# Patient Record
Sex: MALE | Race: WHITE | Employment: OTHER | URBAN - METROPOLITAN AREA
[De-identification: names, ages, dates, MRNs, and addresses within clinical notes are randomized per-mention and may not be internally consistent; named-entity substitution may affect disease eponyms.]

---

## 2017-11-27 ENCOUNTER — HOSPITAL ENCOUNTER (INPATIENT)
Facility: CLINIC | Age: 67
LOS: 2 days | Discharge: HOME | DRG: 871 | End: 2017-11-30
Attending: EMERGENCY MEDICINE | Admitting: INTERNAL MEDICINE
Payer: MEDICARE

## 2017-11-27 ENCOUNTER — APPOINTMENT (OUTPATIENT)
Dept: GENERAL RADIOLOGY | Facility: CLINIC | Age: 67
DRG: 871 | End: 2017-11-27
Attending: EMERGENCY MEDICINE
Payer: MEDICARE

## 2017-11-27 ENCOUNTER — TRANSFERRED RECORDS (OUTPATIENT)
Dept: HEALTH INFORMATION MANAGEMENT | Facility: CLINIC | Age: 67
End: 2017-11-27

## 2017-11-27 DIAGNOSIS — J18.9 COMMUNITY ACQUIRED PNEUMONIA OF LEFT LOWER LOBE OF LUNG: ICD-10-CM

## 2017-11-27 DIAGNOSIS — R79.89 ELEVATED TROPONIN: ICD-10-CM

## 2017-11-27 DIAGNOSIS — M33.20 POLYMYOSITIS (H): Primary | ICD-10-CM

## 2017-11-27 DIAGNOSIS — E11.29 TYPE 2 DIABETES MELLITUS WITH MICROALBUMINURIA, UNSPECIFIED LONG TERM INSULIN USE STATUS: ICD-10-CM

## 2017-11-27 DIAGNOSIS — R80.9 TYPE 2 DIABETES MELLITUS WITH MICROALBUMINURIA, UNSPECIFIED LONG TERM INSULIN USE STATUS: ICD-10-CM

## 2017-11-27 LAB
ALBUMIN SERPL-MCNC: 2.9 G/DL (ref 3.4–5)
ALP SERPL-CCNC: 96 U/L (ref 40–150)
ALT SERPL W P-5'-P-CCNC: 12 U/L (ref 0–70)
ANION GAP SERPL CALCULATED.3IONS-SCNC: 9 MMOL/L (ref 3–14)
AST SERPL W P-5'-P-CCNC: 15 U/L (ref 0–45)
BASOPHILS # BLD AUTO: 0 10E9/L (ref 0–0.2)
BASOPHILS NFR BLD AUTO: 0.2 %
BILIRUB SERPL-MCNC: 0.5 MG/DL (ref 0.2–1.3)
BUN SERPL-MCNC: 18 MG/DL (ref 7–30)
CALCIUM SERPL-MCNC: 9.7 MG/DL (ref 8.5–10.1)
CHLORIDE SERPL-SCNC: 102 MMOL/L (ref 94–109)
CO2 SERPL-SCNC: 24 MMOL/L (ref 20–32)
CREAT SERPL-MCNC: 1.13 MG/DL (ref 0.66–1.25)
DIFFERENTIAL METHOD BLD: ABNORMAL
EOSINOPHIL # BLD AUTO: 0 10E9/L (ref 0–0.7)
EOSINOPHIL NFR BLD AUTO: 0 %
ERYTHROCYTE [DISTWIDTH] IN BLOOD BY AUTOMATED COUNT: 15.9 % (ref 10–15)
FLUAV+FLUBV AG SPEC QL: NEGATIVE
FLUAV+FLUBV AG SPEC QL: NEGATIVE
GFR SERPL CREATININE-BSD FRML MDRD: 48 ML/MIN/1.7M2
GLUCOSE SERPL-MCNC: 130 MG/DL (ref 70–99)
HCT VFR BLD AUTO: 42.6 % (ref 40–53)
HGB BLD-MCNC: 14.1 G/DL (ref 13.3–17.7)
IMM GRANULOCYTES # BLD: 0.2 10E9/L (ref 0–0.4)
IMM GRANULOCYTES NFR BLD: 0.8 %
LACTATE SERPL-SCNC: 1.9 MMOL/L (ref 0.4–2)
LYMPHOCYTES # BLD AUTO: 0.7 10E9/L (ref 0.8–5.3)
LYMPHOCYTES NFR BLD AUTO: 2.6 %
MCH RBC QN AUTO: 30.7 PG (ref 26.5–33)
MCHC RBC AUTO-ENTMCNC: 33.1 G/DL (ref 31.5–36.5)
MCV RBC AUTO: 93 FL (ref 78–100)
MONOCYTES # BLD AUTO: 0.9 10E9/L (ref 0–1.3)
MONOCYTES NFR BLD AUTO: 3.6 %
NEUTROPHILS # BLD AUTO: 24.4 10E9/L (ref 1.6–8.3)
NEUTROPHILS NFR BLD AUTO: 92.8 %
NRBC # BLD AUTO: 0 10*3/UL
NRBC BLD AUTO-RTO: 0 /100
NT-PROBNP SERPL-MCNC: 2061 PG/ML (ref 0–900)
PLATELET # BLD AUTO: 317 10E9/L (ref 150–450)
POTASSIUM SERPL-SCNC: 4.1 MMOL/L (ref 3.4–5.3)
PROT SERPL-MCNC: 7.7 G/DL (ref 6.8–8.8)
RBC # BLD AUTO: 4.59 10E12/L (ref 4.4–5.9)
SODIUM SERPL-SCNC: 135 MMOL/L (ref 133–144)
SPECIMEN SOURCE: NORMAL
TROPONIN I SERPL-MCNC: 0.07 UG/L (ref 0–0.04)
WBC # BLD AUTO: 26.2 10E9/L (ref 4–11)

## 2017-11-27 PROCEDURE — 94640 AIRWAY INHALATION TREATMENT: CPT

## 2017-11-27 PROCEDURE — 87181 SC STD AGAR DILUTION PER AGT: CPT | Performed by: EMERGENCY MEDICINE

## 2017-11-27 PROCEDURE — 96365 THER/PROPH/DIAG IV INF INIT: CPT

## 2017-11-27 PROCEDURE — 87040 BLOOD CULTURE FOR BACTERIA: CPT | Performed by: EMERGENCY MEDICINE

## 2017-11-27 PROCEDURE — 87077 CULTURE AEROBIC IDENTIFY: CPT | Performed by: EMERGENCY MEDICINE

## 2017-11-27 PROCEDURE — 25000128 H RX IP 250 OP 636: Performed by: EMERGENCY MEDICINE

## 2017-11-27 PROCEDURE — 87804 INFLUENZA ASSAY W/OPTIC: CPT | Performed by: EMERGENCY MEDICINE

## 2017-11-27 PROCEDURE — 93005 ELECTROCARDIOGRAM TRACING: CPT

## 2017-11-27 PROCEDURE — 87185 SC STD ENZYME DETCJ PER NZM: CPT | Performed by: EMERGENCY MEDICINE

## 2017-11-27 PROCEDURE — 83880 ASSAY OF NATRIURETIC PEPTIDE: CPT | Performed by: EMERGENCY MEDICINE

## 2017-11-27 PROCEDURE — A9270 NON-COVERED ITEM OR SERVICE: HCPCS | Mod: GY | Performed by: EMERGENCY MEDICINE

## 2017-11-27 PROCEDURE — 71020 XR CHEST 2 VW: CPT

## 2017-11-27 PROCEDURE — 83605 ASSAY OF LACTIC ACID: CPT | Performed by: EMERGENCY MEDICINE

## 2017-11-27 PROCEDURE — 84145 PROCALCITONIN (PCT): CPT | Performed by: EMERGENCY MEDICINE

## 2017-11-27 PROCEDURE — 40000275 ZZH STATISTIC RCP TIME EA 10 MIN

## 2017-11-27 PROCEDURE — 85025 COMPLETE CBC W/AUTO DIFF WBC: CPT | Performed by: EMERGENCY MEDICINE

## 2017-11-27 PROCEDURE — 99285 EMERGENCY DEPT VISIT HI MDM: CPT | Mod: 25

## 2017-11-27 PROCEDURE — 25000132 ZZH RX MED GY IP 250 OP 250 PS 637: Mod: GY | Performed by: EMERGENCY MEDICINE

## 2017-11-27 PROCEDURE — 80053 COMPREHEN METABOLIC PANEL: CPT | Performed by: EMERGENCY MEDICINE

## 2017-11-27 PROCEDURE — 36415 COLL VENOUS BLD VENIPUNCTURE: CPT | Performed by: EMERGENCY MEDICINE

## 2017-11-27 PROCEDURE — 96375 TX/PRO/DX INJ NEW DRUG ADDON: CPT

## 2017-11-27 PROCEDURE — 87800 DETECT AGNT MULT DNA DIREC: CPT | Performed by: EMERGENCY MEDICINE

## 2017-11-27 PROCEDURE — 93005 ELECTROCARDIOGRAM TRACING: CPT | Mod: 76

## 2017-11-27 PROCEDURE — 84484 ASSAY OF TROPONIN QUANT: CPT | Performed by: EMERGENCY MEDICINE

## 2017-11-27 PROCEDURE — 25000125 ZZHC RX 250: Performed by: EMERGENCY MEDICINE

## 2017-11-27 PROCEDURE — 99215 OFFICE O/P EST HI 40 MIN: CPT | Performed by: INTERNAL MEDICINE

## 2017-11-27 RX ORDER — IPRATROPIUM BROMIDE AND ALBUTEROL SULFATE 2.5; .5 MG/3ML; MG/3ML
6 SOLUTION RESPIRATORY (INHALATION) ONCE
Status: COMPLETED | OUTPATIENT
Start: 2017-11-27 | End: 2017-11-27

## 2017-11-27 RX ORDER — ASPIRIN 325 MG
325 TABLET ORAL ONCE
Status: COMPLETED | OUTPATIENT
Start: 2017-11-27 | End: 2017-11-27

## 2017-11-27 RX ORDER — METHYLPREDNISOLONE SODIUM SUCCINATE 125 MG/2ML
125 INJECTION, POWDER, LYOPHILIZED, FOR SOLUTION INTRAMUSCULAR; INTRAVENOUS ONCE
Status: COMPLETED | OUTPATIENT
Start: 2017-11-27 | End: 2017-11-27

## 2017-11-27 RX ADMIN — SODIUM CHLORIDE, PRESERVATIVE FREE 1000 ML: 5 INJECTION INTRAVENOUS at 21:49

## 2017-11-27 RX ADMIN — ASPIRIN 325 MG ORAL TABLET 325 MG: 325 PILL ORAL at 23:11

## 2017-11-27 RX ADMIN — IPRATROPIUM BROMIDE AND ALBUTEROL SULFATE 6 ML: .5; 3 SOLUTION RESPIRATORY (INHALATION) at 22:14

## 2017-11-27 RX ADMIN — METHYLPREDNISOLONE SODIUM SUCCINATE 125 MG: 125 INJECTION, POWDER, FOR SOLUTION INTRAMUSCULAR; INTRAVENOUS at 22:12

## 2017-11-27 RX ADMIN — TAZOBACTAM SODIUM AND PIPERACILLIN SODIUM 4.5 G: 500; 4 INJECTION, SOLUTION INTRAVENOUS at 22:12

## 2017-11-27 RX ADMIN — AZITHROMYCIN MONOHYDRATE 500 MG: 500 INJECTION, POWDER, LYOPHILIZED, FOR SOLUTION INTRAVENOUS at 23:11

## 2017-11-27 ASSESSMENT — ENCOUNTER SYMPTOMS
NAUSEA: 0
WHEEZING: 1
SHORTNESS OF BREATH: 1
COUGH: 1
DIARRHEA: 0
FATIGUE: 1
FEVER: 0
VOMITING: 0

## 2017-11-27 NOTE — IP AVS SNAPSHOT
[place] [address] [facility]    [address]    [facility] [address]    Phone:  [phone]    Fax:  [phone]                                       After Visit Summary   11/27/2017    Beulah Jane    MRN: [id number]           After Visit Summary Signature Page     I have received my discharge instructions, and my questions have been answered. I have discussed any challenges I see with this plan with the nurse or doctor.    ..........................................................................................................................................  Patient/Patient Representative Signature      ..........................................................................................................................................  Patient Representative Print Name and Relationship to Patient    ..................................................               ................................................  Date                                            Time    ..........................................................................................................................................  Reviewed by Signature/Title    ...................................................              ..............................................  Date                                                            Time

## 2017-11-27 NOTE — IP AVS SNAPSHOT
MRN:[id number]                      After Visit Summary   11/27/2017    Beulah Jane    MRN: [id number]           Thank you!     Thank you for choosing [facility] for your care. Our goal is always to provide you with excellent care. Hearing back from our patients is one way we can continue to improve our services. Please take a few minutes to complete the written survey that you may receive in the mail after you visit. If you would like to speak to someone directly about your visit please contact Patient Relations at [phone]. Thank you!          Patient Information     Date Of Birth          1950        Designated Caregiver       Most Recent Value    Caregiver    Will someone help with your care after discharge? yes    Name of designated caregiver Shoaib Alexander    Phone number of caregiver [phone]    Caregiver address [place]      About your hospital stay     You were admitted on:  November 28, 2017 You last received care in the:  [facility]    You were discharged on:  November 30, 2017       Who to Call     For medical emergencies, please call 911.  For non-urgent questions about your medical care, please call your primary care provider or clinic, None          Attending Provider     Provider Specialty    Tomas Lucas MD Emergency Medicine    Renny Jameson MD Emergency Medicine    [facility] Internal Medicine       Primary Care Provider Fax #    Physician No Ref-Primary [phone]      Follow-up Appointments     Follow-up and recommended labs and tests        F/u with Dr. Jayro Bright at [facility] as scheduled on 12/5/17 at 9 am. This is to establish care and get set up for repeat CXR in 4 weeks to ensure resolution of your pneumonia.  You should also be set up for a stress test at that time to check your heart.  You will need to f/u on your diabetes as I have dropped your insulin  "down substantially based on your needs while hospitalized-I suspect your decreased insulin needs are due to weight loss.                  Further instructions from your care team       Follow up appt:  You have been established with a new Primary Care Physician at [facility].    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  EFFECTIVE NOW     Approved and electronically signed by:  Ivana Mayo MD             Admission Information     Date & Time Provider Department Dept. Phone    11/27/2017 Joseluis Diamond MD [place] [address] Medical Surgical [phone]      Your Vitals Were     Blood Pressure Pulse Temperature Respirations Height Weight    119/56 (BP Location: Left arm) 132 97  F (36.1  C) (Oral) 20 1.702 m (5' 7.01\") 76.7 kg (169 lb 3.2 oz)    Pulse Oximetry BMI (Body Mass Index)                93% 26.49 kg/m2          [employer] Interactive Information     [facility] [facility] lets you send messages to your doctor, view your test results, renew your prescriptions, schedule appointments and more. To sign up, go to [email or web address] . Click on \"Log in\" on the left side of the screen, which will take you to the Welcome page. Then click on \"Sign up Now\" on the right side of the page.     You will be asked to enter the access code listed below, as well as some personal information. Please follow the directions to create your username and " password.     Your access code is: [email or web address]  Expires: 2018 11:44 AM     Your access code will  in 90 days. If you need help or a new code, please call your [facility] clinic or [phone].        Care EveryWhere ID     This is your Care EveryWhere ID. This could be used by other organizations to access your [facility] medical records  [id number]        Equal Access to Services     LOIS LLOYD : Hadii herberth ku hadasho Soomaali, waaxda luqadaha, qaybta kaalmada adeegyada, waxcindy justinein sauln shanepetty ross javon . So [facility] [place] [phone].    ATENCIÓN: Si habla español, tiene a paul disposición servicios gratuitos de asistencia lingüística. Parisaame al [phone].    We comply with applicable federal civil rights laws and [place] laws. We do not discriminate on the basis of race, color, national origin, age, disability, sex, sexual orientation, or gender identity.               Review of your medicines      START taking        Dose / Directions    amoxicillin-clavulanate 875-125 MG per tablet   Commonly known as:  AUGMENTIN   Used for:  Community acquired pneumonia of left lower lobe of lung (H)   Notes to Patient:  This is for your pneumonia. You were being given zosyn IV antibiotic while in the hospital which you received this morning        Dose:  1 tablet   Take 1 tablet by mouth 2 times daily for 9 doses   Quantity:  9 tablet   Refills:  0       traMADol 50 MG tablet   Commonly known as:  ULTRAM   Used for:  Polymyositis (H)        Dose:  25 mg   Take 0.5 tablets (25 mg) by mouth every 6 hours as needed for moderate pain   Quantity:  20 tablet   Refills:  0         CONTINUE these medicines which may have CHANGED, or have new prescriptions. If we are uncertain of the size of tablets/capsules you have at home, strength may be listed as something that might have changed.        Dose / Directions    insulin isophane human 100 UNIT/ML injection   Commonly known as:  HumuLIN N PEN   This may have changed:    -  how much to take  - when to take this  - Another medication with the same name was removed. Continue taking this medication, and follow the directions you see here.   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 Make sure you understand how and when to take each.   Used for:  Community acquired pneumonia of left lower lobe of lung (H)        Dose:  40 mg   Start taking on:  12/1/2017   Take 2 tablets (40 mg) by mouth daily for 1 day   Quantity:  2 tablet   Refills:  0       * predniSONE 20 MG tablet   Commonly known as:  DELTASONE   This may have changed:  medication strength   Used for:  Polymyositis (H)        Dose:  20 mg   Start taking on:  12/2/2017   Take 1 tablet (20 mg) by mouth daily   Refills:  0       * Notice:  This list has 2 medication(s) that are the same as other medications prescribed for you. Read the directions carefully, and ask your doctor or other care provider to review them with you.      CONTINUE these medicines which have NOT CHANGED        Dose / Directions    * albuterol (2.5 MG/3ML) 0.083% neb solution   Notes to Patient:  You were given a different nebulizer treatment called a duoneb four times per day while in the hospital        Dose:  1 vial   Take 1 vial by nebulization every 6 hours as needed for shortness of breath / dyspnea or wheezing   Refills:  0       * albuterol 108 (90 BASE) MCG/ACT Inhaler   Commonly known as:  PROAIR HFA/PROVENTIL HFA/VENTOLIN HFA        Dose:  2 puff   Inhale 2 puffs into the lungs every 6 hours as needed for shortness of breath / dyspnea or wheezing   Refills:  0       beclomethasone 80 MCG/ACT Inhaler   Commonly known as:   QVAR   Notes to Patient:  You were receiving the Arnuity Ellipta inhaler while hospitalized in place of Qvar.        Dose:  2 puff   Inhale 2 puffs into the lungs 2 times daily   Refills:  0       FOLIC ACID PO        Dose:  1 mg   Take 1 mg by mouth daily   Refills:  0       GABAPENTIN PO        Dose:  600 mg   Take 600 mg by mouth 3 times daily   Refills:  0       GEMFIBROZIL PO        Dose:  600 mg   Take 600 mg by mouth 2 times daily   Refills:  0       loratadine 10 MG tablet   Commonly known as:  CLARITIN        Dose:  10 mg   Take 10 mg by mouth daily   Refills:  0       METHOTREXATE PO        Dose:  7.5 mg   Take 7.5 mg by mouth once a week   Refills:  0       omega 3 1000 MG Caps        Dose:  2 capsule   Take 2 capsules by mouth 2 times daily   Refills:  0       theophylline 400 MG 24 hr tablet   Commonly known as:  UNIPHYL        Dose:  400 mg   Take 400 mg by mouth daily   Refills:  0       * Notice:  This list has 2 medication(s) that are the same as other medications prescribed for you. Read the directions carefully, and ask your doctor or other care provider to review them with you.      STOP taking     budesonide 0.5 MG/2ML neb solution   Commonly known as:  PULMICORT           FUROSEMIDE PO           PREVACID PO           verapamil 120 MG 24 hr capsule   Commonly known as:  VERELAN                Where to get your medicines      These medications were sent to [facility] [place] [address] [address] [address]     Phone:  [phone]     predniSONE 20 MG tablet         These medications were sent to [facility] [address] [place] [address] [address] AT [place] [address] & [facility]  [address] [facility] [address]     Phone:  [phone]     amoxicillin-clavulanate 875-125 MG per tablet         Some of these will need a paper prescription and others can be bought over the counter. Ask your nurse if you  have questions.     Bring a paper prescription for each of these medications     traMADol 50 MG tablet       You don't need a prescription for these medications     insulin isophane human 100 UNIT/ML injection    predniSONE 20 MG tablet               ANTIBIOTIC INSTRUCTION     You've Been Prescribed an Antibiotic - Now What?  Your healthcare team thinks that you or your loved one might have an infection. Some infections can be treated with antibiotics, which are powerful, life-saving drugs. Like all medications, antibiotics have side effects and should only be used when necessary. There are some important things you should know about your antibiotic treatment.      Your healthcare team may run tests before you start taking an antibiotic.    Your team may take samples (e.g., from your blood, urine or other areas) to run tests to look for bacteria. These test can be important to determine if you need an antibiotic at all and, if you do, which antibiotic will work best.      Within a few days, your healthcare team might change or even stop your antibiotic.    Your team may start you on an antibiotic while they are working to find out what is making you sick.    Your team might change your antibiotic because test results show that a different antibiotic would be better to treat your infection.    In some cases, once your team has more information, they learn that you do not need an antibiotic at all. They may find out that you don't have an infection, or that the antibiotic you're taking won't work against your infection. For example, an infection caused by a virus can't be treated with antibiotics. Staying on an antibiotic when you don't need it is more likely to be harmful than helpful.      You may experience side effects from your antibiotic.    Like all medications, antibiotics have side effects. Some of these can be serious.    Let you healthcare team know if you have any known allergies when you are admitted to  the hospital.    One significant side effect of nearly all antibiotics is the risk of severe and sometimes deadly diarrhea caused by Clostridium difficile (C. Difficile). This occurs when a person takes antibiotics because some good germs are destroyed. Antibiotic use allows C. diificile to take over, putting patients at high risk for this serious infection.    As a patient or caregiver, it is important to understand your or your loved one's antibiotic treatment. It is especially important for caregivers to speak up when patients can't speak for themselves. Here are some important questions to ask your healthcare team.    What infection is this antibiotic treating and how do you know I have that infection?    What side effects might occur from this antibiotic?    How long will I need to take this antibiotic?    Is it safe to take this antibiotic with other medications or supplements (e.g., vitamins) that I am taking?     Are there any special directions I need to know about taking this antibiotic? For example, should I take it with food?    How will I be monitored to know whether my infection is responding to the antibiotic?    What tests may help to make sure the right antibiotic is prescribed for me?      Information provided by:  www.cdc.gov/getsmart  U.S. Department of Health and Human Services  Centers for disease Control and Prevention  National Center for Emerging and Zoonotic Infectious Diseases  Division of Healthcare Quality Promotion         Protect others around you: Learn how to safely use, store and throw away your medicines at www.disposemymeds.org.             Medication List: This is a list of all your medications and when to take them. Check marks below indicate your daily home schedule. Keep this list as a reference.      Medications           Morning Afternoon Evening Bedtime As Needed    * albuterol (2.5 MG/3ML) 0.083% neb solution   Take 1 vial by nebulization every 6 hours as needed for  shortness of breath / dyspnea or wheezing   Next Dose Due:  May take as directed.    Notes to Patient:  You were given a different nebulizer treatment called a duoneb four times per day while in the hospital                                   * albuterol 108 (90 BASE) MCG/ACT Inhaler   Commonly known as:  PROAIR HFA/PROVENTIL HFA/VENTOLIN HFA   Inhale 2 puffs into the lungs every 6 hours as needed for shortness of breath / dyspnea or wheezing   Next Dose Due:  You did not take this medication while in the hospital. May resume as directed                                amoxicillin-clavulanate 875-125 MG per tablet   Commonly known as:  AUGMENTIN   Take 1 tablet by mouth 2 times daily for 9 doses   Next Dose Due:  Begin taking this evening and continue to take until completed   Notes to Patient:  This is for your pneumonia. You were being given zosyn IV antibiotic while in the hospital which you received this morning                                      beclomethasone 80 MCG/ACT Inhaler   Commonly known as:  QVAR   Inhale 2 puffs into the lungs 2 times daily   Next Dose Due:  Today 11/30 in the evening   Notes to Patient:  You were receiving the Arnuity Ellipta inhaler while hospitalized in place of Qvar.                                      FOLIC ACID PO   Take 1 mg by mouth daily   Last time this was given:  1 mg on 11/30/2017  9:04 AM   Next Dose Due:  Tomorrow 12/1 in the morning                                   GABAPENTIN PO   Take 600 mg by mouth 3 times daily   Last time this was given:  600 mg on 11/30/2017  9:04 AM                                         GEMFIBROZIL PO   Take 600 mg by mouth 2 times daily   Last time this was given:  600 mg on 11/30/2017  9:04 AM   Next Dose Due:  Today 11/30 in the evening                                      insulin isophane human 100 UNIT/ML injection   Commonly known as:  HumuLIN N PEN   Inject 6 Units Subcutaneous 2 times daily (before meals)   Next Dose Due:  You did  not take this medication while in the hospital. May resume as directed                                      loratadine 10 MG tablet   Commonly known as:  CLARITIN   Take 10 mg by mouth daily   Next Dose Due:  You did not take this medication while in the hospital. May resume as directed                                METHOTREXATE PO   Take 7.5 mg by mouth once a week   Next Dose Due:  You did not take this medication while in the hospital. May resume as directed                                omega 3 1000 MG Caps   Take 2 capsules by mouth 2 times daily   Next Dose Due:  You did not take this medication while in the hospital. May resume as directed                                * predniSONE 20 MG tablet   Commonly known as:  DELTASONE   Take 2 tablets (40 mg) by mouth daily for 1 day   Start taking on:  12/1/2017   Last time this was given:  40 mg on 11/30/2017  9:04 AM   Next Dose Due:  Tomorrow 12/1 in the morning                                   * predniSONE 20 MG tablet   Commonly known as:  DELTASONE   Take 1 tablet (20 mg) by mouth daily   Start taking on:  12/2/2017   Last time this was given:  40 mg on 11/30/2017  9:04 AM   Next Dose Due:  12/2 in the morning                                   theophylline 400 MG 24 hr tablet   Commonly known as:  UNIPHYL   Take 400 mg by mouth daily   Next Dose Due:  You did not take this medication while in the hospital. May resume as directed                                traMADol 50 MG tablet   Commonly known as:  ULTRAM   Take 0.5 tablets (25 mg) by mouth every 6 hours as needed for moderate pain   Last time this was given:  25 mg on 11/30/2017  9:09 AM                                * Notice:  This list has 4 medication(s) that are the same as other medications prescribed for you. Read the directions carefully, and ask your doctor or other care provider to review them with you.              More Information        Pneumonia (Adult)  Pneumonia is an infection deep  within the lungs. It is in the small air sacs (alveoli). Pneumonia may be caused by a virus or bacteria. Pneumonia caused by bacteria is usually treated with an antibiotic. Severe cases may need to be treated in the hospital. Milder cases can be treated at home. Symptoms usually start to get better during the first 2 days of treatment.    Home care  Follow these guidelines when caring for yourself at home:    Rest at home for the first 2 to 3 days, or until you feel stronger. Don t let yourself get overly tired when you go back to your activities.    Stay away from cigarette smoke - yours or other people s.    You may use acetaminophen or ibuprofen to control fever or pain, unless another medicine was prescribed. If you have chronic liver or kidney disease, talk with your healthcare provider before using these medicines. Also talk with your provider if you ve had a stomach ulcer or gastrointestinal bleeding. Don t give aspirin to anyone younger than 18 years of age who is ill with a fever. It may cause severe liver damage.    Your appetite may be poor, so a light diet is fine.    Drink 6 to 8 glasses of fluids every day to make sure you are getting enough fluids. Beverages can include water, sport drinks, sodas without caffeine, juices, tea, or soup. Fluids will help loosen secretions in the lung. This will make it easier for you to cough up the phlegm (sputum). If you also have heart or kidney disease, check with your healthcare provider before you drink extra fluids.    Take antibiotic medicine prescribed until it is all gone, even if you are feeling better after a few days.  Follow-up care  Follow up with your healthcare provider in the next 2 to 3 days, or as advised. This is to be sure the medicine is helping you get better.  If you are 65 or older, you should get a pneumococcal vaccine and a yearly flu (influenza) shot. You should also get these vaccines if you have chronic lung disease like asthma, emphysema,  or COPD. Recently, a second type of pneumonia vaccine has become available for everyone over 65 years old. This is in addition to the previous vaccine. Ask your provider about this.  When to seek medical advice  Call your healthcare provider right away if any of these occur:    You don t get better within the first 48 hours of treatment    Shortness of breath gets worse    Rapid breathing (more than 25 breaths per minute)    Coughing up blood    Chest pain gets worse with breathing    Fever of 100.4 F (38 C) or higher that doesn t get better with fever medicine    Weakness, dizziness, or fainting that gets worse    Thirst or dry mouth that gets worse    Sinus pain, headache, or a stiff neck    Chest pain not caused by coughing  Date Last Reviewed: 1/1/2017 2000-2017 [employer]. [address] [place] [address], [place] [address]. All rights reserved. This information is not intended as a substitute for professional medical care. Always follow your healthcare professional's instructions.                Preventing Pneumonia  Pneumonia is an infection in one or both of the lungs. Those most at risk include older adults, smokers, and people with chronic lung diseases. For example, those with conditions like chronic obstructive pulmonary disease (COPD), including emphysema or chronic bronchitis, asthma, and those with weak immune systems. There are some things you can do to lessen the chance that you will get pneumonia.    Avoid infection    Wash your hands often:    Use soap and warm water.    If soap and water aren't available, use a hand  with alcohol in it.    Avoid touching your face and mouth with your hands.    Use disposable tissues instead of a handkerchief. Throw used tissues away.    Avoid people who have a cold or the flu.    Try to avoid crowded places.  Get vaccinated  Talk with your healthcare provider about vaccinations and whether you should get a yearly flu shot. There are now 2 different  pneumonia vaccines. Both of these are needed if you have a chronic disease or fit into the higher risk category.      Get a flu shot every year as soon as it's available in your area. The flu shot helps prevent you from getting the flu and complications of the flu, such as pneumonia.    Get pneumococcal pneumonia vaccines. Talk with your healthcare provider about which pneumococcal vaccines are right for you.  Do suggested breathing exercises  Deep breathing and coughing exercises can help clear your lungs. Your healthcare provider may suggest them. If so, you will be shown how to do them. Do them as often as your healthcare provider instructs.  Take care of your body    Drink at least 6 to 8 glasses of water a day.    Eat well-balanced, healthy meals.    Avoid drinking alcohol.    Don t smoke. Avoid places where people are smoking.    Moving around helps keep your lungs clear. Ask your healthcare provider what type of activity is best for you. Walking is often a good choice.    Get enough rest. Sleep at least 8 hours each night. Rest or nap during the day as needed.    Ask your healthcare provider when you should schedule follow-up care to make sure the infection is gone.  Date Last Reviewed: 12/1/2016 2000-2017 [employer]. [address] [place] [address], [place] [address]. All rights reserved. This information is not intended as a substitute for professional medical care. Always follow your healthcare professional's instructions.

## 2017-11-28 PROBLEM — J96.01 ACUTE HYPOXEMIC RESPIRATORY FAILURE (H): Status: ACTIVE | Noted: 2017-11-28

## 2017-11-28 LAB
ANION GAP SERPL CALCULATED.3IONS-SCNC: 9 MMOL/L (ref 3–14)
BASOPHILS # BLD AUTO: 0 10E9/L (ref 0–0.2)
BASOPHILS NFR BLD AUTO: 0.1 %
BUN SERPL-MCNC: 17 MG/DL (ref 7–30)
CALCIUM SERPL-MCNC: 9 MG/DL (ref 8.5–10.1)
CHLORIDE SERPL-SCNC: 105 MMOL/L (ref 94–109)
CO2 SERPL-SCNC: 24 MMOL/L (ref 20–32)
CREAT SERPL-MCNC: 0.91 MG/DL (ref 0.66–1.25)
DIFFERENTIAL METHOD BLD: ABNORMAL
EOSINOPHIL # BLD AUTO: 0 10E9/L (ref 0–0.7)
EOSINOPHIL NFR BLD AUTO: 0 %
ERYTHROCYTE [DISTWIDTH] IN BLOOD BY AUTOMATED COUNT: 15.9 % (ref 10–15)
FLUAV+FLUBV RNA SPEC QL NAA+PROBE: NEGATIVE
FLUAV+FLUBV RNA SPEC QL NAA+PROBE: NEGATIVE
GFR SERPL CREATININE-BSD FRML MDRD: 83 ML/MIN/1.7M2
GLUCOSE BLDC GLUCOMTR-MCNC: 143 MG/DL (ref 70–99)
GLUCOSE BLDC GLUCOMTR-MCNC: 188 MG/DL (ref 70–99)
GLUCOSE BLDC GLUCOMTR-MCNC: 203 MG/DL (ref 70–99)
GLUCOSE BLDC GLUCOMTR-MCNC: 207 MG/DL (ref 70–99)
GLUCOSE BLDC GLUCOMTR-MCNC: 261 MG/DL (ref 70–99)
GLUCOSE SERPL-MCNC: 243 MG/DL (ref 70–99)
GRAM STN SPEC: NORMAL
HBA1C MFR BLD: 6.2 % (ref 4.3–6)
HCT VFR BLD AUTO: 36.1 % (ref 40–53)
HGB BLD-MCNC: 11.5 G/DL (ref 13.3–17.7)
IMM GRANULOCYTES # BLD: 0.1 10E9/L (ref 0–0.4)
IMM GRANULOCYTES NFR BLD: 0.7 %
INTERPRETATION ECG - MUSE: NORMAL
INTERPRETATION ECG - MUSE: NORMAL
LACTATE BLD-SCNC: 2.6 MMOL/L (ref 0.7–2)
LACTATE BLD-SCNC: 2.7 MMOL/L (ref 0.7–2)
LDH SERPL L TO P-CCNC: 255 U/L (ref 85–227)
LYMPHOCYTES # BLD AUTO: 0.5 10E9/L (ref 0.8–5.3)
LYMPHOCYTES NFR BLD AUTO: 3.3 %
MAGNESIUM SERPL-MCNC: 2.6 MG/DL (ref 1.6–2.3)
MCH RBC QN AUTO: 30.3 PG (ref 26.5–33)
MCHC RBC AUTO-ENTMCNC: 31.9 G/DL (ref 31.5–36.5)
MCV RBC AUTO: 95 FL (ref 78–100)
MONOCYTES # BLD AUTO: 0.4 10E9/L (ref 0–1.3)
MONOCYTES NFR BLD AUTO: 2.3 %
NEUTROPHILS # BLD AUTO: 14 10E9/L (ref 1.6–8.3)
NEUTROPHILS NFR BLD AUTO: 93.6 %
NRBC # BLD AUTO: 0 10*3/UL
NRBC BLD AUTO-RTO: 0 /100
PLATELET # BLD AUTO: 255 10E9/L (ref 150–450)
POTASSIUM SERPL-SCNC: 4.2 MMOL/L (ref 3.4–5.3)
PROCALCITONIN SERPL-MCNC: 8.1 NG/ML
RBC # BLD AUTO: 3.79 10E12/L (ref 4.4–5.9)
RSV RNA SPEC NAA+PROBE: NEGATIVE
SODIUM SERPL-SCNC: 138 MMOL/L (ref 133–144)
SPECIMEN SOURCE: NORMAL
SPECIMEN SOURCE: NORMAL
TROPONIN I SERPL-MCNC: 0.02 UG/L (ref 0–0.04)
WBC # BLD AUTO: 15 10E9/L (ref 4–11)

## 2017-11-28 PROCEDURE — 84484 ASSAY OF TROPONIN QUANT: CPT | Performed by: INTERNAL MEDICINE

## 2017-11-28 PROCEDURE — 36415 COLL VENOUS BLD VENIPUNCTURE: CPT | Performed by: INTERNAL MEDICINE

## 2017-11-28 PROCEDURE — 83735 ASSAY OF MAGNESIUM: CPT | Performed by: INTERNAL MEDICINE

## 2017-11-28 PROCEDURE — 83036 HEMOGLOBIN GLYCOSYLATED A1C: CPT | Performed by: INTERNAL MEDICINE

## 2017-11-28 PROCEDURE — A9270 NON-COVERED ITEM OR SERVICE: HCPCS | Mod: GY | Performed by: INTERNAL MEDICINE

## 2017-11-28 PROCEDURE — 94640 AIRWAY INHALATION TREATMENT: CPT | Mod: 76

## 2017-11-28 PROCEDURE — 83605 ASSAY OF LACTIC ACID: CPT | Performed by: INTERNAL MEDICINE

## 2017-11-28 PROCEDURE — 87205 SMEAR GRAM STAIN: CPT | Performed by: INTERNAL MEDICINE

## 2017-11-28 PROCEDURE — 83615 LACTATE (LD) (LDH) ENZYME: CPT | Performed by: INTERNAL MEDICINE

## 2017-11-28 PROCEDURE — 85025 COMPLETE CBC W/AUTO DIFF WBC: CPT | Performed by: INTERNAL MEDICINE

## 2017-11-28 PROCEDURE — 00000146 ZZHCL STATISTIC GLUCOSE BY METER IP

## 2017-11-28 PROCEDURE — 40000274 ZZH STATISTIC RCP CONSULT EA 30 MIN

## 2017-11-28 PROCEDURE — 80048 BASIC METABOLIC PNL TOTAL CA: CPT | Performed by: INTERNAL MEDICINE

## 2017-11-28 PROCEDURE — 25000131 ZZH RX MED GY IP 250 OP 636 PS 637: Mod: GY | Performed by: INTERNAL MEDICINE

## 2017-11-28 PROCEDURE — 94640 AIRWAY INHALATION TREATMENT: CPT

## 2017-11-28 PROCEDURE — 25000132 ZZH RX MED GY IP 250 OP 250 PS 637: Mod: GY | Performed by: INTERNAL MEDICINE

## 2017-11-28 PROCEDURE — 87070 CULTURE OTHR SPECIMN AEROBIC: CPT | Performed by: INTERNAL MEDICINE

## 2017-11-28 PROCEDURE — 25000125 ZZHC RX 250: Performed by: INTERNAL MEDICINE

## 2017-11-28 PROCEDURE — 12000007 ZZH R&B INTERMEDIATE

## 2017-11-28 PROCEDURE — 87449 NOS EACH ORGANISM AG IA: CPT | Performed by: INTERNAL MEDICINE

## 2017-11-28 PROCEDURE — 40000275 ZZH STATISTIC RCP TIME EA 10 MIN

## 2017-11-28 PROCEDURE — 87631 RESP VIRUS 3-5 TARGETS: CPT | Performed by: INTERNAL MEDICINE

## 2017-11-28 PROCEDURE — 25000128 H RX IP 250 OP 636: Performed by: INTERNAL MEDICINE

## 2017-11-28 PROCEDURE — 99233 SBSQ HOSP IP/OBS HIGH 50: CPT | Performed by: INTERNAL MEDICINE

## 2017-11-28 RX ORDER — POTASSIUM CHLORIDE 1500 MG/1
20-40 TABLET, EXTENDED RELEASE ORAL
Status: DISCONTINUED | OUTPATIENT
Start: 2017-11-28 | End: 2017-11-30 | Stop reason: HOSPADM

## 2017-11-28 RX ORDER — ACETAMINOPHEN 325 MG/1
650 TABLET ORAL EVERY 4 HOURS PRN
Status: DISCONTINUED | OUTPATIENT
Start: 2017-11-28 | End: 2017-11-30 | Stop reason: HOSPADM

## 2017-11-28 RX ORDER — GABAPENTIN 100 MG/1
200 CAPSULE ORAL 3 TIMES DAILY
Status: DISCONTINUED | OUTPATIENT
Start: 2017-11-28 | End: 2017-11-29

## 2017-11-28 RX ORDER — AMOXICILLIN 250 MG
1 CAPSULE ORAL 2 TIMES DAILY PRN
Status: DISCONTINUED | OUTPATIENT
Start: 2017-11-28 | End: 2017-11-30 | Stop reason: HOSPADM

## 2017-11-28 RX ORDER — PREDNISONE 20 MG/1
40 TABLET ORAL DAILY
Status: DISCONTINUED | OUTPATIENT
Start: 2017-11-28 | End: 2017-11-30 | Stop reason: HOSPADM

## 2017-11-28 RX ORDER — ONDANSETRON 2 MG/ML
4 INJECTION INTRAMUSCULAR; INTRAVENOUS EVERY 6 HOURS PRN
Status: DISCONTINUED | OUTPATIENT
Start: 2017-11-28 | End: 2017-11-30 | Stop reason: HOSPADM

## 2017-11-28 RX ORDER — POTASSIUM CHLORIDE 1.5 G/1.58G
20-40 POWDER, FOR SOLUTION ORAL
Status: DISCONTINUED | OUTPATIENT
Start: 2017-11-28 | End: 2017-11-30 | Stop reason: HOSPADM

## 2017-11-28 RX ORDER — GEMFIBROZIL 600 MG/1
600 TABLET, FILM COATED ORAL 2 TIMES DAILY
Status: ON HOLD | COMMUNITY
End: 2019-01-01

## 2017-11-28 RX ORDER — POTASSIUM CHLORIDE 7.45 MG/ML
10 INJECTION INTRAVENOUS
Status: DISCONTINUED | OUTPATIENT
Start: 2017-11-28 | End: 2017-11-30 | Stop reason: HOSPADM

## 2017-11-28 RX ORDER — HYDROMORPHONE HCL/0.9% NACL/PF 0.2MG/0.2
0.2 SYRINGE (ML) INTRAVENOUS
Status: DISCONTINUED | OUTPATIENT
Start: 2017-11-28 | End: 2017-11-30 | Stop reason: HOSPADM

## 2017-11-28 RX ORDER — BUDESONIDE 0.5 MG/2ML
0.5 INHALANT ORAL 2 TIMES DAILY
Status: ON HOLD | COMMUNITY
End: 2017-11-30

## 2017-11-28 RX ORDER — MAGNESIUM SULFATE HEPTAHYDRATE 40 MG/ML
4 INJECTION, SOLUTION INTRAVENOUS EVERY 4 HOURS PRN
Status: DISCONTINUED | OUTPATIENT
Start: 2017-11-28 | End: 2017-11-30 | Stop reason: HOSPADM

## 2017-11-28 RX ORDER — BISACODYL 10 MG
10 SUPPOSITORY, RECTAL RECTAL DAILY PRN
Status: DISCONTINUED | OUTPATIENT
Start: 2017-11-28 | End: 2017-11-30 | Stop reason: HOSPADM

## 2017-11-28 RX ORDER — GABAPENTIN 600 MG/1
600 TABLET ORAL 3 TIMES DAILY
COMMUNITY
End: 2019-01-01

## 2017-11-28 RX ORDER — IPRATROPIUM BROMIDE AND ALBUTEROL SULFATE 2.5; .5 MG/3ML; MG/3ML
3 SOLUTION RESPIRATORY (INHALATION)
Status: DISCONTINUED | OUTPATIENT
Start: 2017-11-28 | End: 2017-11-28

## 2017-11-28 RX ORDER — FOLIC ACID 1 MG/1
1 TABLET ORAL EVERY MORNING
COMMUNITY
End: 2019-01-01

## 2017-11-28 RX ORDER — AMOXICILLIN 250 MG
2 CAPSULE ORAL 2 TIMES DAILY PRN
Status: DISCONTINUED | OUTPATIENT
Start: 2017-11-28 | End: 2017-11-30 | Stop reason: HOSPADM

## 2017-11-28 RX ORDER — IPRATROPIUM BROMIDE AND ALBUTEROL SULFATE 2.5; .5 MG/3ML; MG/3ML
3 SOLUTION RESPIRATORY (INHALATION)
Status: DISCONTINUED | OUTPATIENT
Start: 2017-11-28 | End: 2017-11-30 | Stop reason: HOSPADM

## 2017-11-28 RX ORDER — POTASSIUM CL/LIDO/0.9 % NACL 10MEQ/0.1L
10 INTRAVENOUS SOLUTION, PIGGYBACK (ML) INTRAVENOUS
Status: DISCONTINUED | OUTPATIENT
Start: 2017-11-28 | End: 2017-11-30 | Stop reason: HOSPADM

## 2017-11-28 RX ORDER — NALOXONE HYDROCHLORIDE 0.4 MG/ML
.1-.4 INJECTION, SOLUTION INTRAMUSCULAR; INTRAVENOUS; SUBCUTANEOUS
Status: DISCONTINUED | OUTPATIENT
Start: 2017-11-28 | End: 2017-11-30 | Stop reason: HOSPADM

## 2017-11-28 RX ORDER — FOLIC ACID 1 MG/1
1 TABLET ORAL DAILY
Status: DISCONTINUED | OUTPATIENT
Start: 2017-11-28 | End: 2017-11-30 | Stop reason: HOSPADM

## 2017-11-28 RX ORDER — ALBUTEROL SULFATE 0.83 MG/ML
1 SOLUTION RESPIRATORY (INHALATION) EVERY 6 HOURS PRN
COMMUNITY

## 2017-11-28 RX ORDER — DEXTROSE MONOHYDRATE 25 G/50ML
25-50 INJECTION, SOLUTION INTRAVENOUS
Status: DISCONTINUED | OUTPATIENT
Start: 2017-11-28 | End: 2017-11-30 | Stop reason: HOSPADM

## 2017-11-28 RX ORDER — OMEGA-3 FATTY ACIDS/FISH OIL 300-1000MG
2 CAPSULE ORAL EVERY MORNING
COMMUNITY

## 2017-11-28 RX ORDER — NICOTINE POLACRILEX 4 MG
15-30 LOZENGE BUCCAL
Status: DISCONTINUED | OUTPATIENT
Start: 2017-11-28 | End: 2017-11-30 | Stop reason: HOSPADM

## 2017-11-28 RX ORDER — OXYCODONE HYDROCHLORIDE 5 MG/1
5-10 TABLET ORAL
Status: DISCONTINUED | OUTPATIENT
Start: 2017-11-28 | End: 2017-11-29

## 2017-11-28 RX ORDER — IPRATROPIUM BROMIDE AND ALBUTEROL SULFATE 2.5; .5 MG/3ML; MG/3ML
3 SOLUTION RESPIRATORY (INHALATION) EVERY 4 HOURS PRN
Status: DISCONTINUED | OUTPATIENT
Start: 2017-11-28 | End: 2017-11-30 | Stop reason: HOSPADM

## 2017-11-28 RX ORDER — THEOPHYLLINE 400 MG/1
200 TABLET, EXTENDED RELEASE ORAL EVERY MORNING
COMMUNITY
End: 2019-01-01

## 2017-11-28 RX ORDER — POTASSIUM CHLORIDE 29.8 MG/ML
20 INJECTION INTRAVENOUS
Status: DISCONTINUED | OUTPATIENT
Start: 2017-11-28 | End: 2017-11-30 | Stop reason: HOSPADM

## 2017-11-28 RX ORDER — ALBUTEROL SULFATE 90 UG/1
2 AEROSOL, METERED RESPIRATORY (INHALATION) PRN
Status: ON HOLD | COMMUNITY
End: 2019-01-01

## 2017-11-28 RX ORDER — ONDANSETRON 4 MG/1
4 TABLET, ORALLY DISINTEGRATING ORAL EVERY 6 HOURS PRN
Status: DISCONTINUED | OUTPATIENT
Start: 2017-11-28 | End: 2017-11-30 | Stop reason: HOSPADM

## 2017-11-28 RX ORDER — LORATADINE 10 MG/1
10 TABLET ORAL DAILY
COMMUNITY
End: 2018-06-29

## 2017-11-28 RX ORDER — VERAPAMIL HYDROCHLORIDE 120 MG/1
120 CAPSULE, EXTENDED RELEASE ORAL AT BEDTIME
Status: ON HOLD | COMMUNITY
End: 2017-11-30

## 2017-11-28 RX ADMIN — SODIUM CHLORIDE 500 ML: 9 INJECTION, SOLUTION INTRAVENOUS at 10:54

## 2017-11-28 RX ADMIN — TAZOBACTAM SODIUM AND PIPERACILLIN SODIUM 3.38 G: 375; 3 INJECTION, SOLUTION INTRAVENOUS at 16:32

## 2017-11-28 RX ADMIN — IPRATROPIUM BROMIDE AND ALBUTEROL SULFATE 3 ML: .5; 3 SOLUTION RESPIRATORY (INHALATION) at 15:41

## 2017-11-28 RX ADMIN — SODIUM CHLORIDE 500 ML: 9 INJECTION, SOLUTION INTRAVENOUS at 02:38

## 2017-11-28 RX ADMIN — TAZOBACTAM SODIUM AND PIPERACILLIN SODIUM 3.38 G: 375; 3 INJECTION, SOLUTION INTRAVENOUS at 10:24

## 2017-11-28 RX ADMIN — INSULIN ASPART 3 UNITS: 100 INJECTION, SOLUTION INTRAVENOUS; SUBCUTANEOUS at 09:21

## 2017-11-28 RX ADMIN — OXYCODONE HYDROCHLORIDE 10 MG: 5 TABLET ORAL at 22:41

## 2017-11-28 RX ADMIN — IPRATROPIUM BROMIDE AND ALBUTEROL SULFATE 3 ML: .5; 3 SOLUTION RESPIRATORY (INHALATION) at 07:45

## 2017-11-28 RX ADMIN — GABAPENTIN 200 MG: 100 CAPSULE ORAL at 16:30

## 2017-11-28 RX ADMIN — IPRATROPIUM BROMIDE AND ALBUTEROL SULFATE 3 ML: .5; 3 SOLUTION RESPIRATORY (INHALATION) at 20:08

## 2017-11-28 RX ADMIN — FOLIC ACID 1 MG: 1 TABLET ORAL at 16:30

## 2017-11-28 RX ADMIN — AZITHROMYCIN MONOHYDRATE 500 MG: 500 INJECTION, POWDER, LYOPHILIZED, FOR SOLUTION INTRAVENOUS at 22:51

## 2017-11-28 RX ADMIN — GABAPENTIN 200 MG: 100 CAPSULE ORAL at 22:24

## 2017-11-28 RX ADMIN — INSULIN ASPART 1 UNITS: 100 INJECTION, SOLUTION INTRAVENOUS; SUBCUTANEOUS at 17:30

## 2017-11-28 RX ADMIN — PREDNISONE 40 MG: 20 TABLET ORAL at 09:20

## 2017-11-28 RX ADMIN — OXYCODONE HYDROCHLORIDE 5 MG: 5 TABLET ORAL at 17:28

## 2017-11-28 RX ADMIN — TAZOBACTAM SODIUM AND PIPERACILLIN SODIUM 3.38 G: 375; 3 INJECTION, SOLUTION INTRAVENOUS at 04:50

## 2017-11-28 RX ADMIN — TAZOBACTAM SODIUM AND PIPERACILLIN SODIUM 3.38 G: 375; 3 INJECTION, SOLUTION INTRAVENOUS at 22:24

## 2017-11-28 RX ADMIN — INSULIN ASPART 2 UNITS: 100 INJECTION, SOLUTION INTRAVENOUS; SUBCUTANEOUS at 13:05

## 2017-11-28 RX ADMIN — INSULIN GLARGINE 16 UNITS: 100 INJECTION, SOLUTION SUBCUTANEOUS at 09:20

## 2017-11-28 RX ADMIN — IPRATROPIUM BROMIDE AND ALBUTEROL SULFATE 3 ML: .5; 3 SOLUTION RESPIRATORY (INHALATION) at 11:54

## 2017-11-28 ASSESSMENT — ACTIVITIES OF DAILY LIVING (ADL)
ADLS_ACUITY_SCORE: 15
ADLS_ACUITY_SCORE: 11
ADLS_ACUITY_SCORE: 15
ADLS_ACUITY_SCORE: 9
ADLS_ACUITY_SCORE: 9

## 2017-11-28 NOTE — ED NOTES
Patient sent from [facility] for SOB for about 3 weeks that has gotten worse in the past week & diagnosed with Pneumonia of both lungs per imaging reports. Patient has history of pulmonary fibrosis on MTX.  Patient oxygen level at 88% with activity the at rest oxygen level 94% room air. ABC's intact.
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
## 2017-11-28 NOTE — PROGRESS NOTES
[facility]    Sepsis Evaluation Progress Note    Date of Service: 11/28/2017    I was called to see Beulah Jane due to abnormal vital signs triggering the Sepsis SIRS screening alert. He is known to have an infection.     Physical Exam    Vital Signs:  Temp: 96.4  F (35.8  C) Temp src: Oral BP: 137/68 Pulse: 132 Heart Rate: 102 Resp: 14 SpO2: 94 % O2 Device: None (Room air) Oxygen Delivery: 1 LPM    Lab:  Lactic Acid   Date Value Ref Range Status   11/28/2017 2.6 (H) 0.7 - 2.0 mmol/L Final       The patient is at baseline mental status.    The rest of their physical exam is significant for improved since admission.    Assessment and Plan    The SIRS and exam findings are likely due to   sepsis.     ID: The patient is currently on the following antibiotics:  Anti-infectives (Future)    Start     Dose/Rate Route Frequency Ordered Stop    11/28/17 2200  azithromycin (ZITHROMAX) 500 mg in D5W 250 mL intermittent infusion      500 mg  over 1 Hours Intravenous EVERY 24 HOURS 11/28/17 0205      11/28/17 0400  piperacillin-tazobactam (ZOSYN) infusion 3.375 g      3.375 g  100 mL/hr over 30 Minutes Intravenous EVERY 6 HOURS 11/28/17 0205          Current antibiotic coverage is appropriate for source of infection.    Fluid: Fluid bolus ordered.    Lab: Repeat lactic acid ordered for 2 hours from now.      Disposition: The patient will remain on the current unit. We will continue to monitor this patient closely.  Ivana Mayo MD

## 2017-11-28 NOTE — DISCHARGE INSTRUCTIONS
Follow up appt:  You have been established with a new Primary Care Physician at [facility].    You will see Dr Chadwick Bright on Tues Dec 5th at 9:00  *bring your hospital dc paperwork with you to your appt  *there will be an  present for your first appt  *if you have any questions or concerns, call phone number below    [facility]  [address] [place], [place]  [phone]

## 2017-11-28 NOTE — ED NOTES
Bed: ED17  Expected date: 11/27/17  Expected time:   Means of arrival:   Comments:  Hold for triage

## 2017-11-28 NOTE — ED PROVIDER NOTES
"  History     Chief Complaint:  Shortness of Breath    HPI   Beulah Jane is a 67-year-old female who presents with shortness of breath and a productive cough. The patient reports that these symptoms have been getting progressively worse for the past two weeks. He states that the phlegm was initially greenish in color and over the past couple of days it has turned to pink and sometimes has some red in it as well. The patient states that he has had subjective fever and T max 99.9. The patient comments that his shortness of breath is worse with exertion and he states that he has had \"noisy breathing.\" He denies orthopnea, PND and peripheral edema. The patient also complains of \"pain in my diaphragm\" that is only present while coughing. He has had no nausea, vomiting, chest pain, diarrhea, or abdominal pain. The patient was seen in urgent care earlier today and was found to have pneumonia. The patient presents to the ED today from urgent care for further treatment of this issue.    The below history may be incomplete as the patient is not in the [place] system and the patient just moved here from [place].     Allergies:  No known drug allergies.      Medications:    Prednisone 10 mg daily (increased to 20 mg currently because sick)  Budesonide   Calan   Losartan   Prevacid   Levothyroxine  Prednisone   Methotrexate   Tramadol   Loratidine   Lopid   Theochron   Gabapentin  Percocet   Verapamil     Past Medical History:    Pneumonia   Pulmonary fibrosis   Polymyositis  Diabetes     Past Surgical History:    History reviewed. No pertinent past surgical history.     Family History:    History reviewed. No pertinent family history.     Social History:  Presents to the ED with son   Patient just moved here from [place]     Review of Systems   Constitutional: Positive for fatigue. Negative for fever.   Respiratory: Positive for cough, shortness of breath and wheezing.    Cardiovascular: Negative for chest " "pain.   Gastrointestinal: Negative for diarrhea, nausea and vomiting.   All other systems reviewed and are negative.    Physical Exam     Patient Vitals for the past 24 hrs:   BP Temp Temp src Pulse Heart Rate Resp SpO2 Height Weight   11/27/17 2330 112/62 - - - - - 94 % - -   11/27/17 2315 115/64 - - - - - 93 % - -   11/27/17 2245 115/65 - - - 126 20 94 % - -   11/27/17 2230 138/73 - - - 123 23 95 % - -   11/27/17 2215 120/71 - - - 111 18 93 % - -   11/27/17 2214 - - - - - - 95 % - -   11/27/17 2200 141/73 - - - 118 22 95 % - -   11/27/17 2145 126/71 - - - - - - - -   11/27/17 2130 122/73 - - - 124 19 92 % - -   11/27/17 2117 126/71 - - - 124 (!) 31 93 % - -   11/27/17 2056 148/72 98.4  F (36.9  C) Temporal 132 - 28 (!) 88 % 1.702 m (5' 7\") 73.5 kg (162 lb)        Physical Exam    General:   Pleasant, age appropriate male resting comfortably.  HEENT:    Oropharynx is moist, without lesions or trismus.  Eyes:    Conjunctiva normal  Neck:    Supple, no meningismus.     CV:     Regular rate and rhythm.      Grade 2/6 systolic murmurs.      No rubs or gallops.       + JVD     2+ radial pulses bilateral.       No lower extremity edema.  PULM:    Inspiratory rales left > right base     Mild expiratory wheezing     No respiratory distress.      No stridor.  ABD:    Soft, non-tender, non-distended.       No pulsatile masses.       No rebound, guarding or rigidity.  MSK:     No gross deformity to all four extremities.   LYMPH:   No cervical lymphadenopathy.  NEURO:   Alert. Good muscle tone, no atrophy.  Skin:    Warm, dry and intact.    Psych:    Mood is good and affect is appropriate.        Emergency Department Course   ECG:  @ 2101  Indication: Shortness of breath   Vent. Rate 129 bpm. SC interval 138 ms. QRS duration 126 ms. QT/QTc 322/471 ms. P-R-T axis 39 114 -3.   Sinus tachycardia. Possible Left atrial enlargement. Right axis deviation. Nonspecific intraventricular block. T wave abnormality, consider inferior " ischemia. Abnormal ECG.   Read @ 2200 by Dr. Jameson.     Repeat ECG:  @ 2247   Vent. Rate 123 bpm. KY interval 142 ms. QRS duration 130 ms. QT/QTc 348/498 ms. P-R-T axis 39 100 -12.   Sinus tachycardia with premature atrial complexes. Possible Left atrial enlargement. Rightward axis. Nonspecific intraventricular block. Abnormal QRS-T angle, consider primary T wave abnormality. Abnormal ECG.  No significant change when compared to previous.   Read @ 2308 by Dr. Jameson.     Imaging:  Chest XR, PA & LAT  Moderately extensive patchy opacities within the mid and  lower lungs bilaterally, most prominent in the left lung. These are  nonspecific, but most likely represent pulmonary edema or an  infectious or inflammatory process. A follow-up chest radiograph could  be performed following treatment to ensure resolution.  Report per radiology: Marciano Pendleton MD (11/28/17 00:24:17)    Radiographic findings were communicated with the patient and Admitting MD who voiced understanding of the findings.    Laboratory:  Blood:  Initial blood draw collected at 2140  CMP: Glc 130, GFR 48, Albumin 2.9, otherwise WNL (Creatinine 1.13)   CBC:  WBC 26.2, HGB 14.1, , otherwise WNL  Lactic acid: 1.9  Pro-BNP: 2061 (H)   Troponin I: 0.071    Blood Culture: pending x 2    Nasal swab:  Influenza A/B Antigen: Negative.      Interventions:  (2149) Normal Saline, 1 liter, IV bolus   (2212) Zosyn, 4.5 g, IV   (2212) Solu-medrol, 125 mg, IV   (2214) DuoNeb, 6 mL, nebulizer    (2311) Aspirin, 325 mg, PO   (2311) Azithromycin, 500 mg, IV infusion     Emergency Department Course:  Nursing notes and vitals reviewed.  (2158) I entered the room with my scribe, obtained the history, and performed an exam of the patient as documented above.    EKG was done, interpretation as above. x2     A peripheral IV was established. Blood was drawn from the patient. This was sent for laboratory testing, findings above.      The patient was sent for  a chest x-ray while in the emergency department, findings above.      Findings and plan explained to the patient who consents to admission.   (0005) I discussed the patient with Dr. Diamond of the hospitalist service, who will admit the patient to a tele bed for further monitoring, evaluation, and treatment.     Impression & Plan    Medical Decision Making:  Beulah Jane is a 67-year-old male who presents with increasing dyspnea over the last two weeks. On examination he has focal pulmonary findings in the lower lobes. Chest x-ray reveals left lower lobe infiltrate and less likely pulmonary edema. Basic laboratory data revealed no elevation of lactate or acute end-organ damage. Patient was given broad spectrum antibiotics with Zosyn and azithromycin. Antibiotics were broadened because of his chronic steroid use and relative immunosuppression on methotrexate every other week. Should be noted that blood cultures were not obtained prior to antibiotics. This was a miscommunication on my part as the initial provider had put in basic laboratory studies which I thought included blood cultures, only to find out later that blood cultures were not obtained before antibiotics.     The patient does also have a history of asthma and pulmonary fibrosis. He was given bronchodilators and steroids in the event that this was complicating his disease process today. Patient feels improved.     There was also concern of pulmonary edema as he does have an elevated BNP with some degree ofpulmonary vascular congestion on CXR. He does not appear volume overloaded clinically. His examination and history is most consistent with acute infectious process. The patient was not given diuretics as I felt this is more of a chronic process. He does have an elevated troponin but no active chest pain and ECG which shows no definitive findings concerning for coronary artery disease. Repeat ECG showed no dynamic changes. Patient given a  full dose of aspirin. I feel that this is likely due to acute illness and possible underlying congestive heart failure that has been undiagnosed, thus anticoagulation not indicated at this time.     Diagnosis:    ICD-10-CM   1. Community acquired pneumonia of left lower lobe of lung (H) J18.1   2. Elevated troponin R74.8     Disposition:  Admitted to tele bed     [facility] EMERGENCY DEPARTMENT    Scribe disclosure:  I, Edwin Chand, am serving as a scribe on 11/27/2017 at 9:58 PM to personally document services performed by Dr. Jameson based on my observations and the provider's statements to me.                Renny Jameson MD  11/28/17 0146
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
## 2017-11-28 NOTE — PROGRESS NOTES
"[facility] RCAT     Date: 11/2828/2017  Admission Dx: PNA  Pulmonary History: Pulmonary Fibrosis  Home Nebulizer/MDI Use:Albuterol neb/MDI Q6 prn  Home Oxygen: None  Acuity Level (RCAT flow sheet): 3  Aerosol Therapy initiated: Duoneb QID/Q4 prn  Pulmonary Hygiene initiated: Cough and deep breathing techniques  Volume Expansion initiated: IS TID  Current Oxygen Requirements: Room Air  Current SpO2: 93%  Re-evaluation date: 12/01/2017  Patient Education: Education was performed with the patient in regards to indications/benefits and possible side effects of bronchodilators. Will continue to do education with patient.       See \"RT Assessments\" flow sheet for patient assessment scoring and Acuity Level Details.     Vital signs:  Temp: 97.7  F (36.5  C) Temp src: Oral BP: 112/43 Pulse: 132 Heart Rate: 105 Resp: 16 SpO2: 93 % O2 Device: None (Room air) Oxygen Delivery: 1 LPM Height: 170.2 cm (5' 7.01\") Weight: 76.2 kg (167 lb 14.4 oz)  Estimated body mass index is 26.29 kg/(m^2) as calculated from the following:    Height as of this encounter: 1.702 m (5' 7.01\").    Weight as of this encounter: 76.2 kg (167 lb 14.4 oz).    PAST MEDICAL HISTORY:   Past Medical History:   Diagnosis Date     Pneumonia      Pulmonary fibrosis, unspecified (H)        PAST SURGICAL HISTORY: No past surgical history on file.    FAMILY HISTORY: No family history on file.    SOCIAL HISTORY:   Social History   Substance Use Topics     Smoking status: Not on file     Smokeless tobacco: Not on file     Alcohol use Not on file     Study Result      CHEST 2 VIEWS  11/27/2017 11:10 PM      HISTORY: Dyspnea. Cough.     COMPARISON: None.     FINDINGS: Hypoinflated lungs. Moderately extensive patchy opacities  are present in the mid and lower lungs bilaterally, most prominent in  the left lower lung. Probable cardiomegaly.         IMPRESSION: Moderately extensive patchy opacities within the mid and  lower lungs bilaterally, most prominent in the left lung. " These are  nonspecific, but most likely represent an infectious or inflammatory  process or pulmonary edema A follow-up chest radiograph could be  performed following treatment to ensure resolution.     ASHLEY WAN MD     Prior to Admission medications    Medication Sig Last Dose Taking? Auth Provider   GABAPENTIN PO Take 600 mg by mouth 3 times daily 11/27/2017 at Unknown time Yes Unknown, Entered By History   GEMFIBROZIL PO Take 600 mg by mouth 2 times daily 11/27/2017 at Unknown time Yes Unknown, Entered By History   METHOTREXATE PO Take 7.5 mg by mouth once a week 11/21/2017 Yes Unknown, Entered By History   theophylline (UNIPHYL) 400 MG 24 hr tablet Take 400 mg by mouth daily 11/27/2017 at Unknown time Yes Unknown, Entered By History   loratadine (CLARITIN) 10 MG tablet Take 10 mg by mouth daily 11/27/2017 at Unknown time Yes Unknown, Entered By History   FUROSEMIDE PO Take 20 mg by mouth daily 11/27/2017 at Unknown time Yes Unknown, Entered By History   insulin isophane human (HUMULIN N PEN) 100 UNIT/ML injection Inject 50 Units Subcutaneous every morning 11/27/2017 at Unknown time Yes Unknown, Entered By History   insulin isophane human (HUMULIN N PEN) 100 UNIT/ML injection Inject 30 Units Subcutaneous every evening as needed for high blood sugar (if blood sugar over 130)   Yes Unknown, Entered By History   PREDNISONE PO Take 20 mg by mouth daily 11/27/2017 at Unknown time Yes Unknown, Entered By History   albuterol (2.5 MG/3ML) 0.083% neb solution Take 1 vial by nebulization every 6 hours as needed for shortness of breath / dyspnea or wheezing 11/27/2017 at Unknown time Yes Unknown, Entered By History   albuterol (PROAIR HFA/PROVENTIL HFA/VENTOLIN HFA) 108 (90 BASE) MCG/ACT Inhaler Inhale 2 puffs into the lungs every 6 hours as needed for shortness of breath / dyspnea or wheezing 11/27/2017 at Unknown time Yes Unknown, Entered By History   omega 3 1000 MG CAPS Take 2 capsules by mouth 2 times daily More  than a month at Unknown time   Unknown, Entered By History   Lansoprazole (PREVACID PO) Take 30 mg by mouth More than a month at Unknown time   Unknown, Entered By History   verapamil (VERELAN) 120 MG 24 hr capsule Take 120 mg by mouth At Bedtime More than a month at Unknown time   Unknown, Entered By History   FOLIC ACID PO Take 1 mg by mouth daily More than a month at Unknown time   Unknown, Entered By History   beclomethasone (QVAR) 80 MCG/ACT Inhaler Inhale 2 puffs into the lungs 2 times daily More than a month at Unknown time   Unknown, Entered By History   budesonide (PULMICORT) 0.5 MG/2ML neb solution Take 0.5 mg by nebulization 2 times daily More than a month at Unknown time   Unknown, Entered By History         Benjamin Frederick RT  11/28/2017
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
## 2017-11-29 LAB
ANION GAP SERPL CALCULATED.3IONS-SCNC: 8 MMOL/L (ref 3–14)
BASOPHILS # BLD AUTO: 0 10E9/L (ref 0–0.2)
BASOPHILS NFR BLD AUTO: 0.1 %
BUN SERPL-MCNC: 23 MG/DL (ref 7–30)
CALCIUM SERPL-MCNC: 8.7 MG/DL (ref 8.5–10.1)
CHLORIDE SERPL-SCNC: 108 MMOL/L (ref 94–109)
CO2 SERPL-SCNC: 25 MMOL/L (ref 20–32)
CREAT SERPL-MCNC: 0.96 MG/DL (ref 0.66–1.25)
DIFFERENTIAL METHOD BLD: ABNORMAL
EOSINOPHIL # BLD AUTO: 0 10E9/L (ref 0–0.7)
EOSINOPHIL NFR BLD AUTO: 0 %
ERYTHROCYTE [DISTWIDTH] IN BLOOD BY AUTOMATED COUNT: 15.9 % (ref 10–15)
GFR SERPL CREATININE-BSD FRML MDRD: 77 ML/MIN/1.7M2
GLUCOSE BLDC GLUCOMTR-MCNC: 108 MG/DL (ref 70–99)
GLUCOSE BLDC GLUCOMTR-MCNC: 155 MG/DL (ref 70–99)
GLUCOSE BLDC GLUCOMTR-MCNC: 157 MG/DL (ref 70–99)
GLUCOSE BLDC GLUCOMTR-MCNC: 169 MG/DL (ref 70–99)
GLUCOSE BLDC GLUCOMTR-MCNC: 180 MG/DL (ref 70–99)
GLUCOSE SERPL-MCNC: 126 MG/DL (ref 70–99)
HCT VFR BLD AUTO: 34.4 % (ref 40–53)
HGB BLD-MCNC: 10.9 G/DL (ref 13.3–17.7)
IMM GRANULOCYTES # BLD: 0.1 10E9/L (ref 0–0.4)
IMM GRANULOCYTES NFR BLD: 0.8 %
LYMPHOCYTES # BLD AUTO: 0.9 10E9/L (ref 0.8–5.3)
LYMPHOCYTES NFR BLD AUTO: 6.5 %
MCH RBC QN AUTO: 30 PG (ref 26.5–33)
MCHC RBC AUTO-ENTMCNC: 31.7 G/DL (ref 31.5–36.5)
MCV RBC AUTO: 95 FL (ref 78–100)
MONOCYTES # BLD AUTO: 0.7 10E9/L (ref 0–1.3)
MONOCYTES NFR BLD AUTO: 4.9 %
NEUTROPHILS # BLD AUTO: 12.6 10E9/L (ref 1.6–8.3)
NEUTROPHILS NFR BLD AUTO: 87.7 %
NRBC # BLD AUTO: 0 10*3/UL
NRBC BLD AUTO-RTO: 0 /100
PLATELET # BLD AUTO: 270 10E9/L (ref 150–450)
POTASSIUM SERPL-SCNC: 3.5 MMOL/L (ref 3.4–5.3)
RBC # BLD AUTO: 3.63 10E12/L (ref 4.4–5.9)
SODIUM SERPL-SCNC: 141 MMOL/L (ref 133–144)
WBC # BLD AUTO: 14.4 10E9/L (ref 4–11)

## 2017-11-29 PROCEDURE — 25000131 ZZH RX MED GY IP 250 OP 636 PS 637: Mod: GY | Performed by: INTERNAL MEDICINE

## 2017-11-29 PROCEDURE — 40000275 ZZH STATISTIC RCP TIME EA 10 MIN

## 2017-11-29 PROCEDURE — 36415 COLL VENOUS BLD VENIPUNCTURE: CPT | Performed by: INTERNAL MEDICINE

## 2017-11-29 PROCEDURE — 85025 COMPLETE CBC W/AUTO DIFF WBC: CPT | Performed by: INTERNAL MEDICINE

## 2017-11-29 PROCEDURE — 25000128 H RX IP 250 OP 636: Performed by: INTERNAL MEDICINE

## 2017-11-29 PROCEDURE — 12000007 ZZH R&B INTERMEDIATE

## 2017-11-29 PROCEDURE — 00000146 ZZHCL STATISTIC GLUCOSE BY METER IP

## 2017-11-29 PROCEDURE — 80048 BASIC METABOLIC PNL TOTAL CA: CPT | Performed by: INTERNAL MEDICINE

## 2017-11-29 PROCEDURE — 25000125 ZZHC RX 250: Performed by: INTERNAL MEDICINE

## 2017-11-29 PROCEDURE — A9270 NON-COVERED ITEM OR SERVICE: HCPCS | Mod: GY | Performed by: INTERNAL MEDICINE

## 2017-11-29 PROCEDURE — 94640 AIRWAY INHALATION TREATMENT: CPT | Mod: 76

## 2017-11-29 PROCEDURE — 94640 AIRWAY INHALATION TREATMENT: CPT

## 2017-11-29 PROCEDURE — 40000809 ZZH STATISTIC NO DOCUMENTATION TO SUPPORT CHARGE

## 2017-11-29 PROCEDURE — 25000132 ZZH RX MED GY IP 250 OP 250 PS 637: Mod: GY | Performed by: INTERNAL MEDICINE

## 2017-11-29 PROCEDURE — 87299 ANTIBODY DETECTION NOS IF: CPT | Performed by: INTERNAL MEDICINE

## 2017-11-29 PROCEDURE — 99233 SBSQ HOSP IP/OBS HIGH 50: CPT | Performed by: INTERNAL MEDICINE

## 2017-11-29 RX ORDER — GEMFIBROZIL 600 MG/1
600 TABLET, FILM COATED ORAL 2 TIMES DAILY
Status: DISCONTINUED | OUTPATIENT
Start: 2017-11-29 | End: 2017-11-30 | Stop reason: HOSPADM

## 2017-11-29 RX ORDER — GABAPENTIN 400 MG/1
400 CAPSULE ORAL ONCE
Status: COMPLETED | OUTPATIENT
Start: 2017-11-29 | End: 2017-11-29

## 2017-11-29 RX ORDER — HEPARIN SODIUM 5000 [USP'U]/.5ML
5000 INJECTION, SOLUTION INTRAVENOUS; SUBCUTANEOUS EVERY 8 HOURS
Status: DISCONTINUED | OUTPATIENT
Start: 2017-11-29 | End: 2017-11-30 | Stop reason: HOSPADM

## 2017-11-29 RX ORDER — GABAPENTIN 600 MG/1
600 TABLET ORAL 3 TIMES DAILY
Status: DISCONTINUED | OUTPATIENT
Start: 2017-11-29 | End: 2017-11-30 | Stop reason: HOSPADM

## 2017-11-29 RX ADMIN — TAZOBACTAM SODIUM AND PIPERACILLIN SODIUM 3.38 G: 375; 3 INJECTION, SOLUTION INTRAVENOUS at 16:35

## 2017-11-29 RX ADMIN — IPRATROPIUM BROMIDE AND ALBUTEROL SULFATE 3 ML: .5; 3 SOLUTION RESPIRATORY (INHALATION) at 19:45

## 2017-11-29 RX ADMIN — TRAMADOL HYDROCHLORIDE 25 MG: 50 TABLET, FILM COATED ORAL at 16:52

## 2017-11-29 RX ADMIN — FOLIC ACID 1 MG: 1 TABLET ORAL at 09:19

## 2017-11-29 RX ADMIN — IPRATROPIUM BROMIDE AND ALBUTEROL SULFATE 3 ML: .5; 3 SOLUTION RESPIRATORY (INHALATION) at 07:58

## 2017-11-29 RX ADMIN — IPRATROPIUM BROMIDE AND ALBUTEROL SULFATE 3 ML: .5; 3 SOLUTION RESPIRATORY (INHALATION) at 15:46

## 2017-11-29 RX ADMIN — GABAPENTIN 600 MG: 600 TABLET, FILM COATED ORAL at 22:33

## 2017-11-29 RX ADMIN — GEMFIBROZIL 600 MG: 600 TABLET ORAL at 22:33

## 2017-11-29 RX ADMIN — IPRATROPIUM BROMIDE AND ALBUTEROL SULFATE 3 ML: .5; 3 SOLUTION RESPIRATORY (INHALATION) at 11:37

## 2017-11-29 RX ADMIN — INSULIN GLARGINE 16 UNITS: 100 INJECTION, SOLUTION SUBCUTANEOUS at 09:19

## 2017-11-29 RX ADMIN — AZITHROMYCIN MONOHYDRATE 500 MG: 500 INJECTION, POWDER, LYOPHILIZED, FOR SOLUTION INTRAVENOUS at 23:17

## 2017-11-29 RX ADMIN — GABAPENTIN 400 MG: 400 CAPSULE ORAL at 12:05

## 2017-11-29 RX ADMIN — GEMFIBROZIL 600 MG: 600 TABLET ORAL at 12:05

## 2017-11-29 RX ADMIN — FLUTICASONE FUROATE 1 PUFF: 100 POWDER RESPIRATORY (INHALATION) at 07:58

## 2017-11-29 RX ADMIN — PREDNISONE 40 MG: 20 TABLET ORAL at 09:19

## 2017-11-29 RX ADMIN — TAZOBACTAM SODIUM AND PIPERACILLIN SODIUM 3.38 G: 375; 3 INJECTION, SOLUTION INTRAVENOUS at 22:35

## 2017-11-29 RX ADMIN — INSULIN ASPART 1 UNITS: 100 INJECTION, SOLUTION INTRAVENOUS; SUBCUTANEOUS at 18:57

## 2017-11-29 RX ADMIN — GABAPENTIN 600 MG: 600 TABLET, FILM COATED ORAL at 16:35

## 2017-11-29 RX ADMIN — TAZOBACTAM SODIUM AND PIPERACILLIN SODIUM 3.38 G: 375; 3 INJECTION, SOLUTION INTRAVENOUS at 04:35

## 2017-11-29 RX ADMIN — INSULIN ASPART 2 UNITS: 100 INJECTION, SOLUTION INTRAVENOUS; SUBCUTANEOUS at 12:05

## 2017-11-29 RX ADMIN — GABAPENTIN 200 MG: 100 CAPSULE ORAL at 09:19

## 2017-11-29 RX ADMIN — TAZOBACTAM SODIUM AND PIPERACILLIN SODIUM 3.38 G: 375; 3 INJECTION, SOLUTION INTRAVENOUS at 09:35

## 2017-11-29 RX ADMIN — HEPARIN SODIUM 5000 UNITS: 5000 INJECTION, SOLUTION INTRAVENOUS; SUBCUTANEOUS at 19:28

## 2017-11-29 RX ADMIN — OXYCODONE HYDROCHLORIDE 5 MG: 5 TABLET ORAL at 09:29

## 2017-11-29 ASSESSMENT — ACTIVITIES OF DAILY LIVING (ADL)
ADLS_ACUITY_SCORE: 11

## 2017-11-29 NOTE — PROGRESS NOTES
Received call from Yoshi: BC+ from 11/27 at 2254: gram neg coccal bacilli. Results given to primary RN Caroline and hospitalist on call.

## 2017-11-30 VITALS
DIASTOLIC BLOOD PRESSURE: 59 MMHG | HEIGHT: 67 IN | OXYGEN SATURATION: 93 % | BODY MASS INDEX: 26.56 KG/M2 | HEART RATE: 132 BPM | RESPIRATION RATE: 18 BRPM | TEMPERATURE: 96.9 F | WEIGHT: 169.2 LBS | SYSTOLIC BLOOD PRESSURE: 133 MMHG

## 2017-11-30 LAB
1,3 BETA GLUCAN SER-MCNC: <31 PG/ML
ALBUMIN SERPL-MCNC: 2.1 G/DL (ref 3.4–5)
ALP SERPL-CCNC: 70 U/L (ref 40–150)
ALT SERPL W P-5'-P-CCNC: 14 U/L (ref 0–70)
ANION GAP SERPL CALCULATED.3IONS-SCNC: 7 MMOL/L (ref 3–14)
ANISOCYTOSIS BLD QL SMEAR: SLIGHT
AST SERPL W P-5'-P-CCNC: 14 U/L (ref 0–45)
B-D GLUCAN INTERPRETATION (1,3): NEGATIVE
BACTERIA SPEC CULT: ABNORMAL
BACTERIA SPEC CULT: ABNORMAL
BACTERIA SPEC CULT: NORMAL
BASOPHILS # BLD AUTO: 0 10E9/L (ref 0–0.2)
BASOPHILS NFR BLD AUTO: 0 %
BILIRUB SERPL-MCNC: 0.3 MG/DL (ref 0.2–1.3)
BUN SERPL-MCNC: 24 MG/DL (ref 7–30)
CALCIUM SERPL-MCNC: 9 MG/DL (ref 8.5–10.1)
CHLORIDE SERPL-SCNC: 106 MMOL/L (ref 94–109)
CO2 SERPL-SCNC: 29 MMOL/L (ref 20–32)
CREAT SERPL-MCNC: 1.11 MG/DL (ref 0.66–1.25)
DIFFERENTIAL METHOD BLD: ABNORMAL
EOSINOPHIL # BLD AUTO: 0 10E9/L (ref 0–0.7)
EOSINOPHIL NFR BLD AUTO: 0 %
ERYTHROCYTE [DISTWIDTH] IN BLOOD BY AUTOMATED COUNT: 16.2 % (ref 10–15)
GFR SERPL CREATININE-BSD FRML MDRD: 66 ML/MIN/1.7M2
GLUCOSE BLDC GLUCOMTR-MCNC: 153 MG/DL (ref 70–99)
GLUCOSE BLDC GLUCOMTR-MCNC: 222 MG/DL (ref 70–99)
GLUCOSE BLDC GLUCOMTR-MCNC: 90 MG/DL (ref 70–99)
GLUCOSE SERPL-MCNC: 108 MG/DL (ref 70–99)
HCT VFR BLD AUTO: 32.7 % (ref 40–53)
HGB BLD-MCNC: 10.4 G/DL (ref 13.3–17.7)
LYMPHOCYTES # BLD AUTO: 0.9 10E9/L (ref 0.8–5.3)
LYMPHOCYTES NFR BLD AUTO: 9 %
Lab: ABNORMAL
MCH RBC QN AUTO: 30.4 PG (ref 26.5–33)
MCHC RBC AUTO-ENTMCNC: 31.8 G/DL (ref 31.5–36.5)
MCV RBC AUTO: 96 FL (ref 78–100)
MONOCYTES # BLD AUTO: 0.9 10E9/L (ref 0–1.3)
MONOCYTES NFR BLD AUTO: 9 %
NEUTROPHILS # BLD AUTO: 8.5 10E9/L (ref 1.6–8.3)
NEUTROPHILS NFR BLD AUTO: 82 %
OVALOCYTES BLD QL SMEAR: SLIGHT
PLATELET # BLD AUTO: 298 10E9/L (ref 150–450)
PLATELET # BLD EST: NORMAL 10*3/UL
POIKILOCYTOSIS BLD QL SMEAR: SLIGHT
POTASSIUM SERPL-SCNC: 3.5 MMOL/L (ref 3.4–5.3)
PROT SERPL-MCNC: 6 G/DL (ref 6.8–8.8)
RBC # BLD AUTO: 3.42 10E12/L (ref 4.4–5.9)
SODIUM SERPL-SCNC: 142 MMOL/L (ref 133–144)
SPECIMEN SOURCE: ABNORMAL
SPECIMEN SOURCE: NORMAL
WBC # BLD AUTO: 10.4 10E9/L (ref 4–11)

## 2017-11-30 PROCEDURE — 25000132 ZZH RX MED GY IP 250 OP 250 PS 637: Mod: GY | Performed by: INTERNAL MEDICINE

## 2017-11-30 PROCEDURE — 00000146 ZZHCL STATISTIC GLUCOSE BY METER IP

## 2017-11-30 PROCEDURE — 25000131 ZZH RX MED GY IP 250 OP 636 PS 637: Mod: GY | Performed by: INTERNAL MEDICINE

## 2017-11-30 PROCEDURE — 40000809 ZZH STATISTIC NO DOCUMENTATION TO SUPPORT CHARGE

## 2017-11-30 PROCEDURE — 99239 HOSP IP/OBS DSCHRG MGMT >30: CPT | Performed by: INTERNAL MEDICINE

## 2017-11-30 PROCEDURE — 25000128 H RX IP 250 OP 636: Performed by: INTERNAL MEDICINE

## 2017-11-30 PROCEDURE — 36415 COLL VENOUS BLD VENIPUNCTURE: CPT | Performed by: INTERNAL MEDICINE

## 2017-11-30 PROCEDURE — 40000275 ZZH STATISTIC RCP TIME EA 10 MIN

## 2017-11-30 PROCEDURE — 94640 AIRWAY INHALATION TREATMENT: CPT | Mod: 76

## 2017-11-30 PROCEDURE — A9270 NON-COVERED ITEM OR SERVICE: HCPCS | Mod: GY | Performed by: INTERNAL MEDICINE

## 2017-11-30 PROCEDURE — 80053 COMPREHEN METABOLIC PANEL: CPT | Performed by: INTERNAL MEDICINE

## 2017-11-30 PROCEDURE — 94640 AIRWAY INHALATION TREATMENT: CPT

## 2017-11-30 PROCEDURE — 25000125 ZZHC RX 250: Performed by: INTERNAL MEDICINE

## 2017-11-30 PROCEDURE — 85025 COMPLETE CBC W/AUTO DIFF WBC: CPT | Performed by: INTERNAL MEDICINE

## 2017-11-30 RX ORDER — TRAMADOL HYDROCHLORIDE 50 MG/1
25 TABLET ORAL EVERY 6 HOURS PRN
Qty: 20 TABLET | Refills: 0 | Status: ON HOLD | OUTPATIENT
Start: 2017-11-30 | End: 2019-01-01

## 2017-11-30 RX ORDER — PREDNISONE 20 MG/1
20 TABLET ORAL DAILY
Status: ON HOLD
Start: 2017-12-02 | End: 2019-01-01

## 2017-11-30 RX ORDER — PREDNISONE 20 MG/1
40 TABLET ORAL DAILY
Qty: 2 TABLET | Refills: 0 | Status: SHIPPED | OUTPATIENT
Start: 2017-12-01 | End: 2017-12-02

## 2017-11-30 RX ADMIN — GEMFIBROZIL 600 MG: 600 TABLET ORAL at 09:04

## 2017-11-30 RX ADMIN — HEPARIN SODIUM 5000 UNITS: 5000 INJECTION, SOLUTION INTRAVENOUS; SUBCUTANEOUS at 09:28

## 2017-11-30 RX ADMIN — TRAMADOL HYDROCHLORIDE 25 MG: 50 TABLET, FILM COATED ORAL at 09:09

## 2017-11-30 RX ADMIN — TRAMADOL HYDROCHLORIDE 25 MG: 50 TABLET, FILM COATED ORAL at 00:37

## 2017-11-30 RX ADMIN — TAZOBACTAM SODIUM AND PIPERACILLIN SODIUM 3.38 G: 375; 3 INJECTION, SOLUTION INTRAVENOUS at 03:52

## 2017-11-30 RX ADMIN — GABAPENTIN 600 MG: 600 TABLET, FILM COATED ORAL at 09:04

## 2017-11-30 RX ADMIN — IPRATROPIUM BROMIDE AND ALBUTEROL SULFATE 3 ML: .5; 3 SOLUTION RESPIRATORY (INHALATION) at 15:51

## 2017-11-30 RX ADMIN — FLUTICASONE FUROATE 1 PUFF: 100 POWDER RESPIRATORY (INHALATION) at 07:58

## 2017-11-30 RX ADMIN — TRAMADOL HYDROCHLORIDE 25 MG: 50 TABLET, FILM COATED ORAL at 16:02

## 2017-11-30 RX ADMIN — IPRATROPIUM BROMIDE AND ALBUTEROL SULFATE 3 ML: .5; 3 SOLUTION RESPIRATORY (INHALATION) at 07:57

## 2017-11-30 RX ADMIN — GABAPENTIN 600 MG: 600 TABLET, FILM COATED ORAL at 16:02

## 2017-11-30 RX ADMIN — TAZOBACTAM SODIUM AND PIPERACILLIN SODIUM 3.38 G: 375; 3 INJECTION, SOLUTION INTRAVENOUS at 16:05

## 2017-11-30 RX ADMIN — INSULIN GLARGINE 16 UNITS: 100 INJECTION, SOLUTION SUBCUTANEOUS at 09:04

## 2017-11-30 RX ADMIN — PREDNISONE 40 MG: 20 TABLET ORAL at 09:04

## 2017-11-30 RX ADMIN — ACETAMINOPHEN 650 MG: 325 TABLET, FILM COATED ORAL at 00:33

## 2017-11-30 RX ADMIN — FOLIC ACID 1 MG: 1 TABLET ORAL at 09:04

## 2017-11-30 RX ADMIN — HEPARIN SODIUM 5000 UNITS: 5000 INJECTION, SOLUTION INTRAVENOUS; SUBCUTANEOUS at 02:03

## 2017-11-30 RX ADMIN — IPRATROPIUM BROMIDE AND ALBUTEROL SULFATE 3 ML: .5; 3 SOLUTION RESPIRATORY (INHALATION) at 11:45

## 2017-11-30 RX ADMIN — INSULIN ASPART 4 UNITS: 100 INJECTION, SOLUTION INTRAVENOUS; SUBCUTANEOUS at 14:10

## 2017-11-30 RX ADMIN — TAZOBACTAM SODIUM AND PIPERACILLIN SODIUM 3.38 G: 375; 3 INJECTION, SOLUTION INTRAVENOUS at 09:28

## 2017-11-30 ASSESSMENT — ACTIVITIES OF DAILY LIVING (ADL)
ADLS_ACUITY_SCORE: 11

## 2017-11-30 NOTE — PROGRESS NOTES
Pt discharged via family transport. AVS reviewed and questions answered. All pt belongings sent with pt at discharge.

## 2017-11-30 NOTE — PHARMACY - DISCHARGE MEDICATION RECONCILIATION AND EDUCATION
Discharge medication review for this patient is complete. Face-to-face medication education was provided by the pharmacist.  See [facility] for allergy information and immunization status.   Pharmacist assisted with medication reconciliation of discharge medications with PTA medications.    Patient was informed to STOP taking the following HOME medications:   -budesonide, furosemide, prevacid, verapamil    Patient was informed to START taking the following NEW medications:   -augmentin, tramadol    Patient was informed to make the following changes to prior to admission medications:  -prednisone, NPH    Patient was educated on the following for each discharge medication:   Rationale for therapy  Duration of treatment  Dosing and or monitoring drug levels  Common side effects  Importance of compliance  Drug/food interactions  Missed doses  Self monitoring parameters    Left written materials and instructions (Clinical notes from [facility]) for new medications: No    OUTCOMES: Patient verbalized understanding    IMPORTANT FOLLOW UP NOTES:   - Reviewed diabetic education focusing on diet control  Pt aware that NPH home dose was considerably higher than discharge order for NPH 6 units bid. Pt knows that further insulin adjustment will be required. Advised pt to ask for a dietician consult as pt says that he eats a lot of carbohydrates at home.  He needs some help with carbohydrate  counting  - Pt will check his BG 2 times a day and rotate the times of testing. He will take his bg log to the next PCP appointment.  - Pt knows how to treat low BG       Discharge Medication List     Review of your medicines      START taking       Dose / Directions    amoxicillin-clavulanate 875-125 MG per tablet   Commonly known as:  AUGMENTIN   Used for:  Community acquired pneumonia of left lower lobe of lung (H)   Notes to Patient:  This is for your pneumonia. You were being given zosyn IV antibiotic while in the hospital which you received this  morning        Dose:  1 tablet   Take 1 tablet by mouth 2 times daily for 9 doses   Quantity:  9 tablet   Refills:  0       traMADol 50 MG tablet   Commonly known as:  ULTRAM   Used for:  Polymyositis (H)        Dose:  25 mg   Take 0.5 tablets (25 mg) by mouth every 6 hours as needed for moderate pain   Quantity:  20 tablet   Refills:  0         CONTINUE these medicines which may have CHANGED, or have new prescriptions. If we are uncertain of the size of tablets/capsules you have at home, strength may be listed as something that might have changed.       Dose / Directions    insulin isophane human 100 UNIT/ML injection   Commonly known as:  HumuLIN N PEN   This may have changed:    - how much to take  - when to take this  - Another medication with the same name was removed. Continue taking this medication, and follow the directions you see here.   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 Make sure you understand how and when to take each.   Used for:  Community acquired pneumonia of left lower lobe of lung (H)        Dose:  40 mg   Start taking on:  12/1/2017   Take 2 tablets (40 mg) by mouth daily for 1 day   Quantity:  2 tablet   Refills:  0       * predniSONE 20 MG tablet   Commonly known as:  DELTASONE   This may have changed:  medication strength   Used for:  Polymyositis (H)        Dose:  20 mg   Start taking on:  12/2/2017   Take 1 tablet (20 mg) by mouth daily   Refills:  0       * Notice:  This list has 2 medication(s) that are the same as other medications prescribed for you. Read the directions carefully, and ask your doctor or other care provider to review them with you.       CONTINUE these medicines which have NOT CHANGED       Dose / Directions    * albuterol (2.5 MG/3ML) 0.083% neb solution   Notes to Patient:  You were given a different nebulizer treatment called a duoneb four times per day while in the hospital        Dose:  1 vial   Take 1 vial by nebulization every 6 hours as needed for shortness of breath / dyspnea or wheezing   Refills:  0       * albuterol 108 (90 BASE) MCG/ACT Inhaler   Commonly known as:  PROAIR HFA/PROVENTIL HFA/VENTOLIN HFA        Dose:  2 puff   Inhale 2 puffs into the lungs every 6 hours as needed for shortness of breath / dyspnea or wheezing   Refills:  0       beclomethasone 80 MCG/ACT Inhaler   Commonly known as:  QVAR   Notes to Patient:  You were receiving the Arnuity Ellipta inhaler while hospitalized in place of Qvar.        Dose:  2 puff   Inhale 2 puffs into the lungs 2 times daily   Refills:  0       FOLIC ACID PO        Dose:  1 mg   Take 1 mg by mouth daily   Refills:  0       GABAPENTIN PO        Dose:  600 mg   Take 600 mg by mouth 3 times daily   Refills:  0       GEMFIBROZIL PO        Dose:  600 mg   Take 600 mg by mouth 2 times daily   Refills:  0       loratadine 10 MG tablet   Commonly known as:  CLARITIN        Dose:  10 mg   Take 10 mg by mouth daily   Refills:  0       METHOTREXATE PO        Dose:  7.5 mg   Take 7.5 mg by mouth once a week   Refills:  0       omega 3 1000 MG Caps        Dose:  2 capsule   Take 2 capsules by mouth 2 times daily   Refills:  0       theophylline 400 MG 24 hr tablet   Commonly known as:  UNIPHYL        Dose:  400 mg   Take 400 mg by mouth daily   Refills:  0       * Notice:  This list has 2 medication(s) that are the same as other medications prescribed for you. Read the directions carefully, and ask your doctor or other care provider to review them with you.      STOP taking          budesonide 0.5 MG/2ML neb solution   Commonly known as:  PULMICORT           FUROSEMIDE PO           PREVACID PO            verapamil 120 MG 24 hr capsule   Commonly known as:  VERELAN                Where to get your medicines      These medications were sent to [facility] [place], [place] - [address] [facility]  [address] [facility] [address]     Phone:  [phone]      predniSONE 20 MG tablet         These medications were sent to [facility] [address] [place] [address] - [address] AT [place] [address] [address]  [address], [facility] [address]     Phone:  [phone]      amoxicillin-clavulanate 875-125 MG per tablet         Some of these will need a paper prescription and others can be bought over the counter. Ask your nurse if you have questions.     Bring a paper prescription for each of these medications      traMADol 50 MG tablet       You don't need a prescription for these medications      insulin isophane human 100 UNIT/ML injection     predniSONE 20 MG tablet

## 2017-11-30 NOTE — DISCHARGE SUMMARY
Discharge Summary  Hospitalist Service    Beulah Jane MRN# [id number]   YOB: 1950 Age: 67 year old     Date of Admission:  11/27/2017  Date of Discharge:  11/30/2017  Admitting Physician:  Joseluis Diamond MD  Discharge Physician: Ivana Mayo MD  Discharging Service: Hospitalist Service     Primary Provider: Dr Jayro Bright - [facility]  Primary Care Physician Phone Number: None         Discharge Diagnoses/Problem Oriented Hospital Course (Providers):    Beulah Jane was admitted on 11/27/2017 by Joseluis Diamond MD and I would refer you to their history and physical.  The following problems were addressed during his hospitalization:    Acute hypoxic respiratory failure  CAP 2/2 GN coccobacilli with haemophilus influenza not type b bacteremia  Sepsis  Trace troponin elevation  Underlying polymyositis on chronic steroids and pain medication s  Chronic pain 2/2 polymyositis  T2dm  htn  Unintentional weight loss consistent with malnutrition         Code Status:      Full Code        Brief Hospital Stay Summary Sent Home With Patient in AVS:       Summary of Stay: Beulah Jane is a 67 year old male with a history of T2dm, htn, polymyositis, pulmonary fibrosis on chronic prednisone/mtx, traveled here from [place] after [place] to stay with son for the forseable future admitted on 11/27/2017 with c/o cough, sob, and found to be hypoxic at 88 % in UC and sent to ER for further evaluation.  CXR shows nonspecific extensive pathcy opacities, significantly elevated procal, and leukocytosis all suggestive of pneumonia.  Influenza rapid agn negative. He has been placed on azith/pip-tazo for severe CAP and increased prednisone dosing for ? Component of pulmonary fibrosis exacerbation and continues to feel as if his breathing is improving.  His oxygen requirements have resolved.      Eval now notable for 2/2 BCx + for GN coccobacilli  "id'd to haemophilus influenza not type B.      Problem List:   1. Acute hypoxic respiratory failure:  2/2 CAP-at risk for opportunistic organisms in setting of chronic prednisone at 20 mg per day.  Now appears most consistent with h flu. -resolved  2. Pna 2/2 h flu has rec'd 3 doses of high dose azith, and 3 days of pip-tazo with significant improvement in symptoms  Given chronic prednisone use I was concerned about opportunistic infections especially PJC-LDH mildly elevated, fungetelle and induced sputum still pending-seems less likely in setting of + blood cultures as described below  3. H. Influenzae bacteremia 2/2 pneumonia  4. Sepsis:  In light of leukocytosis/hypoxia/lactic acidosis/tachycardia 2/2 H flu CAP/bacteremia-resolved   5. Troponin elevation on admission:  Minimally elevated at 0.071, returned with wnl this am.  No complaints of chest pain-would rec OP stress test to ensure we didn't \"unmask\" prior silent CAD  6. Polymyositis with associated Pulmonary fibrosis;  On chronic prednisone 20 mg and mtx, mtx was on hold during hospitalization but can be resumed next week.  He is currently on pred burst of 40 mg per day which will complete 12/1/17 and then he should resume his typical 20 mg dosing of 12/2/17  7. Chronic pain 2/2 polymyositis-he now reports that he had been on gabapentin 600 mg tid in addition to prn oxycodone/apap and tramadol, he has though run out of his tramadol.  We discussed that oxy in long run not a good agent, I have increased his gabapentin back to home dosing ( I initially had the impression he had run out of all of his meds except his prednisone and mtx) and added in prn tramadol, but have recommended against any oxy.  I have given him a limited supply of tramadol at discharge  8. T2dm:  Home regimen was NPH 50 u qam, 30 u qpm-currently on glargine 16 u with ISS with good control.  hgb A1c 6.2 % likely due to recent weight loss-blood sugars remain under good control and normal " this am.  Will discharge with NPH 6 u bid and close f/u-could consider oral agent based on current blood sugars/hgb A1c  9. Htn:  pta regimen verapamil 120 mg qd/furosemide 20 mg-BPs under good control without any agents-likely due to weight loss, d/c both at this time-f/u with primary MD  10. Unintentional weight loss 18 # over past 2.5 months-suspect due to brewing illness, recent multiple stressors, qualifies for malnutrition but appetite here is excellent  DVT Prophylaxis: Heparin SQ while in hospital  Code Status: Full Code         Important Results:      As noted below          Pending Results:        Unresulted Labs Ordered in the Past 30 Days of this Admission     Date and Time Order Name Status Description    11/28/2017 1701 1,3 Beta D glucan fungitell In process     11/28/2017 1553 Pneumocystis jirovecii DFA In process             Discharge Instructions and Follow-Up:      Follow-up Appointments     Follow-up and recommended labs and tests        F/u with Dr. Jayro Bright at [facility] as scheduled on 12/5/17 at   9 am. This is to establish care and get set up for repeat CXR in 4 weeks   to ensure resolution of your pneumonia.  You should also be set up for a   stress test at that time to check your heart.  You will need to f/u on   your diabetes as I have dropped your insulin down substantially based on   your needs while hospitalized-I suspect your decreased insulin needs are   due to weight loss.                      Discharge Disposition:      Discharged to home         Discharge Medications:        Current Discharge Medication List      START taking these medications    Details   traMADol (ULTRAM) 50 MG tablet Take 0.5 tablets (25 mg) by mouth every 6 hours as needed for moderate pain  Qty: 20 tablet, Refills: 0    Associated Diagnoses: Polymyositis (H)      amoxicillin-clavulanate (AUGMENTIN) 875-125 MG per tablet Take 1 tablet by mouth 2 times daily for 9 doses  Qty: 9 tablet, Refills: 0     Associated Diagnoses: Community acquired pneumonia of left lower lobe of lung (H)         CONTINUE these medications which have CHANGED    Details   !! predniSONE (DELTASONE) 20 MG tablet Take 2 tablets (40 mg) by mouth daily for 1 day  Qty: 2 tablet, Refills: 0    Associated Diagnoses: Community acquired pneumonia of left lower lobe of lung (H)      !! predniSONE (DELTASONE) 20 MG tablet Take 1 tablet (20 mg) by mouth daily    Associated Diagnoses: Polymyositis (H)      insulin isophane human (HUMULIN N PEN) 100 UNIT/ML injection Inject 6 Units Subcutaneous 2 times daily (before meals)    Associated Diagnoses: Type 2 diabetes mellitus with microalbuminuria, unspecified long term insulin use status (H)       !! - Potential duplicate medications found. Please discuss with provider.      CONTINUE these medications which have NOT CHANGED    Details   GABAPENTIN PO Take 600 mg by mouth 3 times daily      GEMFIBROZIL PO Take 600 mg by mouth 2 times daily      METHOTREXATE PO Take 7.5 mg by mouth once a week      theophylline (UNIPHYL) 400 MG 24 hr tablet Take 400 mg by mouth daily      loratadine (CLARITIN) 10 MG tablet Take 10 mg by mouth daily      albuterol (2.5 MG/3ML) 0.083% neb solution Take 1 vial by nebulization every 6 hours as needed for shortness of breath / dyspnea or wheezing      albuterol (PROAIR HFA/PROVENTIL HFA/VENTOLIN HFA) 108 (90 BASE) MCG/ACT Inhaler Inhale 2 puffs into the lungs every 6 hours as needed for shortness of breath / dyspnea or wheezing      omega 3 1000 MG CAPS Take 2 capsules by mouth 2 times daily      FOLIC ACID PO Take 1 mg by mouth daily      beclomethasone (QVAR) 80 MCG/ACT Inhaler Inhale 2 puffs into the lungs 2 times daily         STOP taking these medications       Lansoprazole (PREVACID PO) Comments:   Reason for Stopping:         FUROSEMIDE PO Comments:   Reason for Stopping:         verapamil (VERELAN) 120 MG 24 hr capsule Comments:   Reason for Stopping:         budesonide  "(PULMICORT) 0.5 MG/2ML neb solution Comments:   Reason for Stopping:                 Allergies:       No Known Allergies        Consultations This Hospital Stay:      No consultations were requested during this admission         Condition and Physical on Discharge:      Discharge condition: Stable   Vitals: Blood pressure 119/56, pulse 132, temperature 97  F (36.1  C), temperature source Oral, resp. rate 20, height 1.702 m (5' 7.01\"), weight 76.7 kg (169 lb 3.2 oz), SpO2 95 %.  HR 82 , tele NSR     Constitutional: Pleasant nad looks stated age head nc/at sclera clear   Lungs: Much improved, some mild fine crackles julien in ISABELA field no wheeze no rales effort is wnl, sats 93-97 % on RA   Cardiovascular: rrr no mrg no le edema   Abdomen: S/nt/nd   Skin: W/d no c/c   Other: Alert and oriented affect appropriate brumfield         Discharge Time:      Greater than 30 minutes.        Image Results From This Hospital Stay (For Non-EPIC Providers):        Results for orders placed or performed during the hospital encounter of 11/27/17   Chest XR,  PA & LAT    Narrative    CHEST 2 VIEWS  11/27/2017 11:10 PM     HISTORY: Dyspnea. Cough.    COMPARISON: None.    FINDINGS: Hypoinflated lungs. Moderately extensive patchy opacities  are present in the mid and lower lungs bilaterally, most prominent in  the left lower lung. Probable cardiomegaly.      Impression    IMPRESSION: Moderately extensive patchy opacities within the mid and  lower lungs bilaterally, most prominent in the left lung. These are  nonspecific, but most likely represent an infectious or inflammatory  process or pulmonary edema A follow-up chest radiograph could be  performed following treatment to ensure resolution.    ASHLEY WAN MD           Most Recent Lab Results In EPIC (For Non-EPIC Providers):    Most Recent 3 CBC's:  Recent Labs   Lab Test  11/30/17   0618  11/29/17   0553  11/28/17   0755   WBC  10.4  14.4*  15.0*   HGB  10.4*  10.9*  11.5*   MCV  96 "  95  95   PLT  298  270  255      Most Recent 3 BMP's:  Recent Labs   Lab Test  11/30/17   0618  11/29/17   0553  11/28/17   0755   NA  142  141  138   POTASSIUM  3.5  3.5  4.2   CHLORIDE  106  108  105   CO2  29  25  24   BUN  24  23  17   CR  1.11  0.96  0.91   ANIONGAP  7  8  9   DMITRIY  9.0  8.7  9.0   GLC  108*  126*  243*     Most Recent 3 Troponin's:No lab results found.  Most Recent 3 INR's:No lab results found.  Most Recent 2 LFT's:  Recent Labs   Lab Test  11/30/17   0618  11/27/17   2140   AST  14  15   ALT  14  12   ALKPHOS  70  96   BILITOTAL  0.3  0.5     Most Recent Cholesterol Panel:No lab results found.  Most Recent 6 Bacteria Isolates From Any Culture (See EPIC Reports for Culture Details):  Recent Labs   Lab Test  11/28/17   1310  11/27/17   2254  11/27/17   2140   CULT  Light growth  Normal marlee    Cultured on the 1st day of incubation:  Haemophilus influenzae not type B  Susceptibility testing done on previous specimen  *  Critical Value/Significant Value, preliminary result only, called to and read back by  Jackie Hung RN, @6400 11/28/17.DH.    Cultured on the 1st day of incubation:  Haemophilus influenzae not type B  Beta lactamase negative  *  Critical Value/Significant Value, preliminary result only, called to and read back by  Venita Kulkarni RN, @2965 11/28/17.DH.    (Note)  NEGATIVE for the following organisms and resistance markers:  Acinetobacter sp., Citrobacter sp., Enterobacter sp., Proteus sp., E.  coli, K. pneumoniae/oxytoca, P. aeruginosa, CTX-M, KPC, NDM, VIM, IMP  and OXA by SimpleTuitionigene multiplex nucleic acid test. Final identification  and antimicrobial susceptibility testing will be verified by standard  methods.    Specimen tested with Verigene multiplex, gram-negative blood culture  nucleic acid test for the following targets: Acinetobacter sp.,  Citrobacter sp., Enterobacter sp., Proteus sp., E. coli, K.  pneumoniae/oxytoca, P. aeruginosa, and the following resistance  markers:  CTXM, KPC, NDM, VIM, IMP and OXA.    Critical Value/Significant Value called to and read back by  Caroline Kulkarni RN/[facility] at 1832 on 11/28/17. EH.         Most Recent TSH, T4 and HgbA1c:   Recent Labs   Lab Test  11/28/17   0755   A1C  6.2*

## 2017-12-01 LAB
P JIROVECII AG SPEC QL IF: NEGATIVE
SPECIMEN SOURCE: NORMAL

## 2017-12-05 ENCOUNTER — APPOINTMENT (OUTPATIENT)
Dept: CT IMAGING | Facility: CLINIC | Age: 67
DRG: 307 | End: 2017-12-05
Attending: PHYSICIAN ASSISTANT
Payer: MEDICARE

## 2017-12-05 ENCOUNTER — TRANSFERRED RECORDS (OUTPATIENT)
Dept: HEALTH INFORMATION MANAGEMENT | Facility: CLINIC | Age: 67
End: 2017-12-05

## 2017-12-05 ENCOUNTER — HOSPITAL ENCOUNTER (INPATIENT)
Facility: CLINIC | Age: 67
LOS: 2 days | Discharge: HOME | DRG: 307 | End: 2017-12-07
Attending: PHYSICIAN ASSISTANT | Admitting: INTERNAL MEDICINE
Payer: MEDICARE

## 2017-12-05 DIAGNOSIS — A41.9 SEPSIS, DUE TO UNSPECIFIED ORGANISM: ICD-10-CM

## 2017-12-05 DIAGNOSIS — R01.1 NEWLY RECOGNIZED HEART MURMUR: ICD-10-CM

## 2017-12-05 DIAGNOSIS — J18.9 PNEUMONIA DUE TO INFECTIOUS ORGANISM, UNSPECIFIED LATERALITY, UNSPECIFIED PART OF LUNG: ICD-10-CM

## 2017-12-05 LAB
ANION GAP SERPL CALCULATED.3IONS-SCNC: 6 MMOL/L (ref 3–14)
BASOPHILS # BLD AUTO: 0 10E9/L (ref 0–0.2)
BASOPHILS NFR BLD AUTO: 0.3 %
BUN SERPL-MCNC: 17 MG/DL (ref 7–30)
CALCIUM SERPL-MCNC: 9.5 MG/DL (ref 8.5–10.1)
CHLORIDE SERPL-SCNC: 103 MMOL/L (ref 94–109)
CO2 SERPL-SCNC: 28 MMOL/L (ref 20–32)
CREAT SERPL-MCNC: 0.94 MG/DL (ref 0.66–1.25)
DIFFERENTIAL METHOD BLD: ABNORMAL
EOSINOPHIL # BLD AUTO: 0 10E9/L (ref 0–0.7)
EOSINOPHIL NFR BLD AUTO: 0.1 %
ERYTHROCYTE [DISTWIDTH] IN BLOOD BY AUTOMATED COUNT: 16.3 % (ref 10–15)
GFR SERPL CREATININE-BSD FRML MDRD: 79 ML/MIN/1.7M2
GLUCOSE BLDC GLUCOMTR-MCNC: 140 MG/DL (ref 70–99)
GLUCOSE SERPL-MCNC: 106 MG/DL (ref 70–99)
HCT VFR BLD AUTO: 46.8 % (ref 40–53)
HGB BLD-MCNC: 14.8 G/DL (ref 13.3–17.7)
IMM GRANULOCYTES # BLD: 0.7 10E9/L (ref 0–0.4)
IMM GRANULOCYTES NFR BLD: 4.8 %
INTERPRETATION ECG - MUSE: NORMAL
LACTATE BLD-SCNC: 1.5 MMOL/L (ref 0.7–2)
LACTATE BLD-SCNC: 2.9 MMOL/L (ref 0.7–2)
LYMPHOCYTES # BLD AUTO: 0.5 10E9/L (ref 0.8–5.3)
LYMPHOCYTES NFR BLD AUTO: 3.2 %
MCH RBC QN AUTO: 29.4 PG (ref 26.5–33)
MCHC RBC AUTO-ENTMCNC: 31.6 G/DL (ref 31.5–36.5)
MCV RBC AUTO: 93 FL (ref 78–100)
MONOCYTES # BLD AUTO: 0.3 10E9/L (ref 0–1.3)
MONOCYTES NFR BLD AUTO: 2 %
NEUTROPHILS # BLD AUTO: 13.3 10E9/L (ref 1.6–8.3)
NEUTROPHILS NFR BLD AUTO: 89.6 %
NRBC # BLD AUTO: 0 10*3/UL
NRBC BLD AUTO-RTO: 0 /100
NT-PROBNP SERPL-MCNC: 1393 PG/ML (ref 0–900)
PLATELET # BLD AUTO: 408 10E9/L (ref 150–450)
POTASSIUM SERPL-SCNC: 4.2 MMOL/L (ref 3.4–5.3)
RBC # BLD AUTO: 5.04 10E12/L (ref 4.4–5.9)
SODIUM SERPL-SCNC: 137 MMOL/L (ref 133–144)
TROPONIN I SERPL-MCNC: 0.04 UG/L (ref 0–0.04)
WBC # BLD AUTO: 14.9 10E9/L (ref 4–11)

## 2017-12-05 PROCEDURE — 25000125 ZZHC RX 250: Performed by: INTERNAL MEDICINE

## 2017-12-05 PROCEDURE — 83605 ASSAY OF LACTIC ACID: CPT | Performed by: PHYSICIAN ASSISTANT

## 2017-12-05 PROCEDURE — 36415 COLL VENOUS BLD VENIPUNCTURE: CPT | Performed by: PHYSICIAN ASSISTANT

## 2017-12-05 PROCEDURE — 25000132 ZZH RX MED GY IP 250 OP 250 PS 637: Mod: GY | Performed by: INTERNAL MEDICINE

## 2017-12-05 PROCEDURE — 94640 AIRWAY INHALATION TREATMENT: CPT | Mod: 76

## 2017-12-05 PROCEDURE — 83880 ASSAY OF NATRIURETIC PEPTIDE: CPT | Performed by: PHYSICIAN ASSISTANT

## 2017-12-05 PROCEDURE — 84484 ASSAY OF TROPONIN QUANT: CPT | Performed by: PHYSICIAN ASSISTANT

## 2017-12-05 PROCEDURE — 94640 AIRWAY INHALATION TREATMENT: CPT

## 2017-12-05 PROCEDURE — 83605 ASSAY OF LACTIC ACID: CPT | Performed by: INTERNAL MEDICINE

## 2017-12-05 PROCEDURE — 87040 BLOOD CULTURE FOR BACTERIA: CPT | Performed by: INTERNAL MEDICINE

## 2017-12-05 PROCEDURE — 36415 COLL VENOUS BLD VENIPUNCTURE: CPT | Performed by: INTERNAL MEDICINE

## 2017-12-05 PROCEDURE — 93005 ELECTROCARDIOGRAM TRACING: CPT

## 2017-12-05 PROCEDURE — 71260 CT THORAX DX C+: CPT

## 2017-12-05 PROCEDURE — 00000146 ZZHCL STATISTIC GLUCOSE BY METER IP

## 2017-12-05 PROCEDURE — 40000275 ZZH STATISTIC RCP TIME EA 10 MIN

## 2017-12-05 PROCEDURE — 80048 BASIC METABOLIC PNL TOTAL CA: CPT | Performed by: PHYSICIAN ASSISTANT

## 2017-12-05 PROCEDURE — 25000128 H RX IP 250 OP 636: Performed by: INTERNAL MEDICINE

## 2017-12-05 PROCEDURE — 25000125 ZZHC RX 250: Performed by: PHYSICIAN ASSISTANT

## 2017-12-05 PROCEDURE — 25000128 H RX IP 250 OP 636: Performed by: PHYSICIAN ASSISTANT

## 2017-12-05 PROCEDURE — 36415 COLL VENOUS BLD VENIPUNCTURE: CPT

## 2017-12-05 PROCEDURE — 99285 EMERGENCY DEPT VISIT HI MDM: CPT | Mod: 25

## 2017-12-05 PROCEDURE — 87040 BLOOD CULTURE FOR BACTERIA: CPT | Performed by: PHYSICIAN ASSISTANT

## 2017-12-05 PROCEDURE — 99222 1ST HOSP IP/OBS MODERATE 55: CPT | Mod: AI | Performed by: INTERNAL MEDICINE

## 2017-12-05 PROCEDURE — 12000007 ZZH R&B INTERMEDIATE

## 2017-12-05 PROCEDURE — 96365 THER/PROPH/DIAG IV INF INIT: CPT | Mod: 59

## 2017-12-05 PROCEDURE — 85025 COMPLETE CBC W/AUTO DIFF WBC: CPT | Performed by: PHYSICIAN ASSISTANT

## 2017-12-05 PROCEDURE — 40000274 ZZH STATISTIC RCP CONSULT EA 30 MIN

## 2017-12-05 PROCEDURE — A9270 NON-COVERED ITEM OR SERVICE: HCPCS | Mod: GY | Performed by: INTERNAL MEDICINE

## 2017-12-05 RX ORDER — THEOPHYLLINE 400 MG/1
400 TABLET, EXTENDED RELEASE ORAL DAILY
Status: DISCONTINUED | OUTPATIENT
Start: 2017-12-06 | End: 2017-12-07 | Stop reason: HOSPADM

## 2017-12-05 RX ORDER — ALBUTEROL SULFATE 0.83 MG/ML
1 SOLUTION RESPIRATORY (INHALATION) EVERY 6 HOURS PRN
Status: DISCONTINUED | OUTPATIENT
Start: 2017-12-05 | End: 2017-12-05

## 2017-12-05 RX ORDER — LEVOFLOXACIN 5 MG/ML
750 INJECTION, SOLUTION INTRAVENOUS ONCE
Status: COMPLETED | OUTPATIENT
Start: 2017-12-05 | End: 2017-12-05

## 2017-12-05 RX ORDER — NALOXONE HYDROCHLORIDE 0.4 MG/ML
.1-.4 INJECTION, SOLUTION INTRAMUSCULAR; INTRAVENOUS; SUBCUTANEOUS
Status: DISCONTINUED | OUTPATIENT
Start: 2017-12-05 | End: 2017-12-07 | Stop reason: HOSPADM

## 2017-12-05 RX ORDER — FOLIC ACID 1 MG/1
1 TABLET ORAL DAILY
Status: DISCONTINUED | OUTPATIENT
Start: 2017-12-05 | End: 2017-12-07 | Stop reason: HOSPADM

## 2017-12-05 RX ORDER — CETIRIZINE HYDROCHLORIDE 10 MG/1
10 TABLET ORAL DAILY PRN
COMMUNITY
End: 2019-01-01

## 2017-12-05 RX ORDER — POLYETHYLENE GLYCOL 3350 17 G/17G
17 POWDER, FOR SOLUTION ORAL DAILY PRN
Status: DISCONTINUED | OUTPATIENT
Start: 2017-12-05 | End: 2017-12-07 | Stop reason: HOSPADM

## 2017-12-05 RX ORDER — IPRATROPIUM BROMIDE AND ALBUTEROL SULFATE 2.5; .5 MG/3ML; MG/3ML
3 SOLUTION RESPIRATORY (INHALATION)
Status: DISCONTINUED | OUTPATIENT
Start: 2017-12-05 | End: 2017-12-06

## 2017-12-05 RX ORDER — OXYCODONE AND ACETAMINOPHEN 5; 325 MG/1; MG/1
1 TABLET ORAL 3 TIMES DAILY
Status: ON HOLD | COMMUNITY
End: 2019-01-01

## 2017-12-05 RX ORDER — ONDANSETRON 4 MG/1
4 TABLET, ORALLY DISINTEGRATING ORAL EVERY 6 HOURS PRN
Status: DISCONTINUED | OUTPATIENT
Start: 2017-12-05 | End: 2017-12-07 | Stop reason: HOSPADM

## 2017-12-05 RX ORDER — LEVOFLOXACIN 5 MG/ML
500 INJECTION, SOLUTION INTRAVENOUS EVERY 24 HOURS
Status: DISCONTINUED | OUTPATIENT
Start: 2017-12-06 | End: 2017-12-07 | Stop reason: HOSPADM

## 2017-12-05 RX ORDER — ONDANSETRON 2 MG/ML
4 INJECTION INTRAMUSCULAR; INTRAVENOUS EVERY 6 HOURS PRN
Status: DISCONTINUED | OUTPATIENT
Start: 2017-12-05 | End: 2017-12-07 | Stop reason: HOSPADM

## 2017-12-05 RX ORDER — PREDNISONE 20 MG/1
20 TABLET ORAL DAILY
Status: DISCONTINUED | OUTPATIENT
Start: 2017-12-06 | End: 2017-12-07 | Stop reason: HOSPADM

## 2017-12-05 RX ORDER — IPRATROPIUM BROMIDE AND ALBUTEROL SULFATE 2.5; .5 MG/3ML; MG/3ML
3 SOLUTION RESPIRATORY (INHALATION) ONCE
Status: COMPLETED | OUTPATIENT
Start: 2017-12-05 | End: 2017-12-05

## 2017-12-05 RX ORDER — POTASSIUM CHLORIDE 29.8 MG/ML
20 INJECTION INTRAVENOUS
Status: DISCONTINUED | OUTPATIENT
Start: 2017-12-05 | End: 2017-12-07 | Stop reason: HOSPADM

## 2017-12-05 RX ORDER — ACETAMINOPHEN 650 MG/1
650 SUPPOSITORY RECTAL EVERY 4 HOURS PRN
Status: DISCONTINUED | OUTPATIENT
Start: 2017-12-05 | End: 2017-12-07 | Stop reason: HOSPADM

## 2017-12-05 RX ORDER — POTASSIUM CL/LIDO/0.9 % NACL 10MEQ/0.1L
10 INTRAVENOUS SOLUTION, PIGGYBACK (ML) INTRAVENOUS
Status: DISCONTINUED | OUTPATIENT
Start: 2017-12-05 | End: 2017-12-07 | Stop reason: HOSPADM

## 2017-12-05 RX ORDER — POTASSIUM CHLORIDE 1500 MG/1
20-40 TABLET, EXTENDED RELEASE ORAL
Status: DISCONTINUED | OUTPATIENT
Start: 2017-12-05 | End: 2017-12-07 | Stop reason: HOSPADM

## 2017-12-05 RX ORDER — ALBUTEROL SULFATE 0.83 MG/ML
1 SOLUTION RESPIRATORY (INHALATION) EVERY 4 HOURS PRN
Status: DISCONTINUED | OUTPATIENT
Start: 2017-12-05 | End: 2017-12-07 | Stop reason: HOSPADM

## 2017-12-05 RX ORDER — POTASSIUM CHLORIDE 7.45 MG/ML
10 INJECTION INTRAVENOUS
Status: DISCONTINUED | OUTPATIENT
Start: 2017-12-05 | End: 2017-12-07 | Stop reason: HOSPADM

## 2017-12-05 RX ORDER — DEXTROSE MONOHYDRATE 25 G/50ML
25-50 INJECTION, SOLUTION INTRAVENOUS
Status: DISCONTINUED | OUTPATIENT
Start: 2017-12-05 | End: 2017-12-07 | Stop reason: HOSPADM

## 2017-12-05 RX ORDER — OXYCODONE AND ACETAMINOPHEN 5; 325 MG/1; MG/1
1 TABLET ORAL 3 TIMES DAILY
Status: DISCONTINUED | OUTPATIENT
Start: 2017-12-05 | End: 2017-12-07 | Stop reason: HOSPADM

## 2017-12-05 RX ORDER — GEMFIBROZIL 600 MG/1
600 TABLET, FILM COATED ORAL 2 TIMES DAILY
Status: DISCONTINUED | OUTPATIENT
Start: 2017-12-05 | End: 2017-12-07 | Stop reason: HOSPADM

## 2017-12-05 RX ORDER — GABAPENTIN 300 MG/1
600 CAPSULE ORAL 3 TIMES DAILY
Status: DISCONTINUED | OUTPATIENT
Start: 2017-12-05 | End: 2017-12-07 | Stop reason: HOSPADM

## 2017-12-05 RX ORDER — ACETAMINOPHEN 325 MG/1
650 TABLET ORAL EVERY 4 HOURS PRN
Status: DISCONTINUED | OUTPATIENT
Start: 2017-12-05 | End: 2017-12-07 | Stop reason: HOSPADM

## 2017-12-05 RX ORDER — IOPAMIDOL 755 MG/ML
500 INJECTION, SOLUTION INTRAVASCULAR ONCE
Status: COMPLETED | OUTPATIENT
Start: 2017-12-05 | End: 2017-12-05

## 2017-12-05 RX ORDER — NICOTINE POLACRILEX 4 MG
15-30 LOZENGE BUCCAL
Status: DISCONTINUED | OUTPATIENT
Start: 2017-12-05 | End: 2017-12-07 | Stop reason: HOSPADM

## 2017-12-05 RX ORDER — POTASSIUM CHLORIDE 1.5 G/1.58G
20-40 POWDER, FOR SOLUTION ORAL
Status: DISCONTINUED | OUTPATIENT
Start: 2017-12-05 | End: 2017-12-07 | Stop reason: HOSPADM

## 2017-12-05 RX ADMIN — IPRATROPIUM BROMIDE AND ALBUTEROL SULFATE 3 ML: .5; 3 SOLUTION RESPIRATORY (INHALATION) at 15:37

## 2017-12-05 RX ADMIN — LEVOFLOXACIN 750 MG: 5 INJECTION, SOLUTION INTRAVENOUS at 15:20

## 2017-12-05 RX ADMIN — GEMFIBROZIL 600 MG: 600 TABLET ORAL at 20:04

## 2017-12-05 RX ADMIN — SODIUM CHLORIDE, POTASSIUM CHLORIDE, SODIUM LACTATE AND CALCIUM CHLORIDE 1000 ML: 600; 310; 30; 20 INJECTION, SOLUTION INTRAVENOUS at 22:40

## 2017-12-05 RX ADMIN — IOPAMIDOL 64 ML: 755 INJECTION, SOLUTION INTRAVENOUS at 13:16

## 2017-12-05 RX ADMIN — GABAPENTIN 600 MG: 300 CAPSULE ORAL at 20:05

## 2017-12-05 RX ADMIN — Medication 25 MG: at 20:08

## 2017-12-05 RX ADMIN — GUAIFENESIN AND DEXTROMETHORPHAN HYDROBROMIDE 1 TABLET: 600; 30 TABLET, EXTENDED RELEASE ORAL at 20:05

## 2017-12-05 RX ADMIN — SODIUM CHLORIDE 80 ML: 9 INJECTION, SOLUTION INTRAVENOUS at 13:16

## 2017-12-05 RX ADMIN — OXYCODONE HYDROCHLORIDE AND ACETAMINOPHEN 1 TABLET: 5; 325 TABLET ORAL at 20:04

## 2017-12-05 RX ADMIN — IPRATROPIUM BROMIDE AND ALBUTEROL SULFATE 3 ML: .5; 3 SOLUTION RESPIRATORY (INHALATION) at 20:25

## 2017-12-05 RX ADMIN — FOLIC ACID 1 MG: 1 TABLET ORAL at 20:05

## 2017-12-05 ASSESSMENT — ACTIVITIES OF DAILY LIVING (ADL)
BATHING: 0-->INDEPENDENT
SWALLOWING: 0-->SWALLOWS FOODS/LIQUIDS WITHOUT DIFFICULTY
RETIRED_EATING: 0-->INDEPENDENT
ADLS_ACUITY_SCORE: 11
TOILETING: 0-->INDEPENDENT
COGNITION: 0 - NO COGNITION ISSUES REPORTED
AMBULATION: 0-->INDEPENDENT
RETIRED_COMMUNICATION: 0-->UNDERSTANDS/COMMUNICATES WITHOUT DIFFICULTY
DRESS: 0-->INDEPENDENT
TRANSFERRING: 0-->INDEPENDENT
FALL_HISTORY_WITHIN_LAST_SIX_MONTHS: NO

## 2017-12-05 ASSESSMENT — ENCOUNTER SYMPTOMS
SHORTNESS OF BREATH: 1
FEVER: 0
COUGH: 1

## 2017-12-05 ASSESSMENT — PAIN DESCRIPTION - DESCRIPTORS: DESCRIPTORS: NUMBNESS;TINGLING

## 2017-12-05 NOTE — LETTER
Reason for your hospital stay       Admitted in the hospital for murmur heard in the clinic with recent bacteremia.                   Key Recommendations:  Pt is readmitted after 5 days with PNA.  Now admitted with SOB and murmur.      Sobia Ward    AVS/Discharge Summary is the source of truth; this is a helpful guide for improved communication of patient story
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
## 2017-12-05 NOTE — IP AVS SNAPSHOT
MRN:[id number]                      After Visit Summary   12/5/2017    Beulah Jane    MRN: [id number]           Thank you!     Thank you for choosing [facility] for your care. Our goal is always to provide you with excellent care. Hearing back from our patients is one way we can continue to improve our services. Please take a few minutes to complete the written survey that you may receive in the mail after you visit. If you would like to speak to someone directly about your visit please contact Patient Relations at [phone]. Thank you!          Patient Information     Date Of Birth          1950        Designated Caregiver       Most Recent Value    Caregiver    Will someone help with your care after discharge? yes    Name of designated caregiver Janelle Alexander    Phone number of caregiver [phone]    Caregiver address [address] [place] [address]      About your hospital stay     You were admitted on:  December 5, 2017 You last received care in the:  [facility] [facility] [address] [facility]    You were discharged on:  December 7, 2017        Reason for your hospital stay       Admitted in the hospital for murmur heard in the clinic with recent bacteremia.                  Who to Call     For medical emergencies, please call 911.  For non-urgent questions about your medical care, please call your primary care provider or clinic, [phone]          Attending Provider     Provider Specialty    Yvonne Schmitz PA-C Physician Assistant    Dirk Chase MD Internal Medicine       Primary Care Provider Office Phone # Fax #    Chadwick Bright [phone]      After Care Instructions     Activity       Your activity upon discharge: activity as tolerated            Diet       Follow this diet upon discharge: Orders Placed This Encounter      Snacks/Supplements Adult: Other - Please comment; high protein jello; Between Meals      Moderate  "Consistent CHO Diet                  Follow-up Appointments     Follow-up and recommended labs and tests        Follow up with primary care provider, ANAID CRESPO, within 7 days to evaluate medication change, to evaluate treatment change and for hospital follow- up.  Pending final results of recent blood cultures  F/u with cardiology for moderate aortic stenosis                  Further instructions from your care team       Nutrition: Ok to continue high protein snacks/supplements at discharge.    Pending Results     Date and Time Order Name Status Description    12/5/2017 2207 Blood culture Preliminary     12/5/2017 1151 Blood culture Preliminary     12/5/2017 1128 Blood culture Preliminary             Statement of Approval     Ordered          12/07/17 1227  I have reviewed and agree with all the recommendations and orders detailed in this document.  EFFECTIVE NOW     Approved and electronically signed by:  Dirk Chase MD             Admission Information     Date & Time Provider Department Dept. Phone    12/5/2017 Dirk Chase MD [place] [address] [facility] [phone]      Your Vitals Were     Blood Pressure Pulse Temperature Respirations Height Weight    114/55 (BP Location: Left arm) 107 96.2  F (35.7  C) (Oral) 16 1.702 m (5' 7\") 73.7 kg (162 lb 8 oz)    Pulse Oximetry BMI (Body Mass Index)                96% 25.45 kg/m2          [employer] Information     [employer] lets you send messages to your doctor, view your test results, renew your prescriptions, schedule appointments and more. To sign up, go to [email or web address] . Click on \"Log in\" on the left side of the screen, which will take you to the Welcome page. Then click on \"Sign up Now\" on the right side of the page.     You will be asked to enter the access code listed below, as well as some personal information. Please follow the directions to create your username and password.     Your access code is: [email or web address]  Expires: " 2018 11:44 AM     Your access code will  in 90 days. If you need help or a new code, please call your [facility] clinic or [phone].        Care EveryWhere ID     This is your Care EveryWhere ID. This could be used by other organizations to access your [facility] medical records  [id number]        Equal Access to Services     MORROAARTI PREMA : Hadii herberth salguero hadkenricko Sostanali, waaxda luqadaha, qaybta kaalmada diana, gris polksuzanneelizabeth alejandro . So [facility] [phone].    ATENCIÓN: Si habla español, tiene a paul disposición servicios gratuitos de asistencia lingüística. Hugh al [phone].    We comply with applicable federal civil rights laws and [place] laws. We do not discriminate on the basis of race, color, national origin, age, disability, sex, sexual orientation, or gender identity.               Review of your medicines      CONTINUE these medicines which have NOT CHANGED        Dose / Directions    * albuterol (2.5 MG/3ML) 0.083% neb solution        Dose:  1 vial   Take 1 vial by nebulization every 6 hours as needed for shortness of breath / dyspnea or wheezing   Refills:  0       * albuterol 108 (90 BASE) MCG/ACT Inhaler   Commonly known as:  PROAIR HFA/PROVENTIL HFA/VENTOLIN HFA        Dose:  2 puff   Inhale 2 puffs into the lungs every 6 hours as needed for shortness of breath / dyspnea or wheezing   Refills:  0       beclomethasone 80 MCG/ACT Inhaler   Commonly known as:  QVAR        Dose:  2 puff   Inhale 2 puffs into the lungs 2 times daily   Refills:  0       cetirizine 10 MG tablet   Commonly known as:  zyrTEC        Dose:  10 mg   Take 10 mg by mouth daily   Refills:  0       dextromethorphan-guaiFENesin  MG per 12 hr tablet   Commonly known as:  MUCINEX DM        Dose:  1 tablet   Take 1 tablet by mouth every 12 hours   Refills:  0       FOLIC ACID PO        Dose:  1 mg   Take 1 mg by mouth daily   Refills:  0       GABAPENTIN PO        Dose:  600 mg   Take 600 mg by  mouth 3 times daily   Refills:  0       GEMFIBROZIL PO        Dose:  600 mg   Take 600 mg by mouth 2 times daily   Refills:  0       insulin isophane human 100 UNIT/ML injection   Commonly known as:  HumuLIN N PEN   Used for:  Type 2 diabetes mellitus with microalbuminuria, unspecified long term insulin use status (H)        Dose:  6 Units   Inject 6 Units Subcutaneous 2 times daily (before meals)   Refills:  0       loratadine 10 MG tablet   Commonly known as:  CLARITIN        Dose:  10 mg   Take 10 mg by mouth daily   Refills:  0       METHOTREXATE PO        Dose:  7.5 mg   Take 7.5 mg by mouth once a week   Refills:  0       omega 3 1000 MG Caps        Dose:  2 capsule   Take 2 capsules by mouth 2 times daily   Refills:  0       oxyCODONE-acetaminophen 5-325 MG per tablet   Commonly known as:  PERCOCET        Dose:  1 tablet   Take 1 tablet by mouth 3 times daily   Refills:  0       predniSONE 20 MG tablet   Commonly known as:  DELTASONE   Used for:  Polymyositis (H)        Dose:  20 mg   Take 1 tablet (20 mg) by mouth daily   Refills:  0       theophylline 400 MG 24 hr tablet   Commonly known as:  UNIPHYL        Dose:  400 mg   Take 400 mg by mouth daily   Refills:  0       traMADol 50 MG tablet   Commonly known as:  ULTRAM   Used for:  Polymyositis (H)        Dose:  25 mg   Take 0.5 tablets (25 mg) by mouth every 6 hours as needed for moderate pain   Quantity:  20 tablet   Refills:  0       * Notice:  This list has 2 medication(s) that are the same as other medications prescribed for you. Read the directions carefully, and ask your doctor or other care provider to review them with you.      STOP taking     amoxicillin-clavulanate 875-125 MG per tablet   Commonly known as:  AUGMENTIN                    Protect others around you: Learn how to safely use, store and throw away your medicines at www.disposemymeds.org.             Medication List: This is a list of all your medications and when to take them. Check  marks below indicate your daily home schedule. Keep this list as a reference.      Medications           Morning Afternoon Evening Bedtime As Needed    * albuterol (2.5 MG/3ML) 0.083% neb solution   Take 1 vial by nebulization every 6 hours as needed for shortness of breath / dyspnea or wheezing                                * albuterol 108 (90 BASE) MCG/ACT Inhaler   Commonly known as:  PROAIR HFA/PROVENTIL HFA/VENTOLIN HFA   Inhale 2 puffs into the lungs every 6 hours as needed for shortness of breath / dyspnea or wheezing                                beclomethasone 80 MCG/ACT Inhaler   Commonly known as:  QVAR   Inhale 2 puffs into the lungs 2 times daily                                cetirizine 10 MG tablet   Commonly known as:  zyrTEC   Take 10 mg by mouth daily                                dextromethorphan-guaiFENesin  MG per 12 hr tablet   Commonly known as:  MUCINEX DM   Take 1 tablet by mouth every 12 hours   Last time this was given:  1 tablet on 12/7/2017  8:09 AM                                FOLIC ACID PO   Take 1 mg by mouth daily   Last time this was given:  1 mg on 12/7/2017  8:09 AM                                GABAPENTIN PO   Take 600 mg by mouth 3 times daily   Last time this was given:  600 mg on 12/7/2017  8:09 AM                                GEMFIBROZIL PO   Take 600 mg by mouth 2 times daily   Last time this was given:  600 mg on 12/7/2017  8:09 AM                                insulin isophane human 100 UNIT/ML injection   Commonly known as:  HumuLIN N PEN   Inject 6 Units Subcutaneous 2 times daily (before meals)   Last time this was given:  6 Units on 12/6/2017  4:46 PM                                loratadine 10 MG tablet   Commonly known as:  CLARITIN   Take 10 mg by mouth daily                                METHOTREXATE PO   Take 7.5 mg by mouth once a week                                omega 3 1000 MG Caps   Take 2 capsules by mouth 2 times daily                                 oxyCODONE-acetaminophen 5-325 MG per tablet   Commonly known as:  PERCOCET   Take 1 tablet by mouth 3 times daily   Last time this was given:  1 tablet on 12/7/2017  8:15 AM                                predniSONE 20 MG tablet   Commonly known as:  DELTASONE   Take 1 tablet (20 mg) by mouth daily   Last time this was given:  20 mg on 12/7/2017  8:09 AM                                theophylline 400 MG 24 hr tablet   Commonly known as:  UNIPHYL   Take 400 mg by mouth daily   Last time this was given:  400 mg on 12/7/2017  8:09 AM                                traMADol 50 MG tablet   Commonly known as:  ULTRAM   Take 0.5 tablets (25 mg) by mouth every 6 hours as needed for moderate pain   Last time this was given:  25 mg on 12/7/2017  8:15 AM                                * Notice:  This list has 2 medication(s) that are the same as other medications prescribed for you. Read the directions carefully, and ask your doctor or other care provider to review them with you.

## 2017-12-05 NOTE — LETTER
Transition Communication Hand-off for Care Transitions to Next Level of Care Provider    Name: Beulah Jane  MRN #: [id number]  Primary Care Provider: ANAID CRESPO  Primary Care MD Name: Dr Crespo  Primary Clinic: [facility] [address] [facility] [address]  Primary Care Clinic Name: [facility]  Reason for Hospitalization:  Newly recognized heart murmur [R01.1]  Sepsis, due to unspecified organism (H) [A41.9]  Pneumonia due to infectious organism, unspecified laterality, unspecified part of lung [J18.9]  Admit Date/Time: 12/5/2017 11:10 AM  Discharge Date: 12/7/17  Payor Source: Payor: BCBS / Plan: BCBS PLATINUM BLUE / Product Type: PPO /     Readmission Assessment Measure (COLLEEN) Risk Score/category: AVERAGE    Plan of Care Goals/Milestone Events:   Patient Concerns:  Establish care with cardiology             Reason for Communication Hand-off Referral: Multiple providers/specialties  Other readmit after 5 days after having PNA    Discharge Plan:       Concern for non-adherence with plan of care:   NO  Discharge Needs Assessment:  Needs       Most Recent Value    Transportation Available family or friend will provide    # of Referrals Placed by [facility] External Care Coordination          Already enrolled in Tele-monitoring program and name of program:  na  Follow-up specialty is recommended: Yes cardiology.  He said he would like to get connected with  cardiology    Follow-up plan:  No future appointments.    Any outstanding tests or procedures:             Reason for your hospital stay       Admitted in the hospital for murmur heard in the clinic with recent bacteremia.                 Diet       Follow this diet upon discharge: Orders Placed This Encounter       Snacks/Supplements Adult: Other - Please comment; high protein jello; Between Meals       Moderate Consistent CHO Diet                     Follow-up Appointments      Follow-up and recommended labs and tests       Follow up  with primary care provider, ANAID CRESPO, within 7 days to evaluate medication change, to evaluate treatment change and for hospital follow- up.  Pending final results of recent blood cultures   F/u with cardiology for moderate aortic stenosis                     Further instructions from your care team      Nutrition: Ok to continue high protein snacks/supplements at discharge.       Key Recommendations:  Pt is readmitted after 5 days with PNA.  Now admitted with SOB and murmur.  Follow up with cards in recommended.  Follow up with blood culture results.      Sobia Ward    AVS/Discharge Summary is the source of truth; this is a helpful guide for improved communication of patient story

## 2017-12-05 NOTE — PHARMACY-ADMISSION MEDICATION HISTORY
Admission medication history interview status for this patient is complete. See [facility] admission navigator for allergy information, prior to admission medications and immunization status.     Medication history interview source(s):Patient and Family  Medication history resources (including written lists, pill bottles, clinic record): paper med list (also had med bottles with him, discharged from [facility] 11/30/17)  Primary pharmacy:[facility] on [address] in [place].    Changes made to PTA medication list:  Added: Mucinex, Zyrtec, Percocet  Deleted: none  Changed: none    Actions taken by pharmacist (provider contacted, etc):None     Additional medication history information: Finished Augmentin, took last dose today. Methotrexate - takes on Tue, took last dose today in am. Recently discharged from [facility] - on 11/30/17.  Medication reconciliation/reorder completed by provider prior to medication history? No    Do you take OTC medications (eg tylenol, ibuprofen, fish oil, eye/ear drops, etc)? Yes(Y/N)    For patients on insulin therapy: Yes (Y/N)  Lantus/levemir/NPH/Mix 70/30 dose:   Humulin N 6 units bid.  Sliding scale Novolog: No  How many episodes of hypoglycemia do you have per week: none, lowest BG 89.  How many times do you check your blood glucose per day: once     Prior to Admission medications    Medication Sig Last Dose Taking? Auth Provider   dextromethorphan-guaiFENesin (MUCINEX DM)  MG per 12 hr tablet Take 1 tablet by mouth every 12 hours 12/5/2017 at am Yes Unknown, Entered By History   cetirizine (ZYRTEC) 10 MG tablet Take 10 mg by mouth daily 12/5/2017 at Unknown time Yes Unknown, Entered By History   oxyCODONE-acetaminophen (PERCOCET) 5-325 MG per tablet Take 1 tablet by mouth 3 times daily 12/5/2017 at am Yes Unknown, Entered By History   traMADol (ULTRAM) 50 MG tablet Take 0.5 tablets (25 mg) by mouth every 6 hours as needed for moderate pain prn Yes Ivana Mayo MD   predniSONE (DELTASONE) 20 MG  tablet Take 1 tablet (20 mg) by mouth daily 12/5/2017 at Unknown time Yes Ivana Mayo MD   insulin isophane human (HUMULIN N PEN) 100 UNIT/ML injection Inject 6 Units Subcutaneous 2 times daily (before meals) 12/5/2017 at am Yes Ivana Mayo MD   omega 3 1000 MG CAPS Take 2 capsules by mouth 2 times daily 12/5/2017 at am Yes Unknown, Entered By History   GABAPENTIN PO Take 600 mg by mouth 3 times daily 12/5/2017 at 1 dose today Yes Unknown, Entered By History   GEMFIBROZIL PO Take 600 mg by mouth 2 times daily 12/5/2017 at Unknown time Yes Unknown, Entered By History   METHOTREXATE PO Take 7.5 mg by mouth once a week 12/5/2017 at takes on Tue Yes Unknown, Entered By History   theophylline (UNIPHYL) 400 MG 24 hr tablet Take 400 mg by mouth daily 12/5/2017 at Unknown time Yes Unknown, Entered By History   loratadine (CLARITIN) 10 MG tablet Take 10 mg by mouth daily 12/5/2017 at Unknown time Yes Unknown, Entered By History   FOLIC ACID PO Take 1 mg by mouth daily 12/4/2017 at Unknown time Yes Unknown, Entered By History   albuterol (2.5 MG/3ML) 0.083% neb solution Take 1 vial by nebulization every 6 hours as needed for shortness of breath / dyspnea or wheezing prn Yes Unknown, Entered By History   albuterol (PROAIR HFA/PROVENTIL HFA/VENTOLIN HFA) 108 (90 BASE) MCG/ACT Inhaler Inhale 2 puffs into the lungs every 6 hours as needed for shortness of breath / dyspnea or wheezing prn Yes Unknown, Entered By History   beclomethasone (QVAR) 80 MCG/ACT Inhaler Inhale 2 puffs into the lungs 2 times daily 12/5/2017 at Unknown time Yes Unknown, Entered By History   amoxicillin-clavulanate (AUGMENTIN) 875-125 MG per tablet Take 1 tablet by mouth 2 times daily for 9 doses took last dose today  Ivana Mayo MD

## 2017-12-05 NOTE — ED NOTES
"Shortness of breath for 6 weeks with cough. Recent hospitalization (here) for pneumonia. Today went to follow up appointment \"and they were concerned a heart murmer\". Chest pain with deep breath started 2 days ago.   "

## 2017-12-05 NOTE — IP AVS SNAPSHOT
[place] [address] [facility]    [address]    [facility] [address]    Phone:  [phone]    Fax:  [phone]                                       After Visit Summary   12/5/2017    Beulah Jane    MRN: [id number]           After Visit Summary Signature Page     I have received my discharge instructions, and my questions have been answered. I have discussed any challenges I see with this plan with the nurse or doctor.    ..........................................................................................................................................  Patient/Patient Representative Signature      ..........................................................................................................................................  Patient Representative Print Name and Relationship to Patient    ..................................................               ................................................  Date                                            Time    ..........................................................................................................................................  Reviewed by Signature/Title    ...................................................              ..............................................  Date                                                            Time

## 2017-12-06 ENCOUNTER — APPOINTMENT (OUTPATIENT)
Dept: CARDIOLOGY | Facility: CLINIC | Age: 67
DRG: 307 | End: 2017-12-06
Attending: INTERNAL MEDICINE
Payer: MEDICARE

## 2017-12-06 LAB
ANION GAP SERPL CALCULATED.3IONS-SCNC: 8 MMOL/L (ref 3–14)
BASOPHILS # BLD AUTO: 0 10E9/L (ref 0–0.2)
BASOPHILS NFR BLD AUTO: 0 %
BUN SERPL-MCNC: 19 MG/DL (ref 7–30)
CALCIUM SERPL-MCNC: 9 MG/DL (ref 8.5–10.1)
CHLORIDE SERPL-SCNC: 104 MMOL/L (ref 94–109)
CO2 SERPL-SCNC: 25 MMOL/L (ref 20–32)
CREAT SERPL-MCNC: 0.79 MG/DL (ref 0.66–1.25)
DIFFERENTIAL METHOD BLD: ABNORMAL
EOSINOPHIL # BLD AUTO: 0.1 10E9/L (ref 0–0.7)
EOSINOPHIL NFR BLD AUTO: 1 %
ERYTHROCYTE [DISTWIDTH] IN BLOOD BY AUTOMATED COUNT: 16.2 % (ref 10–15)
GFR SERPL CREATININE-BSD FRML MDRD: >90 ML/MIN/1.7M2
GLUCOSE BLDC GLUCOMTR-MCNC: 130 MG/DL (ref 70–99)
GLUCOSE BLDC GLUCOMTR-MCNC: 134 MG/DL (ref 70–99)
GLUCOSE BLDC GLUCOMTR-MCNC: 167 MG/DL (ref 70–99)
GLUCOSE BLDC GLUCOMTR-MCNC: 92 MG/DL (ref 70–99)
GLUCOSE SERPL-MCNC: 116 MG/DL (ref 70–99)
HBA1C MFR BLD: 6.3 % (ref 4.3–6)
HCT VFR BLD AUTO: 43.1 % (ref 40–53)
HGB BLD-MCNC: 13.6 G/DL (ref 13.3–17.7)
LACTATE BLD-SCNC: 1.6 MMOL/L (ref 0.7–2)
LYMPHOCYTES # BLD AUTO: 1 10E9/L (ref 0.8–5.3)
LYMPHOCYTES NFR BLD AUTO: 10 %
MACROCYTES BLD QL SMEAR: PRESENT
MCH RBC QN AUTO: 29.7 PG (ref 26.5–33)
MCHC RBC AUTO-ENTMCNC: 31.6 G/DL (ref 31.5–36.5)
MCV RBC AUTO: 94 FL (ref 78–100)
METAMYELOCYTES # BLD: 0.3 10E9/L
METAMYELOCYTES NFR BLD MANUAL: 3 %
MONOCYTES # BLD AUTO: 0.4 10E9/L (ref 0–1.3)
MONOCYTES NFR BLD AUTO: 4 %
NEUTROPHILS # BLD AUTO: 8.5 10E9/L (ref 1.6–8.3)
NEUTROPHILS NFR BLD AUTO: 82 %
PLATELET # BLD AUTO: 350 10E9/L (ref 150–450)
PLATELET # BLD EST: NORMAL 10*3/UL
POTASSIUM SERPL-SCNC: 4 MMOL/L (ref 3.4–5.3)
RBC # BLD AUTO: 4.58 10E12/L (ref 4.4–5.9)
SODIUM SERPL-SCNC: 137 MMOL/L (ref 133–144)
WBC # BLD AUTO: 10.4 10E9/L (ref 4–11)

## 2017-12-06 PROCEDURE — 80048 BASIC METABOLIC PNL TOTAL CA: CPT | Performed by: INTERNAL MEDICINE

## 2017-12-06 PROCEDURE — 94640 AIRWAY INHALATION TREATMENT: CPT

## 2017-12-06 PROCEDURE — 83036 HEMOGLOBIN GLYCOSYLATED A1C: CPT | Performed by: INTERNAL MEDICINE

## 2017-12-06 PROCEDURE — 00000146 ZZHCL STATISTIC GLUCOSE BY METER IP

## 2017-12-06 PROCEDURE — 40000275 ZZH STATISTIC RCP TIME EA 10 MIN

## 2017-12-06 PROCEDURE — 25000125 ZZHC RX 250: Performed by: INTERNAL MEDICINE

## 2017-12-06 PROCEDURE — 25500064 ZZH RX 255 OP 636: Performed by: INTERNAL MEDICINE

## 2017-12-06 PROCEDURE — 25000128 H RX IP 250 OP 636: Performed by: INTERNAL MEDICINE

## 2017-12-06 PROCEDURE — 93306 TTE W/DOPPLER COMPLETE: CPT | Mod: 26 | Performed by: INTERNAL MEDICINE

## 2017-12-06 PROCEDURE — 25000131 ZZH RX MED GY IP 250 OP 636 PS 637: Mod: GY | Performed by: INTERNAL MEDICINE

## 2017-12-06 PROCEDURE — 36415 COLL VENOUS BLD VENIPUNCTURE: CPT | Performed by: INTERNAL MEDICINE

## 2017-12-06 PROCEDURE — 25000132 ZZH RX MED GY IP 250 OP 250 PS 637: Mod: GY | Performed by: INTERNAL MEDICINE

## 2017-12-06 PROCEDURE — 12000007 ZZH R&B INTERMEDIATE

## 2017-12-06 PROCEDURE — 83605 ASSAY OF LACTIC ACID: CPT | Performed by: INTERNAL MEDICINE

## 2017-12-06 PROCEDURE — 85025 COMPLETE CBC W/AUTO DIFF WBC: CPT | Performed by: INTERNAL MEDICINE

## 2017-12-06 PROCEDURE — A9270 NON-COVERED ITEM OR SERVICE: HCPCS | Mod: GY | Performed by: INTERNAL MEDICINE

## 2017-12-06 PROCEDURE — 99232 SBSQ HOSP IP/OBS MODERATE 35: CPT | Performed by: INTERNAL MEDICINE

## 2017-12-06 PROCEDURE — 40000264 ECHO COMPLETE WITH OPTISON

## 2017-12-06 RX ORDER — IPRATROPIUM BROMIDE AND ALBUTEROL SULFATE 2.5; .5 MG/3ML; MG/3ML
3 SOLUTION RESPIRATORY (INHALATION) 4 TIMES DAILY PRN
Status: DISCONTINUED | OUTPATIENT
Start: 2017-12-06 | End: 2017-12-07 | Stop reason: HOSPADM

## 2017-12-06 RX ADMIN — OXYCODONE HYDROCHLORIDE AND ACETAMINOPHEN 1 TABLET: 5; 325 TABLET ORAL at 22:08

## 2017-12-06 RX ADMIN — INSULIN HUMAN 6 UNITS: 100 INJECTION, SUSPENSION SUBCUTANEOUS at 16:46

## 2017-12-06 RX ADMIN — GABAPENTIN 600 MG: 300 CAPSULE ORAL at 10:54

## 2017-12-06 RX ADMIN — HUMAN ALBUMIN MICROSPHERES AND PERFLUTREN 3 ML: 10; .22 INJECTION, SOLUTION INTRAVENOUS at 08:30

## 2017-12-06 RX ADMIN — IPRATROPIUM BROMIDE AND ALBUTEROL SULFATE 3 ML: .5; 3 SOLUTION RESPIRATORY (INHALATION) at 07:07

## 2017-12-06 RX ADMIN — LEVOFLOXACIN 500 MG: 5 INJECTION, SOLUTION INTRAVENOUS at 15:13

## 2017-12-06 RX ADMIN — GEMFIBROZIL 600 MG: 600 TABLET ORAL at 20:05

## 2017-12-06 RX ADMIN — GEMFIBROZIL 600 MG: 600 TABLET ORAL at 10:55

## 2017-12-06 RX ADMIN — Medication 25 MG: at 11:00

## 2017-12-06 RX ADMIN — FLUTICASONE FUROATE 1 PUFF: 100 POWDER RESPIRATORY (INHALATION) at 07:09

## 2017-12-06 RX ADMIN — FOLIC ACID 1 MG: 1 TABLET ORAL at 10:55

## 2017-12-06 RX ADMIN — Medication 25 MG: at 16:43

## 2017-12-06 RX ADMIN — GUAIFENESIN AND DEXTROMETHORPHAN HYDROBROMIDE 1 TABLET: 600; 30 TABLET, EXTENDED RELEASE ORAL at 10:55

## 2017-12-06 RX ADMIN — PREDNISONE 20 MG: 20 TABLET ORAL at 10:55

## 2017-12-06 RX ADMIN — GABAPENTIN 600 MG: 300 CAPSULE ORAL at 22:08

## 2017-12-06 RX ADMIN — Medication 25 MG: at 22:08

## 2017-12-06 RX ADMIN — OXYCODONE HYDROCHLORIDE AND ACETAMINOPHEN 1 TABLET: 5; 325 TABLET ORAL at 16:41

## 2017-12-06 RX ADMIN — GABAPENTIN 600 MG: 300 CAPSULE ORAL at 16:41

## 2017-12-06 RX ADMIN — THEOPHYLLINE 400 MG: 400 TABLET, EXTENDED RELEASE ORAL at 20:08

## 2017-12-06 RX ADMIN — OXYCODONE HYDROCHLORIDE AND ACETAMINOPHEN 1 TABLET: 5; 325 TABLET ORAL at 10:56

## 2017-12-06 RX ADMIN — GUAIFENESIN AND DEXTROMETHORPHAN HYDROBROMIDE 1 TABLET: 600; 30 TABLET, EXTENDED RELEASE ORAL at 20:05

## 2017-12-06 RX ADMIN — INSULIN HUMAN 6 UNITS: 100 INJECTION, SUSPENSION SUBCUTANEOUS at 11:01

## 2017-12-06 ASSESSMENT — ACTIVITIES OF DAILY LIVING (ADL)
ADLS_ACUITY_SCORE: 11

## 2017-12-06 NOTE — PROGRESS NOTES
[facility]    Sepsis Evaluation Progress Note    Date of Service: 12/05/2017    I was called to see Beulah Jane due to abnormal vital signs triggering the Sepsis SIRS screening alert. He is not known to have an infection.     Physical Exam    Vital Signs:  Temp: 96.4  F (35.8  C) Temp src: Oral BP: 146/63 Pulse: 107 Heart Rate: 120 Resp: 22 SpO2: 97 % O2 Device: None (Room air)      Lab:  Lactic Acid   Date Value Ref Range Status   12/05/2017 2.9 (H) 0.7 - 2.0 mmol/L Final       The patient is at baseline mental status.    The rest of their physical exam is significant for patient admitted earlier today, feels better, no new issues, baseline mentation.    Assessment and Plan    The SIRS and exam findings are likely due to   sepsis.     ID: The patient is currently on the following antibiotics:  Anti-infectives (Future)    Start     Dose/Rate Route Frequency Ordered Stop    12/06/17 1500  levofloxacin (LEVAQUIN) infusion 500 mg      500 mg  100 mL/hr over 60 Minutes Intravenous EVERY 24 HOURS 12/05/17 1705          Current antibiotic coverage is appropriate for source of infection.    Fluid: Fluid bolus ordered.    Lab: Repeat lactic acid ordered for 2 hours from now.      Disposition: The patient will remain on the current unit. We will continue to monitor this patient closely.    Daquan Montes MD
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
## 2017-12-06 NOTE — PLAN OF CARE
Problem: Patient Care Overview  Goal: Individualization & Mutuality  Outcome: No Change  Up in room and halls --- states slight rib pain -- denies chest pain or SOB----appetite good----states cough is occasionally productive ---resting comfortable between checks

## 2017-12-06 NOTE — PLAN OF CARE
Problem: Patient Care Overview  Goal: Plan of Care/Patient Progress Review  Outcome: No Change  VSS except -120's. Alert and Oriented. Up with SBA. Pain 8/10 numbness and tingling in hands and feet baseline for patient. Lactic came back at 2.9, given fluid bolus
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
## 2017-12-06 NOTE — PROGRESS NOTES
"[facility] RCAT     Date: 12-5-17    Admission Dx: PNA/SOB    Pulmonary History: Pulmonary Fibrosis    Home Nebulizer/MDI Use: Albuterol MDI    Home Oxygen: None    Acuity Level (RCAT flow sheet): 3    Aerosol Therapy initiated: Duoneb QID, Albuterol Q4 prn      Pulmonary Hygiene initiated: Deep Breathing and Coughing      Volume Expansion initiated: IS      Current Oxygen Requirements: Room Air    Current SpO2: 96%    Re-evaluation date: 12-8-17    Patient Education: Patient educated on side effects and benefits of bronchodilator      See \"RT Assessments\" flow sheet for patient assessment scoring and Acuity Level Details.             "

## 2017-12-06 NOTE — PROGRESS NOTES
[facility]  Hospitalist Progress Note  Dirk Chase MD, MD 12/06/2017  (Text Page)  Reason for Stay (Diagnosis): cough/ found with murmur         Assessment and Plan:      Beulah Jane is a 67 year old male who was recently hospitalized here for sepsis from CAP with isolated Haemophilus influenzae and finishing up his oral Augmentin, with background hx of chronic prednisone use for polymyositis, on MTX, hx of pulmonary fibrosis, DM type 2, hypertension and recently moved here from [place] who presented in the ED today as he went for follow up and was found with cardiac murmur.     1. Cardiac murmur?- sent here from the clinic due to this.  - perhaps the line of thinking with this is to rule out endocarditis given his recent sepsis with Haemophilus influenzae  - echo today showed known LVH and moderate AS. Suspect he has this for a while as he gets periodic echo in [place]  - requested patient if family can send photos of echo results in [place]  - repeat blood cultures sent from ED- will await at least for a day  - sensitivities earlier showed pansensitivity-  - he has no signs of sepsis at this time, remained afebrile. Lactic acidosis- resolved  - patient stated that he usually have routine twice a year echo in [place] but only for routine check-up  - he is not aware of prior murmur nor cardiac diagnosis. However he is aware of his cardiomegaly.     2. Recent pneumonia- continue with IV levaquin for now. Anticipating this will only be for a short course as he was adequately covered earlier  - CT findings showed persistent infiltrate but this could just be a radiographic delay in the setting of a compromised lung to start with. He has long standing pulmonary fibrosis  - continue with inhalers and nebulization as needed  - not hypoxic     3. Polymyositis- continue with his long standing prednisone home dosing     4. Pulmonary fibrosis- hold MTX for now     5. Insulin  "requiring DM- resume home insulin dosing, SSI and glucose monitoring     6. Peripheral neuropathy on gabapentin     7. Chronic pain of which he takes opioids     DVT Prophylaxis: Low Risk/Ambulatory with no VTE prophylaxis indicated and Pneumatic Compression Devices  Code Status: Full Code  Discharge Dispo: home  Estimated Disch Date / # of Days until Disch: 1 day        Interval History (Subjective):      Continuing care today  Seen and examined  Last night he triggerred sepsis protocol and found with lactic acidosis. He has no ongoing complaints.  No nausea/vomiting, no chest pain, SOB or worsening cough.  Remained afebrile, tachycardia improving                  Physical Exam:      Last Vital Signs:  /48 (BP Location: Left arm)  Pulse 107  Temp 97.1  F (36.2  C) (Oral)  Resp 16  Ht 1.702 m (5' 7\")  Wt 74.6 kg (164 lb 6.4 oz)  SpO2 93%  BMI 25.75 kg/m2    I/O last 3 completed shifts:  In: 1281 [P.O.:300; IV Piggyback:981]  Out: 750 [Urine:750]  Wt Readings from Last 1 Encounters:   12/06/17 74.6 kg (164 lb 6.4 oz)     Vitals:    12/05/17 1102 12/06/17 0555   Weight: 75.8 kg (167 lb) 74.6 kg (164 lb 6.4 oz)       Constitutional: Awake, alert, cooperative, no apparent distress   Respiratory: Clear to auscultation bilaterally, no crackles or wheezing   Cardiovascular: Regular rate and rhythm, normal S1 and S2, and grade 2/6 systolic murmur   Abdomen: Normal bowel sounds, soft, non-distended, non-tender   Skin: No rashes, no cyanosis, dry to touch   Neuro: Alert and oriented x3, no weakness, spontaneous and coherent speech   Extremities: No edema, normal range of motion   Other(s): Euthymic mood, not agitated       All other systems: Negative          Medications:      All current medications were reviewed with changes reflected in problem list.         Data:      All new lab and imaging data was reviewed.   Labs:    Recent Labs  Lab 12/05/17  2250 12/05/17  1200 12/05/17  1149   CULT No growth after 6 " hours No growth after 16 hours No growth after 16 hours       Recent Labs  Lab 12/06/17  0605 12/05/17  1149 11/30/17  0618    137 142   POTASSIUM 4.0 4.2 3.5   CHLORIDE 104 103 106   CO2 25 28 29   ANIONGAP 8 6 7   * 106* 108*   BUN 19 17 24   CR 0.79 0.94 1.11   GFRESTIMATED >90 79 66   GFRESTBLACK >90 >90 80   DMITRIY 9.0 9.5 9.0       Recent Labs  Lab 12/06/17  0605 12/05/17  1149 11/30/17  0618   WBC 10.4 14.9* 10.4   HGB 13.6 14.8 10.4*   HCT 43.1 46.8 32.7*   MCV 94 93 96    408 298       Recent Labs  Lab 12/06/17  1053 12/06/17  0605 12/06/17  0228 12/05/17  2142 12/05/17  1149 11/30/17  1334 11/30/17  0826 11/30/17  0618   GLC  --  116*  --   --  106*  --   --  108*   BGM 92  --  167* 140*  --  222* 90  --      No results for input(s): INR in the last 168 hours.    Recent Labs  Lab 12/05/17  1149   TROPI 0.041      Imaging:   Results for orders placed or performed during the hospital encounter of 12/05/17   CT Chest Pulmonary Embolism w Contrast    Narrative    CT CHEST PULMONARY EMBOLISM WITH CONTRAST  12/5/2017 1:26 PM     HISTORY: Recent pneumonia hospitalization. Possible new murmur,  shortness of breath and chest pain.      TECHNIQUE: Thin section axial images are performed from the thoracic  inlet to the lung bases utilizing 64 mL of Isovue 370 IV contrast  without adverse event. Coronal reformatted images are also generated.  Radiation dose for this scan was reduced using automated exposure  control, adjustment of the mA and/or kV according to patient size, or  iterative reconstruction technique.    FINDINGS:  Calcified granuloma right lung apex is noted on series 6,  image 33. A few additional calcified granulomas are noted within each  lung. Groundglass opacities and airspace opacities are noted in the  lower lobes bilaterally with likely superimposed pulmonary fibrotic  changes and traction bronchiectasis. Pneumonia superimposed on  fibrosis or primary infectious or inflammatory  process all remain in  the differential. No pleural or pericardial fluid. Heart appears  enlarged. The esophagus is unremarkable. Thyroid gland appears normal  in size. No enlarged lymph nodes. Coronary artery calcification is  present. Thoracic aorta is unremarkable with only a few scattered  calcified plaques. Limited images upper abdomen are within normal  limits. Bone window examination demonstrates degenerative spine  changes.      Impression    IMPRESSION:  1. Bibasilar groundglass and airspace opacities concerning for  pneumonia. No enlarged lymph nodes. Fibrotic lung base changes with  superimposed acute alveolitis remains in the differential. No  associated pleural effusions.  2. No evidence of pulmonary embolism. Thoracic aorta is unremarkable.    COURT LOPEZ MD

## 2017-12-06 NOTE — PLAN OF CARE
Problem: Patient Care Overview  Goal: Plan of Care/Patient Progress Review  Outcome: No Change  Pt hospitalized for SOB.  A&O, . Vital signs stable, except tachy, on RA.   Denies any SOB.  Up with SBA, calls approp, no alarms on.   PIV saline locked, IV Levaquin and Prednisone.   Lactic 1.6  ECHO this AM.   On tele monitoring.  Will continue to monitor.

## 2017-12-07 VITALS
HEART RATE: 107 BPM | SYSTOLIC BLOOD PRESSURE: 114 MMHG | DIASTOLIC BLOOD PRESSURE: 55 MMHG | HEIGHT: 67 IN | WEIGHT: 162.5 LBS | TEMPERATURE: 96.2 F | OXYGEN SATURATION: 96 % | RESPIRATION RATE: 16 BRPM | BODY MASS INDEX: 25.51 KG/M2

## 2017-12-07 LAB
BACTERIA SPEC CULT: ABNORMAL
GLUCOSE BLDC GLUCOMTR-MCNC: 101 MG/DL (ref 70–99)
GLUCOSE BLDC GLUCOMTR-MCNC: 123 MG/DL (ref 70–99)
GLUCOSE BLDC GLUCOMTR-MCNC: 178 MG/DL (ref 70–99)
Lab: ABNORMAL
SPECIMEN SOURCE: ABNORMAL

## 2017-12-07 PROCEDURE — 00000146 ZZHCL STATISTIC GLUCOSE BY METER IP

## 2017-12-07 PROCEDURE — 25000131 ZZH RX MED GY IP 250 OP 636 PS 637: Mod: GY | Performed by: INTERNAL MEDICINE

## 2017-12-07 PROCEDURE — 25000125 ZZHC RX 250: Performed by: INTERNAL MEDICINE

## 2017-12-07 PROCEDURE — 25000132 ZZH RX MED GY IP 250 OP 250 PS 637: Mod: GY | Performed by: INTERNAL MEDICINE

## 2017-12-07 PROCEDURE — 94640 AIRWAY INHALATION TREATMENT: CPT

## 2017-12-07 PROCEDURE — 40000275 ZZH STATISTIC RCP TIME EA 10 MIN

## 2017-12-07 PROCEDURE — 99239 HOSP IP/OBS DSCHRG MGMT >30: CPT | Performed by: INTERNAL MEDICINE

## 2017-12-07 PROCEDURE — A9270 NON-COVERED ITEM OR SERVICE: HCPCS | Mod: GY | Performed by: INTERNAL MEDICINE

## 2017-12-07 RX ADMIN — FLUTICASONE FUROATE 1 PUFF: 100 POWDER RESPIRATORY (INHALATION) at 07:36

## 2017-12-07 RX ADMIN — OXYCODONE HYDROCHLORIDE AND ACETAMINOPHEN 1 TABLET: 5; 325 TABLET ORAL at 16:22

## 2017-12-07 RX ADMIN — Medication 2 LOZENGE: at 07:23

## 2017-12-07 RX ADMIN — INSULIN ASPART 2 UNITS: 100 INJECTION, SOLUTION INTRAVENOUS; SUBCUTANEOUS at 12:00

## 2017-12-07 RX ADMIN — GUAIFENESIN AND DEXTROMETHORPHAN HYDROBROMIDE 1 TABLET: 600; 30 TABLET, EXTENDED RELEASE ORAL at 08:09

## 2017-12-07 RX ADMIN — FOLIC ACID 1 MG: 1 TABLET ORAL at 08:09

## 2017-12-07 RX ADMIN — GEMFIBROZIL 600 MG: 600 TABLET ORAL at 08:09

## 2017-12-07 RX ADMIN — GABAPENTIN 600 MG: 300 CAPSULE ORAL at 08:09

## 2017-12-07 RX ADMIN — Medication 25 MG: at 16:22

## 2017-12-07 RX ADMIN — OXYCODONE HYDROCHLORIDE AND ACETAMINOPHEN 1 TABLET: 5; 325 TABLET ORAL at 08:15

## 2017-12-07 RX ADMIN — THEOPHYLLINE 400 MG: 400 TABLET, EXTENDED RELEASE ORAL at 08:09

## 2017-12-07 RX ADMIN — GABAPENTIN 600 MG: 300 CAPSULE ORAL at 16:22

## 2017-12-07 RX ADMIN — Medication 25 MG: at 08:15

## 2017-12-07 RX ADMIN — PREDNISONE 20 MG: 20 TABLET ORAL at 08:09

## 2017-12-07 ASSESSMENT — ACTIVITIES OF DAILY LIVING (ADL)
ADLS_ACUITY_SCORE: 11

## 2017-12-07 NOTE — PLAN OF CARE
Problem: Patient Care Overview  Goal: Plan of Care/Patient Progress Review  Outcome: Improving  Pt remains hospitalized for SOB.   Vital signs stable, denies any pain or SOB this shift.   PIV saline locked, continues IV Levaquin.   Up independently, voiding without difficulty.   On tele monitoring.   Possible d/c home today.    Pt just up to bathroom c/o SOB with exertion. Continues to have infrequent, non productive cough.

## 2017-12-07 NOTE — PLAN OF CARE
Problem: Patient Care Overview  Goal: Plan of Care/Patient Progress Review  Outcome: Improving   Up in room -- denies pain or discomfort ---appetite good -- planning discharge this evening

## 2017-12-07 NOTE — DISCHARGE SUMMARY
[facility]  Discharge Summary  Name: Beulah Jane    MRN: [id number]  YOB: 1950    Age: 67 year old  Date of Discharge:  12/7/2017  Date of Admission: 12/5/2017  Primary Care Provider: Chadwick Bright  Discharge Physician:  Dirk Chase MD  Discharging Service:  Hospitalist      Discharge Diagnosis:  Cardiac murmur secondary to moderate aortic stenosis with no other signs and symptoms of infective endocarditis  Cultures remained negative.  Recent Haemophilus influenzae pneumonia  Pulmonary fibrosis  Peripheral neuropathy       Other Diagnosis:  Past Medical History:   Diagnosis Date     Pneumonia      Pulmonary fibrosis, unspecified (H)           Discharge Disposition:  Discharged to home     Allergies:  No Known Allergies     Discharge Medications:   Current Discharge Medication List      CONTINUE these medications which have NOT CHANGED    Details   dextromethorphan-guaiFENesin (MUCINEX DM)  MG per 12 hr tablet Take 1 tablet by mouth every 12 hours      cetirizine (ZYRTEC) 10 MG tablet Take 10 mg by mouth daily      oxyCODONE-acetaminophen (PERCOCET) 5-325 MG per tablet Take 1 tablet by mouth 3 times daily      traMADol (ULTRAM) 50 MG tablet Take 0.5 tablets (25 mg) by mouth every 6 hours as needed for moderate pain  Qty: 20 tablet, Refills: 0    Associated Diagnoses: Polymyositis (H)      predniSONE (DELTASONE) 20 MG tablet Take 1 tablet (20 mg) by mouth daily    Associated Diagnoses: Polymyositis (H)      insulin isophane human (HUMULIN N PEN) 100 UNIT/ML injection Inject 6 Units Subcutaneous 2 times daily (before meals)    Associated Diagnoses: Type 2 diabetes mellitus with microalbuminuria, unspecified long term insulin use status (H)      omega 3 1000 MG CAPS Take 2 capsules by mouth 2 times daily      GABAPENTIN PO Take 600 mg by mouth 3 times daily      GEMFIBROZIL PO Take 600 mg by mouth 2 times daily      METHOTREXATE PO Take 7.5 mg by mouth once a week     "  theophylline (UNIPHYL) 400 MG 24 hr tablet Take 400 mg by mouth daily      loratadine (CLARITIN) 10 MG tablet Take 10 mg by mouth daily      FOLIC ACID PO Take 1 mg by mouth daily      albuterol (2.5 MG/3ML) 0.083% neb solution Take 1 vial by nebulization every 6 hours as needed for shortness of breath / dyspnea or wheezing      albuterol (PROAIR HFA/PROVENTIL HFA/VENTOLIN HFA) 108 (90 BASE) MCG/ACT Inhaler Inhale 2 puffs into the lungs every 6 hours as needed for shortness of breath / dyspnea or wheezing      beclomethasone (QVAR) 80 MCG/ACT Inhaler Inhale 2 puffs into the lungs 2 times daily         STOP taking these medications       amoxicillin-clavulanate (AUGMENTIN) 875-125 MG per tablet Comments:   Reason for Stopping:                Condition on Discharge:  Discharge condition: Stable   Discharge vitals: Blood pressure 114/55, pulse 107, temperature 96.2  F (35.7  C), temperature source Oral, resp. rate 16, height 1.702 m (5' 7\"), weight 73.7 kg (162 lb 8 oz), SpO2 96 %.   Code status on discharge: Full Code     History of Present Illness:  See detailed admission note for full details.        Significant Physical Exam Findings Day of Discharge:  HEENT; Atraumatic, normocephalic, pinkish conjuctiva, pupils bilateral reactive   Skin: warm and moist, no rashes  Lungs: equal chest expansion, clear to auscultation,, bilateral basal fine crackles  Heart: normal rate, normal rhythm, no rubs or gallops. grade 2/6 murmur  Abdomen: normal bowel sounds, no tenderness, no peritoneal signs, no guarding  Extremities: no deformities, no edema   Neuro; follow commands, alert and oriented x3, spontaneous speech, coherent, moves all extremities spontaneously  Psych; no hallucination, euthymic mood, not agitated      Procedures other than Imaging:  none      echo    Interpretation Summary     Moderate valvular aortic stenosis.  The visual ejection fraction is estimated at 65-70%.  Left ventricular systolic function is " normal.  The ascending aorta is Mildly dilated.  There is moderate concentric left ventricular hypertrophy.  The study was technically difficult. There is no comparison study available.  _____________________________________________________________________________  __        Left Ventricle  The left ventricle is normal in size. There is moderate concentric left  ventricular hypertrophy. E by E prime ratio is between 8 and 15, which is  indeterminate for assessment of left ventricular filling pressures. Grade I or  early diastolic dysfunction. The visual ejection fraction is estimated at 65-  70%. Left ventricular systolic function is normal. Septal motion is consistent  with conduction abnormality.     Right Ventricle  The right ventricle is normal in size and function.     Atria  Normal left atrial size. Right atrial size is normal. Lipomatous hypertrophy  of the interatrial septum is noted. There is no color Doppler evidence of an  atrial shunt.     Mitral Valve  There is mild mitral annular calcification. There is trace mitral  regurgitation.        Tricuspid Valve  Normal IVC (1.5-2.5cm) with >50% respiratory collapse; right atrial pressure  is estimated at 5-10mmHg. There is trace tricuspid regurgitation. Right  ventricular systolic pressure could not be approximated due to inadequate  tricuspid regurgitation.     Aortic Valve  The aortic valve is not well visualized. There is trace aortic regurgitation.  Moderate valvular aortic stenosis. The calculated aortic valve are is 1.3  cm^2. The peak AoV pressure gradient is 49.0 mmHg. The mean AoV pressure  gradient is 29.4 mmHg.     Pulmonic Valve  There is no pulmonic valvular regurgitation.     Vessels  The aortic root is normal size. The ascending aorta is Mildly dilated. The  inferior vena cava is not dilated.     Pericardium  There is no pericardial effusion.        Rhythm  Sinus rhythm was noted.  Imaging:  Results for orders placed or performed during the  hospital encounter of 12/05/17   CT Chest Pulmonary Embolism w Contrast    Narrative    CT CHEST PULMONARY EMBOLISM WITH CONTRAST  12/5/2017 1:26 PM     HISTORY: Recent pneumonia hospitalization. Possible new murmur,  shortness of breath and chest pain.      TECHNIQUE: Thin section axial images are performed from the thoracic  inlet to the lung bases utilizing 64 mL of Isovue 370 IV contrast  without adverse event. Coronal reformatted images are also generated.  Radiation dose for this scan was reduced using automated exposure  control, adjustment of the mA and/or kV according to patient size, or  iterative reconstruction technique.    FINDINGS:  Calcified granuloma right lung apex is noted on series 6,  image 33. A few additional calcified granulomas are noted within each  lung. Groundglass opacities and airspace opacities are noted in the  lower lobes bilaterally with likely superimposed pulmonary fibrotic  changes and traction bronchiectasis. Pneumonia superimposed on  fibrosis or primary infectious or inflammatory process all remain in  the differential. No pleural or pericardial fluid. Heart appears  enlarged. The esophagus is unremarkable. Thyroid gland appears normal  in size. No enlarged lymph nodes. Coronary artery calcification is  present. Thoracic aorta is unremarkable with only a few scattered  calcified plaques. Limited images upper abdomen are within normal  limits. Bone window examination demonstrates degenerative spine  changes.      Impression    IMPRESSION:  1. Bibasilar groundglass and airspace opacities concerning for  pneumonia. No enlarged lymph nodes. Fibrotic lung base changes with  superimposed acute alveolitis remains in the differential. No  associated pleural effusions.  2. No evidence of pulmonary embolism. Thoracic aorta is unremarkable.    COURT LOPEZ MD        Consultations:  No consultations were requested during this admission.     Recent Lab Results:    Recent Labs  Lab  12/06/17  0605 12/05/17  1149   WBC 10.4 14.9*   HGB 13.6 14.8   HCT 43.1 46.8   MCV 94 93    408       Recent Labs  Lab 12/05/17  2250 12/05/17  1200 12/05/17  1149   CULT No growth after 1 day No growth after 2 days No growth after 2 days       Recent Labs  Lab 12/06/17  0605 12/05/17  1149    137   POTASSIUM 4.0 4.2   CHLORIDE 104 103   CO2 25 28   ANIONGAP 8 6   * 106*   BUN 19 17   CR 0.79 0.94   GFRESTIMATED >90 79   GFRESTBLACK >90 >90   DMITRIY 9.0 9.5       Recent Labs  Lab 12/07/17  1159 12/07/17  0804 12/07/17  0151 12/06/17  2207 12/06/17  1645  12/06/17  0605  12/05/17  1149   GLC  --   --   --   --   --   --  116*  --  106*   * 101* 123* 134* 130*  < >  --   < >  --    < > = values in this interval not displayed.    Recent Labs  Lab 12/06/17  0004 12/05/17  2050 12/05/17  1149   LACT 1.6 2.9* 1.5       Recent Labs  Lab 12/05/17  1149   TROPI 0.041     No results for input(s): COLOR, APPEARANCE, URINEGLC, URINEBILI, URINEKETONE, SG, UBLD, URINEPH, PROTEIN, UROBILINOGEN, NITRITE, LEUKEST, RBCU, WBCU in the last 168 hours.       Pending Results:    Unresulted Labs Ordered in the Past 30 Days of this Admission     Date and Time Order Name Status Description    12/5/2017 2207 Blood culture Preliminary     12/5/2017 1151 Blood culture Preliminary     12/5/2017 1128 Blood culture Preliminary            Discharge Instructions and Follow-Up:   Discharge diet:   Active Diet Order      Moderate Consistent CHO Diet      Diet   Discharge activity: Activity as tolerated   Discharge follow-up: 1-2 weeks with PCP   Outpatient therapy: None    Other instructions: None      Hospital Course:  Overnight he has no reported issues. Remained afebrile  No chest pain. No nausea/vomiting, No diarrhea.   No ongoing coughing spells.   He is doing fine and new cultures remained negative up to date.  He has a cardiac murmur with moderate aortic stenosis with normal EF. Review of his old echo from [place]  stated no prior aortic stenosis (5/16)  He finished course of antibiotics. No further need to continue antibiotics upon discharge.  Continue his home dosing of prednisone and MTX for his Polymyositis, pulmonary fibrosis.       Total time spent in face to face contact with the patient and coordinating discharge was:  > 30 Minutes.

## 2017-12-07 NOTE — PLAN OF CARE
"Problem: Patient Care Overview  Goal: Plan of Care/Patient Progress Review  Outcome: Improving  Ambulatory Status:  Pt up ind.  Orientation: a/o  VS:  Tachy   Pain:  C/o mild rib pain. Pt states \"from coughing\" c/o chronic muscle ache/pain -Scheduled percocet and PRN tramadol given   Resp: LS GONZALEZ, crackles. Cough infrequent nonproductive  GI:  denies nausea.  good appetite, on mod carb diet. +BS.  Passing flatus.  Last BM 12/6.  :  Voiding without difficulty   Tx:  Levaquin   Labs:   and ____  Disposition:  Possible d/c tomorrow         "

## 2017-12-08 NOTE — PROGRESS NOTES
Transition Communication Hand-off for Care Transitions to Next Level of Care Provider    Name: Beulah Jane  MRN #: [id number]  Primary Care Provider: ANAID CRESPO  Primary Care MD Name: Dr Crespo  Primary Clinic: [facility] [address] [facility] [address]  Primary Care Clinic Name: [facility]  Reason for Hospitalization:  Newly recognized heart murmur [R01.1]  Sepsis, due to unspecified organism (H) [A41.9]  Pneumonia due to infectious organism, unspecified laterality, unspecified part of lung [J18.9]  Admit Date/Time: 12/5/2017 11:10 AM  Discharge Date: 12/7/17  Payor Source: Payor: BCBS / Plan: BCBS PLATINUM BLUE / Product Type: PPO /     Readmission Assessment Measure (COLLEEN) Risk Score/category: AVERAGE    Plan of Care Goals/Milestone Events:   Patient Concerns:  Establish care with cardiology             Reason for Communication Hand-off Referral: Multiple providers/specialties  Other readmit after 5 days after having PNA    Discharge Plan:       Concern for non-adherence with plan of care:   NO  Discharge Needs Assessment:  Needs       Most Recent Value    Transportation Available family or friend will provide    # of Referrals Placed by [facility] External Care Coordination          Already enrolled in Tele-monitoring program and name of program:  na  Follow-up specialty is recommended: Yes cardiology.  He said he would like to get connected with  cardiology    Follow-up plan:  No future appointments.    Any outstanding tests or procedures:             Reason for your hospital stay       Admitted in the hospital for murmur heard in the clinic with recent bacteremia.                 Diet       Follow this diet upon discharge: Orders Placed This Encounter       Snacks/Supplements Adult: Other - Please comment; high protein jello; Between Meals       Moderate Consistent CHO Diet                     Follow-up Appointments      Follow-up and recommended labs and tests       Follow up  with primary care provider, ANAID CRESPO, within 7 days to evaluate medication change, to evaluate treatment change and for hospital follow- up.  Pending final results of recent blood cultures   F/u with cardiology for moderate aortic stenosis                     Further instructions from your care team      Nutrition: Ok to continue high protein snacks/supplements at discharge.       Key Recommendations:  Pt is readmitted after 5 days with PNA.  Now admitted with SOB and murmur.  Follow up with cards in recommended.  Follow up with blood culture results.      Sobia Ward    AVS/Discharge Summary is the source of truth; this is a helpful guide for improved communication of patient story

## 2017-12-11 LAB
BACTERIA SPEC CULT: NO GROWTH
BACTERIA SPEC CULT: NO GROWTH
Lab: NORMAL
Lab: NORMAL
SPECIMEN SOURCE: NORMAL
SPECIMEN SOURCE: NORMAL

## 2017-12-12 LAB
BACTERIA SPEC CULT: NO GROWTH
Lab: NORMAL
SPECIMEN SOURCE: NORMAL

## 2017-12-15 ENCOUNTER — CARE COORDINATION (OUTPATIENT)
Dept: CARE COORDINATION | Facility: CLINIC | Age: 67
End: 2017-12-15

## 2017-12-15 NOTE — PROGRESS NOTES
Clinic Care Coordination Contact  [place] [facility] Care Coordination Outreach    Patient was enrolled in [facility] upon Discharged from: Hospital    Program Type: Pneumonia    Program Length: 30 days post discharge    Clinical Data:  Outreach Type: Initial post discharge     (Insert screen shot from [facility] if applicable)      Action: Actions: Left Voicemail                        Plan: RN CC will continue to monitor patients responses. Will plan to outreach as needed and with any new variances or concerns.

## 2017-12-18 ENCOUNTER — CARE COORDINATION (OUTPATIENT)
Dept: CARE COORDINATION | Facility: CLINIC | Age: 67
End: 2017-12-18

## 2017-12-18 NOTE — PROGRESS NOTES
Clinic Care Coordination Contact  [place] [facility] Care Coordination Outreach    Patient was enrolled in [facility] upon Discharged from: Hospital    Program Type: Pneumonia    Program Length: 30 days post discharge    Clinical Data:  Outreach Type: Variance follow up call     (Insert screen shot from [facility] if applicable)      Action: Actions: Left Voicemail                           Plan: RN CC will continue to monitor patients responses. Will plan to outreach as needed and with any new variances or concerns.

## 2018-02-23 ENCOUNTER — CARE COORDINATION (OUTPATIENT)
Dept: CARE COORDINATION | Facility: CLINIC | Age: 68
End: 2018-02-23

## 2018-02-23 NOTE — PROGRESS NOTES
Clinic Care Coordination Contact  [facility] Care Coordination Outreach    Patient was enrolled in [facility] upon Discharged from: Hospital    Program Type: Pneumonia    Program Length: 30 days post discharge    Clinical Data:  Outreach Type: Other (Comment)       Action: Actions:  (Pt completed program )

## 2018-05-07 DIAGNOSIS — M54.50 LUMBAGO: Primary | ICD-10-CM

## 2018-05-08 ENCOUNTER — OFFICE VISIT (OUTPATIENT)
Dept: ORTHOPEDICS | Facility: CLINIC | Age: 68
End: 2018-05-08
Payer: COMMERCIAL

## 2018-05-08 ENCOUNTER — PRE VISIT (OUTPATIENT)
Dept: ORTHOPEDICS | Facility: CLINIC | Age: 68
End: 2018-05-08

## 2018-05-08 ENCOUNTER — RADIANT APPOINTMENT (OUTPATIENT)
Dept: GENERAL RADIOLOGY | Facility: CLINIC | Age: 68
End: 2018-05-08
Payer: COMMERCIAL

## 2018-05-08 VITALS — HEART RATE: 110 BPM | DIASTOLIC BLOOD PRESSURE: 68 MMHG | RESPIRATION RATE: 16 BRPM | SYSTOLIC BLOOD PRESSURE: 137 MMHG

## 2018-05-08 DIAGNOSIS — M47.816 ARTHRITIS, LUMBAR SPINE: Primary | ICD-10-CM

## 2018-05-08 DIAGNOSIS — M54.50 LUMBAGO: ICD-10-CM

## 2018-05-08 NOTE — MR AVS SNAPSHOT
After Visit Summary   5/8/2018    Beulah Jane    MRN: [id number]           Patient Information     Date Of Birth          1950        Visit Information        Provider Department      5/8/2018 3:00 PM Maikel Prince MD [facility]        Today's Diagnoses     Arthritis, lumbar spine (H)    -  1       Follow-ups after your visit        Your next 10 appointments already scheduled     May 19, 2018  5:30 PM CDT   MR LUMBAR SPINE W/O CONTRAST with [id number]   [facility] ([facility])    [address] [address] [phone]           Take your medicines as usual, unless your doctor tells you not to. Bring a list of your current medicines to your exam (including vitamins, minerals and over-the-counter drugs). Also bring the results of similar scans you may have had.  Please remove any body piercings and hair extensions before you arrive.  Follow your doctor s orders. If you do not, we may have to postpone your exam.  You may or may not receive IV contrast for this exam pending the discretion of the Radiologist.  You do not need to do anything special to prepare.  The MRI machine uses a strong magnet. Please wear clothes without metal (snaps, zippers). A sweatsuit works well, or we may give you a hospital gown.   **IMPORTANT** THE INSTRUCTIONS BELOW ARE ONLY FOR THOSE PATIENTS WHO HAVE BEEN PRESCRIBED SEDATION OR GENERAL ANESTHESIA DURING THEIR MRI PROCEDURE:  IF YOUR DOCTOR PRESCRIBED ORAL SEDATION (take medicine to help you relax during your exam):   You must get the medicine from your doctor (oral medication) before you arrive. Bring the medicine to the exam. Do not take it at home. You ll be told when to take it upon arriving for your exam.   Arrive one hour early. Bring someone who can take you home after the test. Your medicine will make you sleepy. After the exam, you may not drive, take a bus  or take a taxi by yourself.  IF YOUR DOCTOR PRESCRIBED IV SEDATION:   Arrive one hour early. Bring someone who can take you home after the test. Your medicine will make you sleepy. After the exam, you may not drive, take a bus or take a taxi by yourself.   No eating 6 hours before your exam. You may have clear liquids up until 4 hours before your exam. (Clear liquids include water, clear tea, black coffee and fruit juice without pulp.)  IF YOUR DOCTOR PRESCRIBED ANESTHESIA (be asleep for your exam):   Arrive 1 1/2 hours early. Bring someone who can take you home after the test. You may not drive, take a bus or take a taxi by yourself.   No eating 8 hours before your exam. You may have clear liquids up until 4 hours before your exam. (Clear liquids include water, clear tea, black coffee and fruit juice without pulp.)   You will spend four to five hours in the recovery room.  Please call the Imaging Department at your exam site with any questions.            Jun 11, 2018  2:30 PM CDT   (Arrive by 2:15 PM)   Return Visit with Maikel Prince MD   [facility] ([facility])    [address] [place] [address] [phone]              Future tests that were ordered for you today     Open Future Orders        Priority Expected Expires Ordered    MRI Lumbar spine w/o contrast Routine  5/8/2019 5/8/2018            Who to contact     Please call your clinic at [phone] to:    Ask questions about your health    Make or cancel appointments    Discuss your medicines    Learn about your test results    Speak to your doctor            Additional Information About Your Visit        MyChart Information     [employer] gives you secure access to your electronic health record. If you see a primary care provider, you can also send messages to your care team and make appointments. If you have questions, please call your primary care clinic.  If you do not have a  primary care provider, please call [phone] and they will assist you.      [employer] is an electronic gateway that provides easy, online access to your medical records. With [employer], you can request a clinic appointment, read your test results, renew a prescription or communicate with your care team.     To access your existing account, please contact your [facility] or call [phone] for assistance.        Care EveryWhere ID     This is your Care EveryWhere ID. This could be used by other organizations to access your [place] medical records  [id number]        Your Vitals Were     Pulse Respirations                110 16           Blood Pressure from Last 3 Encounters:   05/08/18 137/68   12/07/17 114/55   11/30/17 133/59    Weight from Last 3 Encounters:   12/07/17 162 lb 8 oz (73.7 kg)   11/30/17 169 lb 3.2 oz (76.7 kg)               Primary Care Provider Office Phone # Fax #    Rah Bright [phone]       [facility] [id number] [place] [id number]        Equal Access to Services     AARTI [facility] : Hadii aad ku hadasho Soomaali, waaxda luqadaha, qaybta kaalmada adeegyada, gris alejandro . So [facility] [phone].    ATENCIÓN: Si habla español, tiene a paul disposición servicios gratuitos de asistencia lingüística. Llame al [phone].    We comply with applicable federal civil rights laws and [place] laws. We do not discriminate on the basis of race, color, national origin, age, disability, sex, sexual orientation, or gender identity.            Thank you!     Thank you for choosing [facility]  for your care. Our goal is always to provide you with excellent care. Hearing back from our patients is one way we can continue to improve our services. Please take a few minutes to complete the written survey that you may receive in the mail after your visit with us. Thank you!             Your Updated  Medication List - Protect others around you: Learn how to safely use, store and throw away your medicines at www.disposemymeds.org.          This list is accurate as of 5/8/18  4:17 PM.  Always use your most recent med list.                   Brand Name Dispense Instructions for use Diagnosis    * albuterol (2.5 MG/3ML) 0.083% neb solution      Take 1 vial by nebulization every 6 hours as needed for shortness of breath / dyspnea or wheezing        * albuterol 108 (90 Base) MCG/ACT Inhaler    PROAIR HFA/PROVENTIL HFA/VENTOLIN HFA     Inhale 2 puffs into the lungs every 6 hours as needed for shortness of breath / dyspnea or wheezing        beclomethasone 80 MCG/ACT Inhaler    QVAR     Inhale 2 puffs into the lungs 2 times daily        cetirizine 10 MG tablet    zyrTEC     Take 10 mg by mouth daily        dextromethorphan-guaiFENesin  MG per 12 hr tablet    MUCINEX DM     Take 1 tablet by mouth every 12 hours        FOLIC ACID PO      Take 1 mg by mouth daily        GABAPENTIN PO      Take 600 mg by mouth 3 times daily        GEMFIBROZIL PO      Take 600 mg by mouth 2 times daily        insulin isophane human 100 UNIT/ML injection    HumuLIN N PEN     Inject 6 Units Subcutaneous 2 times daily (before meals)    Type 2 diabetes mellitus with microalbuminuria, unspecified long term insulin use status (H)       loratadine 10 MG tablet    CLARITIN     Take 10 mg by mouth daily        METHOTREXATE PO      Take 7.5 mg by mouth once a week        omega 3 1000 MG Caps      Take 2 capsules by mouth 2 times daily        oxyCODONE-acetaminophen 5-325 MG per tablet    PERCOCET     Take 1 tablet by mouth 3 times daily        predniSONE 20 MG tablet    DELTASONE     Take 1 tablet (20 mg) by mouth daily    Polymyositis (H)       theophylline 400 MG 24 hr tablet    UNIPHYL     Take 400 mg by mouth daily        traMADol 50 MG tablet    ULTRAM    20 tablet    Take 0.5 tablets (25 mg) by mouth every 6 hours as needed for moderate  pain    Polymyositis (H)       * Notice:  This list has 2 medication(s) that are the same as other medications prescribed for you. Read the directions carefully, and ask your doctor or other care provider to review them with you.

## 2018-05-08 NOTE — PROGRESS NOTES
Subjective:   Beulah Jane is a 68 year old male who presents low back pain. He notes weakness in low back and lower legs. He ambulates with a walker. Low back surgery fusion 1997 [place] and hardware removal. [place] recently moved from [place] in [place] and has not been evaluated in [place]    Beulah has a relatively long story of some bilateral low back pain that he feels in a belt-like fashion and has been present since the mid01990s.  In 1996 he was diagnosed with a herniated nucleus pulposus of the lumbar spine and underwent surgery with fusion.  Later he underwent hardware removal, about 18 months later.  Currently his radiographs demonstrate a marked degenerative change with some fusion activity between L3 and S1.  In addition to this, he states that in 2012 he had a mesh that was implanted and subsequently was infected from his back and then had to be removed.  Now he has noted some difficulty with stiffness and pain in his lower back.  He is having to use a walking stick to help get about.  He is here for further evaluation and would like to consider seeing Dr. Dial if something can be done surgically.       Background:   Date of injury: NA   Duration of symptoms: 6 years  Mechanism of Injury: Chronic; Unknown   Aggravating factors: Standing upright, lying down, walking  Relieving Factors: oxycodone  Prior Evaluation: Prior Physician Evalutation: Last seen in [place] 3 years ago    PAST MEDICAL, SOCIAL, SURGICAL AND FAMILY HISTORY: He  has a past medical history of Hyperlipidemia; Pneumonia; Polymyositis (H); Pulmonary fibrosis, unspecified (H); and Seasonal allergies.  He  has no past surgical history on file.  His family history is not on file.  He reports that he has quit smoking. He has never used smokeless tobacco.    ALLERGIES: He has No Known Allergies.    CURRENT MEDICATIONS: He has a current medication list which includes the following prescription(s):  albuterol, albuterol, beclomethasone, cetirizine, dextromethorphan-guaifenesin, folic acid, gabapentin, gemfibrozil, insulin isophane human, loratadine, methotrexate, omega 3, oxycodone-acetaminophen, prednisone, theophylline, and tramadol.     REVIEW OF SYSTEMS: 12 point review of systems is negative except as noted above.     Exam:   /68  Pulse 110  Resp 16      CONSTITUTIONAL: alert and no distress  HEAD: Normocephalic. No masses, lesions, tenderness or abnormalities  SKIN: no suspicious lesions or rashes  GAIT: shuffling  NEUROLOGIC: Non-focal, Normal muscle tone and strength, reflexes normal, sensation grossly normal.  PSYCHIATRIC: affect normal/bright and mentation appears normal.  LUMBAR SPINE:  He is not able to fully erect himself.  He is nontender over the lumbar spine and has very minimal tenderness to palpation over his bilateral SI joints.  He has very limited flexion and essentially no extension or extension/rotation to the right or left.  Dural tension signs are negative and L4 through S1 are tested and intact.      IMAGING:  Radiographs are noted as above.      Goals of care will be such that he wishes he would be able to walk for prolonged periods of time, he would be able to stand straight or upright and participate in some type of exercise.  If he is in need of surgical opinion he would like to see Dr. Cal Dial.      ASSESSMENT:  Arthritis, status post fusion.      PLAN:  We will proceed with an MRI of the lumbar spine for initial evaluation and to ensure there is not some underlying lumbar spinal stenosis.  Pending this MRI, then we will consider whether to proceed with a CT to further evaluate the degree of fusion.  All questions were answered.  He will follow up with me pending the imaging.      Fifty minutes spent with the patient, and greater than 50% of the time was spent in counseling and coordination of care.

## 2018-05-08 NOTE — LETTER
5/8/2018      RE: Beulah Jane  [address] [place] [facility] [address]        Subjective:   Beulah Jane is a 68 year old male who presents low back pain. He notes weakness in low back and lower legs. He ambulates with a walker. Low back surgery fusion 1997 [place] and hardware removal. He recently moved from [place] in [place] and has not been evaluated in [place]    Beulah has a relatively long story of some bilateral low back pain that he feels in a belt-like fashion and has been present since the mid01990s.  In 1996 he was diagnosed with a herniated nucleus pulposus of the lumbar spine and underwent surgery with fusion.  Later he underwent hardware removal, about 18 months later.  Currently his radiographs demonstrate a marked degenerative change with some fusion activity between L3 and S1.  In addition to this, he states that in 2012 he had a mesh that was implanted and subsequently was infected from his back and then had to be removed.  Now he has noted some difficulty with stiffness and pain in his lower back.  He is having to use a walking stick to help get about.  He is here for further evaluation and would like to consider seeing Dr. Dial if something can be done surgically.       Background:   Date of injury: NA   Duration of symptoms: 6 years  Mechanism of Injury: Chronic; Unknown   Aggravating factors: Standing upright, lying down, walking  Relieving Factors: oxycodone  Prior Evaluation: Prior Physician Evalutation: Last seen in [place] 3 years ago    PAST MEDICAL, SOCIAL, SURGICAL AND FAMILY HISTORY: He  has a past medical history of Hyperlipidemia; Pneumonia; Polymyositis (H); Pulmonary fibrosis, unspecified (H); and Seasonal allergies.  He  has no past surgical history on file.  His family history is not on file.  He reports that he has quit smoking. He has never used smokeless tobacco.    ALLERGIES: He has No Known Allergies.    CURRENT  MEDICATIONS: He has a current medication list which includes the following prescription(s): albuterol, albuterol, beclomethasone, cetirizine, dextromethorphan-guaifenesin, folic acid, gabapentin, gemfibrozil, insulin isophane human, loratadine, methotrexate, omega 3, oxycodone-acetaminophen, prednisone, theophylline, and tramadol.     REVIEW OF SYSTEMS: 12 point review of systems is negative except as noted above.     Exam:   /68  Pulse 110  Resp 16      CONSTITUTIONAL: alert and no distress  HEAD: Normocephalic. No masses, lesions, tenderness or abnormalities  SKIN: no suspicious lesions or rashes  GAIT: shuffling  NEUROLOGIC: Non-focal, Normal muscle tone and strength, reflexes normal, sensation grossly normal.  PSYCHIATRIC: affect normal/bright and mentation appears normal.  LUMBAR SPINE:  He is not able to fully erect himself.  He is nontender over the lumbar spine and has very minimal tenderness to palpation over his bilateral SI joints.  He has very limited flexion and essentially no extension or extension/rotation to the right or left.  Dural tension signs are negative and L4 through S1 are tested and intact.      IMAGING:  Radiographs are noted as above.      Goals of care will be such that he wishes he would be able to walk for prolonged periods of time, he would be able to stand straight or upright and participate in some type of exercise.  If he is in need of surgical opinion he would like to see Dr. Cal Dial.      ASSESSMENT:  Arthritis, status post fusion.      PLAN:  We will proceed with an MRI of the lumbar spine for initial evaluation and to ensure there is not some underlying lumbar spinal stenosis.  Pending this MRI, then we will consider whether to proceed with a CT to further evaluate the degree of fusion.  All questions were answered.  He will follow up with me pending the imaging.      Fifty minutes spent with the patient, and greater than 50% of the time was spent in counseling and  coordination of care.           Maikel Prince MD

## 2018-05-19 ENCOUNTER — RADIANT APPOINTMENT (OUTPATIENT)
Dept: MRI IMAGING | Facility: CLINIC | Age: 68
End: 2018-05-19
Attending: FAMILY MEDICINE
Payer: COMMERCIAL

## 2018-05-19 DIAGNOSIS — M47.816 ARTHRITIS, LUMBAR SPINE: ICD-10-CM

## 2018-05-19 NOTE — DISCHARGE INSTRUCTIONS
MRI Contrast Discharge Instructions    The IV contrast you received today will pass out of your body in your  urine. This will happen in the next 24 hours. You will not feel this process.  Your urine will not change color.    Drink at least 4 extra glasses of water or juice today (unless your doctor  has restricted your fluids). This reduces the stress on your kidneys.  You may take your regular medicines.    If you are on dialysis: It is best to have dialysis today.    If you have a reaction: Most reactions happen right away. If you have  any new symptoms after leaving the hospital (such as hives or swelling),  call your hospital at the correct number below. Or call your family doctor.  If you have breathing distress or wheezing, call 911.    Special instructions: ***    I have read and understand the above information.    Signature:______________________________________ Date:___________    Staff:__________________________________________ Date:___________     Time:__________    [place] [facility] Departments:    ___[place]: [phone]  ___[facility]: [phone]  ___[place]: [phone] ___[facility]: [phone]  ___[facility]: [phone]  ___[facility]: [phone]  ___[phone]  ___[facility]: [phone]  ___[phone]

## 2018-06-18 ENCOUNTER — TELEPHONE (OUTPATIENT)
Dept: ORTHOPEDICS | Facility: CLINIC | Age: 68
End: 2018-06-18

## 2018-06-18 NOTE — TELEPHONE ENCOUNTER
Called and LVM stating that  would like to reschedule patient for a different time due to appointment is 15 minutes and would like the appointment to be 30 minutes. Our callback number was left.

## 2018-06-19 ENCOUNTER — OFFICE VISIT (OUTPATIENT)
Dept: ORTHOPEDICS | Facility: CLINIC | Age: 68
End: 2018-06-19
Payer: COMMERCIAL

## 2018-06-19 VITALS — WEIGHT: 162 LBS | BODY MASS INDEX: 25.43 KG/M2 | RESPIRATION RATE: 16 BRPM | HEIGHT: 67 IN

## 2018-06-19 DIAGNOSIS — M51.379 DEGENERATION OF LUMBAR OR LUMBOSACRAL INTERVERTEBRAL DISC: ICD-10-CM

## 2018-06-19 DIAGNOSIS — M47.816 ARTHRITIS, LUMBAR SPINE: Primary | ICD-10-CM

## 2018-06-19 DIAGNOSIS — M48.062 SPINAL STENOSIS OF LUMBAR REGION WITH NEUROGENIC CLAUDICATION: ICD-10-CM

## 2018-06-19 RX ORDER — SULFAMETHOXAZOLE/TRIMETHOPRIM 800-160 MG
1 TABLET ORAL DAILY
Refills: 11 | COMMUNITY
Start: 2018-05-24 | End: 2019-01-01

## 2018-06-19 NOTE — PROGRESS NOTES
Subjective:   Beulah Jane is a 68 year old male who is here following up on his lumbar MRI. He notes feeling weakness in bilateral legs.    Please refer to my prior and more detailed notes of 05/08/2018 for details.  Beulah comes in today along with his wife and son for review of his lumbosacral MRI.  He has multilevel degenerative disk disease as well as marked facet arthrosis and multilevel lumbar spinal stenosis as well as multilevel neural foraminal stenosis.  He has had prior lower lumbar laminectomies.  He again today reiterates his goals of care.       Goals of care include that he would be able to walk for prolonged periods of time, and he would be able to stand straight or upright and participate in some type of exercise.       I have recommended referral to Dr. Cal Dial per his wishes for evaluation for surgical intervention.  Given his disk sequestration and multilevel disease, I believe that nonsurgical intervention probably has limited relief, although that will have to be weighed against considerations for the type and extent of surgical intervention.  He has attended physical therapy multiple times in the past with limited to no benefit.       I am also placing an order for a bone density test.  If he elects to proceed with any surgical intervention, we should proceed with bone density to ensure that he does not have glucocorticoid-induced osteoporosis.       Greater than 30 minutes were spent with the patient, and all that time was spent in counseling and coordination of care.

## 2018-06-19 NOTE — MR AVS SNAPSHOT
After Visit Summary   6/19/2018    Beulah Jane    MRN: [id number]           Patient Information     Date Of Birth          1950        Visit Information        Provider Department      6/19/2018 6:45 PM Maikel Prince MD [facility]        Today's Diagnoses     Arthritis, lumbar spine (H)    -  1    Spinal stenosis of lumbar region with neurogenic claudication        Degeneration of lumbar or lumbosacral intervertebral disc           Follow-ups after your visit        Additional Services     ORTHOPEDICS ADULT REFERRAL       Your provider has referred you to: [facility]: Orthopaedic Clinic [facility] [phone]   [email or web address]  Dr. Cal Dial for surgical evaluation    Please be aware that coverage of these services is subject to the terms and limitations of your health insurance plan.  Call member services at your health plan with any benefit or coverage questions.      Please bring the following to your appointment:    >>   Any x-rays, CTs or MRIs which have been performed.  Contact the facility where they were done to arrange for  prior to your scheduled appointment.    >>   List of current medications   >>   This referral request   >>   Any documents/labs given to you for this referral                  Your next 10 appointments already scheduled     Jun 19, 2018  6:45 PM CDT   (Arrive by 6:30 PM)   Return Visit with Maikel Prince MD   [facility] ([facility])    [address] [place] [address]  5th [facility] [id number] [phone]            Jun 26, 2018 10:20 AM CDT   (Arrive by 9:50 AM)   NEW SPINE with Carlos Enrique Dial MD   [facility] ([facility])    [address] [place] [address]  4th Floor  [facility] [id number] [phone]              Future tests that were ordered for you today     Open Future Orders         "Priority Expected Expires Ordered    Dexa hip/pelvis/spine* Routine  6/19/2019 6/19/2018            Who to contact     Please call your clinic at [phone] to:    Ask questions about your health    Make or cancel appointments    Discuss your medicines    Learn about your test results    Speak to your doctor            Additional Information About Your Visit        [employer] Information     [employer] gives you secure access to your electronic health record. If you see a primary care provider, you can also send messages to your care team and make appointments. If you have questions, please call your primary care clinic.  If you do not have a primary care provider, please call [phone] and they will assist you.      [employer] is an electronic gateway that provides easy, online access to your medical records. With [employer], you can request a clinic appointment, read your test results, renew a prescription or communicate with your care team.     To access your existing account, please contact your [facility] or call [phone] for assistance.        Care EveryWhere ID     This is your Care EveryWhere ID. This could be used by other organizations to access your [facility] medical records  [id number]        Your Vitals Were     Respirations Height BMI (Body Mass Index)             16 1.702 m (5' 7\") 25.37 kg/m2          Blood Pressure from Last 3 Encounters:   05/08/18 137/68   12/07/17 114/55   11/30/17 133/59    Weight from Last 3 Encounters:   06/19/18 73.5 kg (162 lb)   12/07/17 73.7 kg (162 lb 8 oz)   11/30/17 76.7 kg (169 lb 3.2 oz)              We Performed the Following     ORTHOPEDICS ADULT REFERRAL        Primary Care Provider Office Phone # Fax #    Rah Bright [phone]       [facility] [address] [facility] [address]        Equal Access to Services     LOIS LLOYD : Emelia Ghotra, bryce king, john salamanca " gris ortizpetty arguetaaan ah. So [facility] [phone].    ATENCIÓN: Si milagrosla ruben, tiene a paul disposición servicios gratuitos de asistencia lingüística. Hugh al [phone].    We comply with applicable federal civil rights laws and [place] laws. We do not discriminate on the basis of race, color, national origin, age, disability, sex, sexual orientation, or gender identity.            Thank you!     Thank you for choosing [facility]  for your care. Our goal is always to provide you with excellent care. Hearing back from our patients is one way we can continue to improve our services. Please take a few minutes to complete the written survey that you may receive in the mail after your visit with us. Thank you!             Your Updated Medication List - Protect others around you: Learn how to safely use, store and throw away your medicines at www.disposemymeds.org.          This list is accurate as of 6/19/18  6:41 PM.  Always use your most recent med list.                   Brand Name Dispense Instructions for use Diagnosis    * albuterol (2.5 MG/3ML) 0.083% neb solution      Take 1 vial by nebulization every 6 hours as needed for shortness of breath / dyspnea or wheezing        * albuterol 108 (90 Base) MCG/ACT Inhaler    PROAIR HFA/PROVENTIL HFA/VENTOLIN HFA     Inhale 2 puffs into the lungs every 6 hours as needed for shortness of breath / dyspnea or wheezing        beclomethasone 80 MCG/ACT Inhaler    QVAR     Inhale 2 puffs into the lungs 2 times daily        cetirizine 10 MG tablet    zyrTEC     Take 10 mg by mouth daily        dextromethorphan-guaiFENesin  MG per 12 hr tablet    MUCINEX DM     Take 1 tablet by mouth every 12 hours        FOLIC ACID PO      Take 1 mg by mouth daily        GABAPENTIN PO      Take 600 mg by mouth 3 times daily        GEMFIBROZIL PO      Take 600 mg by mouth 2 times daily        insulin isophane human 100 UNIT/ML injection    HumuLIN  N PEN     Inject 6 Units Subcutaneous 2 times daily (before meals)    Type 2 diabetes mellitus with microalbuminuria, unspecified long term insulin use status (H)       loratadine 10 MG tablet    CLARITIN     Take 10 mg by mouth daily        METHOTREXATE PO      Take 7.5 mg by mouth once a week        omega 3 1000 MG Caps      Take 2 capsules by mouth 2 times daily        oxyCODONE-acetaminophen 5-325 MG per tablet    PERCOCET     Take 1 tablet by mouth 3 times daily        predniSONE 20 MG tablet    DELTASONE     Take 1 tablet (20 mg) by mouth daily    Polymyositis (H)       sulfamethoxazole-trimethoprim 800-160 MG per tablet    BACTRIM DS/SEPTRA DS     TK 1 T PO 3 TIMES A WK        theophylline 400 MG 24 hr tablet    UNIPHYL     Take 400 mg by mouth daily        traMADol 50 MG tablet    ULTRAM    20 tablet    Take 0.5 tablets (25 mg) by mouth every 6 hours as needed for moderate pain    Polymyositis (H)       * Notice:  This list has 2 medication(s) that are the same as other medications prescribed for you. Read the directions carefully, and ask your doctor or other care provider to review them with you.

## 2018-06-19 NOTE — LETTER
6/19/2018      RE: Beulah Jane  [address] [place] [address]        Subjective:   Beulah Jane is a 68 year old male who is here following up on his lumbar MRI. He notes feeling weakness in bilateral legs.    Please refer to my prior and more detailed notes of 05/08/2018 for details.  Beulah comes in today along with his wife and son for review of his lumbosacral MRI.  He has multilevel degenerative disk disease as well as marked facet arthrosis and multilevel lumbar spinal stenosis as well as multilevel neural foraminal stenosis.  He has had prior lower lumbar laminectomies.  He again today reiterates his goals of care.       Goals of care include that he would be able to walk for prolonged periods of time, and he would be able to stand straight or upright and participate in some type of exercise.       I have recommended referral to Dr. Cal Dial per his wishes for evaluation for surgical intervention.  Given his disk sequestration and multilevel disease, I believe that nonsurgical intervention probably has limited relief, although that will have to be weighed against considerations for the type and extent of surgical intervention.  He has attended physical therapy multiple times in the past with limited to no benefit.       I am also placing an order for a bone density test.  If he elects to proceed with any surgical intervention, we should proceed with bone density to ensure that he does not have glucocorticoid-induced osteoporosis.       Greater than 30 minutes were spent with the patient, and all that time was spent in counseling and coordination of care.       Maikel Prince MD

## 2018-06-25 DIAGNOSIS — M54.50 LUMBAGO: Primary | ICD-10-CM

## 2018-06-25 NOTE — TELEPHONE ENCOUNTER
FUTURE VISIT INFORMATION      FUTURE VISIT INFORMATION:    Date: 6/26/18    Time: 1020    Location: ORTHO  REFERRAL INFORMATION:    Referring provider:  DR IGNACIO    Referring providers clinic:  [facility]    Reason for visit/diagnosis  STENOSIS       RECORDS STATUS      ALL RECORDS INTERNAL

## 2018-06-26 ENCOUNTER — RADIANT APPOINTMENT (OUTPATIENT)
Dept: GENERAL RADIOLOGY | Facility: CLINIC | Age: 68
End: 2018-06-26
Attending: ORTHOPAEDIC SURGERY
Payer: COMMERCIAL

## 2018-06-26 ENCOUNTER — PRE VISIT (OUTPATIENT)
Dept: ORTHOPEDICS | Facility: CLINIC | Age: 68
End: 2018-06-26

## 2018-06-26 ENCOUNTER — DOCUMENTATION ONLY (OUTPATIENT)
Dept: INTERVENTIONAL RADIOLOGY/VASCULAR | Facility: CLINIC | Age: 68
End: 2018-06-26

## 2018-06-26 ENCOUNTER — OFFICE VISIT (OUTPATIENT)
Dept: ORTHOPEDICS | Facility: CLINIC | Age: 68
End: 2018-06-26
Payer: COMMERCIAL

## 2018-06-26 VITALS — BODY MASS INDEX: 27.81 KG/M2 | HEIGHT: 64 IN | WEIGHT: 162.9 LBS

## 2018-06-26 DIAGNOSIS — M54.50 LUMBAGO: Primary | ICD-10-CM

## 2018-06-26 DIAGNOSIS — M54.50 LUMBAGO: ICD-10-CM

## 2018-06-26 DIAGNOSIS — M41.9 SCOLIOSIS: ICD-10-CM

## 2018-06-26 DIAGNOSIS — Z01.818 PRE-OPERATIVE EXAMINATION: ICD-10-CM

## 2018-06-26 DIAGNOSIS — M48.062 SPINAL STENOSIS OF LUMBAR REGION WITH NEUROGENIC CLAUDICATION: ICD-10-CM

## 2018-06-26 DIAGNOSIS — Z01.818 PRE-OPERATIVE EXAMINATION: Primary | ICD-10-CM

## 2018-06-26 DIAGNOSIS — T88.8XXD FLUID COLLECTION AT SURGICAL SITE, SUBSEQUENT ENCOUNTER: ICD-10-CM

## 2018-06-26 DIAGNOSIS — M41.9 SCOLIOSIS: Primary | ICD-10-CM

## 2018-06-26 LAB
ALBUMIN SERPL-MCNC: 4.4 G/DL (ref 3.4–5)
BASOPHILS # BLD AUTO: 0.1 10E9/L (ref 0–0.2)
BASOPHILS NFR BLD AUTO: 0.5 %
CRP SERPL-MCNC: 4.7 MG/L (ref 0–8)
DEPRECATED CALCIDIOL+CALCIFEROL SERPL-MC: 34 UG/L (ref 20–75)
DIFFERENTIAL METHOD BLD: ABNORMAL
EOSINOPHIL # BLD AUTO: 0 10E9/L (ref 0–0.7)
EOSINOPHIL NFR BLD AUTO: 0.1 %
ERYTHROCYTE [DISTWIDTH] IN BLOOD BY AUTOMATED COUNT: 15.9 % (ref 10–15)
ERYTHROCYTE [SEDIMENTATION RATE] IN BLOOD BY WESTERGREN METHOD: 10 MM/H (ref 0–20)
HBA1C MFR BLD: 5.6 % (ref 0–5.6)
HCT VFR BLD AUTO: 46.9 % (ref 40–53)
HGB BLD-MCNC: 14.4 G/DL (ref 13.3–17.7)
IMM GRANULOCYTES # BLD: 0.1 10E9/L (ref 0–0.4)
IMM GRANULOCYTES NFR BLD: 0.8 %
LYMPHOCYTES # BLD AUTO: 0.7 10E9/L (ref 0.8–5.3)
LYMPHOCYTES NFR BLD AUTO: 7.3 %
MCH RBC QN AUTO: 29.5 PG (ref 26.5–33)
MCHC RBC AUTO-ENTMCNC: 30.7 G/DL (ref 31.5–36.5)
MCV RBC AUTO: 96 FL (ref 78–100)
MONOCYTES # BLD AUTO: 0.6 10E9/L (ref 0–1.3)
MONOCYTES NFR BLD AUTO: 6 %
NEUTROPHILS # BLD AUTO: 7.9 10E9/L (ref 1.6–8.3)
NEUTROPHILS NFR BLD AUTO: 85.3 %
NRBC # BLD AUTO: 0 10*3/UL
NRBC BLD AUTO-RTO: 0 /100
PLATELET # BLD AUTO: 260 10E9/L (ref 150–450)
RBC # BLD AUTO: 4.88 10E12/L (ref 4.4–5.9)
WBC # BLD AUTO: 9.3 10E9/L (ref 4–11)

## 2018-06-26 RX ORDER — CALCIUM CARBONATE 500(1250)
1 TABLET ORAL 2 TIMES DAILY
Qty: 180 TABLET | Refills: 3 | Status: SHIPPED | OUTPATIENT
Start: 2018-06-26

## 2018-06-26 RX ORDER — CHOLECALCIFEROL (VITAMIN D3) 50 MCG
2000 TABLET ORAL DAILY
Qty: 100 TABLET | Refills: 3 | Status: SHIPPED | OUTPATIENT
Start: 2018-06-26

## 2018-06-26 ASSESSMENT — ENCOUNTER SYMPTOMS
EXERCISE INTOLERANCE: 1
HYPOTENSION: 0
HEMOPTYSIS: 0
NUMBNESS: 1
SKIN CHANGES: 0
SEIZURES: 0
WEIGHT LOSS: 0
HEADACHES: 0
SNORES LOUDLY: 1
WHEEZING: 0
HYPERTENSION: 0
POLYPHAGIA: 0
FATIGUE: 1
DIZZINESS: 1
MYALGIAS: 1
WEAKNESS: 1
NECK PAIN: 0
PARALYSIS: 0
DECREASED APPETITE: 0
POLYDIPSIA: 0
MEMORY LOSS: 0
EYE REDNESS: 0
SPUTUM PRODUCTION: 0
DOUBLE VISION: 0
WEIGHT GAIN: 0
EYE WATERING: 1
BACK PAIN: 1
POSTURAL DYSPNEA: 0
LOSS OF CONSCIOUSNESS: 0
DYSPNEA ON EXERTION: 1
DISTURBANCES IN COORDINATION: 0
COUGH DISTURBING SLEEP: 0
STIFFNESS: 0
SHORTNESS OF BREATH: 1
ALTERED TEMPERATURE REGULATION: 1
TINGLING: 1
FEVER: 0
PALPITATIONS: 0
NAIL CHANGES: 0
LIGHT-HEADEDNESS: 0
MUSCLE WEAKNESS: 1
EYE PAIN: 0
LEG PAIN: 1
POOR WOUND HEALING: 0
EYE IRRITATION: 0
COUGH: 0
INCREASED ENERGY: 1
HALLUCINATIONS: 0
SYNCOPE: 0
SLEEP DISTURBANCES DUE TO BREATHING: 0
NIGHT SWEATS: 0
CHILLS: 0
TREMORS: 0
JOINT SWELLING: 0
SPEECH CHANGE: 0
MUSCLE CRAMPS: 1
ORTHOPNEA: 0
ARTHRALGIAS: 0

## 2018-06-26 ASSESSMENT — PATIENT HEALTH QUESTIONNAIRE - PHQ9
SUM OF ALL RESPONSES TO PHQ QUESTIONS 1-9: 9
10. IF YOU CHECKED OFF ANY PROBLEMS, HOW DIFFICULT HAVE THESE PROBLEMS MADE IT FOR YOU TO DO YOUR WORK, TAKE CARE OF THINGS AT HOME, OR GET ALONG WITH OTHER PEOPLE: NOT DIFFICULT AT ALL
SUM OF ALL RESPONSES TO PHQ QUESTIONS 1-9: 9

## 2018-06-26 NOTE — MR AVS SNAPSHOT
After Visit Summary   6/26/2018    Beulah Jane    MRN: [id number]           Patient Information     Date Of Birth          1950        Visit Information        Provider Department      6/26/2018 10:20 AM Carlos Enrique Dial MD [facility]        Today's Diagnoses     Pre-operative examination    -  1    Spinal stenosis of lumbar region with neurogenic claudication        Fluid collection at surgical site, subsequent encounter           Follow-ups after your visit        Your next 10 appointments already scheduled     Jun 26, 2018 12:45 PM CDT   LAB with  LAB   [facility])    [address] [place] [id number] [phone]           Please do not eat 10-12 hours before your appointment if you are coming in fasting for labs on lipids, cholesterol, or glucose (sugar). This does not apply to pregnant women. Water, hot tea and black coffee (with nothing added) are okay. Do not drink other fluids, diet soda or chew gum.            Jun 29, 2018  8:30 AM CDT   DX HIP/PELVIS/SPINE with UCDX1   [facility] ([facility])    [address] [place] [address] [id number] [phone]           Please do not take any of the following 24 hours prior to the day of your exam: vitamins, calcium tablets, antacids.  If possible, please wear clothes without metal (snaps, zippers). A sweatsuit works well.            Jun 29, 2018  9:30 AM CDT   (Arrive by 9:15 AM)   PAC EVALUATION with  Pac Nikki 9   [facility] ([facility])    [address] [place] [id number] [phone]            Jun 29, 2018 10:30 AM CDT   (Arrive by 10:15 AM)   PAC RN ASSESSMENT with  Pac Rn   [facility] ([facility])    [address] [place] [address]  Floor  [facility] [address]   [phone]            Jun 29, 2018 10:50 AM CDT   (Arrive by 10:35 AM)   PAC Anesthesia Consult with Thad Pac Anesthesiologist   [facility] ([facility])    [address]  4th Floor  [facility] [address]   [phone]            Jul 02, 2018 10:00 AM CDT   XR MYELOGRAPHY LUMBAR SPINE with THADXR3, THAD IMAGING NURSE, THAD IR RAD   [facility] [place] [facility] ([facility])    [address]  1st Floor  [facility] [address]   [phone]           For nerve root injection, please send or bring copies of any MRIs or other scans you have had.  Bring a list of your current medicines to your exam. (Include vitamins, minerals and over-the-counter medicines.) Leave your valuables at home.  Plan to have someone drive you home afterward.  Stop taking the following medicines (but talk to your doctor first):   If you take blood thinners, you may need to stop taking them a few days before treatment. Talk to your doctor before stopping these medicines.Stop taking Coumadin (warfarin) 3 days before treatment. Restart the day after treatment.   If you take Plavix, Ticlid, Pletal or Persantine, please ask your doctor if you should stop these medicines. You may need extra tests on the morning of your scan.   Stop taking medicine for depression or other mental health concerns 24 hours before your exam, if your doctor says it is safe to do so. You may take your other medicines as normal.  Stop all food and drink (including water) 3 hours before your test or treatment. Drink at least four to six glasses of water the night before your exam.  Please tell the doctor:   If you are allergic to X-ray dye (contrast fluid).   If you may be pregnant.  Injections take about 30 to 45 minutes. Most people spend up to 2 hours in the clinic or hospital.  Please call the Imaging Department at your exam site with any  questions            Jul 02, 2018 10:30 AM CDT   XR MYELOGRAM THORACIC SPINE with UCXR3, ABHAY IMAGING NURSE   [facility] [place] [facility] ([facility])    [address]  1st [facility] [address] [phone]           For nerve root injection, please send or bring copies of any MRIs or other scans you have had.  Bring a list of your current medicines to your exam. (Include vitamins, minerals and over-the-counter medicines.) Leave your valuables at home.  Plan to have someone drive you home afterward.  Stop taking the following medicines (but talk to your doctor first):   If you take blood thinners, you may need to stop taking them a few days before treatment. Talk to your doctor before stopping these medicines.Stop taking Coumadin (warfarin) 3 days before treatment. Restart the day after treatment.   If you take Plavix, Ticlid, Pletal or Persantine, please ask your doctor if you should stop these medicines. You may need extra tests on the morning of your scan.   Stop taking medicine for depression or other mental health concerns 24 hours before your exam, if your doctor says it is safe to do so. You may take your other medicines as normal.  Stop all food and drink (including water) 3 hours before your test or treatment. Drink at least four to six glasses of water the night before your exam.  Please tell the doctor:   If you are allergic to X-ray dye (contrast fluid).   If you may be pregnant.  Injections take about 30 to 45 minutes. Most people spend up to 2 hours in the clinic or hospital.  Please call the Imaging Department at your exam site with any questions              Future tests that were ordered for you today     Open Future Orders        Priority Expected Expires Ordered    Myelogram thoracic Routine 6/26/2018 6/26/2019 6/26/2018    Myelogram lumbosacral Routine 6/26/2018 6/26/2019 6/26/2018    CT Lumbar Spine w Contrast (post myelogram) Routine  6/26/2019  "6/26/2018    CT Thoracic Spine Post Myelogram Routine  6/26/2019 6/26/2018    Albumin level Routine  6/27/2019 6/26/2018    Hemoglobin A1c Routine  6/27/2019 6/26/2018    Vitamin D deficiency screening Routine  6/27/2019 6/26/2018    Erythrocyte sedimentation rate auto Routine  6/27/2019 6/26/2018    CRP inflammation Routine  6/27/2019 6/26/2018    CBC with platelets differential Routine  6/27/2019 6/26/2018    DX Hip/Pelvis/Spine Routine  6/26/2019 6/26/2018    IR Fine Needle Aspiration w Imaging Routine 6/26/2018 6/26/2019 6/26/2018    XR Lumbar Spine G/E 4 Views Routine 6/25/2018 7/25/2018 6/25/2018            Who to contact     Please call your clinic at [phone] to:    Ask questions about your health    Make or cancel appointments    Discuss your medicines    Learn about your test results    Speak to your doctor            Additional Information About Your Visit        [employer] Information     [employer] gives you secure access to your electronic health record. If you see a primary care provider, you can also send messages to your care team and make appointments. If you have questions, please call your primary care clinic.  If you do not have a primary care provider, please call [phone] and they will assist you.      [employer] is an electronic gateway that provides easy, online access to your medical records. With [employer], you can request a clinic appointment, read your test results, renew a prescription or communicate with your care team.     To access your existing account, please contact your [facility] or call [phone] for assistance.        Care EveryWhere ID     This is your Care EveryWhere ID. This could be used by other organizations to access your [facility] medical records  [id number]        Your Vitals Were     Height BMI (Body Mass Index)                1.626 m (5' 4\") 27.96 kg/m2           Blood Pressure from Last 3 Encounters:   05/08/18 137/68   12/07/17 " 114/55   11/30/17 133/59    Weight from Last 3 Encounters:   06/26/18 73.9 kg (162 lb 14.4 oz)   06/19/18 73.5 kg (162 lb)   12/07/17 73.7 kg (162 lb 8 oz)                 Today's Medication Changes          These changes are accurate as of 6/26/18 12:28 PM.  If you have any questions, ask your nurse or doctor.               Start taking these medicines.        Dose/Directions    calcium carbonate 500 MG tablet   Commonly known as:  OS-DMITRIY 500 mg [place]. Ca   Used for:  Pre-operative examination, Spinal stenosis of lumbar region with neurogenic claudication   Started by:  Carlos Enrique Dial MD        Dose:  1 tablet   Take 1 tablet (500 mg) by mouth 2 times daily   Quantity:  180 tablet   Refills:  3       vitamin D 2000 units tablet   Used for:  Pre-operative examination, Spinal stenosis of lumbar region with neurogenic claudication   Started by:  Carlos Enrique Dial MD        Dose:  2000 Units   Take 2,000 Units by mouth daily   Quantity:  100 tablet   Refills:  3            Where to get your medicines      These medications were sent to [facility] [address] [place] [address] [address] AT [place] [address] [address]  [address], [facility] [address]     Phone:  [phone]     calcium carbonate 500 MG tablet    vitamin D 2000 units tablet                Primary Care Provider Office Phone # Fax #    Rah Bright [phone]       [address] [address]        Equal Access to Services     LOIS LLOYD AH: Emelia carpentero Sostanali, waaxda luqadaha, qaybta kaalmada adeegyada, gris sanchez. So [facility] [phone].    ATENCIÓN: Si habla español, tiene a paul disposición servicios gratuitos de asistencia lingüística. Hugh al [phone].    We comply with applicable federal civil rights laws and [place] laws. We do not discriminate on the basis of race, color, national origin, age,  disability, sex, sexual orientation, or gender identity.            Thank you!     Thank you for choosing [facility]  for your care. Our goal is always to provide you with excellent care. Hearing back from our patients is one way we can continue to improve our services. Please take a few minutes to complete the written survey that you may receive in the mail after your visit with us. Thank you!             Your Updated Medication List - Protect others around you: Learn how to safely use, store and throw away your medicines at www.disposemymeds.org.          This list is accurate as of 6/26/18 12:28 PM.  Always use your most recent med list.                   Brand Name Dispense Instructions for use Diagnosis    * albuterol (2.5 MG/3ML) 0.083% neb solution      Take 1 vial by nebulization every 6 hours as needed for shortness of breath / dyspnea or wheezing        * albuterol 108 (90 Base) MCG/ACT Inhaler    PROAIR HFA/PROVENTIL HFA/VENTOLIN HFA     Inhale 2 puffs into the lungs every 6 hours as needed for shortness of breath / dyspnea or wheezing        beclomethasone 80 MCG/ACT Inhaler    QVAR     Inhale 2 puffs into the lungs 2 times daily        calcium carbonate 500 MG tablet    OS-DMITRIY 500 mg [place]. Ca    180 tablet    Take 1 tablet (500 mg) by mouth 2 times daily    Pre-operative examination, Spinal stenosis of lumbar region with neurogenic claudication       cetirizine 10 MG tablet    zyrTEC     Take 10 mg by mouth daily        dextromethorphan-guaiFENesin  MG per 12 hr tablet    MUCINEX DM     Take 1 tablet by mouth every 12 hours        FOLIC ACID PO      Take 1 mg by mouth daily        GABAPENTIN PO      Take 600 mg by mouth 3 times daily        GEMFIBROZIL PO      Take 600 mg by mouth 2 times daily        insulin isophane human 100 UNIT/ML injection    HumuLIN N PEN     Inject 6 Units Subcutaneous 2 times daily (before meals)    Type 2 diabetes mellitus with microalbuminuria,  unspecified long term insulin use status (H)       loratadine 10 MG tablet    CLARITIN     Take 10 mg by mouth daily        METHOTREXATE PO      Take 7.5 mg by mouth once a week        omega 3 1000 MG Caps      Take 2 capsules by mouth 2 times daily        oxyCODONE-acetaminophen 5-325 MG per tablet    PERCOCET     Take 1 tablet by mouth 3 times daily        predniSONE 20 MG tablet    DELTASONE     Take 1 tablet (20 mg) by mouth daily    Polymyositis (H)       sulfamethoxazole-trimethoprim 800-160 MG per tablet    BACTRIM DS/SEPTRA DS     TK 1 T PO 3 TIMES A WK        theophylline 400 MG 24 hr tablet    UNIPHYL     Take 400 mg by mouth daily        traMADol 50 MG tablet    ULTRAM    20 tablet    Take 0.5 tablets (25 mg) by mouth every 6 hours as needed for moderate pain    Polymyositis (H)       vitamin D 2000 units tablet     100 tablet    Take 2,000 Units by mouth daily    Pre-operative examination, Spinal stenosis of lumbar region with neurogenic claudication       * Notice:  This list has 2 medication(s) that are the same as other medications prescribed for you. Read the directions carefully, and ask your doctor or other care provider to review them with you.

## 2018-06-26 NOTE — LETTER
6/26/2018       RE: Beulah Jane  [address] [place] [address]     Dear Colleague,    Thank you for referring your patient, Beulah Jane, to the [facility]. Please see a copy of my visit note below.    Spine Surgery Consultation    REFERRING PHYSICIAN: Maikel Prince   PRIMARY CARE PHYSICIAN: Rah Bright           Chief Complaint:   Back Pain (last two to three weeks pt has had alot of low back pain)      History of Present Illness:  Symptom Profile Including: location of symptoms, onset, severity, exacerbating/alleviating factors, previous treatments:        Beulah Jane is a 68 year old male who presents with a complex history of multiple previous lumbar spinal fusions as well as decompressions which were ultimately complicated by infection, apparently a mesh was placed and then the mesh had to be removed and ultimately he was left with a large seroma, lumbar instability, and severe pain and stenosis with bilateral back and leg symptoms.    He has been dealing with these symptoms for several years now.  He cannot stand upright.  He cannot walk more than about 100 feet before he has to sit down due to severe claudicatory leg pain.  He feels that the back pain is debilitating.  He feels like he would rather die than continue to live with the symptoms.    Each of his previous surgeries were done in [place].  He says that the fluid collection was previously aspirated and was thought to be benign but he is not sure it does not have the records with him today.    Also of note, he has a cardiologist who told him that he might need a cardiac valve replaced sometime this winter.           Past Medical History:     Past Medical History:   Diagnosis Date     Hyperlipidemia      Pneumonia      Polymyositis (H)      Pulmonary fibrosis, unspecified (H)      Seasonal allergies             Past Surgical History:   No  "past surgical history on file.         Social History:     Social History   Substance Use Topics     Smoking status: Former Smoker     Smokeless tobacco: Never Used     Alcohol use Not on file            Family History:   No family history on file.         Allergies:   No Known Allergies         Medications:     Current Outpatient Prescriptions   Medication     calcium carbonate (OS-DMITRIY 500 MG [place]. [place]) 500 MG tablet     Cholecalciferol (VITAMIN D) 2000 units tablet     albuterol (2.5 MG/3ML) 0.083% neb solution     albuterol (PROAIR HFA/PROVENTIL HFA/VENTOLIN HFA) 108 (90 BASE) MCG/ACT Inhaler     beclomethasone (QVAR) 80 MCG/ACT Inhaler     cetirizine (ZYRTEC) 10 MG tablet     dextromethorphan-guaiFENesin (MUCINEX DM)  MG per 12 hr tablet     FOLIC ACID PO     GABAPENTIN PO     GEMFIBROZIL PO     insulin isophane human (HUMULIN N PEN) 100 UNIT/ML injection     loratadine (CLARITIN) 10 MG tablet     METHOTREXATE PO     omega 3 1000 MG CAPS     oxyCODONE-acetaminophen (PERCOCET) 5-325 MG per tablet     predniSONE (DELTASONE) 20 MG tablet     sulfamethoxazole-trimethoprim (BACTRIM DS/SEPTRA DS) 800-160 MG per tablet     theophylline (UNIPHYL) 400 MG 24 hr tablet     traMADol (ULTRAM) 50 MG tablet     No current facility-administered medications for this visit.              Review of Systems:     A 10 point ROS was performed and reviewed. Specific responses to these questions are noted at the end of the document.         Physical Exam:   Vitals: Ht 1.626 m (5' 4\")  Wt 73.9 kg (162 lb 14.4 oz)  BMI 27.96 kg/m2  Constitutional: awake, alert, cooperative, no apparent distress, appears stated age.    Eyes: The sclera are white.  Ears, Nose, Throat: The trachea is midline.  Psychiatric: The patient has a normal affect.  Respiratory: breathing non-labored  Cardiovascular: The extremities are warm and perfused.  Skin: no obvious rashes or lesions.  Musculoskeletal, Neurologic, and Spine:          Lumbar " Spine:    Appearance - No gross stepoffs or deformities.  Obvious right sided seroma and fluid collection is balladable.    Motor -     L2-3: Hip flexion 4/5 L and 4/5 R strength          L3/4:  Knee extension L 5/5 and R 5/5 strength         L4/5:  Foot dorsiflexion R 5/5 L 5/5 and       EHL dorsiflexion R 4/5 L 4/5 strength         S1:  Plantarflexion/Peroneal Muscles  L 4/5 and R 4/5 strength    Sensation: intact to light touch L3-S1 distribution BLE, but diminished L5 and S1.      Neurologic:      REFLEXES Left Right                  Patella 1+ 1+   Ankle jerk 0+ 0+   Babinski No upgoing great toe No upgoing great toe   Clonus 0 beats 0 beats     Hip Exam:  No pain with hip log roll and no tenderness over the greater trochanters.    Alignment:  Patient stands with a markedly positive sagittal balance.           Imaging:   We ordered and independently reviewed new radiographs at this clinic visit. The results were discussed with the patient.  Findings include:    May 19, 2018 lumbar MRI: Complex seroma in the posterior soft total occlusion of the spinal canal L3-4.  Tissues.  T11-12 ligamentum flavum hypertrophy and central stenosis around the spinal cord.  Severe central stenosis L1-2, moderate L2-3.  Total occlusion of the spinal canal at L3-4.  Wide central decompression with apparent absence of the lumbar facet joints L3-5.    AP lateral flexion-extension radiographs today show severe destruction of the L3 and L4 vertebral bodies with kyphosis through this area and severe lumbar kyphosis with positive sagittal imbalance.    Standing scoliosis radiographs show market positive sagittal imbalance due to the above-mentioned kyphosis through L3-4.  He is compensating with hyperextension through the upper thoracic spine and severe pelvic retroversion.             Assessment and Plan:   Assessment:  68 year old male with severe flat back and positive sagittal imbalance due to lumbar instability in the setting of  multiple previous operations which were complicated by infection and a large seroma formation.     Plan:  1. He will need an extensive workup.  I am going to obtain a bone density study, have him seen by the anesthesia clinic, order some preoperative labs, and have this fluid collection aspirated to see if it is either spinal fluid or infection.  I will order a DEXA scan.  I would like the PAC to comment on his recent cardiac history.  If he did need surgery I told him there is a 60-70% chance of major morbidity or death.  He understands.  I spent some time counseling him about this.  This would likely be a T10-Pelvis with PSO and multi-level interbody surgery and be a 8 hour with 1-2L blood loss type of surgery.    2. I spent approximately 25 minutes with the patient essentially all of which was in counseling.      Respectfully,  Carlos Enrique Dial MD  Spine Surgery  [facility]    Attending MD (Dr. Carlos Enrique Dial) :  I reviewed and verified the history and physical exam of the patient and discussed the patient's management with the other clinical providers involved in this patient's care including any involved residents or physicians assistants. I reviewed the above note and agree with the documented findings and plan of care, which were communicated to the patient.      Carlos Enrique Dial MD

## 2018-06-26 NOTE — PROGRESS NOTES
Spine Surgery Consultation    REFERRING PHYSICIAN: Maikel Prince   PRIMARY CARE PHYSICIAN: Rah Bright           Chief Complaint:   Back Pain (last two to three weeks pt has had alot of low back pain)      History of Present Illness:  Symptom Profile Including: location of symptoms, onset, severity, exacerbating/alleviating factors, previous treatments:        Beulah Jane is a 68 year old male who presents with a complex history of multiple previous lumbar spinal fusions as well as decompressions which were ultimately complicated by infection, apparently a mesh was placed and then the mesh had to be removed and ultimately he was left with a large seroma, lumbar instability, and severe pain and stenosis with bilateral back and leg symptoms.    He has been dealing with these symptoms for several years now.  He cannot stand upright.  He cannot walk more than about 100 feet before he has to sit down due to severe claudicatory leg pain.  He feels that the back pain is debilitating.  He feels like he would rather die than continue to live with the symptoms.    Each of his previous surgeries were done in [place].  He says that the fluid collection was previously aspirated and was thought to be benign but he is not sure it does not have the records with him today.    Also of note, he has a cardiologist who told him that he might need a cardiac valve replaced sometime this winter.           Past Medical History:     Past Medical History:   Diagnosis Date     Hyperlipidemia      Pneumonia      Polymyositis (H)      Pulmonary fibrosis, unspecified (H)      Seasonal allergies             Past Surgical History:   No past surgical history on file.         Social History:     Social History   Substance Use Topics     Smoking status: Former Smoker     Smokeless tobacco: Never Used     Alcohol use Not on file            Family History:   No family history on file.         Allergies:   No Known Allergies         " Medications:     Current Outpatient Prescriptions   Medication     calcium carbonate (OS-DMITRIY 500 MG [place]. [place]) 500 MG tablet     Cholecalciferol (VITAMIN D) 2000 units tablet     albuterol (2.5 MG/3ML) 0.083% neb solution     albuterol (PROAIR HFA/PROVENTIL HFA/VENTOLIN HFA) 108 (90 BASE) MCG/ACT Inhaler     beclomethasone (QVAR) 80 MCG/ACT Inhaler     cetirizine (ZYRTEC) 10 MG tablet     dextromethorphan-guaiFENesin (MUCINEX DM)  MG per 12 hr tablet     FOLIC ACID PO     GABAPENTIN PO     GEMFIBROZIL PO     insulin isophane human (HUMULIN N PEN) 100 UNIT/ML injection     loratadine (CLARITIN) 10 MG tablet     METHOTREXATE PO     omega 3 1000 MG CAPS     oxyCODONE-acetaminophen (PERCOCET) 5-325 MG per tablet     predniSONE (DELTASONE) 20 MG tablet     sulfamethoxazole-trimethoprim (BACTRIM DS/SEPTRA DS) 800-160 MG per tablet     theophylline (UNIPHYL) 400 MG 24 hr tablet     traMADol (ULTRAM) 50 MG tablet     No current facility-administered medications for this visit.              Review of Systems:     A 10 point ROS was performed and reviewed. Specific responses to these questions are noted at the end of the document.         Physical Exam:   Vitals: Ht 1.626 m (5' 4\")  Wt 73.9 kg (162 lb 14.4 oz)  BMI 27.96 kg/m2  Constitutional: awake, alert, cooperative, no apparent distress, appears stated age.    Eyes: The sclera are white.  Ears, Nose, Throat: The trachea is midline.  Psychiatric: The patient has a normal affect.  Respiratory: breathing non-labored  Cardiovascular: The extremities are warm and perfused.  Skin: no obvious rashes or lesions.  Musculoskeletal, Neurologic, and Spine:          Lumbar Spine:    Appearance - No gross stepoffs or deformities.  Obvious right sided seroma and fluid collection is balladable.    Motor -     L2-3: Hip flexion 4/5 L and 4/5 R strength          L3/4:  Knee extension L 5/5 and R 5/5 strength         L4/5:  Foot dorsiflexion R 5/5 L 5/5 and       EHL dorsiflexion R " 4/5 L 4/5 strength         S1:  Plantarflexion/Peroneal Muscles  L 4/5 and R 4/5 strength    Sensation: intact to light touch L3-S1 distribution BLE, but diminished L5 and S1.      Neurologic:      REFLEXES Left Right                  Patella 1+ 1+   Ankle jerk 0+ 0+   Babinski No upgoing great toe No upgoing great toe   Clonus 0 beats 0 beats     Hip Exam:  No pain with hip log roll and no tenderness over the greater trochanters.    Alignment:  Patient stands with a markedly positive sagittal balance.           Imaging:   We ordered and independently reviewed new radiographs at this clinic visit. The results were discussed with the patient.  Findings include:    May 19, 2018 lumbar MRI: Complex seroma in the posterior soft total occlusion of the spinal canal L3-4.  Tissues.  T11-12 ligamentum flavum hypertrophy and central stenosis around the spinal cord.  Severe central stenosis L1-2, moderate L2-3.  Total occlusion of the spinal canal at L3-4.  Wide central decompression with apparent absence of the lumbar facet joints L3-5.    AP lateral flexion-extension radiographs today show severe destruction of the L3 and L4 vertebral bodies with kyphosis through this area and severe lumbar kyphosis with positive sagittal imbalance.    Standing scoliosis radiographs show market positive sagittal imbalance due to the above-mentioned kyphosis through L3-4.  He is compensating with hyperextension through the upper thoracic spine and severe pelvic retroversion.             Assessment and Plan:   Assessment:  68 year old male with severe flat back and positive sagittal imbalance due to lumbar instability in the setting of multiple previous operations which were complicated by infection and a large seroma formation.     Plan:  1. He will need an extensive workup.  I am going to obtain a bone density study, have him seen by the anesthesia clinic, order some preoperative labs, and have this fluid collection aspirated to see if it  is either spinal fluid or infection.  I will order a DEXA scan.  I would like the PAC to comment on his recent cardiac history.  If he did need surgery I told him there is a 60-70% chance of major morbidity or death.  He understands.  I spent some time counseling him about this.  This would likely be a T10-Pelvis with PSO and multi-level interbody surgery and be a 8 hour with 1-2L blood loss type of surgery.    2. I spent approximately 25 minutes with the patient essentially all of which was in counseling.      Respectfully,  Carlos Enrique Dial MD  Spine Surgery  [facility]    Attending MD (Dr. Carlos Enrique Dial) :  I reviewed and verified the history and physical exam of the patient and discussed the patient's management with the other clinical providers involved in this patient's care including any involved residents or physicians assistants. I reviewed the above note and agree with the documented findings and plan of care, which were communicated to the patient.      Carlos Enrique Dial MD        Answers for HPI/ROS submitted by the patient on 6/26/2018   General Symptoms: Yes  Skin Symptoms: Yes  HENT Symptoms: No  EYE SYMPTOMS: Yes  HEART SYMPTOMS: Yes  LUNG SYMPTOMS: Yes  INTESTINAL SYMPTOMS: No  URINARY SYMPTOMS: No  REPRODUCTIVE SYMPTOMS: No  SKELETAL SYMPTOMS: Yes  BLOOD SYMPTOMS: No  NERVOUS SYSTEM SYMPTOMS: Yes  MENTAL HEALTH SYMPTOMS: No  Fever: No  Loss of appetite: No  Weight loss: No  Weight gain: No  Fatigue: Yes  Night sweats: No  Chills: No  Increased stress: No  Excessive hunger: No  Excessive thirst: No  Feeling hot or cold when others believe the temperature is normal: Yes  Loss of height: Yes  Post-operative complications: No  Surgical site pain: No  Hallucinations: No  Change in or Loss of Energy: Yes  Hyperactivity: No  Confusion: No  Changes in hair: No  Changes in moles/birth marks: No  Itching: No  Rashes: No  Changes in nails: No  Acne: No  Change in facial hair:  No  Warts: Yes  Non-healing sores: No  Scarring: No  Flaking of skin: No  Color changes of hands/feet in cold : No  Sun sensitivity: Yes  Skin thickening: Yes  Eye pain: No  Vision loss: No  Dry eyes: No  Watery eyes: Yes  Eye bulging: No  Double vision: No  Flashing of lights: No  Spots: No  Floaters: No  Redness: No  Crossed eyes: No  Tunnel Vision: No  Yellowing of eyes: No  Eye irritation: No  Cough: No  Sputum or phlegm: No  Coughing up blood: No  Difficulty breating or shortness of breath: Yes  Snoring: Yes  Wheezing: No  Difficulty breathing on exertion: Yes  Nighttime Cough: No  Difficulty breathing when lying flat: No  Chest pain or pressure: No  Fast or irregular heartbeat: No  Pain in legs with walking: Yes  Trouble breathing while lying down: No  Fingers or toes appear blue: No  High blood pressure: No  Low blood pressure: No  Fainting: No  Murmurs: Yes  Pacemaker: No  Varicose veins: No  Edema or swelling: No  Wake up at night with shortness of breath: No  Light-headedness: No  Exercise intolerance: Yes  Back pain: Yes  Muscle aches: Yes  Neck pain: No  Swollen joints: No  Joint pain: No  Bone pain: Yes  Muscle cramps: Yes  Muscle weakness: Yes  Joint stiffness: No  Bone fracture: No  Trouble with coordination: No  Dizziness or trouble with balance: Yes  Fainting or black-out spells: No  Memory loss: No  Headache: No  Seizures: No  Speech problems: No  Tingling: Yes  Tremor: No  Weakness: Yes  Difficulty walking: Yes  Paralysis: No  Numbness: Yes  PHQ-2 Score: 4  If you checked off any problems, how difficult have these problems made it for you to do your work, take care of things at home, or get along with other people?: Not difficult at all  PHQ9 TOTAL SCORE: 9

## 2018-06-26 NOTE — NURSING NOTE
"Reason For Visit:   Chief Complaint   Patient presents with     Back Pain     last two to three weeks pt has had alot of low back pain       Primary MD: Rah Bright  Ref. MD:    ?  No  Occupation None.  Currently working? No.  Work status?  Retired.  Date of surgery: 1997  Type of surgery: Lumbar fusion  Smoker: No  Request smoking cessation information: No    Ht 1.626 m (5' 4\")  Wt 73.9 kg (162 lb 14.4 oz)  BMI 27.96 kg/m2    Pain Assessment  Patient Currently in Pain: Yes  0-10 Pain Scale: 8  Primary Pain Location: Back  Pain Orientation: Lower  Alleviating Factors: Pain medication  Aggravating Factors: Standing    Oswestry (LAKEISHA) Questionnaire    OSWESTRY DISABILITY INDEX 6/26/2018   Count 9   Sum 31   Oswestry Score (%) 68.89                Visual Analog Pain Scale  Back Pain Scale 0-10: 8.5  Right leg pain: 0.5  Left leg pain: 0.5    Promis 10 Assessment    PROMIS 10 6/26/2018   In general, would you say your health is: Poor   In general, would you say your quality of life is: Poor   In general, how would you rate your physical health? Poor   In general, how would you rate your mental health, including your mood and your ability to think? Very good   In general, how would you rate your satisfaction with your social activities and relationships? Poor   In general, please rate how well you carry out your usual social activities and roles Poor   To what extent are you able to carry out your everyday physical activities such as walking, climbing stairs, carrying groceries, or moving a chair? A little   How often have you been bothered by emotional problems such as feeling anxious, depressed or irritable? Rarely   How would you rate your fatigue on average? Severe   How would you rate your pain on average?   0 = No Pain  to  10 = Worst Imaginable Pain 8   In general, would you say your health is: 1   In general, would you say your quality of life is: 1   In general, how would you rate your " physical health? 1   In general, how would you rate your mental health, including your mood and your ability to think? 4   In general, how would you rate your satisfaction with your social activities and relationships? 1   In general, please rate how well you carry out your usual social activities and roles. (This includes activities at home, at work and in your community, and responsibilities as a parent, child, spouse, employee, friend, etc.) 1   To what extent are you able to carry out your everyday physical activities such as walking, climbing stairs, carrying groceries, or moving a chair? 2   In the past 7 days, how often have you been bothered by emotional problems such as feeling anxious, depressed, or irritable? 2   In the past 7 days, how would you rate your fatigue on average? 4   In the past 7 days, how would you rate your pain on average, where 0 means no pain, and 10 means worst imaginable pain? 8   Global Mental Health Score 10   Global Physical Health Score 7   PROMIS TOTAL - SUBSCORES 17                Chloe Huang CMA

## 2018-06-26 NOTE — PROGRESS NOTES
Patient to be placed  On Interventional radiology schedule  for  a us  guided  Fine needle aspiration of large fluid collection,right to midline in the subcutanoeus fat at the laminectomy site.    Discussed with Dr. Gregorio Figueroa IR [facility]  [phone] Call pager  [phone] pager

## 2018-06-27 ASSESSMENT — PATIENT HEALTH QUESTIONNAIRE - PHQ9: SUM OF ALL RESPONSES TO PHQ QUESTIONS 1-9: 9

## 2018-06-29 ENCOUNTER — OFFICE VISIT (OUTPATIENT)
Dept: SURGERY | Facility: CLINIC | Age: 68
End: 2018-06-29
Payer: COMMERCIAL

## 2018-06-29 ENCOUNTER — RADIANT APPOINTMENT (OUTPATIENT)
Dept: BONE DENSITY | Facility: CLINIC | Age: 68
End: 2018-06-29
Attending: ORTHOPAEDIC SURGERY
Payer: COMMERCIAL

## 2018-06-29 ENCOUNTER — APPOINTMENT (OUTPATIENT)
Dept: SURGERY | Facility: CLINIC | Age: 68
End: 2018-06-29
Payer: COMMERCIAL

## 2018-06-29 ENCOUNTER — ANESTHESIA EVENT (OUTPATIENT)
Dept: SURGERY | Facility: CLINIC | Age: 68
End: 2018-06-29

## 2018-06-29 ENCOUNTER — ALLIED HEALTH/NURSE VISIT (OUTPATIENT)
Dept: SURGERY | Facility: CLINIC | Age: 68
End: 2018-06-29
Payer: COMMERCIAL

## 2018-06-29 VITALS
WEIGHT: 159.5 LBS | BODY MASS INDEX: 27.23 KG/M2 | HEIGHT: 64 IN | OXYGEN SATURATION: 99 % | SYSTOLIC BLOOD PRESSURE: 135 MMHG | RESPIRATION RATE: 18 BRPM | DIASTOLIC BLOOD PRESSURE: 67 MMHG | TEMPERATURE: 97.6 F | HEART RATE: 105 BPM

## 2018-06-29 DIAGNOSIS — Z01.818 PRE-OPERATIVE EXAMINATION: ICD-10-CM

## 2018-06-29 DIAGNOSIS — M48.061 SPINAL STENOSIS OF LUMBAR REGION WITHOUT NEUROGENIC CLAUDICATION: Primary | ICD-10-CM

## 2018-06-29 DIAGNOSIS — M48.062 SPINAL STENOSIS OF LUMBAR REGION WITH NEUROGENIC CLAUDICATION: ICD-10-CM

## 2018-06-29 DIAGNOSIS — Z01.818 PREOP EXAMINATION: Primary | ICD-10-CM

## 2018-06-29 NOTE — CONSULTS
Anesthesia Consult Note      Reason for consult: Anesthesiology Consult in Preparation for Complex Spine Fusion Surgery in regards to complex cardiac medical history    Date of Encounter: 6/29/2018  Referring Physician: Dr. Cal Dial  Primary Care Physician:  Rah Bright  Beulah Jane is a 68 year old male who presents for an anesthesiology consult in preparation for a T10-Pelvis with osteotomy and multi-level interbody surgery that would require 8 hours with 1-2L blood loss with Dr. Dial for spinal stenosis of lumbar region.    Mr. Jane has a complex medical history that includes severe aortic stenosis recently identified.  He is followed by cardiology through [facility] last seen 6/18/18 and according to the Care Everywhere record, the need for an aortic valve replacement within the next year was discussed.  The patient also has pulmonary fibrosis and requires 2L/min of oxygen just to walk one block.  He had an appointment with [facility]monology 3/1/18 and further evaluation is ongoing.  He also has polymyositis on methotrexate and high levels of prednisone.  He currently is in the process of weaning off the prednisone with the goal to be done by mid July.  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    Mr. Jane cannot stand upright and has chronic pain.  He has GONZALEZ and fatigue.  He did have a previous lumbar fusion in the late 1990 s and the hardware needed to be removed 2 years later.   Past Medical History  Past Medical History:   Diagnosis Date     Hyperlipidemia      Pneumonia      Polymyositis (H)      Pulmonary fibrosis, unspecified (H)      Seasonal allergies        Past Surgical History  No past surgical history on file.      Prior to Admission Medications  Current Outpatient Prescriptions   Medication Sig Dispense Refill     albuterol (2.5 MG/3ML) 0.083% neb solution Take 1 vial by nebulization every 6 hours as needed for shortness  of breath / dyspnea or wheezing       albuterol (PROAIR HFA/PROVENTIL HFA/VENTOLIN HFA) 108 (90 BASE) MCG/ACT Inhaler Inhale 2 puffs into the lungs every 6 hours as needed for shortness of breath / dyspnea or wheezing       beclomethasone (QVAR) 80 MCG/ACT Inhaler Inhale 2 puffs into the lungs 2 times daily       calcium carbonate (OS-DMITRIY 500 MG [place]. [place]) 500 MG tablet Take 1 tablet (500 mg) by mouth 2 times daily 180 tablet 3     cetirizine (ZYRTEC) 10 MG tablet Take 10 mg by mouth daily as needed        Cholecalciferol (VITAMIN D) 2000 units tablet Take 2,000 Units by mouth daily 100 tablet 3     dextromethorphan-guaiFENesin (MUCINEX DM)  MG per 12 hr tablet Take 1 tablet by mouth every 12 hours as needed        FOLIC ACID PO Take 1 mg by mouth daily       GABAPENTIN PO Take 600 mg by mouth 3 times daily       GEMFIBROZIL PO Take 600 mg by mouth 2 times daily       insulin isophane human (HUMULIN N PEN) 100 UNIT/ML injection Inject 6 Units Subcutaneous 2 times daily (before meals)       METHOTREXATE PO Take 7.5 mg by mouth once a week       omega 3 1000 MG CAPS Take 2 capsules by mouth 2 times daily       oxyCODONE-acetaminophen (PERCOCET) 5-325 MG per tablet Take 1 tablet by mouth 3 times daily       predniSONE (DELTASONE) 20 MG tablet Take 1 tablet (20 mg) by mouth daily (Patient taking differently: Take 5 mg by mouth daily )       sulfamethoxazole-trimethoprim (BACTRIM DS/SEPTRA DS) 800-160 MG per tablet TK 1 T PO 3 TIMES A WK  11     theophylline (UNIPHYL) 400 MG 24 hr tablet Take 400 mg by mouth every morning        traMADol (ULTRAM) 50 MG tablet Take 0.5 tablets (25 mg) by mouth every 6 hours as needed for moderate pain 20 tablet 0       Allergies  No Known Allergies    Social History  Social History     Social History     Marital status:      Spouse name: N/A     Number of children: N/A     Years of education: N/A     Occupational History     Not on file.     Social History Main Topics      "Smoking status: Former Smoker     Smokeless tobacco: Never Used     Alcohol use Not on file     Drug use: Not on file     Sexual activity: Not on file     Other Topics Concern     Not on file     Social History Narrative       Family History  Negative for bleeding and clotting disorders or severe reaction to anesthesia      ROS   The complete review of systems is negative other than noted in the HPI or here.   Constitutional: Denies  fevers/chills.    EENT: Denies difficulty opening mouth or swallowing.  Cardiovascular: +GONZALEZ, Denies pain, tightness or squeezing in chest, upper abdomen, shoulder, or neck.  Denies orthopnea, palpitations or syncope.  Respiratory: Denies significant shortness of breath or cough.    GI: heartburn controlled with medication, nausea/vomiting     : Denies dysuria   Musculoskeletal: Denies joint pain or swelling.    Skin: Denies rashes, infection or wounds.    Hematologic: Denies prolonged bleeding, anemia or blood clot history;  denies cramping pain in legs when walking  Neurologic: Numbness and tingling in fingers and feet; Denies history of stroke, TIA, migraines, seizures, dizziness, denies short term memory loss  Psychiatric: Denies changes in mood or affect.          Temp: 97.6  F (36.4  C) Temp src: Oral BP: 135/67 Pulse: 105   Resp: 18 SpO2: 99 %         159 lbs 8 oz  5' 4\"   Body mass index is 27.38 kg/(m^2).       Physical Exam    Constitutional: Awake, alert, cooperative, no apparent distress, and appears stated age.  Eyes: Pupils equal, round and reactive to light, extra ocular muscles intact, sclera clear, conjunctiva normal.  HENT: Normocephalic, oral pharynx with moist mucus membranes  Respiratory: Clear to auscultation bilaterally, no crackles or wheezing.  Cardiovascular: Regular rate and rhythm and overt murmur noted.  Carotids beverly no bruits. No edema. Palpable pulses to radial  DP and PT arteries.   GI: Normal bowel sounds, soft, non-distended, non-tender, no masses " palpated, no hepatosplenomegaly.    Skin: Warm and dry.    Musculoskeletal:  There is no redness, warmth, or swelling of the joints. Decreased Gross motor strength   Neurologic: Awake, alert, oriented to name, place and time.     Neuropsychiatric: Calm, cooperative. Normal affect.     Labs / testing: (personally reviewed)  Lab Results   Component Value Date    WBC 9.3 06/26/2018    HGB 14.4 06/26/2018    HCT 46.9 06/26/2018     06/26/2018     12/06/2017    POTASSIUM 4.0 12/06/2017    DMITRIY 9.0 12/06/2017     (H) 12/06/2017    CR 0.79 12/06/2017    BUN 19 12/06/2017    CO2 25 12/06/2017    ALT 14 11/30/2017    AST 14 11/30/2017    BILITOTAL 0.3 11/30/2017    ALKPHOS 70 11/30/2017        ASSESSMENT and PLAN  Beulah Jane is a 68 year old male who presents for an anesthesiology consult in preparation for a T10-Pelvis with osteotomy and multi-level interbody surgery that would require 8 hours with 1-2L blood loss with Dr. Dial for spinal stenosis of lumbar region.       PAC referral for risk assessment and optimization for anesthesia with comorbid conditions of:  1.  Cardiac:  Functional status METS =1       -Mod to Severe AS (velocities of 3.7 M/sec, mean gradient of 30 mm Hg, and calculated PETE 1.1 cm2), Moderate aortic insufficiency. LVEF 55%, which is unchanged. ; follows with [facility] (last seen 6/18/18) who discussed need for aortic valve replacement possibly within the next year  -HLD on gemfibrozil    *ECHO 6/7/18: CONCLUSION:  Technically difficult exam.     Normal LV size and systolic function. Left Ventricular Ejection Fraction: 55 %  Mild concentric LVH.  Normal RV size and function.  Aortic valve not seen well enough to assess if bicuspid or tri-leaflet.   Moderate aortic stenosis. Mean gradient 30mmHg. Peak velocity  3.7m/s. PETE 1.1cm2. Moderate aortic regurgitation.   Mild mitral regurgitation.  IVC is normal in size and responsive to inspiration indicating normal RA  pressure.   As compared to previous echo on 01/03/18, no significant change.     *EKG 12/2017:  Sinus rhythm, Possible Left atrial enlargement. Right bundle branch block, T wave abnormality, consider inferior ischemia  2.  Pulm:     -JUAN, wears CPAP  - Pulmonary fibrosis due to polymyositis, seen by pulmonology 3/1/18 and advised to repeat overnight oximetry wearing CPAP; uses 2L/min of O2 with minor exertion activities such as walking one block  *SPIROMETRY [facility] 3/1/2018:   FVC Best 2.22, FVC % 66, FEV1 Best 1.72, FEV1 % 69, FEV1/FVC 77.4   Spirometry consistent with moderate restrictive impairment.  SpO2 was 97% on RA at rest and was 85% after a 150 ft. Walk.  SpO2 was 97%on 2L O2 and was 90% after a 6 minute walk of 150 feet.  * uses QVAR bid, albuterol neb 1x weekly, theophylline qd  3.  Endo:      -DM T2 with neuropathy on insulin, A1C 5.6(6/26/18)  4. Autoimmune:  -Polymyositis, on methotrexate, chronic prednisone 20 mg, currently weaning off     5.  GI:  Risk of PONV score =2 .  If > 2, anti-emetic intervention recommended.  6.  Chronic Pain:  -Oxycodone-apap 5-325 mg:  3 tabs daily, tramadol 25 mg:  3 tabs daily    We recommended to the patient that he follow up with cardiology for further evaluation of AV replacement to include a Heart CATH study.  He does need to complete his evaluation with pulmonology.    In addition, we advise that the methotrexate be stopped 2 weeks prior to the DOS and he complete the prednisone taper prior to surgery; he plans to follow-up with Rheumatology.  The patient was agreeable to this plan.    Anesthesiologist, Dr. Chong considerations:    I have examined the patient and reviewed the medical record.  I have discussed the patient with the OLIVIER and concur with her assessment  The patient is being seen in evaluation prior to scheduling very extensive posterior lumbar fusion.  The patient has multiple known co morbiditeis:    CV:  Known moderate to severe AS (PETE 1.1  cm2, Mean gradient 30-38 mm Hg) with mild to moderate AI.  Followed by cardiology - possible AVR within 1 year          Very poor activity tolerance - well under 1 MET - because of SOB.  No testing for CADz as of this time    Pulmonary:  Interstitial Lung Disease in the face of known polymyositis.  On chronic prednisone.  On O2 with any activity. (SPO2 <85% with 100 feet walking), Has seen pulmonology with planned follow up.    Other:  Polymyositis.  On chronic prednisone (as high as 80 mg/day in past)  And methotrexate.  Followed by rheumatology    Endo:  Chronic steroid use, hyperglycemia (HgbA1c 6.0)    Anesthesia:  Multiple GA without complications including one GA with in last year for I&D back    PE:  Frail appearing male with cushingoid facies.  Moderate dyspnea with speech. MPC 3 with decreased extension.  3 FBTMD.  Lungs - distant BS.  CV  RRR with 4/6            VS:      /67  RR 18  SpO2 99%    Assessment:  High risk surgery in high risk patient.                          According to NSQUIP calculator 10% chance death, 25% chance major complication, 66% chance nursing home discharge (These are actually underestimates as calculator would not allow adjustment for extensive nature of surgery and rheumatologic                           illness)                         Will need cardiology to evaluate for CADz - probable catheterization given lung disease and AS                         Will need pulmonology to optimize all pulmonary medications                         Will need rheumatology to adjust immunosuppressants.  Withholding methotrexate for wound healing may result in severe rebound of polymyositis                         Discussed high risk of complication and nursing home destination with patient.  He is aware of need for significant preparation prior to surgery and agrees to this.                        If decide to proceed will need to coordinate with anesthesiology, ICU personnel, and  perform case on [place].          Note:  I spent a total of 60 minutes face to face with Beulah Jane during today's office visit.  Over 50% of this time was spent counseling the patient and coordinating care regarding his complex cardiac history and risk to proceed with multiple level spinal fusion surgery.  See above for details.    Kristina Saini PA-C      [facility]operative [facility]  [facility]  Office phone: [phone]  Fax: [phone]

## 2018-06-29 NOTE — ANESTHESIA PREPROCEDURE EVALUATION
PAC Discussion and Assessment    ASA Classification:   Case is suitable for:   Anesthetic techniques and relevant risks discussed:   Invasive monitoring and risk discussed:   Types:   Possibility and Risk of blood transfusion discussed:   NPO instructions given:   Additional anesthetic preparation and risks discussed:   Needs early admission to pre-op area:   Other:     PAC Resident/NP Anesthesia Assessment:        Mid-Level Provider/Resident:   Date:   Time:     Attending Anesthesiologist Anesthesia Assessment:  I have examined the patient and reviewed the medical record.  I have discussed the patient with the OLIVIER and concur with her assessment  The patient is being seen in evaluation prior to scheduling very extensive posterior lumbar fusion.  The patient has multiple known co morbiditeis:    CV:  Known moderate to severe AS (PETE 1.1 cm2, Mean gradient 30-38 mm Hg) with mild to moderate AI.  Followed by cardiology - possible AVR within 1 year          Very poor activity tolerance - well under 1 MET - because of SOB.  No testing for CADz as of this time    Pulmonary:  Interstitial Lung Disease in the face of known polymyositis.  On chronic prednisone.  On O2 with any activity. (SPO2 <85% with 100 feet walking), Has seen pulmonology with planned follow up.    Other:  Polymyositis.  On chronic prednisone (as high as 80 mg/day in past)  And methotrexate.  Followed by rheumatology    Endo:  Chronic steroid use, hyperglycemia (HgbA1c 6.0)    Anesthesia:  Multiple GA without complications including one GA with in last year for I&D back    PE:  Frail appearing male with cushingoid facies.  Moderate dyspnea with speech. MPC 3 with decreased extension.  3 FBTMD.  Lungs - distant BS.  CV  RRR with 4/6            VS:      /67  RR 18  SpO2 99%    Assessment:  High risk surgery in high risk patient.                          According to NSQUIP calculator 10% chance death, 25% chance major  complication, 66% chance nursing home discharge (These are actually underestimates as calculator would not allow adjustment for extensive nature of surgery and rheumatologic                           illness)                         Will need cardiology to evaluate for CADz - probable catheterization given lung disease and AS                         Will need pulmonology to optimize all pulmonary medications                         Will need rheumatology to adjust immunosuppressants.  Withholding methotrexate for wound healing may result in severe rebound of polymyositis                         Discussed high risk of complication and nursing home destination with patient.  He is aware of need for significant preparation prior to surgery and agrees to this.                        If decide to proceed will need to coordinate with anesthesiology, ICU personnel, and perform case on [place].        Reviewed and Signed by PAC Anesthesiologist  Anesthesiologist: Nikhil Chong MD  Date: 6/29/2018  Time:   Pass/Fail:   Disposition:     PAC Pharmacist Assessment:        Pharmacist:   Date:   Time:                           .

## 2018-06-29 NOTE — PROGRESS NOTES
Preoperative Assessment Center medication history for June 29, 2018 is complete.    See Epic admission navigator for prior to admission medications.   Operating room staff will still need to confirm medications and last dose information on day of surgery.     Medication history interview sources:  Patient Interview    Changes made to PTA medication list (reason)  Added: None    Deleted:   -- claritin - taking zyrtec instead    Changed: None    Additional medication history information (including reliability of information, actions taken by pharmacist):    -- No recent (within 30 days) chronic daily medications stopped   -- Patient declines being on any other prescription or over-the-counter medications  -- takes methotrexate on Tuesdays.   -- patient reports taking both percocet and tramadol. 15mg of percocet total/day and 75mg of tramadol total/day.  -- patient is to decrease prednisone to 2.5mg tomorrow and stay on that dose for 2 weeks then stop    Prior to Admission medications    Medication Sig Last Dose Taking? Auth Provider   albuterol (2.5 MG/3ML) 0.083% neb solution Take 1 vial by nebulization every 6 hours as needed for shortness of breath / dyspnea or wheezing Taking Yes Unknown, Entered By History   beclomethasone (QVAR) 80 MCG/ACT Inhaler Inhale 2 puffs into the lungs 2 times daily Taking Yes Unknown, Entered By History   calcium carbonate (OS-DMITRIY 500 MG [place]. [place]) 500 MG tablet Take 1 tablet (500 mg) by mouth 2 times daily Taking Yes Carlos Enrique Dial MD   cetirizine (ZYRTEC) 10 MG tablet Take 10 mg by mouth daily as needed  Taking Yes Unknown, Entered By History   Cholecalciferol (VITAMIN D) 2000 units tablet Take 2,000 Units by mouth daily Taking Yes Carlos Enrique Dial MD   FOLIC ACID PO Take 1 mg by mouth daily Taking Yes Unknown, Entered By History   GABAPENTIN PO Take 600 mg by mouth 3 times daily Taking Yes Unknown, Entered By History   GEMFIBROZIL PO Take 600 mg by mouth 2  times daily Taking Yes Unknown, Entered By History   insulin isophane human (HUMULIN N PEN) 100 UNIT/ML injection Inject 6 Units Subcutaneous 2 times daily (before meals) Taking Yes Ivana Mayo MD   METHOTREXATE PO Take 15 mg by mouth once a week Tuesdays Taking Yes Unknown, Entered By History   omega 3 1000 MG CAPS Take 2 capsules by mouth 2 times daily Taking Yes Unknown, Entered By History   oxyCODONE-acetaminophen (PERCOCET) 5-325 MG per tablet Take 1 tablet by mouth 3 times daily Taking Yes Unknown, Entered By History   predniSONE (DELTASONE) 20 MG tablet Take 1 tablet (20 mg) by mouth daily  Patient taking differently: Take 5 mg by mouth daily  Taking Yes Ivana Mayo MD   sulfamethoxazole-trimethoprim (BACTRIM DS/SEPTRA DS) 800-160 MG per tablet TK 1 T PO 3 TIMES A WK Taking Yes Reported, Patient   theophylline (UNIPHYL) 400 MG 24 hr tablet Take 400 mg by mouth every morning  Taking Yes Unknown, Entered By History   traMADol (ULTRAM) 50 MG tablet Take 0.5 tablets (25 mg) by mouth every 6 hours as needed for moderate pain Taking Yes Ivana Mayo MD   albuterol (PROAIR HFA/PROVENTIL HFA/VENTOLIN HFA) 108 (90 BASE) MCG/ACT Inhaler Inhale 2 puffs into the lungs every 6 hours as needed for shortness of breath / dyspnea or wheezing Not Taking  Unknown, Entered By History   dextromethorphan-guaiFENesin (MUCINEX DM)  MG per 12 hr tablet Take 1 tablet by mouth every 12 hours as needed  Not Taking  Unknown, Entered By History         Medication history completed by: Yordy Chase [facility]

## 2018-06-29 NOTE — MR AVS SNAPSHOT
After Visit Summary   6/29/2018    Beulah Jane    MRN: [id number]           Patient Information     Date Of Birth          1950        Visit Information        Provider Department      6/29/2018 9:00 AM Pharmacist, Thad Tanner [facility] [place]        Today's Diagnoses     Preop examination    -  1       Follow-ups after your visit        Your next 10 appointments already scheduled     Jun 29, 2018 10:30 AM CDT   (Arrive by 10:15 AM)   PAC RN ASSESSMENT with Uc Pac Rn   [facility] [place] ([facility])    [address] [place] [id number]   [phone]            Jun 29, 2018 10:50 AM CDT   (Arrive by 10:35 AM)   PAC Anesthesia Consult with Uc Pac Anesthesiologist   [facility] ([facility])    [address] [place] [id number]   [phone]            Jul 02, 2018 10:00 AM CDT   XR MYELOGRAPHY LUMBAR SPINE with THADXR3, THAD IMAGING NURSE, THAD IR RAD   [facility] ([facility])    [address] [place] [id number]   [phone]           For nerve root injection, please send or bring copies of any MRIs or other scans you have had.  Bring a list of your current medicines to your exam. (Include vitamins, minerals and over-the-counter medicines.) Leave your valuables at home.  Plan to have someone drive you home afterward.  Stop taking the following medicines (but talk to your doctor first):   If you take blood thinners, you may need to stop taking them a few days before treatment. Talk to your doctor before stopping these medicines.Stop taking Coumadin (warfarin) 3 days before treatment. Restart the day after treatment.   If you take Plavix, Ticlid, Pletal or Persantine, please ask your doctor if you should stop these medicines. You may need extra tests on the  morning of your scan.   Stop taking medicine for depression or other mental health concerns 24 hours before your exam, if your doctor says it is safe to do so. You may take your other medicines as normal.  Stop all food and drink (including water) 3 hours before your test or treatment. Drink at least four to six glasses of water the night before your exam.  Please tell the doctor:   If you are allergic to X-ray dye (contrast fluid).   If you may be pregnant.  Injections take about 30 to 45 minutes. Most people spend up to 2 hours in the clinic or hospital.  Please call the Imaging Department at your exam site with any questions            Jul 02, 2018 10:30 AM CDT   XR MYELOGRAM THORACIC SPINE with UCXR3,  IMAGING NURSE, ABHAY IR RAD   [facility] ([facility])    [address] [address] [phone]           For nerve root injection, please send or bring copies of any MRIs or other scans you have had.  Bring a list of your current medicines to your exam. (Include vitamins, minerals and over-the-counter medicines.) Leave your valuables at home.  Plan to have someone drive you home afterward.  Stop taking the following medicines (but talk to your doctor first):   If you take blood thinners, you may need to stop taking them a few days before treatment. Talk to your doctor before stopping these medicines.Stop taking Coumadin (warfarin) 3 days before treatment. Restart the day after treatment.   If you take Plavix, Ticlid, Pletal or Persantine, please ask your doctor if you should stop these medicines. You may need extra tests on the morning of your scan.   Stop taking medicine for depression or other mental health concerns 24 hours before your exam, if your doctor says it is safe to do so. You may take your other medicines as normal.  Stop all food and drink (including water) 3 hours before your test or treatment. Drink at least four to six  glasses of water the night before your exam.  Please tell the doctor:   If you are allergic to X-ray dye (contrast fluid).   If you may be pregnant.  Injections take about 30 to 45 minutes. Most people spend up to 2 hours in the clinic or hospital.  Please call the Imaging Department at your exam site with any questions            Jul 02, 2018  3:00 PM CDT   CT THORACIC SPINE W/O CONTRAST with UCCT1   [facility] ([facility])    [address] [place] [address] [phone]           Please bring any scans or X-rays taken at other hospitals, if similar tests were done. Also bring a list of your medicines, including vitamins, minerals and over-the-counter drugs. It is safest to leave personal items at home.  Be sure to tell your doctor:   If you have any allergies.   If there s any chance you are pregnant.   If you are breastfeeding.  You do not need to do anything special to prepare for this exam.  Please wear loose clothing, such as a sweat suit or jogging clothes. Avoid snaps, zippers and other metal. We may ask you to undress and put on a hospital gown.            Jul 02, 2018  3:20 PM CDT   CT LUMBAR SPINE W CONTRAST with UCCT1   [facility] ([facility])    [address] [address] [phone]           Please bring any scans or X-rays taken at other hospitals, if similar tests were done. Also bring a list of your medicines, including vitamins, minerals and over-the-counter drugs. It is safest to leave personal items at home.  Be sure to tell your doctor:   If you have any allergies.   If there s any chance you are pregnant.   If you are breastfeeding.    If you have diabetes as your medication may need to be adjusted for this exam.  You will have contrast for this exam. To prepare:   Do not eat or drink for 2 hours before your exam. If you need to take medicine, you  may take it with small sips of water. (We may ask you to take liquid medicine as well.)   The day before your exam, drink extra fluids at least six 8-ounce glasses (unless your doctor tells you to restrict your fluids).  Patients over 70 or patients with diabetes or kidney problems:   If you haven t had a blood test (creatinine test) within the last 30 days, the Cardiologist/Radiologist may require you to get this test prior to your exam.  Please wear loose clothing, such as a sweat suit or jogging clothes. Avoid snaps, zippers and other metal. We may ask you to undress and put on a hospital gown.  If you have any questions, please call the Imaging Department where you will have your exam.              Who to contact     Please call your clinic at [phone] to:    Ask questions about your health    Make or cancel appointments    Discuss your medicines    Learn about your test results    Speak to your doctor            Additional Information About Your Visit        [facility] Information     [facility] gives you secure access to your electronic health record. If you see a primary care provider, you can also send messages to your care team and make appointments. If you have questions, please call your primary care clinic.  If you do not have a primary care provider, please call [phone] and they will assist you.      [facility] is an electronic gateway that provides easy, online access to your medical records. With [facility], you can request a clinic appointment, read your test results, renew a prescription or communicate with your care team.     To access your existing account, please contact your [facility] or call [phone] for assistance.        Care EveryWhere ID     This is your Care EveryWhere ID. This could be used by other organizations to access your [facility] medical records  [id number]         Blood Pressure from Last 3 Encounters:   06/29/18 135/67   05/08/18 137/68    12/07/17 114/55    Weight from Last 3 Encounters:   06/29/18 72.3 kg (159 lb 8 oz)   06/26/18 73.9 kg (162 lb 14.4 oz)   06/19/18 73.5 kg (162 lb)              Today, you had the following     No orders found for display         Today's Medication Changes          These changes are accurate as of 6/29/18  9:50 AM.  If you have any questions, ask your nurse or doctor.               These medicines have changed or have updated prescriptions.        Dose/Directions    predniSONE 20 MG tablet   Commonly known as:  DELTASONE   This may have changed:  how much to take   Used for:  Polymyositis (H)        Dose:  20 mg   Take 1 tablet (20 mg) by mouth daily   Refills:  0                Primary Care Provider Office Phone # Fax #    Rah Bright [phone]       [facility] [address] [facility] [address]        Equal Access to Services     LOIS LLOYD : Emelia Ghotra, wadanni king, qakevyn kaalmada diana, gris alejandro . So [facility] [phone].    ATENCIÓN: Si habla español, tiene a paul disposición servicios gratuitos de asistencia lingüística. Llame al [phone].    We comply with applicable federal civil rights laws and [place] laws. We do not discriminate on the basis of race, color, national origin, age, disability, sex, sexual orientation, or gender identity.            Thank you!     Thank you for choosing [facility]  for your care. Our goal is always to provide you with excellent care. Hearing back from our patients is one way we can continue to improve our services. Please take a few minutes to complete the written survey that you may receive in the mail after your visit with us. Thank you!             Your Updated Medication List - Protect others around you: Learn how to safely use, store and throw away your medicines at www.disposemymeds.org.          This list is accurate as of 6/29/18  9:50 AM.   Always use your most recent med list.                   Brand Name Dispense Instructions for use Diagnosis    * albuterol (2.5 MG/3ML) 0.083% neb solution      Take 1 vial by nebulization every 6 hours as needed for shortness of breath / dyspnea or wheezing        * albuterol 108 (90 Base) MCG/ACT Inhaler    PROAIR HFA/PROVENTIL HFA/VENTOLIN HFA     Inhale 2 puffs into the lungs every 6 hours as needed for shortness of breath / dyspnea or wheezing        beclomethasone 80 MCG/ACT Inhaler    QVAR     Inhale 2 puffs into the lungs 2 times daily        calcium carbonate 500 MG tablet    OS-DMITRIY 500 mg [place]. Ca    180 tablet    Take 1 tablet (500 mg) by mouth 2 times daily    Pre-operative examination, Spinal stenosis of lumbar region with neurogenic claudication       cetirizine 10 MG tablet    zyrTEC     Take 10 mg by mouth daily as needed        dextromethorphan-guaiFENesin  MG per 12 hr tablet    MUCINEX DM     Take 1 tablet by mouth every 12 hours as needed        FOLIC ACID PO      Take 1 mg by mouth daily        GABAPENTIN PO      Take 600 mg by mouth 3 times daily        GEMFIBROZIL PO      Take 600 mg by mouth 2 times daily        insulin isophane human 100 UNIT/ML injection    HumuLIN N PEN     Inject 6 Units Subcutaneous 2 times daily (before meals)    Type 2 diabetes mellitus with microalbuminuria, unspecified long term insulin use status (H)       METHOTREXATE PO      Take 15 mg by mouth once a week Tuesdays        omega 3 1000 MG Caps      Take 2 capsules by mouth 2 times daily        oxyCODONE-acetaminophen 5-325 MG per tablet    PERCOCET     Take 1 tablet by mouth 3 times daily        predniSONE 20 MG tablet    DELTASONE     Take 1 tablet (20 mg) by mouth daily    Polymyositis (H)       sulfamethoxazole-trimethoprim 800-160 MG per tablet    BACTRIM DS/SEPTRA DS     TK 1 T PO 3 TIMES A WK        theophylline 400 MG 24 hr tablet    UNIPHYL     Take 400 mg by mouth every morning        traMADol 50 MG  tablet    ULTRAM    20 tablet    Take 0.5 tablets (25 mg) by mouth every 6 hours as needed for moderate pain    Polymyositis (H)       vitamin D 2000 units tablet     100 tablet    Take 2,000 Units by mouth daily    Pre-operative examination, Spinal stenosis of lumbar region with neurogenic claudication       * Notice:  This list has 2 medication(s) that are the same as other medications prescribed for you. Read the directions carefully, and ask your doctor or other care provider to review them with you.

## 2018-07-02 ENCOUNTER — RADIANT APPOINTMENT (OUTPATIENT)
Dept: CT IMAGING | Facility: CLINIC | Age: 68
End: 2018-07-02
Attending: ORTHOPAEDIC SURGERY
Payer: COMMERCIAL

## 2018-07-02 ENCOUNTER — RADIANT APPOINTMENT (OUTPATIENT)
Dept: GENERAL RADIOLOGY | Facility: CLINIC | Age: 68
End: 2018-07-02
Attending: ORTHOPAEDIC SURGERY
Payer: COMMERCIAL

## 2018-07-02 VITALS
DIASTOLIC BLOOD PRESSURE: 72 MMHG | TEMPERATURE: 98.4 F | HEART RATE: 74 BPM | RESPIRATION RATE: 18 BRPM | OXYGEN SATURATION: 98 % | SYSTOLIC BLOOD PRESSURE: 136 MMHG

## 2018-07-02 DIAGNOSIS — T88.8XXD FLUID COLLECTION AT SURGICAL SITE, SUBSEQUENT ENCOUNTER: ICD-10-CM

## 2018-07-02 DIAGNOSIS — M48.062 SPINAL STENOSIS OF LUMBAR REGION WITH NEUROGENIC CLAUDICATION: ICD-10-CM

## 2018-07-02 DIAGNOSIS — Z01.818 PRE-OPERATIVE EXAMINATION: ICD-10-CM

## 2018-07-02 RX ORDER — LIDOCAINE HYDROCHLORIDE 10 MG/ML
30 INJECTION, SOLUTION EPIDURAL; INFILTRATION; INTRACAUDAL; PERINEURAL ONCE
Status: COMPLETED | OUTPATIENT
Start: 2018-07-02 | End: 2018-07-02

## 2018-07-02 RX ORDER — IOPAMIDOL 612 MG/ML
15 INJECTION, SOLUTION INTRATHECAL ONCE
Status: COMPLETED | OUTPATIENT
Start: 2018-07-02 | End: 2018-07-02

## 2018-07-02 RX ADMIN — IOPAMIDOL 15 ML: 612 INJECTION, SOLUTION INTRATHECAL at 10:00

## 2018-07-02 RX ADMIN — LIDOCAINE HYDROCHLORIDE 6 ML: 10 INJECTION, SOLUTION EPIDURAL; INFILTRATION; INTRACAUDAL; PERINEURAL at 10:00

## 2018-07-02 NOTE — DISCHARGE INSTRUCTIONS
[facility]                                        Radiology Discharge instructions Post Myelogram/LumbarPuncture/Blood Patch         AFTER YOU GO HOME      Relax and take it easy for 24 hours    You may resume normal activity tomorrow    You may remove the bandage on your back in the evening or next morning    You may resume bathing the next day    Drink at least 4 glasses of extra fluid today if not on a fluid restriction    DO NOT drive or operate machinery at home or at work for at least 24 hours    If you develop a headache you can take over the counter medicines and lay down.  You can try drinking caffeine-coffee, soda.                   CALL YOU PRIMARY PHYSICIAN IF:    If you start to leak a large amount of fluid from the puncture site, lie down flat on your back.     You develop a severe headache    You develop nausea or vomiting     You develop a temperature of 101 degrees or greater

## 2018-07-02 NOTE — MR AVS SNAPSHOT
MRN:[id number]                      After Visit Summary   7/2/2018    Beulah Jane    MRN: [id number]           Visit Information        Provider Department      7/2/2018 10:00 AM  IR RAD;  IMAGING NURSE; UC[facility]3 [facility]           Review of your medicines          These changes are accurate as of 7/2/18  9:38 AM.  If you have any questions, ask your nurse or doctor.               CONTINUE these medicines which may have CHANGED, or have new prescriptions. If we are uncertain of the size of tablets/capsules you have at home, strength may be listed as something that might have changed.        Dose / Directions    predniSONE 20 MG tablet   Commonly known as:  DELTASONE   This may have changed:  how much to take   Used for:  Polymyositis (H)        Dose:  20 mg   Take 1 tablet (20 mg) by mouth daily   Refills:  0         CONTINUE these medicines which have NOT CHANGED        Dose / Directions    * albuterol (2.5 MG/3ML) 0.083% neb solution        Dose:  1 vial   Take 1 vial by nebulization every 6 hours as needed for shortness of breath / dyspnea or wheezing   Refills:  0       * albuterol 108 (90 Base) MCG/ACT Inhaler   Commonly known as:  PROAIR HFA/PROVENTIL HFA/VENTOLIN HFA        Dose:  2 puff   Inhale 2 puffs into the lungs every 6 hours as needed for shortness of breath / dyspnea or wheezing   Refills:  0       beclomethasone 80 MCG/ACT Inhaler   Commonly known as:  QVAR        Dose:  2 puff   Inhale 2 puffs into the lungs 2 times daily   Refills:  0       calcium carbonate 500 MG tablet   Commonly known as:  OS-DMITRIY 500 mg [place]. Ca   Used for:  Pre-operative examination, Spinal stenosis of lumbar region with neurogenic claudication        Dose:  1 tablet   Take 1 tablet (500 mg) by mouth 2 times daily   Quantity:  180 tablet   Refills:  3       cetirizine 10 MG tablet   Commonly known as:  zyrTEC        Dose:  10 mg   Take 10 mg by mouth daily as needed   Refills:   0       dextromethorphan-guaiFENesin  MG per 12 hr tablet   Commonly known as:  MUCINEX DM        Dose:  1 tablet   Take 1 tablet by mouth every 12 hours as needed   Refills:  0       FOLIC ACID PO        Dose:  1 mg   Take 1 mg by mouth daily   Refills:  0       GABAPENTIN PO        Dose:  600 mg   Take 600 mg by mouth 3 times daily   Refills:  0       GEMFIBROZIL PO        Dose:  600 mg   Take 600 mg by mouth 2 times daily   Refills:  0       insulin isophane human 100 UNIT/ML injection   Commonly known as:  HumuLIN N PEN   Used for:  Type 2 diabetes mellitus with microalbuminuria, unspecified long term insulin use status (H)        Dose:  6 Units   Inject 6 Units Subcutaneous 2 times daily (before meals)   Refills:  0       METHOTREXATE PO        Dose:  15 mg   Take 15 mg by mouth once a week Tuesdays   Refills:  0       omega 3 1000 MG Caps        Dose:  2 capsule   Take 2 capsules by mouth 2 times daily   Refills:  0       oxyCODONE-acetaminophen 5-325 MG per tablet   Commonly known as:  PERCOCET        Dose:  1 tablet   Take 1 tablet by mouth 3 times daily   Refills:  0       sulfamethoxazole-trimethoprim 800-160 MG per tablet   Commonly known as:  BACTRIM DS/SEPTRA DS        TK 1 T PO 3 TIMES A WK   Refills:  11       theophylline 400 MG 24 hr tablet   Commonly known as:  UNIPHYL        Dose:  400 mg   Take 400 mg by mouth every morning   Refills:  0       traMADol 50 MG tablet   Commonly known as:  ULTRAM   Used for:  Polymyositis (H)        Dose:  25 mg   Take 0.5 tablets (25 mg) by mouth every 6 hours as needed for moderate pain   Quantity:  20 tablet   Refills:  0       vitamin D 2000 units tablet   Used for:  Pre-operative examination, Spinal stenosis of lumbar region with neurogenic claudication        Dose:  2000 Units   Take 2,000 Units by mouth daily   Quantity:  100 tablet   Refills:  3       * Notice:  This list has 2 medication(s) that are the same as other medications prescribed for you.  Read the directions carefully, and ask your doctor or other care provider to review them with you.             Protect others around you: Learn how to safely use, store and throw away your medicines at www.disposemymeds.org.         Follow-ups after your visit        Your next 10 appointments already scheduled     Jul 02, 2018 10:00 AM CDT   XR MYELOGRAPHY LUMBAR SPINE with MATTHEW3, ABHAY IMAGING NURSE, ABHAY HOFFMANN RAD   [facility] ([facility] [place])    [address] [address] [phone]           For nerve root injection, please send or bring copies of any MRIs or other scans you have had.  Bring a list of your current medicines to your exam. (Include vitamins, minerals and over-the-counter medicines.) Leave your valuables at home.  Plan to have someone drive you home afterward.  Stop taking the following medicines (but talk to your doctor first):   If you take blood thinners, you may need to stop taking them a few days before treatment. Talk to your doctor before stopping these medicines.Stop taking Coumadin (warfarin) 3 days before treatment. Restart the day after treatment.   If you take Plavix, Ticlid, Pletal or Persantine, please ask your doctor if you should stop these medicines. You may need extra tests on the morning of your scan.   Stop taking medicine for depression or other mental health concerns 24 hours before your exam, if your doctor says it is safe to do so. You may take your other medicines as normal.  Stop all food and drink (including water) 3 hours before your test or treatment. Drink at least four to six glasses of water the night before your exam.  Please tell the doctor:   If you are allergic to X-ray dye (contrast fluid).   If you may be pregnant.  Injections take about 30 to 45 minutes. Most people spend up to 2 hours in the clinic or hospital.  Please call the Imaging Department at your exam site with any questions             Jul 02, 2018 10:30 AM CDT   XR MYELOGRAM THORACIC SPINE with UCXR3, UC IMAGING NURSE, UC IR RAD   [facility] Xray ([facility])    [address] [address] [phone]           For nerve root injection, please send or bring copies of any MRIs or other scans you have had.  Bring a list of your current medicines to your exam. (Include vitamins, minerals and over-the-counter medicines.) Leave your valuables at home.  Plan to have someone drive you home afterward.  Stop taking the following medicines (but talk to your doctor first):   If you take blood thinners, you may need to stop taking them a few days before treatment. Talk to your doctor before stopping these medicines.Stop taking Coumadin (warfarin) 3 days before treatment. Restart the day after treatment.   If you take Plavix, Ticlid, Pletal or Persantine, please ask your doctor if you should stop these medicines. You may need extra tests on the morning of your scan.   Stop taking medicine for depression or other mental health concerns 24 hours before your exam, if your doctor says it is safe to do so. You may take your other medicines as normal.  Stop all food and drink (including water) 3 hours before your test or treatment. Drink at least four to six glasses of water the night before your exam.  Please tell the doctor:   If you are allergic to X-ray dye (contrast fluid).   If you may be pregnant.  Injections take about 30 to 45 minutes. Most people spend up to 2 hours in the clinic or hospital.  Please call the Imaging Department at your exam site with any questions            Jul 02, 2018  3:00 PM CDT   CT THORACIC SPINE W/O CONTRAST with UCCT1   [facility] CT ([facility])    [address] [address] [phone]           Please bring any scans or X-rays taken at other hospitals, if similar tests were done. Also  bring a list of your medicines, including vitamins, minerals and over-the-counter drugs. It is safest to leave personal items at home.  Be sure to tell your doctor:   If you have any allergies.   If there s any chance you are pregnant.   If you are breastfeeding.  You do not need to do anything special to prepare for this exam.  Please wear loose clothing, such as a sweat suit or jogging clothes. Avoid snaps, zippers and other metal. We may ask you to undress and put on a hospital gown.            Jul 02, 2018  3:20 PM CDT   CT LUMBAR SPINE W CONTRAST with UCCT1   [facility] CT ([facility])    [address] [facility]  1st Floor  [facility] [address] [phone]           Please bring any scans or X-rays taken at other hospitals, if similar tests were done. Also bring a list of your medicines, including vitamins, minerals and over-the-counter drugs. It is safest to leave personal items at home.  Be sure to tell your doctor:   If you have any allergies.   If there s any chance you are pregnant.   If you are breastfeeding.    If you have diabetes as your medication may need to be adjusted for this exam.  You will have contrast for this exam. To prepare:   Do not eat or drink for 2 hours before your exam. If you need to take medicine, you may take it with small sips of water. (We may ask you to take liquid medicine as well.)   The day before your exam, drink extra fluids at least six 8-ounce glasses (unless your doctor tells you to restrict your fluids).  Patients over 70 or patients with diabetes or kidney problems:   If you haven t had a blood test (creatinine test) within the last 30 days, the Cardiologist/Radiologist may require you to get this test prior to your exam.  Please wear loose clothing, such as a sweat suit or jogging clothes. Avoid snaps, zippers and other metal. We may ask you to undress and put on a hospital gown.  If you have any questions, please call the  Imaging Department where you will have your exam.            Jul 24, 2018 12:00 PM CDT   (Arrive by 11:30 AM)   RETURN SPINE with Carlos Enrique Dial MD   [facility] ([facility] [place])    [address] [place] [address] [phone]               Care Instructions        Further instructions from your care team       [facility]                                        Radiology Discharge instructions Post Myelogram/LumbarPuncture/Blood Patch         AFTER YOU GO HOME      Relax and take it easy for 24 hours    You may resume normal activity tomorrow    You may remove the bandage on your back in the evening or next morning    You may resume bathing the next day    Drink at least 4 glasses of extra fluid today if not on a fluid restriction    DO NOT drive or operate machinery at home or at work for at least 24 hours    If you develop a headache you can take over the counter medicines and lay down.  You can try drinking caffeine-coffee, soda.                   CALL YOU PRIMARY PHYSICIAN IF:    If you start to leak a large amount of fluid from the puncture site, lie down flat on your back.     You develop a severe headache    You develop nausea or vomiting     You develop a temperature of 101 degrees or greater     Additional Information About Your Visit        [employer] Information     [employer] gives you secure access to your electronic health record. If you see a primary care provider, you can also send messages to your care team and make appointments. If you have questions, please call your primary care clinic.  If you do not have a primary care provider, please call [phone] and they will assist you.      [employer] is an electronic gateway that provides easy, online access to your medical records. With [employer], you can request a clinic appointment, read your test results, renew a prescription or communicate with your care team.     To access your  existing account, please contact your [facility] or call [phone] for assistance.        Care EveryWhere ID     This is your Care EveryWhere ID. This could be used by other organizations to access your [facility] medical records  [id number]        Your Vitals Were     Blood Pressure Pulse Temperature Respirations Pulse Oximetry       126/60 80 98.4  F (36.9  C) (Oral) 18 98%        Primary Care Provider Office Phone # Fax #    Rah Bright [phone]      Equal Access to Services     LOIS LLOYD : Hadii aad ku hadasho Soomaali, waaxda luqadaha, qaybta kaalmada adeegyada, waxcindy fletcherin hayterrell alejandro . So [facility] [phone].    ATENCIÓN: Si habla español, tiene a paul disposición servicios gratuitos de asistencia lingüística. Llame al [phone].    We comply with applicable federal civil rights laws and [place] laws. We do not discriminate on the basis of race, color, national origin, age, disability, sex, sexual orientation, or gender identity.            Thank you!     Thank you for choosing [facility] for your care. Our goal is always to provide you with excellent care. Hearing back from our patients is one way we can continue to improve our services. Please take a few minutes to complete the written survey that you may receive in the mail after you visit with us. Thank you!             Medication List: This is a list of all your medications and when to take them. Check marks below indicate your daily home schedule. Keep this list as a reference.          This list is accurate as of 7/2/18  9:38 AM.  Always use your most recent med list.               Medications           Morning Afternoon Evening Bedtime As Needed    * albuterol (2.5 MG/3ML) 0.083% neb solution   Take 1 vial by nebulization every 6 hours as needed for shortness of breath / dyspnea or wheezing                                * albuterol 108 (90 Base) MCG/ACT Inhaler   Commonly known  as:  PROAIR HFA/PROVENTIL HFA/VENTOLIN HFA   Inhale 2 puffs into the lungs every 6 hours as needed for shortness of breath / dyspnea or wheezing                                beclomethasone 80 MCG/ACT Inhaler   Commonly known as:  QVAR   Inhale 2 puffs into the lungs 2 times daily                                calcium carbonate 500 MG tablet   Commonly known as:  OS-DMITRIY 500 mg [place]. Ca   Take 1 tablet (500 mg) by mouth 2 times daily                                cetirizine 10 MG tablet   Commonly known as:  zyrTEC   Take 10 mg by mouth daily as needed                                dextromethorphan-guaiFENesin  MG per 12 hr tablet   Commonly known as:  MUCINEX DM   Take 1 tablet by mouth every 12 hours as needed                                FOLIC ACID PO   Take 1 mg by mouth daily                                GABAPENTIN PO   Take 600 mg by mouth 3 times daily                                GEMFIBROZIL PO   Take 600 mg by mouth 2 times daily                                insulin isophane human 100 UNIT/ML injection   Commonly known as:  HumuLIN N PEN   Inject 6 Units Subcutaneous 2 times daily (before meals)                                METHOTREXATE PO   Take 15 mg by mouth once a week Tuesdays                                omega 3 1000 MG Caps   Take 2 capsules by mouth 2 times daily                                oxyCODONE-acetaminophen 5-325 MG per tablet   Commonly known as:  PERCOCET   Take 1 tablet by mouth 3 times daily                                predniSONE 20 MG tablet   Commonly known as:  DELTASONE   Take 1 tablet (20 mg) by mouth daily                                sulfamethoxazole-trimethoprim 800-160 MG per tablet   Commonly known as:  BACTRIM DS/SEPTRA DS   TK 1 T PO 3 TIMES A WK                                theophylline 400 MG 24 hr tablet   Commonly known as:  UNIPHYL   Take 400 mg by mouth every morning                                traMADol 50 MG tablet   Commonly known as:   ULTRAM   Take 0.5 tablets (25 mg) by mouth every 6 hours as needed for moderate pain                                vitamin D 2000 units tablet   Take 2,000 Units by mouth daily                                * Notice:  This list has 2 medication(s) that are the same as other medications prescribed for you. Read the directions carefully, and ask your doctor or other care provider to review them with you.

## 2018-07-02 NOTE — PROGRESS NOTES
Pt did well with the procedure.  No complications. VSS. AVS given and pt escorted to the waiting room.    Carina, Imaging Nurse

## 2018-07-24 ENCOUNTER — OFFICE VISIT (OUTPATIENT)
Dept: ORTHOPEDICS | Facility: CLINIC | Age: 68
End: 2018-07-24
Payer: COMMERCIAL

## 2018-07-24 VITALS — HEIGHT: 64 IN | BODY MASS INDEX: 27.14 KG/M2 | WEIGHT: 159 LBS

## 2018-07-24 DIAGNOSIS — M48.062 SPINAL STENOSIS OF LUMBAR REGION WITH NEUROGENIC CLAUDICATION: Primary | ICD-10-CM

## 2018-07-24 ASSESSMENT — ENCOUNTER SYMPTOMS
ORTHOPNEA: 0
ALTERED TEMPERATURE REGULATION: 1
CHILLS: 0
POLYPHAGIA: 0
POSTURAL DYSPNEA: 0
WEIGHT GAIN: 0
WHEEZING: 0
SLEEP DISTURBANCES DUE TO BREATHING: 0
SYNCOPE: 0
MUSCLE CRAMPS: 0
HYPERTENSION: 0
EXERCISE INTOLERANCE: 1
HALLUCINATIONS: 0
COUGH DISTURBING SLEEP: 0
MUSCLE WEAKNESS: 1
NAIL CHANGES: 0
INCREASED ENERGY: 0
COUGH: 0
POLYDIPSIA: 0
DYSPNEA ON EXERTION: 1
LIGHT-HEADEDNESS: 0
NECK PAIN: 1
PALPITATIONS: 0
FEVER: 0
SHORTNESS OF BREATH: 1
FATIGUE: 1
MYALGIAS: 1
HEMOPTYSIS: 0
STIFFNESS: 0
BACK PAIN: 1
LEG PAIN: 1
POOR WOUND HEALING: 0
ARTHRALGIAS: 1
SPUTUM PRODUCTION: 0
SNORES LOUDLY: 0
WEIGHT LOSS: 0
DECREASED APPETITE: 0
SKIN CHANGES: 0
NIGHT SWEATS: 0
JOINT SWELLING: 1
HYPOTENSION: 0

## 2018-07-24 NOTE — LETTER
7/24/2018       RE: Beulah Jane  [address]  [place] [address]     Dear Colleague,    Thank you for referring your patient, Beulah Jane, to the [facility] Please see a copy of my visit note below.    Spine Surgery Return Clinic Visit      Chief Complaint:   No chief complaint on file.      Interval HPI:  Symptom Profile Including: location of symptoms, onset, severity, exacerbating/alleviating factors, previous treatments:        Beulah Jane is a 68 year old male who presents with a complex history of multiple previous lumbar spinal fusions as well as decompressions which were ultimately complicated by infection, apparently a mesh was placed and then the mesh had to be removed and ultimately he was left with a large seroma, lumbar instability, and severe pain and stenosis with bilateral back and leg symptoms.     He has been dealing with these symptoms for several years now.  He cannot stand upright.  He cannot walk more than about 100 feet before he has to sit down due to severe claudicatory leg pain.  He feels that the back pain is debilitating.  He feels like he would rather die than continue to live with the symptoms.     Each of his previous surgeries were done in [place].  He says that the fluid collection was previously aspirated and was thought to be benign but he is not sure it does not have the records with him today.     Also of note, he has a cardiologist who told him that he might need a cardiac valve replaced sometime this winter.     After his last visit with me I thought it would be extremely difficult to address this problem.  I sent him to the anesthesia clinic.  It was recommended that he see cardiology to see whether or not he needs the aortic valve replaced before surgery.  He also needs a pulmonology workup which has not yet been completed.  Lastly, he is on prednisone and also takes  methotrexate and he would need to stop the methotrexate before surgery.          Past Medical History:     Past Medical History:   Diagnosis Date     Aortic stenosis      Chronic pain      DM (diabetes mellitus), type 2 (H)     on insulin     Hyperlipidemia      JUAN on CPAP      Pneumonia      Polymyositis (H)      Pulmonary fibrosis, unspecified (H)      Seasonal allergies             Past Surgical History:     Past Surgical History:   Procedure Laterality Date     ARTHROSCOPY KNEE  1970     COLONOSCOPY       DECOMPRESSION, FUSION LUMBAR POSTERIOR TWO LEVELS, COMBINED  1997     HERNIA REPAIR  2006     lumbar spine hardware removal  1999     REMOVAL PROSTHETIC MATERIAL/MESH, ABD WALL NECRO TISS INFEXN  2012     ROTATOR CUFF REPAIR RT/LT  2003            Social History:     Social History   Substance Use Topics     Smoking status: Former Smoker     Packs/day: 0.25     Quit date: 6/29/1970     Smokeless tobacco: Never Used     Alcohol use Not on file      Comment: few times monthly            Family History:   No family history on file.         Allergies:   No Known Allergies         Medications:     Current Outpatient Prescriptions   Medication     albuterol (2.5 MG/3ML) 0.083% neb solution     albuterol (PROAIR HFA/PROVENTIL HFA/VENTOLIN HFA) 108 (90 BASE) MCG/ACT Inhaler     beclomethasone (QVAR) 80 MCG/ACT Inhaler     calcium carbonate (OS-DMITRIY 500 MG [place]. [place]) 500 MG tablet     cetirizine (ZYRTEC) 10 MG tablet     Cholecalciferol (VITAMIN D) 2000 units tablet     dextromethorphan-guaiFENesin (MUCINEX DM)  MG per 12 hr tablet     FOLIC ACID PO     GABAPENTIN PO     GEMFIBROZIL PO     insulin isophane human (HUMULIN N PEN) 100 UNIT/ML injection     METHOTREXATE PO     omega 3 1000 MG CAPS     oxyCODONE-acetaminophen (PERCOCET) 5-325 MG per tablet     predniSONE (DELTASONE) 20 MG tablet     sulfamethoxazole-trimethoprim (BACTRIM DS/SEPTRA DS) 800-160 MG per tablet     theophylline (UNIPHYL) 400 MG 24 hr tablet      traMADol (ULTRAM) 50 MG tablet     No current facility-administered medications for this visit.              Review of Systems:   A focused musculoskeletal and neurologic ROS was performed with pertinent positives and negatives noted in the HPI.  Additional systems were also reviewed and are documented at the bottom of the note.         Physical Exam:   Vitals: There were no vitals taken for this visit.  Musculoskeletal, Neurologic, and Spine:                         Lumbar Spine:                                              Appearance - No gross stepoffs or deformities.  Obvious right sided seroma and fluid collection is balladable.                                              Motor -                                               L2-3: Hip flexion 4/5 L and 4/5 R strength                                               L3/4:  Knee extension L 5/5 and R 5/5 strength                                              L4/5:  Foot dorsiflexion R 5/5 L 5/5 and                                                                     EHL dorsiflexion R 4/5 L 4/5 strength                                              S1:  Plantarflexion/Peroneal Muscles  L 4/5 and R 4/5 strength                                              Sensation: intact to light touch L3-S1 distribution BLE, but diminished L5 and S1.                                                 Neurologic:      REFLEXES Left Right                           Patella 1+ 1+   Ankle jerk 0+ 0+   Babinski No upgoing great toe No upgoing great toe   Clonus 0 beats 0 beats      Hip Exam:  No pain with hip log roll and no tenderness over the greater trochanters.     Alignment:  Patient stands with a markedly positive sagittal balance.           Imaging:   We ordered and independently reviewed new radiographs at this clinic visit. The results were discussed with the patient. Findings include:    I ordered a new CT scan completed on July 2, 2018 cervical thoracic and lumbar CT  myelogram: Severe diffuse spondylosis.  At T11-12 there is a large ventral disc herniation resulting in a complete dye block.  Severe stenosis L1-2.  Moderate at L2-3.  Severe stenosis L3-4 with complete dye block.  The L5-S1 level is healed ventrally and through the facets.  There is 4  of kyphosis between L1 and S1 on these supine images.  The thoracolumbar junction is neutral.  Vacuum disc phenomena at T11-12.    May 19, 2018 lumbar MRI: Complex seroma in the posterior soft total occlusion of the spinal canal L3-4.  Tissues.  T11-12 ligamentum flavum hypertrophy and central stenosis around the spinal cord.  Severe central stenosis L1-2, moderate L2-3.  Total occlusion of the spinal canal at L3-4.  Wide central decompression with apparent absence of the lumbar facet joints L3-5.     AP lateral flexion-extension radiographs 6/2018 severe destruction of the L3 and L4 vertebral bodies with kyphosis through this area and severe lumbar kyphosis with positive sagittal imbalance.     Standing scoliosis radiographs from 6/26/18 show marked positive sagittal imbalance due to the above-mentioned kyphosis through L3-4.  He is compensating with hyperextension through the upper thoracic spine and severe pelvic retroversion. Thoracic kyphosis measures 5 .  There is 25  of kyphosis between L1 and S1.  The measurement between L3 and S1 is 26  of kyphosis and there is a focal kyphosis between L3 and L5 with collapse of the L4 vertebral body.  47  pelvic retroversion.  51  pelvic incidence.  He has a 21 cm positive sagittal balance.     Assessment and Plan:     68 year old male with severe aortic valvular stenosis, pulmonary comorbidities and markedly severe flat back and degenerative changes in the lumbar spine.     Imaging Findings:  Dorsal Seroma from prior infections  Severe flatback and positive sagittal imbalance.  77 degree PI-LL mismatch  Dynamic instability L3-4 with focal kyphosis  25 degree lumbar kyphosis which reduces  to 4 degrees kyphosis on supine imaging.  Stenosis T11-12, L1-2, L2-3, L3-4.    Surgical Plan:   From a medical standpoint he would need to be optimized before any surgery.  Nonetheless if he did proceed with surgery I would plan to do a T10 to the pelvis operation with an interbody decompression and fusion at T11-12 to address the stenosis there and disc herniation there as well as from L1-L5.  Based on his supine imaging I do think he needs about an additional 50  of correction.  It might be difficult to achieve this with multilevel interbody's.  From posterior.  Potentially could consider an anterior column resection and lateral interbody at L3-4.  I do not think his anatomy is amenable to this at L4-5 because the so as is very ventral.  He is circumferentially fused at L5-S1 so we would not be able to get correction there without considering a 3 column osteotomy.  In addition, there is then the consideration of this dorsal fluid collection which appears to be a seroma.  Certainly this would complicate wound healing.  We might need to have plastics involved to have an appropriate closure.  He has previously seen general surgery and they have offered him a debridement with wound VAC type closure.  Potentially this could be done at the time of his spine surgery as well.  If this were to be addressed at the time of his spine surgery I think we would need to have him see plastic surgery here so that we had a good wound closure plan ahead of time.    Medical plan:  We do know his cardiac status before planning any major thoracolumbar operation.  I have asked him to follow-up with his cardiologist and pulmonologist so they can comment on his suitability for surgery and whether or not he needs the aortic valve replacement completed before the spine operation.  If he does have the aortic valve replaced, we would need to know what his anticoagulation status would be and how long he would need to be on anticoagulation  before he could have the spine surgery.      He is going to arrange a visit with his cardiologist.  Furthermore he has some personal issues to attend to in [place].  He is going to be back from this sometime in the fall.  I will see him back in clinic after he has completed those other referrals.  If he is able to have spine surgery from a cardiac and pulmonary standpoint we could start planning for his operation at that time.          Respectfully,  Carlos Enrique Dial MD  Spine Surgery  [facility]

## 2018-07-24 NOTE — NURSING NOTE
"Reason For Visit:   Chief Complaint   Patient presents with     Lower Back - Pain     Right Leg - Pain       Primary MD: Rah Bright  Ref. MD: Maikel John    ?  No  Occupation None.  Currently working? No.  Work status?  Retired.  Date of surgery: 1997  Type of surgery: Lumbar fusion  Smoker: No  Request smoking cessation information: No       Ht 1.626 m (5' 4\")  Wt 72.1 kg (159 lb)  BMI 27.29 kg/m2    Pain Assessment  0-10 Pain Scale: 7  Primary Pain Location: Back  Pain Orientation: Right  Aggravating Factors: Movement    Oswestry (LAKEISHA) Questionnaire    OSWESTRY DISABILITY INDEX 6/26/2018   Count 9   Sum 31   Oswestry Score (%) 68.89            Neck Disability Index (NDI) Questionnaire    No flowsheet data found.           Visual Analog Pain Scale  Back Pain Scale 0-10: 4  Right leg pain: 3.5  Left leg pain: 0    Promis 10 Assessment    PROMIS 10 6/26/2018   In general, would you say your health is: Poor   In general, would you say your quality of life is: Poor   In general, how would you rate your physical health? Poor   In general, how would you rate your mental health, including your mood and your ability to think? Very good   In general, how would you rate your satisfaction with your social activities and relationships? Poor   In general, please rate how well you carry out your usual social activities and roles Poor   To what extent are you able to carry out your everyday physical activities such as walking, climbing stairs, carrying groceries, or moving a chair? A little   How often have you been bothered by emotional problems such as feeling anxious, depressed or irritable? Rarely   How would you rate your fatigue on average? Severe   How would you rate your pain on average?   0 = No Pain  to  10 = Worst Imaginable Pain 8   In general, would you say your health is: 1   In general, would you say your quality of life is: 1   In general, how would you rate your physical health? 1   In general, " how would you rate your mental health, including your mood and your ability to think? 4   In general, how would you rate your satisfaction with your social activities and relationships? 1   In general, please rate how well you carry out your usual social activities and roles. (This includes activities at home, at work and in your community, and responsibilities as a parent, child, spouse, employee, friend, etc.) 1   To what extent are you able to carry out your everyday physical activities such as walking, climbing stairs, carrying groceries, or moving a chair? 2   In the past 7 days, how often have you been bothered by emotional problems such as feeling anxious, depressed, or irritable? 2   In the past 7 days, how would you rate your fatigue on average? 4   In the past 7 days, how would you rate your pain on average, where 0 means no pain, and 10 means worst imaginable pain? 8   Global Mental Health Score 10   Global Physical Health Score 7   PROMIS TOTAL - SUBSCORES 17                Angela Benson, ATC

## 2018-07-24 NOTE — MR AVS SNAPSHOT
"              After Visit Summary   7/24/2018    Beulah Jane    MRN: [id number]           Patient Information     Date Of Birth          1950        Visit Information        Provider Department      7/24/2018 12:00 PM Carlos Enrique Dial [facility]        Today's Diagnoses     Spinal stenosis of lumbar region with neurogenic claudication    -  1       Follow-ups after your visit        Who to contact     Please call your clinic at [phone] to:    Ask questions about your health    Make or cancel appointments    Discuss your medicines    Learn about your test results    Speak to your doctor            Additional Information About Your Visit        MyChart Information     [employer] gives you secure access to your electronic health record. If you see a primary care provider, you can also send messages to your care team and make appointments. If you have questions, please call your primary care clinic.  If you do not have a primary care provider, please call [phone] and they will assist you.      [employer] is an electronic gateway that provides easy, online access to your medical records. With [employer], you can request a clinic appointment, read your test results, renew a prescription or communicate with your care team.     To access your existing account, please contact your [facility] or call [phone] for assistance.        Care EveryWhere ID     This is your Care EveryWhere ID. This could be used by other organizations to access your [facility] medical records  [id number]        Your Vitals Were     Height BMI (Body Mass Index)                1.626 m (5' 4\") 27.29 kg/m2           Blood Pressure from Last 3 Encounters:   07/02/18 136/72   06/29/18 135/67   05/08/18 137/68    Weight from Last 3 Encounters:   07/24/18 72.1 kg (159 lb)   06/29/18 72.3 kg (159 lb 8 oz)   06/26/18 73.9 kg (162 lb 14.4 oz)              Today, you had the " following     No orders found for display         Today's Medication Changes          These changes are accurate as of 7/24/18 12:35 PM.  If you have any questions, ask your nurse or doctor.               These medicines have changed or have updated prescriptions.        Dose/Directions    predniSONE 20 MG tablet   Commonly known as:  DELTASONE   This may have changed:  how much to take   Used for:  Polymyositis (H)        Dose:  20 mg   Take 1 tablet (20 mg) by mouth daily   Refills:  0                Primary Care Provider Office Phone # Fax #    Rah Bright [phone]       [facility] [address] [facility] [address]        Equal Access to Services     [facility] Center[facility] : Hadii herberth Ghotra, waaxda fernando, qaybta kaalmada diana, gris alejandro . [facility] [phone].    ATENCIÓN: Si habla español, tiene a paul disposición servicios gratuitos de asistencia lingüística. LlProtestant [facility] [phone].    We comply with applicable federal civil rights laws and [place] laws. We do not discriminate on the basis of race, color, national origin, age, disability, sex, sexual orientation, or gender identity.            Thank you!     Thank you for choosing [facility]  for your care. Our goal is always to provide you with excellent care. Hearing back from our patients is one way we can continue to improve our services. Please take a few minutes to complete the written survey that you may receive in the mail after your visit with us. Thank you!             Your Updated Medication List - Protect others around you: Learn how to safely use, store and throw away your medicines at www.disposemymeds.org.          This list is accurate as of 7/24/18 12:35 PM.  Always use your most recent med list.                   Brand Name Dispense Instructions for use Diagnosis    * albuterol (2.5 MG/3ML) 0.083% neb solution      Take 1 vial by nebulization every  6 hours as needed for shortness of breath / dyspnea or wheezing        * albuterol 108 (90 Base) MCG/ACT Inhaler    PROAIR HFA/PROVENTIL HFA/VENTOLIN HFA     Inhale 2 puffs into the lungs every 6 hours as needed for shortness of breath / dyspnea or wheezing        beclomethasone 80 MCG/ACT Inhaler    QVAR     Inhale 2 puffs into the lungs 2 times daily        calcium carbonate 500 MG tablet    OS-DMITRIY 500 mg [place]. [place]    180 tablet    Take 1 tablet (500 mg) by mouth 2 times daily    Pre-operative examination, Spinal stenosis of lumbar region with neurogenic claudication       cetirizine 10 MG tablet    zyrTEC     Take 10 mg by mouth daily as needed        dextromethorphan-guaiFENesin  MG per 12 hr tablet    MUCINEX DM     Take 1 tablet by mouth every 12 hours as needed        FOLIC ACID PO      Take 1 mg by mouth daily        GABAPENTIN PO      Take 600 mg by mouth 3 times daily        GEMFIBROZIL PO      Take 600 mg by mouth 2 times daily        insulin isophane human 100 UNIT/ML injection    HumuLIN N PEN     Inject 6 Units Subcutaneous 2 times daily (before meals)    Type 2 diabetes mellitus with microalbuminuria, unspecified long term insulin use status (H)       METHOTREXATE PO      Take 15 mg by mouth once a week Tuesdays        omega 3 1000 MG Caps      Take 2 capsules by mouth 2 times daily        oxyCODONE-acetaminophen 5-325 MG per tablet    PERCOCET     Take 1 tablet by mouth 3 times daily        predniSONE 20 MG tablet    DELTASONE     Take 1 tablet (20 mg) by mouth daily    Polymyositis (H)       sulfamethoxazole-trimethoprim 800-160 MG per tablet    BACTRIM DS/SEPTRA DS     TK 1 T PO 3 TIMES A WK        theophylline 400 MG 24 hr tablet    UNIPHYL     Take 400 mg by mouth every morning        traMADol 50 MG tablet    ULTRAM    20 tablet    Take 0.5 tablets (25 mg) by mouth every 6 hours as needed for moderate pain    Polymyositis (H)       vitamin D 2000 units tablet     100 tablet    Take 2,000  Units by mouth daily    Pre-operative examination, Spinal stenosis of lumbar region with neurogenic claudication       * Notice:  This list has 2 medication(s) that are the same as other medications prescribed for you. Read the directions carefully, and ask your doctor or other care provider to review them with you.

## 2018-07-24 NOTE — PROGRESS NOTES
Spine Surgery Return Clinic Visit      Chief Complaint:   No chief complaint on file.      Interval HPI:  Symptom Profile Including: location of symptoms, onset, severity, exacerbating/alleviating factors, previous treatments:        Beulah Jane is a 68 year old male who presents with a complex history of multiple previous lumbar spinal fusions as well as decompressions which were ultimately complicated by infection, apparently a mesh was placed and then the mesh had to be removed and ultimately he was left with a large seroma, lumbar instability, and severe pain and stenosis with bilateral back and leg symptoms.     He has been dealing with these symptoms for several years now.  He cannot stand upright.  He cannot walk more than about 100 feet before he has to sit down due to severe claudicatory leg pain.  He feels that the back pain is debilitating.  He feels like he would rather die than continue to live with the symptoms.     Each of his previous surgeries were done in [place].  He says that the fluid collection was previously aspirated and was thought to be benign but he is not sure it does not have the records with him today.     Also of note, he has a cardiologist who told him that he might need a cardiac valve replaced sometime this winter.     After his last visit with me I thought it would be extremely difficult to address this problem.  I sent him to the anesthesia clinic.  It was recommended that he see cardiology to see whether or not he needs the aortic valve replaced before surgery.  He also needs a pulmonology workup which has not yet been completed.  Lastly, he is on prednisone and also takes methotrexate and he would need to stop the methotrexate before surgery.          Past Medical History:     Past Medical History:   Diagnosis Date     Aortic stenosis      Chronic pain      DM (diabetes mellitus), type 2 (H)     on insulin     Hyperlipidemia      JUAN on CPAP      Pneumonia       Polymyositis (H)      Pulmonary fibrosis, unspecified (H)      Seasonal allergies             Past Surgical History:     Past Surgical History:   Procedure Laterality Date     ARTHROSCOPY KNEE  1970     COLONOSCOPY       DECOMPRESSION, FUSION LUMBAR POSTERIOR TWO LEVELS, COMBINED  1997     HERNIA REPAIR  2006     lumbar spine hardware removal  1999     REMOVAL PROSTHETIC MATERIAL/MESH, ABD WALL NECRO TISS INFEXN  2012     ROTATOR CUFF REPAIR RT/LT  2003            Social History:     Social History   Substance Use Topics     Smoking status: Former Smoker     Packs/day: 0.25     Quit date: 6/29/1970     Smokeless tobacco: Never Used     Alcohol use Not on file      Comment: few times monthly            Family History:   No family history on file.         Allergies:   No Known Allergies         Medications:     Current Outpatient Prescriptions   Medication     albuterol (2.5 MG/3ML) 0.083% neb solution     albuterol (PROAIR HFA/PROVENTIL HFA/VENTOLIN HFA) 108 (90 BASE) MCG/ACT Inhaler     beclomethasone (QVAR) 80 MCG/ACT Inhaler     calcium carbonate (OS-DMITRIY 500 MG [place] [place]) 500 MG tablet     cetirizine (ZYRTEC) 10 MG tablet     Cholecalciferol (VITAMIN D) 2000 units tablet     dextromethorphan-guaiFENesin (MUCINEX DM)  MG per 12 hr tablet     FOLIC ACID PO     GABAPENTIN PO     GEMFIBROZIL PO     insulin isophane human (HUMULIN N PEN) 100 UNIT/ML injection     METHOTREXATE PO     omega 3 1000 MG CAPS     oxyCODONE-acetaminophen (PERCOCET) 5-325 MG per tablet     predniSONE (DELTASONE) 20 MG tablet     sulfamethoxazole-trimethoprim (BACTRIM DS/SEPTRA DS) 800-160 MG per tablet     theophylline (UNIPHYL) 400 MG 24 hr tablet     traMADol (ULTRAM) 50 MG tablet     No current facility-administered medications for this visit.              Review of Systems:   A focused musculoskeletal and neurologic ROS was performed with pertinent positives and negatives noted in the HPI.  Additional systems were also reviewed and  are documented at the bottom of the note.         Physical Exam:   Vitals: There were no vitals taken for this visit.  Musculoskeletal, Neurologic, and Spine:                         Lumbar Spine:                                              Appearance - No gross stepoffs or deformities.  Obvious right sided seroma and fluid collection is balladable.                                              Motor -                                               L2-3: Hip flexion 4/5 L and 4/5 R strength                                               L3/4:  Knee extension L 5/5 and R 5/5 strength                                              L4/5:  Foot dorsiflexion R 5/5 L 5/5 and                                                                     EHL dorsiflexion R 4/5 L 4/5 strength                                              S1:  Plantarflexion/Peroneal Muscles  L 4/5 and R 4/5 strength                                              Sensation: intact to light touch L3-S1 distribution BLE, but diminished L5 and S1.                                                 Neurologic:      REFLEXES Left Right                           Patella 1+ 1+   Ankle jerk 0+ 0+   Babinski No upgoing great toe No upgoing great toe   Clonus 0 beats 0 beats      Hip Exam:  No pain with hip log roll and no tenderness over the greater trochanters.     Alignment:  Patient stands with a markedly positive sagittal balance.           Imaging:   We ordered and independently reviewed new radiographs at this clinic visit. The results were discussed with the patient. Findings include:    I ordered a new CT scan completed on July 2, 2018 cervical thoracic and lumbar CT myelogram: Severe diffuse spondylosis.  At T11-12 there is a large ventral disc herniation resulting in a complete dye block.  Severe stenosis L1-2.  Moderate at L2-3.  Severe stenosis L3-4 with complete dye block.  The L5-S1 level is healed ventrally and through the facets.  There is 4  of kyphosis  between L1 and S1 on these supine images.  The thoracolumbar junction is neutral.  Vacuum disc phenomena at T11-12.    May 19, 2018 lumbar MRI: Complex seroma in the posterior soft total occlusion of the spinal canal L3-4.  Tissues.  T11-12 ligamentum flavum hypertrophy and central stenosis around the spinal cord.  Severe central stenosis L1-2, moderate L2-3.  Total occlusion of the spinal canal at L3-4.  Wide central decompression with apparent absence of the lumbar facet joints L3-5.     AP lateral flexion-extension radiographs 6/2018 severe destruction of the L3 and L4 vertebral bodies with kyphosis through this area and severe lumbar kyphosis with positive sagittal imbalance.     Standing scoliosis radiographs from 6/26/18 show marked positive sagittal imbalance due to the above-mentioned kyphosis through L3-4.  He is compensating with hyperextension through the upper thoracic spine and severe pelvic retroversion. Thoracic kyphosis measures 5 .  There is 25  of kyphosis between L1 and S1.  The measurement between L3 and S1 is 26  of kyphosis and there is a focal kyphosis between L3 and L5 with collapse of the L4 vertebral body.  47  pelvic retroversion.  51  pelvic incidence.  He has a 21 cm positive sagittal balance.     Assessment and Plan:     68 year old male with severe aortic valvular stenosis, pulmonary comorbidities and markedly severe flat back and degenerative changes in the lumbar spine.     Imaging Findings:  Dorsal Seroma from prior infections  Severe flatback and positive sagittal imbalance.  77 degree PI-LL mismatch  Dynamic instability L3-4 with focal kyphosis  25 degree lumbar kyphosis which reduces to 4 degrees kyphosis on supine imaging.  Stenosis T11-12, L1-2, L2-3, L3-4.    Surgical Plan:   From a medical standpoint he would need to be optimized before any surgery.  Nonetheless if he did proceed with surgery I would plan to do a T10 to the pelvis operation with an interbody decompression and  fusion at T11-12 to address the stenosis there and disc herniation there as well as from L1-L5.  Based on his supine imaging I do think he needs about an additional 50  of correction.  It might be difficult to achieve this with multilevel interbody's.  From posterior.  Potentially could consider an anterior column resection and lateral interbody at L3-4.  I do not think his anatomy is amenable to this at L4-5 because the so as is very ventral.  He is circumferentially fused at L5-S1 so we would not be able to get correction there without considering a 3 column osteotomy.  In addition, there is then the consideration of this dorsal fluid collection which appears to be a seroma.  Certainly this would complicate wound healing.  We might need to have plastics involved to have an appropriate closure.  He has previously seen general surgery and they have offered him a debridement with wound VAC type closure.  Potentially this could be done at the time of his spine surgery as well.  If this were to be addressed at the time of his spine surgery I think we would need to have him see plastic surgery here so that we had a good wound closure plan ahead of time.    Medical plan:  We do know his cardiac status before planning any major thoracolumbar operation.  I have asked him to follow-up with his cardiologist and pulmonologist so they can comment on his suitability for surgery and whether or not he needs the aortic valve replacement completed before the spine operation.  If he does have the aortic valve replaced, we would need to know what his anticoagulation status would be and how long he would need to be on anticoagulation before he could have the spine surgery.      He is going to arrange a visit with his cardiologist.  Furthermore he has some personal issues to attend to in [place].  He is going to be back from this sometime in the fall.  I will see him back in clinic after he has completed those other referrals.  If  he is able to have spine surgery from a cardiac and pulmonary standpoint we could start planning for his operation at that time.          Respectfully,  Carlos Enrique Dial MD  Spine Surgery  [facility]      Answers for HPI/ROS submitted by the patient on 7/24/2018   General Symptoms: Yes  Skin Symptoms: Yes  HENT Symptoms: No  EYE SYMPTOMS: No  HEART SYMPTOMS: Yes  LUNG SYMPTOMS: Yes  INTESTINAL SYMPTOMS: No  URINARY SYMPTOMS: No  REPRODUCTIVE SYMPTOMS: No  SKELETAL SYMPTOMS: Yes  BLOOD SYMPTOMS: No  NERVOUS SYSTEM SYMPTOMS: No  MENTAL HEALTH SYMPTOMS: No  Fever: No  Loss of appetite: No  Weight loss: No  Weight gain: No  Fatigue: Yes  Night sweats: No  Chills: No  Increased stress: No  Excessive hunger: No  Excessive thirst: No  Feeling hot or cold when others believe the temperature is normal: Yes  Loss of height: No  Post-operative complications: No  Surgical site pain: No  Hallucinations: No  Change in or Loss of Energy: No  Hyperactivity: No  Confusion: No  Changes in hair: No  Changes in moles/birth marks: No  Itching: No  Rashes: No  Changes in nails: No  Acne: No  Change in facial hair: No  Warts: No  Non-healing sores: No  Scarring: No  Flaking of skin: Yes  Color changes of hands/feet in cold : Yes  Sun sensitivity: Yes  Skin thickening: No  Cough: No  Sputum or phlegm: No  Coughing up blood: No  Difficulty breating or shortness of breath: Yes  Snoring: No  Wheezing: No  Difficulty breathing on exertion: Yes  Nighttime Cough: No  Difficulty breathing when lying flat: No  Chest pain or pressure: No  Fast or irregular heartbeat: No  Pain in legs with walking: Yes  Trouble breathing while lying down: No  Fingers or toes appear blue: No  High blood pressure: No  Low blood pressure: No  Fainting: No  Murmurs: Yes  Pacemaker: No  Varicose veins: No  Edema or swelling: Yes  Wake up at night with shortness of breath: No  Light-headedness: No  Exercise intolerance: Yes  Back pain: Yes  Muscle  aches: Yes  Neck pain: Yes  Swollen joints: Yes  Joint pain: Yes  Bone pain: No  Muscle cramps: No  Muscle weakness: Yes  Joint stiffness: No  Bone fracture: No

## 2019-01-01 ENCOUNTER — APPOINTMENT (OUTPATIENT)
Dept: OCCUPATIONAL THERAPY | Facility: CLINIC | Age: 69
DRG: 266 | End: 2019-01-01
Attending: NURSE PRACTITIONER
Payer: MEDICARE

## 2019-01-01 ENCOUNTER — OFFICE VISIT (OUTPATIENT)
Dept: CARDIOLOGY | Facility: CLINIC | Age: 69
End: 2019-01-01
Attending: NURSE PRACTITIONER
Payer: MEDICARE

## 2019-01-01 ENCOUNTER — PATIENT OUTREACH (OUTPATIENT)
Dept: CARE COORDINATION | Facility: CLINIC | Age: 69
End: 2019-01-01

## 2019-01-01 ENCOUNTER — NURSING HOME VISIT (OUTPATIENT)
Dept: GERIATRICS | Facility: CLINIC | Age: 69
End: 2019-01-01
Payer: MEDICARE

## 2019-01-01 ENCOUNTER — APPOINTMENT (OUTPATIENT)
Dept: CARDIOLOGY | Facility: CLINIC | Age: 69
DRG: 292 | End: 2019-01-01
Attending: INTERNAL MEDICINE
Payer: MEDICARE

## 2019-01-01 ENCOUNTER — ANCILLARY PROCEDURE (OUTPATIENT)
Dept: GENERAL RADIOLOGY | Facility: CLINIC | Age: 69
End: 2019-01-01
Attending: ORTHOPAEDIC SURGERY
Payer: MEDICARE

## 2019-01-01 ENCOUNTER — HOSPITAL LABORATORY (OUTPATIENT)
Dept: OTHER | Facility: CLINIC | Age: 69
End: 2019-01-01

## 2019-01-01 ENCOUNTER — OFFICE VISIT (OUTPATIENT)
Dept: ORTHOPEDICS | Facility: CLINIC | Age: 69
End: 2019-01-01
Payer: MEDICARE

## 2019-01-01 ENCOUNTER — OFFICE VISIT (OUTPATIENT)
Dept: INTERPRETER SERVICES | Facility: CLINIC | Age: 69
End: 2019-01-01
Payer: MEDICARE

## 2019-01-01 ENCOUNTER — TELEPHONE (OUTPATIENT)
Dept: CARDIOLOGY | Facility: CLINIC | Age: 69
End: 2019-01-01

## 2019-01-01 ENCOUNTER — APPOINTMENT (OUTPATIENT)
Dept: PHYSICAL THERAPY | Facility: CLINIC | Age: 69
DRG: 871 | End: 2019-01-01
Payer: MEDICARE

## 2019-01-01 ENCOUNTER — TELEPHONE (OUTPATIENT)
Dept: FAMILY MEDICINE | Facility: CLINIC | Age: 69
End: 2019-01-01

## 2019-01-01 ENCOUNTER — APPOINTMENT (OUTPATIENT)
Dept: GENERAL RADIOLOGY | Facility: CLINIC | Age: 69
DRG: 453 | End: 2019-01-01
Attending: ORTHOPAEDIC SURGERY
Payer: MEDICARE

## 2019-01-01 ENCOUNTER — APPOINTMENT (OUTPATIENT)
Dept: PHYSICAL THERAPY | Facility: CLINIC | Age: 69
DRG: 453 | End: 2019-01-01
Attending: ORTHOPAEDIC SURGERY
Payer: MEDICARE

## 2019-01-01 ENCOUNTER — ALLIED HEALTH/NURSE VISIT (OUTPATIENT)
Dept: CARDIOLOGY | Facility: CLINIC | Age: 69
End: 2019-01-01
Attending: INTERNAL MEDICINE
Payer: MEDICARE

## 2019-01-01 ENCOUNTER — DOCUMENTATION ONLY (OUTPATIENT)
Dept: CARDIOLOGY | Facility: CLINIC | Age: 69
End: 2019-01-01

## 2019-01-01 ENCOUNTER — HOSPITAL ENCOUNTER (INPATIENT)
Facility: CLINIC | Age: 69
LOS: 5 days | Discharge: NURSING, REHAB OR LONG-TERM CARE | DRG: 871 | End: 2019-10-30
Attending: EMERGENCY MEDICINE | Admitting: INTERNAL MEDICINE
Payer: MEDICARE

## 2019-01-01 ENCOUNTER — ANESTHESIA EVENT (OUTPATIENT)
Dept: SURGERY | Facility: CLINIC | Age: 69
DRG: 453 | End: 2019-01-01
Payer: MEDICARE

## 2019-01-01 ENCOUNTER — APPOINTMENT (OUTPATIENT)
Dept: GENERAL RADIOLOGY | Facility: CLINIC | Age: 69
DRG: 453 | End: 2019-01-01
Attending: PHYSICIAN ASSISTANT
Payer: MEDICARE

## 2019-01-01 ENCOUNTER — APPOINTMENT (OUTPATIENT)
Dept: CT IMAGING | Facility: CLINIC | Age: 69
End: 2019-01-01
Attending: EMERGENCY MEDICINE
Payer: MEDICARE

## 2019-01-01 ENCOUNTER — APPOINTMENT (OUTPATIENT)
Dept: OCCUPATIONAL THERAPY | Facility: CLINIC | Age: 69
DRG: 453 | End: 2019-01-01
Attending: ORTHOPAEDIC SURGERY
Payer: MEDICARE

## 2019-01-01 ENCOUNTER — CARE COORDINATION (OUTPATIENT)
Dept: CARDIOLOGY | Facility: CLINIC | Age: 69
End: 2019-01-01

## 2019-01-01 ENCOUNTER — PRE VISIT (OUTPATIENT)
Dept: SURGERY | Facility: CLINIC | Age: 69
End: 2019-01-01

## 2019-01-01 ENCOUNTER — APPOINTMENT (OUTPATIENT)
Dept: CT IMAGING | Facility: CLINIC | Age: 69
DRG: 280 | End: 2019-01-01
Attending: NURSE PRACTITIONER
Payer: MEDICARE

## 2019-01-01 ENCOUNTER — SURGERY (OUTPATIENT)
Age: 69
End: 2019-01-01
Payer: MEDICARE

## 2019-01-01 ENCOUNTER — APPOINTMENT (OUTPATIENT)
Dept: OCCUPATIONAL THERAPY | Facility: CLINIC | Age: 69
DRG: 280 | End: 2019-01-01
Payer: MEDICARE

## 2019-01-01 ENCOUNTER — APPOINTMENT (OUTPATIENT)
Dept: OCCUPATIONAL THERAPY | Facility: CLINIC | Age: 69
DRG: 871 | End: 2019-01-01
Attending: INTERNAL MEDICINE
Payer: MEDICARE

## 2019-01-01 ENCOUNTER — OFFICE VISIT (OUTPATIENT)
Dept: INTERPRETER SERVICES | Facility: CLINIC | Age: 69
End: 2019-01-01

## 2019-01-01 ENCOUNTER — ANESTHESIA (OUTPATIENT)
Dept: SURGERY | Facility: CLINIC | Age: 69
DRG: 266 | End: 2019-01-01
Payer: MEDICARE

## 2019-01-01 ENCOUNTER — HOSPITAL ENCOUNTER (INPATIENT)
Facility: CLINIC | Age: 69
LOS: 13 days | Discharge: HOME HEALTH | DRG: 280 | End: 2019-10-08
Attending: EMERGENCY MEDICINE | Admitting: HOSPITALIST
Payer: MEDICARE

## 2019-01-01 ENCOUNTER — HOSPITAL ENCOUNTER (OUTPATIENT)
Facility: CLINIC | Age: 69
Discharge: HOME | End: 2019-07-31
Attending: INTERNAL MEDICINE | Admitting: INTERNAL MEDICINE
Payer: MEDICARE

## 2019-01-01 ENCOUNTER — APPOINTMENT (OUTPATIENT)
Dept: CARDIOLOGY | Facility: CLINIC | Age: 69
DRG: 312 | End: 2019-01-01
Attending: INTERNAL MEDICINE
Payer: MEDICARE

## 2019-01-01 ENCOUNTER — PRE VISIT (OUTPATIENT)
Dept: CARDIOLOGY | Facility: CLINIC | Age: 69
End: 2019-01-01

## 2019-01-01 ENCOUNTER — OFFICE VISIT (OUTPATIENT)
Dept: PODIATRY | Facility: CLINIC | Age: 69
End: 2019-01-01
Payer: MEDICARE

## 2019-01-01 ENCOUNTER — OFFICE VISIT (OUTPATIENT)
Dept: SURGERY | Facility: CLINIC | Age: 69
End: 2019-01-01
Payer: MEDICARE

## 2019-01-01 ENCOUNTER — HOSPITAL ENCOUNTER (OUTPATIENT)
Dept: ULTRASOUND IMAGING | Facility: CLINIC | Age: 69
Discharge: HOME | End: 2019-06-27
Attending: PODIATRIST | Admitting: PODIATRIST
Payer: MEDICARE

## 2019-01-01 ENCOUNTER — ANESTHESIA EVENT (OUTPATIENT)
Dept: SURGERY | Facility: CLINIC | Age: 69
DRG: 266 | End: 2019-01-01
Payer: MEDICARE

## 2019-01-01 ENCOUNTER — APPOINTMENT (OUTPATIENT)
Dept: OCCUPATIONAL THERAPY | Facility: CLINIC | Age: 69
DRG: 266 | End: 2019-01-01
Attending: STUDENT IN AN ORGANIZED HEALTH CARE EDUCATION/TRAINING PROGRAM
Payer: MEDICARE

## 2019-01-01 ENCOUNTER — OFFICE VISIT (OUTPATIENT)
Dept: NEUROSURGERY | Facility: CLINIC | Age: 69
End: 2019-01-01
Payer: MEDICARE

## 2019-01-01 ENCOUNTER — APPOINTMENT (OUTPATIENT)
Dept: OCCUPATIONAL THERAPY | Facility: CLINIC | Age: 69
DRG: 871 | End: 2019-01-01
Payer: MEDICARE

## 2019-01-01 ENCOUNTER — HOSPITAL ENCOUNTER (OUTPATIENT)
Dept: LAB | Facility: CLINIC | Age: 69
Discharge: HOME | End: 2019-08-28
Attending: INTERNAL MEDICINE | Admitting: INTERNAL MEDICINE
Payer: MEDICARE

## 2019-01-01 ENCOUNTER — HOME INFUSION (PRE-WILLOW HOME INFUSION) (OUTPATIENT)
Dept: PHARMACY | Facility: CLINIC | Age: 69
End: 2019-01-01

## 2019-01-01 ENCOUNTER — APPOINTMENT (OUTPATIENT)
Dept: GENERAL RADIOLOGY | Facility: CLINIC | Age: 69
DRG: 266 | End: 2019-01-01
Attending: EMERGENCY MEDICINE
Payer: MEDICARE

## 2019-01-01 ENCOUNTER — DOCUMENTATION ONLY (OUTPATIENT)
Dept: CARE COORDINATION | Facility: CLINIC | Age: 69
End: 2019-01-01

## 2019-01-01 ENCOUNTER — APPOINTMENT (OUTPATIENT)
Dept: GENERAL RADIOLOGY | Facility: CLINIC | Age: 69
DRG: 292 | End: 2019-01-01
Attending: EMERGENCY MEDICINE
Payer: MEDICARE

## 2019-01-01 ENCOUNTER — OFFICE VISIT (OUTPATIENT)
Dept: FAMILY MEDICINE | Facility: CLINIC | Age: 69
End: 2019-01-01
Payer: MEDICARE

## 2019-01-01 ENCOUNTER — DOCUMENTATION ONLY (OUTPATIENT)
Dept: ORTHOPEDICS | Facility: CLINIC | Age: 69
End: 2019-01-01

## 2019-01-01 ENCOUNTER — PRE VISIT (OUTPATIENT)
Dept: VASCULAR SURGERY | Facility: CLINIC | Age: 69
End: 2019-01-01

## 2019-01-01 ENCOUNTER — OFFICE VISIT (OUTPATIENT)
Dept: PALLIATIVE MEDICINE | Facility: CLINIC | Age: 69
End: 2019-01-01
Payer: MEDICARE

## 2019-01-01 ENCOUNTER — APPOINTMENT (OUTPATIENT)
Dept: GENERAL RADIOLOGY | Facility: CLINIC | Age: 69
DRG: 280 | End: 2019-01-01
Attending: EMERGENCY MEDICINE
Payer: MEDICARE

## 2019-01-01 ENCOUNTER — HOSPITAL ENCOUNTER (OUTPATIENT)
Dept: CT IMAGING | Facility: CLINIC | Age: 69
Discharge: HOME | End: 2019-07-11
Attending: INTERNAL MEDICINE | Admitting: INTERNAL MEDICINE
Payer: MEDICARE

## 2019-01-01 ENCOUNTER — APPOINTMENT (OUTPATIENT)
Dept: LAB | Facility: CLINIC | Age: 69
End: 2019-01-01
Attending: INTERNAL MEDICINE
Payer: MEDICARE

## 2019-01-01 ENCOUNTER — TELEPHONE (OUTPATIENT)
Dept: INFECTIOUS DISEASES | Facility: CLINIC | Age: 69
End: 2019-01-01

## 2019-01-01 ENCOUNTER — ANCILLARY PROCEDURE (OUTPATIENT)
Dept: CT IMAGING | Facility: CLINIC | Age: 69
End: 2019-01-01
Attending: INTERNAL MEDICINE
Payer: MEDICARE

## 2019-01-01 ENCOUNTER — TELEPHONE (OUTPATIENT)
Dept: ORTHOPEDICS | Facility: CLINIC | Age: 69
End: 2019-01-01

## 2019-01-01 ENCOUNTER — ANESTHESIA EVENT (OUTPATIENT)
Dept: SURGERY | Facility: CLINIC | Age: 69
End: 2019-01-01

## 2019-01-01 ENCOUNTER — HOSPITAL ENCOUNTER (INPATIENT)
Facility: CLINIC | Age: 69
Setting detail: SURGERY ADMIT
End: 2019-01-01
Attending: INTERNAL MEDICINE | Admitting: INTERNAL MEDICINE
Payer: MEDICARE

## 2019-01-01 ENCOUNTER — APPOINTMENT (OUTPATIENT)
Dept: CARDIOLOGY | Facility: CLINIC | Age: 69
DRG: 266 | End: 2019-01-01
Attending: STUDENT IN AN ORGANIZED HEALTH CARE EDUCATION/TRAINING PROGRAM
Payer: MEDICARE

## 2019-01-01 ENCOUNTER — APPOINTMENT (OUTPATIENT)
Dept: GENERAL RADIOLOGY | Facility: CLINIC | Age: 69
DRG: 266 | End: 2019-01-01
Attending: NURSE PRACTITIONER
Payer: MEDICARE

## 2019-01-01 ENCOUNTER — ANESTHESIA (OUTPATIENT)
Dept: SURGERY | Facility: CLINIC | Age: 69
DRG: 453 | End: 2019-01-01
Payer: MEDICARE

## 2019-01-01 ENCOUNTER — ANCILLARY PROCEDURE (OUTPATIENT)
Dept: CT IMAGING | Facility: CLINIC | Age: 69
End: 2019-01-01
Payer: MEDICARE

## 2019-01-01 ENCOUNTER — PATIENT OUTREACH (OUTPATIENT)
Dept: CARDIOLOGY | Facility: CLINIC | Age: 69
End: 2019-01-01

## 2019-01-01 ENCOUNTER — APPOINTMENT (OUTPATIENT)
Dept: OCCUPATIONAL THERAPY | Facility: CLINIC | Age: 69
DRG: 453 | End: 2019-01-01
Attending: PHYSICIAN ASSISTANT
Payer: MEDICARE

## 2019-01-01 ENCOUNTER — APPOINTMENT (OUTPATIENT)
Dept: GENERAL RADIOLOGY | Facility: CLINIC | Age: 69
DRG: 312 | End: 2019-01-01
Attending: EMERGENCY MEDICINE
Payer: MEDICARE

## 2019-01-01 ENCOUNTER — APPOINTMENT (OUTPATIENT)
Dept: CT IMAGING | Facility: CLINIC | Age: 69
DRG: 312 | End: 2019-01-01
Attending: EMERGENCY MEDICINE
Payer: MEDICARE

## 2019-01-01 ENCOUNTER — HOSPITAL ENCOUNTER (INPATIENT)
Facility: CLINIC | Age: 69
Setting detail: SURGERY ADMIT
End: 2019-01-01
Attending: ORTHOPAEDIC SURGERY | Admitting: ORTHOPAEDIC SURGERY
Payer: MEDICARE

## 2019-01-01 ENCOUNTER — OFFICE VISIT (OUTPATIENT)
Dept: PULMONOLOGY | Facility: CLINIC | Age: 69
End: 2019-01-01
Attending: INTERNAL MEDICINE
Payer: MEDICARE

## 2019-01-01 ENCOUNTER — ANCILLARY PROCEDURE (OUTPATIENT)
Dept: ULTRASOUND IMAGING | Facility: CLINIC | Age: 69
End: 2019-01-01
Attending: INTERNAL MEDICINE
Payer: MEDICARE

## 2019-01-01 ENCOUNTER — OFFICE VISIT (OUTPATIENT)
Dept: VASCULAR SURGERY | Facility: CLINIC | Age: 69
End: 2019-01-01
Payer: MEDICARE

## 2019-01-01 ENCOUNTER — TRANSFERRED RECORDS (OUTPATIENT)
Dept: HEALTH INFORMATION MANAGEMENT | Facility: CLINIC | Age: 69
End: 2019-01-01

## 2019-01-01 ENCOUNTER — APPOINTMENT (OUTPATIENT)
Dept: ULTRASOUND IMAGING | Facility: CLINIC | Age: 69
End: 2019-01-01
Attending: EMERGENCY MEDICINE
Payer: MEDICARE

## 2019-01-01 ENCOUNTER — HOSPITAL ENCOUNTER (INPATIENT)
Dept: CARDIOLOGY | Facility: CLINIC | Age: 69
DRG: 266 | End: 2019-09-04
Attending: INTERNAL MEDICINE
Payer: MEDICARE

## 2019-01-01 ENCOUNTER — HOSPITAL ENCOUNTER (EMERGENCY)
Facility: CLINIC | Age: 69
Discharge: OTHER INSTITUTION | End: 2019-06-02
Attending: EMERGENCY MEDICINE | Admitting: EMERGENCY MEDICINE
Payer: MEDICARE

## 2019-01-01 ENCOUNTER — APPOINTMENT (OUTPATIENT)
Dept: CARDIOLOGY | Facility: CLINIC | Age: 69
DRG: 280 | End: 2019-01-01
Attending: NURSE PRACTITIONER
Payer: MEDICARE

## 2019-01-01 ENCOUNTER — APPOINTMENT (OUTPATIENT)
Dept: PHYSICAL THERAPY | Facility: CLINIC | Age: 69
DRG: 312 | End: 2019-01-01
Attending: INTERNAL MEDICINE
Payer: MEDICARE

## 2019-01-01 ENCOUNTER — TELEPHONE (OUTPATIENT)
Dept: OTHER | Facility: CLINIC | Age: 69
End: 2019-01-01

## 2019-01-01 ENCOUNTER — ANCILLARY PROCEDURE (OUTPATIENT)
Dept: GENERAL RADIOLOGY | Facility: CLINIC | Age: 69
End: 2019-01-01
Payer: MEDICARE

## 2019-01-01 ENCOUNTER — OFFICE VISIT (OUTPATIENT)
Dept: ORTHOPEDICS | Facility: CLINIC | Age: 69
End: 2019-01-01
Attending: ORTHOPAEDIC SURGERY
Payer: MEDICARE

## 2019-01-01 ENCOUNTER — DOCUMENTATION ONLY (OUTPATIENT)
Dept: OTHER | Facility: CLINIC | Age: 69
End: 2019-01-01

## 2019-01-01 ENCOUNTER — APPOINTMENT (OUTPATIENT)
Dept: GENERAL RADIOLOGY | Facility: CLINIC | Age: 69
DRG: 453 | End: 2019-01-01
Attending: INTERNAL MEDICINE
Payer: MEDICARE

## 2019-01-01 ENCOUNTER — APPOINTMENT (OUTPATIENT)
Dept: GENERAL RADIOLOGY | Facility: CLINIC | Age: 69
DRG: 266 | End: 2019-01-01
Attending: STUDENT IN AN ORGANIZED HEALTH CARE EDUCATION/TRAINING PROGRAM
Payer: MEDICARE

## 2019-01-01 ENCOUNTER — PRE VISIT (OUTPATIENT)
Dept: ORTHOPEDICS | Facility: CLINIC | Age: 69
End: 2019-01-01

## 2019-01-01 ENCOUNTER — APPOINTMENT (OUTPATIENT)
Dept: GENERAL RADIOLOGY | Facility: CLINIC | Age: 69
DRG: 453 | End: 2019-01-01
Attending: ANESTHESIOLOGY
Payer: MEDICARE

## 2019-01-01 ENCOUNTER — TELEPHONE (OUTPATIENT)
Dept: GERIATRICS | Facility: CLINIC | Age: 69
End: 2019-01-01

## 2019-01-01 ENCOUNTER — TELEPHONE (OUTPATIENT)
Dept: PALLIATIVE MEDICINE | Facility: CLINIC | Age: 69
End: 2019-01-01

## 2019-01-01 ENCOUNTER — APPOINTMENT (OUTPATIENT)
Dept: CARDIOLOGY | Facility: CLINIC | Age: 69
DRG: 871 | End: 2019-01-01
Attending: INTERNAL MEDICINE
Payer: MEDICARE

## 2019-01-01 ENCOUNTER — APPOINTMENT (OUTPATIENT)
Dept: GENERAL RADIOLOGY | Facility: CLINIC | Age: 69
DRG: 871 | End: 2019-01-01
Attending: EMERGENCY MEDICINE
Payer: MEDICARE

## 2019-01-01 ENCOUNTER — HOSPITAL ENCOUNTER (EMERGENCY)
Facility: CLINIC | Age: 69
Discharge: HOME | End: 2019-05-13
Attending: EMERGENCY MEDICINE | Admitting: EMERGENCY MEDICINE
Payer: MEDICARE

## 2019-01-01 ENCOUNTER — APPOINTMENT (OUTPATIENT)
Dept: MEDSURG UNIT | Facility: CLINIC | Age: 69
End: 2019-01-01
Attending: INTERNAL MEDICINE
Payer: MEDICARE

## 2019-01-01 ENCOUNTER — HOSPITAL ENCOUNTER (INPATIENT)
Facility: CLINIC | Age: 69
LOS: 4 days | Discharge: HOME HEALTH | DRG: 292 | End: 2019-07-09
Attending: EMERGENCY MEDICINE | Admitting: INTERNAL MEDICINE
Payer: MEDICARE

## 2019-01-01 ENCOUNTER — NURSE TRIAGE (OUTPATIENT)
Dept: FAMILY MEDICINE | Facility: CLINIC | Age: 69
End: 2019-01-01

## 2019-01-01 ENCOUNTER — APPOINTMENT (OUTPATIENT)
Dept: PHYSICAL THERAPY | Facility: CLINIC | Age: 69
DRG: 453 | End: 2019-01-01
Attending: PHYSICIAN ASSISTANT
Payer: MEDICARE

## 2019-01-01 ENCOUNTER — APPOINTMENT (OUTPATIENT)
Dept: PHYSICAL THERAPY | Facility: CLINIC | Age: 69
DRG: 312 | End: 2019-01-01
Payer: MEDICARE

## 2019-01-01 ENCOUNTER — NURSING HOME VISIT (OUTPATIENT)
Dept: GERIATRIC MEDICINE | Facility: CLINIC | Age: 69
End: 2019-01-01
Payer: MEDICARE

## 2019-01-01 ENCOUNTER — APPOINTMENT (OUTPATIENT)
Dept: CT IMAGING | Facility: CLINIC | Age: 69
DRG: 453 | End: 2019-01-01
Attending: PHYSICIAN ASSISTANT
Payer: MEDICARE

## 2019-01-01 ENCOUNTER — APPOINTMENT (OUTPATIENT)
Dept: OCCUPATIONAL THERAPY | Facility: CLINIC | Age: 69
DRG: 280 | End: 2019-01-01
Attending: HOSPITALIST
Payer: MEDICARE

## 2019-01-01 ENCOUNTER — APPOINTMENT (OUTPATIENT)
Dept: ULTRASOUND IMAGING | Facility: CLINIC | Age: 69
DRG: 453 | End: 2019-01-01
Attending: INTERNAL MEDICINE
Payer: MEDICARE

## 2019-01-01 ENCOUNTER — APPOINTMENT (OUTPATIENT)
Dept: CARDIOLOGY | Facility: CLINIC | Age: 69
DRG: 280 | End: 2019-01-01
Attending: EMERGENCY MEDICINE
Payer: MEDICARE

## 2019-01-01 ENCOUNTER — PATIENT OUTREACH (OUTPATIENT)
Dept: PULMONOLOGY | Facility: CLINIC | Age: 69
End: 2019-01-01

## 2019-01-01 ENCOUNTER — APPOINTMENT (OUTPATIENT)
Dept: ULTRASOUND IMAGING | Facility: CLINIC | Age: 69
DRG: 871 | End: 2019-01-01
Attending: STUDENT IN AN ORGANIZED HEALTH CARE EDUCATION/TRAINING PROGRAM
Payer: MEDICARE

## 2019-01-01 ENCOUNTER — HOSPITAL ENCOUNTER (INPATIENT)
Facility: CLINIC | Age: 69
LOS: 4 days | Discharge: NURSING, REHAB OR LONG-TERM CARE | DRG: 312 | End: 2019-10-18
Attending: EMERGENCY MEDICINE | Admitting: INTERNAL MEDICINE
Payer: MEDICARE

## 2019-01-01 ENCOUNTER — APPOINTMENT (OUTPATIENT)
Dept: CARDIOLOGY | Facility: CLINIC | Age: 69
DRG: 453 | End: 2019-01-01
Attending: PHYSICIAN ASSISTANT
Payer: MEDICARE

## 2019-01-01 ENCOUNTER — HOSPITAL ENCOUNTER (INPATIENT)
Facility: CLINIC | Age: 69
LOS: 9 days | Discharge: HOME | DRG: 266 | End: 2019-09-07
Attending: EMERGENCY MEDICINE | Admitting: INTERNAL MEDICINE
Payer: MEDICARE

## 2019-01-01 ENCOUNTER — HOSPITAL ENCOUNTER (INPATIENT)
Facility: CLINIC | Age: 69
LOS: 16 days | Discharge: NURSING, REHAB OR LONG-TERM CARE | DRG: 453 | End: 2019-05-08
Attending: ORTHOPAEDIC SURGERY | Admitting: ORTHOPAEDIC SURGERY
Payer: MEDICARE

## 2019-01-01 ENCOUNTER — HOSPITAL ENCOUNTER (EMERGENCY)
Facility: CLINIC | Age: 69
Discharge: HOME | End: 2019-05-10
Attending: EMERGENCY MEDICINE | Admitting: EMERGENCY MEDICINE
Payer: MEDICARE

## 2019-01-01 ENCOUNTER — TELEPHONE (OUTPATIENT)
Dept: PODIATRY | Facility: CLINIC | Age: 69
End: 2019-01-01

## 2019-01-01 VITALS
HEART RATE: 69 BPM | WEIGHT: 169.4 LBS | HEIGHT: 62 IN | OXYGEN SATURATION: 91 % | TEMPERATURE: 101.2 F | DIASTOLIC BLOOD PRESSURE: 68 MMHG | RESPIRATION RATE: 22 BRPM | SYSTOLIC BLOOD PRESSURE: 109 MMHG | BODY MASS INDEX: 31.17 KG/M2

## 2019-01-01 VITALS
SYSTOLIC BLOOD PRESSURE: 81 MMHG | BODY MASS INDEX: 23.7 KG/M2 | WEIGHT: 151 LBS | DIASTOLIC BLOOD PRESSURE: 54 MMHG | HEIGHT: 67 IN | HEART RATE: 91 BPM | OXYGEN SATURATION: 95 %

## 2019-01-01 VITALS — HEIGHT: 63 IN | BODY MASS INDEX: 27.64 KG/M2 | RESPIRATION RATE: 18 BRPM | WEIGHT: 156 LBS

## 2019-01-01 VITALS
SYSTOLIC BLOOD PRESSURE: 115 MMHG | OXYGEN SATURATION: 96 % | HEIGHT: 62 IN | RESPIRATION RATE: 20 BRPM | TEMPERATURE: 98.5 F | BODY MASS INDEX: 31.17 KG/M2 | HEART RATE: 93 BPM | WEIGHT: 169.4 LBS | DIASTOLIC BLOOD PRESSURE: 66 MMHG

## 2019-01-01 VITALS
SYSTOLIC BLOOD PRESSURE: 118 MMHG | RESPIRATION RATE: 16 BRPM | BODY MASS INDEX: 26.26 KG/M2 | HEART RATE: 78 BPM | WEIGHT: 167.33 LBS | HEIGHT: 67 IN | DIASTOLIC BLOOD PRESSURE: 71 MMHG | OXYGEN SATURATION: 93 % | TEMPERATURE: 98 F

## 2019-01-01 VITALS
TEMPERATURE: 98.4 F | OXYGEN SATURATION: 92 % | SYSTOLIC BLOOD PRESSURE: 101 MMHG | HEIGHT: 67 IN | HEART RATE: 77 BPM | RESPIRATION RATE: 18 BRPM | DIASTOLIC BLOOD PRESSURE: 64 MMHG | WEIGHT: 152.56 LBS | BODY MASS INDEX: 23.94 KG/M2

## 2019-01-01 VITALS
SYSTOLIC BLOOD PRESSURE: 108 MMHG | BODY MASS INDEX: 28.26 KG/M2 | TEMPERATURE: 99.3 F | HEIGHT: 62 IN | WEIGHT: 153.6 LBS | HEART RATE: 87 BPM | OXYGEN SATURATION: 95 % | DIASTOLIC BLOOD PRESSURE: 75 MMHG | RESPIRATION RATE: 20 BRPM

## 2019-01-01 VITALS
SYSTOLIC BLOOD PRESSURE: 100 MMHG | BODY MASS INDEX: 24.47 KG/M2 | HEART RATE: 87 BPM | HEIGHT: 67 IN | OXYGEN SATURATION: 96 % | RESPIRATION RATE: 16 BRPM | TEMPERATURE: 98.4 F | DIASTOLIC BLOOD PRESSURE: 76 MMHG | WEIGHT: 155.9 LBS

## 2019-01-01 VITALS
SYSTOLIC BLOOD PRESSURE: 108 MMHG | DIASTOLIC BLOOD PRESSURE: 66 MMHG | HEART RATE: 101 BPM | RESPIRATION RATE: 18 BRPM | OXYGEN SATURATION: 98 % | TEMPERATURE: 98.8 F

## 2019-01-01 VITALS
SYSTOLIC BLOOD PRESSURE: 114 MMHG | HEART RATE: 73 BPM | WEIGHT: 156.4 LBS | DIASTOLIC BLOOD PRESSURE: 67 MMHG | HEIGHT: 62 IN | OXYGEN SATURATION: 96 % | TEMPERATURE: 98.2 F | RESPIRATION RATE: 18 BRPM | BODY MASS INDEX: 28.78 KG/M2

## 2019-01-01 VITALS
RESPIRATION RATE: 20 BRPM | WEIGHT: 169.4 LBS | TEMPERATURE: 97.7 F | OXYGEN SATURATION: 95 % | DIASTOLIC BLOOD PRESSURE: 59 MMHG | HEIGHT: 62 IN | BODY MASS INDEX: 31.17 KG/M2 | SYSTOLIC BLOOD PRESSURE: 99 MMHG | HEART RATE: 86 BPM

## 2019-01-01 VITALS
HEIGHT: 65 IN | OXYGEN SATURATION: 93 % | WEIGHT: 148 LBS | BODY MASS INDEX: 24.66 KG/M2 | HEART RATE: 95 BPM | DIASTOLIC BLOOD PRESSURE: 70 MMHG | SYSTOLIC BLOOD PRESSURE: 103 MMHG

## 2019-01-01 VITALS
SYSTOLIC BLOOD PRESSURE: 92 MMHG | RESPIRATION RATE: 20 BRPM | SYSTOLIC BLOOD PRESSURE: 117 MMHG | HEART RATE: 59 BPM | WEIGHT: 157.4 LBS | OXYGEN SATURATION: 95 % | WEIGHT: 169.4 LBS | OXYGEN SATURATION: 91 % | HEIGHT: 65 IN | DIASTOLIC BLOOD PRESSURE: 53 MMHG | TEMPERATURE: 98.1 F | RESPIRATION RATE: 18 BRPM | BODY MASS INDEX: 26.23 KG/M2 | HEART RATE: 92 BPM | DIASTOLIC BLOOD PRESSURE: 57 MMHG | TEMPERATURE: 100.8 F | BODY MASS INDEX: 31.17 KG/M2 | HEIGHT: 62 IN

## 2019-01-01 VITALS
HEIGHT: 65 IN | BODY MASS INDEX: 25.56 KG/M2 | SYSTOLIC BLOOD PRESSURE: 115 MMHG | HEART RATE: 98 BPM | RESPIRATION RATE: 16 BRPM | DIASTOLIC BLOOD PRESSURE: 75 MMHG | OXYGEN SATURATION: 93 % | WEIGHT: 153.4 LBS | TEMPERATURE: 98.5 F

## 2019-01-01 VITALS
HEIGHT: 68 IN | BODY MASS INDEX: 23.34 KG/M2 | DIASTOLIC BLOOD PRESSURE: 61 MMHG | TEMPERATURE: 97.9 F | OXYGEN SATURATION: 92 % | HEART RATE: 110 BPM | SYSTOLIC BLOOD PRESSURE: 87 MMHG | WEIGHT: 154 LBS

## 2019-01-01 VITALS
TEMPERATURE: 98.2 F | SYSTOLIC BLOOD PRESSURE: 105 MMHG | DIASTOLIC BLOOD PRESSURE: 48 MMHG | HEART RATE: 97 BPM | OXYGEN SATURATION: 96 %

## 2019-01-01 VITALS
SYSTOLIC BLOOD PRESSURE: 107 MMHG | RESPIRATION RATE: 18 BRPM | HEIGHT: 62 IN | TEMPERATURE: 97.6 F | DIASTOLIC BLOOD PRESSURE: 57 MMHG | HEART RATE: 92 BPM | WEIGHT: 154.6 LBS | OXYGEN SATURATION: 94 % | BODY MASS INDEX: 28.45 KG/M2

## 2019-01-01 VITALS — DIASTOLIC BLOOD PRESSURE: 69 MMHG | OXYGEN SATURATION: 98 % | SYSTOLIC BLOOD PRESSURE: 109 MMHG | HEART RATE: 98 BPM

## 2019-01-01 VITALS
HEIGHT: 66 IN | BODY MASS INDEX: 26.08 KG/M2 | OXYGEN SATURATION: 94 % | HEART RATE: 101 BPM | SYSTOLIC BLOOD PRESSURE: 94 MMHG | DIASTOLIC BLOOD PRESSURE: 61 MMHG | WEIGHT: 162.26 LBS

## 2019-01-01 VITALS
WEIGHT: 154.2 LBS | SYSTOLIC BLOOD PRESSURE: 106 MMHG | DIASTOLIC BLOOD PRESSURE: 64 MMHG | HEART RATE: 110 BPM | BODY MASS INDEX: 24.78 KG/M2 | HEIGHT: 66 IN | OXYGEN SATURATION: 93 %

## 2019-01-01 VITALS
HEART RATE: 87 BPM | BODY MASS INDEX: 25.92 KG/M2 | HEIGHT: 66 IN | OXYGEN SATURATION: 95 % | SYSTOLIC BLOOD PRESSURE: 111 MMHG | DIASTOLIC BLOOD PRESSURE: 56 MMHG | WEIGHT: 161.3 LBS

## 2019-01-01 VITALS
HEART RATE: 107 BPM | DIASTOLIC BLOOD PRESSURE: 56 MMHG | HEIGHT: 66 IN | WEIGHT: 158.4 LBS | SYSTOLIC BLOOD PRESSURE: 110 MMHG | OXYGEN SATURATION: 95 % | BODY MASS INDEX: 25.46 KG/M2

## 2019-01-01 VITALS
DIASTOLIC BLOOD PRESSURE: 68 MMHG | HEIGHT: 62 IN | RESPIRATION RATE: 18 BRPM | OXYGEN SATURATION: 91 % | WEIGHT: 169.4 LBS | HEART RATE: 112 BPM | TEMPERATURE: 97.9 F | SYSTOLIC BLOOD PRESSURE: 127 MMHG | BODY MASS INDEX: 31.17 KG/M2

## 2019-01-01 VITALS
BODY MASS INDEX: 29.85 KG/M2 | OXYGEN SATURATION: 98 % | DIASTOLIC BLOOD PRESSURE: 60 MMHG | SYSTOLIC BLOOD PRESSURE: 95 MMHG | HEIGHT: 62 IN | WEIGHT: 162.2 LBS | RESPIRATION RATE: 20 BRPM | HEART RATE: 105 BPM | TEMPERATURE: 97.6 F

## 2019-01-01 VITALS
HEART RATE: 112 BPM | BODY MASS INDEX: 31.17 KG/M2 | SYSTOLIC BLOOD PRESSURE: 127 MMHG | DIASTOLIC BLOOD PRESSURE: 68 MMHG | TEMPERATURE: 97.9 F | OXYGEN SATURATION: 91 % | WEIGHT: 169.4 LBS | RESPIRATION RATE: 18 BRPM | HEIGHT: 62 IN

## 2019-01-01 VITALS
BODY MASS INDEX: 23.64 KG/M2 | HEART RATE: 95 BPM | WEIGHT: 156 LBS | SYSTOLIC BLOOD PRESSURE: 91 MMHG | HEIGHT: 68 IN | DIASTOLIC BLOOD PRESSURE: 59 MMHG | OXYGEN SATURATION: 92 %

## 2019-01-01 VITALS
TEMPERATURE: 97.8 F | SYSTOLIC BLOOD PRESSURE: 122 MMHG | HEART RATE: 98 BPM | HEART RATE: 86 BPM | WEIGHT: 160 LBS | HEIGHT: 63 IN | SYSTOLIC BLOOD PRESSURE: 129 MMHG | DIASTOLIC BLOOD PRESSURE: 53 MMHG | DIASTOLIC BLOOD PRESSURE: 64 MMHG | RESPIRATION RATE: 18 BRPM | BODY MASS INDEX: 28.35 KG/M2 | WEIGHT: 178.5 LBS | HEIGHT: 63 IN | OXYGEN SATURATION: 94 % | BODY MASS INDEX: 31.63 KG/M2 | OXYGEN SATURATION: 94 %

## 2019-01-01 VITALS
WEIGHT: 153.6 LBS | RESPIRATION RATE: 18 BRPM | DIASTOLIC BLOOD PRESSURE: 76 MMHG | WEIGHT: 158 LBS | HEART RATE: 89 BPM | HEART RATE: 55 BPM | BODY MASS INDEX: 29.08 KG/M2 | HEIGHT: 62 IN | TEMPERATURE: 97.5 F | SYSTOLIC BLOOD PRESSURE: 100 MMHG | SYSTOLIC BLOOD PRESSURE: 116 MMHG | BODY MASS INDEX: 28.26 KG/M2 | DIASTOLIC BLOOD PRESSURE: 64 MMHG | HEIGHT: 62 IN | OXYGEN SATURATION: 94 %

## 2019-01-01 VITALS
HEART RATE: 83 BPM | BODY MASS INDEX: 23.62 KG/M2 | HEIGHT: 67 IN | TEMPERATURE: 98.5 F | DIASTOLIC BLOOD PRESSURE: 63 MMHG | SYSTOLIC BLOOD PRESSURE: 102 MMHG | WEIGHT: 150.5 LBS | OXYGEN SATURATION: 100 % | RESPIRATION RATE: 18 BRPM

## 2019-01-01 VITALS
BODY MASS INDEX: 29.08 KG/M2 | DIASTOLIC BLOOD PRESSURE: 63 MMHG | WEIGHT: 158 LBS | SYSTOLIC BLOOD PRESSURE: 100 MMHG | OXYGEN SATURATION: 97 % | HEART RATE: 103 BPM | HEIGHT: 62 IN | TEMPERATURE: 98.4 F

## 2019-01-01 VITALS
BODY MASS INDEX: 31.17 KG/M2 | RESPIRATION RATE: 20 BRPM | SYSTOLIC BLOOD PRESSURE: 126 MMHG | HEART RATE: 113 BPM | OXYGEN SATURATION: 92 % | WEIGHT: 169.4 LBS | HEIGHT: 62 IN | DIASTOLIC BLOOD PRESSURE: 78 MMHG | TEMPERATURE: 100 F

## 2019-01-01 VITALS
WEIGHT: 170.8 LBS | TEMPERATURE: 98.4 F | SYSTOLIC BLOOD PRESSURE: 110 MMHG | RESPIRATION RATE: 18 BRPM | HEART RATE: 87 BPM | HEIGHT: 67 IN | BODY MASS INDEX: 26.81 KG/M2 | DIASTOLIC BLOOD PRESSURE: 71 MMHG | OXYGEN SATURATION: 98 %

## 2019-01-01 VITALS
TEMPERATURE: 99.7 F | SYSTOLIC BLOOD PRESSURE: 116 MMHG | RESPIRATION RATE: 18 BRPM | HEIGHT: 62 IN | WEIGHT: 178 LBS | HEART RATE: 95 BPM | BODY MASS INDEX: 32.76 KG/M2 | OXYGEN SATURATION: 92 % | DIASTOLIC BLOOD PRESSURE: 70 MMHG

## 2019-01-01 VITALS
BODY MASS INDEX: 32.2 KG/M2 | WEIGHT: 175 LBS | DIASTOLIC BLOOD PRESSURE: 55 MMHG | SYSTOLIC BLOOD PRESSURE: 109 MMHG | HEART RATE: 95 BPM | TEMPERATURE: 98.4 F | RESPIRATION RATE: 19 BRPM | OXYGEN SATURATION: 98 % | HEIGHT: 62 IN

## 2019-01-01 VITALS
RESPIRATION RATE: 18 BRPM | OXYGEN SATURATION: 96 % | SYSTOLIC BLOOD PRESSURE: 115 MMHG | BODY MASS INDEX: 29.81 KG/M2 | HEIGHT: 62 IN | TEMPERATURE: 98.4 F | HEART RATE: 91 BPM | DIASTOLIC BLOOD PRESSURE: 66 MMHG | WEIGHT: 162 LBS

## 2019-01-01 VITALS
HEART RATE: 93 BPM | TEMPERATURE: 98.5 F | WEIGHT: 169.4 LBS | RESPIRATION RATE: 20 BRPM | OXYGEN SATURATION: 97 % | HEIGHT: 62 IN | SYSTOLIC BLOOD PRESSURE: 115 MMHG | DIASTOLIC BLOOD PRESSURE: 66 MMHG | BODY MASS INDEX: 31.17 KG/M2

## 2019-01-01 VITALS
SYSTOLIC BLOOD PRESSURE: 126 MMHG | DIASTOLIC BLOOD PRESSURE: 69 MMHG | HEIGHT: 66 IN | HEART RATE: 112 BPM | WEIGHT: 161.6 LBS | BODY MASS INDEX: 25.97 KG/M2

## 2019-01-01 VITALS
DIASTOLIC BLOOD PRESSURE: 52 MMHG | HEART RATE: 95 BPM | HEIGHT: 62 IN | BODY MASS INDEX: 31.17 KG/M2 | OXYGEN SATURATION: 95 % | WEIGHT: 169.4 LBS | RESPIRATION RATE: 22 BRPM | TEMPERATURE: 100.2 F | SYSTOLIC BLOOD PRESSURE: 106 MMHG

## 2019-01-01 VITALS
BODY MASS INDEX: 25.63 KG/M2 | DIASTOLIC BLOOD PRESSURE: 80 MMHG | SYSTOLIC BLOOD PRESSURE: 155 MMHG | HEART RATE: 107 BPM | WEIGHT: 159.5 LBS | TEMPERATURE: 97.4 F | HEIGHT: 66 IN | OXYGEN SATURATION: 92 %

## 2019-01-01 VITALS — WEIGHT: 175 LBS | HEIGHT: 62 IN | BODY MASS INDEX: 32.2 KG/M2

## 2019-01-01 VITALS
OXYGEN SATURATION: 98 % | BODY MASS INDEX: 25.52 KG/M2 | DIASTOLIC BLOOD PRESSURE: 52 MMHG | WEIGHT: 158.1 LBS | HEART RATE: 95 BPM | SYSTOLIC BLOOD PRESSURE: 93 MMHG

## 2019-01-01 VITALS
HEART RATE: 122 BPM | OXYGEN SATURATION: 90 % | DIASTOLIC BLOOD PRESSURE: 63 MMHG | TEMPERATURE: 98.7 F | WEIGHT: 161 LBS | SYSTOLIC BLOOD PRESSURE: 121 MMHG | BODY MASS INDEX: 29.45 KG/M2

## 2019-01-01 VITALS
DIASTOLIC BLOOD PRESSURE: 57 MMHG | HEIGHT: 62 IN | RESPIRATION RATE: 18 BRPM | TEMPERATURE: 98.3 F | OXYGEN SATURATION: 98 % | BODY MASS INDEX: 28.52 KG/M2 | WEIGHT: 155 LBS | HEART RATE: 97 BPM | SYSTOLIC BLOOD PRESSURE: 94 MMHG

## 2019-01-01 VITALS
WEIGHT: 162.2 LBS | OXYGEN SATURATION: 94 % | HEART RATE: 101 BPM | SYSTOLIC BLOOD PRESSURE: 94 MMHG | DIASTOLIC BLOOD PRESSURE: 61 MMHG | HEIGHT: 66 IN | BODY MASS INDEX: 26.07 KG/M2

## 2019-01-01 VITALS
TEMPERATURE: 99.4 F | RESPIRATION RATE: 18 BRPM | HEIGHT: 67 IN | BODY MASS INDEX: 26.21 KG/M2 | DIASTOLIC BLOOD PRESSURE: 64 MMHG | WEIGHT: 167 LBS | SYSTOLIC BLOOD PRESSURE: 126 MMHG | OXYGEN SATURATION: 98 %

## 2019-01-01 VITALS
BODY MASS INDEX: 26.16 KG/M2 | HEART RATE: 92 BPM | SYSTOLIC BLOOD PRESSURE: 92 MMHG | WEIGHT: 157 LBS | HEIGHT: 65 IN | DIASTOLIC BLOOD PRESSURE: 53 MMHG | OXYGEN SATURATION: 91 %

## 2019-01-01 VITALS — BODY MASS INDEX: 25.55 KG/M2 | WEIGHT: 159 LBS | HEIGHT: 66 IN

## 2019-01-01 VITALS
BODY MASS INDEX: 29.5 KG/M2 | SYSTOLIC BLOOD PRESSURE: 135 MMHG | DIASTOLIC BLOOD PRESSURE: 67 MMHG | OXYGEN SATURATION: 95 % | HEART RATE: 99 BPM | RESPIRATION RATE: 18 BRPM | HEIGHT: 63 IN | TEMPERATURE: 98.1 F | WEIGHT: 166.5 LBS

## 2019-01-01 VITALS
WEIGHT: 158.4 LBS | HEIGHT: 66 IN | DIASTOLIC BLOOD PRESSURE: 56 MMHG | BODY MASS INDEX: 25.46 KG/M2 | HEART RATE: 107 BPM | OXYGEN SATURATION: 95 % | SYSTOLIC BLOOD PRESSURE: 110 MMHG

## 2019-01-01 VITALS
TEMPERATURE: 97.8 F | HEIGHT: 67 IN | OXYGEN SATURATION: 96 % | HEART RATE: 97 BPM | BODY MASS INDEX: 24.44 KG/M2 | WEIGHT: 155.7 LBS | DIASTOLIC BLOOD PRESSURE: 66 MMHG | SYSTOLIC BLOOD PRESSURE: 126 MMHG

## 2019-01-01 VITALS — HEIGHT: 66 IN | BODY MASS INDEX: 25.88 KG/M2 | WEIGHT: 161 LBS

## 2019-01-01 DIAGNOSIS — M96.843 POSTPROCEDURAL SEROMA OF A MUSCULOSKELETAL STRUCTURE FOLLOWING OTHER PROCEDURE: ICD-10-CM

## 2019-01-01 DIAGNOSIS — I10 ESSENTIAL HYPERTENSION: ICD-10-CM

## 2019-01-01 DIAGNOSIS — Z95.0 S/P PLACEMENT OF CARDIAC PACEMAKER: Primary | ICD-10-CM

## 2019-01-01 DIAGNOSIS — M33.13 DERMATOMYOSITIS (H): ICD-10-CM

## 2019-01-01 DIAGNOSIS — J84.10 PULMONARY FIBROSIS (H): ICD-10-CM

## 2019-01-01 DIAGNOSIS — R53.81 PHYSICAL DECONDITIONING: ICD-10-CM

## 2019-01-01 DIAGNOSIS — S91.302D OPEN WOUND OF LEFT FOOT EXCLUDING ONE OR MORE TOES, SUBSEQUENT ENCOUNTER: ICD-10-CM

## 2019-01-01 DIAGNOSIS — R00.0 TACHYCARDIA: ICD-10-CM

## 2019-01-01 DIAGNOSIS — I35.0 AORTIC VALVE STENOSIS, ETIOLOGY OF CARDIAC VALVE DISEASE UNSPECIFIED: ICD-10-CM

## 2019-01-01 DIAGNOSIS — I26.99 OTHER ACUTE PULMONARY EMBOLISM WITHOUT ACUTE COR PULMONALE (H): ICD-10-CM

## 2019-01-01 DIAGNOSIS — I25.2 STATUS POST NON-ST ELEVATION MYOCARDIAL INFARCTION (NSTEMI): ICD-10-CM

## 2019-01-01 DIAGNOSIS — E03.9 HYPOTHYROIDISM, UNSPECIFIED TYPE: ICD-10-CM

## 2019-01-01 DIAGNOSIS — M81.0 OSTEOPOROSIS, UNSPECIFIED OSTEOPOROSIS TYPE, UNSPECIFIED PATHOLOGICAL FRACTURE PRESENCE: ICD-10-CM

## 2019-01-01 DIAGNOSIS — I35.0 SEVERE AORTIC STENOSIS: ICD-10-CM

## 2019-01-01 DIAGNOSIS — I50.32 CONGESTIVE HEART FAILURE, NYHA CLASS III, CHRONIC, DIASTOLIC (H): Chronic | ICD-10-CM

## 2019-01-01 DIAGNOSIS — Z98.890 S/P SPINAL SURGERY: Primary | ICD-10-CM

## 2019-01-01 DIAGNOSIS — Z95.0 CARDIAC RESYNCHRONIZATION THERAPY PACEMAKER (CRT-P) IN PLACE: ICD-10-CM

## 2019-01-01 DIAGNOSIS — I35.0 CRITICAL AORTIC VALVE STENOSIS: ICD-10-CM

## 2019-01-01 DIAGNOSIS — E11.40 TYPE 2 DIABETES MELLITUS WITH DIABETIC NEUROPATHY, WITH LONG-TERM CURRENT USE OF INSULIN (H): Primary | ICD-10-CM

## 2019-01-01 DIAGNOSIS — R06.09 DYSPNEA ON EXERTION: Primary | ICD-10-CM

## 2019-01-01 DIAGNOSIS — E78.5 DYSLIPIDEMIA: ICD-10-CM

## 2019-01-01 DIAGNOSIS — Z98.890 STATUS POST LUMBAR SPINE SURGERY FOR DECOMPRESSION OF SPINAL CORD: ICD-10-CM

## 2019-01-01 DIAGNOSIS — R09.89 CAROTID BRUIT: ICD-10-CM

## 2019-01-01 DIAGNOSIS — I48.92 ATRIAL FLUTTER, UNSPECIFIED TYPE (H): ICD-10-CM

## 2019-01-01 DIAGNOSIS — Z01.818 PREOP EXAMINATION: ICD-10-CM

## 2019-01-01 DIAGNOSIS — R06.02 SOB (SHORTNESS OF BREATH): ICD-10-CM

## 2019-01-01 DIAGNOSIS — Z79.4 TYPE 2 DIABETES MELLITUS WITH DIABETIC NEUROPATHY, WITH LONG-TERM CURRENT USE OF INSULIN (H): ICD-10-CM

## 2019-01-01 DIAGNOSIS — I50.30 HEART FAILURE WITH PRESERVED EJECTION FRACTION, NYHA CLASS I (H): Primary | ICD-10-CM

## 2019-01-01 DIAGNOSIS — M40.36 FLATBACK SYNDROME OF LUMBAR REGION: ICD-10-CM

## 2019-01-01 DIAGNOSIS — I27.20 PULMONARY HYPERTENSION (H): Primary | ICD-10-CM

## 2019-01-01 DIAGNOSIS — I50.30 HEART FAILURE WITH PRESERVED EJECTION FRACTION, NYHA CLASS I (H): ICD-10-CM

## 2019-01-01 DIAGNOSIS — D62 ANEMIA DUE TO BLOOD LOSS, ACUTE: ICD-10-CM

## 2019-01-01 DIAGNOSIS — Z98.890 S/P SPINAL SURGERY: ICD-10-CM

## 2019-01-01 DIAGNOSIS — M48.062 SPINAL STENOSIS OF LUMBAR REGION WITH NEUROGENIC CLAUDICATION: ICD-10-CM

## 2019-01-01 DIAGNOSIS — I51.89 OTHER ILL-DEFINED HEART DISEASES: ICD-10-CM

## 2019-01-01 DIAGNOSIS — K59.03 DRUG-INDUCED CONSTIPATION: Primary | ICD-10-CM

## 2019-01-01 DIAGNOSIS — R50.9 FEVER, UNSPECIFIED FEVER CAUSE: Primary | ICD-10-CM

## 2019-01-01 DIAGNOSIS — M48.062 SPINAL STENOSIS OF LUMBAR REGION WITH NEUROGENIC CLAUDICATION: Primary | ICD-10-CM

## 2019-01-01 DIAGNOSIS — M54.50 LUMBAR PAIN: ICD-10-CM

## 2019-01-01 DIAGNOSIS — E11.40 TYPE 2 DIABETES MELLITUS WITH DIABETIC NEUROPATHY, WITH LONG-TERM CURRENT USE OF INSULIN (H): ICD-10-CM

## 2019-01-01 DIAGNOSIS — I35.0 SEVERE AORTIC STENOSIS: Primary | ICD-10-CM

## 2019-01-01 DIAGNOSIS — I27.20 PULMONARY HYPERTENSION (H): ICD-10-CM

## 2019-01-01 DIAGNOSIS — Z95.5 S/P DRUG ELUTING CORONARY STENT PLACEMENT: Primary | ICD-10-CM

## 2019-01-01 DIAGNOSIS — M33.20 POLYMYOSITIS (H): ICD-10-CM

## 2019-01-01 DIAGNOSIS — I47.10 SVT (SUPRAVENTRICULAR TACHYCARDIA) (H): ICD-10-CM

## 2019-01-01 DIAGNOSIS — I50.9 CHF (CONGESTIVE HEART FAILURE) (H): Primary | ICD-10-CM

## 2019-01-01 DIAGNOSIS — K59.01 SLOW TRANSIT CONSTIPATION: ICD-10-CM

## 2019-01-01 DIAGNOSIS — R55 VASOVAGAL SYNCOPE: ICD-10-CM

## 2019-01-01 DIAGNOSIS — S00.03XA CONTUSION OF SCALP, INITIAL ENCOUNTER: ICD-10-CM

## 2019-01-01 DIAGNOSIS — D64.9 CHRONIC ANEMIA: ICD-10-CM

## 2019-01-01 DIAGNOSIS — Z13.0 SCREENING FOR ENDOCRINE, NUTRITIONAL, METABOLIC AND IMMUNITY DISORDER: ICD-10-CM

## 2019-01-01 DIAGNOSIS — J96.11 CHRONIC RESPIRATORY FAILURE WITH HYPOXIA (H): ICD-10-CM

## 2019-01-01 DIAGNOSIS — I50.32 CHRONIC DIASTOLIC HEART FAILURE (H): ICD-10-CM

## 2019-01-01 DIAGNOSIS — I25.118 CORONARY ARTERY DISEASE INVOLVING NATIVE CORONARY ARTERY OF NATIVE HEART WITH OTHER FORM OF ANGINA PECTORIS (H): Chronic | ICD-10-CM

## 2019-01-01 DIAGNOSIS — E87.70 HYPERVOLEMIA, UNSPECIFIED HYPERVOLEMIA TYPE: ICD-10-CM

## 2019-01-01 DIAGNOSIS — T79.2XXA SEROMA DUE TO TRAUMA (H): ICD-10-CM

## 2019-01-01 DIAGNOSIS — L97.521 SKIN ULCER OF LEFT FOOT, LIMITED TO BREAKDOWN OF SKIN (H): ICD-10-CM

## 2019-01-01 DIAGNOSIS — E11.40 TYPE 2 DIABETES MELLITUS WITH DIABETIC NEUROPATHY, WITH LONG-TERM CURRENT USE OF INSULIN (H): Chronic | ICD-10-CM

## 2019-01-01 DIAGNOSIS — I95.1 ORTHOSTATIC HYPOTENSION: Primary | ICD-10-CM

## 2019-01-01 DIAGNOSIS — I25.118 CORONARY ARTERY DISEASE INVOLVING NATIVE CORONARY ARTERY OF NATIVE HEART WITH OTHER FORM OF ANGINA PECTORIS (H): ICD-10-CM

## 2019-01-01 DIAGNOSIS — Z13.21 SCREENING FOR ENDOCRINE, NUTRITIONAL, METABOLIC AND IMMUNITY DISORDER: Primary | ICD-10-CM

## 2019-01-01 DIAGNOSIS — M40.15 OTHER SECONDARY KYPHOSIS, THORACOLUMBAR REGION: ICD-10-CM

## 2019-01-01 DIAGNOSIS — R19.7 DIARRHEA OF PRESUMED INFECTIOUS ORIGIN: ICD-10-CM

## 2019-01-01 DIAGNOSIS — Z79.4 TYPE 2 DIABETES MELLITUS WITH DIABETIC NEUROPATHY, WITH LONG-TERM CURRENT USE OF INSULIN (H): Chronic | ICD-10-CM

## 2019-01-01 DIAGNOSIS — Z53.9 ERRONEOUS ENCOUNTER--DISREGARD: Primary | ICD-10-CM

## 2019-01-01 DIAGNOSIS — Z95.2 S/P TAVR (TRANSCATHETER AORTIC VALVE REPLACEMENT): ICD-10-CM

## 2019-01-01 DIAGNOSIS — G89.4 CHRONIC PAIN SYNDROME: ICD-10-CM

## 2019-01-01 DIAGNOSIS — Z47.89 AFTERCARE FOLLOWING SURGERY OF THE MUSCULOSKELETAL SYSTEM: Primary | ICD-10-CM

## 2019-01-01 DIAGNOSIS — Z01.818 PREOP EXAMINATION: Primary | ICD-10-CM

## 2019-01-01 DIAGNOSIS — J18.9 HAP (HOSPITAL-ACQUIRED PNEUMONIA): Primary | ICD-10-CM

## 2019-01-01 DIAGNOSIS — Z01.818 PRE-OPERATIVE EXAMINATION: ICD-10-CM

## 2019-01-01 DIAGNOSIS — I50.9 CONGESTIVE HEART FAILURE, UNSPECIFIED HF CHRONICITY, UNSPECIFIED HEART FAILURE TYPE (H): ICD-10-CM

## 2019-01-01 DIAGNOSIS — I50.33 ACUTE ON CHRONIC HEART FAILURE WITH PRESERVED EJECTION FRACTION (H): ICD-10-CM

## 2019-01-01 DIAGNOSIS — Z98.61 POSTSURGICAL PERCUTANEOUS TRANSLUMINAL CORONARY ANGIOPLASTY STATUS: ICD-10-CM

## 2019-01-01 DIAGNOSIS — M81.0 OSTEOPOROSIS, UNSPECIFIED OSTEOPOROSIS TYPE, UNSPECIFIED PATHOLOGICAL FRACTURE PRESENCE: Primary | ICD-10-CM

## 2019-01-01 DIAGNOSIS — R50.9 ELEVATED TEMPERATURE: Primary | ICD-10-CM

## 2019-01-01 DIAGNOSIS — Z71.89 ADVANCED DIRECTIVES, COUNSELING/DISCUSSION: ICD-10-CM

## 2019-01-01 DIAGNOSIS — A41.9 SEPSIS, DUE TO UNSPECIFIED ORGANISM, UNSPECIFIED WHETHER ACUTE ORGAN DYSFUNCTION PRESENT (H): ICD-10-CM

## 2019-01-01 DIAGNOSIS — J84.10 PULMONARY FIBROSIS (H): Primary | Chronic | ICD-10-CM

## 2019-01-01 DIAGNOSIS — R53.81 MALAISE: ICD-10-CM

## 2019-01-01 DIAGNOSIS — J81.0 ACUTE PULMONARY EDEMA (H): ICD-10-CM

## 2019-01-01 DIAGNOSIS — E03.9 HYPOTHYROIDISM, UNSPECIFIED TYPE: Chronic | ICD-10-CM

## 2019-01-01 DIAGNOSIS — I27.81 COR PULMONALE (H): ICD-10-CM

## 2019-01-01 DIAGNOSIS — M19.90 INFLAMMATORY ARTHRITIS: Primary | ICD-10-CM

## 2019-01-01 DIAGNOSIS — I25.118 CORONARY ARTERY DISEASE INVOLVING NATIVE CORONARY ARTERY OF NATIVE HEART WITH OTHER FORM OF ANGINA PECTORIS (H): Primary | ICD-10-CM

## 2019-01-01 DIAGNOSIS — Z87.891 PERSONAL HISTORY OF TOBACCO USE, PRESENTING HAZARDS TO HEALTH: ICD-10-CM

## 2019-01-01 DIAGNOSIS — R60.0 BILATERAL LOWER EXTREMITY EDEMA: Primary | ICD-10-CM

## 2019-01-01 DIAGNOSIS — Z95.2 S/P TAVR (TRANSCATHETER AORTIC VALVE REPLACEMENT): Primary | ICD-10-CM

## 2019-01-01 DIAGNOSIS — R05.9 COUGH: ICD-10-CM

## 2019-01-01 DIAGNOSIS — R06.02 SHORTNESS OF BREATH: ICD-10-CM

## 2019-01-01 DIAGNOSIS — I95.1 ORTHOSTATIC HYPOTENSION: ICD-10-CM

## 2019-01-01 DIAGNOSIS — J84.9 ILD (INTERSTITIAL LUNG DISEASE) (H): ICD-10-CM

## 2019-01-01 DIAGNOSIS — L97.521 SKIN ULCER OF LEFT FOOT, LIMITED TO BREAKDOWN OF SKIN (H): Primary | ICD-10-CM

## 2019-01-01 DIAGNOSIS — M54.50 LUMBAR PAIN: Primary | ICD-10-CM

## 2019-01-01 DIAGNOSIS — Z95.5 S/P DRUG ELUTING CORONARY STENT PLACEMENT: ICD-10-CM

## 2019-01-01 DIAGNOSIS — M79.89 SWELLING OF RIGHT UPPER EXTREMITY: ICD-10-CM

## 2019-01-01 DIAGNOSIS — Z13.228 SCREENING FOR ENDOCRINE, NUTRITIONAL, METABOLIC AND IMMUNITY DISORDER: Primary | ICD-10-CM

## 2019-01-01 DIAGNOSIS — I50.32 CONGESTIVE HEART FAILURE, NYHA CLASS III, CHRONIC, DIASTOLIC (H): Primary | ICD-10-CM

## 2019-01-01 DIAGNOSIS — I73.9 PAD (PERIPHERAL ARTERY DISEASE) (H): ICD-10-CM

## 2019-01-01 DIAGNOSIS — G47.00 INSOMNIA, UNSPECIFIED TYPE: ICD-10-CM

## 2019-01-01 DIAGNOSIS — E55.9 VITAMIN D DEFICIENCY: ICD-10-CM

## 2019-01-01 DIAGNOSIS — R79.89 ELEVATED TROPONIN: ICD-10-CM

## 2019-01-01 DIAGNOSIS — K59.00 CONSTIPATION, UNSPECIFIED CONSTIPATION TYPE: ICD-10-CM

## 2019-01-01 DIAGNOSIS — E11.42 DIABETIC POLYNEUROPATHY ASSOCIATED WITH TYPE 2 DIABETES MELLITUS (H): ICD-10-CM
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
## 2019-01-01 DIAGNOSIS — R06.00 DYSPNEA, UNSPECIFIED TYPE: ICD-10-CM

## 2019-01-01 DIAGNOSIS — R42 DIZZINESS: ICD-10-CM

## 2019-01-01 DIAGNOSIS — S91.002D WOUND OF LEFT ANKLE, SUBSEQUENT ENCOUNTER: Primary | ICD-10-CM

## 2019-01-01 DIAGNOSIS — M62.08 DIASTASIS OF RECTUS ABDOMINIS: ICD-10-CM

## 2019-01-01 DIAGNOSIS — Z98.1 HISTORY OF LUMBAR FUSION: ICD-10-CM

## 2019-01-01 DIAGNOSIS — J84.10 PULMONARY FIBROSIS (H): Primary | ICD-10-CM

## 2019-01-01 DIAGNOSIS — M19.90 INFLAMMATORY ARTHRITIS: ICD-10-CM

## 2019-01-01 DIAGNOSIS — M19.90 INFLAMMATORY ARTHRITIS: Chronic | ICD-10-CM

## 2019-01-01 DIAGNOSIS — Z13.21 SCREENING FOR ENDOCRINE, NUTRITIONAL, METABOLIC AND IMMUNITY DISORDER: ICD-10-CM

## 2019-01-01 DIAGNOSIS — M85.88 OSTEOPENIA OF LUMBAR SPINE: ICD-10-CM

## 2019-01-01 DIAGNOSIS — J84.112 IPF (IDIOPATHIC PULMONARY FIBROSIS) (H): ICD-10-CM

## 2019-01-01 DIAGNOSIS — I25.83 CORONARY ARTERY DISEASE DUE TO LIPID RICH PLAQUE: ICD-10-CM

## 2019-01-01 DIAGNOSIS — Z95.0 STATUS POST CARDIAC PACEMAKER PROCEDURE: ICD-10-CM

## 2019-01-01 DIAGNOSIS — G47.33 OSA (OBSTRUCTIVE SLEEP APNEA): ICD-10-CM

## 2019-01-01 DIAGNOSIS — Z13.228 SCREENING FOR ENDOCRINE, NUTRITIONAL, METABOLIC AND IMMUNITY DISORDER: ICD-10-CM

## 2019-01-01 DIAGNOSIS — I25.10 CORONARY ARTERY DISEASE DUE TO LIPID RICH PLAQUE: ICD-10-CM

## 2019-01-01 DIAGNOSIS — I50.32 CONGESTIVE HEART FAILURE, NYHA CLASS III, CHRONIC, DIASTOLIC (H): ICD-10-CM

## 2019-01-01 DIAGNOSIS — T88.8XXD FLUID COLLECTION AT SURGICAL SITE, SUBSEQUENT ENCOUNTER: ICD-10-CM

## 2019-01-01 DIAGNOSIS — J84.10 PULMONARY FIBROSIS (H): Chronic | ICD-10-CM

## 2019-01-01 DIAGNOSIS — S91.302A OPEN WOUND OF LEFT FOOT EXCLUDING ONE OR MORE TOES, INITIAL ENCOUNTER: ICD-10-CM

## 2019-01-01 DIAGNOSIS — I73.9 PAD (PERIPHERAL ARTERY DISEASE) (H): Primary | ICD-10-CM

## 2019-01-01 DIAGNOSIS — G89.4 CHRONIC PAIN SYNDROME: Primary | ICD-10-CM

## 2019-01-01 DIAGNOSIS — M48.05 SPINAL STENOSIS OF THORACOLUMBAR REGION: ICD-10-CM

## 2019-01-01 DIAGNOSIS — I25.10 CORONARY ARTERY DISEASE INVOLVING NATIVE CORONARY ARTERY OF NATIVE HEART WITHOUT ANGINA PECTORIS: ICD-10-CM

## 2019-01-01 DIAGNOSIS — J18.9 PNEUMONIA OF RIGHT LUNG DUE TO INFECTIOUS ORGANISM, UNSPECIFIED PART OF LUNG: Primary | ICD-10-CM

## 2019-01-01 DIAGNOSIS — R06.09 DOE (DYSPNEA ON EXERTION): ICD-10-CM

## 2019-01-01 DIAGNOSIS — R07.89 ATYPICAL CHEST PAIN: ICD-10-CM

## 2019-01-01 DIAGNOSIS — Z00.00 ROUTINE HEALTH MAINTENANCE: ICD-10-CM

## 2019-01-01 DIAGNOSIS — Z95.0 S/P PLACEMENT OF CARDIAC PACEMAKER: ICD-10-CM

## 2019-01-01 DIAGNOSIS — K21.9 GASTROESOPHAGEAL REFLUX DISEASE, ESOPHAGITIS PRESENCE NOT SPECIFIED: ICD-10-CM

## 2019-01-01 DIAGNOSIS — M96.843 POSTPROCEDURAL SEROMA OF A MUSCULOSKELETAL STRUCTURE FOLLOWING OTHER PROCEDURE: Primary | ICD-10-CM

## 2019-01-01 DIAGNOSIS — I27.82 OTHER CHRONIC PULMONARY EMBOLISM WITHOUT ACUTE COR PULMONALE (H): ICD-10-CM

## 2019-01-01 DIAGNOSIS — R55 SYNCOPE: Primary | ICD-10-CM

## 2019-01-01 DIAGNOSIS — R93.1 ABNORMAL FINDINGS DIAGNOSTIC IMAGING OF HEART AND CORONARY CIRCULATION: ICD-10-CM

## 2019-01-01 DIAGNOSIS — I51.89 OTHER ILL-DEFINED HEART DISEASES: Primary | ICD-10-CM

## 2019-01-01 DIAGNOSIS — R09.02 HYPOXIA: ICD-10-CM

## 2019-01-01 DIAGNOSIS — L20.89 OTHER ATOPIC DERMATITIS: ICD-10-CM

## 2019-01-01 DIAGNOSIS — J96.01 ACUTE RESPIRATORY FAILURE WITH HYPOXIA (H): Primary | ICD-10-CM

## 2019-01-01 DIAGNOSIS — E11.42 TYPE 2 DIABETES MELLITUS WITH DIABETIC POLYNEUROPATHY, UNSPECIFIED WHETHER LONG TERM INSULIN USE (H): ICD-10-CM

## 2019-01-01 DIAGNOSIS — R50.9 FEVER, UNSPECIFIED FEVER CAUSE: ICD-10-CM

## 2019-01-01 DIAGNOSIS — E03.9 ACQUIRED HYPOTHYROIDISM: ICD-10-CM

## 2019-01-01 DIAGNOSIS — I50.9 ACUTE ON CHRONIC CONGESTIVE HEART FAILURE, UNSPECIFIED HEART FAILURE TYPE (H): ICD-10-CM

## 2019-01-01 DIAGNOSIS — Z01.818 PRE-OP EVALUATION: Primary | ICD-10-CM

## 2019-01-01 DIAGNOSIS — R55 SYNCOPE, UNSPECIFIED SYNCOPE TYPE: ICD-10-CM

## 2019-01-01 DIAGNOSIS — Z79.4 TYPE 2 DIABETES MELLITUS WITH DIABETIC NEUROPATHY, WITH LONG-TERM CURRENT USE OF INSULIN (H): Primary | ICD-10-CM

## 2019-01-01 DIAGNOSIS — I50.32 CONGESTIVE HEART FAILURE, NYHA CLASS III, CHRONIC, DIASTOLIC (H): Primary | Chronic | ICD-10-CM

## 2019-01-01 DIAGNOSIS — Z45.2 PICC (PERIPHERALLY INSERTED CENTRAL CATHETER) FLUSH: ICD-10-CM

## 2019-01-01 DIAGNOSIS — S90.812S: Primary | ICD-10-CM

## 2019-01-01 DIAGNOSIS — A41.9 SEPSIS (H): Primary | ICD-10-CM

## 2019-01-01 DIAGNOSIS — M40.30 FLAT BACK SYNDROME: ICD-10-CM

## 2019-01-01 DIAGNOSIS — J84.9 ILD (INTERSTITIAL LUNG DISEASE) (H): Primary | ICD-10-CM

## 2019-01-01 DIAGNOSIS — I50.31 ACUTE DIASTOLIC CONGESTIVE HEART FAILURE (H): Primary | ICD-10-CM

## 2019-01-01 DIAGNOSIS — Z13.29 SCREENING FOR ENDOCRINE, NUTRITIONAL, METABOLIC AND IMMUNITY DISORDER: ICD-10-CM

## 2019-01-01 DIAGNOSIS — I95.9 HYPOTENSION, UNSPECIFIED HYPOTENSION TYPE: ICD-10-CM

## 2019-01-01 DIAGNOSIS — Z13.29 SCREENING FOR ENDOCRINE, NUTRITIONAL, METABOLIC AND IMMUNITY DISORDER: Primary | ICD-10-CM

## 2019-01-01 DIAGNOSIS — M54.6 ACUTE RIGHT-SIDED THORACIC BACK PAIN: ICD-10-CM

## 2019-01-01 DIAGNOSIS — I73.9 PERIPHERAL ARTERY DISEASE (H): ICD-10-CM

## 2019-01-01 DIAGNOSIS — D50.9 IRON DEFICIENCY ANEMIA, UNSPECIFIED IRON DEFICIENCY ANEMIA TYPE: ICD-10-CM

## 2019-01-01 DIAGNOSIS — R05.9 COUGH: Primary | ICD-10-CM

## 2019-01-01 DIAGNOSIS — R60.0 BILATERAL LEG EDEMA: ICD-10-CM

## 2019-01-01 DIAGNOSIS — T14.8XXA OPEN WOUND: ICD-10-CM

## 2019-01-01 DIAGNOSIS — M33.13 DERMATOMYOSITIS (H): Primary | ICD-10-CM

## 2019-01-01 DIAGNOSIS — Z13.0 SCREENING FOR ENDOCRINE, NUTRITIONAL, METABOLIC AND IMMUNITY DISORDER: Primary | ICD-10-CM

## 2019-01-01 DIAGNOSIS — I21.A1 TYPE 2 MYOCARDIAL INFARCTION (H): ICD-10-CM

## 2019-01-01 LAB
ABO + RH BLD: NORMAL
ACTH PLAS-MCNC: 36 PG/ML
ALBUMIN SERPL-MCNC: 2.3 G/DL (ref 3.4–5)
ALBUMIN SERPL-MCNC: 2.9 G/DL (ref 3.4–5)
ALBUMIN SERPL-MCNC: 3.1 G/DL (ref 3.4–5)
ALBUMIN SERPL-MCNC: 3.4 G/DL (ref 3.4–5)
ALBUMIN SERPL-MCNC: 3.6 G/DL (ref 3.4–5)
ALBUMIN SERPL-MCNC: 3.7 G/DL (ref 3.4–5)
ALBUMIN SERPL-MCNC: 3.9 G/DL (ref 3.4–5)
ALBUMIN UR-MCNC: 10 MG/DL
ALBUMIN UR-MCNC: 10 MG/DL
ALBUMIN UR-MCNC: NEGATIVE MG/DL
ALBUMIN UR-MCNC: NEGATIVE MG/DL
ALP SERPL-CCNC: 135 U/L (ref 40–150)
ALP SERPL-CCNC: 142 U/L (ref 40–150)
ALP SERPL-CCNC: 144 U/L (ref 40–150)
ALP SERPL-CCNC: 168 U/L (ref 40–150)
ALP SERPL-CCNC: 202 U/L (ref 40–150)
ALP SERPL-CCNC: 95 U/L (ref 40–150)
ALT SERPL W P-5'-P-CCNC: 14 U/L (ref 0–70)
ALT SERPL W P-5'-P-CCNC: 17 U/L (ref 0–70)
ALT SERPL W P-5'-P-CCNC: 20 U/L (ref 0–70)
ALT SERPL W P-5'-P-CCNC: 20 U/L (ref 0–70)
ALT SERPL W P-5'-P-CCNC: 35 U/L (ref 0–70)
ALT SERPL W P-5'-P-CCNC: 56 U/L (ref 0–70)
ANGLE RATE OF CLOT GROWTH: 70.8 DEG (ref 59–74)
ANGLE RATE OF CLOT GROWTH: 76.4 DEG (ref 59–74)
ANION GAP SERPL CALCULATED.3IONS-SCNC: 10 MMOL/L (ref 3–14)
ANION GAP SERPL CALCULATED.3IONS-SCNC: 10 MMOL/L (ref 3–14)
ANION GAP SERPL CALCULATED.3IONS-SCNC: 12 MMOL/L (ref 3–14)
ANION GAP SERPL CALCULATED.3IONS-SCNC: 3 MMOL/L (ref 3–14)
ANION GAP SERPL CALCULATED.3IONS-SCNC: 4 MMOL/L (ref 3–14)
ANION GAP SERPL CALCULATED.3IONS-SCNC: 5 MMOL/L (ref 3–14)
ANION GAP SERPL CALCULATED.3IONS-SCNC: 6 MMOL/L (ref 3–14)
ANION GAP SERPL CALCULATED.3IONS-SCNC: 7 MMOL/L (ref 3–14)
ANION GAP SERPL CALCULATED.3IONS-SCNC: 8 MMOL/L (ref 3–14)
ANION GAP SERPL CALCULATED.3IONS-SCNC: 9 MMOL/L (ref 3–14)
ANISOCYTOSIS BLD QL SMEAR: ABNORMAL
APPEARANCE UR: CLEAR
APTT PPP: 37 SEC (ref 22–37)
AST SERPL W P-5'-P-CCNC: 16 U/L (ref 0–45)
AST SERPL W P-5'-P-CCNC: 17 U/L (ref 0–45)
AST SERPL W P-5'-P-CCNC: 26 U/L (ref 0–45)
AST SERPL W P-5'-P-CCNC: 27 U/L (ref 0–45)
AST SERPL W P-5'-P-CCNC: 55 U/L (ref 0–45)
AST SERPL W P-5'-P-CCNC: 90 U/L (ref 0–45)
BACTERIA SPEC CULT: ABNORMAL
BACTERIA SPEC CULT: NO GROWTH
BACTERIA SPEC CULT: NORMAL
BASE DEFICIT BLDA-SCNC: 0.7 MMOL/L
BASE DEFICIT BLDA-SCNC: 2 MMOL/L
BASE EXCESS BLDA CALC-SCNC: 0 MMOL/L
BASE EXCESS BLDA CALC-SCNC: 0 MMOL/L
BASE EXCESS BLDA CALC-SCNC: 2.6 MMOL/L
BASE EXCESS BLDA CALC-SCNC: 2.9 MMOL/L
BASE EXCESS BLDA CALC-SCNC: 3.2 MMOL/L
BASE EXCESS BLDA CALC-SCNC: 3.4 MMOL/L
BASE EXCESS BLDA CALC-SCNC: 4.4 MMOL/L
BASE EXCESS BLDV CALC-SCNC: 2.1 MMOL/L
BASOPHILS # BLD AUTO: 0 10E9/L (ref 0–0.2)
BASOPHILS # BLD AUTO: 0.1 10E9/L (ref 0–0.2)
BASOPHILS NFR BLD AUTO: 0 %
BASOPHILS NFR BLD AUTO: 0.2 %
BASOPHILS NFR BLD AUTO: 0.2 %
BASOPHILS NFR BLD AUTO: 0.3 %
BASOPHILS NFR BLD AUTO: 0.3 %
BASOPHILS NFR BLD AUTO: 0.4 %
BASOPHILS NFR BLD AUTO: 0.5 %
BASOPHILS NFR BLD AUTO: 0.6 %
BASOPHILS NFR BLD AUTO: 0.6 %
BASOPHILS NFR BLD AUTO: 0.8 %
BASOPHILS NFR BLD AUTO: 0.9 %
BASOPHILS NFR BLD AUTO: 1 %
BASOPHILS NFR BLD AUTO: 1.1 %
BILIRUB SERPL-MCNC: 0.3 MG/DL (ref 0.2–1.3)
BILIRUB SERPL-MCNC: 0.3 MG/DL (ref 0.2–1.3)
BILIRUB SERPL-MCNC: 0.4 MG/DL (ref 0.2–1.3)
BILIRUB SERPL-MCNC: 0.4 MG/DL (ref 0.2–1.3)
BILIRUB SERPL-MCNC: 0.8 MG/DL (ref 0.2–1.3)
BILIRUB SERPL-MCNC: 0.8 MG/DL (ref 0.2–1.3)
BILIRUB UR QL STRIP: NEGATIVE
BLD GP AB SCN SERPL QL: NORMAL
BLD PROD TYP BPU: NORMAL
BLD UNIT ID BPU: 0
BLD UNIT ID BPU: 0
BLOOD BANK CMNT PATIENT-IMP: NORMAL
BLOOD PRODUCT CODE: NORMAL
BLOOD PRODUCT CODE: NORMAL
BPU ID: NORMAL
BPU ID: NORMAL
BUN SERPL-MCNC: 10 MG/DL (ref 7–30)
BUN SERPL-MCNC: 10 MG/DL (ref 7–30)
BUN SERPL-MCNC: 11 MG/DL (ref 7–30)
BUN SERPL-MCNC: 11 MG/DL (ref 7–30)
BUN SERPL-MCNC: 12 MG/DL (ref 7–30)
BUN SERPL-MCNC: 13 MG/DL (ref 7–30)
BUN SERPL-MCNC: 14 MG/DL (ref 7–30)
BUN SERPL-MCNC: 15 MG/DL (ref 7–30)
BUN SERPL-MCNC: 16 MG/DL (ref 7–30)
BUN SERPL-MCNC: 17 MG/DL (ref 7–30)
BUN SERPL-MCNC: 18 MG/DL (ref 7–30)
BUN SERPL-MCNC: 18 MG/DL (ref 7–30)
BUN SERPL-MCNC: 19 MG/DL (ref 7–30)
BUN SERPL-MCNC: 20 MG/DL (ref 7–30)
BUN SERPL-MCNC: 21 MG/DL (ref 7–30)
BUN SERPL-MCNC: 22 MG/DL (ref 7–30)
BUN SERPL-MCNC: 23 MG/DL (ref 7–30)
BUN SERPL-MCNC: 24 MG/DL (ref 7–30)
BUN SERPL-MCNC: 25 MG/DL (ref 7–30)
BUN SERPL-MCNC: 26 MG/DL (ref 7–30)
BUN SERPL-MCNC: 26 MG/DL (ref 7–30)
BUN SERPL-MCNC: 28 MG/DL (ref 7–30)
BUN SERPL-MCNC: 29 MG/DL (ref 7–30)
BUN SERPL-MCNC: 30 MG/DL (ref 7–30)
BUN SERPL-MCNC: 32 MG/DL (ref 7–30)
BUN SERPL-MCNC: 32 MG/DL (ref 7–30)
BUN SERPL-MCNC: 34 MG/DL (ref 7–30)
BUN SERPL-MCNC: 34 MG/DL (ref 7–30)
BUN SERPL-MCNC: 35 MG/DL (ref 7–30)
BURR CELLS BLD QL SMEAR: SLIGHT
CA-I BLD-MCNC: 4.6 MG/DL (ref 4.4–5.2)
CA-I BLD-MCNC: 4.7 MG/DL (ref 4.4–5.2)
CA-I BLD-MCNC: 4.8 MG/DL (ref 4.4–5.2)
CA-I BLD-MCNC: 5.1 MG/DL (ref 4.4–5.2)
CA-I BLD-MCNC: 5.5 MG/DL (ref 4.4–5.2)
CALCIUM SERPL-MCNC: 7.9 MG/DL (ref 8.5–10.1)
CALCIUM SERPL-MCNC: 8 MG/DL (ref 8.5–10.1)
CALCIUM SERPL-MCNC: 8.1 MG/DL (ref 8.5–10.1)
CALCIUM SERPL-MCNC: 8.2 MG/DL (ref 8.5–10.1)
CALCIUM SERPL-MCNC: 8.2 MG/DL (ref 8.5–10.1)
CALCIUM SERPL-MCNC: 8.3 MG/DL (ref 8.5–10.1)
CALCIUM SERPL-MCNC: 8.4 MG/DL (ref 8.5–10.1)
CALCIUM SERPL-MCNC: 8.5 MG/DL (ref 8.5–10.1)
CALCIUM SERPL-MCNC: 8.6 MG/DL (ref 8.5–10.1)
CALCIUM SERPL-MCNC: 8.7 MG/DL (ref 8.5–10.1)
CALCIUM SERPL-MCNC: 8.8 MG/DL (ref 8.5–10.1)
CALCIUM SERPL-MCNC: 8.9 MG/DL (ref 8.5–10.1)
CALCIUM SERPL-MCNC: 9 MG/DL (ref 8.5–10.1)
CALCIUM SERPL-MCNC: 9.1 MG/DL (ref 8.5–10.1)
CALCIUM SERPL-MCNC: 9.2 MG/DL (ref 8.5–10.1)
CALCIUM SERPL-MCNC: 9.3 MG/DL (ref 8.5–10.1)
CALCIUM SERPL-MCNC: 9.5 MG/DL (ref 8.5–10.1)
CALCIUM SERPL-MCNC: 9.6 MG/DL (ref 8.5–10.1)
CALCIUM SERPL-MCNC: 9.8 MG/DL (ref 8.5–10.1)
CHLORIDE SERPL-SCNC: 100 MMOL/L (ref 94–109)
CHLORIDE SERPL-SCNC: 101 MMOL/L (ref 94–109)
CHLORIDE SERPL-SCNC: 102 MMOL/L (ref 94–109)
CHLORIDE SERPL-SCNC: 103 MMOL/L (ref 94–109)
CHLORIDE SERPL-SCNC: 104 MMOL/L (ref 94–109)
CHLORIDE SERPL-SCNC: 105 MMOL/L (ref 94–109)
CHLORIDE SERPL-SCNC: 106 MMOL/L (ref 94–109)
CHLORIDE SERPL-SCNC: 89 MMOL/L (ref 94–109)
CHLORIDE SERPL-SCNC: 91 MMOL/L (ref 94–109)
CHLORIDE SERPL-SCNC: 94 MMOL/L (ref 94–109)
CHLORIDE SERPL-SCNC: 95 MMOL/L (ref 94–109)
CHLORIDE SERPL-SCNC: 95 MMOL/L (ref 94–109)
CHLORIDE SERPL-SCNC: 96 MMOL/L (ref 94–109)
CHLORIDE SERPL-SCNC: 97 MMOL/L (ref 94–109)
CHLORIDE SERPL-SCNC: 98 MMOL/L (ref 94–109)
CHLORIDE SERPL-SCNC: 99 MMOL/L (ref 94–109)
CHOLEST SERPL-MCNC: 153 MG/DL
CHOLEST SERPL-MCNC: 96 MG/DL
CI HYPERCOAGULATION INDEX: 4.2 RATIO (ref 0–3)
CI HYPERCOAGULATION INDEX: 4.6 RATIO (ref 0–3)
CLOT LYSIS 30M P MA LENFR BLD TEG: 0 % (ref 0–8)
CLOT LYSIS 30M P MA LENFR BLD TEG: 0 % (ref 0–8)
CLOT STRENGTH BLD TEG: 20.4 KD/SC (ref 5.3–13.2)
CLOT STRENGTH BLD TEG: 25.5 KD/SC (ref 5.3–13.2)
CO2 BLDCOV-SCNC: 24 MMOL/L (ref 21–28)
CO2 BLDCOV-SCNC: 24 MMOL/L (ref 21–28)
CO2 BLDCOV-SCNC: 27 MMOL/L (ref 21–28)
CO2 BLDCOV-SCNC: 28 MMOL/L (ref 21–28)
CO2 BLDCOV-SCNC: 28 MMOL/L (ref 21–28)
CO2 BLDCOV-SCNC: 30 MMOL/L (ref 21–28)
CO2 SERPL-SCNC: 22 MMOL/L (ref 20–32)
CO2 SERPL-SCNC: 23 MMOL/L (ref 20–32)
CO2 SERPL-SCNC: 25 MMOL/L (ref 20–32)
CO2 SERPL-SCNC: 25 MMOL/L (ref 20–32)
CO2 SERPL-SCNC: 26 MMOL/L (ref 20–32)
CO2 SERPL-SCNC: 27 MMOL/L (ref 20–32)
CO2 SERPL-SCNC: 27 MMOL/L (ref 20–32)
CO2 SERPL-SCNC: 28 MMOL/L (ref 20–32)
CO2 SERPL-SCNC: 29 MMOL/L (ref 20–32)
CO2 SERPL-SCNC: 30 MMOL/L (ref 20–32)
CO2 SERPL-SCNC: 31 MMOL/L (ref 20–32)
CO2 SERPL-SCNC: 32 MMOL/L (ref 20–32)
CO2 SERPL-SCNC: 33 MMOL/L (ref 20–32)
CO2 SERPL-SCNC: 34 MMOL/L (ref 20–32)
CO2 SERPL-SCNC: 36 MMOL/L (ref 20–32)
COLOR UR AUTO: ABNORMAL
COLOR UR AUTO: ABNORMAL
COLOR UR AUTO: YELLOW
COLOR UR AUTO: YELLOW
COPATH REPORT: NORMAL
CORTICOSTER 1H P 250 UG ACTH SERPL-SCNC: 23.6 UG/DL
CORTIS SERPL-MCNC: 16.1 UG/DL (ref 4–22)
CORTIS SERPL-MCNC: 8.8 UG/DL (ref 4–22)
CREAT SERPL-MCNC: 0.69 MG/DL (ref 0.66–1.25)
CREAT SERPL-MCNC: 0.71 MG/DL (ref 0.66–1.25)
CREAT SERPL-MCNC: 0.73 MG/DL (ref 0.66–1.25)
CREAT SERPL-MCNC: 0.73 MG/DL (ref 0.66–1.25)
CREAT SERPL-MCNC: 0.74 MG/DL (ref 0.66–1.25)
CREAT SERPL-MCNC: 0.75 MG/DL (ref 0.66–1.25)
CREAT SERPL-MCNC: 0.75 MG/DL (ref 0.66–1.25)
CREAT SERPL-MCNC: 0.76 MG/DL (ref 0.66–1.25)
CREAT SERPL-MCNC: 0.76 MG/DL (ref 0.66–1.25)
CREAT SERPL-MCNC: 0.77 MG/DL (ref 0.66–1.25)
CREAT SERPL-MCNC: 0.78 MG/DL (ref 0.66–1.25)
CREAT SERPL-MCNC: 0.79 MG/DL (ref 0.66–1.25)
CREAT SERPL-MCNC: 0.79 MG/DL (ref 0.66–1.25)
CREAT SERPL-MCNC: 0.8 MG/DL (ref 0.66–1.25)
CREAT SERPL-MCNC: 0.81 MG/DL (ref 0.66–1.25)
CREAT SERPL-MCNC: 0.82 MG/DL (ref 0.66–1.25)
CREAT SERPL-MCNC: 0.82 MG/DL (ref 0.66–1.25)
CREAT SERPL-MCNC: 0.83 MG/DL (ref 0.66–1.25)
CREAT SERPL-MCNC: 0.85 MG/DL (ref 0.66–1.25)
CREAT SERPL-MCNC: 0.86 MG/DL (ref 0.66–1.25)
CREAT SERPL-MCNC: 0.87 MG/DL (ref 0.66–1.25)
CREAT SERPL-MCNC: 0.87 MG/DL (ref 0.66–1.25)
CREAT SERPL-MCNC: 0.88 MG/DL (ref 0.66–1.25)
CREAT SERPL-MCNC: 0.89 MG/DL (ref 0.66–1.25)
CREAT SERPL-MCNC: 0.9 MG/DL (ref 0.66–1.25)
CREAT SERPL-MCNC: 0.91 MG/DL (ref 0.66–1.25)
CREAT SERPL-MCNC: 0.92 MG/DL (ref 0.66–1.25)
CREAT SERPL-MCNC: 0.92 MG/DL (ref 0.66–1.25)
CREAT SERPL-MCNC: 0.94 MG/DL (ref 0.66–1.25)
CREAT SERPL-MCNC: 0.97 MG/DL (ref 0.66–1.25)
CREAT SERPL-MCNC: 0.98 MG/DL (ref 0.66–1.25)
CREAT SERPL-MCNC: 0.99 MG/DL (ref 0.66–1.25)
CREAT SERPL-MCNC: 1 MG/DL (ref 0.66–1.25)
CREAT SERPL-MCNC: 1.02 MG/DL (ref 0.66–1.25)
CREAT SERPL-MCNC: 1.03 MG/DL (ref 0.66–1.25)
CREAT SERPL-MCNC: 1.04 MG/DL (ref 0.66–1.25)
CREAT SERPL-MCNC: 1.04 MG/DL (ref 0.66–1.25)
CREAT SERPL-MCNC: 1.05 MG/DL (ref 0.66–1.25)
CREAT SERPL-MCNC: 1.06 MG/DL (ref 0.66–1.25)
CREAT SERPL-MCNC: 1.07 MG/DL (ref 0.66–1.25)
CREAT SERPL-MCNC: 1.08 MG/DL (ref 0.66–1.25)
CREAT SERPL-MCNC: 1.08 MG/DL (ref 0.66–1.25)
CREAT SERPL-MCNC: 1.09 MG/DL (ref 0.66–1.25)
CREAT SERPL-MCNC: 1.1 MG/DL (ref 0.66–1.25)
CREAT SERPL-MCNC: 1.1 MG/DL (ref 0.66–1.25)
CREAT SERPL-MCNC: 1.11 MG/DL (ref 0.66–1.25)
CREAT SERPL-MCNC: 1.11 MG/DL (ref 0.66–1.25)
CREAT SERPL-MCNC: 1.12 MG/DL (ref 0.66–1.25)
CREAT SERPL-MCNC: 1.13 MG/DL (ref 0.66–1.25)
CREAT SERPL-MCNC: 1.16 MG/DL (ref 0.66–1.25)
CREAT SERPL-MCNC: 1.17 MG/DL (ref 0.66–1.25)
CREAT SERPL-MCNC: 1.18 MG/DL (ref 0.66–1.25)
CREAT SERPL-MCNC: 1.2 MG/DL (ref 0.66–1.25)
CREAT SERPL-MCNC: 1.3 MG/DL (ref 0.66–1.25)
CREAT SERPL-MCNC: 1.32 MG/DL (ref 0.66–1.25)
CRP SERPL-MCNC: 100 MG/L (ref 0–8)
CRP SERPL-MCNC: 114 MG/L (ref 0–8)
CRP SERPL-MCNC: 20 MG/L (ref 0–8)
CRP SERPL-MCNC: 20.7 MG/L (ref 0–8)
CRP SERPL-MCNC: 22.8 MG/L (ref 0–8)
CRP SERPL-MCNC: 36 MG/L (ref 0–8)
CRP SERPL-MCNC: 42.9 MG/L (ref 0–8)
CRP SERPL-MCNC: 45 MG/L (ref 0–8)
CRP SERPL-MCNC: 52 MG/L (ref 0–8)
CRP SERPL-MCNC: 58.4 MG/L (ref 0–8)
CRP SERPL-MCNC: 74 MG/L (ref 0–8)
CRP SERPL-MCNC: NORMAL MG/L (ref 0–8)
D DIMER PPP FEU-MCNC: 7.1 UG/ML FEU (ref 0–0.5)
DACRYOCYTES BLD QL SMEAR: SLIGHT
DEPRECATED CALCIDIOL+CALCIFEROL SERPL-MC: 43 UG/L (ref 20–75)
DIFFERENTIAL METHOD BLD: ABNORMAL
DLCOCOR-%PRED-PRE: 32 %
DLCOCOR-PRE: 7.73 ML/MIN/MMHG
DLCOUNC-%PRED-PRE: 26 %
DLCOUNC-PRE: 6.4 ML/MIN/MMHG
DLCOUNC-PRED: 23.74 ML/MIN/MMHG
EOSINOPHIL # BLD AUTO: 0 10E9/L (ref 0–0.7)
EOSINOPHIL # BLD AUTO: 0.1 10E9/L (ref 0–0.7)
EOSINOPHIL # BLD AUTO: 0.2 10E9/L (ref 0–0.7)
EOSINOPHIL # BLD AUTO: 0.3 10E9/L (ref 0–0.7)
EOSINOPHIL # BLD AUTO: 0.3 10E9/L (ref 0–0.7)
EOSINOPHIL # BLD AUTO: 0.4 10E9/L (ref 0–0.7)
EOSINOPHIL # BLD AUTO: 0.4 10E9/L (ref 0–0.7)
EOSINOPHIL # BLD AUTO: 0.5 10E9/L (ref 0–0.7)
EOSINOPHIL # BLD AUTO: 0.5 10E9/L (ref 0–0.7)
EOSINOPHIL NFR BLD AUTO: 0 %
EOSINOPHIL NFR BLD AUTO: 0.6 %
EOSINOPHIL NFR BLD AUTO: 0.7 %
EOSINOPHIL NFR BLD AUTO: 0.9 %
EOSINOPHIL NFR BLD AUTO: 1.2 %
EOSINOPHIL NFR BLD AUTO: 1.5 %
EOSINOPHIL NFR BLD AUTO: 1.7 %
EOSINOPHIL NFR BLD AUTO: 2.4 %
EOSINOPHIL NFR BLD AUTO: 3 %
EOSINOPHIL NFR BLD AUTO: 3.2 %
EOSINOPHIL NFR BLD AUTO: 4 %
EOSINOPHIL NFR BLD AUTO: 4.9 %
EOSINOPHIL NFR BLD AUTO: 5.3 %
ERV-%PRED-PRE: 35 %
ERV-PRE: 0.38 L
ERV-PRED: 1.07 L
ERYTHROCYTE [DISTWIDTH] IN BLOOD BY AUTOMATED COUNT: 16.5 % (ref 10–15)
ERYTHROCYTE [DISTWIDTH] IN BLOOD BY AUTOMATED COUNT: 17 % (ref 10–15)
ERYTHROCYTE [DISTWIDTH] IN BLOOD BY AUTOMATED COUNT: 17.1 % (ref 10–15)
ERYTHROCYTE [DISTWIDTH] IN BLOOD BY AUTOMATED COUNT: 17.2 % (ref 10–15)
ERYTHROCYTE [DISTWIDTH] IN BLOOD BY AUTOMATED COUNT: 17.3 % (ref 10–15)
ERYTHROCYTE [DISTWIDTH] IN BLOOD BY AUTOMATED COUNT: 17.4 % (ref 10–15)
ERYTHROCYTE [DISTWIDTH] IN BLOOD BY AUTOMATED COUNT: 17.5 % (ref 10–15)
ERYTHROCYTE [DISTWIDTH] IN BLOOD BY AUTOMATED COUNT: 17.7 % (ref 10–15)
ERYTHROCYTE [DISTWIDTH] IN BLOOD BY AUTOMATED COUNT: 18.2 % (ref 10–15)
ERYTHROCYTE [DISTWIDTH] IN BLOOD BY AUTOMATED COUNT: 18.7 % (ref 10–15)
ERYTHROCYTE [DISTWIDTH] IN BLOOD BY AUTOMATED COUNT: 18.8 % (ref 10–15)
ERYTHROCYTE [DISTWIDTH] IN BLOOD BY AUTOMATED COUNT: 19 % (ref 10–15)
ERYTHROCYTE [DISTWIDTH] IN BLOOD BY AUTOMATED COUNT: 19.2 % (ref 10–15)
ERYTHROCYTE [DISTWIDTH] IN BLOOD BY AUTOMATED COUNT: 19.4 % (ref 10–15)
ERYTHROCYTE [DISTWIDTH] IN BLOOD BY AUTOMATED COUNT: 19.4 % (ref 10–15)
ERYTHROCYTE [DISTWIDTH] IN BLOOD BY AUTOMATED COUNT: 19.5 % (ref 10–15)
ERYTHROCYTE [DISTWIDTH] IN BLOOD BY AUTOMATED COUNT: 19.9 % (ref 10–15)
ERYTHROCYTE [DISTWIDTH] IN BLOOD BY AUTOMATED COUNT: 21.2 % (ref 10–15)
ERYTHROCYTE [DISTWIDTH] IN BLOOD BY AUTOMATED COUNT: 22.4 % (ref 10–15)
ERYTHROCYTE [DISTWIDTH] IN BLOOD BY AUTOMATED COUNT: 22.4 % (ref 10–15)
ERYTHROCYTE [DISTWIDTH] IN BLOOD BY AUTOMATED COUNT: 22.7 % (ref 10–15)
ERYTHROCYTE [DISTWIDTH] IN BLOOD BY AUTOMATED COUNT: 22.9 % (ref 10–15)
ERYTHROCYTE [DISTWIDTH] IN BLOOD BY AUTOMATED COUNT: 22.9 % (ref 10–15)
ERYTHROCYTE [DISTWIDTH] IN BLOOD BY AUTOMATED COUNT: 23 % (ref 10–15)
ERYTHROCYTE [DISTWIDTH] IN BLOOD BY AUTOMATED COUNT: 23.1 % (ref 10–15)
ERYTHROCYTE [DISTWIDTH] IN BLOOD BY AUTOMATED COUNT: 23.2 % (ref 10–15)
ERYTHROCYTE [DISTWIDTH] IN BLOOD BY AUTOMATED COUNT: 23.3 % (ref 10–15)
ERYTHROCYTE [DISTWIDTH] IN BLOOD BY AUTOMATED COUNT: 23.3 % (ref 10–15)
ERYTHROCYTE [DISTWIDTH] IN BLOOD BY AUTOMATED COUNT: 23.4 % (ref 10–15)
ERYTHROCYTE [DISTWIDTH] IN BLOOD BY AUTOMATED COUNT: 23.4 % (ref 10–15)
ERYTHROCYTE [DISTWIDTH] IN BLOOD BY AUTOMATED COUNT: 23.6 % (ref 10–15)
ERYTHROCYTE [DISTWIDTH] IN BLOOD BY AUTOMATED COUNT: 23.6 % (ref 10–15)
ERYTHROCYTE [DISTWIDTH] IN BLOOD BY AUTOMATED COUNT: 23.9 % (ref 10–15)
ERYTHROCYTE [DISTWIDTH] IN BLOOD BY AUTOMATED COUNT: 23.9 % (ref 10–15)
ERYTHROCYTE [DISTWIDTH] IN BLOOD BY AUTOMATED COUNT: 24.1 % (ref 10–15)
ERYTHROCYTE [DISTWIDTH] IN BLOOD BY AUTOMATED COUNT: 24.7 % (ref 10–15)
ERYTHROCYTE [DISTWIDTH] IN BLOOD BY AUTOMATED COUNT: 25.1 % (ref 10–15)
ERYTHROCYTE [SEDIMENTATION RATE] IN BLOOD BY WESTERGREN METHOD: 108 MM/H (ref 0–20)
ERYTHROCYTE [SEDIMENTATION RATE] IN BLOOD BY WESTERGREN METHOD: 24 MM/H (ref 0–20)
ERYTHROCYTE [SEDIMENTATION RATE] IN BLOOD BY WESTERGREN METHOD: 49 MM/H (ref 0–20)
ERYTHROCYTE [SEDIMENTATION RATE] IN BLOOD BY WESTERGREN METHOD: 55 MM/H (ref 0–20)
ERYTHROCYTE [SEDIMENTATION RATE] IN BLOOD BY WESTERGREN METHOD: 70 MM/H (ref 0–20)
ERYTHROCYTE [SEDIMENTATION RATE] IN BLOOD BY WESTERGREN METHOD: 99 MM/H (ref 0–20)
ERYTHROCYTE [SEDIMENTATION RATE] IN BLOOD BY WESTERGREN METHOD: NORMAL MM/H (ref 0–20)
EXPTIME-PRE: 6.9 SEC
FEF2575-%PRED-POST: 45 %
FEF2575-%PRED-PRE: 70 %
FEF2575-POST: 1.06 L/SEC
FEF2575-PRE: 1.65 L/SEC
FEF2575-PRED: 2.32 L/SEC
FEFMAX-%PRED-PRE: 57 %
FEFMAX-PRE: 4.45 L/SEC
FEFMAX-PRED: 7.8 L/SEC
FEV1-%PRED-PRE: 39 %
FEV1-PRE: 1.15 L
FEV1FEV6-PRE: 88 %
FEV1FEV6-PRED: 78 %
FEV1FVC-PRE: 88 %
FEV1FVC-PRED: 77 %
FEV1SVC-PRE: 87 %
FEV1SVC-PRED: 69 %
FIBRINOGEN PPP-MCNC: 551 MG/DL (ref 200–420)
FIFMAX-PRE: 1.71 L/SEC
FVC-%PRED-PRE: 33 %
FVC-PRE: 1.31 L
FVC-PRED: 3.86 L
GFR SERPL CREATININE-BSD FRML MDRD: 54 ML/MIN/{1.73_M2}
GFR SERPL CREATININE-BSD FRML MDRD: 55 ML/MIN/{1.73_M2}
GFR SERPL CREATININE-BSD FRML MDRD: 61 ML/MIN/{1.73_M2}
GFR SERPL CREATININE-BSD FRML MDRD: 62 ML/MIN/{1.73_M2}
GFR SERPL CREATININE-BSD FRML MDRD: 63 ML/MIN/{1.73_M2}
GFR SERPL CREATININE-BSD FRML MDRD: 64 ML/MIN/{1.73_M2}
GFR SERPL CREATININE-BSD FRML MDRD: 66 ML/MIN/{1.73_M2}
GFR SERPL CREATININE-BSD FRML MDRD: 66 ML/MIN/{1.73_M2}
GFR SERPL CREATININE-BSD FRML MDRD: 67 ML/MIN/{1.73_M2}
GFR SERPL CREATININE-BSD FRML MDRD: 67 ML/MIN/{1.73_M2}
GFR SERPL CREATININE-BSD FRML MDRD: 68 ML/MIN/{1.73_M2}
GFR SERPL CREATININE-BSD FRML MDRD: 68 ML/MIN/{1.73_M2}
GFR SERPL CREATININE-BSD FRML MDRD: 69 ML/MIN/{1.73_M2}
GFR SERPL CREATININE-BSD FRML MDRD: 70 ML/MIN/{1.73_M2}
GFR SERPL CREATININE-BSD FRML MDRD: 71 ML/MIN/{1.73_M2}
GFR SERPL CREATININE-BSD FRML MDRD: 72 ML/MIN/{1.73_M2}
GFR SERPL CREATININE-BSD FRML MDRD: 73 ML/MIN/{1.73_M2}
GFR SERPL CREATININE-BSD FRML MDRD: 74 ML/MIN/{1.73_M2}
GFR SERPL CREATININE-BSD FRML MDRD: 76 ML/MIN/{1.73_M2}
GFR SERPL CREATININE-BSD FRML MDRD: 77 ML/MIN/{1.73_M2}
GFR SERPL CREATININE-BSD FRML MDRD: 78 ML/MIN/{1.73_M2}
GFR SERPL CREATININE-BSD FRML MDRD: 79 ML/MIN/{1.73_M2}
GFR SERPL CREATININE-BSD FRML MDRD: 82 ML/MIN/{1.73_M2}
GFR SERPL CREATININE-BSD FRML MDRD: 82 ML/MIN/{1.73_M2}
GFR SERPL CREATININE-BSD FRML MDRD: 83 ML/MIN/{1.73_M2}
GFR SERPL CREATININE-BSD FRML MDRD: 84 ML/MIN/{1.73_M2}
GFR SERPL CREATININE-BSD FRML MDRD: 85 ML/MIN/{1.73_M2}
GFR SERPL CREATININE-BSD FRML MDRD: 85 ML/MIN/{1.73_M2}
GFR SERPL CREATININE-BSD FRML MDRD: 86 ML/MIN/{1.73_M2}
GFR SERPL CREATININE-BSD FRML MDRD: 86 ML/MIN/{1.73_M2}
GFR SERPL CREATININE-BSD FRML MDRD: 87 ML/MIN/{1.73_M2}
GFR SERPL CREATININE-BSD FRML MDRD: 88 ML/MIN/{1.73_M2}
GFR SERPL CREATININE-BSD FRML MDRD: 89 ML/MIN/{1.73_M2}
GFR SERPL CREATININE-BSD FRML MDRD: 90 ML/MIN/{1.73_M2}
GFR SERPL CREATININE-BSD FRML MDRD: >90 ML/MIN/{1.73_M2}
GLUCOSE BLD-MCNC: 132 MG/DL (ref 70–99)
GLUCOSE BLD-MCNC: 140 MG/DL (ref 70–99)
GLUCOSE BLD-MCNC: 155 MG/DL (ref 70–99)
GLUCOSE BLD-MCNC: 158 MG/DL (ref 70–99)
GLUCOSE BLD-MCNC: 160 MG/DL (ref 70–99)
GLUCOSE BLD-MCNC: 160 MG/DL (ref 70–99)
GLUCOSE BLD-MCNC: 161 MG/DL (ref 70–99)
GLUCOSE BLD-MCNC: 163 MG/DL (ref 70–99)
GLUCOSE BLDC GLUCOMTR-MCNC: 100 MG/DL (ref 70–99)
GLUCOSE BLDC GLUCOMTR-MCNC: 101 MG/DL (ref 70–99)
GLUCOSE BLDC GLUCOMTR-MCNC: 102 MG/DL (ref 70–99)
GLUCOSE BLDC GLUCOMTR-MCNC: 103 MG/DL (ref 70–99)
GLUCOSE BLDC GLUCOMTR-MCNC: 103 MG/DL (ref 70–99)
GLUCOSE BLDC GLUCOMTR-MCNC: 104 MG/DL (ref 70–99)
GLUCOSE BLDC GLUCOMTR-MCNC: 104 MG/DL (ref 70–99)
GLUCOSE BLDC GLUCOMTR-MCNC: 105 MG/DL (ref 70–99)
GLUCOSE BLDC GLUCOMTR-MCNC: 106 MG/DL (ref 70–99)
GLUCOSE BLDC GLUCOMTR-MCNC: 107 MG/DL (ref 70–99)
GLUCOSE BLDC GLUCOMTR-MCNC: 108 MG/DL (ref 70–99)
GLUCOSE BLDC GLUCOMTR-MCNC: 109 MG/DL (ref 70–99)
GLUCOSE BLDC GLUCOMTR-MCNC: 110 MG/DL (ref 70–99)
GLUCOSE BLDC GLUCOMTR-MCNC: 111 MG/DL (ref 70–99)
GLUCOSE BLDC GLUCOMTR-MCNC: 112 MG/DL (ref 70–99)
GLUCOSE BLDC GLUCOMTR-MCNC: 113 MG/DL (ref 70–99)
GLUCOSE BLDC GLUCOMTR-MCNC: 114 MG/DL (ref 70–99)
GLUCOSE BLDC GLUCOMTR-MCNC: 115 MG/DL (ref 70–99)
GLUCOSE BLDC GLUCOMTR-MCNC: 116 MG/DL (ref 70–99)
GLUCOSE BLDC GLUCOMTR-MCNC: 116 MG/DL (ref 70–99)
GLUCOSE BLDC GLUCOMTR-MCNC: 117 MG/DL (ref 70–99)
GLUCOSE BLDC GLUCOMTR-MCNC: 117 MG/DL (ref 70–99)
GLUCOSE BLDC GLUCOMTR-MCNC: 118 MG/DL (ref 70–99)
GLUCOSE BLDC GLUCOMTR-MCNC: 119 MG/DL (ref 70–99)
GLUCOSE BLDC GLUCOMTR-MCNC: 120 MG/DL (ref 70–99)
GLUCOSE BLDC GLUCOMTR-MCNC: 120 MG/DL (ref 70–99)
GLUCOSE BLDC GLUCOMTR-MCNC: 121 MG/DL (ref 70–99)
GLUCOSE BLDC GLUCOMTR-MCNC: 121 MG/DL (ref 70–99)
GLUCOSE BLDC GLUCOMTR-MCNC: 122 MG/DL (ref 70–99)
GLUCOSE BLDC GLUCOMTR-MCNC: 123 MG/DL (ref 70–99)
GLUCOSE BLDC GLUCOMTR-MCNC: 124 MG/DL (ref 70–99)
GLUCOSE BLDC GLUCOMTR-MCNC: 124 MG/DL (ref 70–99)
GLUCOSE BLDC GLUCOMTR-MCNC: 125 MG/DL (ref 70–99)
GLUCOSE BLDC GLUCOMTR-MCNC: 126 MG/DL (ref 70–99)
GLUCOSE BLDC GLUCOMTR-MCNC: 127 MG/DL (ref 70–99)
GLUCOSE BLDC GLUCOMTR-MCNC: 128 MG/DL (ref 70–99)
GLUCOSE BLDC GLUCOMTR-MCNC: 129 MG/DL (ref 70–99)
GLUCOSE BLDC GLUCOMTR-MCNC: 130 MG/DL (ref 70–99)
GLUCOSE BLDC GLUCOMTR-MCNC: 131 MG/DL (ref 70–99)
GLUCOSE BLDC GLUCOMTR-MCNC: 132 MG/DL (ref 70–99)
GLUCOSE BLDC GLUCOMTR-MCNC: 134 MG/DL (ref 70–99)
GLUCOSE BLDC GLUCOMTR-MCNC: 135 MG/DL (ref 70–99)
GLUCOSE BLDC GLUCOMTR-MCNC: 136 MG/DL (ref 70–99)
GLUCOSE BLDC GLUCOMTR-MCNC: 136 MG/DL (ref 70–99)
GLUCOSE BLDC GLUCOMTR-MCNC: 137 MG/DL (ref 70–99)
GLUCOSE BLDC GLUCOMTR-MCNC: 138 MG/DL (ref 70–99)
GLUCOSE BLDC GLUCOMTR-MCNC: 139 MG/DL (ref 70–99)
GLUCOSE BLDC GLUCOMTR-MCNC: 139 MG/DL (ref 70–99)
GLUCOSE BLDC GLUCOMTR-MCNC: 140 MG/DL (ref 70–99)
GLUCOSE BLDC GLUCOMTR-MCNC: 141 MG/DL (ref 70–99)
GLUCOSE BLDC GLUCOMTR-MCNC: 141 MG/DL (ref 70–99)
GLUCOSE BLDC GLUCOMTR-MCNC: 142 MG/DL (ref 70–99)
GLUCOSE BLDC GLUCOMTR-MCNC: 142 MG/DL (ref 70–99)
GLUCOSE BLDC GLUCOMTR-MCNC: 145 MG/DL (ref 70–99)
GLUCOSE BLDC GLUCOMTR-MCNC: 146 MG/DL (ref 70–99)
GLUCOSE BLDC GLUCOMTR-MCNC: 147 MG/DL (ref 70–99)
GLUCOSE BLDC GLUCOMTR-MCNC: 147 MG/DL (ref 70–99)
GLUCOSE BLDC GLUCOMTR-MCNC: 149 MG/DL (ref 70–99)
GLUCOSE BLDC GLUCOMTR-MCNC: 152 MG/DL (ref 70–99)
GLUCOSE BLDC GLUCOMTR-MCNC: 153 MG/DL (ref 70–99)
GLUCOSE BLDC GLUCOMTR-MCNC: 155 MG/DL (ref 70–99)
GLUCOSE BLDC GLUCOMTR-MCNC: 155 MG/DL (ref 70–99)
GLUCOSE BLDC GLUCOMTR-MCNC: 156 MG/DL (ref 70–99)
GLUCOSE BLDC GLUCOMTR-MCNC: 160 MG/DL (ref 70–99)
GLUCOSE BLDC GLUCOMTR-MCNC: 161 MG/DL (ref 70–99)
GLUCOSE BLDC GLUCOMTR-MCNC: 161 MG/DL (ref 70–99)
GLUCOSE BLDC GLUCOMTR-MCNC: 162 MG/DL (ref 70–99)
GLUCOSE BLDC GLUCOMTR-MCNC: 163 MG/DL (ref 70–99)
GLUCOSE BLDC GLUCOMTR-MCNC: 164 MG/DL (ref 70–99)
GLUCOSE BLDC GLUCOMTR-MCNC: 165 MG/DL (ref 70–99)
GLUCOSE BLDC GLUCOMTR-MCNC: 165 MG/DL (ref 70–99)
GLUCOSE BLDC GLUCOMTR-MCNC: 167 MG/DL (ref 70–99)
GLUCOSE BLDC GLUCOMTR-MCNC: 168 MG/DL (ref 70–99)
GLUCOSE BLDC GLUCOMTR-MCNC: 168 MG/DL (ref 70–99)
GLUCOSE BLDC GLUCOMTR-MCNC: 171 MG/DL (ref 70–99)
GLUCOSE BLDC GLUCOMTR-MCNC: 173 MG/DL (ref 70–99)
GLUCOSE BLDC GLUCOMTR-MCNC: 174 MG/DL (ref 70–99)
GLUCOSE BLDC GLUCOMTR-MCNC: 176 MG/DL (ref 70–99)
GLUCOSE BLDC GLUCOMTR-MCNC: 179 MG/DL (ref 70–99)
GLUCOSE BLDC GLUCOMTR-MCNC: 179 MG/DL (ref 70–99)
GLUCOSE BLDC GLUCOMTR-MCNC: 180 MG/DL (ref 70–99)
GLUCOSE BLDC GLUCOMTR-MCNC: 180 MG/DL (ref 70–99)
GLUCOSE BLDC GLUCOMTR-MCNC: 182 MG/DL (ref 70–99)
GLUCOSE BLDC GLUCOMTR-MCNC: 191 MG/DL (ref 70–99)
GLUCOSE BLDC GLUCOMTR-MCNC: 193 MG/DL (ref 70–99)
GLUCOSE BLDC GLUCOMTR-MCNC: 194 MG/DL (ref 70–99)
GLUCOSE BLDC GLUCOMTR-MCNC: 210 MG/DL (ref 70–99)
GLUCOSE BLDC GLUCOMTR-MCNC: 225 MG/DL (ref 70–99)
GLUCOSE BLDC GLUCOMTR-MCNC: 56 MG/DL (ref 70–99)
GLUCOSE BLDC GLUCOMTR-MCNC: 70 MG/DL (ref 70–99)
GLUCOSE BLDC GLUCOMTR-MCNC: 73 MG/DL (ref 70–99)
GLUCOSE BLDC GLUCOMTR-MCNC: 75 MG/DL (ref 70–99)
GLUCOSE BLDC GLUCOMTR-MCNC: 80 MG/DL (ref 70–99)
GLUCOSE BLDC GLUCOMTR-MCNC: 81 MG/DL (ref 70–99)
GLUCOSE BLDC GLUCOMTR-MCNC: 82 MG/DL (ref 70–99)
GLUCOSE BLDC GLUCOMTR-MCNC: 82 MG/DL (ref 70–99)
GLUCOSE BLDC GLUCOMTR-MCNC: 83 MG/DL (ref 70–99)
GLUCOSE BLDC GLUCOMTR-MCNC: 84 MG/DL (ref 70–99)
GLUCOSE BLDC GLUCOMTR-MCNC: 84 MG/DL (ref 70–99)
GLUCOSE BLDC GLUCOMTR-MCNC: 85 MG/DL (ref 70–99)
GLUCOSE BLDC GLUCOMTR-MCNC: 87 MG/DL (ref 70–99)
GLUCOSE BLDC GLUCOMTR-MCNC: 88 MG/DL (ref 70–99)
GLUCOSE BLDC GLUCOMTR-MCNC: 89 MG/DL (ref 70–99)
GLUCOSE BLDC GLUCOMTR-MCNC: 90 MG/DL (ref 70–99)
GLUCOSE BLDC GLUCOMTR-MCNC: 91 MG/DL (ref 70–99)
GLUCOSE BLDC GLUCOMTR-MCNC: 92 MG/DL (ref 70–99)
GLUCOSE BLDC GLUCOMTR-MCNC: 92 MG/DL (ref 70–99)
GLUCOSE BLDC GLUCOMTR-MCNC: 93 MG/DL (ref 70–99)
GLUCOSE BLDC GLUCOMTR-MCNC: 94 MG/DL (ref 70–99)
GLUCOSE BLDC GLUCOMTR-MCNC: 94 MG/DL (ref 70–99)
GLUCOSE BLDC GLUCOMTR-MCNC: 95 MG/DL (ref 70–99)
GLUCOSE BLDC GLUCOMTR-MCNC: 95 MG/DL (ref 70–99)
GLUCOSE BLDC GLUCOMTR-MCNC: 96 MG/DL (ref 70–99)
GLUCOSE BLDC GLUCOMTR-MCNC: 97 MG/DL (ref 70–99)
GLUCOSE BLDC GLUCOMTR-MCNC: 98 MG/DL (ref 70–99)
GLUCOSE BLDC GLUCOMTR-MCNC: 99 MG/DL (ref 70–99)
GLUCOSE BLDC GLUCOMTR-MCNC: 99 MG/DL (ref 70–99)
GLUCOSE SERPL-MCNC: 100 MG/DL (ref 70–99)
GLUCOSE SERPL-MCNC: 102 MG/DL (ref 70–99)
GLUCOSE SERPL-MCNC: 103 MG/DL (ref 70–99)
GLUCOSE SERPL-MCNC: 106 MG/DL (ref 70–99)
GLUCOSE SERPL-MCNC: 106 MG/DL (ref 70–99)
GLUCOSE SERPL-MCNC: 107 MG/DL (ref 70–99)
GLUCOSE SERPL-MCNC: 107 MG/DL (ref 70–99)
GLUCOSE SERPL-MCNC: 108 MG/DL (ref 70–99)
GLUCOSE SERPL-MCNC: 108 MG/DL (ref 70–99)
GLUCOSE SERPL-MCNC: 109 MG/DL (ref 70–99)
GLUCOSE SERPL-MCNC: 109 MG/DL (ref 70–99)
GLUCOSE SERPL-MCNC: 110 MG/DL (ref 70–99)
GLUCOSE SERPL-MCNC: 111 MG/DL (ref 70–99)
GLUCOSE SERPL-MCNC: 112 MG/DL (ref 70–99)
GLUCOSE SERPL-MCNC: 113 MG/DL (ref 70–99)
GLUCOSE SERPL-MCNC: 114 MG/DL (ref 70–99)
GLUCOSE SERPL-MCNC: 115 MG/DL (ref 70–99)
GLUCOSE SERPL-MCNC: 115 MG/DL (ref 70–99)
GLUCOSE SERPL-MCNC: 116 MG/DL (ref 70–99)
GLUCOSE SERPL-MCNC: 117 MG/DL (ref 70–99)
GLUCOSE SERPL-MCNC: 117 MG/DL (ref 70–99)
GLUCOSE SERPL-MCNC: 118 MG/DL (ref 70–99)
GLUCOSE SERPL-MCNC: 121 MG/DL (ref 70–99)
GLUCOSE SERPL-MCNC: 121 MG/DL (ref 70–99)
GLUCOSE SERPL-MCNC: 122 MG/DL (ref 70–99)
GLUCOSE SERPL-MCNC: 122 MG/DL (ref 70–99)
GLUCOSE SERPL-MCNC: 123 MG/DL (ref 70–99)
GLUCOSE SERPL-MCNC: 124 MG/DL (ref 70–99)
GLUCOSE SERPL-MCNC: 125 MG/DL (ref 70–99)
GLUCOSE SERPL-MCNC: 127 MG/DL (ref 70–99)
GLUCOSE SERPL-MCNC: 128 MG/DL (ref 70–99)
GLUCOSE SERPL-MCNC: 128 MG/DL (ref 70–99)
GLUCOSE SERPL-MCNC: 129 MG/DL (ref 70–99)
GLUCOSE SERPL-MCNC: 130 MG/DL (ref 70–99)
GLUCOSE SERPL-MCNC: 131 MG/DL (ref 70–99)
GLUCOSE SERPL-MCNC: 132 MG/DL (ref 70–99)
GLUCOSE SERPL-MCNC: 132 MG/DL (ref 70–99)
GLUCOSE SERPL-MCNC: 134 MG/DL (ref 70–99)
GLUCOSE SERPL-MCNC: 134 MG/DL (ref 70–99)
GLUCOSE SERPL-MCNC: 135 MG/DL (ref 70–99)
GLUCOSE SERPL-MCNC: 137 MG/DL (ref 70–99)
GLUCOSE SERPL-MCNC: 137 MG/DL (ref 70–99)
GLUCOSE SERPL-MCNC: 139 MG/DL (ref 70–99)
GLUCOSE SERPL-MCNC: 145 MG/DL (ref 70–99)
GLUCOSE SERPL-MCNC: 147 MG/DL (ref 70–99)
GLUCOSE SERPL-MCNC: 149 MG/DL (ref 70–99)
GLUCOSE SERPL-MCNC: 153 MG/DL (ref 70–99)
GLUCOSE SERPL-MCNC: 156 MG/DL (ref 70–99)
GLUCOSE SERPL-MCNC: 158 MG/DL (ref 70–99)
GLUCOSE SERPL-MCNC: 161 MG/DL (ref 70–99)
GLUCOSE SERPL-MCNC: 163 MG/DL (ref 70–99)
GLUCOSE SERPL-MCNC: 168 MG/DL (ref 70–99)
GLUCOSE SERPL-MCNC: 173 MG/DL (ref 70–99)
GLUCOSE SERPL-MCNC: 183 MG/DL (ref 70–99)
GLUCOSE SERPL-MCNC: 190 MG/DL (ref 70–99)
GLUCOSE SERPL-MCNC: 212 MG/DL (ref 70–99)
GLUCOSE SERPL-MCNC: 87 MG/DL (ref 70–99)
GLUCOSE SERPL-MCNC: 92 MG/DL (ref 70–99)
GLUCOSE SERPL-MCNC: 92 MG/DL (ref 70–99)
GLUCOSE SERPL-MCNC: 94 MG/DL (ref 70–99)
GLUCOSE SERPL-MCNC: 94 MG/DL (ref 70–99)
GLUCOSE SERPL-MCNC: 95 MG/DL (ref 70–99)
GLUCOSE UR STRIP-MCNC: NEGATIVE MG/DL
GRAM STN SPEC: ABNORMAL
HBA1C MFR BLD: 6.3 % (ref 0–5.6)
HBA1C MFR BLD: 6.7 % (ref 0–5.6)
HCO3 BLD-SCNC: 23 MMOL/L (ref 21–28)
HCO3 BLD-SCNC: 23 MMOL/L (ref 21–28)
HCO3 BLD-SCNC: 24 MMOL/L (ref 21–28)
HCO3 BLD-SCNC: 24 MMOL/L (ref 21–28)
HCO3 BLD-SCNC: 27 MMOL/L (ref 21–28)
HCO3 BLD-SCNC: 28 MMOL/L (ref 21–28)
HCO3 BLD-SCNC: 28 MMOL/L (ref 21–28)
HCO3 BLD-SCNC: 29 MMOL/L (ref 21–28)
HCO3 BLD-SCNC: 29 MMOL/L (ref 21–28)
HCO3 BLDV-SCNC: 28 MMOL/L (ref 21–28)
HCT VFR BLD AUTO: 24.7 % (ref 40–53)
HCT VFR BLD AUTO: 25.2 % (ref 40–53)
HCT VFR BLD AUTO: 25.6 % (ref 40–53)
HCT VFR BLD AUTO: 26.4 % (ref 40–53)
HCT VFR BLD AUTO: 26.5 % (ref 40–53)
HCT VFR BLD AUTO: 26.8 % (ref 40–53)
HCT VFR BLD AUTO: 26.9 % (ref 40–53)
HCT VFR BLD AUTO: 27.6 % (ref 40–53)
HCT VFR BLD AUTO: 27.8 % (ref 40–53)
HCT VFR BLD AUTO: 28 % (ref 40–53)
HCT VFR BLD AUTO: 28.3 % (ref 40–53)
HCT VFR BLD AUTO: 28.4 % (ref 40–53)
HCT VFR BLD AUTO: 28.9 % (ref 40–53)
HCT VFR BLD AUTO: 28.9 % (ref 40–53)
HCT VFR BLD AUTO: 29.2 % (ref 40–53)
HCT VFR BLD AUTO: 29.4 % (ref 40–53)
HCT VFR BLD AUTO: 29.4 % (ref 40–53)
HCT VFR BLD AUTO: 29.9 % (ref 40–53)
HCT VFR BLD AUTO: 30 % (ref 40–53)
HCT VFR BLD AUTO: 30.1 % (ref 40–53)
HCT VFR BLD AUTO: 30.2 % (ref 40–53)
HCT VFR BLD AUTO: 30.2 % (ref 40–53)
HCT VFR BLD AUTO: 31.2 % (ref 40–53)
HCT VFR BLD AUTO: 31.2 % (ref 40–53)
HCT VFR BLD AUTO: 31.5 % (ref 40–53)
HCT VFR BLD AUTO: 31.5 % (ref 40–53)
HCT VFR BLD AUTO: 31.6 % (ref 40–53)
HCT VFR BLD AUTO: 32.1 % (ref 40–53)
HCT VFR BLD AUTO: 32.5 % (ref 40–53)
HCT VFR BLD AUTO: 33.4 % (ref 40–53)
HCT VFR BLD AUTO: 33.5 % (ref 40–53)
HCT VFR BLD AUTO: 33.8 % (ref 40–53)
HCT VFR BLD AUTO: 33.9 % (ref 40–53)
HCT VFR BLD AUTO: 34.1 % (ref 40–53)
HCT VFR BLD AUTO: 34.6 % (ref 40–53)
HCT VFR BLD AUTO: 34.9 % (ref 40–53)
HCT VFR BLD AUTO: 35.4 % (ref 40–53)
HCT VFR BLD AUTO: 35.5 % (ref 40–53)
HCT VFR BLD AUTO: 35.5 % (ref 40–53)
HCT VFR BLD AUTO: 35.8 % (ref 40–53)
HCT VFR BLD AUTO: 36.6 % (ref 40–53)
HCT VFR BLD AUTO: 37.1 % (ref 40–53)
HCT VFR BLD AUTO: 37.4 % (ref 40–53)
HCT VFR BLD AUTO: 37.6 % (ref 40–53)
HCT VFR BLD AUTO: 37.9 % (ref 40–53)
HCT VFR BLD AUTO: 38.7 % (ref 40–53)
HCT VFR BLD AUTO: 52.9 % (ref 40–53)
HDLC SERPL-MCNC: 19 MG/DL
HDLC SERPL-MCNC: 22 MG/DL
HGB BLD-MCNC: 10 G/DL (ref 13.3–17.7)
HGB BLD-MCNC: 10.2 G/DL (ref 13.3–17.7)
HGB BLD-MCNC: 10.3 G/DL (ref 13.3–17.7)
HGB BLD-MCNC: 10.4 G/DL (ref 13.3–17.7)
HGB BLD-MCNC: 10.4 G/DL (ref 13.3–17.7)
HGB BLD-MCNC: 10.5 G/DL (ref 13.3–17.7)
HGB BLD-MCNC: 10.7 G/DL (ref 13.3–17.7)
HGB BLD-MCNC: 11.3 G/DL (ref 13.3–17.7)
HGB BLD-MCNC: 11.3 G/DL (ref 13.3–17.7)
HGB BLD-MCNC: 11.5 G/DL (ref 13.3–17.7)
HGB BLD-MCNC: 11.6 G/DL (ref 13.3–17.7)
HGB BLD-MCNC: 11.7 G/DL (ref 13.3–17.7)
HGB BLD-MCNC: 11.8 G/DL (ref 13.3–17.7)
HGB BLD-MCNC: 12.6 G/DL (ref 13.3–17.7)
HGB BLD-MCNC: 13 G/DL (ref 13.3–17.7)
HGB BLD-MCNC: 13.1 G/DL (ref 13.3–17.7)
HGB BLD-MCNC: 13.6 G/DL (ref 13.3–17.7)
HGB BLD-MCNC: 15.9 G/DL (ref 13.3–17.7)
HGB BLD-MCNC: 7.3 G/DL (ref 13.3–17.7)
HGB BLD-MCNC: 7.3 G/DL (ref 13.3–17.7)
HGB BLD-MCNC: 7.6 G/DL (ref 13.3–17.7)
HGB BLD-MCNC: 7.7 G/DL (ref 13.3–17.7)
HGB BLD-MCNC: 7.7 G/DL (ref 13.3–17.7)
HGB BLD-MCNC: 7.8 G/DL (ref 13.3–17.7)
HGB BLD-MCNC: 7.8 G/DL (ref 13.3–17.7)
HGB BLD-MCNC: 7.9 G/DL (ref 13.3–17.7)
HGB BLD-MCNC: 8.1 G/DL (ref 13.3–17.7)
HGB BLD-MCNC: 8.2 G/DL (ref 13.3–17.7)
HGB BLD-MCNC: 8.2 G/DL (ref 13.3–17.7)
HGB BLD-MCNC: 8.3 G/DL (ref 13.3–17.7)
HGB BLD-MCNC: 8.4 G/DL (ref 13.3–17.7)
HGB BLD-MCNC: 8.4 G/DL (ref 13.3–17.7)
HGB BLD-MCNC: 8.6 G/DL (ref 13.3–17.7)
HGB BLD-MCNC: 8.7 G/DL (ref 13.3–17.7)
HGB BLD-MCNC: 8.8 G/DL (ref 13.3–17.7)
HGB BLD-MCNC: 8.8 G/DL (ref 13.3–17.7)
HGB BLD-MCNC: 8.9 G/DL (ref 13.3–17.7)
HGB BLD-MCNC: 8.9 G/DL (ref 13.3–17.7)
HGB BLD-MCNC: 9 G/DL (ref 13.3–17.7)
HGB BLD-MCNC: 9.1 G/DL (ref 13.3–17.7)
HGB BLD-MCNC: 9.2 G/DL (ref 13.3–17.7)
HGB BLD-MCNC: 9.2 G/DL (ref 13.3–17.7)
HGB BLD-MCNC: 9.3 G/DL (ref 13.3–17.7)
HGB BLD-MCNC: 9.3 G/DL (ref 13.3–17.7)
HGB BLD-MCNC: 9.4 G/DL (ref 13.3–17.7)
HGB BLD-MCNC: 9.5 G/DL (ref 13.3–17.7)
HGB BLD-MCNC: 9.6 G/DL (ref 13.3–17.7)
HGB BLD-MCNC: 9.7 G/DL (ref 13.3–17.7)
HGB BLD-MCNC: 9.7 G/DL (ref 13.3–17.7)
HGB BLD-MCNC: 9.8 G/DL (ref 13.3–17.7)
HGB BLD-MCNC: 9.9 G/DL (ref 13.3–17.7)
HGB UR QL STRIP: ABNORMAL
HGB UR QL STRIP: NEGATIVE
HYALINE CASTS #/AREA URNS LPF: 1 /LPF (ref 0–2)
IC-%PRED-PRE: 29 %
IC-PRE: 0.95 L
IC-PRED: 3.18 L
IMM GRANULOCYTES # BLD: 0 10E9/L (ref 0–0.4)
IMM GRANULOCYTES # BLD: 0.1 10E9/L (ref 0–0.4)
IMM GRANULOCYTES # BLD: 0.4 10E9/L (ref 0–0.4)
IMM GRANULOCYTES # BLD: 0.4 10E9/L (ref 0–0.4)
IMM GRANULOCYTES NFR BLD: 0.3 %
IMM GRANULOCYTES NFR BLD: 0.3 %
IMM GRANULOCYTES NFR BLD: 0.4 %
IMM GRANULOCYTES NFR BLD: 0.4 %
IMM GRANULOCYTES NFR BLD: 0.5 %
IMM GRANULOCYTES NFR BLD: 0.6 %
IMM GRANULOCYTES NFR BLD: 0.8 %
IMM GRANULOCYTES NFR BLD: 0.9 %
IMM GRANULOCYTES NFR BLD: 1 %
IMM GRANULOCYTES NFR BLD: 1.1 %
IMM GRANULOCYTES NFR BLD: 2.6 %
IMM GRANULOCYTES NFR BLD: 3.9 %
INR PPP: 1.03 (ref 0.86–1.14)
INR PPP: 1.11 (ref 0.86–1.14)
INR PPP: 1.15 (ref 0.86–1.14)
INR PPP: 1.18 (ref 0.86–1.14)
INR PPP: 1.21 (ref 0.86–1.14)
INR PPP: 1.24 (ref 0.86–1.14)
INR PPP: 1.27 (ref 0.86–1.14)
INR PPP: 1.28 (ref 0.86–1.14)
INR PPP: 1.29 (ref 0.86–1.14)
INR PPP: 1.3 (ref 0.86–1.14)
INR PPP: 1.38 (ref 0.86–1.14)
INTERPRETATION ECG - MUSE: NORMAL
IRON SATN MFR SERPL: 14 % (ref 15–46)
IRON SERPL-MCNC: 57 UG/DL (ref 35–180)
K TIME TO SPEC CLOT STRENGTH: 1 MIN (ref 1–3)
K TIME TO SPEC CLOT STRENGTH: 1.3 MIN (ref 1–3)
KCT BLD-ACNC: 134 SEC (ref 75–150)
KCT BLD-ACNC: 159 SEC (ref 75–150)
KCT BLD-ACNC: 187 SEC (ref 75–150)
KCT BLD-ACNC: 235 SEC (ref 75–150)
KCT BLD-ACNC: 280 SEC (ref 75–150)
KCT BLD-ACNC: 320 SEC (ref 75–150)
KETONES UR STRIP-MCNC: NEGATIVE MG/DL
LACTATE BLD-SCNC: 0.7 MMOL/L (ref 0.7–2)
LACTATE BLD-SCNC: 0.7 MMOL/L (ref 0.7–2)
LACTATE BLD-SCNC: 0.7 MMOL/L (ref 0.7–2.1)
LACTATE BLD-SCNC: 1 MMOL/L (ref 0.7–2)
LACTATE BLD-SCNC: 1 MMOL/L (ref 0.7–2)
LACTATE BLD-SCNC: 1.1 MMOL/L (ref 0.7–2)
LACTATE BLD-SCNC: 1.2 MMOL/L (ref 0.7–2)
LACTATE BLD-SCNC: 1.3 MMOL/L (ref 0.7–2)
LACTATE BLD-SCNC: 1.4 MMOL/L (ref 0.7–2)
LACTATE BLD-SCNC: 1.5 MMOL/L (ref 0.7–2)
LACTATE BLD-SCNC: 1.5 MMOL/L (ref 0.7–2)
LACTATE BLD-SCNC: 1.7 MMOL/L (ref 0.7–2)
LACTATE BLD-SCNC: 1.7 MMOL/L (ref 0.7–2)
LACTATE BLD-SCNC: 1.8 MMOL/L (ref 0.7–2)
LACTATE BLD-SCNC: 1.9 MMOL/L (ref 0.7–2)
LACTATE BLD-SCNC: 1.9 MMOL/L (ref 0.7–2)
LACTATE BLD-SCNC: 2 MMOL/L (ref 0.7–2)
LACTATE BLD-SCNC: 2 MMOL/L (ref 0.7–2)
LACTATE BLD-SCNC: 2.1 MMOL/L (ref 0.7–2)
LACTATE BLD-SCNC: 2.3 MMOL/L (ref 0.7–2)
LACTATE BLD-SCNC: 2.4 MMOL/L (ref 0.7–2)
LACTATE BLD-SCNC: 2.6 MMOL/L (ref 0.7–2)
LACTATE BLD-SCNC: 2.7 MMOL/L (ref 0.7–2)
LACTATE BLD-SCNC: 2.8 MMOL/L (ref 0.7–2)
LACTATE BLD-SCNC: 3 MMOL/L (ref 0.7–2)
LACTATE BLD-SCNC: 3.1 MMOL/L (ref 0.7–2)
LACTATE BLD-SCNC: 3.7 MMOL/L (ref 0.7–2)
LACTATE BLD-SCNC: 4.6 MMOL/L (ref 0.7–2)
LACTATE BLD-SCNC: 4.6 MMOL/L (ref 0.7–2.1)
LACTATE BLD-SCNC: 4.8 MMOL/L (ref 0.7–2.1)
LACTATE BLD-SCNC: NORMAL MMOL/L (ref 0.7–2)
LDLC SERPL CALC-MCNC: 103 MG/DL
LDLC SERPL CALC-MCNC: 58 MG/DL
LEUKOCYTE ESTERASE UR QL STRIP: ABNORMAL
LEUKOCYTE ESTERASE UR QL STRIP: NEGATIVE
LMWH PPP CHRO-ACNC: 0.25 IU/ML
LMWH PPP CHRO-ACNC: 0.3 IU/ML
LMWH PPP CHRO-ACNC: 0.32 IU/ML
LMWH PPP CHRO-ACNC: 0.46 IU/ML
LY60 LYSIS AT 60 MINUTES: 0 % (ref 0–15)
LY60 LYSIS AT 60 MINUTES: 0.8 % (ref 0–15)
LYMPHOCYTES # BLD AUTO: 0.7 10E9/L (ref 0.8–5.3)
LYMPHOCYTES # BLD AUTO: 0.8 10E9/L (ref 0.8–5.3)
LYMPHOCYTES # BLD AUTO: 0.9 10E9/L (ref 0.8–5.3)
LYMPHOCYTES # BLD AUTO: 1.3 10E9/L (ref 0.8–5.3)
LYMPHOCYTES # BLD AUTO: 1.4 10E9/L (ref 0.8–5.3)
LYMPHOCYTES # BLD AUTO: 1.4 10E9/L (ref 0.8–5.3)
LYMPHOCYTES NFR BLD AUTO: 10.4 %
LYMPHOCYTES NFR BLD AUTO: 11.7 %
LYMPHOCYTES NFR BLD AUTO: 11.7 %
LYMPHOCYTES NFR BLD AUTO: 13.5 %
LYMPHOCYTES NFR BLD AUTO: 5 %
LYMPHOCYTES NFR BLD AUTO: 5.3 %
LYMPHOCYTES NFR BLD AUTO: 7.6 %
LYMPHOCYTES NFR BLD AUTO: 8 %
LYMPHOCYTES NFR BLD AUTO: 8.5 %
LYMPHOCYTES NFR BLD AUTO: 8.5 %
LYMPHOCYTES NFR BLD AUTO: 8.7 %
LYMPHOCYTES NFR BLD AUTO: 9.2 %
LYMPHOCYTES NFR BLD AUTO: 9.5 %
LYMPHOCYTES NFR BLD AUTO: 9.7 %
LYMPHOCYTES NFR BLD AUTO: 9.8 %
Lab: ABNORMAL
Lab: ABNORMAL
Lab: NORMAL
MA MAXIMUM CLOT STRENGTH: 80.4 MM (ref 55–74)
MA MAXIMUM CLOT STRENGTH: 83.6 MM (ref 55–74)
MAGNESIUM SERPL-MCNC: 1.8 MG/DL (ref 1.6–2.3)
MAGNESIUM SERPL-MCNC: 2 MG/DL (ref 1.6–2.3)
MAGNESIUM SERPL-MCNC: 2.2 MG/DL (ref 1.6–2.3)
MAGNESIUM SERPL-MCNC: 2.3 MG/DL (ref 1.6–2.3)
MAGNESIUM SERPL-MCNC: 2.4 MG/DL (ref 1.6–2.3)
MAGNESIUM SERPL-MCNC: 2.5 MG/DL (ref 1.6–2.3)
MCH RBC QN AUTO: 20.5 PG (ref 26.5–33)
MCH RBC QN AUTO: 20.6 PG (ref 26.5–33)
MCH RBC QN AUTO: 20.7 PG (ref 26.5–33)
MCH RBC QN AUTO: 20.8 PG (ref 26.5–33)
MCH RBC QN AUTO: 20.9 PG (ref 26.5–33)
MCH RBC QN AUTO: 21 PG (ref 26.5–33)
MCH RBC QN AUTO: 21 PG (ref 26.5–33)
MCH RBC QN AUTO: 21.1 PG (ref 26.5–33)
MCH RBC QN AUTO: 21.2 PG (ref 26.5–33)
MCH RBC QN AUTO: 21.3 PG (ref 26.5–33)
MCH RBC QN AUTO: 21.4 PG (ref 26.5–33)
MCH RBC QN AUTO: 21.5 PG (ref 26.5–33)
MCH RBC QN AUTO: 21.5 PG (ref 26.5–33)
MCH RBC QN AUTO: 21.8 PG (ref 26.5–33)
MCH RBC QN AUTO: 22.2 PG (ref 26.5–33)
MCH RBC QN AUTO: 22.4 PG (ref 26.5–33)
MCH RBC QN AUTO: 23.4 PG (ref 26.5–33)
MCH RBC QN AUTO: 24.4 PG (ref 26.5–33)
MCH RBC QN AUTO: 24.9 PG (ref 26.5–33)
MCH RBC QN AUTO: 25.8 PG (ref 26.5–33)
MCH RBC QN AUTO: 26.2 PG (ref 26.5–33)
MCH RBC QN AUTO: 26.6 PG (ref 26.5–33)
MCH RBC QN AUTO: 26.7 PG (ref 26.5–33)
MCH RBC QN AUTO: 26.8 PG (ref 26.5–33)
MCH RBC QN AUTO: 26.9 PG (ref 26.5–33)
MCH RBC QN AUTO: 27.1 PG (ref 26.5–33)
MCH RBC QN AUTO: 27.4 PG (ref 26.5–33)
MCH RBC QN AUTO: 27.4 PG (ref 26.5–33)
MCH RBC QN AUTO: 27.5 PG (ref 26.5–33)
MCH RBC QN AUTO: 27.5 PG (ref 26.5–33)
MCH RBC QN AUTO: 27.6 PG (ref 26.5–33)
MCH RBC QN AUTO: 27.8 PG (ref 26.5–33)
MCH RBC QN AUTO: 28 PG (ref 26.5–33)
MCH RBC QN AUTO: 28.1 PG (ref 26.5–33)
MCH RBC QN AUTO: 28.2 PG (ref 26.5–33)
MCH RBC QN AUTO: 28.4 PG (ref 26.5–33)
MCH RBC QN AUTO: 28.4 PG (ref 26.5–33)
MCH RBC QN AUTO: 28.9 PG (ref 26.5–33)
MCHC RBC AUTO-ENTMCNC: 26.2 G/DL (ref 31.5–36.5)
MCHC RBC AUTO-ENTMCNC: 26.3 G/DL (ref 31.5–36.5)
MCHC RBC AUTO-ENTMCNC: 26.6 G/DL (ref 31.5–36.5)
MCHC RBC AUTO-ENTMCNC: 26.8 G/DL (ref 31.5–36.5)
MCHC RBC AUTO-ENTMCNC: 27 G/DL (ref 31.5–36.5)
MCHC RBC AUTO-ENTMCNC: 27.2 G/DL (ref 31.5–36.5)
MCHC RBC AUTO-ENTMCNC: 27.2 G/DL (ref 31.5–36.5)
MCHC RBC AUTO-ENTMCNC: 27.3 G/DL (ref 31.5–36.5)
MCHC RBC AUTO-ENTMCNC: 27.4 G/DL (ref 31.5–36.5)
MCHC RBC AUTO-ENTMCNC: 27.5 G/DL (ref 31.5–36.5)
MCHC RBC AUTO-ENTMCNC: 27.6 G/DL (ref 31.5–36.5)
MCHC RBC AUTO-ENTMCNC: 27.7 G/DL (ref 31.5–36.5)
MCHC RBC AUTO-ENTMCNC: 27.7 G/DL (ref 31.5–36.5)
MCHC RBC AUTO-ENTMCNC: 27.8 G/DL (ref 31.5–36.5)
MCHC RBC AUTO-ENTMCNC: 27.9 G/DL (ref 31.5–36.5)
MCHC RBC AUTO-ENTMCNC: 28 G/DL (ref 31.5–36.5)
MCHC RBC AUTO-ENTMCNC: 28.3 G/DL (ref 31.5–36.5)
MCHC RBC AUTO-ENTMCNC: 28.4 G/DL (ref 31.5–36.5)
MCHC RBC AUTO-ENTMCNC: 28.4 G/DL (ref 31.5–36.5)
MCHC RBC AUTO-ENTMCNC: 28.5 G/DL (ref 31.5–36.5)
MCHC RBC AUTO-ENTMCNC: 28.5 G/DL (ref 31.5–36.5)
MCHC RBC AUTO-ENTMCNC: 28.6 G/DL (ref 31.5–36.5)
MCHC RBC AUTO-ENTMCNC: 28.7 G/DL (ref 31.5–36.5)
MCHC RBC AUTO-ENTMCNC: 28.7 G/DL (ref 31.5–36.5)
MCHC RBC AUTO-ENTMCNC: 28.9 G/DL (ref 31.5–36.5)
MCHC RBC AUTO-ENTMCNC: 29 G/DL (ref 31.5–36.5)
MCHC RBC AUTO-ENTMCNC: 29 G/DL (ref 31.5–36.5)
MCHC RBC AUTO-ENTMCNC: 29.1 G/DL (ref 31.5–36.5)
MCHC RBC AUTO-ENTMCNC: 29.1 G/DL (ref 31.5–36.5)
MCHC RBC AUTO-ENTMCNC: 29.2 G/DL (ref 31.5–36.5)
MCHC RBC AUTO-ENTMCNC: 29.6 G/DL (ref 31.5–36.5)
MCHC RBC AUTO-ENTMCNC: 29.6 G/DL (ref 31.5–36.5)
MCHC RBC AUTO-ENTMCNC: 29.8 G/DL (ref 31.5–36.5)
MCHC RBC AUTO-ENTMCNC: 29.8 G/DL (ref 31.5–36.5)
MCHC RBC AUTO-ENTMCNC: 30 G/DL (ref 31.5–36.5)
MCHC RBC AUTO-ENTMCNC: 30.1 G/DL (ref 31.5–36.5)
MCHC RBC AUTO-ENTMCNC: 30.5 G/DL (ref 31.5–36.5)
MCHC RBC AUTO-ENTMCNC: 31.4 G/DL (ref 31.5–36.5)
MCV RBC AUTO: 74 FL (ref 78–100)
MCV RBC AUTO: 75 FL (ref 78–100)
MCV RBC AUTO: 76 FL (ref 78–100)
MCV RBC AUTO: 76 FL (ref 78–100)
MCV RBC AUTO: 77 FL (ref 78–100)
MCV RBC AUTO: 78 FL (ref 78–100)
MCV RBC AUTO: 79 FL (ref 78–100)
MCV RBC AUTO: 80 FL (ref 78–100)
MCV RBC AUTO: 81 FL (ref 78–100)
MCV RBC AUTO: 81 FL (ref 78–100)
MCV RBC AUTO: 83 FL (ref 78–100)
MCV RBC AUTO: 86 FL (ref 78–100)
MCV RBC AUTO: 87 FL (ref 78–100)
MCV RBC AUTO: 90 FL (ref 78–100)
MCV RBC AUTO: 92 FL (ref 78–100)
MCV RBC AUTO: 92 FL (ref 78–100)
MCV RBC AUTO: 94 FL (ref 78–100)
MCV RBC AUTO: 95 FL (ref 78–100)
MCV RBC AUTO: 96 FL (ref 78–100)
MCV RBC AUTO: 97 FL (ref 78–100)
MDC_IDC_LEAD_IMPLANT_DT: NORMAL
MDC_IDC_LEAD_IMPLANT_DT: NORMAL
MDC_IDC_LEAD_LOCATION: NORMAL
MDC_IDC_LEAD_LOCATION: NORMAL
MDC_IDC_LEAD_LOCATION_DETAIL_1: NORMAL
MDC_IDC_LEAD_LOCATION_DETAIL_1: NORMAL
MDC_IDC_LEAD_MFG: NORMAL
MDC_IDC_LEAD_MFG: NORMAL
MDC_IDC_LEAD_MODEL: NORMAL
MDC_IDC_LEAD_MODEL: NORMAL
MDC_IDC_LEAD_POLARITY_TYPE: NORMAL
MDC_IDC_LEAD_POLARITY_TYPE: NORMAL
MDC_IDC_LEAD_SERIAL: NORMAL
MDC_IDC_LEAD_SERIAL: NORMAL
MDC_IDC_LEAD_SPECIAL_FUNCTION: NORMAL
MDC_IDC_LEAD_SPECIAL_FUNCTION: NORMAL
MDC_IDC_MSMT_BATTERY_DTM: NORMAL
MDC_IDC_MSMT_BATTERY_REMAINING_LONGEVITY: 132 MO
MDC_IDC_MSMT_BATTERY_STATUS: NORMAL
MDC_IDC_MSMT_LEADCHNL_RA_IMPEDANCE_VALUE: 566 OHM
MDC_IDC_MSMT_LEADCHNL_RA_PACING_THRESHOLD_AMPLITUDE: 0.8 V
MDC_IDC_MSMT_LEADCHNL_RA_PACING_THRESHOLD_PULSEWIDTH: 0.4 MS
MDC_IDC_MSMT_LEADCHNL_RA_SENSING_INTR_AMPL: 6.4 MV
MDC_IDC_MSMT_LEADCHNL_RV_IMPEDANCE_VALUE: 784 OHM
MDC_IDC_MSMT_LEADCHNL_RV_PACING_THRESHOLD_AMPLITUDE: 1.7 V
MDC_IDC_MSMT_LEADCHNL_RV_PACING_THRESHOLD_PULSEWIDTH: 0.4 MS
MDC_IDC_PG_IMPLANT_DTM: NORMAL
MDC_IDC_PG_MFG: NORMAL
MDC_IDC_PG_MODEL: NORMAL
MDC_IDC_PG_SERIAL: NORMAL
MDC_IDC_PG_TYPE: NORMAL
MDC_IDC_SESS_CLINIC_NAME: NORMAL
MDC_IDC_SESS_DTM: NORMAL
MDC_IDC_SESS_TYPE: NORMAL
MDC_IDC_SET_BRADY_AT_MODE_SWITCH_MODE: NORMAL
MDC_IDC_SET_BRADY_AT_MODE_SWITCH_RATE: 170 {BEATS}/MIN
MDC_IDC_SET_BRADY_HYSTRATE: NORMAL
MDC_IDC_SET_BRADY_LOWRATE: 60 {BEATS}/MIN
MDC_IDC_SET_BRADY_MAX_SENSOR_RATE: 120 {BEATS}/MIN
MDC_IDC_SET_BRADY_MAX_TRACKING_RATE: 120 {BEATS}/MIN
MDC_IDC_SET_BRADY_MODE: NORMAL
MDC_IDC_SET_BRADY_PAV_DELAY_HIGH: 150 MS
MDC_IDC_SET_BRADY_PAV_DELAY_LOW: 180 MS
MDC_IDC_SET_BRADY_SAV_DELAY_HIGH: 125 MS
MDC_IDC_SET_BRADY_SAV_DELAY_LOW: 150 MS
MDC_IDC_SET_LEADCHNL_RA_PACING_AMPLITUDE: 3.5 V
MDC_IDC_SET_LEADCHNL_RA_PACING_ANODE_ELECTRODE_1: NORMAL
MDC_IDC_SET_LEADCHNL_RA_PACING_ANODE_LOCATION_1: NORMAL
MDC_IDC_SET_LEADCHNL_RA_PACING_CAPTURE_MODE: NORMAL
MDC_IDC_SET_LEADCHNL_RA_PACING_CATHODE_ELECTRODE_1: NORMAL
MDC_IDC_SET_LEADCHNL_RA_PACING_CATHODE_LOCATION_1: NORMAL
MDC_IDC_SET_LEADCHNL_RA_PACING_POLARITY: NORMAL
MDC_IDC_SET_LEADCHNL_RA_PACING_PULSEWIDTH: 0.4 MS
MDC_IDC_SET_LEADCHNL_RA_SENSING_ADAPTATION_MODE: NORMAL
MDC_IDC_SET_LEADCHNL_RA_SENSING_ANODE_ELECTRODE_1: NORMAL
MDC_IDC_SET_LEADCHNL_RA_SENSING_ANODE_LOCATION_1: NORMAL
MDC_IDC_SET_LEADCHNL_RA_SENSING_CATHODE_ELECTRODE_1: NORMAL
MDC_IDC_SET_LEADCHNL_RA_SENSING_CATHODE_LOCATION_1: NORMAL
MDC_IDC_SET_LEADCHNL_RA_SENSING_POLARITY: NORMAL
MDC_IDC_SET_LEADCHNL_RA_SENSING_SENSITIVITY: 0.25 MV
MDC_IDC_SET_LEADCHNL_RV_PACING_AMPLITUDE: 2.2 V
MDC_IDC_SET_LEADCHNL_RV_PACING_ANODE_ELECTRODE_1: NORMAL
MDC_IDC_SET_LEADCHNL_RV_PACING_ANODE_LOCATION_1: NORMAL
MDC_IDC_SET_LEADCHNL_RV_PACING_CAPTURE_MODE: NORMAL
MDC_IDC_SET_LEADCHNL_RV_PACING_CATHODE_ELECTRODE_1: NORMAL
MDC_IDC_SET_LEADCHNL_RV_PACING_CATHODE_LOCATION_1: NORMAL
MDC_IDC_SET_LEADCHNL_RV_PACING_POLARITY: NORMAL
MDC_IDC_SET_LEADCHNL_RV_PACING_PULSEWIDTH: 0.4 MS
MDC_IDC_SET_LEADCHNL_RV_SENSING_ADAPTATION_MODE: NORMAL
MDC_IDC_SET_LEADCHNL_RV_SENSING_ANODE_ELECTRODE_1: NORMAL
MDC_IDC_SET_LEADCHNL_RV_SENSING_ANODE_LOCATION_1: NORMAL
MDC_IDC_SET_LEADCHNL_RV_SENSING_CATHODE_ELECTRODE_1: NORMAL
MDC_IDC_SET_LEADCHNL_RV_SENSING_CATHODE_LOCATION_1: NORMAL
MDC_IDC_SET_LEADCHNL_RV_SENSING_POLARITY: NORMAL
MDC_IDC_SET_LEADCHNL_RV_SENSING_SENSITIVITY: 1.5 MV
MDC_IDC_SET_ZONE_DETECTION_INTERVAL: 375 MS
MDC_IDC_SET_ZONE_TYPE: NORMAL
MDC_IDC_SET_ZONE_VENDOR_TYPE: NORMAL
MDC_IDC_STAT_BRADY_RA_PERCENT_PACED: 1 %
MDC_IDC_STAT_BRADY_RV_PERCENT_PACED: 100 %
MDC_IDC_STAT_EPISODE_RECENT_COUNT: 0
MDC_IDC_STAT_EPISODE_RECENT_COUNT_DTM_END: NORMAL
MDC_IDC_STAT_EPISODE_RECENT_COUNT_DTM_START: NORMAL
MDC_IDC_STAT_EPISODE_TOTAL_COUNT: 0
MDC_IDC_STAT_EPISODE_TOTAL_COUNT_DTM_END: NORMAL
MDC_IDC_STAT_EPISODE_TYPE: NORMAL
MDC_IDC_STAT_EPISODE_TYPE: NORMAL
MDC_IDC_STAT_EPISODE_VENDOR_TYPE: NORMAL
MDC_IDC_STAT_EPISODE_VENDOR_TYPE: NORMAL
MICROCYTES BLD QL SMEAR: PRESENT
MONOCYTES # BLD AUTO: 0.2 10E9/L (ref 0–1.3)
MONOCYTES # BLD AUTO: 0.3 10E9/L (ref 0–1.3)
MONOCYTES # BLD AUTO: 0.3 10E9/L (ref 0–1.3)
MONOCYTES # BLD AUTO: 0.5 10E9/L (ref 0–1.3)
MONOCYTES # BLD AUTO: 0.9 10E9/L (ref 0–1.3)
MONOCYTES # BLD AUTO: 1 10E9/L (ref 0–1.3)
MONOCYTES # BLD AUTO: 1.1 10E9/L (ref 0–1.3)
MONOCYTES # BLD AUTO: 1.2 10E9/L (ref 0–1.3)
MONOCYTES # BLD AUTO: 1.3 10E9/L (ref 0–1.3)
MONOCYTES # BLD AUTO: 1.3 10E9/L (ref 0–1.3)
MONOCYTES # BLD AUTO: 1.5 10E9/L (ref 0–1.3)
MONOCYTES # BLD AUTO: 1.6 10E9/L (ref 0–1.3)
MONOCYTES # BLD AUTO: 2 10E9/L (ref 0–1.3)
MONOCYTES NFR BLD AUTO: 1.7 %
MONOCYTES NFR BLD AUTO: 10.5 %
MONOCYTES NFR BLD AUTO: 10.9 %
MONOCYTES NFR BLD AUTO: 10.9 %
MONOCYTES NFR BLD AUTO: 11.8 %
MONOCYTES NFR BLD AUTO: 12.3 %
MONOCYTES NFR BLD AUTO: 3.6 %
MONOCYTES NFR BLD AUTO: 3.8 %
MONOCYTES NFR BLD AUTO: 5.2 %
MONOCYTES NFR BLD AUTO: 8.2 %
MONOCYTES NFR BLD AUTO: 8.4 %
MONOCYTES NFR BLD AUTO: 9.1 %
MONOCYTES NFR BLD AUTO: 9.2 %
MONOCYTES NFR BLD AUTO: 9.2 %
MONOCYTES NFR BLD AUTO: 9.8 %
MRSA DNA SPEC QL NAA+PROBE: NEGATIVE
MUCOUS THREADS #/AREA URNS LPF: PRESENT /LPF
NEUTROPHILS # BLD AUTO: 10.1 10E9/L (ref 1.6–8.3)
NEUTROPHILS # BLD AUTO: 10.2 10E9/L (ref 1.6–8.3)
NEUTROPHILS # BLD AUTO: 11.5 10E9/L (ref 1.6–8.3)
NEUTROPHILS # BLD AUTO: 12.3 10E9/L (ref 1.6–8.3)
NEUTROPHILS # BLD AUTO: 13.1 10E9/L (ref 1.6–8.3)
NEUTROPHILS # BLD AUTO: 6.2 10E9/L (ref 1.6–8.3)
NEUTROPHILS # BLD AUTO: 6.3 10E9/L (ref 1.6–8.3)
NEUTROPHILS # BLD AUTO: 6.8 10E9/L (ref 1.6–8.3)
NEUTROPHILS # BLD AUTO: 7.3 10E9/L (ref 1.6–8.3)
NEUTROPHILS # BLD AUTO: 7.3 10E9/L (ref 1.6–8.3)
NEUTROPHILS # BLD AUTO: 7.5 10E9/L (ref 1.6–8.3)
NEUTROPHILS # BLD AUTO: 8.3 10E9/L (ref 1.6–8.3)
NEUTROPHILS # BLD AUTO: 8.4 10E9/L (ref 1.6–8.3)
NEUTROPHILS # BLD AUTO: 8.6 10E9/L (ref 1.6–8.3)
NEUTROPHILS # BLD AUTO: 9.9 10E9/L (ref 1.6–8.3)
NEUTROPHILS NFR BLD AUTO: 67.6 %
NEUTROPHILS NFR BLD AUTO: 74.1 %
NEUTROPHILS NFR BLD AUTO: 74.8 %
NEUTROPHILS NFR BLD AUTO: 75.7 %
NEUTROPHILS NFR BLD AUTO: 75.9 %
NEUTROPHILS NFR BLD AUTO: 76.8 %
NEUTROPHILS NFR BLD AUTO: 77.5 %
NEUTROPHILS NFR BLD AUTO: 78.1 %
NEUTROPHILS NFR BLD AUTO: 79.1 %
NEUTROPHILS NFR BLD AUTO: 79.6 %
NEUTROPHILS NFR BLD AUTO: 83.3 %
NEUTROPHILS NFR BLD AUTO: 83.4 %
NEUTROPHILS NFR BLD AUTO: 83.4 %
NEUTROPHILS NFR BLD AUTO: 86.8 %
NEUTROPHILS NFR BLD AUTO: 88.1 %
NITRATE UR QL: NEGATIVE
NONHDLC SERPL-MCNC: 131 MG/DL
NONHDLC SERPL-MCNC: 77 MG/DL
NRBC # BLD AUTO: 0 10*3/UL
NRBC # BLD AUTO: 0.1 10*3/UL
NRBC BLD AUTO-RTO: 0 /100
NRBC BLD AUTO-RTO: 1 /100
NT-PROBNP SERPL-MCNC: 1850 PG/ML (ref 0–900)
NT-PROBNP SERPL-MCNC: 2344 PG/ML (ref 0–900)
NT-PROBNP SERPL-MCNC: 2613 PG/ML (ref 0–900)
NT-PROBNP SERPL-MCNC: 5144 PG/ML (ref 0–125)
NT-PROBNP SERPL-MCNC: 5465 PG/ML (ref 0–125)
NT-PROBNP SERPL-MCNC: 6094 PG/ML (ref 0–125)
NT-PROBNP SERPL-MCNC: 6492 PG/ML (ref 0–900)
NT-PROBNP SERPL-MCNC: 6607 PG/ML (ref 0–125)
NT-PROBNP SERPL-MCNC: 7655 PG/ML (ref 0–900)
NT-PROBNP SERPL-MCNC: ABNORMAL PG/ML (ref 0–900)
NUM BPU REQUESTED: 2
O2/TOTAL GAS SETTING VFR VENT: 38 %
O2/TOTAL GAS SETTING VFR VENT: 43 %
O2/TOTAL GAS SETTING VFR VENT: 44 %
O2/TOTAL GAS SETTING VFR VENT: 44 %
O2/TOTAL GAS SETTING VFR VENT: 45 %
O2/TOTAL GAS SETTING VFR VENT: 47 %
O2/TOTAL GAS SETTING VFR VENT: 50 %
O2/TOTAL GAS SETTING VFR VENT: NORMAL %
OVALOCYTES BLD QL SMEAR: ABNORMAL
OXYHGB MFR BLD: 63 % (ref 92–100)
OXYHGB MFR BLDV: 38 %
PAIN DRUG SCR UR W RPTD MEDS: NORMAL
PCO2 BLD: 33 MM HG (ref 35–45)
PCO2 BLD: 35 MM HG (ref 35–45)
PCO2 BLD: 39 MM HG (ref 35–45)
PCO2 BLD: 41 MM HG (ref 35–45)
PCO2 BLD: 42 MM HG (ref 35–45)
PCO2 BLD: 43 MM HG (ref 35–45)
PCO2 BLD: 46 MM HG (ref 35–45)
PCO2 BLDV: 42 MM HG (ref 40–50)
PCO2 BLDV: 43 MM HG (ref 40–50)
PCO2 BLDV: 44 MM HG (ref 40–50)
PCO2 BLDV: 45 MM HG (ref 40–50)
PCO2 BLDV: 46 MM HG (ref 40–50)
PCO2 BLDV: 46 MM HG (ref 40–50)
PCO2 BLDV: 48 MM HG (ref 40–50)
PH BLD: 7.38 PH (ref 7.35–7.45)
PH BLD: 7.38 PH (ref 7.35–7.45)
PH BLD: 7.41 PH (ref 7.35–7.45)
PH BLD: 7.43 PH (ref 7.35–7.45)
PH BLD: 7.44 PH (ref 7.35–7.45)
PH BLD: 7.44 PH (ref 7.35–7.45)
PH BLD: 7.46 PH (ref 7.35–7.45)
PH BLDV: 7.32 PH (ref 7.32–7.43)
PH BLDV: 7.37 PH (ref 7.32–7.43)
PH BLDV: 7.37 PH (ref 7.32–7.43)
PH BLDV: 7.39 PH (ref 7.32–7.43)
PH BLDV: 7.4 PH (ref 7.32–7.43)
PH BLDV: 7.42 PH (ref 7.32–7.43)
PH BLDV: 7.43 PH (ref 7.32–7.43)
PH UR STRIP: 5.5 PH (ref 5–7)
PH UR STRIP: 5.5 PH (ref 5–7)
PH UR STRIP: 6.5 PH (ref 5–7)
PH UR STRIP: 7 PH (ref 5–7)
PHOSPHATE SERPL-MCNC: 2.4 MG/DL (ref 2.5–4.5)
PHOSPHATE SERPL-MCNC: 2.6 MG/DL (ref 2.5–4.5)
PHOSPHATE SERPL-MCNC: 3.7 MG/DL (ref 2.5–4.5)
PHOSPHATE SERPL-MCNC: 3.7 MG/DL (ref 2.5–4.5)
PLATELET # BLD AUTO: 206 10E9/L (ref 150–450)
PLATELET # BLD AUTO: 252 10E9/L (ref 150–450)
PLATELET # BLD AUTO: 264 10E9/L (ref 150–450)
PLATELET # BLD AUTO: 272 10E9/L (ref 150–450)
PLATELET # BLD AUTO: 283 10E9/L (ref 150–450)
PLATELET # BLD AUTO: 302 10E9/L (ref 150–450)
PLATELET # BLD AUTO: 305 10E9/L (ref 150–450)
PLATELET # BLD AUTO: 309 10E9/L (ref 150–450)
PLATELET # BLD AUTO: 318 10E9/L (ref 150–450)
PLATELET # BLD AUTO: 323 10E9/L (ref 150–450)
PLATELET # BLD AUTO: 326 10E9/L (ref 150–450)
PLATELET # BLD AUTO: 332 10E9/L (ref 150–450)
PLATELET # BLD AUTO: 333 10E9/L (ref 150–450)
PLATELET # BLD AUTO: 335 10E9/L (ref 150–450)
PLATELET # BLD AUTO: 336 10E9/L (ref 150–450)
PLATELET # BLD AUTO: 337 10E9/L (ref 150–450)
PLATELET # BLD AUTO: 348 10E9/L (ref 150–450)
PLATELET # BLD AUTO: 349 10E9/L (ref 150–450)
PLATELET # BLD AUTO: 361 10E9/L (ref 150–450)
PLATELET # BLD AUTO: 370 10E9/L (ref 150–450)
PLATELET # BLD AUTO: 375 10E9/L (ref 150–450)
PLATELET # BLD AUTO: 376 10E9/L (ref 150–450)
PLATELET # BLD AUTO: 390 10E9/L (ref 150–450)
PLATELET # BLD AUTO: 391 10E9/L (ref 150–450)
PLATELET # BLD AUTO: 395 10E9/L (ref 150–450)
PLATELET # BLD AUTO: 401 10E9/L (ref 150–450)
PLATELET # BLD AUTO: 405 10E9/L (ref 150–450)
PLATELET # BLD AUTO: 408 10E9/L (ref 150–450)
PLATELET # BLD AUTO: 417 10E9/L (ref 150–450)
PLATELET # BLD AUTO: 418 10E9/L (ref 150–450)
PLATELET # BLD AUTO: 420 10E9/L (ref 150–450)
PLATELET # BLD AUTO: 426 10E9/L (ref 150–450)
PLATELET # BLD AUTO: 428 10E9/L (ref 150–450)
PLATELET # BLD AUTO: 433 10E9/L (ref 150–450)
PLATELET # BLD AUTO: 437 10E9/L (ref 150–450)
PLATELET # BLD AUTO: 439 10E9/L (ref 150–450)
PLATELET # BLD AUTO: 448 10E9/L (ref 150–450)
PLATELET # BLD AUTO: 456 10E9/L (ref 150–450)
PLATELET # BLD AUTO: 460 10E9/L (ref 150–450)
PLATELET # BLD AUTO: 469 10E9/L (ref 150–450)
PLATELET # BLD AUTO: 478 10E9/L (ref 150–450)
PLATELET # BLD AUTO: 487 10E9/L (ref 150–450)
PLATELET # BLD AUTO: 488 10E9/L (ref 150–450)
PLATELET # BLD AUTO: 493 10E9/L (ref 150–450)
PLATELET # BLD AUTO: 504 10E9/L (ref 150–450)
PLATELET # BLD AUTO: 507 10E9/L (ref 150–450)
PLATELET # BLD AUTO: 541 10E9/L (ref 150–450)
PLATELET # BLD AUTO: 544 10E9/L (ref 150–450)
PLATELET # BLD AUTO: 546 10E9/L (ref 150–450)
PLATELET # BLD AUTO: 546 10E9/L (ref 150–450)
PLATELET # BLD AUTO: 549 10E9/L (ref 150–450)
PLATELET # BLD AUTO: 578 10E9/L (ref 150–450)
PLATELET # BLD AUTO: 581 10E9/L (ref 150–450)
PLATELET # BLD AUTO: 661 10E9/L (ref 150–450)
PLATELET # BLD AUTO: 699 10E9/L (ref 150–450)
PLATELET # BLD EST: ABNORMAL 10*3/UL
PO2 BLD: 111 MM HG (ref 80–105)
PO2 BLD: 134 MM HG (ref 80–105)
PO2 BLD: 138 MM HG (ref 80–105)
PO2 BLD: 146 MM HG (ref 80–105)
PO2 BLD: 149 MM HG (ref 80–105)
PO2 BLD: 153 MM HG (ref 80–105)
PO2 BLD: 178 MM HG (ref 80–105)
PO2 BLD: 193 MM HG (ref 80–105)
PO2 BLD: 38 MM HG (ref 80–105)
PO2 BLDV: 19 MM HG (ref 25–47)
PO2 BLDV: 19 MM HG (ref 25–47)
PO2 BLDV: 29 MM HG (ref 25–47)
PO2 BLDV: 29 MM HG (ref 25–47)
PO2 BLDV: 33 MM HG (ref 25–47)
PO2 BLDV: 35 MM HG (ref 25–47)
PO2 BLDV: 36 MM HG (ref 25–47)
POIKILOCYTOSIS BLD QL SMEAR: SLIGHT
POTASSIUM BLD-SCNC: 3.6 MMOL/L (ref 3.4–5.3)
POTASSIUM BLD-SCNC: 3.9 MMOL/L (ref 3.4–5.3)
POTASSIUM BLD-SCNC: 4 MMOL/L (ref 3.4–5.3)
POTASSIUM BLD-SCNC: 4 MMOL/L (ref 3.4–5.3)
POTASSIUM BLD-SCNC: 4.1 MMOL/L (ref 3.4–5.3)
POTASSIUM BLD-SCNC: 4.1 MMOL/L (ref 3.4–5.3)
POTASSIUM BLD-SCNC: 4.2 MMOL/L (ref 3.4–5.3)
POTASSIUM BLD-SCNC: 4.2 MMOL/L (ref 3.4–5.3)
POTASSIUM SERPL-SCNC: 3.3 MMOL/L (ref 3.4–5.3)
POTASSIUM SERPL-SCNC: 3.4 MMOL/L (ref 3.4–5.3)
POTASSIUM SERPL-SCNC: 3.6 MMOL/L (ref 3.4–5.3)
POTASSIUM SERPL-SCNC: 3.7 MMOL/L (ref 3.4–5.3)
POTASSIUM SERPL-SCNC: 3.8 MMOL/L (ref 3.4–5.3)
POTASSIUM SERPL-SCNC: 3.9 MMOL/L (ref 3.4–5.3)
POTASSIUM SERPL-SCNC: 4 MMOL/L (ref 3.4–5.3)
POTASSIUM SERPL-SCNC: 4.1 MMOL/L (ref 3.4–5.3)
POTASSIUM SERPL-SCNC: 4.2 MMOL/L (ref 3.4–5.3)
POTASSIUM SERPL-SCNC: 4.3 MMOL/L (ref 3.4–5.3)
POTASSIUM SERPL-SCNC: 4.4 MMOL/L (ref 3.4–5.3)
POTASSIUM SERPL-SCNC: 4.5 MMOL/L (ref 3.4–5.3)
POTASSIUM SERPL-SCNC: 4.6 MMOL/L (ref 3.4–5.3)
POTASSIUM SERPL-SCNC: 4.6 MMOL/L (ref 3.4–5.3)
POTASSIUM SERPL-SCNC: 4.7 MMOL/L (ref 3.4–5.3)
POTASSIUM SERPL-SCNC: 4.7 MMOL/L (ref 3.4–5.3)
POTASSIUM SERPL-SCNC: 4.8 MMOL/L (ref 3.4–5.3)
POTASSIUM SERPL-SCNC: 5.1 MMOL/L (ref 3.4–5.3)
POTASSIUM SERPL-SCNC: 6 MMOL/L (ref 3.4–5.3)
PROCALCITONIN SERPL-MCNC: 0.4 NG/ML
PROCALCITONIN SERPL-MCNC: 1.18 NG/ML
PROT SERPL-MCNC: 5.6 G/DL (ref 6.8–8.8)
PROT SERPL-MCNC: 6.2 G/DL (ref 6.8–8.8)
PROT SERPL-MCNC: 6.8 G/DL (ref 6.8–8.8)
PROT SERPL-MCNC: 7.2 G/DL (ref 6.8–8.8)
PROT SERPL-MCNC: 7.3 G/DL (ref 6.8–8.8)
PROT SERPL-MCNC: 7.4 G/DL (ref 6.8–8.8)
PTH-INTACT SERPL-MCNC: 50 PG/ML (ref 18–80)
R TIME UNTIL CLOT FORMS: 3.5 MIN (ref 4–9)
R TIME UNTIL CLOT FORMS: 5.7 MIN (ref 4–9)
RADIOLOGIST FLAGS: ABNORMAL
RADIOLOGIST FLAGS: NORMAL
RBC # BLD AUTO: 2.63 10E12/L (ref 4.4–5.9)
RBC # BLD AUTO: 2.65 10E12/L (ref 4.4–5.9)
RBC # BLD AUTO: 2.7 10E12/L (ref 4.4–5.9)
RBC # BLD AUTO: 2.78 10E12/L (ref 4.4–5.9)
RBC # BLD AUTO: 2.81 10E12/L (ref 4.4–5.9)
RBC # BLD AUTO: 2.83 10E12/L (ref 4.4–5.9)
RBC # BLD AUTO: 2.91 10E12/L (ref 4.4–5.9)
RBC # BLD AUTO: 2.93 10E12/L (ref 4.4–5.9)
RBC # BLD AUTO: 2.94 10E12/L (ref 4.4–5.9)
RBC # BLD AUTO: 3.06 10E12/L (ref 4.4–5.9)
RBC # BLD AUTO: 3.09 10E12/L (ref 4.4–5.9)
RBC # BLD AUTO: 3.13 10E12/L (ref 4.4–5.9)
RBC # BLD AUTO: 3.16 10E12/L (ref 4.4–5.9)
RBC # BLD AUTO: 3.17 10E12/L (ref 4.4–5.9)
RBC # BLD AUTO: 3.23 10E12/L (ref 4.4–5.9)
RBC # BLD AUTO: 3.29 10E12/L (ref 4.4–5.9)
RBC # BLD AUTO: 3.44 10E12/L (ref 4.4–5.9)
RBC # BLD AUTO: 3.45 10E12/L (ref 4.4–5.9)
RBC # BLD AUTO: 3.45 10E12/L (ref 4.4–5.9)
RBC # BLD AUTO: 3.5 10E12/L (ref 4.4–5.9)
RBC # BLD AUTO: 3.57 10E12/L (ref 4.4–5.9)
RBC # BLD AUTO: 3.64 10E12/L (ref 4.4–5.9)
RBC # BLD AUTO: 3.78 10E12/L (ref 4.4–5.9)
RBC # BLD AUTO: 3.91 10E12/L (ref 4.4–5.9)
RBC # BLD AUTO: 4.02 10E12/L (ref 4.4–5.9)
RBC # BLD AUTO: 4.07 10E12/L (ref 4.4–5.9)
RBC # BLD AUTO: 4.19 10E12/L (ref 4.4–5.9)
RBC # BLD AUTO: 4.19 10E12/L (ref 4.4–5.9)
RBC # BLD AUTO: 4.21 10E12/L (ref 4.4–5.9)
RBC # BLD AUTO: 4.23 10E12/L (ref 4.4–5.9)
RBC # BLD AUTO: 4.24 10E12/L (ref 4.4–5.9)
RBC # BLD AUTO: 4.32 10E12/L (ref 4.4–5.9)
RBC # BLD AUTO: 4.38 10E12/L (ref 4.4–5.9)
RBC # BLD AUTO: 4.46 10E12/L (ref 4.4–5.9)
RBC # BLD AUTO: 4.46 10E12/L (ref 4.4–5.9)
RBC # BLD AUTO: 4.54 10E12/L (ref 4.4–5.9)
RBC # BLD AUTO: 4.58 10E12/L (ref 4.4–5.9)
RBC # BLD AUTO: 4.62 10E12/L (ref 4.4–5.9)
RBC # BLD AUTO: 4.62 10E12/L (ref 4.4–5.9)
RBC # BLD AUTO: 4.68 10E12/L (ref 4.4–5.9)
RBC # BLD AUTO: 4.73 10E12/L (ref 4.4–5.9)
RBC # BLD AUTO: 4.73 10E12/L (ref 4.4–5.9)
RBC # BLD AUTO: 4.76 10E12/L (ref 4.4–5.9)
RBC # BLD AUTO: 4.87 10E12/L (ref 4.4–5.9)
RBC # BLD AUTO: 4.87 10E12/L (ref 4.4–5.9)
RBC # BLD AUTO: 5.07 10E12/L (ref 4.4–5.9)
RBC # BLD AUTO: 5.76 10E12/L (ref 4.4–5.9)
RBC #/AREA URNS AUTO: 0 /HPF (ref 0–2)
RBC #/AREA URNS AUTO: 0 /HPF (ref 0–2)
RBC #/AREA URNS AUTO: 8 /HPF (ref 0–2)
RBC #/AREA URNS AUTO: <1 /HPF (ref 0–2)
SAO2 % BLDV FROM PO2: 24 %
SAO2 % BLDV FROM PO2: 28 %
SAO2 % BLDV FROM PO2: 56 %
SAO2 % BLDV FROM PO2: 62 %
SAO2 % BLDV FROM PO2: 67 %
SAO2 % BLDV FROM PO2: 70 %
SODIUM BLD-SCNC: 134 MMOL/L (ref 133–144)
SODIUM BLD-SCNC: 135 MMOL/L (ref 133–144)
SODIUM BLD-SCNC: 136 MMOL/L (ref 133–144)
SODIUM BLD-SCNC: 138 MMOL/L (ref 133–144)
SODIUM BLD-SCNC: 139 MMOL/L (ref 133–144)
SODIUM BLD-SCNC: 140 MMOL/L (ref 133–144)
SODIUM SERPL-SCNC: 131 MMOL/L (ref 133–144)
SODIUM SERPL-SCNC: 131 MMOL/L (ref 133–144)
SODIUM SERPL-SCNC: 132 MMOL/L (ref 133–144)
SODIUM SERPL-SCNC: 133 MMOL/L (ref 133–144)
SODIUM SERPL-SCNC: 133 MMOL/L (ref 133–144)
SODIUM SERPL-SCNC: 134 MMOL/L (ref 133–144)
SODIUM SERPL-SCNC: 134 MMOL/L (ref 133–144)
SODIUM SERPL-SCNC: 135 MMOL/L (ref 133–144)
SODIUM SERPL-SCNC: 136 MMOL/L (ref 133–144)
SODIUM SERPL-SCNC: 137 MMOL/L (ref 133–144)
SODIUM SERPL-SCNC: 138 MMOL/L (ref 133–144)
SODIUM SERPL-SCNC: 139 MMOL/L (ref 133–144)
SODIUM SERPL-SCNC: 140 MMOL/L (ref 133–144)
SODIUM SERPL-SCNC: 140 MMOL/L (ref 133–144)
SODIUM SERPL-SCNC: 141 MMOL/L (ref 133–144)
SODIUM SERPL-SCNC: 141 MMOL/L (ref 133–144)
SOURCE: ABNORMAL
SP GR UR STRIP: 1.01 (ref 1–1.03)
SP GR UR STRIP: 1.01 (ref 1–1.03)
SP GR UR STRIP: 1.02 (ref 1–1.03)
SP GR UR STRIP: 1.02 (ref 1–1.03)
SPECIMEN EXP DATE BLD: NORMAL
SPECIMEN SOURCE: ABNORMAL
SPECIMEN SOURCE: NORMAL
SQUAMOUS #/AREA URNS AUTO: 1 /HPF (ref 0–1)
SQUAMOUS #/AREA URNS AUTO: 1 /HPF (ref 0–1)
SQUAMOUS #/AREA URNS AUTO: <1 /HPF (ref 0–1)
TIBC SERPL-MCNC: 413 UG/DL (ref 240–430)
TRANS CELLS #/AREA URNS HPF: <1 /HPF (ref 0–1)
TRANSFUSION STATUS PATIENT QL: NORMAL
TRIGL SERPL-MCNC: 139 MG/DL
TRIGL SERPL-MCNC: 97 MG/DL
TROPONIN I SERPL-MCNC: 0.02 UG/L (ref 0–0.04)
TROPONIN I SERPL-MCNC: 0.03 UG/L (ref 0–0.04)
TROPONIN I SERPL-MCNC: 0.06 UG/L (ref 0–0.04)
TROPONIN I SERPL-MCNC: 0.11 UG/L (ref 0–0.04)
TROPONIN I SERPL-MCNC: 0.11 UG/L (ref 0–0.04)
TROPONIN I SERPL-MCNC: 0.12 UG/L (ref 0–0.04)
TROPONIN I SERPL-MCNC: 0.13 UG/L (ref 0–0.04)
TROPONIN I SERPL-MCNC: 0.18 UG/L (ref 0–0.04)
TROPONIN I SERPL-MCNC: 0.2 UG/L (ref 0–0.04)
TROPONIN I SERPL-MCNC: 0.38 UG/L (ref 0–0.04)
TROPONIN I SERPL-MCNC: 0.38 UG/L (ref 0–0.04)
TROPONIN I SERPL-MCNC: 0.41 UG/L (ref 0–0.04)
TROPONIN I SERPL-MCNC: 0.45 UG/L (ref 0–0.04)
TROPONIN I SERPL-MCNC: 0.51 UG/L (ref 0–0.04)
TROPONIN I SERPL-MCNC: <0.015 UG/L (ref 0–0.04)
TROPONIN I SERPL-MCNC: <0.015 UG/L (ref 0–0.04)
TSH SERPL DL<=0.005 MIU/L-ACNC: 0.46 MU/L (ref 0.4–4)
UROBILINOGEN UR STRIP-MCNC: NORMAL MG/DL (ref 0–2)
VA-%PRED-PRE: 38 %
VA-PRE: 2.24 L
VANCOMYCIN SERPL-MCNC: 15.1 MG/L
VANCOMYCIN SERPL-MCNC: 16.4 MG/L
VANCOMYCIN SERPL-MCNC: 17.5 MG/L
VANCOMYCIN SERPL-MCNC: 19.7 MG/L
VANCOMYCIN SERPL-MCNC: 21.1 MG/L
VANCOMYCIN SERPL-MCNC: 21.3 MG/L
VANCOMYCIN SERPL-MCNC: 22.5 MG/L
VANCOMYCIN SERPL-MCNC: 23 MG/L
VANCOMYCIN SERPL-MCNC: 25 MG/L
VANCOMYCIN SERPL-MCNC: 8 MG/L
VC-%PRED-PRE: 31 %
VC-PRE: 1.33 L
VC-PRED: 4.26 L
WBC # BLD AUTO: 10.1 10E9/L (ref 4–11)
WBC # BLD AUTO: 10.1 10E9/L (ref 4–11)
WBC # BLD AUTO: 10.3 10E9/L (ref 4–11)
WBC # BLD AUTO: 10.7 10E9/L (ref 4–11)
WBC # BLD AUTO: 10.7 10E9/L (ref 4–11)
WBC # BLD AUTO: 10.8 10E9/L (ref 4–11)
WBC # BLD AUTO: 10.9 10E9/L (ref 4–11)
WBC # BLD AUTO: 11.1 10E9/L (ref 4–11)
WBC # BLD AUTO: 12.2 10E9/L (ref 4–11)
WBC # BLD AUTO: 12.3 10E9/L (ref 4–11)
WBC # BLD AUTO: 12.4 10E9/L (ref 4–11)
WBC # BLD AUTO: 12.5 10E9/L (ref 4–11)
WBC # BLD AUTO: 12.5 10E9/L (ref 4–11)
WBC # BLD AUTO: 12.9 10E9/L (ref 4–11)
WBC # BLD AUTO: 13 10E9/L (ref 4–11)
WBC # BLD AUTO: 13 10E9/L (ref 4–11)
WBC # BLD AUTO: 13.1 10E9/L (ref 4–11)
WBC # BLD AUTO: 13.2 10E9/L (ref 4–11)
WBC # BLD AUTO: 13.3 10E9/L (ref 4–11)
WBC # BLD AUTO: 13.3 10E9/L (ref 4–11)
WBC # BLD AUTO: 13.5 10E9/L (ref 4–11)
WBC # BLD AUTO: 13.7 10E9/L (ref 4–11)
WBC # BLD AUTO: 13.7 10E9/L (ref 4–11)
WBC # BLD AUTO: 13.9 10E9/L (ref 4–11)
WBC # BLD AUTO: 14 10E9/L (ref 4–11)
WBC # BLD AUTO: 14.2 10E9/L (ref 4–11)
WBC # BLD AUTO: 14.3 10E9/L (ref 4–11)
WBC # BLD AUTO: 14.4 10E9/L (ref 4–11)
WBC # BLD AUTO: 15 10E9/L (ref 4–11)
WBC # BLD AUTO: 15.7 10E9/L (ref 4–11)
WBC # BLD AUTO: 15.8 10E9/L (ref 4–11)
WBC # BLD AUTO: 16 10E9/L (ref 4–11)
WBC # BLD AUTO: 17.5 10E9/L (ref 4–11)
WBC # BLD AUTO: 18.4 10E9/L (ref 4–11)
WBC # BLD AUTO: 7.8 10E9/L (ref 4–11)
WBC # BLD AUTO: 8.5 10E9/L (ref 4–11)
WBC # BLD AUTO: 8.6 10E9/L (ref 4–11)
WBC # BLD AUTO: 8.7 10E9/L (ref 4–11)
WBC # BLD AUTO: 8.8 10E9/L (ref 4–11)
WBC # BLD AUTO: 8.9 10E9/L (ref 4–11)
WBC # BLD AUTO: 9.3 10E9/L (ref 4–11)
WBC # BLD AUTO: 9.4 10E9/L (ref 4–11)
WBC # BLD AUTO: 9.6 10E9/L (ref 4–11)
WBC # BLD AUTO: 9.7 10E9/L (ref 4–11)
WBC # BLD AUTO: 9.7 10E9/L (ref 4–11)
WBC # BLD AUTO: 9.8 10E9/L (ref 4–11)
WBC # BLD AUTO: 9.8 10E9/L (ref 4–11)
WBC #/AREA URNS AUTO: 1 /HPF (ref 0–5)
WBC #/AREA URNS AUTO: 3 /HPF (ref 0–5)
WBC #/AREA URNS AUTO: 4 /HPF (ref 0–5)
WBC #/AREA URNS AUTO: <1 /HPF (ref 0–5)

## 2019-01-01 PROCEDURE — 25000132 ZZH RX MED GY IP 250 OP 250 PS 637: Mod: GY | Performed by: NURSE PRACTITIONER

## 2019-01-01 PROCEDURE — 25800030 ZZH RX IP 258 OP 636: Performed by: INTERNAL MEDICINE

## 2019-01-01 PROCEDURE — A9270 NON-COVERED ITEM OR SERVICE: HCPCS | Performed by: PHYSICIAN ASSISTANT

## 2019-01-01 PROCEDURE — 12000000 ZZH R&B MED SURG/OB

## 2019-01-01 PROCEDURE — 80053 COMPREHEN METABOLIC PANEL: CPT | Performed by: INTERNAL MEDICINE

## 2019-01-01 PROCEDURE — 83605 ASSAY OF LACTIC ACID: CPT

## 2019-01-01 PROCEDURE — 85610 PROTHROMBIN TIME: CPT | Performed by: PHYSICIAN ASSISTANT

## 2019-01-01 PROCEDURE — 99232 SBSQ HOSP IP/OBS MODERATE 35: CPT | Performed by: HOSPITALIST

## 2019-01-01 PROCEDURE — 99285 EMERGENCY DEPT VISIT HI MDM: CPT | Mod: 25 | Performed by: EMERGENCY MEDICINE

## 2019-01-01 PROCEDURE — 40000275 ZZH STATISTIC RCP TIME EA 10 MIN

## 2019-01-01 PROCEDURE — 81001 URINALYSIS AUTO W/SCOPE: CPT | Performed by: EMERGENCY MEDICINE

## 2019-01-01 PROCEDURE — 85027 COMPLETE CBC AUTOMATED: CPT | Performed by: STUDENT IN AN ORGANIZED HEALTH CARE EDUCATION/TRAINING PROGRAM

## 2019-01-01 PROCEDURE — G0463 HOSPITAL OUTPT CLINIC VISIT: HCPCS | Mod: 27

## 2019-01-01 PROCEDURE — 25000132 ZZH RX MED GY IP 250 OP 250 PS 637: Mod: GY | Performed by: HOSPITALIST

## 2019-01-01 PROCEDURE — 25000128 H RX IP 250 OP 636: Performed by: INTERNAL MEDICINE

## 2019-01-01 PROCEDURE — 25000566 ZZH SEVOFLURANE, EA 15 MIN: Performed by: ORTHOPAEDIC SURGERY

## 2019-01-01 PROCEDURE — 80048 BASIC METABOLIC PNL TOTAL CA: CPT | Performed by: STUDENT IN AN ORGANIZED HEALTH CARE EDUCATION/TRAINING PROGRAM

## 2019-01-01 PROCEDURE — 36415 COLL VENOUS BLD VENIPUNCTURE: CPT | Performed by: INTERNAL MEDICINE

## 2019-01-01 PROCEDURE — 25000132 ZZH RX MED GY IP 250 OP 250 PS 637: Mod: GY | Performed by: INTERNAL MEDICINE

## 2019-01-01 PROCEDURE — 00000146 ZZHCL STATISTIC GLUCOSE BY METER IP

## 2019-01-01 PROCEDURE — C1751 CATH, INF, PER/CENT/MIDLINE: HCPCS

## 2019-01-01 PROCEDURE — 80202 ASSAY OF VANCOMYCIN: CPT | Performed by: INTERNAL MEDICINE

## 2019-01-01 PROCEDURE — 99309 SBSQ NF CARE MODERATE MDM 30: CPT | Performed by: NURSE PRACTITIONER

## 2019-01-01 PROCEDURE — 99214 OFFICE O/P EST MOD 30 MIN: CPT | Mod: ZP | Performed by: NURSE PRACTITIONER

## 2019-01-01 PROCEDURE — 25000128 H RX IP 250 OP 636: Performed by: STUDENT IN AN ORGANIZED HEALTH CARE EDUCATION/TRAINING PROGRAM

## 2019-01-01 PROCEDURE — 25000125 ZZHC RX 250: Performed by: NURSE ANESTHETIST, CERTIFIED REGISTERED

## 2019-01-01 PROCEDURE — 97530 THERAPEUTIC ACTIVITIES: CPT | Mod: GP

## 2019-01-01 PROCEDURE — 87040 BLOOD CULTURE FOR BACTERIA: CPT | Performed by: INTERNAL MEDICINE

## 2019-01-01 PROCEDURE — 84100 ASSAY OF PHOSPHORUS: CPT | Performed by: STUDENT IN AN ORGANIZED HEALTH CARE EDUCATION/TRAINING PROGRAM

## 2019-01-01 PROCEDURE — 0QH204Z INSERTION OF INTERNAL FIXATION DEVICE INTO RIGHT PELVIC BONE, OPEN APPROACH: ICD-10-PCS | Performed by: NEUROLOGICAL SURGERY

## 2019-01-01 PROCEDURE — T1013 SIGN LANG/ORAL INTERPRETER: HCPCS | Mod: U3

## 2019-01-01 PROCEDURE — 25000128 H RX IP 250 OP 636: Performed by: PHYSICIAN ASSISTANT

## 2019-01-01 PROCEDURE — 85049 AUTOMATED PLATELET COUNT: CPT | Performed by: NURSE PRACTITIONER

## 2019-01-01 PROCEDURE — 25000125 ZZHC RX 250: Performed by: INTERNAL MEDICINE

## 2019-01-01 PROCEDURE — 96360 HYDRATION IV INFUSION INIT: CPT | Performed by: EMERGENCY MEDICINE

## 2019-01-01 PROCEDURE — 71260 CT THORAX DX C+: CPT

## 2019-01-01 PROCEDURE — 94640 AIRWAY INHALATION TREATMENT: CPT

## 2019-01-01 PROCEDURE — 93005 ELECTROCARDIOGRAM TRACING: CPT | Performed by: EMERGENCY MEDICINE

## 2019-01-01 PROCEDURE — 84484 ASSAY OF TROPONIN QUANT: CPT | Performed by: HOSPITALIST

## 2019-01-01 PROCEDURE — 25000132 ZZH RX MED GY IP 250 OP 250 PS 637

## 2019-01-01 PROCEDURE — 85025 COMPLETE CBC W/AUTO DIFF WBC: CPT | Performed by: EMERGENCY MEDICINE

## 2019-01-01 PROCEDURE — 36415 COLL VENOUS BLD VENIPUNCTURE: CPT | Performed by: SOCIAL WORKER

## 2019-01-01 PROCEDURE — 36415 COLL VENOUS BLD VENIPUNCTURE: CPT | Performed by: STUDENT IN AN ORGANIZED HEALTH CARE EDUCATION/TRAINING PROGRAM

## 2019-01-01 PROCEDURE — 0KXG0ZZ TRANSFER LEFT TRUNK MUSCLE, OPEN APPROACH: ICD-10-PCS | Performed by: NEUROLOGICAL SURGERY

## 2019-01-01 PROCEDURE — 12000001 ZZH R&B MED SURG/OB UMMC

## 2019-01-01 PROCEDURE — A9270 NON-COVERED ITEM OR SERVICE: HCPCS | Performed by: STUDENT IN AN ORGANIZED HEALTH CARE EDUCATION/TRAINING PROGRAM

## 2019-01-01 PROCEDURE — 37000009 ZZH ANESTHESIA TECHNICAL FEE, EACH ADDTL 15 MIN: Performed by: INTERNAL MEDICINE

## 2019-01-01 PROCEDURE — 25000132 ZZH RX MED GY IP 250 OP 250 PS 637: Mod: GY | Performed by: PHYSICIAN ASSISTANT

## 2019-01-01 PROCEDURE — 83605 ASSAY OF LACTIC ACID: CPT | Performed by: STUDENT IN AN ORGANIZED HEALTH CARE EDUCATION/TRAINING PROGRAM

## 2019-01-01 PROCEDURE — 94640 AIRWAY INHALATION TREATMENT: CPT | Mod: 76

## 2019-01-01 PROCEDURE — 36415 COLL VENOUS BLD VENIPUNCTURE: CPT | Performed by: PHYSICIAN ASSISTANT

## 2019-01-01 PROCEDURE — 99285 EMERGENCY DEPT VISIT HI MDM: CPT | Mod: 25

## 2019-01-01 PROCEDURE — 87086 URINE CULTURE/COLONY COUNT: CPT | Performed by: EMERGENCY MEDICINE

## 2019-01-01 PROCEDURE — 36415 COLL VENOUS BLD VENIPUNCTURE: CPT

## 2019-01-01 PROCEDURE — 82565 ASSAY OF CREATININE: CPT | Performed by: INTERNAL MEDICINE

## 2019-01-01 PROCEDURE — 82803 BLOOD GASES ANY COMBINATION: CPT

## 2019-01-01 PROCEDURE — 36592 COLLECT BLOOD FROM PICC: CPT | Performed by: INTERNAL MEDICINE

## 2019-01-01 PROCEDURE — 40000983 ZZH STATISTIC HFNC ADULT NON-CPAP

## 2019-01-01 PROCEDURE — 99207 ZZC CDG-MDM COMPONENT: MEETS LOW - DOWN CODED: CPT | Performed by: NURSE PRACTITIONER

## 2019-01-01 PROCEDURE — 25000132 ZZH RX MED GY IP 250 OP 250 PS 637: Performed by: PHYSICIAN ASSISTANT

## 2019-01-01 PROCEDURE — 97535 SELF CARE MNGMENT TRAINING: CPT | Mod: GO

## 2019-01-01 PROCEDURE — T1013 SIGN LANG/ORAL INTERPRETER: HCPCS | Mod: U3,ZF

## 2019-01-01 PROCEDURE — 25800030 ZZH RX IP 258 OP 636: Performed by: ORTHOPAEDIC SURGERY

## 2019-01-01 PROCEDURE — 80048 BASIC METABOLIC PNL TOTAL CA: CPT | Performed by: INTERNAL MEDICINE

## 2019-01-01 PROCEDURE — 99232 SBSQ HOSP IP/OBS MODERATE 35: CPT | Mod: 24 | Performed by: INTERNAL MEDICINE

## 2019-01-01 PROCEDURE — 97535 SELF CARE MNGMENT TRAINING: CPT | Mod: GO | Performed by: OCCUPATIONAL THERAPIST

## 2019-01-01 PROCEDURE — 80048 BASIC METABOLIC PNL TOTAL CA: CPT | Performed by: EMERGENCY MEDICINE

## 2019-01-01 PROCEDURE — 99238 HOSP IP/OBS DSCHRG MGMT 30/<: CPT | Performed by: PHYSICIAN ASSISTANT

## 2019-01-01 PROCEDURE — 25000128 H RX IP 250 OP 636: Performed by: EMERGENCY MEDICINE

## 2019-01-01 PROCEDURE — 25000132 ZZH RX MED GY IP 250 OP 250 PS 637: Performed by: NURSE PRACTITIONER

## 2019-01-01 PROCEDURE — A9270 NON-COVERED ITEM OR SERVICE: HCPCS | Performed by: ORTHOPAEDIC SURGERY

## 2019-01-01 PROCEDURE — 93005 ELECTROCARDIOGRAM TRACING: CPT

## 2019-01-01 PROCEDURE — 21400000 ZZH R&B CCU UMMC

## 2019-01-01 PROCEDURE — 99233 SBSQ HOSP IP/OBS HIGH 50: CPT | Mod: GC | Performed by: INTERNAL MEDICINE

## 2019-01-01 PROCEDURE — 85027 COMPLETE CBC AUTOMATED: CPT | Performed by: PHYSICIAN ASSISTANT

## 2019-01-01 PROCEDURE — A9270 NON-COVERED ITEM OR SERVICE: HCPCS | Performed by: INTERNAL MEDICINE

## 2019-01-01 PROCEDURE — 25000125 ZZHC RX 250: Performed by: HOSPITALIST

## 2019-01-01 PROCEDURE — 25800030 ZZH RX IP 258 OP 636: Performed by: PHYSICIAN ASSISTANT

## 2019-01-01 PROCEDURE — 83605 ASSAY OF LACTIC ACID: CPT | Performed by: INTERNAL MEDICINE

## 2019-01-01 PROCEDURE — 99232 SBSQ HOSP IP/OBS MODERATE 35: CPT | Mod: 25 | Performed by: INTERNAL MEDICINE

## 2019-01-01 PROCEDURE — 25800030 ZZH RX IP 258 OP 636

## 2019-01-01 PROCEDURE — 99215 OFFICE O/P EST HI 40 MIN: CPT | Mod: GC | Performed by: INTERNAL MEDICINE

## 2019-01-01 PROCEDURE — G0463 HOSPITAL OUTPT CLINIC VISIT: HCPCS | Mod: ZF

## 2019-01-01 PROCEDURE — 99232 SBSQ HOSP IP/OBS MODERATE 35: CPT | Performed by: INTERNAL MEDICINE

## 2019-01-01 PROCEDURE — 85049 AUTOMATED PLATELET COUNT: CPT | Performed by: INTERNAL MEDICINE

## 2019-01-01 PROCEDURE — 36600 WITHDRAWAL OF ARTERIAL BLOOD: CPT

## 2019-01-01 PROCEDURE — 86140 C-REACTIVE PROTEIN: CPT | Performed by: INTERNAL MEDICINE

## 2019-01-01 PROCEDURE — L8699 PROSTHETIC IMPLANT NOS: HCPCS | Performed by: ORTHOPAEDIC SURGERY

## 2019-01-01 PROCEDURE — 85027 COMPLETE CBC AUTOMATED: CPT | Performed by: INTERNAL MEDICINE

## 2019-01-01 PROCEDURE — 83605 ASSAY OF LACTIC ACID: CPT | Performed by: HOSPITALIST

## 2019-01-01 PROCEDURE — G0463 HOSPITAL OUTPT CLINIC VISIT: HCPCS | Mod: 25,ZF

## 2019-01-01 PROCEDURE — 27211024 ZZHC OR SUPPLY OTHER OPNP: Performed by: INTERNAL MEDICINE

## 2019-01-01 PROCEDURE — 84484 ASSAY OF TROPONIN QUANT: CPT | Performed by: INTERNAL MEDICINE

## 2019-01-01 PROCEDURE — 40000558 ZZH STATISTIC CVC DRESSING CHANGE

## 2019-01-01 PROCEDURE — 97116 GAIT TRAINING THERAPY: CPT | Mod: GP | Performed by: PHYSICAL THERAPIST

## 2019-01-01 PROCEDURE — 87040 BLOOD CULTURE FOR BACTERIA: CPT | Performed by: EMERGENCY MEDICINE

## 2019-01-01 PROCEDURE — 99207 ZZC APP CREDIT; MD BILLING SHARED VISIT: CPT | Performed by: PHYSICIAN ASSISTANT

## 2019-01-01 PROCEDURE — 84132 ASSAY OF SERUM POTASSIUM: CPT

## 2019-01-01 PROCEDURE — 25000131 ZZH RX MED GY IP 250 OP 636 PS 637: Performed by: PHYSICIAN ASSISTANT

## 2019-01-01 PROCEDURE — 12000004 ZZH R&B IMCU UMMC

## 2019-01-01 PROCEDURE — 99233 SBSQ HOSP IP/OBS HIGH 50: CPT | Performed by: INTERNAL MEDICINE

## 2019-01-01 PROCEDURE — 83880 ASSAY OF NATRIURETIC PEPTIDE: CPT | Performed by: INTERNAL MEDICINE

## 2019-01-01 PROCEDURE — 87640 STAPH A DNA AMP PROBE: CPT | Performed by: PHYSICIAN ASSISTANT

## 2019-01-01 PROCEDURE — 99310 SBSQ NF CARE HIGH MDM 45: CPT | Performed by: NURSE PRACTITIONER

## 2019-01-01 PROCEDURE — 93922 UPR/L XTREMITY ART 2 LEVELS: CPT

## 2019-01-01 PROCEDURE — 27210794 ZZH OR GENERAL SUPPLY STERILE: Performed by: ORTHOPAEDIC SURGERY

## 2019-01-01 PROCEDURE — 87205 SMEAR GRAM STAIN: CPT | Performed by: INTERNAL MEDICINE

## 2019-01-01 PROCEDURE — 25000132 ZZH RX MED GY IP 250 OP 250 PS 637: Performed by: INTERNAL MEDICINE

## 2019-01-01 PROCEDURE — 40000986 XR CHEST PORT 1 VW

## 2019-01-01 PROCEDURE — 25000128 H RX IP 250 OP 636

## 2019-01-01 PROCEDURE — 99223 1ST HOSP IP/OBS HIGH 75: CPT | Mod: AI | Performed by: HOSPITALIST

## 2019-01-01 PROCEDURE — 0RG60AJ FUSION OF THORACIC VERTEBRAL JOINT WITH INTERBODY FUSION DEVICE, POSTERIOR APPROACH, ANTERIOR COLUMN, OPEN APPROACH: ICD-10-PCS | Performed by: NEUROLOGICAL SURGERY

## 2019-01-01 PROCEDURE — 36415 COLL VENOUS BLD VENIPUNCTURE: CPT | Performed by: HOSPITALIST

## 2019-01-01 PROCEDURE — 21000001 ZZH R&B HEART CARE

## 2019-01-01 PROCEDURE — 83735 ASSAY OF MAGNESIUM: CPT | Performed by: PHYSICIAN ASSISTANT

## 2019-01-01 PROCEDURE — 93010 ELECTROCARDIOGRAM REPORT: CPT | Mod: 76 | Performed by: INTERNAL MEDICINE

## 2019-01-01 PROCEDURE — 80307 DRUG TEST PRSMV CHEM ANLYZR: CPT | Mod: 90 | Performed by: PHYSICAL MEDICINE & REHABILITATION

## 2019-01-01 PROCEDURE — 80048 BASIC METABOLIC PNL TOTAL CA: CPT | Performed by: PHYSICIAN ASSISTANT

## 2019-01-01 PROCEDURE — 72080 X-RAY EXAM THORACOLMB 2/> VW: CPT

## 2019-01-01 PROCEDURE — 93306 TTE W/DOPPLER COMPLETE: CPT | Mod: 26 | Performed by: INTERNAL MEDICINE

## 2019-01-01 PROCEDURE — A9270 NON-COVERED ITEM OR SERVICE: HCPCS | Performed by: EMERGENCY MEDICINE

## 2019-01-01 PROCEDURE — 85025 COMPLETE CBC W/AUTO DIFF WBC: CPT | Performed by: PHYSICIAN ASSISTANT

## 2019-01-01 PROCEDURE — 84295 ASSAY OF SERUM SODIUM: CPT | Performed by: ORTHOPAEDIC SURGERY

## 2019-01-01 PROCEDURE — 99222 1ST HOSP IP/OBS MODERATE 55: CPT | Mod: 25 | Performed by: INTERNAL MEDICINE

## 2019-01-01 PROCEDURE — A9270 NON-COVERED ITEM OR SERVICE: HCPCS | Performed by: NURSE PRACTITIONER

## 2019-01-01 PROCEDURE — 83605 ASSAY OF LACTIC ACID: CPT | Performed by: ANESTHESIOLOGY

## 2019-01-01 PROCEDURE — 97110 THERAPEUTIC EXERCISES: CPT | Mod: GP | Performed by: PHYSICAL THERAPIST

## 2019-01-01 PROCEDURE — 84484 ASSAY OF TROPONIN QUANT: CPT | Performed by: EMERGENCY MEDICINE

## 2019-01-01 PROCEDURE — 97530 THERAPEUTIC ACTIVITIES: CPT | Mod: GP | Performed by: PHYSICAL THERAPIST

## 2019-01-01 PROCEDURE — 71046 X-RAY EXAM CHEST 2 VIEWS: CPT

## 2019-01-01 PROCEDURE — 3E0U0GB INTRODUCTION OF RECOMBINANT BONE MORPHOGENETIC PROTEIN INTO JOINTS, OPEN APPROACH: ICD-10-PCS | Performed by: ORTHOPAEDIC SURGERY

## 2019-01-01 PROCEDURE — 40000014 ZZH STATISTIC ARTERIAL MONITORING DAILY

## 2019-01-01 PROCEDURE — 40000802 ZZH SITE CHECK

## 2019-01-01 PROCEDURE — 25000131 ZZH RX MED GY IP 250 OP 636 PS 637: Mod: GY | Performed by: HOSPITALIST

## 2019-01-01 PROCEDURE — 93010 ELECTROCARDIOGRAM REPORT: CPT | Performed by: INTERNAL MEDICINE

## 2019-01-01 PROCEDURE — 36592 COLLECT BLOOD FROM PICC: CPT | Performed by: PHYSICIAN ASSISTANT

## 2019-01-01 PROCEDURE — 85610 PROTHROMBIN TIME: CPT | Performed by: EMERGENCY MEDICINE

## 2019-01-01 PROCEDURE — 87641 MR-STAPH DNA AMP PROBE: CPT | Performed by: ORTHOPAEDIC SURGERY

## 2019-01-01 PROCEDURE — 80053 COMPREHEN METABOLIC PANEL: CPT | Performed by: PHYSICIAN ASSISTANT

## 2019-01-01 PROCEDURE — 74174 CTA ABD&PLVS W/CONTRAST: CPT

## 2019-01-01 PROCEDURE — 70450 CT HEAD/BRAIN W/O DYE: CPT

## 2019-01-01 PROCEDURE — 99232 SBSQ HOSP IP/OBS MODERATE 35: CPT | Mod: GC | Performed by: PEDIATRICS

## 2019-01-01 PROCEDURE — 99152 MOD SED SAME PHYS/QHP 5/>YRS: CPT | Performed by: INTERNAL MEDICINE

## 2019-01-01 PROCEDURE — A9270 NON-COVERED ITEM OR SERVICE: HCPCS

## 2019-01-01 PROCEDURE — 83735 ASSAY OF MAGNESIUM: CPT | Performed by: STUDENT IN AN ORGANIZED HEALTH CARE EDUCATION/TRAINING PROGRAM

## 2019-01-01 PROCEDURE — 25800030 ZZH RX IP 258 OP 636: Performed by: NURSE ANESTHETIST, CERTIFIED REGISTERED

## 2019-01-01 PROCEDURE — 83880 ASSAY OF NATRIURETIC PEPTIDE: CPT | Performed by: EMERGENCY MEDICINE

## 2019-01-01 PROCEDURE — 37000008 ZZH ANESTHESIA TECHNICAL FEE, 1ST 30 MIN: Performed by: INTERNAL MEDICINE

## 2019-01-01 PROCEDURE — 97161 PT EVAL LOW COMPLEX 20 MIN: CPT | Mod: GP | Performed by: PHYSICAL THERAPIST

## 2019-01-01 PROCEDURE — 83970 ASSAY OF PARATHORMONE: CPT | Performed by: NURSE PRACTITIONER

## 2019-01-01 PROCEDURE — 27211020 ZZHC OR CELL SAVER OPNP: Performed by: ORTHOPAEDIC SURGERY

## 2019-01-01 PROCEDURE — 99221 1ST HOSP IP/OBS SF/LOW 40: CPT | Performed by: SURGERY

## 2019-01-01 PROCEDURE — 97535 SELF CARE MNGMENT TRAINING: CPT | Mod: GO | Performed by: OCCUPATIONAL THERAPY ASSISTANT

## 2019-01-01 PROCEDURE — C1785 PMKR, DUAL, RATE-RESP: HCPCS | Performed by: INTERNAL MEDICINE

## 2019-01-01 PROCEDURE — 94799 UNLISTED PULMONARY SVC/PX: CPT

## 2019-01-01 PROCEDURE — 80048 BASIC METABOLIC PNL TOTAL CA: CPT | Performed by: ORTHOPAEDIC SURGERY

## 2019-01-01 PROCEDURE — 36415 COLL VENOUS BLD VENIPUNCTURE: CPT | Performed by: NURSE PRACTITIONER

## 2019-01-01 PROCEDURE — 83735 ASSAY OF MAGNESIUM: CPT | Performed by: EMERGENCY MEDICINE

## 2019-01-01 PROCEDURE — 71045 X-RAY EXAM CHEST 1 VIEW: CPT

## 2019-01-01 PROCEDURE — 0JH606Z INSERTION OF PACEMAKER, DUAL CHAMBER INTO CHEST SUBCUTANEOUS TISSUE AND FASCIA, OPEN APPROACH: ICD-10-PCS | Performed by: INTERNAL MEDICINE

## 2019-01-01 PROCEDURE — 80048 BASIC METABOLIC PNL TOTAL CA: CPT | Performed by: NURSE PRACTITIONER

## 2019-01-01 PROCEDURE — 97110 THERAPEUTIC EXERCISES: CPT | Mod: GO

## 2019-01-01 PROCEDURE — 84132 ASSAY OF SERUM POTASSIUM: CPT | Performed by: HOSPITALIST

## 2019-01-01 PROCEDURE — 71000015 ZZH RECOVERY PHASE 1 LEVEL 2 EA ADDTL HR: Performed by: ORTHOPAEDIC SURGERY

## 2019-01-01 PROCEDURE — C1713 ANCHOR/SCREW BN/BN,TIS/BN: HCPCS | Performed by: ORTHOPAEDIC SURGERY

## 2019-01-01 PROCEDURE — 99214 OFFICE O/P EST MOD 30 MIN: CPT | Mod: 24 | Performed by: NURSE PRACTITIONER

## 2019-01-01 PROCEDURE — 37000008 ZZH ANESTHESIA TECHNICAL FEE, 1ST 30 MIN: Performed by: ORTHOPAEDIC SURGERY

## 2019-01-01 PROCEDURE — 99309 SBSQ NF CARE MODERATE MDM 30: CPT | Performed by: INTERNAL MEDICINE

## 2019-01-01 PROCEDURE — 40000048 ZZH STATISTIC DAILY SWAN MONITORING

## 2019-01-01 PROCEDURE — 99284 EMERGENCY DEPT VISIT MOD MDM: CPT | Mod: 25

## 2019-01-01 PROCEDURE — 84484 ASSAY OF TROPONIN QUANT: CPT | Performed by: STUDENT IN AN ORGANIZED HEALTH CARE EDUCATION/TRAINING PROGRAM

## 2019-01-01 PROCEDURE — 25000128 H RX IP 250 OP 636: Performed by: NURSE ANESTHETIST, CERTIFIED REGISTERED

## 2019-01-01 PROCEDURE — 99207 ZZC DOWN CODE DUE TO SUBSEQUENT EXAM: CPT | Performed by: PHYSICAL MEDICINE & REHABILITATION

## 2019-01-01 PROCEDURE — 27211417 ZZ H KIT, VPS RHYTHM STYLET

## 2019-01-01 PROCEDURE — 99239 HOSP IP/OBS DSCHRG MGMT >30: CPT | Performed by: PHYSICIAN ASSISTANT

## 2019-01-01 PROCEDURE — 84484 ASSAY OF TROPONIN QUANT: CPT | Performed by: PHYSICIAN ASSISTANT

## 2019-01-01 PROCEDURE — 84132 ASSAY OF SERUM POTASSIUM: CPT | Performed by: ANESTHESIOLOGY

## 2019-01-01 PROCEDURE — 25000132 ZZH RX MED GY IP 250 OP 250 PS 637: Mod: GY | Performed by: STUDENT IN AN ORGANIZED HEALTH CARE EDUCATION/TRAINING PROGRAM

## 2019-01-01 PROCEDURE — 25500064 ZZH RX 255 OP 636: Performed by: INTERNAL MEDICINE

## 2019-01-01 PROCEDURE — 02H63JZ INSERTION OF PACEMAKER LEAD INTO RIGHT ATRIUM, PERCUTANEOUS APPROACH: ICD-10-PCS | Performed by: INTERNAL MEDICINE

## 2019-01-01 PROCEDURE — 81001 URINALYSIS AUTO W/SCOPE: CPT | Performed by: INTERNAL MEDICINE

## 2019-01-01 PROCEDURE — 97116 GAIT TRAINING THERAPY: CPT | Mod: GP

## 2019-01-01 PROCEDURE — 99207 ZZC NON-BILLABLE SERV PER CHARTING: CPT | Performed by: INTERNAL MEDICINE

## 2019-01-01 PROCEDURE — 82805 BLOOD GASES W/O2 SATURATION: CPT | Performed by: INTERNAL MEDICINE

## 2019-01-01 PROCEDURE — 93460 R&L HRT ART/VENTRICLE ANGIO: CPT | Mod: 26 | Performed by: INTERNAL MEDICINE

## 2019-01-01 PROCEDURE — 25000125 ZZHC RX 250: Performed by: ORTHOPAEDIC SURGERY

## 2019-01-01 PROCEDURE — 40000171 ZZH STATISTIC PRE-PROCEDURE ASSESSMENT III: Performed by: INTERNAL MEDICINE

## 2019-01-01 PROCEDURE — 40000141 ZZH STATISTIC PERIPHERAL IV START W/O US GUIDANCE

## 2019-01-01 PROCEDURE — C1874 STENT, COATED/COV W/DEL SYS: HCPCS | Performed by: INTERNAL MEDICINE

## 2019-01-01 PROCEDURE — 25000125 ZZHC RX 250: Performed by: EMERGENCY MEDICINE

## 2019-01-01 PROCEDURE — 99308 SBSQ NF CARE LOW MDM 20: CPT | Performed by: NURSE PRACTITIONER

## 2019-01-01 PROCEDURE — 36593 DECLOT VASCULAR DEVICE: CPT

## 2019-01-01 PROCEDURE — 33208 INSRT HEART PM ATRIAL & VENT: CPT | Mod: KX | Performed by: INTERNAL MEDICINE

## 2019-01-01 PROCEDURE — 80202 ASSAY OF VANCOMYCIN: CPT | Performed by: ORTHOPAEDIC SURGERY

## 2019-01-01 PROCEDURE — 27211024 ZZHC OR SUPPLY OTHER OPNP: Performed by: ORTHOPAEDIC SURGERY

## 2019-01-01 PROCEDURE — 83605 ASSAY OF LACTIC ACID: CPT | Performed by: ORTHOPAEDIC SURGERY

## 2019-01-01 PROCEDURE — 96367 TX/PROPH/DG ADDL SEQ IV INF: CPT

## 2019-01-01 PROCEDURE — 40000556 ZZH STATISTIC PERIPHERAL IV START W US GUIDANCE

## 2019-01-01 PROCEDURE — 97165 OT EVAL LOW COMPLEX 30 MIN: CPT | Mod: GO

## 2019-01-01 PROCEDURE — 86140 C-REACTIVE PROTEIN: CPT | Performed by: PHYSICIAN ASSISTANT

## 2019-01-01 PROCEDURE — 99207 ZZC MOONLIGHTING INDICATOR: CPT | Performed by: INTERNAL MEDICINE

## 2019-01-01 PROCEDURE — 93010 ELECTROCARDIOGRAM REPORT: CPT | Mod: ZP | Performed by: INTERNAL MEDICINE

## 2019-01-01 PROCEDURE — 82533 TOTAL CORTISOL: CPT | Performed by: HOSPITALIST

## 2019-01-01 PROCEDURE — 25000132 ZZH RX MED GY IP 250 OP 250 PS 637: Performed by: STUDENT IN AN ORGANIZED HEALTH CARE EDUCATION/TRAINING PROGRAM

## 2019-01-01 PROCEDURE — 85347 COAGULATION TIME ACTIVATED: CPT | Mod: 91

## 2019-01-01 PROCEDURE — 83605 ASSAY OF LACTIC ACID: CPT | Performed by: SOCIAL WORKER

## 2019-01-01 PROCEDURE — 25000131 ZZH RX MED GY IP 250 OP 636 PS 637: Mod: GY | Performed by: INTERNAL MEDICINE

## 2019-01-01 PROCEDURE — 85520 HEPARIN ASSAY: CPT | Performed by: INTERNAL MEDICINE

## 2019-01-01 PROCEDURE — 25000132 ZZH RX MED GY IP 250 OP 250 PS 637: Performed by: ORTHOPAEDIC SURGERY

## 2019-01-01 PROCEDURE — C1769 GUIDE WIRE: HCPCS | Performed by: INTERNAL MEDICINE

## 2019-01-01 PROCEDURE — 83036 HEMOGLOBIN GLYCOSYLATED A1C: CPT | Performed by: STUDENT IN AN ORGANIZED HEALTH CARE EDUCATION/TRAINING PROGRAM

## 2019-01-01 PROCEDURE — 33208 INSRT HEART PM ATRIAL & VENT: CPT | Performed by: INTERNAL MEDICINE

## 2019-01-01 PROCEDURE — 93005 ELECTROCARDIOGRAM TRACING: CPT | Mod: ZF

## 2019-01-01 PROCEDURE — 20000004 ZZH R&B ICU UMMC

## 2019-01-01 PROCEDURE — 99222 1ST HOSP IP/OBS MODERATE 55: CPT | Mod: 24 | Performed by: INTERNAL MEDICINE

## 2019-01-01 PROCEDURE — C1779 LEAD, PMKR, TRANSVENOUS VDD: HCPCS | Performed by: INTERNAL MEDICINE

## 2019-01-01 PROCEDURE — 84145 PROCALCITONIN (PCT): CPT | Performed by: EMERGENCY MEDICINE

## 2019-01-01 PROCEDURE — 95940 IONM IN OPERATNG ROOM 15 MIN: CPT | Performed by: ORTHOPAEDIC SURGERY

## 2019-01-01 PROCEDURE — 93321 DOPPLER ECHO F-UP/LMTD STD: CPT | Mod: 26 | Performed by: INTERNAL MEDICINE

## 2019-01-01 PROCEDURE — 85730 THROMBOPLASTIN TIME PARTIAL: CPT | Performed by: ORTHOPAEDIC SURGERY

## 2019-01-01 PROCEDURE — 27210794 ZZH OR GENERAL SUPPLY STERILE: Performed by: INTERNAL MEDICINE

## 2019-01-01 PROCEDURE — 27211022 ZZHC OR IOM SUPPLIES OPNP: Performed by: ORTHOPAEDIC SURGERY

## 2019-01-01 PROCEDURE — 25000128 H RX IP 250 OP 636: Performed by: ORTHOPAEDIC SURGERY

## 2019-01-01 PROCEDURE — 99215 OFFICE O/P EST HI 40 MIN: CPT | Mod: ZP | Performed by: NURSE PRACTITIONER

## 2019-01-01 PROCEDURE — 00NY0ZZ RELEASE LUMBAR SPINAL CORD, OPEN APPROACH: ICD-10-PCS | Performed by: NEUROLOGICAL SURGERY

## 2019-01-01 PROCEDURE — 97530 THERAPEUTIC ACTIVITIES: CPT | Mod: GO | Performed by: OCCUPATIONAL THERAPY ASSISTANT

## 2019-01-01 PROCEDURE — 85652 RBC SED RATE AUTOMATED: CPT | Performed by: EMERGENCY MEDICINE

## 2019-01-01 PROCEDURE — 85027 COMPLETE CBC AUTOMATED: CPT | Performed by: HOSPITALIST

## 2019-01-01 PROCEDURE — 37000009 ZZH ANESTHESIA TECHNICAL FEE, EACH ADDTL 15 MIN: Performed by: ORTHOPAEDIC SURGERY

## 2019-01-01 PROCEDURE — 93463 DRUG ADMIN & HEMODYNMIC MEAS: CPT | Performed by: INTERNAL MEDICINE

## 2019-01-01 PROCEDURE — 02PA3MZ REMOVAL OF CARDIAC LEAD FROM HEART, PERCUTANEOUS APPROACH: ICD-10-PCS | Performed by: INTERNAL MEDICINE

## 2019-01-01 PROCEDURE — 84132 ASSAY OF SERUM POTASSIUM: CPT | Performed by: INTERNAL MEDICINE

## 2019-01-01 PROCEDURE — 25000125 ZZHC RX 250: Performed by: PHYSICIAN ASSISTANT

## 2019-01-01 PROCEDURE — 99214 OFFICE O/P EST MOD 30 MIN: CPT | Mod: ZP | Performed by: INTERNAL MEDICINE

## 2019-01-01 PROCEDURE — 80048 BASIC METABOLIC PNL TOTAL CA: CPT | Performed by: HOSPITALIST

## 2019-01-01 PROCEDURE — 93451 RIGHT HEART CATH: CPT | Mod: 26 | Performed by: INTERNAL MEDICINE

## 2019-01-01 PROCEDURE — 99233 SBSQ HOSP IP/OBS HIGH 50: CPT | Mod: 24 | Performed by: INTERNAL MEDICINE

## 2019-01-01 PROCEDURE — 97116 GAIT TRAINING THERAPY: CPT | Mod: GP | Performed by: REHABILITATION PRACTITIONER

## 2019-01-01 PROCEDURE — C1894 INTRO/SHEATH, NON-LASER: HCPCS | Performed by: INTERNAL MEDICINE

## 2019-01-01 PROCEDURE — 99223 1ST HOSP IP/OBS HIGH 75: CPT | Performed by: NURSE PRACTITIONER

## 2019-01-01 PROCEDURE — 40000985 XR LUMBAR SPINE PORT 2-3VW

## 2019-01-01 PROCEDURE — 99207 ZZC NO CHARGE FOLLOW UP PS: CPT

## 2019-01-01 PROCEDURE — 4A023N6 MEASUREMENT OF CARDIAC SAMPLING AND PRESSURE, RIGHT HEART, PERCUTANEOUS APPROACH: ICD-10-PCS | Performed by: INTERNAL MEDICINE

## 2019-01-01 PROCEDURE — 25000128 H RX IP 250 OP 636: Performed by: HOSPITALIST

## 2019-01-01 PROCEDURE — 93010 ELECTROCARDIOGRAM REPORT: CPT | Mod: 59 | Performed by: INTERNAL MEDICINE

## 2019-01-01 PROCEDURE — 97110 THERAPEUTIC EXERCISES: CPT | Mod: GO | Performed by: OCCUPATIONAL THERAPIST

## 2019-01-01 PROCEDURE — 36000070 ZZH SURGERY LEVEL 5 EA 15 ADDTL MIN - UMMC: Performed by: ORTHOPAEDIC SURGERY

## 2019-01-01 PROCEDURE — 36569 INSJ PICC 5 YR+ W/O IMAGING: CPT

## 2019-01-01 PROCEDURE — 83735 ASSAY OF MAGNESIUM: CPT | Performed by: HOSPITALIST

## 2019-01-01 PROCEDURE — P9016 RBC LEUKOCYTES REDUCED: HCPCS | Performed by: PHYSICIAN ASSISTANT

## 2019-01-01 PROCEDURE — 71000017 ZZH RECOVERY PHASE 1 LEVEL 3 EA ADDTL HR: Performed by: ORTHOPAEDIC SURGERY

## 2019-01-01 PROCEDURE — 74174 CTA ABD&PLVS W/CONTRAST: CPT | Mod: 26 | Performed by: INTERNAL MEDICINE

## 2019-01-01 PROCEDURE — 82947 ASSAY GLUCOSE BLOOD QUANT: CPT | Performed by: ANESTHESIOLOGY

## 2019-01-01 PROCEDURE — C1762 CONN TISS, HUMAN(INC FASCIA): HCPCS | Performed by: ORTHOPAEDIC SURGERY

## 2019-01-01 PROCEDURE — 88307 TISSUE EXAM BY PATHOLOGIST: CPT | Performed by: ORTHOPAEDIC SURGERY

## 2019-01-01 PROCEDURE — 25000131 ZZH RX MED GY IP 250 OP 636 PS 637: Performed by: INTERNAL MEDICINE

## 2019-01-01 PROCEDURE — 27210995 ZZH RX 272: Performed by: ORTHOPAEDIC SURGERY

## 2019-01-01 PROCEDURE — 71275 CT ANGIOGRAPHY CHEST: CPT | Mod: 26 | Performed by: INTERNAL MEDICINE

## 2019-01-01 PROCEDURE — 40000065 ZZH STATISTIC EKG NON-CHARGEABLE

## 2019-01-01 PROCEDURE — 85018 HEMOGLOBIN: CPT | Performed by: NURSE PRACTITIONER

## 2019-01-01 PROCEDURE — 93451 RIGHT HEART CATH: CPT | Performed by: INTERNAL MEDICINE

## 2019-01-01 PROCEDURE — 99223 1ST HOSP IP/OBS HIGH 75: CPT | Mod: AI | Performed by: INTERNAL MEDICINE

## 2019-01-01 PROCEDURE — 96360 HYDRATION IV INFUSION INIT: CPT

## 2019-01-01 PROCEDURE — 40000196 ZZH STATISTIC RAPCV CVP MONITORING

## 2019-01-01 PROCEDURE — 25000125 ZZHC RX 250

## 2019-01-01 PROCEDURE — 33361 REPLACE AORTIC VALVE PERQ: CPT | Mod: 62 | Performed by: INTERNAL MEDICINE

## 2019-01-01 PROCEDURE — 36415 COLL VENOUS BLD VENIPUNCTURE: CPT | Performed by: ORTHOPAEDIC SURGERY

## 2019-01-01 PROCEDURE — 83540 ASSAY OF IRON: CPT | Performed by: EMERGENCY MEDICINE

## 2019-01-01 PROCEDURE — 85049 AUTOMATED PLATELET COUNT: CPT | Performed by: STUDENT IN AN ORGANIZED HEALTH CARE EDUCATION/TRAINING PROGRAM

## 2019-01-01 PROCEDURE — 0SG10A0 FUSION OF 2 OR MORE LUMBAR VERTEBRAL JOINTS WITH INTERBODY FUSION DEVICE, ANTERIOR APPROACH, ANTERIOR COLUMN, OPEN APPROACH: ICD-10-PCS | Performed by: ORTHOPAEDIC SURGERY

## 2019-01-01 PROCEDURE — 99223 1ST HOSP IP/OBS HIGH 75: CPT | Mod: GC | Performed by: INTERNAL MEDICINE

## 2019-01-01 PROCEDURE — 93308 TTE F-UP OR LMTD: CPT | Mod: 26 | Performed by: INTERNAL MEDICINE

## 2019-01-01 PROCEDURE — 0RG7071 FUSION OF 2 TO 7 THORACIC VERTEBRAL JOINTS WITH AUTOLOGOUS TISSUE SUBSTITUTE, POSTERIOR APPROACH, POSTERIOR COLUMN, OPEN APPROACH: ICD-10-PCS | Performed by: NEUROLOGICAL SURGERY

## 2019-01-01 PROCEDURE — 86923 COMPATIBILITY TEST ELECTRIC: CPT | Performed by: PHYSICIAN ASSISTANT

## 2019-01-01 PROCEDURE — 99291 CRITICAL CARE FIRST HOUR: CPT | Mod: 25

## 2019-01-01 PROCEDURE — 99239 HOSP IP/OBS DSCHRG MGMT >30: CPT | Performed by: INTERNAL MEDICINE

## 2019-01-01 PROCEDURE — 84295 ASSAY OF SERUM SODIUM: CPT

## 2019-01-01 PROCEDURE — 85610 PROTHROMBIN TIME: CPT | Performed by: INTERNAL MEDICINE

## 2019-01-01 PROCEDURE — 80053 COMPREHEN METABOLIC PANEL: CPT | Performed by: EMERGENCY MEDICINE

## 2019-01-01 PROCEDURE — 0QH304Z INSERTION OF INTERNAL FIXATION DEVICE INTO LEFT PELVIC BONE, OPEN APPROACH: ICD-10-PCS | Performed by: NEUROLOGICAL SURGERY

## 2019-01-01 PROCEDURE — 25800030 ZZH RX IP 258 OP 636: Performed by: NURSE PRACTITIONER

## 2019-01-01 PROCEDURE — 94660 CPAP INITIATION&MGMT: CPT

## 2019-01-01 PROCEDURE — 92928 PRQ TCAT PLMT NTRAC ST 1 LES: CPT | Mod: LD | Performed by: INTERNAL MEDICINE

## 2019-01-01 PROCEDURE — 93503 INSERT/PLACE HEART CATHETER: CPT

## 2019-01-01 PROCEDURE — 97165 OT EVAL LOW COMPLEX 30 MIN: CPT | Mod: GO | Performed by: OCCUPATIONAL THERAPIST

## 2019-01-01 PROCEDURE — 71000016 ZZH RECOVERY PHASE 1 LEVEL 3 FIRST HR: Performed by: INTERNAL MEDICINE

## 2019-01-01 PROCEDURE — 36000072 ZZH SURGERY LEVEL 5 W FLUORO 1ST 30 MIN - UMMC: Performed by: ORTHOPAEDIC SURGERY

## 2019-01-01 PROCEDURE — 84132 ASSAY OF SERUM POTASSIUM: CPT | Performed by: ORTHOPAEDIC SURGERY

## 2019-01-01 PROCEDURE — 02RF38Z REPLACEMENT OF AORTIC VALVE WITH ZOOPLASTIC TISSUE, PERCUTANEOUS APPROACH: ICD-10-PCS | Performed by: INTERNAL MEDICINE

## 2019-01-01 PROCEDURE — X2A5312 CEREBRAL EMBOLIC FILTRATION, DUAL FILTER IN INNOMINATE ARTERY AND LEFT COMMON CAROTID ARTERY, PERCUTANEOUS APPROACH, NEW TECHNOLOGY GROUP 2: ICD-10-PCS | Performed by: INTERNAL MEDICINE

## 2019-01-01 PROCEDURE — 93010 ELECTROCARDIOGRAM REPORT: CPT | Mod: Z6 | Performed by: EMERGENCY MEDICINE

## 2019-01-01 PROCEDURE — 85610 PROTHROMBIN TIME: CPT | Performed by: ORTHOPAEDIC SURGERY

## 2019-01-01 PROCEDURE — 99203 OFFICE O/P NEW LOW 30 MIN: CPT | Performed by: PODIATRIST

## 2019-01-01 PROCEDURE — 36000074 ZZH SURGERY LEVEL 6 1ST 30 MIN - UMMC: Performed by: INTERNAL MEDICINE

## 2019-01-01 PROCEDURE — 82803 BLOOD GASES ANY COMBINATION: CPT | Performed by: ANESTHESIOLOGY

## 2019-01-01 PROCEDURE — 82565 ASSAY OF CREATININE: CPT | Performed by: PHYSICIAN ASSISTANT

## 2019-01-01 PROCEDURE — 99233 SBSQ HOSP IP/OBS HIGH 50: CPT | Performed by: HOSPITALIST

## 2019-01-01 PROCEDURE — 99223 1ST HOSP IP/OBS HIGH 75: CPT | Performed by: PHYSICIAN ASSISTANT

## 2019-01-01 PROCEDURE — 99214 OFFICE O/P EST MOD 30 MIN: CPT | Performed by: PODIATRIST

## 2019-01-01 PROCEDURE — 0SG1071 FUSION OF 2 OR MORE LUMBAR VERTEBRAL JOINTS WITH AUTOLOGOUS TISSUE SUBSTITUTE, POSTERIOR APPROACH, POSTERIOR COLUMN, OPEN APPROACH: ICD-10-PCS | Performed by: NEUROLOGICAL SURGERY

## 2019-01-01 PROCEDURE — 87186 SC STD MICRODIL/AGAR DIL: CPT | Performed by: ORTHOPAEDIC SURGERY

## 2019-01-01 PROCEDURE — 85347 COAGULATION TIME ACTIVATED: CPT

## 2019-01-01 PROCEDURE — 27810169 ZZH OR IMPLANT GENERAL: Performed by: INTERNAL MEDICINE

## 2019-01-01 PROCEDURE — 93288 INTERROG EVL PM/LDLS PM IP: CPT

## 2019-01-01 PROCEDURE — 96365 THER/PROPH/DIAG IV INF INIT: CPT | Mod: 59

## 2019-01-01 PROCEDURE — 71000014 ZZH RECOVERY PHASE 1 LEVEL 2 FIRST HR: Performed by: ORTHOPAEDIC SURGERY

## 2019-01-01 PROCEDURE — G0463 HOSPITAL OUTPT CLINIC VISIT: HCPCS

## 2019-01-01 PROCEDURE — 99233 SBSQ HOSP IP/OBS HIGH 50: CPT | Performed by: PEDIATRICS

## 2019-01-01 PROCEDURE — 87640 STAPH A DNA AMP PROBE: CPT | Performed by: ORTHOPAEDIC SURGERY

## 2019-01-01 PROCEDURE — 87641 MR-STAPH DNA AMP PROBE: CPT | Performed by: PHYSICIAN ASSISTANT

## 2019-01-01 PROCEDURE — 85384 FIBRINOGEN ACTIVITY: CPT | Performed by: ORTHOPAEDIC SURGERY

## 2019-01-01 PROCEDURE — C9600 PERC DRUG-EL COR STENT SING: HCPCS | Mod: LD | Performed by: INTERNAL MEDICINE

## 2019-01-01 PROCEDURE — 93308 TTE F-UP OR LMTD: CPT

## 2019-01-01 PROCEDURE — 84132 ASSAY OF SERUM POTASSIUM: CPT | Performed by: EMERGENCY MEDICINE

## 2019-01-01 PROCEDURE — 93925 LOWER EXTREMITY STUDY: CPT

## 2019-01-01 PROCEDURE — 82803 BLOOD GASES ANY COMBINATION: CPT | Performed by: ORTHOPAEDIC SURGERY

## 2019-01-01 PROCEDURE — 25800030 ZZH RX IP 258 OP 636: Performed by: EMERGENCY MEDICINE

## 2019-01-01 PROCEDURE — 86900 BLOOD TYPING SEROLOGIC ABO: CPT | Performed by: PHYSICIAN ASSISTANT

## 2019-01-01 PROCEDURE — 85018 HEMOGLOBIN: CPT | Performed by: HOSPITALIST

## 2019-01-01 PROCEDURE — 83605 ASSAY OF LACTIC ACID: CPT | Mod: 91 | Performed by: EMERGENCY MEDICINE

## 2019-01-01 PROCEDURE — 25000132 ZZH RX MED GY IP 250 OP 250 PS 637: Performed by: EMERGENCY MEDICINE

## 2019-01-01 PROCEDURE — 25800030 ZZH RX IP 258 OP 636: Performed by: STUDENT IN AN ORGANIZED HEALTH CARE EDUCATION/TRAINING PROGRAM

## 2019-01-01 PROCEDURE — C1760 CLOSURE DEV, VASC: HCPCS | Performed by: INTERNAL MEDICINE

## 2019-01-01 PROCEDURE — 40000277 XR SURGERY CARM FLUORO LESS THAN 5 MIN W STILLS: Mod: TC

## 2019-01-01 PROCEDURE — 33210 INSERT ELECTRD/PM CATH SNGL: CPT | Mod: GC | Performed by: INTERNAL MEDICINE

## 2019-01-01 PROCEDURE — 86850 RBC ANTIBODY SCREEN: CPT | Performed by: PHYSICIAN ASSISTANT

## 2019-01-01 PROCEDURE — 96361 HYDRATE IV INFUSION ADD-ON: CPT

## 2019-01-01 PROCEDURE — 36000078 ZZH SURGERY LEVEL 6 W FLUORO 1ST 30 MIN - UMMC: Performed by: ORTHOPAEDIC SURGERY

## 2019-01-01 PROCEDURE — 82947 ASSAY GLUCOSE BLOOD QUANT: CPT | Performed by: ORTHOPAEDIC SURGERY

## 2019-01-01 PROCEDURE — C1730 CATH, EP, 19 OR FEW ELECT: HCPCS | Performed by: INTERNAL MEDICINE

## 2019-01-01 PROCEDURE — 25000128 H RX IP 250 OP 636: Performed by: NURSE PRACTITIONER

## 2019-01-01 PROCEDURE — 85396 CLOTTING ASSAY WHOLE BLOOD: CPT | Performed by: ORTHOPAEDIC SURGERY

## 2019-01-01 PROCEDURE — 27211415

## 2019-01-01 PROCEDURE — 83605 ASSAY OF LACTIC ACID: CPT | Performed by: PATHOLOGY

## 2019-01-01 PROCEDURE — 99153 MOD SED SAME PHYS/QHP EA: CPT | Performed by: INTERNAL MEDICINE

## 2019-01-01 PROCEDURE — 87070 CULTURE OTHR SPECIMN AEROBIC: CPT | Performed by: ORTHOPAEDIC SURGERY

## 2019-01-01 PROCEDURE — 99207 ZZC CDG-CODE CATEGORY CHANGED: CPT | Performed by: HOSPITALIST

## 2019-01-01 PROCEDURE — 99214 OFFICE O/P EST MOD 30 MIN: CPT | Performed by: INTERNAL MEDICINE

## 2019-01-01 PROCEDURE — 82024 ASSAY OF ACTH: CPT | Performed by: INTERNAL MEDICINE

## 2019-01-01 PROCEDURE — 93306 TTE W/DOPPLER COMPLETE: CPT

## 2019-01-01 PROCEDURE — 82947 ASSAY GLUCOSE BLOOD QUANT: CPT

## 2019-01-01 PROCEDURE — 99207 ZZC APP CREDIT; MD BILLING SHARED VISIT: CPT | Performed by: INTERNAL MEDICINE

## 2019-01-01 PROCEDURE — 36592 COLLECT BLOOD FROM PICC: CPT | Performed by: ORTHOPAEDIC SURGERY

## 2019-01-01 PROCEDURE — 82306 VITAMIN D 25 HYDROXY: CPT | Performed by: ORTHOPAEDIC SURGERY

## 2019-01-01 PROCEDURE — 85049 AUTOMATED PLATELET COUNT: CPT | Performed by: PHYSICIAN ASSISTANT

## 2019-01-01 PROCEDURE — C1887 CATHETER, GUIDING: HCPCS | Performed by: INTERNAL MEDICINE

## 2019-01-01 PROCEDURE — 82962 GLUCOSE BLOOD TEST: CPT

## 2019-01-01 PROCEDURE — 99223 1ST HOSP IP/OBS HIGH 75: CPT | Mod: 25 | Performed by: INTERNAL MEDICINE

## 2019-01-01 PROCEDURE — 83550 IRON BINDING TEST: CPT | Performed by: EMERGENCY MEDICINE

## 2019-01-01 PROCEDURE — 93296 REM INTERROG EVL PM/IDS: CPT

## 2019-01-01 PROCEDURE — 93971 EXTREMITY STUDY: CPT | Mod: RT

## 2019-01-01 PROCEDURE — 0ST20ZZ RESECTION OF LUMBAR VERTEBRAL DISC, OPEN APPROACH: ICD-10-PCS | Performed by: ORTHOPAEDIC SURGERY

## 2019-01-01 PROCEDURE — 85049 AUTOMATED PLATELET COUNT: CPT | Performed by: ORTHOPAEDIC SURGERY

## 2019-01-01 PROCEDURE — 12000047 ZZH R&B IMCU

## 2019-01-01 PROCEDURE — 99213 OFFICE O/P EST LOW 20 MIN: CPT | Performed by: NURSE PRACTITIONER

## 2019-01-01 PROCEDURE — 40000257 ZZH STATISTIC CONSULT NO CHARGE VASC ACCESS

## 2019-01-01 PROCEDURE — B2111ZZ FLUOROSCOPY OF MULTIPLE CORONARY ARTERIES USING LOW OSMOLAR CONTRAST: ICD-10-PCS | Performed by: INTERNAL MEDICINE

## 2019-01-01 PROCEDURE — 99221 1ST HOSP IP/OBS SF/LOW 40: CPT | Mod: GC | Performed by: INTERNAL MEDICINE

## 2019-01-01 PROCEDURE — 0KXF0ZZ TRANSFER RIGHT TRUNK MUSCLE, OPEN APPROACH: ICD-10-PCS | Performed by: NEUROLOGICAL SURGERY

## 2019-01-01 PROCEDURE — 93325 DOPPLER ECHO COLOR FLOW MAPG: CPT | Mod: 26 | Performed by: INTERNAL MEDICINE

## 2019-01-01 PROCEDURE — 36000076 ZZH SURGERY LEVEL 6 EA 15 ADDTL MIN - UMMC: Performed by: INTERNAL MEDICINE

## 2019-01-01 PROCEDURE — 87075 CULTR BACTERIA EXCEPT BLOOD: CPT | Performed by: ORTHOPAEDIC SURGERY

## 2019-01-01 PROCEDURE — 87040 BLOOD CULTURE FOR BACTERIA: CPT | Performed by: HOSPITALIST

## 2019-01-01 PROCEDURE — 83880 ASSAY OF NATRIURETIC PEPTIDE: CPT | Performed by: NURSE PRACTITIONER

## 2019-01-01 PROCEDURE — 86901 BLOOD TYPING SEROLOGIC RH(D): CPT | Performed by: PHYSICIAN ASSISTANT

## 2019-01-01 PROCEDURE — 90662 IIV NO PRSV INCREASED AG IM: CPT

## 2019-01-01 PROCEDURE — 85379 FIBRIN DEGRADATION QUANT: CPT | Performed by: EMERGENCY MEDICINE

## 2019-01-01 PROCEDURE — 85018 HEMOGLOBIN: CPT | Performed by: ORTHOPAEDIC SURGERY

## 2019-01-01 PROCEDURE — 85520 HEPARIN ASSAY: CPT | Performed by: PHYSICIAN ASSISTANT

## 2019-01-01 PROCEDURE — 99232 SBSQ HOSP IP/OBS MODERATE 35: CPT | Performed by: PHYSICIAN ASSISTANT

## 2019-01-01 PROCEDURE — 80061 LIPID PANEL: CPT | Performed by: HOSPITALIST

## 2019-01-01 PROCEDURE — 99203 OFFICE O/P NEW LOW 30 MIN: CPT | Performed by: PHYSICAL MEDICINE & REHABILITATION

## 2019-01-01 PROCEDURE — 99231 SBSQ HOSP IP/OBS SF/LOW 25: CPT | Performed by: NURSE PRACTITIONER

## 2019-01-01 PROCEDURE — 36415 COLL VENOUS BLD VENIPUNCTURE: CPT | Performed by: PATHOLOGY

## 2019-01-01 PROCEDURE — 83605 ASSAY OF LACTIC ACID: CPT | Performed by: PHYSICIAN ASSISTANT

## 2019-01-01 PROCEDURE — 41000018 ZZH PER-PERFUSION 1ST 30 MIN: Performed by: INTERNAL MEDICINE

## 2019-01-01 PROCEDURE — 87077 CULTURE AEROBIC IDENTIFY: CPT | Performed by: ORTHOPAEDIC SURGERY

## 2019-01-01 PROCEDURE — 82330 ASSAY OF CALCIUM: CPT

## 2019-01-01 PROCEDURE — 81001 URINALYSIS AUTO W/SCOPE: CPT | Performed by: HOSPITALIST

## 2019-01-01 PROCEDURE — 99306 1ST NF CARE HIGH MDM 50: CPT | Performed by: INTERNAL MEDICINE

## 2019-01-01 PROCEDURE — 71275 CT ANGIOGRAPHY CHEST: CPT

## 2019-01-01 PROCEDURE — 99207 ZZC CONSULT E&M CHANGED TO INITIAL LEVEL: CPT | Performed by: PHYSICIAN ASSISTANT

## 2019-01-01 PROCEDURE — 97161 PT EVAL LOW COMPLEX 20 MIN: CPT | Mod: GP

## 2019-01-01 PROCEDURE — 40000170 ZZH STATISTIC PRE-PROCEDURE ASSESSMENT II: Performed by: ORTHOPAEDIC SURGERY

## 2019-01-01 PROCEDURE — 0W9L0ZZ DRAINAGE OF LOWER BACK, OPEN APPROACH: ICD-10-PCS | Performed by: NEUROLOGICAL SURGERY

## 2019-01-01 PROCEDURE — 40000172 ZZH STATISTIC PROCEDURE PREP ONLY

## 2019-01-01 PROCEDURE — 99316 NF DSCHRG MGMT 30 MIN+: CPT | Performed by: NURSE PRACTITIONER

## 2019-01-01 PROCEDURE — 99232 SBSQ HOSP IP/OBS MODERATE 35: CPT | Performed by: SURGERY

## 2019-01-01 PROCEDURE — 84295 ASSAY OF SERUM SODIUM: CPT | Performed by: ANESTHESIOLOGY

## 2019-01-01 PROCEDURE — 82533 TOTAL CORTISOL: CPT | Performed by: INTERNAL MEDICINE

## 2019-01-01 PROCEDURE — 02HK3JZ INSERTION OF PACEMAKER LEAD INTO RIGHT VENTRICLE, PERCUTANEOUS APPROACH: ICD-10-PCS | Performed by: INTERNAL MEDICINE

## 2019-01-01 PROCEDURE — 99306 1ST NF CARE HIGH MDM 50: CPT | Mod: AI | Performed by: INTERNAL MEDICINE

## 2019-01-01 PROCEDURE — 80053 COMPREHEN METABOLIC PANEL: CPT | Performed by: STUDENT IN AN ORGANIZED HEALTH CARE EDUCATION/TRAINING PROGRAM

## 2019-01-01 PROCEDURE — 40000264 ECHOCARDIOGRAM COMPLETE

## 2019-01-01 PROCEDURE — C1725 CATH, TRANSLUMIN NON-LASER: HCPCS | Performed by: INTERNAL MEDICINE

## 2019-01-01 PROCEDURE — 36000076 ZZH SURGERY LEVEL 6 EA 15 ADDTL MIN - UMMC: Performed by: ORTHOPAEDIC SURGERY

## 2019-01-01 PROCEDURE — 97530 THERAPEUTIC ACTIVITIES: CPT | Mod: GO

## 2019-01-01 PROCEDURE — 99233 SBSQ HOSP IP/OBS HIGH 50: CPT | Performed by: PHYSICIAN ASSISTANT

## 2019-01-01 PROCEDURE — 8E0WXBF COMPUTER ASSISTED PROCEDURE OF TRUNK REGION, WITH FLUOROSCOPY: ICD-10-PCS | Performed by: NEUROLOGICAL SURGERY

## 2019-01-01 PROCEDURE — 99000 SPECIMEN HANDLING OFFICE-LAB: CPT | Performed by: PHYSICAL MEDICINE & REHABILITATION

## 2019-01-01 PROCEDURE — 80061 LIPID PANEL: CPT | Performed by: INTERNAL MEDICINE

## 2019-01-01 PROCEDURE — 0QS00ZZ REPOSITION LUMBAR VERTEBRA, OPEN APPROACH: ICD-10-PCS | Performed by: NEUROLOGICAL SURGERY

## 2019-01-01 PROCEDURE — 93970 EXTREMITY STUDY: CPT

## 2019-01-01 PROCEDURE — 71000016 ZZH RECOVERY PHASE 1 LEVEL 3 FIRST HR: Performed by: ORTHOPAEDIC SURGERY

## 2019-01-01 PROCEDURE — 93460 R&L HRT ART/VENTRICLE ANGIO: CPT | Mod: XU | Performed by: INTERNAL MEDICINE

## 2019-01-01 PROCEDURE — 97530 THERAPEUTIC ACTIVITIES: CPT | Mod: GP | Performed by: REHABILITATION PRACTITIONER

## 2019-01-01 PROCEDURE — 84443 ASSAY THYROID STIM HORMONE: CPT | Performed by: HOSPITALIST

## 2019-01-01 PROCEDURE — 99233 SBSQ HOSP IP/OBS HIGH 50: CPT | Mod: GC | Performed by: PEDIATRICS

## 2019-01-01 PROCEDURE — 99283 EMERGENCY DEPT VISIT LOW MDM: CPT | Mod: 25

## 2019-01-01 PROCEDURE — 84145 PROCALCITONIN (PCT): CPT | Performed by: HOSPITALIST

## 2019-01-01 PROCEDURE — 82330 ASSAY OF CALCIUM: CPT | Performed by: ANESTHESIOLOGY

## 2019-01-01 PROCEDURE — 83735 ASSAY OF MAGNESIUM: CPT | Performed by: INTERNAL MEDICINE

## 2019-01-01 PROCEDURE — 3E0U0GB INTRODUCTION OF RECOMBINANT BONE MORPHOGENETIC PROTEIN INTO JOINTS, OPEN APPROACH: ICD-10-PCS | Performed by: NEUROLOGICAL SURGERY

## 2019-01-01 PROCEDURE — 86140 C-REACTIVE PROTEIN: CPT | Performed by: EMERGENCY MEDICINE

## 2019-01-01 PROCEDURE — 82330 ASSAY OF CALCIUM: CPT | Performed by: ORTHOPAEDIC SURGERY

## 2019-01-01 PROCEDURE — 96374 THER/PROPH/DIAG INJ IV PUSH: CPT

## 2019-01-01 PROCEDURE — 85018 HEMOGLOBIN: CPT | Performed by: PHYSICIAN ASSISTANT

## 2019-01-01 PROCEDURE — 0SG10AJ FUSION OF 2 OR MORE LUMBAR VERTEBRAL JOINTS WITH INTERBODY FUSION DEVICE, POSTERIOR APPROACH, ANTERIOR COLUMN, OPEN APPROACH: ICD-10-PCS | Performed by: NEUROLOGICAL SURGERY

## 2019-01-01 PROCEDURE — 25000131 ZZH RX MED GY IP 250 OP 636 PS 637: Mod: GY | Performed by: STUDENT IN AN ORGANIZED HEALTH CARE EDUCATION/TRAINING PROGRAM

## 2019-01-01 PROCEDURE — 99207 ZZC OFFICE-HOSPITAL ADMIT: CPT | Performed by: INTERNAL MEDICINE

## 2019-01-01 PROCEDURE — 99207 ZZC NO CHARGE NEW CONSULT PS: CPT

## 2019-01-01 PROCEDURE — C1898 LEAD, PMKR, OTHER THAN TRANS: HCPCS | Performed by: INTERNAL MEDICINE

## 2019-01-01 DEVICE — IMPLANTABLE DEVICE: Type: IMPLANTABLE DEVICE | Site: ARTERIAL | Status: FUNCTIONAL

## 2019-01-01 DEVICE — IMP SCR MEDT 5.5/6.0MM SOLERA 6.5X55MM MA 55840006555: Type: IMPLANTABLE DEVICE | Site: THORACIC | Status: FUNCTIONAL

## 2019-01-01 DEVICE — STENT SYNERGY DRUG ELUTING 3.00X12MM  H7493926012300: Type: IMPLANTABLE DEVICE | Status: FUNCTIONAL

## 2019-01-01 DEVICE — STENT SYNERGY DRUG ELUTING 2.75X24MM  H7493926024270: Type: IMPLANTABLE DEVICE | Status: FUNCTIONAL

## 2019-01-01 DEVICE — IMP LEAD PACING BIPOLAR CAPSUREFIX NOVUS 45CM 5076-45: Type: IMPLANTABLE DEVICE | Status: FUNCTIONAL

## 2019-01-01 DEVICE — GRAFT BONE INFUSE BMP MED 7510400: Type: IMPLANTABLE DEVICE | Site: SPINE LUMBAR | Status: FUNCTIONAL

## 2019-01-01 DEVICE — GRAFT BONE INFUSE BMP LG 7510600: Type: IMPLANTABLE DEVICE | Site: SPINE LUMBAR | Status: FUNCTIONAL

## 2019-01-01 DEVICE — PCMKR CARD ACCOLADE MRI EL DR: Type: IMPLANTABLE DEVICE | Status: FUNCTIONAL

## 2019-01-01 DEVICE — IMP SCR MEDT 5.5/6.0MM SOLERA 8.5X50MM MA 55840008550: Type: IMPLANTABLE DEVICE | Site: THORACIC | Status: FUNCTIONAL

## 2019-01-01 DEVICE — GRAFT BONE CRUSH CANC 15ML 400075: Type: IMPLANTABLE DEVICE | Site: SPINE LUMBAR | Status: FUNCTIONAL

## 2019-01-01 DEVICE — IMP SCR MEDT 5.5/6.0MM SOLERA 7.5X55MM MA 55840007555: Type: IMPLANTABLE DEVICE | Site: THORACIC | Status: FUNCTIONAL

## 2019-01-01 DEVICE — IMP SCR MEDT 5.5/6.0MM SOLERA 7.5X50MM MA 55840007550: Type: IMPLANTABLE DEVICE | Site: THORACIC | Status: FUNCTIONAL

## 2019-01-01 DEVICE — IMP ROD MEDT SOLERA LINED 5.5X500MM CHR 1555200500: Type: IMPLANTABLE DEVICE | Site: THORACIC | Status: FUNCTIONAL

## 2019-01-01 DEVICE — IMPLANTABLE DEVICE: Type: IMPLANTABLE DEVICE | Site: SPINE LUMBAR | Status: FUNCTIONAL

## 2019-01-01 DEVICE — IMP SCR MEDT 5.5/6.0MM SOLERA 7.5X45MM MA 55840007545: Type: IMPLANTABLE DEVICE | Site: THORACIC | Status: FUNCTIONAL

## 2019-01-01 DEVICE — IMP SCR SET MEDT SOLERA BREAK OFF 5.5MM TI 5540030: Type: IMPLANTABLE DEVICE | Site: THORACIC | Status: FUNCTIONAL

## 2019-01-01 DEVICE — VALVE AORTIC EVOLUTPRO TAVR BIOPROSTH 29MM EVOLUTPRO-29-US: Type: IMPLANTABLE DEVICE | Site: HEART | Status: FUNCTIONAL

## 2019-01-01 DEVICE — IMPLANTABLE DEVICE: Type: IMPLANTABLE DEVICE | Site: HEART | Status: FUNCTIONAL

## 2019-01-01 DEVICE — IMP SCR MEDT 5.5/6.0MM SOLERA 8.5X55MM MA 55840008555: Type: IMPLANTABLE DEVICE | Site: THORACIC | Status: FUNCTIONAL

## 2019-01-01 DEVICE — GRAFT BONE CRUSH CANC 30ML 400080: Type: IMPLANTABLE DEVICE | Site: THORACIC | Status: FUNCTIONAL

## 2019-01-01 RX ORDER — ONDANSETRON 2 MG/ML
4 INJECTION INTRAMUSCULAR; INTRAVENOUS EVERY 6 HOURS PRN
Status: DISCONTINUED | OUTPATIENT
Start: 2019-01-01 | End: 2019-01-01 | Stop reason: HOSPADM

## 2019-01-01 RX ORDER — POTASSIUM CHLORIDE 750 MG/1
40 TABLET, EXTENDED RELEASE ORAL
Status: DISCONTINUED | OUTPATIENT
Start: 2019-01-01 | End: 2019-01-01 | Stop reason: HOSPADM

## 2019-01-01 RX ORDER — LIDOCAINE 40 MG/G
CREAM TOPICAL
Status: DISCONTINUED | OUTPATIENT
Start: 2019-01-01 | End: 2019-01-01 | Stop reason: HOSPADM

## 2019-01-01 RX ORDER — NICOTINE POLACRILEX 4 MG
15-30 LOZENGE BUCCAL
Status: DISCONTINUED | OUTPATIENT
Start: 2019-01-01 | End: 2019-01-01

## 2019-01-01 RX ORDER — IOPAMIDOL 755 MG/ML
62 INJECTION, SOLUTION INTRAVASCULAR ONCE
Status: COMPLETED | OUTPATIENT
Start: 2019-01-01 | End: 2019-01-01

## 2019-01-01 RX ORDER — ALBUTEROL SULFATE 0.83 MG/ML
2.5 SOLUTION RESPIRATORY (INHALATION) EVERY 6 HOURS PRN
Status: DISCONTINUED | OUTPATIENT
Start: 2019-01-01 | End: 2019-01-01 | Stop reason: HOSPADM

## 2019-01-01 RX ORDER — POLYETHYLENE GLYCOL 3350 17 G/17G
1 POWDER, FOR SOLUTION ORAL DAILY PRN
Qty: 100 PACKET | Refills: 11 | Status: SHIPPED | OUTPATIENT
Start: 2019-01-01 | End: 2019-01-01

## 2019-01-01 RX ORDER — HYDRALAZINE HYDROCHLORIDE 20 MG/ML
10 INJECTION INTRAMUSCULAR; INTRAVENOUS
Status: DISCONTINUED | OUTPATIENT
Start: 2019-01-01 | End: 2019-01-01 | Stop reason: HOSPADM

## 2019-01-01 RX ORDER — MAGNESIUM HYDROXIDE 1200 MG/15ML
LIQUID ORAL PRN
Status: DISCONTINUED | OUTPATIENT
Start: 2019-01-01 | End: 2019-01-01 | Stop reason: HOSPADM

## 2019-01-01 RX ORDER — CALCIUM CARBONATE 500 MG/1
1000 TABLET, CHEWABLE ORAL 4 TIMES DAILY PRN
Status: DISCONTINUED | OUTPATIENT
Start: 2019-01-01 | End: 2019-01-01 | Stop reason: HOSPADM

## 2019-01-01 RX ORDER — ESMOLOL HYDROCHLORIDE 10 MG/ML
INJECTION INTRAVENOUS PRN
Status: DISCONTINUED | OUTPATIENT
Start: 2019-01-01 | End: 2019-01-01

## 2019-01-01 RX ORDER — GABAPENTIN 300 MG/1
1200 CAPSULE ORAL 3 TIMES DAILY
Status: DISCONTINUED | OUTPATIENT
Start: 2019-01-01 | End: 2019-01-01 | Stop reason: HOSPADM

## 2019-01-01 RX ORDER — ACETAMINOPHEN 650 MG/1
650 SUPPOSITORY RECTAL EVERY 4 HOURS PRN
Status: DISCONTINUED | OUTPATIENT
Start: 2019-01-01 | End: 2019-01-01 | Stop reason: HOSPADM

## 2019-01-01 RX ORDER — LEVOTHYROXINE SODIUM 50 UG/1
50 TABLET ORAL EVERY MORNING
Status: DISCONTINUED | OUTPATIENT
Start: 2019-01-01 | End: 2019-01-01 | Stop reason: HOSPADM

## 2019-01-01 RX ORDER — NALOXONE HYDROCHLORIDE 0.4 MG/ML
.1-.4 INJECTION, SOLUTION INTRAMUSCULAR; INTRAVENOUS; SUBCUTANEOUS
Status: ACTIVE | OUTPATIENT
Start: 2019-01-01 | End: 2019-01-01

## 2019-01-01 RX ORDER — PREDNISONE 10 MG/1
10 TABLET ORAL DAILY
COMMUNITY
End: 2019-01-01

## 2019-01-01 RX ORDER — POLYETHYLENE GLYCOL 3350 17 G/17G
17 POWDER, FOR SOLUTION ORAL DAILY
DISCHARGE
Start: 2019-01-01 | End: 2019-01-01

## 2019-01-01 RX ORDER — CEFAZOLIN SODIUM 1 G/3ML
1 INJECTION, POWDER, FOR SOLUTION INTRAMUSCULAR; INTRAVENOUS EVERY 8 HOURS
Status: DISCONTINUED | OUTPATIENT
Start: 2019-01-01 | End: 2019-01-01 | Stop reason: HOSPADM

## 2019-01-01 RX ORDER — CLOPIDOGREL BISULFATE 75 MG/1
75 TABLET ORAL DAILY
Status: DISCONTINUED | OUTPATIENT
Start: 2019-01-01 | End: 2019-01-01

## 2019-01-01 RX ORDER — NALOXONE HYDROCHLORIDE 0.4 MG/ML
.1-.4 INJECTION, SOLUTION INTRAMUSCULAR; INTRAVENOUS; SUBCUTANEOUS
Status: DISCONTINUED | OUTPATIENT
Start: 2019-01-01 | End: 2019-01-01

## 2019-01-01 RX ORDER — GABAPENTIN 300 MG/1
300 CAPSULE ORAL ONCE
Status: COMPLETED | OUTPATIENT
Start: 2019-01-01 | End: 2019-01-01

## 2019-01-01 RX ORDER — ESMOLOL HYDROCHLORIDE 20 MG/ML
50-300 INJECTION, SOLUTION INTRAVENOUS CONTINUOUS
Status: DISCONTINUED | OUTPATIENT
Start: 2019-01-01 | End: 2019-01-01

## 2019-01-01 RX ORDER — AMOXICILLIN 250 MG
2 CAPSULE ORAL 2 TIMES DAILY PRN
Status: DISCONTINUED | OUTPATIENT
Start: 2019-01-01 | End: 2019-01-01 | Stop reason: HOSPADM

## 2019-01-01 RX ORDER — CEFAZOLIN SODIUM 2 G/50ML
2 SOLUTION INTRAVENOUS
Status: CANCELLED | OUTPATIENT
Start: 2019-01-01

## 2019-01-01 RX ORDER — GABAPENTIN 100 MG/1
100 CAPSULE ORAL
Status: DISCONTINUED | OUTPATIENT
Start: 2019-01-01 | End: 2019-01-01 | Stop reason: HOSPADM

## 2019-01-01 RX ORDER — PROTAMINE SULFATE 10 MG/ML
INJECTION, SOLUTION INTRAVENOUS PRN
Status: DISCONTINUED | OUTPATIENT
Start: 2019-01-01 | End: 2019-01-01

## 2019-01-01 RX ORDER — PANTOPRAZOLE SODIUM 40 MG/1
40 TABLET, DELAYED RELEASE ORAL
Status: DISCONTINUED | OUTPATIENT
Start: 2019-01-01 | End: 2019-01-01 | Stop reason: HOSPADM

## 2019-01-01 RX ORDER — TORSEMIDE 5 MG/1
5 TABLET ORAL DAILY
COMMUNITY

## 2019-01-01 RX ORDER — ACETAMINOPHEN 325 MG/1
975 TABLET ORAL ONCE
Status: DISCONTINUED | OUTPATIENT
Start: 2019-01-01 | End: 2019-01-01 | Stop reason: HOSPADM

## 2019-01-01 RX ORDER — FUROSEMIDE 40 MG
80 TABLET ORAL 2 TIMES DAILY
Qty: 360 TABLET | Refills: 3 | Status: ON HOLD | OUTPATIENT
Start: 2019-01-01 | End: 2019-01-01

## 2019-01-01 RX ORDER — CALCIUM CARBONATE 500(1250)
500 TABLET ORAL 2 TIMES DAILY
Status: DISCONTINUED | OUTPATIENT
Start: 2019-01-01 | End: 2019-01-01 | Stop reason: HOSPADM

## 2019-01-01 RX ORDER — LEVOFLOXACIN 500 MG/1
500 TABLET, FILM COATED ORAL DAILY
Status: COMPLETED | OUTPATIENT
Start: 2019-01-01 | End: 2019-01-01

## 2019-01-01 RX ORDER — AMOXICILLIN 250 MG
2 CAPSULE ORAL 2 TIMES DAILY
Status: DISCONTINUED | OUTPATIENT
Start: 2019-01-01 | End: 2019-01-01

## 2019-01-01 RX ORDER — LIDOCAINE HYDROCHLORIDE 20 MG/ML
INJECTION, SOLUTION INFILTRATION; PERINEURAL PRN
Status: DISCONTINUED | OUTPATIENT
Start: 2019-01-01 | End: 2019-01-01

## 2019-01-01 RX ORDER — PROCHLORPERAZINE 25 MG
12.5 SUPPOSITORY, RECTAL RECTAL EVERY 12 HOURS PRN
Status: DISCONTINUED | OUTPATIENT
Start: 2019-01-01 | End: 2019-01-01 | Stop reason: HOSPADM

## 2019-01-01 RX ORDER — BENZONATATE 100 MG/1
100 CAPSULE ORAL 3 TIMES DAILY PRN
COMMUNITY
End: 2019-01-01

## 2019-01-01 RX ORDER — ATORVASTATIN CALCIUM 40 MG/1
40 TABLET, FILM COATED ORAL DAILY
Status: DISCONTINUED | OUTPATIENT
Start: 2019-01-01 | End: 2019-01-01

## 2019-01-01 RX ORDER — FENTANYL CITRATE 50 UG/ML
INJECTION, SOLUTION INTRAMUSCULAR; INTRAVENOUS PRN
Status: DISCONTINUED | OUTPATIENT
Start: 2019-01-01 | End: 2019-01-01

## 2019-01-01 RX ORDER — LORAZEPAM 0.5 MG/1
0.5 TABLET ORAL EVERY 6 HOURS PRN
Status: DISCONTINUED | OUTPATIENT
Start: 2019-01-01 | End: 2019-01-01 | Stop reason: HOSPADM

## 2019-01-01 RX ORDER — ATORVASTATIN CALCIUM 40 MG/1
40 TABLET, FILM COATED ORAL DAILY
Status: DISCONTINUED | OUTPATIENT
Start: 2019-01-01 | End: 2019-01-01 | Stop reason: HOSPADM

## 2019-01-01 RX ORDER — CLOPIDOGREL BISULFATE 75 MG/1
75 TABLET ORAL DAILY
Status: DISCONTINUED | OUTPATIENT
Start: 2019-01-01 | End: 2019-01-01 | Stop reason: HOSPADM

## 2019-01-01 RX ORDER — LEVOTHYROXINE SODIUM 50 UG/1
50 TABLET ORAL EVERY MORNING
COMMUNITY

## 2019-01-01 RX ORDER — IPRATROPIUM BROMIDE AND ALBUTEROL SULFATE 2.5; .5 MG/3ML; MG/3ML
1 SOLUTION RESPIRATORY (INHALATION) 3 TIMES DAILY PRN
Status: DISCONTINUED | OUTPATIENT
Start: 2019-01-01 | End: 2019-01-01 | Stop reason: HOSPADM

## 2019-01-01 RX ORDER — LANSOPRAZOLE 30 MG/1
30 CAPSULE, DELAYED RELEASE ORAL
Status: DISCONTINUED | OUTPATIENT
Start: 2019-01-01 | End: 2019-01-01 | Stop reason: HOSPADM

## 2019-01-01 RX ORDER — FUROSEMIDE 40 MG
40 TABLET ORAL DAILY
Qty: 90 TABLET | Refills: 0 | COMMUNITY
Start: 2019-01-01 | End: 2019-01-01

## 2019-01-01 RX ORDER — LIDOCAINE 40 MG/G
CREAM TOPICAL
Status: DISCONTINUED | OUTPATIENT
Start: 2019-01-01 | End: 2019-01-01

## 2019-01-01 RX ORDER — DEXTROSE MONOHYDRATE 25 G/50ML
25-50 INJECTION, SOLUTION INTRAVENOUS
Status: DISCONTINUED | OUTPATIENT
Start: 2019-01-01 | End: 2019-01-01 | Stop reason: HOSPADM

## 2019-01-01 RX ORDER — GEMFIBROZIL 600 MG/1
600 TABLET, FILM COATED ORAL 2 TIMES DAILY
Status: DISCONTINUED | OUTPATIENT
Start: 2019-01-01 | End: 2019-01-01 | Stop reason: HOSPADM

## 2019-01-01 RX ORDER — SODIUM CHLORIDE 9 MG/ML
INJECTION, SOLUTION INTRAVENOUS CONTINUOUS
Status: CANCELLED | OUTPATIENT
Start: 2019-01-01

## 2019-01-01 RX ORDER — FLUMAZENIL 0.1 MG/ML
0.2 INJECTION, SOLUTION INTRAVENOUS
Status: DISCONTINUED | OUTPATIENT
Start: 2019-01-01 | End: 2019-01-01 | Stop reason: HOSPADM

## 2019-01-01 RX ORDER — GABAPENTIN 400 MG/1
1200 CAPSULE ORAL 3 TIMES DAILY
COMMUNITY

## 2019-01-01 RX ORDER — LIDOCAINE 40 MG/G
CREAM TOPICAL
Status: CANCELLED | OUTPATIENT
Start: 2019-01-01

## 2019-01-01 RX ORDER — SODIUM CHLORIDE, SODIUM LACTATE, POTASSIUM CHLORIDE, CALCIUM CHLORIDE 600; 310; 30; 20 MG/100ML; MG/100ML; MG/100ML; MG/100ML
INJECTION, SOLUTION INTRAVENOUS CONTINUOUS
Status: DISCONTINUED | OUTPATIENT
Start: 2019-01-01 | End: 2019-01-01 | Stop reason: HOSPADM

## 2019-01-01 RX ORDER — FENTANYL CITRATE 50 UG/ML
INJECTION, SOLUTION INTRAMUSCULAR; INTRAVENOUS
Status: DISCONTINUED | OUTPATIENT
Start: 2019-01-01 | End: 2019-01-01 | Stop reason: HOSPADM

## 2019-01-01 RX ORDER — ALBUTEROL SULFATE 90 UG/1
2 AEROSOL, METERED RESPIRATORY (INHALATION) EVERY 6 HOURS PRN
Status: DISCONTINUED | OUTPATIENT
Start: 2019-01-01 | End: 2019-01-01 | Stop reason: HOSPADM

## 2019-01-01 RX ORDER — ACETAMINOPHEN 325 MG/1
650 TABLET ORAL EVERY 4 HOURS PRN
Status: DISCONTINUED | OUTPATIENT
Start: 2019-01-01 | End: 2019-01-01 | Stop reason: HOSPADM

## 2019-01-01 RX ORDER — HEPARIN SODIUM 1000 [USP'U]/ML
INJECTION, SOLUTION INTRAVENOUS; SUBCUTANEOUS
Status: DISCONTINUED | OUTPATIENT
Start: 2019-01-01 | End: 2019-01-01 | Stop reason: HOSPADM

## 2019-01-01 RX ORDER — OXYCODONE AND ACETAMINOPHEN 7.5; 325 MG/1; MG/1
1 TABLET ORAL 3 TIMES DAILY PRN
Status: DISCONTINUED | OUTPATIENT
Start: 2019-01-01 | End: 2019-01-01

## 2019-01-01 RX ORDER — OXYCODONE AND ACETAMINOPHEN 5; 325 MG/1; MG/1
1 TABLET ORAL 3 TIMES DAILY
Status: DISCONTINUED | OUTPATIENT
Start: 2019-01-01 | End: 2019-01-01 | Stop reason: HOSPADM

## 2019-01-01 RX ORDER — MAGNESIUM SULFATE HEPTAHYDRATE 40 MG/ML
2 INJECTION, SOLUTION INTRAVENOUS DAILY PRN
Status: DISCONTINUED | OUTPATIENT
Start: 2019-01-01 | End: 2019-01-01 | Stop reason: HOSPADM

## 2019-01-01 RX ORDER — HYDROCODONE BITARTRATE AND ACETAMINOPHEN 5; 325 MG/1; MG/1
1-2 TABLET ORAL EVERY 4 HOURS PRN
Status: DISCONTINUED | OUTPATIENT
Start: 2019-01-01 | End: 2019-01-01 | Stop reason: HOSPADM

## 2019-01-01 RX ORDER — FUROSEMIDE 10 MG/ML
40 INJECTION INTRAMUSCULAR; INTRAVENOUS ONCE
Status: COMPLETED | OUTPATIENT
Start: 2019-01-01 | End: 2019-01-01

## 2019-01-01 RX ORDER — LEVOTHYROXINE SODIUM 50 UG/1
50 TABLET ORAL
Status: DISCONTINUED | OUTPATIENT
Start: 2019-01-01 | End: 2019-01-01 | Stop reason: HOSPADM

## 2019-01-01 RX ORDER — LEVOTHYROXINE SODIUM 50 UG/1
50 TABLET ORAL EVERY MORNING
Status: DISCONTINUED | OUTPATIENT
Start: 2019-01-01 | End: 2019-01-01

## 2019-01-01 RX ORDER — PREDNISONE 5 MG/1
5 TABLET ORAL DAILY
COMMUNITY
End: 2019-01-01

## 2019-01-01 RX ORDER — TORSEMIDE 5 MG/1
5 TABLET ORAL DAILY PRN
DISCHARGE
Start: 2019-01-01 | End: 2019-01-01

## 2019-01-01 RX ORDER — FUROSEMIDE 10 MG/ML
40 INJECTION INTRAMUSCULAR; INTRAVENOUS DAILY
Status: DISCONTINUED | OUTPATIENT
Start: 2019-01-01 | End: 2019-01-01

## 2019-01-01 RX ORDER — POTASSIUM CHLORIDE 1.5 G/1.58G
20-40 POWDER, FOR SOLUTION ORAL
Status: DISCONTINUED | OUTPATIENT
Start: 2019-01-01 | End: 2019-01-01 | Stop reason: CLARIF

## 2019-01-01 RX ORDER — MIDODRINE HYDROCHLORIDE 10 MG/1
10 TABLET ORAL
Status: SHIPPED | DISCHARGE
Start: 2019-01-01

## 2019-01-01 RX ORDER — METOCLOPRAMIDE 5 MG/1
5 TABLET ORAL EVERY 6 HOURS PRN
Status: DISCONTINUED | OUTPATIENT
Start: 2019-01-01 | End: 2019-01-01

## 2019-01-01 RX ORDER — ONDANSETRON 4 MG/1
4 TABLET, ORALLY DISINTEGRATING ORAL EVERY 6 HOURS PRN
Status: DISCONTINUED | OUTPATIENT
Start: 2019-01-01 | End: 2019-01-01 | Stop reason: HOSPADM

## 2019-01-01 RX ORDER — METHOCARBAMOL 500 MG/1
500 TABLET, FILM COATED ORAL 4 TIMES DAILY
DISCHARGE
Start: 2019-01-01 | End: 2019-01-01

## 2019-01-01 RX ORDER — FUROSEMIDE 40 MG
40 TABLET ORAL DAILY
Status: DISCONTINUED | OUTPATIENT
Start: 2019-01-01 | End: 2019-01-01 | Stop reason: HOSPADM

## 2019-01-01 RX ORDER — TORSEMIDE 5 MG/1
10 TABLET ORAL EVERY MORNING
COMMUNITY

## 2019-01-01 RX ORDER — WARFARIN SODIUM 2.5 MG/1
2.5 TABLET ORAL
Status: COMPLETED | OUTPATIENT
Start: 2019-01-01 | End: 2019-01-01

## 2019-01-01 RX ORDER — VERAPAMIL HYDROCHLORIDE 120 MG/1
120 TABLET, FILM COATED, EXTENDED RELEASE ORAL AT BEDTIME
Status: ON HOLD | COMMUNITY
End: 2019-01-01

## 2019-01-01 RX ORDER — POTASSIUM CHLORIDE 1500 MG/1
20 TABLET, EXTENDED RELEASE ORAL
Status: DISCONTINUED | OUTPATIENT
Start: 2019-01-01 | End: 2019-01-01 | Stop reason: HOSPADM

## 2019-01-01 RX ORDER — HYDROXYZINE HYDROCHLORIDE 25 MG/1
25 TABLET, FILM COATED ORAL EVERY 6 HOURS PRN
DISCHARGE
Start: 2019-01-01 | End: 2019-01-01

## 2019-01-01 RX ORDER — LIDOCAINE 4 G/G
1-3 PATCH TOPICAL
Status: DISCONTINUED | OUTPATIENT
Start: 2019-01-01 | End: 2019-01-01 | Stop reason: HOSPADM

## 2019-01-01 RX ORDER — PANTOPRAZOLE SODIUM 20 MG/1
40 TABLET, DELAYED RELEASE ORAL
Status: DISCONTINUED | OUTPATIENT
Start: 2019-01-01 | End: 2019-01-01 | Stop reason: HOSPADM

## 2019-01-01 RX ORDER — POTASSIUM CHLORIDE 1500 MG/1
20 TABLET, EXTENDED RELEASE ORAL DAILY
Qty: 30 TABLET | Refills: 0 | DISCHARGE
Start: 2019-01-01

## 2019-01-01 RX ORDER — IOPAMIDOL 755 MG/ML
64 INJECTION, SOLUTION INTRAVASCULAR ONCE
Status: COMPLETED | OUTPATIENT
Start: 2019-01-01 | End: 2019-01-01

## 2019-01-01 RX ORDER — LIDOCAINE 4 G/G
1-3 PATCH TOPICAL EVERY 24 HOURS
DISCHARGE
Start: 2019-01-01 | End: 2019-01-01

## 2019-01-01 RX ORDER — SODIUM CHLORIDE 9 MG/ML
INJECTION, SOLUTION INTRAVENOUS CONTINUOUS
Status: DISCONTINUED | OUTPATIENT
Start: 2019-01-01 | End: 2019-01-01 | Stop reason: HOSPADM

## 2019-01-01 RX ORDER — ASPIRIN 81 MG/1
81 TABLET ORAL DAILY
Status: CANCELLED | OUTPATIENT
Start: 2019-01-01

## 2019-01-01 RX ORDER — LANSOPRAZOLE 30 MG/1
30 CAPSULE, DELAYED RELEASE ORAL
COMMUNITY

## 2019-01-01 RX ORDER — ACETAMINOPHEN 325 MG/1
975 TABLET ORAL EVERY 8 HOURS
Status: DISPENSED | OUTPATIENT
Start: 2019-01-01 | End: 2019-01-01

## 2019-01-01 RX ORDER — ASPIRIN 81 MG/1
81 TABLET ORAL DAILY
Status: DISCONTINUED | OUTPATIENT
Start: 2019-01-01 | End: 2019-01-01 | Stop reason: HOSPADM

## 2019-01-01 RX ORDER — FUROSEMIDE 40 MG
120 TABLET ORAL DAILY
Qty: 90 TABLET | Refills: 0 | Status: SHIPPED | OUTPATIENT
Start: 2019-01-01 | End: 2019-01-01

## 2019-01-01 RX ORDER — ONDANSETRON 4 MG/1
4 TABLET, ORALLY DISINTEGRATING ORAL EVERY 30 MIN PRN
Status: DISCONTINUED | OUTPATIENT
Start: 2019-01-01 | End: 2019-01-01 | Stop reason: HOSPADM

## 2019-01-01 RX ORDER — FUROSEMIDE 10 MG/ML
60 INJECTION INTRAMUSCULAR; INTRAVENOUS DAILY
Status: DISCONTINUED | OUTPATIENT
Start: 2019-01-01 | End: 2019-01-01

## 2019-01-01 RX ORDER — FUROSEMIDE 10 MG/ML
60 INJECTION INTRAMUSCULAR; INTRAVENOUS ONCE
Status: COMPLETED | OUTPATIENT
Start: 2019-01-01 | End: 2019-01-01

## 2019-01-01 RX ORDER — OXYCODONE HYDROCHLORIDE 5 MG/1
5-10 TABLET ORAL EVERY 4 HOURS PRN
Status: DISCONTINUED | OUTPATIENT
Start: 2019-01-01 | End: 2019-01-01

## 2019-01-01 RX ORDER — NICOTINE POLACRILEX 4 MG
15-30 LOZENGE BUCCAL
Status: DISCONTINUED | OUTPATIENT
Start: 2019-01-01 | End: 2019-01-01 | Stop reason: HOSPADM

## 2019-01-01 RX ORDER — DOBUTAMINE HYDROCHLORIDE 200 MG/100ML
2-20 INJECTION INTRAVENOUS CONTINUOUS PRN
Status: DISCONTINUED | OUTPATIENT
Start: 2019-01-01 | End: 2019-01-01 | Stop reason: HOSPADM

## 2019-01-01 RX ORDER — HYDROCORTISONE 2.5 %
CREAM (GRAM) TOPICAL 2 TIMES DAILY
COMMUNITY
Start: 2019-01-01 | End: 2019-01-01

## 2019-01-01 RX ORDER — POLYETHYLENE GLYCOL 3350 17 G/17G
17 POWDER, FOR SOLUTION ORAL DAILY PRN
Status: DISCONTINUED | OUTPATIENT
Start: 2019-01-01 | End: 2019-01-01 | Stop reason: HOSPADM

## 2019-01-01 RX ORDER — IPRATROPIUM BROMIDE AND ALBUTEROL SULFATE 2.5; .5 MG/3ML; MG/3ML
3 SOLUTION RESPIRATORY (INHALATION)
Status: DISCONTINUED | OUTPATIENT
Start: 2019-01-01 | End: 2019-01-01 | Stop reason: HOSPADM

## 2019-01-01 RX ORDER — PREDNISONE 5 MG/1
5 TABLET ORAL 2 TIMES DAILY WITH MEALS
Status: DISCONTINUED | OUTPATIENT
Start: 2019-01-01 | End: 2019-01-01

## 2019-01-01 RX ORDER — ONDANSETRON 2 MG/ML
4 INJECTION INTRAMUSCULAR; INTRAVENOUS EVERY 30 MIN PRN
Status: DISCONTINUED | OUTPATIENT
Start: 2019-01-01 | End: 2019-01-01 | Stop reason: HOSPADM

## 2019-01-01 RX ORDER — OXYCODONE AND ACETAMINOPHEN 5; 325 MG/1; MG/1
1 TABLET ORAL 3 TIMES DAILY
COMMUNITY
End: 2019-01-01

## 2019-01-01 RX ORDER — EPTIFIBATIDE 2 MG/ML
180 INJECTION, SOLUTION INTRAVENOUS EVERY 10 MIN PRN
Status: DISCONTINUED | OUTPATIENT
Start: 2019-01-01 | End: 2019-01-01 | Stop reason: HOSPADM

## 2019-01-01 RX ORDER — SODIUM CHLORIDE, SODIUM LACTATE, POTASSIUM CHLORIDE, CALCIUM CHLORIDE 600; 310; 30; 20 MG/100ML; MG/100ML; MG/100ML; MG/100ML
INJECTION, SOLUTION INTRAVENOUS CONTINUOUS
Status: DISCONTINUED | OUTPATIENT
Start: 2019-01-01 | End: 2019-01-01

## 2019-01-01 RX ORDER — FERROUS GLUCONATE 324(38)MG
324 TABLET ORAL 2 TIMES DAILY WITH MEALS
Status: DISCONTINUED | OUTPATIENT
Start: 2019-01-01 | End: 2019-01-01 | Stop reason: HOSPADM

## 2019-01-01 RX ORDER — PREDNISONE 5 MG/1
10 TABLET ORAL EVERY MORNING
Status: DISCONTINUED | OUTPATIENT
Start: 2019-01-01 | End: 2019-01-01

## 2019-01-01 RX ORDER — OXYCODONE AND ACETAMINOPHEN 7.5; 325 MG/1; MG/1
1 TABLET ORAL EVERY 6 HOURS PRN
Qty: 90 TABLET | Refills: 0 | Status: SHIPPED | OUTPATIENT
Start: 2019-01-01 | End: 2019-01-01

## 2019-01-01 RX ORDER — AMOXICILLIN 250 MG
1 CAPSULE ORAL 2 TIMES DAILY
Status: DISCONTINUED | OUTPATIENT
Start: 2019-01-01 | End: 2019-01-01

## 2019-01-01 RX ORDER — FOLIC ACID 1 MG/1
1 TABLET ORAL EVERY MORNING
Status: DISCONTINUED | OUTPATIENT
Start: 2019-01-01 | End: 2019-01-01 | Stop reason: HOSPADM

## 2019-01-01 RX ORDER — ACETAMINOPHEN 325 MG/1
650 TABLET ORAL EVERY 4 HOURS PRN
Status: DISCONTINUED | OUTPATIENT
Start: 2019-01-01 | End: 2019-01-01

## 2019-01-01 RX ORDER — BISACODYL 10 MG
10 SUPPOSITORY, RECTAL RECTAL ONCE
Status: COMPLETED | OUTPATIENT
Start: 2019-01-01 | End: 2019-01-01

## 2019-01-01 RX ORDER — ACETAMINOPHEN 325 MG/1
650 TABLET ORAL EVERY 4 HOURS PRN
DISCHARGE
Start: 2019-01-01 | End: 2019-01-01

## 2019-01-01 RX ORDER — METOCLOPRAMIDE HYDROCHLORIDE 5 MG/ML
5 INJECTION INTRAMUSCULAR; INTRAVENOUS EVERY 6 HOURS PRN
Status: DISCONTINUED | OUTPATIENT
Start: 2019-01-01 | End: 2019-01-01 | Stop reason: HOSPADM

## 2019-01-01 RX ORDER — SULFAMETHOXAZOLE/TRIMETHOPRIM 800-160 MG
TABLET ORAL
Refills: 0 | COMMUNITY
Start: 2019-01-01 | End: 2019-01-01

## 2019-01-01 RX ORDER — GABAPENTIN 600 MG/1
600 TABLET ORAL 3 TIMES DAILY
Status: DISCONTINUED | OUTPATIENT
Start: 2019-01-01 | End: 2019-01-01 | Stop reason: HOSPADM

## 2019-01-01 RX ORDER — TORSEMIDE 5 MG/1
5 TABLET ORAL 2 TIMES DAILY
Status: DISCONTINUED | OUTPATIENT
Start: 2019-01-01 | End: 2019-01-01 | Stop reason: HOSPADM

## 2019-01-01 RX ORDER — TORSEMIDE 5 MG/1
5 TABLET ORAL 2 TIMES DAILY
DISCHARGE
Start: 2019-01-01 | End: 2019-01-01

## 2019-01-01 RX ORDER — BISACODYL 10 MG
10 SUPPOSITORY, RECTAL RECTAL DAILY PRN
Status: DISCONTINUED | OUTPATIENT
Start: 2019-01-01 | End: 2019-01-01 | Stop reason: HOSPADM

## 2019-01-01 RX ORDER — METHOCARBAMOL 500 MG/1
500 TABLET, FILM COATED ORAL 4 TIMES DAILY
Start: 2019-01-01 | End: 2019-01-01

## 2019-01-01 RX ORDER — DOBUTAMINE HYDROCHLORIDE 200 MG/100ML
5-40 INJECTION INTRAVENOUS CONTINUOUS PRN
Status: DISCONTINUED | OUTPATIENT
Start: 2019-01-01 | End: 2019-01-01 | Stop reason: HOSPADM

## 2019-01-01 RX ORDER — MULTIPLE VITAMINS W/ MINERALS TAB 9MG-400MCG
1 TAB ORAL DAILY
Status: DISCONTINUED | OUTPATIENT
Start: 2019-01-01 | End: 2019-01-01 | Stop reason: HOSPADM

## 2019-01-01 RX ORDER — AMOXICILLIN 250 MG
1 CAPSULE ORAL 2 TIMES DAILY
Status: DISCONTINUED | OUTPATIENT
Start: 2019-01-01 | End: 2019-01-01 | Stop reason: HOSPADM

## 2019-01-01 RX ORDER — ASPIRIN 81 MG/1
324 TABLET, CHEWABLE ORAL ONCE
Status: COMPLETED | OUTPATIENT
Start: 2019-01-01 | End: 2019-01-01

## 2019-01-01 RX ORDER — IPRATROPIUM BROMIDE AND ALBUTEROL SULFATE 2.5; .5 MG/3ML; MG/3ML
3 SOLUTION RESPIRATORY (INHALATION) ONCE
Status: COMPLETED | OUTPATIENT
Start: 2019-01-01 | End: 2019-01-01

## 2019-01-01 RX ORDER — CEFAZOLIN SODIUM 1 G/3ML
INJECTION, POWDER, FOR SOLUTION INTRAMUSCULAR; INTRAVENOUS PRN
Status: DISCONTINUED | OUTPATIENT
Start: 2019-01-01 | End: 2019-01-01

## 2019-01-01 RX ORDER — DIGOXIN 0.25 MG/ML
250 INJECTION INTRAMUSCULAR; INTRAVENOUS EVERY 8 HOURS
Status: COMPLETED | OUTPATIENT
Start: 2019-01-01 | End: 2019-01-01

## 2019-01-01 RX ORDER — POLYETHYLENE GLYCOL 3350 17 G/17G
17 POWDER, FOR SOLUTION ORAL 2 TIMES DAILY
Status: DISCONTINUED | OUTPATIENT
Start: 2019-01-01 | End: 2019-01-01 | Stop reason: HOSPADM

## 2019-01-01 RX ORDER — FUROSEMIDE 10 MG/ML
20 INJECTION INTRAMUSCULAR; INTRAVENOUS ONCE
Status: COMPLETED | OUTPATIENT
Start: 2019-01-01 | End: 2019-01-01

## 2019-01-01 RX ORDER — NALOXONE HYDROCHLORIDE 0.4 MG/ML
.1-.4 INJECTION, SOLUTION INTRAMUSCULAR; INTRAVENOUS; SUBCUTANEOUS
Status: DISCONTINUED | OUTPATIENT
Start: 2019-01-01 | End: 2019-01-01 | Stop reason: HOSPADM

## 2019-01-01 RX ORDER — SODIUM CHLORIDE, SODIUM LACTATE, POTASSIUM CHLORIDE, CALCIUM CHLORIDE 600; 310; 30; 20 MG/100ML; MG/100ML; MG/100ML; MG/100ML
INJECTION, SOLUTION INTRAVENOUS CONTINUOUS PRN
Status: DISCONTINUED | OUTPATIENT
Start: 2019-01-01 | End: 2019-01-01

## 2019-01-01 RX ORDER — POTASSIUM CHLORIDE 1500 MG/1
40 TABLET, EXTENDED RELEASE ORAL DAILY
Status: DISCONTINUED | OUTPATIENT
Start: 2019-01-01 | End: 2019-01-01

## 2019-01-01 RX ORDER — ACETAMINOPHEN 325 MG/1
325-650 TABLET ORAL EVERY 4 HOURS PRN
Status: DISCONTINUED | OUTPATIENT
Start: 2019-01-01 | End: 2019-01-01

## 2019-01-01 RX ORDER — ONDANSETRON 2 MG/ML
INJECTION INTRAMUSCULAR; INTRAVENOUS PRN
Status: DISCONTINUED | OUTPATIENT
Start: 2019-01-01 | End: 2019-01-01

## 2019-01-01 RX ORDER — FERROUS GLUCONATE 324(38)MG
324 TABLET ORAL
Qty: 30 TABLET | Refills: 0 | Status: SHIPPED | OUTPATIENT
Start: 2019-01-01

## 2019-01-01 RX ORDER — METOPROLOL TARTRATE 25 MG/1
12.5 TABLET, FILM COATED ORAL 2 TIMES DAILY
Qty: 180 TABLET | Refills: 3 | Status: ON HOLD | OUTPATIENT
Start: 2019-01-01 | End: 2019-01-01

## 2019-01-01 RX ORDER — ACETAMINOPHEN 325 MG/1
975 TABLET ORAL ONCE
Status: COMPLETED | OUTPATIENT
Start: 2019-01-01 | End: 2019-01-01

## 2019-01-01 RX ORDER — CEFAZOLIN SODIUM 2 G/100ML
2 INJECTION, SOLUTION INTRAVENOUS
Status: COMPLETED | OUTPATIENT
Start: 2019-01-01 | End: 2019-01-01

## 2019-01-01 RX ORDER — LEVALBUTEROL 1.25 MG/.5ML
1.25 SOLUTION, CONCENTRATE RESPIRATORY (INHALATION) EVERY 6 HOURS PRN
Status: DISCONTINUED | OUTPATIENT
Start: 2019-01-01 | End: 2019-01-01 | Stop reason: HOSPADM

## 2019-01-01 RX ORDER — LORAZEPAM 0.5 MG/1
0.5 TABLET ORAL
Status: DISCONTINUED | OUTPATIENT
Start: 2019-01-01 | End: 2019-01-01 | Stop reason: HOSPADM

## 2019-01-01 RX ORDER — PROPOFOL 10 MG/ML
INJECTION, EMULSION INTRAVENOUS CONTINUOUS PRN
Status: DISCONTINUED | OUTPATIENT
Start: 2019-01-01 | End: 2019-01-01

## 2019-01-01 RX ORDER — FUROSEMIDE 40 MG
40 TABLET ORAL DAILY
Status: DISCONTINUED | OUTPATIENT
Start: 2019-01-01 | End: 2019-01-01

## 2019-01-01 RX ORDER — IPRATROPIUM BROMIDE AND ALBUTEROL SULFATE 2.5; .5 MG/3ML; MG/3ML
1 SOLUTION RESPIRATORY (INHALATION) 3 TIMES DAILY
COMMUNITY
Start: 2019-01-01 | End: 2019-01-01

## 2019-01-01 RX ORDER — NITROGLYCERIN 5 MG/ML
VIAL (ML) INTRAVENOUS
Status: DISCONTINUED | OUTPATIENT
Start: 2019-01-01 | End: 2019-01-01 | Stop reason: HOSPADM

## 2019-01-01 RX ORDER — IOPAMIDOL 755 MG/ML
59 INJECTION, SOLUTION INTRAVASCULAR ONCE
Status: COMPLETED | OUTPATIENT
Start: 2019-01-01 | End: 2019-01-01

## 2019-01-01 RX ORDER — PROCHLORPERAZINE MALEATE 5 MG
5 TABLET ORAL EVERY 6 HOURS PRN
Status: DISCONTINUED | OUTPATIENT
Start: 2019-01-01 | End: 2019-01-01 | Stop reason: HOSPADM

## 2019-01-01 RX ORDER — OXYCODONE AND ACETAMINOPHEN 7.5; 325 MG/1; MG/1
1 TABLET ORAL EVERY 6 HOURS PRN
Qty: 90 TABLET | Refills: 0 | Status: ON HOLD | OUTPATIENT
Start: 2019-01-01 | End: 2019-01-01

## 2019-01-01 RX ORDER — OXYCODONE HYDROCHLORIDE 5 MG/1
5-10 TABLET ORAL
Refills: 0 | Status: SHIPPED | DISCHARGE
Start: 2019-01-01 | End: 2019-01-01

## 2019-01-01 RX ORDER — MULTIPLE VITAMINS W/ MINERALS TAB 9MG-400MCG
1 TAB ORAL DAILY
DISCHARGE
Start: 2019-01-01 | End: 2019-01-01

## 2019-01-01 RX ORDER — TORSEMIDE 20 MG/1
20 TABLET ORAL DAILY
Status: DISCONTINUED | OUTPATIENT
Start: 2019-01-01 | End: 2019-01-01

## 2019-01-01 RX ORDER — AMOXICILLIN 250 MG
1 CAPSULE ORAL 2 TIMES DAILY PRN
Status: DISCONTINUED | OUTPATIENT
Start: 2019-01-01 | End: 2019-01-01 | Stop reason: HOSPADM

## 2019-01-01 RX ORDER — DOCUSATE SODIUM 100 MG/1
100 CAPSULE, LIQUID FILLED ORAL 2 TIMES DAILY
Status: DISCONTINUED | OUTPATIENT
Start: 2019-01-01 | End: 2019-01-01 | Stop reason: HOSPADM

## 2019-01-01 RX ORDER — MIDODRINE HYDROCHLORIDE 5 MG/1
5 TABLET ORAL
DISCHARGE
Start: 2019-01-01 | End: 2019-01-01

## 2019-01-01 RX ORDER — POLYETHYLENE GLYCOL 3350 17 G/17G
17 POWDER, FOR SOLUTION ORAL DAILY
Status: DISCONTINUED | OUTPATIENT
Start: 2019-01-01 | End: 2019-01-01

## 2019-01-01 RX ORDER — NITROGLYCERIN 0.4 MG/1
0.4 TABLET SUBLINGUAL EVERY 5 MIN PRN
Status: DISCONTINUED | OUTPATIENT
Start: 2019-01-01 | End: 2019-01-01 | Stop reason: HOSPADM

## 2019-01-01 RX ORDER — OXYCODONE AND ACETAMINOPHEN 7.5; 325 MG/1; MG/1
1 TABLET ORAL 3 TIMES DAILY
Status: DISCONTINUED | OUTPATIENT
Start: 2019-01-01 | End: 2019-01-01

## 2019-01-01 RX ORDER — PIPERACILLIN SODIUM, TAZOBACTAM SODIUM 4; .5 G/20ML; G/20ML
4.5 INJECTION, POWDER, LYOPHILIZED, FOR SOLUTION INTRAVENOUS ONCE
Status: COMPLETED | OUTPATIENT
Start: 2019-01-01 | End: 2019-01-01

## 2019-01-01 RX ORDER — FUROSEMIDE 10 MG/ML
40 INJECTION INTRAMUSCULAR; INTRAVENOUS
Status: DISCONTINUED | OUTPATIENT
Start: 2019-01-01 | End: 2019-01-01 | Stop reason: CLARIF

## 2019-01-01 RX ORDER — DEXAMETHASONE SODIUM PHOSPHATE 10 MG/ML
INJECTION, SOLUTION INTRAMUSCULAR; INTRAVENOUS PRN
Status: DISCONTINUED | OUTPATIENT
Start: 2019-01-01 | End: 2019-01-01

## 2019-01-01 RX ORDER — HYDROMORPHONE HCL/0.9% NACL/PF 0.2MG/0.2
0.2 SYRINGE (ML) INTRAVENOUS
Status: DISCONTINUED | OUTPATIENT
Start: 2019-01-01 | End: 2019-01-01 | Stop reason: HOSPADM

## 2019-01-01 RX ORDER — METHOTREXATE SODIUM 2.5 MG/1
25 TABLET ORAL WEEKLY
Qty: 120 TABLET | Refills: 3 | Status: SHIPPED | OUTPATIENT
Start: 2019-01-01

## 2019-01-01 RX ORDER — DIGOXIN 125 MCG
125 TABLET ORAL DAILY
Status: DISCONTINUED | OUTPATIENT
Start: 2019-01-01 | End: 2019-01-01 | Stop reason: HOSPADM

## 2019-01-01 RX ORDER — TORSEMIDE 10 MG/1
10 TABLET ORAL DAILY
Status: DISCONTINUED | OUTPATIENT
Start: 2019-01-01 | End: 2019-01-01 | Stop reason: HOSPADM

## 2019-01-01 RX ORDER — OXYCODONE AND ACETAMINOPHEN 5; 325 MG/1; MG/1
1 TABLET ORAL 3 TIMES DAILY
Refills: 0
Start: 2019-01-01 | End: 2019-01-01

## 2019-01-01 RX ORDER — DIPHENHYDRAMINE HCL 25 MG
25 CAPSULE ORAL EVERY 6 HOURS PRN
Status: DISCONTINUED | OUTPATIENT
Start: 2019-01-01 | End: 2019-01-01 | Stop reason: HOSPADM

## 2019-01-01 RX ORDER — OXYCODONE HYDROCHLORIDE 10 MG/1
5 TABLET ORAL EVERY 4 HOURS PRN
COMMUNITY
End: 2019-01-01

## 2019-01-01 RX ORDER — POLYETHYLENE GLYCOL 3350 17 G/17G
17 POWDER, FOR SOLUTION ORAL DAILY
Status: DISCONTINUED | OUTPATIENT
Start: 2019-01-01 | End: 2019-01-01 | Stop reason: HOSPADM

## 2019-01-01 RX ORDER — FAMOTIDINE 20 MG/1
20 TABLET, FILM COATED ORAL EVERY 12 HOURS
DISCHARGE
Start: 2019-01-01 | End: 2019-01-01

## 2019-01-01 RX ORDER — CEFAZOLIN SODIUM 1 G/3ML
1 INJECTION, POWDER, FOR SOLUTION INTRAMUSCULAR; INTRAVENOUS EVERY 8 HOURS
Status: DISCONTINUED | OUTPATIENT
Start: 2019-01-01 | End: 2019-01-01

## 2019-01-01 RX ORDER — NITROGLYCERIN 20 MG/100ML
.07-2 INJECTION INTRAVENOUS CONTINUOUS PRN
Status: DISCONTINUED | OUTPATIENT
Start: 2019-01-01 | End: 2019-01-01 | Stop reason: HOSPADM

## 2019-01-01 RX ORDER — HYDROMORPHONE HYDROCHLORIDE 1 MG/ML
.3-.5 INJECTION, SOLUTION INTRAMUSCULAR; INTRAVENOUS; SUBCUTANEOUS
Status: DISCONTINUED | OUTPATIENT
Start: 2019-01-01 | End: 2019-01-01 | Stop reason: HOSPADM

## 2019-01-01 RX ORDER — GABAPENTIN 300 MG/1
600 CAPSULE ORAL 3 TIMES DAILY
Qty: 60 CAPSULE | Refills: 0 | Status: SHIPPED | OUTPATIENT
Start: 2019-01-01 | End: 2019-01-01

## 2019-01-01 RX ORDER — SULFAMETHOXAZOLE/TRIMETHOPRIM 800-160 MG
1 TABLET ORAL
Status: DISCONTINUED | OUTPATIENT
Start: 2019-01-01 | End: 2019-01-01 | Stop reason: HOSPADM

## 2019-01-01 RX ORDER — DIPHENHYDRAMINE HYDROCHLORIDE 50 MG/ML
12.5 INJECTION INTRAMUSCULAR; INTRAVENOUS EVERY 6 HOURS PRN
Status: DISCONTINUED | OUTPATIENT
Start: 2019-01-01 | End: 2019-01-01

## 2019-01-01 RX ORDER — METHOCARBAMOL 500 MG/1
500 TABLET, FILM COATED ORAL
Status: DISCONTINUED | OUTPATIENT
Start: 2019-01-01 | End: 2019-01-01 | Stop reason: HOSPADM

## 2019-01-01 RX ORDER — OXYCODONE AND ACETAMINOPHEN 7.5; 325 MG/1; MG/1
1 TABLET ORAL EVERY 6 HOURS PRN
Status: DISCONTINUED | OUTPATIENT
Start: 2019-01-01 | End: 2019-01-01 | Stop reason: HOSPADM

## 2019-01-01 RX ORDER — OXYCODONE HYDROCHLORIDE 5 MG/1
5-10 TABLET ORAL
Qty: 50 TABLET | Refills: 0 | Status: SHIPPED | OUTPATIENT
Start: 2019-01-01 | End: 2019-01-01

## 2019-01-01 RX ORDER — DIPHENHYDRAMINE HCL 25 MG
50 CAPSULE ORAL EVERY 6 HOURS PRN
Status: DISCONTINUED | OUTPATIENT
Start: 2019-01-01 | End: 2019-01-01 | Stop reason: HOSPADM

## 2019-01-01 RX ORDER — POTASSIUM CHLORIDE 1500 MG/1
40 TABLET, EXTENDED RELEASE ORAL
Status: CANCELLED | OUTPATIENT
Start: 2019-01-01

## 2019-01-01 RX ORDER — LEVOTHYROXINE SODIUM 50 UG/1
50 TABLET ORAL DAILY
Status: DISCONTINUED | OUTPATIENT
Start: 2019-01-01 | End: 2019-01-01 | Stop reason: HOSPADM

## 2019-01-01 RX ORDER — CHLORAL HYDRATE 500 MG
1 CAPSULE ORAL DAILY
Status: DISCONTINUED | OUTPATIENT
Start: 2019-01-01 | End: 2019-01-01 | Stop reason: HOSPADM

## 2019-01-01 RX ORDER — FUROSEMIDE 40 MG
80 TABLET ORAL DAILY
Qty: 90 TABLET | Refills: 0 | Status: SHIPPED | OUTPATIENT
Start: 2019-01-01 | End: 2019-01-01

## 2019-01-01 RX ORDER — ATORVASTATIN CALCIUM 40 MG/1
40 TABLET, FILM COATED ORAL DAILY
Qty: 90 TABLET | Refills: 3 | Status: SHIPPED | OUTPATIENT
Start: 2019-01-01

## 2019-01-01 RX ORDER — ONDANSETRON 4 MG/1
4 TABLET, ORALLY DISINTEGRATING ORAL EVERY 6 HOURS PRN
Status: DISCONTINUED | OUTPATIENT
Start: 2019-01-01 | End: 2019-01-01

## 2019-01-01 RX ORDER — VANCOMYCIN HYDROCHLORIDE 1 G/20ML
1 INJECTION, POWDER, LYOPHILIZED, FOR SOLUTION INTRAVENOUS ONCE
Status: DISCONTINUED | OUTPATIENT
Start: 2019-01-01 | End: 2019-01-01 | Stop reason: HOSPADM

## 2019-01-01 RX ORDER — COSYNTROPIN 0.25 MG/ML
250 INJECTION, POWDER, FOR SOLUTION INTRAMUSCULAR; INTRAVENOUS ONCE
Status: DISCONTINUED | OUTPATIENT
Start: 2019-01-01 | End: 2019-01-01

## 2019-01-01 RX ORDER — DIPHENHYDRAMINE HCL 12.5MG/5ML
12.5 LIQUID (ML) ORAL EVERY 6 HOURS PRN
Status: DISCONTINUED | OUTPATIENT
Start: 2019-01-01 | End: 2019-01-01 | Stop reason: HOSPADM

## 2019-01-01 RX ORDER — FENTANYL CITRATE 50 UG/ML
25-50 INJECTION, SOLUTION INTRAMUSCULAR; INTRAVENOUS
Status: ACTIVE | OUTPATIENT
Start: 2019-01-01 | End: 2019-01-01

## 2019-01-01 RX ORDER — DOCUSATE SODIUM 100 MG/1
100 CAPSULE, LIQUID FILLED ORAL 2 TIMES DAILY
Status: DISCONTINUED | OUTPATIENT
Start: 2019-01-01 | End: 2019-01-01

## 2019-01-01 RX ORDER — CALCIUM CARBONATE 500(1250)
1 TABLET ORAL 2 TIMES DAILY
Status: DISCONTINUED | OUTPATIENT
Start: 2019-01-01 | End: 2019-01-01 | Stop reason: HOSPADM

## 2019-01-01 RX ORDER — IOPAMIDOL 755 MG/ML
61 INJECTION, SOLUTION INTRAVASCULAR ONCE
Status: COMPLETED | OUTPATIENT
Start: 2019-01-01 | End: 2019-01-01

## 2019-01-01 RX ORDER — OXYCODONE HYDROCHLORIDE 5 MG/1
5 TABLET ORAL
Qty: 15 TABLET | Refills: 0 | Status: SHIPPED | OUTPATIENT
Start: 2019-01-01 | End: 2019-01-01

## 2019-01-01 RX ORDER — SODIUM CHLORIDE, SODIUM GLUCONATE, SODIUM ACETATE, POTASSIUM CHLORIDE AND MAGNESIUM CHLORIDE 526; 502; 368; 37; 30 MG/100ML; MG/100ML; MG/100ML; MG/100ML; MG/100ML
INJECTION, SOLUTION INTRAVENOUS CONTINUOUS PRN
Status: DISCONTINUED | OUTPATIENT
Start: 2019-01-01 | End: 2019-01-01

## 2019-01-01 RX ORDER — DOCUSATE SODIUM 100 MG/1
100 CAPSULE, LIQUID FILLED ORAL 2 TIMES DAILY
Qty: 180 CAPSULE | Refills: 11 | Status: SHIPPED | OUTPATIENT
Start: 2019-01-01 | End: 2019-01-01

## 2019-01-01 RX ORDER — TORSEMIDE 5 MG/1
5 TABLET ORAL DAILY
Status: DISCONTINUED | OUTPATIENT
Start: 2019-01-01 | End: 2019-01-01

## 2019-01-01 RX ORDER — FUROSEMIDE 10 MG/ML
40 INJECTION INTRAMUSCULAR; INTRAVENOUS
Status: DISCONTINUED | OUTPATIENT
Start: 2019-01-01 | End: 2019-01-01

## 2019-01-01 RX ORDER — TORSEMIDE 20 MG/1
20 TABLET ORAL DAILY
Qty: 30 TABLET | Refills: 0 | Status: SHIPPED | OUTPATIENT
Start: 2019-01-01 | End: 2019-01-01

## 2019-01-01 RX ORDER — SULFAMETHOXAZOLE/TRIMETHOPRIM 800-160 MG
1 TABLET ORAL DAILY
Qty: 16 TABLET | Refills: 0 | Status: SHIPPED | OUTPATIENT
Start: 2019-01-01 | End: 2019-01-01

## 2019-01-01 RX ORDER — LORAZEPAM 2 MG/ML
0.5 CONCENTRATE ORAL
Qty: 30 ML | Refills: 0 | Status: SHIPPED | OUTPATIENT
Start: 2019-01-01

## 2019-01-01 RX ORDER — OXYCODONE AND ACETAMINOPHEN 5; 325 MG/1; MG/1
1 TABLET ORAL 2 TIMES DAILY PRN
Qty: 20 TABLET | Refills: 0 | Status: SHIPPED | OUTPATIENT
Start: 2019-01-01 | End: 2019-01-01

## 2019-01-01 RX ORDER — ONDANSETRON 2 MG/ML
4 INJECTION INTRAMUSCULAR; INTRAVENOUS EVERY 6 HOURS PRN
Status: DISCONTINUED | OUTPATIENT
Start: 2019-01-01 | End: 2019-01-01

## 2019-01-01 RX ORDER — NOREPINEPHRINE BITARTRATE/D5W 16MG/250ML
.03-.4 PLASTIC BAG, INJECTION (ML) INTRAVENOUS CONTINUOUS
Status: DISCONTINUED | OUTPATIENT
Start: 2019-01-01 | End: 2019-01-01 | Stop reason: HOSPADM

## 2019-01-01 RX ORDER — FOLIC ACID 1 MG/1
1 TABLET ORAL EVERY MORNING
Qty: 90 TABLET | Refills: 3 | Status: SHIPPED | OUTPATIENT
Start: 2019-01-01

## 2019-01-01 RX ORDER — OXYCODONE AND ACETAMINOPHEN 5; 325 MG/1; MG/1
1-2 TABLET ORAL EVERY 4 HOURS PRN
Status: DISCONTINUED | OUTPATIENT
Start: 2019-01-01 | End: 2019-01-01 | Stop reason: HOSPADM

## 2019-01-01 RX ORDER — CEFAZOLIN SODIUM 1 G/3ML
1 INJECTION, POWDER, FOR SOLUTION INTRAMUSCULAR; INTRAVENOUS
Status: DISCONTINUED | OUTPATIENT
Start: 2019-01-01 | End: 2019-01-01 | Stop reason: HOSPADM

## 2019-01-01 RX ORDER — TORSEMIDE 5 MG/1
TABLET ORAL
Status: SHIPPED | DISCHARGE
Start: 2019-01-01 | End: 2019-01-01

## 2019-01-01 RX ORDER — FERROUS GLUCONATE 324(38)MG
324 TABLET ORAL
Status: DISCONTINUED | OUTPATIENT
Start: 2019-01-01 | End: 2019-01-01

## 2019-01-01 RX ORDER — OXYCODONE AND ACETAMINOPHEN 7.5; 325 MG/1; MG/1
1 TABLET ORAL 3 TIMES DAILY
Status: DISCONTINUED | OUTPATIENT
Start: 2019-01-01 | End: 2019-01-01 | Stop reason: HOSPADM

## 2019-01-01 RX ORDER — CHLORAL HYDRATE 500 MG
2000 CAPSULE ORAL 2 TIMES DAILY
Status: DISCONTINUED | OUTPATIENT
Start: 2019-01-01 | End: 2019-01-01 | Stop reason: HOSPADM

## 2019-01-01 RX ORDER — NALOXONE HYDROCHLORIDE 0.4 MG/ML
.2-.4 INJECTION, SOLUTION INTRAMUSCULAR; INTRAVENOUS; SUBCUTANEOUS
Status: ACTIVE | OUTPATIENT
Start: 2019-01-01 | End: 2019-01-01

## 2019-01-01 RX ORDER — IPRATROPIUM BROMIDE AND ALBUTEROL SULFATE 2.5; .5 MG/3ML; MG/3ML
1 SOLUTION RESPIRATORY (INHALATION) 3 TIMES DAILY
Status: DISCONTINUED | OUTPATIENT
Start: 2019-01-01 | End: 2019-01-01

## 2019-01-01 RX ORDER — DIPHENHYDRAMINE HCL 12.5MG/5ML
12.5 LIQUID (ML) ORAL EVERY 6 HOURS PRN
Status: DISCONTINUED | OUTPATIENT
Start: 2019-01-01 | End: 2019-01-01

## 2019-01-01 RX ORDER — PIPERACILLIN SODIUM, TAZOBACTAM SODIUM 4; .5 G/20ML; G/20ML
4.5 INJECTION, POWDER, LYOPHILIZED, FOR SOLUTION INTRAVENOUS EVERY 6 HOURS
Status: DISCONTINUED | OUTPATIENT
Start: 2019-01-01 | End: 2019-01-01

## 2019-01-01 RX ORDER — DEXTROSE MONOHYDRATE, SODIUM CHLORIDE, AND POTASSIUM CHLORIDE 50; 1.49; 4.5 G/1000ML; G/1000ML; G/1000ML
INJECTION, SOLUTION INTRAVENOUS CONTINUOUS
Status: DISCONTINUED | OUTPATIENT
Start: 2019-01-01 | End: 2019-01-01

## 2019-01-01 RX ORDER — ARGATROBAN 1 MG/ML
350 INJECTION, SOLUTION INTRAVENOUS
Status: DISCONTINUED | OUTPATIENT
Start: 2019-01-01 | End: 2019-01-01 | Stop reason: HOSPADM

## 2019-01-01 RX ORDER — HYDROMORPHONE HYDROCHLORIDE 1 MG/ML
.3-.5 INJECTION, SOLUTION INTRAMUSCULAR; INTRAVENOUS; SUBCUTANEOUS EVERY 5 MIN PRN
Status: DISCONTINUED | OUTPATIENT
Start: 2019-01-01 | End: 2019-01-01 | Stop reason: HOSPADM

## 2019-01-01 RX ORDER — TORSEMIDE 10 MG/1
10 TABLET ORAL 2 TIMES DAILY
Status: DISCONTINUED | OUTPATIENT
Start: 2019-01-01 | End: 2019-01-01

## 2019-01-01 RX ORDER — LANOLIN ALCOHOL/MO/W.PET/CERES
3 CREAM (GRAM) TOPICAL
Status: DISCONTINUED | OUTPATIENT
Start: 2019-01-01 | End: 2019-01-01 | Stop reason: HOSPADM

## 2019-01-01 RX ORDER — BUPIVACAINE HYDROCHLORIDE 2.5 MG/ML
INJECTION, SOLUTION EPIDURAL; INFILTRATION; INTRACAUDAL
Status: DISCONTINUED | OUTPATIENT
Start: 2019-01-01 | End: 2019-01-01 | Stop reason: HOSPADM

## 2019-01-01 RX ORDER — GEMFIBROZIL 600 MG/1
600 TABLET, FILM COATED ORAL 2 TIMES DAILY
Status: DISCONTINUED | OUTPATIENT
Start: 2019-01-01 | End: 2019-01-01

## 2019-01-01 RX ORDER — TIROFIBAN HYDROCHLORIDE 50 UG/ML
0.15 INJECTION INTRAVENOUS CONTINUOUS PRN
Status: DISCONTINUED | OUTPATIENT
Start: 2019-01-01 | End: 2019-01-01 | Stop reason: HOSPADM

## 2019-01-01 RX ORDER — TORSEMIDE 20 MG/1
20 TABLET ORAL 2 TIMES DAILY
Status: DISCONTINUED | OUTPATIENT
Start: 2019-01-01 | End: 2019-01-01

## 2019-01-01 RX ORDER — FUROSEMIDE 40 MG
40 TABLET ORAL DAILY
Qty: 30 TABLET | Refills: 0 | Status: SHIPPED | OUTPATIENT
Start: 2019-01-01 | End: 2019-01-01 | Stop reason: DRUGHIGH

## 2019-01-01 RX ORDER — POTASSIUM CL/LIDO/0.9 % NACL 10MEQ/0.1L
10 INTRAVENOUS SOLUTION, PIGGYBACK (ML) INTRAVENOUS
Status: DISCONTINUED | OUTPATIENT
Start: 2019-01-01 | End: 2019-01-01 | Stop reason: CLARIF

## 2019-01-01 RX ORDER — DOCUSATE SODIUM 100 MG/1
100 CAPSULE, LIQUID FILLED ORAL AT BEDTIME
Status: DISCONTINUED | OUTPATIENT
Start: 2019-01-01 | End: 2019-01-01 | Stop reason: HOSPADM

## 2019-01-01 RX ORDER — SILDENAFIL CITRATE 20 MG/1
20 TABLET ORAL 3 TIMES DAILY
COMMUNITY
End: 2019-01-01

## 2019-01-01 RX ORDER — TRAMADOL HYDROCHLORIDE 50 MG/1
50 TABLET ORAL EVERY 6 HOURS PRN
Status: SHIPPED | DISCHARGE
Start: 2019-01-01 | End: 2019-01-01

## 2019-01-01 RX ORDER — LEVOFLOXACIN 5 MG/ML
500 INJECTION, SOLUTION INTRAVENOUS EVERY 24 HOURS
Status: DISCONTINUED | OUTPATIENT
Start: 2019-01-01 | End: 2019-01-01

## 2019-01-01 RX ORDER — GABAPENTIN 600 MG/1
1200 TABLET ORAL 3 TIMES DAILY
Status: DISCONTINUED | OUTPATIENT
Start: 2019-01-01 | End: 2019-01-01 | Stop reason: HOSPADM

## 2019-01-01 RX ORDER — TRAMADOL HYDROCHLORIDE 50 MG/1
25 TABLET ORAL 3 TIMES DAILY
Qty: 40 TABLET | Refills: 0 | Status: SHIPPED | OUTPATIENT
Start: 2019-01-01 | End: 2019-01-01

## 2019-01-01 RX ORDER — FENTANYL CITRATE 50 UG/ML
25-50 INJECTION, SOLUTION INTRAMUSCULAR; INTRAVENOUS
Status: DISCONTINUED | OUTPATIENT
Start: 2019-01-01 | End: 2019-01-01 | Stop reason: HOSPADM

## 2019-01-01 RX ORDER — FUROSEMIDE 10 MG/ML
60 INJECTION INTRAMUSCULAR; INTRAVENOUS
Status: DISCONTINUED | OUTPATIENT
Start: 2019-01-01 | End: 2019-01-01

## 2019-01-01 RX ORDER — VERAPAMIL HYDROCHLORIDE 120 MG/1
120 TABLET, FILM COATED, EXTENDED RELEASE ORAL AT BEDTIME
Status: DISCONTINUED | OUTPATIENT
Start: 2019-01-01 | End: 2019-01-01 | Stop reason: HOSPADM

## 2019-01-01 RX ORDER — CALCIUM CHLORIDE 100 MG/ML
INJECTION INTRAVENOUS; INTRAVENTRICULAR PRN
Status: DISCONTINUED | OUTPATIENT
Start: 2019-01-01 | End: 2019-01-01

## 2019-01-01 RX ORDER — NOREPINEPHRINE BITARTRATE 0.06 MG/ML
.03-.4 INJECTION, SOLUTION INTRAVENOUS CONTINUOUS
Status: DISCONTINUED | OUTPATIENT
Start: 2019-01-01 | End: 2019-01-01 | Stop reason: HOSPADM

## 2019-01-01 RX ORDER — COSYNTROPIN 0.25 MG/ML
250 INJECTION, POWDER, FOR SOLUTION INTRAMUSCULAR; INTRAVENOUS ONCE
Status: COMPLETED | OUTPATIENT
Start: 2019-01-01 | End: 2019-01-01

## 2019-01-01 RX ORDER — ASPIRIN 81 MG/1
81 TABLET ORAL ONCE
Status: COMPLETED | OUTPATIENT
Start: 2019-01-01 | End: 2019-01-01

## 2019-01-01 RX ORDER — METOCLOPRAMIDE 5 MG/1
5 TABLET ORAL EVERY 6 HOURS PRN
Status: DISCONTINUED | OUTPATIENT
Start: 2019-01-01 | End: 2019-01-01 | Stop reason: HOSPADM

## 2019-01-01 RX ORDER — ALBUTEROL SULFATE 90 UG/1
AEROSOL, METERED RESPIRATORY (INHALATION) PRN
Status: DISCONTINUED | OUTPATIENT
Start: 2019-01-01 | End: 2019-01-01

## 2019-01-01 RX ORDER — AMOXICILLIN 250 MG
2 CAPSULE ORAL 2 TIMES DAILY
Status: DISCONTINUED | OUTPATIENT
Start: 2019-01-01 | End: 2019-01-01 | Stop reason: HOSPADM

## 2019-01-01 RX ORDER — POTASSIUM CHLORIDE 1500 MG/1
40 TABLET, EXTENDED RELEASE ORAL DAILY
Status: DISCONTINUED | OUTPATIENT
Start: 2019-01-01 | End: 2019-01-01 | Stop reason: HOSPADM

## 2019-01-01 RX ORDER — CEFAZOLIN SODIUM 1 G/3ML
1 INJECTION, POWDER, FOR SOLUTION INTRAMUSCULAR; INTRAVENOUS SEE ADMIN INSTRUCTIONS
Status: DISCONTINUED | OUTPATIENT
Start: 2019-01-01 | End: 2019-01-01 | Stop reason: HOSPADM

## 2019-01-01 RX ORDER — BISACODYL 10 MG
10 SUPPOSITORY, RECTAL RECTAL DAILY PRN
DISCHARGE
Start: 2019-01-01 | End: 2019-01-01

## 2019-01-01 RX ORDER — MIDODRINE HYDROCHLORIDE 5 MG/1
10 TABLET ORAL
Status: DISCONTINUED | OUTPATIENT
Start: 2019-01-01 | End: 2019-01-01 | Stop reason: HOSPADM

## 2019-01-01 RX ORDER — IPRATROPIUM BROMIDE AND ALBUTEROL SULFATE 2.5; .5 MG/3ML; MG/3ML
3 SOLUTION RESPIRATORY (INHALATION)
Status: DISCONTINUED | OUTPATIENT
Start: 2019-01-01 | End: 2019-01-01

## 2019-01-01 RX ORDER — SODIUM CHLORIDE 450 MG/100ML
INJECTION, SOLUTION INTRAVENOUS CONTINUOUS
Status: CANCELLED | OUTPATIENT
Start: 2019-01-01

## 2019-01-01 RX ORDER — NALOXONE HYDROCHLORIDE 0.4 MG/ML
.2-.4 INJECTION, SOLUTION INTRAMUSCULAR; INTRAVENOUS; SUBCUTANEOUS
Status: DISCONTINUED | OUTPATIENT
Start: 2019-01-01 | End: 2019-01-01

## 2019-01-01 RX ORDER — TRAMADOL HYDROCHLORIDE 50 MG/1
50 TABLET ORAL EVERY 6 HOURS PRN
Qty: 30 TABLET | Refills: 0 | Status: SHIPPED | OUTPATIENT
Start: 2019-01-01 | End: 2019-01-01

## 2019-01-01 RX ORDER — DIGOXIN 125 MCG
125 TABLET ORAL DAILY
DISCHARGE
Start: 2019-01-01

## 2019-01-01 RX ORDER — POTASSIUM CHLORIDE 1500 MG/1
20-40 TABLET, EXTENDED RELEASE ORAL
Status: DISCONTINUED | OUTPATIENT
Start: 2019-01-01 | End: 2019-01-01 | Stop reason: CLARIF

## 2019-01-01 RX ORDER — ATROPINE SULFATE 0.1 MG/ML
0.5 INJECTION INTRAVENOUS EVERY 5 MIN PRN
Status: DISCONTINUED | OUTPATIENT
Start: 2019-01-01 | End: 2019-01-01 | Stop reason: HOSPADM

## 2019-01-01 RX ORDER — ACETAMINOPHEN 325 MG/1
650 TABLET ORAL EVERY 8 HOURS PRN
COMMUNITY
Start: 2019-01-01 | End: 2019-01-01

## 2019-01-01 RX ORDER — HEPARIN SODIUM,PORCINE 10 UNIT/ML
2-5 VIAL (ML) INTRAVENOUS
Status: DISCONTINUED | OUTPATIENT
Start: 2019-01-01 | End: 2019-01-01 | Stop reason: HOSPADM

## 2019-01-01 RX ORDER — IOPAMIDOL 755 MG/ML
INJECTION, SOLUTION INTRAVASCULAR
Status: DISCONTINUED | OUTPATIENT
Start: 2019-01-01 | End: 2019-01-01 | Stop reason: HOSPADM

## 2019-01-01 RX ORDER — GABAPENTIN 300 MG/1
600 CAPSULE ORAL 3 TIMES DAILY
Status: DISCONTINUED | OUTPATIENT
Start: 2019-01-01 | End: 2019-01-01 | Stop reason: HOSPADM

## 2019-01-01 RX ORDER — ATROPINE SULFATE 0.1 MG/ML
0.5 INJECTION INTRAVENOUS EVERY 5 MIN PRN
Status: ACTIVE | OUTPATIENT
Start: 2019-01-01 | End: 2019-01-01

## 2019-01-01 RX ORDER — METOCLOPRAMIDE HYDROCHLORIDE 5 MG/ML
5 INJECTION INTRAMUSCULAR; INTRAVENOUS EVERY 6 HOURS PRN
Status: DISCONTINUED | OUTPATIENT
Start: 2019-01-01 | End: 2019-01-01

## 2019-01-01 RX ORDER — TRAMADOL HYDROCHLORIDE 50 MG/1
50 TABLET ORAL EVERY 6 HOURS PRN
Qty: 129 TABLET | Refills: 0 | Status: SHIPPED | OUTPATIENT
Start: 2019-01-01 | End: 2019-01-01

## 2019-01-01 RX ORDER — OXYCODONE HYDROCHLORIDE 5 MG/1
5-10 TABLET ORAL
Status: DISCONTINUED | OUTPATIENT
Start: 2019-01-01 | End: 2019-01-01 | Stop reason: HOSPADM

## 2019-01-01 RX ORDER — VASOPRESSIN 20 U/ML
INJECTION PARENTERAL PRN
Status: DISCONTINUED | OUTPATIENT
Start: 2019-01-01 | End: 2019-01-01

## 2019-01-01 RX ORDER — POTASSIUM CHLORIDE 1500 MG/1
40 TABLET, EXTENDED RELEASE ORAL DAILY
Qty: 30 TABLET | Refills: 0 | Status: ON HOLD | OUTPATIENT
Start: 2019-01-01 | End: 2019-01-01

## 2019-01-01 RX ORDER — ACETAMINOPHEN 325 MG/1
650 TABLET ORAL EVERY 4 HOURS PRN
DISCHARGE
Start: 2019-01-01

## 2019-01-01 RX ORDER — OXYCODONE HYDROCHLORIDE 5 MG/1
5 TABLET ORAL 3 TIMES DAILY
Qty: 120 TABLET | Refills: 0 | Status: SHIPPED | OUTPATIENT
Start: 2019-01-01

## 2019-01-01 RX ORDER — CHOLECALCIFEROL (VITAMIN D3) 50 MCG
2000 TABLET ORAL DAILY
Status: DISCONTINUED | OUTPATIENT
Start: 2019-01-01 | End: 2019-01-01 | Stop reason: HOSPADM

## 2019-01-01 RX ORDER — TORSEMIDE 20 MG/1
20 TABLET ORAL 2 TIMES DAILY
Status: DISCONTINUED | OUTPATIENT
Start: 2019-01-01 | End: 2019-01-01 | Stop reason: HOSPADM

## 2019-01-01 RX ORDER — ASPIRIN 81 MG/1
81 TABLET ORAL ONCE
Status: CANCELLED | OUTPATIENT
Start: 2019-01-01 | End: 2019-01-01

## 2019-01-01 RX ORDER — FUROSEMIDE 40 MG
120 TABLET ORAL DAILY
Status: DISCONTINUED | OUTPATIENT
Start: 2019-01-01 | End: 2019-01-01

## 2019-01-01 RX ORDER — PREDNISONE 5 MG/1
5 TABLET ORAL DAILY
Status: DISCONTINUED | OUTPATIENT
Start: 2019-01-01 | End: 2019-01-01 | Stop reason: HOSPADM

## 2019-01-01 RX ORDER — ACETAMINOPHEN 325 MG/1
975 TABLET ORAL EVERY 8 HOURS
Status: DISCONTINUED | OUTPATIENT
Start: 2019-01-01 | End: 2019-01-01

## 2019-01-01 RX ORDER — TRAMADOL HYDROCHLORIDE 50 MG/1
50 TABLET ORAL EVERY 6 HOURS PRN
Status: DISCONTINUED | OUTPATIENT
Start: 2019-01-01 | End: 2019-01-01 | Stop reason: HOSPADM

## 2019-01-01 RX ORDER — HYDROCORTISONE 2.5 %
CREAM (GRAM) TOPICAL 2 TIMES DAILY PRN
Status: DISCONTINUED | OUTPATIENT
Start: 2019-01-01 | End: 2019-01-01 | Stop reason: HOSPADM

## 2019-01-01 RX ORDER — MORPHINE SULFATE 100 MG/5ML
2.5 SOLUTION ORAL
Qty: 15 ML | Refills: 0 | Status: SHIPPED | OUTPATIENT
Start: 2019-01-01

## 2019-01-01 RX ORDER — HYDROXYZINE HYDROCHLORIDE 25 MG/1
25 TABLET, FILM COATED ORAL EVERY 6 HOURS PRN
Status: DISCONTINUED | OUTPATIENT
Start: 2019-01-01 | End: 2019-01-01 | Stop reason: HOSPADM

## 2019-01-01 RX ORDER — METOPROLOL TARTRATE 1 MG/ML
1-2 INJECTION, SOLUTION INTRAVENOUS EVERY 5 MIN PRN
Status: DISCONTINUED | OUTPATIENT
Start: 2019-01-01 | End: 2019-01-01 | Stop reason: HOSPADM

## 2019-01-01 RX ORDER — POTASSIUM CHLORIDE 7.45 MG/ML
10 INJECTION INTRAVENOUS
Status: DISCONTINUED | OUTPATIENT
Start: 2019-01-01 | End: 2019-01-01 | Stop reason: CLARIF

## 2019-01-01 RX ORDER — ALBUTEROL SULFATE 0.83 MG/ML
2.5 SOLUTION RESPIRATORY (INHALATION) EVERY 6 HOURS PRN
Status: DISCONTINUED | OUTPATIENT
Start: 2019-01-01 | End: 2019-01-01

## 2019-01-01 RX ORDER — FUROSEMIDE 20 MG
20 TABLET ORAL DAILY
COMMUNITY
End: 2019-01-01

## 2019-01-01 RX ORDER — ACETAMINOPHEN 500 MG
500-1000 TABLET ORAL 3 TIMES DAILY
COMMUNITY
Start: 2019-01-01 | End: 2019-01-01

## 2019-01-01 RX ORDER — OXYCODONE AND ACETAMINOPHEN 7.5; 325 MG/1; MG/1
1 TABLET ORAL 3 TIMES DAILY
Qty: 60 TABLET | Refills: 0 | Status: ON HOLD | OUTPATIENT
Start: 2019-01-01 | End: 2019-01-01

## 2019-01-01 RX ORDER — OXYCODONE HYDROCHLORIDE 5 MG/1
5 TABLET ORAL EVERY 6 HOURS PRN
Status: DISCONTINUED | OUTPATIENT
Start: 2019-01-01 | End: 2019-01-01 | Stop reason: HOSPADM

## 2019-01-01 RX ORDER — TRAMADOL HYDROCHLORIDE 50 MG/1
50 TABLET ORAL EVERY 6 HOURS PRN
Qty: 129 TABLET | Refills: 0
Start: 2019-01-01 | End: 2019-01-01

## 2019-01-01 RX ORDER — PROPOFOL 10 MG/ML
INJECTION, EMULSION INTRAVENOUS PRN
Status: DISCONTINUED | OUTPATIENT
Start: 2019-01-01 | End: 2019-01-01

## 2019-01-01 RX ORDER — MAGNESIUM SULFATE HEPTAHYDRATE 40 MG/ML
4 INJECTION, SOLUTION INTRAVENOUS EVERY 4 HOURS PRN
Status: DISCONTINUED | OUTPATIENT
Start: 2019-01-01 | End: 2019-01-01 | Stop reason: HOSPADM

## 2019-01-01 RX ORDER — PROCHLORPERAZINE MALEATE 5 MG
5 TABLET ORAL EVERY 6 HOURS PRN
Status: DISCONTINUED | OUTPATIENT
Start: 2019-01-01 | End: 2019-01-01

## 2019-01-01 RX ORDER — DIPHENHYDRAMINE HYDROCHLORIDE 50 MG/ML
12.5 INJECTION INTRAMUSCULAR; INTRAVENOUS EVERY 6 HOURS PRN
Status: DISCONTINUED | OUTPATIENT
Start: 2019-01-01 | End: 2019-01-01 | Stop reason: HOSPADM

## 2019-01-01 RX ORDER — ASPIRIN 81 MG/1
81 TABLET ORAL DAILY
Status: DISCONTINUED | OUTPATIENT
Start: 2019-01-01 | End: 2019-01-01

## 2019-01-01 RX ORDER — METOPROLOL TARTRATE 1 MG/ML
INJECTION, SOLUTION INTRAVENOUS PRN
Status: DISCONTINUED | OUTPATIENT
Start: 2019-01-01 | End: 2019-01-01

## 2019-01-01 RX ORDER — HYDROMORPHONE HYDROCHLORIDE 1 MG/ML
0.2 INJECTION, SOLUTION INTRAMUSCULAR; INTRAVENOUS; SUBCUTANEOUS
Status: DISCONTINUED | OUTPATIENT
Start: 2019-01-01 | End: 2019-01-01 | Stop reason: HOSPADM

## 2019-01-01 RX ORDER — LORAZEPAM 2 MG/ML
0.5 INJECTION INTRAMUSCULAR
Status: DISCONTINUED | OUTPATIENT
Start: 2019-01-01 | End: 2019-01-01 | Stop reason: HOSPADM

## 2019-01-01 RX ORDER — ALUMINA, MAGNESIA, AND SIMETHICONE 2400; 2400; 240 MG/30ML; MG/30ML; MG/30ML
30 SUSPENSION ORAL EVERY 4 HOURS PRN
Status: DISCONTINUED | OUTPATIENT
Start: 2019-01-01 | End: 2019-01-01 | Stop reason: HOSPADM

## 2019-01-01 RX ORDER — AMOXICILLIN 250 MG
2 CAPSULE ORAL 2 TIMES DAILY
DISCHARGE
Start: 2019-01-01 | End: 2019-01-01

## 2019-01-01 RX ORDER — ARGATROBAN 1 MG/ML
150 INJECTION, SOLUTION INTRAVENOUS
Status: DISCONTINUED | OUTPATIENT
Start: 2019-01-01 | End: 2019-01-01 | Stop reason: HOSPADM

## 2019-01-01 RX ORDER — DIPHENHYDRAMINE HCL 50 MG
50 CAPSULE ORAL PRN
COMMUNITY

## 2019-01-01 RX ORDER — CLOPIDOGREL BISULFATE 75 MG/1
75 TABLET ORAL DAILY
Qty: 30 TABLET | Refills: 0 | Status: SHIPPED | OUTPATIENT
Start: 2019-01-01

## 2019-01-01 RX ORDER — METOLAZONE 2.5 MG/1
TABLET ORAL
Qty: 5 TABLET | Refills: 0 | Status: SHIPPED | OUTPATIENT
Start: 2019-01-01 | End: 2019-01-01

## 2019-01-01 RX ORDER — DEXTROSE MONOHYDRATE 25 G/50ML
25-50 INJECTION, SOLUTION INTRAVENOUS
Status: DISCONTINUED | OUTPATIENT
Start: 2019-01-01 | End: 2019-01-01

## 2019-01-01 RX ORDER — ASPIRIN 81 MG/1
81 TABLET ORAL ONCE
Status: DISCONTINUED | OUTPATIENT
Start: 2019-01-01 | End: 2019-01-01 | Stop reason: HOSPADM

## 2019-01-01 RX ORDER — FOLIC ACID 1 MG/1
1 TABLET ORAL DAILY
Status: DISCONTINUED | OUTPATIENT
Start: 2019-01-01 | End: 2019-01-01 | Stop reason: HOSPADM

## 2019-01-01 RX ORDER — HEPARIN SODIUM 5000 [USP'U]/.5ML
5000 INJECTION, SOLUTION INTRAVENOUS; SUBCUTANEOUS EVERY 12 HOURS
Status: DISCONTINUED | OUTPATIENT
Start: 2019-01-01 | End: 2019-01-01 | Stop reason: HOSPADM

## 2019-01-01 RX ORDER — HEPARIN SODIUM 10000 [USP'U]/100ML
0-3500 INJECTION, SOLUTION INTRAVENOUS CONTINUOUS
Status: DISCONTINUED | OUTPATIENT
Start: 2019-01-01 | End: 2019-01-01

## 2019-01-01 RX ORDER — NICARDIPINE HYDROCHLORIDE 2.5 MG/ML
INJECTION INTRAVENOUS
Status: DISCONTINUED | OUTPATIENT
Start: 2019-01-01 | End: 2019-01-01 | Stop reason: HOSPADM

## 2019-01-01 RX ORDER — CETIRIZINE HYDROCHLORIDE 10 MG/1
10 TABLET ORAL DAILY PRN
Status: DISCONTINUED | OUTPATIENT
Start: 2019-01-01 | End: 2019-01-01 | Stop reason: HOSPADM

## 2019-01-01 RX ORDER — FAMOTIDINE 20 MG/1
20 TABLET, FILM COATED ORAL DAILY
Start: 2019-01-01 | End: 2019-01-01

## 2019-01-01 RX ORDER — FAMOTIDINE 20 MG/1
20 TABLET, FILM COATED ORAL EVERY 12 HOURS
Status: DISCONTINUED | OUTPATIENT
Start: 2019-01-01 | End: 2019-01-01 | Stop reason: HOSPADM

## 2019-01-01 RX ORDER — SODIUM CHLORIDE 9 MG/ML
1000 INJECTION, SOLUTION INTRAVENOUS CONTINUOUS
Status: DISCONTINUED | OUTPATIENT
Start: 2019-01-01 | End: 2019-01-01 | Stop reason: HOSPADM

## 2019-01-01 RX ORDER — CEFAZOLIN SODIUM 1 G/3ML
1 INJECTION, POWDER, FOR SOLUTION INTRAMUSCULAR; INTRAVENOUS EVERY 8 HOURS
Status: COMPLETED | OUTPATIENT
Start: 2019-01-01 | End: 2019-01-01

## 2019-01-01 RX ORDER — THEOPHYLLINE 400 MG/1
400 TABLET, EXTENDED RELEASE ORAL EVERY MORNING
Status: DISCONTINUED | OUTPATIENT
Start: 2019-01-01 | End: 2019-01-01 | Stop reason: HOSPADM

## 2019-01-01 RX ORDER — FERROUS GLUCONATE 324(38)MG
324 TABLET ORAL
Status: DISCONTINUED | OUTPATIENT
Start: 2019-01-01 | End: 2019-01-01 | Stop reason: HOSPADM

## 2019-01-01 RX ORDER — PREDNISONE 5 MG/1
5 TABLET ORAL DAILY
DISCHARGE
Start: 2019-01-01 | End: 2019-01-01

## 2019-01-01 RX ORDER — METHOTREXATE SODIUM 2.5 MG/1
25 TABLET ORAL WEEKLY
COMMUNITY
End: 2019-01-01

## 2019-01-01 RX ORDER — NOREPINEPHRINE BITARTRATE 0.06 MG/ML
.03-.4 INJECTION, SOLUTION INTRAVENOUS CONTINUOUS PRN
Status: DISCONTINUED | OUTPATIENT
Start: 2019-01-01 | End: 2019-01-01 | Stop reason: HOSPADM

## 2019-01-01 RX ORDER — TORSEMIDE 20 MG/1
20 TABLET ORAL 2 TIMES DAILY
Qty: 60 TABLET | Refills: 0 | Status: ON HOLD | OUTPATIENT
Start: 2019-01-01 | End: 2019-01-01

## 2019-01-01 RX ORDER — IOPAMIDOL 755 MG/ML
120 INJECTION, SOLUTION INTRAVASCULAR ONCE
Status: COMPLETED | OUTPATIENT
Start: 2019-01-01 | End: 2019-01-01

## 2019-01-01 RX ORDER — DOCUSATE SODIUM 100 MG/1
100 CAPSULE, LIQUID FILLED ORAL AT BEDTIME
Qty: 30 CAPSULE | Refills: 0 | Status: SHIPPED | OUTPATIENT
Start: 2019-01-01

## 2019-01-01 RX ORDER — POTASSIUM CHLORIDE 1500 MG/1
20 TABLET, EXTENDED RELEASE ORAL
Status: CANCELLED | OUTPATIENT
Start: 2019-01-01

## 2019-01-01 RX ORDER — FLUMAZENIL 0.1 MG/ML
0.2 INJECTION, SOLUTION INTRAVENOUS
Status: ACTIVE | OUTPATIENT
Start: 2019-01-01 | End: 2019-01-01

## 2019-01-01 RX ORDER — ALBUTEROL SULFATE 5 MG/ML
2.5 SOLUTION RESPIRATORY (INHALATION)
Status: DISCONTINUED | OUTPATIENT
Start: 2019-01-01 | End: 2019-01-01 | Stop reason: HOSPADM

## 2019-01-01 RX ORDER — HEPARIN SODIUM 1000 [USP'U]/ML
INJECTION, SOLUTION INTRAVENOUS; SUBCUTANEOUS PRN
Status: DISCONTINUED | OUTPATIENT
Start: 2019-01-01 | End: 2019-01-01

## 2019-01-01 RX ORDER — MIDODRINE HYDROCHLORIDE 2.5 MG/1
2.5 TABLET ORAL
Status: DISCONTINUED | OUTPATIENT
Start: 2019-01-01 | End: 2019-01-01

## 2019-01-01 RX ORDER — GABAPENTIN 100 MG/1
100 CAPSULE ORAL 3 TIMES DAILY
Status: DISCONTINUED | OUTPATIENT
Start: 2019-01-01 | End: 2019-01-01

## 2019-01-01 RX ORDER — DIGOXIN 125 MCG
125 TABLET ORAL DAILY
Status: DISCONTINUED | OUTPATIENT
Start: 2019-01-01 | End: 2019-01-01

## 2019-01-01 RX ORDER — LABETALOL 20 MG/4 ML (5 MG/ML) INTRAVENOUS SYRINGE
10
Status: DISCONTINUED | OUTPATIENT
Start: 2019-01-01 | End: 2019-01-01 | Stop reason: HOSPADM

## 2019-01-01 RX ORDER — MIDODRINE HYDROCHLORIDE 2.5 MG/1
5 TABLET ORAL
Status: DISCONTINUED | OUTPATIENT
Start: 2019-01-01 | End: 2019-01-01 | Stop reason: HOSPADM

## 2019-01-01 RX ORDER — AMOXICILLIN 250 MG
1 CAPSULE ORAL 2 TIMES DAILY PRN
DISCHARGE
Start: 2019-01-01

## 2019-01-01 RX ORDER — CLOPIDOGREL BISULFATE 75 MG/1
300 TABLET ORAL ONCE
Status: COMPLETED | OUTPATIENT
Start: 2019-01-01 | End: 2019-01-01

## 2019-01-01 RX ORDER — FUROSEMIDE 10 MG/ML
20 INJECTION INTRAMUSCULAR; INTRAVENOUS DAILY
Status: DISCONTINUED | OUTPATIENT
Start: 2019-01-01 | End: 2019-01-01

## 2019-01-01 RX ORDER — FUROSEMIDE 40 MG
40 TABLET ORAL
Status: DISCONTINUED | OUTPATIENT
Start: 2019-01-01 | End: 2019-01-01

## 2019-01-01 RX ORDER — DOPAMINE HYDROCHLORIDE 160 MG/100ML
2-20 INJECTION, SOLUTION INTRAVENOUS CONTINUOUS PRN
Status: DISCONTINUED | OUTPATIENT
Start: 2019-01-01 | End: 2019-01-01 | Stop reason: HOSPADM

## 2019-01-01 RX ORDER — METOLAZONE 2.5 MG/1
2.5 TABLET ORAL PRN
Qty: 5 TABLET | Refills: 0 | Status: SHIPPED | OUTPATIENT
Start: 2019-01-01 | End: 2019-01-01

## 2019-01-01 RX ORDER — CETIRIZINE HYDROCHLORIDE 10 MG/1
10 TABLET ORAL DAILY PRN
Status: ON HOLD | COMMUNITY
End: 2019-01-01

## 2019-01-01 RX ORDER — EPTIFIBATIDE 2 MG/ML
2 INJECTION, SOLUTION INTRAVENOUS CONTINUOUS PRN
Status: DISCONTINUED | OUTPATIENT
Start: 2019-01-01 | End: 2019-01-01 | Stop reason: HOSPADM

## 2019-01-01 RX ORDER — ATROPINE SULFATE 10 MG/ML
1 SOLUTION/ DROPS OPHTHALMIC
Qty: 1 BOTTLE | Refills: 0 | Status: SHIPPED | OUTPATIENT
Start: 2019-01-01

## 2019-01-01 RX ORDER — OXYCODONE HYDROCHLORIDE 5 MG/1
5-10 TABLET ORAL
Qty: 1000 TABLET | Refills: 0 | Status: SHIPPED | OUTPATIENT
Start: 2019-01-01 | End: 2019-01-01

## 2019-01-01 RX ORDER — GABAPENTIN 600 MG/1
1200 TABLET ORAL 3 TIMES DAILY
Qty: 180 TABLET | Refills: 0 | Status: ON HOLD | OUTPATIENT
Start: 2019-01-01 | End: 2019-01-01

## 2019-01-01 RX ORDER — POTASSIUM CHLORIDE 29.8 MG/ML
20 INJECTION INTRAVENOUS
Status: DISCONTINUED | OUTPATIENT
Start: 2019-01-01 | End: 2019-01-01 | Stop reason: CLARIF

## 2019-01-01 RX ORDER — FUROSEMIDE 20 MG
60 TABLET ORAL DAILY
Qty: 270 TABLET | Refills: 3 | Status: SHIPPED | OUTPATIENT
Start: 2019-01-01 | End: 2019-01-01

## 2019-01-01 RX ORDER — IOPAMIDOL 755 MG/ML
63 INJECTION, SOLUTION INTRAVASCULAR ONCE
Status: COMPLETED | OUTPATIENT
Start: 2019-01-01 | End: 2019-01-01

## 2019-01-01 RX ORDER — ASPIRIN 81 MG/1
324 TABLET, CHEWABLE ORAL DAILY
Status: DISCONTINUED | OUTPATIENT
Start: 2019-01-01 | End: 2019-01-01

## 2019-01-01 RX ORDER — LANOLIN ALCOHOL/MO/W.PET/CERES
3 CREAM (GRAM) TOPICAL
DISCHARGE
Start: 2019-01-01 | End: 2019-01-01

## 2019-01-01 RX ORDER — VANCOMYCIN HYDROCHLORIDE 1 G/20ML
INJECTION, POWDER, LYOPHILIZED, FOR SOLUTION INTRAVENOUS PRN
Status: DISCONTINUED | OUTPATIENT
Start: 2019-01-01 | End: 2019-01-01 | Stop reason: HOSPADM

## 2019-01-01 RX ORDER — AMOXICILLIN 250 MG
1 CAPSULE ORAL AT BEDTIME
Status: DISCONTINUED | OUTPATIENT
Start: 2019-01-01 | End: 2019-01-01 | Stop reason: HOSPADM

## 2019-01-01 RX ORDER — CHLORAL HYDRATE 500 MG
2 CAPSULE ORAL EVERY MORNING
Status: DISCONTINUED | OUTPATIENT
Start: 2019-01-01 | End: 2019-01-01 | Stop reason: HOSPADM

## 2019-01-01 RX ORDER — SILDENAFIL CITRATE 20 MG/1
5 TABLET ORAL 3 TIMES DAILY
Qty: 90 TABLET | Refills: 0 | Status: ON HOLD | OUTPATIENT
Start: 2019-01-01 | End: 2019-01-01

## 2019-01-01 RX ORDER — GABAPENTIN 300 MG/1
300 CAPSULE ORAL ONCE
Status: DISCONTINUED | OUTPATIENT
Start: 2019-01-01 | End: 2019-01-01 | Stop reason: HOSPADM

## 2019-01-01 RX ORDER — CEFTRIAXONE SODIUM 1 G/1
1000 INJECTION, POWDER, FOR SOLUTION INTRAMUSCULAR; INTRAVENOUS DAILY
COMMUNITY
Start: 2019-01-01 | End: 2019-01-01

## 2019-01-01 RX ORDER — TRAMADOL HYDROCHLORIDE 50 MG/1
25 TABLET ORAL 3 TIMES DAILY
Qty: 129 TABLET | Refills: 0 | Status: SHIPPED | OUTPATIENT
Start: 2019-01-01 | End: 2019-01-01

## 2019-01-01 RX ORDER — OXYCODONE AND ACETAMINOPHEN 7.5; 325 MG/1; MG/1
1 TABLET ORAL EVERY 6 HOURS PRN
Qty: 20 TABLET | Refills: 0 | Status: SHIPPED | OUTPATIENT
Start: 2019-01-01 | End: 2019-01-01

## 2019-01-01 RX ORDER — PREDNISONE 5 MG/1
5 TABLET ORAL EVERY MORNING
Status: DISCONTINUED | OUTPATIENT
Start: 2019-01-01 | End: 2019-01-01

## 2019-01-01 RX ORDER — FUROSEMIDE 20 MG
20 TABLET ORAL DAILY
Qty: 2 TABLET | Refills: 0 | Status: SHIPPED | OUTPATIENT
Start: 2019-01-01 | End: 2019-01-01

## 2019-01-01 RX ORDER — POTASSIUM CHLORIDE 750 MG/1
20 TABLET, EXTENDED RELEASE ORAL
Status: DISCONTINUED | OUTPATIENT
Start: 2019-01-01 | End: 2019-01-01 | Stop reason: HOSPADM

## 2019-01-01 RX ORDER — METHOCARBAMOL 500 MG/1
500 TABLET, FILM COATED ORAL 4 TIMES DAILY PRN
Status: DISCONTINUED | OUTPATIENT
Start: 2019-01-01 | End: 2019-01-01

## 2019-01-01 RX ADMIN — OXYCODONE HYDROCHLORIDE 10 MG: 5 TABLET ORAL at 13:00

## 2019-01-01 RX ADMIN — SODIUM CHLORIDE 500 ML: 9 INJECTION, SOLUTION INTRAVENOUS at 02:30

## 2019-01-01 RX ADMIN — OXYCODONE HYDROCHLORIDE AND ACETAMINOPHEN 1 TABLET: 7.5; 325 TABLET ORAL at 15:09

## 2019-01-01 RX ADMIN — FERROUS GLUCONATE 324 MG: 324 TABLET ORAL at 10:11

## 2019-01-01 RX ADMIN — ASPIRIN 81 MG: 81 TABLET, COATED ORAL at 09:34

## 2019-01-01 RX ADMIN — TRAMADOL HYDROCHLORIDE 50 MG: 50 TABLET, COATED ORAL at 01:02

## 2019-01-01 RX ADMIN — GABAPENTIN 600 MG: 300 CAPSULE ORAL at 00:38

## 2019-01-01 RX ADMIN — GABAPENTIN 1200 MG: 300 CAPSULE ORAL at 21:22

## 2019-01-01 RX ADMIN — ENOXAPARIN SODIUM 40 MG: 40 INJECTION SUBCUTANEOUS at 11:46

## 2019-01-01 RX ADMIN — IPRATROPIUM BROMIDE AND ALBUTEROL SULFATE 3 ML: .5; 3 SOLUTION RESPIRATORY (INHALATION) at 12:27

## 2019-01-01 RX ADMIN — OXYCODONE HYDROCHLORIDE AND ACETAMINOPHEN 1 TABLET: 7.5; 325 TABLET ORAL at 09:34

## 2019-01-01 RX ADMIN — GABAPENTIN 600 MG: 300 CAPSULE ORAL at 22:19

## 2019-01-01 RX ADMIN — GABAPENTIN 1200 MG: 300 CAPSULE ORAL at 22:30

## 2019-01-01 RX ADMIN — SILDENAFIL 5 MG: 20 TABLET, FILM COATED ORAL at 21:35

## 2019-01-01 RX ADMIN — PHENYLEPHRINE HYDROCHLORIDE 0.5 MCG/KG/MIN: 10 INJECTION, SOLUTION INTRAMUSCULAR; INTRAVENOUS; SUBCUTANEOUS at 10:34

## 2019-01-01 RX ADMIN — OMEGA-3 FATTY ACIDS CAP 1000 MG 2 CAPSULE: 1000 CAP at 09:34

## 2019-01-01 RX ADMIN — MULTIPLE VITAMINS W/ MINERALS TAB 1 TABLET: TAB at 15:38

## 2019-01-01 RX ADMIN — PHENYLEPHRINE HYDROCHLORIDE 200 MCG: 10 INJECTION, SOLUTION INTRAMUSCULAR; INTRAVENOUS; SUBCUTANEOUS at 08:10

## 2019-01-01 RX ADMIN — PHENYLEPHRINE HYDROCHLORIDE 100 MCG: 10 INJECTION, SOLUTION INTRAMUSCULAR; INTRAVENOUS; SUBCUTANEOUS at 11:33

## 2019-01-01 RX ADMIN — OXYCODONE HYDROCHLORIDE 5 MG: 5 TABLET ORAL at 13:51

## 2019-01-01 RX ADMIN — LANSOPRAZOLE 30 MG: 30 CAPSULE, DELAYED RELEASE ORAL at 08:28

## 2019-01-01 RX ADMIN — FOLIC ACID 1 MG: 1 TABLET ORAL at 09:36

## 2019-01-01 RX ADMIN — TRAMADOL HYDROCHLORIDE 50 MG: 50 TABLET, COATED ORAL at 18:58

## 2019-01-01 RX ADMIN — GABAPENTIN 1200 MG: 600 TABLET, FILM COATED ORAL at 08:04

## 2019-01-01 RX ADMIN — GABAPENTIN 600 MG: 300 CAPSULE ORAL at 20:55

## 2019-01-01 RX ADMIN — LEVOTHYROXINE SODIUM 50 MCG: 50 TABLET ORAL at 08:21

## 2019-01-01 RX ADMIN — CHOLECALCIFEROL TAB 50 MCG (2000 UNIT) 2000 UNITS: 50 TAB at 09:13

## 2019-01-01 RX ADMIN — MULTIPLE VITAMINS W/ MINERALS TAB 1 TABLET: TAB at 08:55

## 2019-01-01 RX ADMIN — DOCUSATE SODIUM 100 MG: 100 CAPSULE, LIQUID FILLED ORAL at 21:35

## 2019-01-01 RX ADMIN — VERAPAMIL HYDROCHLORIDE 120 MG: 120 TABLET, FILM COATED, EXTENDED RELEASE ORAL at 22:46

## 2019-01-01 RX ADMIN — LEVOTHYROXINE SODIUM 50 MCG: 50 TABLET ORAL at 09:34

## 2019-01-01 RX ADMIN — PANTOPRAZOLE SODIUM 40 MG: 40 TABLET, DELAYED RELEASE ORAL at 08:57

## 2019-01-01 RX ADMIN — OXYCODONE HYDROCHLORIDE AND ACETAMINOPHEN 1 TABLET: 7.5; 325 TABLET ORAL at 15:10

## 2019-01-01 RX ADMIN — PROPOFOL 30 MCG/KG/MIN: 10 INJECTION, EMULSION INTRAVENOUS at 08:40

## 2019-01-01 RX ADMIN — DIGOXIN 125 MCG: 0.12 TABLET ORAL at 09:18

## 2019-01-01 RX ADMIN — ASPIRIN 81 MG: 81 TABLET, COATED ORAL at 08:21

## 2019-01-01 RX ADMIN — SILDENAFIL 5 MG: 20 TABLET, FILM COATED ORAL at 18:42

## 2019-01-01 RX ADMIN — PHENYLEPHRINE HYDROCHLORIDE 200 MCG: 10 INJECTION, SOLUTION INTRAMUSCULAR; INTRAVENOUS; SUBCUTANEOUS at 11:20

## 2019-01-01 RX ADMIN — PIPERACILLIN AND TAZOBACTAM 4.5 G: 4; .5 INJECTION, POWDER, FOR SOLUTION INTRAVENOUS at 14:03

## 2019-01-01 RX ADMIN — FUROSEMIDE 40 MG: 10 INJECTION, SOLUTION INTRAVENOUS at 08:55

## 2019-01-01 RX ADMIN — METHOCARBAMOL 500 MG: 500 TABLET, FILM COATED ORAL at 13:00

## 2019-01-01 RX ADMIN — HYDROCODONE BITARTRATE AND ACETAMINOPHEN 1 TABLET: 5; 325 TABLET ORAL at 23:00

## 2019-01-01 RX ADMIN — THEOPHYLLINE 400 MG: 400 TABLET, EXTENDED RELEASE ORAL at 07:57

## 2019-01-01 RX ADMIN — PREDNISONE 5 MG: 5 TABLET ORAL at 08:31

## 2019-01-01 RX ADMIN — ACETAMINOPHEN 650 MG: 325 TABLET, FILM COATED ORAL at 15:59

## 2019-01-01 RX ADMIN — OXYCODONE AND ACETAMINOPHEN 1 TABLET: 7.5; 325 TABLET ORAL at 19:59

## 2019-01-01 RX ADMIN — SILDENAFIL 5 MG: 20 TABLET, FILM COATED ORAL at 17:35

## 2019-01-01 RX ADMIN — PIPERACILLIN AND TAZOBACTAM 4.5 G: 4; .5 INJECTION, POWDER, FOR SOLUTION INTRAVENOUS at 13:51

## 2019-01-01 RX ADMIN — ACETAMINOPHEN 975 MG: 325 TABLET, FILM COATED ORAL at 20:11

## 2019-01-01 RX ADMIN — GABAPENTIN 600 MG: 300 CAPSULE ORAL at 00:03

## 2019-01-01 RX ADMIN — IPRATROPIUM BROMIDE AND ALBUTEROL SULFATE 3 ML: .5; 3 SOLUTION RESPIRATORY (INHALATION) at 15:21

## 2019-01-01 RX ADMIN — METHOCARBAMOL 500 MG: 500 TABLET, FILM COATED ORAL at 19:57

## 2019-01-01 RX ADMIN — PHENYLEPHRINE HYDROCHLORIDE 200 MCG: 10 INJECTION, SOLUTION INTRAMUSCULAR; INTRAVENOUS; SUBCUTANEOUS at 08:25

## 2019-01-01 RX ADMIN — ACETAMINOPHEN 975 MG: 325 TABLET, FILM COATED ORAL at 09:52

## 2019-01-01 RX ADMIN — OXYCODONE HYDROCHLORIDE 10 MG: 5 TABLET ORAL at 00:19

## 2019-01-01 RX ADMIN — IPRATROPIUM BROMIDE AND ALBUTEROL SULFATE 3 ML: .5; 3 SOLUTION RESPIRATORY (INHALATION) at 11:20

## 2019-01-01 RX ADMIN — MULTIPLE VITAMINS W/ MINERALS TAB 1 TABLET: TAB at 09:34

## 2019-01-01 RX ADMIN — LEVOTHYROXINE SODIUM 50 MCG: 50 TABLET ORAL at 10:12

## 2019-01-01 RX ADMIN — OXYCODONE HYDROCHLORIDE 10 MG: 5 TABLET ORAL at 16:08

## 2019-01-01 RX ADMIN — LEVOTHYROXINE SODIUM 50 MCG: 50 TABLET ORAL at 08:56

## 2019-01-01 RX ADMIN — TORSEMIDE 20 MG: 20 TABLET ORAL at 08:43

## 2019-01-01 RX ADMIN — MIDODRINE HYDROCHLORIDE 10 MG: 5 TABLET ORAL at 19:12

## 2019-01-01 RX ADMIN — SILDENAFIL 5 MG: 20 TABLET, FILM COATED ORAL at 08:20

## 2019-01-01 RX ADMIN — Medication 12.5 MG: at 07:56

## 2019-01-01 RX ADMIN — Medication 0.5 MG: at 18:12

## 2019-01-01 RX ADMIN — TORSEMIDE 20 MG: 20 TABLET ORAL at 20:12

## 2019-01-01 RX ADMIN — FOLIC ACID 1 MG: 1 TABLET ORAL at 10:11

## 2019-01-01 RX ADMIN — HYDROXYZINE HYDROCHLORIDE 25 MG: 25 TABLET ORAL at 00:41

## 2019-01-01 RX ADMIN — IPRATROPIUM BROMIDE AND ALBUTEROL SULFATE 3 ML: .5; 3 SOLUTION RESPIRATORY (INHALATION) at 08:18

## 2019-01-01 RX ADMIN — FENTANYL CITRATE 25 MCG: 50 INJECTION, SOLUTION INTRAMUSCULAR; INTRAVENOUS at 09:09

## 2019-01-01 RX ADMIN — LEVOTHYROXINE SODIUM 50 MCG: 50 TABLET ORAL at 08:41

## 2019-01-01 RX ADMIN — POLYETHYLENE GLYCOL 3350 17 G: 17 POWDER, FOR SOLUTION ORAL at 09:04

## 2019-01-01 RX ADMIN — TICAGRELOR 90 MG: 90 TABLET ORAL at 21:34

## 2019-01-01 RX ADMIN — GABAPENTIN 600 MG: 600 TABLET, FILM COATED ORAL at 08:31

## 2019-01-01 RX ADMIN — LEVOTHYROXINE SODIUM 50 MCG: 50 TABLET ORAL at 07:31

## 2019-01-01 RX ADMIN — ACETAMINOPHEN 650 MG: 325 TABLET, FILM COATED ORAL at 05:21

## 2019-01-01 RX ADMIN — POTASSIUM CHLORIDE 20 MEQ: 1500 TABLET, EXTENDED RELEASE ORAL at 00:13

## 2019-01-01 RX ADMIN — FENTANYL CITRATE 25 MCG: 50 INJECTION INTRAMUSCULAR; INTRAVENOUS at 19:21

## 2019-01-01 RX ADMIN — WARFARIN SODIUM 2.5 MG: 2.5 TABLET ORAL at 19:08

## 2019-01-01 RX ADMIN — PREDNISONE 5 MG: 5 TABLET ORAL at 18:11

## 2019-01-01 RX ADMIN — CEFAZOLIN 1 G: 1 INJECTION, POWDER, FOR SOLUTION INTRAMUSCULAR; INTRAVENOUS at 20:11

## 2019-01-01 RX ADMIN — GEMFIBROZIL 600 MG: 600 TABLET ORAL at 08:56

## 2019-01-01 RX ADMIN — GABAPENTIN 1200 MG: 600 TABLET, FILM COATED ORAL at 19:31

## 2019-01-01 RX ADMIN — GABAPENTIN 1200 MG: 600 TABLET, FILM COATED ORAL at 20:15

## 2019-01-01 RX ADMIN — VERAPAMIL HYDROCHLORIDE 120 MG: 120 TABLET, FILM COATED, EXTENDED RELEASE ORAL at 23:50

## 2019-01-01 RX ADMIN — ROCURONIUM BROMIDE 50 MG: 10 INJECTION INTRAVENOUS at 08:36

## 2019-01-01 RX ADMIN — ASPIRIN 81 MG: 81 TABLET, DELAYED RELEASE ORAL at 08:57

## 2019-01-01 RX ADMIN — TORSEMIDE 20 MG: 20 TABLET ORAL at 21:17

## 2019-01-01 RX ADMIN — SULFAMETHOXAZOLE AND TRIMETHOPRIM 1 TABLET: 800; 160 TABLET ORAL at 09:52

## 2019-01-01 RX ADMIN — OMEGA-3 FATTY ACIDS CAP 1000 MG 2 CAPSULE: 1000 CAP at 08:21

## 2019-01-01 RX ADMIN — OXYCODONE HYDROCHLORIDE 10 MG: 5 TABLET ORAL at 20:35

## 2019-01-01 RX ADMIN — SILDENAFIL 5 MG: 20 TABLET, FILM COATED ORAL at 22:19

## 2019-01-01 RX ADMIN — IOPAMIDOL 59 ML: 755 INJECTION, SOLUTION INTRAVENOUS at 13:08

## 2019-01-01 RX ADMIN — OXYCODONE AND ACETAMINOPHEN 1 TABLET: 7.5; 325 TABLET ORAL at 09:40

## 2019-01-01 RX ADMIN — POTASSIUM CHLORIDE 20 MEQ: 1500 TABLET, EXTENDED RELEASE ORAL at 07:05

## 2019-01-01 RX ADMIN — MIDODRINE HYDROCHLORIDE 10 MG: 5 TABLET ORAL at 13:09

## 2019-01-01 RX ADMIN — FERROUS GLUCONATE 324 MG: 324 TABLET ORAL at 08:40

## 2019-01-01 RX ADMIN — PHENYLEPHRINE HYDROCHLORIDE 200 MCG: 10 INJECTION, SOLUTION INTRAMUSCULAR; INTRAVENOUS; SUBCUTANEOUS at 10:27

## 2019-01-01 RX ADMIN — PIPERACILLIN AND TAZOBACTAM 4.5 G: 4; .5 INJECTION, POWDER, FOR SOLUTION INTRAVENOUS at 02:56

## 2019-01-01 RX ADMIN — POTASSIUM CHLORIDE, DEXTROSE MONOHYDRATE AND SODIUM CHLORIDE: 150; 5; 450 INJECTION, SOLUTION INTRAVENOUS at 07:38

## 2019-01-01 RX ADMIN — ASPIRIN 81 MG 324 MG: 81 TABLET ORAL at 22:26

## 2019-01-01 RX ADMIN — GABAPENTIN 600 MG: 300 CAPSULE ORAL at 16:58

## 2019-01-01 RX ADMIN — IPRATROPIUM BROMIDE AND ALBUTEROL SULFATE 3 ML: .5; 3 SOLUTION RESPIRATORY (INHALATION) at 19:57

## 2019-01-01 RX ADMIN — CALCIUM 500 MG: 500 TABLET ORAL at 22:03

## 2019-01-01 RX ADMIN — ATORVASTATIN CALCIUM 40 MG: 40 TABLET, FILM COATED ORAL at 08:57

## 2019-01-01 RX ADMIN — SILDENAFIL 5 MG: 20 TABLET, FILM COATED ORAL at 00:31

## 2019-01-01 RX ADMIN — CALCIUM 500 MG: 500 TABLET ORAL at 20:15

## 2019-01-01 RX ADMIN — TRAMADOL HYDROCHLORIDE 50 MG: 50 TABLET, COATED ORAL at 04:53

## 2019-01-01 RX ADMIN — CALCIUM 500 MG: 500 TABLET ORAL at 08:56

## 2019-01-01 RX ADMIN — LEVOFLOXACIN 500 MG: 5 INJECTION, SOLUTION INTRAVENOUS at 22:16

## 2019-01-01 RX ADMIN — OXYCODONE HYDROCHLORIDE AND ACETAMINOPHEN 1 TABLET: 7.5; 325 TABLET ORAL at 09:02

## 2019-01-01 RX ADMIN — FOLIC ACID 1 MG: 1 TABLET ORAL at 09:35

## 2019-01-01 RX ADMIN — GABAPENTIN 600 MG: 600 TABLET, FILM COATED ORAL at 08:25

## 2019-01-01 RX ADMIN — CEFAZOLIN SODIUM 2 G: 2 INJECTION, SOLUTION INTRAVENOUS at 08:22

## 2019-01-01 RX ADMIN — LEVOFLOXACIN 500 MG: 500 TABLET, FILM COATED ORAL at 14:18

## 2019-01-01 RX ADMIN — ACETAMINOPHEN 975 MG: 325 TABLET, FILM COATED ORAL at 18:12

## 2019-01-01 RX ADMIN — CALCIUM 500 MG: 500 TABLET ORAL at 10:01

## 2019-01-01 RX ADMIN — GABAPENTIN 1200 MG: 600 TABLET, FILM COATED ORAL at 21:33

## 2019-01-01 RX ADMIN — FOLIC ACID 1 MG: 1 TABLET ORAL at 07:56

## 2019-01-01 RX ADMIN — IPRATROPIUM BROMIDE AND ALBUTEROL SULFATE 3 ML: .5; 3 SOLUTION RESPIRATORY (INHALATION) at 20:25

## 2019-01-01 RX ADMIN — OXYCODONE HYDROCHLORIDE AND ACETAMINOPHEN 1 TABLET: 5; 325 TABLET ORAL at 09:30

## 2019-01-01 RX ADMIN — OXYCODONE AND ACETAMINOPHEN 1 TABLET: 7.5; 325 TABLET ORAL at 13:15

## 2019-01-01 RX ADMIN — ROCURONIUM BROMIDE 30 MG: 10 INJECTION INTRAVENOUS at 11:34

## 2019-01-01 RX ADMIN — OXYCODONE HYDROCHLORIDE 10 MG: 5 TABLET ORAL at 20:55

## 2019-01-01 RX ADMIN — TORSEMIDE 10 MG: 10 TABLET ORAL at 08:41

## 2019-01-01 RX ADMIN — TRAMADOL HYDROCHLORIDE 50 MG: 50 TABLET, COATED ORAL at 12:53

## 2019-01-01 RX ADMIN — SENNOSIDES AND DOCUSATE SODIUM 1 TABLET: 8.6; 5 TABLET ORAL at 22:38

## 2019-01-01 RX ADMIN — MELATONIN 2000 UNITS: at 08:57

## 2019-01-01 RX ADMIN — FERROUS GLUCONATE 324 MG: 324 TABLET ORAL at 08:54

## 2019-01-01 RX ADMIN — MULTIPLE VITAMINS W/ MINERALS TAB 1 TABLET: TAB at 09:29

## 2019-01-01 RX ADMIN — SENNOSIDES AND DOCUSATE SODIUM 2 TABLET: 8.6; 5 TABLET ORAL at 22:06

## 2019-01-01 RX ADMIN — ACETAMINOPHEN 650 MG: 325 TABLET, FILM COATED ORAL at 09:51

## 2019-01-01 RX ADMIN — OXYCODONE HYDROCHLORIDE AND ACETAMINOPHEN 1 TABLET: 7.5; 325 TABLET ORAL at 22:41

## 2019-01-01 RX ADMIN — GABAPENTIN 600 MG: 600 TABLET, FILM COATED ORAL at 13:58

## 2019-01-01 RX ADMIN — ASPIRIN 81 MG: 81 TABLET ORAL at 07:04

## 2019-01-01 RX ADMIN — INSULIN ASPART 2 UNITS: 100 INJECTION, SOLUTION INTRAVENOUS; SUBCUTANEOUS at 12:18

## 2019-01-01 RX ADMIN — LEVOTHYROXINE SODIUM 50 MCG: 50 TABLET ORAL at 08:06

## 2019-01-01 RX ADMIN — OXYCODONE AND ACETAMINOPHEN 1 TABLET: 7.5; 325 TABLET ORAL at 08:58

## 2019-01-01 RX ADMIN — MELATONIN 2000 UNITS: at 08:28

## 2019-01-01 RX ADMIN — OXYCODONE HYDROCHLORIDE 10 MG: 5 TABLET ORAL at 20:08

## 2019-01-01 RX ADMIN — LIDOCAINE 3 PATCH: 560 PATCH PERCUTANEOUS; TOPICAL; TRANSDERMAL at 19:48

## 2019-01-01 RX ADMIN — GABAPENTIN 600 MG: 600 TABLET, FILM COATED ORAL at 14:48

## 2019-01-01 RX ADMIN — MIDODRINE HYDROCHLORIDE 10 MG: 5 TABLET ORAL at 11:45

## 2019-01-01 RX ADMIN — VERAPAMIL HYDROCHLORIDE 120 MG: 120 TABLET, FILM COATED, EXTENDED RELEASE ORAL at 22:11

## 2019-01-01 RX ADMIN — CALCIUM 500 MG: 500 TABLET ORAL at 08:47

## 2019-01-01 RX ADMIN — OXYCODONE HYDROCHLORIDE 10 MG: 5 TABLET ORAL at 13:33

## 2019-01-01 RX ADMIN — GEMFIBROZIL 600 MG: 600 TABLET ORAL at 09:31

## 2019-01-01 RX ADMIN — OXYCODONE HYDROCHLORIDE AND ACETAMINOPHEN 1 TABLET: 5; 325 TABLET ORAL at 14:20

## 2019-01-01 RX ADMIN — METHOCARBAMOL 500 MG: 500 TABLET, FILM COATED ORAL at 13:04

## 2019-01-01 RX ADMIN — PROPOFOL 50 MG: 10 INJECTION, EMULSION INTRAVENOUS at 07:44

## 2019-01-01 RX ADMIN — MIDODRINE HYDROCHLORIDE 10 MG: 5 TABLET ORAL at 17:00

## 2019-01-01 RX ADMIN — OXYCODONE HYDROCHLORIDE 10 MG: 5 TABLET ORAL at 20:25

## 2019-01-01 RX ADMIN — METHOCARBAMOL 500 MG: 500 TABLET, FILM COATED ORAL at 08:49

## 2019-01-01 RX ADMIN — LANSOPRAZOLE 30 MG: 30 CAPSULE, DELAYED RELEASE ORAL at 08:06

## 2019-01-01 RX ADMIN — DOCUSATE SODIUM 100 MG: 100 CAPSULE, LIQUID FILLED ORAL at 21:03

## 2019-01-01 RX ADMIN — SODIUM CHLORIDE: 9 INJECTION, SOLUTION INTRAVENOUS at 09:06

## 2019-01-01 RX ADMIN — FOLIC ACID 1 MG: 1 TABLET ORAL at 09:47

## 2019-01-01 RX ADMIN — FUROSEMIDE 40 MG: 10 INJECTION, SOLUTION INTRAVENOUS at 22:04

## 2019-01-01 RX ADMIN — GABAPENTIN 600 MG: 300 CAPSULE ORAL at 15:01

## 2019-01-01 RX ADMIN — VANCOMYCIN HYDROCHLORIDE 1500 MG: 10 INJECTION, POWDER, LYOPHILIZED, FOR SOLUTION INTRAVENOUS at 20:16

## 2019-01-01 RX ADMIN — CLOPIDOGREL BISULFATE 75 MG: 75 TABLET ORAL at 09:52

## 2019-01-01 RX ADMIN — OXYCODONE HYDROCHLORIDE 10 MG: 5 TABLET ORAL at 18:13

## 2019-01-01 RX ADMIN — OXYCODONE HYDROCHLORIDE 10 MG: 5 TABLET ORAL at 20:48

## 2019-01-01 RX ADMIN — PHENYLEPHRINE HYDROCHLORIDE 100 MCG: 10 INJECTION INTRAVENOUS at 07:51

## 2019-01-01 RX ADMIN — OXYCODONE HYDROCHLORIDE 10 MG: 5 TABLET ORAL at 00:11

## 2019-01-01 RX ADMIN — POLYETHYLENE GLYCOL 3350 17 G: 17 POWDER, FOR SOLUTION ORAL at 20:11

## 2019-01-01 RX ADMIN — ACETAMINOPHEN 650 MG: 325 TABLET, FILM COATED ORAL at 23:43

## 2019-01-01 RX ADMIN — CALCIUM 500 MG: 500 TABLET ORAL at 08:21

## 2019-01-01 RX ADMIN — LANSOPRAZOLE 30 MG: 30 CAPSULE, DELAYED RELEASE ORAL at 07:10

## 2019-01-01 RX ADMIN — PREDNISONE 5 MG: 5 TABLET ORAL at 09:06

## 2019-01-01 RX ADMIN — OXYCODONE HYDROCHLORIDE 10 MG: 5 TABLET ORAL at 17:11

## 2019-01-01 RX ADMIN — LEVOTHYROXINE SODIUM 50 MCG: 50 TABLET ORAL at 09:51

## 2019-01-01 RX ADMIN — VASOPRESSIN 1 UNITS: 20 INJECTION INTRAVENOUS at 12:30

## 2019-01-01 RX ADMIN — Medication 1 G: at 08:04

## 2019-01-01 RX ADMIN — GEMFIBROZIL 600 MG: 600 TABLET ORAL at 09:59

## 2019-01-01 RX ADMIN — ESMOLOL HYDROCHLORIDE 10 MG: 10 INJECTION, SOLUTION INTRAVENOUS at 09:28

## 2019-01-01 RX ADMIN — GABAPENTIN 600 MG: 300 CAPSULE ORAL at 21:35

## 2019-01-01 RX ADMIN — SENNOSIDES AND DOCUSATE SODIUM 2 TABLET: 8.6; 5 TABLET ORAL at 09:01

## 2019-01-01 RX ADMIN — OXYCODONE HYDROCHLORIDE AND ACETAMINOPHEN 1 TABLET: 7.5; 325 TABLET ORAL at 08:46

## 2019-01-01 RX ADMIN — FOLIC ACID 1 MG: 1 TABLET ORAL at 09:51

## 2019-01-01 RX ADMIN — FENTANYL CITRATE 50 MCG: 50 INJECTION, SOLUTION INTRAMUSCULAR; INTRAVENOUS at 08:04

## 2019-01-01 RX ADMIN — GABAPENTIN 600 MG: 600 TABLET, FILM COATED ORAL at 13:33

## 2019-01-01 RX ADMIN — VANCOMYCIN HYDROCHLORIDE 1750 MG: 10 INJECTION, POWDER, LYOPHILIZED, FOR SOLUTION INTRAVENOUS at 11:08

## 2019-01-01 RX ADMIN — MULTIPLE VITAMINS W/ MINERALS TAB 1 TABLET: TAB at 08:27

## 2019-01-01 RX ADMIN — PANTOPRAZOLE SODIUM 40 MG: 40 TABLET, DELAYED RELEASE ORAL at 09:05

## 2019-01-01 RX ADMIN — GABAPENTIN 600 MG: 300 CAPSULE ORAL at 22:54

## 2019-01-01 RX ADMIN — GABAPENTIN 1200 MG: 600 TABLET, FILM COATED ORAL at 09:41

## 2019-01-01 RX ADMIN — LANSOPRAZOLE 30 MG: 30 CAPSULE, DELAYED RELEASE ORAL at 13:51

## 2019-01-01 RX ADMIN — ATORVASTATIN CALCIUM 40 MG: 40 TABLET, FILM COATED ORAL at 09:52

## 2019-01-01 RX ADMIN — MELATONIN 2000 UNITS: at 08:47

## 2019-01-01 RX ADMIN — LEVOTHYROXINE SODIUM 50 MCG: 50 TABLET ORAL at 11:56

## 2019-01-01 RX ADMIN — POLYETHYLENE GLYCOL 3350 17 G: 17 POWDER, FOR SOLUTION ORAL at 21:00

## 2019-01-01 RX ADMIN — ASPIRIN 81 MG 324 MG: 81 TABLET ORAL at 22:28

## 2019-01-01 RX ADMIN — OMEGA-3 FATTY ACIDS CAP 1000 MG 2 CAPSULE: 1000 CAP at 08:43

## 2019-01-01 RX ADMIN — METHOCARBAMOL 500 MG: 500 TABLET, FILM COATED ORAL at 18:57

## 2019-01-01 RX ADMIN — TICAGRELOR 90 MG: 90 TABLET ORAL at 20:04

## 2019-01-01 RX ADMIN — TORSEMIDE 20 MG: 20 TABLET ORAL at 08:21

## 2019-01-01 RX ADMIN — HEPARIN SODIUM 5000 UNITS: 5000 INJECTION, SOLUTION INTRAVENOUS; SUBCUTANEOUS at 20:04

## 2019-01-01 RX ADMIN — DEXAMETHASONE SODIUM PHOSPHATE 5 MG: 10 INJECTION, SOLUTION INTRAMUSCULAR; INTRAVENOUS at 13:25

## 2019-01-01 RX ADMIN — LEVOTHYROXINE SODIUM 50 MCG: 50 TABLET ORAL at 09:18

## 2019-01-01 RX ADMIN — METHOCARBAMOL 500 MG: 500 TABLET, FILM COATED ORAL at 09:05

## 2019-01-01 RX ADMIN — OXYCODONE HYDROCHLORIDE 10 MG: 5 TABLET ORAL at 20:10

## 2019-01-01 RX ADMIN — OXYCODONE HYDROCHLORIDE 10 MG: 5 TABLET ORAL at 02:17

## 2019-01-01 RX ADMIN — CALCIUM 500 MG: 500 TABLET ORAL at 09:02

## 2019-01-01 RX ADMIN — POLYETHYLENE GLYCOL 3350 17 G: 17 POWDER, FOR SOLUTION ORAL at 08:49

## 2019-01-01 RX ADMIN — OXYCODONE HYDROCHLORIDE 10 MG: 5 TABLET ORAL at 00:07

## 2019-01-01 RX ADMIN — MULTIPLE VITAMINS W/ MINERALS TAB 1 TABLET: TAB at 08:56

## 2019-01-01 RX ADMIN — PHENYLEPHRINE HYDROCHLORIDE 150 MCG: 10 INJECTION, SOLUTION INTRAMUSCULAR; INTRAVENOUS; SUBCUTANEOUS at 11:10

## 2019-01-01 RX ADMIN — CALCIUM 500 MG: 500 TABLET ORAL at 08:43

## 2019-01-01 RX ADMIN — GABAPENTIN 600 MG: 300 CAPSULE ORAL at 16:22

## 2019-01-01 RX ADMIN — THEOPHYLLINE 400 MG: 400 TABLET, EXTENDED RELEASE ORAL at 08:50

## 2019-01-01 RX ADMIN — FAMOTIDINE 20 MG: 20 TABLET ORAL at 06:25

## 2019-01-01 RX ADMIN — OXYCODONE HYDROCHLORIDE 10 MG: 5 TABLET ORAL at 16:33

## 2019-01-01 RX ADMIN — ASPIRIN 81 MG: 81 TABLET, COATED ORAL at 08:55

## 2019-01-01 RX ADMIN — POLYETHYLENE GLYCOL 3350 17 G: 17 POWDER, FOR SOLUTION ORAL at 10:51

## 2019-01-01 RX ADMIN — OXYCODONE HYDROCHLORIDE 10 MG: 5 TABLET ORAL at 22:26

## 2019-01-01 RX ADMIN — CALCIUM 500 MG: 500 TABLET ORAL at 08:58

## 2019-01-01 RX ADMIN — TICAGRELOR 90 MG: 90 TABLET ORAL at 19:57

## 2019-01-01 RX ADMIN — TORSEMIDE 5 MG: 5 TABLET ORAL at 21:27

## 2019-01-01 RX ADMIN — IPRATROPIUM BROMIDE AND ALBUTEROL SULFATE 3 ML: .5; 3 SOLUTION RESPIRATORY (INHALATION) at 19:16

## 2019-01-01 RX ADMIN — POTASSIUM CHLORIDE 20 MEQ: 1500 TABLET, EXTENDED RELEASE ORAL at 17:39

## 2019-01-01 RX ADMIN — MIDAZOLAM 1 MG: 1 INJECTION INTRAMUSCULAR; INTRAVENOUS at 17:10

## 2019-01-01 RX ADMIN — CARBIDOPA AND LEVODOPA 5 MG: 50; 200 TABLET, EXTENDED RELEASE ORAL at 09:51

## 2019-01-01 RX ADMIN — ESMOLOL HYDROCHLORIDE 20 MG: 10 INJECTION, SOLUTION INTRAVENOUS at 14:11

## 2019-01-01 RX ADMIN — VERAPAMIL HYDROCHLORIDE 120 MG: 120 TABLET, FILM COATED, EXTENDED RELEASE ORAL at 21:00

## 2019-01-01 RX ADMIN — LIDOCAINE 3 PATCH: 560 PATCH PERCUTANEOUS; TOPICAL; TRANSDERMAL at 21:15

## 2019-01-01 RX ADMIN — THEOPHYLLINE 400 MG: 400 TABLET, EXTENDED RELEASE ORAL at 09:51

## 2019-01-01 RX ADMIN — FERROUS GLUCONATE 324 MG: 324 TABLET ORAL at 09:18

## 2019-01-01 RX ADMIN — POTASSIUM CHLORIDE 40 MEQ: 1500 TABLET, EXTENDED RELEASE ORAL at 08:26

## 2019-01-01 RX ADMIN — ACETAMINOPHEN 650 MG: 325 TABLET, FILM COATED ORAL at 16:33

## 2019-01-01 RX ADMIN — ASPIRIN 81 MG: 81 TABLET, COATED ORAL at 09:36

## 2019-01-01 RX ADMIN — ONDANSETRON 4 MG: 2 INJECTION INTRAMUSCULAR; INTRAVENOUS at 12:06

## 2019-01-01 RX ADMIN — SENNOSIDES AND DOCUSATE SODIUM 2 TABLET: 8.6; 5 TABLET ORAL at 08:49

## 2019-01-01 RX ADMIN — CLOPIDOGREL BISULFATE 75 MG: 75 TABLET ORAL at 09:29

## 2019-01-01 RX ADMIN — OXYCODONE HYDROCHLORIDE 10 MG: 5 TABLET ORAL at 11:15

## 2019-01-01 RX ADMIN — OXYCODONE HYDROCHLORIDE AND ACETAMINOPHEN 1 TABLET: 7.5; 325 TABLET ORAL at 08:53

## 2019-01-01 RX ADMIN — ROCURONIUM BROMIDE 20 MG: 10 INJECTION INTRAVENOUS at 09:10

## 2019-01-01 RX ADMIN — OXYCODONE HYDROCHLORIDE AND ACETAMINOPHEN 1 TABLET: 7.5; 325 TABLET ORAL at 15:18

## 2019-01-01 RX ADMIN — OXYCODONE HYDROCHLORIDE 10 MG: 5 TABLET ORAL at 13:03

## 2019-01-01 RX ADMIN — Medication 1 G: at 11:50

## 2019-01-01 RX ADMIN — ACETAMINOPHEN 650 MG: 325 TABLET, FILM COATED ORAL at 04:13

## 2019-01-01 RX ADMIN — GEMFIBROZIL 600 MG: 600 TABLET ORAL at 10:05

## 2019-01-01 RX ADMIN — SENNOSIDES AND DOCUSATE SODIUM 2 TABLET: 8.6; 5 TABLET ORAL at 19:44

## 2019-01-01 RX ADMIN — FLUTICASONE FUROATE 1 PUFF: 100 POWDER RESPIRATORY (INHALATION) at 08:21

## 2019-01-01 RX ADMIN — GEMFIBROZIL 600 MG: 600 TABLET ORAL at 13:18

## 2019-01-01 RX ADMIN — GABAPENTIN 1200 MG: 300 CAPSULE ORAL at 10:13

## 2019-01-01 RX ADMIN — METOPROLOL TARTRATE 1 MG: 1 INJECTION, SOLUTION INTRAVENOUS at 12:08

## 2019-01-01 RX ADMIN — PROPOFOL 50 MG: 10 INJECTION, EMULSION INTRAVENOUS at 08:00

## 2019-01-01 RX ADMIN — SENNOSIDES AND DOCUSATE SODIUM 1 TABLET: 8.6; 5 TABLET ORAL at 20:22

## 2019-01-01 RX ADMIN — HEPARIN SODIUM 5000 UNITS: 5000 INJECTION, SOLUTION INTRAVENOUS; SUBCUTANEOUS at 08:14

## 2019-01-01 RX ADMIN — FOLIC ACID 1 MG: 1 TABLET ORAL at 09:01

## 2019-01-01 RX ADMIN — ATORVASTATIN CALCIUM 40 MG: 40 TABLET, FILM COATED ORAL at 09:35

## 2019-01-01 RX ADMIN — TRAMADOL HYDROCHLORIDE 50 MG: 50 TABLET, COATED ORAL at 21:00

## 2019-01-01 RX ADMIN — SENNOSIDES AND DOCUSATE SODIUM 2 TABLET: 8.6; 5 TABLET ORAL at 20:48

## 2019-01-01 RX ADMIN — DOCUSATE SODIUM 100 MG: 100 CAPSULE, LIQUID FILLED ORAL at 21:27

## 2019-01-01 RX ADMIN — POLYETHYLENE GLYCOL 3350 17 G: 17 POWDER, FOR SOLUTION ORAL at 08:26

## 2019-01-01 RX ADMIN — GABAPENTIN 600 MG: 300 CAPSULE ORAL at 21:20

## 2019-01-01 RX ADMIN — SENNOSIDES AND DOCUSATE SODIUM 2 TABLET: 8.6; 5 TABLET ORAL at 08:59

## 2019-01-01 RX ADMIN — LEVOTHYROXINE SODIUM 50 MCG: 50 TABLET ORAL at 06:12

## 2019-01-01 RX ADMIN — IPRATROPIUM BROMIDE AND ALBUTEROL SULFATE 3 ML: .5; 3 SOLUTION RESPIRATORY (INHALATION) at 23:54

## 2019-01-01 RX ADMIN — OXYCODONE AND ACETAMINOPHEN 1 TABLET: 7.5; 325 TABLET ORAL at 14:48

## 2019-01-01 RX ADMIN — ACETAMINOPHEN 650 MG: 325 TABLET, FILM COATED ORAL at 23:22

## 2019-01-01 RX ADMIN — GEMFIBROZIL 600 MG: 600 TABLET ORAL at 20:15

## 2019-01-01 RX ADMIN — DOCUSATE SODIUM 100 MG: 100 CAPSULE, LIQUID FILLED ORAL at 08:27

## 2019-01-01 RX ADMIN — PHENYLEPHRINE HYDROCHLORIDE 200 MCG: 10 INJECTION, SOLUTION INTRAMUSCULAR; INTRAVENOUS; SUBCUTANEOUS at 08:45

## 2019-01-01 RX ADMIN — CLOPIDOGREL BISULFATE 75 MG: 75 TABLET ORAL at 10:11

## 2019-01-01 RX ADMIN — GABAPENTIN 600 MG: 300 CAPSULE ORAL at 21:10

## 2019-01-01 RX ADMIN — LANSOPRAZOLE 30 MG: 30 CAPSULE, DELAYED RELEASE ORAL at 08:57

## 2019-01-01 RX ADMIN — OXYCODONE HYDROCHLORIDE 10 MG: 5 TABLET ORAL at 22:16

## 2019-01-01 RX ADMIN — FOLIC ACID 1 MG: 1 TABLET ORAL at 08:26

## 2019-01-01 RX ADMIN — OXYCODONE AND ACETAMINOPHEN 1 TABLET: 7.5; 325 TABLET ORAL at 20:18

## 2019-01-01 RX ADMIN — LEVOTHYROXINE SODIUM 50 MCG: 50 TABLET ORAL at 07:53

## 2019-01-01 RX ADMIN — ASPIRIN 81 MG: 81 TABLET, COATED ORAL at 08:59

## 2019-01-01 RX ADMIN — INSULIN ASPART 1 UNITS: 100 INJECTION, SOLUTION INTRAVENOUS; SUBCUTANEOUS at 08:26

## 2019-01-01 RX ADMIN — OXYCODONE AND ACETAMINOPHEN 1 TABLET: 7.5; 325 TABLET ORAL at 07:58

## 2019-01-01 RX ADMIN — PHENYLEPHRINE HYDROCHLORIDE 100 MCG: 10 INJECTION, SOLUTION INTRAMUSCULAR; INTRAVENOUS; SUBCUTANEOUS at 09:49

## 2019-01-01 RX ADMIN — MIDAZOLAM 2 MG: 1 INJECTION INTRAMUSCULAR; INTRAVENOUS at 07:24

## 2019-01-01 RX ADMIN — LEVOTHYROXINE SODIUM 50 MCG: 50 TABLET ORAL at 06:28

## 2019-01-01 RX ADMIN — FENTANYL CITRATE 25 MCG: 50 INJECTION, SOLUTION INTRAMUSCULAR; INTRAVENOUS at 08:15

## 2019-01-01 RX ADMIN — CLOPIDOGREL BISULFATE 75 MG: 75 TABLET, FILM COATED ORAL at 11:56

## 2019-01-01 RX ADMIN — ACETAMINOPHEN 975 MG: 325 TABLET, FILM COATED ORAL at 06:37

## 2019-01-01 RX ADMIN — METHOCARBAMOL 500 MG: 500 TABLET, FILM COATED ORAL at 09:51

## 2019-01-01 RX ADMIN — FERROUS GLUCONATE 324 MG: 324 TABLET ORAL at 14:56

## 2019-01-01 RX ADMIN — HUMAN INSULIN 2 UNITS/HR: 100 INJECTION, SOLUTION SUBCUTANEOUS at 10:10

## 2019-01-01 RX ADMIN — LEVOTHYROXINE SODIUM 50 MCG: 50 TABLET ORAL at 08:23

## 2019-01-01 RX ADMIN — OXYCODONE HYDROCHLORIDE AND ACETAMINOPHEN 1 TABLET: 7.5; 325 TABLET ORAL at 09:38

## 2019-01-01 RX ADMIN — MULTIPLE VITAMINS W/ MINERALS TAB 1 TABLET: TAB at 09:59

## 2019-01-01 RX ADMIN — LEVOTHYROXINE SODIUM 50 MCG: 50 TABLET ORAL at 08:57

## 2019-01-01 RX ADMIN — TRAMADOL HYDROCHLORIDE 50 MG: 50 TABLET, COATED ORAL at 08:31

## 2019-01-01 RX ADMIN — VASOPRESSIN 1 UNITS: 20 INJECTION INTRAVENOUS at 08:42

## 2019-01-01 RX ADMIN — INSULIN ASPART 2 UNITS: 100 INJECTION, SOLUTION INTRAVENOUS; SUBCUTANEOUS at 13:58

## 2019-01-01 RX ADMIN — IPRATROPIUM BROMIDE AND ALBUTEROL SULFATE 3 ML: .5; 3 SOLUTION RESPIRATORY (INHALATION) at 07:25

## 2019-01-01 RX ADMIN — OXYCODONE AND ACETAMINOPHEN 1 TABLET: 7.5; 325 TABLET ORAL at 21:33

## 2019-01-01 RX ADMIN — PANTOPRAZOLE SODIUM 40 MG: 40 TABLET, DELAYED RELEASE ORAL at 08:50

## 2019-01-01 RX ADMIN — OXYCODONE AND ACETAMINOPHEN 1 TABLET: 7.5; 325 TABLET ORAL at 21:28

## 2019-01-01 RX ADMIN — FOLIC ACID 1 MG: 1 TABLET ORAL at 09:18

## 2019-01-01 RX ADMIN — OXYCODONE HYDROCHLORIDE 10 MG: 5 TABLET ORAL at 05:12

## 2019-01-01 RX ADMIN — TRAMADOL HYDROCHLORIDE 25 MG: 50 TABLET ORAL at 08:06

## 2019-01-01 RX ADMIN — MULTIPLE VITAMINS W/ MINERALS TAB 1 TABLET: TAB at 08:31

## 2019-01-01 RX ADMIN — GABAPENTIN 600 MG: 600 TABLET, FILM COATED ORAL at 20:08

## 2019-01-01 RX ADMIN — HEPARIN SODIUM 5000 UNITS: 5000 INJECTION, SOLUTION INTRAVENOUS; SUBCUTANEOUS at 21:33

## 2019-01-01 RX ADMIN — FLUTICASONE FUROATE 1 PUFF: 100 POWDER RESPIRATORY (INHALATION) at 20:45

## 2019-01-01 RX ADMIN — VASOPRESSIN 1 UNITS: 20 INJECTION INTRAVENOUS at 10:28

## 2019-01-01 RX ADMIN — FENTANYL CITRATE 50 MCG: 50 INJECTION, SOLUTION INTRAMUSCULAR; INTRAVENOUS at 08:35

## 2019-01-01 RX ADMIN — IPRATROPIUM BROMIDE AND ALBUTEROL SULFATE 3 ML: .5; 3 SOLUTION RESPIRATORY (INHALATION) at 08:20

## 2019-01-01 RX ADMIN — FUROSEMIDE 40 MG: 10 INJECTION, SOLUTION INTRAVENOUS at 08:10

## 2019-01-01 RX ADMIN — LEVOTHYROXINE SODIUM 50 MCG: 50 TABLET ORAL at 06:57

## 2019-01-01 RX ADMIN — IPRATROPIUM BROMIDE AND ALBUTEROL SULFATE 3 ML: .5; 3 SOLUTION RESPIRATORY (INHALATION) at 15:58

## 2019-01-01 RX ADMIN — MULTIPLE VITAMINS W/ MINERALS TAB 1 TABLET: TAB at 09:04

## 2019-01-01 RX ADMIN — PANTOPRAZOLE SODIUM 40 MG: 20 TABLET, DELAYED RELEASE ORAL at 06:00

## 2019-01-01 RX ADMIN — TORSEMIDE 10 MG: 10 TABLET ORAL at 10:12

## 2019-01-01 RX ADMIN — ENOXAPARIN SODIUM 40 MG: 40 INJECTION SUBCUTANEOUS at 21:51

## 2019-01-01 RX ADMIN — CLOPIDOGREL BISULFATE 75 MG: 75 TABLET ORAL at 09:02

## 2019-01-01 RX ADMIN — GABAPENTIN 600 MG: 600 TABLET, FILM COATED ORAL at 21:09

## 2019-01-01 RX ADMIN — PREDNISONE 5 MG: 5 TABLET ORAL at 18:56

## 2019-01-01 RX ADMIN — MULTIPLE VITAMINS W/ MINERALS TAB 1 TABLET: TAB at 09:35

## 2019-01-01 RX ADMIN — OXYCODONE HYDROCHLORIDE 10 MG: 5 TABLET ORAL at 14:48

## 2019-01-01 RX ADMIN — GABAPENTIN 600 MG: 300 CAPSULE ORAL at 08:27

## 2019-01-01 RX ADMIN — FAMOTIDINE 20 MG: 20 TABLET ORAL at 06:38

## 2019-01-01 RX ADMIN — ATORVASTATIN CALCIUM 40 MG: 40 TABLET, FILM COATED ORAL at 08:29

## 2019-01-01 RX ADMIN — ACETAMINOPHEN 650 MG: 325 TABLET, FILM COATED ORAL at 13:33

## 2019-01-01 RX ADMIN — ENOXAPARIN SODIUM 40 MG: 40 INJECTION SUBCUTANEOUS at 12:53

## 2019-01-01 RX ADMIN — SENNOSIDES AND DOCUSATE SODIUM 2 TABLET: 8.6; 5 TABLET ORAL at 20:25

## 2019-01-01 RX ADMIN — GABAPENTIN 1200 MG: 600 TABLET, FILM COATED ORAL at 13:11

## 2019-01-01 RX ADMIN — GABAPENTIN 600 MG: 600 TABLET, FILM COATED ORAL at 09:05

## 2019-01-01 RX ADMIN — SILDENAFIL 5 MG: 20 TABLET, FILM COATED ORAL at 21:05

## 2019-01-01 RX ADMIN — GEMFIBROZIL 600 MG: 600 TABLET ORAL at 08:57

## 2019-01-01 RX ADMIN — OMEGA-3 FATTY ACIDS CAP 1000 MG 2000 MG: 1000 CAP at 21:46

## 2019-01-01 RX ADMIN — PREDNISONE 5 MG: 5 TABLET ORAL at 08:50

## 2019-01-01 RX ADMIN — OXYCODONE HYDROCHLORIDE AND ACETAMINOPHEN 1 TABLET: 7.5; 325 TABLET ORAL at 17:02

## 2019-01-01 RX ADMIN — FOLIC ACID 1 MG: 1 TABLET ORAL at 08:43

## 2019-01-01 RX ADMIN — HYDROCODONE BITARTRATE AND ACETAMINOPHEN 1 TABLET: 5; 325 TABLET ORAL at 15:47

## 2019-01-01 RX ADMIN — PANTOPRAZOLE SODIUM 40 MG: 40 TABLET, DELAYED RELEASE ORAL at 09:34

## 2019-01-01 RX ADMIN — GABAPENTIN 1200 MG: 600 TABLET, FILM COATED ORAL at 08:47

## 2019-01-01 RX ADMIN — SILDENAFIL 5 MG: 20 TABLET, FILM COATED ORAL at 17:19

## 2019-01-01 RX ADMIN — VANCOMYCIN HYDROCHLORIDE 1500 MG: 10 INJECTION, POWDER, LYOPHILIZED, FOR SOLUTION INTRAVENOUS at 20:52

## 2019-01-01 RX ADMIN — TRAMADOL HYDROCHLORIDE 50 MG: 50 TABLET, COATED ORAL at 19:56

## 2019-01-01 RX ADMIN — OXYCODONE HYDROCHLORIDE 10 MG: 5 TABLET ORAL at 05:21

## 2019-01-01 RX ADMIN — TICAGRELOR 90 MG: 90 TABLET ORAL at 10:06

## 2019-01-01 RX ADMIN — HYDROXYZINE HYDROCHLORIDE 25 MG: 25 TABLET ORAL at 01:08

## 2019-01-01 RX ADMIN — TRAMADOL HYDROCHLORIDE 50 MG: 50 TABLET, COATED ORAL at 12:39

## 2019-01-01 RX ADMIN — GABAPENTIN 600 MG: 600 TABLET, FILM COATED ORAL at 21:14

## 2019-01-01 RX ADMIN — METHOCARBAMOL 500 MG: 500 TABLET, FILM COATED ORAL at 13:40

## 2019-01-01 RX ADMIN — GABAPENTIN 600 MG: 600 TABLET, FILM COATED ORAL at 20:09

## 2019-01-01 RX ADMIN — HEPARIN SODIUM 5000 UNITS: 5000 INJECTION, SOLUTION INTRAVENOUS; SUBCUTANEOUS at 19:57

## 2019-01-01 RX ADMIN — METHOCARBAMOL 500 MG: 500 TABLET, FILM COATED ORAL at 17:34

## 2019-01-01 RX ADMIN — OXYCODONE HYDROCHLORIDE AND ACETAMINOPHEN 1 TABLET: 7.5; 325 TABLET ORAL at 22:14

## 2019-01-01 RX ADMIN — MIDODRINE HYDROCHLORIDE 10 MG: 5 TABLET ORAL at 11:41

## 2019-01-01 RX ADMIN — GABAPENTIN 1200 MG: 600 TABLET, FILM COATED ORAL at 19:59

## 2019-01-01 RX ADMIN — Medication 12.5 MG: at 21:28

## 2019-01-01 RX ADMIN — OMEGA-3 FATTY ACIDS CAP 1000 MG 2000 MG: 1000 CAP at 09:31

## 2019-01-01 RX ADMIN — GABAPENTIN 1200 MG: 600 TABLET, FILM COATED ORAL at 08:25

## 2019-01-01 RX ADMIN — ACETAMINOPHEN 975 MG: 325 TABLET, FILM COATED ORAL at 07:03

## 2019-01-01 RX ADMIN — MULTIPLE VITAMINS W/ MINERALS TAB 1 TABLET: TAB at 08:43

## 2019-01-01 RX ADMIN — FAMOTIDINE 20 MG: 20 TABLET ORAL at 05:14

## 2019-01-01 RX ADMIN — TRAMADOL HYDROCHLORIDE 50 MG: 50 TABLET, COATED ORAL at 16:02

## 2019-01-01 RX ADMIN — FERROUS GLUCONATE 324 MG: 324 TABLET ORAL at 08:55

## 2019-01-01 RX ADMIN — GABAPENTIN 600 MG: 300 CAPSULE ORAL at 22:28

## 2019-01-01 RX ADMIN — PANTOPRAZOLE SODIUM 40 MG: 40 TABLET, DELAYED RELEASE ORAL at 08:25

## 2019-01-01 RX ADMIN — GEMFIBROZIL 600 MG: 600 TABLET ORAL at 21:30

## 2019-01-01 RX ADMIN — GABAPENTIN 600 MG: 600 TABLET, FILM COATED ORAL at 16:54

## 2019-01-01 RX ADMIN — PANTOPRAZOLE SODIUM 40 MG: 40 TABLET, DELAYED RELEASE ORAL at 07:57

## 2019-01-01 RX ADMIN — GEMFIBROZIL 600 MG: 600 TABLET ORAL at 21:47

## 2019-01-01 RX ADMIN — HYDROXYZINE HYDROCHLORIDE 25 MG: 25 TABLET ORAL at 17:35

## 2019-01-01 RX ADMIN — OXYCODONE HYDROCHLORIDE 10 MG: 5 TABLET ORAL at 09:14

## 2019-01-01 RX ADMIN — SENNOSIDES AND DOCUSATE SODIUM 1 TABLET: 8.6; 5 TABLET ORAL at 09:13

## 2019-01-01 RX ADMIN — FUROSEMIDE 20 MG: 10 INJECTION, SOLUTION INTRAVENOUS at 02:55

## 2019-01-01 RX ADMIN — FOLIC ACID 1 MG: 1 TABLET ORAL at 08:57

## 2019-01-01 RX ADMIN — MIDAZOLAM 0.5 MG: 1 INJECTION INTRAMUSCULAR; INTRAVENOUS at 15:56

## 2019-01-01 RX ADMIN — FOLIC ACID 1 MG: 1 TABLET ORAL at 08:54

## 2019-01-01 RX ADMIN — TORSEMIDE 10 MG: 10 TABLET ORAL at 11:42

## 2019-01-01 RX ADMIN — CLOPIDOGREL BISULFATE 75 MG: 75 TABLET, FILM COATED ORAL at 10:13

## 2019-01-01 RX ADMIN — GABAPENTIN 600 MG: 300 CAPSULE ORAL at 20:22

## 2019-01-01 RX ADMIN — ACETAMINOPHEN 975 MG: 325 TABLET, FILM COATED ORAL at 16:54

## 2019-01-01 RX ADMIN — FAMOTIDINE 20 MG: 20 TABLET ORAL at 06:39

## 2019-01-01 RX ADMIN — FUROSEMIDE 60 MG: 10 INJECTION, SOLUTION INTRAVENOUS at 08:46

## 2019-01-01 RX ADMIN — VANCOMYCIN HYDROCHLORIDE 1500 MG: 10 INJECTION, POWDER, LYOPHILIZED, FOR SOLUTION INTRAVENOUS at 09:44

## 2019-01-01 RX ADMIN — IPRATROPIUM BROMIDE AND ALBUTEROL SULFATE 3 ML: .5; 3 SOLUTION RESPIRATORY (INHALATION) at 11:27

## 2019-01-01 RX ADMIN — OXYCODONE HYDROCHLORIDE AND ACETAMINOPHEN 1 TABLET: 7.5; 325 TABLET ORAL at 22:54

## 2019-01-01 RX ADMIN — SILDENAFIL 5 MG: 20 TABLET, FILM COATED ORAL at 16:58

## 2019-01-01 RX ADMIN — HUMAN ALBUMIN MICROSPHERES AND PERFLUTREN 5 ML: 10; .22 INJECTION, SOLUTION INTRAVENOUS at 07:45

## 2019-01-01 RX ADMIN — METHOCARBAMOL 500 MG: 500 TABLET, FILM COATED ORAL at 18:30

## 2019-01-01 RX ADMIN — FENTANYL CITRATE 25 MCG: 50 INJECTION, SOLUTION INTRAMUSCULAR; INTRAVENOUS at 15:56

## 2019-01-01 RX ADMIN — OXYCODONE HYDROCHLORIDE AND ACETAMINOPHEN 1 TABLET: 7.5; 325 TABLET ORAL at 15:01

## 2019-01-01 RX ADMIN — SODIUM CHLORIDE 2283 MG: 9 INJECTION, SOLUTION INTRAVENOUS at 08:33

## 2019-01-01 RX ADMIN — SODIUM CHLORIDE 500 ML: 9 INJECTION, SOLUTION INTRAVENOUS at 08:45

## 2019-01-01 RX ADMIN — LEVOTHYROXINE SODIUM 50 MCG: 50 TABLET ORAL at 06:40

## 2019-01-01 RX ADMIN — GABAPENTIN 600 MG: 600 TABLET, FILM COATED ORAL at 13:14

## 2019-01-01 RX ADMIN — HYDROMORPHONE HYDROCHLORIDE 0.25 MG: 1 INJECTION, SOLUTION INTRAMUSCULAR; INTRAVENOUS; SUBCUTANEOUS at 14:20

## 2019-01-01 RX ADMIN — ENOXAPARIN SODIUM 40 MG: 40 INJECTION SUBCUTANEOUS at 11:42

## 2019-01-01 RX ADMIN — PANTOPRAZOLE SODIUM 40 MG: 40 TABLET, DELAYED RELEASE ORAL at 06:31

## 2019-01-01 RX ADMIN — OXYCODONE HYDROCHLORIDE AND ACETAMINOPHEN 1 TABLET: 5; 325 TABLET ORAL at 07:35

## 2019-01-01 RX ADMIN — TORSEMIDE 10 MG: 10 TABLET ORAL at 09:18

## 2019-01-01 RX ADMIN — FUROSEMIDE 40 MG: 10 INJECTION, SOLUTION INTRAVENOUS at 22:46

## 2019-01-01 RX ADMIN — FENTANYL CITRATE 25 MCG: 50 INJECTION, SOLUTION INTRAMUSCULAR; INTRAVENOUS at 16:39

## 2019-01-01 RX ADMIN — FAMOTIDINE 20 MG: 20 INJECTION, SOLUTION INTRAVENOUS at 07:35

## 2019-01-01 RX ADMIN — OXYCODONE HYDROCHLORIDE 10 MG: 5 TABLET ORAL at 02:21

## 2019-01-01 RX ADMIN — PHENYLEPHRINE HYDROCHLORIDE: 10 INJECTION INTRAVENOUS at 09:43

## 2019-01-01 RX ADMIN — CALCIUM 500 MG: 500 TABLET ORAL at 09:28

## 2019-01-01 RX ADMIN — PREDNISONE 5 MG: 5 TABLET ORAL at 08:25

## 2019-01-01 RX ADMIN — ACETAMINOPHEN 975 MG: 325 TABLET, FILM COATED ORAL at 00:07

## 2019-01-01 RX ADMIN — FLUTICASONE FUROATE 1 PUFF: 100 POWDER RESPIRATORY (INHALATION) at 08:05

## 2019-01-01 RX ADMIN — GABAPENTIN 600 MG: 300 CAPSULE ORAL at 21:02

## 2019-01-01 RX ADMIN — CLOPIDOGREL BISULFATE 75 MG: 75 TABLET ORAL at 08:57

## 2019-01-01 RX ADMIN — LEVOTHYROXINE SODIUM 50 MCG: 50 TABLET ORAL at 08:30

## 2019-01-01 RX ADMIN — FOLIC ACID 1 MG: 1 TABLET ORAL at 10:13

## 2019-01-01 RX ADMIN — SENNOSIDES AND DOCUSATE SODIUM 2 TABLET: 8.6; 5 TABLET ORAL at 19:57

## 2019-01-01 RX ADMIN — CLOPIDOGREL BISULFATE 75 MG: 75 TABLET ORAL at 08:54

## 2019-01-01 RX ADMIN — SENNOSIDES AND DOCUSATE SODIUM 2 TABLET: 8.6; 5 TABLET ORAL at 20:45

## 2019-01-01 RX ADMIN — CALCIUM 500 MG: 500 TABLET ORAL at 19:57

## 2019-01-01 RX ADMIN — OXYCODONE AND ACETAMINOPHEN 1 TABLET: 7.5; 325 TABLET ORAL at 13:57

## 2019-01-01 RX ADMIN — OXYCODONE HYDROCHLORIDE 10 MG: 5 TABLET ORAL at 13:58

## 2019-01-01 RX ADMIN — METHOCARBAMOL 500 MG: 500 TABLET, FILM COATED ORAL at 17:35

## 2019-01-01 RX ADMIN — PANTOPRAZOLE SODIUM 40 MG: 40 TABLET, DELAYED RELEASE ORAL at 08:49

## 2019-01-01 RX ADMIN — TICAGRELOR 90 MG: 90 TABLET ORAL at 08:47

## 2019-01-01 RX ADMIN — MELATONIN 2000 UNITS: at 07:56

## 2019-01-01 RX ADMIN — FAMOTIDINE 20 MG: 20 TABLET ORAL at 17:21

## 2019-01-01 RX ADMIN — SODIUM CHLORIDE 1000 ML: 9 INJECTION, SOLUTION INTRAVENOUS at 21:46

## 2019-01-01 RX ADMIN — LEVOTHYROXINE SODIUM 50 MCG: 50 TABLET ORAL at 08:04

## 2019-01-01 RX ADMIN — GEMFIBROZIL 600 MG: 600 TABLET ORAL at 08:29

## 2019-01-01 RX ADMIN — FERROUS GLUCONATE 324 MG: 324 TABLET ORAL at 09:02

## 2019-01-01 RX ADMIN — OXYCODONE HYDROCHLORIDE 10 MG: 5 TABLET ORAL at 12:58

## 2019-01-01 RX ADMIN — GABAPENTIN 600 MG: 600 TABLET, FILM COATED ORAL at 20:11

## 2019-01-01 RX ADMIN — CALCIUM 500 MG: 500 TABLET ORAL at 14:56

## 2019-01-01 RX ADMIN — LIDOCAINE HYDROCHLORIDE 8 ML: 10 INJECTION, SOLUTION EPIDURAL; INFILTRATION; INTRACAUDAL; PERINEURAL at 15:53

## 2019-01-01 RX ADMIN — GABAPENTIN 600 MG: 300 CAPSULE ORAL at 14:20

## 2019-01-01 RX ADMIN — PIPERACILLIN AND TAZOBACTAM 4.5 G: 4; .5 INJECTION, POWDER, FOR SOLUTION INTRAVENOUS at 02:03

## 2019-01-01 RX ADMIN — GEMFIBROZIL 600 MG: 600 TABLET ORAL at 21:10

## 2019-01-01 RX ADMIN — FOLIC ACID 1 MG: 1 TABLET ORAL at 08:06

## 2019-01-01 RX ADMIN — SENNOSIDES AND DOCUSATE SODIUM 2 TABLET: 8.6; 5 TABLET ORAL at 08:06

## 2019-01-01 RX ADMIN — ATORVASTATIN CALCIUM 40 MG: 40 TABLET, FILM COATED ORAL at 09:01

## 2019-01-01 RX ADMIN — POLYETHYLENE GLYCOL 3350 17 G: 17 POWDER, FOR SOLUTION ORAL at 09:35

## 2019-01-01 RX ADMIN — Medication 6.25 MG: at 21:44

## 2019-01-01 RX ADMIN — METHOCARBAMOL 500 MG: 500 TABLET, FILM COATED ORAL at 13:14

## 2019-01-01 RX ADMIN — SENNOSIDES AND DOCUSATE SODIUM 1 TABLET: 8.6; 5 TABLET ORAL at 21:34

## 2019-01-01 RX ADMIN — SODIUM CHLORIDE 1 MG/KG/HR: 0.9 INJECTION, SOLUTION INTRAVENOUS at 09:05

## 2019-01-01 RX ADMIN — FUROSEMIDE 60 MG: 10 INJECTION, SOLUTION INTRAVENOUS at 21:28

## 2019-01-01 RX ADMIN — FENTANYL CITRATE 25 MCG: 50 INJECTION, SOLUTION INTRAMUSCULAR; INTRAVENOUS at 09:27

## 2019-01-01 RX ADMIN — FENTANYL CITRATE 150 MCG: 50 INJECTION, SOLUTION INTRAMUSCULAR; INTRAVENOUS at 08:00

## 2019-01-01 RX ADMIN — OXYCODONE HYDROCHLORIDE 10 MG: 5 TABLET ORAL at 01:49

## 2019-01-01 RX ADMIN — MIDODRINE HYDROCHLORIDE 10 MG: 5 TABLET ORAL at 08:39

## 2019-01-01 RX ADMIN — PANTOPRAZOLE SODIUM 40 MG: 40 TABLET, DELAYED RELEASE ORAL at 08:29

## 2019-01-01 RX ADMIN — PROPOFOL 75 MCG/KG/MIN: 10 INJECTION, EMULSION INTRAVENOUS at 08:30

## 2019-01-01 RX ADMIN — AMOXICILLIN AND CLAVULANATE POTASSIUM 1 TABLET: 875; 125 TABLET, FILM COATED ORAL at 21:22

## 2019-01-01 RX ADMIN — LEVOTHYROXINE SODIUM 50 MCG: 50 TABLET ORAL at 08:25

## 2019-01-01 RX ADMIN — HYDROXYZINE HYDROCHLORIDE 25 MG: 25 TABLET ORAL at 08:25

## 2019-01-01 RX ADMIN — TICAGRELOR 90 MG: 90 TABLET ORAL at 21:30

## 2019-01-01 RX ADMIN — SENNOSIDES AND DOCUSATE SODIUM 2 TABLET: 8.6; 5 TABLET ORAL at 21:09

## 2019-01-01 RX ADMIN — FUROSEMIDE 40 MG: 10 INJECTION, SOLUTION INTRAVENOUS at 11:45

## 2019-01-01 RX ADMIN — MIDAZOLAM 0.5 MG: 1 INJECTION INTRAMUSCULAR; INTRAVENOUS at 16:42

## 2019-01-01 RX ADMIN — POLYETHYLENE GLYCOL 3350 17 G: 17 POWDER, FOR SOLUTION ORAL at 08:54

## 2019-01-01 RX ADMIN — PHENYLEPHRINE HYDROCHLORIDE 0.3 MCG/KG/MIN: 10 INJECTION INTRAVENOUS at 09:00

## 2019-01-01 RX ADMIN — FAMOTIDINE 20 MG: 20 TABLET ORAL at 18:14

## 2019-01-01 RX ADMIN — SENNOSIDES AND DOCUSATE SODIUM 2 TABLET: 8.6; 5 TABLET ORAL at 08:41

## 2019-01-01 RX ADMIN — METHOCARBAMOL 500 MG: 500 TABLET, FILM COATED ORAL at 08:56

## 2019-01-01 RX ADMIN — ATORVASTATIN CALCIUM 40 MG: 40 TABLET, FILM COATED ORAL at 07:56

## 2019-01-01 RX ADMIN — FUROSEMIDE 40 MG: 10 INJECTION, SOLUTION INTRAVENOUS at 09:59

## 2019-01-01 RX ADMIN — OXYCODONE HYDROCHLORIDE 10 MG: 5 TABLET ORAL at 22:39

## 2019-01-01 RX ADMIN — GABAPENTIN 600 MG: 600 TABLET, FILM COATED ORAL at 07:57

## 2019-01-01 RX ADMIN — FAMOTIDINE 20 MG: 20 TABLET ORAL at 17:11

## 2019-01-01 RX ADMIN — OXYCODONE HYDROCHLORIDE 10 MG: 5 TABLET ORAL at 19:44

## 2019-01-01 RX ADMIN — LEVOFLOXACIN 500 MG: 500 TABLET, FILM COATED ORAL at 09:34

## 2019-01-01 RX ADMIN — DOCUSATE SODIUM 100 MG: 100 CAPSULE, LIQUID FILLED ORAL at 20:57

## 2019-01-01 RX ADMIN — SENNOSIDES AND DOCUSATE SODIUM 1 TABLET: 8.6; 5 TABLET ORAL at 22:03

## 2019-01-01 RX ADMIN — OXYCODONE HYDROCHLORIDE 10 MG: 5 TABLET ORAL at 05:41

## 2019-01-01 RX ADMIN — POLYETHYLENE GLYCOL 3350 17 G: 17 POWDER, FOR SOLUTION ORAL at 22:07

## 2019-01-01 RX ADMIN — PREDNISONE 5 MG: 5 TABLET ORAL at 09:19

## 2019-01-01 RX ADMIN — METHOCARBAMOL 500 MG: 500 TABLET, FILM COATED ORAL at 08:31

## 2019-01-01 RX ADMIN — POTASSIUM CHLORIDE, DEXTROSE MONOHYDRATE AND SODIUM CHLORIDE: 150; 5; 450 INJECTION, SOLUTION INTRAVENOUS at 18:14

## 2019-01-01 RX ADMIN — Medication 1 G: at 08:30

## 2019-01-01 RX ADMIN — OXYCODONE HYDROCHLORIDE 10 MG: 5 TABLET ORAL at 06:11

## 2019-01-01 RX ADMIN — OXYCODONE HYDROCHLORIDE AND ACETAMINOPHEN 1 TABLET: 7.5; 325 TABLET ORAL at 16:07

## 2019-01-01 RX ADMIN — GABAPENTIN 600 MG: 300 CAPSULE ORAL at 16:00

## 2019-01-01 RX ADMIN — DOCUSATE SODIUM 100 MG: 100 CAPSULE, LIQUID FILLED ORAL at 21:46

## 2019-01-01 RX ADMIN — VANCOMYCIN HYDROCHLORIDE 1250 MG: 10 INJECTION, POWDER, LYOPHILIZED, FOR SOLUTION INTRAVENOUS at 20:35

## 2019-01-01 RX ADMIN — OXYCODONE HYDROCHLORIDE 10 MG: 5 TABLET ORAL at 17:29

## 2019-01-01 RX ADMIN — FOLIC ACID 1 MG: 1 TABLET ORAL at 08:28

## 2019-01-01 RX ADMIN — SENNOSIDES AND DOCUSATE SODIUM 2 TABLET: 8.6; 5 TABLET ORAL at 08:25

## 2019-01-01 RX ADMIN — ACETAMINOPHEN 975 MG: 325 TABLET, FILM COATED ORAL at 00:01

## 2019-01-01 RX ADMIN — SENNOSIDES AND DOCUSATE SODIUM 1 TABLET: 8.6; 5 TABLET ORAL at 20:09

## 2019-01-01 RX ADMIN — VANCOMYCIN HYDROCHLORIDE 1250 MG: 10 INJECTION, POWDER, LYOPHILIZED, FOR SOLUTION INTRAVENOUS at 21:57

## 2019-01-01 RX ADMIN — TRAMADOL HYDROCHLORIDE 25 MG: 50 TABLET ORAL at 21:47

## 2019-01-01 RX ADMIN — CEFAZOLIN 1 G: 1 INJECTION, POWDER, FOR SOLUTION INTRAMUSCULAR; INTRAVENOUS at 09:40

## 2019-01-01 RX ADMIN — ATORVASTATIN CALCIUM 40 MG: 40 TABLET, FILM COATED ORAL at 08:06

## 2019-01-01 RX ADMIN — FOLIC ACID 1 MG: 1 TABLET ORAL at 08:21

## 2019-01-01 RX ADMIN — GABAPENTIN 1200 MG: 600 TABLET, FILM COATED ORAL at 13:58

## 2019-01-01 RX ADMIN — GEMFIBROZIL 600 MG: 600 TABLET ORAL at 19:56

## 2019-01-01 RX ADMIN — TRAMADOL HYDROCHLORIDE 25 MG: 50 TABLET ORAL at 14:03

## 2019-01-01 RX ADMIN — ATORVASTATIN CALCIUM 40 MG: 40 TABLET, FILM COATED ORAL at 08:21

## 2019-01-01 RX ADMIN — IPRATROPIUM BROMIDE AND ALBUTEROL SULFATE 3 ML: .5; 3 SOLUTION RESPIRATORY (INHALATION) at 19:32

## 2019-01-01 RX ADMIN — GABAPENTIN 600 MG: 300 CAPSULE ORAL at 00:00

## 2019-01-01 RX ADMIN — ACETAMINOPHEN 650 MG: 325 TABLET, FILM COATED ORAL at 20:10

## 2019-01-01 RX ADMIN — FAMOTIDINE 20 MG: 20 TABLET ORAL at 06:20

## 2019-01-01 RX ADMIN — METHOCARBAMOL 500 MG: 500 TABLET, FILM COATED ORAL at 08:41

## 2019-01-01 RX ADMIN — GABAPENTIN 600 MG: 600 TABLET, FILM COATED ORAL at 08:41

## 2019-01-01 RX ADMIN — FOLIC ACID 1 MG: 1 TABLET ORAL at 08:29

## 2019-01-01 RX ADMIN — POLYETHYLENE GLYCOL 3350 17 G: 17 POWDER, FOR SOLUTION ORAL at 07:55

## 2019-01-01 RX ADMIN — LEVOTHYROXINE SODIUM 50 MCG: 50 TABLET ORAL at 07:02

## 2019-01-01 RX ADMIN — CLOPIDOGREL BISULFATE 75 MG: 75 TABLET, FILM COATED ORAL at 09:47

## 2019-01-01 RX ADMIN — PANTOPRAZOLE SODIUM 40 MG: 40 TABLET, DELAYED RELEASE ORAL at 06:12

## 2019-01-01 RX ADMIN — GABAPENTIN 600 MG: 300 CAPSULE ORAL at 10:11

## 2019-01-01 RX ADMIN — ACETAMINOPHEN 650 MG: 325 TABLET, FILM COATED ORAL at 14:34

## 2019-01-01 RX ADMIN — GABAPENTIN 600 MG: 300 CAPSULE ORAL at 09:02

## 2019-01-01 RX ADMIN — ACETAMINOPHEN 975 MG: 325 TABLET, FILM COATED ORAL at 08:06

## 2019-01-01 RX ADMIN — VANCOMYCIN HYDROCHLORIDE 1500 MG: 10 INJECTION, POWDER, LYOPHILIZED, FOR SOLUTION INTRAVENOUS at 09:56

## 2019-01-01 RX ADMIN — PHENYLEPHRINE HYDROCHLORIDE 100 MCG: 10 INJECTION, SOLUTION INTRAMUSCULAR; INTRAVENOUS; SUBCUTANEOUS at 09:37

## 2019-01-01 RX ADMIN — MULTIPLE VITAMINS W/ MINERALS TAB 1 TABLET: TAB at 08:41

## 2019-01-01 RX ADMIN — LANSOPRAZOLE 30 MG: 30 CAPSULE, DELAYED RELEASE ORAL at 06:46

## 2019-01-01 RX ADMIN — IPRATROPIUM BROMIDE AND ALBUTEROL SULFATE 3 ML: .5; 3 SOLUTION RESPIRATORY (INHALATION) at 11:21

## 2019-01-01 RX ADMIN — GABAPENTIN 600 MG: 600 TABLET, FILM COATED ORAL at 13:52

## 2019-01-01 RX ADMIN — VERAPAMIL HYDROCHLORIDE 120 MG: 120 TABLET, FILM COATED, EXTENDED RELEASE ORAL at 22:39

## 2019-01-01 RX ADMIN — ACETAMINOPHEN 975 MG: 325 TABLET, FILM COATED ORAL at 07:57

## 2019-01-01 RX ADMIN — SENNOSIDES AND DOCUSATE SODIUM 1 TABLET: 8.6; 5 TABLET ORAL at 22:37

## 2019-01-01 RX ADMIN — VANCOMYCIN HYDROCHLORIDE 1750 MG: 5 INJECTION, POWDER, LYOPHILIZED, FOR SOLUTION INTRAVENOUS at 08:30

## 2019-01-01 RX ADMIN — CALCIUM 500 MG: 500 TABLET ORAL at 20:52

## 2019-01-01 RX ADMIN — LANSOPRAZOLE 30 MG: 30 CAPSULE, DELAYED RELEASE ORAL at 07:56

## 2019-01-01 RX ADMIN — HEPARIN SODIUM 5000 UNITS: 5000 INJECTION, SOLUTION INTRAVENOUS; SUBCUTANEOUS at 20:02

## 2019-01-01 RX ADMIN — TRAMADOL HYDROCHLORIDE 50 MG: 50 TABLET, COATED ORAL at 10:33

## 2019-01-01 RX ADMIN — GABAPENTIN 600 MG: 600 TABLET, FILM COATED ORAL at 13:53

## 2019-01-01 RX ADMIN — OXYCODONE HYDROCHLORIDE AND ACETAMINOPHEN 1 TABLET: 7.5; 325 TABLET ORAL at 17:10

## 2019-01-01 RX ADMIN — ENOXAPARIN SODIUM 40 MG: 40 INJECTION SUBCUTANEOUS at 12:05

## 2019-01-01 RX ADMIN — LEVOTHYROXINE SODIUM 50 MCG: 50 TABLET ORAL at 06:44

## 2019-01-01 RX ADMIN — GABAPENTIN 600 MG: 600 TABLET, FILM COATED ORAL at 20:48

## 2019-01-01 RX ADMIN — CEFAZOLIN SODIUM 2 G: 2 INJECTION, SOLUTION INTRAVENOUS at 08:32

## 2019-01-01 RX ADMIN — OXYCODONE HYDROCHLORIDE AND ACETAMINOPHEN 1 TABLET: 5; 325 TABLET ORAL at 09:13

## 2019-01-01 RX ADMIN — GABAPENTIN 600 MG: 300 CAPSULE ORAL at 09:35

## 2019-01-01 RX ADMIN — ALBUTEROL SULFATE 6 PUFF: 90 AEROSOL, METERED RESPIRATORY (INHALATION) at 11:20

## 2019-01-01 RX ADMIN — GABAPENTIN 1200 MG: 300 CAPSULE ORAL at 16:35

## 2019-01-01 RX ADMIN — SILDENAFIL 5 MG: 20 TABLET, FILM COATED ORAL at 22:21

## 2019-01-01 RX ADMIN — FOLIC ACID 1 MG: 1 TABLET ORAL at 08:40

## 2019-01-01 RX ADMIN — SILDENAFIL 5 MG: 20 TABLET, FILM COATED ORAL at 15:55

## 2019-01-01 RX ADMIN — OXYCODONE HYDROCHLORIDE 10 MG: 5 TABLET ORAL at 13:01

## 2019-01-01 RX ADMIN — THEOPHYLLINE 400 MG: 400 TABLET, EXTENDED RELEASE ORAL at 08:54

## 2019-01-01 RX ADMIN — IPRATROPIUM BROMIDE AND ALBUTEROL SULFATE 3 ML: .5; 3 SOLUTION RESPIRATORY (INHALATION) at 16:07

## 2019-01-01 RX ADMIN — CLOPIDOGREL BISULFATE 75 MG: 75 TABLET ORAL at 08:56

## 2019-01-01 RX ADMIN — GABAPENTIN 600 MG: 600 TABLET, FILM COATED ORAL at 22:06

## 2019-01-01 RX ADMIN — INSULIN ASPART 1 UNITS: 100 INJECTION, SOLUTION INTRAVENOUS; SUBCUTANEOUS at 09:06

## 2019-01-01 RX ADMIN — GABAPENTIN 600 MG: 600 TABLET, FILM COATED ORAL at 09:20

## 2019-01-01 RX ADMIN — ASPIRIN 81 MG: 81 TABLET, COATED ORAL at 13:11

## 2019-01-01 RX ADMIN — POLYETHYLENE GLYCOL 3350 17 G: 17 POWDER, FOR SOLUTION ORAL at 21:06

## 2019-01-01 RX ADMIN — OXYCODONE HYDROCHLORIDE 5 MG: 5 TABLET ORAL at 15:39

## 2019-01-01 RX ADMIN — OXYCODONE HYDROCHLORIDE AND ACETAMINOPHEN 1 TABLET: 7.5; 325 TABLET ORAL at 15:55

## 2019-01-01 RX ADMIN — CEFAZOLIN 1 G: 1 INJECTION, POWDER, FOR SOLUTION INTRAMUSCULAR; INTRAVENOUS at 10:29

## 2019-01-01 RX ADMIN — TORSEMIDE 20 MG: 20 TABLET ORAL at 21:05

## 2019-01-01 RX ADMIN — THEOPHYLLINE 400 MG: 400 TABLET, EXTENDED RELEASE ORAL at 08:49

## 2019-01-01 RX ADMIN — SILDENAFIL 5 MG: 20 TABLET, FILM COATED ORAL at 09:28

## 2019-01-01 RX ADMIN — OXYCODONE AND ACETAMINOPHEN 1 TABLET: 7.5; 325 TABLET ORAL at 07:56

## 2019-01-01 RX ADMIN — LEVOTHYROXINE SODIUM 50 MCG: 25 TABLET ORAL at 08:28

## 2019-01-01 RX ADMIN — Medication 1 G: at 08:57

## 2019-01-01 RX ADMIN — VANCOMYCIN HYDROCHLORIDE 1500 MG: 10 INJECTION, POWDER, LYOPHILIZED, FOR SOLUTION INTRAVENOUS at 21:19

## 2019-01-01 RX ADMIN — SULFAMETHOXAZOLE AND TRIMETHOPRIM 1 TABLET: 800; 160 TABLET ORAL at 09:06

## 2019-01-01 RX ADMIN — GABAPENTIN 600 MG: 600 TABLET, FILM COATED ORAL at 20:25

## 2019-01-01 RX ADMIN — GABAPENTIN 600 MG: 300 CAPSULE ORAL at 13:34

## 2019-01-01 RX ADMIN — TORSEMIDE 10 MG: 10 TABLET ORAL at 08:57

## 2019-01-01 RX ADMIN — VANCOMYCIN HYDROCHLORIDE 1250 MG: 10 INJECTION, POWDER, LYOPHILIZED, FOR SOLUTION INTRAVENOUS at 08:29

## 2019-01-01 RX ADMIN — OXYCODONE HYDROCHLORIDE AND ACETAMINOPHEN 1 TABLET: 7.5; 325 TABLET ORAL at 09:08

## 2019-01-01 RX ADMIN — SILDENAFIL 5 MG: 20 TABLET, FILM COATED ORAL at 21:18

## 2019-01-01 RX ADMIN — OXYCODONE AND ACETAMINOPHEN 1 TABLET: 7.5; 325 TABLET ORAL at 19:57

## 2019-01-01 RX ADMIN — FOLIC ACID 1 MG: 1 TABLET ORAL at 09:02

## 2019-01-01 RX ADMIN — OXYCODONE HYDROCHLORIDE AND ACETAMINOPHEN 1 TABLET: 7.5; 325 TABLET ORAL at 21:26

## 2019-01-01 RX ADMIN — HYDROXYZINE HYDROCHLORIDE 25 MG: 25 TABLET ORAL at 20:59

## 2019-01-01 RX ADMIN — OXYCODONE HYDROCHLORIDE 10 MG: 5 TABLET ORAL at 23:16

## 2019-01-01 RX ADMIN — OXYCODONE HYDROCHLORIDE 10 MG: 5 TABLET ORAL at 16:15

## 2019-01-01 RX ADMIN — PHENYLEPHRINE HYDROCHLORIDE 100 MCG: 10 INJECTION, SOLUTION INTRAMUSCULAR; INTRAVENOUS; SUBCUTANEOUS at 09:58

## 2019-01-01 RX ADMIN — SILDENAFIL 5 MG: 20 TABLET, FILM COATED ORAL at 11:50

## 2019-01-01 RX ADMIN — FENTANYL CITRATE 250 MCG: 50 INJECTION, SOLUTION INTRAMUSCULAR; INTRAVENOUS at 08:41

## 2019-01-01 RX ADMIN — TRAMADOL HYDROCHLORIDE 50 MG: 50 TABLET, COATED ORAL at 03:45

## 2019-01-01 RX ADMIN — FENTANYL CITRATE 50 MCG: 50 INJECTION INTRAMUSCULAR; INTRAVENOUS at 15:18

## 2019-01-01 RX ADMIN — CEFAZOLIN 1 G: 1 INJECTION, POWDER, FOR SOLUTION INTRAMUSCULAR; INTRAVENOUS at 18:12

## 2019-01-01 RX ADMIN — PHENYLEPHRINE HYDROCHLORIDE 200 MCG: 10 INJECTION, SOLUTION INTRAMUSCULAR; INTRAVENOUS; SUBCUTANEOUS at 11:28

## 2019-01-01 RX ADMIN — AMOXICILLIN AND CLAVULANATE POTASSIUM 1 TABLET: 875; 125 TABLET, FILM COATED ORAL at 10:13

## 2019-01-01 RX ADMIN — GABAPENTIN 1200 MG: 300 CAPSULE ORAL at 08:22

## 2019-01-01 RX ADMIN — OXYCODONE HYDROCHLORIDE 10 MG: 5 TABLET ORAL at 11:50

## 2019-01-01 RX ADMIN — GABAPENTIN 600 MG: 300 CAPSULE ORAL at 16:07

## 2019-01-01 RX ADMIN — GABAPENTIN 600 MG: 600 TABLET, FILM COATED ORAL at 19:48

## 2019-01-01 RX ADMIN — Medication 12.5 MG: at 20:00

## 2019-01-01 RX ADMIN — ENOXAPARIN SODIUM 40 MG: 40 INJECTION SUBCUTANEOUS at 13:06

## 2019-01-01 RX ADMIN — TRAMADOL HYDROCHLORIDE 25 MG: 50 TABLET ORAL at 09:30

## 2019-01-01 RX ADMIN — FENTANYL CITRATE 50 MCG: 50 INJECTION, SOLUTION INTRAMUSCULAR; INTRAVENOUS at 19:19

## 2019-01-01 RX ADMIN — LEVOTHYROXINE SODIUM 50 MCG: 50 TABLET ORAL at 08:48

## 2019-01-01 RX ADMIN — POTASSIUM CHLORIDE 40 MEQ: 1500 TABLET, EXTENDED RELEASE ORAL at 09:28

## 2019-01-01 RX ADMIN — OXYCODONE HYDROCHLORIDE 5 MG: 5 TABLET ORAL at 21:36

## 2019-01-01 RX ADMIN — SENNOSIDES AND DOCUSATE SODIUM 2 TABLET: 8.6; 5 TABLET ORAL at 09:59

## 2019-01-01 RX ADMIN — OXYCODONE HYDROCHLORIDE 10 MG: 5 TABLET ORAL at 22:32

## 2019-01-01 RX ADMIN — PREDNISONE 5 MG: 5 TABLET ORAL at 18:14

## 2019-01-01 RX ADMIN — FAMOTIDINE 20 MG: 20 TABLET ORAL at 06:11

## 2019-01-01 RX ADMIN — FENTANYL CITRATE 25 MCG: 50 INJECTION, SOLUTION INTRAMUSCULAR; INTRAVENOUS at 08:01

## 2019-01-01 RX ADMIN — HYDROXYZINE HYDROCHLORIDE 25 MG: 25 TABLET ORAL at 00:10

## 2019-01-01 RX ADMIN — OXYCODONE HYDROCHLORIDE 5 MG: 5 TABLET ORAL at 01:57

## 2019-01-01 RX ADMIN — PANTOPRAZOLE SODIUM 40 MG: 40 TABLET, DELAYED RELEASE ORAL at 06:33

## 2019-01-01 RX ADMIN — OXYCODONE HYDROCHLORIDE AND ACETAMINOPHEN 1 TABLET: 7.5; 325 TABLET ORAL at 23:14

## 2019-01-01 RX ADMIN — VANCOMYCIN HYDROCHLORIDE 1750 MG: 10 INJECTION, POWDER, LYOPHILIZED, FOR SOLUTION INTRAVENOUS at 08:35

## 2019-01-01 RX ADMIN — IPRATROPIUM BROMIDE AND ALBUTEROL SULFATE 3 ML: .5; 3 SOLUTION RESPIRATORY (INHALATION) at 11:23

## 2019-01-01 RX ADMIN — ASPIRIN 81 MG: 81 TABLET, COATED ORAL at 09:33

## 2019-01-01 RX ADMIN — OXYCODONE HYDROCHLORIDE AND ACETAMINOPHEN 1 TABLET: 7.5; 325 TABLET ORAL at 16:47

## 2019-01-01 RX ADMIN — CALCIUM 500 MG: 500 TABLET ORAL at 09:01

## 2019-01-01 RX ADMIN — GABAPENTIN 1200 MG: 600 TABLET, FILM COATED ORAL at 14:32

## 2019-01-01 RX ADMIN — FUROSEMIDE 60 MG: 10 INJECTION, SOLUTION INTRAVENOUS at 08:31

## 2019-01-01 RX ADMIN — CALCIUM 500 MG: 500 TABLET ORAL at 19:31

## 2019-01-01 RX ADMIN — HYDROCODONE BITARTRATE AND ACETAMINOPHEN 1 TABLET: 5; 325 TABLET ORAL at 00:11

## 2019-01-01 RX ADMIN — Medication 25 MG: at 05:20

## 2019-01-01 RX ADMIN — OXYCODONE HYDROCHLORIDE 5 MG: 5 TABLET ORAL at 08:36

## 2019-01-01 RX ADMIN — GEMFIBROZIL 600 MG: 600 TABLET ORAL at 18:24

## 2019-01-01 RX ADMIN — FOLIC ACID 1 MG: 1 TABLET ORAL at 09:29

## 2019-01-01 RX ADMIN — CALCIUM 500 MG: 500 TABLET ORAL at 08:27

## 2019-01-01 RX ADMIN — THEOPHYLLINE 400 MG: 400 TABLET, EXTENDED RELEASE ORAL at 08:22

## 2019-01-01 RX ADMIN — LEVOTHYROXINE SODIUM 50 MCG: 50 TABLET ORAL at 06:33

## 2019-01-01 RX ADMIN — INSULIN ASPART 1 UNITS: 100 INJECTION, SOLUTION INTRAVENOUS; SUBCUTANEOUS at 14:40

## 2019-01-01 RX ADMIN — OXYCODONE HYDROCHLORIDE AND ACETAMINOPHEN 1 TABLET: 7.5; 325 TABLET ORAL at 09:39

## 2019-01-01 RX ADMIN — HEPARIN SODIUM 5000 UNITS: 5000 INJECTION, SOLUTION INTRAVENOUS; SUBCUTANEOUS at 20:00

## 2019-01-01 RX ADMIN — VERAPAMIL HYDROCHLORIDE 120 MG: 120 TABLET, FILM COATED, EXTENDED RELEASE ORAL at 21:51

## 2019-01-01 RX ADMIN — SODIUM CHLORIDE, POTASSIUM CHLORIDE, SODIUM LACTATE AND CALCIUM CHLORIDE 2109 ML: 600; 310; 30; 20 INJECTION, SOLUTION INTRAVENOUS at 07:35

## 2019-01-01 RX ADMIN — GABAPENTIN 600 MG: 600 TABLET, FILM COATED ORAL at 14:55

## 2019-01-01 RX ADMIN — GABAPENTIN 600 MG: 300 CAPSULE ORAL at 09:29

## 2019-01-01 RX ADMIN — DOCUSATE SODIUM 100 MG: 100 CAPSULE, LIQUID FILLED ORAL at 09:02

## 2019-01-01 RX ADMIN — ESMOLOL HYDROCHLORIDE 20 MG: 10 INJECTION, SOLUTION INTRAVENOUS at 14:35

## 2019-01-01 RX ADMIN — TICAGRELOR 90 MG: 90 TABLET ORAL at 08:57

## 2019-01-01 RX ADMIN — GABAPENTIN 600 MG: 300 CAPSULE ORAL at 17:00

## 2019-01-01 RX ADMIN — DOCUSATE SODIUM 100 MG: 100 CAPSULE, LIQUID FILLED ORAL at 09:34

## 2019-01-01 RX ADMIN — GABAPENTIN 600 MG: 600 TABLET, FILM COATED ORAL at 13:40

## 2019-01-01 RX ADMIN — GEMFIBROZIL 600 MG: 600 TABLET ORAL at 17:11

## 2019-01-01 RX ADMIN — IPRATROPIUM BROMIDE AND ALBUTEROL SULFATE 3 ML: .5; 3 SOLUTION RESPIRATORY (INHALATION) at 20:26

## 2019-01-01 RX ADMIN — FERROUS GLUCONATE 324 MG: 324 TABLET ORAL at 10:12

## 2019-01-01 RX ADMIN — SODIUM CHLORIDE 500 ML: 9 INJECTION, SOLUTION INTRAVENOUS at 14:36

## 2019-01-01 RX ADMIN — TRAMADOL HYDROCHLORIDE 50 MG: 50 TABLET, COATED ORAL at 01:56

## 2019-01-01 RX ADMIN — METHOCARBAMOL 500 MG: 500 TABLET, FILM COATED ORAL at 13:33

## 2019-01-01 RX ADMIN — FENTANYL CITRATE 25 MCG: 50 INJECTION, SOLUTION INTRAMUSCULAR; INTRAVENOUS at 19:04

## 2019-01-01 RX ADMIN — SODIUM CHLORIDE 88 ML: 9 INJECTION, SOLUTION INTRAVENOUS at 10:34

## 2019-01-01 RX ADMIN — PREDNISONE 5 MG: 5 TABLET ORAL at 17:08

## 2019-01-01 RX ADMIN — PANTOPRAZOLE SODIUM 40 MG: 40 TABLET, DELAYED RELEASE ORAL at 07:31

## 2019-01-01 RX ADMIN — OXYCODONE HYDROCHLORIDE 10 MG: 5 TABLET ORAL at 19:48

## 2019-01-01 RX ADMIN — INSULIN ASPART 1 UNITS: 100 INJECTION, SOLUTION INTRAVENOUS; SUBCUTANEOUS at 18:39

## 2019-01-01 RX ADMIN — ASPIRIN 81 MG: 81 TABLET, COATED ORAL at 09:18

## 2019-01-01 RX ADMIN — PREDNISONE 5 MG: 5 TABLET ORAL at 18:30

## 2019-01-01 RX ADMIN — OMEGA-3 FATTY ACIDS CAP 1000 MG 2 CAPSULE: 1000 CAP at 08:27

## 2019-01-01 RX ADMIN — PHENYLEPHRINE HYDROCHLORIDE 100 MCG: 10 INJECTION, SOLUTION INTRAMUSCULAR; INTRAVENOUS; SUBCUTANEOUS at 11:17

## 2019-01-01 RX ADMIN — VERAPAMIL HYDROCHLORIDE 120 MG: 120 TABLET, FILM COATED, EXTENDED RELEASE ORAL at 21:29

## 2019-01-01 RX ADMIN — THEOPHYLLINE 400 MG: 400 TABLET, EXTENDED RELEASE ORAL at 08:41

## 2019-01-01 RX ADMIN — OXYCODONE HYDROCHLORIDE AND ACETAMINOPHEN 1 TABLET: 5; 325 TABLET ORAL at 22:28

## 2019-01-01 RX ADMIN — TRAMADOL HYDROCHLORIDE 25 MG: 50 TABLET ORAL at 13:34

## 2019-01-01 RX ADMIN — OXYCODONE HYDROCHLORIDE AND ACETAMINOPHEN 1 TABLET: 7.5; 325 TABLET ORAL at 21:35

## 2019-01-01 RX ADMIN — GABAPENTIN 600 MG: 600 TABLET, FILM COATED ORAL at 13:26

## 2019-01-01 RX ADMIN — HEPARIN SODIUM 5000 UNITS: 5000 INJECTION, SOLUTION INTRAVENOUS; SUBCUTANEOUS at 08:34

## 2019-01-01 RX ADMIN — GEMFIBROZIL 600 MG: 600 TABLET ORAL at 17:54

## 2019-01-01 RX ADMIN — OXYCODONE HYDROCHLORIDE AND ACETAMINOPHEN 1 TABLET: 7.5; 325 TABLET ORAL at 20:59

## 2019-01-01 RX ADMIN — OXYCODONE HYDROCHLORIDE 10 MG: 5 TABLET ORAL at 04:10

## 2019-01-01 RX ADMIN — LEVOTHYROXINE SODIUM 50 MCG: 50 TABLET ORAL at 10:11

## 2019-01-01 RX ADMIN — ACETAMINOPHEN 650 MG: 325 TABLET, FILM COATED ORAL at 06:11

## 2019-01-01 RX ADMIN — ACETAMINOPHEN 650 MG: 325 TABLET, FILM COATED ORAL at 13:52

## 2019-01-01 RX ADMIN — HYDROXYZINE HYDROCHLORIDE 25 MG: 25 TABLET ORAL at 21:32

## 2019-01-01 RX ADMIN — SENNOSIDES AND DOCUSATE SODIUM 2 TABLET: 8.6; 5 TABLET ORAL at 09:06

## 2019-01-01 RX ADMIN — POLYETHYLENE GLYCOL 3350 17 G: 17 POWDER, FOR SOLUTION ORAL at 09:30

## 2019-01-01 RX ADMIN — HEPARIN SODIUM 1550 UNITS/HR: 10000 INJECTION, SOLUTION INTRAVENOUS at 17:45

## 2019-01-01 RX ADMIN — POLYETHYLENE GLYCOL 3350 17 G: 17 POWDER, FOR SOLUTION ORAL at 08:55

## 2019-01-01 RX ADMIN — GABAPENTIN 600 MG: 300 CAPSULE ORAL at 13:59

## 2019-01-01 RX ADMIN — ASPIRIN 81 MG: 81 TABLET, COATED ORAL at 08:04

## 2019-01-01 RX ADMIN — GABAPENTIN 600 MG: 600 TABLET, FILM COATED ORAL at 15:34

## 2019-01-01 RX ADMIN — OXYCODONE HYDROCHLORIDE AND ACETAMINOPHEN 1 TABLET: 5; 325 TABLET ORAL at 20:22

## 2019-01-01 RX ADMIN — FERROUS GLUCONATE 324 MG: 324 TABLET ORAL at 09:47

## 2019-01-01 RX ADMIN — ALBUTEROL SULFATE 2.5 MG: 2.5 SOLUTION RESPIRATORY (INHALATION) at 00:08

## 2019-01-01 RX ADMIN — IPRATROPIUM BROMIDE AND ALBUTEROL SULFATE 3 ML: .5; 3 SOLUTION RESPIRATORY (INHALATION) at 19:45

## 2019-01-01 RX ADMIN — TORSEMIDE 20 MG: 20 TABLET ORAL at 11:50

## 2019-01-01 RX ADMIN — Medication 1 G: at 09:41

## 2019-01-01 RX ADMIN — TORSEMIDE 5 MG: 5 TABLET ORAL at 10:11

## 2019-01-01 RX ADMIN — DOCUSATE SODIUM 100 MG: 100 CAPSULE, LIQUID FILLED ORAL at 22:21

## 2019-01-01 RX ADMIN — OXYCODONE HYDROCHLORIDE 10 MG: 5 TABLET ORAL at 16:57

## 2019-01-01 RX ADMIN — OXYCODONE AND ACETAMINOPHEN 1 TABLET: 7.5; 325 TABLET ORAL at 08:46

## 2019-01-01 RX ADMIN — CEFAZOLIN SODIUM 1 G: 1 INJECTION, POWDER, FOR SOLUTION INTRAMUSCULAR; INTRAVENOUS at 19:13

## 2019-01-01 RX ADMIN — TICAGRELOR 90 MG: 90 TABLET ORAL at 09:34

## 2019-01-01 RX ADMIN — COSYNTROPIN 250 MCG: 0.25 INJECTION, POWDER, LYOPHILIZED, FOR SOLUTION INTRAMUSCULAR; INTRAVENOUS at 11:39

## 2019-01-01 RX ADMIN — OXYCODONE HYDROCHLORIDE 10 MG: 5 TABLET ORAL at 08:49

## 2019-01-01 RX ADMIN — LEVOTHYROXINE SODIUM 50 MCG: 25 TABLET ORAL at 07:56

## 2019-01-01 RX ADMIN — OXYCODONE HYDROCHLORIDE AND ACETAMINOPHEN 1 TABLET: 7.5; 325 TABLET ORAL at 22:23

## 2019-01-01 RX ADMIN — VERAPAMIL HYDROCHLORIDE 120 MG: 120 TABLET, FILM COATED, EXTENDED RELEASE ORAL at 20:47

## 2019-01-01 RX ADMIN — METHOCARBAMOL 500 MG: 500 TABLET, FILM COATED ORAL at 13:01

## 2019-01-01 RX ADMIN — MULTIPLE VITAMINS W/ MINERALS TAB 1 TABLET: TAB at 08:58

## 2019-01-01 RX ADMIN — ACETAMINOPHEN 650 MG: 325 TABLET, FILM COATED ORAL at 02:21

## 2019-01-01 RX ADMIN — LEVOTHYROXINE SODIUM 50 MCG: 50 TABLET ORAL at 09:52

## 2019-01-01 RX ADMIN — FERROUS GLUCONATE 324 MG: 324 TABLET ORAL at 18:46

## 2019-01-01 RX ADMIN — TICAGRELOR 90 MG: 90 TABLET ORAL at 20:57

## 2019-01-01 RX ADMIN — ROCURONIUM BROMIDE 30 MG: 10 INJECTION INTRAVENOUS at 08:33

## 2019-01-01 RX ADMIN — OXYCODONE AND ACETAMINOPHEN 1 TABLET: 7.5; 325 TABLET ORAL at 20:15

## 2019-01-01 RX ADMIN — TRAMADOL HYDROCHLORIDE 50 MG: 50 TABLET, COATED ORAL at 10:35

## 2019-01-01 RX ADMIN — OXYCODONE HYDROCHLORIDE AND ACETAMINOPHEN 1 TABLET: 7.5; 325 TABLET ORAL at 14:34

## 2019-01-01 RX ADMIN — FUROSEMIDE 60 MG: 10 INJECTION, SOLUTION INTRAVENOUS at 11:49

## 2019-01-01 RX ADMIN — SODIUM CHLORIDE, POTASSIUM CHLORIDE, SODIUM LACTATE AND CALCIUM CHLORIDE: 600; 310; 30; 20 INJECTION, SOLUTION INTRAVENOUS at 07:35

## 2019-01-01 RX ADMIN — CALCIUM 500 MG: 500 TABLET ORAL at 21:47

## 2019-01-01 RX ADMIN — ASPIRIN 81 MG: 81 TABLET, COATED ORAL at 07:56

## 2019-01-01 RX ADMIN — OMEGA-3 FATTY ACIDS CAP 1000 MG 2 CAPSULE: 1000 CAP at 08:59

## 2019-01-01 RX ADMIN — LANSOPRAZOLE 30 MG: 30 CAPSULE, DELAYED RELEASE ORAL at 10:13

## 2019-01-01 RX ADMIN — THEOPHYLLINE 400 MG: 400 TABLET, EXTENDED RELEASE ORAL at 08:56

## 2019-01-01 RX ADMIN — GABAPENTIN 600 MG: 300 CAPSULE ORAL at 17:02

## 2019-01-01 RX ADMIN — GABAPENTIN 1200 MG: 300 CAPSULE ORAL at 21:25

## 2019-01-01 RX ADMIN — CALCIUM CHLORIDE 1 G: 100 INJECTION, SOLUTION INTRAVENOUS at 10:25

## 2019-01-01 RX ADMIN — OMEGA-3 FATTY ACIDS CAP 1000 MG 2000 MG: 1000 CAP at 08:56

## 2019-01-01 RX ADMIN — HUMAN ALBUMIN MICROSPHERES AND PERFLUTREN 9 ML: 10; .22 INJECTION, SOLUTION INTRAVENOUS at 13:18

## 2019-01-01 RX ADMIN — PHENYLEPHRINE HYDROCHLORIDE 100 MCG: 10 INJECTION INTRAVENOUS at 08:12

## 2019-01-01 RX ADMIN — CALCIUM 500 MG: 500 TABLET ORAL at 20:12

## 2019-01-01 RX ADMIN — IPRATROPIUM BROMIDE AND ALBUTEROL SULFATE 3 ML: .5; 3 SOLUTION RESPIRATORY (INHALATION) at 10:52

## 2019-01-01 RX ADMIN — VASOPRESSIN 1 UNITS: 20 INJECTION INTRAVENOUS at 11:28

## 2019-01-01 RX ADMIN — HYDROCODONE BITARTRATE AND ACETAMINOPHEN 1 TABLET: 5; 325 TABLET ORAL at 12:16

## 2019-01-01 RX ADMIN — SODIUM CHLORIDE, POTASSIUM CHLORIDE, SODIUM LACTATE AND CALCIUM CHLORIDE 100 ML/HR: 600; 310; 30; 20 INJECTION, SOLUTION INTRAVENOUS at 13:25

## 2019-01-01 RX ADMIN — IOPAMIDOL 61 ML: 755 INJECTION, SOLUTION INTRAVENOUS at 00:18

## 2019-01-01 RX ADMIN — FERROUS GLUCONATE 324 MG: 324 TABLET ORAL at 15:06

## 2019-01-01 RX ADMIN — ACETAMINOPHEN 650 MG: 325 TABLET, FILM COATED ORAL at 11:50

## 2019-01-01 RX ADMIN — GABAPENTIN 600 MG: 300 CAPSULE ORAL at 09:13

## 2019-01-01 RX ADMIN — CARBIDOPA AND LEVODOPA 5 MG: 50; 200 TABLET, EXTENDED RELEASE ORAL at 12:48

## 2019-01-01 RX ADMIN — MIDODRINE HYDROCHLORIDE 10 MG: 5 TABLET ORAL at 17:45

## 2019-01-01 RX ADMIN — LEVOTHYROXINE SODIUM 50 MCG: 50 TABLET ORAL at 09:04

## 2019-01-01 RX ADMIN — ATORVASTATIN CALCIUM 40 MG: 40 TABLET, FILM COATED ORAL at 10:02

## 2019-01-01 RX ADMIN — FOLIC ACID 1 MG: 1 TABLET ORAL at 09:41

## 2019-01-01 RX ADMIN — TRAMADOL HYDROCHLORIDE 25 MG: 50 TABLET ORAL at 09:13

## 2019-01-01 RX ADMIN — FOLIC ACID 1 MG: 1 TABLET ORAL at 08:46

## 2019-01-01 RX ADMIN — OXYCODONE HYDROCHLORIDE 10 MG: 5 TABLET ORAL at 04:56

## 2019-01-01 RX ADMIN — CALCIUM 500 MG: 500 TABLET ORAL at 08:06

## 2019-01-01 RX ADMIN — INSULIN ASPART 1 UNITS: 100 INJECTION, SOLUTION INTRAVENOUS; SUBCUTANEOUS at 13:27

## 2019-01-01 RX ADMIN — CLOPIDOGREL BISULFATE 300 MG: 75 TABLET ORAL at 15:06

## 2019-01-01 RX ADMIN — MULTIPLE VITAMINS W/ MINERALS TAB 1 TABLET: TAB at 09:52

## 2019-01-01 RX ADMIN — GABAPENTIN 600 MG: 300 CAPSULE ORAL at 07:02

## 2019-01-01 RX ADMIN — OXYCODONE HYDROCHLORIDE 10 MG: 5 TABLET ORAL at 09:06

## 2019-01-01 RX ADMIN — FOLIC ACID 1 MG: 1 TABLET ORAL at 08:23

## 2019-01-01 RX ADMIN — ASPIRIN 81 MG: 81 TABLET, COATED ORAL at 08:23

## 2019-01-01 RX ADMIN — FENTANYL CITRATE 25 MCG: 50 INJECTION, SOLUTION INTRAMUSCULAR; INTRAVENOUS at 18:55

## 2019-01-01 RX ADMIN — IPRATROPIUM BROMIDE AND ALBUTEROL SULFATE 3 ML: .5; 3 SOLUTION RESPIRATORY (INHALATION) at 07:37

## 2019-01-01 RX ADMIN — OXYCODONE AND ACETAMINOPHEN 1 TABLET: 7.5; 325 TABLET ORAL at 19:31

## 2019-01-01 RX ADMIN — METHOCARBAMOL 500 MG: 500 TABLET, FILM COATED ORAL at 11:50

## 2019-01-01 RX ADMIN — FOLIC ACID 1 MG: 1 TABLET ORAL at 08:59

## 2019-01-01 RX ADMIN — GABAPENTIN 600 MG: 300 CAPSULE ORAL at 08:26

## 2019-01-01 RX ADMIN — CEFAZOLIN SODIUM 1 G: 1 INJECTION, POWDER, FOR SOLUTION INTRAMUSCULAR; INTRAVENOUS at 02:45

## 2019-01-01 RX ADMIN — GABAPENTIN 600 MG: 600 TABLET, FILM COATED ORAL at 19:57

## 2019-01-01 RX ADMIN — ACETAMINOPHEN 650 MG: 325 TABLET, FILM COATED ORAL at 13:01

## 2019-01-01 RX ADMIN — HYDROXYZINE HYDROCHLORIDE 25 MG: 25 TABLET ORAL at 05:12

## 2019-01-01 RX ADMIN — GEMFIBROZIL 600 MG: 600 TABLET ORAL at 08:46

## 2019-01-01 RX ADMIN — GEMFIBROZIL 600 MG: 600 TABLET ORAL at 20:00

## 2019-01-01 RX ADMIN — Medication: at 20:02

## 2019-01-01 RX ADMIN — ATORVASTATIN CALCIUM 40 MG: 40 TABLET, FILM COATED ORAL at 08:55

## 2019-01-01 RX ADMIN — IPRATROPIUM BROMIDE AND ALBUTEROL SULFATE 3 ML: .5; 3 SOLUTION RESPIRATORY (INHALATION) at 07:51

## 2019-01-01 RX ADMIN — POLYETHYLENE GLYCOL 3350 17 G: 17 POWDER, FOR SOLUTION ORAL at 20:52

## 2019-01-01 RX ADMIN — FAMOTIDINE 20 MG: 20 INJECTION, SOLUTION INTRAVENOUS at 05:41

## 2019-01-01 RX ADMIN — TRAMADOL HYDROCHLORIDE 50 MG: 50 TABLET, COATED ORAL at 06:06

## 2019-01-01 RX ADMIN — POTASSIUM CHLORIDE 40 MEQ: 1500 TABLET, EXTENDED RELEASE ORAL at 22:03

## 2019-01-01 RX ADMIN — CALCIUM 500 MG: 500 TABLET ORAL at 09:31

## 2019-01-01 RX ADMIN — OXYCODONE HYDROCHLORIDE 10 MG: 5 TABLET ORAL at 16:52

## 2019-01-01 RX ADMIN — CALCIUM 500 MG: 500 TABLET ORAL at 20:38

## 2019-01-01 RX ADMIN — CEFAZOLIN 1 G: 1 INJECTION, POWDER, FOR SOLUTION INTRAMUSCULAR; INTRAVENOUS at 11:03

## 2019-01-01 RX ADMIN — MIDODRINE HYDROCHLORIDE 10 MG: 5 TABLET ORAL at 18:26

## 2019-01-01 RX ADMIN — GABAPENTIN 600 MG: 300 CAPSULE ORAL at 01:32

## 2019-01-01 RX ADMIN — ASPIRIN 81 MG: 81 TABLET, COATED ORAL at 09:47

## 2019-01-01 RX ADMIN — LEVOTHYROXINE SODIUM 50 MCG: 50 TABLET ORAL at 06:39

## 2019-01-01 RX ADMIN — CARBIDOPA AND LEVODOPA 2.5 MG: 50; 200 TABLET, EXTENDED RELEASE ORAL at 12:13

## 2019-01-01 RX ADMIN — OXYCODONE HYDROCHLORIDE AND ACETAMINOPHEN 1 TABLET: 7.5; 325 TABLET ORAL at 00:03

## 2019-01-01 RX ADMIN — IPRATROPIUM BROMIDE AND ALBUTEROL SULFATE 3 ML: .5; 3 SOLUTION RESPIRATORY (INHALATION) at 16:10

## 2019-01-01 RX ADMIN — PANTOPRAZOLE SODIUM 40 MG: 40 TABLET, DELAYED RELEASE ORAL at 08:06

## 2019-01-01 RX ADMIN — FAMOTIDINE 20 MG: 20 INJECTION, SOLUTION INTRAVENOUS at 08:42

## 2019-01-01 RX ADMIN — TRAMADOL HYDROCHLORIDE 25 MG: 50 TABLET ORAL at 22:28

## 2019-01-01 RX ADMIN — PIPERACILLIN AND TAZOBACTAM 4.5 G: 4; .5 INJECTION, POWDER, FOR SOLUTION INTRAVENOUS at 09:49

## 2019-01-01 RX ADMIN — ATORVASTATIN CALCIUM 40 MG: 40 TABLET, FILM COATED ORAL at 08:43

## 2019-01-01 RX ADMIN — PHENYLEPHRINE HYDROCHLORIDE 200 MCG: 10 INJECTION, SOLUTION INTRAMUSCULAR; INTRAVENOUS; SUBCUTANEOUS at 08:51

## 2019-01-01 RX ADMIN — GABAPENTIN 600 MG: 300 CAPSULE ORAL at 09:28

## 2019-01-01 RX ADMIN — GABAPENTIN 1200 MG: 600 TABLET, FILM COATED ORAL at 10:01

## 2019-01-01 RX ADMIN — PREDNISONE 5 MG: 5 TABLET ORAL at 09:52

## 2019-01-01 RX ADMIN — AMOXICILLIN AND CLAVULANATE POTASSIUM 1 TABLET: 875; 125 TABLET, FILM COATED ORAL at 20:12

## 2019-01-01 RX ADMIN — DIGOXIN 125 MCG: 0.12 TABLET ORAL at 16:35

## 2019-01-01 RX ADMIN — FERROUS GLUCONATE 324 MG: 324 TABLET ORAL at 08:57

## 2019-01-01 RX ADMIN — LEVOTHYROXINE SODIUM 50 MCG: 50 TABLET ORAL at 09:40

## 2019-01-01 RX ADMIN — OXYCODONE AND ACETAMINOPHEN 1 TABLET: 7.5; 325 TABLET ORAL at 20:02

## 2019-01-01 RX ADMIN — PREDNISONE 5 MG: 5 TABLET ORAL at 08:53

## 2019-01-01 RX ADMIN — FERROUS GLUCONATE 324 MG: 324 TABLET ORAL at 09:29

## 2019-01-01 RX ADMIN — RANITIDINE 150 MG: 150 TABLET ORAL at 05:31

## 2019-01-01 RX ADMIN — CEFAZOLIN SODIUM 1 G: 1 INJECTION, POWDER, FOR SOLUTION INTRAMUSCULAR; INTRAVENOUS at 10:30

## 2019-01-01 RX ADMIN — GABAPENTIN 1200 MG: 600 TABLET, FILM COATED ORAL at 19:56

## 2019-01-01 RX ADMIN — GABAPENTIN 1200 MG: 600 TABLET, FILM COATED ORAL at 13:53

## 2019-01-01 RX ADMIN — WARFARIN SODIUM 2.5 MG: 2.5 TABLET ORAL at 17:29

## 2019-01-01 RX ADMIN — OXYCODONE HYDROCHLORIDE AND ACETAMINOPHEN 1 TABLET: 7.5; 325 TABLET ORAL at 11:18

## 2019-01-01 RX ADMIN — PROPOFOL 30 MG: 10 INJECTION, EMULSION INTRAVENOUS at 11:17

## 2019-01-01 RX ADMIN — OXYCODONE HYDROCHLORIDE 10 MG: 5 TABLET ORAL at 23:22

## 2019-01-01 RX ADMIN — ASPIRIN 81 MG: 81 TABLET, COATED ORAL at 08:27

## 2019-01-01 RX ADMIN — GABAPENTIN 600 MG: 600 TABLET, FILM COATED ORAL at 08:22

## 2019-01-01 RX ADMIN — PANTOPRAZOLE SODIUM 40 MG: 40 TABLET, DELAYED RELEASE ORAL at 07:02

## 2019-01-01 RX ADMIN — OXYCODONE HYDROCHLORIDE 10 MG: 5 TABLET ORAL at 12:18

## 2019-01-01 RX ADMIN — PREDNISONE 5 MG: 5 TABLET ORAL at 17:30

## 2019-01-01 RX ADMIN — OXYCODONE HYDROCHLORIDE 10 MG: 5 TABLET ORAL at 09:44

## 2019-01-01 RX ADMIN — CEFAZOLIN 1 G: 1 INJECTION, POWDER, FOR SOLUTION INTRAMUSCULAR; INTRAVENOUS at 01:42

## 2019-01-01 RX ADMIN — SUGAMMADEX 200 MG: 100 INJECTION, SOLUTION INTRAVENOUS at 14:05

## 2019-01-01 RX ADMIN — CARBIDOPA AND LEVODOPA 2.5 MG: 50; 200 TABLET, EXTENDED RELEASE ORAL at 17:58

## 2019-01-01 RX ADMIN — HUMAN INSULIN 2 UNITS/HR: 100 INJECTION, SOLUTION SUBCUTANEOUS at 10:12

## 2019-01-01 RX ADMIN — PANTOPRAZOLE SODIUM 40 MG: 40 TABLET, DELAYED RELEASE ORAL at 06:39

## 2019-01-01 RX ADMIN — SILDENAFIL 5 MG: 20 TABLET, FILM COATED ORAL at 22:46

## 2019-01-01 RX ADMIN — IPRATROPIUM BROMIDE AND ALBUTEROL SULFATE 3 ML: .5; 3 SOLUTION RESPIRATORY (INHALATION) at 07:15

## 2019-01-01 RX ADMIN — ASPIRIN 81 MG: 81 TABLET, COATED ORAL at 08:05

## 2019-01-01 RX ADMIN — PIPERACILLIN AND TAZOBACTAM 4.5 G: 4; .5 INJECTION, POWDER, FOR SOLUTION INTRAVENOUS at 07:51

## 2019-01-01 RX ADMIN — DIGOXIN 250 MCG: 0.25 INJECTION INTRAMUSCULAR; INTRAVENOUS at 13:56

## 2019-01-01 RX ADMIN — LEVOTHYROXINE SODIUM 50 MCG: 50 TABLET ORAL at 09:01

## 2019-01-01 RX ADMIN — FAMOTIDINE 20 MG: 20 INJECTION, SOLUTION INTRAVENOUS at 05:57

## 2019-01-01 RX ADMIN — PREDNISONE 5 MG: 5 TABLET ORAL at 07:53

## 2019-01-01 RX ADMIN — OXYCODONE HYDROCHLORIDE 10 MG: 5 TABLET ORAL at 08:55

## 2019-01-01 RX ADMIN — IPRATROPIUM BROMIDE AND ALBUTEROL SULFATE 3 ML: .5; 3 SOLUTION RESPIRATORY (INHALATION) at 11:33

## 2019-01-01 RX ADMIN — GABAPENTIN 1200 MG: 600 TABLET, FILM COATED ORAL at 15:12

## 2019-01-01 RX ADMIN — NOREPINEPHRINE BITARTRATE 6.4 MCG: 1 INJECTION INTRAVENOUS at 08:39

## 2019-01-01 RX ADMIN — FOLIC ACID 1 MG: 1 TABLET ORAL at 08:05

## 2019-01-01 RX ADMIN — MELATONIN 2000 UNITS: at 12:01

## 2019-01-01 RX ADMIN — METHOCARBAMOL 500 MG: 500 TABLET, FILM COATED ORAL at 18:14

## 2019-01-01 RX ADMIN — SULFAMETHOXAZOLE AND TRIMETHOPRIM 1 TABLET: 800; 160 TABLET ORAL at 08:41

## 2019-01-01 RX ADMIN — SILDENAFIL 5 MG: 20 TABLET, FILM COATED ORAL at 22:54

## 2019-01-01 RX ADMIN — LIDOCAINE HYDROCHLORIDE 1 ML: 10 INJECTION, SOLUTION EPIDURAL; INFILTRATION; INTRACAUDAL; PERINEURAL at 12:27

## 2019-01-01 RX ADMIN — SUFENTANIL CITRATE 0.1 MCG/KG/HR: 50 INJECTION EPIDURAL; INTRAVENOUS at 08:30

## 2019-01-01 RX ADMIN — GABAPENTIN 1200 MG: 300 CAPSULE ORAL at 15:57

## 2019-01-01 RX ADMIN — THEOPHYLLINE 400 MG: 400 TABLET, EXTENDED RELEASE ORAL at 08:26

## 2019-01-01 RX ADMIN — CEFAZOLIN 1 G: 1 INJECTION, POWDER, FOR SOLUTION INTRAMUSCULAR; INTRAVENOUS at 18:13

## 2019-01-01 RX ADMIN — POLYETHYLENE GLYCOL 3350 17 G: 17 POWDER, FOR SOLUTION ORAL at 07:53

## 2019-01-01 RX ADMIN — OXYCODONE HYDROCHLORIDE AND ACETAMINOPHEN 1 TABLET: 7.5; 325 TABLET ORAL at 06:54

## 2019-01-01 RX ADMIN — IOPAMIDOL 120 ML: 755 INJECTION, SOLUTION INTRAVENOUS at 09:37

## 2019-01-01 RX ADMIN — SODIUM CHLORIDE 500 ML: 9 INJECTION, SOLUTION INTRAVENOUS at 15:39

## 2019-01-01 RX ADMIN — GABAPENTIN 600 MG: 300 CAPSULE ORAL at 15:55

## 2019-01-01 RX ADMIN — GABAPENTIN 1200 MG: 600 TABLET, FILM COATED ORAL at 07:59

## 2019-01-01 RX ADMIN — FERROUS GLUCONATE 324 MG: 324 TABLET ORAL at 08:24

## 2019-01-01 RX ADMIN — ACETAMINOPHEN 975 MG: 325 TABLET, FILM COATED ORAL at 02:17

## 2019-01-01 RX ADMIN — VANCOMYCIN HYDROCHLORIDE 1750 MG: 10 INJECTION, POWDER, LYOPHILIZED, FOR SOLUTION INTRAVENOUS at 21:10

## 2019-01-01 RX ADMIN — CLOPIDOGREL BISULFATE 75 MG: 75 TABLET, FILM COATED ORAL at 08:40

## 2019-01-01 RX ADMIN — ATORVASTATIN CALCIUM 40 MG: 40 TABLET, FILM COATED ORAL at 08:23

## 2019-01-01 RX ADMIN — SODIUM CHLORIDE: 9 INJECTION, SOLUTION INTRAVENOUS at 04:31

## 2019-01-01 RX ADMIN — LIDOCAINE HYDROCHLORIDE 15 ML: 10 INJECTION, SOLUTION EPIDURAL; INFILTRATION; INTRACAUDAL; PERINEURAL at 16:34

## 2019-01-01 RX ADMIN — ACETAMINOPHEN 650 MG: 325 TABLET, FILM COATED ORAL at 21:20

## 2019-01-01 RX ADMIN — RANITIDINE 150 MG: 150 TABLET ORAL at 18:13

## 2019-01-01 RX ADMIN — GABAPENTIN 600 MG: 300 CAPSULE ORAL at 21:46

## 2019-01-01 RX ADMIN — LIDOCAINE HYDROCHLORIDE 7 ML: 10 INJECTION, SOLUTION INFILTRATION; PERINEURAL at 15:07

## 2019-01-01 RX ADMIN — INSULIN ASPART 1 UNITS: 100 INJECTION, SOLUTION INTRAVENOUS; SUBCUTANEOUS at 17:02

## 2019-01-01 RX ADMIN — GEMFIBROZIL 600 MG: 600 TABLET ORAL at 20:18

## 2019-01-01 RX ADMIN — LEVOTHYROXINE SODIUM 50 MCG: 50 TABLET ORAL at 09:05

## 2019-01-01 RX ADMIN — SODIUM CHLORIDE, POTASSIUM CHLORIDE, SODIUM LACTATE AND CALCIUM CHLORIDE: 600; 310; 30; 20 INJECTION, SOLUTION INTRAVENOUS at 08:20

## 2019-01-01 RX ADMIN — GABAPENTIN 600 MG: 300 CAPSULE ORAL at 23:14

## 2019-01-01 RX ADMIN — OXYCODONE HYDROCHLORIDE AND ACETAMINOPHEN 1 TABLET: 7.5; 325 TABLET ORAL at 13:06

## 2019-01-01 RX ADMIN — ACETAMINOPHEN 975 MG: 325 TABLET, FILM COATED ORAL at 16:15

## 2019-01-01 RX ADMIN — ACETAMINOPHEN 650 MG: 325 TABLET, FILM COATED ORAL at 10:33

## 2019-01-01 RX ADMIN — OXYCODONE AND ACETAMINOPHEN 1 TABLET: 7.5; 325 TABLET ORAL at 13:58

## 2019-01-01 RX ADMIN — SENNOSIDES AND DOCUSATE SODIUM 1 TABLET: 8.6; 5 TABLET ORAL at 22:48

## 2019-01-01 RX ADMIN — ACETAMINOPHEN 650 MG: 325 TABLET, FILM COATED ORAL at 18:29

## 2019-01-01 RX ADMIN — POLYETHYLENE GLYCOL 3350 17 G: 17 POWDER, FOR SOLUTION ORAL at 08:40

## 2019-01-01 RX ADMIN — OXYCODONE HYDROCHLORIDE AND ACETAMINOPHEN 1 TABLET: 5; 325 TABLET ORAL at 14:00

## 2019-01-01 RX ADMIN — OXYCODONE HYDROCHLORIDE AND ACETAMINOPHEN 1 TABLET: 7.5; 325 TABLET ORAL at 08:28

## 2019-01-01 RX ADMIN — MIDAZOLAM 1 MG: 1 INJECTION INTRAMUSCULAR; INTRAVENOUS at 08:00

## 2019-01-01 RX ADMIN — CEFAZOLIN SODIUM 2 G: 2 INJECTION, SOLUTION INTRAVENOUS at 16:30

## 2019-01-01 RX ADMIN — SULFAMETHOXAZOLE AND TRIMETHOPRIM 1 TABLET: 800; 160 TABLET ORAL at 08:51

## 2019-01-01 RX ADMIN — POLYETHYLENE GLYCOL 3350 17 G: 17 POWDER, FOR SOLUTION ORAL at 10:06

## 2019-01-01 RX ADMIN — Medication 6.25 MG: at 08:31

## 2019-01-01 RX ADMIN — PANTOPRAZOLE SODIUM 40 MG: 40 TABLET, DELAYED RELEASE ORAL at 09:20

## 2019-01-01 RX ADMIN — POLYETHYLENE GLYCOL 3350 17 G: 17 POWDER, FOR SOLUTION ORAL at 07:56

## 2019-01-01 RX ADMIN — PHENYLEPHRINE HYDROCHLORIDE 100 MCG: 10 INJECTION, SOLUTION INTRAMUSCULAR; INTRAVENOUS; SUBCUTANEOUS at 08:03

## 2019-01-01 RX ADMIN — HEPARIN SODIUM 1550 UNITS/HR: 10000 INJECTION, SOLUTION INTRAVENOUS at 04:07

## 2019-01-01 RX ADMIN — INSULIN ASPART 1 UNITS: 100 INJECTION, SOLUTION INTRAVENOUS; SUBCUTANEOUS at 09:38

## 2019-01-01 RX ADMIN — GEMFIBROZIL 600 MG: 600 TABLET ORAL at 22:56

## 2019-01-01 RX ADMIN — METHOCARBAMOL 500 MG: 500 TABLET, FILM COATED ORAL at 08:48

## 2019-01-01 RX ADMIN — ASPIRIN 81 MG: 81 TABLET, COATED ORAL at 10:13

## 2019-01-01 RX ADMIN — OXYCODONE HYDROCHLORIDE 10 MG: 5 TABLET ORAL at 23:43

## 2019-01-01 RX ADMIN — Medication 6.25 MG: at 20:08

## 2019-01-01 RX ADMIN — INSULIN ASPART 1 UNITS: 100 INJECTION, SOLUTION INTRAVENOUS; SUBCUTANEOUS at 17:31

## 2019-01-01 RX ADMIN — PHENYLEPHRINE HYDROCHLORIDE 100 MCG: 10 INJECTION INTRAVENOUS at 08:05

## 2019-01-01 RX ADMIN — THEOPHYLLINE 400 MG: 400 TABLET, EXTENDED RELEASE ORAL at 09:20

## 2019-01-01 RX ADMIN — METHOTREXATE 25 MG: 2.5 TABLET ORAL at 08:37

## 2019-01-01 RX ADMIN — VERAPAMIL HYDROCHLORIDE 120 MG: 120 TABLET, FILM COATED, EXTENDED RELEASE ORAL at 21:15

## 2019-01-01 RX ADMIN — Medication 1 G: at 08:05

## 2019-01-01 RX ADMIN — OXYCODONE HYDROCHLORIDE 10 MG: 5 TABLET ORAL at 06:29

## 2019-01-01 RX ADMIN — SILDENAFIL 5 MG: 20 TABLET, FILM COATED ORAL at 16:40

## 2019-01-01 RX ADMIN — FAMOTIDINE 20 MG: 20 TABLET ORAL at 18:30

## 2019-01-01 RX ADMIN — POLYETHYLENE GLYCOL 3350 17 G: 17 POWDER, FOR SOLUTION ORAL at 08:05

## 2019-01-01 RX ADMIN — MIDODRINE HYDROCHLORIDE 10 MG: 5 TABLET ORAL at 12:04

## 2019-01-01 RX ADMIN — OXYCODONE HYDROCHLORIDE 10 MG: 5 TABLET ORAL at 03:48

## 2019-01-01 RX ADMIN — CHOLECALCIFEROL TAB 50 MCG (2000 UNIT) 2000 UNITS: 50 TAB at 08:56

## 2019-01-01 RX ADMIN — INSULIN ASPART 1 UNITS: 100 INJECTION, SOLUTION INTRAVENOUS; SUBCUTANEOUS at 14:56

## 2019-01-01 RX ADMIN — ATORVASTATIN CALCIUM 40 MG: 40 TABLET, FILM COATED ORAL at 09:47

## 2019-01-01 RX ADMIN — POLYETHYLENE GLYCOL 3350 17 G: 17 POWDER, FOR SOLUTION ORAL at 09:39

## 2019-01-01 RX ADMIN — GABAPENTIN 1200 MG: 600 TABLET, FILM COATED ORAL at 20:02

## 2019-01-01 RX ADMIN — ACETAMINOPHEN 975 MG: 325 TABLET, FILM COATED ORAL at 17:08

## 2019-01-01 RX ADMIN — GABAPENTIN 600 MG: 300 CAPSULE ORAL at 16:25

## 2019-01-01 RX ADMIN — ATORVASTATIN CALCIUM 40 MG: 40 TABLET, FILM COATED ORAL at 08:37

## 2019-01-01 RX ADMIN — DIGOXIN 250 MCG: 0.25 INJECTION INTRAMUSCULAR; INTRAVENOUS at 21:27

## 2019-01-01 RX ADMIN — ACETAMINOPHEN 650 MG: 325 TABLET, FILM COATED ORAL at 06:39

## 2019-01-01 RX ADMIN — SILDENAFIL 5 MG: 20 TABLET, FILM COATED ORAL at 19:08

## 2019-01-01 RX ADMIN — ATORVASTATIN CALCIUM 40 MG: 40 TABLET, FILM COATED ORAL at 08:27

## 2019-01-01 RX ADMIN — DOCUSATE SODIUM 100 MG: 100 CAPSULE, LIQUID FILLED ORAL at 08:20

## 2019-01-01 RX ADMIN — IPRATROPIUM BROMIDE AND ALBUTEROL SULFATE 3 ML: .5; 3 SOLUTION RESPIRATORY (INHALATION) at 19:06

## 2019-01-01 RX ADMIN — OXYCODONE HYDROCHLORIDE AND ACETAMINOPHEN 1 TABLET: 7.5; 325 TABLET ORAL at 21:38

## 2019-01-01 RX ADMIN — FENTANYL CITRATE 50 MCG: 50 INJECTION INTRAMUSCULAR; INTRAVENOUS at 15:01

## 2019-01-01 RX ADMIN — CALCIUM 500 MG: 500 TABLET ORAL at 20:57

## 2019-01-01 RX ADMIN — OXYCODONE HYDROCHLORIDE AND ACETAMINOPHEN 1 TABLET: 7.5; 325 TABLET ORAL at 16:25

## 2019-01-01 RX ADMIN — GABAPENTIN 600 MG: 600 TABLET, FILM COATED ORAL at 07:53

## 2019-01-01 RX ADMIN — OXYCODONE HYDROCHLORIDE AND ACETAMINOPHEN 1 TABLET: 7.5; 325 TABLET ORAL at 08:25

## 2019-01-01 RX ADMIN — HYDROCODONE BITARTRATE AND ACETAMINOPHEN 1 TABLET: 5; 325 TABLET ORAL at 16:23

## 2019-01-01 RX ADMIN — FOLIC ACID 1 MG: 1 TABLET ORAL at 08:27

## 2019-01-01 RX ADMIN — PREDNISONE 5 MG: 5 TABLET ORAL at 08:41

## 2019-01-01 RX ADMIN — VANCOMYCIN HYDROCHLORIDE 1500 MG: 10 INJECTION, POWDER, LYOPHILIZED, FOR SOLUTION INTRAVENOUS at 09:00

## 2019-01-01 RX ADMIN — TICAGRELOR 90 MG: 90 TABLET ORAL at 19:31

## 2019-01-01 RX ADMIN — OXYCODONE AND ACETAMINOPHEN 1 TABLET: 7.5; 325 TABLET ORAL at 15:11

## 2019-01-01 RX ADMIN — BISACODYL 10 MG: 10 SUPPOSITORY RECTAL at 06:57

## 2019-01-01 RX ADMIN — DOCUSATE SODIUM 100 MG: 100 CAPSULE, LIQUID FILLED ORAL at 09:33

## 2019-01-01 RX ADMIN — GABAPENTIN 600 MG: 600 TABLET, FILM COATED ORAL at 08:06

## 2019-01-01 RX ADMIN — FERROUS GLUCONATE 324 MG: 324 TABLET ORAL at 08:59

## 2019-01-01 RX ADMIN — INSULIN ASPART 1 UNITS: 100 INJECTION, SOLUTION INTRAVENOUS; SUBCUTANEOUS at 07:59

## 2019-01-01 RX ADMIN — SILDENAFIL 5 MG: 20 TABLET, FILM COATED ORAL at 08:43

## 2019-01-01 RX ADMIN — GABAPENTIN 1200 MG: 600 TABLET, FILM COATED ORAL at 07:55

## 2019-01-01 RX ADMIN — CEFAZOLIN 1 G: 1 INJECTION, POWDER, FOR SOLUTION INTRAMUSCULAR; INTRAVENOUS at 10:30

## 2019-01-01 RX ADMIN — SILDENAFIL 5 MG: 20 TABLET, FILM COATED ORAL at 08:31

## 2019-01-01 RX ADMIN — INFLUENZA A VIRUS A/MICHIGAN/45/2015 X-275 (H1N1) ANTIGEN (FORMALDEHYDE INACTIVATED), INFLUENZA A VIRUS A/SINGAPORE/INFIMH-16-0019/2016 IVR-186 (H3N2) ANTIGEN (FORMALDEHYDE INACTIVATED), AND INFLUENZA B VIRUS B/MARYLAND/15/2016 BX-69A (A B/COLORADO/6/2017-LIKE VIRUS) ANTIGEN (FORMALDEHYDE INACTIVATED) 0.5 ML: 60; 60; 60 INJECTION, SUSPENSION INTRAMUSCULAR at 18:54

## 2019-01-01 RX ADMIN — CLOPIDOGREL BISULFATE 75 MG: 75 TABLET ORAL at 08:21

## 2019-01-01 RX ADMIN — GEMFIBROZIL 600 MG: 600 TABLET ORAL at 08:06

## 2019-01-01 RX ADMIN — POTASSIUM CHLORIDE 40 MEQ: 1500 TABLET, EXTENDED RELEASE ORAL at 08:27

## 2019-01-01 RX ADMIN — ASPIRIN 81 MG: 81 TABLET, COATED ORAL at 08:42

## 2019-01-01 RX ADMIN — PANTOPRAZOLE SODIUM 40 MG: 40 TABLET, DELAYED RELEASE ORAL at 08:58

## 2019-01-01 RX ADMIN — PHENYLEPHRINE HYDROCHLORIDE 200 MCG: 10 INJECTION INTRAVENOUS at 09:12

## 2019-01-01 RX ADMIN — OXYCODONE AND ACETAMINOPHEN 1 TABLET: 7.5; 325 TABLET ORAL at 14:32

## 2019-01-01 RX ADMIN — ACETAMINOPHEN 650 MG: 325 TABLET, FILM COATED ORAL at 08:36

## 2019-01-01 RX ADMIN — GABAPENTIN 600 MG: 300 CAPSULE ORAL at 22:07

## 2019-01-01 RX ADMIN — ATORVASTATIN CALCIUM 40 MG: 40 TABLET, FILM COATED ORAL at 08:05

## 2019-01-01 RX ADMIN — CARBIDOPA AND LEVODOPA 2.5 MG: 50; 200 TABLET, EXTENDED RELEASE ORAL at 08:57

## 2019-01-01 RX ADMIN — ACETAMINOPHEN 650 MG: 325 TABLET, FILM COATED ORAL at 18:57

## 2019-01-01 RX ADMIN — OXYCODONE HYDROCHLORIDE AND ACETAMINOPHEN 1 TABLET: 5; 325 TABLET ORAL at 13:34

## 2019-01-01 RX ADMIN — TORSEMIDE 10 MG: 10 TABLET ORAL at 09:52

## 2019-01-01 RX ADMIN — OMEGA-3 FATTY ACIDS CAP 1000 MG 2000 MG: 1000 CAP at 09:59

## 2019-01-01 RX ADMIN — FOLIC ACID 1 MG: 1 TABLET ORAL at 09:59

## 2019-01-01 RX ADMIN — ACETAMINOPHEN 650 MG: 325 TABLET, FILM COATED ORAL at 23:13

## 2019-01-01 RX ADMIN — TICAGRELOR 90 MG: 90 TABLET ORAL at 22:03

## 2019-01-01 RX ADMIN — OXYCODONE HYDROCHLORIDE 10 MG: 5 TABLET ORAL at 04:13

## 2019-01-01 RX ADMIN — CLOPIDOGREL BISULFATE 75 MG: 75 TABLET ORAL at 08:55

## 2019-01-01 RX ADMIN — TORSEMIDE 5 MG: 5 TABLET ORAL at 09:02

## 2019-01-01 RX ADMIN — IPRATROPIUM BROMIDE AND ALBUTEROL SULFATE 3 ML: .5; 3 SOLUTION RESPIRATORY (INHALATION) at 15:34

## 2019-01-01 RX ADMIN — PROPOFOL 20 MG: 10 INJECTION, EMULSION INTRAVENOUS at 08:02

## 2019-01-01 RX ADMIN — ENOXAPARIN SODIUM 30 MG: 30 INJECTION SUBCUTANEOUS at 20:24

## 2019-01-01 RX ADMIN — CLOPIDOGREL BISULFATE 75 MG: 75 TABLET, FILM COATED ORAL at 08:23

## 2019-01-01 RX ADMIN — CALCIUM 500 MG: 500 TABLET ORAL at 20:02

## 2019-01-01 RX ADMIN — CARBIDOPA AND LEVODOPA 5 MG: 50; 200 TABLET, EXTENDED RELEASE ORAL at 18:23

## 2019-01-01 RX ADMIN — GABAPENTIN 1200 MG: 600 TABLET, FILM COATED ORAL at 20:04

## 2019-01-01 RX ADMIN — ASPIRIN 81 MG: 81 TABLET, COATED ORAL at 10:00

## 2019-01-01 RX ADMIN — SENNOSIDES AND DOCUSATE SODIUM 2 TABLET: 8.6; 5 TABLET ORAL at 19:48

## 2019-01-01 RX ADMIN — GABAPENTIN 1200 MG: 600 TABLET, FILM COATED ORAL at 20:18

## 2019-01-01 RX ADMIN — LANSOPRAZOLE 30 MG: 30 CAPSULE, DELAYED RELEASE ORAL at 06:51

## 2019-01-01 RX ADMIN — ACETAMINOPHEN 650 MG: 325 TABLET, FILM COATED ORAL at 20:54

## 2019-01-01 RX ADMIN — IPRATROPIUM BROMIDE AND ALBUTEROL SULFATE 3 ML: .5; 3 SOLUTION RESPIRATORY (INHALATION) at 15:23

## 2019-01-01 RX ADMIN — OXYCODONE HYDROCHLORIDE AND ACETAMINOPHEN 1 TABLET: 7.5; 325 TABLET ORAL at 20:54

## 2019-01-01 RX ADMIN — MIDODRINE HYDROCHLORIDE 10 MG: 5 TABLET ORAL at 08:24

## 2019-01-01 RX ADMIN — TICAGRELOR 90 MG: 90 TABLET ORAL at 20:15

## 2019-01-01 RX ADMIN — CALCIUM 500 MG: 500 TABLET ORAL at 20:22

## 2019-01-01 RX ADMIN — Medication 25 MG: at 20:30

## 2019-01-01 RX ADMIN — CALCIUM 500 MG: 500 TABLET ORAL at 21:18

## 2019-01-01 RX ADMIN — TICAGRELOR 90 MG: 90 TABLET ORAL at 21:03

## 2019-01-01 RX ADMIN — LIDOCAINE HYDROCHLORIDE 100 MG: 20 INJECTION, SOLUTION INFILTRATION; PERINEURAL at 08:00

## 2019-01-01 RX ADMIN — ATORVASTATIN CALCIUM 40 MG: 40 TABLET, FILM COATED ORAL at 11:56

## 2019-01-01 RX ADMIN — GABAPENTIN 600 MG: 600 TABLET, FILM COATED ORAL at 13:01

## 2019-01-01 RX ADMIN — PHENYLEPHRINE HYDROCHLORIDE 200 MCG: 10 INJECTION INTRAVENOUS at 09:15

## 2019-01-01 RX ADMIN — OXYCODONE HYDROCHLORIDE AND ACETAMINOPHEN 1 TABLET: 5; 325 TABLET ORAL at 21:46

## 2019-01-01 RX ADMIN — FOLIC ACID 1 MG: 1 TABLET ORAL at 09:13

## 2019-01-01 RX ADMIN — TRAMADOL HYDROCHLORIDE 25 MG: 50 TABLET ORAL at 21:11

## 2019-01-01 RX ADMIN — GEMFIBROZIL 600 MG: 600 TABLET ORAL at 20:04

## 2019-01-01 RX ADMIN — IPRATROPIUM BROMIDE AND ALBUTEROL SULFATE 3 ML: .5; 3 SOLUTION RESPIRATORY (INHALATION) at 07:09

## 2019-01-01 RX ADMIN — VERAPAMIL HYDROCHLORIDE 120 MG: 120 TABLET, FILM COATED, EXTENDED RELEASE ORAL at 22:18

## 2019-01-01 RX ADMIN — THEOPHYLLINE 400 MG: 400 TABLET, EXTENDED RELEASE ORAL at 08:06

## 2019-01-01 RX ADMIN — FAMOTIDINE 20 MG: 20 INJECTION, SOLUTION INTRAVENOUS at 17:09

## 2019-01-01 RX ADMIN — ACETAMINOPHEN 650 MG: 325 TABLET, FILM COATED ORAL at 08:48

## 2019-01-01 RX ADMIN — OXYCODONE HYDROCHLORIDE 10 MG: 5 TABLET ORAL at 03:33

## 2019-01-01 RX ADMIN — POLYETHYLENE GLYCOL 3350 17 G: 17 POWDER, FOR SOLUTION ORAL at 20:12

## 2019-01-01 RX ADMIN — CARBIDOPA AND LEVODOPA 5 MG: 50; 200 TABLET, EXTENDED RELEASE ORAL at 10:11

## 2019-01-01 RX ADMIN — FERROUS GLUCONATE 324 MG: 324 TABLET ORAL at 18:12

## 2019-01-01 RX ADMIN — TRAMADOL HYDROCHLORIDE 50 MG: 50 TABLET, COATED ORAL at 06:25

## 2019-01-01 RX ADMIN — CEFAZOLIN 1 G: 1 INJECTION, POWDER, FOR SOLUTION INTRAMUSCULAR; INTRAVENOUS at 05:01

## 2019-01-01 RX ADMIN — OXYCODONE AND ACETAMINOPHEN 1 TABLET: 7.5; 325 TABLET ORAL at 08:04

## 2019-01-01 RX ADMIN — GABAPENTIN 1200 MG: 300 CAPSULE ORAL at 20:29

## 2019-01-01 RX ADMIN — ACETAMINOPHEN 650 MG: 325 TABLET, FILM COATED ORAL at 22:30

## 2019-01-01 RX ADMIN — OXYCODONE HYDROCHLORIDE 10 MG: 5 TABLET ORAL at 02:58

## 2019-01-01 RX ADMIN — FLUTICASONE FUROATE 1 PUFF: 100 POWDER RESPIRATORY (INHALATION) at 09:20

## 2019-01-01 RX ADMIN — PIPERACILLIN AND TAZOBACTAM 4.5 G: 4; .5 INJECTION, POWDER, FOR SOLUTION INTRAVENOUS at 09:20

## 2019-01-01 RX ADMIN — MIDODRINE HYDROCHLORIDE 10 MG: 5 TABLET ORAL at 10:13

## 2019-01-01 RX ADMIN — PANTOPRAZOLE SODIUM 40 MG: 40 TABLET, DELAYED RELEASE ORAL at 06:35

## 2019-01-01 RX ADMIN — LEVOTHYROXINE SODIUM 50 MCG: 50 TABLET ORAL at 09:47

## 2019-01-01 RX ADMIN — INSULIN ASPART 1 UNITS: 100 INJECTION, SOLUTION INTRAVENOUS; SUBCUTANEOUS at 14:35

## 2019-01-01 RX ADMIN — ATORVASTATIN CALCIUM 40 MG: 40 TABLET, FILM COATED ORAL at 09:18

## 2019-01-01 RX ADMIN — PANTOPRAZOLE SODIUM 40 MG: 40 TABLET, DELAYED RELEASE ORAL at 06:54

## 2019-01-01 RX ADMIN — PREDNISONE 5 MG: 5 TABLET ORAL at 17:37

## 2019-01-01 RX ADMIN — GABAPENTIN 600 MG: 300 CAPSULE ORAL at 17:19

## 2019-01-01 RX ADMIN — SILDENAFIL 5 MG: 20 TABLET, FILM COATED ORAL at 09:33

## 2019-01-01 RX ADMIN — CALCIUM 500 MG: 500 TABLET ORAL at 21:29

## 2019-01-01 RX ADMIN — ATORVASTATIN CALCIUM 40 MG: 40 TABLET, FILM COATED ORAL at 09:34

## 2019-01-01 RX ADMIN — TICAGRELOR 90 MG: 90 TABLET ORAL at 20:02

## 2019-01-01 RX ADMIN — OXYCODONE HYDROCHLORIDE 10 MG: 5 TABLET ORAL at 13:14

## 2019-01-01 RX ADMIN — GABAPENTIN 600 MG: 600 TABLET, FILM COATED ORAL at 09:52

## 2019-01-01 RX ADMIN — MIDAZOLAM 0.5 MG: 1 INJECTION INTRAMUSCULAR; INTRAVENOUS at 17:34

## 2019-01-01 RX ADMIN — ASPIRIN 81 MG: 81 TABLET, DELAYED RELEASE ORAL at 08:26

## 2019-01-01 RX ADMIN — VANCOMYCIN HYDROCHLORIDE 1500 MG: 10 INJECTION, POWDER, LYOPHILIZED, FOR SOLUTION INTRAVENOUS at 20:26

## 2019-01-01 RX ADMIN — IPRATROPIUM BROMIDE AND ALBUTEROL SULFATE 3 ML: .5; 3 SOLUTION RESPIRATORY (INHALATION) at 07:29

## 2019-01-01 RX ADMIN — SENNOSIDES AND DOCUSATE SODIUM 2 TABLET: 8.6; 5 TABLET ORAL at 08:56

## 2019-01-01 RX ADMIN — HEPARIN SODIUM 2000 UNITS: 1000 INJECTION, SOLUTION INTRAVENOUS; SUBCUTANEOUS at 09:05

## 2019-01-01 RX ADMIN — PANTOPRAZOLE SODIUM 40 MG: 20 TABLET, DELAYED RELEASE ORAL at 06:41

## 2019-01-01 RX ADMIN — PREDNISONE 5 MG: 5 TABLET ORAL at 18:13

## 2019-01-01 RX ADMIN — FAMOTIDINE 20 MG: 20 TABLET ORAL at 06:30

## 2019-01-01 RX ADMIN — ASPIRIN 81 MG: 81 TABLET, COATED ORAL at 08:47

## 2019-01-01 RX ADMIN — FERROUS GLUCONATE 324 MG: 324 TABLET ORAL at 19:07

## 2019-01-01 RX ADMIN — HEPARIN SODIUM 5000 UNITS: 5000 INJECTION, SOLUTION INTRAVENOUS; SUBCUTANEOUS at 08:56

## 2019-01-01 RX ADMIN — OXYCODONE HYDROCHLORIDE AND ACETAMINOPHEN 1 TABLET: 5; 325 TABLET ORAL at 08:06

## 2019-01-01 RX ADMIN — SENNOSIDES AND DOCUSATE SODIUM 2 TABLET: 8.6; 5 TABLET ORAL at 20:11

## 2019-01-01 RX ADMIN — SENNOSIDES AND DOCUSATE SODIUM 1 TABLET: 8.6; 5 TABLET ORAL at 20:46

## 2019-01-01 RX ADMIN — CALCIUM 500 MG: 500 TABLET ORAL at 22:56

## 2019-01-01 RX ADMIN — GABAPENTIN 600 MG: 300 CAPSULE ORAL at 16:40

## 2019-01-01 RX ADMIN — OXYCODONE AND ACETAMINOPHEN 1 TABLET: 7.5; 325 TABLET ORAL at 20:04

## 2019-01-01 RX ADMIN — DOCUSATE SODIUM 100 MG: 100 CAPSULE, LIQUID FILLED ORAL at 20:11

## 2019-01-01 RX ADMIN — CARBIDOPA AND LEVODOPA 5 MG: 50; 200 TABLET, EXTENDED RELEASE ORAL at 12:42

## 2019-01-01 RX ADMIN — LEVOTHYROXINE SODIUM 50 MCG: 25 TABLET ORAL at 08:46

## 2019-01-01 RX ADMIN — SILDENAFIL 5 MG: 20 TABLET, FILM COATED ORAL at 17:12

## 2019-01-01 RX ADMIN — PREDNISONE 5 MG: 5 TABLET ORAL at 08:56

## 2019-01-01 RX ADMIN — LEVOTHYROXINE SODIUM 50 MCG: 50 TABLET ORAL at 08:29

## 2019-01-01 RX ADMIN — OMEGA-3 FATTY ACIDS CAP 1000 MG 2000 MG: 1000 CAP at 09:13

## 2019-01-01 RX ADMIN — INSULIN HUMAN 6 UNITS: 100 INJECTION, SUSPENSION SUBCUTANEOUS at 09:39

## 2019-01-01 RX ADMIN — ENOXAPARIN SODIUM 30 MG: 30 INJECTION SUBCUTANEOUS at 07:57

## 2019-01-01 RX ADMIN — AMOXICILLIN AND CLAVULANATE POTASSIUM 1 TABLET: 875; 125 TABLET, FILM COATED ORAL at 08:37

## 2019-01-01 RX ADMIN — OXYCODONE HYDROCHLORIDE 10 MG: 5 TABLET ORAL at 18:56

## 2019-01-01 RX ADMIN — IPRATROPIUM BROMIDE AND ALBUTEROL SULFATE 3 ML: .5; 3 SOLUTION RESPIRATORY (INHALATION) at 15:40

## 2019-01-01 RX ADMIN — INSULIN ASPART 1 UNITS: 100 INJECTION, SOLUTION INTRAVENOUS; SUBCUTANEOUS at 17:11

## 2019-01-01 RX ADMIN — FOLIC ACID 1 MG: 1 TABLET ORAL at 10:00

## 2019-01-01 RX ADMIN — GABAPENTIN 600 MG: 600 TABLET, FILM COATED ORAL at 08:48

## 2019-01-01 RX ADMIN — VASOPRESSIN 1 UNITS: 20 INJECTION INTRAVENOUS at 11:20

## 2019-01-01 RX ADMIN — POLYETHYLENE GLYCOL 3350 17 G: 17 POWDER, FOR SOLUTION ORAL at 22:02

## 2019-01-01 RX ADMIN — GABAPENTIN 600 MG: 300 CAPSULE ORAL at 15:09

## 2019-01-01 RX ADMIN — METHOCARBAMOL 500 MG: 500 TABLET, FILM COATED ORAL at 12:58

## 2019-01-01 RX ADMIN — PANTOPRAZOLE SODIUM 40 MG: 20 TABLET, DELAYED RELEASE ORAL at 09:03

## 2019-01-01 RX ADMIN — TRAMADOL HYDROCHLORIDE 25 MG: 50 TABLET ORAL at 07:35

## 2019-01-01 RX ADMIN — SODIUM CHLORIDE 125 ML: 9 INJECTION, SOLUTION INTRAVENOUS at 15:13

## 2019-01-01 RX ADMIN — SENNOSIDES AND DOCUSATE SODIUM 2 TABLET: 8.6; 5 TABLET ORAL at 07:53

## 2019-01-01 RX ADMIN — HEPARIN SODIUM 5000 UNITS: 5000 INJECTION, SOLUTION INTRAVENOUS; SUBCUTANEOUS at 20:17

## 2019-01-01 RX ADMIN — PIPERACILLIN AND TAZOBACTAM 4.5 G: 4; .5 INJECTION, POWDER, FOR SOLUTION INTRAVENOUS at 20:29

## 2019-01-01 RX ADMIN — VANCOMYCIN HYDROCHLORIDE 1500 MG: 10 INJECTION, POWDER, LYOPHILIZED, FOR SOLUTION INTRAVENOUS at 09:20

## 2019-01-01 RX ADMIN — HEPARIN SODIUM 10000 UNITS: 1000 INJECTION, SOLUTION INTRAVENOUS; SUBCUTANEOUS at 08:45

## 2019-01-01 RX ADMIN — CHOLECALCIFEROL TAB 50 MCG (2000 UNIT) 2000 UNITS: 50 TAB at 09:59

## 2019-01-01 RX ADMIN — GABAPENTIN 600 MG: 300 CAPSULE ORAL at 07:35

## 2019-01-01 RX ADMIN — OXYCODONE HYDROCHLORIDE AND ACETAMINOPHEN 1 TABLET: 7.5; 325 TABLET ORAL at 22:46

## 2019-01-01 RX ADMIN — IPRATROPIUM BROMIDE AND ALBUTEROL SULFATE 3 ML: .5; 3 SOLUTION RESPIRATORY (INHALATION) at 15:46

## 2019-01-01 RX ADMIN — DIGOXIN 125 MCG: 0.12 TABLET ORAL at 10:13

## 2019-01-01 RX ADMIN — PROTAMINE SULFATE 75 MG: 10 INJECTION, SOLUTION INTRAVENOUS at 09:34

## 2019-01-01 RX ADMIN — HYDROCODONE BITARTRATE AND ACETAMINOPHEN 1 TABLET: 5; 325 TABLET ORAL at 05:37

## 2019-01-01 RX ADMIN — DOCUSATE SODIUM 100 MG: 100 CAPSULE, LIQUID FILLED ORAL at 21:05

## 2019-01-01 RX ADMIN — MULTIPLE VITAMINS W/ MINERALS TAB 1 TABLET: TAB at 09:02

## 2019-01-01 RX ADMIN — PHENYLEPHRINE HYDROCHLORIDE 100 MCG: 10 INJECTION, SOLUTION INTRAMUSCULAR; INTRAVENOUS; SUBCUTANEOUS at 08:01

## 2019-01-01 RX ADMIN — ENOXAPARIN SODIUM 40 MG: 40 INJECTION SUBCUTANEOUS at 22:29

## 2019-01-01 RX ADMIN — GABAPENTIN 600 MG: 600 TABLET, FILM COATED ORAL at 20:23

## 2019-01-01 RX ADMIN — CALCIUM CARBONATE (ANTACID) CHEW TAB 500 MG 1000 MG: 500 CHEW TAB at 13:06

## 2019-01-01 RX ADMIN — IPRATROPIUM BROMIDE AND ALBUTEROL SULFATE 3 ML: .5; 3 SOLUTION RESPIRATORY (INHALATION) at 07:46

## 2019-01-01 RX ADMIN — FUROSEMIDE 60 MG: 10 INJECTION, SOLUTION INTRAVENOUS at 09:06

## 2019-01-01 RX ADMIN — METHOCARBAMOL 500 MG: 500 TABLET, FILM COATED ORAL at 08:52

## 2019-01-01 RX ADMIN — GABAPENTIN 600 MG: 600 TABLET, FILM COATED ORAL at 08:55

## 2019-01-01 RX ADMIN — SULFAMETHOXAZOLE AND TRIMETHOPRIM 1 TABLET: 800; 160 TABLET ORAL at 08:31

## 2019-01-01 RX ADMIN — OXYCODONE HYDROCHLORIDE 10 MG: 5 TABLET ORAL at 21:14

## 2019-01-01 RX ADMIN — GABAPENTIN 600 MG: 300 CAPSULE ORAL at 14:03

## 2019-01-01 RX ADMIN — HEPARIN SODIUM 5000 UNITS: 5000 INJECTION, SOLUTION INTRAVENOUS; SUBCUTANEOUS at 10:02

## 2019-01-01 RX ADMIN — INSULIN HUMAN 6 UNITS: 100 INJECTION, SUSPENSION SUBCUTANEOUS at 17:53

## 2019-01-01 RX ADMIN — TORSEMIDE 20 MG: 20 TABLET ORAL at 08:54

## 2019-01-01 RX ADMIN — CALCIUM 500 MG: 500 TABLET ORAL at 21:34

## 2019-01-01 RX ADMIN — VERAPAMIL HYDROCHLORIDE 120 MG: 120 TABLET, FILM COATED, EXTENDED RELEASE ORAL at 21:19

## 2019-01-01 RX ADMIN — GEMFIBROZIL 600 MG: 600 TABLET ORAL at 07:55

## 2019-01-01 RX ADMIN — OXYCODONE HYDROCHLORIDE AND ACETAMINOPHEN 1 TABLET: 7.5; 325 TABLET ORAL at 11:50

## 2019-01-01 RX ADMIN — IPRATROPIUM BROMIDE AND ALBUTEROL SULFATE 3 ML: .5; 3 SOLUTION RESPIRATORY (INHALATION) at 07:10

## 2019-01-01 RX ADMIN — METOPROLOL TARTRATE 1 MG: 1 INJECTION, SOLUTION INTRAVENOUS at 13:22

## 2019-01-01 RX ADMIN — OXYCODONE HYDROCHLORIDE 10 MG: 5 TABLET ORAL at 09:53

## 2019-01-01 RX ADMIN — GABAPENTIN 600 MG: 600 TABLET, FILM COATED ORAL at 13:50

## 2019-01-01 RX ADMIN — PREDNISONE 10 MG: 5 TABLET ORAL at 08:06

## 2019-01-01 RX ADMIN — GABAPENTIN 1200 MG: 300 CAPSULE ORAL at 08:35

## 2019-01-01 RX ADMIN — PREDNISONE 5 MG: 5 TABLET ORAL at 07:57

## 2019-01-01 RX ADMIN — PANTOPRAZOLE SODIUM 40 MG: 40 TABLET, DELAYED RELEASE ORAL at 08:31

## 2019-01-01 RX ADMIN — HEPARIN SODIUM 1400 UNITS/HR: 10000 INJECTION, SOLUTION INTRAVENOUS at 02:40

## 2019-01-01 RX ADMIN — OXYCODONE HYDROCHLORIDE AND ACETAMINOPHEN 1 TABLET: 7.5; 325 TABLET ORAL at 17:12

## 2019-01-01 RX ADMIN — LEVOTHYROXINE SODIUM 50 MCG: 50 TABLET ORAL at 06:35

## 2019-01-01 RX ADMIN — OXYCODONE HYDROCHLORIDE 10 MG: 5 TABLET ORAL at 06:37

## 2019-01-01 RX ADMIN — MIDAZOLAM 1 MG: 1 INJECTION INTRAMUSCULAR; INTRAVENOUS at 07:40

## 2019-01-01 RX ADMIN — FUROSEMIDE 60 MG: 10 INJECTION, SOLUTION INTRAVENOUS at 13:16

## 2019-01-01 RX ADMIN — PANTOPRAZOLE SODIUM 40 MG: 40 TABLET, DELAYED RELEASE ORAL at 09:06

## 2019-01-01 RX ADMIN — ACETAMINOPHEN 650 MG: 325 TABLET, FILM COATED ORAL at 22:38

## 2019-01-01 RX ADMIN — HYDROCODONE BITARTRATE AND ACETAMINOPHEN 1 TABLET: 5; 325 TABLET ORAL at 05:32

## 2019-01-01 RX ADMIN — GABAPENTIN 600 MG: 300 CAPSULE ORAL at 15:53

## 2019-01-01 RX ADMIN — OXYCODONE HYDROCHLORIDE 5 MG: 5 TABLET ORAL at 15:59

## 2019-01-01 RX ADMIN — CALCIUM 500 MG: 500 TABLET ORAL at 20:18

## 2019-01-01 RX ADMIN — GABAPENTIN 1200 MG: 300 CAPSULE ORAL at 09:46

## 2019-01-01 RX ADMIN — FENTANYL CITRATE 25 MCG: 50 INJECTION, SOLUTION INTRAMUSCULAR; INTRAVENOUS at 18:36

## 2019-01-01 RX ADMIN — IPRATROPIUM BROMIDE AND ALBUTEROL SULFATE 3 ML: .5; 3 SOLUTION RESPIRATORY (INHALATION) at 18:52

## 2019-01-01 RX ADMIN — GABAPENTIN 1200 MG: 600 TABLET, FILM COATED ORAL at 14:00

## 2019-01-01 RX ADMIN — OXYCODONE HYDROCHLORIDE 5 MG: 5 TABLET ORAL at 22:30

## 2019-01-01 RX ADMIN — TORSEMIDE 20 MG: 20 TABLET ORAL at 09:02

## 2019-01-01 RX ADMIN — ACETAMINOPHEN 650 MG: 325 TABLET, FILM COATED ORAL at 06:20

## 2019-01-01 RX ADMIN — PREDNISONE 5 MG: 5 TABLET ORAL at 17:11

## 2019-01-01 RX ADMIN — GABAPENTIN 600 MG: 300 CAPSULE ORAL at 22:46

## 2019-01-01 RX ADMIN — FERROUS GLUCONATE 324 MG: 324 TABLET ORAL at 08:26

## 2019-01-01 RX ADMIN — OXYCODONE HYDROCHLORIDE 10 MG: 5 TABLET ORAL at 06:19

## 2019-01-01 RX ADMIN — INSULIN ASPART 1 UNITS: 100 INJECTION, SOLUTION INTRAVENOUS; SUBCUTANEOUS at 14:19

## 2019-01-01 RX ADMIN — SENNOSIDES AND DOCUSATE SODIUM 2 TABLET: 8.6; 5 TABLET ORAL at 07:57

## 2019-01-01 RX ADMIN — IPRATROPIUM BROMIDE AND ALBUTEROL SULFATE 3 ML: .5; 3 SOLUTION RESPIRATORY (INHALATION) at 11:22

## 2019-01-01 RX ADMIN — OXYCODONE HYDROCHLORIDE 10 MG: 5 TABLET ORAL at 16:13

## 2019-01-01 RX ADMIN — Medication 0.3 MG: at 13:11

## 2019-01-01 RX ADMIN — LEVOFLOXACIN 500 MG: 5 INJECTION, SOLUTION INTRAVENOUS at 22:37

## 2019-01-01 RX ADMIN — IPRATROPIUM BROMIDE AND ALBUTEROL SULFATE 3 ML: .5; 3 SOLUTION RESPIRATORY (INHALATION) at 07:55

## 2019-01-01 RX ADMIN — OXYCODONE HYDROCHLORIDE AND ACETAMINOPHEN 1 TABLET: 7.5; 325 TABLET ORAL at 17:22

## 2019-01-01 RX ADMIN — THEOPHYLLINE 400 MG: 400 TABLET, EXTENDED RELEASE ORAL at 09:05

## 2019-01-01 RX ADMIN — OXYCODONE HYDROCHLORIDE AND ACETAMINOPHEN 1 TABLET: 7.5; 325 TABLET ORAL at 20:36

## 2019-01-01 RX ADMIN — IOPAMIDOL 62 ML: 755 INJECTION, SOLUTION INTRAVENOUS at 23:56

## 2019-01-01 RX ADMIN — OXYCODONE HYDROCHLORIDE AND ACETAMINOPHEN 1 TABLET: 5; 325 TABLET ORAL at 14:03

## 2019-01-01 RX ADMIN — LEVOTHYROXINE SODIUM 50 MCG: 50 TABLET ORAL at 05:56

## 2019-01-01 RX ADMIN — ACETAMINOPHEN 975 MG: 325 TABLET, FILM COATED ORAL at 11:03

## 2019-01-01 RX ADMIN — ATORVASTATIN CALCIUM 40 MG: 40 TABLET, FILM COATED ORAL at 10:13

## 2019-01-01 RX ADMIN — ACETAMINOPHEN 650 MG: 325 TABLET, FILM COATED ORAL at 08:49

## 2019-01-01 RX ADMIN — MELATONIN 2000 UNITS: at 08:04

## 2019-01-01 RX ADMIN — MIDAZOLAM 1 MG: 1 INJECTION INTRAMUSCULAR; INTRAVENOUS at 07:35

## 2019-01-01 RX ADMIN — CLOPIDOGREL BISULFATE 75 MG: 75 TABLET ORAL at 08:43

## 2019-01-01 RX ADMIN — FAMOTIDINE 20 MG: 20 INJECTION, SOLUTION INTRAVENOUS at 06:22

## 2019-01-01 RX ADMIN — IOPAMIDOL 64 ML: 755 INJECTION, SOLUTION INTRAVENOUS at 00:16

## 2019-01-01 RX ADMIN — ATORVASTATIN CALCIUM 40 MG: 40 TABLET, FILM COATED ORAL at 09:41

## 2019-01-01 RX ADMIN — CARBIDOPA AND LEVODOPA 5 MG: 50; 200 TABLET, EXTENDED RELEASE ORAL at 13:06

## 2019-01-01 RX ADMIN — ENOXAPARIN SODIUM 40 MG: 40 INJECTION SUBCUTANEOUS at 21:47

## 2019-01-01 RX ADMIN — OXYCODONE AND ACETAMINOPHEN 1 TABLET: 7.5; 325 TABLET ORAL at 14:00

## 2019-01-01 RX ADMIN — ACETAMINOPHEN 650 MG: 325 TABLET, FILM COATED ORAL at 14:55

## 2019-01-01 RX ADMIN — OXYCODONE HYDROCHLORIDE AND ACETAMINOPHEN 1 TABLET: 7.5; 325 TABLET ORAL at 21:03

## 2019-01-01 RX ADMIN — SILDENAFIL 5 MG: 20 TABLET, FILM COATED ORAL at 08:55

## 2019-01-01 RX ADMIN — LEVOTHYROXINE SODIUM 50 MCG: 25 TABLET ORAL at 08:57

## 2019-01-01 RX ADMIN — GEMFIBROZIL 600 MG: 600 TABLET ORAL at 20:22

## 2019-01-01 RX ADMIN — OMEGA-3 FATTY ACIDS CAP 1000 MG 2000 MG: 1000 CAP at 21:11

## 2019-01-01 RX ADMIN — POLYETHYLENE GLYCOL 3350 17 G: 17 POWDER, FOR SOLUTION ORAL at 08:52

## 2019-01-01 RX ADMIN — MIDODRINE HYDROCHLORIDE 10 MG: 5 TABLET ORAL at 09:18

## 2019-01-01 RX ADMIN — VANCOMYCIN HYDROCHLORIDE 1500 MG: 10 INJECTION, POWDER, LYOPHILIZED, FOR SOLUTION INTRAVENOUS at 09:48

## 2019-01-01 RX ADMIN — GABAPENTIN 600 MG: 300 CAPSULE ORAL at 08:58

## 2019-01-01 RX ADMIN — FERROUS GLUCONATE 324 MG: 324 TABLET ORAL at 09:52

## 2019-01-01 RX ADMIN — VANCOMYCIN HYDROCHLORIDE 1750 MG: 10 INJECTION, POWDER, LYOPHILIZED, FOR SOLUTION INTRAVENOUS at 20:44

## 2019-01-01 RX ADMIN — PIPERACILLIN AND TAZOBACTAM 4.5 G: 4; .5 INJECTION, POWDER, FOR SOLUTION INTRAVENOUS at 03:03

## 2019-01-01 RX ADMIN — PANTOPRAZOLE SODIUM 40 MG: 40 TABLET, DELAYED RELEASE ORAL at 08:22

## 2019-01-01 RX ADMIN — FENTANYL CITRATE 25 MCG: 50 INJECTION, SOLUTION INTRAMUSCULAR; INTRAVENOUS at 09:17

## 2019-01-01 RX ADMIN — ACETAMINOPHEN 650 MG: 325 TABLET, FILM COATED ORAL at 11:15

## 2019-01-01 RX ADMIN — LEVOTHYROXINE SODIUM 50 MCG: 50 TABLET ORAL at 07:57

## 2019-01-01 RX ADMIN — VASOPRESSIN 0.5 UNITS/HR: 20 INJECTION INTRAVENOUS at 12:30

## 2019-01-01 RX ADMIN — TICAGRELOR 90 MG: 90 TABLET ORAL at 09:41

## 2019-01-01 RX ADMIN — CEFAZOLIN SODIUM 2 G: 2 INJECTION, SOLUTION INTRAVENOUS at 08:25

## 2019-01-01 RX ADMIN — ASPIRIN 81 MG 324 MG: 81 TABLET ORAL at 08:58

## 2019-01-01 RX ADMIN — VERAPAMIL HYDROCHLORIDE 120 MG: 120 TABLET, FILM COATED, EXTENDED RELEASE ORAL at 21:26

## 2019-01-01 RX ADMIN — ACETAMINOPHEN 650 MG: 325 TABLET, FILM COATED ORAL at 22:06

## 2019-01-01 RX ADMIN — CARBIDOPA AND LEVODOPA 5 MG: 50; 200 TABLET, EXTENDED RELEASE ORAL at 09:02

## 2019-01-01 RX ADMIN — FAMOTIDINE 20 MG: 20 TABLET ORAL at 17:37

## 2019-01-01 RX ADMIN — DOCUSATE SODIUM 100 MG: 100 CAPSULE, LIQUID FILLED ORAL at 20:42

## 2019-01-01 RX ADMIN — PANTOPRAZOLE SODIUM 40 MG: 40 TABLET, DELAYED RELEASE ORAL at 09:52

## 2019-01-01 RX ADMIN — SODIUM CHLORIDE 500 ML: 9 INJECTION, SOLUTION INTRAVENOUS at 06:14

## 2019-01-01 RX ADMIN — MULTIPLE VITAMINS W/ MINERALS TAB 1 TABLET: TAB at 08:57

## 2019-01-01 RX ADMIN — PREDNISONE 5 MG: 5 TABLET ORAL at 08:22

## 2019-01-01 RX ADMIN — GABAPENTIN 600 MG: 300 CAPSULE ORAL at 08:21

## 2019-01-01 RX ADMIN — TRAMADOL HYDROCHLORIDE 50 MG: 50 TABLET, COATED ORAL at 00:04

## 2019-01-01 RX ADMIN — POTASSIUM CHLORIDE 40 MEQ: 1500 TABLET, EXTENDED RELEASE ORAL at 08:48

## 2019-01-01 RX ADMIN — POLYETHYLENE GLYCOL 3350 17 G: 17 POWDER, FOR SOLUTION ORAL at 06:07

## 2019-01-01 RX ADMIN — OXYCODONE HYDROCHLORIDE AND ACETAMINOPHEN 1 TABLET: 7.5; 325 TABLET ORAL at 00:11

## 2019-01-01 RX ADMIN — PHENYLEPHRINE HYDROCHLORIDE 150 MCG: 10 INJECTION, SOLUTION INTRAMUSCULAR; INTRAVENOUS; SUBCUTANEOUS at 10:03

## 2019-01-01 RX ADMIN — PHENYLEPHRINE HYDROCHLORIDE 100 MCG: 10 INJECTION, SOLUTION INTRAMUSCULAR; INTRAVENOUS; SUBCUTANEOUS at 11:10

## 2019-01-01 RX ADMIN — ASPIRIN 81 MG: 81 TABLET, COATED ORAL at 08:35

## 2019-01-01 RX ADMIN — Medication 6.25 MG: at 09:33

## 2019-01-01 RX ADMIN — OXYCODONE HYDROCHLORIDE 10 MG: 5 TABLET ORAL at 02:35

## 2019-01-01 RX ADMIN — OXYCODONE HYDROCHLORIDE AND ACETAMINOPHEN 1 TABLET: 5; 325 TABLET ORAL at 21:11

## 2019-01-01 RX ADMIN — CEFAZOLIN 1 G: 1 INJECTION, POWDER, FOR SOLUTION INTRAMUSCULAR; INTRAVENOUS at 00:20

## 2019-01-01 RX ADMIN — ROCURONIUM BROMIDE 40 MG: 10 INJECTION INTRAVENOUS at 12:45

## 2019-01-01 RX ADMIN — OXYCODONE HYDROCHLORIDE 10 MG: 5 TABLET ORAL at 15:57

## 2019-01-01 RX ADMIN — MIDODRINE HYDROCHLORIDE 10 MG: 5 TABLET ORAL at 09:47

## 2019-01-01 RX ADMIN — ASPIRIN 81 MG: 81 TABLET, COATED ORAL at 22:03

## 2019-01-01 RX ADMIN — LANSOPRAZOLE 30 MG: 30 CAPSULE, DELAYED RELEASE ORAL at 09:40

## 2019-01-01 RX ADMIN — Medication 25 MG: at 02:31

## 2019-01-01 RX ADMIN — POTASSIUM CHLORIDE 40 MEQ: 1500 TABLET, EXTENDED RELEASE ORAL at 09:29

## 2019-01-01 RX ADMIN — ASPIRIN 81 MG: 81 TABLET, COATED ORAL at 09:29

## 2019-01-01 RX ADMIN — FERROUS GLUCONATE 324 MG: 324 TABLET ORAL at 08:21

## 2019-01-01 RX ADMIN — ENOXAPARIN SODIUM 30 MG: 30 INJECTION SUBCUTANEOUS at 08:05

## 2019-01-01 RX ADMIN — SENNOSIDES AND DOCUSATE SODIUM 2 TABLET: 8.6; 5 TABLET ORAL at 21:47

## 2019-01-01 RX ADMIN — SODIUM CHLORIDE, SODIUM GLUCONATE, SODIUM ACETATE, POTASSIUM CHLORIDE AND MAGNESIUM CHLORIDE: 526; 502; 368; 37; 30 INJECTION, SOLUTION INTRAVENOUS at 07:29

## 2019-01-01 RX ADMIN — OXYCODONE HYDROCHLORIDE 5 MG: 5 TABLET ORAL at 20:40

## 2019-01-01 RX ADMIN — OXYCODONE AND ACETAMINOPHEN 1 TABLET: 7.5; 325 TABLET ORAL at 13:53

## 2019-01-01 RX ADMIN — MIDODRINE HYDROCHLORIDE 10 MG: 5 TABLET ORAL at 12:53

## 2019-01-01 RX ADMIN — TRAMADOL HYDROCHLORIDE 50 MG: 50 TABLET, COATED ORAL at 22:24

## 2019-01-01 RX ADMIN — VERAPAMIL HYDROCHLORIDE 120 MG: 120 TABLET, FILM COATED, EXTENDED RELEASE ORAL at 22:13

## 2019-01-01 RX ADMIN — CLOPIDOGREL BISULFATE 75 MG: 75 TABLET, FILM COATED ORAL at 09:18

## 2019-01-01 RX ADMIN — ACETAMINOPHEN 650 MG: 325 TABLET, FILM COATED ORAL at 17:38

## 2019-01-01 RX ADMIN — ROCURONIUM BROMIDE 30 MG: 10 INJECTION INTRAVENOUS at 08:01

## 2019-01-01 RX ADMIN — ACETAMINOPHEN 650 MG: 325 TABLET, FILM COATED ORAL at 02:35

## 2019-01-01 RX ADMIN — VERAPAMIL HYDROCHLORIDE 120 MG: 120 TABLET, FILM COATED, EXTENDED RELEASE ORAL at 22:26

## 2019-01-01 RX ADMIN — OXYCODONE HYDROCHLORIDE 10 MG: 5 TABLET ORAL at 10:30

## 2019-01-01 RX ADMIN — FUROSEMIDE 40 MG: 10 INJECTION, SOLUTION INTRAVENOUS at 09:14

## 2019-01-01 RX ADMIN — FOLIC ACID 1 MG: 1 TABLET ORAL at 11:56

## 2019-01-01 RX ADMIN — OXYCODONE HYDROCHLORIDE 10 MG: 5 TABLET ORAL at 21:20

## 2019-01-01 RX ADMIN — PIPERACILLIN AND TAZOBACTAM 4.5 G: 4; .5 INJECTION, POWDER, FOR SOLUTION INTRAVENOUS at 14:21

## 2019-01-01 RX ADMIN — CALCIUM 500 MG: 500 TABLET ORAL at 08:29

## 2019-01-01 RX ADMIN — ACETAMINOPHEN 650 MG: 325 TABLET, FILM COATED ORAL at 17:11

## 2019-01-01 RX ADMIN — GABAPENTIN 600 MG: 300 CAPSULE ORAL at 06:00

## 2019-01-01 RX ADMIN — GABAPENTIN 1200 MG: 300 CAPSULE ORAL at 09:18

## 2019-01-01 RX ADMIN — CEFAZOLIN SODIUM 1 G: 2 INJECTION, SOLUTION INTRAVENOUS at 10:16

## 2019-01-01 RX ADMIN — CARBIDOPA AND LEVODOPA 5 MG: 50; 200 TABLET, EXTENDED RELEASE ORAL at 18:12

## 2019-01-01 RX ADMIN — GABAPENTIN 1200 MG: 600 TABLET, FILM COATED ORAL at 08:58

## 2019-01-01 RX ADMIN — ATORVASTATIN CALCIUM 40 MG: 40 TABLET, FILM COATED ORAL at 10:11

## 2019-01-01 RX ADMIN — MIDODRINE HYDROCHLORIDE 10 MG: 5 TABLET ORAL at 18:41

## 2019-01-01 RX ADMIN — DOCUSATE SODIUM 100 MG: 100 CAPSULE, LIQUID FILLED ORAL at 22:03

## 2019-01-01 RX ADMIN — OXYCODONE HYDROCHLORIDE 10 MG: 5 TABLET ORAL at 14:58

## 2019-01-01 RX ADMIN — POLYETHYLENE GLYCOL 3350 17 G: 17 POWDER, FOR SOLUTION ORAL at 12:24

## 2019-01-01 RX ADMIN — OXYCODONE HYDROCHLORIDE 10 MG: 5 TABLET ORAL at 17:50

## 2019-01-01 RX ADMIN — ACETAMINOPHEN 650 MG: 325 TABLET, FILM COATED ORAL at 09:53

## 2019-01-01 RX ADMIN — ATORVASTATIN CALCIUM 40 MG: 40 TABLET, FILM COATED ORAL at 08:58

## 2019-01-01 RX ADMIN — POLYETHYLENE GLYCOL 3350 17 G: 17 POWDER, FOR SOLUTION ORAL at 08:27

## 2019-01-01 RX ADMIN — DIGOXIN 250 MCG: 0.25 INJECTION INTRAMUSCULAR; INTRAVENOUS at 04:44

## 2019-01-01 RX ADMIN — LANSOPRAZOLE 30 MG: 30 CAPSULE, DELAYED RELEASE ORAL at 09:47

## 2019-01-01 RX ADMIN — OXYCODONE HYDROCHLORIDE 10 MG: 5 TABLET ORAL at 03:32

## 2019-01-01 RX ADMIN — GABAPENTIN 600 MG: 300 CAPSULE ORAL at 08:06

## 2019-01-01 RX ADMIN — GABAPENTIN 600 MG: 600 TABLET, FILM COATED ORAL at 20:45

## 2019-01-01 RX ADMIN — OXYCODONE HYDROCHLORIDE 10 MG: 5 TABLET ORAL at 00:40

## 2019-01-01 RX ADMIN — GABAPENTIN 600 MG: 600 TABLET, FILM COATED ORAL at 20:46

## 2019-01-01 RX ADMIN — CALCIUM 500 MG: 500 TABLET ORAL at 20:11

## 2019-01-01 RX ADMIN — FOLIC ACID 1 MG: 1 TABLET ORAL at 08:58

## 2019-01-01 RX ADMIN — ASPIRIN 81 MG: 81 TABLET, COATED ORAL at 08:57

## 2019-01-01 RX ADMIN — FAMOTIDINE 20 MG: 20 INJECTION, SOLUTION INTRAVENOUS at 18:54

## 2019-01-01 RX ADMIN — OMEGA-3 FATTY ACIDS CAP 1000 MG 2 CAPSULE: 1000 CAP at 09:02

## 2019-01-01 RX ADMIN — ACETAMINOPHEN 650 MG: 325 TABLET, FILM COATED ORAL at 20:45

## 2019-01-01 RX ADMIN — ACETAMINOPHEN 650 MG: 325 TABLET, FILM COATED ORAL at 15:38

## 2019-01-01 RX ADMIN — POTASSIUM CHLORIDE 20 MEQ: 1500 TABLET, EXTENDED RELEASE ORAL at 08:58

## 2019-01-01 RX ADMIN — CALCIUM 500 MG: 500 TABLET ORAL at 09:13

## 2019-01-01 RX ADMIN — FENTANYL CITRATE 25 MCG: 50 INJECTION, SOLUTION INTRAMUSCULAR; INTRAVENOUS at 17:46

## 2019-01-01 RX ADMIN — FAMOTIDINE 20 MG: 20 INJECTION, SOLUTION INTRAVENOUS at 17:19

## 2019-01-01 RX ADMIN — FAMOTIDINE 20 MG: 20 INJECTION, SOLUTION INTRAVENOUS at 18:11

## 2019-01-01 RX ADMIN — Medication 1 G: at 10:06

## 2019-01-01 RX ADMIN — TRAMADOL HYDROCHLORIDE 50 MG: 50 TABLET, COATED ORAL at 19:08

## 2019-01-01 RX ADMIN — FLUTICASONE FUROATE 1 PUFF: 100 POWDER RESPIRATORY (INHALATION) at 09:52

## 2019-01-01 RX ADMIN — LEVOTHYROXINE SODIUM 50 MCG: 50 TABLET ORAL at 13:11

## 2019-01-01 RX ADMIN — GABAPENTIN 1200 MG: 600 TABLET, FILM COATED ORAL at 15:11

## 2019-01-01 RX ADMIN — OXYCODONE AND ACETAMINOPHEN 1 TABLET: 7.5; 325 TABLET ORAL at 15:12

## 2019-01-01 RX ADMIN — CLOPIDOGREL BISULFATE 75 MG: 75 TABLET ORAL at 09:34

## 2019-01-01 RX ADMIN — SENNOSIDES AND DOCUSATE SODIUM 1 TABLET: 8.6; 5 TABLET ORAL at 21:10

## 2019-01-01 RX ADMIN — ENOXAPARIN SODIUM 40 MG: 40 INJECTION SUBCUTANEOUS at 11:56

## 2019-01-01 RX ADMIN — ACETAMINOPHEN 650 MG: 325 TABLET, FILM COATED ORAL at 16:13

## 2019-01-01 RX ADMIN — CALCIUM 500 MG: 500 TABLET ORAL at 19:07

## 2019-01-01 RX ADMIN — IPRATROPIUM BROMIDE AND ALBUTEROL SULFATE 3 ML: .5; 3 SOLUTION RESPIRATORY (INHALATION) at 21:02

## 2019-01-01 RX ADMIN — CALCIUM 500 MG: 500 TABLET ORAL at 21:11

## 2019-01-01 RX ADMIN — FAMOTIDINE 20 MG: 20 TABLET ORAL at 18:56

## 2019-01-01 RX ADMIN — PANTOPRAZOLE SODIUM 40 MG: 40 TABLET, DELAYED RELEASE ORAL at 05:56

## 2019-01-01 RX ADMIN — PANTOPRAZOLE SODIUM 40 MG: 40 TABLET, DELAYED RELEASE ORAL at 08:20

## 2019-01-01 RX ADMIN — ROCURONIUM BROMIDE 30 MG: 10 INJECTION INTRAVENOUS at 09:47

## 2019-01-01 RX ADMIN — METOPROLOL TARTRATE 1 MG: 1 INJECTION, SOLUTION INTRAVENOUS at 10:12

## 2019-01-01 RX ADMIN — ACETAMINOPHEN 650 MG: 325 TABLET, FILM COATED ORAL at 03:33

## 2019-01-01 RX ADMIN — ATORVASTATIN CALCIUM 40 MG: 40 TABLET, FILM COATED ORAL at 09:29

## 2019-01-01 RX ADMIN — CLOPIDOGREL BISULFATE 75 MG: 75 TABLET ORAL at 08:25

## 2019-01-01 RX ADMIN — PREDNISONE 5 MG: 5 TABLET ORAL at 17:23

## 2019-01-01 RX ADMIN — CLOPIDOGREL BISULFATE 75 MG: 75 TABLET ORAL at 08:27

## 2019-01-01 RX ADMIN — ACETAMINOPHEN 650 MG: 325 TABLET, FILM COATED ORAL at 21:44

## 2019-01-01 RX ADMIN — GABAPENTIN 600 MG: 300 CAPSULE ORAL at 15:18

## 2019-01-01 RX ADMIN — PHENYLEPHRINE HYDROCHLORIDE 100 MCG: 10 INJECTION, SOLUTION INTRAMUSCULAR; INTRAVENOUS; SUBCUTANEOUS at 11:23

## 2019-01-01 RX ADMIN — PHENYLEPHRINE HYDROCHLORIDE 200 MCG: 10 INJECTION, SOLUTION INTRAMUSCULAR; INTRAVENOUS; SUBCUTANEOUS at 08:30

## 2019-01-01 RX ADMIN — FUROSEMIDE 60 MG: 10 INJECTION, SOLUTION INTRAVENOUS at 11:17

## 2019-01-01 RX ADMIN — POLYETHYLENE GLYCOL 3350 17 G: 17 POWDER, FOR SOLUTION ORAL at 08:56

## 2019-01-01 RX ADMIN — SODIUM CHLORIDE, SODIUM GLUCONATE, SODIUM ACETATE, POTASSIUM CHLORIDE AND MAGNESIUM CHLORIDE: 526; 502; 368; 37; 30 INJECTION, SOLUTION INTRAVENOUS at 11:50

## 2019-01-01 RX ADMIN — GABAPENTIN 600 MG: 600 TABLET, FILM COATED ORAL at 19:44

## 2019-01-01 RX ADMIN — ATORVASTATIN CALCIUM 40 MG: 40 TABLET, FILM COATED ORAL at 09:02

## 2019-01-01 RX ADMIN — CARBIDOPA AND LEVODOPA 5 MG: 50; 200 TABLET, EXTENDED RELEASE ORAL at 13:48

## 2019-01-01 RX ADMIN — PANTOPRAZOLE SODIUM 40 MG: 40 TABLET, DELAYED RELEASE ORAL at 06:40

## 2019-01-01 RX ADMIN — OXYCODONE HYDROCHLORIDE 10 MG: 5 TABLET ORAL at 16:32

## 2019-01-01 RX ADMIN — CALCIUM 500 MG: 500 TABLET ORAL at 20:39

## 2019-01-01 RX ADMIN — OXYCODONE HYDROCHLORIDE 10 MG: 5 TABLET ORAL at 21:26

## 2019-01-01 RX ADMIN — ATORVASTATIN CALCIUM 40 MG: 40 TABLET, FILM COATED ORAL at 08:56

## 2019-01-01 RX ADMIN — GEMFIBROZIL 600 MG: 600 TABLET ORAL at 09:13

## 2019-01-01 RX ADMIN — CALCIUM 500 MG: 500 TABLET ORAL at 09:41

## 2019-01-01 RX ADMIN — SODIUM CHLORIDE 500 ML: 9 INJECTION, SOLUTION INTRAVENOUS at 17:24

## 2019-01-01 RX ADMIN — GABAPENTIN 300 MG: 300 CAPSULE ORAL at 06:38

## 2019-01-01 RX ADMIN — TORSEMIDE 5 MG: 5 TABLET ORAL at 20:31

## 2019-01-01 RX ADMIN — TRAMADOL HYDROCHLORIDE 50 MG: 50 TABLET, COATED ORAL at 17:36

## 2019-01-01 RX ADMIN — BISACODYL 10 MG: 10 SUPPOSITORY RECTAL at 15:38

## 2019-01-01 RX ADMIN — ALTEPLASE 2 MG: 2.2 INJECTION, POWDER, LYOPHILIZED, FOR SOLUTION INTRAVENOUS at 14:33

## 2019-01-01 RX ADMIN — PANTOPRAZOLE SODIUM 40 MG: 40 TABLET, DELAYED RELEASE ORAL at 06:44

## 2019-01-01 RX ADMIN — TRAMADOL HYDROCHLORIDE 50 MG: 50 TABLET, COATED ORAL at 17:21

## 2019-01-01 RX ADMIN — INSULIN ASPART 1 UNITS: 100 INJECTION, SOLUTION INTRAVENOUS; SUBCUTANEOUS at 13:33

## 2019-01-01 RX ADMIN — VANCOMYCIN HYDROCHLORIDE 1500 MG: 10 INJECTION, POWDER, LYOPHILIZED, FOR SOLUTION INTRAVENOUS at 21:29

## 2019-01-01 RX ADMIN — GABAPENTIN 1200 MG: 300 CAPSULE ORAL at 17:45

## 2019-01-01 RX ADMIN — FERROUS GLUCONATE 324 MG: 324 TABLET ORAL at 08:27

## 2019-01-01 RX ADMIN — ESMOLOL HYDROCHLORIDE 20 MG: 10 INJECTION, SOLUTION INTRAVENOUS at 09:00

## 2019-01-01 RX ADMIN — GABAPENTIN 600 MG: 300 CAPSULE ORAL at 21:26

## 2019-01-01 RX ADMIN — ONDANSETRON 4 MG: 2 INJECTION INTRAMUSCULAR; INTRAVENOUS at 13:25

## 2019-01-01 RX ADMIN — DOCUSATE SODIUM 100 MG: 100 CAPSULE, LIQUID FILLED ORAL at 21:20

## 2019-01-01 RX ADMIN — MELATONIN 2000 UNITS: at 09:41

## 2019-01-01 RX ADMIN — ASPIRIN 81 MG: 81 TABLET, DELAYED RELEASE ORAL at 09:01

## 2019-01-01 RX ADMIN — GABAPENTIN 600 MG: 600 TABLET, FILM COATED ORAL at 09:06

## 2019-01-01 RX ADMIN — VANCOMYCIN HYDROCHLORIDE 1250 MG: 10 INJECTION, POWDER, LYOPHILIZED, FOR SOLUTION INTRAVENOUS at 09:45

## 2019-01-01 RX ADMIN — CALCIUM 500 MG: 500 TABLET ORAL at 07:56

## 2019-01-01 RX ADMIN — CARBIDOPA AND LEVODOPA 5 MG: 50; 200 TABLET, EXTENDED RELEASE ORAL at 18:46

## 2019-01-01 RX ADMIN — ATORVASTATIN CALCIUM 40 MG: 40 TABLET, FILM COATED ORAL at 13:11

## 2019-01-01 RX ADMIN — IPRATROPIUM BROMIDE AND ALBUTEROL SULFATE 3 ML: .5; 3 SOLUTION RESPIRATORY (INHALATION) at 11:09

## 2019-01-01 RX ADMIN — PREDNISONE 5 MG: 5 TABLET ORAL at 17:33

## 2019-01-01 RX ADMIN — TORSEMIDE 10 MG: 10 TABLET ORAL at 21:20

## 2019-01-01 RX ADMIN — CALCIUM 500 MG: 500 TABLET ORAL at 21:03

## 2019-01-01 RX ADMIN — SODIUM CHLORIDE, POTASSIUM CHLORIDE, SODIUM LACTATE AND CALCIUM CHLORIDE: 600; 310; 30; 20 INJECTION, SOLUTION INTRAVENOUS at 07:28

## 2019-01-01 RX ADMIN — FENTANYL CITRATE 50 MCG: 50 INJECTION, SOLUTION INTRAMUSCULAR; INTRAVENOUS at 10:31

## 2019-01-01 RX ADMIN — PANTOPRAZOLE SODIUM 40 MG: 20 TABLET, DELAYED RELEASE ORAL at 06:47

## 2019-01-01 RX ADMIN — FENTANYL CITRATE 25 MCG: 50 INJECTION, SOLUTION INTRAMUSCULAR; INTRAVENOUS at 16:42

## 2019-01-01 RX ADMIN — GABAPENTIN 600 MG: 300 CAPSULE ORAL at 08:43

## 2019-01-01 RX ADMIN — CALCIUM 500 MG: 500 TABLET ORAL at 17:02

## 2019-01-01 RX ADMIN — ASPIRIN 81 MG: 81 TABLET, COATED ORAL at 08:28

## 2019-01-01 RX ADMIN — HYDROXYZINE HYDROCHLORIDE 25 MG: 25 TABLET ORAL at 07:57

## 2019-01-01 RX ADMIN — OXYCODONE HYDROCHLORIDE 10 MG: 5 TABLET ORAL at 07:57

## 2019-01-01 RX ADMIN — VANCOMYCIN HYDROCHLORIDE 1500 MG: 10 INJECTION, POWDER, LYOPHILIZED, FOR SOLUTION INTRAVENOUS at 21:26

## 2019-01-01 RX ADMIN — FUROSEMIDE 40 MG: 40 TABLET ORAL at 08:57

## 2019-01-01 RX ADMIN — SENNOSIDES AND DOCUSATE SODIUM 1 TABLET: 8.6; 5 TABLET ORAL at 09:09

## 2019-01-01 RX ADMIN — FERROUS GLUCONATE 324 MG: 324 TABLET ORAL at 09:30

## 2019-01-01 RX ADMIN — VANCOMYCIN HYDROCHLORIDE 1500 MG: 10 INJECTION, POWDER, LYOPHILIZED, FOR SOLUTION INTRAVENOUS at 09:30

## 2019-01-01 RX ADMIN — IPRATROPIUM BROMIDE AND ALBUTEROL SULFATE 3 ML: .5; 3 SOLUTION RESPIRATORY (INHALATION) at 11:12

## 2019-01-01 RX ADMIN — TICAGRELOR 90 MG: 90 TABLET ORAL at 09:33

## 2019-01-01 RX ADMIN — IOPAMIDOL 63 ML: 755 INJECTION, SOLUTION INTRAVENOUS at 10:34

## 2019-01-01 RX ADMIN — PIPERACILLIN AND TAZOBACTAM 4.5 G: 4; .5 INJECTION, POWDER, FOR SOLUTION INTRAVENOUS at 08:24

## 2019-01-01 RX ADMIN — GEMFIBROZIL 600 MG: 600 TABLET ORAL at 08:05

## 2019-01-01 RX ADMIN — Medication 12.5 MG: at 08:46

## 2019-01-01 RX ADMIN — SUGAMMADEX 200 MG: 100 INJECTION, SOLUTION INTRAVENOUS at 12:10

## 2019-01-01 RX ADMIN — ENOXAPARIN SODIUM 40 MG: 40 INJECTION SUBCUTANEOUS at 21:11

## 2019-01-01 RX ADMIN — METHOCARBAMOL 500 MG: 500 TABLET, FILM COATED ORAL at 09:37

## 2019-01-01 RX ADMIN — Medication 25 MG: at 10:27

## 2019-01-01 RX ADMIN — THEOPHYLLINE 400 MG: 400 TABLET, EXTENDED RELEASE ORAL at 08:31

## 2019-01-01 RX ADMIN — HEPARIN SODIUM 1550 UNITS/HR: 10000 INJECTION, SOLUTION INTRAVENOUS at 12:54

## 2019-01-01 RX ADMIN — TICAGRELOR 90 MG: 90 TABLET ORAL at 08:30

## 2019-01-01 RX ADMIN — SENNOSIDES AND DOCUSATE SODIUM 1 TABLET: 8.6; 5 TABLET ORAL at 08:55

## 2019-01-01 RX ADMIN — ACETAMINOPHEN 650 MG: 325 TABLET, FILM COATED ORAL at 13:03

## 2019-01-01 RX ADMIN — INSULIN ASPART 1 UNITS: 100 INJECTION, SOLUTION INTRAVENOUS; SUBCUTANEOUS at 12:21

## 2019-01-01 RX ADMIN — DIGOXIN 125 MCG: 0.12 TABLET ORAL at 08:38

## 2019-01-01 RX ADMIN — IPRATROPIUM BROMIDE AND ALBUTEROL SULFATE 3 ML: .5; 3 SOLUTION RESPIRATORY (INHALATION) at 07:53

## 2019-01-01 RX ADMIN — POLYETHYLENE GLYCOL 3350 17 G: 17 POWDER, FOR SOLUTION ORAL at 09:02

## 2019-01-01 RX ADMIN — GABAPENTIN 1200 MG: 600 TABLET, FILM COATED ORAL at 14:49

## 2019-01-01 RX ADMIN — TICAGRELOR 90 MG: 90 TABLET ORAL at 13:18

## 2019-01-01 RX ADMIN — POLYETHYLENE GLYCOL 3350 17 G: 17 POWDER, FOR SOLUTION ORAL at 20:39

## 2019-01-01 RX ADMIN — SENNOSIDES AND DOCUSATE SODIUM 1 TABLET: 8.6; 5 TABLET ORAL at 08:31

## 2019-01-01 RX ADMIN — OMEGA-3 FATTY ACIDS CAP 1000 MG 2 CAPSULE: 1000 CAP at 08:55

## 2019-01-01 RX ADMIN — PIPERACILLIN AND TAZOBACTAM 4.5 G: 4; .5 INJECTION, POWDER, FOR SOLUTION INTRAVENOUS at 19:41

## 2019-01-01 RX ADMIN — TORSEMIDE 20 MG: 20 TABLET ORAL at 08:25

## 2019-01-01 RX ADMIN — ACETAMINOPHEN 975 MG: 325 TABLET, FILM COATED ORAL at 01:42

## 2019-01-01 RX ADMIN — SODIUM CHLORIDE 1000 ML: 9 INJECTION, SOLUTION INTRAVENOUS at 07:35

## 2019-01-01 RX ADMIN — SODIUM CHLORIDE, POTASSIUM CHLORIDE, SODIUM LACTATE AND CALCIUM CHLORIDE: 600; 310; 30; 20 INJECTION, SOLUTION INTRAVENOUS at 08:30

## 2019-01-01 RX ADMIN — Medication 5 MG: at 12:38

## 2019-01-01 RX ADMIN — CEFAZOLIN 1 G: 1 INJECTION, POWDER, FOR SOLUTION INTRAMUSCULAR; INTRAVENOUS at 09:52

## 2019-01-01 RX ADMIN — SENNOSIDES AND DOCUSATE SODIUM 2 TABLET: 8.6; 5 TABLET ORAL at 21:15

## 2019-01-01 RX ADMIN — MELATONIN 2000 UNITS: at 08:05

## 2019-01-01 RX ADMIN — OXYCODONE HYDROCHLORIDE 10 MG: 5 TABLET ORAL at 09:51

## 2019-01-01 RX ADMIN — CALCIUM 500 MG: 500 TABLET ORAL at 22:21

## 2019-01-01 RX ADMIN — LEVOTHYROXINE SODIUM 50 MCG: 50 TABLET ORAL at 06:31

## 2019-01-01 RX ADMIN — OMEGA-3 FATTY ACIDS CAP 1000 MG 2 CAPSULE: 1000 CAP at 09:29

## 2019-01-01 RX ADMIN — SENNOSIDES AND DOCUSATE SODIUM 1 TABLET: 8.6; 5 TABLET ORAL at 21:20

## 2019-01-01 RX ADMIN — METHOCARBAMOL 500 MG: 500 TABLET, FILM COATED ORAL at 17:37

## 2019-01-01 RX ADMIN — OXYCODONE HYDROCHLORIDE 10 MG: 5 TABLET ORAL at 05:27

## 2019-01-01 RX ADMIN — PREDNISONE 5 MG: 5 TABLET ORAL at 08:49

## 2019-01-01 RX ADMIN — OXYCODONE HYDROCHLORIDE AND ACETAMINOPHEN 1 TABLET: 7.5; 325 TABLET ORAL at 17:07

## 2019-01-01 RX ADMIN — FENTANYL CITRATE 50 MCG: 50 INJECTION INTRAMUSCULAR; INTRAVENOUS at 14:53

## 2019-01-01 RX ADMIN — PHENYLEPHRINE HYDROCHLORIDE 100 MCG: 10 INJECTION INTRAVENOUS at 07:45

## 2019-01-01 RX ADMIN — PHENYLEPHRINE HYDROCHLORIDE 100 MCG: 10 INJECTION INTRAVENOUS at 08:57

## 2019-01-01 RX ADMIN — LEVOTHYROXINE SODIUM 50 MCG: 50 TABLET ORAL at 10:00

## 2019-01-01 RX ADMIN — METHOCARBAMOL 500 MG: 500 TABLET, FILM COATED ORAL at 17:21

## 2019-01-01 RX ADMIN — FUROSEMIDE 40 MG: 10 INJECTION, SOLUTION INTRAVENOUS at 16:07

## 2019-01-01 RX ADMIN — PANTOPRAZOLE SODIUM 40 MG: 40 TABLET, DELAYED RELEASE ORAL at 07:53

## 2019-01-01 RX ADMIN — GABAPENTIN 600 MG: 300 CAPSULE ORAL at 17:12

## 2019-01-01 RX ADMIN — OXYCODONE HYDROCHLORIDE 10 MG: 5 TABLET ORAL at 00:10

## 2019-01-01 RX ADMIN — TRAMADOL HYDROCHLORIDE 25 MG: 50 TABLET ORAL at 14:20

## 2019-01-01 RX ADMIN — FAMOTIDINE 20 MG: 20 TABLET ORAL at 17:34

## 2019-01-01 RX ADMIN — FUROSEMIDE 40 MG: 10 INJECTION, SOLUTION INTRAVENOUS at 11:39

## 2019-01-01 RX ADMIN — MIDODRINE HYDROCHLORIDE 10 MG: 5 TABLET ORAL at 13:51

## 2019-01-01 RX ADMIN — TORSEMIDE 20 MG: 20 TABLET ORAL at 08:55

## 2019-01-01 RX ADMIN — BISACODYL 10 MG: 10 SUPPOSITORY RECTAL at 04:46

## 2019-01-01 RX ADMIN — OXYCODONE HYDROCHLORIDE 10 MG: 5 TABLET ORAL at 14:55

## 2019-01-01 RX ADMIN — FERROUS GLUCONATE 324 MG: 324 TABLET ORAL at 18:23

## 2019-01-01 RX ADMIN — OXYCODONE HYDROCHLORIDE 10 MG: 5 TABLET ORAL at 09:28

## 2019-01-01 RX ADMIN — SILDENAFIL 5 MG: 20 TABLET, FILM COATED ORAL at 09:02

## 2019-01-01 RX ADMIN — SILDENAFIL 5 MG: 20 TABLET, FILM COATED ORAL at 08:54

## 2019-01-01 RX ADMIN — TORSEMIDE 20 MG: 20 TABLET ORAL at 09:30

## 2019-01-01 RX ADMIN — OXYCODONE HYDROCHLORIDE AND ACETAMINOPHEN 1 TABLET: 7.5; 325 TABLET ORAL at 07:02

## 2019-01-01 RX ADMIN — SENNOSIDES AND DOCUSATE SODIUM 2 TABLET: 8.6; 5 TABLET ORAL at 20:23

## 2019-01-01 RX ADMIN — PANTOPRAZOLE SODIUM 40 MG: 40 TABLET, DELAYED RELEASE ORAL at 06:55

## 2019-01-01 RX ADMIN — GABAPENTIN 600 MG: 300 CAPSULE ORAL at 21:05

## 2019-01-01 RX ADMIN — GABAPENTIN 600 MG: 300 CAPSULE ORAL at 08:56

## 2019-01-01 RX ADMIN — OXYCODONE HYDROCHLORIDE AND ACETAMINOPHEN 1 TABLET: 7.5; 325 TABLET ORAL at 09:01

## 2019-01-01 RX ADMIN — SUFENTANIL CITRATE 0.2 MCG/KG/HR: 50 INJECTION EPIDURAL; INTRAVENOUS at 08:30

## 2019-01-01 RX ADMIN — LANSOPRAZOLE 30 MG: 30 CAPSULE, DELAYED RELEASE ORAL at 08:46

## 2019-01-01 RX ADMIN — GABAPENTIN 600 MG: 600 TABLET, FILM COATED ORAL at 08:51

## 2019-01-01 RX ADMIN — SODIUM CHLORIDE 86 ML: 9 INJECTION, SOLUTION INTRAVENOUS at 13:08

## 2019-01-01 RX ADMIN — Medication: at 13:12

## 2019-01-01 RX ADMIN — TRAMADOL HYDROCHLORIDE 25 MG: 50 TABLET ORAL at 20:22

## 2019-01-01 RX ADMIN — TICAGRELOR 90 MG: 90 TABLET ORAL at 20:18

## 2019-01-01 RX ADMIN — OXYCODONE HYDROCHLORIDE AND ACETAMINOPHEN 1 TABLET: 7.5; 325 TABLET ORAL at 08:20

## 2019-01-01 RX ADMIN — ASPIRIN 81 MG: 81 TABLET, COATED ORAL at 09:41

## 2019-01-01 RX ADMIN — PANTOPRAZOLE SODIUM 40 MG: 40 TABLET, DELAYED RELEASE ORAL at 06:28

## 2019-01-01 RX ADMIN — MULTIPLE VITAMINS W/ MINERALS TAB 1 TABLET: TAB at 08:21

## 2019-01-01 RX ADMIN — OXYCODONE AND ACETAMINOPHEN 1 TABLET: 7.5; 325 TABLET ORAL at 10:00

## 2019-01-01 RX ADMIN — SENNOSIDES AND DOCUSATE SODIUM 2 TABLET: 8.6; 5 TABLET ORAL at 20:08

## 2019-01-01 RX ADMIN — CALCIUM 500 MG: 500 TABLET ORAL at 20:04

## 2019-01-01 RX ADMIN — MULTIPLE VITAMINS W/ MINERALS TAB 1 TABLET: TAB at 09:14

## 2019-01-01 RX ADMIN — OXYCODONE AND ACETAMINOPHEN 1 TABLET: 7.5; 325 TABLET ORAL at 08:24

## 2019-01-01 RX ADMIN — GABAPENTIN 600 MG: 600 TABLET, FILM COATED ORAL at 08:50

## 2019-01-01 RX ADMIN — CALCIUM 500 MG: 500 TABLET ORAL at 09:59

## 2019-01-01 RX ADMIN — LANSOPRAZOLE 30 MG: 30 CAPSULE, DELAYED RELEASE ORAL at 08:05

## 2019-01-01 RX ADMIN — ESMOLOL HYDROCHLORIDE 20 MG: 10 INJECTION, SOLUTION INTRAVENOUS at 08:39

## 2019-01-01 RX ADMIN — OMEGA-3 FATTY ACIDS CAP 1000 MG 2000 MG: 1000 CAP at 20:22

## 2019-01-01 RX ADMIN — PANTOPRAZOLE SODIUM 40 MG: 20 TABLET, DELAYED RELEASE ORAL at 08:26

## 2019-01-01 RX ADMIN — HEPARIN SODIUM 5000 UNITS: 5000 INJECTION, SOLUTION INTRAVENOUS; SUBCUTANEOUS at 08:01

## 2019-01-01 RX ADMIN — DEXAMETHASONE SODIUM PHOSPHATE 10 MG: 10 INJECTION, SOLUTION INTRAMUSCULAR; INTRAVENOUS at 09:15

## 2019-01-01 RX ADMIN — IPRATROPIUM BROMIDE AND ALBUTEROL SULFATE 3 ML: .5; 3 SOLUTION RESPIRATORY (INHALATION) at 12:18

## 2019-01-01 RX ADMIN — VANCOMYCIN HYDROCHLORIDE 1500 MG: 10 INJECTION, POWDER, LYOPHILIZED, FOR SOLUTION INTRAVENOUS at 08:25

## 2019-01-01 RX ADMIN — LEVOTHYROXINE SODIUM 50 MCG: 50 TABLET ORAL at 06:54

## 2019-01-01 RX ADMIN — TORSEMIDE 20 MG: 20 TABLET ORAL at 08:31

## 2019-01-01 RX ADMIN — TICAGRELOR 90 MG: 90 TABLET ORAL at 19:59

## 2019-01-01 RX ADMIN — POLYETHYLENE GLYCOL 3350 17 G: 17 POWDER, FOR SOLUTION ORAL at 15:12

## 2019-01-01 RX ADMIN — GABAPENTIN 1200 MG: 300 CAPSULE ORAL at 17:00

## 2019-01-01 RX ADMIN — ATORVASTATIN CALCIUM 40 MG: 40 TABLET, FILM COATED ORAL at 08:25

## 2019-01-01 RX ADMIN — POLYETHYLENE GLYCOL 3350 17 G: 17 POWDER, FOR SOLUTION ORAL at 08:22

## 2019-01-01 RX ADMIN — ASPIRIN 81 MG: 81 TABLET, COATED ORAL at 09:02

## 2019-01-01 RX ADMIN — GEMFIBROZIL 600 MG: 600 TABLET ORAL at 09:41

## 2019-01-01 RX ADMIN — SODIUM CHLORIDE 90 ML: 9 INJECTION, SOLUTION INTRAVENOUS at 00:16

## 2019-01-01 RX ADMIN — FENTANYL CITRATE 50 MCG: 50 INJECTION, SOLUTION INTRAMUSCULAR; INTRAVENOUS at 09:49

## 2019-01-01 RX ADMIN — FOLIC ACID 1 MG: 1 TABLET ORAL at 09:33

## 2019-01-01 RX ADMIN — MIDAZOLAM 0.5 MG: 1 INJECTION INTRAMUSCULAR; INTRAVENOUS at 16:39

## 2019-01-01 RX ADMIN — CALCIUM 500 MG: 500 TABLET ORAL at 19:59

## 2019-01-01 RX ADMIN — LEVOTHYROXINE SODIUM 50 MCG: 25 TABLET ORAL at 08:05

## 2019-01-01 RX ADMIN — GABAPENTIN 1200 MG: 600 TABLET, FILM COATED ORAL at 21:28

## 2019-01-01 RX ADMIN — METHOCARBAMOL 500 MG: 500 TABLET, FILM COATED ORAL at 12:21

## 2019-01-01 RX ADMIN — MIDAZOLAM 0.5 MG: 1 INJECTION INTRAMUSCULAR; INTRAVENOUS at 18:33

## 2019-01-01 RX ADMIN — DIPHENHYDRAMINE HYDROCHLORIDE 25 MG: 25 CAPSULE ORAL at 00:09

## 2019-01-01 RX ADMIN — FERROUS GLUCONATE 324 MG: 324 TABLET ORAL at 09:01

## 2019-01-01 RX ADMIN — Medication 25 MG: at 17:33

## 2019-01-01 RX ADMIN — ATORVASTATIN CALCIUM 40 MG: 40 TABLET, FILM COATED ORAL at 08:47

## 2019-01-01 RX ADMIN — ACETAMINOPHEN 975 MG: 325 TABLET, FILM COATED ORAL at 00:52

## 2019-01-01 RX ADMIN — CALCIUM 500 MG: 500 TABLET ORAL at 08:55

## 2019-01-01 RX ADMIN — IPRATROPIUM BROMIDE AND ALBUTEROL SULFATE 3 ML: .5; 3 SOLUTION RESPIRATORY (INHALATION) at 12:15

## 2019-01-01 RX ADMIN — GABAPENTIN 600 MG: 300 CAPSULE ORAL at 08:53

## 2019-01-01 RX ADMIN — POLYETHYLENE GLYCOL 3350 17 G: 17 POWDER, FOR SOLUTION ORAL at 08:28

## 2019-01-01 RX ADMIN — PHENYLEPHRINE HYDROCHLORIDE 200 MCG: 10 INJECTION, SOLUTION INTRAMUSCULAR; INTRAVENOUS; SUBCUTANEOUS at 10:22

## 2019-01-01 RX ADMIN — LEVOTHYROXINE SODIUM 50 MCG: 50 TABLET ORAL at 08:50

## 2019-01-01 RX ADMIN — LANSOPRAZOLE 30 MG: 30 CAPSULE, DELAYED RELEASE ORAL at 10:05

## 2019-01-01 RX ADMIN — TRAMADOL HYDROCHLORIDE 50 MG: 50 TABLET, COATED ORAL at 06:50

## 2019-01-01 RX ADMIN — VASOPRESSIN 0.5 UNITS: 20 INJECTION INTRAVENOUS at 09:13

## 2019-01-01 RX ADMIN — ASPIRIN 81 MG: 81 TABLET, COATED ORAL at 08:43

## 2019-01-01 RX ADMIN — SENNOSIDES AND DOCUSATE SODIUM 2 TABLET: 8.6; 5 TABLET ORAL at 08:22

## 2019-01-01 RX ADMIN — PREDNISONE 5 MG: 5 TABLET ORAL at 18:54

## 2019-01-01 RX ADMIN — PIPERACILLIN AND TAZOBACTAM 4.5 G: 4; .5 INJECTION, POWDER, FOR SOLUTION INTRAVENOUS at 21:26

## 2019-01-01 RX ADMIN — FOLIC ACID 1 MG: 1 TABLET ORAL at 09:34

## 2019-01-01 RX ADMIN — FENTANYL CITRATE 25 MCG: 50 INJECTION, SOLUTION INTRAMUSCULAR; INTRAVENOUS at 10:15

## 2019-01-01 RX ADMIN — OXYCODONE HYDROCHLORIDE 10 MG: 5 TABLET ORAL at 15:43

## 2019-01-01 RX ADMIN — CALCIUM 500 MG: 500 TABLET ORAL at 21:35

## 2019-01-01 RX ADMIN — TICAGRELOR 90 MG: 90 TABLET ORAL at 07:55

## 2019-01-01 RX ADMIN — FAMOTIDINE 20 MG: 20 INJECTION, SOLUTION INTRAVENOUS at 17:29

## 2019-01-01 RX ADMIN — TICAGRELOR 90 MG: 90 TABLET ORAL at 08:27

## 2019-01-01 RX ADMIN — CETIRIZINE HYDROCHLORIDE 10 MG: 10 TABLET, FILM COATED ORAL at 00:35

## 2019-01-01 RX ADMIN — GABAPENTIN 1200 MG: 600 TABLET, FILM COATED ORAL at 13:57

## 2019-01-01 RX ADMIN — CHOLECALCIFEROL TAB 50 MCG (2000 UNIT) 2000 UNITS: 50 TAB at 09:30

## 2019-01-01 RX ADMIN — VASOPRESSIN 1 UNITS: 20 INJECTION INTRAVENOUS at 10:39

## 2019-01-01 RX ADMIN — FERROUS GLUCONATE 324 MG: 324 TABLET ORAL at 08:43

## 2019-01-01 RX ADMIN — TRAMADOL HYDROCHLORIDE 25 MG: 50 TABLET ORAL at 13:59

## 2019-01-01 RX ADMIN — OXYCODONE HYDROCHLORIDE AND ACETAMINOPHEN 1 TABLET: 7.5; 325 TABLET ORAL at 22:07

## 2019-01-01 RX ADMIN — HEPARIN SODIUM 5000 UNITS: 5000 INJECTION, SOLUTION INTRAVENOUS; SUBCUTANEOUS at 08:45

## 2019-01-01 RX ADMIN — GABAPENTIN 1200 MG: 300 CAPSULE ORAL at 16:46

## 2019-01-01 RX ADMIN — GABAPENTIN 600 MG: 300 CAPSULE ORAL at 15:24

## 2019-01-01 RX ADMIN — ONDANSETRON 4 MG: 2 INJECTION INTRAMUSCULAR; INTRAVENOUS at 08:46

## 2019-01-01 RX ADMIN — ACETAMINOPHEN 975 MG: 325 TABLET, FILM COATED ORAL at 04:56

## 2019-01-01 RX ADMIN — MULTIPLE VITAMINS W/ MINERALS TAB 1 TABLET: TAB at 08:54

## 2019-01-01 RX ADMIN — GABAPENTIN 1200 MG: 300 CAPSULE ORAL at 15:38

## 2019-01-01 RX ADMIN — OXYCODONE HYDROCHLORIDE 10 MG: 5 TABLET ORAL at 06:39

## 2019-01-01 RX ADMIN — TORSEMIDE 20 MG: 20 TABLET ORAL at 22:21

## 2019-01-01 RX ADMIN — SULFAMETHOXAZOLE AND TRIMETHOPRIM 1 TABLET: 800; 160 TABLET ORAL at 09:05

## 2019-01-01 RX ADMIN — ASPIRIN 81 MG: 81 TABLET, COATED ORAL at 09:30

## 2019-01-01 RX ADMIN — THEOPHYLLINE 400 MG: 400 TABLET, EXTENDED RELEASE ORAL at 09:52

## 2019-01-01 RX ADMIN — ASPIRIN 81 MG: 81 TABLET, DELAYED RELEASE ORAL at 09:51

## 2019-01-01 RX ADMIN — TICAGRELOR 90 MG: 90 TABLET ORAL at 08:05

## 2019-01-01 RX ADMIN — ASPIRIN 81 MG: 81 TABLET, DELAYED RELEASE ORAL at 10:11

## 2019-01-01 RX ADMIN — PHENYLEPHRINE HYDROCHLORIDE 100 MCG: 10 INJECTION, SOLUTION INTRAMUSCULAR; INTRAVENOUS; SUBCUTANEOUS at 12:04

## 2019-01-01 RX ADMIN — FUROSEMIDE 60 MG: 10 INJECTION, SOLUTION INTRAVENOUS at 08:14

## 2019-01-01 RX ADMIN — LEVOTHYROXINE SODIUM 50 MCG: 50 TABLET ORAL at 09:20

## 2019-01-01 RX ADMIN — ASPIRIN 81 MG: 81 TABLET, COATED ORAL at 11:56

## 2019-01-01 RX ADMIN — PHENYLEPHRINE HYDROCHLORIDE 100 MCG: 10 INJECTION, SOLUTION INTRAMUSCULAR; INTRAVENOUS; SUBCUTANEOUS at 12:46

## 2019-01-01 RX ADMIN — CEFAZOLIN 1 G: 1 INJECTION, POWDER, FOR SOLUTION INTRAMUSCULAR; INTRAVENOUS at 02:19

## 2019-01-01 ASSESSMENT — ACTIVITIES OF DAILY LIVING (ADL)
RETIRED_COMMUNICATION: 0-->UNDERSTANDS/COMMUNICATES WITHOUT DIFFICULTY
ADLS_ACUITY_SCORE: 23
ADLS_ACUITY_SCORE: 22
ADLS_ACUITY_SCORE: 18
ADLS_ACUITY_SCORE: 25
ADLS_ACUITY_SCORE: 16
ADLS_ACUITY_SCORE: 15
ADLS_ACUITY_SCORE: 17
ADLS_ACUITY_SCORE: 15
ADLS_ACUITY_SCORE: 17
ADLS_ACUITY_SCORE: 15
ADLS_ACUITY_SCORE: 16
TRANSFERRING: 1-->ASSISTIVE EQUIPMENT
ADLS_ACUITY_SCORE: 16
AMBULATION: 1-->ASSISTIVE EQUIPMENT
ADLS_ACUITY_SCORE: 16
ADLS_ACUITY_SCORE: 17
ADLS_ACUITY_SCORE: 18
ADLS_ACUITY_SCORE: 16
DRESS: 0-->INDEPENDENT
ADLS_ACUITY_SCORE: 16
ADLS_ACUITY_SCORE: 20
ADLS_ACUITY_SCORE: 18
ADLS_ACUITY_SCORE: 16
ADLS_ACUITY_SCORE: 17
ADLS_ACUITY_SCORE: 16
ADLS_ACUITY_SCORE: 16
ADLS_ACUITY_SCORE: 24
ADLS_ACUITY_SCORE: 16
ADLS_ACUITY_SCORE: 26
ADLS_ACUITY_SCORE: 16
ADLS_ACUITY_SCORE: 17
ADLS_ACUITY_SCORE: 20
ADLS_ACUITY_SCORE: 17
ADLS_ACUITY_SCORE: 22
ADLS_ACUITY_SCORE: 17
BATHING: 1-->ASSISTIVE EQUIPMENT
ADLS_ACUITY_SCORE: 22
ADLS_ACUITY_SCORE: 16
ADLS_ACUITY_SCORE: 24
ADLS_ACUITY_SCORE: 22
ADLS_ACUITY_SCORE: 24
IADL_COMMENTS: SPOUSE A WITH IADL'S
ADLS_ACUITY_SCORE: 15
ADLS_ACUITY_SCORE: 16
ADLS_ACUITY_SCORE: 16
ADLS_ACUITY_SCORE: 20
ADLS_ACUITY_SCORE: 17
ADLS_ACUITY_SCORE: 16
ADLS_ACUITY_SCORE: 17
ADLS_ACUITY_SCORE: 23
AMBULATION: 3-->ASSISTIVE EQUIPMENT AND PERSON
ADLS_ACUITY_SCORE: 17
ADLS_ACUITY_SCORE: 18
ADLS_ACUITY_SCORE: 16
ADLS_ACUITY_SCORE: 17
ADLS_ACUITY_SCORE: 16
ADLS_ACUITY_SCORE: 18
ADLS_ACUITY_SCORE: 16
ADLS_ACUITY_SCORE: 18
ADLS_ACUITY_SCORE: 16
ADLS_ACUITY_SCORE: 16
ADLS_ACUITY_SCORE: 17
ADLS_ACUITY_SCORE: 25
ADLS_ACUITY_SCORE: 16
RETIRED_EATING: 0-->INDEPENDENT
ADLS_ACUITY_SCORE: 24
ADLS_ACUITY_SCORE: 16
ADLS_ACUITY_SCORE: 16
ADLS_ACUITY_SCORE: 26
ADLS_ACUITY_SCORE: 15
ADLS_ACUITY_SCORE: 16
ADLS_ACUITY_SCORE: 16
ADLS_ACUITY_SCORE: 15
ADLS_ACUITY_SCORE: 15
ADLS_ACUITY_SCORE: 20
ADLS_ACUITY_SCORE: 16
ADLS_ACUITY_SCORE: 16
ADLS_ACUITY_SCORE: 20
ADLS_ACUITY_SCORE: 15
ADLS_ACUITY_SCORE: 17
ADLS_ACUITY_SCORE: 16
ADLS_ACUITY_SCORE: 15
ADLS_ACUITY_SCORE: 14
ADLS_ACUITY_SCORE: 20
ADLS_ACUITY_SCORE: 17
ADLS_ACUITY_SCORE: 17
ADLS_ACUITY_SCORE: 16
ADLS_ACUITY_SCORE: 20
ADLS_ACUITY_SCORE: 16
ADLS_ACUITY_SCORE: 17
FALL_HISTORY_WITHIN_LAST_SIX_MONTHS: NO
ADLS_ACUITY_SCORE: 15
ADLS_ACUITY_SCORE: 16
ADLS_ACUITY_SCORE: 17
RETIRED_EATING: 0-->INDEPENDENT
FALL_HISTORY_WITHIN_LAST_SIX_MONTHS: NO
ADLS_ACUITY_SCORE: 17
ADLS_ACUITY_SCORE: 17
ADLS_ACUITY_SCORE: 15
ADLS_ACUITY_SCORE: 16
ADLS_ACUITY_SCORE: 17
ADLS_ACUITY_SCORE: 18
ADLS_ACUITY_SCORE: 16
ADLS_ACUITY_SCORE: 18
FALL_HISTORY_WITHIN_LAST_SIX_MONTHS: NO
ADLS_ACUITY_SCORE: 16
ADLS_ACUITY_SCORE: 18
ADLS_ACUITY_SCORE: 16
ADLS_ACUITY_SCORE: 17
ADLS_ACUITY_SCORE: 18
ADLS_ACUITY_SCORE: 20
ADLS_ACUITY_SCORE: 17
ADLS_ACUITY_SCORE: 18
ADLS_ACUITY_SCORE: 16
ADLS_ACUITY_SCORE: 26
ADLS_ACUITY_SCORE: 17
ADLS_ACUITY_SCORE: 20
ADLS_ACUITY_SCORE: 14
ADLS_ACUITY_SCORE: 15
ADLS_ACUITY_SCORE: 16
ADLS_ACUITY_SCORE: 18
ADLS_ACUITY_SCORE: 17
ADLS_ACUITY_SCORE: 15
ADLS_ACUITY_SCORE: 17
ADLS_ACUITY_SCORE: 26
ADLS_ACUITY_SCORE: 15
ADLS_ACUITY_SCORE: 17
ADLS_ACUITY_SCORE: 24
ADLS_ACUITY_SCORE: 16
ADLS_ACUITY_SCORE: 15
ADLS_ACUITY_SCORE: 17
ADLS_ACUITY_SCORE: 16
ADLS_ACUITY_SCORE: 16
ADLS_ACUITY_SCORE: 17
ADLS_ACUITY_SCORE: 16
ADLS_ACUITY_SCORE: 17
ADLS_ACUITY_SCORE: 16
ADLS_ACUITY_SCORE: 15
ADLS_ACUITY_SCORE: 17
ADLS_ACUITY_SCORE: 15
ADLS_ACUITY_SCORE: 16
ADLS_ACUITY_SCORE: 15
ADLS_ACUITY_SCORE: 17
ADLS_ACUITY_SCORE: 16
SWALLOWING: 0-->SWALLOWS FOODS/LIQUIDS WITHOUT DIFFICULTY
ADLS_ACUITY_SCORE: 20
ADLS_ACUITY_SCORE: 15
ADLS_ACUITY_SCORE: 15
ADLS_ACUITY_SCORE: 17
ADLS_ACUITY_SCORE: 17
ADLS_ACUITY_SCORE: 16
ADLS_ACUITY_SCORE: 16
ADLS_ACUITY_SCORE: 24
ADLS_ACUITY_SCORE: 16
ADLS_ACUITY_SCORE: 16
ADLS_ACUITY_SCORE: 18
ADLS_ACUITY_SCORE: 20
ADLS_ACUITY_SCORE: 16
ADLS_ACUITY_SCORE: 17
ADLS_ACUITY_SCORE: 17
DRESS: 1-->ASSISTIVE EQUIPMENT
ADLS_ACUITY_SCORE: 17
ADLS_ACUITY_SCORE: 20
ADLS_ACUITY_SCORE: 23
ADLS_ACUITY_SCORE: 26
RETIRED_EATING: 0-->INDEPENDENT
ADLS_ACUITY_SCORE: 16
ADLS_ACUITY_SCORE: 17
ADLS_ACUITY_SCORE: 16
ADLS_ACUITY_SCORE: 20
ADLS_ACUITY_SCORE: 24
TOILETING: 0-->INDEPENDENT
ADLS_ACUITY_SCORE: 17
COGNITION: 0 - NO COGNITION ISSUES REPORTED
ADLS_ACUITY_SCORE: 15
PREVIOUS_RESPONSIBILITIES: MEDICATION MANAGEMENT;FINANCES;DRIVING
ADLS_ACUITY_SCORE: 17
ADLS_ACUITY_SCORE: 17
ADLS_ACUITY_SCORE: 16
ADLS_ACUITY_SCORE: 17
ADLS_ACUITY_SCORE: 24
ADLS_ACUITY_SCORE: 17
ADLS_ACUITY_SCORE: 23
ADLS_ACUITY_SCORE: 17
ADLS_ACUITY_SCORE: 18
ADLS_ACUITY_SCORE: 15
ADLS_ACUITY_SCORE: 25
ADLS_ACUITY_SCORE: 17
ADLS_ACUITY_SCORE: 16
ADLS_ACUITY_SCORE: 17
ADLS_ACUITY_SCORE: 16
ADLS_ACUITY_SCORE: 24
ADLS_ACUITY_SCORE: 25
ADLS_ACUITY_SCORE: 16
ADLS_ACUITY_SCORE: 15
BATHING: 0-->INDEPENDENT
ADLS_ACUITY_SCORE: 16
ADLS_ACUITY_SCORE: 17
ADLS_ACUITY_SCORE: 17
RETIRED_COMMUNICATION: 0-->UNDERSTANDS/COMMUNICATES WITHOUT DIFFICULTY
ADLS_ACUITY_SCORE: 23
ADLS_ACUITY_SCORE: 16
ADLS_ACUITY_SCORE: 18
ADLS_ACUITY_SCORE: 16
ADLS_ACUITY_SCORE: 24
ADLS_ACUITY_SCORE: 15
AMBULATION: 1-->ASSISTIVE EQUIPMENT
ADLS_ACUITY_SCORE: 17
ADLS_ACUITY_SCORE: 18
ADLS_ACUITY_SCORE: 15
DRESS: 2-->ASSISTIVE PERSON
ADLS_ACUITY_SCORE: 20
ADLS_ACUITY_SCORE: 16
ADLS_ACUITY_SCORE: 23
ADLS_ACUITY_SCORE: 17
ADLS_ACUITY_SCORE: 16
ADLS_ACUITY_SCORE: 17
ADLS_ACUITY_SCORE: 18
ADLS_ACUITY_SCORE: 16
ADLS_ACUITY_SCORE: 18
TRANSFERRING: 3-->ASSISTIVE EQUIPMENT AND PERSON
ADLS_ACUITY_SCORE: 25
ADLS_ACUITY_SCORE: 17
ADLS_ACUITY_SCORE: 16
SWALLOWING: 0-->SWALLOWS FOODS/LIQUIDS WITHOUT DIFFICULTY
ADLS_ACUITY_SCORE: 22
ADLS_ACUITY_SCORE: 16
ADLS_ACUITY_SCORE: 15
ADLS_ACUITY_SCORE: 20
ADLS_ACUITY_SCORE: 15
ADLS_ACUITY_SCORE: 23
ADLS_ACUITY_SCORE: 17
ADLS_ACUITY_SCORE: 17
RETIRED_EATING: 0-->INDEPENDENT
ADLS_ACUITY_SCORE: 16
ADLS_ACUITY_SCORE: 16
ADLS_ACUITY_SCORE: 24
ADLS_ACUITY_SCORE: 17
ADLS_ACUITY_SCORE: 16
ADLS_ACUITY_SCORE: 20
ADLS_ACUITY_SCORE: 16
ADLS_ACUITY_SCORE: 15
ADLS_ACUITY_SCORE: 23
ADLS_ACUITY_SCORE: 18
ADLS_ACUITY_SCORE: 17
ADLS_ACUITY_SCORE: 16
ADLS_ACUITY_SCORE: 24
RETIRED_COMMUNICATION: 0-->UNDERSTANDS/COMMUNICATES WITHOUT DIFFICULTY
ADLS_ACUITY_SCORE: 18
ADLS_ACUITY_SCORE: 17
ADLS_ACUITY_SCORE: 16
ADLS_ACUITY_SCORE: 17
ADLS_ACUITY_SCORE: 20
ADLS_ACUITY_SCORE: 16
ADLS_ACUITY_SCORE: 22
ADLS_ACUITY_SCORE: 15
ADLS_ACUITY_SCORE: 16
ADLS_ACUITY_SCORE: 17
ADLS_ACUITY_SCORE: 15
ADLS_ACUITY_SCORE: 16
ADLS_ACUITY_SCORE: 20
SWALLOWING: 0-->SWALLOWS FOODS/LIQUIDS WITHOUT DIFFICULTY
TRANSFERRING: 1-->ASSISTIVE EQUIPMENT
ADLS_ACUITY_SCORE: 20
ADLS_ACUITY_SCORE: 17
WHICH_OF_THE_ABOVE_FUNCTIONAL_RISKS_HAD_A_RECENT_ONSET_OR_CHANGE?: AMBULATION;TRANSFERRING;TOILETING
ADLS_ACUITY_SCORE: 16
ADLS_ACUITY_SCORE: 16
ADLS_ACUITY_SCORE: 15
ADLS_ACUITY_SCORE: 17
ADLS_ACUITY_SCORE: 16
COGNITION: 0 - NO COGNITION ISSUES REPORTED
ADLS_ACUITY_SCORE: 20
TOILETING: 1-->ASSISTIVE EQUIPMENT
ADLS_ACUITY_SCORE: 16
ADLS_ACUITY_SCORE: 17
ADLS_ACUITY_SCORE: 16
ADLS_ACUITY_SCORE: 17
ADLS_ACUITY_SCORE: 17
ADLS_ACUITY_SCORE: 15
ADLS_ACUITY_SCORE: 16
ADLS_ACUITY_SCORE: 18
BATHING: 0-->INDEPENDENT
ADLS_ACUITY_SCORE: 17
ADLS_ACUITY_SCORE: 17
RETIRED_COMMUNICATION: 0-->UNDERSTANDS/COMMUNICATES WITHOUT DIFFICULTY
BATHING: 2-->ASSISTIVE PERSON
ADLS_ACUITY_SCORE: 16
ADLS_ACUITY_SCORE: 20
ADLS_ACUITY_SCORE: 20
ADLS_ACUITY_SCORE: 16
ADLS_ACUITY_SCORE: 17
ADLS_ACUITY_SCORE: 17
ADLS_ACUITY_SCORE: 16
ADLS_ACUITY_SCORE: 19
ADLS_ACUITY_SCORE: 16
ADLS_ACUITY_SCORE: 16
TOILETING: 1-->ASSISTIVE EQUIPMENT
ADLS_ACUITY_SCORE: 16
ADLS_ACUITY_SCORE: 17
SWALLOWING: 0-->SWALLOWS FOODS/LIQUIDS WITHOUT DIFFICULTY
ADLS_ACUITY_SCORE: 15
ADLS_ACUITY_SCORE: 23
COGNITION: 0 - NO COGNITION ISSUES REPORTED
ADLS_ACUITY_SCORE: 17
ADLS_ACUITY_SCORE: 16
ADLS_ACUITY_SCORE: 18
ADLS_ACUITY_SCORE: 17
ADLS_ACUITY_SCORE: 16
DRESS: 0-->INDEPENDENT
ADLS_ACUITY_SCORE: 17
TOILETING: 3-->ASSISTIVE EQUIPMENT AND PERSON
ADLS_ACUITY_SCORE: 15
ADLS_ACUITY_SCORE: 17
ADLS_ACUITY_SCORE: 16

## 2019-01-01 ASSESSMENT — MIFFLIN-ST. JEOR
SCORE: 1430.1
SCORE: 1460.13
SCORE: 1439.86
SCORE: 1405.39
SCORE: 1417.18
SCORE: 1395.41
SCORE: 1439.17
SCORE: 1412.64
SCORE: 1412.64
SCORE: 1403.57
SCORE: 1421.13
SCORE: 1415.37
SCORE: 1405.84
SCORE: 1425.63
SCORE: 1404.03
SCORE: 1438.04
SCORE: 1395.86
SCORE: 1406.29
SCORE: 1379.98
SCORE: 1426.71
SCORE: 1438.04
SCORE: 1347.33
SCORE: 1412.64
SCORE: 1475.63
SCORE: 1412.64
SCORE: 1398.58
SCORE: 1426.26
SCORE: 1483.63
SCORE: 1412.64
SCORE: 1385.89
SCORE: 1451.65
SCORE: 1412.64
SCORE: 1412.64
SCORE: 1408.56
SCORE: 1412.64
SCORE: 1481.63
SCORE: 1423.08
SCORE: 1449.39
SCORE: 1425.8
SCORE: 1430.79
SCORE: 1407.2
SCORE: 1429.88
SCORE: 1406.63
SCORE: 1392.69
SCORE: 1435.78
SCORE: 1412.64
SCORE: 1412.64
SCORE: 1438.04
SCORE: 1397.22
SCORE: 1428.97
SCORE: 1434.87
SCORE: 1443.48
SCORE: 1340.98
SCORE: 1407.2
SCORE: 1340.98
SCORE: 1415.63
SCORE: 1408.56
SCORE: 1379.08
SCORE: 1440.76
SCORE: 1459.82
SCORE: 1415.63
SCORE: 1345.51
SCORE: 1431.24
SCORE: 1422.17
SCORE: 1363.2
SCORE: 1437.59
SCORE: 1444.13
SCORE: 1449.39
SCORE: 1449.84
SCORE: 1408.11
SCORE: 1367.74
SCORE: 1414
SCORE: 1482.63
SCORE: 1404.03
SCORE: 1469.8
SCORE: 1426.25
SCORE: 1485.63
SCORE: 1443.75
SCORE: 1417.18
SCORE: 1408.11
SCORE: 1481.14
SCORE: 1353.68
SCORE: 1360.93
SCORE: 1439.4
SCORE: 1387.7
SCORE: 1402.66
SCORE: 1426.25
SCORE: 1498.37
SCORE: 1360.93
SCORE: 1422.17
SCORE: 1406.75

## 2019-01-01 ASSESSMENT — ENCOUNTER SYMPTOMS
SNORES LOUDLY: 0
JAUNDICE: 0
MUSCLE WEAKNESS: 1
WEIGHT LOSS: 0
MUSCLE CRAMPS: 1
CHILLS: 1
SHORTNESS OF BREATH: 1
HYPERTENSION: 0
MYALGIAS: 1
HEARTBURN: 0
COUGH: 1
EXERCISE INTOLERANCE: 1
NUMBNESS: 0
HOARSE VOICE: 0
BLOATING: 1
INCREASED ENERGY: 1
NIGHT SWEATS: 0
SPUTUM PRODUCTION: 0
SHORTNESS OF BREATH: 0
WEAKNESS: 1
HYPERTENSION: 0
WEIGHT GAIN: 0
NECK PAIN: 0
LEG PAIN: 1
ALTERED TEMPERATURE REGULATION: 1
PARALYSIS: 0
WHEEZING: 0
JOINT SWELLING: 0
EYE IRRITATION: 0
COUGH: 0
WHEEZING: 0
LOSS OF CONSCIOUSNESS: 0
JOINT SWELLING: 0
DIZZINESS: 0
PALPITATIONS: 0
HEMOPTYSIS: 0
NECK MASS: 0
EXERCISE INTOLERANCE: 1
EXERCISE INTOLERANCE: 1
PALPITATIONS: 1
SPUTUM PRODUCTION: 0
DECREASED APPETITE: 0
VOMITING: 0
POLYPHAGIA: 0
WEIGHT GAIN: 0
STIFFNESS: 0
SHORTNESS OF BREATH: 1
HEMOPTYSIS: 0
WHEEZING: 0
DYSPNEA ON EXERTION: 1
DYSPNEA ON EXERTION: 1
COUGH: 0
FEVER: 0
SYNCOPE: 0
COUGH DISTURBING SLEEP: 0
HALLUCINATIONS: 0
POLYDIPSIA: 1
BLOATING: 1
SYNCOPE: 0
POLYPHAGIA: 0
JOINT SWELLING: 1
SHORTNESS OF BREATH: 1
INCREASED ENERGY: 0
SHORTNESS OF BREATH: 1
TROUBLE SWALLOWING: 0
ORTHOPNEA: 0
SPUTUM PRODUCTION: 0
HYPOTENSION: 0
VOMITING: 0
POSTURAL DYSPNEA: 0
NECK PAIN: 1
FEVER: 0
LEG PAIN: 0
ABDOMINAL PAIN: 0
PALPITATIONS: 0
HYPOTENSION: 0
COUGH: 0
FEVER: 0
BACK PAIN: 1
WHEEZING: 1
LEG PAIN: 0
DYSPNEA ON EXERTION: 1
COLOR CHANGE: 0
EXERCISE INTOLERANCE: 1
TINGLING: 1
LIGHT-HEADEDNESS: 0
CHEST TIGHTNESS: 1
EYE REDNESS: 0
SHORTNESS OF BREATH: 1
SNORES LOUDLY: 0
POSTURAL DYSPNEA: 0
MYALGIAS: 1
WHEEZING: 0
PALPITATIONS: 0
WEAKNESS: 1
HEMOPTYSIS: 0
SLEEP DISTURBANCES DUE TO BREATHING: 0
BACK PAIN: 1
HEADACHES: 0
HYPOTENSION: 1
EYE WATERING: 1
HYPERTENSION: 0
FEVER: 0
CONSTIPATION: 1
RECTAL PAIN: 0
MEMORY LOSS: 0
DIFFICULTY URINATING: 0
COUGH DISTURBING SLEEP: 0
MUSCLE WEAKNESS: 1
FEVER: 0
NIGHT SWEATS: 0
POSTURAL DYSPNEA: 0
FEVER: 0
STIFFNESS: 1
ARTHRALGIAS: 1
SNORES LOUDLY: 1
ABDOMINAL PAIN: 0
COUGH DISTURBING SLEEP: 0
COUGH: 1
SHORTNESS OF BREATH: 1
HALLUCINATIONS: 0
LIGHT-HEADEDNESS: 1
SHORTNESS OF BREATH: 1
WEIGHT LOSS: 0
MUSCLE WEAKNESS: 1
ABDOMINAL PAIN: 0
ALTERED TEMPERATURE REGULATION: 1
HYPOTENSION: 1
SYNCOPE: 0
NECK PAIN: 1
ARTHRALGIAS: 1
NAIL CHANGES: 0
SKIN CHANGES: 0
BACK PAIN: 1
CHILLS: 0
DECREASED APPETITE: 0
SLEEP DISTURBANCES DUE TO BREATHING: 0
ARTHRALGIAS: 0
HEADACHES: 0
CHEST TIGHTNESS: 0
SHORTNESS OF BREATH: 1
NAUSEA: 0
SLEEP DISTURBANCES DUE TO BREATHING: 1
COUGH DISTURBING SLEEP: 0
HEMOPTYSIS: 0
POSTURAL DYSPNEA: 0
SORE THROAT: 0
TREMORS: 0
DIARRHEA: 0
BOWEL INCONTINENCE: 0
NIGHT SWEATS: 0
POOR WOUND HEALING: 0
BACK PAIN: 1
ORTHOPNEA: 0
NAUSEA: 0
HALLUCINATIONS: 0
STIFFNESS: 1
EYE REDNESS: 0
MUSCLE WEAKNESS: 1
ARTHRALGIAS: 1
BLOOD IN STOOL: 0
SPEECH CHANGE: 0
COUGH: 1
SINUS CONGESTION: 1
FEVER: 0
HEADACHES: 0
JAUNDICE: 0
ABDOMINAL PAIN: 1
CHILLS: 0
JOINT SWELLING: 0
COUGH: 1
TASTE DISTURBANCE: 0
LIGHT-HEADEDNESS: 0
ORTHOPNEA: 0
DECREASED APPETITE: 0
EYE PAIN: 0
DIZZINESS: 1
DOUBLE VISION: 0
SMELL DISTURBANCE: 0
INCREASED ENERGY: 1
BLOOD IN STOOL: 0
NECK PAIN: 0
ALTERED TEMPERATURE REGULATION: 1
ARTHRALGIAS: 0
HYPERTENSION: 0
MYALGIAS: 1
ORTHOPNEA: 0
BACK PAIN: 1
PALPITATIONS: 1
NECK PAIN: 1
SHORTNESS OF BREATH: 1
FATIGUE: 1
LIGHT-HEADEDNESS: 0
SLEEP DISTURBANCES DUE TO BREATHING: 0
POLYDIPSIA: 0
MYALGIAS: 1
DIARRHEA: 0
SINUS PAIN: 0
POLYPHAGIA: 0
CONSTIPATION: 1
LEG PAIN: 1
POLYDIPSIA: 0
NECK STIFFNESS: 0
CHILLS: 0
MUSCLE CRAMPS: 0
HEARTBURN: 0
BOWEL INCONTINENCE: 0
SNORES LOUDLY: 0
SHORTNESS OF BREATH: 1
FATIGUE: 1
DYSPNEA ON EXERTION: 1
STIFFNESS: 1
DISTURBANCES IN COORDINATION: 0
WEIGHT GAIN: 0
CONFUSION: 0
SPUTUM PRODUCTION: 1
SEIZURES: 0
LIGHT-HEADEDNESS: 0
SYNCOPE: 0
RECTAL PAIN: 0
WEIGHT LOSS: 0
MUSCLE CRAMPS: 0
MUSCLE CRAMPS: 1
FATIGUE: 1

## 2019-01-01 ASSESSMENT — PAIN DESCRIPTION - DESCRIPTORS
DESCRIPTORS: ACHING
DESCRIPTORS: PINS AND NEEDLES;SHOOTING
DESCRIPTORS: ACHING;SORE
DESCRIPTORS: ACHING;SORE
DESCRIPTORS: DISCOMFORT
DESCRIPTORS: ACHING
DESCRIPTORS: CONSTANT
DESCRIPTORS: ACHING
DESCRIPTORS: CONSTANT
DESCRIPTORS: PINS AND NEEDLES
DESCRIPTORS: ACHING;SORE
DESCRIPTORS: ACHING
DESCRIPTORS: ACHING;CONSTANT
DESCRIPTORS: ACHING;CONSTANT
DESCRIPTORS: ACHING;SORE
DESCRIPTORS: CONSTANT
DESCRIPTORS: ACHING;SORE
DESCRIPTORS: PINS AND NEEDLES;SHOOTING
DESCRIPTORS: ACHING;CONSTANT
DESCRIPTORS: ACHING
DESCRIPTORS: ACHING;SORE
DESCRIPTORS: ACHING;CONSTANT
DESCRIPTORS: DISCOMFORT
DESCRIPTORS: ACHING
DESCRIPTORS: ACHING;CONSTANT
DESCRIPTORS: ACHING
DESCRIPTORS: SORE;ACHING
DESCRIPTORS: ACHING
DESCRIPTORS: ACHING
DESCRIPTORS: ACHING;SORE
DESCRIPTORS: ACHING;SORE
DESCRIPTORS: ACHING
DESCRIPTORS: CONSTANT;SPASM
DESCRIPTORS: ACHING
DESCRIPTORS: ACHING;DISCOMFORT
DESCRIPTORS: ACHING;DISCOMFORT
DESCRIPTORS: ACHING;SORE
DESCRIPTORS: PINS AND NEEDLES
DESCRIPTORS: SORE;ACHING
DESCRIPTORS: ACHING;SORE
DESCRIPTORS: ACHING
DESCRIPTORS: ACHING;SORE
DESCRIPTORS: ACHING;SORE
DESCRIPTORS: DISCOMFORT
DESCRIPTORS: ACHING

## 2019-01-01 ASSESSMENT — PAIN SCALES - GENERAL
PAINLEVEL: NO PAIN (0)
PAINLEVEL: NO PAIN (0)
PAINLEVEL: MILD PAIN (2)
PAINLEVEL: NO PAIN (0)
PAINLEVEL: MILD PAIN (3)
PAINLEVEL: NO PAIN (0)
PAINLEVEL: NO PAIN (0)
PAINLEVEL: MILD PAIN (2)
PAINLEVEL: NO PAIN (0)
PAINLEVEL: SEVERE PAIN (6)
PAINLEVEL: NO PAIN (0)

## 2019-01-01 ASSESSMENT — PATIENT HEALTH QUESTIONNAIRE - PHQ9
SUM OF ALL RESPONSES TO PHQ QUESTIONS 1-9: 10

## 2019-01-01 ASSESSMENT — VISUAL ACUITY: OU: NORMAL ACUITY

## 2019-01-01 ASSESSMENT — NEW YORK HEART ASSOCIATION (NYHA) CLASSIFICATION: NYHA FUNCTIONAL CLASS: III

## 2019-01-29 PROBLEM — M54.50 LUMBAR PAIN: Status: ACTIVE | Noted: 2019-01-01

## 2019-01-29 PROBLEM — T88.8XXD: Status: ACTIVE | Noted: 2019-01-01

## 2019-01-29 PROBLEM — M48.062 SPINAL STENOSIS OF LUMBAR REGION WITH NEUROGENIC CLAUDICATION: Status: ACTIVE | Noted: 2019-01-01

## 2019-01-29 NOTE — PROGRESS NOTES
Spine Surgery Return Clinic Visit      Chief Complaint:   RECHECK (lumbar spine arthritis)      Interval HPI:  Symptom Profile Including: location of symptoms, onset, severity, exacerbating/alleviating factors, previous treatments:        Beulah Jane is a 68 year old male who presents with a complex history of multiple previous lumbar spinal fusions as well as decompressions which were ultimately complicated by infection, apparently a mesh was placed and then the mesh had to be removed and ultimately he was left with a large seroma, lumbar instability, and severe pain and stenosis with bilateral back and leg symptoms.     He has been dealing with these symptoms for several years now.  He cannot stand upright.  He cannot walk more than about 100 feet before he has to sit down due to severe claudicatory leg pain.  He feels that the back pain is debilitating.  He feels like he would rather die than continue to live with the symptoms.     Each of his previous surgeries were done in [place].  He says that the fluid collection was previously aspirated and was thought to be benign but he is not sure it does not have the records with him today.     After his last visit with me I thought it would be extremely difficult to address this problem.  I sent him to the anesthesia clinic.  It was recommended that he see cardiology to see whether or not he needs the aortic valve replaced before surgery.  Lastly, he is on prednisone and also takes methotrexate and he would need to stop the methotrexate before surgery.    He did recently see both his pulmonologist and cardiologist.He also saw his rheumatologist recently.  They have left some recommendations in the chart.  It is felt that he would be a candidate for surgery and that he should not have the cardiac valve replaced at this time.    He did have a bone density study done in 2018 which showed osteoporosis with a negative T score of -2.5.            Past Medical  History:     Past Medical History:   Diagnosis Date     Aortic stenosis      Chronic pain      DM (diabetes mellitus), type 2 (H)     on insulin     Hyperlipidemia      JUAN on CPAP      Pneumonia      Polymyositis (H)      Pulmonary fibrosis, unspecified (H)      Seasonal allergies             Past Surgical History:     Past Surgical History:   Procedure Laterality Date     ARTHROSCOPY KNEE       COLONOSCOPY       DECOMPRESSION, FUSION LUMBAR POSTERIOR TWO LEVELS, COMBINED       HERNIA REPAIR       lumbar spine hardware removal       REMOVAL PROSTHETIC MATERIAL/MESH, ABD WALL NECRO TISS INFEXN       ROTATOR CUFF REPAIR RT/LT              Social History:     Social History     Tobacco Use     Smoking status: Former Smoker     Packs/day: 0.25     Last attempt to quit: 1970     Years since quittin.6     Smokeless tobacco: Never Used   Substance Use Topics     Alcohol use: Not on file     Comment: few times monthly            Family History:   No family history on file.         Allergies:   No Known Allergies         Medications:     Current Outpatient Medications   Medication     albuterol (2.5 MG/3ML) 0.083% neb solution     albuterol (PROAIR HFA/PROVENTIL HFA/VENTOLIN HFA) 108 (90 BASE) MCG/ACT Inhaler     beclomethasone (QVAR) 80 MCG/ACT Inhaler     calcium carbonate (OS-DMITRIY 500 MG [place]. [place]) 500 MG tablet     cetirizine (ZYRTEC) 10 MG tablet     Cholecalciferol (VITAMIN D) 2000 units tablet     dextromethorphan-guaiFENesin (MUCINEX DM)  MG per 12 hr tablet     FOLIC ACID PO     GABAPENTIN PO     GEMFIBROZIL PO     insulin isophane human (HUMULIN N PEN) 100 UNIT/ML injection     METHOTREXATE PO     omega 3 1000 MG CAPS     oxyCODONE-acetaminophen (PERCOCET) 5-325 MG per tablet     predniSONE (DELTASONE) 20 MG tablet     sulfamethoxazole-trimethoprim (BACTRIM DS/SEPTRA DS) 800-160 MG per tablet     theophylline (UNIPHYL) 400 MG 24 hr tablet     traMADol (ULTRAM) 50 MG tablet  "    No current facility-administered medications for this visit.              Review of Systems:   A focused musculoskeletal and neurologic ROS was performed with pertinent positives and negatives noted in the HPI.  Additional systems were also reviewed and are documented at the bottom of the note.         Physical Exam:   Vitals: Ht 1.676 m (5' 6\")   Wt 72.1 kg (159 lb)   BMI 25.66 kg/m    Musculoskeletal, Neurologic, and Spine:     Lumbar Spine:                                              Appearance - No gross stepoffs or deformities.  Healed midline incisions. Obvious right sided seroma and fluid collection is balladable.                                              Motor -                                               L2-3: Hip flexion 4/5 L and 4/5 R strength                                               L3/4:  Knee extension L 5/5 and R 5/5 strength                                              L4/5:  Foot dorsiflexion R 5/5 L 5/5 and                                                                     EHL dorsiflexion R 4/5 L 4/5 strength                                              S1:  Plantarflexion/Peroneal Muscles  L 4/5 and R 4/5 strength                                              Sensation: intact to light touch L3-S1 distribution BLE, but diminished L5 and S1.                                                 Neurologic:      REFLEXES Left Right                           Patella 1+ 1+   Ankle jerk 0+ 0+   Babinski No upgoing great toe No upgoing great toe   Clonus 0 beats 0 beats      Hip Exam:  No pain with hip log roll and no tenderness over the greater trochanters.     Alignment:  Patient stands with a markedly positive sagittal balance.    He does have rectus diastasis and a midline abdominal hernia.           Imaging:   We ordered and independently reviewed new radiographs at this clinic visit. The results were discussed with the patient. Findings include:     I ordered a new CT scan completed " on July 2, 2018 cervical thoracic and lumbar CT myelogram: Severe diffuse spondylosis.  At T11-12 there is a large ventral disc herniation resulting in a complete dye block.  Severe stenosis L1-2.  Moderate at L2-3.  Severe stenosis L3-4 with complete dye block.  The L5-S1 level is healed ventrally and through the facets.  There is 4  of kyphosis between L1 and S1 on these supine images.  The thoracolumbar junction is neutral.  Vacuum disc phenomena at T11-12.     May 19, 2018 lumbar MRI: Complex seroma in the posterior soft total occlusion of the spinal canal L3-4.  Tissues.  T11-12 ligamentum flavum hypertrophy and central stenosis around the spinal cord.  Severe central stenosis L1-2, moderate L2-3.  Total occlusion of the spinal canal at L3-4.  Wide central decompression with apparent absence of the lumbar facet joints L3-5.     AP lateral flexion-extension radiographs 6/2018 severe destruction of the L3 and L4 vertebral bodies with kyphosis through this area and severe lumbar kyphosis with positive sagittal imbalance.     Standing scoliosis radiographs from 6/26/18 show marked positive sagittal imbalance due to the above-mentioned kyphosis through L3-4.  He is compensating with hyperextension through the upper thoracic spine and severe pelvic retroversion. Thoracic kyphosis measures 5 .  There is 25  of kyphosis between L1 and S1.  The measurement between L3 and S1 is 26  of kyphosis and there is a focal kyphosis between L3 and L5 with collapse of the L4 vertebral body.  47  pelvic retroversion.  51  pelvic incidence.  He has a 21 cm positive sagittal balance.    AP and lateral lumbar radiographs January 29, 2019 show some progressive kyphosis through the L3-4 disc space.  This looks worse compared to his previous standing films from June 2018.       Assessment and Plan:     69 year old male with severe aortic valvular stenosis, pulmonary comorbidities and markedly severe flat back and degenerative changes in  the lumbar spine.     Imaging Findings:  Dorsal Seroma from prior infections  Severe flatback and positive sagittal imbalance.  77 degree PI-LL mismatch  Dynamic instability L3-4 with focal kyphosis  25 degree lumbar kyphosis which reduces to 4 degrees kyphosis on supine imaging.  Stenosis T11-12, L1-2, L2-3, L3-4.  Autofusion at L5-S1.     Surgical Plan:     I spent quite a bit of time with him talking about options today.  Clearly he is very medically and surgically complex.  From a surgical standpoint, I think it would be best to stage his operation.  If he wished to proceed with surgery I recommended a staged anterior interbody fusion from L2-L5 with bone morphogenic protein.  In the second stage operation we would then plan to go from T10 to the pelvis with a thoracic level decompression, and then facetectomies through his previous scar bed from L1-L5, probable L1-2 TLIF as well.  My hope is that this would be enough correction for him.  If it was not enough correction and he might need a pedicle subtraction osteotomy at the time of his second surgery, but I think it is likely we could get enough with the interbodies and multi-level facetectomies.     From a technical standpoint this would be a difficult operation with significant morbidity risk.  I told him a 70% chance of major morbidity. Risks of this surgery include risk of infection, risk of dural tear resulting in CSF leak which might result in headaches, or possible need for lumbar drain, or possible revision surgery in the setting of a persistent leak. Risk of hematoma or seroma resulting in wound complications.  Possible nerve root injury resulting in numbness weakness or paralysis into the arms or legs. Possible radiculitis which could result in similar symptoms or could result in significant neurogenic type pain. There is also a risk of delayed onset nerve root pals or radiculitis, which I explained is potentially due to stretch injury or possibly  due to delayed onset swelling, and is sometimes temporary but can also be permanent.  Risk of incomplete decompression which might require revision surgery in the future.  Risk of adjacent segment problems requiring surgery in the future. Risk of incomplete relief of symptoms possibly requiring revision surgery in the future. Furthermore, although rare, there are risks of major vessel injury such as to the major vessels anteriorly or to the bowel from the surgery.  Sometimes this can happen if an instrument is passed anteriorly through the disc space. There is a risk of nonunion which might require revision surgery in the future, and risk of hardware complications from the instrumentation.  I do use imaging to help guide the placement of the instrumentation, but even with this there is a chance of implant malposition or problem.  There is a risk of blood clots in the legs or the lungs.  Lastly, although rare, there are certainly risks of the anesthetic including stroke heart attack and death.  In addition, we discussed the use of bone morphogenic protein. I explained that this is a recombinant protein which is used to aid in bone healing. The main benefit of this protein is a high bony healing rate. However there are also some risks of its use. It is a little bit controversial, but some literature indicates risk of seroma, risk of radiculitis, risk of wound complications, risk of retrograde ejaculation, or even a small increased cancer risk from the inflammatory nature of the protein.  The patient understood the nature of these risks and together we agreed that the benefit of increased bony healing outweighed these risks, and we agreed to proceed with its use.      Typically this is a 12-month recovery, with a 1 week hospital stay, multi-day intensive care unit stay being possible.  Likely this would need to be done on the [place].  I am also going to ask him to see Dr. Lomeli in neurosurgery to see if he would be  willing to be a co-surgeon on the second stage, and I would like him to see Dr. Hilda Alegria and vascular surgery to see if he thinks the anterior approach is possible.  He does have some rectus diastases and an abdominal hernia, and if she thinks the anterior approach is not possible we could do this all posteriorly, but I think it would be harder to get all of our correction all posterior and that might push us towards a pedicle subtraction osteotomy which I would like to try to avoid if possible.    Medical Plan:   He recently underwent a substantial medical workup with recent visits to his pulmonologist and his rheumatologist as well as his cardiologist.  I would like him to be seen back again in the anesthesia clinic to make sure that he is a candidate for surgery.  Likely this would need to be done on the [place] and again I told him that probably he would need to be in the intensive care unit after the operation. Given his osteoporosis there is also significant risk of perioperative fracture.  I am going to have him see Dr. Ghosh for an opinion on bone density management    Once we have these other referrals made I will try to get him into see the co-surgeons to see if they agree with the plan and then if so we would try to get him onto the schedule.     Roughly 45 minutes was spent with the patient essentially all of which was in counseling.    Respectfully,  Carlos Enrique Dial MD  Spine Surgery  [facility]

## 2019-01-29 NOTE — NURSING NOTE
Teaching Flowsheet   Relevant Diagnosis: flat back syndrome  Teaching Topic: preop L2-4 ALIF, then T10-Pelvis fusion    Beulah lives in [place] with his wife.  He will see PAC today for eval prior to scheduling surgery date.  He is also referred to Dr Ghosh for osteopenia.  CT and labs done today, MRSA swab screen was sent to lab.     Person(s) involved in teaching:   Patient and Wife with , son arrived also during teaching     Motivation Level:  Asks Questions: Yes  Eager to Learn: Yes  Cooperative: Yes  Receptive (willing/able to accept information): Yes  Any cultural factors/Caodaism beliefs that may influence understanding or compliance? No  Comments:      Patient and Family demonstrates understanding of the following:  Reason for the appointment, diagnosis and treatment plan: Yes  Knowledge of proper use of medications and conditions for which they are ordered (with special attention to potential side effects or drug interactions): Yes  Which situations necessitate calling provider and whom to contact: Yes       Teaching Concerns Addressed:   Comments:      Proper use and care of medications (medical equip, care aids, etc.): Yes  Nutritional needs and diet plan: Yes  Pain management techniques: Yes  Wound Care: Yes  How and/when to access community resources: Yes     Instructional Materials Used/Given: preop pkt, antiseptic soap     Time spent with patient: 30 minutes.

## 2019-01-29 NOTE — LETTER
1/29/2019       RE: Beulah aJne  [address]  [facility] [address]     Dear Colleague,    Thank you for referring your patient, Beulah Jane, to the [facility]. Please see a copy of my visit note below.    Spine Surgery Return Clinic Visit      Chief Complaint:   RECHECK (lumbar spine arthritis)      Interval HPI:  Symptom Profile Including: location of symptoms, onset, severity, exacerbating/alleviating factors, previous treatments:        Beulah Jane is a 68 year old male who presents with a complex history of multiple previous lumbar spinal fusions as well as decompressions which were ultimately complicated by infection, apparently a mesh was placed and then the mesh had to be removed and ultimately he was left with a large seroma, lumbar instability, and severe pain and stenosis with bilateral back and leg symptoms.     He has been dealing with these symptoms for several years now.  He cannot stand upright.  He cannot walk more than about 100 feet before he has to sit down due to severe claudicatory leg pain.  He feels that the back pain is debilitating.  He feels like he would rather die than continue to live with the symptoms.     Each of his previous surgeries were done in [place].  He says that the fluid collection was previously aspirated and was thought to be benign but he is not sure it does not have the records with him today.     After his last visit with me I thought it would be extremely difficult to address this problem.  I sent him to the anesthesia clinic.  It was recommended that he see cardiology to see whether or not he needs the aortic valve replaced before surgery.  Lastly, he is on prednisone and also takes methotrexate and he would need to stop the methotrexate before surgery.    He did recently see both his pulmonologist and cardiologist.He also saw his rheumatologist recently.  They have  left some recommendations in the chart.  It is felt that he would be a candidate for surgery and that he should not have the cardiac valve replaced at this time.    He did have a bone density study done in 2018 which showed osteoporosis with a negative T score of -2.5.            Past Medical History:     Past Medical History:   Diagnosis Date     Aortic stenosis      Chronic pain      DM (diabetes mellitus), type 2 (H)     on insulin     Hyperlipidemia      JUAN on CPAP      Pneumonia      Polymyositis (H)      Pulmonary fibrosis, unspecified (H)      Seasonal allergies             Past Surgical History:     Past Surgical History:   Procedure Laterality Date     ARTHROSCOPY KNEE       COLONOSCOPY       DECOMPRESSION, FUSION LUMBAR POSTERIOR TWO LEVELS, COMBINED       HERNIA REPAIR       lumbar spine hardware removal       REMOVAL PROSTHETIC MATERIAL/MESH, ABD WALL NECRO TISS INFEXN       ROTATOR CUFF REPAIR RT/LT              Social History:     Social History     Tobacco Use     Smoking status: Former Smoker     Packs/day: 0.25     Last attempt to quit: 1970     Years since quittin.6     Smokeless tobacco: Never Used   Substance Use Topics     Alcohol use: Not on file     Comment: few times monthly            Family History:   No family history on file.         Allergies:   No Known Allergies         Medications:     Current Outpatient Medications   Medication     albuterol (2.5 MG/3ML) 0.083% neb solution     albuterol (PROAIR HFA/PROVENTIL HFA/VENTOLIN HFA) 108 (90 BASE) MCG/ACT Inhaler     beclomethasone (QVAR) 80 MCG/ACT Inhaler     calcium carbonate (OS-DMITRIY 500 MG [place]. CA) 500 MG tablet     cetirizine (ZYRTEC) 10 MG tablet     Cholecalciferol (VITAMIN D) 2000 units tablet     dextromethorphan-guaiFENesin (MUCINEX DM)  MG per 12 hr tablet     FOLIC ACID PO     GABAPENTIN PO     GEMFIBROZIL PO     insulin isophane human (HUMULIN N PEN) 100 UNIT/ML injection      "METHOTREXATE PO     omega 3 1000 MG CAPS     oxyCODONE-acetaminophen (PERCOCET) 5-325 MG per tablet     predniSONE (DELTASONE) 20 MG tablet     sulfamethoxazole-trimethoprim (BACTRIM DS/SEPTRA DS) 800-160 MG per tablet     theophylline (UNIPHYL) 400 MG 24 hr tablet     traMADol (ULTRAM) 50 MG tablet     No current facility-administered medications for this visit.              Review of Systems:   A focused musculoskeletal and neurologic ROS was performed with pertinent positives and negatives noted in the HPI.  Additional systems were also reviewed and are documented at the bottom of the note.         Physical Exam:   Vitals: Ht 1.676 m (5' 6\")   Wt 72.1 kg (159 lb)   BMI 25.66 kg/m     Musculoskeletal, Neurologic, and Spine:     Lumbar Spine:                                              Appearance - No gross stepoffs or deformities.  Healed midline incisions. Obvious right sided seroma and fluid collection is balladable.                                              Motor -                                               L2-3: Hip flexion 4/5 L and 4/5 R strength                                               L3/4:  Knee extension L 5/5 and R 5/5 strength                                              L4/5:  Foot dorsiflexion R 5/5 L 5/5 and                                                                     EHL dorsiflexion R 4/5 L 4/5 strength                                              S1:  Plantarflexion/Peroneal Muscles  L 4/5 and R 4/5 strength                                              Sensation: intact to light touch L3-S1 distribution BLE, but diminished L5 and S1.                                                 Neurologic:      REFLEXES Left Right                           Patella 1+ 1+   Ankle jerk 0+ 0+   Babinski No upgoing great toe No upgoing great toe   Clonus 0 beats 0 beats      Hip Exam:  No pain with hip log roll and no tenderness over the greater trochanters.     Alignment:  Patient stands with " a markedly positive sagittal balance.    He does have rectus diastasis and a midline abdominal hernia.           Imaging:   We ordered and independently reviewed new radiographs at this clinic visit. The results were discussed with the patient. Findings include:     I ordered a new CT scan completed on July 2, 2018 cervical thoracic and lumbar CT myelogram: Severe diffuse spondylosis.  At T11-12 there is a large ventral disc herniation resulting in a complete dye block.  Severe stenosis L1-2.  Moderate at L2-3.  Severe stenosis L3-4 with complete dye block.  The L5-S1 level is healed ventrally and through the facets.  There is 4  of kyphosis between L1 and S1 on these supine images.  The thoracolumbar junction is neutral.  Vacuum disc phenomena at T11-12.     May 19, 2018 lumbar MRI: Complex seroma in the posterior soft total occlusion of the spinal canal L3-4.  Tissues.  T11-12 ligamentum flavum hypertrophy and central stenosis around the spinal cord.  Severe central stenosis L1-2, moderate L2-3.  Total occlusion of the spinal canal at L3-4.  Wide central decompression with apparent absence of the lumbar facet joints L3-5.     AP lateral flexion-extension radiographs 6/2018 severe destruction of the L3 and L4 vertebral bodies with kyphosis through this area and severe lumbar kyphosis with positive sagittal imbalance.     Standing scoliosis radiographs from 6/26/18 show marked positive sagittal imbalance due to the above-mentioned kyphosis through L3-4.  He is compensating with hyperextension through the upper thoracic spine and severe pelvic retroversion. Thoracic kyphosis measures 5 .  There is 25  of kyphosis between L1 and S1.  The measurement between L3 and S1 is 26  of kyphosis and there is a focal kyphosis between L3 and L5 with collapse of the L4 vertebral body.  47  pelvic retroversion.  51  pelvic incidence.  He has a 21 cm positive sagittal balance.    AP and lateral lumbar radiographs January 29, 2019  show some progressive kyphosis through the L3-4 disc space.  This looks worse compared to his previous standing films from June 2018.       Assessment and Plan:     69 year old male with severe aortic valvular stenosis, pulmonary comorbidities and markedly severe flat back and degenerative changes in the lumbar spine.     Imaging Findings:  Dorsal Seroma from prior infections  Severe flatback and positive sagittal imbalance.  77 degree PI-LL mismatch  Dynamic instability L3-4 with focal kyphosis  25 degree lumbar kyphosis which reduces to 4 degrees kyphosis on supine imaging.  Stenosis T11-12, L1-2, L2-3, L3-4.  Autofusion at L5-S1.     Surgical Plan:     I spent quite a bit of time with him talking about options today.  Clearly he is very medically and surgically complex.  From a surgical standpoint, I think it would be best to stage his operation.  If he wished to proceed with surgery I recommended a staged anterior interbody fusion from L2-L5 with bone morphogenic protein.  In the second stage operation we would then plan to go from T10 to the pelvis with a thoracic level decompression, and then facetectomies through his previous scar bed from L1-L5, probable L1-2 TLIF as well.  My hope is that this would be enough correction for him.  If it was not enough correction and he might need a pedicle subtraction osteotomy at the time of his second surgery, but I think it is likely we could get enough with the interbodies and multi-level facetectomies.     From a technical standpoint this would be a difficult operation with significant morbidity risk.  I told him a 70% chance of major morbidity. Risks of this surgery include risk of infection, risk of dural tear resulting in CSF leak which might result in headaches, or possible need for lumbar drain, or possible revision surgery in the setting of a persistent leak. Risk of hematoma or seroma resulting in wound complications.  Possible nerve root injury resulting in  numbness weakness or paralysis into the arms or legs. Possible radiculitis which could result in similar symptoms or could result in significant neurogenic type pain. There is also a risk of delayed onset nerve root pals or radiculitis, which I explained is potentially due to stretch injury or possibly due to delayed onset swelling, and is sometimes temporary but can also be permanent.  Risk of incomplete decompression which might require revision surgery in the future.  Risk of adjacent segment problems requiring surgery in the future. Risk of incomplete relief of symptoms possibly requiring revision surgery in the future. Furthermore, although rare, there are risks of major vessel injury such as to the major vessels anteriorly or to the bowel from the surgery.  Sometimes this can happen if an instrument is passed anteriorly through the disc space. There is a risk of nonunion which might require revision surgery in the future, and risk of hardware complications from the instrumentation.  I do use imaging to help guide the placement of the instrumentation, but even with this there is a chance of implant malposition or problem.  There is a risk of blood clots in the legs or the lungs.  Lastly, although rare, there are certainly risks of the anesthetic including stroke heart attack and death.  In addition, we discussed the use of bone morphogenic protein. I explained that this is a recombinant protein which is used to aid in bone healing. The main benefit of this protein is a high bony healing rate. However there are also some risks of its use. It is a little bit controversial, but some literature indicates risk of seroma, risk of radiculitis, risk of wound complications, risk of retrograde ejaculation, or even a small increased cancer risk from the inflammatory nature of the protein.  The patient understood the nature of these risks and together we agreed that the benefit of increased bony healing outweighed these  risks, and we agreed to proceed with its use.      Typically this is a 12-month recovery, with a 1 week hospital stay, multi-day intensive care unit stay being possible.  Likely this would need to be done on the [place].  I am also going to ask him to see Dr. Lomeli in neurosurgery to see if he would be willing to be a co-surgeon on the second stage, and I would like him to see Dr. Hilda Alegria and vascular surgery to see if he thinks the anterior approach is possible.  He does have some rectus diastases and an abdominal hernia, and if she thinks the anterior approach is not possible we could do this all posteriorly, but I think it would be harder to get all of our correction all posterior and that might push us towards a pedicle subtraction osteotomy which I would like to try to avoid if possible.    Medical Plan:   He recently underwent a substantial medical workup with recent visits to his pulmonologist and his rheumatologist as well as his cardiologist.  I would like him to be seen back again in the anesthesia clinic to make sure that he is a candidate for surgery.  Likely this would need to be done on the [place] and again I told him that probably he would need to be in the intensive care unit after the operation. Given his osteoporosis there is also significant risk of perioperative fracture.  I am going to have him see Dr. Ghosh for an opinion on bone density management    Once we have these other referrals made I will try to get him into see the co-surgeons to see if they agree with the plan and then if so we would try to get him onto the schedule.     Roughly 45 minutes was spent with the patient essentially all of which was in counseling.    Respectfully,  Carlos Enrique Dial MD  Spine Surgery  [facility]

## 2019-01-29 NOTE — PATIENT INSTRUCTIONS
Preparing for Your Surgery      Name:  Beulah Jane   MRN:  [id number]   :  1950   Today's Date:  2019     Arriving for surgery: You are currently not on the surgery schedule.  You will be called 1-2 days prior to surgery by Preadmission Nursing, [phone].       What can I eat or drink?  -  You may have solid food or milk products until 8 hours prior to your surgery.  -  You may have water, apple juice or 7up/Sprite until 2 hours prior to your surgery.    Which medicines can I take?  Stop Aspirin, vitamins and Fish Oil one week prior to surgery.  Hold Ibuprofen for 24 hours and/or Naproxen for 48 hours prior to surgery.   Hold Methotrexate according to Rheumatologist instructions.    -  Do NOT take these medications in the morning, the day of surgery:    Calcium  Folic Acid   Gemfibrozil    -  Please take these medications the day of surgery:    Qvar inhaler  Gabapentin   Prednisone  Bactrim(If surgery falls on a day you would typically take Bactrim)   Theophylline(Uniphyl)   Tramadol or Percocet as needed for pain   Albuterol inhaler as needed   Take 4 units of Humulin N the morning of surgery as needed    How do I prepare myself?  -  Take two showers: one the night before surgery; and one the morning of surgery.         Use Scrubcare or Hibiclens to wash from neck down.  You may use your own shampoo and conditioner. No other hair products.   -  Do NOT use lotion, powder, deodorant, or antiperspirant the day of your surgery.  -  Do NOT wear jewelry.  -  Begin using Incentive Spirometer 1 week prior to surgery.  Use 4 times per day, up to 5 breaths each time.  Bring Incentive Spirometer to hospital.  - Do not bring your own medications to the hospital, except for inhalers and eye drops.  -  Bring your ID and insurance card.    Questions or Concerns:  -If you have questions or concerns regarding the day of surgery, please call [phone].     -For questions after surgery please call your  surgeons office.           Enhanced Recovery After Surgery     This is a team effort, including you, to get you back on your feet, eating and drinking normally and out of the hospital as quickly as possible.  The goals are: 1) NO INFECTIONS and   2) RETURN TO NORMAL DIET    How can we achieve these goals?  1) STAY ACTIVE: Walk every day before your surgery; try to increase the amount every day.  Walk after surgery as much as you can-the nurses will help you.  Walking speeds healing and gets you home quicker, you heal better at home and have less risk of infection.     2) INCENTIVE SPIROMETER: Practice your incentive spirometer 4 times per day with 5 repetitions each time.  Using the incentive spirometer can strengthen your muscles between your ribs and help you have a strong cough after surgery.  A more effective cough can help prevent problems with your lungs.    3) STAY HYDRATED: Drink clear liquids up until 2 1/2 hours before your surgery. We would like you to purchase a drink such as Gatorade or Ensure Clear (not the milkshake type).  Drink this before bedtime and on the way into the hospital, drink between 8-10 ounces or until you feel hydrated.  Keeping well hydrated leads to your veins being plump, you wake up faster, and you are less likely to be nauseated. Start drinking water as soon as you can after surgery and advance to clear liquids and food as tolerated.  IV fluids contain salt, drinking fluids will minimize the amount of IV fluids you need and decrease the amount of salt you get.    The most common reason for the patient to be readmitted is dehydration. Staying hydrated after you go home from the hospital is very important.  Ensure or Ensure Clear are good options to keep you hydrated.     4) PAIN MANAGEMENT: If we minimize the amount of opioids and narcotics, and use regional blocks (which numb the area where your surgery is) along with oral pain medications; you will have less side effects of  nausea and constipation. Narcotics can slow down your bowels and cause you to stay in the hospital longer.     Our goal is to keep you comfortable; eating and drinking normally and back home safely.     Using an Incentive Spirometer    An incentive spirometer is a device that helps you do deep breathing exercises. These exercises expand your lungs, aid in circulation, and help prevent pneumonia. Deep breathing exercises also help you breathe better and improve the function of your lungs by:    Keeping your lungs clear    Strengthening your breathing muscles    Helping prevent respiratory complications or problems  The incentive spirometer gives you a way to take an active part in your care. A nurse or therapist will teach you breathing exercises. To do these exercises, you will breathe in through your mouth and not your nose. The incentive spirometer only works correctly if you breathe in through your mouth.    Steps to clear lungs  Step 1. Exhale normally. Then, inhale normally.    Relax and breathe out.  Step 2. Place your lips tightly around the mouthpiece.    Make sure the device is upright and not tilted.  Step 3. Inhale as much air as you can through the mouthpiece (don't breath through your nose).    Inhale slowly and deeply.    Hold your breath long enough to keep the balls or disk raised for at least 3 to 5 seconds, or as instructed by your healthcare provider.  Step 4. Repeat the exercise regularly.  Begin using the Incentive Spirometer one week prior to your surgery, 4 times per day-5 times each.

## 2019-01-29 NOTE — ANESTHESIA PREPROCEDURE EVALUATION
Anesthesia Evaluation     . Pt has had prior anesthetic. Type: General and MAC           ROS/MED HX    ENT/Pulmonary:     (+)sleep apnea, uses CPAP , recent URI unresolved cough productive of yellow sputum x 3 weeks -urgent care - mucinex: . Other pulmonary disease Interstitial lung disease followed by pulmonology-previoulsy on O2 for exertion, not currenlty using.    Neurologic:  - neg neurologic ROS     Cardiovascular:     (+) Dyslipidemia, hypertension----. : . . GONZALEZ, . :. valvular problems/murmurs type: AS aortic stenosis:. Previous cardiac testing Echodate:results:date: results: date: results: date: results:          METS/Exercise Tolerance:  1 - Eating, dressing   Hematologic:  - neg hematologic  ROS       Musculoskeletal:   (+) arthritis, , , other musculoskeletal- Polymyositis followed by rheumatologist; back surgery 1976 in [place]      GI/Hepatic:  - neg GI/hepatic ROS       Renal/Genitourinary:     (+) Nephrolithiasis ,       Endo:     (+) type II DM Last HgA1c: 5.9% date: 1/10/19 Using insulin - not using insulin pump not previously admitted for DM/DKA Chronic steroid usage for Date most recently used: polymyositis on methotrexate and prednison; scheduled to start IV biologics in February,.      Psychiatric:  - neg psychiatric ROS       Infectious Disease:  - neg infectious disease ROS       Malignancy:      - no malignancy   Other:    (+) No chance of pregnancy H/O Chronic Pain,H/O chronic opiod use , no other significant disability                    Physical Exam  Normal systems: dental    Airway   Mallampati: I  TM distance: >3 FB  Neck ROM: limited    Dental     Cardiovascular   Rhythm and rate: regular and normal  (+) murmur     PE comment: 3/6 systolic harsh murmur best at base but radiates through to carotids and apex.    Pulmonary   PE comment: Crackles heard bilateral at bases mostly end of expiration. Moving air well. No wheezing    Other findings: [facility]: 2018: Mild  concentric LVH EF 60%; Hypokinesis distal anterolateral wall segments. Mild-moderate aoritc regurgitation and moderate aortic stenosis with mean gradient of 35 and PETE 1.1           PAC Discussion and Assessment    ASA Classification: 3  Case is suitable for:   Anesthetic techniques and relevant risks discussed:   Invasive monitoring and risk discussed:   Types:   Possibility and Risk of blood transfusion discussed:   NPO instructions given:   Additional anesthetic preparation and risks discussed:   Needs early admission to pre-op area:   Other:     PAC Resident/NP Anesthesia Assessment:  Beulah Horner is a 69 year old male being evaluated for a two stage (2 day) orthopedic surgery that will likely be T10-pelvis intrumented fusion with BMP, Possible TLIF, possible pedicle subtraction ostoeotomy, T11-12 decompression and debridement of seroma, with Dr. Carlos Enrique Dial, date and location to be determined. PAC referral for risk assessment and optimization for anesthesia with comorbid conditions of HTN, HLD, aortic valve stenosis (AVS), ventricular hypokinesis, JUAN, interstitial lung ds (ILD), diabetes, hypothyroid, polymyositis and inflammatory arthritis.  Pre-operative considerations include:  1.) CV: Functional status independent, exercise tolerance < 4 METS, limited by back and lung ds. Cardiac risks of HTN (verapamil), HLD (gemfibrizol). No known CAD.   ECHO 2018: LVH. EF 60%, moderate AVS mean gradient 35, PETE 1.1 Hypokinesis distal anterolateral wall segment. (previous 2017 echo mean gradient 49, PETE 1.3).  Repair of valve and angiogram previously recommended by Mr. Alexander 's cardiologist, however more recent phone consultation documented OK to proceed.  Need to clarify risk / benefit and speak with cardiologist for recommendations for pre-op work-up.  2.) Pulmonary: ILD on theophylline, albuterol inhaler and Bactrim for PCP prophylaxis. SOB slightly worse than baseline. PFTs show moderate restrictive  "pattern (2017) FVC 70%; FEV1 70%, DLCO 44%. Hx acute episode of respiratory failure ? ass/w H.flu pneumonia hospitalized [facility] last year. Was on intermittent prn home oxygen but has not needed in months.   Breathing has improved with treatment for polymyositis with steroids and methotrexate.  3.) Ortho: per Dr. Dial- multiple previous lumbar spinal fusions as well as decompressions ...were ultimately complicated by infection (mesh had to be removed and ultimately he was left with a large seroma, lumbar instability, and severe pain and stenosis with bilateral back and leg symptoms\". He doesn't stand upright. Bilateral frozen shoulders inhibit full abduction/extension  On percocet for pain 5/325, tramadol and neurontin.   4.) GI: PONV score = 1  5.) Dermatomyositis on methotrexate, prednisone 30 mg and folowed by rheumatology at [facility] with a plan to begin a biologic next month.  6.) Endo: Type 2 diabetes on low dose NPH with HGBa1c 5.9% this month reflecting good control  Hypothyroid on synthroid.      Discussed cardiopulmonary and rheumatology risk / concerns with Dr. Chong who also saw patient. Please see his recommendations below.      Reviewed and Signed by PAC Mid-Level Provider/Resident  Mid-Level Provider/Resident: Serina Hardin PA-C  Date: 1/29/19  Time: 5:46 PM    Attending Anesthesiologist Anesthesia Assessment:  I have examined the patient and reviewed the medical record.  I have discussed the patient with the OLIVIER and concur with her assessment  The patient is being seen in evaluation prior to scheduling very extensive posterior lumbar fusion.  The patient has multiple known co morbiditeis:    Since the patient was last seen he has been seen by cardiology.  He is felt to have moderate aortic stenosis with mean gradient of 35 mm Hg with LVEF 60%.  The telephone note from cardiology indicated the patient could proceed with surgery, though an earlier note by cardiology recommended TAVR and " cath.  Will call and clarify    Rheumatology has seen the patient and feels the interstitial lung disease is most likely secondary to dermatomyositis.  They are considering changing from methotrexate and prednisone to a biologic agent. They are considering having oxygen as needed added to his regimen.    His DM seems to be under fair control.    I have discussed this case with Dr. Dial.  Without intervention the patient will ultimately have severe neurologic compromise.  I have had lengthy discussion regarding high risk of surgery with the patient.  He very strongly voices the opinion that he would prefer risk of death over paralysis.    I will reach out to his rheumatologist regarding holding off on starting biologic agent.  I will reach out to anesthesia and OR staff to arrange complex care coordination conference    2/5/2019  Addendum    I have spoken with Drs Homer (patient's Cardiologist), Jakub (Rheumatology) and Jayro  Cardiology feels he should proceed with surgery at this time with no further testing and no intervention  Rheumatology would like to know the timeline and discuss institution of biologic agent with Dr Dial if surgery will be many months out (and tehn discontinue for 4 weeks prior to surgery)  Have initiated care conference and will have dedicated anesthesia team available (will need to be on [place])    Reviewed and Signed by PAC Anesthesiologist  Anesthesiologist: Nikhil Chong MD  Date: 1/29/2019  Time:   Pass/Fail:   Disposition:     PAC Pharmacist Assessment:        Pharmacist:   Date:   Time:                           .    TREMAINE CONTI AN PHYSICAL EXAM    Assessment:              Tobacco Use:  Active user of Tobacco

## 2019-01-29 NOTE — CONSULTS
PAC CONSULT    Evaluation for possible two stage orthopedic surgery with Dr. Cal Dial      PAC Resident/NP Anesthesia Assessment:  Beulah Horner is a 69 year old male being evaluated for a two stage (2 day) orthopedic surgery that will likely be T10-pelvis intrumented fusion with BMP, Possible TLIF, possible pedicle subtraction ostoeotomy, T11-12 decompression and debridement of seroma, with Dr. Carlos Enrique Dial, date and location to be determined. PAC referral for risk assessment and optimization for anesthesia with comorbid conditions of HTN, HLD, aortic valve stenosis (AVS), ventricular hypokinesis, JUAN, interstitial lung ds (ILD), diabetes, hypothyroid, polymyositis and inflammatory arthritis.  Pre-operative considerations include:  1.) CV: Functional status independent, exercise tolerance < 4 METS, limited by back and lung ds. Cardiac risks of HTN (verapamil), HLD (gemfibrizol). No known CAD.   ECHO 2018: LVH. EF 60%, moderate AVS mean gradient 35, PETE 1.1 Hypokinesis distal anterolateral wall segment. (previous 2017 echo mean gradient 49, PETE 1.3).  Repair of valve and angiogram previously recommended by Mr. Alexander 's cardiologist, however more recent phone consultation documented OK to proceed.  Need to clarify risk / benefit and speak with cardiologist for recommendations for pre-op work-up.  2.) Pulmonary: ILD on theophylline, albuterol inhaler and Bactrim for PCP prophylaxis. SOB slightly worse than baseline. PFTs show moderate restrictive pattern (2017) FVC 70%; FEV1 70%, DLCO 44%. Hx acute episode of respiratory failure ? ass/w H.flu pneumonia hospitalized [facility] last year. Was on intermittent prn home oxygen but has not needed in months.   Breathing has improved with treatment for polymyositis with steroids and methotrexate.  3.) Ortho: per Dr. Dial- multiple previous lumbar spinal fusions as well as decompressions ...were ultimately complicated by infection (mesh had to be removed and  "ultimately he was left with a large seroma, lumbar instability, and severe pain and stenosis with bilateral back and leg symptoms\". He doesn't stand upright. Bilateral frozen shoulders inhibit full abduction/extension  On percocet for pain 5/325, tramadol and neurontin.   4.) GI: PONV score = 1  5.) Dermatomyositis on methotrexate, prednisone 30 mg and folowed by rheumatology at [facility] with a plan to begin a biologic next month.  6.) Endo: Type 2 diabetes on low dose NPH with HGBa1c 5.9% this month reflecting good control  Hypothyroid on synthroid.            Discussed cardiopulmonary and rheumatology risk / concerns with Dr. Chong who also saw patient. Please see his recommendations below.        Reviewed and Signed by PAC Mid-Level Provider/Resident  Mid-Level Provider/Resident: Serina Hardin PA-C  Date: 1/29/19  Time: 5:46 PM     Attending Anesthesiologist Anesthesia Assessment:  I have examined the patient and reviewed the medical record.  I have discussed the patient with the OLIVIER and concur with her assessment  The patient is being seen in evaluation prior to scheduling very extensive posterior lumbar fusion.  The patient has multiple known co morbiditeis:     Since the patient was last seen he has been seen by cardiology.  He is felt to have moderate aortic stenosis with mean gradient of 35 mm Hg with LVEF 60%.  The telephone note from cardiology indicated the patient could proceed with surgery, though an earlier note by cardiology recommended TAVR and cath.  Will call and clarify     Rheumatology has seen the patient and feels the interstitial lung disease is most likely secondary to dermatomyositis.  They are considering changing from methotrexate and prednisone to a biologic agent. They are considering having oxygen as needed added to his regimen.     His DM seems to be under fair control.     I have discussed this case with Dr. Dial.  Without intervention the patient will ultimately " have severe neurologic compromise.  I have had lengthy discussion regarding high risk of surgery with the patient.  He very strongly voices the opinion that he would prefer risk of death over paralysis.     I will reach out to his rheumatologist regarding holding off on starting biologic agent.  I will reach out to anesthesia and OR staff to arrange complex care coordination conference     Reviewed and Signed by PAC Anesthesiologist  Anesthesiologist: Nikhil Chong MD  Date: 1/29/2019

## 2019-01-30 NOTE — TELEPHONE ENCOUNTER
RECORDS RECEIVED FROM: internal   DATE RECEIVED: 1/30   NOTES STATUS DETAILS   OFFICE NOTE from referring provider Internal    OFFICE NOTE from other specialist Internal    DISCHARGE SUMMARY from hospital N/A    DISCHARGE REPORT from the ER N/A    OPERATIVE REPORT Received    MEDICATION LIST Internal    IMPLANT RECORD/STICKER N/A    LABS     CBC/DIFF N/A    CULTURES N/A    INJECTIONS DONE IN RADIOLOGY N/A    MRI Internal 5/19/18   CT SCAN Internal    XRAYS (IMAGES & REPORTS) Internal    TUMOR     PATHOLOGY  Slides & report N/A    osteopenia

## 2019-02-05 NOTE — TELEPHONE ENCOUNTER
RECORDS RECEIVED FROM: internal   DATE RECEIVED: 2/5   NOTES STATUS DETAILS   OFFICE NOTE from referring provider Internal    OFFICE NOTE from other specialist Internal    DISCHARGE SUMMARY from hospital N/A    DISCHARGE REPORT from the ER N/A    OPERATIVE REPORT Received    MEDICATION LIST Internal    IMPLANT RECORD/STICKER N/A    LABS     CBC/DIFF N/A    CULTURES N/A    INJECTIONS DONE IN RADIOLOGY N/A    MRI Internal 5/19/18   CT SCAN Internal    XRAYS (IMAGES & REPORTS) Internal    TUMOR     PATHOLOGY  Slides & report N/A    osteopenia

## 2019-02-07 NOTE — LETTER
2/7/2019      RE: Beulah Jane  [address]  [facility] [address]       SUBJECTIVE:    Beulah Jane is a 69 year old male here today to disuss osteoporosis.  Previous DEXA done 6/29/18 .  T-score showed him to be -2.8 radius 33% done in June 2018.  This was his first and only DXA scan. Referred by Dr. Carlos Enrique Dial, Orthopedic Spine Surgeon. Surgery planned for April 25, 2019: Pelvis Instrumented Fusion With BMP, Possible Transforminal Lumbar Interbody Fusion (TLIF), Possible Pedicle Subtraction Osteotomy, Thoracic 11-12 Decompression, Debridement Of Seroma  Risk factors for osteroporosis include:    Fractures:  None  Calcium:  [place] milk 2 cups per day, yogurt (some). Loves regular milk but it hurts his stomach.   D3:  2000 unit(s) per day  No chemo/radiation  No cancer  No osteoporosis treatment  Fam Hx:  None, GM with kyphosis  Kidney stone:  2-3 years ago; calcium stone.  Patient reports it is due to too many Cokes  Thyroid:  Yes, hypothyroidism. Dx years ago  Weightligher for [employer]  Seizure meds;  None  Tob:  Started when he was 19-19 yo but quit in 1970s  Steroid inhaler and prednisone at time.         Past Medical History:   Diagnosis Date     Aortic stenosis      Chronic pain      DM (diabetes mellitus), type 2 (H)     on insulin     Hyperlipidemia      JUAN on CPAP      Pneumonia      Polymyositis (H)      Pulmonary fibrosis, unspecified (H)      Seasonal allergies        Past Surgical History:   Procedure Laterality Date     ARTHROSCOPY KNEE  1970     COLONOSCOPY       DECOMPRESSION, FUSION LUMBAR POSTERIOR TWO LEVELS, COMBINED  1997     HERNIA REPAIR  2006     lumbar spine hardware removal  1999     REMOVAL PROSTHETIC MATERIAL/MESH, ABD WALL NECRO TISS INFEXN  2012     ROTATOR CUFF REPAIR RT/LT  2003       Current Outpatient Medications   Medication Sig Dispense Refill     albuterol (2.5 MG/3ML) 0.083% neb solution Take 1 vial by nebulization every 6 hours as needed  for shortness of breath / dyspnea or wheezing       albuterol (PROAIR HFA/PROVENTIL HFA/VENTOLIN HFA) 108 (90 BASE) MCG/ACT Inhaler Inhale 2 puffs into the lungs every 6 hours as needed for shortness of breath / dyspnea or wheezing       beclomethasone (QVAR) 80 MCG/ACT Inhaler Inhale 2 puffs into the lungs 2 times daily       calcium carbonate (OS-DMITRIY 500 MG [place]. [place]) 500 MG tablet Take 1 tablet (500 mg) by mouth 2 times daily 180 tablet 3     cetirizine (ZYRTEC) 10 MG tablet Take 10 mg by mouth daily as needed        Cholecalciferol (VITAMIN D) 2000 units tablet Take 2,000 Units by mouth daily 100 tablet 3     dextromethorphan-guaiFENesin (MUCINEX DM)  MG per 12 hr tablet Take 1 tablet by mouth every 12 hours as needed        FOLIC ACID PO Take 1 mg by mouth daily       GABAPENTIN PO Take 600 mg by mouth 3 times daily       GEMFIBROZIL PO Take 600 mg by mouth 2 times daily       insulin isophane human (HUMULIN N PEN) 100 UNIT/ML injection Inject 6 Units Subcutaneous 2 times daily (before meals) (Patient taking differently: Inject 6 Units Subcutaneous as needed )       METHOTREXATE PO Take 15 mg by mouth once a week Tuesdays       omega 3 1000 MG CAPS Take 2 capsules by mouth 2 times daily       oxyCODONE-acetaminophen (PERCOCET) 5-325 MG per tablet Take 1 tablet by mouth 3 times daily       predniSONE (DELTASONE) 20 MG tablet Take 1 tablet (20 mg) by mouth daily (Patient taking differently: Take 30 mg by mouth every morning .)       sulfamethoxazole-trimethoprim (BACTRIM DS/SEPTRA DS) 800-160 MG per tablet Take 1 tablet 3 times a week (Monday, Wednesday, Friday)  11     theophylline (UNIPHYL) 400 MG 24 hr tablet Take 400 mg by mouth every morning        traMADol (ULTRAM) 50 MG tablet Take 0.5 tablets (25 mg) by mouth every 6 hours as needed for moderate pain 20 tablet 0         Social History     Socioeconomic History     Marital status:      Spouse name: Not on file     Number of children: Not on  "file     Years of education: Not on file     Highest education level: Not on file   Social Needs     Financial resource strain: Not on file     Food insecurity - worry: Not on file     Food insecurity - inability: Not on file     Transportation needs - medical: Not on file     Transportation needs - non-medical: Not on file   Occupational History     Not on file   Tobacco Use     Smoking status: Former Smoker     Packs/day: 0.25     Last attempt to quit: 1970     Years since quittin.6     Smokeless tobacco: Never Used   Substance and Sexual Activity     Alcohol use: Not on file     Comment: few times monthly     Drug use: Not on file     Sexual activity: Not on file   Other Topics Concern     Not on file   Social History Narrative     Not on file       OBJECTIVE:  Resp 18   Ht 1.6 m (5' 3\")   Wt 70.8 kg (156 lb)   BMI 27.63 kg/m     There has been a change in patients height.  NAD  HEENT:  Neg  Neck; no lad  Lungs; cta  Heart: rrr  Abd: benign  Posture:  Forward flexed, flat back posture    ASSESSMENT:  Osteoporosis  Flat back syndrome  Pending spinal surgery with instrumentation  Lactose intolerant        PLAN:    We discussed medication options with him, his wife and son.  He is a good candidate for a number of osteoporosis medications including: fosamax, foreto and tylmos, reclast, prolia.  We have discussed the risks and benefits of Fosamax and Forteo specifically.  He understands that there may be insurance and cost limitations with Forteo, but would like to pursue this medication option as his first choice and Fosamax as his second.  I have prescribed Forteo.  He will have a PTH and a 24 hour urine for calcium.  He has had all other testing recently that I have reviewed in detail.      The importance of calcium and vitamin D in bone health was discussed in detail. A calcium intake of 1200 mg total per day in divided doses, to include diet and supplements, was recommended.  I have urged the patient " to obtain as much as the recommended amount of calcium as possible from the diet.  He probably needs a calcium supplement of 300-650 if he drinks two glasses of [place] milk per day.  Alternatively he could increase his almond intake by one more cup per day. Continue on the 2000 international units vitamin D daily that was started by Dr. Dial.  He recent Vit D level was 43 which is adequate for bone health.      > 45 min of total time spent in one-on-one evalution and discussion with patient regarding nature of problem, course, prior treatments, and therapeutic options,> 50% of which was spent in counseling and coordination of care:    Michelle Ghosh MD, CAQ, FACSM, CCD  [facility]  Sports Medicine and Bone Health  Team Physician;  Athletics

## 2019-02-07 NOTE — PROGRESS NOTES
SUBJECTIVE:    Beulah Jane is a 69 year old male here today to disuss osteoporosis.  Previous DEXA done 6/29/18 .  T-score showed him to be -2.8 radius 33% done in June 2018.  This was his first and only DXA scan. Referred by Dr. Carlos Enrique Dial, Orthopedic Spine Surgeon. Surgery planned for April 25, 2019: Pelvis Instrumented Fusion With BMP, Possible Transforminal Lumbar Interbody Fusion (TLIF), Possible Pedicle Subtraction Osteotomy, Thoracic 11-12 Decompression, Debridement Of Seroma  Risk factors for osteroporosis include:    Fractures:  None  Calcium:  [place] milk 2 cups per day, yogurt (some). Loves regular milk but it hurts his stomach.   D3:  2000 unit(s) per day  No chemo/radiation  No cancer  No osteoporosis treatment  Fam Hx:  None, GM with kyphosis  Kidney stone:  2-3 years ago; calcium stone.  Patient reports it is due to too many Cokes  Thyroid:  Yes, hypothyroidism. Dx years ago  Weightligher for [place]  Seizure meds;  None  Tob:  Started when he was 19-19 yo but quit in 1970s  Steroid inhaler and prednisone at time.         Past Medical History:   Diagnosis Date     Aortic stenosis      Chronic pain      DM (diabetes mellitus), type 2 (H)     on insulin     Hyperlipidemia      JUAN on CPAP      Pneumonia      Polymyositis (H)      Pulmonary fibrosis, unspecified (H)      Seasonal allergies        Past Surgical History:   Procedure Laterality Date     ARTHROSCOPY KNEE  1970     COLONOSCOPY       DECOMPRESSION, FUSION LUMBAR POSTERIOR TWO LEVELS, COMBINED  1997     HERNIA REPAIR  2006     lumbar spine hardware removal  1999     REMOVAL PROSTHETIC MATERIAL/MESH, ABD WALL NECRO TISS INFEXN  2012     ROTATOR CUFF REPAIR RT/LT  2003       Current Outpatient Medications   Medication Sig Dispense Refill     albuterol (2.5 MG/3ML) 0.083% neb solution Take 1 vial by nebulization every 6 hours as needed for shortness of breath / dyspnea or wheezing       albuterol (PROAIR HFA/PROVENTIL  HFA/VENTOLIN HFA) 108 (90 BASE) MCG/ACT Inhaler Inhale 2 puffs into the lungs every 6 hours as needed for shortness of breath / dyspnea or wheezing       beclomethasone (QVAR) 80 MCG/ACT Inhaler Inhale 2 puffs into the lungs 2 times daily       calcium carbonate (OS-DMITRIY 500 MG [place]) 500 MG tablet Take 1 tablet (500 mg) by mouth 2 times daily 180 tablet 3     cetirizine (ZYRTEC) 10 MG tablet Take 10 mg by mouth daily as needed        Cholecalciferol (VITAMIN D) 2000 units tablet Take 2,000 Units by mouth daily 100 tablet 3     dextromethorphan-guaiFENesin (MUCINEX DM)  MG per 12 hr tablet Take 1 tablet by mouth every 12 hours as needed        FOLIC ACID PO Take 1 mg by mouth daily       GABAPENTIN PO Take 600 mg by mouth 3 times daily       GEMFIBROZIL PO Take 600 mg by mouth 2 times daily       insulin isophane human (HUMULIN N PEN) 100 UNIT/ML injection Inject 6 Units Subcutaneous 2 times daily (before meals) (Patient taking differently: Inject 6 Units Subcutaneous as needed )       METHOTREXATE PO Take 15 mg by mouth once a week Tuesdays       omega 3 1000 MG CAPS Take 2 capsules by mouth 2 times daily       oxyCODONE-acetaminophen (PERCOCET) 5-325 MG per tablet Take 1 tablet by mouth 3 times daily       predniSONE (DELTASONE) 20 MG tablet Take 1 tablet (20 mg) by mouth daily (Patient taking differently: Take 30 mg by mouth every morning .)       sulfamethoxazole-trimethoprim (BACTRIM DS/SEPTRA DS) 800-160 MG per tablet Take 1 tablet 3 times a week (Monday, Wednesday, Friday)  11     theophylline (UNIPHYL) 400 MG 24 hr tablet Take 400 mg by mouth every morning        traMADol (ULTRAM) 50 MG tablet Take 0.5 tablets (25 mg) by mouth every 6 hours as needed for moderate pain 20 tablet 0         Social History     Socioeconomic History     Marital status:      Spouse name: Not on file     Number of children: Not on file     Years of education: Not on file     Highest education level: Not on file  "  Social Needs     Financial resource strain: Not on file     Food insecurity - worry: Not on file     Food insecurity - inability: Not on file     Transportation needs - medical: Not on file     Transportation needs - non-medical: Not on file   Occupational History     Not on file   Tobacco Use     Smoking status: Former Smoker     Packs/day: 0.25     Last attempt to quit: 1970     Years since quittin.6     Smokeless tobacco: Never Used   Substance and Sexual Activity     Alcohol use: Not on file     Comment: few times monthly     Drug use: Not on file     Sexual activity: Not on file   Other Topics Concern     Not on file   Social History Narrative     Not on file       OBJECTIVE:  Resp 18   Ht 1.6 m (5' 3\")   Wt 70.8 kg (156 lb)   BMI 27.63 kg/m    There has been a change in patients height.  NAD  HEENT:  Neg  Neck; no lad  Lungs; cta  Heart: rrr  Abd: benign  Posture:  Forward flexed, flat back posture    ASSESSMENT:  Osteoporosis  Flat back syndrome  Pending spinal surgery with instrumentation  Lactose intolerant        PLAN:    We discussed medication options with him, his wife and son.  He is a good candidate for a number of osteoporosis medications including: fosamax, foreto and tylmos, reclast, prolia.  We have discussed the risks and benefits of Fosamax and Forteo specifically.  He understands that there may be insurance and cost limitations with Forteo, but would like to pursue this medication option as his first choice and Fosamax as his second.  I have prescribed Forteo.  He will have a PTH and a 24 hour urine for calcium.  He has had all other testing recently that I have reviewed in detail.      The importance of calcium and vitamin D in bone health was discussed in detail. A calcium intake of 1200 mg total per day in divided doses, to include diet and supplements, was recommended.  I have urged the patient to obtain as much as the recommended amount of calcium as possible from the diet.  " He probably needs a calcium supplement of 300-650 if he drinks two glasses of [place] milk per day.  Alternatively he could increase his almond intake by one more cup per day. Continue on the 2000 international units vitamin D daily that was started by Dr. Dial.  He recent Vit D level was 43 which is adequate for bone health.      > 45 min of total time spent in one-on-one evalution and discussion with patient regarding nature of problem, course, prior treatments, and therapeutic options,> 50% of which was spent in counseling and coordination of care:    Michelle Ghosh MD, CAQ, FACSM, CCD  [facility]  Sports Medicine and Bone Health  Team Physician;  Athletics

## 2019-02-08 NOTE — TELEPHONE ENCOUNTER
Patient called back to confirm both surgery dates with Dr. Dial in April. Made PAC and return visit with Dr. Dial 4/2/19. Made Post-ops. Patient is aware. Will mail out info. Patient best form of contact since he is traveling from [place] is E-Mail or [employer].

## 2019-02-08 NOTE — PROGRESS NOTES
Patient is scheduled for surgery with Dr. Dial, Dr. Alegria, and Dr. Lomeli       Spoke or left message with: patient via phone     Date of Surgery: 4/22/19 and 4/25/19     Location: [place]     Post-ops Made: 6 wks with Dr. Dial     Pre-op with surgeon (if applicable): yes 4/2/19     H&P: Scheduled with PAC on 4/2/19     Additional imaging/appointments: n/a     Surgery packet: given in clinic      Additional comments: patient is having surgery with Dr. Alegria on 4/22/19 and Dr. Lomeli on 4/25/19 at [facility]

## 2019-02-08 NOTE — TELEPHONE ENCOUNTER
[facility] Prior Authorization Team Request    Medication: Forteo 600 MCG/ 2.4 ML   Dosing: Inject 0.08 ml ( 20 mcg ) under the skin once daily  NDC (required for Medicaid members):   [address]-97  Insurance   BIN: [id number]  PCN: [id number]  Grp:   ID: [id number]    CoverMyMeds Key (if applicable):     Additional documentation:     Filling Pharmacy: [facility]  Phone Number: [phone]  Contact: [employer] [facility] (P [id number]) please send all responses to this pool.  Pharmacy NPI (required for Medicaid members): [id number]

## 2019-02-11 NOTE — TELEPHONE ENCOUNTER
PA Initiation    Medication: FORTEO  Insurance Company: [employer] - Phone [phone] Fax [phone]  Pharmacy Filling the Rx: [facility] - [place], [place] - [facility] [address]  Filling Pharmacy Phone:    Filling Pharmacy Fax:    Start Date: 2/11/2019

## 2019-02-12 NOTE — TELEPHONE ENCOUNTER
Prior Authorization Approval    Authorization Effective Date: 2/12/2019  Authorization Expiration Date: 2/11/2021  Medication: FORTEO - Approved  Approved Dose/Quantity: ONE MONTH AT A TIME   Reference #: [id number]   Insurance Company: [employer] - [employer] [phone] Fax [phone]  Expected CoPay: >$1500     CoPay Card Available:      Foundation Assistance Needed:    Which Pharmacy is filling the prescription (Not needed for infusion/clinic administered): [facility] - [place], [place] - [facility] [address]   Pharmacy Notified: No - rx not sent, need to verify if patient needs free drug  Patient Notified: Yes - used  to leave message for patient regarding free drug - will await call back

## 2019-02-12 NOTE — TELEPHONE ENCOUNTER
Free Drug Application Initiated  Medication - Forteo   Sponsor - [facility]  Phone # - [phone]  Fax # - [phone]  Additional Information - patient has been emailed the application and instructions on what is needed to be faxed back - will await patient email

## 2019-02-18 NOTE — TELEPHONE ENCOUNTER
FUTURE VISIT INFORMATION      FUTURE VISIT INFORMATION:    Date: 2/20    Time: 830    Location: Vascular  REFERRAL INFORMATION:    Referring provider:  Dr. Maria providers clinic:  Ortho    Reason for visit/diagnosis  Consult    RECORDS REQUESTED FROM:       Clinic name Comments Records Status Imaging Status                                         All Records Internal

## 2019-02-25 PROBLEM — M40.15 OTHER SECONDARY KYPHOSIS, THORACOLUMBAR REGION: Status: ACTIVE | Noted: 2019-01-01

## 2019-02-25 NOTE — PROGRESS NOTES
"It was a pleasure to see Beulah Jane today in Neurosurgery Clinic. He is a 69 year old male referred by Dr. Dial for preoperative evaluation prior to spinal deformity surgery. He has had multiple previous spinal surgeries and has a significant lumbar kyphotic deformity. He has also had subcutaneous fluid co    Vitals:    02/25/19 0937   BP: 129/64   BP Location: Left arm   Pulse: 98   SpO2: 94%   Weight: 72.6 kg (160 lb)   Height: 1.6 m (5' 3\")     Body mass index is 28.34 kg/m .  Mild Pain (2)    Awake alert.  Strength 5/5 BUE/LE  Seroma under lumbar incision.    Imaging: Spinal imaging shows upright kyphotic deformity that corrects to nearly normal on laying down. He has severe stenosis at L3-4 as well. The imaging was shown to the patient and reviewed in clinic.     Assessment: S/P lumbar surgery with lumbar kyphosis and lumbar stenosis with neurogenic claudication.    Plan: He is scheduled for joint surgery with myself and Dr. Dial. We discussed this in detail and the risks and benefits. We will proceed later this spring with surgery.      Answers for HPI/ROS submitted by the patient on 2/25/2019   General Symptoms: No  Skin Symptoms: No  HENT Symptoms: No  EYE SYMPTOMS: No  HEART SYMPTOMS: Yes  LUNG SYMPTOMS: Yes  INTESTINAL SYMPTOMS: No  URINARY SYMPTOMS: No  REPRODUCTIVE SYMPTOMS: No  SKELETAL SYMPTOMS: Yes  BLOOD SYMPTOMS: No  NERVOUS SYSTEM SYMPTOMS: No  MENTAL HEALTH SYMPTOMS: No  Cough: Yes  Sputum or phlegm: Yes  Coughing up blood: No  Difficulty breating or shortness of breath: Yes  Snoring: No  Wheezing: No  Difficulty breathing on exertion: Yes  Nighttime Cough: No  Difficulty breathing when lying flat: No  Chest pain or pressure: Yes  Fast or irregular heartbeat: No  Pain in legs with walking: No  Trouble breathing while lying down: No  Fingers or toes appear blue: No  High blood pressure: No  Low blood pressure: No  Fainting: No  Murmurs: Yes  Pacemaker: No  Varicose veins: No  Edema or " swelling: No  Wake up at night with shortness of breath: No  Light-headedness: No  Exercise intolerance: Yes  Back pain: Yes  Muscle aches: Yes  Neck pain: Yes  Swollen joints: No  Joint pain: Yes  Bone pain: No  Muscle cramps: Yes  Muscle weakness: Yes  Joint stiffness: No  Bone fracture: No

## 2019-02-25 NOTE — LETTER
"2/25/2019       RE: Beulah Jane  [address]  [address]  [facility] [address]     Dear Colleague,    Thank you for referring your patient, Beulah Jane, to the [facility] NEUROSURGERY at [facility] Please see a copy of my visit note below.    It was a pleasure to see Beulah Jane today in Neurosurgery Clinic. He is a 69 year old male referred by Dr. Dial for preoperative evaluation prior to spinal deformity surgery. He has had multiple previous spinal surgeries and has a significant lumbar kyphotic deformity. He has also had subcutaneous fluid co    Vitals:    02/25/19 0937   BP: 129/64   BP Location: Left arm   Pulse: 98   SpO2: 94%   Weight: 72.6 kg (160 lb)   Height: 1.6 m (5' 3\")     Body mass index is 28.34 kg/m .  Mild Pain (2)    Awake alert.  Strength 5/5 BUE/LE  Seroma under lumbar incision.    Imaging: Spinal imaging shows upright kyphotic deformity that corrects to nearly normal on laying down. He has severe stenosis at L3-4 as well. The imaging was shown to the patient and reviewed in clinic.     Assessment: S/P lumbar surgery with lumbar kyphosis and lumbar stenosis with neurogenic claudication.    Plan: He is scheduled for joint surgery with myself and Dr. Dial. We discussed this in detail and the risks and benefits. We will proceed later this spring with surgery.      Again, thank you for allowing me to participate in the care of your patient.      Sincerely,    Joseluis Lomeli MD      "

## 2019-02-27 NOTE — TELEPHONE ENCOUNTER
Calling [employer] at [phone] opt 2 to check status of pt's [employer] free drug analisa. Spoke to rep Raf. She states they are behind on faxes - still working thru faxes from 2/12/19. Processing time is 1-2 business days after receipt and file is created.

## 2019-02-27 NOTE — TELEPHONE ENCOUNTER
Patient was discussed at Complex Spine Meeting 2/20/2019. Consensus is: Patient has severe cardio-pulmonary co morbidities, but that it would be appropriate to proceed with surgical planning for 3 level ALIF. Defer to Rheumatology to control holding of Retuximab 4 weeks prior to surgery and restarting 10 weeks post. Vascular surgeon to assist and Plastic Surgery to be involved.

## 2019-02-27 NOTE — TELEPHONE ENCOUNTER
Complex spine case review    Date of meetin2019  Document status:  Auth (verified)  Presented by:   Carlos Enrique Dial MD  Recorded by:   Corwin Morocho PA-C    *Final Report*    [facility]    Beulah Jane was discussed at the Complex Spine Case Review on 19.  Representatives from Spine surgery, Neurosurgery and Anesthesia were present at the meeting.    Concerns:  Taking Retuximab   need to hold 4 weeks prior to, and 10 weeks after surgery  Closure concerns    Recommendation:  Proceed with 3 level ALIF with support from Plastic Surgery and Vascular Surgery. Rheumatology to manage holding of Retuximab    Electronically signed by:  Corwin Morocho PA-C ........................ 2019, 5:06 PM

## 2019-02-28 NOTE — TELEPHONE ENCOUNTER
Fax rec'd from [employer] indication missing info (not the case - income proof WAS provided). Calling [employer] back to verify the issue at [phone] opt 2 for Stormy. Spoke to rep [place]. Tax forms are from 2016 and program needs from 2017/ 2018 (depending on most recently filed taxes). Once faxed, can take 2-3 days to attach to file.

## 2019-03-06 NOTE — LETTER
3/6/2019       RE: Beulah Jane  [address]  [address]  [facility] [address]     Dear Colleague,    Thank you for referring your patient, Beulah Jane, to the [facility] VASCULAR [facility]INIC at [facility]. Please see a copy of my visit note below.      Again, thank you for allowing me to participate in the care of your patient.      Sincerely,    Hilda Alegria MD

## 2019-03-06 NOTE — NURSING NOTE
Vascular Rooming Note     Beulah Jane's goals for this visit include:   Chief Complaint   Patient presents with     Consult     Beulah is being seen today for a consult, discussion about the surgery, no other concerns at this time as reported by patient .     Barbara Mondragon LPN

## 2019-03-06 NOTE — PROGRESS NOTES
Vascular Surgery Consultation Note     Patient:  Beulah Jane   Date of birth 1950, Medical record number [id number]  Date of Visit:  03/06/2019  Date of Admission: (Not on file)  Consult Requester:No att. providers found            Assessment and Recommendations:   ASSESSMENT / RECOMMENDATION:    69-year-old male with spinal stenosis and need for anterior spine exposure for lumbar levels L2 through L4.  I did discuss with the patient that I think this is possible however it may cause more issues with his rectus diastases given the retraction of the left rectus muscle laterally during the case.  We also discussed his healing issues and being on prednisone that could result in poor healing and potential increased risk of infection.  I discussed my role and the retraction of his aorta to expose the levels for Dr. Dial.  We discussed the possible risk of bleeding and need for vascular repair.  All questions were answered.      Many thanks for involving me in the care of this very pleasant patient. Should any questions or concerns arise, please don't hesitate to contact me.    Warm Regards,    Hilda Alegria MD, DFSVS, RPVI  Director, [facility]  Professor and Chief, Vascular and Endovascular Surgery  [facility]  Cell: [phone]  [email or web address]           History of Present Illness:   Mr. Catherine Jane is a 69-year-old male referred today by Dr. Cal Dial of spine surgery for evaluation of a anterior approach for levels L2 through L4.  The patient has had several spine issues in the past and had a seroma develop as well.  His visit today primarily centers around a rectus diastases which has been present for several months per the patient.  He does not describe particular pain to this but is bothered by it.  He denies a history of bowel obstructions, malignancy, deep vein thromboses, claudication, stroke, amaurosis or TIA.  He has had issues with pulmonary fibrosis and  shortness of breath and is wondering if his rectus diastases is secondary to this.  He does also have a history of aortic valvular stenosis and hypertension.  He does take insulin for his diabetes and is also on prednisone.           Review of Systems:   CONSTITUTIONAL:  No fevers or chills  EYES: negative for icterus  ENT:  negative for hearing loss, tinnitus and sore throat  RESPIRATORY:  negative for cough with sputum and dyspnea  CARDIOVASCULAR:  negative for chest pain, dyspnea  GASTROINTESTINAL:  negative for nausea, vomiting, diarrhea and constipation  GENITOURINARY:  negative for dysuria  HEME:  No easy bruising  INTEGUMENT:  negative for rash and pruritus  NEURO:  Negative for headache           Past Medical History:     Past Medical History:   Diagnosis Date     Aortic stenosis      Chronic pain      DM (diabetes mellitus), type 2 (H)     on insulin     Hyperlipidemia      JUAN on CPAP      Pneumonia      Polymyositis (H)      Pulmonary fibrosis, unspecified (H)      Seasonal allergies             Past Surgical History:     Past Surgical History:   Procedure Laterality Date     ARTHROSCOPY KNEE       COLONOSCOPY       DECOMPRESSION, FUSION LUMBAR POSTERIOR TWO LEVELS, COMBINED       HERNIA REPAIR       lumbar spine hardware removal       REMOVAL PROSTHETIC MATERIAL/MESH, ABD WALL NECRO TISS INFEXN       ROTATOR CUFF REPAIR RT/LT              Family History:   No family history on file.         Social History:     Social History     Tobacco Use     Smoking status: Former Smoker     Packs/day: 0.25     Last attempt to quit: 1970     Years since quittin.7     Smokeless tobacco: Never Used   Substance Use Topics     Alcohol use: Not on file     Comment: few times monthly     History   Sexual Activity     Sexual activity: Not on file            Current Medications (antimicrobials listed in bold):            Allergies:   No Known Allergies         Physical Exam:   Vitals were  reviewed  Patient Vitals for the past 24 hrs:   BP Pulse SpO2   03/06/19 1522 109/69 -- --   03/06/19 1518 108/63 98 98 %     Ranges for his vital signs:  Pulse:  [98] 98  BP: (108-109)/(63-69) 109/69  SpO2:  [98 %] 98 %    Physical Examination:  GENERAL:  well-developed, well-nourished, in bed in no acute distress.   HEENT:  Head is normocephalic, atraumatic   EYES:  Eyes have anicteric sclerae without conjunctival injection or stigmata of endocarditis.    ENT:  Oropharynx is moist without exudates or ulcers. Tongue is midline  NECK:  Supple. No  Cervical lymphadenopathy  LUNGS:  Clear to auscultation bilateral.   CARDIOVASCULAR:  Regular rate and rhythm with no murmurs, gallops or rubs.  ABDOMEN:  Normal bowel sounds, soft, nontender. No appreciable hepatosplenomegaly.  Supraumbilical rectus diastases appreciated on Valsalva.  This is easily reducible without pain.  I do not appreciate palpable pulsatile masses.  SKIN:  No acute rashes.    NEUROLOGIC:  Grossly nonfocal. Active x4 extremities  Pulses: Feet warm with palpable DP pulses.         Laboratory Data:     Hematology Studies    Recent Labs   Lab Test 06/26/18  1247 12/06/17  0605 12/05/17  1149 11/30/17  0618 11/29/17  0553 11/28/17  0755   WBC 9.3 10.4 14.9* 10.4 14.4* 15.0*   ANEU 7.9 8.5* 13.3* 8.5* 12.6* 14.0*   AEOS 0.0 0.1 0.0 0.0 0.0 0.0   HGB 14.4 13.6 14.8 10.4* 10.9* 11.5*   MCV 96 94 93 96 95 95    350 408 298 270 255       Metabolic Studies     Recent Labs   Lab Test 12/06/17  0605 12/05/17  1149 11/30/17  0618 11/29/17  0553 11/28/17  0755    137 142 141 138   POTASSIUM 4.0 4.2 3.5 3.5 4.2   CHLORIDE 104 103 106 108 105   CO2 25 28 29 25 24   BUN 19 17 24 23 17   CR 0.79 0.94 1.11 0.96 0.91   GFRESTIMATED >90 79 66 77 83       Imaging Studies    Total time spent 30 minutes face to face with patient with more than 50% time spent in counseling and coordination of care.

## 2019-03-08 PROBLEM — Z79.4 TYPE 2 DIABETES MELLITUS WITH NEUROLOGIC COMPLICATION, WITH LONG-TERM CURRENT USE OF INSULIN (H): Status: ACTIVE | Noted: 2017-12-15

## 2019-03-08 PROBLEM — E78.2 MIXED HYPERLIPIDEMIA: Status: ACTIVE | Noted: 2018-08-13

## 2019-03-08 PROBLEM — I10 HYPERTENSION: Status: ACTIVE | Noted: 2017-12-15

## 2019-03-08 PROBLEM — M51.369 DDD (DEGENERATIVE DISC DISEASE), LUMBAR: Status: ACTIVE | Noted: 2018-05-03

## 2019-03-08 PROBLEM — T79.2XXA SEROMA DUE TO TRAUMA (H): Status: ACTIVE | Noted: 2017-12-21

## 2019-03-08 PROBLEM — Z80.0 FAMILY HISTORY OF COLON CANCER: Status: ACTIVE | Noted: 2018-05-03

## 2019-03-08 PROBLEM — J84.10 PULMONARY FIBROSIS (H): Status: ACTIVE | Noted: 2017-12-15

## 2019-03-08 PROBLEM — E03.9 HYPOTHYROIDISM: Status: ACTIVE | Noted: 2017-12-15

## 2019-03-08 PROBLEM — R00.0 TACHYCARDIA: Status: ACTIVE | Noted: 2017-12-15

## 2019-03-08 PROBLEM — M19.90 INFLAMMATORY ARTHRITIS: Status: ACTIVE | Noted: 2019-01-01

## 2019-03-08 PROBLEM — E11.49 TYPE 2 DIABETES MELLITUS WITH NEUROLOGIC COMPLICATION, WITH LONG-TERM CURRENT USE OF INSULIN (H): Status: ACTIVE | Noted: 2017-12-15

## 2019-03-08 PROBLEM — M33.13 DERMATOMYOSITIS (H): Status: ACTIVE | Noted: 2017-12-15

## 2019-03-08 PROBLEM — I35.0 AORTIC VALVE STENOSIS: Status: ACTIVE | Noted: 2017-12-15

## 2019-03-26 NOTE — TELEPHONE ENCOUNTER
Sent patient [email or web address] message following up on foundation requirement for newer tax year documents, emailed patient already but received no response - will await communication back from patient.

## 2019-04-02 NOTE — NURSING NOTE
Reason For Visit:   Chief Complaint   Patient presents with     RECHECK     PAC review and discuss surgery       There were no vitals taken for this visit.         Alessio Jha ATC

## 2019-04-02 NOTE — ANESTHESIA PREPROCEDURE EVALUATION
Anesthesia Pre-Procedure Evaluation    Patient: Beulha Jane   MRN:     [id number] Gender:   male   Age:    69 year old :      1950        Preoperative Diagnosis: * No surgery found *        Past Medical History:   Diagnosis Date     Aortic stenosis      Chronic pain      DM (diabetes mellitus), type 2 (H)     on insulin     Hyperlipidemia      JUAN on CPAP      Pneumonia      Polymyositis (H)      Pulmonary fibrosis, unspecified (H)      Seasonal allergies       Past Surgical History:   Procedure Laterality Date     ARTHROSCOPY KNEE       COLONOSCOPY       DECOMPRESSION, FUSION LUMBAR POSTERIOR TWO LEVELS, COMBINED       HERNIA REPAIR       lumbar spine hardware removal       REMOVAL PROSTHETIC MATERIAL/MESH, ABD WALL NECRO TISS INFEXN       ROTATOR CUFF REPAIR RT/LT            Anesthesia Evaluation     . Pt has had prior anesthetic. Type: General and MAC           ROS/MED HX    ENT/Pulmonary:     (+)sleep apnea, uses CPAP , . Other pulmonary disease Interstitial lung disease followed by pulmonology-previoulsy on O2 for exertion, not currenlty using.    Neurologic:  - neg neurologic ROS     Cardiovascular:     (+) Dyslipidemia, hypertension----. : . . . :. valvular problems/murmurs type: AS aortic stenosis:. Previous cardiac testing Echodate:2018results:  Echocardiogram 2018       CONCLUSION:    1. Mild concentric LVH. Normal LV size and systolic function.     Hypokinesis of the distal anterolateral and inferolateral wall     segments.    2. Normal right ventricular size and function.    3. Moderate aortic stenosis is present -- see details below.    4. Mild to moderate aortic regurgitation.    5. Compared to prior report, there has been a slight progression in     the degree of AS.      Left Ventricular Ejection Fraction: 60 %      Measurements:    M-MODE    IVS to PW Ratio MM                1.4                         2D ECHO    Body Height                       66  in                     Body Weight                       156 lb                    Body Surface Area                 1.8 m                     LV Diastolic Diameter Base LX     4.9 cm                3.5-5.7    LV Systolic Diameter Base LX      3.1 cm                2.3-4.9    LV Diastolic Diameter Index       2.7 cm/m                  IVS Diastolic Thickness           1.4 cm                0.6-1.1 cm    LVPW Diastolic Thickness          0.99 cm               0.6-1.1 cm    Aorta at Sinuses Diameter         3.5 cm                    Ascending Aorta Diameter          3.7 cm                    LA Area 4C View                   16.6 cm                   LA Area 2C View                   13.8 cm               <= 20 cm     LA Volume                         41.5 cm                   LA Volume Index                   22.9 cm /m            16-34 cm /m       DOPPLER    AV Peak Velocity                  377 cm/s                  AV Peak Gradient                  56.9 mmHg                 AV Mean Gradient                  35 mmHg                   AV Velocity Time Integral         68.6 cm                   LVOT Peak Velocity                118 cm/s                  LVOT Peak Gradient                5.6 mmHg                  LVOT Mean Gradient                3 mmHg                    LVOT Velocity Time Integral       20.2 cm                   LVOT Diameter                     2.1 cm                    LVOT Area                         3.5 cm                    LVOT AV VTI Ratio                 0.29                      AV Stroke Volume                  70 cm                     AV Area Cont Eq vti               1 cm                      AV Area Cont Eq pk                1.1 cm                    Mitral E Point Velocity           45.6 cm/s                 Mitral  A Point Velocity          76.6 cm/s                 Mitral E to A Ratio               0.6                       MV Deceleration Time              209 ms                     MV Pressure Half Time             61 ms                     MV Area PHT                       3.6 cm                    LV E' Lateral Velocity            4.7 cm/s                  LV E' Septal Velocity             5.3 cm/s                  Mitral E to LV E' Lateral Ratio   9.7                       Mitral E to LV E' Septal Ratio    8.7                         COLOR DOPPLER    LVOT Diameter                     2.1 cm                    LA Area 4C View                   16.6 cm                   LA Area 2C View                   13.8 cm               <= 20 cm     LA Volume                         41.5 cm                   LA Volume Index                   22.9 ml/m             16-34 cm /m               Left Ventricle:     Mild concentric LVH. Normal LV size and                         systolic function. Hypokinesis of the distal                         anterolateral and inferolateral wall segments.    Right Ventricle:    Normal right ventricular size and function.    Left Atrium:        Normal size left atrium.    Right Atrium:       Normal size right atrium.    Aortic Valve:       Moderate aortic stenosis is present -- Vmax 3.8                         m/s, mean gradient 35 mmHg, PETE 1.1 cm^2.                          Mild to moderate aortic regurgitation.    Mitral Valve:       Trace mitral regurgitation.    Tricuspid Valve:    Normal in appearance and function.    Pulmonic Valve:     Normal in appearance and function.    Aorta:              The proximal ascending aorta was normal in                         size.    Pericardium:        The pericardium is normal.  No significant                         effusion.    IVC:                Normal IVC.    IAS:                Interatrial septum appears intact.    3D Imaging/Contrast:              date: results:ECG reviewed date:4/2/2019 results:NSR, non-specific intraventricular block date: results:          METS/Exercise Tolerance:  1 - Eating, dressing   Hematologic:  -  neg hematologic  ROS       Musculoskeletal:   (+) arthritis, , , other musculoskeletal- myositis followed by rheumatologist; back surgery 1976 in [place]      GI/Hepatic:  - neg GI/hepatic ROS       Renal/Genitourinary:     (+) Nephrolithiasis ,       Endo:     (+) type II DM Last HgA1c: 5.9% date: 1/10/19 Using insulin - not using insulin pump not previously admitted for DM/DKA Chronic steroid usage for Other Date most recently used: myositis ,.      Psychiatric:  - neg psychiatric ROS       Infectious Disease:  - neg infectious disease ROS       Malignancy:      - no malignancy   Other:    (+) No chance of pregnancy C-spine cleared: N/A, H/O Chronic Pain,H/O chronic opiod use , no other significant disability                        PHYSICAL EXAM:   Mental Status/Neuro: A/A/O   Airway: Facies: Feasible  Mallampati: II  Mouth/Opening: Full  TM distance: > 6 cm  Neck ROM: Full   Respiratory: Auscultation: CTAB     Resp. Rate: Normal     Resp. Effort: Normal      CV:   Heart: Murmur (Systolic)   Comments:      Dental:  Dentures: Partial                  Results for DANELLE SIMMS (MRN [id number]) as of 4/2/2019 15:19   Ref. Range 4/2/2019 12:15   Sodium Latest Ref Range: 133 - 144 mmol/L 138   Potassium Latest Ref Range: 3.4 - 5.3 mmol/L 4.8   Chloride Latest Ref Range: 94 - 109 mmol/L 104   Carbon Dioxide Latest Ref Range: 20 - 32 mmol/L 29   Urea Nitrogen Latest Ref Range: 7 - 30 mg/dL 25   Creatinine Latest Ref Range: 0.66 - 1.25 mg/dL 0.82   GFR Estimate Latest Ref Range: >60 mL/min/1.73_m2 >90   GFR Estimate If Black Latest Ref Range: >60 mL/min/1.73_m2 >90   Calcium Latest Ref Range: 8.5 - 10.1 mg/dL 9.8   Anion Gap Latest Ref Range: 3 - 14 mmol/L 4   Glucose Latest Ref Range: 70 - 99 mg/dL 94   WBC Latest Ref Range: 4.0 - 11.0 10e9/L 12.2 (H)   Hemoglobin Latest Ref Range: 13.3 - 17.7 g/dL 15.9   Hematocrit Latest Ref Range: 40.0 - 53.0 % 52.9   Platelet Count Latest Ref Range: 150 - 450 10e9/L 206  "  RBC Count Latest Ref Range: 4.4 - 5.9 10e12/L 5.76   MCV Latest Ref Range: 78 - 100 fl 92   MCH Latest Ref Range: 26.5 - 33.0 pg 27.6   MCHC Latest Ref Range: 31.5 - 36.5 g/dL 30.1 (L)   RDW Latest Ref Range: 10.0 - 15.0 % 19.4 (H)   INR Latest Ref Range: 0.86 - 1.14  1.03   ABO Unknown O   RH(D) Unknown Pos   Antibody Screen Unknown Neg   Test Valid Only At Latest Units:     [facility]   Specimen Expires Unknown 04/05/2019             Preop Vitals  BP Readings from Last 3 Encounters:   04/02/19 135/67   03/06/19 109/69   02/25/19 129/64    Pulse Readings from Last 3 Encounters:   04/02/19 99   03/06/19 98   02/25/19 98      Resp Readings from Last 3 Encounters:   04/02/19 18   02/07/19 18   07/02/18 18    SpO2 Readings from Last 3 Encounters:   04/02/19 95%   03/06/19 98%   02/25/19 94%      Temp Readings from Last 1 Encounters:   04/02/19 98.1  F (36.7  C) (Oral)    Ht Readings from Last 1 Encounters:   04/02/19 1.6 m (5' 3\")      Wt Readings from Last 1 Encounters:   04/02/19 75.5 kg (166 lb 8 oz)    Estimated body mass index is 29.49 kg/m  as calculated from the following:    Height as of this encounter: 1.6 m (5' 3\").    Weight as of this encounter: 75.5 kg (166 lb 8 oz).     LDA:            JJUSTOG FV AN PLAN NO PONV RULE         PAC Discussion and Assessment    ASA Classification: 3  Case is suitable for: [place]  Anesthetic techniques and relevant risks discussed: GA  Invasive monitoring and risk discussed: Yes  Types:   Possibility and Risk of blood transfusion discussed:   NPO instructions given:   Additional anesthetic preparation and risks discussed:   Needs early admission to pre-op area:   Other:     PAC Resident/NP Anesthesia Assessment:  Beulah Jane is a 68 yo male scheduled for Stage 1 Of Two Procedure: Lumbar 1 Or Lumbar 2-5 Anterior Lumbar Interbody Fusion with BMP on 4/22/2019 (stage 2 will be 4/25/2019) by Dr. Dalal in treatment of spinal stenosis and need for anterior " "spine exposure for lumbar levels L2 through L4.  I        Previous anesthesia without complications   1.) CV: Functional status independent, exercise tolerance < 4 METS, limited by back pain. Cardiac risks of HTN (verapamil), HLD (gemfibrizol). No known CAD.   ECHO 2018: LVH. EF 60%, moderate AVS mean gradient 35, PETE 1.1 Hypokinesis distal anterolateral wall segment. (previous 2017 echo mean gradient 49, PETE 1.3).  Repair of valve and angiogram previously recommended by Mr. Alexander 's cardiologist, however more recent phone consultation documented OK to proceed. EKG today NSR, non-specific intraventricular block  2.) Pulmonary: ILD on theophylline, albuterol inhaler and Bactrim for PCP prophylaxis. PFTs show moderate restrictive pattern (2017) FVC 70%; FEV1 70%, DLCO 44%. JUAN with CPAP  Breathing has improved with treatment for polymyositis with steroids and methotrexate.  3.) MSK: per Dr. Dial- multiple previous lumbar spinal fusions as well as decompressions ...were ultimately complicated by infection (mesh had to be removed and ultimately he was left with a large seroma, lumbar instability, and severe pain and stenosis with bilateral back and leg symptoms\". He doesn't stand upright. Bilateral frozen shoulders inhibit full abduction/extension  On percocet for pain 5/325, tramadol and neurontin.   4.) GI: PONV score = 1  5.) Dermatomyositis on methotrexate, prednisone 30 mg and folowed by [facility]ology at [facility] with a plan to begin a biologic next month.  6.) Endo: Type 2 diabetes on low dose NPH with HGBa1c 5.9%. Hypothyroid on synthroid.                                 Reviewed and Signed by PAC Mid-Level Provider/Resident  Mid-Level Provider/Resident: Marlys Dalal, APRN  Date: 4/2/2019  Time: 1200    Attending Anesthesiologist Anesthesia Assessment:  I have examined the patient and reviewed the medical record.  I have discussed the patient with the OLIVIER and concur with her assessment  The patient is " being seen in evaluation prior to scheduling very extensive posterior lumbar fusion.  The patient has multiple known co morbiditeis:     Since the patient was last seen he has been seen by cardiology.  He is felt to have moderate aortic stenosis with mean gradient of 35 mm Hg with LVEF 60%.  The telephone note from cardiology indicated the patient could proceed with surgery, though an earlier note by cardiology recommended TAVR and cath.  Will call and clarify     Rheumatology has seen the patient and feels the interstitial lung disease is most likely secondary to dermatomyositis.  They are considering changing from methotrexate and prednisone to a biologic agent. They are considering having oxygen as needed added to his regimen.     His DM seems to be under fair control.     I have discussed this case with Dr. Dial.  Without intervention the patient will ultimately have severe neurologic compromise.  I have had lengthy discussion regarding high risk of surgery with the patient.  He very strongly voices the opinion that he would prefer risk of death over paralysis.     I will reach out to his rheumatologist regarding holding off on starting biologic agent.  I will reach out to anesthesia and OR staff to arrange complex care coordination conference     4/2/2019 Addendum     I have spoken with Ange Coronel (patient's Cardiologist), Jakub (Rheumatology) and Jayro  Cardiology feels he should proceed with surgery at this time with no further testing and no intervention  Rheumatology would like to know the timeline and discuss institution of biologic agent with Dr Dial if surgery will be many months out (and tehn discontinue for 4 weeks prior to surgery).  This has been done and the patient feels much better.  Currently without acute exacerbation of symptoms  The patient has cardiac anesthesiologist assigned to his surgical case and she is aware of patient and underlying health issues.  The patient again verbalizes his  understanding of the high risk nature of this surgery but wishes to proceed.  Final plan per attending anesthesiologist the day of surgery.              Reviewed and Signed by PAC Anesthesiologist  Anesthesiologist: Nikhil Chong MD  Date: 4/2/2019  Time:   Pass/Fail:   Disposition:     PAC Pharmacist Assessment:        Pharmacist:   Date:   Time:        ANASTACIA Carlson

## 2019-04-02 NOTE — PATIENT INSTRUCTIONS
Preparing for Your Surgery      Name:  Beulah Jane   MRN:  [id number]   :  1950   Today's Date:  2019     Arriving for surgery:  Surgery date:  19  Arrival time:  5:30 am    Please come to:  [facility] [address]  [address] [place]  [address]    - ? parking is available in front of the hospital      -    Please proceed to [address] on the 3rd floor. [phone]?     - ?If you are in need of directions, wheelchair or escort please stop at the Information Desk in the lobby.  Inform the information person that you are here for surgery; a wheelchair and escort to [address] will be provided.?     -  Bring your ID and insurance card.    Enhanced Recovery After Surgery     This is a team effort, including you, to get you back on your feet, eating and drinking normally and out of the hospital as quickly as possible.  The goals are: 1) NO INFECTIONS and   2) RETURN TO NORMAL DIET    How can we achieve these goals?  1) STAY ACTIVE: Walk every day before your surgery; try to increase the amount every day.  Walk after surgery as much as you can-the nurses will help you.  Walking speeds healing and gets you home quicker, you heal better at home and have less risk of infection.     2) INCENTIVE SPIROMETER: Practice your incentive spirometer 4 times per day with 5-10 repetitions each time.  Using the incentive spirometer can strengthen your muscles between your ribs and help you have a strong cough after surgery.  A more effective cough can help prevent problems with your lungs.    3) STAY HYDRATED: Drink clear liquids up until 2 hours before your surgery. We would like you to purchase a drink such as Gatorade or Ensure Clear (not the milkshake type).  Drink this before bedtime and on the way into the hospital, drink between 8-12 ounces or until you feel hydrated.  Keeping well hydrated leads to your veins being plump, you wake up faster, and you are less  likely to be nauseated. Start drinking water as soon as you can after surgery and advance to clear liquids and food as tolerated.  IV fluids contain salt, drinking fluids will minimize the amount of IV fluids you need and decrease the amount of salt you get.    The most common reason for the patient to be readmitted is dehydration. Staying hydrated after you go home from the hospital is very important.  Ensure or Ensure Clear are good options to keep you hydrated.     4) PAIN MANAGEMENT: If we minimize the amount of opioids and narcotics, and use regional blocks (which numb the area where your surgery is) along with oral pain medications; you will have less side effects of nausea and constipation. Narcotics can slow down your bowels and cause you to stay in the hospital longer.     Our goal is to keep you comfortable; eating and drinking normally and back home safely.     What can I eat or drink?  -  You may have solid food or milk products until 8 hours prior to your surgery. (Until 11:30 pm 4-21-19 )  -  You may have water, apple juice or 7up/Sprite until 2 hours prior to your surgery. (Until 5:30 am )    Which medicines can I take?       -  Do not bring your own medications to the hospital, except for inhalers.        -  Follow [facility] instructions regarding Ibuprofen. If no instructions given, NO Ibuprofen the day prior to surgery.         -  Hold Fish Oil 7 days prior to surgery. Last dose 4-14-19.    -  Do NOT take these medications in the morning, the day of surgery:  Calcium Carbonate, Vitamin D, Folic Acid,   Humulin N insulin (Takes as needed)    -  Please take these medications the morning of surgery:  Gabapentin, Gemfibrozil, Lansoprazole (Prevacid), Levothyroxine, Prednisone, Bactrim, Theophylline,      Tramadol if needed      Albuterol inhaler/neb if needed      Oxycodone if needed    How do I prepare myself?      -  Bring CPAP.  -  Take two showers: one the night before surgery; and one the  morning of surgery.         Use Scrubcare or Hibiclens to wash from neck down.  You may use your own shampoo and conditioner. No other hair products.   -  Do NOT use lotion, powder, colognes, deodorant, or antiperspirant the day of your surgery.  -  Do NOT wear any jewelry.    -  Begin using Incentive Spirometer 1 week prior to surgery.  Use 4 times per day, up to 5-10 breaths each time.  Bring Incentive Spirometer to hospital.    Questions or Concerns:  If you have questions or concerns prior to your surgery, call [phone]. (Mon - Fri   8 am- 5:30 pm)  Questions about surgery, contact your Surgeons office.      AFTER YOUR SURGERY  Breathing exercises   Breathing exercises help you recover faster. Take deep breaths and let the air out slowly. This will:     Help you wake up after surgery.    Help prevent complications like pneumonia.  Preventing complications will help you go home sooner.   Nausea and vomiting   You may feel sick to your stomach after surgery; if so, let your nurse know.    Pain control:  After surgery, you may have pain. Our goal is to help you manage your pain. Pain medicine will help you feel comfortable enough to do activities that will help you heal.  These activities may include breathing exercises, walking and physical therapy.   To help your health care team treat your pain we will ask: 1) If you have pain  2) where it is located 3) describe your pain in your words  Methods of pain control include medications given by mouth, vein or by nerve block for some surgeries.  We may give you a pain control pump that will:  1) Deliver the medicine through a tube placed in your vein  2) Control the amount of medicine you receive  3) Allow you to push a button to deliver a dose of pain medicine  Sequential Compression Device (SCD) or Pneumo Boots:  You may need to wear SCD S on your legs or feet. These are wraps connected to a machine that pumps in air and releases it. The repeated pumping helps  prevent blood clots from forming.   Using an Incentive Spirometer    An incentive spirometer is a device that helps you do deep breathing exercises. These exercises expand your lungs, aid in circulation, and help prevent pneumonia. Deep breathing exercises also help you breathe better and improve the function of your lungs by:    Keeping your lungs clear    Strengthening your breathing muscles    Helping prevent respiratory complications or problems  The incentive spirometer gives you a way to take an active part in your care. A nurse or therapist will teach you breathing exercises. To do these exercises, you will breathe in through your mouth and not your nose. The incentive spirometer only works correctly if you breathe in through your mouth.    Steps to clear lungs  Step 1. Exhale normally. Then, inhale normally.    Relax and breathe out.  Step 2. Place your lips tightly around the mouthpiece.    Make sure the device is upright and not tilted.  Step 3. Inhale as much air as you can through the mouthpiece (don't breath through your nose).    Inhale slowly and deeply.    Hold your breath long enough to keep the balls or disk raised for at least 3 to 5 seconds, or as instructed by your healthcare provider.  Step 4. Repeat the exercise regularly.

## 2019-04-02 NOTE — H&P
"  Pre-Operative H & P     CC:  Preoperative exam to assess for increased cardiopulmonary risk while undergoing surgery and anesthesia.    Date of Encounter: 4/2/2019  Primary Care Physician:  Rah Bright      Reason for visit:  Preop examination  Back pain        HPI  Beulah Jane is a 69 year old male who presents for pre-operative H & P in preparation for  Stage 1 Of Two Procedure: Lumbar 1 Or Lumbar 2-5 Anterior Lumbar Interbody Fusion with BMP on 4/22/2019 (stage 2 will be 4/25/2019) by Dr. Dial and Raji in treatment of spinal stenosis and need for anterior spine exposure for lumbar levels L2 through L4 at [facility].     History is obtained from the patient.         Previous anesthesia without complications     1.) CV: Functional status independent, exercise tolerance < 4 METS, limited by back pain. Cardiac risks of HTN (verapamil), HLD (gemfibrizol). No known CAD.   ECHO 2018: LVH. EF 60%, moderate AVS mean gradient 35, PETE 1.1 Hypokinesis distal anterolateral wall segment. (previous 2017 echo mean gradient 49, PETE 1.3).  Repair of valve and angiogram previously recommended by Mr. Alexander 's cardiologist, however more recent phone consultation documented OK to proceed. EKG today NSR, non-specific intraventricular block  2.) Pulmonary: ILD on theophylline, albuterol inhaler and Bactrim for PCP prophylaxis. PFTs show moderate restrictive pattern (2017) FVC 70%; FEV1 70%, DLCO 44%. JUAN with CPAP  Breathing has improved with treatment for polymyositis with steroids and methotrexate.  3.) MSK: per Dr. Dial- multiple previous lumbar spinal fusions as well as decompressions ...were ultimately complicated by infection (mesh had to be removed and ultimately he was left with a large seroma, lumbar instability, and severe pain and stenosis with bilateral back and leg symptoms\". He doesn't stand upright. Bilateral frozen shoulders inhibit full " abduction/extension  On percocet for pain 5/325, tramadol and neurontin.   4.) GI: PONV score = 1  5.) Dermatomyositis on methotrexate, prednisone 30 mg and folowed by rheumatology at [facility] with a plan to begin a biologic next month.  6.) Endo: Type 2 diabetes on low dose NPH with HGBa1c 5.9%. Hypothyroid on synthroid.                  Past Medical History  Past Medical History:   Diagnosis Date     Aortic stenosis      Chronic pain      DM (diabetes mellitus), type 2 (H)     on insulin     Hyperlipidemia      JUAN on CPAP      Pneumonia      Polymyositis (H)      Pulmonary fibrosis, unspecified (H)      Seasonal allergies        Past Surgical History  Past Surgical History:   Procedure Laterality Date     ARTHROSCOPY KNEE  1970     COLONOSCOPY       DECOMPRESSION, FUSION LUMBAR POSTERIOR TWO LEVELS, COMBINED  1997     HERNIA REPAIR  2006     lumbar spine hardware removal  1999     REMOVAL PROSTHETIC MATERIAL/MESH, ABD WALL NECRO TISS INFEXN  2012     ROTATOR CUFF REPAIR RT/LT  2003       Hx of Blood transfusions/reactions: none    Hx of abnormal bleeding or anti-platelet use: none    Menstrual history: No LMP for male patient.:     Steroid use in the last year: current use, weaning down.    Personal or FH with difficulty with Anesthesia: none        Prior to Admission Medications  Current Outpatient Medications   Medication Sig Dispense Refill     albuterol (2.5 MG/3ML) 0.083% neb solution Take 1 vial by nebulization every 6 hours as needed for shortness of breath / dyspnea or wheezing       albuterol (PROAIR HFA/PROVENTIL HFA/VENTOLIN HFA) 108 (90 BASE) MCG/ACT Inhaler Inhale 2 puffs into the lungs every 6 hours as needed for shortness of breath / dyspnea or wheezing       beclomethasone (QVAR) 80 MCG/ACT Inhaler Inhale 2 puffs into the lungs 2 times daily       calcium carbonate (OS-DMITRIY 500 MG [place]. [place]) 500 MG tablet Take 1 tablet (500 mg) by mouth 2 times daily 180 tablet 3     Cholecalciferol (VITAMIN  D) 2000 units tablet Take 2,000 Units by mouth daily 100 tablet 3     diphenhydrAMINE (BENADRYL) 50 MG capsule Take 50 mg by mouth every 6 hours as needed for itching or allergies       FOLIC ACID PO Take 1 mg by mouth daily       GABAPENTIN PO Take 600 mg by mouth 3 times daily       GEMFIBROZIL PO Take 600 mg by mouth 2 times daily       insulin isophane human (HUMULIN N PEN) 100 UNIT/ML injection Inject 6 Units Subcutaneous 2 times daily (before meals) (Patient taking differently: Inject 6 Units Subcutaneous 2 times daily as needed )       LANsoprazole (PREVACID) 30 MG DR capsule Take 30 mg by mouth every morning (before breakfast)       levothyroxine (SYNTHROID/LEVOTHROID) 50 MCG tablet Take 50 mcg by mouth daily       METHOTREXATE PO Take 25 mg by mouth once a week Tuesdays       omega 3 1000 MG CAPS Take 2 capsules by mouth 2 times daily       oxyCODONE-acetaminophen (PERCOCET) 5-325 MG per tablet Take 1 tablet by mouth 3 times daily       predniSONE (DELTASONE) 20 MG tablet Take 1 tablet (20 mg) by mouth daily (Patient taking differently: Take 10 mg by mouth every morning 10mg by mouth daily x 2 weeks then 5mg by mouth daily x 2 weeks.)       sulfamethoxazole-trimethoprim (BACTRIM DS/SEPTRA DS) 800-160 MG per tablet Take 1 tablet 3 times a week (Monday, Wednesday, Friday)  11     theophylline (UNIPHYL) 400 MG 24 hr tablet Take 400 mg by mouth every morning        traMADol (ULTRAM) 50 MG tablet Take 0.5 tablets (25 mg) by mouth every 6 hours as needed for moderate pain 20 tablet 0     verapamil ER (CALAN-SR) 120 MG CR tablet Take 120 mg by mouth At Bedtime         Allergies  No Known Allergies    Social History  Social History     Socioeconomic History     Marital status:      Spouse name: Not on file     Number of children: Not on file     Years of education: Not on file     Highest education level: Not on file   Occupational History     Not on file   Social Needs     Financial resource strain: Not on  file     Food insecurity:     Worry: Not on file     Inability: Not on file     Transportation needs:     Medical: Not on file     Non-medical: Not on file   Tobacco Use     Smoking status: Former Smoker     Packs/day: 0.25     Last attempt to quit: 1970     Years since quittin.7     Smokeless tobacco: Never Used   Substance and Sexual Activity     Alcohol use: Not on file     Comment: few times monthly     Drug use: Not on file     Sexual activity: Not on file   Lifestyle     Physical activity:     Days per week: Not on file     Minutes per session: Not on file     Stress: Not on file   Relationships     Social connections:     Talks on phone: Not on file     Gets together: Not on file     Attends [employer] service: Not on file     Active member of club or organization: Not on file     Attends meetings of clubs or organizations: Not on file     Relationship status: Not on file     Intimate partner violence:     Fear of current or ex partner: Not on file     Emotionally abused: Not on file     Physically abused: Not on file     Forced sexual activity: Not on file   Other Topics Concern     Not on file   Social History Narrative     Not on file       Family History  No family history on file.              Anesthesia Pre-Procedure Evaluation    Patient: Beulah Jane   MRN:     [id number] Gender:   male   Age:    69 year old :      1950        Preoperative Diagnosis: * No surgery found *        Past Medical History:   Diagnosis Date     Aortic stenosis      Chronic pain      DM (diabetes mellitus), type 2 (H)     on insulin     Hyperlipidemia      JUAN on CPAP      Pneumonia      Polymyositis (H)      Pulmonary fibrosis, unspecified (H)      Seasonal allergies       Past Surgical History:   Procedure Laterality Date     ARTHROSCOPY KNEE       COLONOSCOPY       DECOMPRESSION, FUSION LUMBAR POSTERIOR TWO LEVELS, COMBINED       HERNIA REPAIR       lumbar spine hardware removal        REMOVAL PROSTHETIC MATERIAL/MESH, ABD WALL NECRO TISS INFEXN  2012     ROTATOR CUFF REPAIR RT/LT  2003          Anesthesia Evaluation     . Pt has had prior anesthetic. Type: General and MAC           ROS/MED HX    ENT/Pulmonary:     (+)sleep apnea, uses CPAP , . Other pulmonary disease Interstitial lung disease followed by pulmonology-previoulsy on O2 for exertion, not currenlty using.    Neurologic:  - neg neurologic ROS     Cardiovascular:     (+) Dyslipidemia, hypertension----. : . . . :. valvular problems/murmurs type: AS aortic stenosis:. Previous cardiac testing Echodate:results:date: results: date: results: date: results:          METS/Exercise Tolerance:  1 - Eating, dressing   Hematologic:  - neg hematologic  ROS       Musculoskeletal:   (+) arthritis, , , other musculoskeletal- myositis followed by rheumatologist; back surgery 1976 in [place]      GI/Hepatic:  - neg GI/hepatic ROS       Renal/Genitourinary:     (+) Nephrolithiasis ,       Endo:     (+) type II DM Last HgA1c: 5.9% date: 1/10/19 Using insulin - not using insulin pump not previously admitted for DM/DKA Chronic steroid usage for Other Date most recently used: myositis ,.      Psychiatric:  - neg psychiatric ROS       Infectious Disease:  - neg infectious disease ROS       Malignancy:      - no malignancy   Other:    (+) No chance of pregnancy C-spine cleared: N/A, H/O Chronic Pain,H/O chronic opiod use , no other significant disability                    PHYSICAL EXAM:   Mental Status/Neuro: A/A/O   Airway: Facies: Feasible  Mallampati: II  Mouth/Opening: Full  TM distance: > 6 cm  Neck ROM: Full   Respiratory: Auscultation: CTAB     Resp. Rate: Normal     Resp. Effort: Normal      CV:   Heart: Murmur (Systolic)   Comments:      Dental:  Dentures: Partial                      Preop Vitals  BP Readings from Last 3 Encounters:   04/02/19 135/67   03/06/19 109/69   02/25/19 129/64    Pulse Readings from Last 3 Encounters:   04/02/19 99  "  03/06/19 98   02/25/19 98      Resp Readings from Last 3 Encounters:   04/02/19 18   02/07/19 18   07/02/18 18    SpO2 Readings from Last 3 Encounters:   04/02/19 95%   03/06/19 98%   02/25/19 94%      Temp Readings from Last 1 Encounters:   04/02/19 98.1  F (36.7  C) (Oral)    Ht Readings from Last 1 Encounters:   04/02/19 1.6 m (5' 3\")      Wt Readings from Last 1 Encounters:   04/02/19 75.5 kg (166 lb 8 oz)    Estimated body mass index is 29.49 kg/m  as calculated from the following:    Height as of this encounter: 1.6 m (5' 3\").    Weight as of this encounter: 75.5 kg (166 lb 8 oz).           The complete review of systems is negative other than noted in the HPI or here.   Temp: 98.1  F (36.7  C) Temp src: Oral BP: 135/67 Pulse: 99   Resp: 18 SpO2: 95 %         166 lbs 8 oz  5' 3\"   Body mass index is 29.49 kg/m .       Physical Exam  Constitutional: Awake, alert, cooperative, no apparent distress, and appears stated age.  Eyes: Pupils equal, round and reactive to light, extra ocular muscles intact, sclera clear, conjunctiva normal.  HENT: Normocephalic, oral pharynx with moist mucus membranes, good dentition. No goiter appreciated.   Respiratory: Clear to auscultation bilaterally, no crackles or wheezing.  Cardiovascular: Regular rate and rhythm, normal S1 and S2, and no murmur noted.  Carotids +2, no bruits. No edema. Palpable pulses to radial  DP and PT arteries.   GI: Normal bowel sounds, soft, non-distended, non-tender, no masses palpated, no hepatosplenomegaly.  Surgical scars: back  Lymph/Hematologic: No cervical lymphadenopathy and no supraclavicular lymphadenopathy.  Genitourinary:  Deferred   Skin: Warm and dry.  No rashes at anticipated surgical site.   Musculoskeletal: Full ROM of neck. There is no redness, warmth, or swelling of the joints. Gross motor strength is normal.    Neurologic: Awake, alert, oriented to name, place and time. Cranial nerves II-XII are grossly intact. Gait is normal. "   Neuropsychiatric: Calm, cooperative. Normal affect.     Labs: (personally reviewed)  Results for DANELLE SIMMS (MRN [id number]) as of 2019 15:19   Ref. Range 2019 12:15   Sodium Latest Ref Range: 133 - 144 mmol/L 138   Potassium Latest Ref Range: 3.4 - 5.3 mmol/L 4.8   Chloride Latest Ref Range: 94 - 109 mmol/L 104   Carbon Dioxide Latest Ref Range: 20 - 32 mmol/L 29   Urea Nitrogen Latest Ref Range: 7 - 30 mg/dL 25   Creatinine Latest Ref Range: 0.66 - 1.25 mg/dL 0.82   GFR Estimate Latest Ref Range: >60 mL/min/1.73_m2 >90   GFR Estimate If Black Latest Ref Range: >60 mL/min/1.73_m2 >90   Calcium Latest Ref Range: 8.5 - 10.1 mg/dL 9.8   Anion Gap Latest Ref Range: 3 - 14 mmol/L 4   Glucose Latest Ref Range: 70 - 99 mg/dL 94   WBC Latest Ref Range: 4.0 - 11.0 10e9/L 12.2 (H)   Hemoglobin Latest Ref Range: 13.3 - 17.7 g/dL 15.9   Hematocrit Latest Ref Range: 40.0 - 53.0 % 52.9   Platelet Count Latest Ref Range: 150 - 450 10e9/L 206   RBC Count Latest Ref Range: 4.4 - 5.9 10e12/L 5.76   MCV Latest Ref Range: 78 - 100 fl 92   MCH Latest Ref Range: 26.5 - 33.0 pg 27.6   MCHC Latest Ref Range: 31.5 - 36.5 g/dL 30.1 (L)   RDW Latest Ref Range: 10.0 - 15.0 % 19.4 (H)   INR Latest Ref Range: 0.86 - 1.14  1.03   ABO Unknown O   RH(D) Unknown Pos   Antibody Screen Unknown Neg   Test Valid Only At Latest Units:     [facility]...   Specimen Expires Unknown 2019         EKG: Personally reviewed but formal cardiology read [id number] NSR, non-specific intraventricular block        Echocardiogram 2018       CONCLUSION:    1. Mild concentric LVH. Normal LV size and systolic function.     Hypokinesis of the distal anterolateral and inferolateral wall     segments.    2. Normal right ventricular size and function.    3. Moderate aortic stenosis is present -- see details below.    4. Mild to moderate aortic regurgitation.    5. Compared to prior report, there has been a slight progression in      the degree of AS.      Left Ventricular Ejection Fraction: 60 %      Measurements:    M-MODE    IVS to PW Ratio MM                1.4                         2D ECHO    Body Height                       66 in                     Body Weight                       156 lb                    Body Surface Area                 1.8 m                     LV Diastolic Diameter Base LX     4.9 cm                3.5-5.7    LV Systolic Diameter Base LX      3.1 cm                2.3-4.9    LV Diastolic Diameter Index       2.7 cm/m                  IVS Diastolic Thickness           1.4 cm                0.6-1.1 cm    LVPW Diastolic Thickness          0.99 cm               0.6-1.1 cm    Aorta at Sinuses Diameter         3.5 cm                    Ascending Aorta Diameter          3.7 cm                    LA Area 4C View                   16.6 cm                   LA Area 2C View                   13.8 cm               <= 20 cm     LA Volume                         41.5 cm                   LA Volume Index                   22.9 cm /m            16-34 cm /m       DOPPLER    AV Peak Velocity                  377 cm/s                  AV Peak Gradient                  56.9 mmHg                 AV Mean Gradient                  35 mmHg                   AV Velocity Time Integral         68.6 cm                   LVOT Peak Velocity                118 cm/s                  LVOT Peak Gradient                5.6 mmHg                  LVOT Mean Gradient                3 mmHg                    LVOT Velocity Time Integral       20.2 cm                   LVOT Diameter                     2.1 cm                    LVOT Area                         3.5 cm                    LVOT AV VTI Ratio                 0.29                      AV Stroke Volume                  70 cm                     AV Area Cont Eq vti               1 cm                      AV Area Cont Eq pk                1.1 cm                    Mitral E Point  Velocity           45.6 cm/s                 Mitral  A Point Velocity          76.6 cm/s                 Mitral E to A Ratio               0.6                       MV Deceleration Time              209 ms                    MV Pressure Half Time             61 ms                     MV Area PHT                       3.6 cm                    LV E' Lateral Velocity            4.7 cm/s                  LV E' Septal Velocity             5.3 cm/s                  Mitral E to LV E' Lateral Ratio   9.7                       Mitral E to LV E' Septal Ratio    8.7                         COLOR DOPPLER    LVOT Diameter                     2.1 cm                    LA Area 4C View                   16.6 cm                   LA Area 2C View                   13.8 cm               <= 20 cm     LA Volume                         41.5 cm                   LA Volume Index                   22.9 ml/m             16-34 cm /m               Left Ventricle:     Mild concentric LVH. Normal LV size and                         systolic function. Hypokinesis of the distal                         anterolateral and inferolateral wall segments.    Right Ventricle:    Normal right ventricular size and function.    Left Atrium:        Normal size left atrium.    Right Atrium:       Normal size right atrium.    Aortic Valve:       Moderate aortic stenosis is present -- Vmax 3.8                         m/s, mean gradient 35 mmHg, PETE 1.1 cm^2.                          Mild to moderate aortic regurgitation.    Mitral Valve:       Trace mitral regurgitation.    Tricuspid Valve:    Normal in appearance and function.    Pulmonic Valve:     Normal in appearance and function.    Aorta:              The proximal ascending aorta was normal in                         size.    Pericardium:        The pericardium is normal.  No significant                         effusion.    IVC:                Normal IVC.    IAS:                Interatrial septum appears  intact.    3D Imaging/Contrast:                     Outside records reviewed from: care everywhere        ASSESSMENT and PLAN  Beulah Jane is a 69 year old male scheduled to undergo Stage 1 Of Two Procedure: Lumbar 1 Or Lumbar 2-5 Anterior Lumbar Interbody Fusion with BMP on 4/22/2019 (stage 2 will be 4/25/2019) by Dr. Dalal in treatment of spinal stenosis and need for anterior spine exposure for lumbar levels L2 through L4. He has the following specific operative considerations:   - RCRI : Low serious cardiac risks.  0.9% risk of major adverse cardiac event.   - Anesthesia considerations:  Refer to PAC assessment in anesthesia records  - VTE risk: 0.5%  - JUAN # of risks  = known JUAN with CPAP  - Post-op delirium risk: high risk due to age  - Risk of PONV score = 2.  If > 2, anti-emetic intervention recommended.      Pt optimized for surgery. AVS with information on surgery time/arrival time, meds and NPO status given by nursing staff    I spent 30 minutes with patient, greater than 50% educating on preop meds, counseling on anesthesia and coordinating care for surgery  All labs and imaging personally reviewed.         Patient was discussed with Dr Chong.    ANASTACIA Carlson CNS  Preoperative Assessment Center  [facility]  Phone: [phone]  Fax: [phone]

## 2019-04-02 NOTE — PROGRESS NOTES
Preoperative Assessment Center medication history for April 2, 2019 is complete.    See Epic admission navigator for prior to admission medications.   Operating room staff will still need to confirm medications and last dose information on day of surgery.     Medication history interview sources:  Patient Interview    Changes made to PTA medication list (reason)  Added:   -- verapamil per medication list  -- levothyroxine per medication list  -- lansoprazole per medication list    Deleted:   -- mucinex  -- forteo    Changed:   -- cetirizine changed to benadryl    Additional medication history information (including reliability of information, actions taken by pharmacist):    -- No recent (within 30 days) course of antibiotics  -- No recent (within 30 days) course of steroids  -- Patient declines being on any other prescription or over-the-counter medications  -- patient is on a prednisone taper currently half a week in of his 10mg daily x 2 weeks then 5mg daily x 2 weeks then stop  -- Patient was instructed to stop his methotrexate 2 weeks prior to his procedure per his rheumatologist   -- Patient's last rituxan infusion was 3/11/19  -- patient uses his humulin as needed   -- patient currently not using his QVAR scheduled, just on a as needed basis   -- patient averaging 3 tablets of percocet 5/325 per day and 3 0.5 tablets of tramadol 50mg per day.     Prior to Admission medications    Medication Sig Last Dose Taking? Auth Provider   calcium carbonate (OS-DMITRIY 500 MG [place]. [place]) 500 MG tablet Take 1 tablet (500 mg) by mouth 2 times daily Taking Yes Carlos Enrique Dial MD   Cholecalciferol (VITAMIN D) 2000 units tablet Take 2,000 Units by mouth daily Taking Yes Carlos Enrique Dial MD   diphenhydrAMINE (BENADRYL) 50 MG capsule Take 50 mg by mouth every 6 hours as needed for itching or allergies Taking Yes Unknown, Entered By History   FOLIC ACID PO Take 1 mg by mouth daily Taking Yes Unknown, Entered By  History   GABAPENTIN PO Take 600 mg by mouth 3 times daily Taking Yes Unknown, Entered By History   GEMFIBROZIL PO Take 600 mg by mouth 2 times daily Taking Yes Unknown, Entered By History   insulin isophane human (HUMULIN N PEN) 100 UNIT/ML injection Inject 6 Units Subcutaneous 2 times daily (before meals)  Patient taking differently: Inject 6 Units Subcutaneous 2 times daily as needed  Taking Yes Ivana Mayo MD   LANsoprazole (PREVACID) 30 MG DR capsule Take 30 mg by mouth every morning (before breakfast) Taking Yes Unknown, Entered By History   levothyroxine (SYNTHROID/LEVOTHROID) 50 MCG tablet Take 50 mcg by mouth daily Taking Yes Unknown, Entered By History   omega 3 1000 MG CAPS Take 2 capsules by mouth 2 times daily Taking Yes Unknown, Entered By History   oxyCODONE-acetaminophen (PERCOCET) 5-325 MG per tablet Take 1 tablet by mouth 3 times daily Taking Yes Unknown, Entered By History   predniSONE (DELTASONE) 20 MG tablet Take 1 tablet (20 mg) by mouth daily  Patient taking differently: Take 10 mg by mouth every morning 10mg by mouth daily x 2 weeks then 5mg by mouth daily x 2 weeks. Taking Yes Ivana Mayo MD   sulfamethoxazole-trimethoprim (BACTRIM DS/SEPTRA DS) 800-160 MG per tablet Take 1 tablet 3 times a week (Monday, Wednesday, Friday) Taking Yes Reported, Patient   theophylline (UNIPHYL) 400 MG 24 hr tablet Take 400 mg by mouth every morning  Taking Yes Unknown, Entered By History   traMADol (ULTRAM) 50 MG tablet Take 0.5 tablets (25 mg) by mouth every 6 hours as needed for moderate pain Taking Yes Ivana Mayo MD   verapamil ER (CALAN-SR) 120 MG CR tablet Take 120 mg by mouth At Bedtime Taking Yes Unknown, Entered By History   albuterol (2.5 MG/3ML) 0.083% neb solution Take 1 vial by nebulization every 6 hours as needed for shortness of breath / dyspnea or wheezing Not Taking  Unknown, Entered By History   albuterol (PROAIR HFA/PROVENTIL HFA/VENTOLIN HFA) 108 (90 BASE) MCG/ACT Inhaler Inhale 2  puffs into the lungs every 6 hours as needed for shortness of breath / dyspnea or wheezing Not Taking  Unknown, Entered By History   beclomethasone (QVAR) 80 MCG/ACT Inhaler Inhale 2 puffs into the lungs 2 times daily Not Taking  Unknown, Entered By History   METHOTREXATE PO Take 25 mg by mouth once a week Tuesdays Not Taking  Unknown, Entered By History         Medication history completed by: Yordy Chase RP

## 2019-04-02 NOTE — LETTER
4/2/2019       RE: Beulah Jane  [address]  [address]  [facility] [address]     Dear Colleague,    Thank you for referring your patient, Beulah Jane, to the [facility]. Please see a copy of my visit note below.    The patient returns today after his anesthesia visit for brief discussion.  He and I have previously met multiple times to discuss the nature of his procedure.  We are planning a two-stage front back thoracolumbar fusion.  I again went over the risks and benefits today.  I quoted him a 50-70% chance of major morbidity including risk of infection, risk of spinal fluid leak, risk of fracture, risk of paralysis or neurologic injury, risk of medical complication up to including stroke heart attack and death, risk of nonunion.  I told him the likely several day intensive care unit stay, 1-2-week hospital stay and likely rehab placement for up to a month and then a 6-12-month recovery afterwards.  He had many questions today about the recovery process and the morbidity of surgery.  I tried not to sugar coated and certainly this is a major procedure.  However he has met with both myself and Dr. Lomeli as well as the anesthesia team and we have had extensive discussions about his procedure and I think he is as optimized as can be.    All questions were answered today.  The exam was deferred.  I spent 15 minutes with the patient all of which was in counseling.    Again, thank you for allowing me to participate in the care of your patient.      Sincerely,    Carlos Enrique Dial MD

## 2019-04-02 NOTE — TELEPHONE ENCOUNTER
No response from patient back after email, "[employer] ([employer]hart and phone messages - closing encounter

## 2019-04-03 NOTE — PROGRESS NOTES
The patient returns today after his anesthesia visit for brief discussion.  He and I have previously met multiple times to discuss the nature of his procedure.  We are planning a two-stage front back thoracolumbar fusion.  I again went over the risks and benefits today.  I quoted him a 50-70% chance of major morbidity including risk of infection, risk of spinal fluid leak, risk of fracture, risk of paralysis or neurologic injury, risk of medical complication up to including stroke heart attack and death, risk of nonunion.  I told him the likely several day intensive care unit stay, 1-2-week hospital stay and likely rehab placement for up to a month and then a 6-12-month recovery afterwards.  He had many questions today about the recovery process and the morbidity of surgery.  I tried not to sugar coated and certainly this is a major procedure.  However he has met with both myself and Dr. Lomeli as well as the anesthesia team and we have had extensive discussions about his procedure and I think he is as optimized as can be.    All questions were answered today.  The exam was deferred.  I spent 15 minutes with the patient all of which was in counseling.    Carlos Enrique Dial MD  Answers for HPI/ROS submitted by the patient on 4/2/2019   General Symptoms: Yes  Skin Symptoms: No  HENT Symptoms: Yes  EYE SYMPTOMS: Yes  HEART SYMPTOMS: Yes  LUNG SYMPTOMS: Yes  INTESTINAL SYMPTOMS: No  URINARY SYMPTOMS: No  REPRODUCTIVE SYMPTOMS: No  SKELETAL SYMPTOMS: Yes  BLOOD SYMPTOMS: No  NERVOUS SYSTEM SYMPTOMS: No  MENTAL HEALTH SYMPTOMS: No  Fever: No  Loss of appetite: No  Weight loss: No  Weight gain: No  Fatigue: Yes  Night sweats: No  Chills: No  Increased stress: No  Excessive hunger: No  Excessive thirst: No  Feeling hot or cold when others believe the temperature is normal: Yes  Loss of height: No  Post-operative complications: No  Surgical site pain: No  Hallucinations: No  Change in or Loss of Energy: No  Hyperactivity:  No  Confusion: No  Ear pain: No  Ear discharge: No  Hearing loss: No  Tinnitus: No  Nosebleeds: No  Congestion: Yes  Sinus pain: No  Trouble swallowing: No   Voice hoarseness: No  Mouth sores: No  Sore throat: No  Tooth pain: No  Gum tenderness: No  Bleeding gums: No  Change in taste: No  Change in sense of smell: No  Dry mouth: No  Hearing aid used: No  Neck lump: No  Eye pain: No  Vision loss: No  Dry eyes: No  Watery eyes: Yes  Eye bulging: No  Double vision: No  Flashing of lights: No  Spots: No  Floaters: No  Redness: No  Crossed eyes: No  Tunnel Vision: No  Yellowing of eyes: No  Eye irritation: No  Cough: No  Sputum or phlegm: No  Coughing up blood: No  Difficulty breating or shortness of breath: Yes  Snoring: No  Wheezing: No  Difficulty breathing on exertion: Yes  Nighttime Cough: No  Difficulty breathing when lying flat: No  Chest pain or pressure: No  Fast or irregular heartbeat: No  Pain in legs with walking: No  Trouble breathing while lying down: No  Fingers or toes appear blue: No  High blood pressure: No  Low blood pressure: No  Fainting: No  Murmurs: Yes  Pacemaker: No  Varicose veins: No  Edema or swelling: No  Wake up at night with shortness of breath: No  Light-headedness: No  Exercise intolerance: Yes  Back pain: Yes  Muscle aches: Yes  Neck pain: Yes  Swollen joints: No  Joint pain: No  Bone pain: No  Muscle cramps: No  Muscle weakness: Yes  Joint stiffness: Yes  Bone fracture: No

## 2019-04-16 PROBLEM — Z79.899 CONTROLLED SUBSTANCE AGREEMENT SIGNED: Status: ACTIVE | Noted: 2017-12-19

## 2019-04-16 PROBLEM — M96.843 POSTPROCEDURAL SEROMA OF A MUSCULOSKELETAL STRUCTURE FOLLOWING OTHER PROCEDURE: Status: ACTIVE | Noted: 2017-12-15

## 2019-04-16 PROBLEM — M33.21: Status: ACTIVE | Noted: 2017-12-15

## 2019-04-22 PROBLEM — Z98.890 S/P SPINAL SURGERY: Status: ACTIVE | Noted: 2019-01-01

## 2019-04-22 NOTE — OR NURSING
Vascular surgery resident at bedside, not able to doppler left DP pulse. Right PT and Dps dopplerable, left PT dopplerable.

## 2019-04-22 NOTE — ANESTHESIA POSTPROCEDURE EVALUATION
Anesthesia POST Procedure Evaluation    Patient: Beulah Jane   MRN:     [id number] Gender:   male   Age:    69 year old :      1950        Preoperative Diagnosis: Spinal Stenosis Of Lumbar Region With Neurogenic Claudication   Procedure(s):  Lumbar 1 Or Lumbar 2-5 Anterior Lumbar Interbody Fusion with BMP Stage 1 Of Two Procedure:   Postop Comments: No value filed.       Anesthesia Type:  General  No value filed.    Reportable Event: NO     PAIN: Uncomplicated   Sign Out status: Comfortable, Well controlled pain     PONV: No PONV   Sign Out status:  No Nausea or Vomiting     Neuro/Psych: Uneventful perioperative course   Sign Out Status: Preoperative baseline; Age appropriate mentation     Airway/Resp.: Uneventful perioperative course   Sign Out Status: Non labored breathing, age appropriate RR; Resp. Status within EXPECTED Parameters     CV: Uneventful perioperative course   Sign Out status: Appropriate BP and perfusion indices; Appropriate HR/Rhythm     Disposition:   Sign Out in:  PACU  Disposition:  Floor  Recovery Course: Uneventful  Follow-Up: Not required     Comments/Narrative:  PAC and MAC removed in PACU           Last Anesthesia Record Vitals:  CRNA VITALS  2019 1202 - 2019 1302      2019             ART BP:  141/28  (Abnormal)     ART Mean:  80          Last PACU Vitals:  Vitals Value Taken Time   /79 2019  2:40 PM   Temp 37.1  C (98.7  F) 2019  1:15 PM   Pulse 89 2019  2:40 PM   Resp 18 2019  2:44 PM   SpO2 98 % 2019  2:44 PM   Temp src     NIBP     Pulse     SpO2     Resp     Temp     Ht Rate     Temp 2     Vitals shown include unvalidated device data.      Electronically Signed By: Radha Luna MD, 2019, 2:45 PM

## 2019-04-22 NOTE — ANESTHESIA PREPROCEDURE EVALUATION
Anesthesia Pre-Procedure Evaluation    Patient: Beulah Jane   MRN:     [id number] Gender:   male   Age:    69 year old :      1950        Preoperative Diagnosis: Spinal Stenosis Of Lumbar Region With Neurogenic Claudication   Procedure(s):  Lumbar 1 Or Lumbar 2-5 Anterior Lumbar Interbody Fusion with BMP Stage 1 Of Two Procedure:     Past Medical History:   Diagnosis Date     Aortic stenosis      Chronic pain      DM (diabetes mellitus), type 2 (H)     on insulin     Hyperlipidemia      JUAN on CPAP      Pneumonia      Polymyositis (H)      Pulmonary fibrosis, unspecified (H)      Seasonal allergies       Past Surgical History:   Procedure Laterality Date     ARTHROSCOPY KNEE       COLONOSCOPY       DECOMPRESSION, FUSION LUMBAR POSTERIOR TWO LEVELS, COMBINED       HERNIA REPAIR       lumbar spine hardware removal       REMOVAL PROSTHETIC MATERIAL/MESH, ABD WALL NECRO TISS INFEXN       ROTATOR CUFF REPAIR RT/LT            Anesthesia Evaluation     . Pt has had prior anesthetic. Type: General and MAC           ROS/MED HX    ENT/Pulmonary:     (+)sleep apnea, uses CPAP , . Other pulmonary disease Interstitial lung disease followed by pulmonology-previoulsy on O2 for exertion, not currenlty using.    Neurologic:  - neg neurologic ROS     Cardiovascular:     (+) Dyslipidemia, hypertension----. : . . . :. valvular problems/murmurs type: AS aortic stenosis:. Previous cardiac testing Echodate:2018results:  Echocardiogram 2018       CONCLUSION:    1. Mild concentric LVH. Normal LV size and systolic function.     Hypokinesis of the distal anterolateral and inferolateral wall     segments.    2. Normal right ventricular size and function.    3. Moderate aortic stenosis is present -- see details below.    4. Mild to moderate aortic regurgitation.    5. Compared to prior report, there has been a slight progression in     the degree of AS.      Left Ventricular Ejection Fraction: 60 %       Measurements:    M-MODE    IVS to PW Ratio MM                1.4                         2D ECHO    Body Height                       66 in                     Body Weight                       156 lb                    Body Surface Area                 1.8 m                     LV Diastolic Diameter Base LX     4.9 cm                3.5-5.7    LV Systolic Diameter Base LX      3.1 cm                2.3-4.9    LV Diastolic Diameter Index       2.7 cm/m                  IVS Diastolic Thickness           1.4 cm                0.6-1.1 cm    LVPW Diastolic Thickness          0.99 cm               0.6-1.1 cm    Aorta at Sinuses Diameter         3.5 cm                    Ascending Aorta Diameter          3.7 cm                    LA Area 4C View                   16.6 cm                   LA Area 2C View                   13.8 cm               <= 20 cm     LA Volume                         41.5 cm                   LA Volume Index                   22.9 cm /m            16-34 cm /m       DOPPLER    AV Peak Velocity                  377 cm/s                  AV Peak Gradient                  56.9 mmHg                 AV Mean Gradient                  35 mmHg                   AV Velocity Time Integral         68.6 cm                   LVOT Peak Velocity                118 cm/s                  LVOT Peak Gradient                5.6 mmHg                  LVOT Mean Gradient                3 mmHg                    LVOT Velocity Time Integral       20.2 cm                   LVOT Diameter                     2.1 cm                    LVOT Area                         3.5 cm                    LVOT AV VTI Ratio                 0.29                      AV Stroke Volume                  70 cm                     AV Area Cont Eq vti               1 cm                      AV Area Cont Eq pk                1.1 cm                    Mitral E Point Velocity           45.6 cm/s                 Mitral  A Point  Velocity          76.6 cm/s                 Mitral E to A Ratio               0.6                       MV Deceleration Time              209 ms                    MV Pressure Half Time             61 ms                     MV Area PHT                       3.6 cm                    LV E' Lateral Velocity            4.7 cm/s                  LV E' Septal Velocity             5.3 cm/s                  Mitral E to LV E' Lateral Ratio   9.7                       Mitral E to LV E' Septal Ratio    8.7                         COLOR DOPPLER    LVOT Diameter                     2.1 cm                    LA Area 4C View                   16.6 cm                   LA Area 2C View                   13.8 cm               <= 20 cm     LA Volume                         41.5 cm                   LA Volume Index                   22.9 ml/m             16-34 cm /m               Left Ventricle:     Mild concentric LVH. Normal LV size and                         systolic function. Hypokinesis of the distal                         anterolateral and inferolateral wall segments.    Right Ventricle:    Normal right ventricular size and function.    Left Atrium:        Normal size left atrium.    Right Atrium:       Normal size right atrium.    Aortic Valve:       Moderate aortic stenosis is present -- Vmax 3.8                         m/s, mean gradient 35 mmHg, PETE 1.1 cm^2.                          Mild to moderate aortic regurgitation.    Mitral Valve:       Trace mitral regurgitation.    Tricuspid Valve:    Normal in appearance and function.    Pulmonic Valve:     Normal in appearance and function.    Aorta:              The proximal ascending aorta was normal in                         size.    Pericardium:        The pericardium is normal.  No significant                         effusion.    IVC:                Normal IVC.    IAS:                Interatrial septum appears intact.    3D Imaging/Contrast:              date: results:ECG  reviewed date:4/2/2019 results:NSR, non-specific intraventricular block date: results:          METS/Exercise Tolerance:  1 - Eating, dressing   Hematologic:  - neg hematologic  ROS       Musculoskeletal:   (+) arthritis,  other musculoskeletal- myositis followed by rheumatologist; back surgery 1976 in [place]      GI/Hepatic:  - neg GI/hepatic ROS       Renal/Genitourinary:     (+) Nephrolithiasis ,       Endo:     (+) type II DM Last HgA1c: 5.9% date: 1/10/19 Using insulin - not using insulin pump not previously admitted for DM/DKA Chronic steroid usage for Other Date most recently used: myositis ,.      Psychiatric:  - neg psychiatric ROS       Infectious Disease:  - neg infectious disease ROS       Malignancy:      - no malignancy   Other:    (+) No chance of pregnancy C-spine cleared: N/A, H/O Chronic Pain,H/O chronic opiod use , no other significant disability                        PHYSICAL EXAM:   Mental Status/Neuro: A/A/O   Airway: Facies: Feasible  Mallampati: II  Mouth/Opening: Full  TM distance: > 6 cm  Neck ROM: Full   Respiratory: Auscultation: CTAB     Resp. Rate: Normal     Resp. Effort: Normal      CV: Rhythm: Regular  Heart: Murmur (Systolic)  Pulses: Normal   Comments:                    Lab Results   Component Value Date    WBC 12.2 (H) 04/02/2019    HGB 15.9 04/02/2019    HCT 52.9 04/02/2019     04/02/2019    CRP 4.7 06/26/2018    SED 10 06/26/2018     04/02/2019    POTASSIUM 4.8 04/02/2019    CHLORIDE 104 04/02/2019    CO2 29 04/02/2019    BUN 25 04/02/2019    CR 0.82 04/02/2019    GLC 94 04/02/2019    DMITRIY 9.8 04/02/2019    MAG 2.6 (H) 11/28/2017    ALBUMIN 3.9 01/29/2019    PROTTOTAL 6.0 (L) 11/30/2017    ALT 14 11/30/2017    AST 14 11/30/2017    ALKPHOS 70 11/30/2017    BILITOTAL 0.3 11/30/2017    INR 1.03 04/02/2019       Preop Vitals  BP Readings from Last 3 Encounters:   04/02/19 135/67   03/06/19 109/69   02/25/19 129/64    Pulse Readings from Last 3 Encounters:  "  04/02/19 99   03/06/19 98   02/25/19 98      Resp Readings from Last 3 Encounters:   04/02/19 18   02/07/19 18   07/02/18 18    SpO2 Readings from Last 3 Encounters:   04/02/19 95%   03/06/19 98%   02/25/19 94%      Temp Readings from Last 1 Encounters:   04/02/19 36.7  C (98.1  F) (Oral)    Ht Readings from Last 1 Encounters:   04/02/19 1.6 m (5' 3\")      Wt Readings from Last 1 Encounters:   04/02/19 75.5 kg (166 lb 8 oz)    Estimated body mass index is 29.49 kg/m  as calculated from the following:    Height as of 4/2/19: 1.6 m (5' 3\").    Weight as of 4/2/19: 75.5 kg (166 lb 8 oz).     LDA:            Assessment:   ASA SCORE: 4       Documentation: H&P complete; Preop Testing complete; Consents complete   Proceeding: Proceed without further delay  Tobacco Use:  NO Active use of Tobacco/UNKNOWN Tobacco use status     Plan:   Anes. Type:  General   Pre-Induction: Gabapentin PO; Midazolam IV; Acetaminophen PO; Hydrocortisone IV (Stress)     Fluid/Blood-Preparation: Hot Line     Drips/Meds-Preparation: Phenylephrine; TXA; Vasopressin; Norepi   Induction:  IV (Standard)   Airway: Oral ETT   Access/Monitoring: PIV; 2nd PIV; US; A-Line; FloTrac     Advanced Access: CVL by anesthesia     Advanced Monitoring: BIS; [place] Adult   Maintenance: Balanced; Propofol; Sufentanil; Ketamine; IV; Inhalational   Emergence: Procedure Site   Logistics: ICU Admission     Postop Pain/Sedation Strategy:  Standard-Options: Opioids PRN  Advanced Options: PCA     PONV Management:  Adult Risk Factors:, Non-Smoker, Postop Opioids  Prevention: Ondansetron; Dexamethasone     CONSENT: Direct conversation   Plan and risks discussed with: Patient   Blood Products: Consented (ALL Blood Products)       Comments for Plan/Consent:  69. Year old male with PMH of Polymyositis/dermatomyostis, interstitial lung disease, moderate aortic stenosis, HTN scheduled for anterior lumbar interbody fusion.   This morning patient reported to be feeling well at his " baseline. Denied shortness of breath.   Discussed with patient risks related to anesthesia and to procedure. Patient expressed to understand.     Dru Contreras  Anesthesiology Resident, PGY-2   [facility]   [phone]  4/22/2019 6:56 AM                           Dru Contreras MD

## 2019-04-22 NOTE — ANESTHESIA CARE TRANSFER NOTE
Patient: Beulah Jane    Procedure(s):  Lumbar 1 Or Lumbar 2-5 Anterior Lumbar Interbody Fusion with BMP Stage 1 Of Two Procedure:    Diagnosis: Spinal Stenosis Of Lumbar Region With Neurogenic Claudication  Diagnosis Additional Information: No value filed.    Anesthesia Type:   No value filed.     Note:  Airway :Nasal Cannula  Patient transferred to:PACU  Comments: Airway :Nasal Cannula   Patient transferred to:PACU  Comments: Prior to extubation, patient was reversed with 200 mg of Sugammadex and shortly afterwards observed with spontaneous breathing with regular pattern and proper tidal volumes. Patient woke up, opened his eyes to verbal stimuli and followed basic commands. Patient was suctioned and extubated without complication.   Transported to PACU on 6L O2 via nasal cannula.   VSS upon arrival to PACU.  Patient denies nausea or pain at this time.   Care transfer plan communicated and patient care transferred to PACU CARI Contreras  Anesthesiology Resident, PGY-2   [facility]   [phone]  4/22/2019 12:40 PM    Handoff Report: Identifed the Patient, Identified the Reponsible Provider, Reviewed the pertinent medical history, Discussed the surgical course, Reviewed Intra-OP anesthesia mangement and issues during anesthesia, Set expectations for post-procedure period and Allowed opportunity for questions and acknowledgement of understanding      Vitals: (Last set prior to Anesthesia Care Transfer)    CRNA VITALS  4/22/2019 1202 - 4/22/2019 1239      4/22/2019             ART BP:  141/28  (Abnormal)     ART Mean:  80                Electronically Signed By: Dru Contreras MD  April 22, 2019  12:39 PM

## 2019-04-22 NOTE — OR NURSING
Shannon Daniels PA-C (orthopedics) to clarify orders placed by her but received no return call.   Dr. Dial at bedside to evaluate patient. Discussed with Dr. Dial plan for patient and whether spine is considered stable. Instructed by Dr. Dial that patient is to remain on bedside w/ HOB 20-30 degrees, no brace needed. Plan for phase 2 on Thursday.

## 2019-04-22 NOTE — BRIEF OP NOTE
Brief Operative Note    Preop Dx:   Spinal Stenosis Of Lumbar Region With Neurogenic Claudication  Post op Dx:   * No post-op diagnosis entered *  Procedure:    Procedure(s):  Lumbar 1 Or Lumbar 2-5 Anterior Lumbar Interbody Fusion with BMP Stage 1 Of Two Procedure:  Surgeon:     Carlos Enrique Dial MD  Assistants:    Michelle Lamb PA-C  Anesthesia:   General  EBL:    400 ml  Total IV Fluids:  See Anesthesia Record  Specimens:   None  Findings:   See Operative Dictation    Assessment and Plan: Beulah Jane is a 69 year old male with PMH including aortic stenosis, chronic pain, DM type 2, hyperlipidemia, JUAN now s/p Lumbar 1 Or Lumbar 2-5 Anterior Lumbar Interbody Fusion  on 4/22/19 with Dr. Dial and Dr. Alegria.     Jayro Primary  Activity: Strict bedrest with  bathroom privledges.  No excessive bending or twisting. No lifting >10 lbs x 6 weeks. No Faiza lift for transfers.   Pain management: Transition from IV to PO as tolerated. No NSAIDs   Antibiotics: Ancef 1g x 24 hours.  Diet: Begin with clear fluids and progress diet as tolerated.   DVT prophylaxis: Lovenox 30mg BID starting afternoon of 4/22/19 and ending after morning dose on 4/24/19. SCDs.  Imaging: XR Upright Lumbar spine, in PACU PTDC - ordered.  Bracing/Splinting: None.  Dressings: Change dressing in 48 hours.  Drains: None  [place] catheter: Remove POD#1  Physical Therapy/Occupational Therapy: Eval and treat.  Cultures: None  Consults: Hospitalist.  Follow-up: Clinic with Dr. Dial in 6 weeks with repeat x-rays.   Disposition: Has Phase 2 of surgery scheduled for later this week with Dr. Dial.

## 2019-04-22 NOTE — PLAN OF CARE
Contacted by floor, pt arrived on unit with orders for telemetry monitoring. This unit does not have that capability. Dr Dial contacted for clarification, Okay to keep pt on 6A, discontinue telemetry monitoring order.

## 2019-04-22 NOTE — OR NURSING
Discussed with Dr. Contreras and Dr. Luna about plan for swan-linda catheter. Informed both providers that RNs would not be able to remove the catheter 2/2 large Fr. Size. CXR completed as ordered. Plan for line to be removed by Dr. Contreras. Providers aware that patient is not able to leave PACU until the line has been removed.

## 2019-04-22 NOTE — PROGRESS NOTES
Patient extubated post op and in stable condition in PACU.    + bilateral PT signals, right AT signal, no left DP signal.  Motor intact, no lower extremity pain or numbness.    Please continue neurovascular assessments.    Remaining care per ortho.    Mari Mendiola MD  Vascular Surgery Fellow

## 2019-04-22 NOTE — LETTER
Transition Communication Hand-off for Care Transitions to Next Level of Care Provider    Name: Beulah Jane  : 1950  MRN #: [id number]  Primary Care Provider: Rah Bright     Primary Clinic: [address] [address]     Reason for Hospitalization:  S/P spinal surgery [Z98.890]  Status post lumbar spine surgery for decompression of spinal cord [Z98.890]  Admit Date/Time: 2019  5:27 AM  Discharge Date: 19  Payor Source: Payor: MEDICARE / Plan: MEDICARE / Product Type: Medicare /     Readmission Assessment Measure (COLLEEN) Risk Score/category: Average         Reason for Communication Hand-off Referral: Multiple providers/specialties    Discharge Plan:  TCU:  [facility]   [address] [place] [address]   [phone]  Fax [phone]     Concern for non-adherence with plan of care:   Y/N No  Discharge Needs Assessment:  Needs      Most Recent Value   Equipment Currently Used at Home  shower chair, walker, rolling, cane, straight          Already enrolled in Tele-monitoring program and name of program:  Unknown  Follow-up specialty is recommended: Yes    Follow-up plan:    Future Appointments   Date Time Provider Department Center   2019 11:00 AM Carlos Enrique Dial MD [facility]       Any outstanding tests or procedures:    Procedures     Future Labs/Procedures    Oxygen - Nasal cannula     Comments:    2-3 Lpm by nasal cannula to keep O2 sats 92% or greater.          Referrals     Future Labs/Procedures    Follow-Up with [facility]     Comments:    Please schedule appointment in [facility] for TAVR evaluation    Physical Therapy Adult Consult     Comments:    Evaluate and treat as clinically indicated.    Reason:  pt needing cont skilled PT to progress functional IDN for safe discharge home            CARLOS Larsen, LGSW  5A Unit   Pager [phone]  Phone  [phone]      AVS/Discharge Summary is the source of truth; this is a helpful guide for improved communication of patient story

## 2019-04-22 NOTE — ANESTHESIA PROCEDURE NOTES
Arterial Line Procedure Note  Staff:     Anesthesiologist:  Radha Luna MD    Resident/CRNA:  Dru Contreras MD    Arterial line performed by resident/CRNA in presence of a teaching physician    Location: In OR Before Induction  Procedure Start/Stop Times:     patient identified      Correct Patient: Yes      Correct Position: Yes      Correct Site: Yes      Correct Procedure: Yes      Correct Laterality:  N/A    Site Marked:  N/A  Line Placement:     Procedure:  Arterial Line    Insertion Site:  Radial    Insertion laterality:  Left    Skin Prep: Chloraprep      Patient Prep: patient draped, mask, sterile gloves, hat and hand hygiene      Local skin infiltration:  2% lidocaine    amount (mL):  3    Ultrasound Guided?: Yes      Artery evaluated via ultrasound confirming patency.   Using realtime imaging, the artery was punctured and the needle was observed entering the artery.      A permanent image is NOT entered into the patient's record.      Catheter size:  20 gauge, 12 cm    Cath secured with: anchor securement device      Dressing:  Tegaderm and Occlusive Gauze    Complications:  None obvious    Arterial waveform: Yes      IBP within 10% of NIBP: Yes    Assessment/Narrative:      Dru Contreras  Anesthesiology Resident, PGY-2   [facility]   [phone]  4/22/2019 9:19 AM

## 2019-04-22 NOTE — LETTER
Health Information Management Services               Recipient:  [facility]        Sender:  CARLOS Larsen, LGSW  [address]   Pager [phone]  Phone [phone]          Date: May 8, 2019  Patient Name:  Beulah Jane  Routing Message:    Discharge orders        The documents accompanying this notice contain confidential information belonging to the sender.  This information is intended only for the use of the individual or entity named above.  The authorized recipient of this information is prohibited from disclosing this information to any other party and is required to destroy the information after its stated need has been fulfilled, unless otherwise required by state law.      If you are not the intended recipient, you are hereby notified that any disclosure, copy, distribution or action taken in reliance on the contents of these documents is strictly prohibited.  If you have received this document in error, please return it by fax to [phone] with a note on the cover sheet explaining why you are returning it (e.g. not your patient, not your provider, etc.).  If you need further assistance, please call [employer] Centralized Transcription at [phone].  Documents may also be returned by mail to [employer], , [facility] [address] [place], [place] [address].

## 2019-04-22 NOTE — ANESTHESIA PROCEDURE NOTES
PA Catheter Insertion Note  Anesthesiologist: Radha Luna MD  Resident:  Dru Contreras MD   PA Catheter placed by resident/CRNA in the presence of a teaching physician.  Introducer: Introducer placed as part of procedure (SEE separate note)   Skin prep:  Chloraprep Cap, Full body drape, hand hygiene, Mask, Sterile gloves and Sterile gown    PA Catheter type:  CCO    Distance catheter advanced:  50 cm.    Appropriate RA, RV, PA  waveforms?: Yes    Dressing:  Biopatch    Complications:  None apparent        PA catheter notes:  Dru Contreras  Anesthesiology Resident, PGY-2   [facility]   [phone]  4/22/2019 9:21 AM

## 2019-04-22 NOTE — PROGRESS NOTES
SPIRITUAL HEALTH SERVICES  [facility] 3C   PRE-SURGERY VISIT    Had pre-surgery visit with pt. and family. Provided spiritual support, prayer.     [place] M.Div.     Pager [phone]

## 2019-04-22 NOTE — OP NOTE
DATE OF SURGERY: 4/22/2019    PREOPERATIVE DIAGNOSIS: Spinal Stenosis Of Lumbar Region With Neurogenic Claudication, Lumbar Flatback.             POSTOPERATIVE DIAGNOSIS: Same    PROCEDURES:  1. Anterior lumbar interbody fusion via anterior retroperitoneal approach to L3-4 and L4-5.  2. Placement intervertebral prosthetic device, L3-4, and L4-5, with Nuvasive BASE cage.  3. Use of morselized allograft and Large BMP Kit (1/2 the kit at each level) for fusion, L3-4 and L4-5.    CO-SURGEON: Dr. Hilda Alegria, Vascular Surgery.  (The co-surgeon assisted with the exposure for #1 above.  The remainder of the procedure was performed independently).    Primary Surgeon: Carlos Enrique Dial MD    FIRST ASSISTANT: JUAN CARLOS Bear.  There was no qualified resident available.  The assistant was necessary for retraction, visualization and wound closure.    ANESTHESIA: General Endotracheal    COMPLICATIONS:  None.    SPECIMENS: None.    ESTIMATED BLOOD LOSS: 400 EBL.    INDICATIONS:                          Beulah Jane is a 69 year old male with debilitating symptoms from their pathology. The patient elected surgical treatment, and understood the indications for this surgery, as well as its risks, benefits, and alternatives. We reviewed the risks and benefits of the surgery in detail. The risks include, but are not limited to, the general risks associated with anesthesia, including death, pulmonary embolism, DVT, stroke, myocardial infarction, pneumonia, and urinary tract infection. Additional risks specific to the surgery include the risk of infection, failure to heal (non-union), dural tear with resultant CSF leak which might necessitate placement of a drain or revision surgery or could result in headaches, nerve injury resulting in weakness or paralysis, risk of adjacent segment disease, the risks of vascular injury resulting in severe or possibly uncontrollable bleeding, need for revision surgery in the future due to  one of the above issues, or risk of incomplete symptom relief. Furthemore, in male patients there is a risk of retrograde ejaculation and infertility. Beulah Jane understands the risks of the surgery and wishes to proceed.  Of note, he has a severe flat back deformity.  This was part 1 of a two-stage procedure.  His second surgery will be Thursday later this week.    DESCRIPTION OF PROCEDURE:           Beulah Jane was taken to the operating room, where the Anesthesiology Service induced satisfactory general anesthesia. Ancef was given IV.  Venous thromboembolic prophylaxis was performed with sequential devices placed on the calves bilaterally.  Washington catheter was placed under standard sterile techniques. A bump was placed under the pelvis and the arms were secured.  All pressure points were well padded.  The anterior abdomen was prepped and draped in its entirety in the usual sterile fashion. We then held a multidisciplinary time out in which we verified the patient, procedure, antibiotics, and operative plan.  All team members were in agreement.    Our vascular colleagues started the approach for the ALIF at L4-5.  Please see their dictation for the details of the approach.  I assisted with the approach and in obtaining the exposure.  Once I deemed the exposure adequate, I placed a needle in the disk space, and obtained an AP and lateral fluoroscopic image to confirm the level.  I repositioned the retractors to protect the vessels and then performed an annulotomy with a 10 blade and a complete diskectomy using Berkowitz curettes and pituitary, preparing the endplates for fusion, anterior to posterior into the width the body.  We then serially trialed and selected a [id number] cage with 25 degrees lordosis.  This was filled with Bone Morphogenic Protein and Allograft for fusion.  When I was trialing this cage, I noted that the 3 4 disc space had been pushed very superior.  I felt that I would not be able  to impact the cage with at 3-4 if I placed the 4-5 device first.  So I left this disc space and then turned my attention to the L3-4 disc.  The retractors were moved up to level.  Once exposure was adequate I then incised the disc space with cautery, thoroughly prepared the disc space with a combination of curettes and Jarad and scrapers.  I thoroughly cleaned out and we then serially trialed at the L3-4 level.  I eventually settled upon a 6 x 38 mm x 28 mm 20 degree lordotic cage.  This cage was filled with one half of the large kit of bone morphogenic protein as well as some allograft and then impacted into the disc space.  I then placed a single screw into the L3 vertebral body with good purchase.  We verified the position of this cage fluoroscopically.  I then turned my attention back to the L4-5 disc.  I serially trialed again to make sure we could still place the same size cage.  The disc space was quite mobile and I felt that I could get a 25 degree lordotic cage so we then finally prepared that cage and it was impacted under fluoroscopic guidance and I then placed a screw into the L4 body as well as a second screw into the L5 vertebral body.      At this point I did note that the posterior aspect of L4-5 was not fully closed down and the cage was seated on the anterior apophyseal rim.  We had gotten about 35 degrees of lordosis correction across these 2 levels.  I consider going up to the L2-3 disc as well.  Dr. Alegria felt that she could in fact obtain the exposure.  However given that the anterior column was already under quite a bit of stress with these first 2 cages I felt that placing a third interbody would increase the risk of cage subsidence or vertebral endplate fracture by further increasing the anterior column stress.  His second stage surgery is planned for Thursday for logistical reasons and furthermore the L2-3 disc is a virgin level and has never been operated on posteriorly and I think it  should be safe to access this disc for a TLIF from behind. Thus, I chose not to do any more levels of anterior interbody at this time because I did not want to further stress the anterior column prior to our posterior surgery.  I was satisfied with the amount of lordosis correction we had already gotten.    We copiously irrigated and then our vascular colleagues returned for the closure.  We obtained final fluoroscopic images and the implants were in good position with no obvious complications.  The patient was then extubated and returned to the recovery area in stable condition.        I plan to keep him on bedrest with bathroom privledges with lovenox and SCD for DVT prophylaxis until his second operation.    I, Carlos Enrique Dial MD was present and scrubbed for the critical portions of the procedure including obtaining the exposure, disc space preparation, placement of the interbody device, and evaluation of fluoroscopic images.       Carlos Enrique Dial MD

## 2019-04-22 NOTE — ANESTHESIA PROCEDURE NOTES
[facility] Probe Insertion Note:    Inserted by:  Radha Luna MD (Responsible Anesthesiologist)    Probe Status PRE Insertion: NO obvious damage  Probe type:  Adult 2D    Bite block used:   Yes  Insertion Technique: Easy, no oropharyngeal manipulation  Insertion complications: None obvious    Billing Report:A [facility] report is NOT being generated.    Probe Status POST Removal: NO obvious damage

## 2019-04-22 NOTE — ANESTHESIA PROCEDURE NOTES
Central Line Procedure Note  Staff:     Anesthesiologist:  Radha Luna MD    Resident/CRNA:  Dru Contreras MD    Central line placed by Resident/CRNA in the presence of a teaching physician    Location: In OR after induction  Procedure Start/Stop Times:     patient identified, IV checked, site marked, risks and benefits discussed, informed consent, monitors and equipment checked, pre-op evaluation and at physician/surgeon's request      Correct Patient: Yes      Correct Position: Yes      Correct Site: Yes      Correct Procedure: Yes      Correct Laterality:  Yes    Site Marked:  Yes  Line Placement:     Procedure:  Central Line    Insertion laterality:  Right    Insertion site:  Internal Jugular      Maximal Sterile Barriers: All elements of maximal sterile barrier technique followed      (Maximal sterile barriers include:   Sterile gown, Sterile Gloves, Mask, Cap, Whole body draped, hand hygiene and acceptable skin prep).Skin Prep: Chloraprep         Injection Technique:  Ultrasound guided    Sterile Ultrasound Technique:  Sterile probe cover and Sterile gel    Vein evaluated via U/S for patency/adequacy of catheter insertion and is adequate.  Using realtime U/S imaging the vein was punctured, and needle was observed entering vein on U/S      Local skin infiltration:  None    Catheter size:  9 Fr, 2 lumen 11.5 cm (MAC)    Cath secured with: suture      Dressing:  Tegaderm and Biopatch    Complications:  None obvious    Blood aspirated all lumens: Yes      All Lumens Flushed: Yes      Tip termination: other    Assessment/Narrative:      Dru Contreras  Anesthesiology Resident, PGY-2   [facility]   [phone]  4/22/2019 9:20 AM

## 2019-04-23 NOTE — PROGRESS NOTES
"Orthopaedic Surgery Progress Note     Subjective: No acute events overnight. Pain well controlled. Passing gas. Washington in place. Denies numbness or tingling, motor dysfunction or weakness.     Objective: /51 (BP Location: Right arm)   Pulse 90   Temp 97.8  F (36.6  C) (Oral)   Resp 14   Ht 1.6 m (5' 3\")   Wt 76.1 kg (167 lb 12.3 oz)   SpO2 97%   BMI 29.72 kg/m      General: NAD, alert and oriented, cooperative with exam.   Cardio: RRR, extremities wwp.   Respiratory: Non-labored breathing.  MSK: Dressing c/d/i. Unable to palpate pulses bilaterally but toes well perfused and equally warm bilaterally. SILT L3-S1 bilaterally.   L2-3: Hip flexion L and R 5/5 strength    L4:  Knee extension L and R 5/5 strength   L5:  Foot / EHL dorsiflexion L and R 5/5 strength   S1:  Plantarflexion  L and R 5/5 strength    Drain: none    Labs:   Recent Labs   Lab 04/22/19  1559 04/22/19  1133 04/22/19  1035 04/22/19  0911   HGB  --  13.1* 13.0* 13.6     --   --   --      Sed Rate   Date Value Ref Range Status   06/26/2018 10 0 - 20 mm/h Final       Imaging:   Personally reviewed by me. S/p- ALIF L3-4, L4-5 with appropriate position of implants    Per radiolgy:  Xr Lumbar Spine Port 2/3 Views    Result Date: 4/22/2019  Exam: XR LUMBAR SPINE PORT 2-3VW, 4/22/2019 3:34 PM Indication: Post-op Thoracic/Lumbar Surgery Comparison: 4/22/2019 intraoperative radiographs, 1/29/2018 lumbar spine CT Findings: Supine frontal and lateral views of the lumbar spine. 5 lumbar type vertebral bodies are assumed for the purposes of this dictation. Mild reversal of the normal lumbar lordosis in the upper lumbar spine. L3-4 and L4-5 interbody fusion devices. Multilevel degenerative endplate spurring and sclerosis with associated intervertebral disc space narrowing. Decompression changes L4-S1. No significant spondylolisthesis. Lower lumbar predominant facet arthropathy. Osteopenic appearance of the bones. Nonobstructive bowel gas pattern. " Washington catheter in place.     Impression: 1. New L3-4 and L4-5 interbody fusion instrumentation. 2. Moderate to severe multilevel degenerative change in the lumbar spine. MONICA ZHENG, DO      Assessment and Plan: Beulah Jane is a 69 year old male with PMH including aortic stenosis, chronic pain, DM type 2, hyperlipidemia, JUAN now s/p Stage I L3-4, L4-5 ALIF  on 4/22/19 with Dr. Dial and Dr. Alegria.  Planned return to OR on 4/25 for stage II T10-pelvis fusion    Ortho Primary  Activity: Strict bedrest with  bathroom privledges.  No excessive bending or twisting. No lifting >10 lbs x 6 weeks. No Faiza lift for transfers.   Pain management: Transition from IV to PO as tolerated. No NSAIDs   Antibiotics: Ancef 1g x24 hours  Diet: Begin with clear fluids and progress diet as tolerated.   DVT prophylaxis: Lovenox 30mg BID starting afternoon of 4/22/19 and ending after morning dose on 4/24/19. SCDs.  Imaging: XR Upright Lumbar spine, in PACU PTDC - ordered, complete.  Bracing/Splinting: None.  Dressings: Change dressing in 48 hours.  Drains: None  Washington catheter: Remove POD#1  Physical Therapy/Occupational Therapy: Eval and treat.  Cultures: None  Consults: Hospitalist.  Follow-up: Clinic with Dr. Dial in 6 weeks with repeat x-rays.   Disposition: Has Phase 2 of surgery scheduled for later this week with Dr. Dial.        Patient discussed with Dr. Jayro Morrow, PGY4  [phone]

## 2019-04-23 NOTE — CONSULTS
[facility], [place]  Consult Note - Hospitalist Service, Gold 9     Date of Admission:  4/22/2019  Consult Requested by: Dr. Dial of Orthopedics.   Reason for Consult: T2DM, dermatomyositis, ILD    Assessment & Plan   Beulah Jane is a 69 year old male with hx of HTN, hypothyroidism, T2DM, ILD, dermatomyositis, aortic stenosis, and GERD,admitted on 4/22/2019. He is now status post stage I lumbar ALIF on 4/22 by Ed Dial and Rjai with planned stage II operation on 4/25.     S/p stage I lumbar fusion, 4/22:  By Ed Dial and Raji. Tolerated procedure well. Plan is for stage II surgery on 4/25. Currently states incisional pain tolerable on current pain med regimen. Incisions healing well without discharge or dehiscence.   - Discontinue prn Tylenol as pt on scheduled Tylenol 3 g daily already.   - Further management, including ongoing pain control, activity level, and DVT ppx per primary team.     Dermatomyositis (Shannon-1 deepak +), ILD:  Followed OP by Dr. Call at  Rheumatology clinic in [place]. Chronically on methotrexate 25 mg once weekly, last taken however, two weeks ago at the advice of Dr. Call. Also recently nearly completed protracted taper of prednisone, started on 2/7 at 20 mg x 2 wks, then decrease by 5 mg q2wks, so pt was on 5 mg daily day of surgery with a few days left of taper. Most recent PFTs with reduced DLCO so received rituximab infusion on 3/11. Not normally on O2 PTA, but currently on 2L most likely the need is multifactorial and due to ILD, deconditioned state, anemia, etc.   - Continue oral prednisone but in acute post-op setting increase dose from 5 mg daily to BID until after a few days after stage II surgery, then would decrease to daily for a few days then off.    - Continue with PTA theophylline, scheduled Arnuity Ellipta, and prn albuterol inhaler/nebs  - Continue supplemental O2 via NC to keep O2 sats >92%. Wean as able.   - Encourage IS q2hrs  -  Would hold off on restarting methotrexate for at least 1 week after stage II surgery, longer if concern for lack of healing from surgery perspective.     T2DM:  Most recent HgbA1C from 1/29/19, 6.3%. PTA on PRN insulin isophane 6U BID. Pt currently states he has poor appetite.   Recent Labs   Lab 04/23/19  1209 04/23/19  0756 04/23/19  0617 04/22/19  1753 04/22/19  1233 04/22/19  1133 04/22/19  1035 04/22/19  0911 04/22/19  0620   GLC  --   --  147*  --   --  158* 163* 161*  --    * 140*  --  210* 162*  --   --   --  127*      - Discontinue PTA insulin isophane  - Start MSSI  - Accuchecks TID before meals and at bedtime  - Hypoglycemic protocol.     Acute blood loss anemia: Pre-op Hgb on 4/2, 15.9. EBL from stage I 400 ml. Today Hgb decreased to 11.6 from prior of 13.1 yesterday. No e/o active bleeding on exam.   - Repeat hgb tomorrow am.     Chronic aortic stenosis: Most recent echo 12/20/18 with mild LVH, normal LV size and systolic function, normal RV size and function, est EF 60%, and with moderate aortic stenosis with slight progress compared to prior study. Followed by OP Cardiology and  [facility] with plan for likely AVR in the next year or two.   - Follow up with OP Cardiology with repeat echo as scheduled June 2019.     HTN:  PTA on verapamil. Currently BPs stable.   - Continue PTA verapamil  daily with parameters to hold.     Hypothyroidism:  Euthyroid based on normal TSH on 1/10/19. Continue PTA levothyroxine 50 mcg daily.       The patient's care was discussed with the Attending Physician, Dr. Herring, Patient and Primary team via this note    Joseluis Smith PA-C  [facility], [place]  Pager: [phone]  Please see sticky note for cross cover information  ______________________________________________________________________    Chief Complaint   Acute on chronic back pain    History is obtained from the patient and electronic health record    History of Present  Illness   Beulah Jane is a 69 year old male with hx of HTN, hypothyroidism, T2DM, ILD, dermatomyositis, aortic stenosis, and GERD,admitted on 2019. He is now status post stage I lumbar ALIF on  by Ed Dial and Raji with planned stage II operation on . Tolerated procedure well with est  ml. Currently admits to tolerable incisional pain. Denies fever, chills, chest pain, SOB, nausea, abd pain, and bowel concerns. Passing gas. Has Washington in place.     Review of Systems   The 10 point Review of Systems is negative other than noted in the HPI or here.       Past Medical History    I have reviewed this patient's medical history and updated it with pertinent information if needed.   Past Medical History:   Diagnosis Date     Aortic stenosis      Chronic pain      DM (diabetes mellitus), type 2 (H)     on insulin     Hyperlipidemia      JUAN on CPAP      Pneumonia      Polymyositis (H)      Pulmonary fibrosis, unspecified (H)      Seasonal allergies        Past Surgical History   I have reviewed this patient's surgical history and updated it with pertinent information if needed.  Past Surgical History:   Procedure Laterality Date     ARTHROSCOPY KNEE       COLONOSCOPY       DECOMPRESSION, FUSION LUMBAR POSTERIOR TWO LEVELS, COMBINED       HERNIA REPAIR       lumbar spine hardware removal       REMOVAL PROSTHETIC MATERIAL/MESH, ABD WALL NECRO TISS INFEXN       ROTATOR CUFF REPAIR RT/LT         Social History   I have reviewed this patient's social history and updated it with pertinent information if needed.  Social History     Tobacco Use     Smoking status: Former Smoker     Packs/day: 0.25     Last attempt to quit: 1970     Years since quittin.8     Smokeless tobacco: Never Used   Substance Use Topics     Alcohol use: None     Comment: few times monthly     Drug use: None       Family History   I have reviewed this patient's family history and updated it with  pertinent information if needed.   History reviewed. No pertinent family history.    Medications   Medications Prior to Admission   Medication Sig Dispense Refill Last Dose     calcium carbonate (OS-DMITRIY 500 MG [place]. [place]) 500 MG tablet Take 1 tablet (500 mg) by mouth 2 times daily 180 tablet 3 Past Week at Unknown time     Cholecalciferol (VITAMIN D) 2000 units tablet Take 2,000 Units by mouth daily 100 tablet 3 4/21/2019 at 0900     diphenhydrAMINE (BENADRYL) 50 MG capsule Take 50 mg by mouth every 6 hours as needed for itching or allergies   Past Week at Unknown time     FOLIC ACID PO Take 1 mg by mouth daily   4/21/2019 at 0900     GABAPENTIN PO Take 600 mg by mouth 3 times daily   4/22/2019 at 0430     GEMFIBROZIL PO Take 600 mg by mouth 2 times daily   4/22/2019 at 0430     insulin isophane human (HUMULIN N PEN) 100 UNIT/ML injection Inject 6 Units Subcutaneous 2 times daily (before meals) (Patient taking differently: Inject 6 Units Subcutaneous 2 times daily as needed )   Past Week at Unknown time     LANsoprazole (PREVACID) 30 MG DR capsule Take 30 mg by mouth every morning (before breakfast)   4/22/2019 at 0430     levothyroxine (SYNTHROID/LEVOTHROID) 50 MCG tablet Take 50 mcg by mouth daily   4/22/2019 at 0430     METHOTREXATE PO Take 25 mg by mouth once a week Tuesdays   Past Month at Unknown time     omega 3 1000 MG CAPS Take 2 capsules by mouth 2 times daily   Past Week at Unknown time     oxyCODONE-acetaminophen (PERCOCET) 5-325 MG per tablet Take 1 tablet by mouth 3 times daily   4/22/2019 at 0430     predniSONE (DELTASONE) 20 MG tablet Take 1 tablet (20 mg) by mouth daily (Patient taking differently: Take 10 mg by mouth every morning 10mg by mouth daily x 2 weeks then 5mg by mouth daily x 2 weeks.)   4/22/2019 at 0430     sulfamethoxazole-trimethoprim (BACTRIM DS/SEPTRA DS) 800-160 MG per tablet Take 1 tablet 3 times a week (Monday, Wednesday, Friday)  11 4/22/2019 at 0430     theophylline (UNIPHYL) 400  MG 24 hr tablet Take 400 mg by mouth every morning    Past Week at Unknown time     traMADol (ULTRAM) 50 MG tablet Take 0.5 tablets (25 mg) by mouth every 6 hours as needed for moderate pain 20 tablet 0 4/22/2019 at 0430     verapamil ER (CALAN-SR) 120 MG CR tablet Take 120 mg by mouth At Bedtime   4/21/2019 at 2100     albuterol (2.5 MG/3ML) 0.083% neb solution Take 1 vial by nebulization every 6 hours as needed for shortness of breath / dyspnea or wheezing   Unknown at Unknown time     albuterol (PROAIR HFA/PROVENTIL HFA/VENTOLIN HFA) 108 (90 BASE) MCG/ACT Inhaler Inhale 2 puffs into the lungs every 6 hours as needed for shortness of breath / dyspnea or wheezing   Unknown at Unknown time     beclomethasone (QVAR) 80 MCG/ACT Inhaler Inhale 2 puffs into the lungs 2 times daily   Unknown at Unknown time       Allergies   No Known Allergies    Physical Exam   Vital Signs: Temp: 97.7  F (36.5  C) Temp src: Oral BP: 126/55   Heart Rate: 76 Resp: 16 SpO2: 99 % O2 Device: Nasal cannula Oxygen Delivery: 2 LPM  Weight: 167 lbs 12.32 oz  GEN: Pleasant gentleman in NAD  HEENT: NCAT; PERRL; sclerae non-icteric; MMM  LUNGS: Decreased breath sounds otherwise clear  CV: RRR; 2/6 systolic murmur noted.   ABD: +BSs; soft, ND, incision healing well without e/o infection or dehiscence.   EXT: No BLE edema  SKIN: No acute rashes noted on exposed areas.  NEURO: AAOx3; CNs grossly intact; No acute focal deficits noted.      Data   CMP  Recent Labs   Lab 04/23/19  0617 04/22/19  1559 04/22/19  1133 04/22/19  1035 04/22/19  0911 04/22/19  0645   NA  --   --  138 139 140  --    POTASSIUM  --   --  4.2 4.0 3.6 3.7   *  --  158* 163* 161*  --    CR  --  0.81  --   --   --   --    GFRESTIMATED  --  >90  --   --   --   --    GFRESTBLACK  --  >90  --   --   --   --      CBC  Recent Labs   Lab 04/23/19  0617 04/22/19  1559 04/22/19  1133 04/22/19  1035 04/22/19  0911   HGB 11.6*  --  13.1* 13.0* 13.6   PLT  --  309  --   --   --         Arterial Blood Gas  Recent Labs   Lab 04/22/19  1133 04/22/19  1035 04/22/19  0911   PH 7.46* 7.44 7.38   PCO2 33* 35 41   PO2 153* 146* 149*   HCO3 23 24 24   O2PER 50.0 45.0 50.0

## 2019-04-23 NOTE — OP NOTE
Procedure Date: April 22, 2019      DATE OF OPERATION: April 22, 2019      CO-SURGEONS:  Cal Dial MD; Hilda Alegria MD.       ASSISTANT: Stacey LeJeune, MD, Michelle Daniels PA-C      ANESTHESIA:  General endotracheal anesthesia.       PREOPERATIVE DIAGNOSIS: Spinal Stenosis Of Lumbar Region With Neurogenic Claudication, Lumbar Flatback.           POSTOPERATIVE DIAGNOSIS:  Spinal Stenosis Of Lumbar Region With Neurogenic Claudication, Lumbar Flatback.           PROCEDURE:     1.  L4-L5 anterior lumbar intradiscal fusion.   2.  L3-4  anterior lumbar intradiscal fusion.   3.  Left retroperitoneal exposure.       ESTIMATED BLOOD LOSS:  400 mL.       COMPLICATIONS:  None apparent.       INDICATIONS:  This 69 year old male saw Dr. Dial of  Spine Surgery for evaluation.  Treatment was recommended, which included anterior exposure for the L3-L4, L4-L5 disk spaces.  I was consulted for exposure via retroperitoneal exposure.  I met the patient preoperatively.  We discussed the risks and benefits.  He was willing to proceed.       DESCRIPTION OF PROCEDURE:  The patient was brought to the operating room, placed in the supine position.  After monitors were placed general anesthesia was achieved.  A left paramedian incision was created and the anterior rectus sheath incised.  The left rectus muscle was then retracted laterally and the posterior sheath entered in a vertical fashion.  The retroperitoneal space was entered and reflected toward the right.  A self-retaining radiolucent retractor was then placed.  The L4-L5 disk space was then encountered and the middle sacral artery cauterized.  The disk space between L4 and L5, the L5 vertebral body, and L4 vertebral body were then cleared cephalad and caudally as well as laterally. Care and time was then taken to further expose L3-L4.  The disk and fusion portion as well as the instrumentation for the L4-L5 disk space will then be dictated by Dr. Dial.  The retractors were  released and hemostasis appeared adequate.  At this point, the L3-L4 disk space was then exposed. Several lumbar arteries and veins were divided between hemoclips. The instrumentation and fusion ports will then be dictated by Dr. Dial.  At this point, when excellent hemostasis was achieved a small peritoneal rent was repaired with a running 3-0 Vicryl suture.  The area was again inspected for hemostasis and when this was assured, the posterior sheath was closed with looped #0 PDS from the cephalad and caudal positions.  Interrupted 3-0 Vicryl sutures were then used for deep dermal followed by 4-0 subcuticular Monocryl followed by skin glue.  Prior to closure, 20 mL of 0.25% Marcaine was injected into the incision.  At this point, the remainder of the operation and fusion will be dictated by Dr. Dial. At this time the patient was hemodynamically stable. I was present throughout the entire operation.           Hilda Alegria MD, FACS, RPVI  Chief, Vascular and Endovascular Surgery  [facility]  Cell: [phone]  [email or web address]

## 2019-04-23 NOTE — PLAN OF CARE
OT 6A: Hold.  Pt on strict bedrest with bathroom privileges only. Plan for OR 4/25 for surgery.  Will hold and initiate 4/26 (POD 1) as appropriate per complex spine protocol.

## 2019-04-23 NOTE — PROGRESS NOTES
Vascular Surgery Progress Note    Doing well, pain controlled  Denies numbness or tingling in lower extremities    B/P: 122/51, T: 97.8, P: 90, R: 14  Alert oriented no acute distress  + PT signals bilaterally, Right AT signal  Motor sensory intact  Abd soft, mild distention, midline incision intact with skin glue, mild ecchymosis no drainage    WBC   Date Value Ref Range Status   04/02/2019 12.2 (H) 4.0 - 11.0 10e9/L Final   ]  Hemoglobin   Date Value Ref Range Status   04/22/2019 13.1 (L) 13.3 - 17.7 g/dL Final   ]  INR   Date Value Ref Range Status   04/02/2019 1.03 0.86 - 1.14 Final      Creatinine   Date Value Ref Range Status   04/22/2019 0.81 0.66 - 1.25 mg/dL Final   ]      Intake/Output Summary (Last 24 hours) at 4/23/2019 0618  Last data filed at 4/23/2019 0600  Gross per 24 hour   Intake 1380 ml   Output 2425 ml   Net -1045 ml       Assessment/Plan:  69 year old male POD#1 s/p L3-5 ALIF    Care per ortho, continue neurovascular checks qshift  Please call if any questions or concerns.    Mari Mendiola MD  Vascular Surgery Fellow

## 2019-04-23 NOTE — PLAN OF CARE
Status: Pt on 6A POD #1 Lumbar 1 Or Lumbar 2-5 Anterior Lumbar Interbody Fusion.   Vitals: VSS on 2L via NC.   Neuros: Pt is A&Ox4. Tingling to bilateral feet baseline neuropathy. Strengths 4/5 throughout   IV: PIV SL  Diet: Tolerating regular diet, good PO.    Bowel status: No BM   :  [place] patent    Skin: Anterior mid abdominal incision HARDIK and intact with dermabond. Able to Doppler R pedal pulse. Unable to Doppler L pedal pulse (Vascular surgery and Ortho aware per PACU RN). Doppler + on L posterior tibial pulse   Pain: Dilaudid PCA discontinued this AM. Taking PRN Oxycodone and scheduled Tylenol     Activity: Not OOB. Per orders pt is bedrest with bathroom privileges. No HOB restrictions.  Log rolling side-to-side q2 hrs  Social:  No family/visitors present this shift  Plan: 2nd part of surgery scheduled for Thursday. Continue to monitor and follow POC

## 2019-04-23 NOTE — PLAN OF CARE
PT 6A: HOLD.  Pt on bedrest with bathroom privileges.  Per chart review, pt will be in OR again for surgery on Thursday.  Will initiate following surgery.

## 2019-04-23 NOTE — PLAN OF CARE
Writer assumed care of patient from 1526-1236    VS: AVSS on 2L NC.   Neuro: A&O x4. Baseline neuropathy in bilat feet. Strengths 4/5 in all extremities.   Resp: Lungs clear/equal bilaterally.   Cardiac: WNL except murmer auscultated.   GI: +BS/+ passing flatus. No BM overnight. Denies N/V  : [place] patent w/ adequate UOP  Skin: Scattered bruises. Abd incision HARDIK/WNL. Able to doppler right pedal and tibial pulses but unable to doppler left pedal pulse (unchanged from previous shift). Able to doppler left tibial pulse.   Diet: Regular diet. No food intake overnight   LDA: Left PIV infusing TKO and dilaudid PCA  Activity: Repositioning in bed independently. HOB <30.    Pain: Pain managed w/ minimal use of Dilaudid PCA @ 0.2mg q10min, scheduled Tylenol, and PRN Oxycodone 10mg administered 1x. Declined additional pain interventions.    Kendra Amin RN on 4/23/2019 at 5:12 AM

## 2019-04-23 NOTE — PLAN OF CARE
[id number]:  Status: Pt arrived from PACU at 1530. POD #0Lumbar 1 Or Lumbar 2-5 Anterior Lumbar Interbody Fusion  Vitals: VSS on 2L via NC. CAPNO WNL  Neuros: Pt is A&Ox4. Tingling to bilateral feet baseline neuropathy. Strengths 4/5 throughout   IV: PIV TKO with Dilaudid PCA  Diet: Denies nausea. Tolerated regular diet for dinner.    Bowel status: No BM   :  [place] patent    Skin: Anterior mid abdominal incision HARDIK and intact with dermabond. Able to Doppler R pedal pulse. Unable to Doppler L pedal pulse (Vascular surgery and Ortho aware per PACU RN). Doppler + on L posterior tibial pulse   Pain: Dilaudid PCA 0.1mg with 10 min lockout. Also receiving scheduled Tylenol     Activity: Not OOB. Per orders pt is bedrest with bathroom privileges. HOB <30 degrees. Log rolling side-to-side q2 hrs  Social:  Family at bedside at arrival from PACU, went home for the night   Plan: Continue to monitor and follow POC

## 2019-04-23 NOTE — PLAN OF CARE
"[id number]:  Status: Pt on 6A POD #0 s/p L4-L5 anterior lumbar intradiscal fusion, L3-4  anterior lumbar intradiscal fusion, Left retroperitoneal exposure.   VS: /54   Pulse 90   Temp 98.2  F (36.8  C) (Oral)   Resp 17   Ht 1.6 m (5' 3\")   Wt 76.1 kg (167 lb 12.3 oz)   SpO2 97%   BMI 29.72 kg/m    Neuros: Baseline neuropathy to feet, strengths 4/5 throughout.   GI: Tolerates regular diet. No BM this shift.   : Washington in place with adequate UOP.   IV: TKO with dilaudid PCA  Activity: Bedrest with bathroom privileges, not OOB this shift.   Pain: Reports pain controlled with dilaudid PCA at 0.2 bumps  Respiratory: LS clear, equal throughout  Drains/lines/skin: Anterior mid abdominal incision HARDIK and intact with dermabond. Able to Doppler R pedal pulse. Unable to Doppler L pedal pulse (Vascular surgery and Ortho aware per PACU RN). Doppler + on L posterior tibial pulse   New this shift: No notable events this shift  Social: No family at bedside at this time. Pt is Italian speaking, but speaks and understands English well.   Plan of care: Continue to monitor and follow current POC.     "

## 2019-04-24 NOTE — PLAN OF CARE
[id number]  Status: POD#1 L1-L2-5 ALIF. Pt is Ortho primary.   Vitals: VSS on 2L NC.   Neuros: AE 4-5/5. N/t to bilateral feet (baseline)   IV: PIV SL   Resp: LS clear   Diet: Reg diet tolerating well   Bowel status: No BM this shift.   : Washington in place and patent.   Skin: Abdominal incision HARDIK with dermabond, WDL.   Pain: Alternating tramadol/oxy, please wake pt for pain meds per pt request.   Activity: Bedrest with BP. SCDs on.   Social: No family present this shift.   Plan: Plan for second part of surgery on Thursday.

## 2019-04-24 NOTE — PLAN OF CARE
POD#2 L1-L2-5 ALIF. Abdominal incision HARDIK with liquid bandage, CDI. VSS on 1-2L NC. A&Ox4. Neuros include: 4-5 t/o with baseline N/T to BLE feet. Tolerating regular diet. [place] catheter in place with adequate UO. No BM this shift; last BM on 4/21; +passing flatus; BS audible and normoactive. Pt. is strict bedrest with bathroom privileges.  L. PIV, SL. C/o back/abdomen pain managed with PRN ultram and oxycodone. Plan for stage II T10-pelvis fusion 4/25. Continue to monitor and follow POC.

## 2019-04-24 NOTE — PLAN OF CARE
Pt POD# 2 L1-2 ALIF, vs ex 2L NC, RN attempted to ween and remove O2 but pt sats dipped into low 80s, neuros include: a/o x4, BUE 5/5, N/T in BLE (feet) @ baseline. PIV SL, regular diet w/assistance ordering and meal setup, chandler removed pt voiding spont, no BM but active bowel sounds and passing gas. Pt is on strict bedrest w/bathroom privileges, he's up AO1 w/GB and walker, PRN pain meds provided round-the-clock w/good relief. Plan is OR tomorrow AM, continue to monitor per MD orders.

## 2019-04-24 NOTE — PROGRESS NOTES
[facility], [facility] Progress Note - Hospitalist Service, Gold 9       Date of Admission:  4/22/2019  Assessment & Plan   Beulah Jane is a 69 year old male with hx of HTN, hypothyroidism, T2DM, ILD, dermatomyositis, aortic stenosis, and GERD,admitted on 4/22/2019. He is now status post stage I lumbar ALIF on 4/22 by Ed Dial and Raji with planned stage II operation on 4/25.      S/p stage I lumbar fusion, 4/22:  By Ed Dial and Raji. Tolerated procedure well. Plan is for stage II surgery on 4/25. Currently states incisional pain tolerable on current pain med regimen. Incisions healing well without discharge or dehiscence.   - Management, including ongoing pain control, activity level, and DVT ppx per primary team.      Dermatomyositis (Shannon-1 deepak +), ILD:  Followed OP by Dr. Call at  Rheumatology clinic in [place]. Chronically on methotrexate 25 mg once weekly, last taken however, two weeks ago at the advice of Dr. Call. Also recently nearly completed protracted taper of prednisone, started on 2/7 at 20 mg x 2 wks, then decrease by 5 mg q2wks, so pt was on 5 mg daily day of surgery with a few days left of taper. Most recent PFTs with reduced DLCO so received rituximab infusion on 3/11. Not normally on O2 PTA, but currently on 2L most likely the need is multifactorial and due to ILD, deconditioned state, anemia, etc.   - Continue oral prednisone but in acute post-op setting, continue 5 mg BID until after a few days after stage II surgery, then would decrease to daily for a few days then off.    - Continue with PTA theophylline, scheduled Arnuity Ellipta, and prn albuterol inhaler/nebs  - Continue supplemental O2 via NC to keep O2 sats >92%. Wean as able.   - Encourage IS q2hrs  - Would hold off on restarting methotrexate for at least 1 week after stage II surgery, longer if concern for lack of healing from surgery perspective.      T2DM:  Most recent HgbA1C from  1/29/19, 6.3%. PTA on PRN insulin isophane 6U BID. Pt currently states appetite improving  Recent Labs   Lab 04/24/19  0803 04/24/19  0636 04/24/19  0614 04/23/19  2139 04/23/19  1740 04/23/19  1209 04/23/19  0756 04/23/19  0617  04/22/19  1133 04/22/19  1035 04/22/19  0911   GLC  --   --  129*  --   --   --   --  147*  --  158* 163* 161*   * 131*  --  171* 138* 179* 140*  --    < >  --   --   --     < > = values in this interval not displayed.      - Continue MSSI  - Continue to hold PTA insulin isophane  - Accuchecks TID before meals and at bedtime  - Hypoglycemic protocol.      Acute blood loss anemia: Pre-op Hgb on 4/2, 15.9. EBL from stage I 400 ml. Hgb this am stable at 11.8 (11.6). No e/o active bleeding on exam.   - Repeat hgb tomorrow am.      Chronic aortic stenosis: Most recent echo 12/20/18 with mild LVH, normal LV size and systolic function, normal RV size and function, est EF 60%, and with moderate aortic stenosis with slight progress compared to prior study. Followed by OP Cardiology and [facility] with plan for likely AVR in the next year or two.   - Follow up with OP Cardiology with repeat echo as scheduled June 2019.      HTN:  PTA on verapamil. Currently BPs normotensive.   - Continue PTA verapamil  daily with parameters to hold.      Hypothyroidism:  Euthyroid based on normal TSH on 1/10/19. Continue PTA levothyroxine 50 mcg daily.       The patient's care was discussed with the Attending Physician, Dr. Herring, Bedside Nurse, Patient and Primary team via this note.     Joseluis Smith PA-C  Hospitalist Service, [address], [place]  Pager: [phone]  Please see sticky note for cross cover information  ______________________________________________________________________    Interval History   No acute events overnight. Surgical pain controlled. Denies other acute physical concerns at this time.     Data reviewed today: I reviewed all medications,  new labs and imaging results over the last 24 hours.     Physical Exam   Vital Signs: Temp: 98.1  F (36.7  C) Temp src: Oral BP: 101/53   Heart Rate: 81 Resp: 18 SpO2: 94 % O2 Device: Nasal cannula Oxygen Delivery: 2 LPM  Weight: 167 lbs 12.32 oz  GEN: Pleasant gentleman in NAD  HEENT: NCAT; PERRL; sclerae non-icteric  LUNGS: CTAB  CV: RRR  ABD: +BSs; abd incision healing well  EXT: No BLE edema  SKIN: No acute rashes noted on exposed areas.  NEURO: AAOx3; CNs grossly intact; No acute focal deficits noted.      Data   CMP  Recent Labs   Lab 04/24/19  0614 04/23/19  0617 04/22/19  1559 04/22/19  1133 04/22/19  1035 04/22/19  0911     --   --  138 139 140   POTASSIUM 4.0  --   --  4.2 4.0 3.6   CHLORIDE 102  --   --   --   --   --    CO2 30  --   --   --   --   --    ANIONGAP 6  --   --   --   --   --    * 147*  --  158* 163* 161*   BUN 11  --   --   --   --   --    CR 0.71  --  0.81  --   --   --    GFRESTIMATED >90  --  >90  --   --   --    GFRESTBLACK >90  --  >90  --   --   --    DMITRIY 8.8  --   --   --   --   --      CBC  Recent Labs   Lab 04/24/19  0614 04/23/19 0617 04/22/19  1559 04/22/19  1133 04/22/19  1035   HGB 11.8* 11.6*  --  13.1* 13.0*   PLT  --   --  309  --   --        Arterial Blood Gas  Recent Labs   Lab 04/22/19  1133 04/22/19  1035 04/22/19  0911   PH 7.46* 7.44 7.38   PCO2 33* 35 41   PO2 153* 146* 149*   HCO3 23 24 24   O2PER 50.0 45.0 50.0

## 2019-04-24 NOTE — PROGRESS NOTES
"Orthopaedic Surgery Progress Note     Subjective: No acute events overnight. Pain best controlled when alternating oxycodone and dilaudid. Passing gas. Washington in place. Denies numbness or tingling, motor dysfunction or weakness.     Objective: /53   Pulse 90   Temp 98.1  F (36.7  C) (Oral)   Resp 18   Ht 1.6 m (5' 3\")   Wt 76.1 kg (167 lb 12.3 oz)   SpO2 94%   BMI 29.72 kg/m      General: NAD, alert and oriented, cooperative with exam.   Cardio: RRR, extremities wwp.   Respiratory: Non-labored breathing.  MSK: Dressing c/d/i. Unable to palpate pulses bilaterally but toes well perfused and equally warm bilaterally. SILT L3-S1 bilaterally.   L2-3: Hip flexion L and R 5/5 strength    L4:  Knee extension L and R 5/5 strength   L5:  Foot / EHL dorsiflexion L and R 5/5 strength   S1:  Plantarflexion  L and R 5/5 strength    Drain: none    Labs:   Recent Labs   Lab 04/24/19  0614 04/23/19  0617 04/22/19  1559 04/22/19  1133   HGB 11.8* 11.6*  --  13.1*   PLT  --   --  309  --      Sed Rate   Date Value Ref Range Status   06/26/2018 10 0 - 20 mm/h Final       Imaging:   Personally reviewed by me. S/p- ALIF L3-4, L4-5 with appropriate position of implants    Per radiolgy:  Xr Lumbar Spine Port 2/3 Views    Result Date: 4/22/2019  Exam: XR LUMBAR SPINE PORT 2-3VW, 4/22/2019 3:34 PM Indication: Post-op Thoracic/Lumbar Surgery Comparison: 4/22/2019 intraoperative radiographs, 1/29/2018 lumbar spine CT Findings: Supine frontal and lateral views of the lumbar spine. 5 lumbar type vertebral bodies are assumed for the purposes of this dictation. Mild reversal of the normal lumbar lordosis in the upper lumbar spine. L3-4 and L4-5 interbody fusion devices. Multilevel degenerative endplate spurring and sclerosis with associated intervertebral disc space narrowing. Decompression changes L4-S1. No significant spondylolisthesis. Lower lumbar predominant facet arthropathy. Osteopenic appearance of the bones. Nonobstructive " bowel gas pattern. Washington catheter in place.     Impression: 1. New L3-4 and L4-5 interbody fusion instrumentation. 2. Moderate to severe multilevel degenerative change in the lumbar spine. MONICA ZHENG, DO      Assessment and Plan: Beulah Jane is a 69 year old male with PMH including aortic stenosis, chronic pain, DM type 2, hyperlipidemia, JUAN now s/p Stage I L3-4, L4-5 ALIF  on 4/22/19 with Dr. Dial and Dr. Alegria.  Planned return to OR on 4/25 for stage II T10-pelvis fusion    Ortho Primary  Activity: Strict bedrest with  bathroom privledges.  No excessive bending or twisting. No lifting >10 lbs x 6 weeks. No Faiza lift for transfers.   Pain management: Transition from IV to PO as tolerated. No NSAIDs   Antibiotics: Ancef 1g x24 hours, complete  Diet: NPO at midnight.   DVT prophylaxis: Lovenox 30mg BID starting afternoon of 4/22/19 and ending after morning dose on 4/24/19. SCDs.  Imaging: XR Upright Lumbar spine, in PACU PTDC - ordered, complete.  Bracing/Splinting: None.  Dressings: None  Drains: None  Washington catheter: Remove today. May use urinal or commode  Physical Therapy/Occupational Therapy: Eval and treat.  Cultures: None  Consults: Hospitalist.  Follow-up: Clinic with Dr. Dial in 6 weeks with repeat x-rays.   Disposition: Has Phase 2 of surgery scheduled for later this week with Dr. Dial.        Patient discussed with Dr. Jayro Morrow, PGY4  [phone]

## 2019-04-25 PROBLEM — Z98.890 STATUS POST LUMBAR SPINE SURGERY FOR DECOMPRESSION OF SPINAL CORD: Status: ACTIVE | Noted: 2019-01-01

## 2019-04-25 NOTE — ANESTHESIA PREPROCEDURE EVALUATION
Anesthesia Pre-Procedure Evaluation    Patient: Beulah Jane   MRN:     [id number] Gender:   male   Age:    69 year old :      1950        Preoperative Diagnosis: Spinal Stenosis Of Lumbar Region With Neurogenic Claudication, Flatback Syndrome Of Lumbar Region   Procedure(s):  O-Arm/Stealth Assisted Thoracic 10-Pelvis Instrumented Fusion Possible Thoracic 1-Lumbar 2 Transforminal Lumbar Interbody Fusion (TLIF) And Hung Borges Osteotomies, Possible Lumbar 5 Pedicle Subtraction Osteotomy, Use Of BMP, Debridement Of Seroma     Past Medical History:   Diagnosis Date     Aortic stenosis      Chronic pain      DM (diabetes mellitus), type 2 (H)     on insulin     Hyperlipidemia      JUAN on CPAP      Pneumonia      Polymyositis (H)      Pulmonary fibrosis, unspecified (H)      Seasonal allergies       Past Surgical History:   Procedure Laterality Date     ARTHROSCOPY KNEE       COLONOSCOPY       DECOMPRESSION, FUSION LUMBAR POSTERIOR TWO LEVELS, COMBINED       HERNIA REPAIR       lumbar spine hardware removal       REMOVAL PROSTHETIC MATERIAL/MESH, ABD WALL NECRO TISS INFEXN       ROTATOR CUFF REPAIR RT/LT            Anesthesia Evaluation     . Pt has had prior anesthetic. Type: General and MAC           ROS/MED HX    ENT/Pulmonary:     (+)sleep apnea, uses CPAP , . Other pulmonary disease Interstitial lung disease followed by pulmonology-previoulsy on O2 for exertion, not currenlty using.    Neurologic:  - neg neurologic ROS     Cardiovascular:     (+) Dyslipidemia, hypertension----. : . . . :. valvular problems/murmurs type: AS aortic stenosis:. Previous cardiac testing Echodate:2018results:  Echocardiogram 2018       CONCLUSION:    1. Mild concentric LVH. Normal LV size and systolic function.     Hypokinesis of the distal anterolateral and inferolateral wall     segments.    2. Normal right ventricular size and function.    3. Moderate aortic stenosis is present -- see  details below.    4. Mild to moderate aortic regurgitation.    5. Compared to prior report, there has been a slight progression in     the degree of AS.      Left Ventricular Ejection Fraction: 60 %      Measurements:    M-MODE    IVS to PW Ratio MM                1.4                         2D ECHO    Body Height                       66 in                     Body Weight                       156 lb                    Body Surface Area                 1.8 m                     LV Diastolic Diameter Base LX     4.9 cm                3.5-5.7    LV Systolic Diameter Base LX      3.1 cm                2.3-4.9    LV Diastolic Diameter Index       2.7 cm/m                  IVS Diastolic Thickness           1.4 cm                0.6-1.1 cm    LVPW Diastolic Thickness          0.99 cm               0.6-1.1 cm    Aorta at Sinuses Diameter         3.5 cm                    Ascending Aorta Diameter          3.7 cm                    LA Area 4C View                   16.6 cm                   LA Area 2C View                   13.8 cm               <= 20 cm     LA Volume                         41.5 cm                   LA Volume Index                   22.9 cm /m            16-34 cm /m       DOPPLER    AV Peak Velocity                  377 cm/s                  AV Peak Gradient                  56.9 mmHg                 AV Mean Gradient                  35 mmHg                   AV Velocity Time Integral         68.6 cm                   LVOT Peak Velocity                118 cm/s                  LVOT Peak Gradient                5.6 mmHg                  LVOT Mean Gradient                3 mmHg                    LVOT Velocity Time Integral       20.2 cm                   LVOT Diameter                     2.1 cm                    LVOT Area                         3.5 cm                    LVOT AV VTI Ratio                 0.29                      AV Stroke Volume                  70 cm                     AV Area Cont Eq  vti               1 cm                      AV Area Cont Eq pk                1.1 cm                    Mitral E Point Velocity           45.6 cm/s                 Mitral  A Point Velocity          76.6 cm/s                 Mitral E to A Ratio               0.6                       MV Deceleration Time              209 ms                    MV Pressure Half Time             61 ms                     MV Area PHT                       3.6 cm                    LV E' Lateral Velocity            4.7 cm/s                  LV E' Septal Velocity             5.3 cm/s                  Mitral E to LV E' Lateral Ratio   9.7                       Mitral E to LV E' Septal Ratio    8.7                         COLOR DOPPLER    LVOT Diameter                     2.1 cm                    LA Area 4C View                   16.6 cm                   LA Area 2C View                   13.8 cm               <= 20 cm     LA Volume                         41.5 cm                   LA Volume Index                   22.9 ml/m             16-34 cm /m               Left Ventricle:     Mild concentric LVH. Normal LV size and                         systolic function. Hypokinesis of the distal                         anterolateral and inferolateral wall segments.    Right Ventricle:    Normal right ventricular size and function.    Left Atrium:        Normal size left atrium.    Right Atrium:       Normal size right atrium.    Aortic Valve:       Moderate aortic stenosis is present -- Vmax 3.8                         m/s, mean gradient 35 mmHg, PETE 1.1 cm^2.                          Mild to moderate aortic regurgitation.    Mitral Valve:       Trace mitral regurgitation.    Tricuspid Valve:    Normal in appearance and function.    Pulmonic Valve:     Normal in appearance and function.    Aorta:              The proximal ascending aorta was normal in                         size.    Pericardium:        The pericardium is normal.  No  significant                         effusion.    IVC:                Normal IVC.    IAS:                Interatrial septum appears intact.    3D Imaging/Contrast:              date: results:ECG reviewed date:4/2/2019 results:NSR, non-specific intraventricular block date: results:          METS/Exercise Tolerance:  1 - Eating, dressing   Hematologic:  - neg hematologic  ROS       Musculoskeletal:   (+) arthritis,  other musculoskeletal- myositis followed by rheumatologist; back surgery 1976 in [place]      GI/Hepatic:  - neg GI/hepatic ROS       Renal/Genitourinary:     (+) Nephrolithiasis ,       Endo:     (+) type II DM Last HgA1c: 5.9% date: 1/10/19 Using insulin - not using insulin pump not previously admitted for DM/DKA Chronic steroid usage for Other Date most recently used: myositis ,.      Psychiatric:  - neg psychiatric ROS       Infectious Disease:  - neg infectious disease ROS       Malignancy:      - no malignancy   Other:    (+) No chance of pregnancy C-spine cleared: N/A, H/O Chronic Pain,H/O chronic opiod use , no other significant disability                        PHYSICAL EXAM:   Mental Status/Neuro:    Airway: Facies: Feasible  Mallampati: I  Mouth/Opening: Full  TM distance: > 6 cm  Neck ROM: Full   Respiratory:   Resp. Rate: Normal     Resp. Effort: Normal      CV: Rhythm: Regular  Heart: Murmur   Comments:      Dental: Normal                  Lab Results   Component Value Date    WBC 12.2 (H) 04/02/2019    HGB 11.7 (L) 04/25/2019    HCT 52.9 04/02/2019     04/22/2019    CRP 4.7 06/26/2018    SED 10 06/26/2018     04/25/2019    POTASSIUM 4.1 04/25/2019    CHLORIDE 101 04/25/2019    CO2 29 04/25/2019    BUN 13 04/25/2019    CR 0.80 04/25/2019     (H) 04/25/2019    DMITRIY 8.7 04/25/2019    MAG 2.6 (H) 11/28/2017    ALBUMIN 3.9 01/29/2019    PROTTOTAL 6.0 (L) 11/30/2017    ALT 14 11/30/2017    AST 14 11/30/2017    ALKPHOS 70 11/30/2017    BILITOTAL 0.3 11/30/2017    INR 1.03  "04/02/2019       Preop Vitals  BP Readings from Last 3 Encounters:   04/25/19 114/63   04/02/19 135/67   03/06/19 109/69    Pulse Readings from Last 3 Encounters:   04/22/19 90   04/02/19 99   03/06/19 98      Resp Readings from Last 3 Encounters:   04/25/19 16   04/02/19 18   02/07/19 18    SpO2 Readings from Last 3 Encounters:   04/25/19 99%   04/02/19 95%   03/06/19 98%      Temp Readings from Last 1 Encounters:   04/25/19 36.9  C (98.5  F) (Oral)    Ht Readings from Last 1 Encounters:   04/22/19 1.6 m (5' 3\")      Wt Readings from Last 1 Encounters:   04/22/19 76.1 kg (167 lb 12.3 oz)    Estimated body mass index is 29.72 kg/m  as calculated from the following:    Height as of this encounter: 1.6 m (5' 3\").    Weight as of this encounter: 76.1 kg (167 lb 12.3 oz).     LDA:  Peripheral IV 04/22/19 Left;Anterior Upper forearm (Active)   Site Assessment WDL 4/25/2019 12:00 AM   Line Status Saline locked 4/25/2019 12:00 AM   Phlebitis Scale 0-->no symptoms 4/25/2019 12:00 AM   Infiltration Scale 0 4/25/2019 12:00 AM   Infiltration Site Treatment Method  None 4/24/2019  4:00 PM   Extravasation? No 4/24/2019  4:00 PM   Number of days: 3            Assessment:   ASA SCORE: 4    NPO Status: > 6 hours since completed Solid Foods   Documentation: H&P complete; Preop Testing complete; Consents complete   Proceeding: Proceed without further delay  Tobacco Use:  NO Active use of Tobacco/UNKNOWN Tobacco use status     Plan:   Anes. Type:  General   Pre-Induction: Gabapentin PO; Midazolam IV; Acetaminophen PO     Fluid/Blood-Preparation: Hot Line; Albumin; Cell Saver     Drips/Meds-Preparation: Phenylephrine; TXA; Norepi   Induction:  IV (Standard)   Airway: Oral ETT   Access/Monitoring: PIV; 2nd PIV; US; A-Line; FloTrac     Advanced Monitoring: SSEP; MEP; BIS   Maintenance: Balanced; Propofol; Sufentanil; IV; Inhalational   Emergence: Procedure Site   Logistics: ICU Admission     Postop Pain/Sedation " Strategy:  Standard-Options: Opioids PRN  Advanced Options: PCA; Ketamine (cont.)     PONV Management:  Adult Risk Factors:, Non-Smoker, Postop Opioids  Prevention: Propofol Infusion; Ondansetron; Dexamethasone     CONSENT: Direct conversation   Plan and risks discussed with: Patient   Blood Products: Consent Deferred (Minimal Blood Loss)       Comments for Plan/Consent:  Dru Contreras  Anesthesiology Resident, PGY-2   [facility]   [phone]  4/25/2019 6:33 AM                           Dru Contreras MD

## 2019-04-25 NOTE — ANESTHESIA CARE TRANSFER NOTE
Patient: Beulah Jane    Procedure(s):  O-Arm/Stealth Assisted Thoracic 10-Pelvis Instrumented Fusion T11-2, L1-2, L2-3 Transforminal Lumbar Interbody Fusion (TLIF) And Smith Borges Osteotomies,and L3-4 SPO as well, Use Of BMP, Debridement Of Seroma    Diagnosis: Spinal Stenosis Of Lumbar Region With Neurogenic Claudication, Flatback Syndrome Of Lumbar Region  Diagnosis Additional Information: No value filed.    Anesthesia Type:   No value filed.     Note:  Airway :Face Mask  Patient transferred to:PACU  Comments: Airway :Face Mask  Patient transferred to:PACU  Comments: Prior to extubation, patient was reversed with 200 mg of Sugammadex and shortly afterwards observed with spontaneous breathing with regular pattern and proper tidal volumes. Patient woke up, opened his eyes to verbal stimuli and followed basic commands. Patient was suctioned and  extubated without complication.   Transported to PACU on 6L O2 via face mask.   Patient's HR on arrival to PACU on 110's for which he was given 20 mg of IV esmolol with proper control of HR.  Patient denies nausea or pain at this time.   Care transfer plan communicated and patient care transferred to PACU CARI Contreras  Anesthesiology Resident, PGY-2   [facility]   [phone]  4/25/2019 2:44 PM  Handoff Report: Identifed the Patient, Identified the Reponsible Provider, Reviewed the pertinent medical history, Discussed the surgical course, Reviewed Intra-OP anesthesia mangement and issues during anesthesia, Set expectations for post-procedure period and Allowed opportunity for questions and acknowledgement of understanding      Vitals: (Last set prior to Anesthesia Care Transfer)    CRNA VITALS  4/25/2019 1355 - 4/25/2019 1444      4/25/2019             NIBP:  108/67    NIBP Mean:  79    Ht Rate:  93    SpO2:  100 %    EKG:  Sinus tachycardia                Electronically Signed By: Dru Contreras MD  April 25, 2019  2:44 PM

## 2019-04-25 NOTE — ANESTHESIA PROCEDURE NOTES
Arterial Line Procedure Note  Staff:     Anesthesiologist:  Radha Luna MD    Resident/CRNA:  Dru Contreras MD    Arterial line performed by resident/CRNA in presence of a teaching physician    Location: In OR Before Induction  Procedure Start/Stop Times:      4/25/2019 7:40 AM     4/25/2019 7:50 AM    patient identified, IV checked, site marked, risks and benefits discussed, informed consent, monitors and equipment checked, pre-op evaluation and at physician/surgeon's request      Correct Patient: Yes      Correct Position: Yes      Correct Site: Yes      Correct Procedure: Yes      Correct Laterality:  Yes    Site Marked:  Yes  Line Placement:     Procedure:  Arterial Line    Insertion Site:  Radial    Insertion laterality:  Right    Skin Prep: Chloraprep      Patient Prep: patient draped, mask, sterile gloves, hat and hand hygiene      Local skin infiltration:  None    Ultrasound Guided?: No      Catheter size:  20 gauge, 12 cm    Dressing:  Tegaderm    Complications:  None obvious    Arterial waveform: Yes      IBP within 10% of NIBP: Yes

## 2019-04-25 NOTE — BRIEF OP NOTE
Brief Operative Note    Preop Dx:   Spinal Stenosis Of Lumbar Region With Neurogenic Claudication, Flatback Syndrome Of Lumbar Region  Post op Dx:   Same  Procedure:    Procedure(s):  O-Arm/Stealth Assisted Thoracic 10-Pelvis Instrumented Fusion T11-2, L1-2, L2-3 Transforminal Lumbar Interbody Fusion (TLIF) And Smith Borges Osteotomies,and L3-4 SPO as well, Use Of BMP, Debridement Of Seroma  Surgeon:     Carlos Enrique Dial MD, Joseluis Lomeli MD  Assistants:    Corwin Morocho PA-C  Anesthesia:   General  EBL:    850 ml out, 325 ml returned via cell saver  Total IV Fluids:  See Anesthesia Record  Specimens:   Seroma fluid and debris  Findings:   See Operative Dictation      Assessment and Plan: Beulah Jane is a 69 year old male with PMH including respiratory failure, SOB, aortic valve stenosis, polymyositis, HTN, Inflammatory arthritis, tachycardia, Type 2 DM now s/p T10-pelvis PIF, TLIF, SPO on 4/25/19 with Dr. Dial.     [place] Primary  Activity: Up with assist until independent. No excessive bending or twisting. No lifting >10 lbs x 6 weeks. No Faiza lift for transfers.   Weight bearing status: WBAT.  Pain management: Transition from IV to PO as tolerated. No NSAIDs   Antibiotics: Ancef x 3 days or until drains are discontinued.  Diet: Begin with clear fluids and progress diet as tolerated.   DVT prophylaxis: SCDs only. No chemical DVT ppx needed.  Imaging: XR Upright  PTDC - ordered.  Labs: Hgb POD#1.  Bracing/Splinting: None.  Dressings: Prineo with 4x4 and tegaderm.  Drains: 3,Document output per shift, will be discontinued at Orthopedic Surgery discretion.  Washington catheter: Remove POD#1.   Physical Therapy/Occupational Therapy: Eval and treat.  Cultures: Seroma fluid and debris Pending, follow culture results closely.    Consults: Hospitalist, Pain.  Follow-up: Clinic with Dr. Dial in 6 weeks with repeat x-rays.   Disposition: Pending progress with therapies, pain control on orals, and medical  stability, anticipate discharge to TCU or home on POD #4-5.

## 2019-04-25 NOTE — PLAN OF CARE
Status: POD#2 L1-L2-5 ALIF.   Vitals: VSS on 2L NC. Bipap during the night.  Neuros: AOx4. BUE 5/5. BLE 4/5. N/T to BLE - baseline neuropathy in feet.    IV: PIV SL.   Resp: LS clear.   Diet: Regular diet. Tolerating well. NPO at 0000 for procedure tomorrow.  Bowel status: No BM this shift. BS+, passing gas.  : Washington pulled this afternoon. Voiding without issue.  Skin: Abdominal incision HARDIK with dermabond, WDL.   Pain: Given oxy x2, tramadol x1 for pain.  Activity: Bedrest with bathroom privileges. SCDs on when in bed.   Social: Family was at bedside for part of shift, supportive with cares.   Plan: Plan for second part of surgery tomorrow (4/25). Continue to monitor and follow POC.

## 2019-04-25 NOTE — PROGRESS NOTES
SPIRITUAL HEALTH SERVICES  [facility] ([place]) 3C   PRE-SURGERY VISIT    Had pre-surgery visit with pt. and wife and son. Provided spiritual support, prayer.     [place] M.Div.     Pager [phone]

## 2019-04-25 NOTE — OR NURSING
Dr. Dial at bedside with patient, Dr. Dial completed consent himself. Dr Lomeli was present as well, patient had no further questions. I witnessed the patient signature to consent; patient and family had no questions regarding surgery or consent.

## 2019-04-25 NOTE — PLAN OF CARE
Status: POD#3 L1/L2-5 instrumental fusion  Vitals: VSS   Neuros: AOx4. BUE 5/5, BLE 4/5 with neuropathy in feet  IV: PIV SL.   Resp: LS clear. 2L NC  Diet: NPO since midnight  Bowel status: No BM this shift  : Voiding wnl  Skin: Abdominal incision HARDIK   Pain: Scheduled tylenol, PRN oxy and tramadol given  Activity: Bedrest with BRP  Plan: Second part of surgery this AM @0745. CHG bath done, pre-op checklist started, blank consent form in chart.

## 2019-04-25 NOTE — PROGRESS NOTES
"Orthopaedic Surgery Progress Note     Subjective: No acute events overnight. Ready for surgery today. Denies numbness or tingling, motor dysfunction or weakness.     Objective: /63   Pulse 100   Temp 98.5  F (36.9  C) (Oral)   Resp 18   Ht 1.6 m (5' 3\")   Wt 76.1 kg (167 lb 12.3 oz)   SpO2 99%   BMI 29.72 kg/m      General: NAD, alert and oriented, cooperative with exam.   Cardio: RRR, extremities wwp.   Respiratory: Non-labored breathing.  MSK: Dressing c/d/i.   SILT L3-S1 bilaterally.   L2-3: Hip flexion L and R 5/5 strength    L4:  Knee extension L and R 5/5 strength   L5:  Foot / EHL dorsiflexion L and R 5/5 strength   S1:  Plantarflexion  L and R 5/5 strength    Drain: none    Labs:   Recent Labs   Lab 04/25/19  0358 04/24/19  0614 04/23/19  0617 04/22/19  1559   HGB 11.7* 11.8* 11.6*  --    PLT  --   --   --  309     Sed Rate   Date Value Ref Range Status   06/26/2018 10 0 - 20 mm/h Final       Imaging:   Personally reviewed by me. S/p- ALIF L3-4, L4-5 with appropriate position of implants    Per radiolgy:  Xr Lumbar Spine Port 2/3 Views    Result Date: 4/22/2019  Exam: XR LUMBAR SPINE PORT 2-3VW, 4/22/2019 3:34 PM Indication: Post-op Thoracic/Lumbar Surgery Comparison: 4/22/2019 intraoperative radiographs, 1/29/2018 lumbar spine CT Findings: Supine frontal and lateral views of the lumbar spine. 5 lumbar type vertebral bodies are assumed for the purposes of this dictation. Mild reversal of the normal lumbar lordosis in the upper lumbar spine. L3-4 and L4-5 interbody fusion devices. Multilevel degenerative endplate spurring and sclerosis with associated intervertebral disc space narrowing. Decompression changes L4-S1. No significant spondylolisthesis. Lower lumbar predominant facet arthropathy. Osteopenic appearance of the bones. Nonobstructive bowel gas pattern. Washington catheter in place.     Impression: 1. New L3-4 and L4-5 interbody fusion instrumentation. 2. Moderate to severe multilevel " degenerative change in the lumbar spine. MONICA ZHENG, DO      Assessment and Plan: Beulah Jane is a 69 year old male with PMH including aortic stenosis, chronic pain, DM type 2, hyperlipidemia, JUAN now s/p Stage I L3-4, L4-5 ALIF  on 4/22/19 with Dr. Dial and Dr. Alegria.  Planned return to OR on 4/25 for stage II T10-pelvis fusion    Ortho Primary  Activity: Strict bedrest with  bathroom privledges.  No excessive bending or twisting. No lifting >10 lbs x 6 weeks. No Faiza lift for transfers.   Pain management: Transition from IV to PO as tolerated. No NSAIDs   Antibiotics: Ancef 1g x24 hours, complete  Diet: NPO for surgery  DVT prophylaxis: Lovenox 30mg BID starting afternoon of 4/22/19 and ending after morning dose on 4/24/19. SCDs.  Imaging: XR Upright Lumbar spine, in PACU PTDC - ordered, complete.  Bracing/Splinting: None.  Dressings: None  Drains: None  Washington catheter: remove POD#1 after 2nd stage (remove Friday)  Physical Therapy/Occupational Therapy: Eval and treat.  Cultures: None  Consults: Hospitalist.  Follow-up: Clinic with Dr. Dial in 6 weeks with repeat x-rays.   Disposition: Has Phase 2 of surgery scheduled for today with Dr. Dial.        Patient discussed with Dr. Jayro Morrow, PGY4  [phone]

## 2019-04-25 NOTE — OP NOTE
DATE OF SURGERY: 4/25/2019    PREOPERATIVE DIAGNOSIS: Spinal Stenosis Of Lumbar Region With Neurogenic Claudication, Flatback Syndrome Of Lumbar Region             POSTOPERATIVE DIAGNOSIS: Same    PROCEDURES:  22 modifier on the entire procedure: The patient has had 4 or 5 other lumbar procedures with dense adherent scar.  He had a previous infection and has a known seroma extending from L1-S1 which greatly increase the difficulty of the dissection and wound closure and increased our operative time and blood loss.  1. Hung Borges Osteotomies T11-12, L1-2, L2-3, L3-4  2. Transforaminal lumbar interbody fusion at T11-12, L1-2, L2-3, anterior and posterior fusion through a single approach.  3. T11-12, L1-2, L2-3 Capstone PEEK Cage.  4. T10 through S1 Posterior Spinal Fusion.  5. T10 through Pelvis Posterior Spinal Instrumentation, Medtronic Solera.  6. [place] and use of Local Autograft, Allograft, and BMP for fusion.  7. Use of O-Arm navigational guidance.  8. Debridement of Seroma Pocket measuring 10 x 10 x 6cm.  9. Paraspinal Muscle Flap Creation and Complex Wound Closure.    PRIMARY SURGEON: Carlos Enrique Dial MD    Co-surgeon: Jakub Lomeli MD, neurosurgery.  The assistant was necessary due to the complexity of the operation.  It was necessary to have the co-surgeon for minimizing the operative time and blood loss and for assistance with complex intraoperative decision making regarding his wound issues and the deformity correction.    FIRST ASSISTANT: JUAN CARLOS Rodriguez, there was no qualified resident available.  The assistant was necessary for retraction visualization and wound closure.    ANESTHESIA: General Endotracheal    COMPLICATIONS:  None.    SPECIMENS: None.    ESTIMATED BLOOD LOSS: 850cc with 350cc returned via [employer].    INDICATIONS:                          Beulah Jane is a 69 year old male who elected surgical treatment, and understood the indications for this surgery, as well as  its risks, benefits, and alternatives as documented in the pre-operative H&P.  Specifically, we reviewed the risks and benefits of the surgery in detail. The risks include, but are not limited to, the general risks associated with anesthesia, including death, pulmonary embolism, DVT, stroke, myocardial infarction, pneumonia, and urinary tract infection. Additional risks specific to the surgery include the risk of infection, failure to heal (non-union), dural tear with resultant CSF leak which might necessitate placement of a drain or revision surgery or could result in headaches, nerve injury resulting in weakness or paralysis, risk of adjacent segment disease, the risks of vascular injury, need for revision surgery in the future due to one of the above issues, or risk of incomplete symptom relief. Beulah Jane understands the risks of the surgery and wishes to proceed. No guarantees were given.    Of note, this is his second stage surgery.  He also had a large seroma from his multiple previous operations.  I told him we would try to debride this and close it as well and that it might require a complex wound closure.    DESCRIPTION OF PROCEDURE:           Beulah Jane was taken to the operating room, where the Anesthesiology Service induced satisfactory general anesthesia. Ancef was given IV.  Venous thromboembolic prophylaxis was performed with sequential devices.  A Washington catheter was placed under standard sterile techniques.  The patient was placed prone on a Pro-Axis frame with the abdomen hanging free and all bony prominences well padded.  The low back was then prepped and draped in its entirety in the usual sterile fashion. We then held a multidisciplinary time out in which we verified the patient, procedure, antibiotics, and operative plan.  All team members were in agreement.    I started by making a midline incision from T10 down to roughly the level of S2.  We dissected down to the level of  the fascia.  At this point we turned our attention to the seroma debridement.  He had an obvious ballotable seroma along his right side from his multiple previous surgeries.  The seroma spread across the midline about 2 cm and then extended about 10 cm to the right towards iliac crest and then up and down caudad and cephalad also about 10 cm from roughly L1-S1.  This had a pseudocapsule around it.  I incised the dorsal pseudocapsule immediately encountered serous appearing fluid with a lot of fat necrosis.  There was about 300 cc of this.  I evacuated it.  I then debrided the edges of the pseudocapsule.  There is a lot of necrotic fat in this area.  This was sent for culture as well as final pathology.  Once I had thoroughly debrided this wound capsule I then irrigated about 3 L through the wound.  There is nothing obviously infected looking.  We then packed this area and left until the end of the case.    We are then able to dissect down to the true fascia and elevate this while maintaining a barrier between this seroma area and the deep wound.  We elevated the fascia in a subperiosteal fashion from T10-S2.  This was quite easy through the virgin areas from T10-L1.  From L1-S1 there is quite a bit of scarring and we had to take great care to make sure that we did not enter the spinal canal and also to try and preserve our capsule to keep the seroma area separate from the deep wound.  This took quite a bit of time.  The dense adherent scar as well as dealing with his seroma and wound issues greatly increase the complexity of the case and in part this is a reason we have added the 22 modifier.  This did increase our operative time and blood loss.    Once we have completed our exposure we turned our attention to the instrumentation placement.  We attached the reference frame to the intact spinous process at L2.  We then brought in the navigation and took an initial spin.  We then placed pedicle screws bilaterally from  L2-L5.   This was done using the navigation to cannulate the pedicle, followed by the appropriate diameter tap which was always 1 mm under the diameter of the screw and the screw was placed under manual power.  We then brought the navigation back in and took a check span and all the screws were completely intraosseous.      We then moved the navigation down to the pelvis.  We took another spin.  We then placed bilateral pedicle screws at S1.  He did have some previous screw tracks both at S1 as well as at the L4 and L3 levels but we are able to navigate around these and we did get good screws at each level.  We then used the navigation to place the pelvic fixation.  We started at the S2 alar starting point and use the navigation to cannulate across the sacroiliac joint and into the table of the ilium.  We tapped with a 6.5 mm tap and is 9.5 mm tap and then the pelvic fixation was placed under power.  We took an additional span and verify that the screws were intraosseous.  We then moved the navigation up to the thoracic spine took a third spin and then placed pedicle screws in the same fashion bilaterally from T10-L1.  This completed the T10 to the pelvis instrumentation.  A final spin at the thoracic level also come verify that those screws were completely intraosseous.     Screw lengths were as follows: T10: 7.5 x 45 bilaterally  T11: 7.5 x 45 bilaterally  T12: 7.5 x 50 bilaterally  L1: 7.5 x 55 bilaterally  L2: 6.5 x 55 on the left and 7.5 x 55 on the right  L3: 7.5 x 50 in the left and 7.5 x 55 on the right  L4: 8.5 x 55 on the left and 9.5 x 55 on the right  L5: 7.5 x 50 bilaterally  S1: 8.5 x 50 bilaterally  Pelvic fixation: 9.5 x 100 bilaterally    We then turned our attention to the Smith-Greer osteotomy and interbody fusion portion of the procedure.  He had a thoracic disc herniation at T11-12.  We started at this level.  I used a narrow rongure to take down the interspinous ligament and then I thinned  the ligament flavum with a regular rondure.  We inserted the lamina  and then we took down the inferior articular facets with a osteotome.  I then worked in an outside in fashion starting on the left side to take down the superior articular process with a 4 mm Kerrison.  Dr. Lomeli did the same on his side.  The removal of the bilateral facet joints completed the Smith-Greer osteotomy at this level which allowed for complete neurologic decompression as well as additional lordosis correction.  We then turned our attention to the interbody fusion.  We protected the thecal sac medially.  We coagulated some light bleeding in the foramen.  I entered the disc space on the left with a 15 blade followed by pituitaries and scrapers and then the serial dilators.  This was left in place on the left and then Dr. Lomeli did the same on his side thoroughly cleaning out the right side.  We then serially trialed and selected a 9 x 22 mm 0 degree lordotic cage for this level.  The disc space was packed with a mix of about 3 cc of local autograft and some allograft and then the cages were inserted bilaterally. The cages were filled with BMP. We verify their position fluoroscopically.  This completed the interbody fusion at this level.    At this level he also had a disc herniation.  We are able to feel ventral to the spinal cord with a [place] and we did push down on this with the curette and made sure that there is no remaining ventral pressure.    We then turned our attention to the L1-2 level. I used a narrow rongure to take down the interspinous ligament and then I thinned the ligament flavum with a regular rondure.  We inserted the lamina  and then we took down the inferior articular facets with a osteotome.  I then worked in an outside in fashion starting on the left side to take down the superior articular process with a 4 mm Kerrison.  Dr. Lomeli did the same on his side.  The removal of the bilateral facet  joints completed the Hung-Greer osteotomy at this level which allowed for complete neurologic decompression as well as additional lordosis correction.  We then turned our attention to the interbody fusion.  We protected the thecal sac medially.  We coagulated some light bleeding in the foramen.  I entered the disc space on the left with a 15 blade followed by pituitaries and scrapers and then the serial dilators.  This was left in place on the left and then Dr. Lomeli did the same on his side thoroughly cleaning out the right side.  We then serially trialed and selected a 10 x 22 mm 0 degree lordotic cage for this level.  The disc space was packed with a mix of about 6 cc of local autograft and some allograft and then the cages were inserted bilaterally. The cages were filled with BMP  We verify their position fluoroscopically.  This completed the interbody fusion at this level.    We then turned our attention to the L2-3 level. I used a narrow rongure to take down the interspinous ligament and then I thinned the ligament flavum with a regular rondure.  We inserted the lamina  and then we took down the inferior articular facets with a osteotome.  I then worked in an outside in fashion starting on the left side to take down the superior articular process with a 4 mm Kerrison.  Dr. Lomeli did the same on his side.  The removal of the bilateral facet joints completed the Smith-Greer osteotomy at this level which allowed for complete neurologic decompression as well as additional lordosis correction.  We then turned our attention to the interbody fusion.  We protected the thecal sac medially.  We coagulated some light bleeding in the foramen.  I entered the disc space on the left with a 15 blade followed by pituitaries and scrapers and then the serial dilators.  This was left in place on the left and then Dr. Lomeli did the same on his side thoroughly cleaning out the right side.  We then serially trialed and  selected a 13x 22 mm 6 degree lordotic cage for this level.  The disc space was packed with a mix of about 6 cc of local autograft and some allograft and then the cages were inserted bilaterally. The cages were filled with BMP  We verify their position fluoroscopically.  This completed the interbody fusion at this level.    We then turned our attention to the L3-4 level.  This level already had an anterior interbody in place.  However he had a complete dye block on his myelogram so I felt it was necessary to decompress this level.  Furthermore I felt it need to the Smith-Greer osteotomy because there was some gapping of the disc space posteriorly behind the anterior interbody device and I felt we needed the facets resected to allow this to settle in and get complete lordosis correction.  Thus we again identified the interspace with a rondure.  We then used the osteotome to take down the inferior articular processes and again worked an outside in fashion with the 4 mm Kerrison to take down the superior articular process.  This was done bilaterally.  This completed the Smith-Greer osteotomy.  Dr. Lomeli and myself then worked across the midline to resect the remaining midline ligamentum flavum and make sure that the dura was completely free of compression.    We thoroughly irrigated.  We then measured and cut bilateral rods and setscrews were temporarily placed.  We took stitched images and I measured his lumbar lordosis is 40 degrees.  His preoperative pelvic incidence was roughly 50 degrees and I felt that this was adequate correction.  I did not want to overcorrect him because I felt that would increase his risk of proximal junctional kyphosis, and we have Nate gotten about 70 degrees of correction which is quite substantial.    We irrigated a total of about 3 L throughout the case.  We then thoroughly decorticated the remaining midline laminar bone from T10-L3.  There is no laminar bone at L4-5 so we  decorticated the remaining lateral bone.  The S1 lamina was also decorticated.  This was then packed with about 30 cc of local autograft that we had harvested from the spinous processes as well as 90 cc of crushed cancellus allograft and an additional medium kit of BMP to augment the fusion.  This completed the posterior fusion from T10-S1.    We then achieved hemostasis.  A CORINNE drain was placed in the deep wound.  We then spent some time closing the seroma pocket.  The edges were debrided.  We placed a Hemovac into the pocket.  We then placed some deep Vicryl sutures through the pseudocapsule and into the deep fascia to try and close the dead space.  We then tightly closed the edges to keep this area separate from the main fascial wound.  The edge of this seroma pocket thus became the new fascial edge.  In doing this, we noted that the fascia was quite tight.  We felt that we needed to relax the paraspinal muscles.  We used the cautery to elevate the scar and fat up off of the fascia bilaterally from roughly L1-S1.  We then incised through the lateral fascia roughly from L1-S1 to create a relaxing incision and allow the paraspinal muscles to rolled medially.,  In doing this we are able to get a nice tension-free closure between the fascia of the pseudocapsule on the right as well as the native fascia on the left side.  This was tightly closed with interrupted 0 Vicryl sutures and then oversewn with a running #1 Vicryl.  We placed a Hemovac drain above the fascia.  The fat was closed with 0 Vicryl sutures, dermis with 2-0 Monocryl skin with 3-0 Monocryl and then a perineal 4 x 4 and Tegaderms.    We placed 3 g of vancomycin powder throughout the deep wound as well as the seroma pocket.    We ran motor monitoring throughout the case and this was completely stable.  There were no obvious intraoperative complications.  I was present and scrubbed for the entire procedure.    Carlos Enrique Dial MD

## 2019-04-25 NOTE — H&P
SURGICAL ICU H&P  April 25, 2019      CO-MORBIDITIES:   Aortic valve Stenosis  HTN  DMII  Pulmonary fibrosis  hypothyroidism    ASSESSMENT: Beulah Jane is a 69 year old male POD #0 s/p T10-S1 spine fusion on 4/25 and POD#3 for L3-L5 ALIF on 4/22. Patient was extubated in the PACU.     PLAN:  Neuro/ pain/ sedation:  - Monitor neurological status; notify the MD for any acute changes in exam  - tylenol, gabapentin 100mg TID, dilaudid PCA, oxycodone 5-10mg Q3H PRN, tramadol  for pain, hydroxyzine.   - lorazepam 0.5mg Q6H prn  for sedation.  - pain management consult    Pulmonary care:   #pulmonary fibrosis  #JUAN  - Supplemental oxygen to keep saturation above 92%  - Incentive spirometer [id number] when awake  - albuterol prn, fluticasone  - theophylline    Cardiovascular:   #HTN   - Monitor hemodynamic status  - verapamil 120MG QPM  - labetalol prn holding home meds    GI care:   -CHO diet  -famotidine 20mg Q12H, ranitidine  -metoclopramide 5mg Q6H, zofran 4mg Q6H  - pantoprazole on board  -senna-docusate, miralax    Fluids/ Electrolytes/ Nutrition:   - d5 + 0.45NaCl + KCL 20, 85cc/hr for IV fluid hydration  - No indication for parenteral nutrition    Renal/ Fluid Balance:    - Urine output is adequate so far  - Will continue to monitor intake and output    Endocrine:    #hypothyroidism  - levothyroxine 50mg every day  - Sliding scale for diabetes management    ID/ Antibiotics:  - cefazolin 1gm post-op abs Q8H    Heme:     - Hemoglobin stable for now, cbc pending tomorrow am    MSK:  #dermatomyositis  Activity: up with assist POD#0, POD#1 up ad mer  - weight bearing as tolerated  - prednisone 5mg BID    Prophylaxis:    - Mechanical prophylaxis for DVT   - No chemical DVT prophylaxis, wbat    Lines/ tubes/ drains:  - chandler discontinue 4/26    Disposition:  - Surgical ICU    Patient seen, findings and plan discussed with surgical ICU staff Dr. Espinosa.    Skip De Jesus, PGY1    - - - - - - - - - - - - - - - - - -  - - - - - - - - - - - - - - - - - - - - - - - - - - - - - - - - - - - - - -   PRIMARY TEAM: Dr. Dial  PRIMARY PHYSICIAN:  Orthopaedic Surgery    REASON FOR CRITICAL CARE ADMISSION: close hemodynamic monitoring   ADMITTING PHYSICIAN: Dr. Espinosa    HISTORY PRESENTING ILLNESS: Beulah Jane is a 69 year old male w/ pmhx aortic valve stenosis, HTN, DMII, Pulmonary fibrosis, Hypothyroidism of POD #0 s/p T10-S1 spine fusion on  and POD#3 for L3-L5 ALIF on . Patient was extubated in the PACU. Denies any new numbness/tingling at this time. Reports continued b/l dorsal/plantar feet numbness as prior to surgery. Denies any new pain.    REVIEW OF SYSTEMS: As noted above. No headache, dizziness. No fever, chills. No chest pain or shortness of breath. No abdominal pain, nausea, vomiting. No diarrhea or constipation. No urinary difficulties. No muscle or body aches.     PAST MEDICAL HISTORY:   Past Medical History:   Diagnosis Date     Aortic stenosis      Chronic pain      DM (diabetes mellitus), type 2 (H)     on insulin     Hyperlipidemia      JUAN on CPAP      Pneumonia      Polymyositis (H)      Pulmonary fibrosis, unspecified (H)      Seasonal allergies        SURGICAL HISTORY:   Past Surgical History:   Procedure Laterality Date     ARTHROSCOPY KNEE       COLONOSCOPY       DECOMPRESSION, FUSION LUMBAR POSTERIOR TWO LEVELS, COMBINED       FUSION LUMBAR ANTERIOR THREE+ LEVELS N/A 2019    Procedure: Lumbar 1 Or Lumbar 2-5 Anterior Lumbar Interbody Fusion with BMP Stage 1 Of Two Procedure:;  Surgeon: Carlos Enrique Dial MD;  Location: [facility] OR     HERNIA REPAIR       lumbar spine hardware removal       REMOVAL PROSTHETIC MATERIAL/MESH, ABD WALL NECRO TISS INFEXN       ROTATOR CUFF REPAIR RT/LT         SOCIAL HISTORY:  Social History     Tobacco Use     Smoking status: Former Smoker     Packs/day: 0.25     Last attempt to quit: 1970     Years since quittin.8      Smokeless tobacco: Never Used   Substance Use Topics     Alcohol use: Not on file     Comment: few times monthly       FAMILY HISTORY:  History reviewed. No pertinent family history.     ALLERGIES:    No Known Allergies    MEDICATIONS:    No current facility-administered medications on file prior to encounter.   Current Outpatient Medications on File Prior to Encounter:  calcium carbonate (OS-DMITRIY 500 MG [place]. CA) 500 MG tablet Take 1 tablet (500 mg) by mouth 2 times daily   Cholecalciferol (VITAMIN D) 2000 units tablet Take 2,000 Units by mouth daily   FOLIC ACID PO Take 1 mg by mouth daily   GABAPENTIN PO Take 600 mg by mouth 3 times daily   GEMFIBROZIL PO Take 600 mg by mouth 2 times daily   insulin isophane human (HUMULIN N PEN) 100 UNIT/ML injection Inject 6 Units Subcutaneous 2 times daily (before meals) (Patient taking differently: Inject 6 Units Subcutaneous 2 times daily as needed )   METHOTREXATE PO Take 25 mg by mouth once a week Tuesdays   omega 3 1000 MG CAPS Take 2 capsules by mouth 2 times daily   oxyCODONE-acetaminophen (PERCOCET) 5-325 MG per tablet Take 1 tablet by mouth 3 times daily   predniSONE (DELTASONE) 20 MG tablet Take 1 tablet (20 mg) by mouth daily (Patient taking differently: Take 10 mg by mouth every morning 10mg by mouth daily x 2 weeks then 5mg by mouth daily x 2 weeks.)   sulfamethoxazole-trimethoprim (BACTRIM DS/SEPTRA DS) 800-160 MG per tablet Take 1 tablet 3 times a week (Monday, Wednesday, Friday)   theophylline (UNIPHYL) 400 MG 24 hr tablet Take 400 mg by mouth every morning    traMADol (ULTRAM) 50 MG tablet Take 0.5 tablets (25 mg) by mouth every 6 hours as needed for moderate pain   albuterol (2.5 MG/3ML) 0.083% neb solution Take 1 vial by nebulization every 6 hours as needed for shortness of breath / dyspnea or wheezing   albuterol (PROAIR HFA/PROVENTIL HFA/VENTOLIN HFA) 108 (90 BASE) MCG/ACT Inhaler Inhale 2 puffs into the lungs every 6 hours as needed for shortness of breath  / dyspnea or wheezing   beclomethasone (QVAR) 80 MCG/ACT Inhaler Inhale 2 puffs into the lungs 2 times daily       PHYSICAL EXAMINATION:  Temp:  [96  F (35.6  C)-98.8  F (37.1  C)] 98.4  F (36.9  C)  Pulse:  [100-111] 111  Heart Rate:  [] 97  Resp:  [16-18] 18  BP: (105-114)/(60-75) 105/75  MAP:  [72 mmHg-81 mmHg] 72 mmHg  Arterial Line BP: (102-120)/(51-55) 102/51  SpO2:  [96 %-100 %] 100 %  General: Alert, well-appearing in no acute distress.  HEENT: Normocephalic, atraumatic.  Neck: No cervical lymphadenopathy.   Chest wall: Symmetric thorax. No masses or tenderness to palpation.   Spine: Dressing in place, no see through drainage noted.  Respiratory: Non-labored breathing. Lung sounds clear to auscultation bilaterally.   Cardiovascular: Regular rate and rhythm.   Gastrointestinal: Abdomen soft, non-distended. Left abdomen and upper abdomen mild contusions from OR positioning.  Extremities: No limb deformities. No pedal edema.   RLE:  No obvious deformities noted  Able to flex/extend knee, unable to straight leg due to back pain from surgery  Dorsiflexion/plantar flexion intact, EHL/FHL firing  SILT through extremity  DP/PT audible on doppler.  LLE:  No obvious deformities noted  Able to flex/extend knee, unable to straight leg due to back pain from surgery  Dorsiflexion/plantar flexion intact, EHL/FHL firing  SILT through extremity  DP/PT audible on doppler.  Skin: As noted above. No rashes or lesions appreciated.    LABS: Reviewed.   Arterial Blood Gases   Recent Labs   Lab 04/25/19  1309 04/25/19  1217 04/25/19  1105 04/25/19  1009   PH 7.43 7.43 7.41 7.43   PCO2 41 42 46* 41   PO2 111* 134* 138* 178*   HCO3 27 28 29* 28     Complete Blood Count   Recent Labs   Lab 04/25/19  1309 04/25/19  1217 04/25/19  1105 04/25/19  1009  04/22/19  1559   HGB 10.4* 11.3* 11.3* 11.5*   < >  --    PLT  --   --   --   --   --  309    < > = values in this interval not displayed.     Basic Metabolic Panel  Recent Labs    Lab 04/25/19  1309 04/25/19  1217 04/25/19  1105 04/25/19  1009  04/25/19  0358 04/24/19  0614  04/22/19  1559    134 134 135   < > 139 138  --   --    POTASSIUM 4.1 4.2 4.0 3.9   < > 4.1 4.0  --   --    CHLORIDE  --   --   --   --   --  101 102  --   --    CO2  --   --   --   --   --  29 30  --   --    BUN  --   --   --   --   --  13 11  --   --    CR  --   --   --   --   --  0.80 0.71  --  0.81   * 160* 160* 155*   < > 134* 129*   < >  --     < > = values in this interval not displayed.     Liver Function Tests  Recent Labs   Lab 04/25/19  1205   INR 1.30*     Pancreatic Enzymes  No lab results found in last 7 days.  Coagulation Profile  Recent Labs   Lab 04/25/19  1205   INR 1.30*   PTT 37     Lactate  Invalid input(s): LACTATE    IMAGING:  Results for orders placed or performed during the hospital encounter of 04/22/19   XR Surgery MI Fluoro L/T 5 Min w Stills    Narrative    This exam was marked as non-reportable because it will not be read by a   radiologist or a [place] non-radiologist provider.             XR Chest Port 1 View    Narrative    EXAMINATION: XR CHEST PORT 1 VW, 4/22/2019 1:23 PM    INDICATION: Central line placement,    COMPARISON: CT 12/5/2017. Plain film 11/27/2017.    FINDINGS: Single portable AP radiograph of the chest. Right IJ  approach Kenefic-Chapito catheter tip projects over the right main pulmonary  artery. The cardiomediastinal silhouette is enlarged. Small left  pleural effusion. No right pleural effusion and bibasilar atelectasis.  Patchy interstitial airspace opacities. No pneumothorax. Partially  visualized upper abdomen is grossly unremarkable. No acute osseous  abnormalities. Soft tissue is unremarkable.      Impression    IMPRESSION:   1. Stable enlarged cardiomediastinal silhouette with bilateral patchy  airspace and interstitial opacities may represent pulmonary edema.  2. Right small pleural effusion with bibasilar atelectasis.  3. Kenefic-Chapito catheter tip  projects over the right main pulmonary  artery.    I have personally reviewed the examination and initial interpretation  and I agree with the findings.    GIRMA ROQUE MD   XR Lumbar Spine Port 2/3 Views    Narrative    Exam: XR LUMBAR SPINE PORT 2-3VW, 4/22/2019 3:34 PM    Indication: Post-op Thoracic/Lumbar Surgery    Comparison: 4/22/2019 intraoperative radiographs, 1/29/2018 lumbar  spine CT    Findings:   Supine frontal and lateral views of the lumbar spine. 5 lumbar type  vertebral bodies are assumed for the purposes of this dictation. Mild  reversal of the normal lumbar lordosis in the upper lumbar spine. L3-4  and L4-5 interbody fusion devices. Multilevel degenerative endplate  spurring and sclerosis with associated intervertebral disc space  narrowing. Decompression changes L4-S1. No significant  spondylolisthesis. Lower lumbar predominant facet arthropathy.  Osteopenic appearance of the bones.    Nonobstructive bowel gas pattern. [place] catheter in place.      Impression    Impression:   1. New L3-4 and L4-5 interbody fusion instrumentation.  2. Moderate to severe multilevel degenerative change in the lumbar  spine.    MONICA ZHENG, DO   XR Surgery MI Fluoro L/T 5 Min w Stills    Narrative    This exam was marked as non-reportable because it will not be read by a   radiologist or a [place] non-radiologist provider.

## 2019-04-26 NOTE — PROGRESS NOTES
Post-Op  D/I/A: Patient arrived on 4A from PACU - Tachy, hypertensive, c/o uncontrolled pain and sats 68% upon arrival to 4A. Aggressive pulm toilet initiated, supplemental oxygen increased to 15LPM; currently patient requiring O2 at 4LPM per NC. All 3 PIV blown - vascular access contacted to place new PIV. Neuros intact, baseline numbness in BLE. Moving all extremities upon command. CMS intact, however BLE very weak and difficult to auscultate w/ doppler - Ortho MD informed. Surgical incisions c/d/i. Surgical drain x 3, sanguineous output. ABD distended, last BM prior to admission, ABD w/ marked bruising and painful in left quadrants - Ortho MD informed. A-line in place. Family contacted, belongings collected from 6A. Medicated for pain.  P: Continue to closely monitor.

## 2019-04-26 NOTE — CONSULTS
Inpatient Pain Management Service: Consultation    Patient: Beulah Jane  Date of Consult: April 26, 2019 Admission Date:4/22/2019   1 Day Post-Op     Chief Pain Endorsement: Post op incisional pain    Recommendations were discussed and relayed to the SICU service.  Plan was reviewed by the Inpatient Pain Service and staff attending, Dr. Abiel Villalobos.      1. Discontinue hydromorphone pca.  2. Start hydromorphone 0.3-0.5mg IV q3h prn severe pain not controlled by oral opioids.    Discontinue 24 hours prior to discharge.  3. Continue oxycodone 5-10mg po q3h prn moderate to severe pain.    When discharged, taper to home baseline regimen of oxycodone - acetaminophen 5/325mg tabs, 3 per day,  over the usual course of healing.  4. Discontinue tramadol.    Resume home dosing at discharge.  5. Initiate Lidocaine 4% patches, 1-3 patch(es) transdermal every 24 hours (12 hours on and 12 hours off)    When discharged, can change to as needed dosing.  6. Initiate Menthol 5% patches, 1 patch(es) transdermal every 8 hours as needed when Lidoderm 5% patches are off.    When discharged, can change to as needed dosing.  7. Continue acetaminophen 975mg po q8h.    When discharged, can change to as needed dosing.  8. Continue gabapentin 600mg po tid.  This is a prior to admission medication.  9. Bowel regimen per primary team to prevent opioid induced constipation.  10. If patient develops muscle spasm could consider methocarbamol 500mg po q6h prn.    Pain Service will sign off at this time.    Thank you for consulting the Inpatient Pain Management Service.   The above recommendations are to be acted upon at the primary team s discretion.    To reach us:  Mon - Friday 8 AM - 3 PM: Pager [phone] (Text Page)  After hours, weekends and holidays: Primary service should call [phone] for the on-call pain specialist      1. Patient admitted 4/22/19 for Stage 1 surgery for ALIF L3-4 and L4-5 and Stage 2: T10 - pelvis  "fusion, T11-12, L1-2, L2-3 TLIF, L3-4 SPO and debridement of seroma on 4/25/19.  Had an episode of intense pain last night, hydromorphone pca started, improved today.  2. History of dermatomyositis, on chronic opioids as an outpatient for pain control.  3. History of aortic valve stenosis.  4. History of HTN.  5. History of DM2, on insulin as an outpatient.  6. History of pulmonary fibrosis.  7. History of flat back syndrome, had previous spinal surgery in 1997.  8. History of JUAN.    -- Outpatient opioid requirements prior to admission:    -- Oxycodone - acetaminophen 5/325mg tab, #90 filled 3/29/19 for a 30 day supply.  -- Tramadol 50mg tab, #60 filled 3/29/19 for a 30 day supply.  -- Gabapentin 600mg tab, #90 filled 3/6/19 for a 30 day supply.    Primary Care Provider: Jesús Sosa, [facility]  Chronic Pain Provider: none  [place]  reviewed    History of Present Illness:  Beulah Jane is a 69 year old male who was seen today (April 26, 2019) and he reports that his pain is under much better control than last night (hydromorphone pca was started), patient was sitting up in the chair when I came into the room.  Pain is located in the incisional areas, rated as 4/10 this morning.  Back pain is described as feeling like a pinched nerve and abdominal pain feels like a \"cut\".  The pain comes and goes, is improved with opioids and made worse with movement and coughing.  Patient finds the pain increases if he stays in one position too long.  Pain was controlled during the time between Stage 1 and Stage 2 of surgery.  Discussed plan with patient, will discontinue tramadol while he is inpatient and use oxycodone, can also transition from pca to hydromorphone IV.  Patient is in agreement with the plan.  Drinks occasional Etoh, denies illicits, has run out of oxycodone - acetaminophen early occasionally, uses tramadol then.  Able to complete ADLs at home prior to admission.    CAPA (Clinically " Aligned Pain Assessment)  Comfort (How is your pain?): Tolerable with discomfort  Change in Pain (Since your last medication/intervention?): Getting better  Pain Control (How are your pain treatments working?): Partially effective pain control  Functioning (Are you able to do activities to get better?) : Can do most things, but pain gets in the way of some   Sleep (Does your pain management allow you to sleep or rest?): Awake with pain most of the night     FUNCTIONAL STATUS:  Change:      Improving  Oral intake:     Advancing  Bowel function:    Passing gas  Activity level:     Up in chair  Mood:      Stable    HEALTH & LIFESTYLE PRACTICES:   Tobacco:  reports that he quit smoking about 48 years ago. He smoked 0.25 packs per day. He has never used smokeless tobacco.  Alcohol:  has no alcohol history on file.  Illicit drugs:  has no drug history on file.    ALLERGIES:  No Known Allergies     INPATIENT MEDICATIONS PERTINENT TO PAIN CONSULT:   Medications related to Pain Management (From now, onward)    Start     Dose/Rate Route Frequency Ordered Stop    04/26/19 0800  polyethylene glycol (MIRALAX/GLYCOLAX) Packet 17 g      17 g Oral DAILY 04/25/19 1654 04/25/19 2000  senna-docusate (SENOKOT-S/PERICOLACE) 8.6-50 MG per tablet 1 tablet      1 tablet Oral 2 TIMES DAILY 04/25/19 1655 04/25/19 2000  senna-docusate (SENOKOT-S/PERICOLACE) 8.6-50 MG per tablet 2 tablet      2 tablet Oral 2 TIMES DAILY 04/25/19 1655 04/25/19 1700  HYDROmorphone (DILAUDID) PCA 1 mg/mL OPIOID NAIVE       Intravenous CONTINUOUS 04/25/19 1655 04/25/19 1700  acetaminophen (TYLENOL) tablet 975 mg      975 mg Oral EVERY 8 HOURS 04/25/19 1654 04/28/19 1759    04/25/19 1654  diphenhydrAMINE (BENADRYL) solution 12.5 mg      12.5 mg Oral EVERY 6 HOURS PRN 04/25/19 1654 04/25/19 1654  diphenhydrAMINE (BENADRYL) injection 12.5 mg      12.5 mg Intravenous EVERY 6 HOURS PRN 04/25/19 1654 04/25/19 1654  lidocaine 1 % 0.1-1 mL       0.1-1 mL Other EVERY 1 HOUR PRN 04/25/19 1654 04/25/19 1654  lidocaine (LMX4) cream       Topical EVERY 1 HOUR PRN 04/25/19 1654 04/25/19 1654  bisacodyl (DULCOLAX) Suppository 10 mg      10 mg Rectal DAILY PRN 04/25/19 1654      04/25/19 1654  LORazepam (ATIVAN) tablet 0.5 mg      0.5 mg Oral EVERY 6 HOURS PRN 04/25/19 1654 04/25/19 1654  oxyCODONE (ROXICODONE) tablet 5-10 mg      5-10 mg Oral EVERY 3 HOURS PRN 04/25/19 1654      04/22/19 1712  hydrOXYzine (ATARAX) tablet 25 mg      25 mg Oral EVERY 6 HOURS PRN 04/22/19 1713      04/22/19 1545  gabapentin (NEURONTIN) tablet 600 mg      600 mg Oral 3 TIMES DAILY 04/22/19 1538      04/22/19 1538  traMADol (ULTRAM) half-tab 25 mg      25 mg Oral EVERY 6 HOURS PRN 04/22/19 1538      04/22/19 1537  lidocaine 1 % 0.1-1 mL      0.1-1 mL Other EVERY 1 HOUR PRN 04/22/19 1538      04/22/19 1537  lidocaine (LMX4) cream       Topical EVERY 1 HOUR PRN 04/22/19 1538            LABORATORY VALUES:   Recent Labs   Lab 04/26/19  0329 04/25/19  1309 04/25/19  1217  04/25/19  0358 04/24/19  0614   CR 0.73  --   --   --  0.80 0.71   WBC 15.7*  --   --   --   --   --    HGB 9.8* 10.4* 11.3*   < > 11.7* 11.8*    < > = values in this interval not displayed.       -----------------------------------------------------------------------------------  Asha Davila PharmD, MS  Inpatient Pain Service    To reach us:  Mon - Friday 8 AM - 3 PM: Pager [phone] (Text Page)  After hours, weekends and holidays: Primary service should call [phone] for the on-call pain specialist    Helpful Resources:  Getting Rid of Unwanted Medications (printable PDF for patients)   Opioid Overdose Prevention Toolkit (printable PDF for patients)

## 2019-04-26 NOTE — PROGRESS NOTES
04/26/19 0853   Quick Adds   Type of Visit Initial PT Evaluation       Present no   Living Environment   Lives With spouse;child(day), adult   Living Arrangements apartment   Home Accessibility stairs to enter home   Number of Stairs, Main Entrance   (2 flights)   Stair Railings, Main Entrance railing on right side (ascending)   Living Environment Comment pt living in [place] with his wife and adult son, now [place], reports living on the third floor of apartment. Pt's son is buying a house in [place] this summer, pt planning to stay there locally throughout summer.    Self-Care   Usual Activity Tolerance moderate   Current Activity Tolerance fair   Regular Exercise Yes   Activity/Exercise Type   (UE resistance exercises )   Equipment Currently Used at Home shower chair;walker, rolling;cane, straight   Activity/Exercise/Self-Care Comment pt generally IND with 4WW in the home   Functional Level Prior   Ambulation 1-->assistive equipment   Transferring 1-->assistive equipment   Toileting 0-->independent   Bathing 1-->assistive equipment   Fall history within last six months no   Which of the above functional risks had a recent onset or change? ambulation;transferring   General Information   Onset of Illness/Injury or Date of Surgery - Date 04/22/19   Referring Physician Pratik Morocho PA-C   Patient/Family Goals Statement to get better, walk, reduce pain    Pertinent History of Current Problem (include personal factors and/or comorbidities that impact the POC) 69 year old male POD #0 s/p T10-S1 spine fusion on 4/25 and POD#3 for L3-L5 ALIF on 4/22. Patient was extubated in the PACU.    Precautions/Limitations spinal precautions  (complex spine )   Heart Disease Risk Factors Gender;Age   General Observations pt supine, alert, agreeable to session    General Info Comments activity orders: ambulate    Cognitive Status Examination   Orientation orientation to person,  "place and time   Level of Consciousness alert   Follows Commands and Answers Questions 100% of the time   Personal Safety and Judgment intact   Memory intact   Pain Assessment   Patient Currently in Pain Yes, see Vital Sign flowsheet   Integumentary/Edema   Integumentary/Edema Comments spinal and abdominal incision    Posture    Posture Forward head position;Protracted shoulders   Range of Motion (ROM)   ROM Quick Adds No deficits were identified   Strength   Strength Comments grossly 4/5 in B LE   Bed Mobility   Bed Mobility Comments mod A x 1   Transfer Skills   Transfer Comments STS with min A x 2   Gait   Gait Comments pivots to chair with Porfirio x 2   Balance   Balance Comments needs UE support in standing for balance    Sensory Examination   Sensory Perception Comments reports some numbness only in B feet    General Therapy Interventions   Planned Therapy Interventions progressive activity/exercise;home program guidelines;risk factor education;transfer training;strengthening;balance training;bed mobility training;gait training   Clinical Impression   Criteria for Skilled Therapeutic Intervention yes, treatment indicated   PT Diagnosis impaired mobility    Influenced by the following impairments pain, spinal precautions   Functional limitations due to impairments impaired gait and transfers    Clinical Presentation Stable/Uncomplicated   Clinical Presentation Rationale pt with clear presentation   Clinical Decision Making (Complexity) Low complexity   Therapy Frequency`   (6x per week )   Predicted Duration of Therapy Intervention (days/wks) 1 week    Anticipated Discharge Disposition Transitional Care Facility   Risk & Benefits of therapy have been explained Yes   Patient, Family & other staff in agreement with plan of care Yes   Clinical Impression Comments Pt with 3 flights of stairs to enter apt, may need TCU at discharge pending progress   [facility] AM-PAC TM \"6 Clicks\"   2016, Trustees of [employer] " "[employer], under license to [employer].  All rights reserved.   6 Clicks Short Forms Basic Mobility Inpatient Short Form   [facility] AM-PAC  \"6 Clicks\" V.2 Basic Mobility Inpatient Short Form   1. Turning from your back to your side while in a flat bed without using bedrails? 3 - A Little   2. Moving from lying on your back to sitting on the side of a flat bed without using bedrails? 3 - A Little   3. Moving to and from a bed to a chair (including a wheelchair)? 2 - A Lot   4. Standing up from a chair using your arms (e.g., wheelchair, or bedside chair)? 3 - A Little   5. To walk in hospital room? 2 - A Lot   6. Climbing 3-5 steps with a railing? 1 - Total   Basic Mobility Raw Score (Score out of 24.Lower scores equate to lower levels of function) 14   Total Evaluation Time   Total Evaluation Time (Minutes) 10     "

## 2019-04-26 NOTE — PROGRESS NOTES
[facility], [facility] Progress Note - Hospitalist Service, Gold 9       Date of Admission:  4/22/2019  Assessment & Plan   Beulah Jane is a 69 year old male with hx of HTN, hypothyroidism, T2DM, ILD, dermatomyositis, aortic stenosis, and GERD,admitted on 4/22/2019. He is now status post stage I lumbar ALIF on 4/22 by Ed Dial and Raji with planned stage II operation on 4/25.      S/p stage I lumbar fusion, 4/22, and stage II, 4/25:  By Ed Dial and Raji. Tolerated second procedure well. Currently states incisional pain tolerable on current pain med regimen as recommended by pain service. Abd incision healing well without discharge or dehiscence.   - Ortho continuing to follow. DVT ppx and activity per their protocol.     Leukocytosis:  WBC ~15 this am most likely stress reaction from surgeries. Afebrile.   - Repeat CBC tomorrow am.      Dermatomyositis (Shannon-1 deepak +), ILD:  Followed OP by Dr. Call at  Rheumatology clinic in [place]. Chronically on methotrexate 25 mg once weekly, last taken however, two weeks ago at the advice of Dr. Call. Also recently nearly completed protracted taper of prednisone, started on 2/7 at 20 mg x 2 wks, then decrease by 5 mg q2wks, so pt was on 5 mg daily day of surgery with a few days left of taper. Most recent PFTs with reduced DLCO so received rituximab infusion on 3/11. Not normally on O2 PTA, but currently on 3-4L most likely the need is multifactorial and due to yesterday's surgery, ILD, deconditioned state, anemia, opioid use, etc.   - Continue oral prednisone but in acute post-op setting, continue 5 mg BID until after a few days after stage II surgery, then would decrease to daily for a few days then off.    - Continue with PTA theophylline, scheduled Arnuity Ellipta, and prn albuterol inhaler/nebs  - Continue supplemental O2 via NC to keep O2 sats >92%. Wean as able.   - Encourage IS q2hrs  - Would hold off on restarting  methotrexate for at least 1 week after stage II surgery, longer if concern for lack of healing from surgery perspective.      T2DM:  Most recent HgbA1C from 1/29/19, 6.3%. PTA on PRN insulin isophane 6U BID, on hold for now.   Recent Labs   Lab 04/26/19  1220 04/26/19  0758 04/26/19  0329 04/25/19  2054 04/25/19  1459 04/25/19  1309 04/25/19  1217 04/25/19  1105 04/25/19  1009 04/25/19  0850 04/25/19  0620  04/24/19  2104   GLC  --   --  212*  --   --  132* 160* 160* 155* 140*  --    < >  --    * 182*  --  194* 137*  --   --   --   --   --  141*  --  180*    < > = values in this interval not displayed.      - Continue MSSI  - Continue to hold PTA insulin isophane  - Accuchecks TID before meals and at bedtime  - Hypoglycemic protocol.      Acute blood loss anemia: Pre-op Hgb on 4/2, 15.9. EBL from stage I 400 ml. EBL from stage  ml with 350 ml pRBCs returned via cell saver 350 ml.. Most recent Hgb stable with no e/o active bleeding on exam.   - Repeat hgb tomorrow am.      Chronic aortic stenosis: Most recent echo 12/20/18 with mild LVH, normal LV size and systolic function, normal RV size and function, est EF 60%, and with moderate aortic stenosis with slight progress compared to prior study. Followed by OP Cardiology and  [facility] with plan for likely AVR in the next year or two.   - Follow up with OP Cardiology with repeat echo as scheduled June 2019.      HTN:  PTA on verapamil. Currently BPs normotensive.   - Continue PTA verapamil  daily with parameters to hold.      Hypothyroidism:  Euthyroid based on normal TSH on 1/10/19. Continue PTA levothyroxine 50 mcg daily.      Diet: Advance Diet as Tolerated: Fully Advanced to diet(s) per Provider order; [id number] Calories: Moderate Consistent CHO (4-6 CHO units/meal)  Snacks/Supplements Adult: Boost Glucose Control; Between Meals  Snacks/Supplements Adult: Gelatein sugar-free; Between Meals    DVT Prophylaxis: Pneumatic Compression Devices  [place]  Catheter: in place, indication: Strict 1-2 Hour I&O, Other (Comment)(bedrest)  Code Status: Full    Disposition Plan   Expected discharge: 4 - 7 days, recommended to transitional care unit once adequate pain management/ tolerating PO medications and safe disposition plan/ TCU bed available.  Entered: Joseluis Smith PA-C 04/26/2019, 3:07 PM       The patient's care was discussed with the Attending Physician, Dr. Herring and Patient.    Joseluis Smith PA-C  Hospitalist Service, [address], [place]  Pager: [phone]  Please see sticky note for cross cover information  ______________________________________________________________________    Interval History   No events overnight. Currently sleeping and states his pain currently controlled. Denies other physical concerns at this time.     Data reviewed today: I reviewed all medications, new labs and imaging results over the last 24 hours.    Physical Exam   Vital Signs: Temp: 97.8  F (36.6  C) Temp src: Axillary BP: 112/57 Pulse: 112 Heart Rate: 96 Resp: 9 SpO2: 100 % O2 Device: Nasal cannula Oxygen Delivery: 3 LPM  Weight: 172 lbs 13.45 oz  GEN: In NAD  HEENT: NCAT; PERRL; sclerae non-icteric  LUNGS: CTAB  CV: RRR  ABD: +BSs; SNTND; abd incision has healed well  EXT: No BLE edema  SKIN: No acute rashes noted on exposed areas.  NEURO: AAOx3; CNs grossly intact; No acute focal deficits noted.      Data   CMP  Recent Labs   Lab 04/26/19  0329 04/25/19  1309 04/25/19  1217 04/25/19  1105  04/25/19  0358 04/24/19  0614  04/22/19  1559    134 134 134   < > 139 138  --   --    POTASSIUM 4.5 4.1 4.2 4.0   < > 4.1 4.0  --   --    CHLORIDE 104  --   --   --   --  101 102  --   --    CO2 28  --   --   --   --  29 30  --   --    ANIONGAP 7  --   --   --   --  9 6  --   --    * 132* 160* 160*   < > 134* 129*   < >  --    BUN 15  --   --   --   --  13 11  --   --    CR 0.73  --   --   --   --  0.80 0.71  --  0.81    GFRESTIMATED >90  --   --   --   --  >90 >90  --  >90   GFRESTBLACK >90  --   --   --   --  >90 >90  --  >90   DMITRIY 7.9*  --   --   --   --  8.7 8.8  --   --     < > = values in this interval not displayed.     CBC  Recent Labs   Lab 04/26/19  0329 04/25/19  1309 04/25/19  1217 04/25/19  1105  04/22/19  1559   WBC 15.7*  --   --   --   --   --    RBC 3.45*  --   --   --   --   --    HGB 9.8* 10.4* 11.3* 11.3*   < >  --    HCT 31.2*  --   --   --   --   --    MCV 90  --   --   --   --   --    MCH 28.4  --   --   --   --   --    MCHC 31.4*  --   --   --   --   --    RDW 16.5*  --   --   --   --   --      --   --   --   --  309    < > = values in this interval not displayed.     INR  Recent Labs   Lab 04/26/19  0329 04/25/19  1205   INR 1.28* 1.30*

## 2019-04-26 NOTE — PROGRESS NOTES
SURGICAL ICU PROGRESS NOTE    CO-MORBIDITIES:   Aortic valve Stenosis  HTN  DMII  Pulmonary fibrosis  Hypothyroidism    ASSESSMENT: Beulah Jane is a 69 year old male POD #1 s/p T10-S1 spine fusion on 4/25 and POD#4 for L3-L5 ALIF on 4/22. Patient was extubated in the PACU. Admitted to the SICU for hemodynamic monitoring. Doing this am.      TODAY'S PROGRESS/PLANS:   Work with therapies today  Resume bactrim ppx, discontinue h2 blocker.  Transfer to floor    PLAN:  Neuro/ pain/ sedation:  - Monitor neurological status; notify the MD for any acute changes in exam  - tylenol, gabapentin 100mg TID, dilaudid PCA dc, oxycodone 5-10mg Q3H PRN, tramadol  for pain discontinue while inpatient, hydroxyzine. Lidocaine patch and menthol patch. Atarax prn.  - lorazepam 0.5mg Q6H prn  for sedation.  - pain management consult in place     Pulmonary care:   #pulmonary fibrosis  #JUAN  - Supplemental oxygen to keep saturation above 92%  - Incentive spirometer [id number] when awake  - albuterol prn, fluticasone  - theophylline  - replaced bactrim homemed  - CPAP at night     Cardiovascular:   #HTN   - Monitor hemodynamic status  - verapamil 120MG QPM     GI care:   - CHO diet  - famotidine 20mg Q12H,   - ranitidine dc  - metoclopramide 5mg Q6H, zofran 4mg Q6H  - pantoprazole on board  - senna-docusate, miralax     Fluids/ Electrolytes/ Nutrition:   - d5 + 0.45NaCl + KCL 20, 85cc/hr for IV fluid hydration, dc  - No indication for parenteral nutrition    Renal/ Fluid Balance:    - Urine output is adequate so far  - Will continue to monitor intake and output     Endocrine:    #hypothyroidism  - levothyroxine 50mg every day  - Sliding scale for diabetes management     ID/ Antibiotics:  - cefazolin 1gm post-op abs Q8H     Heme:     - Hemoglobin stable for now, cbc pending tomorrow am     MSK:  #dermatomyositis  Activity: up with assist POD#0, POD#1 up ad mer  - weight bearing as tolerated  - prednisone 5mg BID     Prophylaxis:     - Mechanical prophylaxis for DVT   - No chemical DVT prophylaxis, wbat     Lines/ tubes/ drains:  - chandler discontinue 4/26     Disposition:  - Surgical ICU     Patient seen, findings and plan discussed with surgical ICU staff Dr. Espinosa.     Skip De Jesus, PGY1        ====================================    SUBJECTIVE:   - acute events overnight.   - no new numbness and tingling   - pain controlled   - passing urine+     OBJECTIVE:   1. VITAL SIGNS:   Temp:  [97.4  F (36.3  C)-99.1  F (37.3  C)] 97.4  F (36.3  C)  Pulse:  [] 112  Heart Rate:  [] 86  Resp:  [10-25] 21  BP: ()/(55-75) 112/57  MAP:  [51 mmHg-105 mmHg] 83 mmHg  Arterial Line BP: ()/(45-74) 140/54  SpO2:  [92 %-100 %] 100 %  Resp: 21      2. INTAKE/ OUTPUT:   I/O last 3 completed shifts:  In: 5588.33 [I.V.:4263.33; Other:325]  Out: 1514 [Urine:1185; Drains:329]    3. PHYSICAL EXAMINATION:   General: Alert, well-appearing in no acute distress.  HEENT: Normocephalic, atraumatic.  Neck: No cervical lymphadenopathy.   Chest wall: Symmetric thorax. No masses or tenderness to palpation.   Spine: Dressing in place, no see through drainage noted. Drains x2 holding suction.  Respiratory: Non-labored breathing. Lung sounds clear to auscultation bilaterally.   Cardiovascular: Regular rate and rhythm.   Gastrointestinal: Abdomen soft, non-distended. Left abdomen and upper abdomen mild contusions from OR positioning.  Extremities: No limb deformities. No pedal edema.   RLE:  No obvious deformities noted  Able to flex/extend knee, unable to straight leg due to back pain from surgery  Dorsiflexion/plantar flexion intact, EHL/FHL firing  SILT through extremity  DP/PT audible on doppler.  LLE:  No obvious deformities noted  Able to flex/extend knee, unable to straight leg due to back pain from surgery  Dorsiflexion/plantar flexion intact, EHL/FHL firing  SILT through extremity  DP/PT audible on doppler.  Skin: As noted above. No rashes or lesions  appreciated.    4. INVESTIGATIONS:   Reviewed    5. RADIOLOGY:   Reviewed

## 2019-04-26 NOTE — PLAN OF CARE
Pt afebrile, BP slightly hypertensive at times, tachy in the evening and NSR overnight, 5L oxiplus mask at night because of sleep apnea otherwise is on 3L NC during the day. Capno on-IPI 8-10, co2 30-35. Neuros intact, baseline numbness in BLE. Moving all extremities upon command. CMS intact, BLE very weak and pulses difficult to auscultate w/doppler (this is baseline). MD already aware.  Surgical incisions CDI. Surgical drain x 3, sanguineous output. ABD distended, last BM prior to admission, ABD w/ marked bruising and painful in left quadrants (from OR and MD aware).  [place] in place with adequate amount of urine output.  Pt being turned Q2hrs.  Complained of uncontrolled pain at beginning of shift and Dilaudid PCA was started.  Pt received Oxycodone 10 mg PO x 2 overnight with some relief.  Pt has right radial ART line and right PIV with IVF.  Will continue with plan of care.       Problem: Pain Acute  Goal: Optimal Pain Control  Outcome: Improving

## 2019-04-26 NOTE — PROGRESS NOTES
"Orthopaedic Surgery Progress Note     Subjective: No acute events overnight. In ICU but stable. May transfer to floor today. Pain really well controlled once PCA started    Objective: /57   Pulse 112   Temp 98.2  F (36.8  C) (Axillary)   Resp 12   Ht 1.6 m (5' 3\")   Wt 78.4 kg (172 lb 13.5 oz)   SpO2 100%   BMI 30.62 kg/m      General: NAD, alert and oriented, cooperative with exam.   Cardio: RRR, extremities wwp.   Respiratory: Non-labored breathing.  MSK: Dressing c/d/i.    SILT L3-S1 bilaterally.   L2-3: Hip flexion L and R 5/5 strength    L4:  Knee extension L and R 5/5 strength   L5:  Foot / EHL dorsiflexion L and R 5/5 strength   S1:  Plantarflexion  L and R 5/5 strength    Drain: hemovac 1- 0  hemovac 2- 0  Deep 222cc, 107cc last two shifts    Labs:   Recent Labs   Lab 04/26/19  0329 04/25/19  1309 04/25/19  1217  04/22/19  1559   WBC 15.7*  --   --   --   --    HGB 9.8* 10.4* 11.3*   < >  --      --   --   --  309    < > = values in this interval not displayed.     Sed Rate   Date Value Ref Range Status   06/26/2018 10 0 - 20 mm/h Final     All cultures:  Recent Labs   Lab 04/25/19  0857 04/25/19  0845   CULT Culture negative monitoring continues  PENDING Culture negative monitoring continues  PENDING       Assessment and Plan: Beulah Jane is a 69 year old male with PMH including aortic stenosis, chronic pain, DM type 2, hyperlipidemia, JUAN now s/p Stage I L3-4, L4-5 ALIF  on 4/22/19 with Dr. Dial and Dr. Alegria.  Stage II T10-pelvis PIF, TLIF, SPO on 4/25/19 with Dr. Dial.      Jayro Primary  Activity: Up with assist until independent. No excessive bending or twisting. No lifting >10 lbs x 6 weeks. No Faiza lift for transfers.   Weight bearing status: WBAT.  Pain management: Transition from IV to PO as tolerated. No NSAIDs   Antibiotics: Ancef x 3 days or until drains are discontinued (may need to extend duration if drains still in)  Diet: Begin with clear fluids and " progress diet as tolerated.   DVT prophylaxis: SCDs only. No chemical DVT ppx needed.  Imaging: XR Upright  PTDC - ordered.  Labs: Hgb POD#1.  Bracing/Splinting: None.  Dressings: Prineo with 4x4 and tegaderm.  Drains: 3,Document output per shift, will be discontinued at Orthopedic Surgery discretion.  [place] catheter: Remove POD#1.   Physical Therapy/Occupational Therapy: Eval and treat.  Cultures: Seroma fluid and debris Pending, follow culture results closely.    Consults: Hospitalist, Pain.  Follow-up: Clinic with Dr. Dial in 6 weeks with repeat x-rays.   Disposition: Pending progress with therapies, pain control on orals, and medical stability, anticipate discharge to TCU or home on POD #4-5    Discussed with Dr. Jayro Morrow, PGY4

## 2019-04-26 NOTE — OP NOTE
Procedure Date: 04/25/2019      PREOPERATIVE DIAGNOSIS:  Thoracolumbar kyphosis with flat back syndrome and thoracolumbar stenosis with myeloradiculopathy.      POSTOPERATIVE DIAGNOSIS:  Thoracolumbar kyphosis with flat back syndrome and thoracolumbar stenosis with myeloradiculopathy.      PROCEDURES:   1.  Posterior segmental instrumentation T10 to S1.   2.  Pelvic instrumentation.   3.  T11-T12, L1-L2 and L2-L3 Hung-Greer osteotomies with bilateral transforaminal lumbar interbody fusion and decompression.   4.  L3-L4 Hung-Greer osteotomy and decompression.   5.  Posterior arthrodesis T10 to S1.      ATTENDING SURGEON:  Joseluis Lomeli MD      CO-SURGEON:  Cal Dial MD.      ANESTHESIA:  General endotracheal anesthesia.      INDICATIONS:  The patient is a 69-year-old male with multiple previous operations who has lumbar kyphosis and severe stenosis and severe symptoms.  Earlier this week he underwent anterior lumbar interbody fusion at L3-L4 and L4-L5 to begin to correct his deformity.  He is brought back for the staged procedure to finish and secure the correction.  Dr. Dial asked me to assist him and be with co-surgeon with him for the procedure given the extent of the surgery required, the scar tissue required to be dissected and the indication that a dual surgeons reduce blood loss and complications.      DESCRIPTION OF PROCEDURE:  The patient was brought to the operating room, general endotracheal anesthesia was induced.  The patient was rolled into the prone position on the ProAxis table.  The back was prepped and draped in sterile fashion.  Midline exposure was performed and his previous large seroma cavity was also entered.  The seroma cavity was debrided and the medial wall was dissected free and reflected laterally.  Further midline exposure was then performed from T10 to the pelvis.  At this point, the O-arm was sterilely draped and brought into the field.  Navigation imaging was obtained  and screws were placed.  The pedicle screws were placed bilaterally from T10 to S1 and all screws confirmed to be in good position with post-placement imaging.  Bilateral S2 alar iliac screws were placed as well, again using neuronavigation and imaging.  The pedicle screws were Medtronic Solera system with balanced pelvic instrumentation.  Once this was done, then attention was turned to the TLIF and Smith-Greer osteotomy.  First the T11-T12 level facets were resected bilaterally and the spinal canal decompressed by removing the remaining ligamentum flavum on the thecal sac.  Once this was done, the wheeled lamina  was used to distract the level the disk space were entered.  The diskectomy performed a trial.  Capstone control cages were placed and bilateral Capstone control 0-degree cages were placed at T11-T12.  Each cage had a BMP sponge as well.  At this level allograft cancellous chips were also placed.  Once this was done, a similar procedure was performed at L1-L2 and L2-L3 bilaterally.  At L1, 0-degree cages were placed and at L2-L3, 6 degree cages were placed.  Once these were in good position, then a Hung-Greer osteotomy and decompression was performed at L3-L4, removing the facets bilaterally at this level as well.  Once good decompression was obtained at all these levels, then the rods were contoured and seated bilaterally.  Stitched imaging was obtained confirming the degree of correction was adequate with about 40 degrees of lordosis across the lumbar spine.  The set screws were final tightened.  The spinous processes were debrided and the posterior elements debrided.  More bone morphogenic protein was placed as well as local autograft and allograft cancellous chips.  Once this was secured, a medium Hemovac drain was placed in this old seroma cavity.  A 15 English drain was placed in the deep wound and a medium Hemovac placed in the superficial wound.  The seroma cavity was closed with  suture.  The fascia closed with 0 interrupted and #1 running suture.  Vancomycin powder was placed extensively throughout the wound.  The skin was closed in the standard fashion and a sterile dressing applied.  The patient was then awakened, extubated and taken to the recovery room in good condition.      ESTIMATED BLOOD LOSS:  Approximately 850 mL.        Of note, this operation was more difficult than usual given his extensive scar tissue from previous operations as well as the seroma cavity.  This required extra dissection and more difficulty in performing the osteotomies by approximately 25%.         SHANIQUE FREITAS MD             D: 2019   T: 2019   MT: NATACHA      Name:     DANELLE SIMMS   MRN:      [id number]        Account:        [id number]   :      1950           Procedure Date: 2019      Document: [id number]

## 2019-04-26 NOTE — PLAN OF CARE
4A: PT evaluation completed, treatment initiated.   Discharge Planner PT   Patient plan for discharge: home to local apartment   Current status: pt needs mod A for supine to sit transfer, min A x 2 for STS transfer and pivot to chair. Pt using 3L O2 via NC, quite fatigued with standing, limited by incisional pain.   Barriers to return to prior living situation: medical status, spinal precautions, assist for mobility, stairs at home   Recommendations for discharge: TCU at this time   Rationale for recommendations: pt reports living on the third floor of apartment and needing to complete 2 flights of stairs to access. Pt currently with below baseline mobility needing further therapy to maximize function and independence. Pt IND with 4WW at baseline, pending progress and trial of stairs may be able to return home with assist.        Entered by: Madalyn Benavides 04/26/2019 1:54 PM

## 2019-04-26 NOTE — PROGRESS NOTES
CLINICAL NUTRITION SERVICES - ASSESSMENT NOTE     Nutrition Prescription    RECOMMENDATIONS FOR MDs/PROVIDERS TO ORDER:  None at this time.     Malnutrition Status:    Patient does not meet criteria for diagnosing malnutrition.     Recommendations already ordered by Registered Dietitian (RD):  Boost GC @ 2pm snack and Gelatein SF @ 8pm snack.    Future/Additional Recommendations:  If intake continues to be inadequate, offer additional supplements or high protein snacks with calorie counts.      REASON FOR ASSESSMENT  Beulah Jane is a/an 69 year old male assessed by the dietitian for Provider Order - Dietitian to Assess and Order per Nutrition Protocol.     NUTRITION HISTORY  Information obtained from patient. Pt reports eating at baseline PTA. Pt normally follows a diet high in protein and low is Carbs. Pt is lactose intolerant with no other allergies. He eats TID meals with some snacks. Pt mentioned he would rather skip eating carbs and not take insulin. He limits his bread, potato, and sweet intake. He takes insulin (Humulin) after breakfast, checks blood sugar in the afternoon and may take insulin again depending on the value. Pt has had Carb Counting education in the past and has tried boost and gelatein nutritional supplements at prior admissions.     Diet Recall:  -B: 3 eggs and sometimes 12 grain toast  -L: Steak, chicken, or turkey with pieces of baked potato  -D: same as lunch    CURRENT NUTRITION ORDERS  Diet: Mod CHO Diet (4-6 units/meal)    Intake/Tolerance: After first surgery on 4/22 pt was eating % per flowsheets until going NPO @ midnight on 4/24. He has a decreased appetite currently with little nausea. He has not had anything to eat since 4/24 evening, but says he plans to order breakfast soon. 0% intake in the past 36 hours.    LABS  BGM (4/26): 135-212, A1C (1/29/19): 6.3 (H)    Last BM was on 4/21, pt feels constipated.     MEDICATIONS  Novolog: Medium resistance  Miralax/Senokot  "for bowel regimen   Prednisone: 5 mg 2 X daily     ANTHROPOMETRICS  Height: (5' 6.5\")  Lowest/Admit Weight: 76.1 kg (167 lb 12.3 oz) (4/22)  IBW: 67 kg  BMI: Overweight BMI 25-29.9 (26.5 m2/kg)  Weight History: Wt has remained stable over the past year with some weight gain.   Wt Readings from Last 10 Encounters:   04/26/19 78.4 kg (172 lb 13.5 oz)   04/02/19 75.5 kg (166 lb 8 oz)   02/25/19 72.6 kg (160 lb)   02/07/19 70.8 kg (156 lb)   01/29/19 72.3 kg (159 lb 8 oz)   01/29/19 72.1 kg (159 lb)   07/24/18 72.1 kg (159 lb)   06/29/18 72.3 kg (159 lb 8 oz)   06/26/18 73.9 kg (162 lb 14.4 oz)   06/19/18 73.5 kg (162 lb)       Dosing Weight: 76 kg (actual based on lowest wt this admit of 76.1 kg on 4/22)    ASSESSED NUTRITION NEEDS  Estimated Energy Needs: 8939-5201 kcals/day (20 - 25 kcals/kg)  Justification: Maintenance with Mod Cho diet  Estimated Protein Needs:  grams protein/day (1.2-1.5grams of pro/kg)  Justification: Post-op  Estimated Fluid Needs: (1 mL/kcal)   Justification: Maintenance    PHYSICAL FINDINGS  Distended stomach    MALNUTRITION  % Intake: Decreased intake does not meet criteria  % Weight Loss: None noted  Subcutaneous Fat Loss: None observed  Muscle Loss: None observed  Fluid Accumulation/Edema: None noted  Malnutrition Diagnosis: Patient does not meet two of the above criteria necessary for diagnosing malnutrition    NUTRITION DIAGNOSIS  Inadequate energy intake related to decreased appetite d/d post-op status as evidenced by 0% intake the past 36 hours.       INTERVENTIONS  Implementation  Nutrition Education: Provided education on nutritional supplements, importance of protein post-op for optimal healing. Discussed carb counting, carb units allowed at meals, and protein and carb food choices.    Medical food supplement therapy - ordered as above  Encouraged meals high in protein and low in CHO.     Goals  Patient to consume % of nutritionally adequate meal trays TID, or the " equivalent with supplements/snacks.     Monitoring/Evaluation  Progress toward goals will be monitored and evaluated per protocol.    Anaya Griffin-Dietetic Intern

## 2019-04-27 NOTE — PROGRESS NOTES
"Orthopaedic Surgery Progress Note   April 27, 2019    Subjective: No acute events overnight. Pain well controlled. Tolerating diet. Voiding spontaneously. +Flatus, no BM. Denies fever or chills, CP, SOB, numbness or tingling, motor dysfunction or weakness. Patient had episode of dizziness/lightheadedness when up to bathroom yesterday, resolved this AM.     Objective: /63 (BP Location: Right arm)   Pulse 112   Temp 97.7  F (36.5  C) (Oral)   Resp 14   Ht 1.6 m (5' 3\")   Wt 78.4 kg (172 lb 13.5 oz)   SpO2 100%   BMI 30.62 kg/m      General: NAD, alert and oriented, cooperative with exam.   Cardio: RRR, extremities wwp.   Respiratory: Non-labored breathing.  MSK: Dressing c/d/i, no strikethrough.   SILT L3-S1 bilaterally.                L2-3: Hip flexion L and R 5/5 strength                 L4:  Knee extension L and R 5/5 strength                L5:  Foot / EHL dorsiflexion L and R 5/5 strength                S1:  Plantarflexion  L and R 5/5 strength     Drain: hemovac 1- 0  hemovac 2- 0  Deep 96/10/72.5 last 3 shifts, serosanguinous output.         Labs:  Hemoglobin   Date Value Ref Range Status   04/26/2019 9.8 (L) 13.3 - 17.7 g/dL Final   ]  All cultures:  Recent Labs   Lab 04/25/19  0857 04/25/19  0845   CULT Culture negative monitoring continues  Culture negative monitoring continues Culture negative monitoring continues  Culture negative monitoring continues       Assessment and Plan: Beulah Jane is a 69 year old male with PMH including aortic stenosis, chronic pain, DM type 2, hyperlipidemia, JUAN now s/p Stage I L3-4, L4-5 ALIF  on 4/22/19 with Dr. Dial and Dr. Alegria.  Stage II T10-pelvis PIF, TLIF, SPO on 4/25/19 with Dr. Dial.      Ortho Primary, medicine following for co-management  Activity: Up with assist until independent. No excessive bending or twisting. No lifting >10 lbs x 6 weeks. No Faiza lift for transfers.   Weight bearing status: WBAT.  Pain management: Transition from " IV to PO as tolerated. No NSAIDs   Antibiotics: Ancef x 3 days or until drains are discontinued (may need to extend duration if drains still in)  Diet: Begin with clear fluids and progress diet as tolerated.   DVT prophylaxis: SCDs only. No chemical DVT ppx needed.  Imaging: XR Upright  PTDC - ordered.  Labs: trend Hgb   Bracing/Splinting: None.  Dressings: Prineo with 4x4 and tegaderm.  Drains: 3,Document output per shift, will be discontinued at Orthopedic Surgery discretion. Will leave for at least 1 more day.   [place] catheter: Remove POD#1.   Physical Therapy/Occupational Therapy: Eval and treat.  Cultures: Seroma fluid and debris Pending, follow culture results closely.    Consults: Hospitalist, Pain.  Follow-up: Clinic with Dr. Dial in 6 weeks with repeat x-rays.   Disposition: Pending progress with therapies, pain control on orals, and medical stability, anticipate discharge to TCU or home on POD #4-5     Discussed with Dr. Jayro Zamarripa MD  Orthopaedic Surgery Resident, PGY-4  Pager: [phone]

## 2019-04-27 NOTE — PLAN OF CARE
[id number]  Triggered sepsis protocol- lactic acid 1.5. Sating 98% on 2L NC. /62, . Neuros intact. Back dressing CDI, abd incision HARDIK. R hemovac x2 and L CORINNE with serosanguinous output. Voiding. No BM- says he will take suppository after dinner. Up with 1-2 and walker.

## 2019-04-27 NOTE — PROGRESS NOTES
[facility], [facility] Progress Note - Hospitalist Service, Gold 9       Date of Admission:  4/22/2019  Assessment & Plan   Beulah Jane is a 69 year old male with hx of HTN, hypothyroidism, T2DM, ILD, dermatomyositis, aortic stenosis, and GERD,admitted on 4/22/2019. He is now status post stage I lumbar ALIF on 4/22 by Ed Dial and Raji with planned stage II operation on 4/25.     Plan for today:  - Continue prednisone 5 mg BID for the next several days until pt up oob working with therapies on a regular basis, then can switch to daily.   - Continue supplemental O2 via NC to keep sats >92% and wean as able.   - Continue to hold PTA insulin isophane in favor of MSSI  - Repeat CBC tomorrow am      S/p stage I lumbar fusion, 4/22, and stage II, 4/25:  By Ed Dial and Raji. Tolerated second procedure well. Currently states incisional pain tolerable on current pain med regimen as recommended by pain service. Abd incisions appear to be healing well without discharge or dehiscence.   - Ortho continuing to follow. DVT ppx and activity per their protocol.     Leukocytosis:  WBC morning after surgery 15.7 and most likely 2/2 stress leukemoid reaction as pt remains afebrile, surgical incisions appear to be healing well, and pt denies s/s of an underlying infectious process. WBC this with slight improvement to 14.2.   - Repeat CBC tomorrow am.      Dermatomyositis (Shannon-1 deepak +), ILD:  Followed OP by Dr. Call at  Rheumatology clinic in [place]. Chronically on methotrexate 25 mg once weekly, last taken however, two weeks ago at the advice of Dr. Call. Also recently nearly completed protracted taper of prednisone, started on 2/7 at 20 mg x 2 wks, then decrease by 5 mg q2wks, so pt was on 5 mg daily day of surgery with a few days left of taper. Most recent PFTs with reduced DLCO so received rituximab infusion on 3/11. Not normally on O2 PTA, but currently on 3-4L most likely the  need is multifactorial and due to yesterday's surgery, ILD, deconditioned state, anemia, opioid use, etc.   - Continue oral prednisone but in acute post-op setting, continue 5 mg BID for the next several days until pt up oob working with therapies on a regular basis, then can switch to daily. Would hold on discontinuing until pt improves to the point of not needed supplemental O2 and/or until follow up after discharge with Rheumatology.   - Continue with PTA theophylline, scheduled Arnuity Ellipta, and prn albuterol inhaler/nebs  - Continue supplemental O2 via NC to keep O2 sats >92%. Wean as able.   - Encourage IS q2hrs  - Would hold off on restarting methotrexate for at least 1 week after stage II surgery, longer if concern for lack of healing from surgery perspective.      T2DM:  Most recent HgbA1C from 1/29/19, 6.3%. PTA on PRN insulin isophane 6U BID, on hold for now.   Recent Labs   Lab 04/27/19  0836 04/27/19  0816 04/26/19  2229 04/26/19  1701 04/26/19  1220 04/26/19  0758 04/26/19  0329 04/25/19  2054  04/25/19  1309 04/25/19  1217 04/25/19  1105 04/25/19  1009   *  --   --   --   --   --  212*  --   --  132* 160* 160* 155*   BGM  --  149* 160* 142* 176* 182*  --  194*   < >  --   --   --   --     < > = values in this interval not displayed.      - Continue MSSI  - Continue to hold PTA insulin isophane  - Accuchecks TID before meals and at bedtime  - Hypoglycemic protocol.      Acute blood loss anemia: Pre-op Hgb on 4/2, 15.9. EBL from stage I 400 ml. EBL from stage  ml with 350 ml pRBCs returned via cell saver 350 ml.. Most recent Hgb stable with no e/o active bleeding on exam.   - Repeat hgb tomorrow am.      Chronic aortic stenosis: Most recent echo 12/20/18 with mild LVH, normal LV size and systolic function, normal RV size and function, est EF 60%, and with moderate aortic stenosis with slight progress compared to prior study. Followed by OP Cardiology and [facility] with plan for likely  AVR in the next year or two.   - Follow up with OP Cardiology with repeat echo as scheduled June 2019.      HTN:  PTA on verapamil. Currently BPs normotensive.   - Continue PTA verapamil  daily with parameters to hold.     Acute constipation:  Most likely opioid induced. LBM 4/21. Denies generalize abd pain and nausea. Abd exam ND.  - Give pt one time Dulcolax supp   - Increase Miralax to BID  - Continue Senokot-S, two tabs BID.      Hypothyroidism:  Euthyroid based on normal TSH on 1/10/19. Continue PTA levothyroxine 50 mcg daily.      Diet: Snacks/Supplements Adult: Boost Glucose Control; Between Meals  Snacks/Supplements Adult: Gelatein sugar-free; Between Meals  Moderate Consistent CHO Diet    DVT Prophylaxis: Pneumatic Compression Devices  [place] Catheter: not present  Code Status: Full    Disposition Plan   Expected discharge: 4 - 7 days, recommended to transitional care unit once adequate pain management/ tolerating PO medications and safe disposition plan/ TCU bed available.  Entered: Joseluis Smith PA-C 04/27/2019, 9:58 AM       The patient's care was discussed with the Attending Physician, Dr. Herring, Bedside Nurse and Patient.    Joseluis Smith PA-C  Hospitalist Service, [address], [place]  Pager: [phone]  Please see sticky note for cross cover information  ______________________________________________________________________    Interval History   No events overnight. States incisional pain controlled. LBM 4/21. Passing gas. Denies fever, chills, chest pain and other acute physical concerns at this time.     Data reviewed today: I reviewed all medications, new labs and imaging results over the last 24 hours.    Physical Exam   Vital Signs: Temp: 97.7  F (36.5  C) Temp src: Oral BP: 110/63   Heart Rate: 84 Resp: 14 SpO2: 100 % O2 Device: Nasal cannula Oxygen Delivery: 2 LPM  Weight: 172 lbs 13.45 oz  GEN: Pleasant gentleman in NAD  HEENT: NCAT; PERRL;  sclerae non-icteric; MMM  LUNGS: CTAB  CV: RRR  ABD: +BSs; SNTND; abd incision has healed well  EXT: No BLE edema  BACK: Thoracic and lumbar incisions covered with c/d/i dressings.   SKIN: No acute rashes noted on exposed areas.  NEURO: AAOx3; CNs grossly intact; No acute focal deficits noted.      Data   CMP  Recent Labs   Lab 04/27/19 0836 04/26/19 0329 04/25/19  1309 04/25/19  1217  04/25/19  0358 04/24/19  0614    138 134 134   < > 139 138   POTASSIUM 4.4 4.5 4.1 4.2   < > 4.1 4.0   CHLORIDE 103 104  --   --   --  101 102   CO2 29 28  --   --   --  29 30   ANIONGAP 8 7  --   --   --  9 6   * 212* 132* 160*   < > 134* 129*   BUN 13 15  --   --   --  13 11   CR 0.79 0.73  --   --   --  0.80 0.71   GFRESTIMATED >90 >90  --   --   --  >90 >90   GFRESTBLACK >90 >90  --   --   --  >90 >90   DMITRIY 8.1* 7.9*  --   --   --  8.7 8.8    < > = values in this interval not displayed.     CBC  Recent Labs   Lab 04/27/19 0836 04/26/19 0329 04/25/19  1309 04/25/19  1217  04/22/19  1559   WBC 14.2* 15.7*  --   --   --   --    RBC 2.94* 3.45*  --   --   --   --    HGB 8.3* 9.8* 10.4* 11.3*   < >  --    HCT 27.6* 31.2*  --   --   --   --    MCV 94 90  --   --   --   --    MCH 28.2 28.4  --   --   --   --    MCHC 30.1* 31.4*  --   --   --   --    RDW 17.0* 16.5*  --   --   --   --     264  --   --   --  309    < > = values in this interval not displayed.     INR  Recent Labs   Lab 04/26/19 0329 04/25/19  1205   INR 1.28* 1.30*

## 2019-04-27 NOTE — PLAN OF CARE
NEURO:  Unchanged;  baseline BLE numbness/tingling;generalized weakness; pain well controlled with PO Oxycodone; PCA Dilaudid discontinued and started on prn IV dilaudid. PERRLA.    CV: NSR with BBB but  tachycardic with activity/reports of pain; afebrile    PULM: Non-productive cough; clear lung sounds;  weaned to 3L NC to maintain SPO2>92%.    GI/: denies nausea; tolerating PO intake with fair appetite; No BM; abdomen is rounded with hypoactive BS.  Washington removed at 1700, pt DTV. On sliding scale insulin    SKIN: abdominal incision intact with liquid bandage and HARDIK; posterior infusion dressing CDI    LINES/DRAINS: Art-line removed; 1 PIV with TKO fluids; 2 Hemovacs  with no output; 1 CORINNE drain with bloody output.    PLAN: Transfer to med-surg floor once bed is available; continue to monitor and with POC, update team with changes/concerns.

## 2019-04-27 NOTE — PLAN OF CARE
"Status: s/p lumbar instrumental fusion; first stage completed 4/22, second stage completed 4/25  Vitals: /62 (BP Location: Left arm)   Pulse 112   Temp 95.9  F (35.5  C) (Axillary)   Resp 16   Ht 1.6 m (5' 3\")   Wt 78.4 kg (172 lb 13.5 oz)   SpO2 99%   BMI 30.62 kg/m    Neuros: Alert & oriented x4, BLE numbness and tingling   IV: PIV SL  Resp/trach: LS clear, SOB on exertion- sats dropped to low 80's when up to side of bed, pt ambulated to bathroom pt on 4L via NC   Diet: Diabetic diet, adequate intake   Bowel status: No BM since 4/21 per pt, sup ordered, pt wanting it later this evening, pt passing gas and tried to have a BM   : Pt voiding spontaneously without difficulty   Skin: Bruised. Back incision covered. Abd incision covered. Hemovac x2, JPx1 with an increase in output, MD aware   Pain: Pt complaining of pain and back, controlled with PRN oxy, Atarax and scheduled Tylenol   Activity: Ax1-2 w/GB and personal 4ww  Plan: Continue to monitor and assess, continue to encourage activity     "

## 2019-04-27 NOTE — PROGRESS NOTES
04/27/19 1000   Quick Adds   Type of Visit Initial Occupational Therapy Evaluation   Living Environment   Lives With spouse;child(day), adult   Living Arrangements apartment   Home Accessibility stairs to enter home   Number of Stairs, Main Entrance other (see comments)  (2 flights)   Stair Railings, Main Entrance railing on right side (ascending)   Transportation Anticipated family or friend will provide   Living Environment Comment pt living in [place] with his wife and adult son, now [place], reports living on the third floor of apartment. Pt's son is buying a house in [place] this summer, pt planning to stay there locally throughout summer.    Self-Care   Usual Activity Tolerance moderate   Current Activity Tolerance fair   Regular Exercise Yes   Activity/Exercise Type other (see comments)  (UE ther ex resistance)   Equipment Currently Used at Home shower chair;walker, rolling;cane, straight   Activity/Exercise/Self-Care Comment pt reports IND prior level, used 4WW, limited by SOB   Functional Level   Ambulation 1-->assistive equipment   Transferring 1-->assistive equipment   Toileting 0-->independent   Bathing 1-->assistive equipment   Dressing 0-->independent   Eating 0-->independent   Communication 0-->understands/communicates without difficulty   Swallowing 0-->swallows foods/liquids without difficulty   Cognition 0 - no cognition issues reported   Fall history within last six months no   Which of the above functional risks had a recent onset or change? ambulation;transferring;dressing   Prior Functional Level Comment IND with walker level       Present no   General Information   Onset of Illness/Injury or Date of Surgery - Date 04/22/19   Referring Physician Pratik Morocho   Patient/Family Goals Statement get better   Additional Occupational Profile Info/Pertinent History of Current Problem Beulah Jane is a 69 year old male with hx of HTN,  hypothyroidism, T2DM, ILD, dermatomyositis, aortic stenosis, and GERD,admitted on 4/22/2019. He is now status post stage I lumbar ALIF on 4/22 by Ed Dial and Raji with planned stage II operation on 4/25.    Precautions/Limitations spinal precautions;other (see comments)  (complex spinal precautions)   General Observations up with assist   Cognitive Status Examination   Orientation orientation to person, place and time   Level of Consciousness alert;lethargic/somnolent   Follows Commands (Cognition) WNL   Memory intact   Visual Perception   Visual Perception No deficits were identified   Sensory Examination   Sensory Quick Adds No deficits were identified   Pain Assessment   Patient Currently in Pain Yes, see Vital Sign flowsheet   Integumentary/Edema   Integumentary/Edema no deficits were identifed   Posture   Posture not impaired   Range of Motion (ROM)   ROM Quick Adds No deficits were identified   Strength   Manual Muscle Testing Quick Adds Other  (NT due to post op precautions)   Coordination   Upper Extremity Coordination No deficits were identified   Transfer Skill: Bed to Chair/Chair to Bed   Level of [place]: Bed to Chair contact guard   Transfer Skill: Sit to Stand   Level of [place]: Sit/Stand contact guard   Lower Body Dressing   Level of [place]: Dress Lower Body maximum assist (25% patients effort)   Grooming   Level of [place]: Grooming stand-by assist   Eating/Self Feeding   Level of [place]: Eating stand-by assist   Instrumental Activities of Daily Living (IADL)   Previous Responsibilities medication management;finances;driving   Activities of Daily Living Analysis   Impairments Contributing to Impaired Activities of Daily Living balance impaired;pain;post surgical precautions   General Therapy Interventions   Planned Therapy Interventions ADL retraining;IADL retraining;transfer training;progressive activity/exercise   Clinical Impression   Criteria for Skilled  "Therapeutic Interventions Met yes, treatment indicated   OT Diagnosis ADL/IADL deficits   Influenced by the following impairments post op pain and precuations   Assessment of Occupational Performance 3-5 Performance Deficits   Identified Performance Deficits dressing, toileting, mobility, IADLS   Clinical Decision Making (Complexity) Low complexity   Therapy Frequency other (see comments)  (6x/week)   Predicted Duration of Therapy Intervention (days/wks) 1 week   Anticipated Equipment Needs at Discharge   (TBD)   Anticipated Discharge Disposition Transitional Care Facility   Risks and Benefits of Treatment have been explained. Yes   Patient, Family & other staff in agreement with plan of care Yes   [facility] TM \"6 Clicks\"   2016, Trustees of [facility], under license to [employer].  All rights reserved.   6 Clicks Short Forms Daily Activity Inpatient Short Form   [facility]  \"6 Clicks\" Daily Activity Inpatient Short Form   1. Putting on and taking off regular lower body clothing? 2 - A Lot   2. Bathing (including washing, rinsing, drying)? 2 - A Lot   3. Toileting, which includes using toilet, bedpan or urinal? 2 - A Lot   4. Putting on and taking off regular upper body clothing? 3 - A Little   5. Taking care of personal grooming such as brushing teeth? 4 - None   6. Eating meals? 4 - None   Daily Activity Raw Score (Score out of 24.Lower scores equate to lower levels of function) 17   Total Evaluation Time   Total Evaluation Time (Minutes) 10     "

## 2019-04-27 NOTE — PLAN OF CARE
"Status: s/p lumbar instrumental fusion; first stage completed 4/22, second stage completed 4/25  Vitals: /51 (BP Location: Right arm)   Pulse 112   Temp 97.9  F (36.6  C) (Oral)   Resp 16   Ht 1.6 m (5' 3\")   Wt 78.4 kg (172 lb 13.5 oz)   SpO2 100%   BMI 30.62 kg/m   tachy.   Neuros: Intact; BLE neuropathy baseline  IV: PIV SL  Resp/trach: LS clear, 2L NC overnight; SOB on exertion- sats dropped to low 80's when up to the bathroom with reports of dizziness  Diet: Diabetic diet  Bowel status: No BM since 4/21 per pt and report  : Washington removed last evening-voided once (600ml) since removal  Skin: Bruised. Back incision covered. Abd incision covered. Hemovac x2 with no output, JPx1 with minimal output  Pain: Prn Oxy and scheduled tylenol. Only has pain when he is up and moving.   Activity: Ax1-2 w/GB and personal 4wwalker  Plan: Continue to monitor and assess.   "

## 2019-04-27 NOTE — PROGRESS NOTES
Shift [id number]:     Pt transferred to 6A from . Pt alert and oriented for this nurse. Pt has baseline numbness and tingling to BLE. Pt able to make needs known. Vitals WNL other than pt tachy. Pt on supplemental oxygen. PO PRN pain meds effective for pt. Pt due to void and is aware. Pt wanting to drink more water to do so. Per pt no urge to void yet. Pt has not been up out of bed on this flood. Hemovacs no output and 30 ml output for CORINNE drain. Call light within reach. Will continue to monitor.

## 2019-04-28 NOTE — PHARMACY-VANCOMYCIN DOSING SERVICE
Pharmacy Vancomycin Initial Note  Date of Service 2019  Patient's  1950  69 year old, male    Indication: Bone and Joint Infection    Current estimated CrCl = Estimated Creatinine Clearance: 80.7 mL/min (based on SCr of 0.8 mg/dL).    Creatinine for last 3 days  2019:  3:29 AM Creatinine 0.73 mg/dL  2019:  8:36 AM Creatinine 0.79 mg/dL;  9:13 PM Creatinine 0.83 mg/dL  2019:  2:13 AM Creatinine 0.80 mg/dL    Recent Vancomycin Level(s) for last 3 days  No results found for requested labs within last 72 hours.      Vancomycin IV Administrations (past 72 hours)      No vancomycin orders with administrations in past 72 hours.                Nephrotoxins and other renal medications (From now, onward)    Start     Dose/Rate Route Frequency Ordered Stop    19 2000  vancomycin 1250 mg in 0.9% NaCl 250 mL intermittent infusion 1,250 mg      1,250 mg  over 90 Minutes Intravenous EVERY 12 HOURS 19 0758      19 0800  vancomycin 1500 mg in 0.9% NaCl 250 ml intermittent infusion 1,500 mg      1,500 mg  over 90 Minutes Intravenous ONCE 19 0758            Contrast Orders - past 72 hours (72h ago, onward)    Start     Dose/Rate Route Frequency Ordered Stop    19 0030  iopamidol (ISOVUE-370) solution 61 mL      61 mL Intravenous ONCE 19 0017 19 0018                Plan:  1.  Give vancomycin 1500mg IV load x 1 followed by vancomycin 1250mg IV q12h.   2.  Goal Trough Level: 15-20 mg/L   3.  Pharmacy will check trough levels as appropriate in 1-3 Days.    4. Serum creatinine levels will be ordered daily for the first week of therapy and at least twice weekly for subsequent weeks.      Xuan Blackburn, PGY-1 Pharmacy Resident

## 2019-04-28 NOTE — PROGRESS NOTES
"Orthopaedic Surgery Progress Note   April 28, 2019    Subjective: Patient with episode of chest pressure and shortness of breath when ambulating yesterday. MEdicine assessed patient and workup revealed EKG changes and small subsegmental PEs. Started on heparin drip overnight. This morning patient denies any chest pain or pressure. He reports pain controlled in his back. Denies numbness or tingling more than baseline. AMbulated yesterday. Voiding without difficulty, no BM yet.     Objective: /64 (BP Location: Left arm)   Pulse 84   Temp 99.2  F (37.3  C) (Oral)   Resp 16   Ht 1.6 m (5' 3\")   Wt 78.4 kg (172 lb 13.5 oz)   SpO2 100%   BMI 30.62 kg/m      General: NAD, alert and oriented, cooperative with exam.   Cardio: RRR, extremities wwp.   Respiratory: Non-labored breathing.  MSK: Dressing c/d/i, no strikethrough.   SILT L3-S1 bilaterally.                L2-3: Hip flexion L and R 5/5 strength                 L4:  Knee extension L and R 5/5 strength                L5:  Foot / EHL dorsiflexion L and R 5/5 strength                S1:  Plantarflexion  L and R 5/5 strength     Drain: hemovac 1- 22 ml in last 24 hours  hemovac 2- 0  Deep 122/20/70 last 3 shifts, serosanguinous output.         Labs:  Hemoglobin   Date Value Ref Range Status   04/28/2019 7.8 (L) 13.3 - 17.7 g/dL Final     Seroma 4/25: 1 culture with staph epidermidis and 1 culture with gram positive cocci.     Assessment and Plan: Beulah Jane is a 69 year old male with PMH including aortic stenosis, chronic pain, DM type 2, hyperlipidemia, JUAN now s/p Stage I L3-4, L4-5 ALIF  on 4/22/19 with Dr. Dial and Dr. Alegria.  Stage II T10-pelvis PIF, TLIF, SPO on 4/25/19 with Dr. Dial.     Chest pressure and shortness of breath on 4/27, CT chest showing subsegmental PEs.      Seroma cultured in surgery with 2 positive cultures staph epi and gram positive cocci.      Ortho Primary, medicine following for co-management  Activity: Up with " assist until independent. No excessive bending or twisting. No lifting >10 lbs x 6 weeks. No Faiza lift for transfers.   Weight bearing status: WBAT.  Pain management: Transition from IV to PO as tolerated. No NSAIDs   Antibiotics: Switch from IV ancef to IV vancomycin due to positive cultures, ID consult to direct further abx management.   Diet: Begin with clear fluids and progress diet as tolerated.   DVT prophylaxis: SCDs only. Heparin drip currently for PEs per medicine. Ok with anticoagulation to treat PEs per medicine direction.   Imaging: XR Upright  PTDC - ordered.  Labs: trend Hgb   Bracing/Splinting: None.  Dressings: Prineo with 4x4 and tegaderm.  Drains: 3,Document output per shift, will be discontinued at Orthopedic Surgery discretion. Will leave for at least 1 more day given heparin drip started overnight.   [place] catheter: Remove POD#1.   Physical Therapy/Occupational Therapy: Eval and treat.  Cultures: Seroma fluid and debris Pending, follow culture results closely.    Consults: Hospitalist, Pain.  Follow-up: Clinic with Dr. Dial in 6 weeks with repeat x-rays.   Disposition: Pending progress with therapies, pain control on orals, and medical stability, anticipate discharge to TCU or home in 2-3 days.      Discussed with Dr. Jyaro Zamarripa MD  Orthopaedic Surgery Resident, PGY-4  Pager: [phone]

## 2019-04-28 NOTE — PROGRESS NOTES
" Brief Medicine Note  Patient is a 70 yo male with a hx of aortic stenosis, ILD, HTN, T2DM without hx of previous MI. He is s/p stage I lumbar fusion, 4/22, and stage II, on 4/25. ECHO from 2017 reviewed with moderate valvular aortic stenosis with EF of 65-70%.    Contacted by nursing ~2045 regarding patient having an episode of chest pressure that started at 2020. RN noted that patient had his nasal cannula off (was supposed to be on 2L of oxygen) and was saturating at 80% with HR in 140s. POx improved to 99% on 4L of O2.     On assessment of the patient, vitals were /64, , RR 19, and T 99.3F. He denied having any current chest pressure. States he had chest pressure ~20minutes. He denied any chest pain, pleuritic chest pain, cough, shortness of breath or hemoptysis. He reported mild lightheadedness, but without vertigo, headache, nausea, vomiting or diaphoresis.      /67   Pulse 114   Temp 99.4  F (37.4  C) (Oral)   Resp 22   Ht 1.6 m (5' 3\")   Wt 78.4 kg (172 lb 13.5 oz)   SpO2 98%   BMI 30.62 kg/m    GENERAL: Alert and oriented x 3. Appears comfortable. Answering questions appropriately.   HEENT: Anicteric sclera. Mucous membranes moist.   CV: Tachycardic. Grade 3/6 systolic murmur in RUSB. S1, S2.   RESPIRATORY: Effort normal on 3L of O2 via NC. Lungs CTAB with no wheezing, rales, rhonchi. There is ecchymosis to neck.   GI: Abdomen soft and non distended, bowel sounds present. Large midline surgical scar wound intact. Mild overlying tenderness, w/o rebound, or guarding.   MUSCULOSKELETAL: No joint swelling or tenderness.   NEUROLOGICAL: No focal deficits. Moves all extremities.   EXTREMITIES: No peripheral edema. Intact bilateral pedal pulses.   SKIN: No jaundice. No rashes.     A:P:   Chest pressure  S/p stage I lumbar fusion, 4/22, and stage II, on 4/25.   Elevated troponin  Abnormal ECG with T wave inversion in inferior leads:  ECG was performed with sinus tachycardia, T wave " inversions in lead II, III and AVF with rightward axis with nonspecific intraventricular block. On review from ECG from 4/2/19, T wave inversions appear more prominent. Troponin elevated at 0.132. CBC with WBC 13, Hgb 8.7, K 4, Mg 2.3. CXR obtained. Discussed with Cardiology. Cardiology reviewed ECG and previous ECGs and discussed there has been a hx of T wave inversions on previous ECG in past. Discussed elevated troponin likely secondary to demand ischemia due to recent sugery vs hx ILD being off oxygen for unknown time. Did not recommend acute intervention at this time or heparin. Agreed with plan for ASA 324mg chewed once. Given tachycardia, hypoxia, recent orthopedic hx, will obtain CTA chest to evaluate for acute PE at this time.     Addendum: at ~2300; patient with another episode of chest pressure and SOB while up walking to the bathroom. Repeat ECG obtained with T wave inversions in III and AVF and mildly in II.    -Plan for CTA chest   -Repeat ECG/Trop at 0300   -ECG prn for chest pain   -Transfer to 6B/6C for telemetry    Case was discussed with Dr. Kilpatrick who agrees with plan of care and will follow-up on pending results.     Angela Spence PA-C  Hospitalist Service  Pager [phone]

## 2019-04-28 NOTE — PLAN OF CARE
PT 6B: Cx, pt at this time is N/A for PT per RN due to tachycardia, HR to 140s after walk to bathroom per RN. RN asked for pt to have time to rest, indicated pt was fatigued.

## 2019-04-28 NOTE — PROGRESS NOTES
Angela Spence 9 team paged and RRT called. Pt reports chest pressure, SOB. HR 150s. Team responded within 15min. Continue to monitor.

## 2019-04-28 NOTE — PROGRESS NOTES
Brief Medicine Note    Contacted by Radiology ~0100 that patient has a few scattered subsegmental pulmonary embolisms in RUL. No central or saddle PE. Discussed with Dr. Cleaning. Patient is s/p stage I lumbar fusion, 4/22, and stage II, on 4/25, and has two trains in place draining red serosanguinous fluid. Given recent surgery, concern for anticoagulation at this time. Paged Orthopedics regarding recommendations. Will continue to monitor. ECG and Trop scheduled for 0300.     Addendum: Orthopedics returned page. Discussed case including recent procedure, subsegmental PE in the RUL and risk/benefit ratio to starting on heparin gtt. Orthopedics team reviewed case as well as drain output and discussed drain output is not significant. Orthopedics gave the OK to start heparin gtt given the acute pulmonary embolism. Will monitor closely for signs or symptoms of bleeding and monitor drain output. Orthopedics will discuss with Staff and round in early AM. Moonlighter will continue to monitor closely for any acute decompensation. If any signs or symptoms of HD compromise, will stop heparin gtt stat. Will start high intensity heparin gtt but with NOT do loading dose given hx of recent surgery. Case was d/w Dr. Cleaning, Internal Medicine.     Angela Spence PA-C  Hospitalist Service  Pager [phone]

## 2019-04-28 NOTE — PROVIDER NOTIFICATION
04/27/19 2000   Call Information   Date of Call 04/27/19   Time of Call 2031   Name of person requesting the team Juan   Title of person requesting team RN   RRT Arrival time 2034   Time RRT ended 0015   Reason for call   Type of RRT Adult   Primary reason for call Cardiovascular;Respiratory   Cardiovascular EKG changes   Respiratory O2sat less than 88% for greater than 5 minutes despite O2;Labored breathing   Was patient transferred from the ED, ICU, or PACU within last 24 hours prior to RRT call? Yes   SBAR   Situation Pt having SOB, saturations in 80's, increase O2 needs having chest pressure with HR in 140's.   Background admitted for spinal surgery   Notable History/Conditions Cardiac;Diabetes;Hypertension;Recent surgery  (Pulmonary Fibrosis, Polymyositis, OR on 4/22 & 4/25)   Assessment Chest pressure is absent. Patient appears comfortable except when he has to move.  Trops are elevated slightly.  B/P stable.  HR = 120's-110's.     Interventions Blood glucose;ECG;Labs;Meds;O2 per N/C or mask;Portable monitor   Patient Outcome   Patient Outcome Transferred to  (unit 6B)   RRT Team   Physician(s) Angela Spence PA-C   Lead RN Leann Fernando RN & Katie Robertson RN   RN Juan Torre RN    RT Joseluis Fierro RT   Post RRT Intervention Assessment   Post RRT Assessment Other (see comment)  (VSS; denies Chest Pressure. Trops increasing. )   Date Follow Up Done 04/28/19   Time Follow Up Done 0340   Comments Trop = 0.381; heparin started r/t PE diagnosis.

## 2019-04-28 NOTE — PROGRESS NOTES
[facility], [facility] Progress Note - Hospitalist Service, Gold 9       Date of Admission:  4/22/2019  Assessment & Plan   Beulah Jane is a 69 year old male with hx of HTN, hypothyroidism, T2DM, ILD, dermatomyositis, aortic stenosis, and GERD,admitted on 4/22/2019. He is now status post stage I lumbar ALIF on 4/22 by Ed Dial and Raji with planned stage II operation on 4/25.     Plan for today:  - D/w Ortho and ok to start warfarin today; continue with bridging heparin gtt as ordered until INR therapeutic between 2-3 for at least 24 hrs.   - D/w Cards and will obtain echocardiogram and if any WMA's or decrease in EF from prior, place formal consult.   - Await formal rec's from ID related to +intra-op cultures. Continue IV vanc in interim.  - Continue prednisone 5 mg BID for the next several days until pt up oob working with therapies on a regular basis, then can switch to daily.   - Continue supplemental O2 via NC to keep sats >92% and wean as able.   - Continue to hold PTA insulin isophane in favor of MSSI      S/p stage I lumbar fusion, 4/22, and stage II, 4/25:  By Ed Dial and Raji. Tolerated second procedure well. Currently states incisional pain tolerable on current pain med regimen as recommended by pain service. Abd incisions appear to be healing well without discharge or dehiscence.   - Ortho continuing to follow.   - Due to intra-op culture + staph epi, Ortho consulted ID  - Continue IV vanc in interim.     Acute PE, chest pain:  Pt c/o chest pressure with tachycardia and worsening hypoxia last night. Stat CT chest obtained that revealed small subsegmental PE in upper lobes but no central PE. Subsequently discussed with Ortho, whom ok'ed start of AC, so high intensity hepatin gtt without loading dose started. D/w Ortho this am, and ok to start warfarin. Also discussed with Cards as pt has mild elevation of troponin to 0.381, with repeat trop this am now  on downward trend, and deemed most likely due to demand ischemia. They recommended routine echo and to formally consult them if it showed any wall motion abnormalities or a decrease in EF from prior. Also no further need for repeat trop levels. Pt currently denies CP and SOB and back on 2L O2, of which he has been persistently on since last surgery on 4/25.  - Obtain TTE and consult cardiology if it reveals any WMAs and/or decrease in EF from prior  - Consult pharmacy to start and dose daily warfarin. Goal INR between 2-3.  - Continue bridging heparin gtt until INR therapeutic for at least 24 hrs.   - Monitor for bleeding or increased drain output.      Dermatomyositis (Shannon-1 deepak +), ILD:  Followed OP by Dr. Call at  Rheumatology clinic in [place]. Chronically on methotrexate 25 mg once weekly, last taken however, two weeks ago at the advice of Dr. Call. Also recently nearly completed protracted taper of prednisone, started on 2/7 at 20 mg x 2 wks, then decrease by 5 mg q2wks, so pt was on 5 mg daily day of surgery with a few days left of taper. Most recent PFTs with reduced DLCO so received rituximab infusion on 3/11. Not normally on O2 PTA, but currently on 2L most likely the need is multifactorial and due to surgeries, ILD, deconditioned state, anemia, opioid use, and last nights small subseqmental PEs.    - Continue oral prednisone but in acute post-op setting, continue 5 mg BID for the next several days until pt up oob working with therapies on a regular basis, then can switch to daily. Would hold on discontinuing until pt improves to the point of not needed supplemental O2 and/or until follow up after discharge with Rheumatology.   - Continue with PTA theophylline, scheduled Arnuity Ellipta, and prn albuterol inhaler/nebs  - Continue supplemental O2 via NC to keep O2 sats >92%. Wean as able.   - Encourage IS q2hrs  - Would hold off on restarting methotrexate for at least 1 week after stage II surgery, longer  if concern for lack of healing from surgery perspective.      T2DM:  Most recent HgbA1C from 1/29/19, 6.3%. PTA on PRN insulin isophane 6U BID, on hold for now.   Recent Labs   Lab 04/28/19  0749 04/28/19  0213 04/27/19  2113 04/27/19  2107 04/27/19  1809 04/27/19  0836 04/27/19  0816 04/26/19  2229 04/26/19  1701  04/26/19  0329  04/25/19  1309 04/25/19  1217   GLC  --  139* 173*  --   --  137*  --   --   --   --  212*  --  132* 160*   *  --   --  167* 126*  --  149* 160* 142*   < >  --    < >  --   --     < > = values in this interval not displayed.      - Continue MSSI  - Continue to hold PTA insulin isophane  - Accuchecks TID before meals and at bedtime  - Hypoglycemic protocol.      Acute blood loss anemia: Pre-op Hgb on 4/2, 15.9. EBL from stage I 400 ml. EBL from stage  ml with 350 ml pRBCs returned via cell saver 350 ml.. Most recent Hgb stable with no e/o active bleeding on exam.   - Repeat CBC tomorrow am.     Leukocytosis:  WBC morning after surgery 15.7 and most likely 2/2 stress leukemoid reaction from surgeries combined with current prednisone use as pt remains afebrile, surgical incisions appear to be healing well, and pt denies s/s of an underlying infectious process. WBC this with slight improvement to 13.2.   - Repeat CBC tomorrow am.      Chronic aortic stenosis: Most recent echo 12/20/18 with mild LVH, normal LV size and systolic function, normal RV size and function, est EF 60%, and with moderate aortic stenosis with slight progress compared to prior study. Followed by OP Cardiology and  [facility] with plan for likely AVR in the next year or two.   - Follow up with OP Cardiology with repeat echo as scheduled June 2019.      HTN:  PTA on verapamil. Currently BPs normotensive.   - Continue PTA verapamil  daily with parameters to hold.     Acute constipation:  Most likely opioid induced. LBM was 4/21, so given a one time Dulcolax supp yesterday with large BM this am.   - Continue  Miralax to BID  - Continue Senokot-S, two tabs BID.      Hypothyroidism:  Euthyroid based on normal TSH on 1/10/19. Continue PTA levothyroxine 50 mcg daily.      Diet: Snacks/Supplements Adult: Boost Glucose Control; Between Meals  Snacks/Supplements Adult: Gelatein sugar-free; Between Meals  Moderate Consistent CHO Diet  Room Service    DVT Prophylaxis: Pneumatic Compression Devices  [place] Catheter: not present  Code Status: Full    Disposition Plan   Expected discharge: 4 - 7 days, recommended to transitional care unit once adequate pain management/ tolerating PO medications and safe disposition plan/ TCU bed available.  Entered: Joseluis Smith PA-C 04/28/2019, 10:00 AM       The patient's care was discussed with the Attending Physician, Dr. Herring, Bedside Nurse and Patient.    Joseluis Smith PA-C  Hospitalist Service, [address], [place]  Pager: [phone]  Please see sticky note for cross cover information  ______________________________________________________________________    Interval History   Developed acute onset chest pressure along with new onset tachycardia and worsening hypoxia. CT chest revealed subsegmental PE so started on heparin gtt after getting ok from Ortho. Pt currently denies chest pain, SOB at rest, and other acute physical concerns at this time.     Data reviewed today: I reviewed all medications, new labs and imaging results over the last 24 hours.    Physical Exam   Vital Signs: Temp: 97.7  F (36.5  C) Temp src: Oral BP: 97/57 Pulse: 84 Heart Rate: 80 Resp: 16 SpO2: 99 % O2 Device: Nasal cannula Oxygen Delivery: 2 LPM  Weight: 172 lbs 13.45 oz  GEN: Pleasant gentleman in NAD  HEENT: NCAT; PERRL; sclerae non-icteric; MMM  LUNGS: CTAB  CV: RRR  ABD: +BSs; SNTND; abd incision has healed well  EXT: No BLE edema  BACK: Thoracic and lumbar incisions covered with c/d/i dressings.   SKIN: No acute rashes noted on exposed areas.  NEURO: AAOx3; CNs  grossly intact; No acute focal deficits noted.      Data   CMP  Recent Labs   Lab 04/28/19 0213 04/27/19 2113 04/27/19 0836 04/26/19  0329    136 140 138   POTASSIUM 4.1 4.0 4.4 4.5   CHLORIDE 103 102 103 104   CO2 28 29 29 28   ANIONGAP 8 5 8 7   * 173* 137* 212*   BUN 20 16 13 15   CR 0.80 0.83 0.79 0.73   GFRESTIMATED >90 90 >90 >90   GFRESTBLACK >90 >90 >90 >90   DMITRIY 8.1* 8.2* 8.1* 7.9*   MAG  --  2.0  --   --    PROTTOTAL  --  5.6*  --   --    ALBUMIN  --  2.3*  --   --    BILITOTAL  --  0.3  --   --    ALKPHOS  --  95  --   --    AST  --  90*  --   --    ALT  --  56  --   --      CBC  Recent Labs   Lab 04/28/19 0213 04/27/19 2113 04/27/19 0836 04/26/19 0329   WBC 13.2* 13.0* 14.2* 15.7*   RBC 2.70* 3.06* 2.94* 3.45*   HGB 7.8* 8.7* 8.3* 9.8*   HCT 25.6* 29.4* 27.6* 31.2*   MCV 95 96 94 90   MCH 28.9 28.4 28.2 28.4   MCHC 30.5* 29.6* 30.1* 31.4*   RDW 17.1* 17.1* 17.0* 16.5*    302 283 264     INR  Recent Labs   Lab 04/26/19 0329 04/25/19  1205   INR 1.28* 1.30*

## 2019-04-28 NOTE — PLAN OF CARE
Incorrect previous note:    S/B: Pt slept between cares after oxycodone and atarax for pain with relief. Sba x 1-2 lines and drains. Sat up on commode for 10 min with flatus but no bm for 1 week, suppository given. No episodes of chest pain or pressure since coming to 6B, no shortness of breath and no elevated HR here on 6B. Heparin drip started.     A: A&Ox4. VSS. SR. Afebrile. Incisional pain oxycodone and atarax well tolerated. Tylenol scheduled. Electrolytes WNL. Abodomen distended with incision derma bonded and back incision covered. 2 hemovac drains with no output overnight and a gill drain with moderate output. Good urine output.     No intake/output data recorded.    Temp:  [95.9  F (35.5  C)-99.4  F (37.4  C)] 99.2  F (37.3  C)  Pulse:  [] 84  Heart Rate:  [] 111  Resp:  [14-22] 20  BP: (110-176)/(43-76) 113/64  SpO2:  [88 %-100 %] 100 %     R: Recheck hep 10A @9am, continue with POC. Notify primary team with changes.

## 2019-04-28 NOTE — PROGRESS NOTES
Responded to Rapid Response call. RRT was called due to increased shortness of breath and increased heart rate. Patient was on LFNC @ 4 LPM, RR 18/min, SpO2 100%. Respiratory interventions not required at this time. A 12-lead EKG was ordered. Patient outcome/transfer pending.    Joseluis Fierro, RT  4/27/2019 8:45 PM

## 2019-04-28 NOTE — CONSULTS
[facility] SERVICE CONSULTATION     Patient:  Beulah Jane   Date of birth 1950, Medical record number [id number]  Date of Visit:  04/28/2019  Date of Admission: 4/22/2019  Consult Requester:Carlos Enrique Dial*            Assessment and Recommendations:   PROBLEM LIST:  1. S/p stage I lumbar fusion, 4/22, and stage II, 4/25  2. Seroma fluid culture growing (normal skin marlee and staph epi)  3. Dermatomyositis (Shannon-1 deepak +), ILD - on weekly methotrexate, currently on prednisone taper, rituximab infusion on 3/11  4. Acute PE (during this admission)   5. DM     RECOMMENDATION:  1. Agree with discontinuing Cefazolin   2. Continue Vancomycin (awaiting sensitivities for the CoNS)  3. Abx regimen and duration of therapy to be determined.     ASSESSMENT: Mr. Alexander is a 70 y/o man s/p stage I lumbar fusion, 4/22, and stage II, 4/25. He had back hernia surgery in 2006 in [facility]. His repair was performed with mesh and it was fine for a few years but then became infected in 2009 (unclear about the details here). He had 2 surgeries to remove the mesh and remove the abscessed/infected tissue. After his 2nd surgery he developed a seroma.This was treated with multiple aspirations. He had a drain placed an attempt was made at sclerotherapy. Despite extensive local therapy the seroma immediately recurred. On 4/25, during the second stage of the surgery, the seroma was debrided and 300 ml of fluid was removed and sent for culture. One of the samples is growing normal skin marlee and one sample is growing CoNS. Per operative report -the seroma did not appear to communicate with the deep wound. I wonder if the normal skin marlee and Staph epi are playing a part in the re-accumulation of the seroma fluid. Given that he had two surgical procedures and placement of hardware - I would err on the side of treating the CoNS.     Eda Dalton, DO  Infectious  "Diseases  [phone]  ________________________________________________________________    Consult Question:.  Admission Diagnosis: S/P spinal surgery [Z98.890]  Status post lumbar spine surgery for decompression of spinal cord [Z98.890]         History of Present Illness:     Beulah Jane is a 69 year old male with hx of HTN, hypothyroidism, T2DM, ILD, dermatomyositis, aortic stenosis, and GERD,admitted on 4/22/2019. He is now status post Stage I L3-4, L4-5 ALIF  on 4/22/19 and Stage II T10-pelvis PIF, TLIF, SPO on 4/25/19.     4/22: Spinal Stenosis Of Lumbar Region With Neurogenic Claudication  1. Anterior lumbar interbody fusion via anterior retroperitoneal approach to L3-4 and L4-5.  2. Placement intervertebral prosthetic device, L3-4, and L4-5, with Nuvasive BASE cage.  3. Use of morselized allograft and Large BMP Kit (1/2 the kit at each level) for fusion, L3-4 and L4-5.    4/25: The patient has had 4 or 5 other lumbar procedures with dense adherent scar. He had a previous infection and has a known seroma extending from L1-S1.  1. Hung Borges Osteotomies T11-12, L1-2, L2-3, L3-4  2. Transforaminal lumbar interbody fusion at T11-12, L1-2, L2-3, anterior and posterior fusion through a single approach.  3. T11-12, L1-2, L2-3 Capstone PEEK Cage.  4. T10 through S1 Posterior Spinal Fusion.  5. T10 through Pelvis Posterior Spinal Instrumentation, Medtronic Solera.  6. Ralston and use of Local Autograft, Allograft, and BMP for fusion.  7. Use of O-Arm navigational guidance.  8. Debridement of Seroma Pocket measuring 10 x 10 x 6cm.  9. Paraspinal Muscle Flap Creation and Complex Wound Closure.  \"The seroma spread across the midline about 2 cm and then extended about 10 cm to the right towards iliac crest and then up and down caudad and cephalad also about 10 cm from roughly L1-S1.  This had a pseudocapsule around it.  I incised the dorsal pseudocapsule immediately encountered serous appearing fluid with a " "lot of fat necrosis.  There was about 300 cc of this.  I evacuated it.  I then debrided the edges of the pseudocapsule.  There is a lot of necrotic fat in this area.  This was sent for culture as well as final pathology.  Once I had thoroughly debrided this wound capsule I then irrigated about 3 L through the wound.  There is nothing obviously infected looking.\"     Per OSH Records:   Patient's sx started after a back hernia surgery in 2006 in [facility]. His repair was performed with mesh and it was fine for a few years but then became infected in 2009. He had 2 surgeries to remove the mesh and remove the abscessed/infected tissue.   After his 2nd surgery he developed a seroma.  This was treated with multiple aspirations. He had a drain placed an attempt was made at sclerotherapy. Despite extensive local therapy the seroma immediately recurred.         Review of Systems:   CONSTITUTIONAL:  No fevers or chills  EYES: negative for icterus  ENT:  negative for hearing loss, tinnitus and sore throat  RESPIRATORY:  negative for cough with sputum and dyspnea  CARDIOVASCULAR:  negative for chest pain, dyspnea  GASTROINTESTINAL:  negative for nausea, vomiting, diarrhea and constipation  GENITOURINARY:  negative for dysuria  HEME:  No easy bruising  INTEGUMENT:  negative for rash and pruritus  NEURO:  Negative for headache         Past Medical History:     Past Medical History:   Diagnosis Date     Aortic stenosis      Chronic pain      DM (diabetes mellitus), type 2 (H)     on insulin     Hyperlipidemia      JUAN on CPAP      Pneumonia      Polymyositis (H)      Pulmonary fibrosis, unspecified (H)      Seasonal allergies             Past Surgical History:     Past Surgical History:   Procedure Laterality Date     ARTHROSCOPY KNEE  1970     COLONOSCOPY       DECOMPRESSION, FUSION LUMBAR POSTERIOR TWO LEVELS, COMBINED  1997     FUSION LUMBAR ANTERIOR THREE+ LEVELS N/A 4/22/2019    Procedure: Lumbar 1 Or Lumbar 2-5 Anterior " Lumbar Interbody Fusion with BMP Stage 1 Of Two Procedure:;  Surgeon: Carlos Enrique Dial MD;  Location: [facility] OR     HERNIA REPAIR       lumbar spine hardware removal       OPTICAL TRACKING SYSTEM FUSION POSTERIOR SPINE THORACIC THREE+ LEVELS N/A 2019    Procedure: O-Arm/Stealth Assisted Thoracic 10-Pelvis Instrumented Fusion T11-2, L1-2, L2-3 Transforminal Lumbar Interbody Fusion (TLIF) And Smith Borges Osteotomies,and L3-4 SPO as well, Use Of BMP, Debridement Of Seroma;  Surgeon: Carlos Enrique Dial MD;  Location: [facility] OR     REMOVAL PROSTHETIC MATERIAL/MESH, ABD WALL NECRO TISS INFEXN       ROTATOR CUFF REPAIR RT/LT              Family History:   History reviewed. No pertinent family history.         Social History:     Social History     Tobacco Use     Smoking status: Former Smoker     Packs/day: 0.25     Last attempt to quit: 1970     Years since quittin.8     Smokeless tobacco: Never Used   Substance Use Topics     Alcohol use: Not on file     Comment: few times monthly     History   Sexual Activity     Sexual activity: Not on file            Current Medications (antimicrobials listed in bold):       acetaminophen  975 mg Oral Q8H     famotidine  20 mg Intravenous Q12H     fluticasone  1 puff Inhalation Daily     gabapentin  600 mg Oral TID     insulin aspart  1-7 Units Subcutaneous TID AC     insulin aspart  1-5 Units Subcutaneous At Bedtime     levothyroxine  50 mcg Oral QAM AC     lidocaine  1-3 patch Transdermal Q24h    And     lidocaine   Transdermal Q24H    And     lidocaine   Transdermal Q8H     pantoprazole  40 mg Oral QAM AC     polyethylene glycol  17 g Oral Daily     predniSONE  5 mg Oral BID w/meals     senna-docusate  2 tablet Oral BID     sodium chloride (PF)  3 mL Intracatheter Q8H     sulfamethoxazole-trimethoprim  1 tablet Oral Q Mon Wed Fri AM     theophylline  400 mg Oral QAM     vancomycin (VANCOCIN) IV  1,500 mg Intravenous Once    Followed by      vancomycin (VANCOCIN) IV  1,250 mg Intravenous Q12H     verapamil ER  120 mg Oral At Bedtime            Allergies:   No Known Allergies         Physical Exam:   Vitals were reviewed  Ranges for his vital signs:  Temp:  [95.9  F (35.5  C)-99.4  F (37.4  C)] 97.7  F (36.5  C)  Pulse:  [] 84  Heart Rate:  [] 80  Resp:  [16-22] 16  BP: ()/(43-76) 97/57  SpO2:  [88 %-100 %] 99 %    Physical Examination:  GENERAL:  well-developed, well-nourished, in bed in no acute distress.   HEENT:  Head is normocephalic, atraumatic   EYES:  Eyes have anicteric sclerae without conjunctival injection or stigmata of endocarditis.    ENT:  Oropharynx is moist without exudates or ulcers. Tongue is midline  NECK:  Supple. No  Cervical lymphadenopathy  LUNGS:  Clear to auscultation bilateral.   CARDIOVASCULAR:  Regular rate and rhythm with no murmurs, gallops or rubs.  ABDOMEN:  Normal bowel sounds, soft, nontender. No appreciable hepatosplenomegaly  SKIN:  No acute rashes.  Line(s) are in place without any surrounding erythema or exudate. No stigmata of endocarditis.  NEUROLOGIC:  Grossly nonfocal. Active x4 extremities         Laboratory Data:     Inflammatory Markers    Recent Labs   Lab Test 06/26/18  1247   SED 10   CRP 4.7     Hematology Studies    Recent Labs   Lab Test 04/28/19  0213 04/27/19  2113 04/27/19  0836 04/26/19  0329 04/25/19  1309 04/25/19  1217  04/02/19  1215 06/26/18  1247 12/06/17  0605 12/05/17  1149 11/30/17  0618 11/29/17  0553   WBC 13.2* 13.0* 14.2* 15.7*  --   --   --  12.2* 9.3 10.4 14.9* 10.4 14.4*   ANEU  --   --   --  13.1*  --   --   --   --  7.9 8.5* 13.3* 8.5* 12.6*   AEOS  --   --   --  0.0  --   --   --   --  0.0 0.1 0.0 0.0 0.0   HGB 7.8* 8.7* 8.3* 9.8* 10.4* 11.3*   < > 15.9 14.4 13.6 14.8 10.4* 10.9*   MCV 95 96 94 90  --   --   --  92 96 94 93 96 95    302 283 264  --   --    < > 206 260 350 408 298 270    < > = values in this interval not displayed.     Metabolic Studies      Recent Labs   Lab Test 04/28/19  0213 04/27/19  2113 04/27/19  0836 04/26/19  0329 04/25/19  1309  04/25/19  0358    136 140 138 134   < > 139   POTASSIUM 4.1 4.0 4.4 4.5 4.1   < > 4.1   CHLORIDE 103 102 103 104  --   --  101   CO2 28 29 29 28  --   --  29   BUN 20 16 13 15  --   --  13   CR 0.80 0.83 0.79 0.73  --   --  0.80   GFRESTIMATED >90 90 >90 >90  --   --  >90    < > = values in this interval not displayed.     Hepatic Studies    Recent Labs   Lab Test 04/27/19 2113 01/29/19  1707 06/26/18  1247 11/30/17  0618 11/27/17  2140   BILITOTAL 0.3  --   --  0.3 0.5   ALKPHOS 95  --   --  70 96   ALBUMIN 2.3* 3.9 4.4 2.1* 2.9*   AST 90*  --   --  14 15   ALT 56  --   --  14 12     Microbiology:     4/25: Seroma Specimen 1: Normal Skin Leann   4/25: Seroma Specimen 2: Staph Epi

## 2019-04-28 NOTE — ANESTHESIA POSTPROCEDURE EVALUATION
Anesthesia POST Procedure Evaluation    Patient: Beulah Jane   MRN:     [id number] Gender:   male   Age:    69 year old :      1950        Preoperative Diagnosis: Spinal Stenosis Of Lumbar Region With Neurogenic Claudication, Flatback Syndrome Of Lumbar Region   Procedure(s):  O-Arm/Stealth Assisted Thoracic 10-Pelvis Instrumented Fusion T11-2, L1-2, L2-3 Transforminal Lumbar Interbody Fusion (TLIF) And Smith Borges Osteotomies,and L3-4 SPO as well, Use Of BMP, Debridement Of Seroma   Postop Comments: No value filed.       Anesthesia Type:  General  No value filed.    Reportable Event: NO     PAIN: Uncomplicated   Sign Out status: Comfortable, Well controlled pain     PONV: No PONV   Sign Out status:  No Nausea or Vomiting     Neuro/Psych: Uneventful perioperative course   Sign Out Status: Preoperative baseline; Age appropriate mentation     Airway/Resp.: Uneventful perioperative course   Sign Out Status: Non labored breathing, age appropriate RR; Resp. Status within EXPECTED Parameters     CV: Uneventful perioperative course   Sign Out status: Appropriate BP and perfusion indices; Appropriate HR/Rhythm     Disposition:   Sign Out in:  PACU  Disposition:  Floor  Recovery Course: Uneventful  Follow-Up: Not required           Last Anesthesia Record Vitals:  CRNA VITALS  2019 1355 - 2019 1455      2019             NIBP:  108/67    NIBP Mean:  79    Ht Rate:  93    SpO2:  100 %    EKG:  Sinus tachycardia          Last PACU Vitals:  Vitals Value Taken Time   /69 2019 12:45 PM   Temp 36.9  C (98.4  F) 2019 12:45 PM   Pulse 96 2019 12:41 PM   Resp 20 2019 12:45 PM   SpO2 99 % 2019  2:24 PM   Temp src     NIBP 108/67 2019  2:35 PM   Pulse     SpO2 100 % 2019  2:35 PM   Resp     Temp     Ht Rate 93 2019  2:35 PM   Temp 2     Vitals shown include unvalidated device data.      Electronically Signed By: Radha Luna MD, 2019, 2:25 PM

## 2019-04-28 NOTE — PLAN OF CARE
Admitted for thoracic spinal surgery/fusion. Back incision covered CDI. Drains intact. No BM since surgery . Suppository ordered. VSS ex tachycardic in 110-120 at rest. Patient continued to have increasing SOB and chest tightness with activity. HR in 130-140, Desaturations into low 90s on 3L-4L NC. EKG completed x2. CTA STAT ordered for PE evaluation. Report handed off to 6B RN. Patient transferred with float nursing.

## 2019-04-28 NOTE — PHARMACY-ANTICOAGULATION SERVICE
Clinical Pharmacy - Warfarin Dosing Consult     Pharmacy has been consulted to manage this patient s warfarin therapy.  Indication: DVT/ PE Treatment  Therapy Goal: INR 2-3  Warfarin Prior to Admission: No  Warfarin PTA Regimen: New start  Significant drug interactions: hep gtt, bactrim, prednisone    INR   Date Value Ref Range Status   04/28/2019 1.29 (H) 0.86 - 1.14 Final   04/26/2019 1.28 (H) 0.86 - 1.14 Final       Recommend warfarin 2.5 mg today given elevated INR at baseline.  Pharmacy will monitor Beulah Lawsono daily and order warfarin doses to achieve specified goal.      Please contact pharmacy as soon as possible if the warfarin needs to be held for a procedure or if the warfarin goals change.      Xuan Blackburn, PGY-1 Pharmacy Resident

## 2019-04-28 NOTE — PLAN OF CARE
"Blood pressure 117/64, pulse 103, temperature 98.5  F (36.9  C), temperature source Oral, resp. rate 20, height 1.6 m (5' 3\"), weight 78.4 kg (172 lb 13.5 oz), SpO2 98 %.  Neuro: A&Ox4.   Cardiac: ST with heart rate at 100-110's, increased HR of 130-140's with activity. Other  VSS.   Respiratory: Sating % on 2L nasal canula  GI/: Adequate urine output. BM X4, loose stools.  Diet/appetite: Tolerating  diabetic diet. Eating well.  Activity:  Assist of 1 up to chair and bathroom.  Pain: At acceptable level on current regimen. Tramadol and Oxycodone helpful.  Skin: No new deficits noted.  LDA's: PIV x2, Open drains x3.     Plan: Need orders for dressing changes. Continue with POC. Notify primary team with changes.    "

## 2019-04-28 NOTE — PLAN OF CARE
FOCUS: Chest Pressure    Around 2215, Patient walked to Bathroom with 2 assists.  He remained on 3 liters O2 during this activity.  Patient said that he felt fine walking to the Bath Room.  However, when he walked from the Bathroom to the bed, he expressed chest pressure.  Upon returning to the bed, his sats = 88% on 3 liter.  O2 was increased to 4 liters and he recovered with sats in the 90's within 1 minute.  Patient's Heart Rate increased to 140's and B/P  also increased to 170's/60's. PA was notified and 12 Lead EKG was obtained. Five minutes after Patient returned to Bed, he resumed to his baseline Heart rate in the 110's and Sats in the 90's on 3 liters.  Patient was transported to CT scan for a chest CT.  He was transferred to Unit 6B after CT scan.

## 2019-04-28 NOTE — PROGRESS NOTES
Admission          4/22/2019  5:27 AM  -----------------------------------------------------------  Reason for transfer from: 6A     Via: stretcher after back from CT scan.  Accompanied by:self only in bed  Belongings: Placed in closet  Admission Profile: complete  Teaching: orientation to unit and call light- call light within reach, call don't fall, use of console, meal times, when to call for the RN, and enforced importance of safety   Access: PIV x 2  Telemetry:Placed on pt  Ht./Wt.: complete  2 RN Skin Assessment Completed By: Sanaz RN and Katie RN   Pt has spinal incision covered with dressing no drainage noted with 2 hemovac drains coming off it to left side, Mikey drain also. Coccyx blanchable but darkened; pillows placed. Left arm swollen with two dressings in place and bruised. Bruised on mid chest also. Abdominal incision with dermabond in place.   Pt status: stable but having some back pain, oxycodone and atarax given. No chest pain currently. Held off on suppository till am due to episodes of Chest pain and sob when up moving.     Temp:  [95.9  F (35.5  C)-99.4  F (37.4  C)] 99.3  F (37.4  C)  Pulse:  [110-140] 111  Heart Rate:  [] 111  Resp:  [14-22] 22  BP: (110-176)/(60-76) 139/72  SpO2:  [88 %-100 %] 99 %

## 2019-04-28 NOTE — PROVIDER NOTIFICATION
Dr. Cleaning notified that troponin increased from 0.132 to 0.381, heparin gtt started. EKG done. No new orders at this time.

## 2019-04-29 NOTE — PLAN OF CARE
OT/6B - Discharge Planner OT   Patient plan for discharge: rehab  Current status: Educated on adaptive equipment for increased IND with LB dressing. Mod A required to don B socks. Returned supine in bed with min A. Pt requires frequent cues to adhere to spinal precautions during activity. SpO2 94% on 2L NC, HR 95 bpm.  Barriers to return to prior living situation: post surgical spinal precautions, activity tolerance, fatigue, strength  Recommendations for discharge: ARU  Rationale for recommendations: Pt would benefit from continued skilled OT to increase ADL/IADl IND prior to return home. Pt would likely tolerate 3 hours of therapy daily to meet multidisciplinary therapy goals.        Entered by: Claudia Green 04/29/2019 2:19 PM

## 2019-04-29 NOTE — PROVIDER NOTIFICATION
MD [phone] notified that pt incision from Mikey drain Is leaking under tegaderm; clear film dressing. Pt has not had dressing changed and needs changed. Md stated to contact ortho surgery. Also MD made aware that hemovac drain line came out of hemovac; still intact in pt. Air in hemovac bag, new bag placed and hemovac closed for suction. Note on board for orthopedics to call rn when in room. Need dressing change orders.

## 2019-04-29 NOTE — PROGRESS NOTES
"Orthopaedic Surgery Progress Note   April 29, 2019    Subjective:   Afebrile. Tachycardic to 110s. Vitals otherwise unremarkable. Hemovac drain line dislodged from hemovac overnight - replaced and holding suction. Pain controlled. Tolerating PO without nausea or vomiting. Voiding. BM x 3. OOB to chair and bathroom. No chest pain or shortness of breath. No new numbness or tingling.    Objective: /63   Pulse 103   Temp 98.3  F (36.8  C) (Oral)   Resp 18   Ht 1.6 m (5' 3\")   Wt 76.6 kg (168 lb 14.4 oz)   SpO2 100%   BMI 29.92 kg/m      General: awake and alert, no acute distress   Cardio: extremities wwp  Respiratory: non-labored breathing  MSK: Serosanguinous drainage to CORINNE drain site dressing, otherwise dressings clean/dry/intact. All dressings taken down and changed this AM. Incision clean/dry/intact.  SILT L3-S1 bilaterally.                L2-3: Hip flexion L and R 5/5 strength                 L4:  Knee extension L and R 5/5 strength                L5:  Foot / EHL dorsiflexion L and R 5/5 strength                S1:  Plantarflexion  L and R 5/5 strength     Hemovac 1: 0/12/0 over the past 3 nursing shifts  Hemovac 2: -/0/0 over the past 3 nursing shifts  Deep drain: 25/110/- over the past 3 nursing shifts    Labs:  Hemoglobin   Date Value Ref Range Status   04/28/2019 7.8 (L) 13.3 - 17.7 g/dL Final     Seroma cultures 4/25: seroma specimen 2 growing staph epidermidis    Assessment and Plan: Beulah Jane is a 69 year old male with PMH including aortic stenosis, chronic pain, DM type 2, hyperlipidemia, JUAN now s/p Stage I L3-4, L4-5 ALIF  on 4/22/19 with Dr. Dial and Dr. Alegria. Stage II T10-pelvis PIF, TLIF, SPO on 4/25/19 with Dr. Dial.     Chest pressure and shortness of breath on 4/27, CT chest showing subsegmental PEs.      Seroma cultured in surgery with seroma specimen 2 growing staph epidermidis.     Ortho Primary, medicine following for co-management  Activity: Up with assist until " independent. No excessive bending or twisting. No lifting >10 lbs x 6 weeks. No Faiza lift for transfers.   Weight bearing status: WBAT.  Pain management: Transition from IV to PO as tolerated. No NSAIDs   Antibiotics: IV vancomycin as per ID recs, ID following  Diet: Begin with clear fluids and progress diet as tolerated.   DVT prophylaxis: SCDs only. Heparin drip currently for PEs per medicine. Ok with anticoagulation to treat PEs per medicine direction.   Imaging: XR Upright  PTDC - ordered.  Labs: trend Hgb   Bracing/Splinting: None.  Dressings: Prineo with 4x4 and tegaderm. Dressings to be changed by Ortho at Orthopaedic Surgery discretion.  Drains: 3, Document output per shift, will be discontinued at Orthopedic Surgery discretion. Will leave for at least 1 more day given heparin drip started overnight.   [place] catheter: Remove POD#1.   Physical Therapy/Occupational Therapy: Eval and treat.  Cultures: Seroma fluid and debris pending, follow culture results closely.    Consults: Hospitalist, Pain.  Follow-up: Clinic with Dr. Dial in 6 weeks with repeat x-rays.   Disposition: Pending progress with therapies, pain control on orals, and medical stability, anticipate discharge to TCU or home in 2-3 days.      Discussed with Dr. Jayro Vides MD  Orthopaedic Surgery Resident, PGY-1  Pager: [phone]

## 2019-04-29 NOTE — PLAN OF CARE
Neuro: A&Ox4.   Cardiac: SR with BBB. VSS.   Respiratory: Sating 94% on 2L NC  GI/: Adequate urine output. No BM today, miralax given.  Diet/appetite: Tolerating Mod CHO diet. Eating well.  Activity:  Assist of 1 and walker, up to chair.  Pain: At acceptable level on current regimen. Pain is better managed with giving the muscle relaxants and adding tramadol.  Skin: No new deficits noted.  LDA's: Midline and PIV, Hemovac with no out put and CORINNE with serosanguinous output.    Plan: Continue with POC. Notify primary team with changes.

## 2019-04-29 NOTE — PLAN OF CARE
Neuro: A&Ox4. Up to chair at times, HR does go up to 140's with activity at times. Denies CP or nausea.  Cardiac: SR. VSS.   Respiratory: Sating 97% on 3L NC.,1L NC during the day.  GI/: Adequate urine output. No bm overnight.  Diet/appetite: Tolerating mod cho diet. Eating well.  Activity:  Assist of  1, up to chair and in halls.  Pain: At acceptable level on current regimen with oxycodone/hydroxyzine and tramadol.   Skin: Back incision and drains dressings were replaced this am per ortho md.   LDA's:No drainage per drains overnight.  2 hemovacs and 1 gill drain in back incision area.   Plan: Continue with POC. Notify primary team with changes.

## 2019-04-29 NOTE — PROGRESS NOTES
[facility], [facility] Progress Note - Hospitalist Service, Gold 9       Date of Admission:  4/22/2019  Assessment & Plan   Beulah Jane is a 69 year old male with hx of HTN, hypothyroidism, T2DM, ILD, dermatomyositis, aortic stenosis, and GERD,admitted on 4/22/2019. He is now status post stage I lumbar ALIF on 4/22 by Ed Dial and Raji with planned stage II operation on 4/25.      Plan for today:  - Continue warfarin and heparin until INR therapeutic  - Given TTE findings with severe aortic stenosis, moderate aortic regurgitation and possible reduction in EF, as well as symptoms of dyspnea and angina which I suspect may be related cardiology will be consulted today to review need/timing for possible AVR replacement/TAVR  - Continue vancomycin at this time for staph epi  - Continue prednisone 5 mg BID for the next several days until pt up oob working with therapies on a regular basis, then can switch to daily.   - Continue supplemental O2 via NC to keep sats >92% and wean as able.   - WOC consult for right neck wound     S/p stage I lumbar fusion, 4/22, and stage II, 4/25:  By Ed Dial and Raji. Tolerated second procedure well. Currently states incisional pain tolerable on current pain med regimen as recommended by pain service. Abd incisions appear to be healing well without discharge or dehiscence.   - Ortho continuing to follow.   - Due to intra-op culture + staph epi, Ortho consulted ID  - Continue IV vanc pending culture results        Chest pain, suggestive of angina  Dyspnea on exertion  Severe aortic stenosis, moderate aortic regurgitation, suspect above symptoms more related to this than PE or ILD  Type II NSTEMI  Subsegmental Pulmonary Emboli  - Cardiology consultation to review need/timing to address aortic valve  - Pharmacy to dose warfarin  - Continue bridging heparin gtt until INR therapeutic for at least 24 hrs.   - Monitor for bleeding or increased  drain output.      Dermatomyositis (Shannon-1 deepak +), ILD:  Followed OP by Dr. Call at  Rheumatology clinic in [place]. Chronically on methotrexate 25 mg once weekly, last taken however, two weeks ago at the advice of Dr. Call. Also recently nearly completed protracted taper of prednisone, started on 2/7 at 20 mg x 2 wks, then decrease by 5 mg q2wks, so pt was on 5 mg daily day of surgery with a few days left of taper. Most recent PFTs with reduced DLCO so received rituximab infusion on 3/11. Not normally on O2 PTA, but currently on 2L most likely the need is multifactorial and due to surgeries, ILD, deconditioned state, anemia, opioid use, and last nights small subseqmental PEs.    - Continue oral prednisone but in acute post-op setting, continue 5 mg BID for the next several days until pt up oob working with therapies on a regular basis, then can switch to daily. Would hold on discontinuing until pt improves to the point of not needed supplemental O2 and/or until follow up after discharge with Rheumatology.   - Continue with PTA theophylline, scheduled Arnuity Ellipta, and prn albuterol inhaler/nebs  - Continue supplemental O2 via NC to keep O2 sats >92%. Wean as able.   - Encourage IS q2hrs  - Would hold off on restarting methotrexate for at least 1 week after stage II surgery, longer if concern for lack of healing from surgery perspective.      T2DM:  Most recent HgbA1C from 1/29/19, 6.3%. PTA on PRN insulin isophane 6U BID, on hold for now. On sliding scale. Hypoglycemia protocol.    Acute blood loss anemia: Pre-op Hgb on 4/2, 15.9. EBL from stage I 400 ml. EBL from stage  ml with 350 ml pRBCs returned via cell saver 350 ml.. Most recent Hgb stable with no e/o active bleeding on exam.   - Repeat CBC tomorrow am.      HTN:  PTA on verapamil. Currently BPs normotensive.   - Continue PTA verapamil  daily with parameters to hold.      Acute constipation:  Most likely opioid induced. LBM was 4/21, so given  a one time Dulcolax supp yesterday with large BM this am.   - Continue Miralax to BID  - Continue Senokot-S, two tabs BID.      Hypothyroidism:  Euthyroid based on normal TSH on 1/10/19. Continue PTA levothyroxine 50 mcg daily.    Diet: Snacks/Supplements Adult: Boost Glucose Control; Between Meals  Snacks/Supplements Adult: Gelatein sugar-free; Between Meals  Moderate Consistent CHO Diet  Room Service    DVT Prophylaxis: heparin, warfarin  [place] Catheter: not present  Code Status: Full    Disposition Plan   Expected discharge: 4 - 7 days, recommended to transitional care unit once adequate pain management/ tolerating PO medications, antibiotic plan established and hemoglobin stable.  Entered: Joseluis Herring MD 04/29/2019, 3:22 PM       The patient's care was discussed with the Bedside Nurse, Patient and cardiology Consultant.    Joseluis Herring MD  Hospitalist Service, [address], [place]  Pager: [phone]  Please see sticky note for cross cover information  ______________________________________________________________________    Interval History   Patient without acute events overnight. He reports that his pain has been well controlled with PO oxycodone and tramadol. He reports intermittent substernal chest pressure with exertion as well as dyspnea. Otherwise his breathing is ok with the nasal cannula. No fevers or chills. No significant leg swelling. Having regular BMs    Data reviewed today: I reviewed all medications, new labs and imaging results over the last 24 hours. I personally reviewed no images or EKG's today.    Physical Exam   Vital Signs: Temp: 98.6  F (37  C) Temp src: Oral BP: 104/53 Pulse: 103 Heart Rate: 95 Resp: 16 SpO2: 95 % O2 Device: Nasal cannula Oxygen Delivery: 3 LPM  Weight: 168 lbs 14.4 oz    General: Patient sitting up in chair, NAD  Resp: Breathing comfortably, scattered crackles at bases  CV: RRR, III/VI systolic murmur loudest at base, soft S2,  soft diastolic murmur.   Abdomen: Non-tender, active BS  Back: dressings clean, hemovacs with serosanguinous output.   CORINNE drain with serosanguinous output.  Ext: 1+ Pitting edema bilaterally    Data   Recent Labs   Lab 04/29/19  0430 04/28/19  1047 04/28/19  0213 04/27/19  2113  04/26/19  0329   WBC 10.8  --  13.2* 13.0*   < > 15.7*   HGB 7.3*  --  7.8* 8.7*   < > 9.8*   MCV 94  --  95 96   < > 90     --  252 302   < > 264   INR 1.27* 1.29*  --   --   --  1.28*     --  139 136   < > 138   POTASSIUM 3.9  --  4.1 4.0   < > 4.5   CHLORIDE 103  --  103 102   < > 104   CO2 29  --  28 29   < > 28   BUN 16  --  20 16   < > 15   CR 0.69  --  0.80 0.83   < > 0.73   ANIONGAP 7  --  8 5   < > 7   DMITRIY 8.6  --  8.1* 8.2*   < > 7.9*   *  --  139* 173*   < > 212*   ALBUMIN  --   --   --  2.3*  --   --    PROTTOTAL  --   --   --  5.6*  --   --    BILITOTAL  --   --   --  0.3  --   --    ALKPHOS  --   --   --  95  --   --    ALT  --   --   --  56  --   --    AST  --   --   --  90*  --   --    TROPI  --  0.376* 0.381* 0.132*  --   --     < > = values in this interval not displayed.     No results found for this or any previous visit (from the past 24 hour(s)).  Medications     HEParin 1,550 Units/hr (04/29/19 1254)     Warfarin Therapy Reminder         famotidine  20 mg Intravenous Q12H     fluticasone  1 puff Inhalation Daily     gabapentin  600 mg Oral TID     insulin aspart  1-7 Units Subcutaneous TID AC     insulin aspart  1-5 Units Subcutaneous At Bedtime     levothyroxine  50 mcg Oral QAM AC     lidocaine  1-3 patch Transdermal Q24h    And     lidocaine   Transdermal Q24H    And     lidocaine   Transdermal Q8H     pantoprazole  40 mg Oral QAM AC     polyethylene glycol  17 g Oral Daily     predniSONE  5 mg Oral BID w/meals     senna-docusate  2 tablet Oral BID     sodium chloride (PF)  3 mL Intracatheter Q8H     sulfamethoxazole-trimethoprim  1 tablet Oral Q Mon Wed Fri AM     theophylline  400 mg Oral QAM      vancomycin (VANCOCIN) IV  1,250 mg Intravenous Q12H     verapamil ER  120 mg Oral At Bedtime     warfarin  2.5 mg Oral ONCE at 18:00

## 2019-04-29 NOTE — PLAN OF CARE
6B Discharge Planner PT   Patient plan for discharge: not discussed at session  Current status: Pt demonstrates sit<>stand with Renay/ModA, cues for spinal precautions, however continues to use UE support. Ambulated 6 bouts of 8' forward/backward in room with 4WW and CGA, fatigues quickly and requires seated and standing rest breaks. Pt on 2L at rest, requires 4L NC to maintain SpO2>90% with activity.  at rest, 125-135 during ambulation.  Barriers to return to prior living situation: activity tolerance, SOB, pain near incision, balance, acute medical needs, drains, post-op precautions, 2 flights to enter apartment  Recommendations for discharge: ARU  Rationale for recommendations: Pt is below baseline mobility and would benefit from intensive therapy at ARU in order to progress activity tolerance, strength, and balance. Pt previously IND with FWW, has 2 flights of stairs to enter home, highly motivated to return to PLOF. Anticipate would tolerate 3 hours of therapy/day.        Entered by: Carolina Santizo 04/29/2019 11:56 AM

## 2019-04-29 NOTE — PROGRESS NOTES
Social Work: Assessment with Discharge Plan    Patient Name:  Beulah Lerma  :  1950  Age:  69 year old  MRN:  [id number]  Risk/Complexity Score:  Filed Complexity Screen Score: 6  Completed assessment with:  Patient, Chart Review    Presenting Information   Reason for Referral:  Discharge plan  Date of Intake:  2019  Referral Source:  Chart Review  Decision Maker:  Patient  Alternate Decision Maker:  Defer to [facility]  Health Care Directive:  Not on file  Living Situation:  Pt and his wife have been living with his youngest son in an apartment in [place] on the third level. He reports that they are looking for new housing so that their needs can be met on the main level.   Previous Functional Status:  Independent  Patient and family understanding of hospitalization:  Pt demonstrates good understanding.   Cultural/Language/Spiritual Considerations:  From [place], [place].   Adjustment to Illness:  Appropriate    Physical Health  Reason for Admission:  No diagnosis found.  Services Needed/Recommended:  ARU    Mental Health/Chemical Dependency  Diagnosis:  None  Support/Services in Place:  n/a  Services Needed/Recommended:  n/a    Support System  Significant relationship at present time:    Family of origin is available for support:  Yes - lives with youngest son. Middle son is moving to [place]. Oldest son is moving to [place] from [place] soon.  Other support available:  Yes  Gaps in support system:  No  Patient is caregiver to:  None     Provider Information   Primary Care Physician:  Rah Bright   [phone]   Clinic:  [address]*      :  None    Financial   Income Source:  Retired  Financial Concerns:  None identified at this time  Insurance:    Payor/Plan Subscriber Name Rel Member # Group #   MEDICARE - MEDICARE ANTONIETA LERMAMARK* VIC [id number]       ATTN CLAIMS, [address]   [employer] - [employer] MEDIC* ANTONIETA  MARK LERMA*  [id number]       [address] [id number]       Discharge Plan   Patient and family discharge goal:  Pt is agreeable to ARU placement.   Provided education on discharge plan:  YES  Patient agreeable to discharge plan:  YES  A list of Medicare Certified Facilities was provided to the patient and/or family to encourage patient choice. Patient's choices for facility are:  1) [facility]  Will [facility] provide Skilled rehabilitation or complex medical:  YES  General information regarding anticipated insurance coverage and possible out of pocket cost was discussed. Patient and patient's family are aware patient may incur the cost of transportation to the facility, pending insurance payment: YES  Barriers to discharge:  Medical stability    Discharge Recommendations   Anticipated Disposition:  Facility:  ARU - [facility] to assess  Transportation Needs:  AISHA  Name of Transportation Company and Phone:  CARLOS Sol, [place]UL[place]  6B Intermediate Care Unit   Phone: [phone]  Pager: [phone]

## 2019-04-29 NOTE — CONSULTS
Cardiology Consult  Beulah Jane MRN: [id number]  Age: 69 year old, : 1950  Primary care provider: Rah Bright            Assessment and Recommendations:     Beulah Jane is a 69 year old male with a history of ILD (FEV1 55%, DLCO 42% in 2018), dermatomyositis on chronic steroids, HTN, DM2, hypothyroidism, and now aortic stenosis was is s/p stage I lumbar ALIF on  and planned stage II operation on . Cardiology consulted for now severe aortic stenosis and elevated troponin I.    Severe aortic stenosis and moderate AI  Some of patient's GONZALEZ may be due to severe aortic stenosis. However, a component of his dyspnea may also be due to acute blood loss anemia and PEs. TAVR may be a better option for patient vs SAVR since patient has ILD (FEV1 55%, DLCO 42% in 2018). Additionally, he is on chronic steroids for dermatomyositis. AVR does not need to be completed as an inpatient.  - Will have patient follow-up in [facility] (we will coordinate this for you). He can get coronary angiogram and CT TAVR as an outpatient for evaluation (we will coordinate this for you) - please do not order this for patient, we will coordinate.    Elevated troponin  Not likely ACS. Does not have chest pain. Likely demand ischemia in the setting of severe aortic stenosis and acute blood loss anemia. He will get a coronary angiogram as an outpatient for TAVR evaluation. No need for ischemic evaluation as an inpatient. Please do not continue to trend troponin at this time.    We will sign off at this time.    Patient was seen and discussed with staff attending, Dr. Carney.    Thank you for consulting the Cardiology Service at the [facility]. Please do not hesitate to call with questions or concerns.     Deloris Winslow MD, PhD  Cardiology Fellow  Pager: [phone]            Reason for Consult:     Severe aortic stenosis and moderate AI, elevated  troponin          History of Present Illness:     History is obtained from the patient and chart review.    Beulah Jane is a 69 year old male with a history of ILD (FEV1 55%, DLCO 42% in 7/2018), dermatomyositis on chronic steroids, HTN, DM2, hypothyroidism, and now aortic stenosis was is s/p stage I lumbar ALIF on 4/22 and planned stage II operation on 4/25. Cardiology consulted for now severe aortic stenosis and elevated troponin I.    Patient's post-operative course was complicated by worsening GONZALEZ and he was found to have bilateral small subsegmental PE in the upper lobes. Patient notes that at baseline, he has GONZALEZ but that now he gets GONZALEZ at shorter distances. Denies chest pain. TTE today showed EF 50-55%, apical wall akinesis, severe aortic stenosis (velocity 4.8 m/s, averaged peak velocity 4.1 m/s, PETE 0.8 cm2, mean gradient 55 mmHg) and moderate AI. He had a TTE at [facility] in 12/2018 which showed EF 60%, hypokinesis of distal anterolateral and inferolateral wall segments, moderate aortic stenosis (velocity 3.8 m/s, mean gradient 35 mmHg, PETE 1.1 cm2), mild-moderate AI. Trop I 0.132 -> 0.381 -> 0.376 from 4/27-4/28. Trop was checked due to tachycardia and GONZALEZ. His EKG at the time that he was tachycardic showed sinus tachycardia and RAD.    Patient notes that his back pain has improved. Denies hematochezia, melena, dysuria, or hematuria. 10 point ROS otherwise negative.           Past Medical History:     Past Medical History:   Diagnosis Date     Aortic stenosis      Chronic pain      DM (diabetes mellitus), type 2 (H)     on insulin     Hyperlipidemia      JUAN on CPAP      Pneumonia      Polymyositis (H)      Pulmonary fibrosis, unspecified (H)      Seasonal allergies               Past Surgical History:      Past Surgical History:   Procedure Laterality Date     ARTHROSCOPY KNEE  1970     COLONOSCOPY       DECOMPRESSION, FUSION LUMBAR POSTERIOR TWO LEVELS, COMBINED  1997     FUSION LUMBAR  ANTERIOR THREE+ LEVELS N/A 2019    Procedure: Lumbar 1 Or Lumbar 2-5 Anterior Lumbar Interbody Fusion with BMP Stage 1 Of Two Procedure:;  Surgeon: Carlos Enrique Dial MD;  Location: [facility] OR     HERNIA REPAIR       lumbar spine hardware removal       OPTICAL TRACKING SYSTEM FUSION POSTERIOR SPINE THORACIC THREE+ LEVELS N/A 2019    Procedure: O-Arm/Stealth Assisted Thoracic 10-Pelvis Instrumented Fusion T11-2, L1-2, L2-3 Transforminal Lumbar Interbody Fusion (TLIF) And Smith Borges Osteotomies,and L3-4 SPO as well, Use Of BMP, Debridement Of Seroma;  Surgeon: Carlos Enrique Dial MD;  Location: [facility] OR     REMOVAL PROSTHETIC MATERIAL/MESH, ABD WALL NECRO TISS INFEXN       ROTATOR CUFF REPAIR RT/LT                Social History:     Social History     Socioeconomic History     Marital status:      Spouse name: Not on file     Number of children: Not on file     Years of education: Not on file     Highest education level: Not on file   Occupational History     Not on file   Social Needs     Financial resource strain: Not on file     Food insecurity:     Worry: Not on file     Inability: Not on file     Transportation needs:     Medical: Not on file     Non-medical: Not on file   Tobacco Use     Smoking status: Former Smoker     Packs/day: 0.25     Last attempt to quit: 1970     Years since quittin.8     Smokeless tobacco: Never Used   Substance and Sexual Activity     Alcohol use: Not on file     Comment: few times monthly     Drug use: Not on file     Sexual activity: Not on file   Lifestyle     Physical activity:     Days per week: Not on file     Minutes per session: Not on file     Stress: Not on file   Relationships     Social connections:     Talks on phone: Not on file     Gets together: Not on file     Attends Yazidism service: Not on file     Active member of club or organization: Not on file     Attends meetings of clubs or organizations: Not on file      Relationship status: Not on file     Intimate partner violence:     Fear of current or ex partner: Not on file     Emotionally abused: Not on file     Physically abused: Not on file     Forced sexual activity: Not on file   Other Topics Concern     Not on file   Social History Narrative     Not on file              Family History:     History reviewed. No pertinent family history.  No family history of any cardiac conditions. Family history of colon cancer.           Allergies:     No Known Allergies           Medications:     Medications Prior to Admission   Medication Sig Dispense Refill Last Dose     calcium carbonate (OS-DMITRIY 500 MG [place]. [place]) 500 MG tablet Take 1 tablet (500 mg) by mouth 2 times daily 180 tablet 3 Past Week at Unknown time     Cholecalciferol (VITAMIN D) 2000 units tablet Take 2,000 Units by mouth daily 100 tablet 3 4/21/2019 at 0900     diphenhydrAMINE (BENADRYL) 50 MG capsule Take 50 mg by mouth every 6 hours as needed for itching or allergies   Past Week at Unknown time     FOLIC ACID PO Take 1 mg by mouth daily   4/21/2019 at 0900     GABAPENTIN PO Take 600 mg by mouth 3 times daily   4/22/2019 at 0430     GEMFIBROZIL PO Take 600 mg by mouth 2 times daily   4/22/2019 at 0430     insulin isophane human (HUMULIN N PEN) 100 UNIT/ML injection Inject 6 Units Subcutaneous 2 times daily (before meals) (Patient taking differently: Inject 6 Units Subcutaneous 2 times daily as needed )   Past Week at Unknown time     LANsoprazole (PREVACID) 30 MG DR capsule Take 30 mg by mouth every morning (before breakfast)   4/22/2019 at 0430     levothyroxine (SYNTHROID/LEVOTHROID) 50 MCG tablet Take 50 mcg by mouth daily   4/22/2019 at 0430     METHOTREXATE PO Take 25 mg by mouth once a week Tuesdays   Past Month at Unknown time     omega 3 1000 MG CAPS Take 2 capsules by mouth 2 times daily   Past Week at Unknown time     oxyCODONE-acetaminophen (PERCOCET) 5-325 MG per tablet Take 1 tablet by mouth 3 times daily    4/22/2019 at 0430     predniSONE (DELTASONE) 20 MG tablet Take 1 tablet (20 mg) by mouth daily (Patient taking differently: Take 10 mg by mouth every morning 10mg by mouth daily x 2 weeks then 5mg by mouth daily x 2 weeks.)   4/22/2019 at 0430     sulfamethoxazole-trimethoprim (BACTRIM DS/SEPTRA DS) 800-160 MG per tablet Take 1 tablet 3 times a week (Monday, Wednesday, Friday)  11 4/22/2019 at 0430     theophylline (UNIPHYL) 400 MG 24 hr tablet Take 400 mg by mouth every morning    Past Week at Unknown time     traMADol (ULTRAM) 50 MG tablet Take 0.5 tablets (25 mg) by mouth every 6 hours as needed for moderate pain 20 tablet 0 4/22/2019 at 0430     verapamil ER (CALAN-SR) 120 MG CR tablet Take 120 mg by mouth At Bedtime   4/21/2019 at 2100     albuterol (2.5 MG/3ML) 0.083% neb solution Take 1 vial by nebulization every 6 hours as needed for shortness of breath / dyspnea or wheezing   Unknown at Unknown time     albuterol (PROAIR HFA/PROVENTIL HFA/VENTOLIN HFA) 108 (90 BASE) MCG/ACT Inhaler Inhale 2 puffs into the lungs every 6 hours as needed for shortness of breath / dyspnea or wheezing   Unknown at Unknown time     beclomethasone (QVAR) 80 MCG/ACT Inhaler Inhale 2 puffs into the lungs 2 times daily   Unknown at Unknown time                    Physical Exam:     B/P: 104/53, T: 98.6, P: 103, R: 16    Wt Readings from Last 4 Encounters:   04/29/19 76.6 kg (168 lb 14.4 oz)   04/02/19 75.5 kg (166 lb 8 oz)   02/25/19 72.6 kg (160 lb)   02/07/19 70.8 kg (156 lb)         Intake/Output Summary (Last 24 hours) at 4/29/2019 1338  Last data filed at 4/29/2019 0937  Gross per 24 hour   Intake 1178.83 ml   Output 762 ml   Net 416.83 ml       Gen: No acute distress, sitting upright in chair  HEENT: NC/AT  PULM/THORAX: Bibasilar crackles and faint diffuse crackles. No wheezing or rhonchi. On 3 L NC  CV: RRR, normal S1 and S2, 3/6 VICKY heard throughout precordium best heard at RUSB and LSB. No JVD  ABD: Soft, NTND, bowel  sounds present  Back: Dressing on back c/d/i with serosanguineous drainage in CORINNE drains  EXT: 1+ pitting bilateral lower extremity edema to mid-tibia  NEURO: A&Ox3  Skin: no rashes  Endocrine: no thyromegaly  Musculoskeletal: post back surgery  Psych: pleasant and conversant              Data:     Labs Reviewed on Admission  Pertinent for:  Lab Results   Component Value Date    TROPI 0.376 (HH) 04/28/2019    TROPI 0.381 (HH) 04/28/2019    TROPI 0.132 (HH) 04/27/2019           Most Recent Imaging:     TTE 4/29/2019:  Interpretation Summary  Left ventricular size is normal.  The Ejection Fraction is estimated at 50-55%.  Apical wall akinesis is present.  Right ventricular function, chamber size, wall motion, and thickness are  normal.  Moderate aortic insufficiency is present.  Severe aortic stenosis is present.  With contrast,peak aortic velocity 4.8m/sec and mean aortic gradient 55mmHg.  Averaged peak velocity 4.1m/sec and mean aortic gradient 40mmHg.  Calculated valve area 0.8 cm2.  Partly thr increased gradients could be due to aortic regurgitation. Recommend  additional evaluation to assess severity of aortic stenosis.  Pulmonary artery systolic pressure cannot be assessed.  The inferior vena cava is normal.  No pericardial effusion is present.  Increase in aortic gradients and apical wallmotion abnormality are new since  study from 2017.    TTE 12/20/2018:   CONCLUSION:    1. Mild concentric LVH. Normal LV size and systolic function.     Hypokinesis of the distal anterolateral and inferolateral wall     segments.    2. Normal right ventricular size and function.    3. Moderate aortic stenosis is present -- see details below.    4. Mild to moderate aortic regurgitation.    5. Compared to prior report, there has been a slight progression in     the degree of AS.      Left Ventricular Ejection Fraction: 60 %        Left Ventricle:     Mild concentric LVH. Normal LV size and                         systolic  function. Hypokinesis of the distal                         anterolateral and inferolateral wall segments.    Right Ventricle:    Normal right ventricular size and function.    Left Atrium:        Normal size left atrium.    Right Atrium:       Normal size right atrium.    Aortic Valve:       Moderate aortic stenosis is present -- Vmax 3.8                         m/s, mean gradient 35 mmHg, PETE 1.1 cm^2.                          Mild to moderate aortic regurgitation.    Mitral Valve:       Trace mitral regurgitation.    Tricuspid Valve:    Normal in appearance and function.    Pulmonic Valve:     Normal in appearance and function.    Aorta:              The proximal ascending aorta was normal in                         size.    Pericardium:        The pericardium is normal.  No significant                         effusion.    IVC:                Normal IVC.    IAS:                Interatrial septum appears intact.                     ATTENDING ATTESTATION:  Patient has been seen and evaluated by me on April 29, 2019. I have reviewed the medications, laboratory, imaging, and other relevant results.  I agree with the above note which I have edited, as necessary.  I have discussed my assessment and plan with the house staff.    68 y/o with severe aortic stenosis. Will refer to [facility] clinic as an outpatient. No urgent need to address this during this hospitalization.     Lupe Carney MD, MS  Staff Cardiologist, [facility]  Pager: [phone]

## 2019-04-30 NOTE — PLAN OF CARE
Neuro: AxO X 4.   Cardiac: VSS. No changes.              Respiratory: LS CTA but diminished with crackles in lower lobes.   GI/: Voiding good amount of urine.   Activity: Up with 1 assist.  Pain: Oxycodone effective  Skin: Incisions abdomen and back, 2 hemovac drains and 1 CORINNE  LDA's: PIV x1 and Midline one lumen     Plan: Continue with POC. Notify primary team with changes

## 2019-04-30 NOTE — PLAN OF CARE
Neuro: A&Ox4.   Cardiac: SR-ST VSS.   Respiratory: Sating  on 2L NC   GI/: Adequate urine output.   Diet/appetite: Tolerating consistent carb diet. Eating well.  Activity:  Assist of x1 up to chair and in halls.  Pain: Tramadol and Oxycodone for pain  Skin: Incisions abdomen and back, 2 hemovac drains and 1 CORINNE  LDA's: PIV x1 and Midline one lumen    Plan: Continue with POC. Notify primary team with changes.

## 2019-04-30 NOTE — PROVIDER NOTIFICATION
-------------------CRITICAL LAB VALUE-------------------    Lab Value: Lactic 3.7  Time of notification: 12:34 PM  MD notified: Dr. Retana  Patient status: VSS at this time. Up in chair, requesting pain meds.  Temp:  [97.5  F (36.4  C)-98.4  F (36.9  C)] 98  F (36.7  C)  Pulse:  [94-97] 94  Heart Rate:  [] 109  Resp:  [14-18] 16  BP: (106-126)/(54-72) 113/65  SpO2:  [91 %-100 %] 100 %  Orders received:    -Labs orders: Blood cultures x2, re-draw lactic whole blood  -UA ordered  -CXR 2 view obtained

## 2019-04-30 NOTE — PROGRESS NOTES
[facility], [place]    Sepsis Evaluation Progress Note    Date of Service: 04/30/2019    I was called to see Beulah Jane due to abnormal vital signs triggering the Sepsis SIRS screening alert. He is known to have an infection.     Physical Exam    Vital Signs:  Temp: 98  F (36.7  C) Temp src: Axillary BP: 113/65 Pulse: 94 Heart Rate: 109 Resp: 16 SpO2: 100 % O2 Device: Nasal cannula Oxygen Delivery: 2 LPM    Lab:  Lactic Acid   Date Value Ref Range Status   04/30/2019 3.7 (H) 0.7 - 2.0 mmol/L Final     Comment:     /RN Notified PARISA BENITEZ RN     Lactate for Sepsis Protocol   Date Value Ref Range Status   04/28/2019 1.7 0.7 - 2.0 mmol/L Final       The patient is at baseline mental status.    The rest of their physical exam is significant for nothing changed from this am.    Assessment and Plan    The SIRS and exam findings are likely due to dehydration, there is no sign of sepsis at this time. However, we will pan culture and do a CXR to make sure we aren't missing a second infection given the rise in leukocytosis.    Disposition: The patient will remain on the current unit. We will continue to monitor this patient closely.    Sofía Retana MD

## 2019-04-30 NOTE — PROGRESS NOTES
"Orthopaedic Surgery Progress Note   April 30, 2019    Subjective:   Afebrile. Tachycardic to 110s. Vitals otherwise unremarkable. No issues overnight. Pain controlled. Tolerating PO without nausea or vomiting. Voiding. Last BM 4/28. OOB and ambulating. No chest pain or shortness of breath. New tingling to left groin/proximal anterior thigh.    Objective: /67   Pulse 94   Temp 98.4  F (36.9  C) (Oral)   Resp 14   Ht 1.6 m (5' 3\")   Wt 79 kg (174 lb 1.6 oz)   SpO2 96%   BMI 30.84 kg/m      General: awake and alert, no acute distress   Cardio: extremities wwp  Respiratory: non-labored breathing  MSK: Serosanguinous drainage to CORINNE drain site dressing, serosanguinous drainage to distal dressing. All dressings taken down and changed this AM. Active drainage from the distal incision.  SILT L3-S1 bilaterally.                L2-3: Hip flexion L and R 5/5 strength                 L4:  Knee extension L and R 5/5 strength                L5:  Foot / EHL dorsiflexion L and R 5/5 strength                S1:  Plantarflexion  L and R 5/5 strength     Hemovac 1: 0/- over the past 2 nursing shifts  Hemovac 2: 0/- over the past 2 nursing shifts  Deep drain: 40/20 over the past 2 nursing shifts    Labs:  Hemoglobin   Date Value Ref Range Status   04/29/2019 7.3 (L) 13.3 - 17.7 g/dL Final     Seroma cultures 4/25: seroma specimen 2 growing staph epidermidis    Assessment and Plan: Beulah Jane is a 69 year old male with PMH including aortic stenosis, chronic pain, DM type 2, hyperlipidemia, JUAN now s/p Stage I L3-4, L4-5 ALIF  on 4/22/19 with Dr. Dial and Dr. Alegria. Stage II T10-pelvis PIF, TLIF, SPO on 4/25/19 with Dr. Dial.     Chest pressure and shortness of breath on 4/27, CT chest showing subsegmental PEs.      Seroma cultured in surgery with seroma specimen 2 growing staph epidermidis.     Medicine Primary  Activity: Up with assist until independent. No excessive bending or twisting. No lifting >10 lbs x 6 " weeks. No Faiza lift for transfers.   Weight bearing status: WBAT.  Pain management: Transition from IV to PO as tolerated. No NSAIDs   Antibiotics: IV vancomycin as per ID recs, ID following  Diet: Begin with clear fluids and progress diet as tolerated.   DVT prophylaxis: SCDs only. Discontinue heparin drip given wound drainage.  Imaging: XR Upright  PTDC - completed.  Labs: trend Hgb   Bracing/Splinting: None.  Dressings: 4x4 and tegaderm. Dressings to be changed by Ortho at Orthopaedic Surgery discretion.  Drains: 3, Document output per shift, will be discontinued at Orthopedic Surgery discretion. Will leave for at least 1 more day given heparin drip started overnight.   [place] catheter: Remove POD#1.   Physical Therapy/Occupational Therapy: Eval and treat.  Cultures: Seroma fluid and debris pending, follow culture results closely.    Follow-up: Clinic with Dr. Dial in 6 weeks with repeat x-rays.   Disposition: Pending progress with therapies, pain control on orals, and medical stability, anticipate discharge to TCU or home in 3-6 days.      Discussed with Dr. Jayro Vides MD  Orthopaedic Surgery Resident, PGY-1  Pager: [phone]

## 2019-04-30 NOTE — PHARMACY-ANTICOAGULATION SERVICE
Pharmacy Vancomycin Note  Date of Service 2019  Patient's  1950   69 year old, male    Indication: Bone and Joint Infection and Osteomyelitis  Goal Trough Level: 15-20 mg/L  Day of Therapy: 3  Current Vancomycin regimen:  1500 mg x1 dose, then 1250 mg IV q12h    Current estimated CrCl = Estimated Creatinine Clearance: 88.8 mL/min (based on SCr of 0.73 mg/dL).    Creatinine for last 3 days  2019:  9:13 PM Creatinine 0.83 mg/dL  2019:  2:13 AM Creatinine 0.80 mg/dL  2019:  4:30 AM Creatinine 0.69 mg/dL  2019:  4:56 AM Creatinine 0.73 mg/dL    Recent Vancomycin Levels (past 3 days)  2019:  6:48 AM Vancomycin Level 15.1 mg/L    Vancomycin IV Administrations (past 72 hours)                   vancomycin 1500 mg in 0.9% NaCl 250 ml intermittent infusion 1,500 mg (mg) 1,500 mg Given 19 0948                Nephrotoxins and other renal medications (From now, onward)    Start     Dose/Rate Route Frequency Ordered Stop    19  vancomycin 1250 mg in 0.9% NaCl 250 mL intermittent infusion 1,250 mg      1,250 mg  over 90 Minutes Intravenous EVERY 12 HOURS 19 0758               Contrast Orders - past 72 hours (72h ago, onward)    Start     Dose/Rate Route Frequency Ordered Stop    19 0745  perflutren diluted 1mL to 2mL with saline (OPTISON) diluted injection 5 mL      5 mL Intravenous ONCE 19 0740 19 0745    19 0030  iopamidol (ISOVUE-370) solution 61 mL      61 mL Intravenous ONCE 19 0017 19 0018          Interpretation of levels and current regimen:  Trough level is  Subtherapeutic    Has serum creatinine changed > 50% in last 72 hours: No    Urine output:  good urine output    Renal Function: Stable    Ke= 0.089 hr-1  T1/2= 7.79 hrs  Vd= 55.3 L    Expected trough from new dose of ~18 mg/L      Plan:  1.  Increase Dose to  1750 mg every 12 hours  2.  Pharmacy will check trough levels as appropriate in 1-3 Days.    3. Serum  creatinine levels will be ordered daily for the first week of therapy and at least twice weekly for subsequent weeks.      Dany Hung RP on 4/30/2019 at 8:55 AM

## 2019-04-30 NOTE — PLAN OF CARE
OT/6B - Discharge Planner OT   Patient plan for discharge: rehab  Current status: Progressed LB dressing IND with use of equipment and baby powder to reduce friction during use of sock aid; pt dons B socks with mod I, pt doffs B socks with min A. Pt requires cues for adherence to spinal precautions.  Barriers to return to prior living situation: post surgical precautions, activity tolerance  Recommendations for discharge: ARU  Rationale for recommendations: Pt would benefit from continued skilled OT to increase ADL/IADl IND prior to return home. Pt would likely tolerate 3 hours of therapy daily to meet multidisciplinary therapy goals.       Entered by: Claudia Green 04/30/2019 2:49 PM

## 2019-04-30 NOTE — PROGRESS NOTES
[facility], [facility] Progress Note - Hospitalist Service, Gold 9       Date of Admission:  4/22/2019  Assessment & Plan   Beulah Jane is a 69 year old male with hx of HTN, hypothyroidism, T2DM, ILD, dermatomyositis, aortic stenosis, and GERD,admitted on 4/22/2019. He is now status post stage I lumbar ALIF on 4/22 by Ed Dial and Raji with planned stage II operation on 4/25.      Plan for today:  - disontinue warfarin and heparin and give 1 dose of Vit K- due to blood dripping from wound   - continue vancomycin for staph epi until susceptibilities return  - continue prednisone 5 mg BID for the next several days until pt up oob working with therapies on a regular basis, then can switch to daily  - continue supplemental O2 via NC to keep sats >92% and wean as able.      S/p stage I lumbar fusion, 4/22, and stage II, 4/25:  By Ed Dial and Raji. Tolerated second procedure well. Currently states incisional pain tolerable on current pain med regimen as recommended by pain service. Abd incisions appear to be healing well without discharge or dehiscence.   - ortho continuing to co-manage   - wound now with slow drip of blood, and due to concern of it not healing the anticoagulation has been stopped (see below)    Intra-op culture (4/25) of seroma with staph epi and normal skin marlee: ID consulted, seroma not communicating with deep wound per visualization during surgery, favor treating since this normal skin marlee and staph epi are causing re-accumulation of his seroma and in the setting of 2 surgical procedures with hardware.  - cont vanc until susceptibilities have resulted    Chest pain, suggestive of angina  Dyspnea on exertion  Severe aortic stenosis, moderate aortic regurgitation  Type II NSTEMI  Subsegmental Pulmonary Emboli  Cardiology consultation on 4/29 reassured that this is not likely ACS, but it is likely multifactoral (ILD, PE, severe aortic stenosis).  Trop likely elevated because of demand ischemia (and has now downtrended). Forced to discontinue his anticoagulation on 4/30/2019 due to his new bleeding. GONZALEZ and hypoxia could be more from ILD and severe aortic stenosis. Will not treat subsegmental PE as this risks the wound not healing (which could require an extensive surgery to close the wound that we are hoping to avoid).  - coronary angiogram and TAVR CT planned as an outpt for evaluation (will follow up in structural cardiology valve clinic)- this will all be coordinated by cards  - discontinued heparin and warfarin   - 1 dose of Vit K, although his INR was still at his baseline (no need for FFP, appreciate discussion with pharmacy)  - monitor for hemodynamic instability without anticoagulation      Dermatomyositis (Shannon-1 deepak +), ILD:  Followed OP by Dr. Call at  Rheumatology clinic in [place]. Chronically on methotrexate 25 mg once weekly, last taken however, two weeks ago at the advice of Dr. Call. Also recently nearly completed protracted taper of prednisone, started on 2/7 at 20 mg x 2 wks, then decrease by 5 mg q2wks, so pt was on 5 mg daily day of surgery with a few days left of taper. Most recent PFTs with reduced DLCO so received rituximab infusion on 3/11. Not normally on O2 PTA, but currently on 2L most likely the need is multifactorial and due to surgeries, ILD, deconditioned state, anemia, opioid use, and last nights small subseqmental PEs.    - Continue oral prednisone but in acute post-op setting, continue 5 mg BID for the next several days until pt up oob working with therapies on a regular basis, then can switch to daily. Plan to discontinue when pt no longer requires supplemental O2 and/or until follow up after discharge with Rheumatology  - Continue with PTA theophylline, scheduled Arnuity Ellipta, and prn albuterol inhaler/nebs  - Continue supplemental O2 via NC to keep O2 sats >92%, wean as able   - Encourage IS q2hrs  - Would hold off  on restarting methotrexate for at least 1 week after stage II surgery (which would be 2 May), longer if concern for lack of healing from surgery perspective.      DM2:  Most recent HgbA1C from 1/29/19, 6.3%. PTA on PRN insulin isophane 6U BID. Glucoses well-controlled currently in hospital  - cont to hold home NPH  - cont sliding scale insulin  - cont hypoglycemia protocol    Acute blood loss anemia: Pre-op Hgb on 4/2, 15.9. EBL from stage I 400 ml. EBL from stage  ml with 350 ml pRBCs returned via cell saver 350 ml.. Most recent Hgb stable with no e/o significant active bleeding on exam.   - Monitor CBCs    Leukocytosis:15.7 WBC on the am after surgery (thought to be stress degranulation)--> 10.8 on 4/29-->12.9 on 4/30. Taking chronic prednisone, no signs of a new infectious process (treating staph epi and normal skin marlee with vanc)  - cont to monitor    HTN:  PTA on verapamil. Currently BPs normotensive.   - Continue PTA verapamil  daily with parameters to hold     Acute constipation:  Most likely opioid induced  - Continue Miralax to BID  - Continue Senokot-S, two tabs BID      Hypothyroidism:  Euthyroid based on normal TSH on 1/10/19  - continue PTA levothyroxine 50 mcg daily    Diet: Snacks/Supplements Adult: Boost Glucose Control; Between Meals  Snacks/Supplements Adult: Gelatein sugar-free; Between Meals  Moderate Consistent CHO Diet  Room Service    DVT Prophylaxis: none, with active bleeding from wound  [place] Catheter: not present  Code Status: Full    Disposition Plan   Expected discharge: 4 - 7 days, recommended to transitional care unit once adequate pain management/ tolerating PO medications, antibiotic plan established and hemoglobin stable.  Entered: Sofía Retana MD 04/30/2019, 7:05 AM       The patient's care was discussed with the Bedside Nurse, Beulah, and orthopedics     Sofía Retana MD  Hospitalist Service, [address],  "[place]  Pager: [phone]  Please see sticky note for cross cover information  ______________________________________________________________________    Interval History   Beulah slept pretty well overnight, and he said his pain \"all over\" feels much better controlled today.  He continues to have significant GONZALEZ that makes him rest when exerting. At rest he requires only 2LPM NC. No fevers or chills. No significant leg swelling. Having regular BMs.    Data reviewed today: I reviewed all medications, new labs and imaging results over the last 24 hours. I personally reviewed no images or EKG's today.    Physical Exam   Vital Signs: Temp: 98.4  F (36.9  C) Temp src: Oral BP: 106/67 Pulse: 94 Heart Rate: 115 Resp: 14 SpO2: 96 % O2 Device: Nasal cannula Oxygen Delivery: 2 LPM  Weight: 174 lbs 1.6 oz    General: lying in bed, NAD  Resp: Breathing comfortably, no wheezes  CV: RRR, III/VI systolic murmur loudest at base, soft S2, soft diastolic murmur.   Abdomen: Non-tender, active BS  Back: dressings clean, hemovacs with sanguinous output  CORINNE drain with sanguinous output  Ext: 1+ Pitting edema in devonte hands and feet (Rt>Lt)    Data   Recent Labs   Lab 04/30/19  0456 04/29/19  0430 04/28/19  1047 04/28/19  0213 04/27/19  2113   WBC 12.9* 10.8  --  13.2* 13.0*   HGB 7.3* 7.3*  --  7.8* 8.7*   MCV 95 94  --  95 96    272  --  252 302   INR 1.24* 1.27* 1.29*  --   --     139  --  139 136   POTASSIUM 4.3 3.9  --  4.1 4.0   CHLORIDE 101 103  --  103 102   CO2 30 29  --  28 29   BUN 14 16  --  20 16   CR 0.73 0.69  --  0.80 0.83   ANIONGAP 6 7  --  8 5   DMITRIY 8.4* 8.6  --  8.1* 8.2*   * 153*  --  139* 173*   ALBUMIN  --   --   --   --  2.3*   PROTTOTAL  --   --   --   --  5.6*   BILITOTAL  --   --   --   --  0.3   ALKPHOS  --   --   --   --  95   ALT  --   --   --   --  56   AST  --   --   --   --  90*   TROPI  --   --  0.376* 0.381* 0.132*     Recent Results (from the past 24 hour(s))   XR Thor/Lumb Standing 2 " Views (Scoli)    Narrative    Exam: Full spine radiographs using EOS     History: Post op instrumentation    Techniques: AP and lateral images of full spine were submitted for  interpretation.    Comparison: Lumbar spine radiographs and CT 1/29/2019, chest CT  4/27/2019    Findings:    12 rib bearing vertebral bodies and 5 lumbar type vertebral bodies are  identified. Postsurgical changes of [id number] posterior fusion  instrumentation. No hardware complication, fracture, or periprosthetic  lucency. The L1 and L2 screws extend just beyond the anterior  vertebral body cortices. Multilevel degenerative changes throughout  the spine including severe disc space narrowing at C4-5 and C5-6.  There is trace anterolisthesis at C3-4 and mild retrolisthesis at  C5-6. Surgical drainage catheter located posteriorly.    No substantial scoliotic curvature of the spine. No global coronal or  sagittal imbalance.    Substantial cardiomegaly with bilateral interstitial opacities.      Impression    Impression:  1. Postsurgical changes of [id number] posterior fusion instrumentation  without suspected hardware complication.   2. No global coronal or sagittal imbalance.   3. Substantial cardiomegaly with interstitial pulmonary edema.    CAROLINA MYERS MD     Medications     HEParin 1,550 Units/hr (04/30/19 0407)     Warfarin Therapy Reminder         famotidine  20 mg Intravenous Q12H     fluticasone  1 puff Inhalation Daily     gabapentin  600 mg Oral TID     insulin aspart  1-7 Units Subcutaneous TID AC     insulin aspart  1-5 Units Subcutaneous At Bedtime     levothyroxine  50 mcg Oral QAM AC     lidocaine  1-3 patch Transdermal Q24h    And     lidocaine   Transdermal Q24H    And     lidocaine   Transdermal Q8H     pantoprazole  40 mg Oral QAM AC     polyethylene glycol  17 g Oral Daily     predniSONE  5 mg Oral BID w/meals     senna-docusate  2 tablet Oral BID     sodium chloride (PF)  3 mL Intracatheter Q8H      sulfamethoxazole-trimethoprim  1 tablet Oral Q Mon Wed Fri AM     theophylline  400 mg Oral QAM     vancomycin (VANCOCIN) IV  1,250 mg Intravenous Q12H     verapamil ER  120 mg Oral At Bedtime

## 2019-05-01 NOTE — PROGRESS NOTES
[facility], [facility] Progress Note - Hospitalist Service, Gold 9       Date of Admission:  4/22/2019  Assessment & Plan   Beulah Jnae is a 69 year old male with hx of HTN, hypothyroidism, T2DM, ILD, dermatomyositis, aortic stenosis, and GERD,admitted on 4/22/2019. He is now status post stage I lumbar ALIF on 4/22 by Ed Dial and Raji with planned stage II operation on 4/25.      Plan for today:  - continue vancomycin for staph epi until susceptibilities return fm wound cx from OR on 4/25  - continue prednisone 5 mg BID for the next several days until pt up oob working with therapies on a regular basis, then can switch to daily  - continue supplemental O2 via NC to keep sats >92% and wean as able.      S/p stage I lumbar fusion, 4/22, and stage II, 4/25:  By Ed Dial and Raji. Tolerated second procedure well. Currently states incisional pain tolerable on current pain med regimen as recommended by pain service. Abd incisions appear to be healing well without discharge or dehiscence.   - ortho continuing to co-manage   - wound with slow drip of blood: due to concern of it not healing the anticoagulation was stopped (see below)    Intra-op culture (4/25) of seroma with staph epi and normal skin marlee: ID consulted, seroma not communicating with deep wound per visualization during surgery, favor treating since this normal skin marlee and staph epi are causing re-accumulation of his seroma and in the setting of 2 surgical procedures with hardware.  - cont vanc until susceptibilities have resulted    Chest pain, suggestive of angina  Dyspnea on exertion  Severe aortic stenosis, moderate aortic regurgitation  Type II NSTEMI  Subsegmental Pulmonary Emboli  Cardiology consultation on 4/29 reassured that this is not likely ACS, but it is likely multifactoral (ILD, PE, severe aortic stenosis). Trop likely elevated because of demand ischemia (and has now downtrended). Forced  to discontinue his anticoagulation on 4/30/2019 due to his new bleeding. GONZALEZ and hypoxia could be more from ILD and severe aortic stenosis. Will not treat subsegmental PE as this risks the wound not healing (which could require an extensive surgery to close the wound that we are hoping to avoid).  - coronary angiogram and TAVR CT planned as an outpt for evaluation (will follow up in structural cardiology valve clinic)- this will all be coordinated by cards  - monitor for hemodynamic instability without anticoagulation      Dermatomyositis (Shannon-1 deepak +), ILD:  Followed OP by Dr. Call at  Rheumatology clinic in [place]. Chronically on methotrexate 25 mg once weekly, last taken however, two weeks ago at the advice of Dr. Call. Also recently nearly completed protracted taper of prednisone, started on 2/7 at 20 mg x 2 wks, then decrease by 5 mg q2wks, so pt was on 5 mg daily day of surgery with a few days left of taper. Most recent PFTs with reduced DLCO so received rituximab infusion on 3/11. Not normally on O2 PTA, but currently on 2L most likely the need is multifactorial and due to surgeries, ILD, deconditioned state, anemia, opioid use, and last nights small subseqmental PEs.    - Continue oral prednisone but in acute post-op setting, continue 5 mg BID for the next several days until pt up oob working with therapies on a regular basis, then can switch to daily. Plan to discontinue when pt no longer requires supplemental O2 and/or until follow up after discharge with Rheumatology  - Continue with PTA theophylline, scheduled Arnuity Ellipta, and prn albuterol inhaler/nebs  - Continue supplemental O2 via NC to keep O2 sats >92%, wean as able   - Encourage IS q2hrs  - Plan to restart methotrexate more than 1 week after stage II surgery (which would be 2 May), longer if concern for lack of healing from surgery perspective.      DM2:  Most recent HgbA1C from 1/29/19, 6.3%. PTA on PRN insulin isophane 6U BID. Glucoses  well-controlled currently in hospital  - cont to hold home NPH  - cont sliding scale insulin  - cont hypoglycemia protocol    Acute blood loss anemia: Pre-op Hgb on 4/2, 15.9. EBL from stage I 400 ml. EBL from stage  ml with 350 ml pRBCs returned via cell saver 350 ml.. Most recent Hgb stable with no e/o significant active bleeding on exam.   - Monitor CBCs    Leukocytosis: 15.7 WBC on the am after surgery (thought to be stress degranulation)--> 10.8 on 4/29-->12.9 on 4/30-->15.8 on 5/1. Taking chronic prednisone, no signs of a new infectious process (treating staph epi and normal skin marlee with vanc). 4/30: CXR unchanged, UA unconcerning, bld cx pending with NGTD.  - cont to monitor    HTN:  PTA on verapamil. Currently BPs normotensive.   - Continue PTA verapamil  daily with parameters to hold     Acute constipation:  Most likely opioid induced  - Continue Miralax to BID  - Continue Senokot-S, two tabs BID      Hypothyroidism:  Euthyroid based on normal TSH on 1/10/19  - continue PTA levothyroxine 50 mcg daily    Diet: Snacks/Supplements Adult: Boost Glucose Control; Between Meals  Snacks/Supplements Adult: Gelatein sugar-free; Between Meals  Moderate Consistent CHO Diet  Room Service    DVT Prophylaxis: none, with anticoagulation stopped due to bleeding from wound  [place] Catheter: not present  Code Status: Full    Disposition Plan   Expected discharge: 4 - 7 days, recommended to transitional care unit once adequate pain management/ tolerating PO medications, antibiotic plan established and hemoglobin stable.  Entered: Sofía Retana MD 05/01/2019, 6:47 AM       The patient's care was discussed with the Bedside Nurse, Beulah, and orthopedics     Sofía Retana MD  Hospitalist Service, [address], [place]  Pager: [phone]  Please see sticky note for cross cover information  ______________________________________________________________________    Interval  History   Beulah is having more pain, but the pain meds are helping.   He continues to have significant GONZALEZ that makes him rest when exerting. At rest he requires only 2LPM NC. No fevers or chills. No significant leg swelling. Has been 2 days since his last BM.    Data reviewed today: I reviewed all medications, new labs and imaging results over the last 24 hours. I personally reviewed no images or EKG's today.    Physical Exam   Vital Signs: Temp: 98.7  F (37.1  C) Temp src: Oral BP: 130/60   Heart Rate: 109 Resp: 18 SpO2: 99 % O2 Device: Nasal cannula Oxygen Delivery: 3 LPM  Weight: 176 lbs 5.89 oz    General: lying in bed, NAD  Resp: Breathing comfortably, no wheezes  CV: RRR, III/VI systolic murmur loudest at base, soft S2, soft diastolic murmur.   Abdomen: Non-tender, active BS  Back: dressings clean, hemovacs with sanguinous output  CORINNE drain with sanguinous output  Ext: 1+ Pitting edema in devonte hands and feet (Rt>Lt)    Data   Recent Labs   Lab 05/01/19  0453 04/30/19  0456 04/29/19  0430 04/28/19  1047 04/28/19  0213 04/27/19  2113   WBC 15.8* 12.9* 10.8  --  13.2* 13.0*   HGB 8.7* 7.3* 7.3*  --  7.8* 8.7*   MCV 95 95 94  --  95 96    305 272  --  252 302   INR 1.15* 1.24* 1.27* 1.29*  --   --     138 139  --  139 136   POTASSIUM 4.1 4.3 3.9  --  4.1 4.0   CHLORIDE 99 101 103  --  103 102   CO2 31 30 29  --  28 29   BUN 16 14 16  --  20 16   CR 0.75 0.73 0.69  --  0.80 0.83   ANIONGAP 6 6 7  --  8 5   DMITRIY 9.1 8.4* 8.6  --  8.1* 8.2*   * 113* 153*  --  139* 173*   ALBUMIN  --   --   --   --   --  2.3*   PROTTOTAL  --   --   --   --   --  5.6*   BILITOTAL  --   --   --   --   --  0.3   ALKPHOS  --   --   --   --   --  95   ALT  --   --   --   --   --  56   AST  --   --   --   --   --  90*   TROPI  --   --   --  0.376* 0.381* 0.132*     Recent Results (from the past 24 hour(s))   XR Chest 2 Views    Narrative    Exam: XR CHEST 2 VW, 4/30/2019 2:09 PM    Indication: hypoxia, lactic acidemia,  leukocytosis ? HCAP    Comparison: Chest CT 4/27/2019, chest x-ray 4/27/2019    Findings:   Stable cardiomegaly with bilateral interstitial opacities. Streaky  bibasilar opacities. No pneumothorax or pleural effusion. Partially  visualized thoracolumbar hardware is intact.      Impression    Impression:   1. Stable streaky bibasilar opacities. Differential includes  atelectasis, infection, and aspiration.  2. Cardiomegaly with interstitial pulmonary edema.     CAROLINA MYERS MD     Medications       famotidine  20 mg Intravenous Q12H     fluticasone  1 puff Inhalation Daily     gabapentin  600 mg Oral TID     insulin aspart  1-7 Units Subcutaneous TID AC     insulin aspart  1-5 Units Subcutaneous At Bedtime     levothyroxine  50 mcg Oral QAM AC     lidocaine  1-3 patch Transdermal Q24h    And     lidocaine   Transdermal Q24H    And     lidocaine   Transdermal Q8H     pantoprazole  40 mg Oral QAM AC     polyethylene glycol  17 g Oral Daily     predniSONE  5 mg Oral BID w/meals     senna-docusate  2 tablet Oral BID     sodium chloride (PF)  3 mL Intracatheter Q8H     sulfamethoxazole-trimethoprim  1 tablet Oral Q Mon Wed Fri AM     theophylline  400 mg Oral QAM     vancomycin (VANCOCIN) IV  1,750 mg Intravenous Q12H     verapamil ER  120 mg Oral At Bedtime

## 2019-05-01 NOTE — PLAN OF CARE
Neuro: A&Ox4.   Cardiac: SR-ST. . 1 episode of ST up to 140-150 when up to the bathroom. VSS.   Respiratory: Sating >93% on 3L NC. Lungs diminished with crackles bilaterally. Mild GONZALEZ. Wean O2 as tolerated today.   GI/: Adequate urine output. No BM overnight.   Diet/appetite: Tolerating regular CHO diet. Eating well.   Activity:  Assist of 1 up to bathroom with walker. Repositioning in bed with 2 assist.   Pain: At acceptable level on current regimen.   Skin: No new deficits noted.  LDA's: L midline, CORINNE, x2 hemovac. Hemovac #2 appears to be clotted off. Surgery at bedside this morning and reported off to.     Plan: Continue with POC. Notify primary team with changes.

## 2019-05-01 NOTE — PLAN OF CARE
"/64 (BP Location: Right arm)   Pulse 103   Temp 98.9  F (37.2  C) (Oral)   Resp 20   Ht 1.6 m (5' 3\")   Wt 80 kg (176 lb 5.9 oz)   SpO2 100%   BMI 31.24 kg/m      Neuro: A&Ox4.   Cardiac: ST- 109. OVSS  Respiratory: 2L NC  GI/: Adequate urine output. No BM  Diet/appetite: Tolerating * diet. Eating well.  Activity:  Assist of 1 when up. Sat up in the chair for 4 hrs   Pain: At acceptable level on current regimen. Gave oxy 10mg and tylenol prn with adequate relief  Skin: Abdomen dermabond- HARDIK. Back incision dressing c/d/i  LDA's: Midline left TKO for abx. 2 hemovacs and 1 CORINNE    Sepsis triggered. MD's aware. Lactic acid 2.0. No interventions at this time    Plan: Continue with POC. Notify primary team with changes.   "

## 2019-05-01 NOTE — PROGRESS NOTES
BLUE [facility] ID SERVICE: ONGOING CONSULTATION        Patient:  Beulah Jane, Date of birth 1950, Medical record number [id number]  Date of Visit:  May 1, 2019         Assessment and Recommendations:   Problem List:  1. S/p stage I lumbar fusion, 4/22, and stage II, 4/25  2. Seroma fluid culture growing (normal skin marlee and staph epi)  3. Dermatomyositis (Shannon-1 deepak +), ILD - on weekly methotrexate, currently on prednisone taper, rituximab infusion on 3/11  4. Acute PE (during this admission)   5. DM   6. Rising leukocytosis      Impression: Mr. Alexander is a 68 y/o man s/p stage I lumbar fusion, 4/22, and stage II, 4/25. He had back hernia surgery in 2006 in [facility]. His repair was performed with mesh and it was fine for a few years but then became infected in 2009 (unclear about the details here). He had 2 surgeries to remove the mesh and remove the abscessed/infected tissue. After his 2nd surgery he developed a seroma.This was treated with multiple aspirations. He had a drain placed an attempt was made at sclerotherapy. Despite extensive local therapy the seroma immediately recurred. On 4/25, during the second stage of the surgery, the seroma was debrided and 300 ml of fluid was removed and sent for culture. One of the samples is growing normal skin marlee and one sample is growing CoNS. Per operative report -the seroma did not appear to communicate with the deep wound. Thus we are treating him for a CONS wound infection.     WBC rising today, to 15.8. This could be due to steroids also. Would obtain CRP today and tomorrow. If continuing to rise, would reimage his back around the seroma to evaluate for abscess. Differential also includes hospital acquired pneumonia, given that he requires 2L of oxygen currently. No cough.     Recommendations:  1. Obtain CRP today and tomorrow -if rising, would reimage back to evaluate for new abscess formation  2. Continue Vancomcyin    ID will follow.          Attestation:  I have reviewed today's vital signs, medications, labs and imaging.  Phylicia Pollock MD  Pager [phone]          Interval History:       Patient has continued back pain that he describes as different in nature -more expansive.          Review of Systems:   CONSTITUTIONAL:  No fevers or chills  EYES: negative for icterus  ENT:  negative for oral lesions, hearing loss, tinnitus and sore throat  RESPIRATORY:  negative for cough and dyspnea  CARDIOVASCULAR:  negative for chest pain, palpitations  GASTROINTESTINAL:  negative for nausea, vomiting, diarrhea and constipation  GENITOURINARY:  negative for dysuria  HEME:  No easy bruising/bleeding  INTEGUMENT:  negative for rash and pruritus  NEURO:  Negative for headache         Current Antimicrobials   Vancomycin 1750mg IV q12h         Physical Exam:   Ranges for vital signs:  Temp:  [97.9  F (36.6  C)-98.8  F (37.1  C)] 98  F (36.7  C)  Pulse:  [101] 101  Heart Rate:  [] 86  Resp:  [16-22] 22  BP: (113-134)/(60-72) 134/72  SpO2:  [99 %-100 %] 99 %      Exam:  GENERAL:  well-developed, well-nourished, in no acute distress.   ENT:  Head is normocephalic, atraumatic. Oropharynx is moist without exudates or ulcers.  EYES:  Eyes have anicteric sclerae. PERRL.   NECK:  Supple. No cervical lymphadenopathy  EXT: Extremities warm and without edema.  SKIN:  No acute rashes.   NEUROLOGIC:  Grossly nonfocal.           Laboratory Data:     Creatinine   Date Value Ref Range Status   05/01/2019 0.75 0.66 - 1.25 mg/dL Final   04/30/2019 0.73 0.66 - 1.25 mg/dL Final   04/29/2019 0.69 0.66 - 1.25 mg/dL Final   04/28/2019 0.80 0.66 - 1.25 mg/dL Final   04/27/2019 0.83 0.66 - 1.25 mg/dL Final     WBC   Date Value Ref Range Status   05/01/2019 15.8 (H) 4.0 - 11.0 10e9/L Final   04/30/2019 12.9 (H) 4.0 - 11.0 10e9/L Final   04/29/2019 10.8 4.0 - 11.0 10e9/L Final   04/28/2019 13.2 (H) 4.0 - 11.0 10e9/L Final   04/27/2019 13.0 (H) 4.0 - 11.0 10e9/L Final      Hemoglobin   Date Value Ref Range Status   05/01/2019 8.7 (L) 13.3 - 17.7 g/dL Final     Platelet Count   Date Value Ref Range Status   05/01/2019 401 150 - 450 10e9/L Final     Sed Rate   Date Value Ref Range Status   06/26/2018 10 0 - 20 mm/h Final     CRP Inflammation   Date Value Ref Range Status   06/26/2018 4.7 0.0 - 8.0 mg/L Final     AST   Date Value Ref Range Status   04/27/2019 90 (H) 0 - 45 U/L Final   11/30/2017 14 0 - 45 U/L Final   11/27/2017 15 0 - 45 U/L Final     ALT   Date Value Ref Range Status   04/27/2019 56 0 - 70 U/L Final   11/30/2017 14 0 - 70 U/L Final   11/27/2017 12 0 - 70 U/L Final     Bilirubin Total   Date Value Ref Range Status   04/27/2019 0.3 0.2 - 1.3 mg/dL Final   11/30/2017 0.3 0.2 - 1.3 mg/dL Final   11/27/2017 0.5 0.2 - 1.3 mg/dL Final     Lab Results   Component Value Date     05/01/2019    BUN 16 05/01/2019    CO2 31 05/01/2019       Culture data:  All cultures:  Fluid culture 4/25  Staphylococcus epidermidis     Antibiotic Interpretation Sensitivity Method Status    CIPROFLOXACIN Resistant >2.0 ug/mL BRENDON Final    CLINDAMYCIN Sensitive <=0.25 ug/mL BRENDON Final    ERYTHROMYCIN Sensitive <=0.25 ug/mL BRENDON Final    GENTAMICIN Resistant >8.0 ug/mL BRENDON Final    LEVOFLOXACIN Resistant >4.0 ug/mL BRENDON Final    OXACILLIN Resistant >2.0 ug/mL BRENDON Final    TETRACYCLINE Sensitive <=1.0 ug/mL BRENDON Final    VANCOMYCIN Sensitive 2.0 ug/mL BRENDON Final        Recent Labs   Lab 04/30/19 2033 04/30/19 2028 04/25/19  0857 04/25/19  0845   CULT No growth after 9 hours No growth after 9 hours Light growth  Staphylococcus epidermidis  *  These bacteria are part of normal skin marlee, but on occasion, may be true pathogens.    Clinical correlation must be applied to interpreting this microbiology result.  *  Culture negative monitoring continues Light growth  Normal skin marlee    Culture negative monitoring continues

## 2019-05-01 NOTE — PROGRESS NOTES
Received consult for abrasions on abdomen, assessed the area and noted healing rashes appearing area r/t tape. Pt denies any itchiness but minimal tenderness upon palpation. No intervention needed at this time. Continue to leave it open to air. Updated RN and pt with POC. WOC will complete the consult, re-consult as needed.

## 2019-05-01 NOTE — PROGRESS NOTES
"Orthopaedic Surgery Progress Note   May 1, 2019    Subjective:   Afebrile. Tachycardic to 110s. Vitals otherwise unremarkable. Yesterday heparin drip and warfarin discontinued and vitamin K administered due to wound drainage. No issues overnight. Pain controlled. Tolerating PO without nausea or vomiting. Voiding. Last BM 4/29. Passing flatus. OOB and ambulating. No chest pain or shortness of breath. No new numbness or tingling.    Objective: /60 (BP Location: Left arm)   Pulse 94   Temp 98.7  F (37.1  C) (Oral)   Resp 18   Ht 1.6 m (5' 3\")   Wt 80 kg (176 lb 5.9 oz)   SpO2 99%   BMI 31.24 kg/m      General: awake and alert, no acute distress   Cardio: extremities wwp  Respiratory: non-labored breathing  MSK: minimal serosanguinous drainage to CORINNE drain site dressing and proximal hemovac drain site dressing, otherwise dressings clean/dry/intact.   SILT L3-S1 bilaterally.                L2-3: Hip flexion L and R 5/5 strength                 L4:  Knee extension L and R 5/5 strength                L5:  Foot / EHL dorsiflexion L and R 5/5 strength                S1:  Plantarflexion  L and R 5/5 strength     Hemovac 1: 30/30/20 over the past 3 nursing shifts  Hemovac 2: 0/0/0 over the past 3 nursing shifts  Deep drain: 35/150/40 over the past 3 nursing shifts    Labs:  Hemoglobin   Date Value Ref Range Status   04/30/2019 7.3 (L) 13.3 - 17.7 g/dL Final     Seroma cultures 4/25: seroma specimen 2 growing staph epidermidis    Assessment and Plan: Beulah Jane is a 69 year old male with PMH including aortic stenosis, chronic pain, DM type 2, hyperlipidemia, JUAN now s/p Stage I L3-4, L4-5 ALIF  on 4/22/19 with Dr. Dial and Dr. Alegria. Stage II T10-pelvis PIF, TLIF, SPO on 4/25/19 with Dr. Dial.     Chest pressure and shortness of breath on 4/27, CT chest showing subsegmental PEs.      Seroma cultured in surgery with seroma specimen 2 growing staph epidermidis.     Medicine Primary  Activity: Up with " assist until independent. No excessive bending or twisting. No lifting >10 lbs x 6 weeks. No Faiza lift for transfers.   Weight bearing status: WBAT.  Pain management: Transition from IV to PO as tolerated. No NSAIDs   Antibiotics: IV vancomycin as per ID recs, ID following  Diet: Begin with clear fluids and progress diet as tolerated.   DVT prophylaxis: SCDs only. Continue to hold heparin/warfarin given wound drainage.  Imaging: XR Upright  PTDC - completed.  Labs: trend Hgb   Bracing/Splinting: None.  Dressings: 4x4 and tegaderm. Dressings to be changed by Ortho at Orthopaedic Surgery discretion.  Drains: 3, Document output per shift, will be discontinued at Orthopedic Surgery discretion. Will leave for at least 1 more day given heparin drip started overnight.   [place] catheter: Remove POD#1.   Physical Therapy/Occupational Therapy: Eval and treat.  Cultures: Seroma fluid and debris pending, follow culture results closely.    Follow-up: Clinic with Dr. Dial in 6 weeks with repeat x-rays.   Disposition: Pending progress with therapies, pain control on orals, and medical stability, anticipate discharge to TCU or home in 3-6 days.      Discussed with Dr. Jayro Vides MD  Orthopaedic Surgery Resident, PGY-1  Pager: [phone]

## 2019-05-01 NOTE — PLAN OF CARE
Neuro: AxO X 4. Intermittent neuropathy at baseline, fingers & toes  Cardiac: VSS. SR/Tach 100's.              Respiratory: Sating >92% on 2 L via NC, increased to 4 L when up to bathroom. GONZALEZ. Lungs diminished + crackles in lower lobes, encouraging IS. XR Chest 2 view obtained, no signif changes per provier.  GI/:  Adequate UOP via urinal. *Need urine sample (clean urinal at bedside). LBM 4/29, passing gas. Denies nausea.  Activity: Assist x1 + walker.  Pain: C/o back pain/incisional pain. Prn Oxycodone, Robaxin, & Tramadol given with some relief.   Skin/LDA's: Incisions abdomen and back. Heparin gtt discontinued @0745 per Ortho.  -Hemovac drains x2; CORINNE x1.  -Midline, single lumen.     Plan: Continue with POC. Notify primary team with changes

## 2019-05-02 NOTE — PLAN OF CARE
2521-5523 A&Ox4.  VS not WNL, O2 sats stable on 2-4L NC (GONZALEZ), tachycardia, tmax 99.  Tele sinus tachy.  Up w/ assist x1+W.  R PIV SL, placed for CT of lungs w/ contrast- no changes.  L midline SL after IV vanco.  C/o pain in back, PRN atarax, oxycodone, and tylenol.  Stool softeners given, no BM this shift.  Pt has been passing gas and feels bloated.  Edema +3 in BLE, numbness and tingling at baseline.  Dressing on posterior and anterior chest CDI, to be changed by ortho.  Hemovac x2, no OP. L side CORINNE drain had 70 ml serosanguinous drainage.  Continue to monitor and follow POC.

## 2019-05-02 NOTE — PROGRESS NOTES
Pt transferred from  at 1710. No assessment completed. Pt settled and vitals complete. Will pass onto oncoming nurse.

## 2019-05-02 NOTE — PLAN OF CARE
Discharge Planner PT   Patient plan for discharge: Rehab  Current status: Focus on IND with mobility within precautions.  Pt needing modA for bed mobility with log roll.  Pt ambulated 175' x 2 with use of 4WW and CGA, on 3L O2 through out, needing seated rest break.  HR increased to 120-125 with activity.   Barriers to return to prior living situation: Need for increased assist with bed mobility, transfers and ambulation, poor activity tolerance  Recommendations for discharge: TCU  Rationale for recommendations: To progress strength and IND with functional mobility       Entered by: Florecita Orozco 05/02/2019 9:38 AM

## 2019-05-02 NOTE — PROGRESS NOTES
Cross Cover Note:    Called to bedside due to HR spike to 160 with near syncopal event when getting up from going to the bathroom. He was assisted to the floor by nursing and did not have traumatic fall. O2 was increased to 4L. Noted to have cold hands. When seen at bedside, HR improved to 120s. Reports palpitations and chest fullness during the near fainting event. Dyspnea resolving. No ongoing chest pain or associated ACS symptoms. Denies nausea or abdominal pain. On exam, tachycardia with regular rhythm. Currently on 4L. Hands cold, warm up during interview. He has 3+ BLE pitting edema. Afebrile. Etiology concerning for changes in preload in the setting of severe aortic stenosis, worsening clot burden, orthostatic event, 2/2 underlying ILD, other. Less likely ACS  - Get CT PE, EKG, Trop, BMP, Mg, CBC  - Contact with acute changes     Alpa Brice PA-C  Internal Medicine [facility]  [phone]

## 2019-05-02 NOTE — PROGRESS NOTES
[facility], [place]    Sepsis Evaluation Progress Note    Date of Service: 05/02/2019    I was called to see Beulah Jane due to abnormal vital signs triggering the Sepsis SIRS screening alert. He is known to have an infection.     Physical Exam    Vital Signs:  Temp: 98.2  F (36.8  C) Temp src: Oral BP: 131/64 Pulse: 103 Heart Rate: 105 Resp: 18 SpO2: 99 % O2 Device: Nasal cannula Oxygen Delivery: 2 LPM    Lab:  Lactic Acid   Date Value Ref Range Status   05/02/2019 2.8 (H) 0.7 - 2.0 mmol/L Final     Comment:     DR/RN Notified KIANA VELA     Lactate for Sepsis Protocol   Date Value Ref Range Status   04/30/2019 Canceled, Test credited 0.7 - 2.0 mmol/L Final     Comment:     Duplicate request  SEE [id number]         The patient is at baseline mental status.    The rest of their physical exam is significant for nothing different from this am.    Assessment and Plan    The SIRS and exam findings are likely due to intravascular depletion, there is no sign of sepsis at this time.    Disposition: The patient will remain on the current unit. We will continue to monitor this patient closely.    Sofía Retana MD

## 2019-05-02 NOTE — PROVIDER NOTIFICATION
Eemka Britt notified of pt's near syncopal episode while getting up from the bathroom w/ help from nursing assistant.  Pt helped back to bed with walker.  MD came to assess.  VSS except O2 increased to 4L from 2L and tachycardia- HR went  Up to 160.  Tele continues to be in place and MD ordered CT, EKG, and labs.

## 2019-05-02 NOTE — PROGRESS NOTES
BLUE [facility] ID SERVICE: ONGOING CONSULTATION        Patient:  Beulah Jane, Date of birth 1950, Medical record number [id number]  Date of Visit:  May 2, 2019         Assessment and Recommendations:   Problem List:  1. S/p stage I lumbar fusion, 4/22, and stage II, 4/25  2. Seroma fluid culture growing (normal skin marlee and staph epi)  3. Dermatomyositis (Shannon-1 deepak +), ILD - on weekly methotrexate, currently on prednisone taper, rituximab infusion on 3/11  4. Acute PE (during this admission)   5. DM   6. Rising leukocytosis      Impression: Mr. Alexander is a 68 y/o man s/p stage I lumbar fusion, 4/22, and stage II, 4/25. He had back hernia surgery in 2006 in [facility]. His repair was performed with mesh and it was fine for a few years but then became infected in 2009 (unclear about the details here). He had 2 surgeries to remove the mesh and remove the abscessed/infected tissue. After his 2nd surgery he developed a seroma.This was treated with multiple aspirations. He had a drain placed an attempt was made at sclerotherapy. Despite extensive local therapy the seroma immediately recurred. On 4/25, during the second stage of the surgery, the seroma was debrided and 300 ml of fluid was removed and sent for culture. One of the samples is growing normal skin marlee and one sample is growing CoNS. Per operative report -the seroma did not appear to communicate with the deep wound. Thus we are treating him for a CONS wound infection.     CRP trending down. Would plan for 2 weeks of IV Vancomycin for Coag negative staph wound infection, day 1=4/26.         Recommendations:  1. Continue Vancomycin for 14 days. Day 1=4/26. Thus today is day 7 of 14. No need for weekly labs while on IV antibiotics, as he will only be on them for 1 week. Follow up with primary and surgical team. No need for ID follow up.         ID will sign off. Please page if questions arise.         Attestation:  I have reviewed today's vital  signs, medications, labs and imaging.  Phylicia Pollock MD  Pager [phone]          Interval History:       Patient has continued back pain that he describes as different in nature -more expansive. No other complaints.          Review of Systems:   CONSTITUTIONAL:  No fevers or chills  EYES: negative for icterus  ENT:  negative for oral lesions, hearing loss, tinnitus and sore throat  RESPIRATORY:  negative for cough and dyspnea  CARDIOVASCULAR:  negative for chest pain, palpitations  GASTROINTESTINAL:  negative for nausea, vomiting, diarrhea and constipation  GENITOURINARY:  negative for dysuria  HEME:  No easy bruising/bleeding  INTEGUMENT:  negative for rash and pruritus  NEURO:  Negative for headache         Current Antimicrobials   Vancomycin 1750mg IV q12h         Physical Exam:   Ranges for vital signs:  Temp:  [95.5  F (35.3  C)-99  F (37.2  C)] 95.5  F (35.3  C)  Pulse:  [103-108] 103  Heart Rate:  [] 86  Resp:  [16-22] 18  BP: (115-169)/(56-72) 136/56  SpO2:  [96 %-100 %] 100 %      Exam:  GENERAL:  well-developed, well-nourished, in no acute distress.   ENT:  Head is normocephalic, atraumatic. Oropharynx is moist without exudates or ulcers.  EYES:  Eyes have anicteric sclerae. PERRL.   NECK:  Supple. No cervical lymphadenopathy  EXT: Extremities warm and without edema.  SKIN:  No acute rashes.   NEUROLOGIC:  Grossly nonfocal.           Laboratory Data:     Creatinine   Date Value Ref Range Status   05/02/2019 0.74 0.66 - 1.25 mg/dL Final   05/01/2019 0.78 0.66 - 1.25 mg/dL Final   05/01/2019 0.75 0.66 - 1.25 mg/dL Final   04/30/2019 0.73 0.66 - 1.25 mg/dL Final   04/29/2019 0.69 0.66 - 1.25 mg/dL Final     WBC   Date Value Ref Range Status   05/02/2019 14.0 (H) 4.0 - 11.0 10e9/L Final   05/01/2019 13.7 (H) 4.0 - 11.0 10e9/L Final   05/01/2019 15.8 (H) 4.0 - 11.0 10e9/L Final   04/30/2019 12.9 (H) 4.0 - 11.0 10e9/L Final   04/29/2019 10.8 4.0 - 11.0 10e9/L Final     Hemoglobin   Date Value Ref  Range Status   05/02/2019 7.8 (L) 13.3 - 17.7 g/dL Final     Platelet Count   Date Value Ref Range Status   05/02/2019 336 150 - 450 10e9/L Final     Sed Rate   Date Value Ref Range Status   06/26/2018 10 0 - 20 mm/h Final     CRP Inflammation   Date Value Ref Range Status   05/02/2019 52.0 (H) 0.0 - 8.0 mg/L Final   05/01/2019 74.0 (H) 0.0 - 8.0 mg/L Final   06/26/2018 4.7 0.0 - 8.0 mg/L Final     AST   Date Value Ref Range Status   04/27/2019 90 (H) 0 - 45 U/L Final   11/30/2017 14 0 - 45 U/L Final   11/27/2017 15 0 - 45 U/L Final     ALT   Date Value Ref Range Status   04/27/2019 56 0 - 70 U/L Final   11/30/2017 14 0 - 70 U/L Final   11/27/2017 12 0 - 70 U/L Final     Bilirubin Total   Date Value Ref Range Status   04/27/2019 0.3 0.2 - 1.3 mg/dL Final   11/30/2017 0.3 0.2 - 1.3 mg/dL Final   11/27/2017 0.5 0.2 - 1.3 mg/dL Final     Lab Results   Component Value Date     05/02/2019    BUN 13 05/02/2019    CO2 29 05/02/2019       Culture data:  All cultures:  Fluid culture 4/25  Staphylococcus epidermidis     Antibiotic Interpretation Sensitivity Method Status    CIPROFLOXACIN Resistant >2.0 ug/mL BRENDON Final    CLINDAMYCIN Sensitive <=0.25 ug/mL BRENDON Final    ERYTHROMYCIN Sensitive <=0.25 ug/mL BRENDON Final    GENTAMICIN Resistant >8.0 ug/mL BRENDON Final    LEVOFLOXACIN Resistant >4.0 ug/mL BRENDON Final    OXACILLIN Resistant >2.0 ug/mL BRENDON Final    TETRACYCLINE Sensitive <=1.0 ug/mL BRENDON Final    VANCOMYCIN Sensitive 2.0 ug/mL BRENDON Final        Recent Labs   Lab 04/30/19 2033 04/30/19 2028   CULT No growth after 2 days No growth after 2 days

## 2019-05-02 NOTE — PLAN OF CARE
Patient plan for discharge: rehab  Current status: Pt requires A for mobility and ADLs. Tolerated seated ADL activity.  Barriers to return to prior living situation: post surgical precautions, activity tolerance  Recommendations for discharge: Per plan established by the OT, the recommendation for dc location is ARU  Rationale for recommendations: Pt would benefit from continued skilled OT to increase ADL/IADl IND prior to return home. Pt would likely tolerate 3 hours of therapy daily to meet multidisciplinary therapy goals.

## 2019-05-02 NOTE — PROGRESS NOTES
"Orthopaedic Surgery Progress Note   May 2, 2019    Subjective:   Afebrile. Tachycardic to 120s. Vitals otherwise unremarkable. Near-syncopal event overnight when getting up from going to the bathroom with HR spike to 160, palpitations, and chest fullness. Was assisted to the floor by nursing and did not have a traumatic fall. Pain controlled. Tolerating PO without nausea or vomiting. Voiding. Last BM 4/29. Passing flatus. OOB and ambulating. No chest pain or shortness of breath this AM. No new numbness or tingling.    Objective: /65 (BP Location: Right arm)   Pulse 103   Temp 98  F (36.7  C) (Oral)   Resp 18   Ht 1.6 m (5' 3\")   Wt 80 kg (176 lb 5.9 oz)   SpO2 99%   BMI 31.24 kg/m      General: awake and alert, no acute distress   Cardio: extremities wwp  Respiratory: non-labored breathing  MSK: minimal serosanguinous drainage to CORINNE drain site dressing and proximal hemovac drain site dressing, otherwise dressings clean/dry/intact.   SILT L3-S1 bilaterally.                L2-3: Hip flexion L and R 5/5 strength                 L4:  Knee extension L and R 5/5 strength                L5:  Foot / EHL dorsiflexion L and R 5/5 strength                S1:  Plantarflexion  L and R 5/5 strength     Hemovac 1: 20/30 over the past 2 nursing shifts  Hemovac 2: 0/0 over the past 2 nursing shifts  Deep drain: 55/30 over the past 2 nursing shifts    Labs:  Hemoglobin   Date Value Ref Range Status   05/01/2019 8.2 (L) 13.3 - 17.7 g/dL Final     Seroma cultures 4/25: seroma specimen 2 growing staph epidermidis    Assessment and Plan: Beulah Jane is a 69 year old male with PMH including aortic stenosis, chronic pain, DM type 2, hyperlipidemia, JUAN now s/p Stage I L3-4, L4-5 ALIF  on 4/22/19 with Dr. Dial and Dr. Alegria. Stage II T10-pelvis PIF, TLIF, SPO on 4/25/19 with Dr. Dial.     Chest pressure and shortness of breath on 4/27, CT chest showing subsegmental PEs.      Seroma cultured in surgery with seroma " specimen 2 growing staph epidermidis.     Medicine Primary  Activity: Up with assist until independent. No excessive bending or twisting. No lifting >10 lbs x 6 weeks. No Faiza lift for transfers.   Weight bearing status: WBAT.  Pain management: Transition from IV to PO as tolerated. No NSAIDs   Antibiotics: IV vancomycin as per ID recs, ID following  Diet: Begin with clear fluids and progress diet as tolerated.   DVT prophylaxis: SCDs only. Continue to hold heparin/warfarin given wound drainage.  Imaging: XR Upright  PTDC - completed.  Labs: trend Hgb   Bracing/Splinting: None.  Dressings: 4x4 and tegaderm. Dressings to be changed by Ortho at Orthopaedic Surgery discretion.  Drains: 3, Document output per shift, will be discontinued at Orthopedic Surgery discretion. Will leave for at least 1 more day given heparin drip started overnight.   [place] catheter: Remove POD#1.   Physical Therapy/Occupational Therapy: Eval and treat.  Cultures: Seroma fluid and debris pending, follow culture results closely.    Follow-up: Clinic with Dr. Dial in 6 weeks with repeat x-rays.   Disposition: Pending progress with therapies, pain control on orals, and medical stability, anticipate discharge to TCU in 3-6 days.      Discussed with Dr. Jayro Vides MD  Orthopaedic Surgery Resident, PGY-1  Pager: [phone]

## 2019-05-02 NOTE — PROGRESS NOTES
Social Work Services Progress Note    Hospital Day: 11  Date of Initial Social Work Evaluation:  4/29/19  Collaborated with:  PT staff, [facility] rehab admissions [facility]    Data:  Pt is a 69-year-old male admitted to [facility] 4/22/19.     Intervention:  Referral pending at  [facility]. Checked in on status of referral. Per discussion with PT and [facility] rehab admissions liaison, based on pt's work with PT today pt may not be able to tolerate acute rehab, but pt estimated to be hospitalized into next week and rehab tolerance may improve. [facility] admissions will continue to follow.    Assessment:  Referral pending at  [facility]    Plan:    Anticipated Disposition:  Facility:  [facility] vs TCU    Barriers to d/c plan:  Medical stability    Follow Up:  SW to follow for discharge planning    CARLOS Larsen, LGSW  5A Unit   Pager [phone]  Phone [phone]

## 2019-05-02 NOTE — PROGRESS NOTES
CLINICAL NUTRITION SERVICES - REASSESSMENT NOTE     Nutrition Prescription    RECOMMENDATIONS FOR MDs/PROVIDERS TO ORDER:  None at this time.     Malnutrition Status:    Patient does not meet two of the above criteria necessary for diagnosing malnutrition but is at risk for malnutrition.     Recommendations already ordered by Registered Dietitian (RD):  Calorie Counts   Thera-Vit-M     Future/Additional Recommendations:  1. Calorie counts to meet at least 60% of estimated energy needs (1140 kcals and 55 g PRO).   -- If needs are not met, recommend further MNT interventions as follows: (1) adjust scheduled snacks/supplements (2) high protein diet education for optimized healing (3) consideration of appetite stimulant to aid in improvement of oral intake      EVALUATION OF THE PROGRESS TOWARD GOALS   Diet: Moderate Consistent CHO + Boost Glucose Control @ 2 pm & Gelatein SF @ 8 pm   Intake: % of 2-3 meals/day per RN flowsheet. Per [facility] review (4/25-5/1), patient has been ordering 1-3 meals/day via kitchen. 7-day meal order average providing 688 kcals (9 kcal/kg) and 37 g PRO (0.5 g/kg), which meets 40-45% of low end of est energy & protein needs. *Assuming 100% of meals consumed.         Unable to obtain updated nutrition information via patient given with cares upon multiple attempts.     NEW FINDINGS   Weight: dry wt of 76.1 kg on 4/22. Overall, fluctuating between 76.1-81 kg throughout LOS, suspect fluid related, at least in part.     Labs: CRP: 52 (abnormal)--trending down; BGMs (24-hr): 109-168 mg/dL (goal < 180 mg/dL)     GI: per I/Os, last BM (x3) on 4/28.       Dosing Weight: 76 kg (driest/actual weight on 4/22)     ASSESSED NUTRITION NEEDS  Estimated Energy Needs: 1900 - 2280 kcals/day (25 - 30 kcals/kg)  Justification: Maintenance  Estimated Protein Needs: 91 - 114 grams protein/day (1.2 - 1.5 grams of pro/kg)  Justification: Post-op    MALNUTRITION  % Intake: < 75% for > 7 days (non-severe)  %  Weight Loss: None noted  Subcutaneous Fat Loss: None observed (per RD assess on 4/26)   Muscle Loss: None observed (per RD assess on 4/26)  Fluid Accumulation/Edema: Moderate  Malnutrition Diagnosis: Patient does not meet two of the above criteria necessary for diagnosing malnutrition but is at risk for malnutrition.     Previous Goals   Patient to consume % of nutritionally adequate meal trays TID, or the equivalent with supplements/snacks.  Evaluation: Not met    Previous Nutrition Diagnosis  Inadequate energy intake related to decreased appetite d/d post-op status as evidenced by 0% intake the past 36 hours.  Evaluation: Improving    CURRENT NUTRITION DIAGNOSIS  Inadequate oral intake related to varied appetite as evidenced by % of 2-3 meals/day and per [facility], 7-day meal order average providing 688 kcals (9 kcal/kg) and 37 g PRO (0.5 g/kg).       INTERVENTIONS  Implementation  Calorie Counts   Multivitamin/mineral supplement therapy (see above)     Goals  Total avg nutritional intake to meet a minimum of 25 kcal/kg and 1.2 g PRO/kg daily (per dosing wt 76 kg).    Monitoring/Evaluation  Progress toward goals will be monitored and evaluated per protocol.      Venita Bailey RD, LD   7B floor pager [phone]

## 2019-05-02 NOTE — PROGRESS NOTES
[facility], [facility] Progress Note - Hospitalist Service, Gold 6       Date of Admission:  4/22/2019  Assessment & Plan   Beulah Jane is a 69 year old male with hx of HTN, hypothyroidism, T2DM, ILD, dermatomyositis, aortic stenosis, and GERD,admitted on 4/22/2019. He is now status post stage I lumbar ALIF on 4/22 by Ed Dial and Raji with planned stage II operation on 4/25.      Plan for today:  - continue vancomycin per ID to complete a 2 wk course  - continue prednisone 5 mg BID for the next several days until pt up oob working with therapies on a regular basis, then can switch to daily  - continue supplemental O2 via NC to keep sats >92% and wean as able  - trending CRP to assess for need to repeat image to look for new abscess   - suppository   - trial of compression stockings     Near syncopal event 5/1/19: likely due to combination of ILD, aortic stenosis, possible subsegmental PEs. Trop lower than prior, CTPE unchanged, lytes normal  - cont to monitor with tele  - strict I/O   - ideally restart anticoagulation in 2 wks, unless hemodynamically unstable prior to that time (after discussion with ortho)    S/p stage I lumbar fusion, 4/22, and stage II, 4/25:  By Ed Dial and Raji. Tolerated second procedure well. Currently states incisional pain tolerable on current pain med regimen as recommended by pain service. Abd incisions appear to be healing well without discharge or dehiscence.   - ortho continuing to co-manage   - wound had slow drip of blood: due to concern of it not healing the anticoagulation was stopped (see below)    Intra-op culture (4/25) of seroma with staph epi and normal skin marlee: ID consulted, seroma not communicating with deep wound per visualization during surgery, favor treating since this normal skin marlee and staph epi are causing re-accumulation of his seroma and in the setting of 2 surgical procedures with hardware. CRP 74 on 5/1  and 52 on 5/2. So, complete 2 wk course of vanc for this wound infection (favor treating aggressively due to his history and the sensitive area of his infection) Susceptibilities have returned: staph epi is sensitive to clinda, erythromycin, tetracycline, and vanc. Consider reimaging if CRP increases in addition to WBC or he develops clinical signs of an infection.  - cont vanc per ID recs to complete a 2 wk course  - trending CRP with WBC    Chest pain, suggestive of angina  Dyspnea on exertion  Severe aortic stenosis, moderate aortic regurgitation  Type II NSTEMI  Subsegmental Pulmonary Emboli  Cardiology consultation on 4/29 reassured that this is not likely ACS, but it is likely multifactoral (ILD, PE, severe aortic stenosis). Trop likely elevated because of demand ischemia (and has now downtrended). Forced to discontinue his anticoagulation on 4/30/2019 due to his new bleeding. GONZALEZ and hypoxia could be more from ILD and severe aortic stenosis. Will not treat subsegmental PE as this risks the wound not healing (which could require an extensive surgery to close the wound that we are hoping to avoid).  - coronary angiogram and TAVR CT planned as an outpt for evaluation (will follow up in structural cardiology valve clinic)- this will all be coordinated by cards  - monitor for hemodynamic instability without anticoagulation      Dermatomyositis (Shannon-1 deepak +), ILD:  Followed OP by Dr. Call at  Rheumatology clinic in [place]. Chronically on methotrexate 25 mg once weekly, last taken however, two weeks ago at the advice of Dr. Call. Also recently nearly completed protracted taper of prednisone, started on 2/7 at 20 mg x 2 wks, then decrease by 5 mg q2wks, so pt was on 5 mg daily day of surgery with a few days left of taper. Most recent PFTs with reduced DLCO so received rituximab infusion on 3/11. Not normally on O2 PTA, but currently on 2L most likely the need is multifactorial and due to surgeries, ILD,  deconditioned state, anemia, opioid use, and last nights small subseqmental PEs.    - Continue oral prednisone but in acute post-op setting, continue 5 mg BID for the next several days until pt up oob working with therapies on a regular basis, then can switch to daily. Plan to discontinue when pt no longer requires supplemental O2 and/or until follow up after discharge with Rheumatology  - Continue with PTA theophylline, scheduled Arnuity Ellipta, and prn albuterol inhaler/nebs  - Continue supplemental O2 via NC to keep O2 sats >92%, wean as able   - Encourage IS q2hrs     - Plan to restart methotrexate when we are convinced there isn't ongoing concern for infection and when no concern for lack of wound healing from surgery perspective (1 week after stage II surgery is 2 May)     DM2:  Most recent HgbA1C from 1/29/19, 6.3%. PTA on PRN insulin isophane 6U BID. Glucoses well-controlled currently in hospital  - cont to hold home NPH  - cont sliding scale insulin  - cont hypoglycemia protocol    Acute blood loss anemia: Pre-op Hgb on 4/2, 15.9. EBL from stage I 400 ml. EBL from stage  ml with 350 ml pRBCs returned via cell saver 350 ml.. Most recent Hgb stable with no e/o significant active bleeding on exam.   - Monitor CBCs    Leukocytosis: 15.7 WBC on the am after surgery (thought to be stress degranulation)--> 10.8 on 4/29-->12.9 on 4/30-->15.8 and 13.7 on 5/1--> 14 on 5/2. Taking chronic prednisone, no signs of a new infectious process (treating staph epi and normal skin marlee with vanc). 4/30: CXR unchanged, UA unconcerning, bld cx pending with NGTD.  - cont to monitor along with CRPs and clinically  - crp trending    HTN:  PTA on verapamil. Currently BPs normotensive.   - Continue PTA verapamil  daily with parameters to hold     Acute constipation:  Most likely opioid induced  - Continue Miralax to BID  - Continue Senokot-S, two tabs BID   - suppository to be given 5/2     Hypothyroidism:  Euthyroid  based on normal TSH on 1/10/19  - continue PTA levothyroxine 50 mcg daily    Diet: Snacks/Supplements Adult: Boost Glucose Control; Between Meals  Snacks/Supplements Adult: Gelatein sugar-free; Between Meals  Moderate Consistent CHO Diet  Room Service    DVT Prophylaxis: none, with anticoagulation stopped due to bleeding from wound  [place] Catheter: not present  Code Status: Full    Disposition Plan   Expected discharge: 4 - 7 days, recommended to transitional care unit once adequate pain management/ tolerating PO medications, antibiotic plan established and hemoglobin stable.  Entered: Sofía Retana MD 05/02/2019, 6:43 AM       The patient's care was discussed with the Bedside Nurse, Beulah, and orthopedics     Sofía Retana MD  Hospitalist Service, [address], [place]  Pager: [phone]  Please see sticky note for cross cover information  ______________________________________________________________________    Interval History   Last night Beulah had a presyncopal episode and didn't fall (thanks to nursing aid).  His HR was 160 with cold extremities. His HR slowed to 120s with an EKG showing RBBB and his trop being lower than prior. His CTPE protocol showed unchanged subsegmental clots. His electrolytes were not abnormal.  He had just urinated, and it is possible this was triggered by micturition. His pain is well controlled with his current regimen. He continues to have significant GONZALEZ that makes him rest when exerting. At rest he requires only 2LPM NC. No fevers or chills. Has been several days since his last BM.    Data reviewed today: I reviewed all medications, new labs and imaging results over the last 24 hours. I personally reviewed no images or EKG's today.    Physical Exam   Vital Signs: Temp: 95.5  F (35.3  C) Temp src: Oral BP: 136/56 Pulse: 103 Heart Rate: 86 Resp: 18 SpO2: 100 % O2 Device: Nasal cannula Oxygen Delivery: 2 LPM  Weight: 176 lbs 5.89  oz    General: lying in bed, NAD  Resp: Breathing comfortably, no wheezes, bibasilar crackles  CV: RRR, III/VI systolic murmur loudest at base, soft S2, soft diastolic murmur.   Abdomen: Non-tender, active BS  Back: dressings clean, hemovacs with sanguinous output  CORINNE drain with sanguinous output  Ext: 2+ Pitting edema in devonte feet (Rt>Lt)    Data   Recent Labs   Lab 05/02/19  0554 05/01/19  2212 05/01/19  0453 04/30/19  0456  04/28/19  1047 04/28/19  0213 04/27/19  2113   WBC 14.0* 13.7* 15.8* 12.9*   < >  --  13.2* 13.0*   HGB 7.8* 8.2* 8.7* 7.3*   < >  --  7.8* 8.7*   MCV 96 97 95 95   < >  --  95 96    348 401 305   < >  --  252 302   INR 1.18*  --  1.15* 1.24*   < > 1.29*  --   --     138 136 138   < >  --  139 136   POTASSIUM 3.9 3.8 4.1 4.3   < >  --  4.1 4.0   CHLORIDE 102 102 99 101   < >  --  103 102   CO2 29 26 31 30   < >  --  28 29   BUN 13 15 16 14   < >  --  20 16   CR 0.74 0.78 0.75 0.73   < >  --  0.80 0.83   ANIONGAP 8 10 6 6   < >  --  8 5   DMITRIY 8.6 8.5 9.1 8.4*   < >  --  8.1* 8.2*   * 183* 117* 113*   < >  --  139* 173*   ALBUMIN  --   --   --   --   --   --   --  2.3*   PROTTOTAL  --   --   --   --   --   --   --  5.6*   BILITOTAL  --   --   --   --   --   --   --  0.3   ALKPHOS  --   --   --   --   --   --   --  95   ALT  --   --   --   --   --   --   --  56   AST  --   --   --   --   --   --   --  90*   TROPI  --  0.064*  --   --   --  0.376* 0.381* 0.132*    < > = values in this interval not displayed.     Recent Results (from the past 24 hour(s))   CT Chest Pulmonary Embolism w Contrast    Narrative    EXAMINATION: CT CHEST PULMONARY EMBOLISM W CONTRAST, 5/2/2019 12:14 AM      COMPARISON: CT angiogram 4/27/2019.    HISTORY: Known subsegmental PEs. Not on a/c due to bleeding risk.  Worsening tachycardia, near fainting;     TECHNIQUE: Volumetric helical acquisition of CT images of the chest  from the lung apices to the kidneys were acquired after the  administration of 62  mL of Isovue-370 IV contrast. Three-dimensional  (3D) post-processed angiographic images were reconstructed, archived  to PACS and used in interpretation of this study.    FINDINGS: Since 4/27/2019, unchanged upper lobe subsegmental pulmonary  embolism. No new pulmonary embolism. Significant cardiomegaly,  unchanged. No pericardial effusion. LAD stent. Contrast reflux to the  hepatic veins.    50% decreased diameter of the trache . Redemonstration of interlobular  septal thickening and peribronchial groundglass opacities. No  pneumothorax.    Upper abdomen: Partially visualized upper abdomen is unremarkable.    Bone and soft tissue: Posterior fixation of the distal thoracic and  lumbar spine with multilevel disc spacers.      Impression    IMPRESSION:   1.  Since 4/27/2019, unchanged upper lobes subsegmental pulmonary  emboli. No new pulmonary embolism.  2.  Unchanged moderate pulmonary edema and significant cardiomegaly.  3.  Tracheomalacia.     Medications       famotidine  20 mg Oral Q12H     fluticasone  1 puff Inhalation Daily     gabapentin  600 mg Oral TID     insulin aspart  1-7 Units Subcutaneous TID AC     insulin aspart  1-5 Units Subcutaneous At Bedtime     levothyroxine  50 mcg Oral QAM AC     lidocaine  1-3 patch Transdermal Q24h    And     lidocaine   Transdermal Q24H    And     lidocaine   Transdermal Q8H     methocarbamol  500 mg Oral TID     pantoprazole  40 mg Oral QAM AC     polyethylene glycol  17 g Oral Daily     predniSONE  5 mg Oral BID w/meals     senna-docusate  2 tablet Oral BID     sodium chloride (PF)  3 mL Intracatheter Q8H     sulfamethoxazole-trimethoprim  1 tablet Oral Q Mon Wed Fri AM     theophylline  400 mg Oral QAM     vancomycin (VANCOCIN) IV  1,750 mg Intravenous Q12H     verapamil ER  120 mg Oral At Bedtime

## 2019-05-02 NOTE — PLAN OF CARE
Time [id number]    Vitals: Stable with a soft BP this afternoon and tachycardic with HR 120s at that time. MD paged to notify that patient triggered sepsis protocol.   Activity: A1 with rolling walker. Worked with Therapy today.   Pain: Oxycodone and Tylenol q 3-4 hours for pain. Pt reports pain is better today except when he gets behind on pain meds. Pain worse with movement.    Neuro: A/O x 4. Korean and English speaking.      Cardiac: Tele NSR or sinus tachycardia. Mostly tachycardia throughout the afternoon.     Respiratory:  2L O2 via NC. Shortness of breathe noted when patient ambulates.   GI/: BM in toilet after receiving a suppository.   Diet: Regular. Starting calorie counts tomorrow. Encouraging meals.   Lines: PIV infusing IV vanco and IV bolus. Midline saline locked - no blood return. Vascular Access RN contacted and not advised to put TPA in a midline. CORINNE and two hemovac sites CDI with dressings. Dressing on spine CDI. No dressing changes done as orders said Ortho changes them.     Endocrine: BGs before meals. >140 today. No sliding scale insulin required.   Labs/imaging:  Lactic acid 2.8.        New changes this shift:  Sepsis protocol triggered with elevated lactic acid - 500mL bolus given. Repeat lactic acid level ordered for tonight. Family visiting this evening. Started compression stockings on BLE for edema.      Plan: IV antibiotics for 7 more days per ID note. Ortho following for drains and incisions.     Continue to monitor and follow POC

## 2019-05-03 NOTE — PHARMACY-VANCOMYCIN DOSING SERVICE
Pharmacy Vancomycin Note  Date of Service May 2, 2019  Patient's  1950   69 year old, male    Indication: Seroma with fluid culture positive for Staphylococcus epidermidis (vancomycin BRENDON = 2)  Goal Trough Level: 15-20 mg/L  Day of Therapy: 5  Current Vancomycin regimen:  1750 mg IV q12h    Current estimated CrCl = Estimated Creatinine Clearance: 88.6 mL/min (based on SCr of 0.74 mg/dL).    Creatinine for last 3 days  2019:  4:56 AM Creatinine 0.73 mg/dL  2019:  4:53 AM Creatinine 0.75 mg/dL; 10:12 PM Creatinine 0.78 mg/dL  2019:  5:54 AM Creatinine 0.74 mg/dL    Recent Vancomycin Levels (past 3 days)  2019:  6:48 AM Vancomycin Level 15.1 mg/L  2019:  7:17 PM Vancomycin Level 21.3 mg/L    Vancomycin IV Administrations (past 72 hours)                   vancomycin (VANCOCIN) 1,750 mg in sodium chloride 0.9 % 500 mL intermittent infusion (mg) 1,750 mg New Bag 19 0835     1,750 mg New Bag 19 2044     1,750 mg New Bag  1108     1,750 mg New Bag 19 2110                Nephrotoxins and other renal medications (From now, onward)    Start     Dose/Rate Route Frequency Ordered Stop    19 2100  vancomycin 1500 mg in 0.9% NaCl 250 ml intermittent infusion 1,500 mg      1,500 mg  over 90 Minutes Intravenous EVERY 12 HOURS 19 2359             Contrast Orders - past 72 hours (72h ago, onward)    Start     Dose/Rate Route Frequency Ordered Stop    19 2345  iopamidol (ISOVUE-370) solution 62 mL      62 mL Intravenous ONCE 19 2339 19 2356          Interpretation of levels and current regimen:  Vancomycin Level (21.3 mg/L) is slightly above goal range     Has serum creatinine changed > 50% in last 72 hours: No  Urine output:  unable to determine  Renal Function: Stable    Plan:  1.  Decrease Dose to 1500 mg IV Q12H  2.  Pharmacy will check trough levels as appropriate in 3-5 days.   3. Serum creatinine levels will be ordered daily for the first  week of therapy and at least twice weekly for subsequent weeks.      Birgit Anderson, PharmD, BCPS  Pager [phone]      .

## 2019-05-03 NOTE — SIGNIFICANT EVENT
[id number]:  Tachycardic, stable on 2LPM via NC; w/ exertion- HR & oxygen needs increase, 4LPM. Pt stable up to bathroom w/ assist of 1. BM x1. Pt's pain managed w/ tramadol x1, oxycodone 10mg x1, & tylenol x1. Hemovac's w/o further output. CORINNE drain 30cc sanguinous output. Lactic acid was rechecked after bolus for 2.7, MD notified, no further interventions done at this time. Continue to monitor & w/ POC.

## 2019-05-03 NOTE — PLAN OF CARE
Discharge Planner PT   Patient plan for discharge: rehab  Current status: pt Porfirio bed mobility, stands to 4WW CGA, SBA with toileting for steadiness as pt able to manage briefs without assist, ambulates 300' with 4WW CGA progressed to SBA. Pt initially on 3L with VSS for first 200' of ambulation, then desats to 85% needing increased O2 to 4L to maintain stable vitals.  Barriers to return to prior living situation: medical needs, reduced activity tolerance, mobility training within spinal precautions, stairs  Recommendations for discharge: TCU  Rationale for recommendations: Pt currently below baseline level of function and would benefit from ongoing therapy to address the above deficits in order to progress towards PLOF and promote IND mobility.       Entered by: Jaky Gill 05/03/2019 4:26 PM

## 2019-05-03 NOTE — PROGRESS NOTES
[facility] Progress Note - Hospitalist Service, Gold 6       Date of Admission:  4/22/2019  Assessment & Plan   Beulah Jane is a 69 year old male with hx of HTN, hypothyroidism, T2DM, ILD, dermatomyositis, aortic stenosis, and GERD,admitted on 4/22/2019. He is now status post stage I lumbar ALIF on 4/22 by Ed Dial and Raji with planned stage II operation on 4/25.      Plan for today:  - continue vancomycin per ID to complete a 2 wk course (through 5/9)  - continue prednisone 5 mg BID for the next several days until pt up oob working with therapies on a regular basis, then can switch to daily  - continue supplemental O2 via NC to keep sats >92% and wean as able  - trending CRP to assess for need to repeat image to look for new abscess   - cont compression stockings  - 500 mL bolus NS     Near syncopal event 5/1/19: likely due to combination of ILD, aortic stenosis, possible subsegmental PEs. Trop lower than prior, CTPE unchanged, lytes normal  - cont to monitor with tele  - strict I/O   - ideally restart anticoagulation in 2 wks, unless hemodynamically unstable prior to that time (after discussion with ortho)    S/p stage I lumbar fusion, 4/22, and stage II, 4/25:  By Ed Dial and Raji. Tolerated second procedure well. Currently states incisional pain tolerable on current pain med regimen as recommended by pain service. Abd incisions appear to be healing well without discharge or dehiscence.   - ortho continuing to co-manage   - wound had slow drip of blood: due to concern of it not healing the anticoagulation was stopped (see below)    Intra-op culture (4/25) of seroma with staph epi and normal skin marlee: ID consulted, seroma not communicating with deep wound per visualization during surgery, favor treating since this normal skin marlee and staph epi are causing re-accumulation of his seroma and in the setting of 2 surgical procedures with hardware.  CRP 74 on 5/1 and 52 on 5/2. So, complete 2 wk course of vanc for this wound infection (favor treating aggressively due to his history and the sensitive area of his infection) Susceptibilities have returned: staph epi is sensitive to clinda, erythromycin, tetracycline, and vanc. Consider reimaging if CRP increases in addition to WBC or he develops clinical signs of an infection.  - cont vanc per ID recs to complete a 2 wk course  - trending CRP with WBC--> CRP decreasing steadily    Chest pain, suggestive of angina  Dyspnea on exertion  Severe aortic stenosis, moderate aortic regurgitation  Type II NSTEMI  Subsegmental Pulmonary Emboli  Cardiology consultation on 4/29 reassured that this is not likely ACS, but it is likely multifactoral (ILD, PE, severe aortic stenosis). Trop likely elevated because of demand ischemia (and has now downtrended). Forced to discontinue his anticoagulation on 4/30/2019 due to his new bleeding. GONZALEZ and hypoxia could be more from ILD and severe aortic stenosis. Will not treat subsegmental PE as this risks the wound not healing (which could require an extensive surgery to close the wound that we are hoping to avoid).  - coronary angiogram and TAVR CT planned as an outpt for evaluation (will follow up in structural cardiology valve clinic)- this will all be coordinated by cards  - monitor for hemodynamic instability without anticoagulation      Dermatomyositis (Shannon-1 deepak +), ILD:  Followed OP by Dr. Call at  Rheumatology clinic in [place]. Chronically on methotrexate 25 mg once weekly, last taken however, two weeks ago at the advice of Dr. Call. Also recently nearly completed protracted taper of prednisone, started on 2/7 at 20 mg x 2 wks, then decrease by 5 mg q2wks, so pt was on 5 mg daily day of surgery with a few days left of taper. Most recent PFTs with reduced DLCO so received rituximab infusion on 3/11. Not normally on O2 PTA, but currently on 2L most likely the need is  multifactorial and due to surgeries, ILD, deconditioned state, anemia, opioid use, and last nights small subseqmental PEs.    - Continue oral prednisone but in acute post-op setting, continue 5 mg BID for the next several days until pt up oob working with therapies on a regular basis, then can switch to daily. Plan to discontinue when pt no longer requires supplemental O2 and/or until follow up after discharge with Rheumatology  - Continue with PTA theophylline, scheduled Arnuity Ellipta, and prn albuterol inhaler/nebs  - Continue supplemental O2 via NC to keep O2 sats >92%, wean as able   - Encourage IS q2hrs     - Plan to restart methotrexate when we are convinced there isn't ongoing concern for infection and when no concern for lack of wound healing from surgery perspective (1 week after stage II surgery is 2 May)     DM2:  Most recent HgbA1C from 1/29/19, 6.3%. PTA on PRN insulin isophane 6U BID. Glucoses well-controlled currently in hospital  - cont to hold home NPH  - cont sliding scale insulin  - cont hypoglycemia protocol    Acute blood loss anemia: Pre-op Hgb on 4/2, 15.9. EBL from stage I 400 ml. EBL from stage  ml with 350 ml pRBCs returned via cell saver 350 ml.. Most recent Hgb stable with no e/o significant active bleeding on exam.   - Monitor CBCs    Leukocytosis: 15.7 WBC on the am after surgery (thought to be stress degranulation)--> 10.8 on 4/29-->12.9 on 4/30-->15.8 and 13.7 on 5/1--> 14 on 5/2. Taking chronic prednisone, no signs of a new infectious process (treating staph epi and normal skin marlee with vanc). 4/30: CXR unchanged, UA unconcerning, bld cx pending with NGTD.  - cont to monitor along with CRPs and clinically  - crp trending down- cont to monitor    HTN:  PTA on verapamil. Currently BPs normotensive.   - Continue PTA verapamil  daily with parameters to hold     Acute constipation:  Most likely opioid induced  - Continue Miralax to BID  - Continue Senokot-S, two tabs BID    - suppository to be given 5/2     Hypothyroidism:  Euthyroid based on normal TSH on 1/10/19  - continue PTA levothyroxine 50 mcg daily    Diet: Snacks/Supplements Adult: Boost Glucose Control; Between Meals  Snacks/Supplements Adult: Gelatein sugar-free; Between Meals  Moderate Consistent CHO Diet  Room Service  Calorie Counts    500 mL NS bolus again on 5/3  DVT Prophylaxis: none, with anticoagulation stopped due to bleeding from wound  [place] Catheter: not present  Code Status: Full    Disposition Plan   Expected discharge: 4 - 7 days, recommended to transitional care unit once adequate pain management/ tolerating PO medications, antibiotic plan established and hemoglobin stable.  Entered: Sofía Retana MD 05/03/2019, 6:41 AM       The patient's care was discussed with the Bedside Nurse and Beulah Retana MD  Hospitalist Service, [address], [place]  Pager: [phone]  Please see sticky note for cross cover information  ______________________________________________________________________    Interval History   Last night Beulah slept some and has had adequately controlled pain. His only question is when he will be able to discharge.  He needs 2LPM NC at rest, and he still has increased dyspnea with exertion.  He did have a BM with the suppository on 5/2.  Remainder of 4 pt ROS neg    Data reviewed today: I reviewed all medications, new labs and imaging results over the last 24 hours. I personally reviewed no images or EKG's today.    Physical Exam   Vital Signs: Temp: 97.3  F (36.3  C) Temp src: Oral BP: 118/56   Heart Rate: 111 Resp: 18 SpO2: 99 % O2 Device: Nasal cannula Oxygen Delivery: 2 LPM  Weight: 178 lbs 8 oz    General: lying in bed, NAD  Resp: Breathing comfortably, no wheezes, bibasilar crackles  CV: RRR, III/VI systolic murmur loudest at base, soft S2, soft diastolic murmur.   Abdomen: Non-tender, active BS  Back: dressings clean, hemovacs with  sanguinous output  CORINNE drain with sanguinous output  Ext:1+ Pitting edema in devonte feet with compression stockings on     Data   Recent Labs   Lab 05/03/19  0611 05/02/19  0554 05/01/19  2212 05/01/19  0453 04/30/19  0456  04/28/19  1047 04/28/19  0213 04/27/19  2113   WBC 18.4* 14.0* 13.7* 15.8* 12.9*   < >  --  13.2* 13.0*   HGB 8.7* 7.8* 8.2* 8.7* 7.3*   < >  --  7.8* 8.7*   MCV 95 96 97 95 95   < >  --  95 96   * 336 348 401 305   < >  --  252 302   INR  --  1.18*  --  1.15* 1.24*   < > 1.29*  --   --    NA  --  139 138 136 138   < >  --  139 136   POTASSIUM  --  3.9 3.8 4.1 4.3   < >  --  4.1 4.0   CHLORIDE  --  102 102 99 101   < >  --  103 102   CO2  --  29 26 31 30   < >  --  28 29   BUN  --  13 15 16 14   < >  --  20 16   CR  --  0.74 0.78 0.75 0.73   < >  --  0.80 0.83   ANIONGAP  --  8 10 6 6   < >  --  8 5   DMITRIY  --  8.6 8.5 9.1 8.4*   < >  --  8.1* 8.2*   GLC  --  168* 183* 117* 113*   < >  --  139* 173*   ALBUMIN  --   --   --   --   --   --   --   --  2.3*   PROTTOTAL  --   --   --   --   --   --   --   --  5.6*   BILITOTAL  --   --   --   --   --   --   --   --  0.3   ALKPHOS  --   --   --   --   --   --   --   --  95   ALT  --   --   --   --   --   --   --   --  56   AST  --   --   --   --   --   --   --   --  90*   TROPI  --   --  0.064*  --   --   --  0.376* 0.381* 0.132*    < > = values in this interval not displayed.     Medications       famotidine  20 mg Oral Q12H     fluticasone  1 puff Inhalation Daily     gabapentin  600 mg Oral TID     insulin aspart  1-7 Units Subcutaneous TID AC     insulin aspart  1-5 Units Subcutaneous At Bedtime     levothyroxine  50 mcg Oral QAM AC     lidocaine  1-3 patch Transdermal Q24h    And     lidocaine   Transdermal Q24H    And     lidocaine   Transdermal Q8H     methocarbamol  500 mg Oral TID     multivitamin w/minerals  1 tablet Oral Daily     pantoprazole  40 mg Oral QAM AC     polyethylene glycol  17 g Oral Daily     predniSONE  5 mg Oral BID w/meals      senna-docusate  2 tablet Oral BID     sodium chloride (PF)  3 mL Intracatheter Q8H     sulfamethoxazole-trimethoprim  1 tablet Oral Q Mon Wed Fri AM     theophylline  400 mg Oral QAM     vancomycin (VANCOCIN) IV  1,500 mg Intravenous Q12H     verapamil ER  120 mg Oral At Bedtime

## 2019-05-03 NOTE — PLAN OF CARE
VSS. Alert and Oriented. Cont to complained of back pain, SEE EMAR for PRN meds given. Assist of one to the bathroom. Voiding good amount of urine. NO BM this shift. Neuro's WNL. LA 2.1 received bolus of   ML. CORINNE's and hemovac patent. Last hemoglobin 8.7. Back dressing dry and intact. Tele -NSR. PT/OT per scheduled. Pt is able to make his needs known. Cont plan of care.

## 2019-05-03 NOTE — PROGRESS NOTES
"Orthopaedic Surgery Progress Note   May 3, 2019    Subjective:   Afebrile. Tachycardic to 110s. Vitals otherwise unremarkable. No issues overnight. Pain controlled. Tolerating PO without nausea or vomiting. Voiding. BM x 2. OOB and ambulating. Some chest tightness this AM over inferior portion of anterior chest/diaphragm. No shortness of breath. No new numbness or tingling.    Objective: /56 (BP Location: Right arm)   Pulse 103   Temp 97.3  F (36.3  C) (Oral)   Resp 18   Ht 1.6 m (5' 3\")   Wt 81 kg (178 lb 8 oz)   SpO2 99%   BMI 31.62 kg/m      General: awake and alert, no acute distress   Cardio: extremities wwp  Respiratory: non-labored breathing  MSK: minimal serosanguinous drainage to CORINNE drain site dressing and proximal hemovac drain site dressing, otherwise dressings clean/dry/intact.   SILT L3-S1 bilaterally.                L2-3: Hip flexion L and R 5/5 strength                 L4:  Knee extension L and R 5/5 strength                L5:  Foot / EHL dorsiflexion L and R 5/5 strength                S1:  Plantarflexion  L and R 5/5 strength     Hemovac 1: 0/0 over the past 2 nursing shifts  Hemovac 2: 0/0 over the past 2 nursing shifts  Deep drain: 70/105 over the past 2 nursing shifts    Labs:  Hemoglobin   Date Value Ref Range Status   05/02/2019 7.8 (L) 13.3 - 17.7 g/dL Final     Seroma cultures 4/25: seroma specimen 2 growing staph epidermidis    Assessment and Plan: Beulah Jane is a 69 year old male with PMH including aortic stenosis, chronic pain, DM type 2, hyperlipidemia, JUAN now s/p Stage I L3-4, L4-5 ALIF  on 4/22/19 with Dr. Dial and Dr. Alegria. Stage II T10-pelvis PIF, TLIF, SPO on 4/25/19 with Dr. Dial.     Chest pressure and shortness of breath on 4/27, CT chest showing subsegmental PEs.      Seroma cultured in surgery with seroma specimen 2 growing staph epidermidis.     Medicine Primary  Activity: Up with assist until independent. No excessive bending or twisting. No " lifting >10 lbs x 6 weeks. No Faiza lift for transfers.   Weight bearing status: WBAT.  Pain management: Transition from IV to PO as tolerated. No NSAIDs   Antibiotics: IV vancomycin as per ID recs, ID following  Diet: Begin with clear fluids and progress diet as tolerated.   DVT prophylaxis: SCDs only. Continue to hold heparin/warfarin given wound drainage - plan to hold for 2 weeks (until a 5/17).  Imaging: XR Upright  PTDC - completed.  Labs: trend Hgb   Bracing/Splinting: None.  Dressings: 4x4 and tegaderm. Dressings to be changed by Ortho at Orthopaedic Surgery discretion.  Drains: 3, Document output per shift, will be discontinued at Orthopedic Surgery discretion. Will leave for at least 1 more day given heparin drip started overnight.   [place] catheter: Remove POD#1.   Physical Therapy/Occupational Therapy: Eval and treat.  Cultures: Seroma fluid and debris pending, follow culture results closely.    Follow-up: Clinic with Dr. Dial in 6 weeks with repeat x-rays.   Disposition: Pending progress with therapies, pain control on orals, and medical stability, anticipate discharge to TCU in 3-6 days.      Discussed with Dr. Jayro Vides MD  Orthopaedic Surgery Resident, PGY-1  Pager: [phone]

## 2019-05-04 NOTE — PLAN OF CARE
VSS. Alert and Oriented. Cont to complained of back pain. SEE EMAR for PRN meds given.NO nausea/emesis. GONZALEZ. Last hemoglobin 7.7. Tele- NSR.Up walking with Physical Therapist. Did have medium BM this afternoon. Cont on heri counts. Enc  to increased po intake.Bg'S 103,123.  Hemovac zero output, CORINNE'S total of 80 ml's serosanguinous. Back dressing dry and intact.PT recommend TCU placement. Cont plan of care.

## 2019-05-04 NOTE — PROGRESS NOTES
"Orthopaedic Surgery Progress Note   May 4, 2019    Subjective:   Afebrile. Tachycardic to 100s. Vitals otherwise unremarkable. Pain controlled. Tolerating PO without nausea or vomiting. Voiding. Last BM 5/2. OOB and ambulating. No chest pain or shortness of breath. No new numbness or tingling.    Objective: /60 (BP Location: Right arm)   Pulse 108   Temp 97.8  F (36.6  C) (Oral)   Resp 16   Ht 1.6 m (5' 3\")   Wt 81 kg (178 lb 8 oz)   SpO2 100%   BMI 31.62 kg/m      General: awake and alert, no acute distress   Cardio: extremities wwp  Respiratory: non-labored breathing  MSK: minimal serosanguinous drainage to CORINNE drain site dressing and proximal hemovac drain site dressing, otherwise dressings clean/dry/intact.   SILT L3-S1 bilaterally.                L2-3: Hip flexion L and R 5/5 strength                 L4:  Knee extension L and R 5/5 strength                L5:  Foot / EHL dorsiflexion L and R 5/5 strength                S1:  Plantarflexion  L and R 5/5 strength     Hemovac 1: 0/0 over the past 2 nursing shifts  Hemovac 2: 0/0 over the past 2 nursing shifts  Deep drain: 90/-/55 over the past 2 nursing shifts    Labs:  Hemoglobin   Date Value Ref Range Status   05/04/2019 7.7 (L) 13.3 - 17.7 g/dL Final     Seroma cultures 4/25: seroma specimen 2 growing staph epidermidis    Assessment and Plan: Beulah Jane is a 69 year old male with PMH including aortic stenosis, chronic pain, DM type 2, hyperlipidemia, JUAN now s/p Stage I L3-4, L4-5 ALIF  on 4/22/19 with Dr. Dial and Dr. Alegria. Stage II T10-pelvis PIF, TLIF, SPO on 4/25/19 with Dr. Dial.     Chest pressure and shortness of breath on 4/27, CT chest showing subsegmental PEs.      Seroma cultured in surgery with seroma specimen 2 growing staph epidermidis.     Medicine Primary  Activity: Up with assist until independent. No excessive bending or twisting. No lifting >10 lbs x 6 weeks. No Faiza lift for transfers.   Weight bearing status: " WBAT.  Pain management: Transition from IV to PO as tolerated. No NSAIDs   Antibiotics: IV vancomycin as per ID recs, ID following  Diet: Begin with clear fluids and progress diet as tolerated.   DVT prophylaxis: SCDs only. Continue to hold heparin/warfarin given wound drainage - plan to hold for 2 weeks (until a 5/17).  Imaging: XR Upright  PTDC - completed.  Labs: trend Hgb   Bracing/Splinting: None.  Dressings: 4x4 and tegaderm. Dressings to be changed by Ortho at Orthopaedic Surgery discretion.  Drains: 3, Document output per shift, will be discontinued at Orthopedic Surgery discretion. Will leave for at least 1 more day given heparin drip started overnight.   [place] catheter: Remove POD#1.   Physical Therapy/Occupational Therapy: Eval and treat.  Cultures: Seroma fluid and debris pending, follow culture results closely.    Follow-up: Clinic with Dr. Dial in 6 weeks with repeat x-rays.   Disposition: Pending progress with therapies, pain control on orals, and medical stability, anticipate discharge to TCU in 3-6 days.      Discussed with Dr. Jayro Vides MD  Orthopaedic Surgery Resident, PGY-1  Pager: [phone]

## 2019-05-04 NOTE — PLAN OF CARE
Vitals stable. PRN tylenol and oxycodone given for back pain. CORINNE and hemovac patent. BG's 140 and 117. Recheck lactic acid 0.7. Last hemoglobin 7.7. Voiding. No BM this shift.

## 2019-05-04 NOTE — PROGRESS NOTES
[facility]    Sepsis Evaluation Progress Note    Date of Service: 05/04/2019    I was called to see Beulah Jane due to abnormal vital signs triggering the Sepsis SIRS screening alert.  and WBC 18.4. He is known to have an infection.     Physical Exam    Vital Signs:  Temp: 97  F (36.1  C) Temp src: Oral BP: 103/48 Pulse: 108 Heart Rate: 102 Resp: 16 SpO2: 97 % O2 Device: Nasal cannula Oxygen Delivery: 3 LPM    Lab:  Lactic Acid   Date Value Ref Range Status   05/03/2019 2.1 (H) 0.7 - 2.0 mmol/L Final     Lactate for Sepsis Protocol   Date Value Ref Range Status   05/03/2019 2.6 (H) 0.7 - 2.0 mmol/L Final     Comment:     Significant value called to and read back by  ISHA JACKSON ON 5/3/19 AT 2358 BY [id number]         The patient is at baseline mental status.    The rest of their physical exam is significant for: late middle aged man lying comfortably in bed, initially sleeping but awoke to voice, alert and answering questions appropriately; lungs CTAB over anterior lung fields, CV exam with RRR, systolic murmur as before, radialis pulses 2+ bilaterally, extremities warm (R > L), moving all extremities.    Assessment and Plan    The SIRS and exam findings are likely due to mild volume depletion and pain, there is no sign of sepsis at this time.    Patient is certainly at risk of infection with recent procedures and immune suppression. He denies symptoms at this time (dizziness, light-headedness, shortness of breath, chest pain, feeling feverish or chilled) and BP remains normal.     Plan:  - Give 500mL NS  - Recheck lactate in 2 hrs (4am)    --> If not improving, will initiate broader sepsis work-up, consider additional antibiotic coverage  - Morning labs will be drawn in ~ 3 hrs, will follow-up WBC, Hgb, CRP to assess trends    Disposition: The patient will remain on the current unit. We will continue to monitor this patient closely.    Anaya Whitley MD

## 2019-05-04 NOTE — PLAN OF CARE
Patient alert and oriented x4. Oral temp 99.0, , /61, Resp 16, Oxygen sats 98% on 3LPM. Patient triggered sepsis. Lactic acid 2.6. MD notified. Awaiting new orders.

## 2019-05-04 NOTE — PLAN OF CARE
Anaya Baez at bedside for assessment. Vitals stable: Oral temp 97, , /48, Resp. 16, Oxygen sats 97% on 3 LPM. Tele NSR.No changes neuro vitals. Patient denies chest pain no dizziness/lightheadedness or SOB. Order to bolus 500 ml NS at 250 ml/hr and to recheck lactate in 2 hrs. Will continue to monitor and follow plan of care

## 2019-05-04 NOTE — PROGRESS NOTES
Calorie Count  Intake recorded for: 5/3  Total Kcals: 550 Total Protein: 28g  Kcals from Hospital Food: 550   Protein: 28g  Kcals from Outside Food (average):0  Protein: 0g  # Meals Recorded: 100% chicken noodle soup, orange juice.  # Supplements Recorded: 100% 1 Boost Glucose.

## 2019-05-04 NOTE — PROGRESS NOTES
[facility], [facility] Progress Note - Hospitalist Service, Gold [place]       Date of Admission:  4/22/2019  Assessment & Plan   Beulah Jane is a 69 year old male with hx of HTN, hypothyroidism, T2DM, ILD, dermatomyositis, aortic stenosis, and GERD,admitted on 4/22/2019. He is now status post stage I lumbar ALIF on 4/22 by Ed Dial and Raji with planned stage II operation on 4/25.      Plan for today:  - continue vancomycin per ID to complete a 2 wk course (through 5/11)  - continue prednisone 5 mg BID for the next few days until pt up oob working with therapies on a regular basis, then can switch to daily  - continue supplemental O2 via NC to keep sats >92% and wean as able  - encourage po intake and boost, etc.     Near syncopal event 5/1/19: likely due to combination of ILD, aortic stenosis, possible subsegmental PEs. Trop lower than prior, CTPE unchanged, lytes normal  - cont to monitor with tele  - strict I/O   - ideally restart anticoagulation in 2 wks (5/17), unless hemodynamically unstable prior to that time (after discussion with ortho)    S/p stage I lumbar fusion, 4/22, and stage II, 4/25:  By Ed Dial and Raji. Tolerated second procedure well. Currently states incisional pain tolerable on current pain med regimen as recommended by pain service. Abd incisions appear to be healing well without discharge or dehiscence.   - ortho continuing to co-manage   - wound had slow drip of blood: due to concern of it not healing the anticoagulation was stopped (see below)    Intra-op culture (4/25) of seroma with staph epi and normal skin marlee: ID consulted, seroma not communicating with deep wound per visualization during surgery, favor treating since this normal skin marlee and staph epi are causing re-accumulation of his seroma and in the setting of 2 surgical procedures with hardware. CRP 74 on 5/1 and 52 on 5/2. So, complete 2 wk course of vanc for this wound  infection (favor treating aggressively due to his history and the sensitive area of his infection) Susceptibilities have returned: staph epi is sensitive to clinda, erythromycin, tetracycline, and vanc. Consider reimaging if CRP increases in addition to WBC or he develops clinical signs of an infection.  - cont vanc per ID recs to complete a 2 wk course  - trending CRP with WBC--> CRP decreasing steadily    Chest pain, suggestive of angina  Dyspnea on exertion  Severe aortic stenosis, moderate aortic regurgitation  Type II NSTEMI  Subsegmental Pulmonary Emboli  Cardiology consultation on 4/29 reassured that this is not likely ACS, but it is likely multifactoral (ILD, PE, severe aortic stenosis). Trop likely elevated because of demand ischemia (and has now downtrended). Forced to discontinue his anticoagulation on 4/30/2019 due to his new bleeding. GONZALEZ and hypoxia could be more from ILD and severe aortic stenosis. Will not treat subsegmental PE as this risks the wound not healing (which could require an extensive surgery to close the wound that we are hoping to avoid).  - coronary angiogram and TAVR CT planned as an outpt for evaluation (will follow up in structural cardiology valve clinic)- this will all be coordinated by cards  - monitor for hemodynamic instability without anticoagulation      Dermatomyositis (Shannon-1 deepak +), ILD:  Followed OP by Dr. Call at  Rheumatology clinic in [place]. Chronically on methotrexate 25 mg once weekly, last taken however, two weeks ago at the advice of Dr. Call. Also recently nearly completed protracted taper of prednisone, started on 2/7 at 20 mg x 2 wks, then decrease by 5 mg q2wks, so pt was on 5 mg daily day of surgery with a few days left of taper. Most recent PFTs with reduced DLCO so received rituximab infusion on 3/11. Not normally on O2 PTA, but currently on 2L most likely the need is multifactorial and due to surgeries, ILD, deconditioned state, anemia, opioid use, and  last nights small subseqmental PEs.    - Continue oral prednisone but in acute post-op setting, continue 5 mg BID for the next several days until pt up oob working with therapies on a regular basis, then can switch to daily. Plan to discontinue when pt no longer requires supplemental O2 and/or until follow up after discharge with Rheumatology  - Continue with PTA theophylline, scheduled Arnuity Ellipta, and prn albuterol inhaler/nebs  - Continue supplemental O2 via NC to keep O2 sats >92%, wean as able   - Encourage IS q2hrs     - Plan to restart methotrexate when we are convinced there isn't ongoing concern for infection and when no concern for lack of wound healing from surgery perspective (1 week after stage II surgery is 2 May)     DM2:  Most recent HgbA1C from 1/29/19, 6.3%. PTA on PRN insulin isophane 6U BID. Glucoses well-controlled currently in hospital  - cont to hold home NPH  - cont sliding scale insulin  - cont hypoglycemia protocol    Acute blood loss anemia: Pre-op Hgb on 4/2, 15.9. EBL from stage I 400 ml. EBL from stage  ml with 350 ml pRBCs returned via cell saver 350 ml. Most recent Hgb stable with no e/o significant active bleeding on exam.   - Monitor CBCs    Leukocytosis: 15.7 WBC on the am after surgery (thought to be stress degranulation)--> 10.8 on 4/29-->15.8 on 5/1--> 14 on 5/2, etc. Taking chronic prednisone, no signs of a new infectious process (treating staph epi and normal skin marlee with vanc). 4/30: CXR unchanged, UA unconcerning, bld cx pending with NGTD.  - cont to monitor along with CRPs and clinically  - crp trending down- cont to monitor    HTN:  PTA on verapamil. Currently BPs normotensive.   - Continue PTA verapamil  daily with parameters to hold     Acute constipation:  Most likely opioid induced  - Continue Miralax to BID  - Continue Senokot-S, two tabs BID   - suppository prn     Hypothyroidism:  Euthyroid based on normal TSH on 1/10/19  - continue PTA  levothyroxine 50 mcg daily    Diet: Snacks/Supplements Adult: Boost Glucose Control; Between Meals  Snacks/Supplements Adult: Gelatein sugar-free; Between Meals  Moderate Consistent CHO Diet  Room Service  Calorie Counts  Encouraging po intake, boost and and fluids  DVT Prophylaxis: none, with anticoagulation stopped due to bleeding from wound  [place] Catheter: not present  Code Status: Full    Disposition Plan   Expected discharge: 4 - 7 days, recommended to transitional care unit once adequate pain management/ tolerating PO medications, antibiotic plan established and hemoglobin stable.  Entered: Sofía Retana MD 05/04/2019, 6:42 AM       The patient's care was discussed with the Bedside Nurse and Beulah Retana MD  Hospitalist Service, [address], [place]  Pager: [phone]  Please see sticky note for cross cover information  ______________________________________________________________________    Interval History   Last night Beulah had a lactic acid drawn and a fluid bolus given, which lowered his lactic acid again. He is not drinking well, so he has needed 500-1000mL NS each day. He slept some and has had adequately controlled pain with his po meds. He likely doesn't need supplemental O2 at rest (saturates 100% on 2-3 LPM NC), but he continues to have significant dyspnea with exertion.  He would like to try to have a BM today and then use a suppository if needed tomorrow. Remainder of 4 pt ROS neg    Data reviewed today: I reviewed all medications, new labs and imaging results over the last 24 hours. I personally reviewed no images or EKG's today.    Physical Exam   Vital Signs: Temp: 97.8  F (36.6  C) Temp src: Oral BP: 128/60 Pulse: 108 Heart Rate: 89 Resp: 16 SpO2: 100 % O2 Device: Nasal cannula Oxygen Delivery: 3 LPM  Weight: 178 lbs 8 oz    General: lying in bed, NAD  Resp: Breathing comfortably, no wheezes, no crackles devonte  CV: RRR, III/VI systolic  murmur loudest at base, soft S2, soft diastolic murmur.   Abdomen: Non-tender, active BS  Back: dressings clean, hemovacs with sanguinous output  CORINNE drain with sanguinous output  Ext:minimal Pitting edema in devonte feet with compression stockings on     Data   Recent Labs   Lab 05/04/19  0338 05/03/19  0611 05/02/19  0554 05/01/19  2212 05/01/19  0453 04/30/19  0456  04/28/19  1047 04/28/19  0213 04/27/19  2113   WBC 14.4* 18.4* 14.0* 13.7* 15.8* 12.9*   < >  --  13.2* 13.0*   HGB 7.7* 8.7* 7.8* 8.2* 8.7* 7.3*   < >  --  7.8* 8.7*   MCV 94 95 96 97 95 95   < >  --  95 96    487* 336 348 401 305   < >  --  252 302   INR  --   --  1.18*  --  1.15* 1.24*   < > 1.29*  --   --     138 139 138 136 138   < >  --  139 136   POTASSIUM 4.0 3.8 3.9 3.8 4.1 4.3   < >  --  4.1 4.0   CHLORIDE 103 102 102 102 99 101   < >  --  103 102   CO2 30 29 29 26 31 30   < >  --  28 29   BUN 15 12 13 15 16 14   < >  --  20 16   CR 0.76 0.75 0.74 0.78 0.75 0.73   < >  --  0.80 0.83   ANIONGAP 5 8 8 10 6 6   < >  --  8 5   DMITRIY 8.5 8.9 8.6 8.5 9.1 8.4*   < >  --  8.1* 8.2*   * 158* 168* 183* 117* 113*   < >  --  139* 173*   ALBUMIN  --   --   --   --   --   --   --   --   --  2.3*   PROTTOTAL  --   --   --   --   --   --   --   --   --  5.6*   BILITOTAL  --   --   --   --   --   --   --   --   --  0.3   ALKPHOS  --   --   --   --   --   --   --   --   --  95   ALT  --   --   --   --   --   --   --   --   --  56   AST  --   --   --   --   --   --   --   --   --  90*   TROPI  --   --   --  0.064*  --   --   --  0.376* 0.381* 0.132*    < > = values in this interval not displayed.     Medications       famotidine  20 mg Oral Q12H     fluticasone  1 puff Inhalation Daily     gabapentin  600 mg Oral TID     insulin aspart  1-7 Units Subcutaneous TID AC     insulin aspart  1-5 Units Subcutaneous At Bedtime     levothyroxine  50 mcg Oral QAM AC     lidocaine  1-3 patch Transdermal Q24h    And     lidocaine   Transdermal Q24H    And      lidocaine   Transdermal Q8H     methocarbamol  500 mg Oral TID     multivitamin w/minerals  1 tablet Oral Daily     pantoprazole  40 mg Oral QAM AC     polyethylene glycol  17 g Oral Daily     predniSONE  5 mg Oral BID w/meals     senna-docusate  2 tablet Oral BID     sodium chloride (PF)  3 mL Intracatheter Q8H     sulfamethoxazole-trimethoprim  1 tablet Oral Q Mon Wed Fri AM     theophylline  400 mg Oral QAM     vancomycin (VANCOCIN) IV  1,500 mg Intravenous Q12H     verapamil ER  120 mg Oral At Bedtime

## 2019-05-04 NOTE — PLAN OF CARE
Discharge Planner PT   Patient plan for discharge: TCU  Current status: min assist for supine to/from sidelying to/from sitting; SBA for sit to/from stand from EOB, armchair; SBA to amb with 4WW on level with use of O2 at 2 LPM to maintain spO2 greater than 92%  Barriers to return to prior living situation: medical status, assist needed for mobility  Recommendations for discharge: TCU  Rationale for recommendations: will benefit from continued skilled PT intervention to address mobility deficits, improve strength & activity tolerance       Entered by: Serina Viera 05/04/2019 2:00 PM

## 2019-05-05 NOTE — PROGRESS NOTES
[facility], [facility] Progress Note - Hospitalist Service, Gold 6       Date of Admission:  4/22/2019  Assessment & Plan   Beulah Jane is a 69 year old male with hx of HTN, hypothyroidism, T2DM, ILD, dermatomyositis, aortic stenosis, and GERD,admitted on 4/22/2019. He is now status post stage I lumbar ALIF on 4/22 by Ed Dial and Raji with planned stage II operation on 4/25.      Plan for today:  - continue vancomycin per ID to complete a 6 wk course   - decreased prednisone to 5 mg daily  - continue supplemental O2 via NC to keep sats >92% and wean as able  - encourage po intake and boost, etc.  - plan to give methotrexate on 5/6 if cbc/crp continue to trend down- if ortho agrees with this plan     Near syncopal event 5/1/19: likely due to combination of ILD, aortic stenosis, possible subsegmental PEs. Trop lower than prior, CTPE unchanged, lytes normal  - cont to monitor with tele  - strict I/O   - ideally restart anticoagulation in 2 wks (5/17)    S/p stage I lumbar fusion, 4/22, and stage II, 4/25:  By Ed Dial and Raji. Tolerated second procedure well. Currently states incisional pain tolerable on current pain med regimen as recommended by pain service. Abd incisions appear to be healing well without discharge or dehiscence.   - ortho continuing to co-manage   - wound had slow drip of blood: due to concern of it not healing the anticoagulation was stopped (see below)    Intra-op culture (4/25) of seroma with staph epi and normal skin marlee: ID consulted, seroma not communicating with deep wound per visualization during surgery, favor treating since this normal skin marlee and staph epi are causing re-accumulation of his seroma and in the setting of 2 surgical procedures with hardware. CRP 74 on 5/1 and 52 on 5/2. So, complete 2 wk course of vanc for this wound infection (favor treating aggressively due to his history and the sensitive area of his infection)  "Susceptibilities have returned: staph epi is sensitive to clinda, erythromycin, tetracycline, and vanc. Consider reimaging if CRP increases in addition to WBC or he develops clinical signs of an infection.  - cont vanc per ID recs to complete a 6 wk course \"Reasonable to anticipate 6 weeks of vanco, with d1=4/26. This translates to a stop date of 6/8. He should have twice-weekly BMP, CBC with diff, vanco trough; weekly ESR and CRP. If Cr is unchanged during 3 weeks of therapy, would consider less frequent lab draws. Vanc trough goal 15-20. Will continue to assess the risk/benefit of 42 days therapy - if s/e emerge these will be weighed against the theoretic benefit of abx  Following 6 weeks of IV therapy, anticipate transition to PO doxycycline for at least 3 months.\"  - ID f/u, ideally with Dr. Moura, 3-4 weeks from discharge (they will schedule)  - PICC will be needed prior to discharge to ARU  - trending CRP with WBC--> CRP decreasing steadily    Chest pain, suggestive of angina  Dyspnea on exertion  Severe aortic stenosis, moderate aortic regurgitation  Type II NSTEMI  Subsegmental Pulmonary Emboli  Cardiology consultation on 4/29 reassured that this is not likely ACS, but it is likely multifactoral (ILD, PE, severe aortic stenosis). Trop likely elevated because of demand ischemia (and has now downtrended). Forced to discontinue his anticoagulation on 4/30/2019 due to his new bleeding. GONZALEZ and hypoxia could be more from ILD and severe aortic stenosis. Will not treat subsegmental PE as this risks the wound not healing (which could require an extensive surgery to close the wound that we are hoping to avoid).  - coronary angiogram and TAVR CT planned as an outpt for evaluation (will follow up in structural cardiology valve clinic)- this will all be coordinated by cards  - monitor for hemodynamic instability without anticoagulation      Dermatomyositis (Shannon-1 deepak +), ILD:  Followed OP by Dr. Call at  Rheumatology " clinic in [place]. Chronically on methotrexate 25 mg once weekly, last taken however, two weeks ago at the advice of Dr. Call. Also recently nearly completed protracted taper of prednisone, started on 2/7 at 20 mg x 2 wks, then decrease by 5 mg q2wks, so pt was on 5 mg daily day of surgery with a few days left of taper. Most recent PFTs with reduced DLCO so received rituximab infusion on 3/11. Not normally on O2 PTA, but currently on 2L most likely the need is multifactorial and due to surgeries, ILD, deconditioned state, anemia, opioid use, and last nights small subseqmental PEs.    - Continue oral prednisone, but change 5 mg BID-->5 mg daily on 5/5  - Plan to discontinue when pt no longer requires supplemental O2 and/or until follow up after discharge with Rheumatology  - Continue with PTA theophylline, scheduled Arnuity Ellipta, and prn albuterol inhaler/nebs  - Continue supplemental O2 via NC to keep O2 sats >92%, wean as able   - Encourage IS q2hrs     - Plan to restart methotrexate when we are convinced there isn't ongoing concern for infection and when no concern for lack of wound healing from surgery perspective (1 week after stage II surgery is 2 May)- likely restart on 5/6     DM2:  Most recent HgbA1C from 1/29/19, 6.3%. PTA on PRN insulin isophane 6U BID. Glucoses well-controlled currently in hospital  - cont to hold home NPH  - cont sliding scale insulin  - cont hypoglycemia protocol    Acute blood loss anemia: Pre-op Hgb on 4/2, 15.9. EBL from stage I 400 ml. EBL from stage  ml with 350 ml pRBCs returned via cell saver 350 ml. Most recent Hgb stable with no e/o significant active bleeding on exam.   - Monitor CBCs    Leukocytosis: 15.7 WBC on the am after surgery (thought to be stress degranulation)--> 10.8 on 4/29-->15.8 on 5/1--> 14 on 5/2, etc. Taking chronic prednisone, no signs of a new infectious process (treating staph epi and normal skin marlee with vanc). 4/30: CXR unchanged, UA  unconcerning, bld cx pending with NGTD.  - cont to monitor along with CRPs and clinically  - crp trending down- cont to monitor    HTN:  PTA on verapamil. Currently BPs normotensive.   - Continue PTA verapamil  daily with parameters to hold     Acute constipation:  Most likely opioid induced  - Continue Miralax to BID  - Continue Senokot-S, two tabs BID   - suppository prn     Hypothyroidism:  Euthyroid based on normal TSH on 1/10/19  - continue PTA levothyroxine 50 mcg daily    Diet: Snacks/Supplements Adult: Boost Glucose Control; Between Meals  Snacks/Supplements Adult: Gelatein sugar-free; Between Meals  Moderate Consistent CHO Diet  Room Service  Calorie Counts  Encouraging po intake, boost and and fluids  DVT Prophylaxis: none, with anticoagulation stopped due to bleeding from wound  [place] Catheter: not present  Code Status: Full    Disposition Plan   Expected discharge: 1-2days, recommended to transitional care unit once adequate pain management/ tolerating PO medications, antibiotic plan established and hemoglobin stable.  Entered: Sofía Retana MD 05/05/2019, 7:00 AM       The patient's care was discussed with the Bedside Nurse and Beulah Retana MD  Hospitalist Service, [address], [place]  Pager: [phone]  Please see sticky note for cross cover information  ______________________________________________________________________    Interval History   Last night Beulah slept ok. This am he had his drains pulled. He is trying to drink more and does like boost and gatorade. He has had adequately controlled pain with his po meds. He likely doesn't need supplemental O2 at rest (saturates 100% on 2-3 LPM NC), but he continues to have dyspnea with exertion.  He did have a BM. Remainder of 4 pt ROS neg    Data reviewed today: I reviewed all medications, new labs and imaging results over the last 24 hours. I personally reviewed no images or EKG's  today.    Physical Exam   Vital Signs: Temp: 97.9  F (36.6  C) Temp src: Oral BP: 122/56 Pulse: 88 Heart Rate: 112 Resp: 18 SpO2: 98 % O2 Device: Nasal cannula Oxygen Delivery: 2 LPM  Weight: 178 lbs 8 oz    General: lying in bed, NAD  Resp: Breathing comfortably, no wheezes, no crackles devonte  CV: RRR, III/VI systolic murmur loudest at base, soft S2, soft diastolic murmur.   Abdomen: Non-tender, active BS  Back: dressings clean  Ext:minimal Pitting edema in devonte feet with compression stockings on     Data   Recent Labs   Lab 05/04/19  0338 05/03/19  0611 05/02/19  0554 05/01/19  2212 05/01/19  0453 04/30/19  0456  04/28/19  1047   WBC 14.4* 18.4* 14.0* 13.7* 15.8* 12.9*   < >  --    HGB 7.7* 8.7* 7.8* 8.2* 8.7* 7.3*   < >  --    MCV 94 95 96 97 95 95   < >  --     487* 336 348 401 305   < >  --    INR  --   --  1.18*  --  1.15* 1.24*   < > 1.29*    138 139 138 136 138   < >  --    POTASSIUM 4.0 3.8 3.9 3.8 4.1 4.3   < >  --    CHLORIDE 103 102 102 102 99 101   < >  --    CO2 30 29 29 26 31 30   < >  --    BUN 15 12 13 15 16 14   < >  --    CR 0.76 0.75 0.74 0.78 0.75 0.73   < >  --    ANIONGAP 5 8 8 10 6 6   < >  --    DMITRIY 8.5 8.9 8.6 8.5 9.1 8.4*   < >  --    * 158* 168* 183* 117* 113*   < >  --    TROPI  --   --   --  0.064*  --   --   --  0.376*    < > = values in this interval not displayed.     Medications       famotidine  20 mg Oral Q12H     fluticasone  1 puff Inhalation Daily     gabapentin  600 mg Oral TID     insulin aspart  1-7 Units Subcutaneous TID AC     insulin aspart  1-5 Units Subcutaneous At Bedtime     levothyroxine  50 mcg Oral QAM AC     lidocaine  1-3 patch Transdermal Q24h    And     lidocaine   Transdermal Q24H    And     lidocaine   Transdermal Q8H     methocarbamol  500 mg Oral TID     multivitamin w/minerals  1 tablet Oral Daily     pantoprazole  40 mg Oral QAM AC     polyethylene glycol  17 g Oral Daily     predniSONE  5 mg Oral BID w/meals     senna-docusate  2 tablet  Oral BID     sodium chloride (PF)  3 mL Intracatheter Q8H     sulfamethoxazole-trimethoprim  1 tablet Oral Q Mon Wed Fri AM     theophylline  400 mg Oral QAM     vancomycin (VANCOCIN) IV  1,500 mg Intravenous Q12H     verapamil ER  120 mg Oral At Bedtime

## 2019-05-05 NOTE — PLAN OF CARE
[id number]: VSS on 2L and room air (O2 used when pt at rest). AOx3, up with asst 1/personal walker to bathroom. Pt good urine output and soft formed bm today. Pt taking Tylenol and 10mg Oxycodone when available for back pain management. Surgery removed drains x3 and changed incision bandage this am.- incision reddened on top 1/2 of incision, entire incision approximated. Surgery team to do dressing changes. Pt tolerating mod/carb diet- appetite fair- snack delivered 1400 & 2000. Compression socks left on all day d/t pt primarily resting laying down. Pt dangled at bedside for meals. Blood sugars managed with sliding scale. Vanco level needed prior to next dose of Vanco. Call light within reach, pt calls appropriately. Will continue to follow POC/monitor. Pt hopeful discharge to [facility] [place] soon.  Tele: NSR- intermittently tachy with activity

## 2019-05-05 NOTE — PROGRESS NOTES
Time: 1221-1620  Reason for Admission: Post Stage I Lumbar ALIF  Temp: 97.7  Pulse: 84  BP: 120/57  Respiration: 15  Activity: assist with 1  Pain: pain manageable with medication and getting better  Neuro: Alert and Oriented X4  Mood/Behavior: No mood or behavior issues   Cardiac: Stable  Respiratory: Stable  GI/: BM at around 1100 on 5/5/19  Diet: Diabetic diet  Lines: Left PIV and Midline saline locked  Skin: Bruising and wounds from surgery on back. Bruising on both arms  New Changes: CORINNE removed and wounds dressed by Surgery MD  Plan: Continue to monitor patients pain and blood glucose.

## 2019-05-05 NOTE — PROGRESS NOTES
"Orthopaedic Surgery Progress Note   May 5, 2019    Subjective:   Afebrile. Tachycardic to 110s. Vitals otherwise unremarkable. No issues overnight. Pain controlled. OOB and ambulating. No chest pain or shortness of breath. No new numbness or tingling.    Objective: /56 (BP Location: Right arm)   Pulse 88   Temp 97.9  F (36.6  C) (Oral)   Resp 18   Ht 1.6 m (5' 3\")   Wt 81 kg (178 lb 8 oz)   SpO2 98%   BMI 31.62 kg/m      General: awake and alert, no acute distress   Cardio: extremities wwp  Respiratory: non-labored breathing  MSK: Minimal serosanguinous drainage to CORINNE drain site dressing and proximal hemovac drain site dressing, otherwise dressings clean/dry/intact. Drains removed this AM and dressing changed. Incision clean/dry/intact.  SILT L3-S1 bilaterally.                L2-3: Hip flexion L and R 5/5 strength                 L4:  Knee extension L and R 5/5 strength                L5:  Foot / EHL dorsiflexion L and R 5/5 strength                S1:  Plantarflexion  L and R 5/5 strength     Hemovac 1: 0/0 over the past 2 nursing shifts  Hemovac 2: 0/0 over the past 2 nursing shifts  Deep drain: 50/30 over the past 2 nursing shifts    Labs:  Hemoglobin   Date Value Ref Range Status   05/04/2019 7.7 (L) 13.3 - 17.7 g/dL Final     Seroma cultures 4/25: seroma specimen 2 growing staph epidermidis    Assessment and Plan: Beulah Jane is a 69 year old male with PMH including aortic stenosis, chronic pain, DM type 2, hyperlipidemia, JUAN now s/p Stage I L3-4, L4-5 ALIF  on 4/22/19 with Dr. Dial and Dr. Alegria. Stage II T10-pelvis PIF, TLIF, SPO on 4/25/19 with Dr. Dial.     Chest pressure and shortness of breath on 4/27, CT chest showing subsegmental PEs.      Seroma cultured in surgery with seroma specimen 2 growing staph epidermidis.     Medicine Primary  Activity: Up with assist until independent. No excessive bending or twisting. No lifting >10 lbs x 6 weeks. No Faiza lift for transfers. "   Weight bearing status: WBAT.  Pain management: Transition from IV to PO as tolerated. No NSAIDs   Antibiotics: IV vancomycin as per ID recs, ID following - will discuss duration again with ID  Diet: Begin with clear fluids and progress diet as tolerated.   DVT prophylaxis: SCDs only. Continue to hold heparin/warfarin given wound drainage - plan to hold for 2 weeks (until a 5/17).  Imaging: XR Upright  PTDC - completed.  Labs: trend Hgb   Bracing/Splinting: None.  Dressings: 4x4 and tegaderm. Dressings to be changed by Ortho at Orthopaedic Surgery discretion.  Drains: removed 5/5/19  [place] catheter: Remove POD#1.   Physical Therapy/Occupational Therapy: Eval and treat.  Cultures: Seroma fluid and debris pending, follow culture results closely.    Follow-up: Clinic with Dr. Dial in 6 weeks with repeat x-rays.   Disposition: Pending progress with therapies, pain control on orals, and medical stability, plan for duration of IV antibiotics (will discuss with ID today)     Discussed with Dr. Jayro Vides MD  Orthopaedic Surgery Resident, PGY-1  Pager: [phone]

## 2019-05-05 NOTE — PROGRESS NOTES
[facility] ID SERVICE: ONGOING CONSULTATION        Patient:  Beulah Jane, Date of birth 1950, Medical record number [id number]  Date of Visit:  May 5, 2019         Assessment and Recommendations:   Problem List:  1. S/p stage I lumbar fusion, 4/22, and stage II, 4/25  2. Seroma fluid culture growing (normal skin marlee and staph epi)  3. Dermatomyositis (Shannon-1 deepak +), ILD - on weekly methotrexate, currently on prednisone taper, rituximab infusion on 3/11  4. Acute PE (during this admission)   5. DM       Impression: Mr. Alexander is a 68 y/o man s/p stage I lumbar fusion, 4/22, and stage II, 4/25. He had back hernia surgery in 2006 in [facility]. His repair was performed with mesh and it was fine for a few years but then became infected in 2009 (unclear about the details here). He had 2 surgeries to remove the mesh and remove the abscessed/infected tissue, but there was a residual superficial collection. These interventions occurred at another facility.  He underwent lumbar interbody fusion via anterior RP approach to L3-4 and L4-5 with Nuvavasive BASE cage, allograft, and intervertrebral prosthetic device placement; return to OR on 4/25 for posterior T10-S1 instrumentation with closure. Aspiration of his chronic superficial fluid collection revealed Ox-R CONS    I spoke with Dr. Dial. There is concern that in the process of performing the aforementioned 2-stage surgery, it was necessary to traverse the chronic superficial collection, and there is concern for the possibility of contamination of deeper structures. Further, it is possible that even though this collcetion was anatomically superficial, it may have arisen from deeper structures given his hx of infection in which case deeper structures may have had residual CONS even before his most recent procedure.        Recommendations:  1. Reasonable to anticipate 6 weeks of vanco, with d1=4/26. This translates to a stop date of 6/8. He should have  twice-weekly BMP, CBC with diff, vanco trough; weekly ESR and CRP. If Cr is unchanged during 3 weeks of therapy, would consider less frequent lab draws. Vanc trough goal 15-20.  Will continue to assess the risk/benefit of 42 days therapy - if s/e emerge these will be weighed against the theoretic benefit of abx  Following 6 weeks of IV therapy, anticipate transition to PO doxycycline for at least 3 months.    He will need a PICC line, and I have placed an order for this. Note hx of PE this adm but bleeding which contraindicates anticoagulation so thrombosis risk will be present.    He will need ID f/u, ideally with me, 3-4 weeks from discharge. I have contacted my clinic staff to schedule.    Thanks for this consult. ID will sign of, but please page with additional questions!    Apryl Moura MD   of Medicine, Division of Infectious Diseases  [facility] [phone]          Interval History:     Patient has continued back pain that he describes as different in nature -more expansive. No other complaints. No SOBr, no chest pain, no skin rash, no GI complaints.         Review of Systems:   CONSTITUTIONAL:  No fevers or chills  EYES: negative for icterus  ENT:  negative for oral lesions, hearing loss, tinnitus and sore throat  RESPIRATORY:  negative for cough and dyspnea  CARDIOVASCULAR:  negative for chest pain, palpitations  GASTROINTESTINAL:  negative for nausea, vomiting, diarrhea and constipation  GENITOURINARY:  negative for dysuria  HEME:  No easy bruising/bleeding  INTEGUMENT:  negative for rash and pruritus  NEURO:  Negative for headache         Current Antimicrobials   Vancomycin 1750mg IV q12h         Physical Exam:   Ranges for vital signs:  Temp:  [97.9  F (36.6  C)-98.8  F (37.1  C)] 97.9  F (36.6  C)  Pulse:  [88] 88  Heart Rate:  [] 112  Resp:  [16-18] 18  BP: (110-122)/(45-56) 122/56  SpO2:  [98 %-99 %] 98 %      Exam:  GENERAL:  well-developed, well-nourished, in no acute distress.    ENT:  Head is normocephalic, atraumatic. Oropharynx is moist without exudates or ulcers.  EYES:  Eyes have anicteric sclerae. PERRL.   NECK:  Supple. No cervical lymphadenopathy  EXT: Extremities warm and without edema.  SKIN:  No acute rashes.   NEUROLOGIC:  Grossly nonfocal.           Laboratory Data:     Creatinine   Date Value Ref Range Status   05/04/2019 0.76 0.66 - 1.25 mg/dL Final   05/03/2019 0.75 0.66 - 1.25 mg/dL Final   05/02/2019 0.74 0.66 - 1.25 mg/dL Final   05/01/2019 0.78 0.66 - 1.25 mg/dL Final   05/01/2019 0.75 0.66 - 1.25 mg/dL Final     WBC   Date Value Ref Range Status   05/04/2019 14.4 (H) 4.0 - 11.0 10e9/L Final   05/03/2019 18.4 (H) 4.0 - 11.0 10e9/L Final   05/02/2019 14.0 (H) 4.0 - 11.0 10e9/L Final   05/01/2019 13.7 (H) 4.0 - 11.0 10e9/L Final   05/01/2019 15.8 (H) 4.0 - 11.0 10e9/L Final     Hemoglobin   Date Value Ref Range Status   05/04/2019 7.7 (L) 13.3 - 17.7 g/dL Final     Platelet Count   Date Value Ref Range Status   05/04/2019 332 150 - 450 10e9/L Final     Sed Rate   Date Value Ref Range Status   06/26/2018 10 0 - 20 mm/h Final     CRP Inflammation   Date Value Ref Range Status   05/04/2019 36.0 (H) 0.0 - 8.0 mg/L Final   05/03/2019 45.0 (H) 0.0 - 8.0 mg/L Final   05/02/2019 52.0 (H) 0.0 - 8.0 mg/L Final   05/01/2019 74.0 (H) 0.0 - 8.0 mg/L Final   06/26/2018 4.7 0.0 - 8.0 mg/L Final     AST   Date Value Ref Range Status   04/27/2019 90 (H) 0 - 45 U/L Final   11/30/2017 14 0 - 45 U/L Final   11/27/2017 15 0 - 45 U/L Final     ALT   Date Value Ref Range Status   04/27/2019 56 0 - 70 U/L Final   11/30/2017 14 0 - 70 U/L Final   11/27/2017 12 0 - 70 U/L Final     Bilirubin Total   Date Value Ref Range Status   04/27/2019 0.3 0.2 - 1.3 mg/dL Final   11/30/2017 0.3 0.2 - 1.3 mg/dL Final   11/27/2017 0.5 0.2 - 1.3 mg/dL Final     Lab Results   Component Value Date     05/04/2019    BUN 15 05/04/2019    CO2 30 05/04/2019       Culture data:  All cultures:  Fluid culture  4/25  Staphylococcus epidermidis     Antibiotic Interpretation Sensitivity Method Status    CIPROFLOXACIN Resistant >2.0 ug/mL BRENDON Final    CLINDAMYCIN Sensitive <=0.25 ug/mL BRENDON Final    ERYTHROMYCIN Sensitive <=0.25 ug/mL BRENDON Final    GENTAMICIN Resistant >8.0 ug/mL BRENDON Final    LEVOFLOXACIN Resistant >4.0 ug/mL BRENDON Final    OXACILLIN Resistant >2.0 ug/mL BRENDON Final    TETRACYCLINE Sensitive <=1.0 ug/mL BRENDON Final    VANCOMYCIN Sensitive 2.0 ug/mL BRENDON Final        Recent Labs   Lab 04/30/19 2033 04/30/19 2028   CULT No growth after 5 days No growth after 5 days

## 2019-05-05 NOTE — PROGRESS NOTES
Calorie Count  Intake recorded for: 5/4  Total Kcals: 370 Total Protein: 14g  Kcals from Hospital Food: 370  Protein: 14g  Kcals from Outside Food (average):0 Protein: 0g  # Meals Recorded: 100% orange juice   # Supplements Recorded: 100% 1 Boost Glucose Control

## 2019-05-05 NOTE — PLAN OF CARE
Patient alert and oriented x 4. Vitals stable on 2 LPM via NC. No changes neuro vitals.Up with 1 assist. PRN Tylenol, oxycodone and tramadol given for back pain. Consistent carb diet. Tolerating well. BG's 191 and 130.  Left PIV and midline saline locked. IV vancomycin every 12 hrs. CORINNE drain dressing with minimal serosanguinous drainage, hemovac drain dressings clean dry and intact. Slept between cares.

## 2019-05-06 NOTE — PROGRESS NOTES
Calorie Count  Intake recorded for: 5/5  Total Kcals: 1055 Total Protein: 30g  Kcals from Hospital Food: 1055  Protein: 30g  Kcals from Outside Food (average):0 Protein: 0g  # Meals Recorded: 2 meals (First - 75% scrambled eggs, hash browns, ap[ple juice, banana bread)      (Second - 100% side caesar salad w/ crotuons, apple juice, wheat roll)  # Supplements Recorded: 0

## 2019-05-06 NOTE — PROGRESS NOTES
[facility] Progress Note - Hospitalist Service, [place]       Date of Admission:  4/22/2019  Assessment & Plan   Beulah Jane is a 69 year old male with hx of HTN, hypothyroidism, T2DM, ILD, dermatomyositis, aortic stenosis, and GERD,admitted on 4/22/2019. He is now status post stage I lumbar ALIF on 4/22 by Ed Dial and Raji with planned stage II operation on 4/25.      Plan for today:  - continue vancomycin per ID to complete a 6 wk course (ends June 8)  - cont prednisone to 5 mg daily through 5/19- then taper off  - continue supplemental O2 via NC to keep sats >92% and wean as able  - encourage po intake and boost, etc.  - plan to give methotrexate on 5/9 (2 wks out from surg)  - plan to restart anticoagulation in 2 wks (5/17) if the wound continues to heal well    Near syncopal event 5/1/19: likely due to combination of ILD, aortic stenosis, possible subsegmental PEs. Trop lower than prior, CTPE unchanged, lytes normal. No repeat episodes.  - cont to monitor with tele  - strict I/O   - plan to restart anticoagulation in 2 wks (5/17) if the wound continues to heal well    S/p stage I lumbar fusion, 4/22, and stage II, 4/25:  By Ed Dial and Raji. Tolerated second procedure well. Currently states incisional pain tolerable on current pain med regimen as recommended by pain service. Abd incisions appear to be healing well without discharge or dehiscence.   - ortho continuing to co-manage   - wound had slow drip of blood: due to concern of it not healing the anticoagulation was stopped (see below)    Intra-op culture (4/25) of seroma with staph epi and normal skin marlee: ID consulted, seroma not communicating with deep wound per visualization during surgery, favor treating since this normal skin marlee and staph epi are causing re-accumulation of his seroma and in the setting of 2 surgical procedures with hardware. CRP 74 on 5/1 and 52 on 5/2. So, complete  "2 wk course of vanc for this wound infection (favor treating aggressively due to his history and the sensitive area of his infection) Susceptibilities have returned: staph epi is sensitive to clinda, erythromycin, tetracycline, and vanc. Consider reimaging if CRP increases in addition to WBC or he develops clinical signs of an infection.  - cont vanc per ID recs to complete a 6 wk course (through 6/8)- will need \"twice-weekly BMP, CBC with diff, vanco trough; weekly ESR and CRP. If Cr is unchanged during 3 weeks of therapy, would consider less frequent lab draws. Vanc trough goal 15-20. Will continue to assess the risk/benefit of 42 days therapy - if s/e emerge these will be weighed against the theoretic benefit of abx.  Following 6 weeks of IV therapy, anticipate transition to PO doxycycline for at least 3 months.\"  - ID f/u, ideally with Dr. Moura, 3-4 weeks from discharge (ID will schedule)  - PICC will be needed prior to discharge to TCU  - trending CRP with WBC--> CRP decreasing steadily    Chest pain, suggestive of angina  Dyspnea on exertion  Severe aortic stenosis, moderate aortic regurgitation  Type II NSTEMI  Subsegmental Pulmonary Emboli  Cardiology consultation on 4/29 reassured that this is not likely ACS, but it is likely multifactoral (ILD, PE, severe aortic stenosis). Trop likely elevated because of demand ischemia (and has now downtrended). Forced to discontinue his anticoagulation on 4/30/2019 due to his new bleeding. GONZALEZ and hypoxia could be more from ILD and severe aortic stenosis. Will not treat subsegmental PE as this risks the wound not healing (which could require an extensive surgery to close the wound that we are hoping to avoid).  - coronary angiogram and TAVR CT planned as an outpt for evaluation (will follow up in structural cardiology valve clinic)- this will all be coordinated by cards  - monitor for hemodynamic instability without anticoagulation      Dermatomyositis (Shannon-1 deepak +), " ILD:  Followed OP by Dr. Call at  Rheumatology clinic in [place]. Chronically on methotrexate 25 mg once weekly, last taken however, two weeks ago at the advice of Dr. Call. Also recently nearly completed protracted taper of prednisone, started on 2/7 at 20 mg x 2 wks, then decrease by 5 mg q2wks, so pt was on 5 mg daily day of surgery with a few days left of taper. Most recent PFTs with reduced DLCO so received rituximab infusion on 3/11. Not normally on O2 PTA, but currently on 2L most likely the need is multifactorial and due to surgeries, ILD, deconditioned state, anemia, opioid use, and last nights small subseqmental PEs.    - Continue oral prednisone, but change 5 mg BID-->5 mg daily on 5/5 (needs 2 wks before weaning down)  - Plan to discontinue when pt no longer requires supplemental O2 and/or until follow up after discharge with Rheumatology  - Continue with PTA theophylline, scheduled Arnuity Ellipta, and prn albuterol inhaler/nebs  - Continue supplemental O2 via NC to keep O2 sats >92%, wean as able   - Encourage IS q2hrs     - Plan to restart methotrexate on 5/9     DM2:  Most recent HgbA1C from 1/29/19, 6.3%. PTA on PRN insulin isophane 6U BID. Glucoses well-controlled currently in hospital  - cont to hold home NPH  - cont sliding scale insulin  - cont hypoglycemia protocol    Acute blood loss anemia: Pre-op Hgb on 4/2, 15.9. EBL from stage I 400 ml. EBL from stage  ml with 350 ml pRBCs returned via cell saver 350 ml. Most recent Hgb stable with no e/o significant active bleeding on exam.   - Monitor CBCs    Leukocytosis: 15.7 WBC on the am after surgery (thought to be stress degranulation)--> 10.8 on 4/29-->15.8 on 5/1--> 14 on 5/2, etc. Taking chronic prednisone, no signs of a new infectious process (treating staph epi and normal skin marlee with vanc). 4/30: CXR unchanged, UA unconcerning, bld cx pending with NGTD.  - cont to monitor along with CRPs and clinically    HTN:  PTA on verapamil.  Currently BPs normotensive.   - Continue PTA verapamil  daily with parameters to hold     Acute constipation:  Most likely opioid induced  - Continue Miralax to BID  - Continue Senokot-S, two tabs BID   - suppository prn     Hypothyroidism:  Euthyroid based on normal TSH on 1/10/19  - continue PTA levothyroxine 50 mcg daily    Diet: Snacks/Supplements Adult: Boost Glucose Control; Between Meals  Snacks/Supplements Adult: Gelatein sugar-free; Between Meals  Moderate Consistent CHO Diet  Room Service  Encouraging po intake, boost and and fluids  DVT Prophylaxis: none, with anticoagulation stopped due to bleeding from wound  [place] Catheter: not present  Code Status: Full    Disposition Plan   Expected discharge: 1-2days, recommended to transitional care unit once adequate pain management/ tolerating PO medications, antibiotic plan established and hemoglobin stable.  Entered: Sofía Retana MD 05/06/2019, 7:13 AM       The patient's care was discussed with the Bedside Nurse and Beulah and ortho    Sofía Retana MD  Hospitalist Service, [address]  Pager: [phone]  Please see sticky note for cross cover information  ______________________________________________________________________    Interval History   Last night Beulah did not sleep very well due to pain, as he received some pain meds late. During the day his pain was adequately controlled. He needs supplemental O2 with exertion as per his recent pattern.  He did have a BM yesterday and already this am. Remainder of 4 pt ROS neg    Data reviewed today: I reviewed all medications, new labs and imaging results over the last 24 hours. I personally reviewed no images or EKG's today.    Physical Exam   Vital Signs: Temp: 97.3  F (36.3  C) Temp src: Oral BP: 118/56 Pulse: 86 Heart Rate: 95 Resp: 18 SpO2: 98 % O2 Device: Nasal cannula Oxygen Delivery: 2 LPM  Weight: 178 lbs 8 oz  General: lying in bed,  NAD  Resp: Breathing comfortably, no wheezes, no crackles devonte  CV: RRR, III/VI systolic murmur loudest at base, soft S2, soft diastolic murmur.   Abdomen: Non-tender, active BS  Back: dressings clean    Data   Recent Labs   Lab 05/06/19  0644 05/04/19  0338 05/03/19  0611 05/02/19  0554 05/01/19  2212 05/01/19  0453 04/30/19  0456   WBC 12.4* 14.4* 18.4* 14.0* 13.7* 15.8* 12.9*   HGB 7.9* 7.7* 8.7* 7.8* 8.2* 8.7* 7.3*   MCV 96 94 95 96 97 95 95    332 487* 336 348 401 305   INR  --   --   --  1.18*  --  1.15* 1.24*    138 138 139 138 136 138   POTASSIUM 3.7 4.0 3.8 3.9 3.8 4.1 4.3   CHLORIDE 103 103 102 102 102 99 101   CO2 29 30 29 29 26 31 30   BUN 11 15 12 13 15 16 14   CR 0.80 0.76 0.75 0.74 0.78 0.75 0.73   ANIONGAP 7 5 8 8 10 6 6   DMITRIY 8.3* 8.5 8.9 8.6 8.5 9.1 8.4*   * 112* 158* 168* 183* 117* 113*   TROPI  --   --   --   --  0.064*  --   --      Medications       famotidine  20 mg Oral Q12H     fluticasone  1 puff Inhalation Daily     gabapentin  600 mg Oral TID     insulin aspart  1-7 Units Subcutaneous TID AC     insulin aspart  1-5 Units Subcutaneous At Bedtime     levothyroxine  50 mcg Oral QAM AC     lidocaine  1-3 patch Transdermal Q24h    And     lidocaine   Transdermal Q24H    And     lidocaine   Transdermal Q8H     methocarbamol  500 mg Oral TID     multivitamin w/minerals  1 tablet Oral Daily     pantoprazole  40 mg Oral QAM AC     polyethylene glycol  17 g Oral Daily     predniSONE  5 mg Oral Daily     senna-docusate  2 tablet Oral BID     sodium chloride (PF)  3 mL Intracatheter Q8H     sulfamethoxazole-trimethoprim  1 tablet Oral Q Mon Wed Fri AM     theophylline  400 mg Oral QAM     vancomycin (VANCOCIN) IV  1,500 mg Intravenous Q12H     verapamil ER  120 mg Oral At Bedtime

## 2019-05-06 NOTE — PROGRESS NOTES
"Orthopaedic Surgery Progress Note   May 6, 2019    Subjective:   No issues overnight. Pain controlled. OOB and ambulating. No chest pain or shortness of breath. No new numbness or tingling.    Objective: /55 (BP Location: Right arm)   Pulse 84   Temp 97.4  F (36.3  C) (Oral)   Resp 15   Ht 1.6 m (5' 3\")   Wt 81 kg (178 lb 8 oz)   SpO2 94%   BMI 31.62 kg/m      General: awake and alert, no acute distress   Cardio: extremities wwp  Respiratory: non-labored breathing  MSK: Incision clean/dry/intact.  SILT L3-S1 bilaterally.                L2-3: Hip flexion L and R 5/5 strength                 L4:  Knee extension L and R 5/5 strength                L5:  Foot / EHL dorsiflexion L and R 5/5 strength                S1:  Plantarflexion  L and R 5/5 strength     Hemovac 1: removed  Hemovac 2: removed  Deep drain: removed    Labs:  Hemoglobin   Date Value Ref Range Status   05/04/2019 7.7 (L) 13.3 - 17.7 g/dL Final     Seroma cultures 4/25: seroma specimen 2 growing staph epidermidis    Assessment and Plan: Beulah Jane is a 69 year old male with PMH including aortic stenosis, chronic pain, DM type 2, hyperlipidemia, JUAN now s/p Stage I L3-4, L4-5 ALIF  on 4/22/19 with Dr. Dial and Dr. Alegria. Stage II T10-pelvis PIF, TLIF, SPO on 4/25/19 with Dr. Dial.     Chest pressure and shortness of breath on 4/27, CT chest showing subsegmental PEs.      Seroma cultured in surgery with seroma specimen 2 growing staph epidermidis.     Medicine Primary, appreciate cares  Activity: Up with assist until independent. No excessive bending or twisting. No lifting >10 lbs x 6 weeks. No Faiza lift for transfers.   Weight bearing status: WBAT.  Pain management: Per primary. No NSAIDs   Antibiotics: IV vancomycin for 6 weeks as per ID recs w/ stop date 6/8/19, ID following, appreciate assistance  Diet: per primary  DVT prophylaxis: SCDs only. Continue to hold heparin/warfarin given wound drainage - plan to hold for 2 weeks " (until 5/17).  Imaging: XR Upright  PTDC - completed.  Labs: trend Hgb   Bracing/Splinting: None.  Dressings: 4x4 and tegaderm. Dressings to be changed by Ortho at Orthopaedic Surgery discretion.  Wound: Please hold methotrexate until 2 weeks postop (5/9/2019) to permit wound healing  Drains: removed 5/5/19  Physical Therapy/Occupational Therapy: Eval and treat.  Cultures: Seroma fluid and debris pending, follow culture results closely.    Follow-up: Clinic with Dr. Dial in 6 weeks with repeat x-rays.   Disposition: OK to discharge from ortho perspective when ABx plan is in place, medically stable, cleared by therapies, and TCU bed available.        Sukhdeep Mcelroy MD  Orthopaedic Surgery, PGY-1  Pager: [phone]

## 2019-05-06 NOTE — DISCHARGE SUMMARY
[facility], [place]  Hospitalist Discharge Summary       Date of Admission:  4/22/2019  Date of Discharge:  5/8/2019   Discharging Provider:  Manfred Hanna  Discharge Team: Hospitalist Service, Gold 6    Discharge Diagnoses   S/p lumbar fusion: Stage 1 surgery on 4/22 and stage 2 surgery on 4/25  Aortic stenosis  Seroma fluid culture with normal skin marlee and staph epi growth  Dermatomyositis  hypoxemia  Acute subsegmental PE  DM2  ILD  Hypothyroidism  HTN    Follow-ups Needed After Discharge    ID follow up- with Dr. Dueñas in 3-4 wks (ID will set this up)  Twice weekly BMP, CBC with diff, vanc trough (vanc trough goal 15-20)  Weekly ESR and CRP  PCP follow up after TCU discharge  Ortho follow up- with Dr. Dial in 6 wks (already scheduled, on 6/7)  Plan to resume anticoagulation on 5/17 for sm subsegmental PEs depending on risk assessment at that time  Plan to resume methotrexate on 5/9  Rheum follow up with Dr. Call 5/16  Cardiology follow up in structural cardiology valve clinic (with TAVR CT and coronary angiogram)- will be coordinated by cardiology      Unresulted Labs Ordered in the Past 30 Days of this Admission     No orders found from 2/21/2019 to 4/23/2019.      These results will be followed up by N/A    Discharge Disposition   Discharged to short-term care facility  Condition at discharge: Stable      Hospital Course      Beulah is a 68 yo male who was admitted for a 2 staged lumbar fusion (stage one 4/22 and stage two on 4/25).  This was lumbar interbody fusion via anterior RP approach to L3-4 and L4-5 with Nuvavasive BASE cage, allograft, and intervertrebral prosthetic device placement for stage 1 and posterior T10-S1 instrumentation with closure for stage 2. Aspiration of his chronic superficial seroma grew normal skin marlee and staph epi.  His back problems began after back hernia surgery in 2006 in [place]. His repair was performed with mesh that became infected  in 2009, and he had 2 surgeries to remove the mesh and infection paraspinous tissue, but he had repeated seroma formation.      S/p stage I lumbar fusion, 4/22, and stage II, 4/25:  Performed by Ed Dial and Raji. He tolerated these procedures well, and his pain was well controlled on po pain meds (oxycodone, acetaminophen, gabapentin, tramadol, and robaxan.) His wound had some mild bleeding on 4/30 that prompted us to discontinue his heparin and reverse his INR (they had been treating subsegmental PE, see below). US LE no DVT. But, his wounds healed well after anticoagulation was stopped, his drains were removed on 5/5, and he will need to follow up with ortho in 6 wks (6/7 at 11am, already scheduled).   - Activity: Up with assist until independent. No excessive bending or twisting. No lifting >10 lbs x 6 weeks. No Faiza lift for transfers.   - Weight bearing status: WBAT.      Intra-op culture (4/25) of seroma with staph epi and normal skin marlee:  The seroma was not communicating with his deep wound per visualization during surgery, and ID was consulted and decided that treating this infection (normal skin marlee and staph epi) would be important since these organisms may be causing re-accumulation of his seroma and in the setting of 2 surgical procedures with hardware. His WBC bounced around a bit, but his CRP steadily decreased after surgery while he was being treated with vancomycin for this infection.  ID and ortho decided to plan for 6 wks of vancomycin (4/28-6/8). He will need the labs listed below monitored while he is on vanc, and vanc will continue unless the risk/benefit ratio changes based on these labs. ID is anticipating transitioning to po doxycycline for at least 3 months after the vanc finishes. ID follow up 3-4 wks from discharge will be scheduled by ID and will ideally be with Dr. Moura.  - Twice weekly BMP, CBC with diff, vanc trough (vanc trough goal 15-20)  - Weekly ESR and CRP  - if Cr is  unchanged during 3 wks of therapy, ID would consider less frequent lab draws    Chest pain, suggestive of angina  Dyspnea on exertion and hypoxia  Severe aortic stenosis, moderate aortic regurgitation  Type II NSTEMI  Subsegmental Pulmonary Emboli  Cardiology consultation on 4/29 reassured us that this is not likely ACS, but it is likely multifactoral (ILD, PE, severe aortic stenosis). His trop was likely elevated because of demand ischemia (downtrended). Heparin was initiated when small subsegmental PEs were diagnosed on 4/28. Unfortunately, we were forced to discontinue his anticoagulation on 4/30/2019 due to bleeding in a wound that could preventing healing (which could require an extensive surgery to close the wound that we are hoping to avoid). He remained hemodynamically stable, and his GONZALEZ and hypoxia is likely more from ILD, severe aortic stenosis, and deconditioning. His O2 need have lessen and is currently on 1 l NC and we have been weaning as tolerated. A lower extremity US was negative of DVT 5/5/29. He will need a coronary angiogram and TAVR CT as an outpt for further evaluation of his aortic stenosis. He will need to follow up in structural cardiology valve clinic.  This will all be coordinated by cardiology. Consider restarting anticoagulation on 5/17 (ok per ortho at that time if his wounds continue to heal well). The patient had a US of the lower extremities on 5/6 which was negative for PE and it is unclear if the subsegmental PE is new or old so it was felt it was reasonable to hold anticoagulation for a few days and not place an IVC filter.      Dermatomyositis (Shannon-1 deepak +), ILD:  Beulah is followed by Dr. Call at  Rheumatology clinic in [place]. He is chronically on methotrexate 25 mg once weekly, and it was stopped two weeks prior to the first stage of his surgery at the advice of Dr. Call. Methotrexate should be restarted on 5/6 per ortho since his wounds are healing well.  Beulah also  recently nearly completed protracted taper of prednisone, started on 2/7 at 20 mg x 2 wks, then decreased by 5 mg q2wks, which meant that Beulah was on 5 mg daily on the day of surgery with a few days left of this taper. Prednisone was increased to BID after surgery and then tapered down to 5 mg daily on 5/5 to begin a 2 wk course that then can be stopped to finish the taper unless directed differently by rheum. He has an appt with Dr. Call on 5/16.  His most recent PFTs had reduced DLCO, so he had received rituximab infusion on 3/11.They can discuss another rituximab infusion at that time. Beulah was not on supplemental O2 prior to admission, but he currently needs 2 LPM with exertion, which is likely due to a combination of deconditioning from surgeries, ILD, anemia, and small subseqmental PEs.  He will be discharge with supplemental O2 with a hope to wean off it with recovery from this hospitalization. His home theophylline, scheduled Arnuity Ellipta, and prn albuterol inhaler/nebs were continued, and an IS was used. The plan is to restart his weekly po methotrexate on 5/9 (2 wks after stage 2 surgery) as long as there remains no concern for lack of wound healing. He will need close follow up to make sure that the methotrexate is not contributing to his hypoxia as it is restarted on 5/9.     DM2:  Most recent HgbA1C from 1/29/19, 6.3%. Beulah was prescribed PRN insulin isophane 6U BID prior to admission. This was discontinued, and his glucoses have been very well-controlled currently in hospital with a rare unit of aspart being given via the correction scale given.    Acute blood loss anemia: Pre-op Hgb on 4/2, 15.9. EBL from stage I 400 ml. EBL from stage  ml with 350 ml pRBCs returned via cell saver 350 ml. Most recent Hgb appear stable with no e/o significant active bleeding on exam.     Leukocytosis: WBC 12-18 WBC after surgery, likely from steroids and stress degranulation. CRP steadily decreased,  and his blood cultures had no growth with an unconcerning UA and unchanged CXR. Since 5/3 WBC count has been trending down.     HTN: Beulah was taking verapamil prior to admission, and his BPs remained normotensive while he continued his home regimen while hospitalized (verapamil  daily with parameters to hold).     Acute constipation: Beulah's constipation was managed with miralax BID, senna, and prn suppositories.      Hypothyroidism:  Beulah was euthyroid based on normal TSH on 1/10/19, and his home levothyroxine 50 mcg daily was continued during his hospitalization.    Risk for malnutrition due to low calorie counts: We encouraged boost glucose control, which Beulah likes to drink. Nutrition suggested consideration of an appetite stimulant to help with improvement of po intake if no improvement soon. It appears that  the menu at the hospital is limiting his intake, as he did eat when his family brought him soup over the weekend.    Consultations This Hospital Stay   INTERNAL MEDICINE ADULT IP CONSULT FOR [place]  NUTRITION SERVICES ADULT IP CONSULT  OCCUPATIONAL THERAPY ADULT IP CONSULT  PHYSICAL THERAPY ADULT IP CONSULT  PAIN MANAGEMENT ADULT IP CONSULT  INTERNAL MEDICINE ADULT IP CONSULT FOR [place]  PHARMACY TO DOSE VANCO  INFECTIOUS DISEASE GENERAL ADULT IP CONSULT  VASCULAR ACCESS CARE ADULT IP CONSULT  PHARMACY TO DOSE WARFARIN  CARDIOLOGY GENERAL ADULT IP CONSULT  CARDIOLOGY INTERVENTIONAL (CATH LAB) IP CONSULT     Code Status   Prior    Time Spent on this Encounter   I, Manfred Hanna, personally saw the patient today and spent greater than 30 minutes discharging this patient.       Manfred Hanna MD  [facility], [place]  ______________________________________________________________________    Physical Exam   Vital Signs: Temp: 97.3  F (36.3  C) Temp src: Oral BP: 118/56 Pulse: 86 Heart Rate: 95 Resp: 18 SpO2: 98 % O2 Device: Nasal cannula  Oxygen Delivery: 2 LPM  Weight: 178 lbs 8 oz  Vital Signs: Temp: 97.8  F (36.6  C) Temp src: Oral BP: 122/53   Heart Rate: 89 Resp: 18 SpO2: 96 % O2 Device: Nasal cannula Oxygen Delivery: 2 LPM  Weight: 178 lbs 8 oz  General: lying in bed, NAD  Resp: Breathing comfortably, no wheezes, no crackles devonte  CV: RRR, III/VI systolic murmur loudest at base, soft S2, soft diastolic murmur.   Abdomen: Non-tender, active BS  Back: no discharge nor abnormal redness or sign of cellulitis.              Primary Care Physician   Rah Bright    Discharge Orders      Follow-Up with Cardiac Structural Heart Clinic      General info for SNF    Length of Stay Estimate: Short Term Care: Estimated # of Days <30  Condition at Discharge: Improving  Level of care:skilled   Rehabilitation Potential: Good  Admission H&P remains valid and up-to-date: Yes  Recent Chemotherapy: N/A  Use Nursing Home Standing Orders: Yes     Mantoux instructions    Give two-step Mantoux (PPD) Per Facility Policy Yes     Reason for your hospital stay    Mr. Catherine Jane was admitted for a 2 staged lumbar fusion that went well. He is healing well and now needs some rehab to build his strength before discharge to home. He also needs vancomycin (IV antibiotic through 6/8) and some monitoring labs to be drawn.     Glucose monitor nursing POCT    Before meals and at bedtime     IV access    PICC.     Follow Up and recommended labs and tests    Twice weekly BMP, CBC with diff, vanc trough (vanc trough goal 15-20)  Weekly ESR and CRP    ID follow up- with Dr. Dueñas in 3-4 wks (ID will set this up)  PCP follow up in 2 wks  Ortho follow up- with Dr. Dial in 6 wks (already scheduled, on 6/7)  Plan to resume anticoagulation on 5/17 for sm subsegmental PEs depending on risk assessment at that time  Plan to resume methotrexate on 5/9  [facility] follow up with Dr. Call 5/16  Cardiology follow up in structural cardiology valve clinic (with TAVR CT and coronary  angiogram)- will be coordinated by cardiology     Weight bearing status    NO lifting GREATER than 10 pounds, NO repetitive twisting, bending. Up with assist until independent.  No Faiza lift for transfers.   Weight bearing as tolerated.     Activity - Up with nursing assistance    NO lifting GREATER than 10 pounds, NO repetitive twisting, bending. Up with assist until independent.  No Faiza lift for transfers.   Weight bearing status: WBAT.     Encourage PO fluids     Physical Therapy Adult Consult    Evaluate and treat as clinically indicated.    Reason:  pt needing cont skilled PT to progress functional IDN for safe discharge home     Oxygen - Nasal cannula    2-3 Lpm by nasal cannula to keep O2 sats 92% or greater.     Advance Diet as Tolerated    Follow this diet upon discharge: Orders Placed This Encounter      Room Service      Calorie Counts      Snacks/Supplements Adult: Boost Glucose Control; Between Meals      Moderate Consistent CHO Diet     Significant Results and Procedures   Most Recent 3 CBC's:  Recent Labs   Lab Test 05/06/19  0644 05/04/19  0338 05/03/19  0611   WBC 12.4* 14.4* 18.4*   HGB 7.9* 7.7* 8.7*   MCV 96 94 95    332 487*     Most Recent 3 BMP's:  Recent Labs   Lab Test 05/06/19  0644 05/04/19  0338 05/03/19  0611    138 138   POTASSIUM 3.7 4.0 3.8   CHLORIDE 103 103 102   CO2 29 30 29   BUN 11 15 12   CR 0.80 0.76 0.75   ANIONGAP 7 5 8   DMITRIY 8.3* 8.5 8.9   * 112* 158*     Most Recent INR's and Anticoagulation Dosing History:  Anticoagulation Dose History     Recent Dosing and Labs Latest Ref Rng & Units 4/25/2019 4/26/2019 4/28/2019 4/29/2019 4/30/2019 5/1/2019 5/2/2019    Warfarin 2.5 mg - - - 2.5 mg 2.5 mg - - -    INR 0.86 - 1.14 1.30(H) 1.28(H) 1.29(H) 1.27(H) 1.24(H) 1.15(H) 1.18(H)        Most Recent 3 Troponin's:  Recent Labs   Lab Test 05/01/19  2212 04/28/19  1047 04/28/19  0213   TROPI 0.064* 0.376* 0.381*     Most Recent 6 Bacteria Isolates From Any Culture  (See EPIC Reports for Culture Details):  Recent Labs   Lab Test 04/30/19 2033 04/30/19 2028 04/25/19  0857 04/25/19  0845 12/05/17  2250 12/05/17  1200   CULT No growth No growth No anaerobes isolated  Light growth  Staphylococcus epidermidis  *  These bacteria are part of normal skin marlee, but on occasion, may be true pathogens.    Clinical correlation must be applied to interpreting this microbiology result.  * No anaerobes isolated  Light growth  Normal skin marlee   No growth No growth     Most Recent 6 glucoses:  Recent Labs   Lab Test 05/06/19  0644 05/04/19  0338 05/03/19  0611 05/02/19  0554 05/01/19  2212 05/01/19  0453   * 112* 158* 168* 183* 117*     Most Recent ESR & CRP:  Recent Labs   Lab Test 05/06/19  0644  06/26/18  1247   SED  --   --  10   CRP 20.0*   < > 4.7    < > = values in this interval not displayed.     CTA 5/2:   FINDINGS: Since 4/27/2019, unchanged upper lobe subsegmental pulmonary embolism. No new pulmonary embolism. Significant cardiomegaly,  unchanged. No pericardial effusion. LAD stent. Contrast reflux to the hepatic veins.  50% decreased diameter of the trache . Redemonstration of interlobular septal thickening and peribronchial groundglass opacities. No pneumothorax.  Upper abdomen: Partially visualized upper abdomen is unremarkable.  Bone and soft tissue: Posterior fixation of the distal thoracic and lumbar spine with multilevel disc spacers.  IMPRESSION:   1.  Since 4/27/2019, unchanged upper lobes subsegmental pulmonary emboli. No new pulmonary embolism.  2.  Unchanged moderate pulmonary edema and significant cardiomegaly.  3.  Tracheomalacia.    CXR 4/30:  Findings:   Stable cardiomegaly with bilateral interstitial opacities. Streaky  bibasilar opacities. No pneumothorax or pleural effusion. Partially  visualized thoracolumbar hardware is intact.  Impression:   1. Stable streaky bibasilar opacities. Differential includes  atelectasis, infection, and aspiration.  2.  Cardiomegaly with interstitial pulmonary edema.    Discharge Medications   Current Discharge Medication List      START taking these medications    Details   acetaminophen (TYLENOL) 325 MG tablet Take 2 tablets (650 mg) by mouth every 4 hours as needed for mild pain or fever    Associated Diagnoses: Lumbar pain      bisacodyl (DULCOLAX) 10 MG suppository Place 1 suppository (10 mg) rectally daily as needed for constipation    Associated Diagnoses: Constipation, unspecified constipation type      famotidine (PEPCID) 20 MG tablet Take 1 tablet (20 mg) by mouth every 12 hours    Associated Diagnoses: Gastroesophageal reflux disease, esophagitis presence not specified      hydrOXYzine (ATARAX) 25 MG tablet Take 1 tablet (25 mg) by mouth every 6 hours as needed for other (adjuvant pain)    Associated Diagnoses: Lumbar pain      !! insulin aspart (NOVOLOG PEN) 100 UNIT/ML pen Inject 1-7 Units Subcutaneous 3 times daily (before meals)    Associated Diagnoses: Type 2 diabetes mellitus with diabetic neuropathy, with long-term current use of insulin (H)      !! insulin aspart (NOVOLOG PEN) 100 UNIT/ML pen Inject 1-5 Units Subcutaneous At Bedtime    Associated Diagnoses: Type 2 diabetes mellitus with diabetic neuropathy, with long-term current use of insulin (H)      Lidocaine (LIDOCARE) 4 % Patch Place 1-3 patches onto the skin every 24 hours    Associated Diagnoses: Lumbar pain      melatonin 3 MG tablet Take 1 tablet (3 mg) by mouth nightly as needed for sleep    Associated Diagnoses: Insomnia, unspecified type      menthol (ICY HOT) 5 % PTCH Apply 1 patch topically every 8 hours as needed for muscle soreness    Associated Diagnoses: Lumbar pain      methocarbamol (ROBAXIN) 500 MG tablet Take 1 tablet (500 mg) by mouth 4 times daily    Associated Diagnoses: Lumbar pain      multivitamin w/minerals (THERA-VIT-M) tablet Take 1 tablet by mouth daily    Associated Diagnoses: Routine health maintenance      oxyCODONE  (ROXICODONE) 5 MG tablet Take 1-2 tablets (5-10 mg) by mouth every 3 hours as needed for pain  Refills: 0    Associated Diagnoses: Lumbar pain      polyethylene glycol (MIRALAX/GLYCOLAX) packet Take 17 g by mouth daily    Associated Diagnoses: Constipation, unspecified constipation type      senna-docusate (SENOKOT-S/PERICOLACE) 8.6-50 MG tablet Take 2 tablets by mouth 2 times daily    Associated Diagnoses: Constipation, unspecified constipation type      !! sodium chloride, PF, 0.9% PF flush 10 mLs by Intracatheter route every 7 days    Associated Diagnoses: Seroma due to trauma (H)      !! sodium chloride, PF, 0.9% PF flush 10-20 mLs by Intracatheter route every hour as needed for line flush or post meds or blood draw    Associated Diagnoses: Seroma due to trauma (H)      vancomycin 1,500 mg Inject 1,500 mg into the vein every 12 hours    Associated Diagnoses: Postprocedural seroma of a musculoskeletal structure following other procedure       !! - Potential duplicate medications found. Please discuss with provider.      CONTINUE these medications which have CHANGED    Details   methotrexate 2.5 MG tablet Take 10 tablets (25 mg) by mouth once a week Thursdays    Associated Diagnoses: Pre-operative examination; Spinal stenosis of lumbar region with neurogenic claudication      predniSONE (DELTASONE) 5 MG tablet Take 5 mg by mouth daily for 12 days.    Comments: Please include the quantity of tablets and (strength) per dose in sig.  Associated Diagnoses: Polymyositis (H)      traMADol (ULTRAM) 50 MG tablet Take 1 tablet (50 mg) by mouth every 6 hours as needed for moderate pain    Associated Diagnoses: Lumbar pain         CONTINUE these medications which have NOT CHANGED    Details   calcium carbonate (OS-DMITRIY 500 MG [place]. [place]) 500 MG tablet Take 1 tablet (500 mg) by mouth 2 times daily  Qty: 180 tablet, Refills: 3    Associated Diagnoses: Pre-operative examination; Spinal stenosis of lumbar region with neurogenic  claudication      Cholecalciferol (VITAMIN D) 2000 units tablet Take 2,000 Units by mouth daily  Qty: 100 tablet, Refills: 3    Associated Diagnoses: Pre-operative examination; Spinal stenosis of lumbar region with neurogenic claudication      diphenhydrAMINE (BENADRYL) 50 MG capsule Take 50 mg by mouth every 6 hours as needed for itching or allergies      FOLIC ACID PO Take 1 mg by mouth daily      GABAPENTIN PO Take 600 mg by mouth 3 times daily      GEMFIBROZIL PO Take 600 mg by mouth 2 times daily      LANsoprazole (PREVACID) 30 MG DR capsule Take 30 mg by mouth every morning (before breakfast)      levothyroxine (SYNTHROID/LEVOTHROID) 50 MCG tablet Take 50 mcg by mouth daily      omega 3 1000 MG CAPS Take 2 capsules by mouth 2 times daily      sulfamethoxazole-trimethoprim (BACTRIM DS/SEPTRA DS) 800-160 MG per tablet Take 1 tablet 3 times a week (Monday, Wednesday, Friday)  Refills: 11      theophylline (UNIPHYL) 400 MG 24 hr tablet Take 400 mg by mouth every morning       verapamil ER (CALAN-SR) 120 MG CR tablet Take 120 mg by mouth At Bedtime      albuterol (2.5 MG/3ML) 0.083% neb solution Take 1 vial by nebulization every 6 hours as needed for shortness of breath / dyspnea or wheezing      albuterol (PROAIR HFA/PROVENTIL HFA/VENTOLIN HFA) 108 (90 BASE) MCG/ACT Inhaler Inhale 2 puffs into the lungs every 6 hours as needed for shortness of breath / dyspnea or wheezing      beclomethasone (QVAR) 80 MCG/ACT Inhaler Inhale 2 puffs into the lungs 2 times daily         STOP taking these medications       insulin isophane human (HUMULIN N PEN) 100 UNIT/ML injection Comments:   Reason for Stopping:         oxyCODONE-acetaminophen (PERCOCET) 5-325 MG per tablet Comments:   Reason for Stopping:             Allergies   No Known Allergies

## 2019-05-06 NOTE — PHARMACY-VANCOMYCIN DOSING SERVICE
Pharmacy Vancomycin Note  Date of Service May 6, 2019  Patient's  1950   69 year old, male, ABW 81 kg    Indication: Bone and Joint Infection, MRSE, vanco BRENDON=2  Goal Trough Level: 15-20 mg/L  Day of Therapy: 9  Current Vancomycin regimen:  Vancomycin 1500 mg IV q12h (18.5 mg/kg)    Current estimated CrCl = Estimated Creatinine Clearance: 82 mL/min (based on SCr of 0.8 mg/dL).    Creatinine for last 3 days  2019:  3:38 AM Creatinine 0.76 mg/dL  2019:  6:44 AM Creatinine 0.80 mg/dL    Recent Vancomycin Levels (past 3 days)  2019:  7:38 AM Vancomycin Level 23.0 mg/L - ~10 hr trough    Vancomycin IV Administrations (past 72 hours)                   vancomycin 1500 mg in 0.9% NaCl 250 ml intermittent infusion 1,500 mg (mg) 1,500 mg Given 19     1,500 mg Given 19     1,500 mg Given  956     1,500 mg Given 19     1,500 mg Given  09     1,500 mg Given 19                Nephrotoxins and other renal medications (From now, onward)    Start     Dose/Rate Route Frequency Ordered Stop    19 0000  methotrexate 2.5 MG tablet      25 mg Oral WEEKLY 19 1128      19 0000  vancomycin 1,500 mg      1,500 mg  over 90 Minutes Intravenous EVERY 12 HOURS 19 1128 19 2359    19 2100  vancomycin 1500 mg in 0.9% NaCl 250 ml intermittent infusion 1,500 mg      1,500 mg  over 90 Minutes Intravenous EVERY 12 HOURS 19 2359             Contrast Orders - past 72 hours (72h ago, onward)    None          Interpretation of levels and current regimen:  Trough level is  Slightly supratherapeutic, estimated true 12-hr trough would be ~20-21 mg/L. Pt's renal fx appears stable, and given site of infection (bone), slightly high level is acceptable.    Has serum creatinine changed > 50% in last 72 hours: No    Urine output:  good urine output    Renal Function: Stable    Plan:  1.  Continue Current Dose vancomycin 1500 mg IV q12h (18.5  mg/kg)  2.  Pharmacy will check trough levels as appropriate in 1-3 Days.    3. Serum creatinine levels will be ordered a minimum of twice weekly.      Maxi Hurst, CalebD, BCPS

## 2019-05-06 NOTE — PLAN OF CARE
Patient alert and oriented. Vitals stable on 2L oxygen via NC. Tele NSR intermittently tachy low 100's.Up with assist. Voiding. BM x1. Patient tolerating mod/carb diet well. BG's 114 and 102. All dressings clean, dry and intact. IV vancomycin. Possible PICC placement today. Continue to monitor and follow plan of care.

## 2019-05-06 NOTE — PROGRESS NOTES
CLINICAL NUTRITION SERVICES - BRIEF NOTE     Nutrition Prescription    RECOMMENDATIONS FOR MDs/PROVIDERS TO ORDER:  1. HIGHLY recommend the consideration of an appetite stimulant (Remeron, Marinol, or Megace) to assist with improvement of PO intake.    2. Encourage 2-3 meals and 1-2 Boost Glucose Control daily     Recommendations already ordered by Registered Dietitian (RD):  Modified ONS: D/C'ed Gelatein & Boost GC @ 10 am and 2 pm     Future/Additional Recommendations:  1. Continue to monitor oral intake and need for further adjustments to supplements.    *Refer to RD note on 5/2 for full nutrition assessment details.    Diet:  Mod Consistent CHO + Boost     NEW FINDINGS   Calorie Counts (5/3-5/5):   5/3: 550 kcals   28 g PRO   1 meal + 100% 1 Boost GC  5/4: 370 kcals  14 g PRO  100% orange juice + 100% 1 Boost GC  5/5: 1055 kcals  30 g PRO  2 meals + 0 supplements     3-day calorie count average providing 658 kcals (9 kcal/kg) and 24 g PRO (0.3 g/kg), which meets 35% of estimated energy needs & 26% of estimated protein needs.     INTERVENTIONS  Implementation  Medical food supplement therapy (see above)     Monitoring/Evaluation  Will continue to monitor and evaluate per protocol.      Venita Bailey RD, LD   7B floor pager [phone]

## 2019-05-06 NOTE — PROVIDER NOTIFICATION
05/06/19 1220   PICC Single Lumen 05/06/19 Right Basilic   Placement Date/Time: 05/06/19 1220   Catheter Brand: [id number]  Size (Fr): 4 Fr  Lot #: [id number]  Full barrier precautions done: Yes, hand hygiene, sterile gown, sterile gloves, mask, cap, full body drape, chlorhexidine scrub  Consent Signed: Yes  Time Ou...   Site Assessment WDL   Line Status Blood return noted;Saline locked   External Cath Length (cm) 1 cm   Extremity Circumference (cm) 28.5 cm   Extravasation? No   Dressing Intervention Chlorhexidine patch;Transparent;Securing device;New dressing   Dressing Change Due 05/13/19   PICC Comment PICC insertion done

## 2019-05-06 NOTE — PLAN OF CARE
Discharge Planner PT   Patient plan for discharge: TCU   Current status: pt is progressing well, still limited by pain and weakness. Pt needing V.c for spinal precautions during PT session. Pt needing CGA to min A for supine to sit with log roll tech. Pt demo sit to stand to 4WW with CGA. Pt amb up to 300'x 1 at slow but stable pace. Pt up in chair at end of PT sessoin. Pt on 2L 02 at rest SATS 96% 3L during amb with SATS 92%. Pt stating feeling mild SOB   Barriers to return to prior living situation: weakness, fatigeu.   Recommendations for discharge: PT - per plan established by the Physical Therapist, according to functional mobility the  discharge recommendation is TCU   Rationale for recommendations: pt needing cont skilled PT to progress functional IDN for safe discharge home.        Entered by: Hamilton Olvera 05/06/2019 10:06 AM

## 2019-05-06 NOTE — PROGRESS NOTES
Social Work Services Progress Note     Hospital Day: 15  Date of Initial Social Work Evaluation:  4/29/19  Collaborated with:  Primary team Gold 6,  rehab admissions Katie, patient     Data:  Pt is a 69-year-old male admitted to [facility] 4/22/19.      Intervention:  Referral pending at  ARU/TCU. Pt is stable for discharge when PICC line placed. Contacted  rehab liaison who feels pt more appropriate for TCU and is also checking home benefits for IV abx as pt will require 6 wk course of IV abx. No beds available at  TCU today.    Updated pt on switch from ARU to TCU and the differences between them. Pt agreeable with placement at  TCU. Discussed with pt that there is not a bed today but potentially will be tomorrow.      Assessment:  Pt on waiting list at  TCU     Plan:    Anticipated Disposition:  Facility:  TCU    Barriers to d/c plan:  Placement    Follow Up:  SW to follow for discharge planning     CARLOS Larsen, LGSW  5A Unit   Pager [phone]  Phone [phone]

## 2019-05-06 NOTE — PLAN OF CARE
Time  8497-8141     Reason for admission: Had lumbar spinal fusion following an infected abscess  Vitals: VSS on 2L O2 Nasal [place]  Activity: 1 person assist, pain with movement  Pain: Oxy, Tramadol, and Tylenol are PRN pain medications. Pt uses all three.  Neuro: A&O x 4. Calm and cooperative  Cardiac: Tele NSR intermittent tachy  Respiratory: WDL, some dyspnea upon excertion  GI/: Pt calls for help to the bathroom, 1 BM this shift  Diet: Moderate CHO Diet  Lines: Midline and PICC, PIV removed today from leaking. Pt received IV Vanco. Lines are saline locked.  Wounds: Edema in legs and feet      New changes this shift: PICC line place today, used heat pack near site. Pt's pain is tolerably managed with PRN medication. Pt had an ultrasound for swelling of the legs. Pt should not be bending, twisting, or lifting.     Plan: Continue to use heat packs near PICC site for first 48 hours. Continue to provide pain medication regularly. Pt receiving 6 weeks IV antibiotics, and discharging to TCU potentially tomorrow.      Continue to monitor and follow POC

## 2019-05-07 NOTE — PROGRESS NOTES
"Orthopaedic Surgery Progress Note   May 6, 2019    Subjective:   No issues overnight. Receive PICC line yesterday. Pain controlled. OOB and ambulating. No chest pain or shortness of breath. No new numbness or tingling.    Objective: /56   Pulse 86   Temp 98.9  F (37.2  C) (Oral)   Resp 16   Ht 1.6 m (5' 3\")   Wt 81 kg (178 lb 8 oz)   SpO2 98%   BMI 31.62 kg/m      General: awake and alert, no acute distress   Cardio: extremities wwp  Respiratory: non-labored breathing  MSK: Incision clean/dry/intact.  SILT L3-S1 bilaterally.                L2-3: Hip flexion L and R 5/5 strength                 L4:  Knee extension L and R 5/5 strength                L5:  Foot / EHL dorsiflexion L and R 5/5 strength                S1:  Plantarflexion  L and R 5/5 strength     Hemovac 1: removed  Hemovac 2: removed  Deep drain: removed    Labs:  Hemoglobin   Date Value Ref Range Status   05/06/2019 7.9 (L) 13.3 - 17.7 g/dL Final     Seroma cultures 4/25: seroma specimen 2 growing staph epidermidis    Assessment and Plan: Beulah Jane is a 69 year old male with PMH including aortic stenosis, chronic pain, DM type 2, hyperlipidemia, JUAN now s/p Stage I L3-4, L4-5 ALIF  on 4/22/19 with Dr. Dial and Dr. Alegria. Stage II T10-pelvis PIF, TLIF, SPO on 4/25/19 with Dr. Dail.     Chest pressure and shortness of breath on 4/27, CT chest showing subsegmental PEs.      Seroma cultured in surgery with seroma specimen 2 growing staph epidermidis.      Medicine Primary, appreciate cares  Activity: Up with assist until independent. No excessive bending or twisting. No lifting >10 lbs x 6 weeks. No Faiza lift for transfers.   Weight bearing status: WBAT.  Pain management: Per primary. No NSAIDs   Antibiotics: IV vancomycin for 6 weeks as per ID recs w/ stop date 6/8/19, ID following, appreciate assistance  Diet: per primary  DVT prophylaxis: SCDs only. Continue to hold heparin/warfarin given wound drainage - plan to hold for 2 " weeks (until 5/17).  Bracing/Splinting: None.  Dressings: Removed 5/6  Wound: Please hold methotrexate until 2 weeks postop (5/9/2019) to permit wound healing  Drains: removed 5/5/19  Physical Therapy/Occupational Therapy: Eval and treat.  Cultures: Seroma fluid and debris pending, follow culture results closely.    Follow-up: Clinic with Dr. Dial in 6 weeks with repeat x-rays.   Disposition: OK to discharge from ortho perspective when ABx plan is in place, medically stable, cleared by therapies, and TCU bed available.        Sukhdeep Mcelroy MD  Orthopaedic Surgery, PGY-1  Pager: [phone]

## 2019-05-07 NOTE — PLAN OF CARE
Discharge Planner PT   Patient plan for discharge: TCU  Current status: PT 5A: Pt able to perform sit>stand with SBA, 2 sets 5 reps to promote improved strength, achieved gait x 120 ft x 2, took seated rest. HR up to 135 bpm, O2 sats down to 82% on RA (sats 91% on RA at rest) with need for 3L to keep sats 90+% (sats on 3L were 95-96%, pt reported feeling much better activity tolerance on 3L.   Barriers to return to prior living situation: medical status, reduced IND with functional strength, stairs  Recommendations for discharge: TCU  Rationale for recommendations: below baseline functional status, not yet able to tolerate stairs       Entered by: Sylvia Jerez 05/07/2019 5:27 PM

## 2019-05-07 NOTE — PLAN OF CARE
Time: 2922-0668     Reason for admission: lumbar pain (4/22)  Vitals: VSS on 2L O2 NC  Activity: Assistx1  Pain: C/o low back pain, pain managed with PRN pain medications- tylenol, tramadol, and oxycodone   Neuro:  A&Ox4, calls appropriately   Cardiac: Tele NSR/sinus tachycardia  Respiratory: GONZALEZ   GI/: Voiding without difficulty, Last BM 5/6  Diet:  Mod CHO diet   Lines: R single lumen PICC infusing TKO with intermittent abx. L midline saline locked.   Skin/Wounds: BLE edema. Lumbar surgical scars HARDIK, CDI     Plan: Continue with IV abx for 6 weeks total. Possible discharge to [facility]CU today pending bed availability     Continue to monitor and follow POC

## 2019-05-07 NOTE — PROVIDER NOTIFICATION
Orthopedic surgery contacted to ask about 2 sutures left in place. Per ortho- those sutures are from drain removal on 5/5. Sutures to remain in place until post op visit with ortho.

## 2019-05-07 NOTE — PLAN OF CARE
A&O, VSS on 2L NC except tachy. Left PICC infusing TKO and IV abx. Right midline SL. C/o back pain due to surgery, Oxycodone 10 mg given x 1. Incision on bilateral back open to air. Tele ST. BG stable. Cons CHO diet, good appetite. Calls to make needs known. Continue with POC.

## 2019-05-07 NOTE — PLAN OF CARE
[id number]  HR up to 120 with activity, 110 at rest. Sating 90% on RA at rest, needing 2L O2 with activity. Other VSS. A&Ox4. Up with SBA and walker. Voiding. Had BM. R PICC SL'd between abx- IV vancomycin. L midline SL'd.Tolerating reg diet. Abd and back pain controlled with PRN oxycodone, tylenol, and tramadol. Scheduled robaxan and gabapentin. Back and abd incision HARDIK. No drainage. Waiting for TCU placement.

## 2019-05-07 NOTE — PROGRESS NOTES
Patient has been assessed for Home Oxygen needs. Oxygen readings:    *Pulse oximetry (SpO2) = 90% on room air at rest while awake.    *SpO2 improved to 98% on 2 liters/minute at rest.    *SpO2 = 84% on room air during activity/with exercise.    *SpO2 improved to 94% on 2 liters/minute during activity/with exercise.

## 2019-05-07 NOTE — PROGRESS NOTES
[facility], [facility] Progress Note - Hospitalist Service, Gold 6       Date of Admission:  4/22/2019  Assessment & Plan   Beulah Jane is a 69 year old male with hx of HTN, hypothyroidism, T2DM, ILD, dermatomyositis, aortic stenosis, and GERD,admitted on 4/22/2019. He is now status post stage I lumbar ALIF on 4/22 by Ed Dial and Raji with planned stage II operation on 4/25.      Plan for today:  - continue vancomycin per ID to complete a 6 wk course (ends June 8)  - cont prednisone to 5 mg daily through 5/19- then taper off  - continue supplemental O2 via NC to keep sats >92% and wean as able (Sat 99 on 2 liter)  - encourage po intake and boost, etc.  - plan to give methotrexate on 5/9 (2 wks out from surg)  - plan to restart anticoagulation in 2 wks (5/17) if the wound continues to heal well  - patient is looking into facilities where he would go when he leaves  - Nurse to connect with surgery regarding timing for removal of stiches    Near syncopal event 5/1/19: likely due to combination of ILD, aortic stenosis, doubt contribution of subsegmental PEs. Trop lower than prior, CTPE unchanged, lytes normal. No repeat episodes.  - cont to monitor with tele  - strict I/O   - plan to restart anticoagulation in 2 wks (5/17) if the wound continues to heal well. US LE neg for PE 5-6.    S/p stage I lumbar fusion, 4/22, and stage II, 4/25:  By Ed Dalal. Tolerated second procedure well. Currently states incisional pain tolerable on current pain med regimen as recommended by pain service. Abd incisions appear to be healing well without discharge or dehiscence.   - ortho continuing to co-manage   - wound had slow drip of blood: due to concern of it not healing the anticoagulation was stopped (see below)    Intra-op culture (4/25) of seroma with staph epi and normal skin marlee: ID consulted, seroma not communicating with deep wound per visualization during surgery,  "favor treating since this normal skin marlee and staph epi are causing re-accumulation of his seroma and in the setting of 2 surgical procedures with hardware. CRP 74 on 5/1 and 52 on 5/2. So, complete 2 wk course of vanc for this wound infection (favor treating aggressively due to his history and the sensitive area of his infection) Susceptibilities have returned: staph epi is sensitive to clinda, erythromycin, tetracycline, and vanc. Consider reimaging if CRP increases in addition to WBC or he develops clinical signs of an infection.  - cont vanc per ID recs to complete a 6 wk course (through 6/8)- will need \"twice-weekly BMP, CBC with diff, vanco trough; weekly ESR and CRP. If Cr is unchanged during 3 weeks of therapy, would consider less frequent lab draws. Vanc trough goal 15-20. Will continue to assess the risk/benefit of 42 days therapy - if s/e emerge these will be weighed against the theoretic benefit of abx.  Following 6 weeks of IV therapy, anticipate transition to PO doxycycline for at least 3 months.\"  - ID f/u, ideally with Dr. Moura, 3-4 weeks from discharge (ID will schedule)  - PICC will be needed prior to discharge to TCU  - trending CRP with WBC--> CRP decreasing steadily    Chest pain, suggestive of angina  Dyspnea on exertion  Severe aortic stenosis, moderate aortic regurgitation  Type II NSTEMI  Subsegmental Pulmonary Emboli  Cardiology consultation on 4/29 reassured that this is not likely ACS, but it is likely multifactoral (ILD, PE, severe aortic stenosis). Trop likely elevated because of demand ischemia (and has now downtrended). Forced to discontinue his anticoagulation on 4/30/2019 due to his new bleeding. GONZALEZ and hypoxia could be more from ILD and severe aortic stenosis. Will not treat subsegmental PE as this risks the wound not healing (which could require an extensive surgery to close the wound that we are hoping to avoid).  - coronary angiogram and TAVR CT planned as an outpt for " evaluation (will follow up in structural cardiology valve clinic)- this will all be coordinated by cards  - monitor for hemodynamic instability without anticoagulation      Dermatomyositis (Shannon-1 deepak +), ILD:  Followed OP by Dr. Call at  Rheumatology clinic in [place]. Chronically on methotrexate 25 mg once weekly, last taken however, two weeks ago at the advice of Dr. Call. Also recently nearly completed protracted taper of prednisone, started on 2/7 at 20 mg x 2 wks, then decrease by 5 mg q2wks, so pt was on 5 mg daily day of surgery with a few days left of taper. Most recent PFTs with reduced DLCO so received rituximab infusion on 3/11. Not normally on O2 PTA, but currently on 2L most likely the need is multifactorial and due to surgeries, ILD, deconditioned state, anemia, opioid use, and last nights small subseqmental PEs.    - Continue oral prednisone, but change 5 mg BID-->5 mg daily on 5/5 (needs 2 wks before weaning down)  - Plan to discontinue when pt no longer requires supplemental O2 and/or until follow up after discharge with Rheumatology  - Continue with PTA theophylline, scheduled Arnuity Ellipta, and prn albuterol inhaler/nebs  - Continue supplemental O2 via NC to keep O2 sats >92%, wean as able   - Encourage IS q2hrs     - Plan to restart methotrexate on 5/9     DM2:  Most recent HgbA1C from 1/29/19, 6.3%. PTA on PRN insulin isophane 6U BID. Glucoses well-controlled currently in hospital  - cont to hold home NPH  - cont sliding scale insulin  - cont hypoglycemia protocol    Acute blood loss anemia: Pre-op Hgb on 4/2, 15.9. EBL from stage I 400 ml. EBL from stage  ml with 350 ml pRBCs returned via cell saver 350 ml. Most recent Hgb appears stable with no e/o significant active bleeding on exam.   - Monitor CBCs    Leukocytosis: 15.7 WBC on the am after surgery (thought to be stress degranulation)--> 10.8 on 4/29-->15.8 on 5/1--> 14 on 5/2, etc. Taking chronic prednisone, no signs of a new  infectious process (treating staph epi and normal skin marlee with vanc). 4/30: CXR unchanged, UA unconcerning, bld cx pending with NGTD.  - cont to monitor along with CRPs and clinically    HTN:  PTA on verapamil. Currently BPs normotensive.   - Continue PTA verapamil  daily with parameters to hold     Acute constipation:  Most likely opioid induced  - Continue Miralax to BID  - Continue Senokot-S, two tabs BID   - suppository prn     Hypothyroidism:  Euthyroid based on normal TSH on 1/10/19  - continue PTA levothyroxine 50 mcg daily    Diet: Moderate Consistent CHO Diet  Room Service  Snacks/Supplements Adult: Boost Glucose Control; Between Meals  Advance Diet as Tolerated  Encouraging po intake, boost and and fluids  DVT Prophylaxis: none, with anticoagulation stopped due to bleeding from wound  [place] Catheter: not present  Code Status: Full    Disposition Plan   Expected discharge: 1-2days, recommended to transitional care unit once adequate pain management/ tolerating PO medications, antibiotic plan established and hemoglobin stable.  Entered: Manfred Hanna MD 05/07/2019, 8:45 AM       The patient's care was discussed with patient and bedside nurse    Manfred Hanna MD  Hospitalist Service, [address]  Pager: [phone]  Please see sticky note for cross cover information  ______________________________________________________________________    Interval History   Back pain is less intense. Patient feels overall better, no SOB, palpitation, anterior chest pain or new symptoms or event overnight. Discharge when we have a TCU bed available    Data reviewed today: I reviewed all medications, new labs and imaging results over the last 24 hours. I personally reviewed no images or EKG's today.    Physical Exam   Vital Signs: Temp: 97.7  F (36.5  C) Temp src: Oral BP: 120/48   Heart Rate: 90 Resp: 20 SpO2: 99 % O2 Device: Nasal cannula Oxygen Delivery: 2  LPM  Weight: 178 lbs 8 oz  General: lying in bed, NAD  Resp: Breathing comfortably, no wheezes, no crackles devonte  CV: RRR, III/VI systolic murmur loudest at base, soft S2, soft diastolic murmur.   Abdomen: Non-tender, active BS  Back: dressings clean    Data   Recent Labs   Lab 05/07/19  0641 05/06/19  0644 05/04/19  0338  05/02/19  0554 05/01/19  2212 05/01/19  0453   WBC 10.7 12.4* 14.4*   < > 14.0* 13.7* 15.8*   HGB 7.6* 7.9* 7.7*   < > 7.8* 8.2* 8.7*   MCV 94 96 94   < > 96 97 95    337 332   < > 336 348 401   INR  --   --   --   --  1.18*  --  1.15*    138 138   < > 139 138 136   POTASSIUM 3.8 3.7 4.0   < > 3.9 3.8 4.1   CHLORIDE 105 103 103   < > 102 102 99   CO2 31 29 30   < > 29 26 31   BUN 15 11 15   < > 13 15 16   CR 0.85 0.80 0.76   < > 0.74 0.78 0.75   ANIONGAP 5 7 5   < > 8 10 6   DMITRIY 8.3* 8.3* 8.5   < > 8.6 8.5 9.1   * 139* 112*   < > 168* 183* 117*   TROPI  --   --   --   --   --  0.064*  --     < > = values in this interval not displayed.     Medications       famotidine  20 mg Oral Q12H     fluticasone  1 puff Inhalation Daily     gabapentin  600 mg Oral TID     insulin aspart  1-7 Units Subcutaneous TID AC     insulin aspart  1-5 Units Subcutaneous At Bedtime     levothyroxine  50 mcg Oral QAM AC     lidocaine  1-3 patch Transdermal Q24h    And     lidocaine   Transdermal Q24H    And     lidocaine   Transdermal Q8H     methocarbamol  500 mg Oral TID     multivitamin w/minerals  1 tablet Oral Daily     pantoprazole  40 mg Oral QAM AC     polyethylene glycol  17 g Oral Daily     predniSONE  5 mg Oral Daily     senna-docusate  2 tablet Oral BID     sodium chloride (PF)  10 mL Intracatheter Q7 Days     sodium chloride (PF)  3 mL Intracatheter Q8H     sulfamethoxazole-trimethoprim  1 tablet Oral Q Mon Wed Fri AM     theophylline  400 mg Oral QAM     vancomycin (VANCOCIN) IV  1,500 mg Intravenous Q12H     verapamil ER  120 mg Oral At Bedtime

## 2019-05-08 NOTE — PROGRESS NOTES
Social Work Services Discharge Note      Patient Name:  Beulah Jane     Anticipated Discharge Date:  5/8/19    Discharge Disposition:   TCU:  [facility] [address]   [address] [place] [address]  Ph [phone]  Fax [phone]    Following MD:  Facility assignment     Pre-Admission Screening (PAS) online form has been completed.  The Level of Care (LOC) is:  Determined  Confirmation Code is: [id number]  Patient/caregiver informed of referral to [facility] for Pre-Admission Screening for skilled nursing facility (SNF) placement and to expect a phone call post discharge from SNF.     Additional Services/Equipment Arranged:  [employer] (ph [phone]) wc van arranged for 2pm. Pt and pt's son aware of private pay cost. Portable O2 tank ordered via [employer] [facility] (ph [phone]) for delivery prior to d/c. Pt aware of private pay cost.      Patient / Family response to discharge plan:  Pt in agreement with plan.      Persons notified of above discharge plan:  Pt, RN Shraddha, primary team [facility] Tawana Costello- Replaced by [facility] at [facility] (ph [phone]). Pt states he will notify his son.    Staff Discharge Instructions:  RN to call report to [phone].  SW to fax discharge orders and signed hard scripts for any controlled substances.  Please print a packet and send with patient.     CTS Handoff completed:  YES    CARLOS Larsen, LGSW  5A Unit   Pager [phone]  Phone [phone]

## 2019-05-08 NOTE — PLAN OF CARE
Patient plan for discharge: rehab  Current status: Pt tolerating increased activity tolerance well. CGA for all mobility and cues for precautions. A for ADLs.   Barriers to return to prior living situation: post surgical precautions, activity tolerance  Recommendations for discharge: Per plan established by the OT, the recommendation for dc location is ARU  Rationale for recommendations: Pt would benefit from continued skilled OT to increase ADL/IADl IND prior to return home. Pt would likely tolerate 3 hours of therapy daily to meet multidisciplinary therapy goals.

## 2019-05-08 NOTE — PLAN OF CARE
Alert and oriented x 4. Complained of pain in the abdomen and back. Tramadol, atarax, oxycodone and tylenol given. Using oxygen 1 liter and saturation was 99%. Up with SBA, walker and gait belt. Both abdominal and back incisions open to air.

## 2019-05-08 NOTE — PHARMACY-VANCOMYCIN DOSING SERVICE
Pharmacy Vancomycin Note  Date of Service May 8, 2019  Patient's  1950   69 year old, male, ABW 81 kg    Indication: Bone and Joint Infection, seroma related to 2-stage spinal instrumentation surgery  Goal Trough Level: 15-20 mg/L - primary team would like to be aggressive x2 weeks  Day of Therapy: 11  Current Vancomycin regimen:  1500 mg IV q12h (18.5 mg/kg)    Current estimated CrCl = Estimated Creatinine Clearance: 77.1 mL/min (based on SCr of 0.85 mg/dL).    Creatinine for last 3 days  2019:  6:44 AM Creatinine 0.80 mg/dL  2019:  6:41 AM Creatinine 0.85 mg/dL    Recent Vancomycin Levels (past 3 days)  2019:  7:38 AM Vancomycin Level 23.0 mg/L  2019:  8:16 AM Vancomycin Level 21.1 mg/L - 12 hr trough    Vancomycin IV Administrations (past 72 hours)                   vancomycin 1500 mg in 0.9% NaCl 250 ml intermittent infusion 1,500 mg (mg) 1,500 mg Given 19 0930     1,500 mg Given 19     1,500 mg Given  08     1,500 mg Given 19     1,500 mg Given  920     1,500 mg Given 19                Nephrotoxins and other renal medications (From now, onward)    Start     Dose/Rate Route Frequency Ordered Stop    19 0000  methotrexate 2.5 MG tablet      25 mg Oral WEEKLY 19 1128      19 0000  vancomycin 1,500 mg      1,500 mg  over 90 Minutes Intravenous EVERY 12 HOURS 19 1128 19 23519 2100  vancomycin 1500 mg in 0.9% NaCl 250 ml intermittent infusion 1,500 mg      1,500 mg  over 90 Minutes Intravenous EVERY 12 HOURS 19 235             Contrast Orders - past 72 hours (72h ago, onward)    None          Interpretation of levels and current regimen:  Trough level is  Slightly above goal range, but team would like to be aggressive given site of infection, and renal function appears to be stable    Has serum creatinine changed > 50% in last 72 hours: No    Urine output:  good urine output    Renal  Function: Stable    Plan:  1.  Continue Current Dose vancomycin 1500 mg IV q12h (18.5 mg/kg)  2.  Pharmacy will check trough levels as appropriate in 5-7 Days, likely do not need to check another level before 5/12 (day 14 of vanco, planning 2-wk course).  3. Serum creatinine levels will be ordered a minimum of twice weekly.      Maxi Hurst, CalebD, BCPS

## 2019-05-08 NOTE — PLAN OF CARE
Try to call [facility] to give report but Joy Tabares unavailable at this time. Messaged left.Cont plan of care.

## 2019-05-08 NOTE — PLAN OF CARE
PT 5A: Cancel - Pt eating breakfast during initial attempt and requesting check-back. During check-back, pt reporting he is discharging to TCU today and working to coordinate the necessary items to discharge. Will check back with pt this PM as schedule allows.

## 2019-05-08 NOTE — PLAN OF CARE
Focus: Discharge to nursing home  D:  Patient has orders to discharge to nursing home.  I:  Reviewed discharge orders for completeness.  Patients belongings gathered for discharge.  IV  midline removed prior to discharge . Has a Picc for 6 wks antibiotic.  A:  Patient is aware of discharge.  Patient is being transported by  [facility]  P:  Discharge to [facility] with all his personal belongings.at 1400.

## 2019-05-08 NOTE — PLAN OF CARE
A&O, VSS on 1L NC. PICC and midline SL. Cons CHO diet. GONZALEZ when up walking with PT requiring O2. Pt requesting O2 for now for comfort. C/o back pain. Oxycodone, Tramadol and Tylenol with some relief. No lido patches on. Pt awaiting placement at TCU. Will require IV abx for 6 weeks. Continue with POC.

## 2019-05-09 PROBLEM — R53.81 PHYSICAL DECONDITIONING: Status: ACTIVE | Noted: 2019-01-01

## 2019-05-09 PROBLEM — K59.01 SLOW TRANSIT CONSTIPATION: Status: ACTIVE | Noted: 2019-01-01

## 2019-05-09 PROBLEM — I26.99 ACUTE PULMONARY EMBOLISM (H): Status: ACTIVE | Noted: 2019-01-01

## 2019-05-09 PROBLEM — I25.2 STATUS POST NON-ST ELEVATION MYOCARDIAL INFARCTION (NSTEMI): Status: ACTIVE | Noted: 2019-01-01

## 2019-05-09 NOTE — PLAN OF CARE
Occupational Therapy Discharge Summary    Reason for therapy discharge:    Discharged to transitional care facility.    Progress towards therapy goal(s). See goals on Care Plan in [facility] electronic health record for goal details.  Goals partially met.  Barriers to achieving goals:   discharge from facility.    Therapy recommendation(s):    Continued therapy is recommended.  Rationale/Recommendations:  Pt would benefit from continued skilled OT to increase ADL/IADl IND prior to return home. Pt would likely tolerate 3 hours of therapy daily to meet multidisciplinary therapy goals..

## 2019-05-09 NOTE — PLAN OF CARE
Physical Therapy Discharge Summary    Reason for therapy discharge:    Discharged to transitional care facility.    Progress towards therapy goal(s). See goals on Care Plan in [facility] electronic health record for goal details.  Goals partially met.  Barriers to achieving goals:   discharge from facility.    Therapy recommendation(s):    Continued therapy is recommended.  Rationale/Recommendations:  will benefit from continued skilled PT intervention to address mobility deficits, improve strength & activity tolerance.

## 2019-05-09 NOTE — ED AVS SNAPSHOT
Emergency Department  [address] [facility] [address]  Phone:  [phone]  Fax:  [phone]                                    Beulah Jane   MRN: [id number]    Department:   Emergency Department   Date of Visit:  5/9/2019           After Visit Summary Signature Page    I have received my discharge instructions, and my questions have been answered. I have discussed any challenges I see with this plan with the nurse or doctor.    ..........................................................................................................................................  Patient/Patient Representative Signature      ..........................................................................................................................................  Patient Representative Print Name and Relationship to Patient    ..................................................               ................................................  Date                                   Time    ..........................................................................................................................................  Reviewed by Signature/Title    ...................................................              ..............................................  Date                                               Time          22EPIC Rev 08/18

## 2019-05-09 NOTE — PROGRESS NOTES
[facility]  PRIMARY CARE PROVIDER AND CLINIC:  [place] [address] [facility]*  Chief Complaint   Patient presents with     Hospital F/U     [facility] Medical Record Number:  [id number]  Place of Service where encounter took place:  [facility] [[id number]]    Beulah Jane  is a 69 year old  (1950), admitted to the above facility from  [facility]. Hospital stay 4/22/2019 through 5/8/2019..  Admitted to this facility for  rehab, medical management and nursing care.    HPI:    HPI information obtained from: facility chart records, facility staff, patient report and [facility] chart review.   Brief Summary of Hospital Course: patient underwent planned two staged lumbar fusion on 4/22 and 4/25 due to  thoracolumbar kyphosis with flat back syndrome and thoracolumbar stenosis with myeloradiculopathy.. During second surgery a chronic seroma was debrided and cultured. It grew out staph epi. ID was consulted and recommended IV vanco until 6/8 hen 3 weeks of po doxycycline.    Post op issues included type 2 NSTEMI likely due to ILD, PULMONARY EMBOLISM and aortic stenosis. He was started on heparin for finding of subsegmental pulmonary emboli on 4/28 then developed  bleeding at incision so anticoagulation had to be held.     PMH includes dermatomyositis and ILD, DM2, hypertension.hypothyroidism.     Once he was stabilized he was transferred to TCU due to weakness.     Updates on Status Since Skilled nursing Admission: patient reports ongoing pain. He is wearing oxygen; O2 sats in mid 90s. HR . He is eating and will work with therapy.     CODE STATUS/ADVANCE DIRECTIVES DISCUSSION:   CPR/Full code   Patient's living condition: lives with spouse  And son. Will be moving to apartment soon. Moved here from [place].   ALLERGIES: Patient has no known allergies.  PAST MEDICAL HISTORY:  has a past  medical history of Aortic stenosis, Chronic pain, DM (diabetes mellitus), type 2 (H), Hyperlipidemia, JUAN on CPAP, Pneumonia, Polymyositis (H), Pulmonary fibrosis, unspecified (H), and Seasonal allergies.  PAST SURGICAL HISTORY:   has a past surgical history that includes hernia repair (2006); removal prosthetic material/mesh, abd wall necro tiss infexn (2012); Decompression, fusion lumbar posterior two levels, combined (1997); lumbar spine hardware removal (1999); rotator cuff repair rt/lt (2003); Arthroscopy knee (1970); colonoscopy; Fusion lumbar anterior three+ levels (N/A, 4/22/2019); Optical tracking system fusion posterior spine thoracic three+ levels (N/A, 4/25/2019); and picc insertion (Right, 05/06/2019).  FAMILY HISTORY: family history is not on file.  SOCIAL HISTORY:   reports that he quit smoking about 48 years ago. He smoked 0.25 packs per day. He has never used smokeless tobacco.    Post Discharge Medication Reconciliation Status: discharge medications reconciled, continue medications without change    Current Outpatient Medications   Medication Sig Dispense Refill     acetaminophen (TYLENOL) 325 MG tablet Take 2 tablets (650 mg) by mouth every 4 hours as needed for mild pain or fever       albuterol (2.5 MG/3ML) 0.083% neb solution Take 1 vial by nebulization every 6 hours as needed for shortness of breath / dyspnea or wheezing       albuterol (PROAIR HFA/PROVENTIL HFA/VENTOLIN HFA) 108 (90 BASE) MCG/ACT Inhaler Inhale 2 puffs into the lungs every 6 hours as needed for shortness of breath / dyspnea or wheezing       beclomethasone (QVAR) 80 MCG/ACT Inhaler Inhale 2 puffs into the lungs 2 times daily       bisacodyl (DULCOLAX) 10 MG suppository Place 1 suppository (10 mg) rectally daily as needed for constipation       calcium carbonate (OS-DMITRIY 500 MG [place]. [place]) 500 MG tablet Take 1 tablet (500 mg) by mouth 2 times daily 180 tablet 3     Cholecalciferol (VITAMIN D) 2000 units tablet Take 2,000 Units by  mouth daily 100 tablet 3     diphenhydrAMINE (BENADRYL) 50 MG capsule Take 50 mg by mouth every 6 hours as needed for itching or allergies       famotidine (PEPCID) 20 MG tablet Take 1 tablet (20 mg) by mouth every 12 hours       FOLIC ACID PO Take 1 mg by mouth daily       GABAPENTIN PO Take 600 mg by mouth 3 times daily       GEMFIBROZIL PO Take 600 mg by mouth 2 times daily       hydrOXYzine (ATARAX) 25 MG tablet Take 1 tablet (25 mg) by mouth every 6 hours as needed for other (adjuvant pain)       insulin aspart (NOVOLOG PEN) 100 UNIT/ML pen Inject 1-7 Units Subcutaneous 3 times daily (before meals)       insulin aspart (NOVOLOG PEN) 100 UNIT/ML pen Inject 1-5 Units Subcutaneous At Bedtime       LANsoprazole (PREVACID) 30 MG DR capsule Take 30 mg by mouth every morning (before breakfast)       levothyroxine (SYNTHROID/LEVOTHROID) 50 MCG tablet Take 50 mcg by mouth daily       Lidocaine (LIDOCARE) 4 % Patch Place 1-3 patches onto the skin every 24 hours       melatonin 3 MG tablet Take 1 tablet (3 mg) by mouth nightly as needed for sleep       menthol (ICY HOT) 5 % PTCH Apply 1 patch topically every 8 hours as needed for muscle soreness       methocarbamol (ROBAXIN) 500 MG tablet Take 1 tablet (500 mg) by mouth 4 times daily       methotrexate 2.5 MG tablet Take 10 tablets (25 mg) by mouth once a week Thursdays       multivitamin w/minerals (THERA-VIT-M) tablet Take 1 tablet by mouth daily       omega 3 1000 MG CAPS Take 2 capsules by mouth 2 times daily       oxyCODONE (ROXICODONE) 5 MG tablet Take 1-2 tablets (5-10 mg) by mouth every 3 hours as needed for pain 50 tablet 0     polyethylene glycol (MIRALAX/GLYCOLAX) packet Take 17 g by mouth daily       predniSONE (DELTASONE) 5 MG tablet Take 5 mg by mouth daily for 12 days.       senna-docusate (SENOKOT-S/PERICOLACE) 8.6-50 MG tablet Take 2 tablets by mouth 2 times daily       [START ON 5/13/2019] sodium chloride, PF, 0.9% PF flush 10 mLs by Intracatheter route  "every 7 days       sodium chloride, PF, 0.9% PF flush 10-20 mLs by Intracatheter route every hour as needed for line flush or post meds or blood draw       sulfamethoxazole-trimethoprim (BACTRIM DS/SEPTRA DS) 800-160 MG per tablet Take 1 tablet 3 times a week (Monday, Wednesday, Friday)  11     theophylline (UNIPHYL) 400 MG 24 hr tablet Take 400 mg by mouth every morning        traMADol (ULTRAM) 50 MG tablet Take 1 tablet (50 mg) by mouth every 6 hours as needed for moderate pain 30 tablet 0     vancomycin 1,500 mg Inject 1,500 mg into the vein every 12 hours       verapamil ER (CALAN-SR) 120 MG CR tablet Take 120 mg by mouth At Bedtime         ROS:  10 point ROS of systems including Constitutional, Eyes, Respiratory, Cardiovascular, Gastroenterology, Genitourinary, Integumentary, Musculoskeletal, Psychiatric were all negative except for pertinent positives noted in my HPI.    Vitals:  /70   Pulse 95   Temp 99.7  F (37.6  C)   Resp 18   Ht 1.575 m (5' 2\")   Wt 80.7 kg (178 lb)   SpO2 92%   BMI 32.56 kg/m    Exam:  GENERAL APPEARANCE:  Alert, in no distress  ENT:  Mouth and posterior oropharynx normal, moist mucous membranes, hearing acuity adequate   EYES:  EOM, conjunctivae, lids, pupils and irises normal  NECK:  No adenopathy,masses or thyromegaly  RESP:  respiratory effort and palpation of chest normal, no respiratory distress, Lung sounds clear  CV:  Palpation and auscultation of heart done , rate and rhythm irreg, systolic murmur, no rub or gallop, Edema 1+  ABDOMEN:  normal bowel sounds, soft, nontender, no hepatosplenomegaly or other masses  M/S:   Gait and station not observed, Digits and nails normal   SKIN:  Inspection/Palpation of skin and subcutaneous tissue spine incision is CDI. Few sutures still in place  NEURO: 2-12 in normal limits and at patient's baseline  PSYCH:  insight and judgement, memory intact , affect and mood normal    Lab/Diagnostic data:  Labs done in SNF are in [place] " EPIC. Please refer to them using EPIC/Care Everywhere. and Recent labs in [facility] reviewed by me today.     ASSESSMENT/PLAN:    (Z98.890) S/P spinal surgery  (primary encounter diagnosis)  Comment: patient transferred to TCU from hospital after two staged spinal fusion due to thoracolumbar kyphosis with flat back syndrome and thoracolumbar stenosis with myeloradiculopathy.  Complicated post op period with ABLA, pulmonary emboli, demand ischemia and debrided seroma that that was cultured and grew out staph epi . PMH includes dermatomyositis, ILD, severe aortic stenosis, DM2, hypertension, hypothyroidism.  He is having pain and is taking oxycodone, tramadol and acetaminophen. T has been running .   Plan: analgesics  physical therapy - no bending or twisting, no lifting more than 10# for 6 weeks  Monitor incision  Follow up with ortho spine in 6 weeks    (M96.843) Postprocedural seroma of a musculoskeletal structure following other procedure  Comment: seroma formation from previous surgery. This was debrided and cultured. ID was consulted.   Plan: IV vanco until 6/8   twice weekly labs- BMP, CBC w diff, ESR and CRP  Hospital discharge did not specify if ID will dose vanco so will have [facility] pharmacy take care of this  Follow up with ID Dr Moura in 3-4 weeks.     (I26.99) Other acute pulmonary embolism without acute cor pulmonale (H)  Comment: diagnosed on 4/28. He was started on anticoagulation then developed bleeding.   Plan: restart anticoagulation on 5/17- will discuss with Dr Mahmood    (I25.2) Status post non-ST elevation myocardial infarction (NSTEMI)  (I35.0) Severe aortic stenosis  Comment: patient developed elevated troponins with hypoxia  post operatively. Cardiology was consulted. Thought not ACS but more likely due to demand ischemia due to aortic stenosis and interstitial lung disease and deconditioning.   Plan: needs follow up with cardiology for consideration for TAVR    (D62) Anemia due to blood loss,  acute  Comment: hb dropped from 15.9 to 7.7. Now hgb trending back up  Plan: twice weekly CBC    (E11.40,  Z79.4) Type 2 diabetes mellitus with diabetic neuropathy, with long-term current use of insulin (H)  Comment:A1C 6.3%. Prior to admission he was taking isophan 6u BID. This was discontinued.  BG running 150-180 in TCU  Plan: continue current sliding scale insulin.     (M33.90) Dermatomyositis (H)  (J84.10) Pulmonary fibrosis (H)  Comment: followed by rheumatology Dr Call at [facility]. He had been methotrexate 25mg q weeks. Most recent DLCO was reduced so needed rituximab in March. He was not on oxygen at home.  Prior to surgery he was on long taper of prednisone. During hospital stay prednisone was increased to  5mg BID then back to 5mg q day at time of discharge to TCU.   Plan: prednisone 5mg q day for 2 weeks.   Follow up with Dr Call on 5/16,   Continue PTA theophylline, ellipta and prn albuterol.   Supplemental oxygen.   Restart methotrexate on 5/9      (I10) Essential hypertension  Comment: he continues on PTA verapamil. BPs in /73, 143/64, 116/70  Plan: monitor     (E03.9) Hypothyroidism, unspecified type  Comment: no recent change in synthroid. Last TSH in January was 0.41  Plan: per PCP    (K59.01) Slow transit constipation  Comment: having infrequent BM. He did have one this after noon.   Plan: miralax and senna s    (R53.81) Physical deconditioning  Comment: lives with wife at son's home. Has been independent. Goal is to move to apartment with wife  Plan: physical therapy and OCCUPATIONAL THERAPY   SW to assist with discharge planning        Total time spent with patient visit at the skilled nursing facility was 60 minutes including patient visit and review of past records. Greater than 50% of total time spent with counseling and coordinating care due to multiple medical co morbidities  Electronically signed by:  ANASTACIA Mcmanus CNP

## 2019-05-09 NOTE — PROGRESS NOTES
Date: 5/9/2019    Time of Call: 2:01 PM     Diagnosis:  Severe aortic stenosis     [ TORB ] Ordering provider: Pj Garcia MD  Order:   1) Bilateral carotid US  2) Labs-CBC and CMP  3) CT TAVR     Order received by: Meenakshi Irwin RN     Follow-up/additional notes: Voicemail left on cell.

## 2019-05-10 NOTE — PROGRESS NOTES
[facility]  [place] Medical Record Number:  [id number]  Place of Service where encounter took place:  [facility] ([facility] [[id number]]  Chief Complaint   Patient presents with     RECHECK       HPI:    Beulah Jane  is a 69 year old (1950), who is being seen today for an episodic care visit.  HPI information obtained from: facility chart records, facility staff, patient report and [facility] chart review. Today's concern is:  Brief Summary of Hospital Course: patient underwent planned two staged lumbar fusion on 4/22 and 4/25 due to  thoracolumbar kyphosis with flat back syndrome and thoracolumbar stenosis with myeloradiculopathy.. During second surgery a chronic seroma was debrided and cultured. It grew out staph epi. ID was consulted and recommended IV vanco until 6/8 hen 3 weeks of po doxycycline.     Post op issues included type 2 NSTEMI likely due to ILD, PULMONARY EMBOLISM and aortic stenosis. He was started on heparin for finding of subsegmental pulmonary emboli on 4/28 then developed  bleeding at incision so anticoagulation had to be held.      PMH includes dermatomyositis and ILD, DM2, hypertension.hypothyroidism.      Once he was stabilized he was transferred to TCU due to weakness.      Updates on Status Since Skilled nursing Admission: patient reports ongoing pain. He is wearing oxygen; O2 sats in mid 90s. HR . He is eating and will work with therapy.   Last night patient became shaky and sweaty. pulse 113-125, BP 86/51, O2 sats 95% on 0.5L nasal cannula. Patient's also c/o of uncontrolled pain. He was sent to ED to r/o sepsis. In ED he was treated for dehydration and sent back to TCU.     Today T 100.8 with . He reports pain is his biggest problem is back pain.        Past Medical and Surgical History reviewed in Epic today.    MEDICATIONS:  Current Outpatient Medications   Medication Sig Dispense Refill     acetaminophen (TYLENOL) 325 MG  tablet Take 2 tablets (650 mg) by mouth every 4 hours as needed for mild pain or fever       albuterol (2.5 MG/3ML) 0.083% neb solution Take 1 vial by nebulization every 6 hours as needed for shortness of breath / dyspnea or wheezing       albuterol (PROAIR HFA/PROVENTIL HFA/VENTOLIN HFA) 108 (90 BASE) MCG/ACT Inhaler Inhale 2 puffs into the lungs every 6 hours as needed for shortness of breath / dyspnea or wheezing       beclomethasone (QVAR) 80 MCG/ACT Inhaler Inhale 2 puffs into the lungs 2 times daily       bisacodyl (DULCOLAX) 10 MG suppository Place 1 suppository (10 mg) rectally daily as needed for constipation       calcium carbonate (OS-DMITRIY 500 MG [place]. [place]) 500 MG tablet Take 1 tablet (500 mg) by mouth 2 times daily 180 tablet 3     Cholecalciferol (VITAMIN D) 2000 units tablet Take 2,000 Units by mouth daily 100 tablet 3     diphenhydrAMINE (BENADRYL) 50 MG capsule Take 50 mg by mouth every 6 hours as needed for itching or allergies       famotidine (PEPCID) 20 MG tablet Take 1 tablet (20 mg) by mouth every 12 hours       FOLIC ACID PO Take 1 mg by mouth daily       GABAPENTIN PO Take 600 mg by mouth 3 times daily       GEMFIBROZIL PO Take 600 mg by mouth 2 times daily       hydrOXYzine (ATARAX) 25 MG tablet Take 1 tablet (25 mg) by mouth every 6 hours as needed for other (adjuvant pain)       insulin aspart (NOVOLOG PEN) 100 UNIT/ML pen Inject 1-7 Units Subcutaneous 3 times daily (before meals)       insulin aspart (NOVOLOG PEN) 100 UNIT/ML pen Inject 1-5 Units Subcutaneous At Bedtime       LANsoprazole (PREVACID) 30 MG DR capsule Take 30 mg by mouth every morning (before breakfast)       levothyroxine (SYNTHROID/LEVOTHROID) 50 MCG tablet Take 50 mcg by mouth daily       Lidocaine (LIDOCARE) 4 % Patch Place 1-3 patches onto the skin every 24 hours       melatonin 3 MG tablet Take 1 tablet (3 mg) by mouth nightly as needed for sleep       menthol (ICY HOT) 5 % PTCH Apply 1 patch topically every 8 hours as  "needed for muscle soreness       methocarbamol (ROBAXIN) 500 MG tablet Take 1 tablet (500 mg) by mouth 4 times daily       methotrexate 2.5 MG tablet Take 10 tablets (25 mg) by mouth once a week Thursdays       multivitamin w/minerals (THERA-VIT-M) tablet Take 1 tablet by mouth daily       omega 3 1000 MG CAPS Take 2 capsules by mouth 2 times daily       oxyCODONE (ROXICODONE) 5 MG tablet Take 1-2 tablets (5-10 mg) by mouth every 3 hours as needed for pain 50 tablet 0     polyethylene glycol (MIRALAX/GLYCOLAX) packet Take 17 g by mouth daily       predniSONE (DELTASONE) 5 MG tablet Take 5 mg by mouth daily for 12 days.       senna-docusate (SENOKOT-S/PERICOLACE) 8.6-50 MG tablet Take 2 tablets by mouth 2 times daily       [START ON 5/13/2019] sodium chloride, PF, 0.9% PF flush 10 mLs by Intracatheter route every 7 days       sodium chloride, PF, 0.9% PF flush 10-20 mLs by Intracatheter route every hour as needed for line flush or post meds or blood draw       sulfamethoxazole-trimethoprim (BACTRIM DS/SEPTRA DS) 800-160 MG per tablet Take 1 tablet 3 times a week (Monday, Wednesday, Friday)  11     theophylline (UNIPHYL) 400 MG 24 hr tablet Take 400 mg by mouth every morning        traMADol (ULTRAM) 50 MG tablet Take 1 tablet (50 mg) by mouth every 6 hours as needed for moderate pain 30 tablet 0     vancomycin 1,500 mg Inject 1,500 mg into the vein every 12 hours       verapamil ER (CALAN-SR) 120 MG CR tablet Take 120 mg by mouth At Bedtime           REVIEW OF SYSTEMS:  4 point ROS including Respiratory, CV, GI and , other than that noted in the HPI,  is negative    Objective:  /57   Pulse 59   Temp 100.8  F (38.2  C)   Resp 20   Ht 1.575 m (5' 2\")   Wt 76.8 kg (169 lb 6.4 oz)   SpO2 95%   BMI 30.98 kg/m    Exam:  GENERAL APPEARANCE:  Alert, in no distress  ENT:  Mouth and posterior oropharynx normal, moist mucous membranes, hearing acuity adequate   EYES:  EOM, conjunctivae, lids, pupils and irises " normal  NECK:  No adenopathy,masses or thyromegaly  RESP:  respiratory effort and palpation of chest normal, no respiratory distress, Lung sounds clear  CV:  Palpation and auscultation of heart done , rate and rhythm reg, systolic murmur, no rub or gallop, Edema LE wraps in place  ABDOMEN:  normal bowel sounds, soft, nontender, no hepatosplenomegaly or other masses  M/S:   Gait and station not observed, Digits and nails normal   SKIN:  Inspection/Palpation of skin and subcutaneous tissue no rash  NEURO: 2-12 in normal limits and at patient's baseline  PSYCH:  insight and judgement, memory intact , affect and mood normal    Labs:   Labs done in SNF are in [facility]. Please refer to them using [employer] Everywhere. and Recent labs in EPIC reviewed by me today.   4/22/19 IMPRESSION:   1.  Since 4/27/2019, unchanged upper lobes subsegmental pulmonary  emboli. No new pulmonary embolism.  2.  Unchanged moderate pulmonary edema and significant cardiomegaly.  3.  Tracheomalacia.  ASSESSMENT/PLAN:  (Z98.890) S/P spinal surgery  (primary encounter diagnosis)  Comment: patient transferred to TCU from hospital after two staged spinal fusion due to thoracolumbar kyphosis with flat back syndrome and thoracolumbar stenosis with myeloradiculopathy.  Complicated post op period with ABLA, pulmonary emboli, demand ischemia and debrided seroma that that was cultured and grew out staph epi . PMH includes dermatomyositis, ILD, severe aortic stenosis, DM2, hypertension, hypothyroidism.  He is having pain and is taking oxycodone, tramadol and acetaminophen. T has been running .   Plan: analgesics- schedule oxycodone 10mg q 3 h  physical therapy - no bending or twisting, no lifting more than 10# for 6 weeks  Monitor incision  Follow up with ortho spine in 6 weeks        (M96.843) Postprocedural seroma of a musculoskeletal structure following other procedure  Comment: seroma formation from previous surgery. This was debrided and  cultured. ID was consulted.   Plan: IV vanco until 6/8   twice weekly labs- BMP, CBC w diff, ESR and CRP   [facility] pharmacy to dose vanco  Follow up with ID Dr Moura in 3-4 weeks.      (I26.99) Other acute pulmonary embolism without acute cor pulmonale (H)  Comment: diagnosed on 4/28. He was started on anticoagulation then developed bleeding.   Plan: restart anticoagulation on 5/17- will discuss with Dr Mahmood        (I25.2) Status post non-ST elevation myocardial infarction (NSTEMI)  (I35.0) Severe aortic stenosis  Comment: patient developed elevated troponins with hypoxia  post operatively. Cardiology was consulted. Thought not ACS but more likely due to demand ischemia due to aortic stenosis and interstitial lung disease and deconditioning.   Plan: needs follow up with cardiology for consideration for TAVR      (D62) Anemia due to blood loss, acute  Comment: hb dropped from 15.9 to 7.7. Now hgb trending back up  Hemoglobin   Date Value Ref Range Status   05/09/2019 8.4 (L) 13.3 - 17.7 g/dL Final   05/09/2019 8.8 (L) 13.3 - 17.7 g/dL Final     Plan: twice weekly CBC        (E11.40,  Z79.4) Type 2 diabetes mellitus with diabetic neuropathy, with long-term current use of insulin (H)  Comment:A1C 6.3%. Prior to admission he was taking isophan 6u BID. This was discontinued.  BG running 150-180 in TCU  Plan: continue current sliding scale insulin.         (M33.90) Dermatomyositis (H)  (J84.10) Pulmonary fibrosis (H)  Comment: followed by rheumatology Dr Call and pulmonoloogy Dr Lennox at [facility]. He had been methotrexate 25mg q weeks. Most recent DLCO was reduced so needed rituximab in March. He was not on oxygen at home.  Prior to surgery he was on long taper of prednisone. During hospital stay prednisone was increased to  5mg BID then back to 5mg q day at time of discharge to TCU. He continues on theophylline 400mg q day. HR around 100.   Plan: prednisone 5mg q day for 2 weeks.   Follow up with Dr Call on 5/16,    Continue PTA  ellipta and prn albuterol.   Supplemental oxygen.   Restart methotrexate on 5/9  Hold theophylline- call placed to  pulmonology regarding this medication in light of ongoing tachycardia    (I10) Essential hypertension  Comment: he continues on PTA verapamil. BPs in TCU 88/46, 117/51, 116/70  Plan: monitor     (E03.9) Hypothyroidism, unspecified type  Comment: no recent change in synthroid. Last TSH in January was 0.41  Plan: per PCP    (K59.01) Slow transit constipation  Comment: he did move bowels today  Plan: miralax and senna s    (R53.81) Physical deconditioning  Comment: lives with wife at son's home. Has been independent. Goal is to move to apartment with wife  Plan: physical therapy and OCCUPATIONAL THERAPY   SW to assist with discharge planning        Electronically signed by:  ANASTACIA Mcmanus CNP

## 2019-05-10 NOTE — DISCHARGE INSTRUCTIONS
Return for fever greater than 100.4, localizing signs of infection, or any other symptoms concerning to you.

## 2019-05-10 NOTE — ED TRIAGE NOTES
Pt sent from [facility] after spinal stenosis dx earlier this week. Pt was sent over for abnormal vitals and [facility] staff want him checked for sepsis

## 2019-05-10 NOTE — ED PROVIDER NOTES
History     Chief Complaint:  Tachycardia    HPI   HPI somewhat limited as the patient is a poor historian.    Beulah Jane is a 69 year old male with a history of traumatic seroma on IV Vancomycin, insulin-dependent diabetes, and spinal stenosis s/p L2-L5 anterior fusion 4/22/19 and 4/25/19 who presents with tachycardia and diaphoresis. The patient is currently staying at [facility] following his spinal fusion and hospitalization from 4/22/19-5/8/19 with eventual PICC line insertion and has been using supplemental oxygen at home since being discharged home with the hope that he will eventually be weaned off. Per chart review, the patient has a long-standing history of back problems following hernia surgery in 2006 in [place], which was performed using mesh that subsequently was infected a few years afterwards. The patient then underwent two surgical procedure to remove the mesh and infection, but had recurrent formation of a seroma. During his hospital stay, the patient was also diagnosed with NSTEMI and small subsugmental PEs and was treated with Heparin, though has cardiology follow-up scheduled for 5/17/19 for continued anticoagulation consult.  The patient had multiple episodes of sweating while in the hospital as well and reports that he felt well today.    This evening, the patient states he woke up from a nap and was profusely sweating, so he went to take a shower and became tachycardic, prompting staff at [facility] to call EMS to bring him in. Here, the patient reports he has been experiencing these diaphoretic episodes intermittently for some time now. The patient is afebrile and otherwise asymptomatic.  He denies any increase in shortness of breath, palpitations more than he was having in the hospital, any chest pain, or other symptoms which are concerning to him.    Allergies:  No known drug allergies    Medications:    Loratadine  Albuterol neb solution  Albuterol inhaler  Qvar  inhaler  Dulcolax  Benadryl  Famotidine  Gabapentin  Hydroxyzine  Gemfibrozil  Novolog  Prevacid  Levothyroxine  Robaxin  Oxycodone  Verapamil  Methotrexate  Vancomycin    Past Medical History:    Aortic stenosis  Chronic pain  Type II diabetes mellitus  Hyperlipidemia  Obstructive sleep apnea on CPAP  Pulmonary fibrosis  Seasonal allergies  Hypertension  Pulmonary embolism  NSTEMI  Seroma due to trauma  Spinal stenosis  Inflammatory arthritis   Hypothyroidism  Dermatomyositis - rheumatologist Dr. Call at  [facility] in [place]    Past Surgical History:    Arthroscopic knee procedure  Decompression and lumbar fusion, two levels  Anterior L2-L5 interbody fusion - 4/22/19  Optical tracking system fusion thoracic T1-2, L1-2, L2-3 with osteotomies L3-4 - 4/25/19  PICC insertion - 5/6/19  Removal prosthetic material from abdominal wall  Rotator cuff repair    Family History:    Colon cancer    Social History:  Smoking status: Former smoker, quit 1970  Alcohol use: Yes, a few times per month  PCP: Rah Bright  Marital Status:  [2]     Review of Systems   Unable to perform ROS: Other   Patient is a poor historian    Physical Exam     Patient Vitals for the past 24 hrs:   BP Temp Temp src Pulse Heart Rate Resp SpO2   05/09/19 2300 121/66 -- -- 93 -- -- 99 %   05/09/19 2200 129/61 -- -- 107 -- -- 99 %   05/09/19 2100 103/63 -- -- 97 -- -- 100 %   05/09/19 2034 127/68 98.8  F (37.1  C) Oral -- 105 18 93 %     Physical Exam  General: Resting on the gurney, appears comfortable  Head:  The scalp, face, and head appear normal  Mouth/Throat: Mucus membranes are tacky  CV:  Regular rate    Normal S1 and S2  No pathological murmur   Resp:  Clear to auscultation. Breath sounds clear and equal bilaterally    Non-labored, no retractions or accessory muscle use    No coarseness    No wheezing   GI:  Abdomen is soft, no rigidity    No tenderness to palpation. No guarding or rebound.  MS:  Normal motor assessment of all  extremities.    Good capillary refill noted.  Skin:  No redness or inflammation surrounding PICC site. No rash or lesions noted.  Neuro:   Speech is normal and fluent. No apparent deficit.  Psych: Awake. Alert.  Normal affect.      Appropriate interactions.    Emergency Department Course   Laboratory:  CBC: HGB 8.4 (L), o/w WNL (WBC 10.9, )  BMP: Glucose 108 (H), o/w WNL (Creatinine 0.97)  Lactic acid (2104): 3.1 (H)  Lactic acid (2336): 2.1 (H)  Blood cultures: In process  UA: Trace blood, protein albumin 10, RBC 8 (H), mucous present, o/w negative  Urine culture: In process    Interventions:  2146: NS 1L IV Bolus    Emergency Department Course:  The patient arrived in the emergency department via EMS.  Past medical records, nursing notes, and vitals reviewed.  2054: I performed an exam of the patient and obtained history, as documented above.  IV inserted and blood drawn. UA performed, results above.    2350: I spoke with Dr. Chase regarding the patient as I feel he can be discharged home. His lactic acid improved and he has remained hemodynamically stable and asymptomatic. Dr. Chase agrees with plan for discharge.    2359: I rechecked the patient. Findings and plan explained to the patient. Patient discharged home with instructions regarding supportive care, medications, and reasons to return. The importance of close follow-up was reviewed.     Impression & Plan    CMS Diagnoses: The Lactic acid level is elevated due to dehydration, at this time there is no sign of severe sepsis or septic shock.    Medical Decision Making:  Beulah Jane is a 69 year old male who presents to the ED for evaluation of an episode of sweating and rapid heart rate earlier today.  Patient reports that these are normal for him since he was in the hospital.  Evaluation today does not show any new source of infection, though blood cultures and urine culture were obtained.  Lactic acid was elevated but did decrease after 1 L  of normal saline.  He likely is somewhat dehydrated as his lips were dry on initial evaluation.  We have redressed his pack and will have him return to his nursing facility.  Should he have any localized sources of infection or any fevers he should return immediately to the emergency department.    Diagnosis:   ICD-10-CM    PICC (peripherally inserted central catheter) flush Z45.2      Disposition: Discharged to home    Discharge Medications: None    Eneida Arana  5/9/2019    EMERGENCY DEPARTMENT    I, Eneida Arana, am serving as a scribe at 8:54 PM on 5/9/2019 to document services personally performed by Samira Darling MD based on my observations and the provider's statements to me.      Samira Darling MD  05/10/19 0035

## 2019-05-10 NOTE — LETTER
5/10/2019        RE: Beulah Jane  [address]  [address]  [facility] [address]        [place] [facility]  [place] Medical Record Number:  [id number]  [facility] of [facility] where encounter took place[facility] [[id number]]  Chief Complaint   Patient presents with     RECHECK       HPI:    Beulah Jane  is a 69 year old (1950), who is being seen today for an episodic care visit.  HPI information obtained from: facility chart records, facility staff, patient report and [facility] chart review. Today's concern is:  Brief Summary of Hospital Course: patient underwent planned two staged lumbar fusion on 4/22 and 4/25 due to  thoracolumbar kyphosis with flat back syndrome and thoracolumbar stenosis with myeloradiculopathy.. During second surgery a chronic seroma was debrided and cultured. It grew out staph epi. ID was consulted and recommended IV vanco until 6/8 hen 3 weeks of po doxycycline.     Post op issues included type 2 NSTEMI likely due to ILD, PULMONARY EMBOLISM and aortic stenosis. He was started on heparin for finding of subsegmental pulmonary emboli on 4/28 then developed  bleeding at incision so anticoagulation had to be held.      PMH includes dermatomyositis and ILD, DM2, hypertension.hypothyroidism.      Once he was stabilized he was transferred to TCU due to weakness.      Updates on Status Since Skilled nursing Admission: patient reports ongoing pain. He is wearing oxygen; O2 sats in mid 90s. HR . He is eating and will work with therapy.   Last night patient became shaky and sweaty. pulse 113-125, BP 86/51, O2 sats 95% on 0.5L nasal cannula. Patient's also c/o of uncontrolled pain. He was sent to ED to r/o sepsis. In ED he was treated for dehydration and sent back to TCU.     Today T 100.8 with . He reports pain is his biggest problem is back pain.        Past Medical and Surgical History reviewed in Epic  today.    MEDICATIONS:  Current Outpatient Medications   Medication Sig Dispense Refill     acetaminophen (TYLENOL) 325 MG tablet Take 2 tablets (650 mg) by mouth every 4 hours as needed for mild pain or fever       albuterol (2.5 MG/3ML) 0.083% neb solution Take 1 vial by nebulization every 6 hours as needed for shortness of breath / dyspnea or wheezing       albuterol (PROAIR HFA/PROVENTIL HFA/VENTOLIN HFA) 108 (90 BASE) MCG/ACT Inhaler Inhale 2 puffs into the lungs every 6 hours as needed for shortness of breath / dyspnea or wheezing       beclomethasone (QVAR) 80 MCG/ACT Inhaler Inhale 2 puffs into the lungs 2 times daily       bisacodyl (DULCOLAX) 10 MG suppository Place 1 suppository (10 mg) rectally daily as needed for constipation       calcium carbonate (OS-DMITRIY 500 MG [place]. CA) 500 MG tablet Take 1 tablet (500 mg) by mouth 2 times daily 180 tablet 3     Cholecalciferol (VITAMIN D) 2000 units tablet Take 2,000 Units by mouth daily 100 tablet 3     diphenhydrAMINE (BENADRYL) 50 MG capsule Take 50 mg by mouth every 6 hours as needed for itching or allergies       famotidine (PEPCID) 20 MG tablet Take 1 tablet (20 mg) by mouth every 12 hours       FOLIC ACID PO Take 1 mg by mouth daily       GABAPENTIN PO Take 600 mg by mouth 3 times daily       GEMFIBROZIL PO Take 600 mg by mouth 2 times daily       hydrOXYzine (ATARAX) 25 MG tablet Take 1 tablet (25 mg) by mouth every 6 hours as needed for other (adjuvant pain)       insulin aspart (NOVOLOG PEN) 100 UNIT/ML pen Inject 1-7 Units Subcutaneous 3 times daily (before meals)       insulin aspart (NOVOLOG PEN) 100 UNIT/ML pen Inject 1-5 Units Subcutaneous At Bedtime       LANsoprazole (PREVACID) 30 MG DR capsule Take 30 mg by mouth every morning (before breakfast)       levothyroxine (SYNTHROID/LEVOTHROID) 50 MCG tablet Take 50 mcg by mouth daily       Lidocaine (LIDOCARE) 4 % Patch Place 1-3 patches onto the skin every 24 hours       melatonin 3 MG tablet Take 1  "tablet (3 mg) by mouth nightly as needed for sleep       menthol (ICY HOT) 5 % PTCH Apply 1 patch topically every 8 hours as needed for muscle soreness       methocarbamol (ROBAXIN) 500 MG tablet Take 1 tablet (500 mg) by mouth 4 times daily       methotrexate 2.5 MG tablet Take 10 tablets (25 mg) by mouth once a week Thursdays       multivitamin w/minerals (THERA-VIT-M) tablet Take 1 tablet by mouth daily       omega 3 1000 MG CAPS Take 2 capsules by mouth 2 times daily       oxyCODONE (ROXICODONE) 5 MG tablet Take 1-2 tablets (5-10 mg) by mouth every 3 hours as needed for pain 50 tablet 0     polyethylene glycol (MIRALAX/GLYCOLAX) packet Take 17 g by mouth daily       predniSONE (DELTASONE) 5 MG tablet Take 5 mg by mouth daily for 12 days.       senna-docusate (SENOKOT-S/PERICOLACE) 8.6-50 MG tablet Take 2 tablets by mouth 2 times daily       [START ON 5/13/2019] sodium chloride, PF, 0.9% PF flush 10 mLs by Intracatheter route every 7 days       sodium chloride, PF, 0.9% PF flush 10-20 mLs by Intracatheter route every hour as needed for line flush or post meds or blood draw       sulfamethoxazole-trimethoprim (BACTRIM DS/SEPTRA DS) 800-160 MG per tablet Take 1 tablet 3 times a week (Monday, Wednesday, Friday)  11     theophylline (UNIPHYL) 400 MG 24 hr tablet Take 400 mg by mouth every morning        traMADol (ULTRAM) 50 MG tablet Take 1 tablet (50 mg) by mouth every 6 hours as needed for moderate pain 30 tablet 0     vancomycin 1,500 mg Inject 1,500 mg into the vein every 12 hours       verapamil ER (CALAN-SR) 120 MG CR tablet Take 120 mg by mouth At Bedtime           REVIEW OF SYSTEMS:  4 point ROS including Respiratory, CV, GI and , other than that noted in the HPI,  is negative    Objective:  /57   Pulse 59   Temp 100.8  F (38.2  C)   Resp 20   Ht 1.575 m (5' 2\")   Wt 76.8 kg (169 lb 6.4 oz)   SpO2 95%   BMI 30.98 kg/m     Exam:  GENERAL APPEARANCE:  Alert, in no distress  ENT:  Mouth and " posterior oropharynx normal, moist mucous membranes, hearing acuity adequate   EYES:  EOM, conjunctivae, lids, pupils and irises normal  NECK:  No adenopathy,masses or thyromegaly  RESP:  respiratory effort and palpation of chest normal, no respiratory distress, Lung sounds clear  CV:  Palpation and auscultation of heart done , rate and rhythm reg, systolic murmur, no rub or gallop, Edema LE wraps in place  ABDOMEN:  normal bowel sounds, soft, nontender, no hepatosplenomegaly or other masses  M/S:   Gait and station not observed, Digits and nails normal   SKIN:  Inspection/Palpation of skin and subcutaneous tissue no rash  NEURO: 2-12 in normal limits and at patient's baseline  PSYCH:  insight and judgement, memory intact , affect and mood normal    Labs:   Labs done in SNF are in [place] EPIC. Please refer to them using [facility]/Care Everywhere. and Recent labs in EPIC reviewed by me today.   4/22/19 IMPRESSION:   1.  Since 4/27/2019, unchanged upper lobes subsegmental pulmonary  emboli. No new pulmonary embolism.  2.  Unchanged moderate pulmonary edema and significant cardiomegaly.  3.  Tracheomalacia.  ASSESSMENT/PLAN:  (Z98.890) S/P spinal surgery  (primary encounter diagnosis)  Comment: patient transferred to TCU from hospital after two staged spinal fusion due to thoracolumbar kyphosis with flat back syndrome and thoracolumbar stenosis with myeloradiculopathy.  Complicated post op period with ABLA, pulmonary emboli, demand ischemia and debrided seroma that that was cultured and grew out staph epi . PMH includes dermatomyositis, ILD, severe aortic stenosis, DM2, hypertension, hypothyroidism.  He is having pain and is taking oxycodone, tramadol and acetaminophen. T has been running .   Plan: analgesics - schedule oxycodone 10mg q 3 h  physical therapy - no bending or twisting, no lifting more than 10# for 6 weeks  Monitor incision  Follow up with ortho spine in 6 weeks        (M96.843) Postprocedural seroma  of a musculoskeletal structure following other procedure  Comment: seroma formation from previous surgery. This was debrided and cultured. ID was consulted.   Plan: IV vanco until 6/8   twice weekly labs- BMP, CBC w diff, ESR and CRP   [facility] to dose vanco  Follow up with ID Dr Moura in 3-4 weeks.      (I26.99) Other acute pulmonary embolism without acute cor pulmonale (H)  Comment: diagnosed on 4/28. He was started on anticoagulation then developed bleeding.   Plan: restart anticoagulation on 5/17- will discuss with Dr Mahmood        (I25.2) Status post non-ST elevation myocardial infarction (NSTEMI)  (I35.0) Severe aortic stenosis  Comment: patient developed elevated troponins with hypoxia  post operatively. Cardiology was consulted. Thought not ACS but more likely due to demand ischemia due to aortic stenosis and interstitial lung disease and deconditioning.   Plan: needs follow up with cardiology for consideration for TAVR      (D62) Anemia due to blood loss, acute  Comment: hb dropped from 15.9 to 7.7. Now hgb trending back up  Hemoglobin   Date Value Ref Range Status   05/09/2019 8.4 (L) 13.3 - 17.7 g/dL Final   05/09/2019 8.8 (L) 13.3 - 17.7 g/dL Final     Plan: twice weekly CBC        (E11.40,  Z79.4) Type 2 diabetes mellitus with diabetic neuropathy, with long-term current use of insulin (H)  Comment:A1C 6.3%. Prior to admission he was taking isophan 6u BID. This was discontinued.  BG running 150-180 in TCU  Plan: continue current sliding scale insulin.         (M33.90) Dermatomyositis (H)  (J84.10) Pulmonary fibrosis (H)  Comment: followed by rheumatology Dr Call and pulmonoloogy Dr Lennox at [facility]. He had been methotrexate 25mg q weeks. Most recent DLCO was reduced so needed rituximab in March. He was not on oxygen at home.  Prior to surgery he was on long taper of prednisone. During hospital stay prednisone was increased to  5mg BID then back to 5mg q day at time of discharge to TCU. He  continues on theophylline 400mg q day. HR around 100.   Plan: prednisone 5mg q day for 2 weeks.   Follow up with Dr Call on 5/16,   Continue PTA  ellipta and prn albuterol.   Supplemental oxygen.   Restart methotrexate on 5/9  Hold theophylline- call placed to  pulmonology regarding this medication in light of ongoing tachycardia    (I10) Essential hypertension  Comment: he continues on PTA verapamil. BPs in TCU 88/46, 117/51, 116/70  Plan: monitor     (E03.9) Hypothyroidism, unspecified type  Comment: no recent change in synthroid. Last TSH in January was 0.41  Plan: per PCP    (K59.01) Slow transit constipation  Comment: he did move bowels today  Plan: miralax and senna s    (R53.81) Physical deconditioning  Comment: lives with wife at son's home. Has been independent. Goal is to move to apartment with wife  Plan: physical therapy and OCCUPATIONAL THERAPY   SW to assist with discharge planning        Electronically signed by:  ANASTACIA Mcmanus CNP               Sincerely,        ANASTACIA Mcmanus CNP

## 2019-05-11 NOTE — LETTER
5/11/2019        RE: Beulah Jane  [address]  [address]  [facility] [address]        [place] [facility]  [place] Medical Record Number:  [id number]  [facility] Service where encounter took place[facility] [[id number]]  Chief Complaint   Patient presents with     Nursing Home Acute       HPI:    Beulah Jane  is a 69 year old (1950), who is being seen today for an episodic care visit.  HPI information obtained from: facility chart records, facility staff, patient report and [facility] chart review. Today's concern is:  Fever, unspecified fever cause  Cough  Pulmonary fibrosis (H)  Patient was seen today as he had a fever of 100.2 and the orders are to send patient to ER for fever of 100.4. Patient complains of wheezing and cough. He stated he is not short of breath but notes he is not taking deep breaths due to pain. He is on IV vanco and for incisional infection.     Past Medical and Surgical History reviewed in Epic today.    MEDICATIONS:  Current Outpatient Medications   Medication Sig Dispense Refill     acetaminophen (TYLENOL) 325 MG tablet Take 2 tablets (650 mg) by mouth every 4 hours as needed for mild pain or fever       albuterol (2.5 MG/3ML) 0.083% neb solution Take 1 vial by nebulization every 6 hours as needed for shortness of breath / dyspnea or wheezing       albuterol (PROAIR HFA/PROVENTIL HFA/VENTOLIN HFA) 108 (90 BASE) MCG/ACT Inhaler Inhale 2 puffs into the lungs every 6 hours as needed for shortness of breath / dyspnea or wheezing       bisacodyl (DULCOLAX) 10 MG suppository Place 1 suppository (10 mg) rectally daily as needed for constipation       calcium carbonate (OS-DMITRIY 500 MG [place]. [place]) 500 MG tablet Take 1 tablet (500 mg) by mouth 2 times daily 180 tablet 3     Cholecalciferol (VITAMIN D) 2000 units tablet Take 2,000 Units by mouth daily 100 tablet 3     diphenhydrAMINE (BENADRYL) 50 MG capsule Take 50 mg by mouth every 6 hours  as needed for itching or allergies       famotidine (PEPCID) 20 MG tablet Take 1 tablet (20 mg) by mouth every 12 hours       FOLIC ACID PO Take 1 mg by mouth daily       GABAPENTIN PO Take 600 mg by mouth 3 times daily       GEMFIBROZIL PO Take 600 mg by mouth 2 times daily       hydrOXYzine (ATARAX) 25 MG tablet Take 1 tablet (25 mg) by mouth every 6 hours as needed for other (adjuvant pain)       insulin aspart (NOVOLOG PEN) 100 UNIT/ML pen Inject 1-7 Units Subcutaneous 3 times daily (before meals)       insulin aspart (NOVOLOG PEN) 100 UNIT/ML pen Inject 1-5 Units Subcutaneous At Bedtime       LANsoprazole (PREVACID) 30 MG DR capsule Take 30 mg by mouth every morning (before breakfast)       levothyroxine (SYNTHROID/LEVOTHROID) 50 MCG tablet Take 50 mcg by mouth daily       Lidocaine (LIDOCARE) 4 % Patch Place 1-3 patches onto the skin every 24 hours       melatonin 3 MG tablet Take 1 tablet (3 mg) by mouth nightly as needed for sleep       menthol (ICY HOT) 5 % PTCH Apply 1 patch topically every 8 hours as needed for muscle soreness       methocarbamol (ROBAXIN) 500 MG tablet Take 1 tablet (500 mg) by mouth 4 times daily       methotrexate 2.5 MG tablet Take 10 tablets (25 mg) by mouth once a week Thursdays       multivitamin w/minerals (THERA-VIT-M) tablet Take 1 tablet by mouth daily       omega 3 1000 MG CAPS Take 2 capsules by mouth 2 times daily       oxyCODONE IR (ROXICODONE) 10 MG tablet Take 10 mg by mouth every 3 hours       polyethylene glycol (MIRALAX/GLYCOLAX) packet Take 17 g by mouth daily       predniSONE (DELTASONE) 5 MG tablet Take 5 mg by mouth daily for 12 days.       senna-docusate (SENOKOT-S/PERICOLACE) 8.6-50 MG tablet Take 2 tablets by mouth 2 times daily       [START ON 5/13/2019] sodium chloride, PF, 0.9% PF flush 10 mLs by Intracatheter route every 7 days       sodium chloride, PF, 0.9% PF flush 10-20 mLs by Intracatheter route every hour as needed for line flush or post meds or blood  "draw       sulfamethoxazole-trimethoprim (BACTRIM DS/SEPTRA DS) 800-160 MG per tablet Take 1 tablet 3 times a week (Monday, Wednesday, Friday)  11     theophylline (UNIPHYL) 400 MG 24 hr tablet Take 400 mg by mouth every morning        traMADol (ULTRAM) 50 MG tablet Take 1 tablet (50 mg) by mouth every 6 hours as needed for moderate pain 30 tablet 0     VANCOMYCIN HCL IV Inject 1,200 mg into the vein 2 times daily for Postprocedural Seroma of a musculoskeletal structure Vanco Trough to be draw on Mon [facility] to dose.       verapamil ER (CALAN-SR) 120 MG CR tablet Take 120 mg by mouth At Bedtime       beclomethasone (QVAR) 80 MCG/ACT Inhaler Inhale 2 puffs into the lungs 2 times daily       oxyCODONE (ROXICODONE) 5 MG tablet Take 1-2 tablets (5-10 mg) by mouth every 3 hours as needed for pain 50 tablet 0     vancomycin 1,500 mg Inject 1,500 mg into the vein every 12 hours         REVIEW OF SYSTEMS:  4 point ROS including Respiratory, CV, GI and , other than that noted in the HPI,  is negative    Objective:  /78   Pulse 113   Temp 100  F (37.8  C)   Resp 20   Ht 1.575 m (5' 2\")   Wt 76.8 kg (169 lb 6.4 oz)   SpO2 92%   BMI 30.98 kg/m     Exam:  GENERAL APPEARANCE:  Alert, in no distress, cooperative  RESP:  respiratory effort and palpation of chest normal, no respiratory distress, rhonchi right side, expiratory wheezes  CV:  Palpation and auscultation of heart done , regular rate and rhythm, no murmur, rub, or gallop, no edema  ABDOMEN:  normal bowel sounds, soft, nontender, no hepatosplenomegaly or other masses  PSYCH:  oriented X 3    Labs:   Labs done in SNF are in [place] EPIC. Please refer to them using EPIC/Care Everywhere.    ASSESSMENT/PLAN:  (R50.9) Fever, unspecified fever cause  (primary encounter diagnosis)  (R05) Cough  (J84.10) Pulmonary fibrosis (H)  Comment: fevers on and off  Plan: He was seen in ED on 5/9 for fever, received IV fluids. Push fluids today, will add duoneb for a " couple of days and Incentive spirometer 10 every 1 hour and monitor.     Orders [facility] provider [facility] facility  [facility]  IS 10 x per hour    Electronically signed by:  Venita Mullen NP             Sincerely,        Venita Mullen NP

## 2019-05-11 NOTE — PROGRESS NOTES
[place] [facility]  [place] Medical Record Number:  [id number]  [facility] of Service where encounter took place[facility] [[id number]]  Chief Complaint   Patient presents with     Nursing Home Acute       HPI:    Beulah Jane  is a 69 year old (1950), who is being seen today for an episodic care visit.  HPI information obtained from: facility chart records, facility staff, patient report and [facility] chart review. Today's concern is:  Fever, unspecified fever cause  Cough  Pulmonary fibrosis (H)  Patient was seen today as he had a fever of 100.2 and the orders are to send patient to ER for fever of 100.4. Patient complains of wheezing and cough. He stated he is not short of breath but notes he is not taking deep breaths due to pain. He is on IV vanco and for incisional infection.     Past Medical and Surgical History reviewed in Epic today.    MEDICATIONS:  Current Outpatient Medications   Medication Sig Dispense Refill     acetaminophen (TYLENOL) 325 MG tablet Take 2 tablets (650 mg) by mouth every 4 hours as needed for mild pain or fever       albuterol (2.5 MG/3ML) 0.083% neb solution Take 1 vial by nebulization every 6 hours as needed for shortness of breath / dyspnea or wheezing       albuterol (PROAIR HFA/PROVENTIL HFA/VENTOLIN HFA) 108 (90 BASE) MCG/ACT Inhaler Inhale 2 puffs into the lungs every 6 hours as needed for shortness of breath / dyspnea or wheezing       bisacodyl (DULCOLAX) 10 MG suppository Place 1 suppository (10 mg) rectally daily as needed for constipation       calcium carbonate (OS-DMITRIY 500 MG [place]. CA) 500 MG tablet Take 1 tablet (500 mg) by mouth 2 times daily 180 tablet 3     Cholecalciferol (VITAMIN D) 2000 units tablet Take 2,000 Units by mouth daily 100 tablet 3     diphenhydrAMINE (BENADRYL) 50 MG capsule Take 50 mg by mouth every 6 hours as needed for itching or allergies       famotidine (PEPCID) 20 MG tablet Take 1 tablet (20 mg) by  mouth every 12 hours       FOLIC ACID PO Take 1 mg by mouth daily       GABAPENTIN PO Take 600 mg by mouth 3 times daily       GEMFIBROZIL PO Take 600 mg by mouth 2 times daily       hydrOXYzine (ATARAX) 25 MG tablet Take 1 tablet (25 mg) by mouth every 6 hours as needed for other (adjuvant pain)       insulin aspart (NOVOLOG PEN) 100 UNIT/ML pen Inject 1-7 Units Subcutaneous 3 times daily (before meals)       insulin aspart (NOVOLOG PEN) 100 UNIT/ML pen Inject 1-5 Units Subcutaneous At Bedtime       LANsoprazole (PREVACID) 30 MG DR capsule Take 30 mg by mouth every morning (before breakfast)       levothyroxine (SYNTHROID/LEVOTHROID) 50 MCG tablet Take 50 mcg by mouth daily       Lidocaine (LIDOCARE) 4 % Patch Place 1-3 patches onto the skin every 24 hours       melatonin 3 MG tablet Take 1 tablet (3 mg) by mouth nightly as needed for sleep       menthol (ICY HOT) 5 % PTCH Apply 1 patch topically every 8 hours as needed for muscle soreness       methocarbamol (ROBAXIN) 500 MG tablet Take 1 tablet (500 mg) by mouth 4 times daily       methotrexate 2.5 MG tablet Take 10 tablets (25 mg) by mouth once a week Thursdays       multivitamin w/minerals (THERA-VIT-M) tablet Take 1 tablet by mouth daily       omega 3 1000 MG CAPS Take 2 capsules by mouth 2 times daily       oxyCODONE IR (ROXICODONE) 10 MG tablet Take 10 mg by mouth every 3 hours       polyethylene glycol (MIRALAX/GLYCOLAX) packet Take 17 g by mouth daily       predniSONE (DELTASONE) 5 MG tablet Take 5 mg by mouth daily for 12 days.       senna-docusate (SENOKOT-S/PERICOLACE) 8.6-50 MG tablet Take 2 tablets by mouth 2 times daily       [START ON 5/13/2019] sodium chloride, PF, 0.9% PF flush 10 mLs by Intracatheter route every 7 days       sodium chloride, PF, 0.9% PF flush 10-20 mLs by Intracatheter route every hour as needed for line flush or post meds or blood draw       sulfamethoxazole-trimethoprim (BACTRIM DS/SEPTRA DS) 800-160 MG per tablet Take 1 tablet  "3 times a week (Monday, Wednesday, Friday)  11     theophylline (UNIPHYL) 400 MG 24 hr tablet Take 400 mg by mouth every morning        traMADol (ULTRAM) 50 MG tablet Take 1 tablet (50 mg) by mouth every 6 hours as needed for moderate pain 30 tablet 0     VANCOMYCIN HCL IV Inject 1,200 mg into the vein 2 times daily for Postprocedural Seroma of a musculoskeletal structure Vanco Trough to be draw on Mon [facility] to dose.       verapamil ER (CALAN-SR) 120 MG CR tablet Take 120 mg by mouth At Bedtime       beclomethasone (QVAR) 80 MCG/ACT Inhaler Inhale 2 puffs into the lungs 2 times daily       oxyCODONE (ROXICODONE) 5 MG tablet Take 1-2 tablets (5-10 mg) by mouth every 3 hours as needed for pain 50 tablet 0     vancomycin 1,500 mg Inject 1,500 mg into the vein every 12 hours         REVIEW OF SYSTEMS:  4 point ROS including Respiratory, CV, GI and , other than that noted in the HPI,  is negative    Objective:  /78   Pulse 113   Temp 100  F (37.8  C)   Resp 20   Ht 1.575 m (5' 2\")   Wt 76.8 kg (169 lb 6.4 oz)   SpO2 92%   BMI 30.98 kg/m    Exam:  GENERAL APPEARANCE:  Alert, in no distress, cooperative  RESP:  respiratory effort and palpation of chest normal, no respiratory distress, rhonchi right side, expiratory wheezes  CV:  Palpation and auscultation of heart done , regular rate and rhythm, no murmur, rub, or gallop, no edema  ABDOMEN:  normal bowel sounds, soft, nontender, no hepatosplenomegaly or other masses  PSYCH:  oriented X 3    Labs:   Labs done in SNF are in [place] EPIC. Please refer to them using EPIC/Care Everywhere.    ASSESSMENT/PLAN:  (R50.9) Fever, unspecified fever cause  (primary encounter diagnosis)  (R05) Cough  (J84.10) Pulmonary fibrosis (H)  Comment: fevers on and off  Plan: He was seen in ED on 5/9 for fever, received IV fluids. Push fluids today, will add duoneb for a couple of days and Incentive spirometer 10 every 1 hour and monitor.     Orders written by provider at " [facility] tid  IS 10 x per hour    Electronically signed by:  Venita Mullen, NP

## 2019-05-12 NOTE — ED AVS SNAPSHOT
Emergency Department  [address] [facility] [address]  Phone:  [phone]  Fax:  [phone]                                    Beulah Jane   MRN: [id number]    Department:   Emergency Department   Date of Visit:  5/12/2019           After Visit Summary Signature Page    I have received my discharge instructions, and my questions have been answered. I have discussed any challenges I see with this plan with the nurse or doctor.    ..........................................................................................................................................  Patient/Patient Representative Signature      ..........................................................................................................................................  Patient Representative Print Name and Relationship to Patient    ..................................................               ................................................  Date                                   Time    ..........................................................................................................................................  Reviewed by Signature/Title    ...................................................              ..............................................  Date                                               Time          22EPIC Rev 08/18

## 2019-05-13 NOTE — PROGRESS NOTES
[facility]  [place] Medical Record Number:  [id number]  Place of Service where encounter took place:  [facility] [[id number]]  Chief Complaint   Patient presents with     RECHECK       HPI:    Beulah Jane  is a 69 year old (1950), who is being seen today for an episodic care visit.  HPI information obtained from: facility chart records, facility staff, patient report and [facility] chart review. Today's concern is:    Brief Summary of Hospital Course: patient underwent planned two staged lumbar fusion on 4/22 and 4/25 due to  thoracolumbar kyphosis with flat back syndrome and thoracolumbar stenosis with myeloradiculopathy. During second surgery a chronic seroma was debrided and cultured. It grew out staph epi. ID was consulted and recommended IV vanco until 6/8 hen 3 weeks of po doxycycline.     Post op issues included type 2 NSTEMI likely due to ILD, PULMONARY EMBOLISM and aortic stenosis. He was started on heparin for finding of subsegmental pulmonary emboli on 4/28 then developed  bleeding at incision so anticoagulation had to be held.      PMH includes dermatomyositis and ILD, DM2, hypertension.hypothyroidism.      Once he was stabilized he was transferred to TCU due to weakness.      Updates on Status Since Skilled nursing Admission: patient reports ongoing pain. He is wearing oxygen; O2 sats in mid 90s. HR . He is eating and will work with therapy.   Patient was sent to ED on 5/9 and 5/12.   On 5/9 patient became shaky and sweaty. pulse 113-125, BP 86/51, O2 sats 95% on 0.5L nasal cannula. Patient's also c/o of uncontrolled pain. He was sent to ED to r/o sepsis. In ED he was treated for dehydration and sent back to TCU.    On 5/12 he was sent to ED due to shortness of breath and had sats in 80s while on supplemental oxygen.  In ED chest CT  showed few small bilateral upper lobe pulmonary emboli and fibrosis. His O2 sats in mid 90s. He was sent  back to [facility].    Overnight he reports ongoing pain in his back/. He is working with therapy.         Past Medical and Surgical History reviewed in Epic today.    MEDICATIONS:  Current Outpatient Medications   Medication Sig Dispense Refill     acetaminophen (TYLENOL) 325 MG tablet Take 2 tablets (650 mg) by mouth every 4 hours as needed for mild pain or fever       albuterol (2.5 MG/3ML) 0.083% neb solution Take 1 vial by nebulization every 6 hours as needed for shortness of breath / dyspnea or wheezing       albuterol (PROAIR HFA/PROVENTIL HFA/VENTOLIN HFA) 108 (90 BASE) MCG/ACT Inhaler Inhale 2 puffs into the lungs every 6 hours as needed for shortness of breath / dyspnea or wheezing       bisacodyl (DULCOLAX) 10 MG suppository Place 1 suppository (10 mg) rectally daily as needed for constipation       calcium carbonate (OS-DMITRIY 500 MG [place]. CA) 500 MG tablet Take 1 tablet (500 mg) by mouth 2 times daily 180 tablet 3     Cholecalciferol (VITAMIN D) 2000 units tablet Take 2,000 Units by mouth daily 100 tablet 3     diphenhydrAMINE (BENADRYL) 50 MG capsule Take 50 mg by mouth every 6 hours as needed for itching or allergies       famotidine (PEPCID) 20 MG tablet Take 1 tablet (20 mg) by mouth every 12 hours       FOLIC ACID PO Take 1 mg by mouth daily       GABAPENTIN PO Take 600 mg by mouth 3 times daily       GEMFIBROZIL PO Take 600 mg by mouth 2 times daily       hydrOXYzine (ATARAX) 25 MG tablet Take 1 tablet (25 mg) by mouth every 6 hours as needed for other (adjuvant pain)       insulin aspart (NOVOLOG PEN) 100 UNIT/ML pen Inject 1-7 Units Subcutaneous 3 times daily (before meals)       insulin aspart (NOVOLOG PEN) 100 UNIT/ML pen Inject 1-5 Units Subcutaneous At Bedtime       LANsoprazole (PREVACID) 30 MG DR capsule Take 30 mg by mouth every morning (before breakfast)       levothyroxine (SYNTHROID/LEVOTHROID) 50 MCG tablet Take 50 mcg by mouth daily       Lidocaine (LIDOCARE) 4 % Patch Place 1-3 patches onto  "the skin every 24 hours       melatonin 3 MG tablet Take 1 tablet (3 mg) by mouth nightly as needed for sleep       menthol (ICY HOT) 5 % PTCH Apply 1 patch topically every 8 hours as needed for muscle soreness       methocarbamol (ROBAXIN) 500 MG tablet Take 1 tablet (500 mg) by mouth 4 times daily       methotrexate 2.5 MG tablet Take 10 tablets (25 mg) by mouth once a week Thursdays       multivitamin w/minerals (THERA-VIT-M) tablet Take 1 tablet by mouth daily       omega 3 1000 MG CAPS Take 2 capsules by mouth 2 times daily       oxyCODONE IR (ROXICODONE) 10 MG tablet Take 10 mg by mouth every 3 hours       polyethylene glycol (MIRALAX/GLYCOLAX) packet Take 17 g by mouth daily       predniSONE (DELTASONE) 5 MG tablet Take 5 mg by mouth daily for 12 days.       senna-docusate (SENOKOT-S/PERICOLACE) 8.6-50 MG tablet Take 2 tablets by mouth 2 times daily       sodium chloride, PF, 0.9% PF flush 10 mLs by Intracatheter route every 7 days       sodium chloride, PF, 0.9% PF flush 10-20 mLs by Intracatheter route every hour as needed for line flush or post meds or blood draw       sulfamethoxazole-trimethoprim (BACTRIM DS/SEPTRA DS) 800-160 MG per tablet Take 1 tablet 3 times a week (Monday, Wednesday, Friday)  11     theophylline (UNIPHYL) 400 MG 24 hr tablet Take 400 mg by mouth every morning        traMADol (ULTRAM) 50 MG tablet Take 1 tablet (50 mg) by mouth every 6 hours as needed for moderate pain 30 tablet 0     VANCOMYCIN HCL IV Inject 1,200 mg into the vein 2 times daily for Postprocedural Seroma of a musculoskeletal structure Vanco Trough to be draw on Mon [facility] to dose.       verapamil ER (CALAN-SR) 120 MG CR tablet Take 120 mg by mouth At Bedtime           REVIEW OF SYSTEMS:  4 point ROS including Respiratory, CV, GI and , other than that noted in the HPI,  is negative    Objective:  /68   Pulse 112   Temp 97.9  F (36.6  C)   Resp 18   Ht 1.575 m (5' 2\")   Wt 76.8 kg (169 lb 6.4 oz)   " SpO2 91%   BMI 30.98 kg/m    Exam:  GENERAL APPEARANCE:  Alert, in no distress  ENT:  Mouth and posterior oropharynx normal, moist mucous membranes, hearing acuity adequate   EYES:  EOM, conjunctivae, lids, pupils and irises normal    RESP:  respiratory effort and palpation of chest normal, no respiratory distress, Lung sounds c;ear  CV:  Palpation and auscultation of heart done , rate and rhythm reg, systolic murmur, no rub or gallop, Edema trace  ABDOMEN:  normal bowel sounds, soft, nontender,  M/S:   Gait and station slow gait, Digits and nails nl  SKIN:  Inspection/Palpation of skin and subcutaneous tissue  Spinal incision is CDI  NEURO: 2-12 in normal limits and at patient's baseline  PSYCH:  insight and judgement, memory intact , affect and mood normal    Labs:   Labs done in SNF are in [place] EPIC. Please refer to them using [employer] Everywhere. and Recent labs in EPIC reviewed by me today.     ASSESSMENT/PLAN:  (Z98.890) S/P spinal surgery  (primary encounter diagnosis)    Comment: patient transferred to TCU from hospital after two staged spinal fusion due to thoracolumbar kyphosis with flat back syndrome and thoracolumbar stenosis with myeloradiculopathy.  Complicated post op period with ABLA, pulmonary emboli, demand ischemia and debrided seroma that that was cultured and grew out staph epi . PMH includes dermatomyositis, ILD, severe aortic stenosis, DM2, hypertension, hypothyroidism.  He is having pain and is taking oxycodone, tramadol and acetaminophen. T has been running .   Plan: analgesics- change  oxycodone 10mg q 4 h  physical therapy - no bending or twisting, no lifting more than 10# for 6 weeks  Monitor incision  Follow up with ortho spine in 5 weeks    (M96.843) Postprocedural seroma of a musculoskeletal structure following other procedure  Comment: seroma formation from previous surgery. This was debrided and cultured. ID was consulted.   Plan: IV vanco until 6/8   twice weekly labs-  BMP, CBC w diff, ESR and CRP   [facility] to dose [facility]  Follow up with ID Dr Moura in 3 weeks.    (I26.99) Other acute pulmonary embolism without acute cor pulmonale (H)  Comment: diagnosed on 4/28. He was started on anticoagulation then developed bleeding. anticoagulation has been on hold. During this last trip to ED Chest CT suggested that pulmonary emboli are getting smaller  Plan: restart anticoagulation on 5/17- will discuss with Dr Mahmood      (I25.2) Status post non-ST elevation myocardial infarction (NSTEMI)  (I35.0) Severe aortic stenosis  Comment: patient developed elevated troponins with hypoxia  post operatively. Cardiology was consulted. Thought not ACS but more likely due to demand ischemia due to aortic stenosis and interstitial lung disease and deconditioning.   Plan: needs follow up with cardiology for consideration for TAVR      (D62) Anemia due to blood loss, acute  Comment: hb dropped from 15.9 to 7.7. Now hgb trending back up  Hemoglobin   Date Value Ref Range Status   05/13/2019 8.6 (L) 13.3 - 17.7 g/dL Final   05/12/2019 8.1 (L) 13.3 - 17.7 g/dL Final      Plan: twice weekly CBC    (E11.40,  Z79.4) Type 2 diabetes mellitus with diabetic neuropathy, with long-term current use of insulin (H)  Comment:A1C 6.3%. Prior to admission he was taking isophan 6u BID. This was discontinued.  BG running 150-170 in TCU  Plan: continue current sliding scale insulin.       (M33.90) Dermatomyositis (H)  (J84.10) Pulmonary fibrosis (H)  Comment: followed by rheumatology Dr Call and pulmonoloogy Dr Lennox at [facility]. He had been methotrexate 25mg q weeks. Most recent DLCO was reduced so needed rituximab in March. He was not on oxygen at home.  Prior to surgery he was on long taper of prednisone. During hospital stay prednisone was increased to  5mg BID then back to 5mg q day at time of discharge to TCU. He continues on theophylline 400mg q day. HR around 100.   Plan: prednisone 5mg q day for 2 weeks.    Follow up with Dr Call on 5/16,   Continue PTA  ellipta and prn albuterol and methotrexate  Supplemental oxygen.       (I10) Essential hypertension  Comment: he continues on PTA verapamil. BPs in /68, 154/84, 123/63  Plan: monitor       (E03.9) Hypothyroidism, unspecified type  Comment: no recent change in synthroid. Last TSH in January was 0.41  Plan: per PCP      (K59.01) Slow transit constipation  Comment: he did move bowels today  Plan: miralax and senna s      (R53.81) Physical deconditioning  Comment: lives with wife at son's home. Has been independent. Goal is to move to apartment with wife  Plan: physical therapy and OCCUPATIONAL THERAPY   SW to assist with discharge planning        Electronically signed by:  ANASTACIA Mcmanus CNP

## 2019-05-13 NOTE — DISCHARGE INSTRUCTIONS
No new blood clots on CT chest  No pneumonia on CT chest  Continue your current medications as prescribed  Follow up with your doctors about restarting anticoagulation

## 2019-05-13 NOTE — LETTER
5/13/2019        RE: Beulah Jane  [address]  [facility] [address]        [facility]  [facility] Record Number:  [id number]  Place of Service where encounter took place:  [facility] [[id number][facility]  Chief Complaint   Patient presents with     RECHECK       HPI:    Beualh Jane  is a 69 year old (1950), who is being seen today for an episodic care visit.  HPI information obtained from: facility chart records, facility staff, patient report and [facility] chart review. Today's concern is:    Brief Summary of Hospital Course: patient underwent planned two staged lumbar fusion on 4/22 and 4/25 due to  thoracolumbar kyphosis with flat back syndrome and thoracolumbar stenosis with myeloradiculopathy. During second surgery a chronic seroma was debrided and cultured. It grew out staph epi. ID was consulted and recommended IV vanco until 6/8 hen 3 weeks of po doxycycline.     Post op issues included type 2 NSTEMI likely due to ILD, PULMONARY EMBOLISM and aortic stenosis. He was started on heparin for finding of subsegmental pulmonary emboli on 4/28 then developed  bleeding at incision so anticoagulation had to be held.      PMH includes dermatomyositis and ILD, DM2, hypertension.hypothyroidism.      Once he was stabilized he was transferred to TCU due to weakness.      Updates on Status Since Skilled nursing Admission: patient reports ongoing pain. He is wearing oxygen; O2 sats in mid 90s. HR . He is eating and will work with therapy.   Patient was sent to ED on 5/9 and 5/12.   On 5/9 patient became shaky and sweaty. pulse 113-125, BP 86/51, O2 sats 95% on 0.5L nasal cannula. Patient's also c/o of uncontrolled pain. He was sent to ED to r/o sepsis. In ED he was treated for dehydration and sent back to TCU.    On 5/12 he was sent to ED due to shortness of breath and had sats in 80s while on supplemental oxygen.  In ED chest CT  showed few  small bilateral upper lobe pulmonary emboli and fibrosis. His O2 sats in mid 90s. He was sent back to TCU.    Overnight he reports ongoing pain in his back/. He is working with therapy.         Past Medical and Surgical History reviewed in Epic today.    MEDICATIONS:  Current Outpatient Medications   Medication Sig Dispense Refill     acetaminophen (TYLENOL) 325 MG tablet Take 2 tablets (650 mg) by mouth every 4 hours as needed for mild pain or fever       albuterol (2.5 MG/3ML) 0.083% neb solution Take 1 vial by nebulization every 6 hours as needed for shortness of breath / dyspnea or wheezing       albuterol (PROAIR HFA/PROVENTIL HFA/VENTOLIN HFA) 108 (90 BASE) MCG/ACT Inhaler Inhale 2 puffs into the lungs every 6 hours as needed for shortness of breath / dyspnea or wheezing       bisacodyl (DULCOLAX) 10 MG suppository Place 1 suppository (10 mg) rectally daily as needed for constipation       calcium carbonate (OS-DMITRIY 500 MG [place]. CA) 500 MG tablet Take 1 tablet (500 mg) by mouth 2 times daily 180 tablet 3     Cholecalciferol (VITAMIN D) 2000 units tablet Take 2,000 Units by mouth daily 100 tablet 3     diphenhydrAMINE (BENADRYL) 50 MG capsule Take 50 mg by mouth every 6 hours as needed for itching or allergies       famotidine (PEPCID) 20 MG tablet Take 1 tablet (20 mg) by mouth every 12 hours       FOLIC ACID PO Take 1 mg by mouth daily       GABAPENTIN PO Take 600 mg by mouth 3 times daily       GEMFIBROZIL PO Take 600 mg by mouth 2 times daily       hydrOXYzine (ATARAX) 25 MG tablet Take 1 tablet (25 mg) by mouth every 6 hours as needed for other (adjuvant pain)       insulin aspart (NOVOLOG PEN) 100 UNIT/ML pen Inject 1-7 Units Subcutaneous 3 times daily (before meals)       insulin aspart (NOVOLOG PEN) 100 UNIT/ML pen Inject 1-5 Units Subcutaneous At Bedtime       LANsoprazole (PREVACID) 30 MG DR capsule Take 30 mg by mouth every morning (before breakfast)       levothyroxine (SYNTHROID/LEVOTHROID) 50 MCG  tablet Take 50 mcg by mouth daily       Lidocaine (LIDOCARE) 4 % Patch Place 1-3 patches onto the skin every 24 hours       melatonin 3 MG tablet Take 1 tablet (3 mg) by mouth nightly as needed for sleep       menthol (ICY HOT) 5 % PTCH Apply 1 patch topically every 8 hours as needed for muscle soreness       methocarbamol (ROBAXIN) 500 MG tablet Take 1 tablet (500 mg) by mouth 4 times daily       methotrexate 2.5 MG tablet Take 10 tablets (25 mg) by mouth once a week Thursdays       multivitamin w/minerals (THERA-VIT-M) tablet Take 1 tablet by mouth daily       omega 3 1000 MG CAPS Take 2 capsules by mouth 2 times daily       oxyCODONE IR (ROXICODONE) 10 MG tablet Take 10 mg by mouth every 3 hours       polyethylene glycol (MIRALAX/GLYCOLAX) packet Take 17 g by mouth daily       predniSONE (DELTASONE) 5 MG tablet Take 5 mg by mouth daily for 12 days.       senna-docusate (SENOKOT-S/PERICOLACE) 8.6-50 MG tablet Take 2 tablets by mouth 2 times daily       sodium chloride, PF, 0.9% PF flush 10 mLs by Intracatheter route every 7 days       sodium chloride, PF, 0.9% PF flush 10-20 mLs by Intracatheter route every hour as needed for line flush or post meds or blood draw       sulfamethoxazole-trimethoprim (BACTRIM DS/SEPTRA DS) 800-160 MG per tablet Take 1 tablet 3 times a week (Monday, Wednesday, Friday)  11     theophylline (UNIPHYL) 400 MG 24 hr tablet Take 400 mg by mouth every morning        traMADol (ULTRAM) 50 MG tablet Take 1 tablet (50 mg) by mouth every 6 hours as needed for moderate pain 30 tablet 0     VANCOMYCIN HCL IV Inject 1,200 mg into the vein 2 times daily for Postprocedural Seroma of a musculoskeletal structure Vanco Trough to be draw on Mon [facility] to dose.       verapamil ER (CALAN-SR) 120 MG CR tablet Take 120 mg by mouth At Bedtime           REVIEW OF SYSTEMS:  4 point ROS including Respiratory, CV, GI and , other than that noted in the HPI,  is negative    Objective:  /68   Pulse 112  "  Temp 97.9  F (36.6  C)   Resp 18   Ht 1.575 m (5' 2\")   Wt 76.8 kg (169 lb 6.4 oz)   SpO2 91%   BMI 30.98 kg/m     Exam:  GENERAL APPEARANCE:  Alert, in no distress  ENT:  Mouth and posterior oropharynx normal, moist mucous membranes, hearing acuity adequate   EYES:  EOM, conjunctivae, lids, pupils and irises normal    RESP:  respiratory effort and palpation of chest normal, no respiratory distress, Lung sounds c;ear  CV:  Palpation and auscultation of heart done , rate and rhythm reg, systolic murmur, no rub or gallop, Edema trace  ABDOMEN:  normal bowel sounds, soft, nontender,  M/S:   Gait and station slow gait, Digits and nails nl  SKIN:  Inspection/Palpation of skin and subcutaneous tissue  Spinal incision is CDI  NEURO: 2-12 in normal limits and at patient's baseline  PSYCH:  insight and judgement, memory intact , affect and mood normal    Labs:   Labs done in SNF are in [place] EPIC. Please refer to them using [employer] Everywhere. and Recent labs in EPIC reviewed by me today.     ASSESSMENT/PLAN:  (Z98.890) S/P spinal surgery  (primary encounter diagnosis)    Comment: patient transferred to TCU from hospital after two staged spinal fusion due to thoracolumbar kyphosis with flat back syndrome and thoracolumbar stenosis with myeloradiculopathy.  Complicated post op period with ABLA, pulmonary emboli, demand ischemia and debrided seroma that that was cultured and grew out staph epi . PMH includes dermatomyositis, ILD, severe aortic stenosis, DM2, hypertension, hypothyroidism.  He is having pain and is taking oxycodone, tramadol and acetaminophen. T has been running .   Plan: analgesics- change  oxycodone 10mg q 4 h  physical therapy - no bending or twisting, no lifting more than 10# for 6 weeks  Monitor incision  Follow up with ortho spine in 5 weeks    (M96.843) Postprocedural seroma of a musculoskeletal structure following other procedure  Comment: seroma formation from previous surgery. This " was debrided and cultured. ID was consulted.   Plan: IV vanco until 6/8   twice weekly labs- BMP, CBC w diff, ESR and CRP   [facility] to dose v[facility]  Follow up with ID Dr Moura in 3 weeks.    (I26.99) Other acute pulmonary embolism without acute cor pulmonale (H)  Comment: diagnosed on 4/28. He was started on anticoagulation then developed bleeding. anticoagulation has been on hold. During this last trip to ED Chest CT suggested that pulmonary emboli are getting smaller  Plan: restart anticoagulation on 5/17- will discuss with Dr Mahmood      (I25.2) Status post non-ST elevation myocardial infarction (NSTEMI)  (I35.0) Severe aortic stenosis  Comment: patient developed elevated troponins with hypoxia  post operatively. Cardiology was consulted. Thought not ACS but more likely due to demand ischemia due to aortic stenosis and interstitial lung disease and deconditioning.   Plan: needs follow up with cardiology for consideration for TAVR      (D62) Anemia due to blood loss, acute  Comment: hb dropped from 15.9 to 7.7. Now hgb trending back up  Hemoglobin   Date Value Ref Range Status   05/13/2019 8.6 (L) 13.3 - 17.7 g/dL Final   05/12/2019 8.1 (L) 13.3 - 17.7 g/dL Final      Plan: twice weekly CBC    (E11.40,  Z79.4) Type 2 diabetes mellitus with diabetic neuropathy, with long-term current use of insulin (H)  Comment:A1C 6.3%. Prior to admission he was taking isophan 6u BID. This was discontinued.  BG running 150-170 in TCU  Plan: continue current sliding scale insulin.       (M33.90) Dermatomyositis (H)  (J84.10) Pulmonary fibrosis (H)  Comment: followed by rheumatology Dr Call and pulmonoloogy Dr Lennox at [facility]. He had been methotrexate 25mg q weeks. Most recent DLCO was reduced so needed rituximab in March. He was not on oxygen at home.  Prior to surgery he was on long taper of prednisone. During hospital stay prednisone was increased to  5mg BID then back to 5mg q day at time of discharge to TCU. He  continues on theophylline 400mg q day. HR around 100.   Plan: prednisone 5mg q day for 2 weeks.   Follow up with Dr Call on 5/16,   Continue PTA  ellipta and prn albuterol and methotrexate  Supplemental oxygen.       (I10) Essential hypertension  Comment: he continues on PTA verapamil. BPs in TCU  127/68, 154/84, 123/63  Plan: monitor       (E03.9) Hypothyroidism, unspecified type  Comment: no recent change in synthroid. Last TSH in January was 0.41  Plan: per PCP      (K59.01) Slow transit constipation  Comment: he did move bowels today  Plan: miralax and senna s      (R53.81) Physical deconditioning  Comment: lives with wife at son's home. Has been independent. Goal is to move to apartment with wife  Plan: physical therapy and OCCUPATIONAL THERAPY   SW to assist with discharge planning        Electronically signed by:  ANASTACIA Mcmanus CNP               Sincerely,        ANASTACIA Mcmanus CNP

## 2019-05-13 NOTE — ED PROVIDER NOTES
History     Chief Complaint:  Shortness of Breath    HPI   Beulah Jane is a 69 year old male history of aortic stenosis, PE, pulmonary fibrosis on 2 L oxygen, and PICC line in place for IV vancomycin, who presents to the emergency department today for evaluation of shortness of breath. Per EMS report, patient is living at [facility] following two back surgeries for lumbar fusion, and his oxygen was noted to be 80% with his usual 2 L oxygen. Per chart review, patient has a PICC line for IV vancomycin for chronic seroma that was debrided. Also has small PEs that are not currently getting anticoagulated due to recent surgery. The patient states he has been having more cough, shortness of breath. Additionally, he endorses that he has been coughing and wheezing more than usual. Patient denies chest pain or fever.    Allergies:  No Known Drug Allergies    Medications:    Albuterol  Dulcolax  Benadryl  Pepcid  Gabapentin  Folic acid  Gemfibrozil  Insulin  Prevacid  Levothyroxine  Atarax  Lidocaine  Robaxin  Roxicodone  Prednisone  Bactrim  Theophylline  Ultram  Vancomycin    Past Medical History:    Aortic stenosis  Chronic pain  Diabetes  Hyperlipidemia  JUAN  Polymyositis   Pulmonary fibrosis  DDD  NSTEMI  PE    Past Surgical History:    Arthroscopy knee  Decompression, fusion lumbar posterior  Fusion lumbar anterior  Hernia repair  Lumbar spine hardware removal  Optical tracking system fusion posterior spine thoracic  PICC insertion  Removal prosthetic material/mesh abdominal wall, necro tissue  Rotator cuff repair    Family History:    Family history reviewed. No pertinent family history.    Social History:  The patient was accompanied to the ED by himself.  Smoking Status: Former smoker  Smokeless Tobacco: Never Used  Alcohol Use: Positive  Drug Use: Negative  PCP: Rah Bright  Marital Status:        Review of Systems   Constitutional: Negative for fever.   Respiratory: Positive for cough, shortness of  "breath and wheezing.    Cardiovascular: Negative for chest pain.   All other systems reviewed and are negative.    Physical Exam     Patient Vitals for the past 24 hrs:   BP Temp Temp src Heart Rate Resp SpO2 Height Weight   05/13/19 0021 126/64 -- -- 107 18 98 % -- --   05/12/19 2312 -- 99.4  F (37.4  C) Oral -- -- -- -- --   05/12/19 2303 110/62 -- -- 110 18 100 % 1.702 m (5' 7\") 75.8 kg (167 lb)       Physical Exam  General: Resting comfortably on the gurney  Eyes:  The pupils are equal and round    Conjunctivae and sclerae are normal  ENT:    Moist mucous membranes  Neck:  Normal range of motion  CV:  Regular rate and rhythm    Skin warm and well perfused   Resp:  Lungs are clear    On oxygen via nasal cannula    Non-labored    No rales    No wheezing   GI:  Abdomen is soft, there is no rigidity    No distension    No rebound tenderness     No abdominal tenderness  MS:  Incision c/d/i on back and abdomen, no erythema or swelling  Skin:  No rash or acute skin lesions noted  Neuro:   Awake, alert.      Speech is normal and fluent.    Face is symmetric.     Moves all extremities equally  Psych: Normal affect.  Appropriate interactions.    Emergency Department Course     ECG:  ECG taken at 2331, ECG read at 2336  Sinus tachycardia with premature atrial complexes with aberrant conduction  Rightward axis  Nonspecific intraventricular block  T wave abnormality, consider inferior ischemia  Abnormal ECG  No significant changes compared to EKG dated 5/1/19  Rate 113 bpm. WA interval 142 ms. QRS duration 132 ms. QT/QTc 348/477 ms. P-R-T axes 55 109 -14.    Imaging:  Radiology findings were communicated with the patient who voiced understanding of the findings.    Chest CT, IV contrast only - PE protocol   Preliminary Result   IMPRESSION:   1. A few small bilateral upper lobe pulmonary emboli are stable or   slightly decreased in the interval. No new pulmonary emboli.   2. Cardiomegaly.   3. Fibrosis and probable pulmonary " edema at the periphery of the lungs   and lung bases, similar to the previous exam.        Laboratory:  Laboratory findings were communicated with the patient who voiced understanding of the findings.    BNP: 1850(H)  INR: 1.21(H)  CBC: WBC 8.7, HGB 8.1(L),   BMP: glucose 137(H), calcium 8.4(L) o/w WNL (Creatinine 0.88)    Interventions:  2354 Duoneb 3 ml nebulization    Emergency Department Course:    2302 Nursing notes and vitals reviewed.    2302 I performed an exam of the patient as documented above.     2321 IV was inserted and blood was drawn for laboratory testing, results above.    0020 The patient was sent for a chest CT (PE protocol) while in the emergency department, results above.     0100 Patient rechecked and updated.     0140 Patient is waiting for transfer back to [facility].    0206 I personally reviewed the lab and image results with the patient and answered all related questions prior to discharge.    Impression & Plan      Medical Decision Making:  Beulah Jane is a 69 year old male who presents to the emergency department today for evaluation of shortness of breath. Patient on 2 L O2 chronically. Pt remained on 2 L O2 in ED w/o hypoxia. Breathing comfortably. CT chest obtained to re-evaluate his known small PEs and these are similar size or possibly slightly decreased. Rest of CT findings unchanged from prior imaging. Given PEs not significantly changed, then no indication for anticoagulation. He may start anticoagulation later this month on follow-up per last hospitalization note after allowing his incision to heal. No pneumonia on CT chest. Doubt ACS. Feeling back to normal after neb in ED. No indication for admission. Will go back to [facility] and nurse contacted them.    Diagnosis:    ICD-10-CM    1. Dyspnea, unspecified type R06.00      Disposition:   The patient is discharged to home.    Discharge Medications:  No discharge medications    Scribe Disclosure:  Sylwia SANTOYO am  serving as a scribe at 11:51 PM on 5/12/2019 to document services personally performed by Sylvie Tidwell MD based on my observations and the provider's statements to me.      EMERGENCY DEPARTMENT       Sylvie Tidwell MD  05/13/19 1933

## 2019-05-13 NOTE — ED TRIAGE NOTES
Pt brought in by ems for sob, low O2 sat at [place] (in the 80s). Pt usually on 2L NC. Pt reports recent cough and dyspnea on exertion. PICC line noted for daily antibiotic administration

## 2019-05-13 NOTE — ED NOTES
Bed: ED02  Expected date: 5/12/19  Expected time: 10:51 PM  Means of arrival:   Comments:  E2  69M  SOB

## 2019-05-13 NOTE — ED NOTES
Pt back from CT at this time - pt states he is feeling better after neb treatment. Vitals stable. Pt has maintained O2 saturation above 95% on 2L NC throughout ED stay.

## 2019-05-14 NOTE — LETTER
5/14/2019        RE: Beulah Jane  [address]  [facility] [address]        Beulah Jane is a 69 year old male seen May 14, 2019 at [facility] where he was admitted this week after [facility] hospitalization 4/22-5/8 for thoracolumbar kyphosis with flat back syndrome and spinal stenosis with myeloradiculopathy for which he underwent a planned two-staged lumbar fusion on 4/22 and 4/25.    He was noted to have a seroma which was debrided and cultured, growing staph epi for which he is on a course of IV vancomycin until 6/8.     Post op complications include NSTEMI, anemia, aortic stenosis and pulmonary embolism.   He was started on heparin for PE on 4/28, but developed bleeding at incision site and anticoagulation was discontinued.     By chart review, patient has long history of LBP, had lumbar fusion in 1997, with hardware removal 18 months later.   He had a mesh implanted in 2012 that became infected and was later removed.     Patient has dermatomyositis, followed by Rheumatology and tx'd with methotrexate and prednisone.   He has advanced pulmonary fibrosis and chronic dyspnea; he is on MWF Bactrim    He is currently on O2 by NC at 2 L/min, coughing some, nonproductive.      Today he is seen in his room, up to chair.   Slow to answer questions, c/o pain and dyspnea.    Reports a cough that is chronic.   Denies GI or  symptoms.        Past Medical History:   Diagnosis Date     Aortic stenosis      Chronic pain      DM (diabetes mellitus), type 2 (H)     on insulin     Hyperlipidemia      JUAN on CPAP      Pneumonia      Polymyositis (H)      Pulmonary fibrosis, unspecified (H)      Seasonal allergies        Past Surgical History:   Procedure Laterality Date     ARTHROSCOPY KNEE  1970     COLONOSCOPY       DECOMPRESSION, FUSION LUMBAR POSTERIOR TWO LEVELS, COMBINED  1997     FUSION LUMBAR ANTERIOR THREE+ LEVELS N/A 4/22/2019    Procedure: Lumbar 1 Or Lumbar 2-5 Anterior Lumbar Interbody  "Fusion with BMP Stage 1 Of Two Procedure:;  Surgeon: Carlos Enrique Dial MD;  Location: [facility] OR     HERNIA REPAIR  2006     lumbar spine hardware removal       OPTICAL TRACKING SYSTEM FUSION POSTERIOR SPINE THORACIC THREE+ LEVELS N/A 2019    Procedure: O-Arm/Stealth Assisted Thoracic 10-Pelvis Instrumented Fusion T11-2, L1-2, L2-3 Transforminal Lumbar Interbody Fusion (TLIF) And Smith Borges Osteotomies,and L3-4 SPO as well, Use Of BMP, Debridement Of Seroma;  Surgeon: Carlos Enrique Dial MD;  Location: [facility] OR     PICC INSERTION Right 2019    4Fr - 37cm, Basilic vein, low SVC     REMOVAL PROSTHETIC MATERIAL/MESH, ABD WALL NECRO TISS INFEXN       ROTATOR CUFF REPAIR RT/LT       Social History     Tobacco Use     Smoking status: Former Smoker     Packs/day: 0.25     Last attempt to quit: 1970     Years since quittin.9     Smokeless tobacco: Never Used   Substance Use Topics     Alcohol use: Not on file     Comment: few times monthly        SH: Native of [place], moved to [place] in 2017.       Lives with his wife and son David, apartment in [place].      FH:  History reviewed, not pertinent to current admission.      Review Of Systems  Skin: negative   Eyes: impaired vision  Ears/Nose/Throat: hearing loss  Respiratory: as above; O2 sat 91% on 2 L/min with activity today  Cardiovascular: tachycardia and exercise intolerance  Gastrointestinal: poor appetite and constipation  Genitourinary: negative  Musculoskeletal: back pain, needs assist to transfer sit to stand.   Short distances with 4WW and SBA  Neurologic: myeloradiculopathy, peripheral neuropathy  Psychiatric: negative  Hematologic/Lymphatic/Immunologic: anemia  Endocrine: DM2 and hypothyroidism     GENERAL APPEARANCE: alert and mild distress  /68   Pulse 112   Temp 97.9  F (36.6  C)   Resp 18   Ht 1.575 m (5' 2\")   Wt 76.8 kg (169 lb 6.4 oz)   SpO2 91%   BMI 30.98 kg/m      HEENT: normocephalic, no " lesion or abnormalities  NECK: no adenopathy, no asymmetry, masses, or scars and thyroid normal to palpation  RESP: decreased BS, inspiratory crackles throughout both lung fields.  CV: regular rate and rhythm, s1s2 at 100, hyperdynamic precordium  ABDOMEN:  soft, nontender, no HSM or masses and bowel sounds normal  MS: extremities normal, 1+ LE edema L>R  PICC site RUE okay.     SKIN: no suspicious lesions or rashes; long back incision, well healed, no edema or inflammation, no drainage.   Small incision left of midline with single suture in place, healed.     NEURO: generalized weakness, limited history.     PSYCH: affect okay  LYMPHATICS: No cervical,  or supraclavicular nodes    Last Comprehensive Metabolic Panel:  Sodium   Date Value Ref Range Status   05/12/2019 135 133 - 144 mmol/L Final     Potassium   Date Value Ref Range Status   05/12/2019 4.0 3.4 - 5.3 mmol/L Final     Chloride   Date Value Ref Range Status   05/12/2019 100 94 - 109 mmol/L Final     Carbon Dioxide   Date Value Ref Range Status   05/12/2019 29 20 - 32 mmol/L Final     Anion Gap   Date Value Ref Range Status   05/12/2019 6 3 - 14 mmol/L Final     Glucose   Date Value Ref Range Status   05/12/2019 137 (H) 70 - 99 mg/dL Final     Urea Nitrogen   Date Value Ref Range Status   05/13/2019 12 7 - 30 mg/dL Final     Creatinine   Date Value Ref Range Status   05/13/2019 0.83 0.66 - 1.25 mg/dL Final     GFR Estimate   Date Value Ref Range Status   05/13/2019 90 >60 mL/min/[1.73_m2] Final     Comment:     Non  GFR Calc  Starting 12/18/2018, serum creatinine based estimated GFR (eGFR) will be   calculated using the Chronic Kidney Disease Epidemiology Collaboration   (CKD-EPI) equation.       Calcium   Date Value Ref Range Status   05/12/2019 8.4 (L) 8.5 - 10.1 mg/dL Final     Lab Results   Component Value Date    WBC 8.8 05/13/2019      HGB 8.6 05/13/2019      MCV 90 05/13/2019       05/13/2019     Lab Results   Component Value  Date    AST 90 04/27/2019     Lab Results   Component Value Date    ALT 56 04/27/2019      BILITOTAL 0.3 04/27/2019     Lab Results   Component Value Date    ALBUMIN 2.3 04/27/2019     Lab Results   Component Value Date    PROTTOTAL 5.6 04/27/2019      Lab Results   Component Value Date    ALKPHOS 95 04/27/2019     Lab Results   Component Value Date    A1C 6.3 01/29/2019      CT CHEST PULMONARY EMBOLISM WITH CONTRAST  5/13/2019 12:23 AM  HISTORY:  Worsening hypoxia, cough. History of small pulmonary emboli, not currently on anticoagulation for them. Rule out worsening  pulmonary embolism or pneumonia.  COMPARISON:  5/1/2019.  FINDINGS:  Evaluation of the pulmonary arterial system is remarkable for a few small bilateral upper lobe pulmonary emboli which are stable  or decreased in size from the prior exam. No new pulmonary emboli. No large central embolus. The thoracic aorta is calcified and mildly  tortuous. No aortic aneurysm or dissection. The heart is enlarged.  There are again several borderline-enlarged mediastinal lymph nodes.  There is a right PICC in place. No pneumothorax. There are again extensive fibrotic changes at the periphery of the lungs and lung  bases. Probable pulmonary edema similar to the previous exam. No pleural effusion. Images through the upper abdomen show no acute  abnormalities. There are postoperative changes in the spine.   IMPRESSION:  1. A few small bilateral upper lobe pulmonary emboli are stable or slightly decreased in the interval. No new pulmonary emboli.  2. Cardiomegaly.  3. Fibrosis and probable pulmonary edema at the periphery of the lungs and lung bases, similar to the previous exam.      IMP/PLAN:   (R50.9) Fever, unspecified fever cause    Comment: Tmax 101.4    Pt's symptoms are all respiratory.    He had chest CT 2 days ago, as above, WBC normal, does not appear acutely ill.   Incision without any evidence of infection and he remains on IV vancomycin and 3x/week Bactrim.      No GI or  symptoms.   Plan: will follow at this time for localizing symptoms    BC if fever spike.     Recheck WBC in 2 days time.   If worsening respiratory symptoms or fever would need to add gram negative coverage.     (M48.05) Spinal stenosis of thoracolumbar region  (M40.30) Flat back syndrome  (Z98.890) S/P spinal surgery  Comment: several post op complications  Plan: He is on gabapentin, acetaminophen, Robaxin, hydroxyzine, lidocaine patch, tramadol and oxycodone for pain, with bowel regimen.     Hopefully can wean down this regimen as he recovers  Monitor incision, follow up with surgeon.        (M96.843) Postprocedural seroma of a musculoskeletal structure following other procedure  Comment: staph epi on cultures  Plan: vancomycin IV until 6/8, then 2 weeks of doxycycline.    Labs, follow up with ID.       (M33.90) Dermatomyositis (H)  Comment: followed by Rheumatology   Plan: remains on methotrexate and prednisone taper   Bactrim MW     (J84.10) Pulmonary fibrosis (H)  Comment: abnormal exam, persistent hypoxia, cough  Plan: Given tachycardia would wean off theophylline  Continue nebs, O2 by NC        (E11.40,  Z79.4) Type 2 diabetes mellitus with diabetic neuropathy, with long-term current use of insulin (H)  Comment:   Lab Results   Component Value Date    A1C 6.3 01/29/2019   Plan: short acting insulin with meals: hold for hypoglycemia    (E03.9) Hypothyroidism, unspecified type  Comment:  TSH 0.41 in 1/2019     Plan: levothyroxine 50 mcg/day       (R00.0) Tachycardia  Comment: limits activity tolerance   Plan: wean off theophylline.   If HR better could discontinue verapamil as well.       Jessika Mahmood MD                Sincerely,        Jessika Mahmood MD

## 2019-05-14 NOTE — PROGRESS NOTES
Beulah Jane is a 69 year old male seen May 14, 2019 at [facility] where he was admitted this week after [place] 4/22-5/8 for thoracolumbar kyphosis with flat back syndrome and spinal stenosis with myeloradiculopathy for which he underwent a planned two-staged lumbar fusion on 4/22 and 4/25.    He was noted to have a seroma which was debrided and cultured, growing staph epi for which he is on a course of IV vancomycin until 6/8.     Post op complications include NSTEMI, anemia, aortic stenosis and pulmonary embolism.   He was started on heparin for PE on 4/28, but developed bleeding at incision site and anticoagulation was discontinued.     By chart review, patient has long history of LBP, had lumbar fusion in 1997, with hardware removal 18 months later.   He had a mesh implanted in 2012 that became infected and was later removed.     Patient has dermatomyositis, followed by Rheumatology and tx'd with methotrexate and prednisone.   He has advanced pulmonary fibrosis and chronic dyspnea; he is on MWF Bactrim    He is currently on O2 by NC at 2 L/min, coughing some, nonproductive.      Today he is seen in his room, up to chair.   Slow to answer questions, c/o pain and dyspnea.    Reports a cough that is chronic.   Denies GI or  symptoms.        Past Medical History:   Diagnosis Date     Aortic stenosis      Chronic pain      DM (diabetes mellitus), type 2 (H)     on insulin     Hyperlipidemia      JUAN on CPAP      Pneumonia      Polymyositis (H)      Pulmonary fibrosis, unspecified (H)      Seasonal allergies        Past Surgical History:   Procedure Laterality Date     ARTHROSCOPY KNEE  1970     COLONOSCOPY       DECOMPRESSION, FUSION LUMBAR POSTERIOR TWO LEVELS, COMBINED  1997     FUSION LUMBAR ANTERIOR THREE+ LEVELS N/A 4/22/2019    Procedure: Lumbar 1 Or Lumbar 2-5 Anterior Lumbar Interbody Fusion with BMP Stage 1 Of Two Procedure:;  Surgeon: Carlos Enrique Dial MD;  Location:  OR  "    HERNIA REPAIR  2006     lumbar spine hardware removal       OPTICAL TRACKING SYSTEM FUSION POSTERIOR SPINE THORACIC THREE+ LEVELS N/A 2019    Procedure: O-Arm/Stealth Assisted Thoracic 10-Pelvis Instrumented Fusion T11-2, L1-2, L2-3 Transforminal Lumbar Interbody Fusion (TLIF) And Smith Borges Osteotomies,and L3-4 SPO as well, Use Of BMP, Debridement Of Seroma;  Surgeon: Carlos Enrique Dial MD;  Location: [facility] OR     PICC INSERTION Right 2019    4Fr - 37cm, Basilic vein, low SVC     REMOVAL PROSTHETIC MATERIAL/MESH, ABD WALL NECRO TISS INFEXN       ROTATOR CUFF REPAIR RT/LT       Social History     Tobacco Use     Smoking status: Former Smoker     Packs/day: 0.25     Last attempt to quit: 1970     Years since quittin.9     Smokeless tobacco: Never Used   Substance Use Topics     Alcohol use: Not on file     Comment: few times monthly        SH: Native of [place], moved to [place] in 2017.       Lives with his wife and son David, apartment in [place].      FH:  History reviewed, not pertinent to current admission.      Review Of Systems  Skin: negative   Eyes: impaired vision  Ears/Nose/Throat: hearing loss  Respiratory: as above; O2 sat 91% on 2 L/min with activity today  Cardiovascular: tachycardia and exercise intolerance  Gastrointestinal: poor appetite and constipation  Genitourinary: negative  Musculoskeletal: back pain, needs assist to transfer sit to stand.   Short distances with 4WW and SBA  Neurologic: myeloradiculopathy, peripheral neuropathy  Psychiatric: negative  Hematologic/Lymphatic/Immunologic: anemia  Endocrine: DM2 and hypothyroidism     GENERAL APPEARANCE: alert and mild distress  /68   Pulse 112   Temp 97.9  F (36.6  C)   Resp 18   Ht 1.575 m (5' 2\")   Wt 76.8 kg (169 lb 6.4 oz)   SpO2 91%   BMI 30.98 kg/m     HEENT: normocephalic, no lesion or abnormalities  NECK: no adenopathy, no asymmetry, masses, or scars and thyroid normal to " palpation  RESP: decreased BS, inspiratory crackles throughout both lung fields.  CV: regular rate and rhythm, s1s2 at 100, hyperdynamic precordium  ABDOMEN:  soft, nontender, no HSM or masses and bowel sounds normal  MS: extremities normal, 1+ LE edema L>R  PICC site RUE okay.     SKIN: no suspicious lesions or rashes; long back incision, well healed, no edema or inflammation, no drainage.   Small incision left of midline with single suture in place, healed.     NEURO: generalized weakness, limited history.     PSYCH: affect okay  LYMPHATICS: No cervical,  or supraclavicular nodes    Last Comprehensive Metabolic Panel:  Sodium   Date Value Ref Range Status   05/12/2019 135 133 - 144 mmol/L Final     Potassium   Date Value Ref Range Status   05/12/2019 4.0 3.4 - 5.3 mmol/L Final     Chloride   Date Value Ref Range Status   05/12/2019 100 94 - 109 mmol/L Final     Carbon Dioxide   Date Value Ref Range Status   05/12/2019 29 20 - 32 mmol/L Final     Anion Gap   Date Value Ref Range Status   05/12/2019 6 3 - 14 mmol/L Final     Glucose   Date Value Ref Range Status   05/12/2019 137 (H) 70 - 99 mg/dL Final     Urea Nitrogen   Date Value Ref Range Status   05/13/2019 12 7 - 30 mg/dL Final     Creatinine   Date Value Ref Range Status   05/13/2019 0.83 0.66 - 1.25 mg/dL Final     GFR Estimate   Date Value Ref Range Status   05/13/2019 90 >60 mL/min/[1.73_m2] Final     Comment:     Non  GFR Calc  Starting 12/18/2018, serum creatinine based estimated GFR (eGFR) will be   calculated using the Chronic Kidney Disease Epidemiology Collaboration   (CKD-EPI) equation.       Calcium   Date Value Ref Range Status   05/12/2019 8.4 (L) 8.5 - 10.1 mg/dL Final     Lab Results   Component Value Date    WBC 8.8 05/13/2019      HGB 8.6 05/13/2019      MCV 90 05/13/2019       05/13/2019     Lab Results   Component Value Date    AST 90 04/27/2019     Lab Results   Component Value Date    ALT 56 04/27/2019       BILITOTAL 0.3 04/27/2019     Lab Results   Component Value Date    ALBUMIN 2.3 04/27/2019     Lab Results   Component Value Date    PROTTOTAL 5.6 04/27/2019      Lab Results   Component Value Date    ALKPHOS 95 04/27/2019     Lab Results   Component Value Date    A1C 6.3 01/29/2019      CT CHEST PULMONARY EMBOLISM WITH CONTRAST  5/13/2019 12:23 AM  HISTORY:  Worsening hypoxia, cough. History of small pulmonary emboli, not currently on anticoagulation for them. Rule out worsening  pulmonary embolism or pneumonia.  COMPARISON:  5/1/2019.  FINDINGS:  Evaluation of the pulmonary arterial system is remarkable for a few small bilateral upper lobe pulmonary emboli which are stable  or decreased in size from the prior exam. No new pulmonary emboli. No large central embolus. The thoracic aorta is calcified and mildly  tortuous. No aortic aneurysm or dissection. The heart is enlarged.  There are again several borderline-enlarged mediastinal lymph nodes.  There is a right PICC in place. No pneumothorax. There are again extensive fibrotic changes at the periphery of the lungs and lung  bases. Probable pulmonary edema similar to the previous exam. No pleural effusion. Images through the upper abdomen show no acute  abnormalities. There are postoperative changes in the spine.   IMPRESSION:  1. A few small bilateral upper lobe pulmonary emboli are stable or slightly decreased in the interval. No new pulmonary emboli.  2. Cardiomegaly.  3. Fibrosis and probable pulmonary edema at the periphery of the lungs and lung bases, similar to the previous exam.      IMP/PLAN:   (R50.9) Fever, unspecified fever cause    Comment: Tmax 101.4    Pt's symptoms are all respiratory.    He had chest CT 2 days ago, as above, WBC normal, does not appear acutely ill.   Incision without any evidence of infection and he remains on IV vancomycin and 3x/week Bactrim.     No GI or  symptoms.   Plan: will follow at this time for localizing symptoms    BC  if fever spike.     Recheck WBC in 2 days time.   If worsening respiratory symptoms or fever would need to add gram negative coverage.     (M48.05) Spinal stenosis of thoracolumbar region  (M40.30) Flat back syndrome  (Z98.890) S/P spinal surgery  Comment: several post op complications  Plan: He is on gabapentin, acetaminophen, Robaxin, hydroxyzine, lidocaine patch, tramadol and oxycodone for pain, with bowel regimen.     Hopefully can wean down this regimen as he recovers  Monitor incision, follow up with surgeon.        (M96.843) Postprocedural seroma of a musculoskeletal structure following other procedure  Comment: staph epi on cultures  Plan: vancomycin IV until 6/8, then 2 weeks of doxycycline.    Labs, follow up with ID.       (M33.90) Dermatomyositis (H)  Comment: followed by Rheumatology   Plan: remains on methotrexate and prednisone taper   Bactrim MWF     (J84.10) Pulmonary fibrosis (H)  Comment: abnormal exam, persistent hypoxia, cough  Plan: Given tachycardia would wean off theophylline  Continue nebs, O2 by NC        (E11.40,  Z79.4) Type 2 diabetes mellitus with diabetic neuropathy, with long-term current use of insulin (H)  Comment:   Lab Results   Component Value Date    A1C 6.3 01/29/2019   Plan: short acting insulin with meals: hold for hypoglycemia    (E03.9) Hypothyroidism, unspecified type  Comment:  TSH 0.41 in 1/2019     Plan: levothyroxine 50 mcg/day       (R00.0) Tachycardia  Comment: limits activity tolerance   Plan: wean off theophylline.   If HR better could discontinue verapamil as well.       Jessika Mahmood MD

## 2019-05-15 PROBLEM — R53.81 MALAISE: Status: ACTIVE | Noted: 2019-01-01

## 2019-05-15 PROBLEM — R50.9 ELEVATED TEMPERATURE: Status: ACTIVE | Noted: 2019-01-01

## 2019-05-15 NOTE — PROGRESS NOTES
[facility]  [place] Medical Record Number:  [id number]  Place of Service where encounter took place:  [facility] [[id number]]  Chief Complaint   Patient presents with     RECHECK       HPI:    Beulah Jane  is a 69 year old (1950), who is being seen today for an episodic care visit.  HPI information obtained from: facility chart records, facility staff, patient report and [facility] chart review. Today's concern is:  Brief Summary of Hospital Course: patient underwent planned two staged lumbar fusion on 4/22 and 4/25 due to  thoracolumbar kyphosis with flat back syndrome and thoracolumbar stenosis with myeloradiculopathy. During second surgery a chronic seroma was debrided and cultured. It grew out staph epi. ID was consulted and recommended IV vanco until 6/8 hen 3 weeks of po doxycycline.     Post op issues included type 2 NSTEMI likely due to ILD, PULMONARY EMBOLISM and aortic stenosis. He was started on heparin for finding of subsegmental pulmonary emboli on 4/28 then developed  bleeding at incision so anticoagulation had to be held.      PMH includes dermatomyositis and ILD, DM2, hypertension.hypothyroidism.      Once he was stabilized he was transferred to TCU due to weakness.      Updates on Status Since Skilled nursing Admission: patient has reported ongoing pain. He is wearing oxygen; O2 sats in low 90s. HR .   Patient was sent to ED on 5/9 and 5/12.   On 5/9 patient became shaky and sweaty. pulse 113-125, BP 86/51, O2 sats 95% on 0.5L nasal cannula. Patient's also c/o of uncontrolled pain. He was sent to ED to r/o sepsis. In ED he was treated for dehydration and sent back to TCU.    On 5/12 he was sent to ED due to shortness of breath and had sats in 80s while on supplemental oxygen.  In ED chest CT  showed few small bilateral upper lobe pulmonary emboli and fibrosis. His O2 sats in mid 90s. He was sent back to TCU.    Today he has not eaten  anything. He has been sleeping all day. T 102.       Past Medical and Surgical History reviewed in Epic today.    MEDICATIONS:  Current Outpatient Medications   Medication Sig Dispense Refill     acetaminophen (TYLENOL) 325 MG tablet Take 2 tablets (650 mg) by mouth every 4 hours as needed for mild pain or fever       albuterol (2.5 MG/3ML) 0.083% neb solution Take 1 vial by nebulization every 6 hours as needed for shortness of breath / dyspnea or wheezing       albuterol (PROAIR HFA/PROVENTIL HFA/VENTOLIN HFA) 108 (90 BASE) MCG/ACT Inhaler Inhale 2 puffs into the lungs every 6 hours as needed for shortness of breath / dyspnea or wheezing       bisacodyl (DULCOLAX) 10 MG suppository Place 1 suppository (10 mg) rectally daily as needed for constipation       calcium carbonate (OS-DMITRIY 500 MG [place]. [place]) 500 MG tablet Take 1 tablet (500 mg) by mouth 2 times daily 180 tablet 3     Cholecalciferol (VITAMIN D) 2000 units tablet Take 2,000 Units by mouth daily 100 tablet 3     diphenhydrAMINE (BENADRYL) 50 MG capsule Take 50 mg by mouth every 6 hours as needed for itching or allergies       famotidine (PEPCID) 20 MG tablet Take 1 tablet (20 mg) by mouth every 12 hours       FOLIC ACID PO Take 1 mg by mouth daily       GABAPENTIN PO Take 600 mg by mouth 3 times daily       GEMFIBROZIL PO Take 600 mg by mouth 2 times daily       hydrOXYzine (ATARAX) 25 MG tablet Take 1 tablet (25 mg) by mouth every 6 hours as needed for other (adjuvant pain)       insulin aspart (NOVOLOG PEN) 100 UNIT/ML pen Inject 1-7 Units Subcutaneous 3 times daily (before meals)       insulin aspart (NOVOLOG PEN) 100 UNIT/ML pen Inject 1-5 Units Subcutaneous At Bedtime       ipratropium - albuterol 0.5 mg/2.5 mg/3 mL (DUONEB) 0.5-2.5 (3) MG/3ML neb solution Take 1 vial by nebulization 3 times daily       LANsoprazole (PREVACID) 30 MG DR capsule Take 30 mg by mouth every morning (before breakfast)       levothyroxine (SYNTHROID/LEVOTHROID) 50 MCG tablet  "Take 50 mcg by mouth daily       Lidocaine (LIDOCARE) 4 % Patch Place 1-3 patches onto the skin every 24 hours       melatonin 3 MG tablet Take 1 tablet (3 mg) by mouth nightly as needed for sleep       menthol (ICY HOT) 5 % PTCH Apply 1 patch topically every 8 hours as needed for muscle soreness       methocarbamol (ROBAXIN) 500 MG tablet Take 1 tablet (500 mg) by mouth 4 times daily       methotrexate 2.5 MG tablet Take 10 tablets (25 mg) by mouth once a week Thursdays       multivitamin w/minerals (THERA-VIT-M) tablet Take 1 tablet by mouth daily       omega 3 1000 MG CAPS Take 2 capsules by mouth 2 times daily       oxyCODONE IR (ROXICODONE) 10 MG tablet Take 10 mg by mouth every 4 hours       polyethylene glycol (MIRALAX/GLYCOLAX) packet Take 17 g by mouth daily       predniSONE (DELTASONE) 5 MG tablet Take 5 mg by mouth daily for 12 days.       senna-docusate (SENOKOT-S/PERICOLACE) 8.6-50 MG tablet Take 2 tablets by mouth 2 times daily       sodium chloride, PF, 0.9% PF flush 10 mLs by Intracatheter route every 7 days       sodium chloride, PF, 0.9% PF flush 10-20 mLs by Intracatheter route every hour as needed for line flush or post meds or blood draw       sulfamethoxazole-trimethoprim (BACTRIM DS/SEPTRA DS) 800-160 MG per tablet Take 1 tablet 3 times a week (Monday, Wednesday, Friday)  11     theophylline (UNIPHYL) 400 MG 24 hr tablet Take 200 mg by mouth every morning        traMADol (ULTRAM) 50 MG tablet Take 1 tablet (50 mg) by mouth every 6 hours as needed for moderate pain 30 tablet 0     VANCOMYCIN HCL IV Inject 1,200 mg into the vein 2 times daily for Postprocedural Seroma of a musculoskeletal structure Vanco Trough to be draw on Mon [facility] to dose.       verapamil ER (CALAN-SR) 120 MG CR tablet Take 120 mg by mouth At Bedtime           REVIEW OF SYSTEMS:  Unable to provide due to malaise, somnolence    Objective:  /68   Pulse 69   Temp 101.2  F (38.4  C)   Resp 22   Ht 1.575 m (5' 2\") "   Wt 76.8 kg (169 lb 6.4 oz)   SpO2 91%   BMI 30.98 kg/m    Exam:  GENERAL APPEARANCE:  so  ENT:  Mouth and posterior oropharynx normal, moist mucous membranes, hearing acuity adequate   EYES:  EOM, conjunctivae, lids, pupils and irises normal  NECK:  No adenopathy,masses or thyromegaly  RESP:  respiratory effort and palpation of chest normal, no respiratory distress, Lung sounds scattered wheezes and coarse lung sounds  CV:  Palpation and auscultation of heart done , rate and rhythm irreg, systolic murmur, no rub or gallop, Edema none  ABDOMEN:  normal bowel sounds, soft, nontender, no hepatosplenomegaly or other masses  M/S:   Gait and station not observed, Digits and nails normal   SKIN:  Inspection/Palpation of skin and subcutaneous tissue no rash  NEURO: 2-12 in normal limits and at patient's baseline  PSYCH:  insight and judgement, memory intact , affect and mood flat    Labs:   Labs done in SNF are in [facility]. Please refer to them using [facility] Everywhere. and Recent labs in [facility] reviewed by me today.     ASSESSMENT/PLAN:  (R50.9) Elevated temperature  (primary encounter diagnosis)  (R53.81) Malaise  Comment: patient has temp >100 for past 48 h. Temp now is 102. He has not eaten anything today. He does arouse with VS. Discussed with DR Mahmood. DDX: SE of pain meds vs pulmonary disease  Plan: blood cultures x2  Ceftriaxone 1 gm q day for 2 days  Hold robaxin  Change oxycodone 5-10 mg q 4 h prn    (Z98.890) S/P spinal surgery  Comment: patient transferred to TCU from hospital after two staged spinal fusion due to thoracolumbar kyphosis with flat back syndrome and thoracolumbar stenosis with myeloradiculopathy.  Complicated post op period with ABLA, pulmonary emboli, demand ischemia and debrided seroma that that was cultured and grew out staph epi . PMH includes dermatomyositis, ILD, severe aortic stenosis, DM2, hypertension, hypothyroidism.  He is having pain and is taking oxycodone, tramadol and  acetaminophen. T has been running 100-102  Plan: analgesics- change  oxycodone 5- 10mg q 4 h prn  physical therapy - no bending or twisting, no lifting more than 10# for 6 weeks  Monitor incision  Follow up with ortho spine in 4 weeks    (M96.843) Postprocedural seroma of a musculoskeletal structure following other procedure  Comment: seroma formation from previous surgery. This was debrided and cultured. ID was consulted.   Plan: IV vanco until 6/8   twice weekly labs- BMP, CBC w diff, ESR and CRP   [facility] to dose vanco  Follow up with ID Dr Moura in 3 weeks.    (I26.99) Other acute pulmonary embolism without acute cor pulmonale (H)  Comment: diagnosed on 4/28. He was started on anticoagulation then developed bleeding. anticoagulation has been on hold. During this last trip to ED Chest CT suggested that pulmonary emboli are getting smaller  Plan: restart anticoagulation on 5/17- will discuss with Dr Mahmood    (M33.90) Dermatomyositis (H)  (J84.10) Pulmonary fibrosis (H)  Comment: followed by rheumatology Dr Call and pulmonoloogy Dr Lennox at [facility]. He had been methotrexate 25mg q weeks. Most recent DLCO was reduced so needed rituximab in March. He was not on oxygen at home.  Prior to surgery he was on long taper of prednisone. During hospital stay prednisone was increased to  5mg BID then back to 5mg q day at time of discharge to TCU.  since in TCU Theophylline was reduced to 200mg q day. HR around 100.   Plan: prednisone 5mg q day for 2 weeks.   Hold methotrexate  q aiyana 2 puffs BID  Continue PTA  ellipta and prn albuterol   Supplemental oxygen.     (I25.2) Status post non-ST elevation myocardial infarction (NSTEMI)  (I35.0) Severe aortic stenosis  Comment: patient developed elevated troponins with hypoxia  post operatively. Cardiology was consulted. Thought not ACS but more likely due to demand ischemia due to aortic stenosis and interstitial lung disease and deconditioning.   Plan: needs follow up  with cardiology for consideration for TAVR      (D62) Anemia due to blood loss, acute  Comment: hb dropped from 15.9 to 7.7. Now hgb trending back up  Plan:  CBC 5/16      (E11.40,  Z79.4) Type 2 diabetes mellitus with diabetic neuropathy, with long-term current use of insulin (H)  Comment: BG<200 in TCU. Not eating today  Plan: monitor     (I10) Essential hypertension  Comment: last few BPs here 107/64, 109/68, 111/69  Plan: monitor     (R53.81) Physical deconditioning  Comment: due to recent back surgery and chronic lung condition. He is not making progress with therapy.   Plan: physical therapy and OCCUPATIONAL THERAPY     Orders written by provider at facility  It needs to go back to hospital will send to [facility]    Total time spent with patient visit at the skilled nursing facility was 45 min including patient visit and review of past records. Greater than 50% of total time spent with counseling and coordinating care due to complex medical condition and ongoing fever  Electronically signed by:  ANASTACIA Mcmanus CNP

## 2019-05-15 NOTE — LETTER
5/15/2019        RE: Beulah Jane  [address]  [facility] [address]        [place] [facility]  [place] Medical Record Number:  [id number]  Place of Service where encounter took place:  [facility] [[id number]]  Chief Complaint   Patient presents with     RECHECK       HPI:    Beulah Jane  is a 69 year old (1950), who is being seen today for an episodic care visit.  HPI information obtained from: facility chart records, facility staff, patient report and [facility] chart review. Today's concern is:  Brief Summary of Hospital Course: patient underwent planned two staged lumbar fusion on 4/22 and 4/25 due to  thoracolumbar kyphosis with flat back syndrome and thoracolumbar stenosis with myeloradiculopathy. During second surgery a chronic seroma was debrided and cultured. It grew out staph epi. ID was consulted and recommended IV vanco until 6/8 hen 3 weeks of po doxycycline.     Post op issues included type 2 NSTEMI likely due to ILD, PULMONARY EMBOLISM and aortic stenosis. He was started on heparin for finding of subsegmental pulmonary emboli on 4/28 then developed  bleeding at incision so anticoagulation had to be held.      PMH includes dermatomyositis and ILD, DM2, hypertension.hypothyroidism.      Once he was stabilized he was transferred to TCU due to weakness.      Updates on Status Since Skilled nursing Admission: patient has reported ongoing pain. He is wearing oxygen; O2 sats in low 90s. HR .   Patient was sent to ED on 5/9 and 5/12.   On 5/9 patient became shaky and sweaty. pulse 113-125, BP 86/51, O2 sats 95% on 0.5L nasal cannula. Patient's also c/o of uncontrolled pain. He was sent to ED to r/o sepsis. In ED he was treated for dehydration and sent back to TCU.    On 5/12 he was sent to ED due to shortness of breath and had sats in 80s while on supplemental oxygen.  In ED chest CT  showed few small bilateral upper lobe pulmonary  emboli and fibrosis. His O2 sats in mid 90s. He was sent back to TCU.    Today he has not eaten anything. He has been sleeping all day. T 102.       Past Medical and Surgical History reviewed in Epic today.    MEDICATIONS:  Current Outpatient Medications   Medication Sig Dispense Refill     acetaminophen (TYLENOL) 325 MG tablet Take 2 tablets (650 mg) by mouth every 4 hours as needed for mild pain or fever       albuterol (2.5 MG/3ML) 0.083% neb solution Take 1 vial by nebulization every 6 hours as needed for shortness of breath / dyspnea or wheezing       albuterol (PROAIR HFA/PROVENTIL HFA/VENTOLIN HFA) 108 (90 BASE) MCG/ACT Inhaler Inhale 2 puffs into the lungs every 6 hours as needed for shortness of breath / dyspnea or wheezing       bisacodyl (DULCOLAX) 10 MG suppository Place 1 suppository (10 mg) rectally daily as needed for constipation       calcium carbonate (OS-DMITRIY 500 MG [place]. [place]) 500 MG tablet Take 1 tablet (500 mg) by mouth 2 times daily 180 tablet 3     Cholecalciferol (VITAMIN D) 2000 units tablet Take 2,000 Units by mouth daily 100 tablet 3     diphenhydrAMINE (BENADRYL) 50 MG capsule Take 50 mg by mouth every 6 hours as needed for itching or allergies       famotidine (PEPCID) 20 MG tablet Take 1 tablet (20 mg) by mouth every 12 hours       FOLIC ACID PO Take 1 mg by mouth daily       GABAPENTIN PO Take 600 mg by mouth 3 times daily       GEMFIBROZIL PO Take 600 mg by mouth 2 times daily       hydrOXYzine (ATARAX) 25 MG tablet Take 1 tablet (25 mg) by mouth every 6 hours as needed for other (adjuvant pain)       insulin aspart (NOVOLOG PEN) 100 UNIT/ML pen Inject 1-7 Units Subcutaneous 3 times daily (before meals)       insulin aspart (NOVOLOG PEN) 100 UNIT/ML pen Inject 1-5 Units Subcutaneous At Bedtime       ipratropium - albuterol 0.5 mg/2.5 mg/3 mL (DUONEB) 0.5-2.5 (3) MG/3ML neb solution Take 1 vial by nebulization 3 times daily       LANsoprazole (PREVACID) 30 MG DR capsule Take 30 mg by  mouth every morning (before breakfast)       levothyroxine (SYNTHROID/LEVOTHROID) 50 MCG tablet Take 50 mcg by mouth daily       Lidocaine (LIDOCARE) 4 % Patch Place 1-3 patches onto the skin every 24 hours       melatonin 3 MG tablet Take 1 tablet (3 mg) by mouth nightly as needed for sleep       menthol (ICY HOT) 5 % PTCH Apply 1 patch topically every 8 hours as needed for muscle soreness       methocarbamol (ROBAXIN) 500 MG tablet Take 1 tablet (500 mg) by mouth 4 times daily       methotrexate 2.5 MG tablet Take 10 tablets (25 mg) by mouth once a week Thursdays       multivitamin w/minerals (THERA-VIT-M) tablet Take 1 tablet by mouth daily       omega 3 1000 MG CAPS Take 2 capsules by mouth 2 times daily       oxyCODONE IR (ROXICODONE) 10 MG tablet Take 10 mg by mouth every 4 hours       polyethylene glycol (MIRALAX/GLYCOLAX) packet Take 17 g by mouth daily       predniSONE (DELTASONE) 5 MG tablet Take 5 mg by mouth daily for 12 days.       senna-docusate (SENOKOT-S/PERICOLACE) 8.6-50 MG tablet Take 2 tablets by mouth 2 times daily       sodium chloride, PF, 0.9% PF flush 10 mLs by Intracatheter route every 7 days       sodium chloride, PF, 0.9% PF flush 10-20 mLs by Intracatheter route every hour as needed for line flush or post meds or blood draw       sulfamethoxazole-trimethoprim (BACTRIM DS/SEPTRA DS) 800-160 MG per tablet Take 1 tablet 3 times a week (Monday, Wednesday, Friday)  11     theophylline (UNIPHYL) 400 MG 24 hr tablet Take 200 mg by mouth every morning        traMADol (ULTRAM) 50 MG tablet Take 1 tablet (50 mg) by mouth every 6 hours as needed for moderate pain 30 tablet 0     VANCOMYCIN HCL IV Inject 1,200 mg into the vein 2 times daily for Postprocedural Seroma of a musculoskeletal structure Vanco Trough to be draw on Mon [facility] to dose.       verapamil ER (CALAN-SR) 120 MG CR tablet Take 120 mg by mouth At Bedtime           REVIEW OF SYSTEMS:  Unable to provide due to malaise,  "somnolence    Objective:  /68   Pulse 69   Temp 101.2  F (38.4  C)   Resp 22   Ht 1.575 m (5' 2\")   Wt 76.8 kg (169 lb 6.4 oz)   SpO2 91%   BMI 30.98 kg/m     Exam:  GENERAL APPEARANCE:  so  ENT:  Mouth and posterior oropharynx normal, moist mucous membranes, hearing acuity adequate   EYES:  EOM, conjunctivae, lids, pupils and irises normal  NECK:  No adenopathy,masses or thyromegaly  RESP:  respiratory effort and palpation of chest normal, no respiratory distress, Lung sounds scattered wheezes and coarse lung sounds  CV:  Palpation and auscultation of heart done , rate and rhythm irreg, systolic murmur, no rub or gallop, Edema none  ABDOMEN:  normal bowel sounds, soft, nontender, no hepatosplenomegaly or other masses  M/S:   Gait and station not observed, Digits and nails normal   SKIN:  Inspection/Palpation of skin and subcutaneous tissue no rash  NEURO: 2-12 in normal limits and at patient's baseline  PSYCH:  insight and judgement, memory intact , affect and mood flat    Labs:   Labs done in SNF are in [facility]. Please refer to them using [employer] Everywhere. and Recent labs in EPIC reviewed by me today.     ASSESSMENT/PLAN:  (R50.9) Elevated temperature  (primary encounter diagnosis)  (R53.81) Malaise  Comment: patient has temp >100 for past 48 h. Temp now is 102. He has not eaten anything today. He does arouse with VS. Discussed with DR Mahmood. DDX: SE of pain meds vs pulmonary disease  Plan: blood cultures x2  Ceftriaxone 1 gm q day for 2 days  Hold robaxin  Change oxycodone 5-10 mg q 4 h prn    (Z98.890) S/P spinal surgery  Comment: patient transferred to TCU from hospital after two staged spinal fusion due to thoracolumbar kyphosis with flat back syndrome and thoracolumbar stenosis with myeloradiculopathy.  Complicated post op period with ABLA, pulmonary emboli, demand ischemia and debrided seroma that that was cultured and grew out staph epi . PMH includes dermatomyositis, ILD, severe " aortic stenosis, DM2, hypertension, hypothyroidism.  He is having pain and is taking oxycodone, tramadol and acetaminophen. T has been running 100-102  Plan: analgesics- change  oxycodone  5- 10mg q 4 h  prn  physical therapy - no bending or twisting, no lifting more than 10# for 6 weeks  Monitor incision  Follow up with ortho spine in  4 weeks    (M96.843) Postprocedural seroma of a musculoskeletal structure following other procedure  Comment: seroma formation from previous surgery. This was debrided and cultured. ID was consulted.   Plan: IV vanco until 6/8   twice weekly labs- BMP, CBC w diff, ESR and CRP   [facility] to dose [facility]  Follow up with ID Dr Moura in 3 weeks.    (I26.99) Other acute pulmonary embolism without acute cor pulmonale (H)  Comment: diagnosed on 4/28. He was started on anticoagulation then developed bleeding. anticoagulation has been on hold. During this last trip to ED Chest CT suggested that pulmonary emboli are getting smaller  Plan: restart anticoagulation on 5/17- will discuss with Dr Mahmood    (M33.90) Dermatomyositis (H)  (J84.10) Pulmonary fibrosis (H)  Comment: followed by rheumatology Dr Call and pulmonoloogy Dr Lennox at [facility]. He had been methotrexate 25mg q weeks. Most recent DLCO was reduced so needed rituximab in March. He was not on oxygen at home.  Prior to surgery he was on long taper of prednisone. During hospital stay prednisone was increased to  5mg BID then back to 5mg q day at time of discharge to TCU.  since in TCU Theophylline was reduced to 200mg q day. HR around 100.   Plan: prednisone 5mg q day for 2 weeks.   Hold methotrexate  q aiyana 2 puffs BID  Continue PTA  ellipta and prn albuterol   Supplemental oxygen.     (I25.2) Status post non-ST elevation myocardial infarction (NSTEMI)  (I35.0) Severe aortic stenosis  Comment: patient developed elevated troponins with hypoxia  post operatively. Cardiology was consulted. Thought not ACS but more likely due to  demand ischemia due to aortic stenosis and interstitial lung disease and deconditioning.   Plan: needs follow up with cardiology for consideration for TAVR      (D62) Anemia due to blood loss, acute  Comment: hb dropped from 15.9 to 7.7. Now hgb trending back up  Plan:  CBC 5/16      (E11.40,  Z79.4) Type 2 diabetes mellitus with diabetic neuropathy, with long-term current use of insulin (H)  Comment: BG<200 in TCU. Not eating today  Plan: monitor     (I10) Essential hypertension  Comment: last few BPs here 107/64, 109/68, 111/69  Plan: monitor     (R53.81) Physical deconditioning  Comment: due to recent back surgery and chronic lung condition. He is not making progress with therapy.   Plan: physical therapy and OCCUPATIONAL THERAPY     Orders written by provider at facility  It needs to go back to hospital will send to [facility]    Total time spent with patient visit at the skilled nursing facility was 45 min including patient visit and review of past records. Greater than 50% of total time spent with counseling and coordinating care due to complex medical condition and ongoing fever  Electronically signed by:  ANASTACIA Mcmanus CNP               Sincerely,        ANASTACIA Mcmanus CNP

## 2019-05-16 NOTE — PROGRESS NOTES
[place] [facility]  [place] Medical Record Number:  [id number]  Place of Service where encounter took place:  [facility] ([facility] [[id number]]  Chief Complaint   Patient presents with     RECHECK       HPI:    Beulah Jane  is a 69 year old (1950), who is being seen today for an episodic care visit.  HPI information obtained from: facility chart records, facility staff, patient report and [facility] chart review. Today's concern is:  Brief Summary of Hospital Course: patient underwent planned two staged lumbar fusion on 4/22 and 4/25 due to  thoracolumbar kyphosis with flat back syndrome and thoracolumbar stenosis with myeloradiculopathy. During second surgery a chronic seroma was debrided and cultured. It grew out staph epi. ID was consulted and recommended IV vanco until 6/8 hen 3 weeks of po doxycycline.     Post op issues included type 2 NSTEMI likely due to ILD, PULMONARY EMBOLISM and aortic stenosis. He was started on heparin for finding of subsegmental pulmonary emboli on 4/28 then developed  bleeding at incision so anticoagulation had to be held.      PMH includes dermatomyositis and ILD, DM2, hypertension.hypothyroidism.      Once he was stabilized he was transferred to TCU due to weakness.      Updates on Status Since Skilled nursing Admission: patient has reported ongoing pain. He is wearing oxygen; O2 sats in low 90s. HR .   Patient was sent to ED on 5/9 and 5/12.   On 5/9 patient became shaky and sweaty. pulse 113-125, BP 86/51, O2 sats 95% on 0.5L nasal cannula. Patient's also c/o of uncontrolled pain. He was sent to ED to r/o sepsis. In ED he was treated for dehydration and sent back to TCU.    On 5/12 he was sent to ED due to shortness of breath and had sats in 80s while on supplemental oxygen.  In ED chest CT  showed few small bilateral upper lobe pulmonary emboli and fibrosis. His O2 sats in mid 90s. He was sent back to TCU.    5/14 and 5/15,  the  previous two days, temp up to 102. Oxycodone was reduced, robaxin and methotrexate was held and Qvar was added. ceftriaxone was added but patient has not received yet.   Today he is much more alert. He continues to have pain but he is eating and working a bit with therapy,.   Past Medical and Surgical History reviewed in Epic today.    MEDICATIONS:  Current Outpatient Medications   Medication Sig Dispense Refill     acetaminophen (TYLENOL) 325 MG tablet Take 2 tablets (650 mg) by mouth every 4 hours as needed for mild pain or fever       albuterol (2.5 MG/3ML) 0.083% neb solution Take 1 vial by nebulization every 6 hours as needed for shortness of breath / dyspnea or wheezing       albuterol (PROAIR HFA/PROVENTIL HFA/VENTOLIN HFA) 108 (90 BASE) MCG/ACT Inhaler Inhale 2 puffs into the lungs every 6 hours as needed for shortness of breath / dyspnea or wheezing       beclomethasone (QVAR) 80 MCG/ACT AERS IS A DISCONTINUED MEDICATION Inhale 2 puffs into the lungs 2 times daily       bisacodyl (DULCOLAX) 10 MG suppository Place 1 suppository (10 mg) rectally daily as needed for constipation       calcium carbonate (OS-DMITRIY 500 MG [place]. [place]) 500 MG tablet Take 1 tablet (500 mg) by mouth 2 times daily 180 tablet 3     Cholecalciferol (VITAMIN D) 2000 units tablet Take 2,000 Units by mouth daily 100 tablet 3     diphenhydrAMINE (BENADRYL) 50 MG capsule Take 50 mg by mouth every 6 hours as needed for itching or allergies       famotidine (PEPCID) 20 MG tablet Take 1 tablet (20 mg) by mouth daily       FOLIC ACID PO Take 1 mg by mouth daily       GABAPENTIN PO Take 600 mg by mouth 3 times daily       GEMFIBROZIL PO Take 600 mg by mouth 2 times daily       hydrOXYzine (ATARAX) 25 MG tablet Take 1 tablet (25 mg) by mouth every 6 hours as needed for other (adjuvant pain)       insulin aspart (NOVOLOG PEN) 100 UNIT/ML pen Inject 1-7 Units Subcutaneous 3 times daily (before meals)       insulin aspart (NOVOLOG PEN) 100 UNIT/ML pen  Inject 1-5 Units Subcutaneous At Bedtime       ipratropium - albuterol 0.5 mg/2.5 mg/3 mL (DUONEB) 0.5-2.5 (3) MG/3ML neb solution Take 1 vial by nebulization 3 times daily       LANsoprazole (PREVACID) 30 MG DR capsule Take 30 mg by mouth every morning (before breakfast)       levothyroxine (SYNTHROID/LEVOTHROID) 50 MCG tablet Take 50 mcg by mouth daily       Lidocaine (LIDOCARE) 4 % Patch Place 1-3 patches onto the skin every 24 hours       melatonin 3 MG tablet Take 1 tablet (3 mg) by mouth nightly as needed for sleep       menthol (ICY HOT) 5 % PTCH Apply 1 patch topically every 8 hours as needed for muscle soreness       methocarbamol (ROBAXIN) 500 MG tablet Take 1 tablet (500 mg) by mouth 4 times daily       methotrexate 2.5 MG tablet Take 10 tablets (25 mg) by mouth once a week Thursdays       multivitamin w/minerals (THERA-VIT-M) tablet Take 1 tablet by mouth daily       omega 3 1000 MG CAPS Take 2 capsules by mouth 2 times daily       oxyCODONE IR (ROXICODONE) 10 MG tablet Take 5-10 mg by mouth every 4 hours        polyethylene glycol (MIRALAX/GLYCOLAX) packet Take 17 g by mouth daily       predniSONE (DELTASONE) 5 MG tablet Take 5 mg by mouth daily for 12 days.       senna-docusate (SENOKOT-S/PERICOLACE) 8.6-50 MG tablet Take 2 tablets by mouth 2 times daily       sodium chloride, PF, 0.9% PF flush 10 mLs by Intracatheter route every 7 days       sodium chloride, PF, 0.9% PF flush 10-20 mLs by Intracatheter route every hour as needed for line flush or post meds or blood draw       sterile water (preservative free) SOLN 10 mL with cefTRIAXone 1 GM SOLR 1,000 mg vial Inject 1,000 mg into the vein daily       sulfamethoxazole-trimethoprim (BACTRIM DS/SEPTRA DS) 800-160 MG per tablet Take 1 tablet 3 times a week (Monday, Wednesday, Friday)  11     theophylline (UNIPHYL) 400 MG 24 hr tablet Take 200 mg by mouth every morning        traMADol (ULTRAM) 50 MG tablet Take 1 tablet (50 mg) by mouth every 6 hours as  "needed for moderate pain 30 tablet 0     VANCOMYCIN HCL IV Inject 1,200 mg into the vein 2 times daily for Postprocedural Seroma of a musculoskeletal structure Vanco Trough to be draw on Mon [facility] to dose.       verapamil ER (CALAN-SR) 120 MG CR tablet Take 120 mg by mouth At Bedtime           REVIEW OF SYSTEMS:  4 point ROS including Respiratory, CV, GI and , other than that noted in the HPI,  is negative    Objective:  /52   Pulse 95   Temp 100.2  F (37.9  C)   Resp 22   Ht 1.575 m (5' 2\")   Wt 76.8 kg (169 lb 6.4 oz)   SpO2 95%   BMI 30.98 kg/m    Exam:  GENERAL APPEARANCE:  Alert, tired  ENT:  Mouth and posterior oropharynx normal, moist mucous membranes, hearing acuity clear  EYES:  EOM, conjunctivae, lids, pupils and irises normal  NECK:  No adenopathy,masses or thyromegaly  RESP:  respiratory effort and palpation of chest normal, no respiratory distress, Lung sounds scattered wheezes and rhonchi. Air movement almost to lung bases  CV:  Palpation and auscultation of heart done , rate and rhythm irreg, systolic murmur, no rub or gallop, Edema none  ABDOMEN:  normal bowel sounds, soft, nontender, no hepatosplenomegaly or other masses  M/S:   Gait and station not observed. Needs assist to turn in bed, Digits and nails normal   SKIN:  Inspection/Palpation of skin and subcutaneous tissue incision on spine is CDI  NEURO: 2-12 in normal limits and at patient's baseline  PSYCH:  insight and judgement, memory intact , affect and mood normal    Labs:   [facility]. Please refer to them using [employer] Everywhere. and Recent labs in EPIC reviewed by me today.     ASSESSMENT/PLAN:  (R50.9) Elevated temperature  (primary encounter diagnosis)  (R53.81) Malaise  Comment: earlier this week Tmax 102. Yesterday he was quite lethargic and did not eat all day. Blood cultures were obtained and are so far negative. We did cut back on pain meds, stopped methotrexate and added " ceftriaxone and qvar. He is much more alert today. He seems to be breathing better. ? Medication SEs vs infection vs progression of lung disease  Plan: monitor VS.     (Z98.890) S/P spinal surgery  (M96.843) Postprocedural seroma of a musculoskeletal structure following other procedure  Comment: patient transferred to TCU from hospital after two staged spinal fusion due to thoracolumbar kyphosis with flat back syndrome and thoracolumbar stenosis with myeloradiculopathy.he also had seroma debrided and cultured.   Complicated post op period with ABLA, pulmonary emboli, demand ischemia and debrided seroma that that was cultured and grew out staph epi .   He is having pain and is taking oxycodone, tramadol and acetaminophen  Plan: continue to try to limit sedating medications.   physical therapy - no bending or twisting, no lifting more than 10# for 6 weeks  Monitor incision  Follow up with ortho spine in 4 weeks  IV vanco until 6/8   twice weekly labs- BMP, CBC w diff, ESR and CRP   [facility] to dose [facility]  Follow up with ID Dr Moura in 3 weeks  Ceftriaxone 1gm q day for two days added on 5/15   Start ceftin 500mg BId after two days of ceftriaxone     (I26.99) Other acute pulmonary embolism without acute cor pulmonale (H)  Comment: diagnosed on 4/28. He was started on anticoagulation then developed bleeding. anticoagulation has been on hold. During this last trip to ED Chest CT suggested that pulmonary emboli are getting smaller  Plan: restart anticoagulation on 5/17- will discuss with Dr Mahmood      (M33.90) Dermatomyositis (H)  (J84.10) Pulmonary fibrosis (H)  Comment: [facility] by rheumatology Dr Call and [facility] He had been methotrexate 25mg q weeks. Most recent DLCO was reduced so needed rituximab in March. He was not on oxygen at home.  Prior to surgery he was on long taper of prednisone. During hospital stay prednisone was increased to  5mg BID then back to 5mg q day at time of  discharge to TCU.  since in TCU Theophylline was reduced to 200mg q day. HR around 100. call back from pulmonologist on 5/15- ok to stop theophylline.   Plan: prednisone 5mg q day for 2 weeks.   Hold methotrexate  q aiyana 2 puffs BID  Continue PTA  ellipta and prn albuterol   Supplemental oxygen.   DISCONTINUE theophylline    (I25.2) Status post non-ST elevation myocardial infarction (NSTEMI)  (I35.0) Severe aortic stenosis  Comment: patient developed elevated troponins with hypoxia  post operatively. Cardiology was consulted. Thought not ACS but more likely due to demand ischemia due to aortic stenosis and interstitial lung disease and deconditioning.   Plan: needs follow up with cardiology for consideration for TAVR      (D62) Anemia due to blood loss, acute  Comment: hb dropped from 15.9 to 7.7. Now hgb trending back up  Plan:  CBC next week      (E11.40,  Z79.4) Type 2 diabetes mellitus with diabetic neuropathy, with long-term current use of insulin (H)  Comment: BG<200 in TCU. Eating better today.   Plan: monitor   Continue novolog sliding scale       (I10) Essential hypertension  Comment: last three /57, 107/64, 109/68. No BP meds  Plan: monitor     (R53.81) Physical deconditioning  Comment: due to recent back surgery and chronic lung condition. He is not making progress with therapy. needs encouragement  Plan: physical therapy and OCCUPATIONAL THERAPY       Electronically signed by:  ANASTACIA Mcmanus CNP

## 2019-05-16 NOTE — LETTER
5/16/2019        RE: Beulah Jane  [address]  [facility] [address]        [place] [facility]  [place] Medical Record Number:  [id number]  Place of Service where encounter took place:  [facility] [[id number]]  Chief Complaint   Patient presents with     RECHECK       HPI:    Beulah Jane  is a 69 year old (1950), who is being seen today for an episodic care visit.  HPI information obtained from: facility chart records, facility staff, patient report and [facility] chart review. Today's concern is:  Brief Summary of Hospital Course: patient underwent planned two staged lumbar fusion on 4/22 and 4/25 due to  thoracolumbar kyphosis with flat back syndrome and thoracolumbar stenosis with myeloradiculopathy. During second surgery a chronic seroma was debrided and cultured. It grew out staph epi. ID was consulted and recommended IV vanco until 6/8 hen 3 weeks of po doxycycline.     Post op issues included type 2 NSTEMI likely due to ILD, PULMONARY EMBOLISM and aortic stenosis. He was started on heparin for finding of subsegmental pulmonary emboli on 4/28 then developed  bleeding at incision so anticoagulation had to be held.      PMH includes dermatomyositis and ILD, DM2, hypertension.hypothyroidism.      Once he was stabilized he was transferred to TCU due to weakness.      Updates on Status Since Skilled nursing Admission: patient has reported ongoing pain. He is wearing oxygen; O2 sats in low 90s. HR .   Patient was sent to ED on 5/9 and 5/12.   On 5/9 patient became shaky and sweaty. pulse 113-125, BP 86/51, O2 sats 95% on 0.5L nasal cannula. Patient's also c/o of uncontrolled pain. He was sent to ED to r/o sepsis. In ED he was treated for dehydration and sent back to TCU.    On 5/12 he was sent to ED due to shortness of breath and had sats in 80s while on supplemental oxygen.  In ED chest CT  showed few small bilateral upper lobe pulmonary  emboli and fibrosis. His O2 sats in mid 90s. He was sent back to TCU.    5/14 and 5/15,  the previous two days, temp up to 102. Oxycodone was reduced, robaxin and methotrexate was held and Qvar was added. ceftriaxone was added but patient has not received yet.   Today he is much more alert. He continues to have pain but he is eating and working a bit with therapy,.   Past Medical and Surgical History reviewed in Epic today.    MEDICATIONS:  Current Outpatient Medications   Medication Sig Dispense Refill     acetaminophen (TYLENOL) 325 MG tablet Take 2 tablets (650 mg) by mouth every 4 hours as needed for mild pain or fever       albuterol (2.5 MG/3ML) 0.083% neb solution Take 1 vial by nebulization every 6 hours as needed for shortness of breath / dyspnea or wheezing       albuterol (PROAIR HFA/PROVENTIL HFA/VENTOLIN HFA) 108 (90 BASE) MCG/ACT Inhaler Inhale 2 puffs into the lungs every 6 hours as needed for shortness of breath / dyspnea or wheezing       beclomethasone (QVAR) 80 MCG/ACT AERS IS A DISCONTINUED MEDICATION Inhale 2 puffs into the lungs 2 times daily       bisacodyl (DULCOLAX) 10 MG suppository Place 1 suppository (10 mg) rectally daily as needed for constipation       calcium carbonate (OS-DMITRIY 500 MG [place]. [place]) 500 MG tablet Take 1 tablet (500 mg) by mouth 2 times daily 180 tablet 3     Cholecalciferol (VITAMIN D) 2000 units tablet Take 2,000 Units by mouth daily 100 tablet 3     diphenhydrAMINE (BENADRYL) 50 MG capsule Take 50 mg by mouth every 6 hours as needed for itching or allergies       famotidine (PEPCID) 20 MG tablet Take 1 tablet (20 mg) by mouth daily       FOLIC ACID PO Take 1 mg by mouth daily       GABAPENTIN PO Take 600 mg by mouth 3 times daily       GEMFIBROZIL PO Take 600 mg by mouth 2 times daily       hydrOXYzine (ATARAX) 25 MG tablet Take 1 tablet (25 mg) by mouth every 6 hours as needed for other (adjuvant pain)       insulin aspart (NOVOLOG PEN) 100 UNIT/ML pen Inject 1-7 Units  Subcutaneous 3 times daily (before meals)       insulin aspart (NOVOLOG PEN) 100 UNIT/ML pen Inject 1-5 Units Subcutaneous At Bedtime       ipratropium - albuterol 0.5 mg/2.5 mg/3 mL (DUONEB) 0.5-2.5 (3) MG/3ML neb solution Take 1 vial by nebulization 3 times daily       LANsoprazole (PREVACID) 30 MG DR capsule Take 30 mg by mouth every morning (before breakfast)       levothyroxine (SYNTHROID/LEVOTHROID) 50 MCG tablet Take 50 mcg by mouth daily       Lidocaine (LIDOCARE) 4 % Patch Place 1-3 patches onto the skin every 24 hours       melatonin 3 MG tablet Take 1 tablet (3 mg) by mouth nightly as needed for sleep       menthol (ICY HOT) 5 % PTCH Apply 1 patch topically every 8 hours as needed for muscle soreness       methocarbamol (ROBAXIN) 500 MG tablet Take 1 tablet (500 mg) by mouth 4 times daily       methotrexate 2.5 MG tablet Take 10 tablets (25 mg) by mouth once a week Thursdays       multivitamin w/minerals (THERA-VIT-M) tablet Take 1 tablet by mouth daily       omega 3 1000 MG CAPS Take 2 capsules by mouth 2 times daily       oxyCODONE IR (ROXICODONE) 10 MG tablet Take 5-10 mg by mouth every 4 hours        polyethylene glycol (MIRALAX/GLYCOLAX) packet Take 17 g by mouth daily       predniSONE (DELTASONE) 5 MG tablet Take 5 mg by mouth daily for 12 days.       senna-docusate (SENOKOT-S/PERICOLACE) 8.6-50 MG tablet Take 2 tablets by mouth 2 times daily       sodium chloride, PF, 0.9% PF flush 10 mLs by Intracatheter route every 7 days       sodium chloride, PF, 0.9% PF flush 10-20 mLs by Intracatheter route every hour as needed for line flush or post meds or blood draw       sterile water (preservative free) SOLN 10 mL with cefTRIAXone 1 GM SOLR 1,000 mg vial Inject 1,000 mg into the vein daily       sulfamethoxazole-trimethoprim (BACTRIM DS/SEPTRA DS) 800-160 MG per tablet Take 1 tablet 3 times a week (Monday, Wednesday, Friday)  11     theophylline (UNIPHYL) 400 MG 24 hr tablet Take 200 mg by mouth every  "morning        traMADol (ULTRAM) 50 MG tablet Take 1 tablet (50 mg) by mouth every 6 hours as needed for moderate pain 30 tablet 0     VANCOMYCIN HCL IV Inject 1,200 mg into the vein 2 times daily for Postprocedural Seroma of a musculoskeletal structure Vanco Trough to be draw on Mon [facility] to dose.       verapamil ER (CALAN-SR) 120 MG CR tablet Take 120 mg by mouth At Bedtime           REVIEW OF SYSTEMS:  4 point ROS including Respiratory, CV, GI and , other than that noted in the HPI,  is negative    Objective:  /52   Pulse 95   Temp 100.2  F (37.9  C)   Resp 22   Ht 1.575 m (5' 2\")   Wt 76.8 kg (169 lb 6.4 oz)   SpO2 95%   BMI 30.98 kg/m     Exam:  GENERAL APPEARANCE:  Alert, tired  ENT:  Mouth and posterior oropharynx normal, moist mucous membranes, hearing acuity clear  EYES:  EOM, conjunctivae, lids, pupils and irises normal  NECK:  No adenopathy,masses or thyromegaly  RESP:  respiratory effort and palpation of chest normal, no respiratory distress, Lung sounds scattered wheezes and rhonchi. Air movement almost to lung bases  CV:  Palpation and auscultation of heart done , rate and rhythm irreg, systolic murmur, no rub or gallop, Edema none  ABDOMEN:  normal bowel sounds, soft, nontender, no hepatosplenomegaly or other masses  M/S:   Gait and station not observed. Needs assist to turn in bed, Digits and nails normal   SKIN:  Inspection/Palpation of skin and subcutaneous tissue incision on spine is CDI  NEURO: 2-12 in normal limits and at patient's baseline  PSYCH:  insight and judgement, memory intact , affect and mood normal    Labs:   Labs done in SNF are in [facility]. Please refer to them using EPIC/Care Everywhere. and Recent labs in EPIC reviewed by me today.     ASSESSMENT/PLAN:  (R50.9) Elevated temperature  (primary encounter diagnosis)  (R53.81) Malaise  Comment: earlier this week Tmax 102. Yesterday he was quite lethargic and did not eat all day. Blood cultures were obtained " and are so far negative. We did cut back on pain meds, stopped methotrexate and added ceftriaxone and qvar. He is much more alert today. He seems to be breathing better. ? Medication SEs vs infection vs progression of lung disease  Plan: monitor VS.     (Z98.890) S/P spinal surgery  (M96.843) Postprocedural seroma of a musculoskeletal structure following other procedure  Comment: patient transferred to TCU from hospital after two staged spinal fusion due to thoracolumbar kyphosis with flat back syndrome and thoracolumbar stenosis with myeloradiculopathy.he also had seroma debrided and cultured.   Complicated post op period with ABLA, pulmonary emboli, demand ischemia and debrided seroma that that was cultured and grew out staph epi .   He is having pain and is taking oxycodone, tramadol and acetaminophen  Plan: continue to try to limit sedating medications.   physical therapy - no bending or twisting, no lifting more than 10# for 6 weeks  Monitor incision  Follow up with ortho spine in 4 weeks  IV vanco until 6/8   twice weekly labs- BMP, CBC w diff, ESR and CRP   [facility] to dose v[facility]  Follow up with ID Dr Moura in 3 weeks  Ceftriaxone 1gm q day for two days added on 5/15   Start ceftin 500mg BId after two days of ceftriaxone     (I26.99) Other acute pulmonary embolism without acute cor pulmonale (H)  Comment: diagnosed on 4/28. He was started on anticoagulation then developed bleeding. anticoagulation has been on hold. During this last trip to ED Chest CT suggested that pulmonary emboli are getting smaller  Plan: restart anticoagulation on 5/17- will discuss with Dr Mahmood      (M33.90) Dermatomyositis (H)  (J84.10) Pulmonary fibrosis (H)  Comment: followed by rheumatology Dr Call and pulmonoloogy Dr Lennox at [facility]. He had been methotrexate 25mg q weeks. Most recent DLCO was reduced so needed rituximab in March. He was not on oxygen at home.  Prior to surgery he was on long taper of prednisone.  During hospital stay prednisone was increased to  5mg BID then back to 5mg q day at time of discharge to TCU.  since in TCU Theophylline was reduced to 200mg q day. HR around 100. call back from pulmonologist on 5/15- ok to stop theophylline.   Plan: prednisone 5mg q day for 2 weeks.   Hold methotrexate  q aiyana 2 puffs BID  Continue PTA  ellipta and prn albuterol   Supplemental oxygen.   DISCONTINUE theophylline    (I25.2) Status post non-ST elevation myocardial infarction (NSTEMI)  (I35.0) Severe aortic stenosis  Comment: patient developed elevated troponins with hypoxia  post operatively. Cardiology was consulted. Thought not ACS but more likely due to demand ischemia due to aortic stenosis and interstitial lung disease and deconditioning.   Plan: needs follow up with cardiology for consideration for TAVR      (D62) Anemia due to blood loss, acute  Comment: hb dropped from 15.9 to 7.7. Now hgb trending back up  Plan:  CBC  next week      (E11.40,  Z79.4) Type 2 diabetes mellitus with diabetic neuropathy, with long-term current use of insulin (H)  Comment: BG<200 in TCU. Eating better today.   Plan: monitor   Continue novolog sliding scale       (I10) Essential hypertension  Comment: last three /57, 107/64, 109/68. No BP meds  Plan: monitor     (R53.81) Physical deconditioning  Comment: due to recent back surgery and chronic lung condition. He is not making progress with therapy.  needs encouragement  Plan: physical therapy and OCCUPATIONAL THERAPY       Electronically signed by:  ANASTACIA Mcmanus CNP               Sincerely,        ANASTACIA Mcmanus CNP

## 2019-05-16 NOTE — TELEPHONE ENCOUNTER
----- Message from Meenakshi Irwin RN sent at 5/9/2019  2:08 PM CDT -----  Regarding: Please schedule  Steven Castellanos,    Another 6/20 pt. Her is currently at a TCU(s/p spinal fusion). He will need nurse visit, valve clinic visit and the following testing;    1) Bilateral carotid US  2) Labs-CBC and CMP  3) CT TAVR    Orders are in    Thanks much,  Meenakshi

## 2019-05-17 NOTE — PROGRESS NOTES
[place] [facility]  [place] Medical Record Number:  [id number]  Place of Service where encounter took place:  [facility] [[id number]]  Chief Complaint   Patient presents with     RECHECK       HPI:    Beulah Jane  is a 69 year old (1950), who is being seen today for an episodic care visit.  HPI information obtained from: facility chart records, facility staff, patient report and [facility] chart review. Today's concern is:  Brief Summary of Hospital Course: patient underwent planned two staged lumbar fusion on 4/22 and 4/25 due to  thoracolumbar kyphosis with flat back syndrome and thoracolumbar stenosis with myeloradiculopathy. During second surgery a chronic seroma was debrided and cultured. It grew out staph epi. ID was consulted and recommended IV vanco until 6/8 hen 3 weeks of po doxycycline.     Post op issues included type 2 NSTEMI likely due to ILD, PULMONARY EMBOLISM and aortic stenosis. He was started on heparin for finding of subsegmental pulmonary emboli on 4/28 then developed  bleeding at incision so anticoagulation had to be held.      PMH includes dermatomyositis and ILD, DM2, hypertension.hypothyroidism.      Once he was stabilized he was transferred to TCU due to weakness.      Updates on Status Since Skilled nursing Admission: patient has reported ongoing pain. He is wearing oxygen; O2 sats in low 90s. HR .   Patient was sent to ED on 5/9 and 5/12.   On 5/9 patient became shaky and sweaty. pulse 113-125, BP 86/51, O2 sats 95% on 0.5L nasal cannula. Patient's also c/o of uncontrolled pain. He was sent to ED to r/o sepsis. In ED he was treated for dehydration and sent back to TCU.    On 5/12 he was sent to ED due to shortness of breath and had sats in 80s while on supplemental oxygen.  In ED chest CT  showed few small bilateral upper lobe pulmonary emboli and fibrosis. His O2 sats in mid 90s. He was sent back to TCU.     5/14 and 5/15, temp up  to 102. Oxycodone was reduced, robaxin and methotrexate was held and Qvar was added.  Today he is much more alert. He is afebrile and breathing more comfortably. He continues to have pain but he is eating and working a bit with therapy.    Past Medical and Surgical History reviewed in Epic today.    MEDICATIONS:  Current Outpatient Medications   Medication Sig Dispense Refill     acetaminophen (TYLENOL) 325 MG tablet Take 2 tablets (650 mg) by mouth every 4 hours as needed for mild pain or fever       albuterol (2.5 MG/3ML) 0.083% neb solution Take 1 vial by nebulization every 6 hours as needed for shortness of breath / dyspnea or wheezing       albuterol (PROAIR HFA/PROVENTIL HFA/VENTOLIN HFA) 108 (90 BASE) MCG/ACT Inhaler Inhale 2 puffs into the lungs every 6 hours as needed for shortness of breath / dyspnea or wheezing       beclomethasone (QVAR) 80 MCG/ACT AERS IS A DISCONTINUED MEDICATION Inhale 2 puffs into the lungs 2 times daily       bisacodyl (DULCOLAX) 10 MG suppository Place 1 suppository (10 mg) rectally daily as needed for constipation       calcium carbonate (OS-DMITRIY 500 MG [place]. [place]) 500 MG tablet Take 1 tablet (500 mg) by mouth 2 times daily 180 tablet 3     Cholecalciferol (VITAMIN D) 2000 units tablet Take 2,000 Units by mouth daily 100 tablet 3     diphenhydrAMINE (BENADRYL) 50 MG capsule Take 50 mg by mouth every 6 hours as needed for itching or allergies       famotidine (PEPCID) 20 MG tablet Take 1 tablet (20 mg) by mouth daily       FOLIC ACID PO Take 1 mg by mouth daily       GABAPENTIN PO Take 600 mg by mouth 3 times daily       GEMFIBROZIL PO Take 600 mg by mouth 2 times daily       hydrOXYzine (ATARAX) 25 MG tablet Take 1 tablet (25 mg) by mouth every 6 hours as needed for other (adjuvant pain)       insulin aspart (NOVOLOG PEN) 100 UNIT/ML pen Inject 1-7 Units Subcutaneous 3 times daily (before meals)       insulin aspart (NOVOLOG PEN) 100 UNIT/ML pen Inject 1-5 Units Subcutaneous At  Bedtime       ipratropium - albuterol 0.5 mg/2.5 mg/3 mL (DUONEB) 0.5-2.5 (3) MG/3ML neb solution Take 1 vial by nebulization 3 times daily       LANsoprazole (PREVACID) 30 MG DR capsule Take 30 mg by mouth every morning (before breakfast)       levothyroxine (SYNTHROID/LEVOTHROID) 50 MCG tablet Take 50 mcg by mouth daily       Lidocaine (LIDOCARE) 4 % Patch Place 1-3 patches onto the skin every 24 hours       melatonin 3 MG tablet Take 1 tablet (3 mg) by mouth nightly as needed for sleep       menthol (ICY HOT) 5 % PTCH Apply 1 patch topically every 8 hours as needed for muscle soreness       methocarbamol (ROBAXIN) 500 MG tablet Take 1 tablet (500 mg) by mouth 4 times daily       methotrexate 2.5 MG tablet Take 10 tablets (25 mg) by mouth once a week Thursdays       multivitamin w/minerals (THERA-VIT-M) tablet Take 1 tablet by mouth daily       omega 3 1000 MG CAPS Take 2 capsules by mouth 2 times daily       oxyCODONE IR (ROXICODONE) 10 MG tablet Take 5-10 mg by mouth every 4 hours        polyethylene glycol (MIRALAX/GLYCOLAX) packet Take 17 g by mouth daily       predniSONE (DELTASONE) 5 MG tablet Take 5 mg by mouth daily for 12 days.       senna-docusate (SENOKOT-S/PERICOLACE) 8.6-50 MG tablet Take 2 tablets by mouth 2 times daily       sodium chloride, PF, 0.9% PF flush 10 mLs by Intracatheter route every 7 days       sodium chloride, PF, 0.9% PF flush 10-20 mLs by Intracatheter route every hour as needed for line flush or post meds or blood draw       sulfamethoxazole-trimethoprim (BACTRIM DS/SEPTRA DS) 800-160 MG per tablet Take 1 tablet 3 times a week (Monday, Wednesday, Friday)  11     traMADol (ULTRAM) 50 MG tablet Take 1 tablet (50 mg) by mouth every 6 hours as needed for moderate pain 30 tablet 0     VANCOMYCIN HCL IV Inject 1,200 mg into the vein 2 times daily for Postprocedural Seroma of a musculoskeletal structure Vanco Trough to be draw on Mon [facility] to dose.       verapamil ER (CALAN-SR) 120  "MG CR tablet Take 120 mg by mouth At Bedtime       sterile water (preservative free) SOLN 10 mL with cefTRIAXone 1 GM SOLR 1,000 mg vial Inject 1,000 mg into the vein daily       theophylline (UNIPHYL) 400 MG 24 hr tablet Take 200 mg by mouth every morning            REVIEW OF SYSTEMS:  4 point ROS including Respiratory, CV, GI and , other than that noted in the HPI,  is negative    Objective:  BP 99/59   Pulse 86   Temp 97.7  F (36.5  C)   Resp 20   Ht 1.575 m (5' 2\")   Wt 76.8 kg (169 lb 6.4 oz)   SpO2 95%   BMI 30.98 kg/m    Exam:  GENERAL APPEARANCE:  Alert, no distress  ENT:  Mouth and posterior oropharynx normal, moist mucous membranes, hearing acuity adequate   EYES:  EOM, conjunctivae, lids, pupils and irises normal    RESP:  respiratory effort and palpation of chest normal, no respiratory distress, Lung sounds scattered faint wheezes and rhonchi. Air movement to lung bases  CV:  Palpation and auscultation of heart done , rate and rhythm irreg, systolic murmur, no rub or gallop, Edema none  ABDOMEN:  normal bowel sounds, soft, nontender, no hepatosplenomegaly or other masses  M/S:   Gait and station not observed, Digits and nails normal   SKIN:  Inspection/Palpation of skin and subcutaneous tissue incision on spine is CDI  NEURO: 2-12 in normal limits and at patient's baseline  PSYCH:  insight and judgement, memory intact , affect and mood normal      Labs:   [facility] in SNF [facility]. Please refer to them using [employer]/Care Everywhere. and Recent labs in [facility] reviewed by me today.     ASSESSMENT/PLAN:  (Z98.890) S/P spinal surgery  (primary encounter diagnosis)  (M96.843) Postprocedural seroma of a musculoskeletal structure following other procedure  Comment: patient transferred to TCU from hospital after two staged spinal fusion due to thoracolumbar kyphosis with flat back syndrome and thoracolumbar stenosis with myeloradiculopathy.he also had seroma debrided and cultured.   Complicated " post op period with ABLA, pulmonary emboli, demand ischemia and debrided seroma that that was cultured and grew out staph epi .  He is having pain and is taking oxycodone, tramadol and acetaminophen. Earlier this week he had elevated temperature and was more somnolent. Pain meds were reduced. He is now much more alert and participating in therapy.   Plan: continue to try to limit sedating medications.   physical therapy - no bending or twisting, no lifting more than 10# for 6 weeks  Monitor incision  Follow up with ortho spine in 4 weeks  IV vanco until 6/8   twice weekly labs- BMP, CBC w diff, ESR and CRP   [facility] to dose vanco  Follow up with ID Dr Moura in 3 weeks      (I26.99) Other acute pulmonary embolism without acute cor pulmonale (H)  Comment: diagnosed on 4/28. He was started on anticoagulation then developed bleeding. anticoagulation has been on hold. During this last trip to ED Chest CT suggested that pulmonary emboli are getting smaller  Plan: restart anticoagulation on 5/17- will discuss with Dr Mahmood      (M33.90) Dermatomyositis (H)  (J84.10) Pulmonary fibrosis (H)  Comment: followed by rheumatology Dr Call and pulmonoloogy Dr Lennox at [facility]. He had been methotrexate 25mg q weeks. Most recent DLCO was reduced so needed rituximab in March. He was not on oxygen at home.  Prior to surgery he was on long taper of prednisone. During hospital stay prednisone was increased to  5mg BID then back to 5mg q day at time of discharge to TCU.  since in TCU Theophylline was reduced to 200mg q day then discontinued after ok by Dr Lennox. . HR lower now- in the 80s  Plan: prednisone 5mg q day for 2 weeks.   Hold methotrexate  q aiyana 2 puffs BID  Continue PTA  ellipta and prn albuterol   Supplemental oxygen.   DISCONTINUE theophylline      (I25.2) Status post non-ST elevation myocardial infarction (NSTEMI)  (I35.0) Severe aortic stenosis  Comment: patient developed elevated troponins with hypoxia  post  operatively. Cardiology was consulted. Thought not ACS but more likely due to demand ischemia due to aortic stenosis and interstitial lung disease and deconditioning.   Plan: needs follow up with cardiology for consideration for TAVR      (D62) Anemia due to blood loss, acute  Comment: hb dropped from 15.9 to 7.7. Now hgb trending back up  Hemoglobin   Date Value Ref Range Status   05/16/2019 8.9 (L) 13.3 - 17.7 g/dL Final   05/13/2019 8.6 (L) 13.3 - 17.7 g/dL Final     Plan:  CBC next week      (E11.40,  Z79.4) Type 2 diabetes mellitus with diabetic neuropathy, with long-term current use of insulin (H)  Comment: BG<200 in TCU. Eating better today.   Plan: monitor   Continue novolog sliding scale       (I10) Essential hypertension  Comment: last three /57, 107/64, 99/59 No BP meds  Plan: monitor       (R53.81) Physical deconditioning  Comment: due to recent back surgery and chronic lung condition. In past two days he is looking stronger and more alert. Therapy states he is walking up to 300' with walker  Plan: physical therapy and OCCUPATIONAL THERAPY       Electronically signed by:  ANASTACIA Mcmanus CNP

## 2019-05-17 NOTE — LETTER
5/17/2019        RE: Beulah Jane  [address]  [facility] [address]        [facility]  [place] [facility] Record Number:  [id number]  Place of Service where encounter took place:  [facility] [[id number][facility]  Chief Complaint   Patient presents with     RECHECK       HPI:    Beulah Jane  is a 69 year old (1950), who is being seen today for an episodic care visit.  HPI information obtained from: facility chart records, facility staff, patient report and [facility] chart review. Today's concern is:  Brief Summary of Hospital Course: patient underwent planned two staged lumbar fusion on 4/22 and 4/25 due to  thoracolumbar kyphosis with flat back syndrome and thoracolumbar stenosis with myeloradiculopathy. During second surgery a chronic seroma was debrided and cultured. It grew out staph epi. ID was consulted and recommended IV vanco until 6/8 hen 3 weeks of po doxycycline.     Post op issues included type 2 NSTEMI likely due to ILD, PULMONARY EMBOLISM and aortic stenosis. He was started on heparin for finding of subsegmental pulmonary emboli on 4/28 then developed  bleeding at incision so anticoagulation had to be held.      PMH includes dermatomyositis and ILD, DM2, hypertension.hypothyroidism.      Once he was stabilized he was transferred to TCU due to weakness.      Updates on Status Since Skilled nursing Admission: patient has reported ongoing pain. He is wearing oxygen; O2 sats in low 90s. HR .   Patient was sent to ED on 5/9 and 5/12.   On 5/9 patient became shaky and sweaty. pulse 113-125, BP 86/51, O2 sats 95% on 0.5L nasal cannula. Patient's also c/o of uncontrolled pain. He was sent to ED to r/o sepsis. In ED he was treated for dehydration and sent back to TCU.    On 5/12 he was sent to ED due to shortness of breath and had sats in 80s while on supplemental oxygen.  In ED chest CT  showed few small bilateral upper lobe pulmonary  emboli and fibrosis. His O2 sats in mid 90s. He was sent back to TCU.     5/14 and 5/15, temp up to 102. Oxycodone was reduced, robaxin and methotrexate was held and Qvar was added.  Today he is much more alert. He is afebrile and breathing more comfortably. He continues to have pain but he is eating and working a bit with therapy.    Past Medical and Surgical History reviewed in Epic today.    MEDICATIONS:  Current Outpatient Medications   Medication Sig Dispense Refill     acetaminophen (TYLENOL) 325 MG tablet Take 2 tablets (650 mg) by mouth every 4 hours as needed for mild pain or fever       albuterol (2.5 MG/3ML) 0.083% neb solution Take 1 vial by nebulization every 6 hours as needed for shortness of breath / dyspnea or wheezing       albuterol (PROAIR HFA/PROVENTIL HFA/VENTOLIN HFA) 108 (90 BASE) MCG/ACT Inhaler Inhale 2 puffs into the lungs every 6 hours as needed for shortness of breath / dyspnea or wheezing       beclomethasone (QVAR) 80 MCG/ACT AERS IS A DISCONTINUED MEDICATION Inhale 2 puffs into the lungs 2 times daily       bisacodyl (DULCOLAX) 10 MG suppository Place 1 suppository (10 mg) rectally daily as needed for constipation       calcium carbonate (OS-DMITRIY 500 MG [place]. [place]) 500 MG tablet Take 1 tablet (500 mg) by mouth 2 times daily 180 tablet 3     Cholecalciferol (VITAMIN D) 2000 units tablet Take 2,000 Units by mouth daily 100 tablet 3     diphenhydrAMINE (BENADRYL) 50 MG capsule Take 50 mg by mouth every 6 hours as needed for itching or allergies       famotidine (PEPCID) 20 MG tablet Take 1 tablet (20 mg) by mouth daily       FOLIC ACID PO Take 1 mg by mouth daily       GABAPENTIN PO Take 600 mg by mouth 3 times daily       GEMFIBROZIL PO Take 600 mg by mouth 2 times daily       hydrOXYzine (ATARAX) 25 MG tablet Take 1 tablet (25 mg) by mouth every 6 hours as needed for other (adjuvant pain)       insulin aspart (NOVOLOG PEN) 100 UNIT/ML pen Inject 1-7 Units Subcutaneous 3 times daily (before  meals)       insulin aspart (NOVOLOG PEN) 100 UNIT/ML pen Inject 1-5 Units Subcutaneous At Bedtime       ipratropium - albuterol 0.5 mg/2.5 mg/3 mL (DUONEB) 0.5-2.5 (3) MG/3ML neb solution Take 1 vial by nebulization 3 times daily       LANsoprazole (PREVACID) 30 MG DR capsule Take 30 mg by mouth every morning (before breakfast)       levothyroxine (SYNTHROID/LEVOTHROID) 50 MCG tablet Take 50 mcg by mouth daily       Lidocaine (LIDOCARE) 4 % Patch Place 1-3 patches onto the skin every 24 hours       melatonin 3 MG tablet Take 1 tablet (3 mg) by mouth nightly as needed for sleep       menthol (ICY HOT) 5 % PTCH Apply 1 patch topically every 8 hours as needed for muscle soreness       methocarbamol (ROBAXIN) 500 MG tablet Take 1 tablet (500 mg) by mouth 4 times daily       methotrexate 2.5 MG tablet Take 10 tablets (25 mg) by mouth once a week Thursdays       multivitamin w/minerals (THERA-VIT-M) tablet Take 1 tablet by mouth daily       omega 3 1000 MG CAPS Take 2 capsules by mouth 2 times daily       oxyCODONE IR (ROXICODONE) 10 MG tablet Take 5-10 mg by mouth every 4 hours        polyethylene glycol (MIRALAX/GLYCOLAX) packet Take 17 g by mouth daily       predniSONE (DELTASONE) 5 MG tablet Take 5 mg by mouth daily for 12 days.       senna-docusate (SENOKOT-S/PERICOLACE) 8.6-50 MG tablet Take 2 tablets by mouth 2 times daily       sodium chloride, PF, 0.9% PF flush 10 mLs by Intracatheter route every 7 days       sodium chloride, PF, 0.9% PF flush 10-20 mLs by Intracatheter route every hour as needed for line flush or post meds or blood draw       sulfamethoxazole-trimethoprim (BACTRIM DS/SEPTRA DS) 800-160 MG per tablet Take 1 tablet 3 times a week (Monday, Wednesday, Friday)  11     traMADol (ULTRAM) 50 MG tablet Take 1 tablet (50 mg) by mouth every 6 hours as needed for moderate pain 30 tablet 0     VANCOMYCIN HCL IV Inject 1,200 mg into the vein 2 times daily for Postprocedural Seroma of a musculoskeletal  "structure Vanco Trough to be draw on Mon [facility] to dose.       verapamil ER (CALAN-SR) 120 MG CR tablet Take 120 mg by mouth At Bedtime       sterile water (preservative free) SOLN 10 mL with cefTRIAXone 1 GM SOLR 1,000 mg vial Inject 1,000 mg into the vein daily       theophylline (UNIPHYL) 400 MG 24 hr tablet Take 200 mg by mouth every morning            REVIEW OF SYSTEMS:  4 point ROS including Respiratory, CV, GI and , other than that noted in the HPI,  is negative    Objective:  BP 99/59   Pulse 86   Temp 97.7  F (36.5  C)   Resp 20   Ht 1.575 m (5' 2\")   Wt 76.8 kg (169 lb 6.4 oz)   SpO2 95%   BMI 30.98 kg/m     Exam:  GENERAL APPEARANCE:  Alert,  no distress  ENT:  Mouth and posterior oropharynx normal, moist mucous membranes, hearing acuity adequate   EYES:  EOM, conjunctivae, lids, pupils and irises normal    RESP:  respiratory effort and palpation of chest normal, no respiratory distress, Lung sounds scattered faint wheezes and rhonchi. Air movement to lung bases  CV:  Palpation and auscultation of heart done , rate and rhythm irreg, systolic murmur, no rub or gallop, Edema none  ABDOMEN:  normal bowel sounds, soft, nontender, no hepatosplenomegaly or other masses  M/S:   Gait and station not observed, Digits and nails normal   SKIN:  Inspection/Palpation of skin and subcutaneous tissue incision on spine is CDI  NEURO: 2-12 in normal limits and at patient's baseline  PSYCH:  insight and judgement, memory intact , affect and mood normal      Labs:   Labs done in SNF are in [facility]. Please refer to them using [facility] [employer] Everywhere. and Recent labs in EPIC reviewed by me today.     ASSESSMENT/PLAN:  (Z98.890) S/P spinal surgery  (primary encounter diagnosis)  (M96.843) Postprocedural seroma of a musculoskeletal structure following other procedure  Comment: patient transferred to TCU from hospital after two staged spinal fusion due to thoracolumbar kyphosis with flat back syndrome and " thoracolumbar stenosis with myeloradiculopathy.he also had seroma debrided and cultured.   Complicated post op period with ABLA, pulmonary emboli, demand ischemia and debrided seroma that that was cultured and grew out staph epi .  He is having pain and is taking oxycodone, tramadol and acetaminophen. Earlier this week he had elevated temperature and was more somnolent. Pain meds were reduced. He is now much more alert and participating in therapy.   Plan: continue to try to limit sedating medications.   physical therapy - no bending or twisting, no lifting more than 10# for 6 weeks  Monitor incision  Follow up with ortho spine in 4 weeks  IV vanco until 6/8   twice weekly labs- BMP, CBC w diff, ESR and CRP   [facility] to dose vanco  Follow up with ID Dr Moura in 3 weeks      (I26.99) Other acute pulmonary embolism without acute cor pulmonale (H)  Comment: diagnosed on 4/28. He was started on anticoagulation then developed bleeding. anticoagulation has been on hold. During this last trip to ED Chest CT suggested that pulmonary emboli are getting smaller  Plan: restart anticoagulation on 5/17- will discuss with Dr Mahmood      (M33.90) Dermatomyositis (H)  (J84.10) Pulmonary fibrosis (H)  Comment: followed by rheumatology Dr Call and pulmonoloogy Dr Lennox at [facility]. He had been methotrexate 25mg q weeks. Most recent DLCO was reduced so needed rituximab in March. He was not on oxygen at home.  Prior to surgery he was on long taper of prednisone. During hospital stay prednisone was increased to  5mg BID then back to 5mg q day at time of discharge to TCU.  since in TCU Theophylline was reduced to 200mg q day then discontinued after ok by Dr Lennox. . HR lower now- in the 80s  Plan: prednisone 5mg q day for 2 weeks.   Hold methotrexate  q aiyana 2 puffs BID  Continue PTA  ellipta and prn albuterol   Supplemental oxygen.   DISCONTINUE theophylline      (I25.2) Status post non-ST elevation myocardial infarction  (NSTEMI)  (I35.0) Severe aortic stenosis  Comment: patient developed elevated troponins with hypoxia  post operatively. Cardiology was consulted. Thought not ACS but more likely due to demand ischemia due to aortic stenosis and interstitial lung disease and deconditioning.   Plan: needs follow up with cardiology for consideration for TAVR      (D62) Anemia due to blood loss, acute  Comment: hb dropped from 15.9 to 7.7. Now hgb trending back up  Hemoglobin   Date Value Ref Range Status   05/16/2019 8.9 (L) 13.3 - 17.7 g/dL Final   05/13/2019 8.6 (L) 13.3 - 17.7 g/dL Final     Plan:  CBC next week      (E11.40,  Z79.4) Type 2 diabetes mellitus with diabetic neuropathy, with long-term current use of insulin (H)  Comment: BG<200 in TCU. Eating better today.   Plan: monitor   Continue novolog sliding scale       (I10) Essential hypertension  Comment: last three /57, 107/64, 99/59 No BP meds  Plan: monitor       (R53.81) Physical deconditioning  Comment: due to recent back surgery and chronic lung condition. In past two days he is looking stronger and more alert. Therapy states he is walking up to 300' with walker  Plan: physical therapy and OCCUPATIONAL THERAPY       Electronically signed by:  ANASTACIA Mcmanus CNP               Sincerely,        ANASTACIA Mcmanus CNP

## 2019-05-20 NOTE — LETTER
"    5/20/2019        RE: Beulah Jane  [address]  [facility] [address]        Beulah Jane is a 69 year old male seen May 20, 2019 at [facility] to follow up cough, dyspnea and back pain   Patient is seen in his room, up to chair.    He reports a chronic cough that has worsened, and coughing worsens his back pain.  Cough is not as dry as it was, breathing is at baseline.    His pain regimen was significantly decreased secondary to lethargy, and he is much more alert and conversant today.    However, having more pain, \"back is aching\"      Pt had [facility] hospitalization 4/22-5/8 for thoracolumbar kyphosis with flat back syndrome and spinal stenosis with myeloradiculopathy for which he underwent a planned two-staged lumbar fusion on 4/22 and 4/25.    He was noted to have a seroma which was debrided and cultured, growing staph epi for which he is on a course of IV vancomycin until 6/8.     Post op complications include NSTEMI, anemia, aortic stenosis and small pulmonary embolism.   He was started on heparin for PE on 4/28, but developed bleeding at incision site and anticoagulation was discontinued.     By chart review, patient has long history of LBP, had lumbar fusion in 1997, with hardware removal 18 months later.   He had a mesh implanted in 2012 that became infected and was later removed.     Patient has dermatomyositis, dx'd in 2006 and followed by Rheumatology, tx'd with methotrexate and prednisone.  Today patient states he has not done well when prednisone tapered down below 20 mg/day.      He has advanced pulmonary fibrosis and chronic dyspnea; he is on MWF Bactrim    He is currently on O2 by NC at 2 L/min     Past Medical History:   Diagnosis Date     Aortic stenosis      Chronic pain      DM (diabetes mellitus), type 2 (H)     on insulin     Hyperlipidemia      JUAN on CPAP      Pneumonia      Polymyositis (H)      Pulmonary fibrosis, unspecified (H)      Seasonal allergies        SH: " "Native of [place], moved to [place] in 2017.       Lives with his wife and son David, apartment in [place].      ROS:  Reviewed and negative other than above.    EXAM:  Much improved MS  /66   Pulse 93   Temp 98.5  F (36.9  C)   Resp 20   Ht 1.575 m (5' 2\")   Wt 76.8 kg (169 lb 6.4 oz)   SpO2 97%   BMI 30.98 kg/m      Neck supple without adenopathy  Lungs with decreased BS, fine rales in both bases  Heart RRR s1s2 at 90, 3/6 VICKY  Abd soft, NT, no distention, +BS  Ext trace LE edema   Back incision is well healed, no erythema or drainage  Suture remains in area where seroma was drained.    Neuro: improved MS, no focal findings.   Psych: affect okay       Last Comprehensive Metabolic Panel:  Sodium   Date Value Ref Range Status   05/16/2019 136 133 - 144 mmol/L Final     Potassium   Date Value Ref Range Status   05/16/2019 3.8 3.4 - 5.3 mmol/L Final     Chloride   Date Value Ref Range Status   05/16/2019 99 94 - 109 mmol/L Final     Carbon Dioxide   Date Value Ref Range Status   05/16/2019 31 20 - 32 mmol/L Final     Anion Gap   Date Value Ref Range Status   05/16/2019 6 3 - 14 mmol/L Final     Glucose   Date Value Ref Range Status   05/16/2019 110 (H) 70 - 99 mg/dL Final     Urea Nitrogen   Date Value Ref Range Status   05/20/2019 10 7 - 30 mg/dL Final     Creatinine   Date Value Ref Range Status   05/20/2019 0.69 0.66 - 1.25 mg/dL Final     GFR Estimate   Date Value Ref Range Status   05/20/2019 >90 >60 mL/min/[1.73_m2] Final     Comment:     Non  GFR Calc  Starting 12/18/2018, serum creatinine based estimated GFR (eGFR) will be   calculated using the Chronic Kidney Disease Epidemiology Collaboration   (CKD-EPI) equation.       Calcium   Date Value Ref Range Status   05/16/2019 8.5 8.5 - 10.1 mg/dL Final      Lab Results   Component Value Date    WBC 9.3 05/20/2019      HGB 8.9 05/20/2019      MCV 87 05/20/2019     Component Value Date     05/20/2019       CT CHEST PULMONARY " EMBOLISM WITH CONTRAST  5/13/2019 12:23 AM  HISTORY:  Worsening hypoxia, cough. History of small pulmonary emboli, not currently on anticoagulation for them. Rule out worsening  pulmonary embolism or pneumonia.  COMPARISON:  5/1/2019.  FINDINGS:  Evaluation of the pulmonary arterial system is remarkable for a few small bilateral upper lobe pulmonary emboli which are stable  or decreased in size from the prior exam. No new pulmonary emboli. No large central embolus. The thoracic aorta is calcified and mildly  tortuous. No aortic aneurysm or dissection. The heart is enlarged.  There are again several borderline-enlarged mediastinal lymph nodes.  There is a right PICC in place. No pneumothorax. There are again extensive fibrotic changes at the periphery of the lungs and lung  bases. Probable pulmonary edema similar to the previous exam. No pleural effusion. Images through the upper abdomen show no acute  abnormalities. There are postoperative changes in the spine.   IMPRESSION:  1. A few small bilateral upper lobe pulmonary emboli are stable or slightly decreased in the interval. No new pulmonary emboli.  2. Cardiomegaly.  3. Fibrosis and probable pulmonary edema at the periphery of the lungs and lung bases, similar to the previous exam.      IMP/PLAN:   (R05) Cough    Comment: exam most c/w pulmonary edema     Plan: furosemide 20 mg today and tomorrow.   Follow exam and symptoms.      He has severe aortic stenosis and is being considered for TAVR.       (I26.99) Other acute pulmonary embolism without acute cor pulmonale (H)  Comment: small and stable/decreased by repeat chest CT    Plan: given anemia and improved mobility, would not restart anticoagulation at this time.        (M48.05) Spinal stenosis of thoracolumbar region  (M40.30) Flat back syndrome  (Z98.890) S/P spinal surgery  Comment: several post op complications  Plan: He is on gabapentin, acetaminophen, hydroxyzine, lidocaine patch and prn oxycodone for  pain, with bowel regimen.  Can restart Robaxin at lower dose to improve pain control.    Monitor incision, follow up with surgeon.        (M96.229) Postprocedural seroma of a musculoskeletal structure following other procedure  Comment: staph epi on cultures  Plan: vancomycin IV until 6/8, then 2 weeks of doxycycline.    Labs, follow up with ID.       (M33.90) Dermatomyositis (H)  Comment: followed by Rheumatology   Plan: remains on methotrexate and prednisone taper   Bactrim MWF  Consider bumping prednisone back up to 20 mg/day if cough persists after diuresis.         (J84.10) Pulmonary fibrosis (H)  Comment: abnormal exam, persistent hypoxia, cough  Plan:  Theophylline d/c'd secondary to tachycardia.    Continue nebs, O2 by NC        (E11.40,  Z79.4) Type 2 diabetes mellitus with diabetic neuropathy, with long-term current use of insulin (H)  Comment:   Lab Results   Component Value Date    A1C 6.3 01/29/2019   Plan: short acting insulin with meals: hold for hypoglycemia    (E03.9) Hypothyroidism, unspecified type  Comment:  TSH 0.41 in 1/2019     Plan: levothyroxine 50 mcg/day       Jessika Mahmood MD            Sincerely,        Jessika Mahmood MD

## 2019-05-20 NOTE — PROGRESS NOTES
"Beulah Jane is a 69 year old male seen May 20, 2019 at [facility] to follow up cough, dyspnea and back pain   Patient is seen in his room, up to chair.    He reports a chronic cough that has worsened, and coughing worsens his back pain.  Cough is not as dry as it was, breathing is at baseline.    His pain regimen was significantly decreased secondary to lethargy, and he is much more alert and conversant today.    However, having more pain, \"back is aching\"      Pt had [facility] hospitalization 4/22-5/8 for thoracolumbar kyphosis with flat back syndrome and spinal stenosis with myeloradiculopathy for which he underwent a planned two-staged lumbar fusion on 4/22 and 4/25.    He was noted to have a seroma which was debrided and cultured, growing staph epi for which he is on a course of IV vancomycin until 6/8.     Post op complications include NSTEMI, anemia, aortic stenosis and small pulmonary embolism.   He was started on heparin for PE on 4/28, but developed bleeding at incision site and anticoagulation was discontinued.     By chart review, patient has long history of LBP, had lumbar fusion in 1997, with hardware removal 18 months later.   He had a mesh implanted in 2012 that became infected and was later removed.     Patient has dermatomyositis, dx'd in 2006 and followed by Rheumatology, tx'd with methotrexate and prednisone.  Today patient states he has not done well when prednisone tapered down below 20 mg/day.      He has advanced pulmonary fibrosis and chronic dyspnea; he is on MWF Bactrim    He is currently on O2 by NC at 2 L/min     Past Medical History:   Diagnosis Date     Aortic stenosis      Chronic pain      DM (diabetes mellitus), type 2 (H)     on insulin     Hyperlipidemia      JUAN on CPAP      Pneumonia      Polymyositis (H)      Pulmonary fibrosis, unspecified (H)      Seasonal allergies        SH: Native of [place], moved to [place] in 2017.       Lives with his wife and son David, apartment " "in [place].      ROS:  Reviewed and negative other than above.    EXAM:  Much improved MS  /66   Pulse 93   Temp 98.5  F (36.9  C)   Resp 20   Ht 1.575 m (5' 2\")   Wt 76.8 kg (169 lb 6.4 oz)   SpO2 97%   BMI 30.98 kg/m     Neck supple without adenopathy  Lungs with decreased BS, fine rales in both bases  Heart RRR s1s2 at 90, 3/6 VICKY  Abd soft, NT, no distention, +BS  Ext trace LE edema   Back incision is well healed, no erythema or drainage  Suture remains in area where seroma was drained.    Neuro: improved MS, no focal findings.   Psych: affect okay       Last Comprehensive Metabolic Panel:  Sodium   Date Value Ref Range Status   05/16/2019 136 133 - 144 mmol/L Final     Potassium   Date Value Ref Range Status   05/16/2019 3.8 3.4 - 5.3 mmol/L Final     Chloride   Date Value Ref Range Status   05/16/2019 99 94 - 109 mmol/L Final     Carbon Dioxide   Date Value Ref Range Status   05/16/2019 31 20 - 32 mmol/L Final     Anion Gap   Date Value Ref Range Status   05/16/2019 6 3 - 14 mmol/L Final     Glucose   Date Value Ref Range Status   05/16/2019 110 (H) 70 - 99 mg/dL Final     Urea Nitrogen   Date Value Ref Range Status   05/20/2019 10 7 - 30 mg/dL Final     Creatinine   Date Value Ref Range Status   05/20/2019 0.69 0.66 - 1.25 mg/dL Final     GFR Estimate   Date Value Ref Range Status   05/20/2019 >90 >60 mL/min/[1.73_m2] Final     Comment:     Non  GFR Calc  Starting 12/18/2018, serum creatinine based estimated GFR (eGFR) will be   calculated using the Chronic Kidney Disease Epidemiology Collaboration   (CKD-EPI) equation.       Calcium   Date Value Ref Range Status   05/16/2019 8.5 8.5 - 10.1 mg/dL Final      Lab Results   Component Value Date    WBC 9.3 05/20/2019      HGB 8.9 05/20/2019      MCV 87 05/20/2019     Component Value Date     05/20/2019       CT CHEST PULMONARY EMBOLISM WITH CONTRAST  5/13/2019 12:23 AM  HISTORY:  Worsening hypoxia, cough. History of small " pulmonary emboli, not currently on anticoagulation for them. Rule out worsening  pulmonary embolism or pneumonia.  COMPARISON:  5/1/2019.  FINDINGS:  Evaluation of the pulmonary arterial system is remarkable for a few small bilateral upper lobe pulmonary emboli which are stable  or decreased in size from the prior exam. No new pulmonary emboli. No large central embolus. The thoracic aorta is calcified and mildly  tortuous. No aortic aneurysm or dissection. The heart is enlarged.  There are again several borderline-enlarged mediastinal lymph nodes.  There is a right PICC in place. No pneumothorax. There are again extensive fibrotic changes at the periphery of the lungs and lung  bases. Probable pulmonary edema similar to the previous exam. No pleural effusion. Images through the upper abdomen show no acute  abnormalities. There are postoperative changes in the spine.   IMPRESSION:  1. A few small bilateral upper lobe pulmonary emboli are stable or slightly decreased in the interval. No new pulmonary emboli.  2. Cardiomegaly.  3. Fibrosis and probable pulmonary edema at the periphery of the lungs and lung bases, similar to the previous exam.      IMP/PLAN:   (R05) Cough    Comment: exam most c/w pulmonary edema     Plan: furosemide 20 mg today and tomorrow.   Follow exam and symptoms.      He has severe aortic stenosis and is being considered for TAVR.       (I26.99) Other acute pulmonary embolism without acute cor pulmonale (H)  Comment: small and stable/decreased by repeat chest CT    Plan: given anemia and improved mobility, would not restart anticoagulation at this time.        (M48.05) Spinal stenosis of thoracolumbar region  (M40.30) Flat back syndrome  (Z98.890) S/P spinal surgery  Comment: several post op complications  Plan: He is on gabapentin, acetaminophen, hydroxyzine, lidocaine patch and prn oxycodone for pain, with bowel regimen.  Can restart Robaxin at lower dose to improve pain control.    Monitor  incision, follow up with surgeon.        (M96.843) Postprocedural seroma of a musculoskeletal structure following other procedure  Comment: staph epi on cultures  Plan: vancomycin IV until 6/8, then 2 weeks of doxycycline.    Labs, follow up with ID.       (M33.90) Dermatomyositis (H)  Comment: followed by Rheumatology   Plan: remains on methotrexate and prednisone taper   Bactrim MWF  Consider bumping prednisone back up to 20 mg/day if cough persists after diuresis.         (J84.10) Pulmonary fibrosis (H)  Comment: abnormal exam, persistent hypoxia, cough  Plan:  Theophylline d/c'd secondary to tachycardia.    Continue nebs, O2 by NC        (E11.40,  Z79.4) Type 2 diabetes mellitus with diabetic neuropathy, with long-term current use of insulin (H)  Comment:   Lab Results   Component Value Date    A1C 6.3 01/29/2019   Plan: short acting insulin with meals: hold for hypoglycemia    (E03.9) Hypothyroidism, unspecified type  Comment:  TSH 0.41 in 1/2019     Plan: levothyroxine 50 mcg/day       Jessika Mahmood MD

## 2019-05-21 NOTE — LETTER
5/21/2019        RE: Beulah Jane  [address]  [facility] [address]        [facility]  [facility] Record Number:  [id number]  Place of Service where encounter took place:  [facility] [[id number][facility]  Chief Complaint   Patient presents with     RECHECK       HPI:    Beulah Jane  is a 69 year old (1950), who is being seen today for an episodic care visit.  HPI information obtained from: facility chart records, facility staff, patient report and [facility] chart review. Today's concern is:  Brief Summary of Hospital Course: patient underwent planned two staged lumbar fusion on 4/22 and 4/25 due to  thoracolumbar kyphosis with flat back syndrome and thoracolumbar stenosis with myeloradiculopathy. During second surgery a chronic seroma was debrided and cultured. It grew out staph epi. ID was consulted and recommended IV vanco until 6/8 hen 3 weeks of po doxycycline.     Post op issues included type 2 NSTEMI likely due to ILD, PULMONARY EMBOLISM and aortic stenosis. He was started on heparin for finding of subsegmental pulmonary emboli on 4/28 then developed  bleeding at incision so anticoagulation had to be held.      PMH includes dermatomyositis and ILD, DM2, hypertension.hypothyroidism.      Once he was stabilized he was transferred to TCU due to weakness.      Updates on Status Since Skilled nursing Admission:   Patient was sent to ED on 5/9 and 5/12.   On 5/9 patient became shaky and sweaty. pulse 113-125, BP 86/51, O2 sats 95% on 0.5L nasal cannula. Patient's also c/o of uncontrolled pain. He was sent to ED to r/o sepsis. In ED he was treated for dehydration and sent back to TCU.    On 5/12 he was sent to ED due to shortness of breath and had sats in 80s while on supplemental oxygen.  In ED chest CT  showed few small bilateral upper lobe pulmonary emboli and fibrosis. His O2 sats in mid 90s. He was sent back to TCU.     5/14 and 5/15,  temp up to 102. Oxycodone was reduced, robaxin and methotrexate was held and Qvar was added.    Today he is much more alert. He is afebrile and breathing more comfortably. He continues to have pain but he is eating and working with therapy.    Past Medical and Surgical History reviewed in Epic today.    MEDICATIONS:  Current Outpatient Medications   Medication Sig Dispense Refill     acetaminophen (TYLENOL) 325 MG tablet Take 2 tablets (650 mg) by mouth every 4 hours as needed for mild pain or fever       albuterol (2.5 MG/3ML) 0.083% neb solution Take 1 vial by nebulization every 6 hours as needed for shortness of breath / dyspnea or wheezing       albuterol (PROAIR HFA/PROVENTIL HFA/VENTOLIN HFA) 108 (90 BASE) MCG/ACT Inhaler Inhale 2 puffs into the lungs every 6 hours as needed for shortness of breath / dyspnea or wheezing       beclomethasone (QVAR) 80 MCG/ACT AERS IS A DISCONTINUED MEDICATION Inhale 2 puffs into the lungs 2 times daily       bisacodyl (DULCOLAX) 10 MG suppository Place 1 suppository (10 mg) rectally daily as needed for constipation       calcium carbonate (OS-DMITRIY 500 MG [place]. [place]) 500 MG tablet Take 1 tablet (500 mg) by mouth 2 times daily 180 tablet 3     Cholecalciferol (VITAMIN D) 2000 units tablet Take 2,000 Units by mouth daily 100 tablet 3     diphenhydrAMINE (BENADRYL) 50 MG capsule Take 50 mg by mouth every 6 hours as needed for itching or allergies       famotidine (PEPCID) 20 MG tablet Take 1 tablet (20 mg) by mouth daily       FOLIC ACID PO Take 1 mg by mouth daily       GABAPENTIN PO Take 600 mg by mouth 3 times daily       GEMFIBROZIL PO Take 600 mg by mouth 2 times daily       hydrOXYzine (ATARAX) 25 MG tablet Take 1 tablet (25 mg) by mouth every 6 hours as needed for other (adjuvant pain)       insulin aspart (NOVOLOG PEN) 100 UNIT/ML pen Inject 1-7 Units Subcutaneous 3 times daily (before meals)       insulin aspart (NOVOLOG PEN) 100 UNIT/ML pen Inject 1-5 Units Subcutaneous At  Bedtime       ipratropium - albuterol 0.5 mg/2.5 mg/3 mL (DUONEB) 0.5-2.5 (3) MG/3ML neb solution Take 1 vial by nebulization 3 times daily       LANsoprazole (PREVACID) 30 MG DR capsule Take 30 mg by mouth every morning (before breakfast)       levothyroxine (SYNTHROID/LEVOTHROID) 50 MCG tablet Take 50 mcg by mouth daily       Lidocaine (LIDOCARE) 4 % Patch Place 1-3 patches onto the skin every 24 hours       melatonin 3 MG tablet Take 1 tablet (3 mg) by mouth nightly as needed for sleep       menthol (ICY HOT) 5 % PTCH Apply 1 patch topically every 8 hours as needed for muscle soreness       methocarbamol (ROBAXIN) 500 MG tablet Take 1 tablet (500 mg) by mouth 4 times daily       methotrexate 2.5 MG tablet Take 10 tablets (25 mg) by mouth once a week Thursdays       multivitamin w/minerals (THERA-VIT-M) tablet Take 1 tablet by mouth daily       omega 3 1000 MG CAPS Take 2 capsules by mouth 2 times daily       oxyCODONE IR (ROXICODONE) 10 MG tablet Take 5-10 mg by mouth every 4 hours        polyethylene glycol (MIRALAX/GLYCOLAX) packet Take 17 g by mouth daily       senna-docusate (SENOKOT-S/PERICOLACE) 8.6-50 MG tablet Take 2 tablets by mouth 2 times daily       sodium chloride, PF, 0.9% PF flush 10 mLs by Intracatheter route every 7 days       sodium chloride, PF, 0.9% PF flush 10-20 mLs by Intracatheter route every hour as needed for line flush or post meds or blood draw       sulfamethoxazole-trimethoprim (BACTRIM DS/SEPTRA DS) 800-160 MG per tablet Take 1 tablet 3 times a week (Monday, Wednesday, Friday)  11     traMADol (ULTRAM) 50 MG tablet Take 1 tablet (50 mg) by mouth every 6 hours as needed for moderate pain 30 tablet 0     VANCOMYCIN HCL IV Inject 1,200 mg into the vein 2 times daily for Postprocedural Seroma of a musculoskeletal structure Vanco Trough to be draw on Mon [facility] to dose.       verapamil ER (CALAN-SR) 120 MG CR tablet Take 120 mg by mouth At Bedtime       guaiFENesin (ROBITUSSIN) 100  "MG/5ML SYRP Take 20 mLs by mouth every 4 hours as needed for cough       predniSONE (DELTASONE) 5 MG tablet Take 5 mg by mouth daily for 12 days.       theophylline (UNIPHYL) 400 MG 24 hr tablet Take 200 mg by mouth every morning            REVIEW OF SYSTEMS:  4 point ROS including Respiratory, CV, GI and , other than that noted in the HPI,  is negative    Objective:  /66   Pulse 93   Temp 98.5  F (36.9  C)   Resp 20   Ht 1.575 m (5' 2\")   Wt 76.8 kg (169 lb 6.4 oz)   SpO2 96%   BMI 30.98 kg/m     Exam:  GENERAL APPEARANCE:  Alert, no distress  ENT:  Mouth and posterior oropharynx normal, moist mucous membranes, hearing acuity adequate   EYES:  EOM, conjunctivae, lids, pupils and irises normal     RESP:  respiratory effort and palpation of chest normal, no respiratory distress, Lung sounds scattered faint wheezes and rhonchi. Air movement to lung bases  CV:  Palpation and auscultation of heart done , rate and rhythm irreg, systolic murmur, no rub or gallop, Edema none  ABDOMEN:  normal bowel sounds, soft, nontender, no hepatosplenomegaly or other masses  M/S:   Gait and station not observed, Digits and nails normal   SKIN:  Inspection/Palpation of skin and subcutaneous tissue incision on spine is CDI  NEURO: 2-12 in normal limits and at patient's baseline  PSYCH:  insight and judgement, memory intact , affect and mood normal    Labs:   [facility] Please refer to them using [facility] Everywhere. and Recent labs in [facility] reviewed by me today.     ASSESSMENT/PLAN:  (Z98.890) S/P spinal surgery  (primary encounter diagnosis)  (M96.843) Postprocedural seroma of a musculoskeletal structure following other procedure  Comment: patient transferred to TCU from hospital after two staged spinal fusion due to thoracolumbar kyphosis with flat back syndrome and thoracolumbar stenosis with myeloradiculopathy.he also had seroma debrided and cultured.   Complicated post op period with ABLA, " pulmonary emboli, demand ischemia and debrided seroma that that was cultured and grew out staph epi .  He is having pain and is taking oxycodone, tramadol and acetaminophen. Earlier this week he had elevated temperature and was more somnolent. Pain meds were reduced. He is now much more alert and participating in therapy.   Plan: continue to try to limit sedating medications.   physical therapy - no bending or twisting, no lifting more than 10# for 6 weeks  Monitor incision  Follow up with ortho spine  4 weeks post op  IV vanco until 6/8   twice weekly labs- BMP, CBC w diff, ESR and CRP   [facility] to dose vanco  Follow up with ID Dr Moura on 5/29      (I26.99) Other acute pulmonary embolism without acute cor pulmonale (H)  Comment: diagnosed on 4/28. He was started on anticoagulation then developed bleeding. anticoagulation has been on hold. During this last trip to ED Chest CT suggested that pulmonary emboli are getting smaller. Discussed with Dr Mahmood, since emboli are getting smaller will hold off on anticoagulation.   Plan: monitor respiratory status      (M33.90) Dermatomyositis (H)  (J84.10) Pulmonary fibrosis (H)  Comment: followed by rheumatology Dr Call and pulmonoloogy Dr Lennox at [facility]. He had been methotrexate 25mg q weeks. Most recent DLCO was reduced so needed rituximab in March. He was not on oxygen at home.  Prior to surgery he was on long taper of prednisone. During hospital stay prednisone was increased to  5mg BID then back to 5mg q day at time of discharge to TCU.  since in TCU Theophylline was reduced to 200mg q day then discontinued after ok by Dr Lennox. . HR lower now- in the 80s-100s  Plan:continue prednisone 5mg q day   Continue to Hold methotrexate  q aiyana 2 puffs BID  Continue PTA  ellipta and prn albuterol   Supplemental oxygen.         (I25.2) Status post non-ST elevation myocardial infarction (NSTEMI)  (I35.0) Severe aortic stenosis  Comment: patient developed elevated  troponins with hypoxia  post operatively. Cardiology was consulted. Thought not ACS but more likely due to demand ischemia due to aortic stenosis and interstitial lung disease and deconditioning.   He did have more of a cough on 5/20 with BB rales. Lasix 20mg q day added.   Plan: needs follow up with cardiology for consideration for TAVR  Monitor respiratory status    (D62) Anemia due to blood loss, acute  Comment: hb dropped from 15.9 to 7.7. Now hgb trending back up- now at 8.9  Plan:  CBC next week    (E11.40,  Z79.4) Type 2 diabetes mellitus with diabetic neuropathy, with long-term current use of insulin (H)  Comment: BG<200 in TCU. Eating better today.   Plan: monitor   Continue novolog sliding scale       (I10) Essential hypertension  Comment: last three BPS in TCU 99/49, 120/68, 115/66  Plan: monitor     (R53.81) Physical deconditioning  Comment: due to recent back surgery and chronic lung condition. In past two days he is looking stronger and more alert. Therapy states he is walking up to 300' with walker  Plan: physical therapy and OCCUPATIONAL THERAPY           Electronically signed by:  ANASTACIA Mcmanus CNP               Sincerely,        ANASTACIA Mcmanus CNP

## 2019-05-21 NOTE — PROGRESS NOTES
[facility]  [facility] Medical Record Number:  [id number]  Place of Service where encounter took place:  [facility] [[id number]]  Chief Complaint   Patient presents with     RECHECK       HPI:    Beulah Jane  is a 69 year old (1950), who is being seen today for an episodic care visit.  HPI information obtained from: facility chart records, facility staff, patient report and [facility] chart review. Today's concern is:  Brief Summary of Hospital Course: patient underwent planned two staged lumbar fusion on 4/22 and 4/25 due to  thoracolumbar kyphosis with flat back syndrome and thoracolumbar stenosis with myeloradiculopathy. During second surgery a chronic seroma was debrided and cultured. It grew out staph epi. ID was consulted and recommended IV vanco until 6/8 hen 3 weeks of po doxycycline.     Post op issues included type 2 NSTEMI likely due to ILD, PULMONARY EMBOLISM and aortic stenosis. He was started on heparin for finding of subsegmental pulmonary emboli on 4/28 then developed  bleeding at incision so anticoagulation had to be held.      PMH includes dermatomyositis and ILD, DM2, hypertension.hypothyroidism.      Once he was stabilized he was transferred to TCU due to weakness.      Updates on Status Since Skilled nursing Admission:   Patient was sent to ED on 5/9 and 5/12.   On 5/9 patient became shaky and sweaty. pulse 113-125, BP 86/51, O2 sats 95% on 0.5L nasal cannula. Patient's also c/o of uncontrolled pain. He was sent to ED to r/o sepsis. In ED he was treated for dehydration and sent back to TCU.    On 5/12 he was sent to ED due to shortness of breath and had sats in 80s while on supplemental oxygen.  In ED chest CT  showed few small bilateral upper lobe pulmonary emboli and fibrosis. His O2 sats in mid 90s. He was sent back to TCU.     5/14 and 5/15, temp up to 102. Oxycodone was reduced, robaxin and methotrexate was held and Qvar was  added.    5/20- lasix added due to wet sounding cough with dependent rales.     Today he is much more alert. He is afebrile and breathing more comfortably. He continues to have pain but he is eating and working with therapy.    Past Medical and Surgical History reviewed in Epic today.    MEDICATIONS:  Current Outpatient Medications   Medication Sig Dispense Refill     acetaminophen (TYLENOL) 325 MG tablet Take 2 tablets (650 mg) by mouth every 4 hours as needed for mild pain or fever       albuterol (2.5 MG/3ML) 0.083% neb solution Take 1 vial by nebulization every 6 hours as needed for shortness of breath / dyspnea or wheezing       albuterol (PROAIR HFA/PROVENTIL HFA/VENTOLIN HFA) 108 (90 BASE) MCG/ACT Inhaler Inhale 2 puffs into the lungs every 6 hours as needed for shortness of breath / dyspnea or wheezing       beclomethasone (QVAR) 80 MCG/ACT AERS IS A DISCONTINUED MEDICATION Inhale 2 puffs into the lungs 2 times daily       bisacodyl (DULCOLAX) 10 MG suppository Place 1 suppository (10 mg) rectally daily as needed for constipation       calcium carbonate (OS-DMITRIY 500 MG [place]. CA) 500 MG tablet Take 1 tablet (500 mg) by mouth 2 times daily 180 tablet 3     Cholecalciferol (VITAMIN D) 2000 units tablet Take 2,000 Units by mouth daily 100 tablet 3     diphenhydrAMINE (BENADRYL) 50 MG capsule Take 50 mg by mouth every 6 hours as needed for itching or allergies       famotidine (PEPCID) 20 MG tablet Take 1 tablet (20 mg) by mouth daily       FOLIC ACID PO Take 1 mg by mouth daily       GABAPENTIN PO Take 600 mg by mouth 3 times daily       GEMFIBROZIL PO Take 600 mg by mouth 2 times daily       hydrOXYzine (ATARAX) 25 MG tablet Take 1 tablet (25 mg) by mouth every 6 hours as needed for other (adjuvant pain)       insulin aspart (NOVOLOG PEN) 100 UNIT/ML pen Inject 1-7 Units Subcutaneous 3 times daily (before meals)       insulin aspart (NOVOLOG PEN) 100 UNIT/ML pen Inject 1-5 Units Subcutaneous At Bedtime        ipratropium - albuterol 0.5 mg/2.5 mg/3 mL (DUONEB) 0.5-2.5 (3) MG/3ML neb solution Take 1 vial by nebulization 3 times daily       LANsoprazole (PREVACID) 30 MG DR capsule Take 30 mg by mouth every morning (before breakfast)       levothyroxine (SYNTHROID/LEVOTHROID) 50 MCG tablet Take 50 mcg by mouth daily       Lidocaine (LIDOCARE) 4 % Patch Place 1-3 patches onto the skin every 24 hours       melatonin 3 MG tablet Take 1 tablet (3 mg) by mouth nightly as needed for sleep       menthol (ICY HOT) 5 % PTCH Apply 1 patch topically every 8 hours as needed for muscle soreness       methocarbamol (ROBAXIN) 500 MG tablet Take 1 tablet (500 mg) by mouth 4 times daily       methotrexate 2.5 MG tablet Take 10 tablets (25 mg) by mouth once a week Thursdays       multivitamin w/minerals (THERA-VIT-M) tablet Take 1 tablet by mouth daily       omega 3 1000 MG CAPS Take 2 capsules by mouth 2 times daily       oxyCODONE IR (ROXICODONE) 10 MG tablet Take 5-10 mg by mouth every 4 hours        polyethylene glycol (MIRALAX/GLYCOLAX) packet Take 17 g by mouth daily       senna-docusate (SENOKOT-S/PERICOLACE) 8.6-50 MG tablet Take 2 tablets by mouth 2 times daily       sodium chloride, PF, 0.9% PF flush 10 mLs by Intracatheter route every 7 days       sodium chloride, PF, 0.9% PF flush 10-20 mLs by Intracatheter route every hour as needed for line flush or post meds or blood draw       sulfamethoxazole-trimethoprim (BACTRIM DS/SEPTRA DS) 800-160 MG per tablet Take 1 tablet 3 times a week (Monday, Wednesday, Friday)  11     traMADol (ULTRAM) 50 MG tablet Take 1 tablet (50 mg) by mouth every 6 hours as needed for moderate pain 30 tablet 0     VANCOMYCIN HCL IV Inject 1,200 mg into the vein 2 times daily for Postprocedural Seroma of a musculoskeletal structure Vanco Trough to be draw on Mon [facility] to dose.       verapamil ER (CALAN-SR) 120 MG CR tablet Take 120 mg by mouth At Bedtime       guaiFENesin (ROBITUSSIN) 100 MG/5ML SYRP  "Take 20 mLs by mouth every 4 hours as needed for cough       predniSONE (DELTASONE) 5 MG tablet Take 5 mg by mouth daily for 12 days.       theophylline (UNIPHYL) 400 MG 24 hr tablet Take 200 mg by mouth every morning            REVIEW OF SYSTEMS:  4 point ROS including Respiratory, CV, GI and , other than that noted in the HPI,  is negative    Objective:  /66   Pulse 93   Temp 98.5  F (36.9  C)   Resp 20   Ht 1.575 m (5' 2\")   Wt 76.8 kg (169 lb 6.4 oz)   SpO2 96%   BMI 30.98 kg/m    Exam:  GENERAL APPEARANCE:  Alert, no distress  ENT:  Mouth and posterior oropharynx normal, moist mucous membranes, hearing acuity adequate   EYES:  EOM, conjunctivae, lids, pupils and irises normal     RESP:  respiratory effort and palpation of chest normal, no respiratory distress, Lung sounds scattered faint wheezes and rhonchi. Air movement to lung bases  CV:  Palpation and auscultation of heart done , rate and rhythm irreg, systolic murmur, no rub or gallop, Edema none  ABDOMEN:  normal bowel sounds, soft, nontender, no hepatosplenomegaly or other masses  M/S:   Gait and station not observed, Digits and nails normal   SKIN:  Inspection/Palpation of skin and subcutaneous tissue incision on spine is CDI  NEURO: 2-12 in normal limits and at patient's baseline  PSYCH:  insight and judgement, memory intact , affect and mood normal    Labs:   [facility]. Please refer to them using [place] Everywhere. and Recent labs in EPIC reviewed by me today.     ASSESSMENT/PLAN:  (Z98.890) S/P spinal surgery  (primary encounter diagnosis)  (M96.843) Postprocedural seroma of a musculoskeletal structure following other procedure  Comment: patient transferred to TCU from hospital after two staged spinal fusion due to thoracolumbar kyphosis with flat back syndrome and thoracolumbar stenosis with myeloradiculopathy.he also had seroma debrided and cultured.   Complicated post op period with ABLA, pulmonary " emboli, demand ischemia and debrided seroma that that was cultured and grew out staph epi .  He is having pain and is taking oxycodone, tramadol and acetaminophen. Earlier this week he had elevated temperature and was more somnolent. Pain meds were reduced. He is now much more alert and participating in therapy.   Plan: continue to try to limit sedating medications.   physical therapy - no bending or twisting, no lifting more than 10# for 6 weeks  Monitor incision  Follow up with ortho spine  4 weeks post op  IV vanco until 6/8   twice weekly labs- BMP, CBC w diff, ESR and CRP   [facility] to dose vanco  Follow up with ID Dr Moura on 5/29      (I26.99) Other acute pulmonary embolism without acute cor pulmonale (H)  Comment: diagnosed on 4/28. He was started on anticoagulation then developed bleeding. anticoagulation has been on hold. During this last trip to ED Chest CT suggested that pulmonary emboli are getting smaller. Discussed with Dr Mahmood, since emboli are getting smaller will hold off on anticoagulation.   Plan: monitor respiratory status      (M33.90) Dermatomyositis (H)  (J84.10) Pulmonary fibrosis (H)  Comment: followed by rheumatology Dr Call and pulmonoloogy Dr Lennox at [facility]. He had been methotrexate 25mg q weeks. Most recent DLCO was reduced so needed rituximab in March. He was not on oxygen at home.  Prior to surgery he was on long taper of prednisone. During hospital stay prednisone was increased to  5mg BID then back to 5mg q day at time of discharge to TCU.  since in TCU Theophylline was reduced to 200mg q day then discontinued after ok by Dr Lennox. . HR lower now- in the 80s-100s  Plan:continue prednisone 5mg q day   Continue to Hold methotrexate  q aiyana 2 puffs BID  Continue PTA  ellipta and prn albuterol   Supplemental oxygen.         (I25.2) Status post non-ST elevation myocardial infarction (NSTEMI)  (I35.0) Severe aortic stenosis  Comment: patient developed elevated troponins  with hypoxia  post operatively. Cardiology was consulted. Thought not ACS but more likely due to demand ischemia due to aortic stenosis and interstitial lung disease and deconditioning.   He did have more of a cough on 5/20 with BB rales. Lasix 20mg q day added.   Plan: needs follow up with cardiology for consideration for TAVR  Monitor respiratory status    (D62) Anemia due to blood loss, acute  Comment: hb dropped from 15.9 to 7.7. Now hgb trending back up- now at 8.9  Plan:  CBC next week    (E11.40,  Z79.4) Type 2 diabetes mellitus with diabetic neuropathy, with long-term current use of insulin (H)  Comment: BG<200 in TCU. Eating better today.   Plan: monitor   Continue novolog sliding scale       (I10) Essential hypertension  Comment: last three BPS in TCU 99/49, 120/68, 115/66  Plan: monitor     (R53.81) Physical deconditioning  Comment: due to recent back surgery and chronic lung condition. In past two days he is looking stronger and more alert. Therapy states he is walking up to 300' with walker  Plan: physical therapy and OCCUPATIONAL THERAPY           Electronically signed by:  ANASTACIA Mcmanus CNP

## 2019-05-24 NOTE — PROGRESS NOTES
[facility]  [facility] Record Number:  [id number]  Place of Service where encounter took place:  [facility] [[id number]]  Chief Complaint   Patient presents with     RECHECK       HPI:    Beulah Jane  is a 69 year old (1950), who is being seen today for an episodic care visit.  HPI information obtained from: facility chart records, facility staff, patient report and [facility] chart review.     Today's concern is:  1. Aftercare following surgery of the musculoskeletal system    2. S/P spinal surgery    3. Cough    4. Pulmonary fibrosis (H)    5. Type 2 diabetes mellitus with diabetic neuropathy, with long-term current use of insulin (H)    6. Anemia due to blood loss, acute    7. Swelling of right upper extremity      Mr. Alexander was visited today while sitting up in the chair just finishing breakfast. He reports that he has had a dry cough for the past 2-3 days and now has discomfort in the chest and back when he coughs. He denies congestion or phlegm production. No fevers or chills. He has been urinating and having regular bowel movements. Fair appetite. Sleeping well. Continues on 1 liters of oxygen per nasal cannula.     Past Medical and Surgical History reviewed in Epic today.    MEDICATIONS:  Current Outpatient Medications   Medication Sig Dispense Refill     acetaminophen (TYLENOL) 325 MG tablet Take 2 tablets (650 mg) by mouth every 4 hours as needed for mild pain or fever       albuterol (2.5 MG/3ML) 0.083% neb solution Take 1 vial by nebulization every 6 hours as needed for shortness of breath / dyspnea or wheezing       albuterol (PROAIR HFA/PROVENTIL HFA/VENTOLIN HFA) 108 (90 BASE) MCG/ACT Inhaler Inhale 2 puffs into the lungs every 6 hours as needed for shortness of breath / dyspnea or wheezing       beclomethasone (QVAR) 80 MCG/ACT AERS IS A DISCONTINUED MEDICATION Inhale 2 puffs into the lungs 2 times daily       bisacodyl (DULCOLAX) 10 MG  suppository Place 1 suppository (10 mg) rectally daily as needed for constipation       calcium carbonate (OS-DMITRIY 500 MG [place]. CA) 500 MG tablet Take 1 tablet (500 mg) by mouth 2 times daily 180 tablet 3     Cholecalciferol (VITAMIN D) 2000 units tablet Take 2,000 Units by mouth daily 100 tablet 3     diphenhydrAMINE (BENADRYL) 50 MG capsule Take 50 mg by mouth every 6 hours as needed for itching or allergies       famotidine (PEPCID) 20 MG tablet Take 1 tablet (20 mg) by mouth daily       FOLIC ACID PO Take 1 mg by mouth daily       GABAPENTIN PO Take 600 mg by mouth 3 times daily       GEMFIBROZIL PO Take 600 mg by mouth 2 times daily       hydrOXYzine (ATARAX) 25 MG tablet Take 1 tablet (25 mg) by mouth every 6 hours as needed for other (adjuvant pain)       insulin aspart (NOVOLOG PEN) 100 UNIT/ML pen Inject 1-7 Units Subcutaneous 3 times daily (before meals)       insulin aspart (NOVOLOG PEN) 100 UNIT/ML pen Inject 1-5 Units Subcutaneous At Bedtime       ipratropium - albuterol 0.5 mg/2.5 mg/3 mL (DUONEB) 0.5-2.5 (3) MG/3ML neb solution Take 1 vial by nebulization 3 times daily       LANsoprazole (PREVACID) 30 MG DR capsule Take 30 mg by mouth every morning (before breakfast)       levothyroxine (SYNTHROID/LEVOTHROID) 50 MCG tablet Take 50 mcg by mouth daily       Lidocaine (LIDOCARE) 4 % Patch Place 1-3 patches onto the skin every 24 hours       menthol (ICY HOT) 5 % PTCH Apply 1 patch topically every 8 hours as needed for muscle soreness       methocarbamol (ROBAXIN) 500 MG tablet Take 1 tablet (500 mg) by mouth 4 times daily       methotrexate 2.5 MG tablet Take 10 tablets (25 mg) by mouth once a week Thursdays       multivitamin w/minerals (THERA-VIT-M) tablet Take 1 tablet by mouth daily       omega 3 1000 MG CAPS Take 2 capsules by mouth 2 times daily       oxyCODONE IR (ROXICODONE) 10 MG tablet Take 5-10 mg by mouth every 4 hours        polyethylene glycol (MIRALAX/GLYCOLAX) packet Take 17 g by mouth  "daily       senna-docusate (SENOKOT-S/PERICOLACE) 8.6-50 MG tablet Take 2 tablets by mouth 2 times daily       sodium chloride, PF, 0.9% PF flush 10 mLs by Intracatheter route every 7 days       sodium chloride, PF, 0.9% PF flush 10-20 mLs by Intracatheter route every hour as needed for line flush or post meds or blood draw       sulfamethoxazole-trimethoprim (BACTRIM DS/SEPTRA DS) 800-160 MG per tablet Take 1 tablet 3 times a week (Monday, Wednesday, Friday)  11     traMADol (ULTRAM) 50 MG tablet Take 1 tablet (50 mg) by mouth every 6 hours as needed for moderate pain 129 tablet 0     VANCOMYCIN HCL IV Inject 1,200 mg into the vein 2 times daily for Postprocedural Seroma of a musculoskeletal structure Vanco Trough to be draw on Mon [facility] to dose.       verapamil ER (CALAN-SR) 120 MG CR tablet Take 120 mg by mouth At Bedtime         REVIEW OF SYSTEMS:  4 point ROS including Respiratory, CV, GI and , other than that noted in the HPI,  is negative    Objective:  /66   Pulse 91   Temp 98.4  F (36.9  C)   Resp 18   Ht 1.575 m (5' 2\")   Wt 73.5 kg (162 lb)   SpO2 96%   BMI 29.63 kg/m    Exam:  GENERAL APPEARANCE:  Alert, in no distress, pleasant, cooperative, oriented x 4  EYES: no discharge or mattering on lids or lashes noted  ENT:  moist mucous membranes, hearing acuity intact  NECK: supple, symmetrical  RESP: no respiratory distress, Lung sounds clear, patient is on 1 liter per nasal cannula  CV:  rate and rhythm regular, aortic murmur Edema none in bilateral lower extremities. VASCULAR: warm extremities without open areas.  ABDOMEN: normal bowel sounds, soft, nontender.  M/S:   Gait and station ambulates independently, no tenderness or swelling of the joints; able to move all extremities. Swelling 1+ of RUE most notable in the hand.   SKIN:  Inspection and palpation of skin and subcutaneous tissue: skin warm, dry. Back incision is healing.   NEURO: no facial asymmetry, no speech deficits and " able to follow directions, moves all extremities symmetrically  PSYCH:  insight and judgement intact, memory intact, affect and mood normal    Labs:   CBC RESULTS:   Recent Labs   Lab Test 05/20/19  0900 05/16/19  1010   WBC 9.3 8.6   RBC 3.57* 3.45*   HGB 8.9* 8.9*   HCT 31.2* 29.9*   MCV 87 87   MCH 24.9* 25.8*   MCHC 28.5* 29.8*   RDW 18.2* 17.7*    395       Last Basic Metabolic Panel:  Recent Labs   Lab Test 05/20/19  0900 05/16/19  1010  05/12/19  2321   NA  --  136  --  135   POTASSIUM  --  3.8  --  4.0   CHLORIDE  --  99  --  100   DMITRIY  --  8.5  --  8.4*   CO2  --  31  --  29   BUN 10 14   < > 15   CR 0.69 0.90   < > 0.88   GLC  --  110*  --  137*    < > = values in this interval not displayed.       Liver Function Studies -   Recent Labs   Lab Test 04/27/19  2113 01/29/19  1707  11/30/17  0618   PROTTOTAL 5.6*  --   --  6.0*   ALBUMIN 2.3* 3.9   < > 2.1*   BILITOTAL 0.3  --   --  0.3   ALKPHOS 95  --   --  70   AST 90*  --   --  14   ALT 56  --   --  14    < > = values in this interval not displayed.     Lab Results   Component Value Date    A1C 6.3 01/29/2019    A1C 5.6 06/26/2018       ASSESSMENT/PLAN:  (Z47.89) Aftercare following surgery of the musculoskeletal system  (primary encounter diagnosis)  (Z98.890) S/P spinal surgery  (M79.89) Swelling of RUE  Comment: having pain which appears to be exacerbated by coughing so will try to control coughing. Incision healing without s/sx of infection. Has PICC line in place with RUE swelling. Concern for DVT so will obtain doppler US today. Of note, was on AC in the recent hx but developed bleeding so this was stopped.   Plan:   --doppler ultrasound to RUE to rule out DVT.   --schedule tylenol 1000 mg TID x 7 days then 650 mg q4h PRN  --oxycodone 5-10 mg PO q4h PRN  --methocarbamol 500 mg QID  --monitor pain and adjust meds as needed.  --no bending or twisting, no lifting more than 10# for 6 weeks  --Follow up with ortho spine  4 weeks post op  --IV vanco  until 6/8   --twice weekly labs- BMP, CBC w diff, ESR and CRP  --Follow up with ID Dr Moura on 5/29.     (R05) Cough  Comment: had pulmonary edema earlier this week so could be contributing. Lungs clear today and weight is stable so will not give additional doses of furosemide. Will try to control cough so no longer has pain. Of note, has pulmonary emboli which appear to be smaller in sizer per latest CT scan. Not on AC due to bleeding.  Plan: tessalon perles 100 mg TID PRN    (J84.10) Pulmonary fibrosis (H)  Comment: on 1 liter of oxygen which is not his baseline. Saturations of % so he should be able to taper off oxygen soon.   Plan: continue with QVAR and oxygen. Attempt to taper.     (E11.40,  Z79.4) Type 2 diabetes mellitus with diabetic neuropathy, with long-term current use of insulin (H)  Comment: blood sugars are well controlled with most readings 130-170's.   Plan: continue with novlog sliding scale.   --monitor blood sugars and titrate insulin as needed.     (D62) Anemia due to blood loss, acute  Comment: was 15.9 pre operatively which decreased to 7.7 but now stabilized and 8.9 on the last two blood draws. No s/sx of bleeding.    Plan: continue with weekly labs    Total time spent with patient visit at the skilled nursing facility was 35 min including patient visit. Greater than 50% of total time spent with counseling and coordinating care due to cough, oxgyen use, pain  Electronically signed by:  ANASTACIA Mcgee CNP

## 2019-05-24 NOTE — LETTER
5/24/2019        RE: Beulah Jane  [address]  [facility] [address]        [place] [facility]  [place] [facility] Record Number:  [id number]  [facility] encounter took place[facility] [[id number]]  Chief Complaint   Patient presents with     RECHECK       HPI:    Beulah Jane  is a 69 year old (1950), who is being seen today for an episodic care visit.  HPI information obtained from: facility chart records, facility staff, patient report and [facility] chart review.     Today's concern is:  1. Aftercare following surgery of the musculoskeletal system    2. S/P spinal surgery    3. Cough    4. Pulmonary fibrosis (H)    5. Type 2 diabetes mellitus with diabetic neuropathy, with long-term current use of insulin (H)    6. Anemia due to blood loss, acute      Mr. Alexander was visited today while sitting up in the chair just finishing breakfast. He reports that he has had a dry cough for the past 2-3 days and now has discomfort in the chest and back when he coughs. He denies congestion or phlegm production. No fevers or chills. He has been urinating and having regular bowel movements. Fair appetite. Sleeping well. Continues on 1 liters of oxygen per nasal cannula.     Past Medical and Surgical History reviewed in Epic today.    MEDICATIONS:  Current Outpatient Medications   Medication Sig Dispense Refill     acetaminophen (TYLENOL) 325 MG tablet Take 2 tablets (650 mg) by mouth every 4 hours as needed for mild pain or fever       albuterol (2.5 MG/3ML) 0.083% neb solution Take 1 vial by nebulization every 6 hours as needed for shortness of breath / dyspnea or wheezing       albuterol (PROAIR HFA/PROVENTIL HFA/VENTOLIN HFA) 108 (90 BASE) MCG/ACT Inhaler Inhale 2 puffs into the lungs every 6 hours as needed for shortness of breath / dyspnea or wheezing       beclomethasone (QVAR) 80 MCG/ACT AERS IS A DISCONTINUED MEDICATION Inhale 2 puffs into  the lungs 2 times daily       bisacodyl (DULCOLAX) 10 MG suppository Place 1 suppository (10 mg) rectally daily as needed for constipation       calcium carbonate (OS-DMITRIY 500 MG [place]. [place]) 500 MG tablet Take 1 tablet (500 mg) by mouth 2 times daily 180 tablet 3     Cholecalciferol (VITAMIN D) 2000 units tablet Take 2,000 Units by mouth daily 100 tablet 3     diphenhydrAMINE (BENADRYL) 50 MG capsule Take 50 mg by mouth every 6 hours as needed for itching or allergies       famotidine (PEPCID) 20 MG tablet Take 1 tablet (20 mg) by mouth daily       FOLIC ACID PO Take 1 mg by mouth daily       GABAPENTIN PO Take 600 mg by mouth 3 times daily       GEMFIBROZIL PO Take 600 mg by mouth 2 times daily       hydrOXYzine (ATARAX) 25 MG tablet Take 1 tablet (25 mg) by mouth every 6 hours as needed for other (adjuvant pain)       insulin aspart (NOVOLOG PEN) 100 UNIT/ML pen Inject 1-7 Units Subcutaneous 3 times daily (before meals)       insulin aspart (NOVOLOG PEN) 100 UNIT/ML pen Inject 1-5 Units Subcutaneous At Bedtime       ipratropium - albuterol 0.5 mg/2.5 mg/3 mL (DUONEB) 0.5-2.5 (3) MG/3ML neb solution Take 1 vial by nebulization 3 times daily       LANsoprazole (PREVACID) 30 MG DR capsule Take 30 mg by mouth every morning (before breakfast)       levothyroxine (SYNTHROID/LEVOTHROID) 50 MCG tablet Take 50 mcg by mouth daily       Lidocaine (LIDOCARE) 4 % Patch Place 1-3 patches onto the skin every 24 hours       menthol (ICY HOT) 5 % PTCH Apply 1 patch topically every 8 hours as needed for muscle soreness       methocarbamol (ROBAXIN) 500 MG tablet Take 1 tablet (500 mg) by mouth 4 times daily       methotrexate 2.5 MG tablet Take 10 tablets (25 mg) by mouth once a week Thursdays       multivitamin w/minerals (THERA-VIT-M) tablet Take 1 tablet by mouth daily       omega 3 1000 MG CAPS Take 2 capsules by mouth 2 times daily       oxyCODONE IR (ROXICODONE) 10 MG tablet Take 5-10 mg by mouth every 4 hours         "polyethylene glycol (MIRALAX/GLYCOLAX) packet Take 17 g by mouth daily       senna-docusate (SENOKOT-S/PERICOLACE) 8.6-50 MG tablet Take 2 tablets by mouth 2 times daily       sodium chloride, PF, 0.9% PF flush 10 mLs by Intracatheter route every 7 days       sodium chloride, PF, 0.9% PF flush 10-20 mLs by Intracatheter route every hour as needed for line flush or post meds or blood draw       sulfamethoxazole-trimethoprim (BACTRIM DS/SEPTRA DS) 800-160 MG per tablet Take 1 tablet 3 times a week (Monday, Wednesday, Friday)  11     traMADol (ULTRAM) 50 MG tablet Take 1 tablet (50 mg) by mouth every 6 hours as needed for moderate pain 129 tablet 0     VANCOMYCIN HCL IV Inject 1,200 mg into the vein 2 times daily for Postprocedural Seroma of a musculoskeletal structure Vanco Trough to be draw on Mon [facility] to dose.       verapamil ER (CALAN-SR) 120 MG CR tablet Take 120 mg by mouth At Bedtime         REVIEW OF SYSTEMS:  4 point ROS including Respiratory, CV, GI and , other than that noted in the HPI,  is negative    Objective:  /66   Pulse 91   Temp 98.4  F (36.9  C)   Resp 18   Ht 1.575 m (5' 2\")   Wt 73.5 kg (162 lb)   SpO2 96%   BMI 29.63 kg/m     Exam:  GENERAL APPEARANCE:  Alert, in no distress, pleasant, cooperative, oriented x 4  EYES: no discharge or mattering on lids or lashes noted  ENT:  moist mucous membranes, hearing acuity intact  NECK: supple, symmetrical  RESP: no respiratory distress, Lung sounds clear, patient is on 1 liter per nasal cannula  CV:  rate and rhythm regular, aortic murmur Edema none in bilateral lower extremities. VASCULAR: warm extremities without open areas.  ABDOMEN: normal bowel sounds, soft, nontender.  M/S:   Gait and station ambulates independently, no tenderness or swelling of the joints; able to move all extremities   SKIN:  Inspection and palpation of skin and subcutaneous tissue: skin warm, dry. Back incision is healing.   NEURO: no facial asymmetry, no " speech deficits and able to follow directions, moves all extremities symmetrically  PSYCH:  insight and judgement intact, memory intact, affect and mood normal    Labs:   CBC RESULTS:   Recent Labs   Lab Test 05/20/19  0900 05/16/19  1010   WBC 9.3 8.6   RBC 3.57* 3.45*   HGB 8.9* 8.9*   HCT 31.2* 29.9*   MCV 87 87   MCH 24.9* 25.8*   MCHC 28.5* 29.8*   RDW 18.2* 17.7*    395       Last Basic Metabolic Panel:  Recent Labs   Lab Test 05/20/19  0900 05/16/19  1010  05/12/19  2321   NA  --  136  --  135   POTASSIUM  --  3.8  --  4.0   CHLORIDE  --  99  --  100   DMITRIY  --  8.5  --  8.4*   CO2  --  31  --  29   BUN 10 14   < > 15   CR 0.69 0.90   < > 0.88   GLC  --  110*  --  137*    < > = values in this interval not displayed.       Liver Function Studies -   Recent Labs   Lab Test 04/27/19  2113 01/29/19  1707  11/30/17  0618   PROTTOTAL 5.6*  --   --  6.0*   ALBUMIN 2.3* 3.9   < > 2.1*   BILITOTAL 0.3  --   --  0.3   ALKPHOS 95  --   --  70   AST 90*  --   --  14   ALT 56  --   --  14    < > = values in this interval not displayed.     Lab Results   Component Value Date    A1C 6.3 01/29/2019    A1C 5.6 06/26/2018       ASSESSMENT/PLAN:  (Z47.89) Aftercare following surgery of the musculoskeletal system  (primary encounter diagnosis)  (Z98.890) S/P spinal surgery  Comment: having pain which appears to be exacerbated by coughing so will try to control coughing. Incision healing without s/sx of infection.   Plan: schedule tylenol 1000 mg TID x 7 days then 650 mg q4h PRN  --oxycodone 5-10 mg PO q4h PRN  --methocarbamol 500 mg QID  --monitor pain and adjust meds as needed.  --no bending or twisting, no lifting more than 10# for 6 weeks  --Follow up with ortho spine  4 weeks post op  --IV vanco until 6/8   --twice weekly labs- BMP, CBC w diff, ESR and CRP  --Follow up with ID Dr Moura on 5/29.     (R05) Cough  Comment: had pulmonary edema earlier this week so could be contributing. Lungs clear today and weight is  stable so will not give additional doses of furosemide. Will try to control cough so no longer has pain. Of note, has pulmonary emboli which appear to be smaller in sizer per latest CT scan.   Plan: tessalon perles 100 mg TID PRN    (J84.10) Pulmonary fibrosis (H)  Comment: on 1 liter of oxygen which is not his baseline. Saturations of % so he should be able to taper off oxygen soon.   Plan: continue with QVAR and oxygen. Attempt to taper.     (E11.40,  Z79.4) Type 2 diabetes mellitus with diabetic neuropathy, with long-term current use of insulin (H)  Comment: blood sugars are well controlled with most readings 130-170's.   Plan: continue with novlog sliding scale.   --monitor blood sugars and titrate insulin as needed.     (D62) Anemia due to blood loss, acute  Comment: was 15.9 pre operatively which decreased to 7.7 but now stabilized and 8.9 on the last two blood draws. No s/sx of bleeding.    Plan: continue with weekly labs    Total time spent with patient visit at the [facility] nursing [facility] was 35 min including patient visit. Greater than 50% of total time spent with counseling and coordinating care due to cough, oxgyen use, pain  Electronically signed by:  ANASTACIA Mcgee CNP               Sincerely,        ANASTACIA Mcgee CNP

## 2019-05-28 NOTE — LETTER
5/28/2019        RE: Beulah Jane  [address]  [facility] [address]        [place] [facility]  [place] [facility] Record Number:  [id number]  [facility] took place[facility] [[id number]  Chief Complaint   Patient presents with     RECHECK       HPI:    Beulah Jane  is a 69 year old (1950), who is being seen today for an episodic care visit.  HPI information obtained from: facility chart records, facility staff, patient report and [facility] chart review.     Per recent NP notes:  Patient underwent planned two staged lumbar fusion on 4/22 and 4/25 due to  thoracolumbar kyphosis with flat back syndrome and thoracolumbar stenosis with myeloradiculopathy. During second surgery a chronic seroma was debrided and cultured. It grew out staph epi. ID was consulted and recommended IV vanco until 6/8 then 3 weeks of po doxycycline. Post op issues included type 2 NSTEMI likely due to ILD, PULMONARY EMBOLISM and aortic stenosis. He was started on heparin for finding of subsegmental pulmonary emboli on 4/28 then developed bleeding at incision so anticoagulation had to be held. PMH includes dermatomyositis and ILD, DM2, hypertension, hypothyroidism.     Has PICC line in place with RUE swelling. Concern for DVT so obtained doppler US last week - negative. Pain managed well. Follow up with ortho spine 4 weeks post op and IV vanco until 6/8. Started Tessalon perles for cough. Attempt tapering O2. Hgb stabilized and 8.9 on the last two blood draws. No s/sx of bleeding.      Today's concern is:  Patient reports he is feeling stronger, pain better managed. No dizziness, headaches, chest pain. Cough improved. Bowels/bladder working well. No edema. Note weight loss 16 lbs since admission. SBP ranges . BG ranges 113-204. Continues on 02. Doppler US right arm negative and swelling improved.     Past Medical and Surgical History reviewed in  Epic today.    MEDICATIONS:  Current Outpatient Medications   Medication Sig Dispense Refill     acetaminophen (TYLENOL) 325 MG tablet Take 2 tablets (650 mg) by mouth every 4 hours as needed for mild pain or fever       acetaminophen (TYLENOL) 500 MG tablet Take 500-1,000 mg by mouth 3 times daily       albuterol (2.5 MG/3ML) 0.083% neb solution Take 1 vial by nebulization every 6 hours as needed for shortness of breath / dyspnea or wheezing       albuterol (PROAIR HFA/PROVENTIL HFA/VENTOLIN HFA) 108 (90 BASE) MCG/ACT Inhaler Inhale 2 puffs into the lungs every 6 hours as needed for shortness of breath / dyspnea or wheezing       beclomethasone (QVAR) 80 MCG/ACT AERS IS A DISCONTINUED MEDICATION Inhale 2 puffs into the lungs 2 times daily       benzonatate (TESSALON) 100 MG capsule Take 100 mg by mouth 3 times daily as needed for cough       bisacodyl (DULCOLAX) 10 MG suppository Place 1 suppository (10 mg) rectally daily as needed for constipation       calcium carbonate (OS-DMITRIY 500 MG [place]. [place]) 500 MG tablet Take 1 tablet (500 mg) by mouth 2 times daily 180 tablet 3     Cholecalciferol (VITAMIN D) 2000 units tablet Take 2,000 Units by mouth daily 100 tablet 3     diphenhydrAMINE (BENADRYL) 50 MG capsule Take 50 mg by mouth every 6 hours as needed for itching or allergies       famotidine (PEPCID) 20 MG tablet Take 1 tablet (20 mg) by mouth daily       FOLIC ACID PO Take 1 mg by mouth daily       GABAPENTIN PO Take 600 mg by mouth 3 times daily       GEMFIBROZIL PO Take 600 mg by mouth 2 times daily       hydrOXYzine (ATARAX) 25 MG tablet Take 1 tablet (25 mg) by mouth every 6 hours as needed for other (adjuvant pain)       insulin aspart (NOVOLOG PEN) 100 UNIT/ML pen Inject 1-7 Units Subcutaneous 3 times daily (before meals)       insulin aspart (NOVOLOG PEN) 100 UNIT/ML pen Inject 1-5 Units Subcutaneous At Bedtime       ipratropium - albuterol 0.5 mg/2.5 mg/3 mL (DUONEB) 0.5-2.5 (3) MG/3ML neb solution Take 1  vial by nebulization 3 times daily       LANsoprazole (PREVACID) 30 MG DR capsule Take 30 mg by mouth every morning (before breakfast)       levothyroxine (SYNTHROID/LEVOTHROID) 50 MCG tablet Take 50 mcg by mouth daily       menthol (ICY HOT) 5 % PTCH Apply 1 patch topically every 8 hours as needed for muscle soreness       methocarbamol (ROBAXIN) 500 MG tablet Take 1 tablet (500 mg) by mouth 4 times daily       methotrexate 2.5 MG tablet Take 10 tablets (25 mg) by mouth once a week Thursdays       multivitamin w/minerals (THERA-VIT-M) tablet Take 1 tablet by mouth daily       omega 3 1000 MG CAPS Take 2 capsules by mouth 2 times daily       oxyCODONE IR (ROXICODONE) 10 MG tablet Take 5-10 mg by mouth every 4 hours        polyethylene glycol (MIRALAX/GLYCOLAX) packet Take 17 g by mouth daily       senna-docusate (SENOKOT-S/PERICOLACE) 8.6-50 MG tablet Take 2 tablets by mouth 2 times daily       sodium chloride, PF, 0.9% PF flush 10 mLs by Intracatheter route every 7 days       sodium chloride, PF, 0.9% PF flush 10-20 mLs by Intracatheter route every hour as needed for line flush or post meds or blood draw       sulfamethoxazole-trimethoprim (BACTRIM DS/SEPTRA DS) 800-160 MG per tablet Take 1 tablet 3 times a week (Monday, Wednesday, Friday)  11     traMADol (ULTRAM) 50 MG tablet Take 1 tablet (50 mg) by mouth every 6 hours as needed for moderate pain 129 tablet 0     VANCOMYCIN HCL IV Inject 1,200 mg into the vein 2 times daily for Postprocedural Seroma of a musculoskeletal structure Vanco Trough to be draw on Mon [facility] to dose.       verapamil ER (CALAN-SR) 120 MG CR tablet Take 120 mg by mouth At Bedtime       Lidocaine (LIDOCARE) 4 % Patch Place 1-3 patches onto the skin every 24 hours         REVIEW OF SYSTEMS:  10 point ROS of systems including Constitutional, Eyes, Respiratory, Cardiovascular, Gastroenterology, Genitourinary, Integumentary, Musculoskeletal, Psychiatric were all negative except for  "pertinent positives noted in my HPI.    Objective:  BP 95/60   Pulse 105   Temp 97.6  F (36.4  C)   Resp 20   Ht 1.575 m (5' 2\")   Wt 73.6 kg (162 lb 3.2 oz)   SpO2 98%   BMI 29.67 kg/m     Exam:  GENERAL APPEARANCE:  Alert, in no distress, pleasant, cooperative, oriented x 4  EYES:  EOM, lids, pupils and irises normal, sclera clear and conjunctiva normal, no discharge or mattering on lids or lashes noted  ENT:  Mouth normal, moist mucous membranes, nose normal without drainage or crusting, external ears without lesions, hearing acuity intact  NECK: supple, symmetrical, trachea midline  RESP:  respiratory effort normal, no chest wall tenderness, no respiratory distress, Lung sounds clear, patient is on oxygen per NC  CV:  Auscultation of heart done, rate and rhythm controlled and regular, no murmur, no rub or gallop. Edema none bilateral lower extremities.   ABDOMEN:  normal bowel sounds, soft, nontender, no palpable masses.  M/S:   Gait and station walks with assist , no tenderness or swelling of the joints; able to move all extremities, digits normal  NEURO: cranial nerves 2-12 grossly intact, no facial asymmetry, no speech deficits and able to follow directions, moves all extremities symmetrically  PSYCH:  insight and judgement and memory intact, affect and mood normal     Labs:   Most Recent 3 CBC's:  Recent Labs   Lab Test 05/27/19  0000 05/20/19  0900 05/16/19  1010   WBC 13.3* 9.3 8.6   HGB 8.4* 8.9* 8.9*   MCV 86 87 87   * 376 395     Most Recent 3 BMP's:  Recent Labs   Lab Test 05/27/19  0000 05/20/19  0900 05/16/19  1010  05/12/19  2321 05/09/19  2048   NA  --   --  136  --  135 137   POTASSIUM  --   --  3.8  --  4.0 4.7   CHLORIDE  --   --  99  --  100 104   CO2  --   --  31  --  29 25   BUN 12 10 14   < > 15 14   CR 0.87 0.69 0.90   < > 0.88 0.97   ANIONGAP  --   --  6  --  6 8   DMITRIY  --   --  8.5  --  8.4* 8.8   GLC  --   --  110*  --  137* 108*    < > = values in this interval not " displayed.       ASSESSMENT/PLAN:  Aftercare following surgery of the musculoskeletal system  S/P spinal surgery  Physical deconditioning  Acute on chronic, pain improving. Improved mobility. Monitor - f/u with progress. F/U with surgeon as planned.     Swelling of right upper extremity  Resolved. Monitor.     Cough  Improving. Monitor.     Pulmonary fibrosis (H)  Ongoing - o2 use, will attempt to wean    Type 2 diabetes mellitus with diabetic neuropathy, with long-term current use of insulin (H)  Chronic, well managed. Meds, bg checks as above. F/U PRN.     Anemia due to blood loss, acute  Improved - CBC on 5/30.     Orders written by provider at facility  CBC on 5/30 diagnosis anemia, leukocytosis    Total time spent with patient visit at the skilled nursing facility was 35 min including patient visit and review of past records. Greater than 50% of total time spent with counseling and coordinating care due to review of history, current status, concerns and POC to address them as noted above    Electronically signed by:  ANASTACIA Vitale CNP               Sincerely,        ANASTACIA Vitale CNP

## 2019-05-28 NOTE — PROGRESS NOTES
[facility]  [place] Medical Record Number:  [id number]  Place of Service where encounter took place:  [facility] [[id number]]  Chief Complaint   Patient presents with     RECHECK       HPI:    Beulah Jane  is a 69 year old (1950), who is being seen today for an episodic care visit.  HPI information obtained from: facility chart records, facility staff, patient report and [facility] chart review.     Per recent NP notes:  Patient underwent planned two staged lumbar fusion on 4/22 and 4/25 due to  thoracolumbar kyphosis with flat back syndrome and thoracolumbar stenosis with myeloradiculopathy. During second surgery a chronic seroma was debrided and cultured. It grew out staph epi. ID was consulted and recommended IV vanco until 6/8 then 3 weeks of po doxycycline. Post op issues included type 2 NSTEMI likely due to ILD, PULMONARY EMBOLISM and aortic stenosis. He was started on heparin for finding of subsegmental pulmonary emboli on 4/28 then developed bleeding at incision so anticoagulation had to be held. PMH includes dermatomyositis and ILD, DM2, hypertension, hypothyroidism.     Has PICC line in place with RUE swelling. Concern for DVT so obtained doppler US last week - negative. Pain managed well. Follow up with ortho spine 4 weeks post op and IV vanco until 6/8. Started Tessalon perles for cough. Attempt tapering O2. Hgb stabilized and 8.9 on the last two blood draws. No s/sx of bleeding.      Today's concern is:  Patient reports he is feeling stronger, pain better managed. No dizziness, headaches, chest pain. Cough improved. Bowels/bladder working well. No edema. Note weight loss 16 lbs since admission. SBP ranges . BG ranges 113-204. Continues on 02. Doppler US right arm negative and swelling improved.     Past Medical and Surgical History reviewed in Epic today.    MEDICATIONS:  Current Outpatient Medications   Medication Sig Dispense Refill      acetaminophen (TYLENOL) 325 MG tablet Take 2 tablets (650 mg) by mouth every 4 hours as needed for mild pain or fever       acetaminophen (TYLENOL) 500 MG tablet Take 500-1,000 mg by mouth 3 times daily       albuterol (2.5 MG/3ML) 0.083% neb solution Take 1 vial by nebulization every 6 hours as needed for shortness of breath / dyspnea or wheezing       albuterol (PROAIR HFA/PROVENTIL HFA/VENTOLIN HFA) 108 (90 BASE) MCG/ACT Inhaler Inhale 2 puffs into the lungs every 6 hours as needed for shortness of breath / dyspnea or wheezing       beclomethasone (QVAR) 80 MCG/ACT AERS IS A DISCONTINUED MEDICATION Inhale 2 puffs into the lungs 2 times daily       benzonatate (TESSALON) 100 MG capsule Take 100 mg by mouth 3 times daily as needed for cough       bisacodyl (DULCOLAX) 10 MG suppository Place 1 suppository (10 mg) rectally daily as needed for constipation       calcium carbonate (OS-DMITRIY 500 MG [place]) 500 MG tablet Take 1 tablet (500 mg) by mouth 2 times daily 180 tablet 3     Cholecalciferol (VITAMIN D) 2000 units tablet Take 2,000 Units by mouth daily 100 tablet 3     diphenhydrAMINE (BENADRYL) 50 MG capsule Take 50 mg by mouth every 6 hours as needed for itching or allergies       famotidine (PEPCID) 20 MG tablet Take 1 tablet (20 mg) by mouth daily       FOLIC ACID PO Take 1 mg by mouth daily       GABAPENTIN PO Take 600 mg by mouth 3 times daily       GEMFIBROZIL PO Take 600 mg by mouth 2 times daily       hydrOXYzine (ATARAX) 25 MG tablet Take 1 tablet (25 mg) by mouth every 6 hours as needed for other (adjuvant pain)       insulin aspart (NOVOLOG PEN) 100 UNIT/ML pen Inject 1-7 Units Subcutaneous 3 times daily (before meals)       insulin aspart (NOVOLOG PEN) 100 UNIT/ML pen Inject 1-5 Units Subcutaneous At Bedtime       ipratropium - albuterol 0.5 mg/2.5 mg/3 mL (DUONEB) 0.5-2.5 (3) MG/3ML neb solution Take 1 vial by nebulization 3 times daily       LANsoprazole (PREVACID) 30 MG DR capsule Take 30 mg by  mouth every morning (before breakfast)       levothyroxine (SYNTHROID/LEVOTHROID) 50 MCG tablet Take 50 mcg by mouth daily       menthol (ICY HOT) 5 % PTCH Apply 1 patch topically every 8 hours as needed for muscle soreness       methocarbamol (ROBAXIN) 500 MG tablet Take 1 tablet (500 mg) by mouth 4 times daily       methotrexate 2.5 MG tablet Take 10 tablets (25 mg) by mouth once a week Thursdays       multivitamin w/minerals (THERA-VIT-M) tablet Take 1 tablet by mouth daily       omega 3 1000 MG CAPS Take 2 capsules by mouth 2 times daily       oxyCODONE IR (ROXICODONE) 10 MG tablet Take 5-10 mg by mouth every 4 hours        polyethylene glycol (MIRALAX/GLYCOLAX) packet Take 17 g by mouth daily       senna-docusate (SENOKOT-S/PERICOLACE) 8.6-50 MG tablet Take 2 tablets by mouth 2 times daily       sodium chloride, PF, 0.9% PF flush 10 mLs by Intracatheter route every 7 days       sodium chloride, PF, 0.9% PF flush 10-20 mLs by Intracatheter route every hour as needed for line flush or post meds or blood draw       sulfamethoxazole-trimethoprim (BACTRIM DS/SEPTRA DS) 800-160 MG per tablet Take 1 tablet 3 times a week (Monday, Wednesday, Friday)  11     traMADol (ULTRAM) 50 MG tablet Take 1 tablet (50 mg) by mouth every 6 hours as needed for moderate pain 129 tablet 0     VANCOMYCIN HCL IV Inject 1,200 mg into the vein 2 times daily for Postprocedural Seroma of a musculoskeletal structure Vanco Trough to be draw on Mon [facility] to dose.       verapamil ER (CALAN-SR) 120 MG CR tablet Take 120 mg by mouth At Bedtime       Lidocaine (LIDOCARE) 4 % Patch Place 1-3 patches onto the skin every 24 hours         REVIEW OF SYSTEMS:  10 point ROS of systems including Constitutional, Eyes, Respiratory, Cardiovascular, Gastroenterology, Genitourinary, Integumentary, Musculoskeletal, Psychiatric were all negative except for pertinent positives noted in my HPI.    Objective:  BP 95/60   Pulse 105   Temp 97.6  F (36.4  C)    "Resp 20   Ht 1.575 m (5' 2\")   Wt 73.6 kg (162 lb 3.2 oz)   SpO2 98%   BMI 29.67 kg/m    Exam:  GENERAL APPEARANCE:  Alert, in no distress, pleasant, cooperative, oriented x 4  EYES:  EOM, lids, pupils and irises normal, sclera clear and conjunctiva normal, no discharge or mattering on lids or lashes noted  ENT:  Mouth normal, moist mucous membranes, nose normal without drainage or crusting, external ears without lesions, hearing acuity intact  NECK: supple, symmetrical, trachea midline  RESP:  respiratory effort normal, no chest wall tenderness, no respiratory distress, Lung sounds clear, patient is on oxygen per NC  CV:  Auscultation of heart done, rate and rhythm controlled and regular, no murmur, no rub or gallop. Edema none bilateral lower extremities.   ABDOMEN:  normal bowel sounds, soft, nontender, no palpable masses.  M/S:   Gait and station walks with assist , no tenderness or swelling of the joints; able to move all extremities, digits normal  NEURO: cranial nerves 2-12 grossly intact, no facial asymmetry, no speech deficits and able to follow directions, moves all extremities symmetrically  PSYCH:  insight and judgement and memory intact, affect and mood normal     Labs:   Most Recent 3 CBC's:  Recent Labs   Lab Test 05/27/19  0000 05/20/19  0900 05/16/19  1010   WBC 13.3* 9.3 8.6   HGB 8.4* 8.9* 8.9*   MCV 86 87 87   * 376 395     Most Recent 3 BMP's:  Recent Labs   Lab Test 05/27/19  0000 05/20/19  0900 05/16/19  1010  05/12/19  2321 05/09/19  2048   NA  --   --  136  --  135 137   POTASSIUM  --   --  3.8  --  4.0 4.7   CHLORIDE  --   --  99  --  100 104   CO2  --   --  31  --  29 25   BUN 12 10 14   < > 15 14   CR 0.87 0.69 0.90   < > 0.88 0.97   ANIONGAP  --   --  6  --  6 8   DMITRIY  --   --  8.5  --  8.4* 8.8   GLC  --   --  110*  --  137* 108*    < > = values in this interval not displayed.       ASSESSMENT/PLAN:  Aftercare following surgery of the musculoskeletal system  S/P spinal " surgery  Physical deconditioning  Acute on chronic, pain improving. Improved mobility. Monitor - f/u with progress. F/U with surgeon as planned.     Swelling of right upper extremity  Resolved. Monitor.     Cough  Improving. Monitor.     Pulmonary fibrosis (H)  Ongoing - o2 use, will attempt to wean    Type 2 diabetes mellitus with diabetic neuropathy, with long-term current use of insulin (H)  Chronic, well managed. Meds, bg checks as above. F/U PRN.     Anemia due to blood loss, acute  Improved - CBC on 5/30.     Orders written by provider at facility  CBC on 5/30 diagnosis anemia, leukocytosis    Total time spent with patient visit at the [facility] nursing [facility] was 35 min including patient visit and review of past records. Greater than 50% of total time spent with counseling and coordinating care due to review of history, current status, concerns and POC to address them as noted above    Electronically signed by:  ANASTACIA Vitale CNP

## 2019-05-29 NOTE — TELEPHONE ENCOUNTER
Julia VELASQUEZ CMa called to inform [facility] of Dr. Moura' response from previous telephone note. No further action required.

## 2019-05-29 NOTE — NURSING NOTE
Visit Reason: New, post hospital visit      Evaluation / Assessment:   Patient states pain level of 6/10 in mid abdominal region. Vitals taken, allergies and medications reviewed.      Labs: NA      Procedure ordered? NA      Dressing Change: NA      Vaccine ordered / administered: NA      Other: NA

## 2019-05-29 NOTE — TELEPHONE ENCOUNTER
[facility] called stating that they received orders to continue Ceftriaxone, but informed me that pt is currently not on any abx. They are wanting clarification asap so they know what the next steps are by the end of today.

## 2019-05-29 NOTE — TELEPHONE ENCOUNTER
Spoke with Dr. Moura and she stated that if the pt is currently not on Ceftriaxone then [facility] should not initiate it, but she would like for the pt to be on Vancomycin if not already until the stop date listed on the orders that were sent over.

## 2019-06-02 NOTE — ED NOTES
Bed: ED21  Expected date:   Expected time:   Means of arrival:   Comments:  Amparo 2 Chest pain 69 m

## 2019-06-02 NOTE — ED PROVIDER NOTES
History     Chief Complaint:  Chest Pain    HPI   Beulah Jane is a 69 year old male with a history of PE and pulmonary fibrosis who presents with chest pain. The patient is from [place] and came to the [place] for a lumbar fusion completed in two stages on 4/22 and 4/25 by Dr. Dial at the [facility]. The patient did have a chronic superficial seroma that was aspirated and cultured that grew staph epi. The patient subsequently was seen by Infectious Disease and plan was for 6 weeks of vancomycin (4/28-6/8) through a PICC line in his right upper arm. The patient has been staying at [facility] since his discharge from the hospital on 5/8 following his work up following his surgeries.    This morning, approximately 2 hours prior to arrival to the ED, while sitting in a chair at his TCU the patient states that he developed a small focal area of chest pain as well as some deep left upper quadrant abdominal pain that lasted for 10-15 minutes before resolving on its own. The patient states that upon hearing of his pain, staff at the TCU called EMS. Currently in the ED the patient denies any shortness of breath, chest heaviness or pain, abdominal or jaw pain or history of MI. The patient is noted to have swelling to his right hand and arm. He additionally has some swelling to his bilateral lower legs, but the patient states that this has been going on for a few weeks. The patient otherwise states he has been eating and drinking normally.  He reports that he gets 2 injections through his PICC line per day, though can't remember if her got his morning injection already or not.    Of note, the patient was seen in the ED last month on 5/12/19 for dyspnea. He had a chest CT scan at that time, see results below.    CARDIAC RISK FACTORS:  Sex:    Male  Tobacco:   Former  Hypertension:   Yes  Hyperlipidemia:  Yes  Diabetes:   Yes  Family History:  No    PE/DVT RISK FACTORS:  Sex:     Male  Hormones:   No  Tobacco:   Former  Cancer:   No  Travel:   No  Surgery:   Yes  Other immobilization: No  Personal history:  Yes  Family history:  No    See results for patient's Chest CT on 5/12/19 below.    Chest CT, IV contrast only - PE protocol  1. A few small bilateral upper lobe pulmonary emboli are stable or  slightly decreased in the interval. No new pulmonary emboli.  2. Cardiomegaly.  3. Fibrosis and probable pulmonary edema at the periphery of the lungs  and lung bases, similar to the previous exam.  MEME PAKRS MD    Allergies:  No known drug allergies.     Medications:    Tylenol  Albuterol neb solution  Albuterol Inhaler  Tessalon   Dulcolax  left eye-Mamadou 500 mg [place]  Vitamin D  Benadryl  Pepcid  Folic Acid PO  Gabapentin PO  Gemfibrozil PO  Atarax  Novolog Pen  Duoneb  Prevacid  Levothyroxine  Icy Hot  Robaxin  Methotrexate  Thera-Vit-M  Omega 3 1000 mg CAPS  Roxicodone  Miralax/Glycolax  Senokot-S/Pericolace   Bactrim DS/Septra DS  Ultram  Vancomycin HCL IV  Calan-SR    Past Medical History:    Aortic stenosis  Chronic Pain  Type 2 diabetes  Hyperlipidemia  Hypertension  Hypothyroidism  JUAN on CPAP  Pneumonia  Polymyositis  Pulmonary fibrosis  Seasonal Allergies  Acute hypoxemic respiratory failure  Dermatomyositis  Seroma due to trauma   DDD (degenerative disc disease), lumbar  Inflammatory arthritis  Spinal stenosis of lumbar region with neurogenic claudication  Physical deconditioning  Acute PE    Past Surgical History:    Knee Arthroscopy  Colonoscopy  Decompression, fusion lumbar posterior two levels, combined  Fusion lumbar anterior 3+ levels  Hernia Repair  Lumbar spine hardware removal  Optical tracking system fusion posterior spine thoracic 3+ levels  PICC insertion  Removal prosthetic material/mesh, abd wall necro tissues infection  Rotator cuff repair    Family History:    Colon Cancer    Social History:  Smoking Status: Former Smoker  Alcohol Use: Rarely  Patient presents  via EMS from his TCU.  Marital Status:       Review of Systems   Respiratory: Negative for chest tightness and shortness of breath.    Cardiovascular: Positive for chest pain and leg swelling.   Gastrointestinal: Positive for abdominal pain.   Musculoskeletal:        + Right hand swelling   All other systems reviewed and are negative.    Physical Exam     Patient Vitals for the past 24 hrs:   BP Temp Temp src Pulse Heart Rate Resp SpO2   06/02/19 1530 99/53 -- -- 95 -- -- --   06/02/19 1500 95/54 -- -- 94 -- -- 96 %   06/02/19 1430 110/67 -- -- 105 -- -- --   06/02/19 1400 101/61 -- -- 94 -- -- 94 %   06/02/19 1330 94/59 -- -- 99 -- -- 96 %   06/02/19 1300 115/69 -- -- 103 -- -- 96 %   06/02/19 1215 -- -- -- -- 99 19 98 %   06/02/19 1200 98/62 -- -- 93 90 16 90 %   06/02/19 1130 119/60 -- -- 93 96 25 97 %   06/02/19 1110 -- -- -- -- 93 15 92 %   06/02/19 1100 106/53 -- -- 87 96 19 95 %   06/02/19 1000 98/55 -- -- 92 92 28 100 %   06/02/19 0959 94/54 -- -- 92 -- -- --   06/02/19 0900 99/64 -- -- 100 99 19 98 %   06/02/19 0850 101/55 -- -- 101 99 15 96 %   06/02/19 0830 128/61 98.4  F (36.9  C) Oral 110 -- 18 91 %     Physical Exam  Nursing note and vitals reviewed.    Constitutional:  Appears well-developed and well-nourished, comfortable.    HENT:    Nose normal.  No discharge.      Oropharynx is clear and moist.  Eyes:    Conjunctivae are normal without injection.   Lymph:  No enlarged or tender cervical or submandibular lymph nodes.   Cardiovascular:  Normal rate, regular rhythm with normal S1 and S2.      Normal heart sounds and peripheral pulses 2+ and equal.       No murmur or khushi.     No reproducible chest wall tenderness.  Pulmonary:  Effort normal and breath sounds clear to auscultation bilaterally. No respiratory distress.  No stridor.     No wheezes. No rales.     GI:    Soft. No distension and no mass. No tenderness.   Musculoskeletal:  Normal range of motion. No extremity deformity.     1+  edema below the knees. Puffiness in right hand and forearm.     No tenderness.  No cyanosis.  Neurological:   Alert and oriented. No cranial nerve deficit, no facial droop.     Exhibits good muscle tone. Coordination normal.      GCS eye subscore is 4. GCS verbal subscore is 5.      GCS motor subscore is 6.   Skin:    Skin is warm and dry. No rash noted. No diaphoresis.      PICC line right upper arm, no surrounding erythema.     No pallor.  There is a little bit of redness where the tape was only.     Wound on his back looks like it is healing well.  No redness no drainage.  Psychiatric:   Behavior is normal. Appropriate mood and affect.     Judgment and thought content normal.     Emergency Department Course     ECG (8:42:27):  Rate 99 bpm. ME interval 132 ms. QRS duration 146 ms. QT/QTc 382/490 ms. P-R-T axes 12 178 -24.   Normal sinus rhythm.  Possible Left atrial enlargement.  Nonspecific intraventricular block.  Abnormal ECG.  No significant change compared to ECG dated 5/12/19.  Interpreted at 0845 by Fabiola Kidd MD.    Imaging:  Radiographic findings were communicated with the patient who voiced understanding of the findings.    US Upper Extremity Venous Duplex Right  No evidence for DVT within the right arm.  As read by radiology.    Chest CT, IV contrast only - PE protocol  1. Stable to slightly smaller, very small, bilateral upper lobe  pulmonary emboli. No new acute pulmonary embolism in the interval.  2. Stable prominent bibasilar and subpleural regions of consolidation  with interstitial prominence. No new airspace disease identified.  3. Stable mildly prominent chest lymph nodes.  4. Stable cardiomegaly.  As read by radiology.    Laboratory:    0840: Troponin I: <0.015  1140: Troponin I: 0.017    D dimer quantitative: 7.1 (H)    Nt probnp inpatient (BNP): 2,344 (H)    BMP: Glucose 130 (H), o/w AWNL (Creatinine 0.85)    CBC: WBC 11.1 (H), HGB 9.8 (L),  (H), RBC 4.19 (L), HCT 34.1 (L),  MCH 23.4 (L), MCHC 28.7 (L), RDW 18.8 (H), o/w AWNL    Interventions:    0858: Aspirin 324 mg PO  1145: Lasix 40 mg IV  1433: Cathflo Activase 2 mg IV    Emergency Department Course:  Past medical records, nursing notes, and vitals reviewed.  0831: I performed an exam of the patient and obtained history, as documented above.    Blood was drawn.    The patient was sent for an ultrasound while in the emergency department, findings above.    1014: I rechecked the patient. Explained findings to the patient.    The patient was sent for a CT scan while in the emergency department, findings above.    1110: I discussed the case with Dr. Dial of Orthopedics at the [facility] regarding the patient.    1113: I rechecked the patient. Explained findings to the patient.    1134: I discussed the case with Dr. Amaral of Cardiology regarding the patient.    1224: I rechecked the patient. Explained findings to the patient and his son.    1253: I rechecked the patient. Explained findings to the patient and his son.    1315: I rechecked the patient.    I rechecked the patient. Findings and plan explained to the Patient and son. Patient discharged home with instructions regarding supportive care, medications, and reasons to return. The importance of close follow-up was reviewed.      Impression & Plan      Medical Decision Making:  Patient comes in with 15 minutes of localized left anterior chest wall tenderness.  It is gone now and he has not recurred while in the emergency room.  EKG is unchanged from previous EKGs.  He has not had any fevers, no coughing.  I did get a d-dimer with his history of PEs and it came back high at 7.1.  He has some puffiness in his right arm where he has the PICC line and so I did get an ultrasound of that arm and there was no clot.  Basic metabolic panel is normal, his white count is slightly elevated 11.1 hemoglobin is improved at 9.8.  His differential is unremarkable.  I did get a BNP and  is elevated at 2344, slightly up from where it was in the past.  His troponin is normal at less than 0.05.  I did get a CT of the chest and it did show some bilateral upper lobe subsegmental pulmonary emboli.  They are actually slightly improved from the last CT.  He has some interstitial prominence noted.  He does have a history of pulmonary fibrosis and is on oxygen.  With a history of the PEs, he is not currently on anticoagulation and the anticoagulation was stopped after his surgery because he had some postop bleeding from the incision.  I talked with Dr. Dial the surgeon and he said that since the wound is looking normal without any bleeding or evidence of infection that he would be okay if the vascular doctors would want him to be on blood thinners.  I discussed the case then with the cardiologist on-call who felt that his subsegmental clots are improving without anticoagulation that he would be fine to continue at this time off anticoagulation.  We could not get his PICC line to flush or to infuse so we contacted the vascular team.  She came down and was able to get it to infuse but could not draw back.  2 mg of alteplase was put into the catheter and after 45 minutes we still were unable to draw back.  I discussed the case with Dr. Grewal from  who said it was safe to infuse the 2 mg alteplase to clear the line and then to continue to use it.  However if it gets more difficult to infuse the vancomycin, it would need to be changed out and he would be available to do that this week.  A second troponin was drawn and it was still 0.017, no change essentially.  I do not believe that his chest pain was from ischemia.  It was so localized in brief that it was more likely musculoskeletal.  If he develops worsening chest pain he knows to return to the ER.  I would like him to be followed up in the clinic in the next couple of days or with the doctor to see him at [place].  The cardiologist did recommend a few  days of Lasix for the elevated BNP, mild congestion on CT and the leg edema.  He was given 40 mg IV here and will be sent home on 20 mg a day for 2 more mornings.    Diagnosis:    ICD-10-CM    1. Atypical chest pain R07.89    2. Other chronic pulmonary embolism without acute cor pulmonale (H) I27.82    3. Congestive heart failure, unspecified HF chronicity, unspecified heart failure type (H) I50.9    4. Bilateral leg edema R60.0    5. IPF (idiopathic pulmonary fibrosis) (H) J84.112        Disposition:  Discharged to home. Take the Lasix once a day for 2 more days in the morning.  Push fluids.  See your doctor in the next few days for recheck.  If your chest pain returns and gets worse, especially if associated with shortness of breath, return to the ER.    Discharge Medications:     Medication List      Started    furosemide 20 MG tablet  Commonly known as:  LASIX  20 mg, Oral, DAILY              Apryl Morris  6/2/2019    EMERGENCY DEPARTMENT  I, Apryl Morris, am serving as a scribe at 8:31 AM on 6/2/2019 to document services personally performed by Fabiola Kidd MD based on my observations and the provider's statements to me.        Fabiola Kidd MD  06/02/19 [id number]

## 2019-06-02 NOTE — ED TRIAGE NOTES
Pt is staying at [facility] while recovering from back surgery, today had sudden chest pain that only lasted a few seconds, upon EMS arrival pt had BM and reports while bearing down the pain presented again but again only lasted a few seconds. Pt denies pain upon arrival to ED, is always on 1 L NC

## 2019-06-02 NOTE — ED AVS SNAPSHOT
Emergency Department  [address] [facility] [address]  Phone:  [phone]  Fax:  [phone]                                    Beulah Jane   MRN: [id number]    Department:   Emergency Department   Date of Visit:  6/2/2019           After Visit Summary Signature Page    I have received my discharge instructions, and my questions have been answered. I have discussed any challenges I see with this plan with the nurse or doctor.    ..........................................................................................................................................  Patient/Patient Representative Signature      ..........................................................................................................................................  Patient Representative Print Name and Relationship to Patient    ..................................................               ................................................  Date                                   Time    ..........................................................................................................................................  Reviewed by Signature/Title    ...................................................              ..............................................  Date                                               Time          22EPIC Rev 08/18

## 2019-06-02 NOTE — TELEPHONE ENCOUNTER
"He was sitting in bed when he started feeling pain in upper left side of chest \"like I was punched\" which lasted about 10-15 minutes. Felt associated weakness and nauseated for brief moment. He repositioned and in time it improved. Does have significant cardiac h/o with NSTEMI this spring and severe aortic stenosis as well as h/o PE. Is anemic as well. Non-smoker.  I spoke with Beulah about the above symptoms and he consented to transfer to ER.      PLAN: Transfer to ER for serial troponins/EKG/eval.    Julia Meng, DO     "

## 2019-06-02 NOTE — DISCHARGE INSTRUCTIONS
Take the Lasix once a day for 2 more days in the morning.  Push fluids.  See your doctor in the next few days for recheck.  If your chest pain returns and gets worse, especially if associated with shortness of breath, return to the ER.

## 2019-06-03 NOTE — PROGRESS NOTES
ID clinic follow-up visit note         Assessment and Recommendations:   Problem List:  1. S/p stage I lumbar fusion, 4/22, and stage II, 4/25  2. Seroma fluid culture growing (normal skin marlee and staph epi)  3. Dermatomyositis (Shannon-1 deepak +), ILD - on weekly methotrexate, currently on prednisone taper, rituximab infusion on 3/11  4. Acute PE during 4/2019 admission, not on anticoagulation due to bleeding concerns post-op  5. DM       Impression: Mr. Alexander is a 68 y/o man s/p stage I lumbar fusion, 4/22, and stage II, 4/25. He had back hernia surgery in 2006 in [facility]. His repair was performed with mesh and it was fine for a few years but then became infected in 2009 (unclear about the details here). He had 2 surgeries to remove the mesh and remove the abscessed/infected tissue, but there was a residual superficial collection. These interventions occurred at another facility.  He underwent lumbar interbody fusion via anterior RP approach to L3-4 and L4-5 with Nuvavasive BASE cage, allograft, and intervertrebral prosthetic device placement; return to OR on 4/25 for posterior T10-S1 instrumentation with closure. 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 Further, it is possible that even though this collection was anatomically superficial, it may have arisen from deeper structures given his hx of infection in which case deeper structures may have had residual CONS even before his procedures on 4/22 and 4/25.     His incision is healing well and any infection that may have been present appears to be under good control, though assessment is difficult due to his chronic underlying pain that I suspect is largely unrelated to infection. ESR remains high but  suspect this will be chronic. CRP mike but was rechecked on 6/2 (following his visit with me on 5/29) and is improved.       Recommendations:  1. Continue vanco to complete a 6 weeks course, this translates to a stop date of 6/8  After vanco is stopped, would continue bactrim, which he is taking for PJP prophylaxis while on steroids for his ILD. I had initially considered doxy, but since bactrim is already on board I think is makes the most sense to continue this agent. I would consider increasing to 1 DS tab daily, will discuss with his PCP and/or pulmonologist.  (Note that when I completed his TCU paperwork, I indicated that vancomycin should continue along with ceftriaxone due to the fact that review of his MAR suggested he was on both agents. However, he is on only vanco as had been directed by me during his prior hospitalization.)  2. F/u with me in 2-3 months  3. Suggest ongoing pulm f/u for ILD, which produces cough and exacerbates spinal symptoms    It is a pleasure to participate in Beulah's care. Visit length 25 min, >50% clinical counseling/care coordination.    Apryl Moura MD   of Medicine, Division of Infectious Diseases  pgr [phone]          Interval History:     I last saw Beulah when he was in the hospital in early May. Since that time he is doing relatively well, in a TCU. He has a chronic, largely non-productive cough. Coughing unfortunately exacerbates his ongoing back pain. He has weakness of his bilateral hip flexors. His PICC is reportedly working well.           Current Antimicrobials   Vancomycin          Physical Exam:   Ranges for vital signs:  Temp:  [98.4  F (36.9  C)] 98.4  F (36.9  C)  Pulse:  [] 95  Heart Rate:  [90-99] 99  Resp:  [15-28] 19  BP: ()/(53-69) 109/55  SpO2:  [90 %-100 %] 98 %      Exam:  GENERAL:  well-developed, well-nourished, in no acute distress.   ENT:  Head is normocephalic, atraumatic. Oropharynx is moist without exudates or  ulcers.  EYES:  Eyes have anicteric sclerae. PERRL.   NECK:  Supple. No cervical lymphadenopathy  EXT: Extremities warm and without edema.  SKIN:  No acute rashes. Posterior incision examined and it is very well-appearing. PICC in place.  NEUROLOGIC:  Strength of hip flexors is ~4-/5. Mentating well.           Laboratory Data:     Creatinine   Date Value Ref Range Status   06/02/2019 0.85 0.66 - 1.25 mg/dL Final   05/29/2019 0.76 0.66 - 1.25 mg/dL Final   05/27/2019 0.87 0.66 - 1.25 mg/dL Final   05/20/2019 0.69 0.66 - 1.25 mg/dL Final   05/16/2019 0.90 0.66 - 1.25 mg/dL Final     WBC   Date Value Ref Range Status   06/02/2019 11.1 (H) 4.0 - 11.0 10e9/L Final   05/30/2019 10.7 4.0 - 11.0 10e9/L Final   05/27/2019 13.3 (H) 4.0 - 11.0 10e9/L Final   05/20/2019 9.3 4.0 - 11.0 10e9/L Final   05/16/2019 8.6 4.0 - 11.0 10e9/L Final     Hemoglobin   Date Value Ref Range Status   06/02/2019 9.8 (L) 13.3 - 17.7 g/dL Final     Platelet Count   Date Value Ref Range Status   06/02/2019 581 (H) 150 - 450 10e9/L Final     Sed Rate   Date Value Ref Range Status   05/27/2019 99 (H) 0 - 20 mm/h Final   05/20/2019 49 (H) 0 - 20 mm/h Final   05/13/2019 70 (H) 0 - 20 mm/h Final   05/09/2019 Canceled, Test credited 0 - 20 mm/h Final     Comment:     Test added to previous specimen   05/09/2019 55 (H) 0 - 20 mm/h Final     CRP Inflammation   Date Value Ref Range Status   05/27/2019 114.0 (H) 0.0 - 8.0 mg/L Final   05/20/2019 20.7 (H) 0.0 - 8.0 mg/L Final   05/13/2019 100.0 (H) 0.0 - 8.0 mg/L Final   05/09/2019 Canceled, Test credited 0.0 - 8.0 mg/L Final     Comment:     Test added to previous specimen   05/09/2019 58.4 (H) 0.0 - 8.0 mg/L Final     AST   Date Value Ref Range Status   04/27/2019 90 (H) 0 - 45 U/L Final   11/30/2017 14 0 - 45 U/L Final   11/27/2017 15 0 - 45 U/L Final     ALT   Date Value Ref Range Status   04/27/2019 56 0 - 70 U/L Final   11/30/2017 14 0 - 70 U/L Final   11/27/2017 12 0 - 70 U/L Final     Bilirubin Total    Date Value Ref Range Status   04/27/2019 0.3 0.2 - 1.3 mg/dL Final   11/30/2017 0.3 0.2 - 1.3 mg/dL Final   11/27/2017 0.5 0.2 - 1.3 mg/dL Final     Lab Results   Component Value Date     06/02/2019    BUN 14 06/02/2019    CO2 30 06/02/2019       Culture data:  All cultures:  Fluid culture 4/25  Staphylococcus epidermidis     Antibiotic Interpretation Sensitivity Method Status    CIPROFLOXACIN Resistant >2.0 ug/mL BRENDON Final    CLINDAMYCIN Sensitive <=0.25 ug/mL BRENDON Final    ERYTHROMYCIN Sensitive <=0.25 ug/mL BRENDON Final    GENTAMICIN Resistant >8.0 ug/mL BRENDON Final    LEVOFLOXACIN Resistant >4.0 ug/mL BRENDON Final    OXACILLIN Resistant >2.0 ug/mL BRENDON Final    TETRACYCLINE Sensitive <=1.0 ug/mL BRENDON Final    VANCOMYCIN Sensitive 2.0 ug/mL BRENDON Final        Recent Labs   Lab 04/30/19 2033 04/30/19 2028   CULT No growth after 5 days No growth after 5 days

## 2019-06-05 NOTE — LETTER
6/5/2019        RE: Beulah Jane  [address]  [facility] [address]        [facility]  [place] Medical Record Number:  [id number]  Place of Service where encounter took place:  [facility][id number]  Chief Complaint   Patient presents with     RECHECK       HPI:    Beulah Jane  is a 69 year old (1950), who is being seen today for an episodic care visit.  HPI information obtained from: facility chart records, facility staff, patient report and [facility] chart review. Today's concern is:    Brief Summary of Hospital Course: patient underwent planned two staged lumbar fusion on 4/22 and 4/25 due to  thoracolumbar kyphosis with flat back syndrome and thoracolumbar stenosis with myeloradiculopathy. During second surgery a chronic seroma was debrided and cultured. It grew out staph epi. ID was consulted and recommended IV vanco until 6/8 hen 3 weeks of po doxycycline.     Post op issues included type 2 NSTEMI likely due to ILD, PULMONARY EMBOLISM and aortic stenosis. He was started on heparin for finding of subsegmental pulmonary emboli on 4/28 then developed  bleeding at incision so anticoagulation had to be held.      PMH includes dermatomyositis and ILD, DM2, hypertension.hypothyroidism.      Once he was stabilized he was transferred to TCU due to weakness.      Updates on Status Since Skilled nursing Admission:   Patient was sent to ED on 5/9 and 5/12.   On 5/9 patient became shaky and sweaty. pulse 113-125, BP 86/51, O2 sats 95% on 0.5L nasal cannula. Patient's also c/o of uncontrolled pain. He was sent to ED to r/o sepsis. In ED he was treated for dehydration and sent back to TCU.    On 5/12 he was sent to ED due to shortness of breath and had sats in 80s while on supplemental oxygen.  In ED chest CT  showed few small bilateral upper lobe pulmonary emboli and fibrosis. His O2 sats in mid 90s. He was sent back to TCU.     5/14 and 5/15,  temp up to 102. Oxycodone was reduced, robaxin and methotrexate was held and Qvar was added.     5/20- lasix added due to wet sounding cough with dependent rales.      He did return to ID on 5/29. He continues to have elevated ESR and CRP. Overall infection from seroma is improving. Last day for IV vanco is 6/8.     He did go to ED on 6/2 due to chest pain. Work up revealed elevated D dimer but chest CT showed improvement of known pulmonary emboli. BNP was elevated at 2344. But other tests did not suggest the short episode of chest pain was cardiac related. He was sent back to TCU.     Today he is much more alert and looks great. He is afebrile and breathing more comfortably. He has much less pain. He is making progress with therapy. He does report pruritic rash that started a few days ago. Nursing is applying HC 2.5% cream to rash. He reports it is improving. No new meds recently.       Past Medical and Surgical History reviewed in Epic today.    MEDICATIONS:  Current Outpatient Medications   Medication Sig Dispense Refill     acetaminophen (TYLENOL) 325 MG tablet Take 2 tablets (650 mg) by mouth every 4 hours as needed for mild pain or fever       albuterol (2.5 MG/3ML) 0.083% neb solution Take 1 vial by nebulization every 6 hours as needed for shortness of breath / dyspnea or wheezing       albuterol (PROAIR HFA/PROVENTIL HFA/VENTOLIN HFA) 108 (90 BASE) MCG/ACT Inhaler Inhale 2 puffs into the lungs every 6 hours as needed for shortness of breath / dyspnea or wheezing       beclomethasone (QVAR) 80 MCG/ACT AERS IS A DISCONTINUED MEDICATION Inhale 2 puffs into the lungs 2 times daily       benzonatate (TESSALON) 100 MG capsule Take 100 mg by mouth 3 times daily as needed for cough       bisacodyl (DULCOLAX) 10 MG suppository Place 1 suppository (10 mg) rectally daily as needed for constipation       calcium carbonate (OS-DMITRIY 500 MG [place]. CA) 500 MG tablet Take 1 tablet (500 mg) by mouth 2 times daily 180 tablet 3      Cholecalciferol (VITAMIN D) 2000 units tablet Take 2,000 Units by mouth daily 100 tablet 3     diphenhydrAMINE (BENADRYL) 50 MG capsule Take 50 mg by mouth every 6 hours as needed for itching or allergies       famotidine (PEPCID) 20 MG tablet Take 1 tablet (20 mg) by mouth daily       FOLIC ACID PO Take 1 mg by mouth daily       GABAPENTIN PO Take 600 mg by mouth 3 times daily       GEMFIBROZIL PO Take 600 mg by mouth 2 times daily       hydrocortisone 2.5 % cream Apply topically 2 times daily Apply to Rash on Back topically two times a day       hydrOXYzine (ATARAX) 25 MG tablet Take 1 tablet (25 mg) by mouth every 6 hours as needed for other (adjuvant pain)       insulin aspart (NOVOLOG PEN) 100 UNIT/ML pen Inject 1-7 Units Subcutaneous 3 times daily (before meals)       insulin aspart (NOVOLOG PEN) 100 UNIT/ML pen Inject 1-5 Units Subcutaneous At Bedtime       ipratropium - albuterol 0.5 mg/2.5 mg/3 mL (DUONEB) 0.5-2.5 (3) MG/3ML neb solution Take 1 vial by nebulization 3 times daily       LANsoprazole (PREVACID) 30 MG DR capsule Take 30 mg by mouth every morning (before breakfast)       levothyroxine (SYNTHROID/LEVOTHROID) 50 MCG tablet Take 50 mcg by mouth daily       menthol (ICY HOT) 5 % PTCH Apply 1 patch topically every 8 hours as needed for muscle soreness       methocarbamol (ROBAXIN) 500 MG tablet Take 1 tablet (500 mg) by mouth 4 times daily       methotrexate 2.5 MG tablet Take 10 tablets (25 mg) by mouth once a week Thursdays       multivitamin w/minerals (THERA-VIT-M) tablet Take 1 tablet by mouth daily       omega 3 1000 MG CAPS Take 2 capsules by mouth 2 times daily       oxyCODONE IR (ROXICODONE) 10 MG tablet Take 5-10 mg by mouth every 4 hours        polyethylene glycol (MIRALAX/GLYCOLAX) packet Take 17 g by mouth daily       senna-docusate (SENOKOT-S/PERICOLACE) 8.6-50 MG tablet Take 2 tablets by mouth 2 times daily       sodium chloride, PF, 0.9% PF flush 10 mLs by Intracatheter route  "every 7 days       sodium chloride, PF, 0.9% PF flush 10-20 mLs by Intracatheter route every hour as needed for line flush or post meds or blood draw       sulfamethoxazole-trimethoprim (BACTRIM DS/SEPTRA DS) 800-160 MG per tablet Take 1 tablet 3 times a week (Monday, Wednesday, Friday)  11     VANCOMYCIN HCL IV Inject 1,200 mg into the vein 2 times daily for Postprocedural Seroma of a musculoskeletal structure Vanco Trough to be draw on Mon [facility] to dose.       verapamil ER (CALAN-SR) 120 MG CR tablet Take 120 mg by mouth At Bedtime       furosemide (LASIX) 20 MG tablet Take 1 tablet (20 mg) by mouth daily for 2 days 2 tablet 0     traMADol (ULTRAM) 50 MG tablet Take 1 tablet (50 mg) by mouth every 6 hours as needed for moderate pain 129 tablet 0         REVIEW OF SYSTEMS:  4 point ROS including Respiratory, CV, GI and , other than that noted in the HPI,  is negative    Objective:  BP 94/57   Pulse 97   Temp 98.3  F (36.8  C)   Resp 18   Ht 1.575 m (5' 2\")   Wt 70.3 kg (155 lb)   SpO2 98%   BMI 28.35 kg/m     Exam:  GENERAL APPEARANCE:  Alert, in no distress  ENT:  Mouth and posterior oropharynx normal, moist mucous membranes, hearing acuity adequate   EYES:  EOM, conjunctivae, lids, pupils and irises normal    RESP:  respiratory effort and palpation of chest normal, no respiratory distress, Lung sounds faint crackles in bases  CV:  Palpation and auscultation of heart done , rate and rhythm irreg, systolic murmur, no rub or gallop, Edema normal   ABDOMEN:  normal bowel sounds, soft, nontender,  M/S:   Gait and station weak but able to take some steps in parallel bars, Digits and nails normal   SKIN:  Inspection/Palpation of skin and subcutaneous tissue incision CDI, scattered macular papulur rash on trunk and limbs.   NEURO: 2-12 in normal limits and at patient's baseline  PSYCH:  insight and judgement, memory intact , affect and mood normal    Labs:   Labs done in SNF are in [facility] EPIC. Please " refer to them using EPIC/Care Everywhere. and Recent labs in [facility] reviewed by me today.     ASSESSMENT/PLAN:  (Z98.890) S/P spinal surgery  (primary encounter diagnosis)  (M96.843) Postprocedural seroma of a musculoskeletal structure following other procedure  Comment: patient transferred to TCU from hospital on 5/8/19 after two staged spinal fusion due to thoracolumbar kyphosis with flat back syndrome and thoracolumbar stenosis with myeloradiculopathy. He also had seroma debrided and cultured.   Complicated post op period with ABLA, pulmonary emboli, demand ischemia and debrided seroma that that was cultured and grew out staph epi. He has been on IV Vanco for 6 week course, which is done on 6/8. He did follow up with ID Dr Dueñas on 5/29.   He has been taking oxycodone 10mg 4-6 times a day. He is making progress with therapy. He is not taking robaxin.  he is taking some tramadol 1-2 times per day  Plan: decrease oxycodone 5mg q 6 h prn  DISCONTINUE robaxin.  physical therapy - no bending or twisting, no lifting more than 10# for 6 weeks  Monitor incision  Follow up with ortho spine 6/7  IV vanco until 6/8   twice weekly labs- BMP, CBC w diff, ESR and CRP   [facility] to dose vanco      (I26.99) Other acute pulmonary embolism without acute cor pulmonale (H)  Comment: at last CT of chest the pulmonary emboli are shrinking. At last ED visit the physician did discuss this with cardiology who thought since the emboli are improving no need to start anticoagulation now.   Plan: monitor     (M33.90) Dermatomyositis (H)  (J84.10) Pulmonary fibrosis (H)  Comment: followed by rheumatology Dr Call and pulmonoloogy Dr Lennox at [facility]. He had been methotrexate 25mg q weeks. Most recent DLCO was reduced so needed rituximab in March. He was not on oxygen at home.  Prior to surgery he was on long taper of prednisone. During hospital stay prednisone was increased to  5mg BID then back to 5mg q day at time of discharge to  TCU.  since in TCU Theophylline was reduced to 200mg q day then discontinued after ok by Dr Lennox. . HR lower now- in the 80s-100s. Patient has been refusing beclomethasone because he reports allergic reaction to this. He is not taking duo nebs or albuterol. He has no wheezing today.   Plan:continue prednisone 5mg q day   Continue to Hold methotrexate  q aiyana 2 puffs BID  Continue PTA  ellipta and prn albuterol   Supplemental oxygen.   DISCONTINUE beclamethosone inhaler  Follow up with Dr Call on 6/11    (I25.2) Status post non-ST elevation myocardial infarction (NSTEMI)  (I35.0) Severe aortic stenosis  Comment: patient developed elevated troponins with hypoxia  post operatively. Cardiology was consulted. Thought not ACS but more likely due to demand ischemia due to aortic stenosis and interstitial lung disease and deconditioning.   He did have more of a cough on 5/20 with BB rales. Lasix 20mg q day added.   He did return to ED on 6/2 due to short episode of chest pain. Work up excluded cardiac source. He did have elevated BNP and some sx of edema with mild congestion on CXR and was prescribed a few days of lasix.   wts in TCU show 5# decrease from 6/1.   Plan: needs follow up with cardiology for consideration for TAVR- appointments in July  Monitor respiratory status  Continue lasix 20mg q day.   BMP 6/11    (D62) Anemia due to blood loss, acute  Comment: hb dropped from 15.9 to 7.7. Now hgb trending back up- now at 9.4  Plan:  follow hgb      (E11.40,  Z79.4) Type 2 diabetes mellitus with diabetic neuropathy, with long-term current use of insulin (H)  Comment: BG<200 in TCU.   Plan: monitor   Continue novolog sliding scale       (I10) Essential hypertension  Comment: last three BPs ehre 94/57, 109/70, 96/61. He continues on verapamil for rate control.   Plan: monitor     (L20.89) Other atopic dermatitis  Comment: patient developed full body macular papular rash in past few days. HC 2.5% cream seems to be helping.  Not sure cause no new meds. Rash is subsiding per patient report  Plan: monitor     (R53.81) Physical deconditioning  Comment: due to back surgery and pulmonary disease. He is moving to apartment in the area with wife.  Plan: physical therapy and OCCUPATIONAL THERAPY       Total time spent with patient visit at the skilled nursing facility was 37 including patient visit and review of past records. Greater than 50% of total time spent with counseling and coordinating care due to complexity of medical issues with mutlple adjustments and review of reports.   Electronically signed by:  ANASTACIA Mcmanus CNP               Sincerely,        ANASTACIA Mcmanus CNP

## 2019-06-05 NOTE — PROGRESS NOTES
[facility]  Rosie Medical Record Number:  [id number]  Place of Service where encounter took place:  [facility] [[id number]]  Chief Complaint   Patient presents with     RECHECK       HPI:    Beulah Jane  is a 69 year old (1950), who is being seen today for an episodic care visit.  HPI information obtained from: facility chart records, facility staff, patient report and [facility] chart review. Today's concern is:    Brief Summary of Hospital Course: patient underwent planned two staged lumbar fusion on 4/22 and 4/25 due to  thoracolumbar kyphosis with flat back syndrome and thoracolumbar stenosis with myeloradiculopathy. During second surgery a chronic seroma was debrided and cultured. It grew out staph epi. ID was consulted and recommended IV vanco until 6/8 hen 3 weeks of po doxycycline.     Post op issues included type 2 NSTEMI likely due to ILD, PULMONARY EMBOLISM and aortic stenosis. He was started on heparin for finding of subsegmental pulmonary emboli on 4/28 then developed  bleeding at incision so anticoagulation had to be held.      PMH includes dermatomyositis and ILD, DM2, hypertension.hypothyroidism.      Once he was stabilized he was transferred to TCU due to weakness.      Updates on Status Since Skilled nursing Admission:   Patient was sent to ED on 5/9 and 5/12.   On 5/9 patient became shaky and sweaty. pulse 113-125, BP 86/51, O2 sats 95% on 0.5L nasal cannula. Patient's also c/o of uncontrolled pain. He was sent to ED to r/o sepsis. In ED he was treated for dehydration and sent back to TCU.    On 5/12 he was sent to ED due to shortness of breath and had sats in 80s while on supplemental oxygen.  In ED chest CT  showed few small bilateral upper lobe pulmonary emboli and fibrosis. His O2 sats in mid 90s. He was sent back to TCU.     5/14 and 5/15, temp up to 102. Oxycodone was reduced, robaxin and methotrexate was held and Qvar was  added.     5/20- lasix added due to wet sounding cough with dependent rales.      He did return to ID on 5/29. He continues to have elevated ESR and CRP. Overall infection from seroma is improving. Last day for IV vanco is 6/8.     He did go to ED on 6/2 due to chest pain. Work up revealed elevated D dimer but chest CT showed improvement of known pulmonary emboli. BNP was elevated at 2344. But other tests did not suggest the short episode of chest pain was cardiac related. He was sent back to TCU.     Today he is much more alert and looks great. He is afebrile and breathing more comfortably. He has much less pain. He is making progress with therapy. He does report pruritic rash that started a few days ago. Nursing is applying HC 2.5% cream to rash. He reports it is improving. No new meds recently.       Past Medical and Surgical History reviewed in Epic today.    MEDICATIONS:  Current Outpatient Medications   Medication Sig Dispense Refill     acetaminophen (TYLENOL) 325 MG tablet Take 2 tablets (650 mg) by mouth every 4 hours as needed for mild pain or fever       albuterol (2.5 MG/3ML) 0.083% neb solution Take 1 vial by nebulization every 6 hours as needed for shortness of breath / dyspnea or wheezing       albuterol (PROAIR HFA/PROVENTIL HFA/VENTOLIN HFA) 108 (90 BASE) MCG/ACT Inhaler Inhale 2 puffs into the lungs every 6 hours as needed for shortness of breath / dyspnea or wheezing       beclomethasone (QVAR) 80 MCG/ACT AERS IS A DISCONTINUED MEDICATION Inhale 2 puffs into the lungs 2 times daily       benzonatate (TESSALON) 100 MG capsule Take 100 mg by mouth 3 times daily as needed for cough       bisacodyl (DULCOLAX) 10 MG suppository Place 1 suppository (10 mg) rectally daily as needed for constipation       calcium carbonate (OS-DMITRIY 500 MG [place]. [place]) 500 MG tablet Take 1 tablet (500 mg) by mouth 2 times daily 180 tablet 3     Cholecalciferol (VITAMIN D) 2000 units tablet Take 2,000 Units by mouth daily 100  tablet 3     diphenhydrAMINE (BENADRYL) 50 MG capsule Take 50 mg by mouth every 6 hours as needed for itching or allergies       famotidine (PEPCID) 20 MG tablet Take 1 tablet (20 mg) by mouth daily       FOLIC ACID PO Take 1 mg by mouth daily       GABAPENTIN PO Take 600 mg by mouth 3 times daily       GEMFIBROZIL PO Take 600 mg by mouth 2 times daily       hydrocortisone 2.5 % cream Apply topically 2 times daily Apply to Rash on Back topically two times a day       hydrOXYzine (ATARAX) 25 MG tablet Take 1 tablet (25 mg) by mouth every 6 hours as needed for other (adjuvant pain)       insulin aspart (NOVOLOG PEN) 100 UNIT/ML pen Inject 1-7 Units Subcutaneous 3 times daily (before meals)       insulin aspart (NOVOLOG PEN) 100 UNIT/ML pen Inject 1-5 Units Subcutaneous At Bedtime       ipratropium - albuterol 0.5 mg/2.5 mg/3 mL (DUONEB) 0.5-2.5 (3) MG/3ML neb solution Take 1 vial by nebulization 3 times daily       LANsoprazole (PREVACID) 30 MG DR capsule Take 30 mg by mouth every morning (before breakfast)       levothyroxine (SYNTHROID/LEVOTHROID) 50 MCG tablet Take 50 mcg by mouth daily       menthol (ICY HOT) 5 % PTCH Apply 1 patch topically every 8 hours as needed for muscle soreness       methocarbamol (ROBAXIN) 500 MG tablet Take 1 tablet (500 mg) by mouth 4 times daily       methotrexate 2.5 MG tablet Take 10 tablets (25 mg) by mouth once a week Thursdays       multivitamin w/minerals (THERA-VIT-M) tablet Take 1 tablet by mouth daily       omega 3 1000 MG CAPS Take 2 capsules by mouth 2 times daily       oxyCODONE IR (ROXICODONE) 10 MG tablet Take 5-10 mg by mouth every 4 hours        polyethylene glycol (MIRALAX/GLYCOLAX) packet Take 17 g by mouth daily       senna-docusate (SENOKOT-S/PERICOLACE) 8.6-50 MG tablet Take 2 tablets by mouth 2 times daily       sodium chloride, PF, 0.9% PF flush 10 mLs by Intracatheter route every 7 days       sodium chloride, PF, 0.9% PF flush 10-20 mLs by Intracatheter route  "every hour as needed for line flush or post meds or blood draw       sulfamethoxazole-trimethoprim (BACTRIM DS/SEPTRA DS) 800-160 MG per tablet Take 1 tablet 3 times a week (Monday, Wednesday, Friday)  11     VANCOMYCIN HCL IV Inject 1,200 mg into the vein 2 times daily for Postprocedural Seroma of a musculoskeletal structure Vanco Trough to be draw on Mon [facility] to dose.       verapamil ER (CALAN-SR) 120 MG CR tablet Take 120 mg by mouth At Bedtime       furosemide (LASIX) 20 MG tablet Take 1 tablet (20 mg) by mouth daily for 2 days 2 tablet 0     traMADol (ULTRAM) 50 MG tablet Take 1 tablet (50 mg) by mouth every 6 hours as needed for moderate pain 129 tablet 0         REVIEW OF SYSTEMS:  4 point ROS including Respiratory, CV, GI and , other than that noted in the HPI,  is negative    Objective:  BP 94/57   Pulse 97   Temp 98.3  F (36.8  C)   Resp 18   Ht 1.575 m (5' 2\")   Wt 70.3 kg (155 lb)   SpO2 98%   BMI 28.35 kg/m    Exam:  GENERAL APPEARANCE:  Alert, in no distress  ENT:  Mouth and posterior oropharynx normal, moist mucous membranes, hearing acuity adequate   EYES:  EOM, conjunctivae, lids, pupils and irises normal    RESP:  respiratory effort and palpation of chest normal, no respiratory distress, Lung sounds faint crackles in bases  CV:  Palpation and auscultation of heart done , rate and rhythm irreg, systolic murmur, no rub or gallop, Edema normal   ABDOMEN:  normal bowel sounds, soft, nontender,  M/S:   Gait and station weak but able to take some steps in parallel bars, Digits and nails normal   SKIN:  Inspection/Palpation of skin and subcutaneous tissue incision CDI, scattered macular papulur rash on trunk and limbs.   NEURO: 2-12 in normal limits and at patient's baseline  PSYCH:  insight and judgement, memory intact , affect and mood normal    Labs:   Labs done in SNF are in [place] EPIC. Please refer to them using [employer] Everywhere. and Recent labs in EPIC reviewed by me today. "     ASSESSMENT/PLAN:  (Z98.890) S/P spinal surgery  (primary encounter diagnosis)  (M96.843) Postprocedural seroma of a musculoskeletal structure following other procedure  Comment: patient transferred to TCU from hospital on 5/8/19 after two staged spinal fusion due to thoracolumbar kyphosis with flat back syndrome and thoracolumbar stenosis with myeloradiculopathy. He also had seroma debrided and cultured.   Complicated post op period with ABLA, pulmonary emboli, demand ischemia and debrided seroma that that was cultured and grew out staph epi. He has been on IV Vanco for 6 week course, which is done on 6/8. He did follow up with ID Dr Dueñas on 5/29.   He has been taking oxycodone 10mg 4-6 times a day. He is making progress with therapy. He is not taking robaxin.  he is taking some tramadol 1-2 times per day  Plan: decrease oxycodone 5mg q 6 h prn  DISCONTINUE robaxin.  physical therapy - no bending or twisting, no lifting more than 10# for 6 weeks  Monitor incision  Follow up with ortho spine 6/7  IV vanco until 6/8   twice weekly labs- BMP, CBC w diff, ESR and CRP   [facility] to dose vanco      (I26.99) Other acute pulmonary embolism without acute cor pulmonale (H)  Comment: at last CT of chest the pulmonary emboli are shrinking. At last ED visit the physician did discuss this with cardiology who thought since the emboli are improving no need to start anticoagulation now.   Plan: monitor     (M33.90) Dermatomyositis (H)  (J84.10) Pulmonary fibrosis (H)  Comment: followed by rheumatology Dr Call and pulmonoloogy Dr Lennox at [facility]. He had been methotrexate 25mg q weeks. Most recent DLCO was reduced so needed rituximab in March. He was not on oxygen at home.  Prior to surgery he was on long taper of prednisone. During hospital stay prednisone was increased to  5mg BID then back to 5mg q day at time of discharge to TCU.  since in TCU Theophylline was reduced to 200mg q day then discontinued after ok by  Dr Lennox. . HR lower now- in the 80s-100s. Patient has been refusing beclomethasone because he reports allergic reaction to this. He is not taking duo nebs or albuterol. He has no wheezing today.   Plan:continue prednisone 5mg q day   Continue to Hold methotrexate  q aiyana 2 puffs BID  Continue PTA  ellipta and prn albuterol   Supplemental oxygen.   DISCONTINUE beclamethosone inhaler  Follow up with Dr Call on 6/11    (I25.2) Status post non-ST elevation myocardial infarction (NSTEMI)  (I35.0) Severe aortic stenosis  Comment: patient developed elevated troponins with hypoxia  post operatively. Cardiology was consulted. Thought not ACS but more likely due to demand ischemia due to aortic stenosis and interstitial lung disease and deconditioning.   He did have more of a cough on 5/20 with BB rales. Lasix 20mg q day added.   He did return to ED on 6/2 due to short episode of chest pain. Work up excluded cardiac source. He did have elevated BNP and some sx of edema with mild congestion on CXR and was prescribed a few days of lasix.   wts in TCU show 5# decrease from 6/1.   Plan: needs follow up with cardiology for consideration for TAVR- appointments in July  Monitor respiratory status  Continue lasix 20mg q day.   BMP 6/11    (D62) Anemia due to blood loss, acute  Comment: hb dropped from 15.9 to 7.7. Now hgb trending back up- now at 9.4  Plan:  follow hgb      (E11.40,  Z79.4) Type 2 diabetes mellitus with diabetic neuropathy, with long-term current use of insulin (H)  Comment: BG<200 in TCU.   Plan: monitor   Continue novolog sliding scale       (I10) Essential hypertension  Comment: last three BPs ehre 94/57, 109/70, 96/61. He continues on verapamil for rate control.   Plan: monitor     (L20.89) Other atopic dermatitis  Comment: patient developed full body macular papular rash in past few days. HC 2.5% cream seems to be helping. Not sure cause no new meds. Rash is subsiding per patient report  Plan: monitor      (R53.81) Physical deconditioning  Comment: due to back surgery and pulmonary disease. He is moving to apartment in the area with wife.  Plan: physical therapy and OCCUPATIONAL THERAPY       Total time spent with patient visit at the skilled nursing facility was 37 including patient visit and review of past records. Greater than 50% of total time spent with counseling and coordinating care due to complexity of medical issues with mutlple adjustments and review of reports.   Electronically signed by:  ANASTACIA Mcmanus CNP

## 2019-06-06 PROBLEM — L20.89 OTHER ATOPIC DERMATITIS: Status: ACTIVE | Noted: 2019-01-01

## 2019-06-07 NOTE — NURSING NOTE
"Reason For Visit:   Chief Complaint   Patient presents with     Surgical Followup     6 week follow up DOS 4/25/19       Ht 1.575 m (5' 2\")   Wt 79.4 kg (175 lb)   BMI 32.01 kg/m      Pain Assessment  Patient Currently in Pain: Yes    Alessio Jha ATC  "

## 2019-06-07 NOTE — LETTER
6/7/2019    RE: Beulah Jane  [address]  [place] [facility] [address]     Dear Colleague,    Thank you for referring your patient, Beulah Jane, to the [facility]. Please see a copy of my visit note below.    Spine Surgery Return Clinic Visit    Chief Complaint:   Surgical Followup (6 week follow up DOS 4/25/19)    Interval HPI:  Symptom Profile Including: location of symptoms, onset, severity, exacerbating/alleviating factors, previous treatments:        Beulah Jane is a 69 year old male who returns 6 weeks status post revision lumbar procedure with anterior interbody and revision posterior instrumented fusion.  Overall he is very happy.  He says he has no pain around the surgery area.  No pain in his upper back.  Denies any neurologic complaints.  He has been in an inpatient facility rehabbing.  He did have positive cultures from a seroma that was present preoperatively and we have been treating this with IV antibiotics which she will complete tomorrow.  He has no recurrent signs of infection at this time.         Past Medical History:     Past Medical History:   Diagnosis Date     Aortic stenosis      Chronic pain      DM (diabetes mellitus), type 2 (H)     on insulin     Hyperlipidemia      JUAN on CPAP      Pneumonia      Polymyositis (H)      Pulmonary fibrosis, unspecified (H)      Seasonal allergies             Past Surgical History:     Past Surgical History:   Procedure Laterality Date     ARTHROSCOPY KNEE  1970     COLONOSCOPY       DECOMPRESSION, FUSION LUMBAR POSTERIOR TWO LEVELS, COMBINED  1997     FUSION LUMBAR ANTERIOR THREE+ LEVELS N/A 4/22/2019    Procedure: Lumbar 1 Or Lumbar 2-5 Anterior Lumbar Interbody Fusion with BMP Stage 1 Of Two Procedure:;  Surgeon: Carlos Enrique Dial MD;  Location: [facility] OR     HERNIA REPAIR  2006     lumbar spine hardware removal  1999     OPTICAL TRACKING SYSTEM FUSION  POSTERIOR SPINE THORACIC THREE+ LEVELS N/A 2019    Procedure: O-Arm/Stealth Assisted Thoracic 10-Pelvis Instrumented Fusion T11-2, L1-2, L2-3 Transforminal Lumbar Interbody Fusion (TLIF) And Smith Borges Osteotomies,and L3-4 SPO as well, Use Of BMP, Debridement Of Seroma;  Surgeon: Carlos Enrique Dial MD;  Location: [facility] OR     PICC INSERTION Right 2019    4Fr - 37cm, Basilic vein, low SVC     REMOVAL PROSTHETIC MATERIAL/MESH, ABD WALL NECRO TISS INFEXN       ROTATOR CUFF REPAIR RT/LT              Social History:     Social History     Tobacco Use     Smoking status: Former Smoker     Packs/day: 0.25     Last attempt to quit: 1970     Years since quittin.9     Smokeless tobacco: Never Used   Substance Use Topics     Alcohol use: Not on file     Comment: few times monthly            Family History:   No family history on file.         Allergies:   No Known Allergies         Medications:     Current Outpatient Medications   Medication     acetaminophen (TYLENOL) 325 MG tablet     albuterol (2.5 MG/3ML) 0.083% neb solution     albuterol (PROAIR HFA/PROVENTIL HFA/VENTOLIN HFA) 108 (90 BASE) MCG/ACT Inhaler     benzonatate (TESSALON) 100 MG capsule     bisacodyl (DULCOLAX) 10 MG suppository     calcium carbonate (OS-DMITRIY 500 MG [place]. [place]) 500 MG tablet     Cholecalciferol (VITAMIN D) 2000 units tablet     diphenhydrAMINE (BENADRYL) 50 MG capsule     famotidine (PEPCID) 20 MG tablet     FOLIC ACID PO     GABAPENTIN PO     GEMFIBROZIL PO     hydrocortisone 2.5 % cream     hydrOXYzine (ATARAX) 25 MG tablet     insulin aspart (NOVOLOG PEN) 100 UNIT/ML pen     insulin aspart (NOVOLOG PEN) 100 UNIT/ML pen     ipratropium - albuterol 0.5 mg/2.5 mg/3 mL (DUONEB) 0.5-2.5 (3) MG/3ML neb solution     LANsoprazole (PREVACID) 30 MG DR capsule     levothyroxine (SYNTHROID/LEVOTHROID) 50 MCG tablet     menthol (ICY HOT) 5 % PTCH     methotrexate 2.5 MG tablet     multivitamin w/minerals (THERA-VIT-M)  "tablet     omega 3 1000 MG CAPS     oxyCODONE IR (ROXICODONE) 10 MG tablet     polyethylene glycol (MIRALAX/GLYCOLAX) packet     senna-docusate (SENOKOT-S/PERICOLACE) 8.6-50 MG tablet     sodium chloride, PF, 0.9% PF flush     sodium chloride, PF, 0.9% PF flush     sulfamethoxazole-trimethoprim (BACTRIM DS/SEPTRA DS) 800-160 MG per tablet     traMADol (ULTRAM) 50 MG tablet     VANCOMYCIN HCL IV     verapamil ER (CALAN-SR) 120 MG CR tablet     Current Facility-Administered Medications   Medication     ipratropium - albuterol 0.5 mg/2.5 mg/3 mL (DUONEB) neb solution 3 mL             Review of Systems:   A focused musculoskeletal and neurologic ROS was performed with pertinent positives and negatives noted in the HPI.  Additional systems were also reviewed and are documented at the bottom of the note.         Physical Exam:   Vitals: Ht 1.575 m (5' 2\")   Wt 79.4 kg (175 lb)   BMI 32.01 kg/m     Musculoskeletal, Neurologic, and Spine:          Lumbar Spine:    Appearance - No gross stepoffs or deformities    Motor -     L2-3: Hip flexion 5/5 R and 5/5 L strength          L3/4:  Knee extension R 5/5 and L 5/5 strength         L4/5:  Foot dorsiflexion R 5/5 L 5/5 and       EHL dorsiflexion R 4/5 L 4/5 strength         S1:  Plantarflexion/Peroneal Muscles  R 5/5 and L 5/5 strength    Sensation: intact to light touch L3-S1 distribution BLE      Neurologic:      REFLEXES Left Right                  Patella 1+ 1+   Ankle jerk 1+ 1+   Babinski No upgoing great toe No upgoing great toe   Clonus 0 beats 0 beats     Hip Exam:  No pain with hip log roll and no tenderness over the greater trochanters.    Alignment:  Patient stands with a neutral standing sagittal balance.         Imaging:   We ordered and independently reviewed new radiographs at this clinic visit. The results were discussed with the patient. Findings include:     AP and lateral standing scoliosis radiographs today do show some proximal junctional kyphosis.  The " instrumented segments look unchanged.     Assessment and Plan:     69 year old male with good clinical outcome at this point, but radiographs showing proximal junctional kyphosis.    I am going to put him in a [place] type brace.  I am going to also have him see endocrine again.  He did see them before surgery, but I would like to see him again if he is a candidate for [place] now that he seems to a developed a compression fracture at the upper instrumented segments.  I told him as long as things stabilize in this position that his outlook remains positive.  If he were to develop progressive kyphosis or back pain symptoms he might need an extension of the fusion but I am very hopeful we could avoid this at this point.  Right now he has no upper thoracic pain and no tenderness in that area and no neurologic symptoms I think it is safe to observe this.  I will see him back again in 6 weeks.    Respectfully,  Carlos Enrique Dial MD  Spine Surgery  [facility]

## 2019-06-07 NOTE — PROGRESS NOTES
Spine Surgery Return Clinic Visit      Chief Complaint:   Surgical Followup (6 week follow up DOS 4/25/19)      Interval HPI:  Symptom Profile Including: location of symptoms, onset, severity, exacerbating/alleviating factors, previous treatments:        Beulah Jane is a 69 year old male who returns 6 weeks status post revision lumbar procedure with anterior interbody and revision posterior instrumented fusion.  Overall he is very happy.  He says he has no pain around the surgery area.  No pain in his upper back.  Denies any neurologic complaints.  He has been in an inpatient facility rehabbing.  He did have positive cultures from a seroma that was present preoperatively and we have been treating this with IV antibiotics which she will complete tomorrow.  He has no recurrent signs of infection at this time.            Past Medical History:     Past Medical History:   Diagnosis Date     Aortic stenosis      Chronic pain      DM (diabetes mellitus), type 2 (H)     on insulin     Hyperlipidemia      JUAN on CPAP      Pneumonia      Polymyositis (H)      Pulmonary fibrosis, unspecified (H)      Seasonal allergies             Past Surgical History:     Past Surgical History:   Procedure Laterality Date     ARTHROSCOPY KNEE  1970     COLONOSCOPY       DECOMPRESSION, FUSION LUMBAR POSTERIOR TWO LEVELS, COMBINED  1997     FUSION LUMBAR ANTERIOR THREE+ LEVELS N/A 4/22/2019    Procedure: Lumbar 1 Or Lumbar 2-5 Anterior Lumbar Interbody Fusion with BMP Stage 1 Of Two Procedure:;  Surgeon: Carlos Enrique Dial MD;  Location: [facility] OR     HERNIA REPAIR  2006     lumbar spine hardware removal  1999     OPTICAL TRACKING SYSTEM FUSION POSTERIOR SPINE THORACIC THREE+ LEVELS N/A 4/25/2019    Procedure: O-Arm/Stealth Assisted Thoracic 10-Pelvis Instrumented Fusion T11-2, L1-2, L2-3 Transforminal Lumbar Interbody Fusion (TLIF) And Smith Borges Osteotomies,and L3-4 SPO as well, Use Of BMP, Debridement Of Seroma;  Surgeon:  Carlos Enrique Dial MD;  Location: [facility] OR     PICC INSERTION Right 2019    4Fr - 37cm, Basilic vein, low SVC     REMOVAL PROSTHETIC MATERIAL/MESH, ABD WALL NECRO TISS INFEXN       ROTATOR CUFF REPAIR RT/LT              Social History:     Social History     Tobacco Use     Smoking status: Former Smoker     Packs/day: 0.25     Last attempt to quit: 1970     Years since quittin.9     Smokeless tobacco: Never Used   Substance Use Topics     Alcohol use: Not on file     Comment: few times monthly            Family History:   No family history on file.         Allergies:   No Known Allergies         Medications:     Current Outpatient Medications   Medication     acetaminophen (TYLENOL) 325 MG tablet     albuterol (2.5 MG/3ML) 0.083% neb solution     albuterol (PROAIR HFA/PROVENTIL HFA/VENTOLIN HFA) 108 (90 BASE) MCG/ACT Inhaler     benzonatate (TESSALON) 100 MG capsule     bisacodyl (DULCOLAX) 10 MG suppository     calcium carbonate (OS-DMITRIY 500 MG [place]. [place]) 500 MG tablet     Cholecalciferol (VITAMIN D) 2000 units tablet     diphenhydrAMINE (BENADRYL) 50 MG capsule     famotidine (PEPCID) 20 MG tablet     FOLIC ACID PO     GABAPENTIN PO     GEMFIBROZIL PO     hydrocortisone 2.5 % cream     hydrOXYzine (ATARAX) 25 MG tablet     insulin aspart (NOVOLOG PEN) 100 UNIT/ML pen     insulin aspart (NOVOLOG PEN) 100 UNIT/ML pen     ipratropium - albuterol 0.5 mg/2.5 mg/3 mL (DUONEB) 0.5-2.5 (3) MG/3ML neb solution     LANsoprazole (PREVACID) 30 MG DR capsule     levothyroxine (SYNTHROID/LEVOTHROID) 50 MCG tablet     menthol (ICY HOT) 5 % PTCH     methotrexate 2.5 MG tablet     multivitamin w/minerals (THERA-VIT-M) tablet     omega 3 1000 MG CAPS     oxyCODONE IR (ROXICODONE) 10 MG tablet     polyethylene glycol (MIRALAX/GLYCOLAX) packet     senna-docusate (SENOKOT-S/PERICOLACE) 8.6-50 MG tablet     sodium chloride, PF, 0.9% PF flush     sodium chloride, PF, 0.9% PF flush      "sulfamethoxazole-trimethoprim (BACTRIM DS/SEPTRA DS) 800-160 MG per tablet     traMADol (ULTRAM) 50 MG tablet     VANCOMYCIN HCL IV     verapamil ER (CALAN-SR) 120 MG CR tablet     Current Facility-Administered Medications   Medication     ipratropium - albuterol 0.5 mg/2.5 mg/3 mL (DUONEB) neb solution 3 mL             Review of Systems:   A focused musculoskeletal and neurologic ROS was performed with pertinent positives and negatives noted in the HPI.  Additional systems were also reviewed and are documented at the bottom of the note.         Physical Exam:   Vitals: Ht 1.575 m (5' 2\")   Wt 79.4 kg (175 lb)   BMI 32.01 kg/m    Musculoskeletal, Neurologic, and Spine:          Lumbar Spine:    Appearance - No gross stepoffs or deformities    Motor -     L2-3: Hip flexion 5/5 R and 5/5 L strength          L3/4:  Knee extension R 5/5 and L 5/5 strength         L4/5:  Foot dorsiflexion R 5/5 L 5/5 and       EHL dorsiflexion R 4/5 L 4/5 strength         S1:  Plantarflexion/Peroneal Muscles  R 5/5 and L 5/5 strength    Sensation: intact to light touch L3-S1 distribution BLE      Neurologic:      REFLEXES Left Right                  Patella 1+ 1+   Ankle jerk 1+ 1+   Babinski No upgoing great toe No upgoing great toe   Clonus 0 beats 0 beats     Hip Exam:  No pain with hip log roll and no tenderness over the greater trochanters.    Alignment:  Patient stands with a neutral standing sagittal balance.           Imaging:   We ordered and independently reviewed new radiographs at this clinic visit. The results were discussed with the patient. Findings include:     AP and lateral standing scoliosis radiographs today do show some proximal junctional kyphosis.  The instrumented segments look unchanged.       Assessment and Plan:     69 year old male with good clinical outcome at this point, but radiographs showing proximal junctional kyphosis.    I am going to put him in a Ginny type brace.  I am going to also have him see " endocrine again.  He did see them before surgery, but I would like to see him again if he is a candidate for Forteo now that he seems to a developed a compression fracture at the upper instrumented segments.  I told him as long as things stabilize in this position that his outlook remains positive.  If he were to develop progressive kyphosis or back pain symptoms he might need an extension of the fusion but I am very hopeful we could avoid this at this point.  Right now he has no upper thoracic pain and no tenderness in that area and no neurologic symptoms I think it is safe to observe this.  I will see him back again in 6 weeks.      Respectfully,  Carlos Enrique Dial MD  Spine Surgery  [facility]

## 2019-06-10 NOTE — LETTER
6/10/2019        RE: Beulah Jane  [address]  [facility] [address]        [facility]  [place] [facility] Record Number:  [id number]  Place of Service where encounter took place:  [facility] [[id number]]  Chief Complaint   Patient presents with     RECHECK       HPI:    Beulah Jane  is a 69 year old (1950), who is being seen today for an episodic care visit.  HPI information obtained from: facility chart records, facility staff, patient report and [facility] chart review. Today's concern is:  Brief Summary of Hospital Course: patient underwent planned two staged lumbar fusion on 4/22 and 4/25 due to  thoracolumbar kyphosis with flat back syndrome and thoracolumbar stenosis with myeloradiculopathy. During second surgery a chronic seroma was debrided and cultured. It grew out staph epi. ID was consulted and recommended IV vanco until 6/8 hen 3 weeks of po doxycycline.     Post op issues included type 2 NSTEMI likely due to ILD, PULMONARY EMBOLISM and aortic stenosis. He was started on heparin for finding of subsegmental pulmonary emboli on 4/28 then developed  bleeding at incision so anticoagulation had to be held.      PMH includes dermatomyositis and ILD, DM2, hypertension.hypothyroidism.      Once he was stabilized he was transferred to TCU due to weakness.      Updates on Status Since Skilled nursing Admission:   Patient was sent to ED on 5/9 and 5/12.   On 5/9 patient became shaky and sweaty. pulse 113-125, BP 86/51, O2 sats 95% on 0.5L nasal cannula. Patient's also c/o of uncontrolled pain. He was sent to ED to r/o sepsis. In ED he was treated for dehydration and sent back to TCU.    On 5/12 he was sent to ED due to shortness of breath and had sats in 80s while on supplemental oxygen.  In ED chest CT  showed few small bilateral upper lobe pulmonary emboli and fibrosis. His O2 sats in mid 90s. He was sent back to TCU.     5/14 and 5/15, temp  up to 102. Oxycodone was reduced, robaxin and methotrexate was held and Qvar was added.     5/20- lasix added due to wet sounding cough with dependent rales.      He did return to ID on 5/29. He continues to have elevated ESR and CRP. Overall infection from seroma is improving. Last day for IV vanco is 6/8.      He did go to ED on 6/2 due to chest pain. Work up revealed elevated D dimer but chest CT showed improvement of known pulmonary emboli. BNP was elevated at 2344. But other tests did not suggest the short episode of chest pain was cardiac related. He was sent back to TCU.     He did go for follow up with ortho on 6/7-  patient was prescribed a Ginny brace. Recommendations made for him to follow up with endocrinology to see if forteo would be of benefit. It seems that patient developed a compression fracture at upper thoracic region.     Today he is much more alert and looks great. He is afebrile and breathing more comfortably. He has  no back pain. He is making progress with therapy. He does report pruritic rash that started a few days ago. Nursing is applying HC 2.5% cream to rash. He reports it is improving. No new meds recently.   He does report increased pain in shoulders, feet and hands. Pain was bad yesterday that he threatened to leave the facility. He reports that he has this pain due to dermatomyositis and fibromyalgia. He is scheduled to see his rheumatologist tomorrow.       Past Medical and Surgical History reviewed in Epic today.    MEDICATIONS:  Current Outpatient Medications   Medication Sig Dispense Refill     acetaminophen (TYLENOL) 325 MG tablet Take 2 tablets (650 mg) by mouth every 4 hours as needed for mild pain or fever       albuterol (2.5 MG/3ML) 0.083% neb solution Take 1 vial by nebulization every 6 hours as needed for shortness of breath / dyspnea or wheezing       albuterol (PROAIR HFA/PROVENTIL HFA/VENTOLIN HFA) 108 (90 BASE) MCG/ACT Inhaler Inhale 2 puffs into the lungs every 6  hours as needed for shortness of breath / dyspnea or wheezing       benzonatate (TESSALON) 100 MG capsule Take 100 mg by mouth 3 times daily as needed for cough       bisacodyl (DULCOLAX) 10 MG suppository Place 1 suppository (10 mg) rectally daily as needed for constipation       calcium carbonate (OS-DMITRIY 500 MG [place]. [place]) 500 MG tablet Take 1 tablet (500 mg) by mouth 2 times daily 180 tablet 3     Cholecalciferol (VITAMIN D) 2000 units tablet Take 2,000 Units by mouth daily 100 tablet 3     diphenhydrAMINE (BENADRYL) 50 MG capsule Take 50 mg by mouth every 6 hours as needed for itching or allergies       famotidine (PEPCID) 20 MG tablet Take 1 tablet (20 mg) by mouth daily       FOLIC ACID PO Take 1 mg by mouth daily       furosemide (LASIX) 20 MG tablet Take 20 mg by mouth daily       GABAPENTIN PO Take 600 mg by mouth 3 times daily       GEMFIBROZIL PO Take 600 mg by mouth 2 times daily       hydrocortisone 2.5 % cream Apply topically 2 times daily Apply to Rash on Back topically two times a day       hydrOXYzine (ATARAX) 25 MG tablet Take 1 tablet (25 mg) by mouth every 6 hours as needed for other (adjuvant pain)       insulin aspart (NOVOLOG PEN) 100 UNIT/ML pen Inject 1-7 Units Subcutaneous 3 times daily (before meals)       insulin aspart (NOVOLOG PEN) 100 UNIT/ML pen Inject 1-5 Units Subcutaneous At Bedtime       ipratropium - albuterol 0.5 mg/2.5 mg/3 mL (DUONEB) 0.5-2.5 (3) MG/3ML neb solution Take 1 vial by nebulization 3 times daily       LANsoprazole (PREVACID) 30 MG DR capsule Take 30 mg by mouth every morning (before breakfast)       levothyroxine (SYNTHROID/LEVOTHROID) 50 MCG tablet Take 50 mcg by mouth daily       methotrexate 2.5 MG tablet Take 10 tablets (25 mg) by mouth once a week Thursdays       multivitamin w/minerals (THERA-VIT-M) tablet Take 1 tablet by mouth daily       omega 3 1000 MG CAPS Take 2 capsules by mouth 2 times daily       oxyCODONE IR (ROXICODONE) 10 MG tablet Take 5 mg by  "mouth every 4 hours as needed        oxyCODONE-acetaminophen (PERCOCET) 5-325 MG tablet Take 1 tablet by mouth 3 times daily       oxyCODONE-acetaminophen (PERCOCET) 5-325 MG tablet Take 1 tablet by mouth 3 times daily for 7 days Take percocet 5/325 TID at 7am, 2pm and 9pm  0     polyethylene glycol (MIRALAX/GLYCOLAX) packet Take 17 g by mouth daily       senna-docusate (SENOKOT-S/PERICOLACE) 8.6-50 MG tablet Take 2 tablets by mouth 2 times daily       sulfamethoxazole-trimethoprim (BACTRIM DS/SEPTRA DS) 800-160 MG per tablet Take 1 tablet 3 times a week (Monday, Wednesday, Friday)  11     traMADol (ULTRAM) 50 MG tablet Take 1 tablet (50 mg) by mouth every 6 hours as needed for moderate pain Take 25mg TID at 7am, 2pm and 9pm. 129 tablet 0     verapamil ER (CALAN-SR) 120 MG CR tablet Take 120 mg by mouth At Bedtime           REVIEW OF SYSTEMS:  4 point ROS including Respiratory, CV, GI and , other than that noted in the HPI,  is negative    Objective:  /67   Pulse 73   Temp 98.2  F (36.8  C)   Resp 18   Ht 1.575 m (5' 2\")   Wt 70.9 kg (156 lb 6.4 oz)   SpO2 96%   BMI 28.61 kg/m     Exam:  GENERAL APPEARANCE:  Alert, in no distress  ENT:  Mouth and posterior oropharynx normal, moist mucous membranes, hearing acuity adequate   EYES:  EOM, conjunctivae, lids, pupils and irises normal    RESP:  respiratory effort and palpation of chest normal, no respiratory distress, Lung sounds clear  CV:  Palpation and auscultation of heart done , rate and rhythm reg, systolic murmur, no rub or gallop, Edema 1+  ABDOMEN:  normal bowel sounds, soft, nontender, no hepatosplenomegaly or other masses  M/S:   Gait and station steady, Digits and nails  Normal   SKIN:  Inspection/Palpation of skin and subcutaneous tissue fading rash on back  NEURO: 2-12 in normal limits and at patient's baseline  PSYCH:  insight and judgement, memory intact , affect and mood normal    Labs:   Labs done in SNF are in [place] EPIC. Please refer " to them using EPIC/[employer] Everywhere. and Recent labs in [facility] reviewed by me today.     ASSESSMENT/PLAN:  (Z98.890) S/P spinal surgery  (primary encounter diagnosis)  (M96.843) Postprocedural seroma of a musculoskeletal structure following other procedure  Comment: patient transferred to TCU from hospital on 5/8/19 after two staged spinal fusion due to thoracolumbar kyphosis with flat back syndrome and thoracolumbar stenosis with myeloradiculopathy. He also had seroma debrided and cultured.   Complicated post op period with ABLA, pulmonary emboli, demand ischemia and debrided seroma that that was cultured and grew out staph epi. He has completed a 6 week course of  IV Vanco on 6/8. He did follow up with ID Dr Dueñas on 5/29.   He reports no back pain although he is taking oxycodone up to 4 times per day and tramadol a few times per day. . He is making progress with therapy.  at his follow up up ortho appointment a Vendor brace was ordered but I do not see it in patient's room.   Plan: continue oxycodone 5mg q 6 h prn  physical therapy - no bending or twisting, no lifting more than 10# for 6 weeks  DISCONTINUE menthol patch  DISCONTINUE PICC    (I26.99) Other acute pulmonary embolism without acute cor pulmonale (H)  Comment: at last CT of chest the pulmonary emboli are shrinking. At last ED visit the physician did discuss this with cardiology who thought since the emboli are improving no need to start anticoagulation now.   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 He had been methotrexate 25mg q week. Most recent DLCO was reduced so needed rituximab in March but not started due to upcoming surgery. He was not on oxygen at home.  Prior to surgery he was on long taper of prednisone. During hospital stay prednisone was increased to  5mg BID then back to 5mg q day at time of  discharge to TCU.  Since in TCU Theophylline was reduced to 200mg q day then discontinued after ok by Dr Lennox. . HR lower now- in the 80s-100s. Patient has been refusing beclomethasone because he reports allergic reaction to this. He is not taking duo nebs or albuterol. He has no wheezing today.   He is now reporting shoulder, wrist and foot pain- severe. He would like to restart the pain regimen he was taking at home.   Plan:continue prednisone 5mg q day   Continue to Hold methotrexate  Continue PTA  ellipta and prn albuterol   Supplemental oxygen.   Follow up with Dr Call on 6/11- ? Restart methotrexate.   Restart home pain regimen  Percocet 5/325 +tramadol 25mg+ acetaminophen 325mg+gabapentin 600mg TID    (I25.2) Status post non-ST elevation myocardial infarction (NSTEMI)  (I35.0) Severe aortic stenosis  Comment: patient developed elevated troponins with hypoxia  post operatively. Cardiology was consulted. Thought not ACS but more likely due to demand ischemia due to aortic stenosis and interstitial lung disease and deconditioning.   Plan: needs follow up with cardiology for consideration for TAVR      (D62) Anemia due to blood loss, acute  Comment:   Hemoglobin   Date Value Ref Range Status   06/03/2019 9.4 (L) 13.3 - 17.7 g/dL Final   06/02/2019 9.8 (L) 13.3 - 17.7 g/dL Final     Plan: periodic monitoroing     (E11.40,  Z79.4) Type 2 diabetes mellitus with diabetic neuropathy, with long-term current use of insulin (H)  Comment: BG running around 150  Plan: no change    (I10) Essential hypertension  Comment: last three BPs 107/57, 114/67,99/58  Plan: monitor     (L20.89) Other atopic dermatitis  Comment: he did developed macular papular rash in past 10d. He has been applying HC2.5% cream. Rash is improving and mostly gone  Plan: continue PRN HC 2.5% creat    (R53.81) Physical deconditioning  Comment: due to recent major back surgery and ILD/dermatomyositis. He is much improved. IV antibiotics are done  Plan:  discharge soon back to apartment with wife        Electronically signed by:  ANASTACIA Mcmanus CNP               Sincerely,        ANASTACIA Mcmanus CNP

## 2019-06-10 NOTE — PROGRESS NOTES
[facility]  [facility] Record Number:  [id number]  Place of Service where encounter took place:  [facility] ([facility] [[id number]]  Chief Complaint   Patient presents with     RECHECK       HPI:    Beulah Jane  is a 69 year old (1950), who is being seen today for an episodic care visit.  HPI information obtained from: facility chart records, facility staff, patient report and [facility] chart review. Today's concern is:  Brief Summary of Hospital Course: patient underwent planned two staged lumbar fusion on 4/22 and 4/25 due to  thoracolumbar kyphosis with flat back syndrome and thoracolumbar stenosis with myeloradiculopathy. During second surgery a chronic seroma was debrided and cultured. It grew out staph epi. ID was consulted and recommended IV vanco until 6/8 hen 3 weeks of po doxycycline.     Post op issues included type 2 NSTEMI likely due to ILD, PULMONARY EMBOLISM and aortic stenosis. He was started on heparin for finding of subsegmental pulmonary emboli on 4/28 then developed  bleeding at incision so anticoagulation had to be held.      PMH includes dermatomyositis and ILD, DM2, hypertension.hypothyroidism.      Once he was stabilized he was transferred to TCU due to weakness.      Updates on Status Since Skilled nursing Admission:   Patient was sent to ED on 5/9 and 5/12.   On 5/9 patient became shaky and sweaty. pulse 113-125, BP 86/51, O2 sats 95% on 0.5L nasal cannula. Patient's also c/o of uncontrolled pain. He was sent to ED to r/o sepsis. In ED he was treated for dehydration and sent back to TCU.    On 5/12 he was sent to ED due to shortness of breath and had sats in 80s while on supplemental oxygen.  In ED chest CT  showed few small bilateral upper lobe pulmonary emboli and fibrosis. His O2 sats in mid 90s. He was sent back to TCU.     5/14 and 5/15, temp up to 102. Oxycodone was reduced, robaxin and methotrexate was held and Qvar was  added.     5/20- lasix added due to wet sounding cough with dependent rales.      He did return to ID on 5/29. He continues to have elevated ESR and CRP. Overall infection from seroma is improving. Last day for IV vanco is 6/8.      He did go to ED on 6/2 due to chest pain. Work up revealed elevated D dimer but chest CT showed improvement of known pulmonary emboli. BNP was elevated at 2344. But other tests did not suggest the short episode of chest pain was cardiac related. He was sent back to TCU.     He did go for follow up with ortho on 6/7-  patient was prescribed a Ginny brace. Recommendations made for him to follow up with endocrinology to see if forteo would be of benefit. It seems that patient developed a compression fracture at upper thoracic region.     Today he is much more alert and looks great. He is afebrile and breathing more comfortably. He has no back pain. He is making progress with therapy. He does report pruritic rash that started a few days ago. Nursing is applying HC 2.5% cream to rash. He reports it is improving. No new meds recently.   He does report increased pain in shoulders, feet and hands. Pain was bad yesterday that he threatened to leave the facility. He reports that he has this pain due to dermatomyositis and fibromyalgia. He is scheduled to see his rheumatologist tomorrow.       Past Medical and Surgical History reviewed in Epic today.    MEDICATIONS:  Current Outpatient Medications   Medication Sig Dispense Refill     acetaminophen (TYLENOL) 325 MG tablet Take 2 tablets (650 mg) by mouth every 4 hours as needed for mild pain or fever       albuterol (2.5 MG/3ML) 0.083% neb solution Take 1 vial by nebulization every 6 hours as needed for shortness of breath / dyspnea or wheezing       albuterol (PROAIR HFA/PROVENTIL HFA/VENTOLIN HFA) 108 (90 BASE) MCG/ACT Inhaler Inhale 2 puffs into the lungs every 6 hours as needed for shortness of breath / dyspnea or wheezing       benzonatate  (TESSALON) 100 MG capsule Take 100 mg by mouth 3 times daily as needed for cough       bisacodyl (DULCOLAX) 10 MG suppository Place 1 suppository (10 mg) rectally daily as needed for constipation       calcium carbonate (OS-DMITRIY 500 MG [place]. [place]) 500 MG tablet Take 1 tablet (500 mg) by mouth 2 times daily 180 tablet 3     Cholecalciferol (VITAMIN D) 2000 units tablet Take 2,000 Units by mouth daily 100 tablet 3     diphenhydrAMINE (BENADRYL) 50 MG capsule Take 50 mg by mouth every 6 hours as needed for itching or allergies       famotidine (PEPCID) 20 MG tablet Take 1 tablet (20 mg) by mouth daily       FOLIC ACID PO Take 1 mg by mouth daily       furosemide (LASIX) 20 MG tablet Take 20 mg by mouth daily       GABAPENTIN PO Take 600 mg by mouth 3 times daily       GEMFIBROZIL PO Take 600 mg by mouth 2 times daily       hydrocortisone 2.5 % cream Apply topically 2 times daily Apply to Rash on Back topically two times a day       hydrOXYzine (ATARAX) 25 MG tablet Take 1 tablet (25 mg) by mouth every 6 hours as needed for other (adjuvant pain)       insulin aspart (NOVOLOG PEN) 100 UNIT/ML pen Inject 1-7 Units Subcutaneous 3 times daily (before meals)       insulin aspart (NOVOLOG PEN) 100 UNIT/ML pen Inject 1-5 Units Subcutaneous At Bedtime       ipratropium - albuterol 0.5 mg/2.5 mg/3 mL (DUONEB) 0.5-2.5 (3) MG/3ML neb solution Take 1 vial by nebulization 3 times daily       LANsoprazole (PREVACID) 30 MG DR capsule Take 30 mg by mouth every morning (before breakfast)       levothyroxine (SYNTHROID/LEVOTHROID) 50 MCG tablet Take 50 mcg by mouth daily       methotrexate 2.5 MG tablet Take 10 tablets (25 mg) by mouth once a week Thursdays       multivitamin w/minerals (THERA-VIT-M) tablet Take 1 tablet by mouth daily       omega 3 1000 MG CAPS Take 2 capsules by mouth 2 times daily       oxyCODONE IR (ROXICODONE) 10 MG tablet Take 5 mg by mouth every 4 hours as needed        oxyCODONE-acetaminophen (PERCOCET) 5-325 MG  "tablet Take 1 tablet by mouth 3 times daily       oxyCODONE-acetaminophen (PERCOCET) 5-325 MG tablet Take 1 tablet by mouth 3 times daily for 7 days Take percocet 5/325 TID at 7am, 2pm and 9pm  0     polyethylene glycol (MIRALAX/GLYCOLAX) packet Take 17 g by mouth daily       senna-docusate (SENOKOT-S/PERICOLACE) 8.6-50 MG tablet Take 2 tablets by mouth 2 times daily       sulfamethoxazole-trimethoprim (BACTRIM DS/SEPTRA DS) 800-160 MG per tablet Take 1 tablet 3 times a week (Monday, Wednesday, Friday)  11     traMADol (ULTRAM) 50 MG tablet Take 1 tablet (50 mg) by mouth every 6 hours as needed for moderate pain Take 25mg TID at 7am, 2pm and 9pm. 129 tablet 0     verapamil ER (CALAN-SR) 120 MG CR tablet Take 120 mg by mouth At Bedtime           REVIEW OF SYSTEMS:  4 point ROS including Respiratory, CV, GI and , other than that noted in the HPI,  is negative    Objective:  /67   Pulse 73   Temp 98.2  F (36.8  C)   Resp 18   Ht 1.575 m (5' 2\")   Wt 70.9 kg (156 lb 6.4 oz)   SpO2 96%   BMI 28.61 kg/m    Exam:  GENERAL APPEARANCE:  Alert, in no distress  ENT:  Mouth and posterior oropharynx normal, moist mucous membranes, hearing acuity adequate   EYES:  EOM, conjunctivae, lids, pupils and irises normal    RESP:  respiratory effort and palpation of chest normal, no respiratory distress, Lung sounds clear  CV:  Palpation and auscultation of heart done , rate and rhythm reg, systolic murmur, no rub or gallop, Edema 1+  ABDOMEN:  normal bowel sounds, soft, nontender, no hepatosplenomegaly or other masses  M/S:   Gait and station steady, Digits and nails  Normal   SKIN:  Inspection/Palpation of skin and subcutaneous tissue fading rash on back  NEURO: 2-12 in normal limits and at patient's baseline  PSYCH:  insight and judgement, memory intact , affect and mood normal    Labs:   Labs done in SNF are in [place] EPIC. Please refer to them using EPIC/Care Everywhere. and Recent labs in EPIC reviewed by me today. "     ASSESSMENT/PLAN:  (Z98.890) S/P spinal surgery  (primary encounter diagnosis)  (M96.843) Postprocedural seroma of a musculoskeletal structure following other procedure  Comment: patient transferred to TCU from hospital on 5/8/19 after two staged spinal fusion due to thoracolumbar kyphosis with flat back syndrome and thoracolumbar stenosis with myeloradiculopathy. He also had seroma debrided and cultured.   Complicated post op period with ABLA, pulmonary emboli, demand ischemia and debrided seroma that that was cultured and grew out staph epi. He has completed a 6 week course of  IV Vanco on 6/8. He did follow up with ID Dr Dueñas on 5/29.   He reports no back pain although he is taking oxycodone up to 4 times per day and tramadol a few times per day. . He is making progress with therapy.  at his follow up up ortho appointment a Dagmar brace was ordered but I do not see it in patient's room.   Plan: continue oxycodone 5mg q 6 h prn  physical therapy - no bending or twisting, no lifting more than 10# for 6 weeks  DISCONTINUE menthol patch  DISCONTINUE PICC    (I26.99) Other acute pulmonary embolism without acute cor pulmonale (H)  Comment: at last CT of chest the pulmonary emboli are shrinking. At last ED visit the physician did discuss this with cardiology who thought since the emboli are improving no need to start anticoagulation now.   Plan: monitor     (M33.90) Dermatomyositis (H)- Shannon-1positive  (J84.10) Pulmonary fibrosis (H)  Comment: followed by rheumatology Dr Call and pulmonoloogy Dr Lennox at [facility]. He had been methotrexate 25mg q week. Most recent DLCO was reduced so needed rituximab in March but not started due to upcoming surgery. He was not on oxygen at home.  Prior to surgery he was on long taper of prednisone. During hospital stay prednisone was increased to  5mg BID then back to 5mg q day at time of discharge to TCU.  Since in TCU Theophylline was reduced to 200mg q day then discontinued  after ok by Dr Lennox. . HR lower now- in the 80s-100s. Patient has been refusing beclomethasone because he reports allergic reaction to this. He is not taking duo nebs or albuterol. He has no wheezing today.   He is now reporting shoulder, wrist and foot pain- severe. He would like to restart the pain regimen he was taking at home.   Plan:continue prednisone 5mg q day   Continue to Hold methotrexate  Continue PTA  ellipta and prn albuterol   Supplemental oxygen.   Follow up with Dr Call on 6/11- ? Restart methotrexate.   Restart home pain regimen  Percocet 5/325 +tramadol 25mg+ acetaminophen 325mg+gabapentin 600mg TID    (I25.2) Status post non-ST elevation myocardial infarction (NSTEMI)  (I35.0) Severe aortic stenosis  Comment: patient developed elevated troponins with hypoxia  post operatively. Cardiology was consulted. Thought not ACS but more likely due to demand ischemia due to aortic stenosis and interstitial lung disease and deconditioning.   Plan: needs follow up with cardiology for consideration for TAVR      (D62) Anemia due to blood loss, acute  Comment:   Hemoglobin   Date Value Ref Range Status   06/03/2019 9.4 (L) 13.3 - 17.7 g/dL Final   06/02/2019 9.8 (L) 13.3 - 17.7 g/dL Final     Plan: periodic monitoroing     (E11.40,  Z79.4) Type 2 diabetes mellitus with diabetic neuropathy, with long-term current use of insulin (H)  Comment: BG running around 150  Plan: no change    (I10) Essential hypertension  Comment: last three BPs 107/57, 114/67,99/58  Plan: monitor     (L20.89) Other atopic dermatitis  Comment: he did developed macular papular rash in past 10d. He has been applying HC2.5% cream. Rash is improving and mostly gone  Plan: continue PRN HC 2.5% creat    (R53.81) Physical deconditioning  Comment: due to recent major back surgery and ILD/dermatomyositis. He is much improved. IV antibiotics are done  Plan: discharge soon back to apartment with wife        Electronically signed by:  Bobbi Snyder  ANASTACIA Baker CNP

## 2019-06-12 NOTE — LETTER
6/12/2019        RE: Beulah Jane  [address]  [place] [address]        [facility] DISCHARGE SUMMARY  PATIENT'S NAME: Beulah Jane  YOB: 1950  MEDICAL RECORD NUMBER:  [id number]  Place of Service where encounter took place:  [facility] [[id number]]    PRIMARY CARE PROVIDER AND CLINIC RESPONSIBLE AFTER TRANSFER:   Dr López   [facility]     Transferring providers: ANASTACIA Mcmanus CNP, Jessika Mahmood MD  Recent Hospitalization/ED:  [facility] stay 4/22/2019 to 5/8/2019.  Date of SNF Admission: May / 08 / 2019  Date of SNF (anticipated) Discharge: June / 14 / 2019  Discharged to: previous independent home (apartment)  Cognitive Scores: SLUMS: 16/30  Physical Function: Ambulating 300 ft with walker  DME: none    CODE STATUS/ADVANCE DIRECTIVES DISCUSSION:  Full Code   ALLERGIES: Patient has no known allergies.    DISCHARGE DIAGNOSIS/NURSING FACILITY COURSE:     Brief Summary of Hospital Course: Patient underwent planned two staged lumbar fusion on 4/22 and 4/25 due to thoracolumbar kyphosis with flat back syndrome and thoracolumbar stenosis with myeloradiculopathy. During second surgery a chronic seroma was debrided and cultured. It grew out staph epi. ID was consulted and recommended IV vanco until 6/8 then 3 weeks of po bactrim DS      Post op issues included type 2 NSTEMI likely due to ILD, PULMONARY EMBOLISM and aortic stenosis. He was started on heparin for finding of subsegmental pulmonary emboli on 4/28 then developed  bleeding at incision so anticoagulation had to be held.      PMH includes dermatomyositis and ILD, DM2, hypertension.hypothyroidism.      Once he was stabilized he was transferred to TCU due to weakness.      Updates on Status Since Skilled nursing Admission:   Patient was sent to ED on 5/9 and 5/12.   On 5/9 patient became shaky and sweaty. pulse 113-125, BP 86/51, O2  sats 95% on 0.5L nasal cannula. Patient's also c/o of uncontrolled pain. He was sent to ED to r/o sepsis. In ED he was treated for dehydration and sent back to TCU.    On 5/12 he was sent to ED due to shortness of breath and had sats in 80s while on supplemental oxygen.  In ED chest CT  showed few small bilateral upper lobe pulmonary emboli and fibrosis. His O2 sats in mid 90s. He was sent back to TCU.     5/14 and 5/15, temp up to 102. Oxycodone was reduced, robaxin and methotrexate was held and Qvar was added.     5/20- lasix added due to wet sounding cough with dependent rales.      He did return to ID on 5/29. He continues to have elevated ESR and CRP. Overall infection from seroma is improving. Last day for IV vanco is 6/8. Then per ID notes continue bactrim DS I po q day for three weeks- stop date 6/29. Of note the bactrim was not increased to daily as this order was not written on communication form from ID clinic.. She has been on bactrim DS I three times per week for PCP prophylaxis.      He did go to ED on 6/2 due to chest pain. Work up revealed elevated D dimer but chest CT showed improvement of known pulmonary emboli. BNP was elevated at 2344. But other tests did not suggest the short episode of chest pain was cardiac related. He was sent back to TCU.      He did go for follow up with ortho on 6/7-  patient was prescribed a Ginny brace. Recommendations made for him to follow up with endocrinology to see if forteo would be of benefit. It seems that patient developed a compression fracture at upper thoracic region.  [place] brace is not here. Not sure if this order was carried through by spine ortho.      He did go for rheumatology follow up with Dr Call on 6/11. Methotrexate has been held since admit to TCU. Lately he has had more shoulder and joint pain. methotrexate was restarted. His PTA pain regimen was restarted on 6/10/19 (percocet + tramadol + gabapentin TID). With this combination he is pretty  comfortable. .         ASSESSMENT/PLAN:  (Z98.890) S/P spinal surgery  (primary encounter diagnosis)  (M96.843) Postprocedural seroma of a musculoskeletal structure following other procedure  Comment: patient transferred to TCU from hospital on 5/8/19 after two staged spinal fusion due to thoracolumbar kyphosis with flat back syndrome and thoracolumbar stenosis with myeloradiculopathy. He also had seroma debrided and cultured.   Complicated post op period with ABLA, pulmonary emboli, demand ischemia and debrided seroma that that was cultured and grew out staph epi. He has completed a 6 week course of  IV Vanco on 6/8.  he was supposed to increase bactrim DS to daily on 6/8 but no orders sent from ID for this . He is making progress with therapy.  At his follow up up ortho appointment a Ginny brace was ordered but I do not see it in patient's room.  he has no back pain.   Plan: increase bactrim DS to daily until 6/29   DISCONTINUE oxycodone  Home physical therapy     (I26.99) Other acute pulmonary embolism without acute cor pulmonale (H)  Comment: at last CT of chest the pulmonary emboli are shrinking. At last ED visit the physician did discuss this with cardiology who thought since the emboli are improving no need to start anticoagulation now.   Plan: monitor      (M33.90) Dermatomyositis (H)- Shannon-1positive  (J84.10) Pulmonary fibrosis (H)  Comment: followed by rheumatology Dr Call and pulmonoloogy Dr Lennox at [facility]. He had been methotrexate 25mg q week. Most recent DLCO was reduced so needed rituximab in March but not started due to upcoming surgery. He was not on oxygen at home.  Prior to surgery he was on long taper of prednisone. During hospital stay prednisone was increased to  5mg BID then back to 5mg q day at time of discharge to TCU.  Since in TCU Theophylline was reduced to 200mg q day then discontinued after ok by Dr Lennox. . HR lower now- in the 80s-100s. Patient has been refusing  beclomethasone because he reports allergic reaction to this. He is not taking duo nebs or albuterol. He has no wheezing today.   He is now reporting shoulder, wrist and foot pain- severe. His home pain regimen was restarted on 6/10.   Prednisone 5mg q day was ordered through 5/20  Plan:  Restart  Methotrexate 25mg q weeks.   Continue PTA  ellipta and prn albuterol    Restart home pain regimen  Percocet 5/325 +tramadol 25mg+ acetaminophen 325mg+gabapentin 600mg TID     (I25.2) Status post non-ST elevation myocardial infarction (NSTEMI)  (I35.0) Severe aortic stenosis  Comment: patient developed elevated troponins with hypoxia  post operatively. Cardiology was consulted. Thought not ACS but more likely due to demand ischemia due to aortic stenosis and interstitial lung disease and deconditioning.   Plan: needs follow up with cardiology for consideration for TAVR        (D62) Anemia due to blood loss, acute  Comment:         Hemoglobin   Date Value Ref Range Status   06/03/2019 9.4 (L) 13.3 - 17.7 g/dL Final   06/02/2019 9.8 (L) 13.3 - 17.7 g/dL Final      Plan: periodic monitoroing      (E11.40,  Z79.4) Type 2 diabetes mellitus with diabetic neuropathy, with long-term current use of insulin (H)  Comment: BG running around 150. He will resume home regimen with humulin insulin. His med list does not reflect this change.   Plan:  up date insulin orders at PCP appointment     (I10) Essential hypertension  Comment: last three BPs 107/57, 114/67,99/58  Plan: monitor      (L20.89) Other atopic dermatitis  Comment: he did developed macular papular rash in past 10d. He has been applying HC2.5% cream. Rash is improving and mostly gone  Plan: continue PRN HC 2.5% creat     (R53.81) Physical deconditioning  Comment: due to recent major back surgery and ILD/dermatomyositis. He is much improved. IV antibiotics are done. He is walking up to 300' with walker.   Plan: discharge soon back to apartment with wife with home care.        Past Medical History:  has a past medical history of Aortic stenosis, Chronic pain, DM (diabetes mellitus), type 2 (H), Hyperlipidemia, JUAN on CPAP, Pneumonia, Polymyositis (H), Pulmonary fibrosis, unspecified (H), and Seasonal allergies.    Discharge Medications:  Current Outpatient Medications   Medication Sig Dispense Refill     acetaminophen (TYLENOL) 325 MG tablet Take 2 tablets (650 mg) by mouth every 4 hours as needed for mild pain or fever       albuterol (2.5 MG/3ML) 0.083% neb solution Take 1 vial by nebulization every 6 hours as needed for shortness of breath / dyspnea or wheezing       albuterol (PROAIR HFA/PROVENTIL HFA/VENTOLIN HFA) 108 (90 BASE) MCG/ACT Inhaler Inhale 2 puffs into the lungs every 6 hours as needed for shortness of breath / dyspnea or wheezing       calcium carbonate (OS-DMITRIY 500 MG [place]. CA) 500 MG tablet Take 1 tablet (500 mg) by mouth 2 times daily 180 tablet 3     Cholecalciferol (VITAMIN D) 2000 units tablet Take 2,000 Units by mouth daily 100 tablet 3     diphenhydrAMINE (BENADRYL) 50 MG capsule Take 50 mg by mouth every 6 hours as needed for itching or allergies       FOLIC ACID PO Take 1 mg by mouth daily       GABAPENTIN PO Take 600 mg by mouth 3 times daily       GEMFIBROZIL PO Take 600 mg by mouth 2 times daily       hydrocortisone 2.5 % cream Apply topically 2 times daily Apply to Rash on Back topically two times a day       hydrOXYzine (ATARAX) 25 MG tablet Take 1 tablet (25 mg) by mouth every 6 hours as needed for other (adjuvant pain)       insulin aspart (NOVOLOG PEN) 100 UNIT/ML pen Inject 1-7 Units Subcutaneous 3 times daily (before meals)       insulin aspart (NOVOLOG PEN) 100 UNIT/ML pen Inject 1-5 Units Subcutaneous At Bedtime       ipratropium - albuterol 0.5 mg/2.5 mg/3 mL (DUONEB) 0.5-2.5 (3) MG/3ML neb solution Take 1 vial by nebulization 3 times daily       LANsoprazole (PREVACID) 30 MG DR capsule Take 30 mg by mouth every morning (before breakfast)        levothyroxine (SYNTHROID/LEVOTHROID) 50 MCG tablet Take 50 mcg by mouth daily       methotrexate 2.5 MG tablet Take 10 tablets (25 mg) by mouth once a week Thursdays       multivitamin w/minerals (THERA-VIT-M) tablet Take 1 tablet by mouth daily       omega 3 1000 MG CAPS Take 2 capsules by mouth 2 times daily       oxyCODONE-acetaminophen (PERCOCET) 5-325 MG tablet Take 1 tablet by mouth 3 times daily       oxyCODONE-acetaminophen (PERCOCET) 5-325 MG tablet Take 1 tablet by mouth 3 times daily for 7 days Take percocet 5/325 TID at 7am, 2pm and 9pm  0     senna-docusate (SENOKOT-S/PERICOLACE) 8.6-50 MG tablet Take 2 tablets by mouth 2 times daily       sulfamethoxazole-trimethoprim (BACTRIM DS/SEPTRA DS) 800-160 MG per tablet Take 1 tablet 3 times a week (Monday, Wednesday, Friday)  11     traMADol (ULTRAM) 50 MG tablet Take 1 tablet (50 mg) by mouth every 6 hours as needed for moderate pain Take 25mg TID at 7am, 2pm and 9pm. 129 tablet 0     verapamil ER (CALAN-SR) 120 MG CR tablet Take 120 mg by mouth At Bedtime         Medication Changes/Rationale:     5/10/19 oxycodone 10 mg q 3 h    5/11/19 duonebs TID for 3 days  Then prn    5/13/19 change oxycodone 5mg q 4 h    5/14/19 decrease theophylline 200mg q day    5/15/19 hold methotrexate; decrease famotidine; decrease oxycodone 5-10 mg q 4 h prn    5/16/19 DISCONTINUE theophylline; discontinue cefrtiraxone and ceftin    5/20/19 lasix 20mgq day    5/22/19 discontinue lidocaine patch    5/24/19 tessalon pearls 100mg TID prn    6/6/19 decrease oxycodone 5mg q 6 h prn; DISCONTINUE robaxin    6/10/19 percocet 5/325mg TID with tramadol 25mg TId with gabapentin 600mg TID.     6/10/19 DISCONTINUE menthol patch    6/12/19 DISCONTINUE famotidine, oxycodone, lasix and tessalon.     6/12/19 change bactrim DS I po q day until 6/29 then stop    Controlled medications sent with patient:   Medication: percocet and tramadol and gabapentin  , 20 tabs given to patient at the time of  "discharge to take home     ROS:   4 point ROS including Respiratory, CV, GI and , other than that noted in the HPI,  is negative    Physical Exam:   Vitals: /57   Pulse 92   Temp 97.6  F (36.4  C)   Resp 18   Ht 1.575 m (5' 2\")   Wt 70.1 kg (154 lb 9.6 oz)   SpO2 94%   BMI 28.28 kg/m     BMI= Body mass index is 28.28 kg/m .   GENERAL APPEARANCE:  Alert, in no distress  ENT:  Mouth and posterior oropharynx normal, moist mucous membranes, hearing acuity adequate   EYES:  EOM, conjunctivae, lids, pupils and irises normal     RESP:  respiratory effort and palpation of chest normal, no respiratory distress, Lung sounds clear  CV:  Palpation and auscultation of heart done , rate and rhythm reg, systolic murmur, no rub or gallop, Edema 1+  ABDOMEN:  normal bowel sounds, soft, nontender, no hepatosplenomegaly or other masses  M/S:   Gait and station steady, Digits and nails  Normal   SKIN:  Inspection/Palpation of skin and subcutaneous tissue fading rash on back  NEURO: 2-12 in normal limits and at patient's baseline  PSYCH:  insight and judgement, memory intact , affect and mood normal      SNF labs: Labs done in SNF are in [facility]. Please refer to them using [employer]Care Everywhere. and Recent labs in [facility] reviewed by me today.       DISCHARGE PLAN:    Follow up labs: No labs orders/due    Medical Follow Up:     6/17/19  Dr López at 4pm at [facility] scheduled appointments:  Next 5 appointments (look out 90 days)    Aug 28, 2019 11:00 AM CDT  Return Visit with Apryl Moura MD  [facility] [place], Infectious Disease ([facility]) [address]  [address]  [facility] [address] [phone]          Discharge Services: Home Care:  Occupational Therapy, Physical Therapy, Registered Nurse, Home Health Aide and From:  [facility]    Discharge Instructions Verbalized to Patient at Discharge:     None      TOTAL DISCHARGE " TIME:   Greater than 30 minutes  Electronically signed by:  ANASTACIA Mcmanus CNP   [phone]    Home care Face to Face documentation done in [facility] attached to Home care orders for [facility].                     Sincerely,        ANASTACIA Mcmanus CNP

## 2019-06-12 NOTE — Clinical Note
Steven López, I am sending a new patient to you from [facility]. He will be in on Monday. The only medications that are not up to date on med list is the insulin. At home the patient uses humulin and we have been using novolog. Please call if questions. Bobbi Baker, [phone]

## 2019-06-12 NOTE — PROGRESS NOTES
[facility] DISCHARGE SUMMARY  PATIENT'S NAME: Beulah Jane  YOB: 1950  MEDICAL RECORD NUMBER:  [id number]  Place of Service where encounter took place:  [facility] [[id number]]    PRIMARY CARE PROVIDER AND CLINIC RESPONSIBLE AFTER TRANSFER:   Dr López   [facility] Provider     Transferring providers: ANASTACIA Mcmanus CNP, Jessika Mahmood MD  Recent Hospitalization/ED:  [facility] stay 4/22/2019 to 5/8/2019.  Date of SNF Admission: May / 08 / 2019  Date of SNF (anticipated) Discharge: June / 14 / 2019  Discharged to: previous independent home (apartment)  Cognitive Scores: SLUMS: 16/30  Physical Function: Ambulating 300 ft with walker  DME: none    CODE STATUS/ADVANCE DIRECTIVES DISCUSSION:  Full Code   ALLERGIES: Patient has no known allergies.    DISCHARGE DIAGNOSIS/NURSING FACILITY COURSE:     Brief Summary of Hospital Course: Patient underwent planned two staged lumbar fusion on 4/22 and 4/25 due to thoracolumbar kyphosis with flat back syndrome and thoracolumbar stenosis with myeloradiculopathy. During second surgery a chronic seroma was debrided and cultured. It grew out staph epi. ID was consulted and recommended IV vanco until 6/8 then 3 weeks of po bactrim DS      Post op issues included type 2 NSTEMI likely due to ILD, PULMONARY EMBOLISM and aortic stenosis. He was started on heparin for finding of subsegmental pulmonary emboli on 4/28 then developed  bleeding at incision so anticoagulation had to be held.      PMH includes dermatomyositis and ILD, DM2, hypertension.hypothyroidism.      Once he was stabilized he was transferred to TCU due to weakness.      Updates on Status Since Skilled nursing Admission:   Patient was sent to ED on 5/9 and 5/12.   On 5/9 patient became shaky and sweaty. pulse 113-125, BP 86/51, O2 sats 95% on 0.5L nasal cannula. Patient's also c/o of uncontrolled pain. He was sent to ED to  r/o sepsis. In ED he was treated for dehydration and sent back to TCU.    On 5/12 he was sent to ED due to shortness of breath and had sats in 80s while on supplemental oxygen.  In ED chest CT  showed few small bilateral upper lobe pulmonary emboli and fibrosis. His O2 sats in mid 90s. He was sent back to TCU.     5/14 and 5/15, temp up to 102. Oxycodone was reduced, robaxin and methotrexate was held and Qvar was added.     5/20- lasix added due to wet sounding cough with dependent rales.      He did return to ID on 5/29. He continues to have elevated ESR and CRP. Overall infection from seroma is improving. Last day for IV vanco is 6/8. Then per ID notes continue bactrim DS I po q day for three weeks- stop date 6/29. Of note the bactrim was not increased to daily as this order was not written on communication form from ID clinic.. She has been on bactrim DS I three times per week for PCP prophylaxis.      He did go to ED on 6/2 due to chest pain. Work up revealed elevated D dimer but chest CT showed improvement of known pulmonary emboli. BNP was elevated at 2344. But other tests did not suggest the short episode of chest pain was cardiac related. He was sent back to TCU.      He did go for follow up with ortho on 6/7-  patient was prescribed a [place] brace. Recommendations made for him to follow up with endocrinology to see if forteo would be of benefit. It seems that patient developed a compression fracture at upper thoracic region. Ginny brace is not here. Not sure if this order was carried through by spine ortho.      He did go for rheumatology follow up with Dr Call on 6/11. Methotrexate has been held since admit to TCU. Lately he has had more shoulder and joint pain. methotrexate was restarted. His PTA pain regimen was restarted on 6/10/19 (percocet + tramadol + gabapentin TID). With this combination he is pretty comfortable. .         ASSESSMENT/PLAN:  (Z98.890) S/P spinal surgery  (primary encounter  diagnosis)  (M96.843) Postprocedural seroma of a musculoskeletal structure following other procedure  Comment: patient transferred to TCU from hospital on 5/8/19 after two staged spinal fusion due to thoracolumbar kyphosis with flat back syndrome and thoracolumbar stenosis with myeloradiculopathy. He also had seroma debrided and cultured.   Complicated post op period with ABLA, pulmonary emboli, demand ischemia and debrided seroma that that was cultured and grew out staph epi. He has completed a 6 week course of  IV Vanco on 6/8. he was supposed to increase bactrim DS to daily on 6/8 but no orders sent from ID for this . He is making progress with therapy.  At his follow up up ortho appointment a Ginny brace was ordered but I do not see it in patient's room. he has no back pain.   Plan: increase bactrim DS to daily until 6/29   DISCONTINUE oxycodone  Home physical therapy     (I26.99) Other acute pulmonary embolism without acute cor pulmonale (H)  Comment: at last CT of chest the pulmonary emboli are shrinking. At last ED visit the physician did discuss this with cardiology who thought since the emboli are improving no need to start anticoagulation now.   Plan: monitor      (M33.90) Dermatomyositis (H)- Shannon-1positive  (J84.10) Pulmonary fibrosis (H)  Comment: followed by rheumatology Dr Call and pulmonoloogy Dr Lennox at [facility]. He had been methotrexate 25mg q week. Most recent DLCO was reduced so needed rituximab in March but not started due to upcoming surgery. He was not on oxygen at home.  Prior to surgery he was on long taper of prednisone. During hospital stay prednisone was increased to  5mg BID then back to 5mg q day at time of discharge to TCU.  Since in TCU Theophylline was reduced to 200mg q day then discontinued after ok by Dr Lennox. . HR lower now- in the 80s-100s. Patient has been refusing beclomethasone because he reports allergic reaction to this. He is not taking duo nebs or albuterol. He  has no wheezing today.   He is now reporting shoulder, wrist and foot pain- severe. His home pain regimen was restarted on 6/10.   Prednisone 5mg q day was ordered through 5/20  Plan:  Restart  Methotrexate 25mg q weeks.   Continue PTA  ellipta and prn albuterol    Restart home pain regimen  Percocet 5/325 +tramadol 25mg+ acetaminophen 325mg+gabapentin 600mg TID     (I25.2) Status post non-ST elevation myocardial infarction (NSTEMI)  (I35.0) Severe aortic stenosis  Comment: patient developed elevated troponins with hypoxia  post operatively. Cardiology was consulted. Thought not ACS but more likely due to demand ischemia due to aortic stenosis and interstitial lung disease and deconditioning.   Plan: needs follow up with cardiology for consideration for TAVR        (D62) Anemia due to blood loss, acute  Comment:         Hemoglobin   Date Value Ref Range Status   06/03/2019 9.4 (L) 13.3 - 17.7 g/dL Final   06/02/2019 9.8 (L) 13.3 - 17.7 g/dL Final      Plan: periodic monitoroing      (E11.40,  Z79.4) Type 2 diabetes mellitus with diabetic neuropathy, with long-term current use of insulin (H)  Comment: BG running around 150. He will resume home regimen with humulin insulin. His med list does not reflect this change.   Plan: up date insulin orders at PCP appointment     (I10) Essential hypertension  Comment: last three BPs 107/57, 114/67,99/58  Plan: monitor      (L20.89) Other atopic dermatitis  Comment: he did developed macular papular rash in past 10d. He has been applying HC2.5% cream. Rash is improving and mostly gone  Plan: continue PRN HC 2.5% creat     (R53.81) Physical deconditioning  Comment: due to recent major back surgery and ILD/dermatomyositis. He is much improved. IV antibiotics are done. He is walking up to 300' with walker.   Plan: discharge soon back to apartment with wife with home care.       Past Medical History:  has a past medical history of Aortic stenosis, Chronic pain, DM (diabetes mellitus),  type 2 (H), Hyperlipidemia, JUAN on CPAP, Pneumonia, Polymyositis (H), Pulmonary fibrosis, unspecified (H), and Seasonal allergies.    Discharge Medications:  Current Outpatient Medications   Medication Sig Dispense Refill     acetaminophen (TYLENOL) 325 MG tablet Take 2 tablets (650 mg) by mouth every 4 hours as needed for mild pain or fever       albuterol (2.5 MG/3ML) 0.083% neb solution Take 1 vial by nebulization every 6 hours as needed for shortness of breath / dyspnea or wheezing       albuterol (PROAIR HFA/PROVENTIL HFA/VENTOLIN HFA) 108 (90 BASE) MCG/ACT Inhaler Inhale 2 puffs into the lungs every 6 hours as needed for shortness of breath / dyspnea or wheezing       calcium carbonate (OS-DMITRIY 500 MG [place]. CA) 500 MG tablet Take 1 tablet (500 mg) by mouth 2 times daily 180 tablet 3     Cholecalciferol (VITAMIN D) 2000 units tablet Take 2,000 Units by mouth daily 100 tablet 3     diphenhydrAMINE (BENADRYL) 50 MG capsule Take 50 mg by mouth every 6 hours as needed for itching or allergies       FOLIC ACID PO Take 1 mg by mouth daily       GABAPENTIN PO Take 600 mg by mouth 3 times daily       GEMFIBROZIL PO Take 600 mg by mouth 2 times daily       hydrocortisone 2.5 % cream Apply topically 2 times daily Apply to Rash on Back topically two times a day       hydrOXYzine (ATARAX) 25 MG tablet Take 1 tablet (25 mg) by mouth every 6 hours as needed for other (adjuvant pain)       insulin aspart (NOVOLOG PEN) 100 UNIT/ML pen Inject 1-7 Units Subcutaneous 3 times daily (before meals)       insulin aspart (NOVOLOG PEN) 100 UNIT/ML pen Inject 1-5 Units Subcutaneous At Bedtime       ipratropium - albuterol 0.5 mg/2.5 mg/3 mL (DUONEB) 0.5-2.5 (3) MG/3ML neb solution Take 1 vial by nebulization 3 times daily       LANsoprazole (PREVACID) 30 MG DR capsule Take 30 mg by mouth every morning (before breakfast)       levothyroxine (SYNTHROID/LEVOTHROID) 50 MCG tablet Take 50 mcg by mouth daily       methotrexate 2.5 MG tablet  Take 10 tablets (25 mg) by mouth once a week Thursdays       multivitamin w/minerals (THERA-VIT-M) tablet Take 1 tablet by mouth daily       omega 3 1000 MG CAPS Take 2 capsules by mouth 2 times daily       oxyCODONE-acetaminophen (PERCOCET) 5-325 MG tablet Take 1 tablet by mouth 3 times daily       oxyCODONE-acetaminophen (PERCOCET) 5-325 MG tablet Take 1 tablet by mouth 3 times daily for 7 days Take percocet 5/325 TID at 7am, 2pm and 9pm  0     senna-docusate (SENOKOT-S/PERICOLACE) 8.6-50 MG tablet Take 2 tablets by mouth 2 times daily       sulfamethoxazole-trimethoprim (BACTRIM DS/SEPTRA DS) 800-160 MG per tablet Take 1 tablet 3 times a week (Monday, Wednesday, Friday)  11     traMADol (ULTRAM) 50 MG tablet Take 1 tablet (50 mg) by mouth every 6 hours as needed for moderate pain Take 25mg TID at 7am, 2pm and 9pm. 129 tablet 0     verapamil ER (CALAN-SR) 120 MG CR tablet Take 120 mg by mouth At Bedtime         Medication Changes/Rationale:     5/10/19 oxycodone 10 mg q 3 h    5/11/19 duonebs TID for 3 days  Then prn    5/13/19 change oxycodone 5mg q 4 h    5/14/19 decrease theophylline 200mg q day    5/15/19 hold methotrexate; decrease famotidine; decrease oxycodone 5-10 mg q 4 h prn    5/16/19 DISCONTINUE theophylline; discontinue cefrtiraxone and ceftin    5/20/19 lasix 20mgq day    5/22/19 discontinue lidocaine patch    5/24/19 tessalon pearls 100mg TID prn    6/6/19 decrease oxycodone 5mg q 6 h prn; DISCONTINUE robaxin    6/10/19 percocet 5/325mg TID with tramadol 25mg TId with gabapentin 600mg TID.     6/10/19 DISCONTINUE menthol patch    6/12/19 DISCONTINUE famotidine, oxycodone, lasix and tessalon.     6/12/19 change bactrim DS I po q day until 6/29 then stop    Controlled medications sent with patient:   Medication: percocet and tramadol and gabapentin  , 20 tabs given to patient at the time of discharge to take home     ROS:   4 point ROS including Respiratory, CV, GI and , other than that noted in the  "HPI,  is negative    Physical Exam:   Vitals: /57   Pulse 92   Temp 97.6  F (36.4  C)   Resp 18   Ht 1.575 m (5' 2\")   Wt 70.1 kg (154 lb 9.6 oz)   SpO2 94%   BMI 28.28 kg/m    BMI= Body mass index is 28.28 kg/m .   GENERAL APPEARANCE:  Alert, in no distress  ENT:  Mouth and posterior oropharynx normal, moist mucous membranes, hearing acuity adequate   EYES:  EOM, conjunctivae, lids, pupils and irises normal     RESP:  respiratory effort and palpation of chest normal, no respiratory distress, Lung sounds clear  CV:  Palpation and auscultation of heart done , rate and rhythm reg, systolic murmur, no rub or gallop, Edema 1+  ABDOMEN:  normal bowel sounds, soft, nontender, no hepatosplenomegaly or other masses  M/S:   Gait and station steady, Digits and nails  Normal   SKIN:  Inspection/Palpation of skin and subcutaneous tissue fading rash on back  NEURO: 2-12 in normal limits and at patient's baseline  PSYCH:  insight and judgement, memory intact , affect and mood normal      SNF labs: Labs done in SNF are in [facility]. Please refer to them using [facility]/Care Everywhere. and Recent labs in [facility] reviewed by me today.       DISCHARGE PLAN:    Follow up labs: No labs orders/due    Medical Follow Up:     6/17/19  Dr López at 4pm at [facility] scheduled appointments:  Next 5 appointments (look out 90 days)    Aug 28, 2019 11:00 AM CDT  Return Visit with Apryl Moura MD  [facility], [place], Infectious Disease ([facility]) [address] [place], [place] [address] S  [address] [place] [id number] [phone]          Discharge Services: Home Care:  Occupational Therapy, Physical Therapy, Registered Nurse, Home Health Aide and From:  [facility]    Discharge Instructions Verbalized to Patient at Discharge:     None      TOTAL DISCHARGE TIME:   Greater than 30 minutes  Electronically signed by:  ANASTACIA Mcmanus CNP   [phone]    Home care " Face to Face documentation done in [facility] attached to Home care orders for [facility].

## 2019-06-13 PROBLEM — D62 ANEMIA DUE TO BLOOD LOSS, ACUTE: Status: ACTIVE | Noted: 2019-01-01

## 2019-06-13 NOTE — PROGRESS NOTES
This is a recent snapshot of the patient's [facility] medical record.  For current drug dose and complete information and questions, call [phone] or In Basket pool, [facility]v home infusion ([id number])  CSN Number:  [id number]

## 2019-06-14 PROBLEM — M54.6 ACUTE RIGHT-SIDED THORACIC BACK PAIN: Status: ACTIVE | Noted: 2019-01-01

## 2019-06-14 NOTE — TELEPHONE ENCOUNTER
Health Call Center    Phone Message    May a detailed message be left on voicemail: yes    Reason for Call: Symptoms or Concerns     If patient has red-flag symptoms, warm transfer to triage line    Current symptom or concern: Deep, strong pain in surgery area started 2 days ago.  Lowest pain level was 6/7 and highest has been a 10 - cannot even lay down or sleep.  (If you cannot reach patient, call his son, Beulah - same name- at [phone].)    Symptoms have been present for:  2 day(s)    Has patient previously been seen for this? No    Are there any new or worsening symptoms? Yes: pain in surgery area      Action Taken: Message routed to:  [facility] ([facility]): [facility]

## 2019-06-14 NOTE — PROGRESS NOTES
[facility]  [facility] Medical Record Number:  [id number]  Place of Service where encounter took place:  No question data found.  No chief complaint on file.      HPI:    Beulah Jane  is a 69 year old (1950), who is being seen today for an episodic care visit.  HPI information obtained from: facility chart records, facility staff, patient report and [facility] chart review. Today's concern is:  Asked to see patient by wife due to acute mid back pain. He was due for his scheduled pain meds. Patient reports this pain is at mid back on right side. He said it seems like muscle pain. He says this happens when he is not taking prednisone.     Past Medical and Surgical History reviewed in Epic today.    MEDICATIONS:  Current Outpatient Medications   Medication Sig Dispense Refill     acetaminophen (TYLENOL) 325 MG tablet Take 2 tablets (650 mg) by mouth every 4 hours as needed for mild pain or fever       albuterol (2.5 MG/3ML) 0.083% neb solution Take 1 vial by nebulization every 6 hours as needed for shortness of breath / dyspnea or wheezing       albuterol (PROAIR HFA/PROVENTIL HFA/VENTOLIN HFA) 108 (90 BASE) MCG/ACT Inhaler Inhale 2 puffs into the lungs every 6 hours as needed for shortness of breath / dyspnea or wheezing       calcium carbonate (OS-DMITRIY 500 MG [place]. CA) 500 MG tablet Take 1 tablet (500 mg) by mouth 2 times daily 180 tablet 3     Cholecalciferol (VITAMIN D) 2000 units tablet Take 2,000 Units by mouth daily 100 tablet 3     diphenhydrAMINE (BENADRYL) 50 MG capsule Take 50 mg by mouth every 6 hours as needed for itching or allergies       FOLIC ACID PO Take 1 mg by mouth daily       GABAPENTIN PO Take 600 mg by mouth 3 times daily       GEMFIBROZIL PO Take 600 mg by mouth 2 times daily       hydrOXYzine (ATARAX) 25 MG tablet Take 1 tablet (25 mg) by mouth every 6 hours as needed for other (adjuvant pain)       insulin aspart (NOVOLOG PEN) 100 UNIT/ML pen Inject 1-7 Units  Subcutaneous 3 times daily (before meals)       insulin aspart (NOVOLOG PEN) 100 UNIT/ML pen Inject 1-5 Units Subcutaneous At Bedtime       ipratropium - albuterol 0.5 mg/2.5 mg/3 mL (DUONEB) 0.5-2.5 (3) MG/3ML neb solution Take 1 vial by nebulization 3 times daily       LANsoprazole (PREVACID) 30 MG DR capsule Take 30 mg by mouth every morning (before breakfast)       levothyroxine (SYNTHROID/LEVOTHROID) 50 MCG tablet Take 50 mcg by mouth daily       methotrexate 2.5 MG tablet Take 10 tablets (25 mg) by mouth once a week Thursdays       multivitamin w/minerals (THERA-VIT-M) tablet Take 1 tablet by mouth daily       omega 3 1000 MG CAPS Take 2 capsules by mouth 2 times daily       oxyCODONE-acetaminophen (PERCOCET) 5-325 MG tablet Take 1 tablet by mouth 3 times daily Take percocet 5/325 TID at 7am, 2pm and 9pm  0     oxyCODONE-acetaminophen (PERCOCET) 5-325 MG tablet Take 1 tablet by mouth 3 times daily       senna-docusate (SENOKOT-S/PERICOLACE) 8.6-50 MG tablet Take 2 tablets by mouth 2 times daily       sulfamethoxazole-trimethoprim (BACTRIM DS/SEPTRA DS) 800-160 MG tablet Take 1 tablet by mouth daily for 16 days 16 tablet 0     traMADol (ULTRAM) 50 MG tablet Take 0.5 tablets (25 mg) by mouth 3 times daily 129 tablet 0     verapamil ER (CALAN-SR) 120 MG CR tablet Take 120 mg by mouth At Bedtime           REVIEW OF SYSTEMS:  4 point ROS including Respiratory, CV, GI and , other than that noted in the HPI,  is negative    Objective:  There were no vitals taken for this visit.  Exam:  GENERAL APPEARANCE:  Alert, anxious  RESP:  respiratory effort and palpation of chest normal, CTA  M/S:   Gait and station normal  Digits and nails normal  SKIN:  Inspection of skin and subcutaneous tissue baseline, Palpation of skin and subcutaneous tissue baseline  MS- pain at mid thoracic area on right side.   NEURO:   Cranial nerves 2-12 are normal tested and grossly at patient's baseline, Examination of sensation by touch  normal  PSYCH:  normal insight, judgement and memory, affect and mood normal    Labs:   Labs done in SNF are in [place] [facility]. Please refer to them using EPIC/Care Everywhere. and Recent labs in EPIC reviewed by me today.     ASSESSMENT/PLAN:  (M19.90) Inflammatory arthritis  (primary encounter diagnosis)  Comment: at baseline has pain in shoulders, wrists, ankles. Follows with rheumatology. Methotrexate recently restarted.   Plan: continue plan of care.  Percocet 5/325 +tramadol 25mg+ acetaminophen 325mg+gabapentin 600mg TID    (M54.6) Acute right-sided thoracic back pain  Comment: patient describes terrible pain at mid back. Does not seem to be related to breathing. Suspect muscle spasm.   Plan: robaxin 500mg QID prn        Electronically signed by:  ANASTACIA Mcmanus CNP

## 2019-06-14 NOTE — TELEPHONE ENCOUNTER
Beulah underwent E02-Khbhzs fusion on 4/25/19, last seen in clinic 6/7/19.  Pt was phoned back by RN, busy signal x4  Pt's son was called and voicemail was left for pt to call RN directly.  Lois Brannon RN  [phone]

## 2019-06-17 NOTE — PROGRESS NOTES
SUBJECTIVE:    Beulah Jane is a 69 year old male here today to disuss osteoporosis treatment.  He was interested in Forteo treatment but his insurance company said he would have to pay $1500 and he can't afford that.  He doesn't remember exactly what happened next but wants to see if he qualifies for [employer] by Samantha Coates the maker of Forteo. We last saw him in Feb 2019.  He underwent surgery with Dr. Dial. Pelvis Instrumented Fusion With BMP, Possible Transforminal Lumbar Interbody Fusion (TLIF), Possible Pedicle Subtraction Osteotomy, Thoracic 11-12 Decompression, Debridement Of Seroma . Reports that he is healing well.       OBJECTIVE:  There were no vitals taken for this visit.  No examine performed today. Here with his wife, Rossy.      ASSESSMENT:  Osteoporosis  Flat back syndrome s/p recent surgical intervention   Lactose intolerant        PLAN:    We have looked thru the telephone encounters and he was supposed to send Samantha Coates a copy of his income from his taxes from 2017-18 which he had forgotten about.  I have printed the telephone encounters for him and he will pursue this per the instructions in the messages/emails.  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  If he doesn't qualify for the [employer], then he will let me know and I will prescribed Fosamax.        Michelle Ghosh MD, CAQ, FACSM, CCD  [facility]  Sports Medicine and Bone Health  Team Physician;  Athletics

## 2019-06-17 NOTE — PROGRESS NOTES
Chief Complaint:     Multiple concerns including abrasion wound of the left foot    HPI:   Patient Beulah Jane is a very pleasant 69 year old male with history of chronic pain syndrome due to lumbar stenosis s/p recent spine surgery at the [facility] who presents to Internal Medicine clinic today brought in by his wife and son from home for evaluation of multiple concerns including a recent new abrasion wound of the left foot. Regarding the patient's recent new abrasion wound of the left foot, the patient reports that the abrasion wound started from wrapping of the left foot and ankle with wrap bandage. The wound has gradually worsened over the past several days. He denies any acute fever or chills or purulent wound drainage at this time. Of note, the patient is currently on oral bactrim antibiotics therapy which the patient is currently taking based on a recent prescription. Regarding the patient's chronic dermatomyocytis, the patient is currently followed by a local rheumatology specialist clinic. Regarding his chronic pain syndrome due to lumbar stenosis s/p recent spine surgery, the patient recently was discharged from hospital and rehab facility. He is requesting a refill of his chronic pain medications with Percocet and Tramadol at this time.         Current Medications:     Current Outpatient Medications   Medication Sig Dispense Refill     acetaminophen (TYLENOL) 325 MG tablet Take 2 tablets (650 mg) by mouth every 4 hours as needed for mild pain or fever       albuterol (2.5 MG/3ML) 0.083% neb solution Take 1 vial by nebulization every 6 hours as needed for shortness of breath / dyspnea or wheezing       albuterol (PROAIR HFA/PROVENTIL HFA/VENTOLIN HFA) 108 (90 BASE) MCG/ACT Inhaler Inhale 2 puffs into the lungs every 6 hours as needed for shortness of breath / dyspnea or wheezing       bacitracin-neomycin-polymyxin (NEOSPORIN) 400-5-5000 external ointment Apply topically 2 times  daily 28.35 g 3     calcium carbonate (OS-DMITRIY 500 MG [place]. [place]) 500 MG tablet Take 1 tablet (500 mg) by mouth 2 times daily 180 tablet 3     Cholecalciferol (VITAMIN D) 2000 units tablet Take 2,000 Units by mouth daily 100 tablet 3     diphenhydrAMINE (BENADRYL) 50 MG capsule Take 50 mg by mouth every 6 hours as needed for itching or allergies       FOLIC ACID PO Take 1 mg by mouth daily       GABAPENTIN PO Take 600 mg by mouth 3 times daily       GEMFIBROZIL PO Take 600 mg by mouth 2 times daily       hydrOXYzine (ATARAX) 25 MG tablet Take 1 tablet (25 mg) by mouth every 6 hours as needed for other (adjuvant pain)       insulin aspart (NOVOLOG PEN) 100 UNIT/ML pen Inject 1-7 Units Subcutaneous 3 times daily (before meals)       insulin aspart (NOVOLOG PEN) 100 UNIT/ML pen Inject 1-5 Units Subcutaneous At Bedtime       ipratropium - albuterol 0.5 mg/2.5 mg/3 mL (DUONEB) 0.5-2.5 (3) MG/3ML neb solution Take 1 vial by nebulization 3 times daily       LANsoprazole (PREVACID) 30 MG DR capsule Take 30 mg by mouth every morning (before breakfast)       levothyroxine (SYNTHROID/LEVOTHROID) 50 MCG tablet Take 50 mcg by mouth daily       methotrexate 2.5 MG tablet Take 10 tablets (25 mg) by mouth once a week Thursdays       multivitamin w/minerals (THERA-VIT-M) tablet Take 1 tablet by mouth daily       omega 3 1000 MG CAPS Take 2 capsules by mouth 2 times daily       oxyCODONE-acetaminophen (PERCOCET) 5-325 MG tablet Take 1 tablet by mouth 2 times daily as needed for severe pain Take percocet 5/325 TID at 7am, 2pm and 9pm 20 tablet 0     oxyCODONE-acetaminophen (PERCOCET) 5-325 MG tablet Take 1 tablet by mouth 3 times daily       senna-docusate (SENOKOT-S/PERICOLACE) 8.6-50 MG tablet Take 2 tablets by mouth 2 times daily       sulfamethoxazole-trimethoprim (BACTRIM DS/SEPTRA DS) 800-160 MG tablet Take 1 tablet by mouth daily for 16 days 16 tablet 0     traMADol (ULTRAM) 50 MG tablet Take 0.5 tablets (25 mg) by mouth 3 times  daily 40 tablet 0     verapamil ER (CALAN-SR) 120 MG CR tablet Take 120 mg by mouth At Bedtime           Allergies:    No Known Allergies         Past Medical History:     Past Medical History:   Diagnosis Date     Aortic stenosis      Chronic pain      DM (diabetes mellitus), type 2 (H)     on insulin     Hyperlipidemia      JUAN on CPAP      Pneumonia      Polymyositis (H)      Pulmonary fibrosis, unspecified (H)      Seasonal allergies          Past Surgical History:     Past Surgical History:   Procedure Laterality Date     ARTHROSCOPY KNEE  1970     COLONOSCOPY       DECOMPRESSION, FUSION LUMBAR POSTERIOR TWO LEVELS, COMBINED  1997     FUSION LUMBAR ANTERIOR THREE+ LEVELS N/A 4/22/2019    Procedure: Lumbar 1 Or Lumbar 2-5 Anterior Lumbar Interbody Fusion with BMP Stage 1 Of Two Procedure:;  Surgeon: Carlos Enrique Dial MD;  Location: [facility] OR     HERNIA REPAIR  2006     lumbar spine hardware removal  1999     OPTICAL TRACKING SYSTEM FUSION POSTERIOR SPINE THORACIC THREE+ LEVELS N/A 4/25/2019    Procedure: O-Arm/Stealth Assisted Thoracic 10-Pelvis Instrumented Fusion T11-2, L1-2, L2-3 Transforminal Lumbar Interbody Fusion (TLIF) And Smith Borges Osteotomies,and L3-4 SPO as well, Use Of BMP, Debridement Of Seroma;  Surgeon: Carlos Enrique Dial MD;  Location: [facility] OR     PICC INSERTION Right 05/06/2019    4Fr - 37cm, Basilic vein, low SVC     REMOVAL PROSTHETIC MATERIAL/MESH, ABD WALL NECRO TISS INFEXN  2012     ROTATOR CUFF REPAIR RT/LT  2003         Family Medical History:   No family history on file.      Social History:     Social History     Socioeconomic History     Marital status:      Spouse name: Not on file     Number of children: Not on file     Years of education: Not on file     Highest education level: Not on file   Occupational History     Not on file   Social Needs     Financial resource strain: Not on file     Food insecurity:     Worry: Not on file     Inability: Not on file      Transportation needs:     Medical: Not on file     Non-medical: Not on file   Tobacco Use     Smoking status: Former Smoker     Packs/day: 0.25     Last attempt to quit: 1970     Years since quittin.0     Smokeless tobacco: Never Used   Substance and Sexual Activity     Alcohol use: Not on file     Comment: few times monthly     Drug use: Not on file     Sexual activity: Not on file   Lifestyle     Physical activity:     Days per week: Not on file     Minutes per session: Not on file     Stress: Not on file   Relationships     Social connections:     Talks on phone: Not on file     Gets together: Not on file     Attends Confucianist service: Not on file     Active member of club or organization: Not on file     Attends meetings of clubs or organizations: Not on file     Relationship status: Not on file     Intimate partner violence:     Fear of current or ex partner: Not on file     Emotionally abused: Not on file     Physically abused: Not on file     Forced sexual activity: Not on file   Other Topics Concern     Not on file   Social History Narrative     Not on file           Review of System:     Constitutional: Negative for fever or chills  Skin: positive for recent abrasion wound of the left foot  Ears/Nose/Throat: Negative for nasal congestion, sore throat  Respiratory: No shortness of breath, dyspnea on exertion, cough, or hemoptysis  Cardiovascular: Negative for chest pain  Gastrointestinal: Negative for nausea, vomiting  Genitourinary: Negative for dysuria, hematuria  Musculoskeletal: positive for chronic mechanical lumbar back pains due to lumbar stenosis s/p recent lumbar surgery.  Rheumatologic: Positive for chronic diffuse myalgias due to dermatomyositis.  Neurologic: Negative for headaches  Psychiatric: Negative for depression, anxiety  Hematologic/Lymphatic/Immunologic: Negative  Endocrine: Negative  Behavioral: Negative for tobacco use       Physical Exam:   /63 (BP Location: Left  "arm, Cuff Size: Adult Regular)   Pulse 103   Temp 98.4  F (36.9  C) (Tympanic)   Ht 1.575 m (5' 2\")   Wt 71.7 kg (158 lb)   SpO2 97%   BMI 28.90 kg/m      GENERAL: chronically ill appearing older male, alert and no acute distress  EYES: eyes grossly normal to inspection, and conjunctivae and sclerae normal  HENT: Normocephalic atraumatic. Nose and mouth without ulcers or lesions  NECK: supple  RESP: lungs clear to auscultation   CV: regular rate and rhythm, normal S1 S2  LYMPH: no peripheral edema   ABDOMEN: nondistended  MS: patient is wearing a back support brace for his recent lumbar spine surgery noted  SKIN: abrasion wound measuring about 2 centimeter in size located on the dorsal surface of the top of the left mid foot, without signs of acute bleeding or purulent drainage from the wound.  NEURO: Alert & Oriented x 3.   PSYCH: mentation appears normal, affect normal        Diagnostic Test Results:     Diagnostic Test Results:  Results for orders placed or performed in visit on 06/11/19   Basic metabolic panel   Result Value Ref Range    Sodium 138 133 - 144 mmol/L    Potassium 4.1 3.4 - 5.3 mmol/L    Chloride 102 94 - 109 mmol/L    Carbon Dioxide 31 20 - 32 mmol/L    Anion Gap 5 3 - 14 mmol/L    Glucose 109 (H) 70 - 99 mg/dL    Urea Nitrogen 13 7 - 30 mg/dL    Creatinine 0.94 0.66 - 1.25 mg/dL    GFR Estimate 83 >60 mL/min/[1.73_m2]    GFR Estimate If Black >90 >60 mL/min/[1.73_m2]    Calcium 9.0 8.5 - 10.1 mg/dL       ASSESSMENT/PLAN:     (S90.946C) Abrasion, foot, left, sequela (primary encounter diagnosis)  Comment: patient presents to clinic today with an abrasion wound of the dorsal surface of the mid left foot that started several days ago  Plan: continue current oral bactrim antibiotics therapy which the patient is currently taking based on a recent prescription.   I have also prescribed new topical antibiotic therapy with bacitracin-neomycin-polymyxin (NEOSPORIN)         400-5-5000 external " ointment, PODIATRY/FOOT & ANKLE SURGERY REFERRAL where the patient's appointment has been scheduled with the [facility] podiatry clinic tomorrow on 6/18/2019.      (M33.90) Dermatomyositis (H)  Comment: patient is currently followed by a local outpatient rheumatology clinic  Plan: continue current rheumatology clinic follow up going forward. Continue current rheum medications including methotraxate.       (G89.4) Chronic pain syndrome  (M54.5) Lumbar pain  (M48.062) Spinal stenosis of lumbar region with neurogenic claudication  Comment: chronic low back pains s/p recent spine surgery with the [facility]. Patient is requesting a refill of his chronic opioid pain medications, which the patient's been taking for many years, starting when the patient used to live in [place].   Plan: I have explained to the patient at length today regarding my opinion that the patient should establish care with a local pain clinic in order to continue to manage his chronic pain syndrome. In the meantime, I have prescribed a one time refill only of oxyCODONE-acetaminophen (PERCOCET) 5-325 MG tablet, total of 20 tabs and traMADol (ULTRAM) 50 MG tablet, total of 40 tabs. PAIN MANAGEMENT REFERRAL         Follow Up Plan:     Patient is instructed to return to Internal Medicine clinic for follow-up visit in 1 week.        Hoa López MD  Internal Medicine  [facility]

## 2019-06-17 NOTE — LETTER
6/17/2019      RE: Beulah Jane  [address]  [facility] [address]       SUBJECTIVE:    Beulah Jane is a 69 year old male here today to disuss osteoporosis treatment.  He was interested in Forteo treatment but his insurance company said he would have to pay $1500 and he can't afford that.  He doesn't remember exactly what happened next but wants to see if he qualifies for [employer] by Samantha Coates the maker of Forteo. We last saw him in Feb 2019.  He underwent surgery with Dr. Dial. Pelvis Instrumented Fusion With BMP, Possible Transforminal Lumbar Interbody Fusion (TLIF), Possible Pedicle Subtraction Osteotomy, Thoracic 11-12 Decompression, Debridement Of Seroma . Reports that he is healing well.       OBJECTIVE:  There were no vitals taken for this visit.  No examine performed today. Here with his wife, Rossy.      ASSESSMENT:  Osteoporosis  Flat back syndrome s/p recent surgical intervention   Lactose intolerant        PLAN:    We have looked thru the telephone encounters and he was supposed to send Samantha Coates a copy of his income from his taxes from 2017-18 which he had forgotten about.  I have printed the telephone encounters for him and he will pursue this per the instructions in the messages/emails.  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  If he doesn't qualify for the [employer], then he will let me know and I will prescribed Fosamax.        Michelle Ghosh MD, CAQ, FACSM, CCD  [facility]  Sports Medicine and Bone Health  Team Physician;  Athletics      Michelle Ghosh MD

## 2019-06-18 NOTE — PATIENT INSTRUCTIONS
"Thank you for choosing [facility]!    Follow up in 1 month    DR. CARDONA'S CLINIC LOCATIONS     MONDAY  [place] TUESDAY & FRIDAY AM  [place]   [address] [facility]   [address]   [place] [address] [place] [address]   [phone]  [phone]       WEDNESDAY  [place] SCHEDULE SURGERY: [phone]   [address] [place] [address] APPOINTMENTS: [phone]   [place] [address] BILLING QUESTIONS: [phone]   [phone]         DIABETES AND YOUR FEET  Diabetes can result in several problems in the feet including ulcers (open sores) and amputations. Two of the most important reasons why people develop foot problems when they have diabetes is : 1. Neuropathy (loss of feeling)  2. Vascular disease (loss or decrease of blood flow).    Neuropathy is a term used to describe a loss of nerve function.  Patients with diabetes are at risk of developing neuropathy if their sugars continue to run high and are above the normal value. One theory for neuropathy is that the \"extra\" sugar in the body enters the nerves and is broken down. These by-products build up in the nerve causing it to swell and impairing nerve function. Often times, this can be prevented by controlling your sugars, dieting and exercise.    When a person develops neuropathy, they usually begin to feel numbness or tingling in their feet and sometime in their legs.  Other symptoms may include painful burning or hot feet, tingling or feeling like insects or ants are crawling on your feet or legs.  If the diabetes is sever and the sugars run high for long periods of time, neuropathy can also occur in the hands.    Vascular disease  is a term used to describe a loss or decrease in circulation (blood flow). There is a problem in getting blood and oxygen to areas that need it. Similar to neuropathy, sugars can build up in the walls of the arteries (blood vessels) " and cause them to become swollen, thickened and hardened. This decreases the amount of blood that can go to an area that needs it. Though this is common in the legs of diabetic patients, it can also affect other arteries (blood vessels) in the body such as in the heart and eyes.    In the legs, vascular disease usually results in cramping. Patients who develop leg cramps after walking the same distance every time (i.e. One block, half a mile, ect.) need to let their doctors know so that their circulation may be checked. Cramps causing severe pain in the feet and/or legs while sleeping and the cramps go away when you stand or hang your legs off the side of the bed, may also be a sign of poor blood circulation.  Occasional cramping in cold weather or on rare occasions with activity may not be due to poor circulation, but you should inform your doctor.    PREVENTION OF THESE DISEASES  The key to prevention is good blood sugar control. Poor blood sugar control is a big reason many of these problems start. Physical activity (exercise) is a very good way to help decrease your blood sugars. Exercise can lower your blood sugar, blood pressure, and cholesterol. It also reduces your risk for heart disease and stroke, relieves stress, and strengthens your heart, muscles and bones.  In addition, regular activity helps insulin work better, improves your blood circulation, and keeps your joints flexible. If you're trying to lose weight, a combination of exercise and wise food choices can help you reach your target weight and maintain it.      PAIN MANAGEMENT  1.Blood Sugar Control - Most important  2. Medications such as:  Amytriptylline, duloxetine, gabapentin, lyrica, tramadol  3. Nutritional therapy:  Vitamin B6 (100mg daily), Vitamin B12 (75mcg daily), Vitamin D 2000 IU daily), Alpha-Lipoic Acid (600-1800mg daily), Acetyl-L-Carnitine (500-1000mg TID, L-methyl folate (1500mcg daily)    ** Metformin can block Vitamin B6 and B12  so it is important to supplement**    FOOT CARE RECOMMENDATIONS   1. Wash your feet with lukewarm water and a mild soap and then dry them thoroughly, especially between the toes.     2. Examine your feet daily looking for cuts, corns, blisters, cracks, ect, especially after wearing new shoes. Make sure to look between your toes. If you cannot see the bottom of your feet, set a mirror on the floor and hold your foot over it, or ask a spouse, friend or family member to examine your feet for you. Contact your doctor immediately if new problems are noted or if sores are not healing.     3. Immediately apply moisturizer to the tops and bottoms of your feet, avoiding areas between the toes. Hand lotion (Intesive Care, Sangeeta, Eucerin, Neutrogena, Curel, ect) is sufficient unless your doctor prescribes a medicated lotion. Apply sunscreen to your feet when going swimming outside.     4. Use clean comfortable shoes, wear white socks (if you have any bleeding or drainage, you will see it on white socks). Socks should not have thick seams or cut off the circulation around the leg. Break in new shoes slowly and rotate with older shoes until broken in. Check the inside of your shoes with your hand to look for areas of irritation or objects that may have fallen into your shoes.       5. Keep slippers by the side of your bed for use during the night.     6.  Shoes should be fitted by a professional and should not cause areas of irritation.  Check your feet regularly when wearing a new pair of shoes and replace them as needed.     7.  Talk to your doctor about proper exercise. Exercise and stretching stimulate blood flow to your feet and maintain proper glucose levels.     8.  Monitor your blood glucose level as instructed by your doctor. Notify your doctor immediately if your blood sugar is abnormally high or low.    9. Cut your nails straight across, but then gently round any sharp edges with a cardboard nail file. If you have  neuropathy, peripheral vascular disease or cannot see that well to trim your own toenails contact [facility] ([phone]) or [facility] ([phone]).      THINGS TO AVOID DOING   1.  Do not soak your feet if you have an open sore. Use only lukewarm water and always check the temperature with your hand as hot water can easily burn your feet.       2.  Never use a hot water bottle or heating pad on your feet. Also do not apply cold compresses to your feet. With decreased sensation, you could burn or freeze your feet.       3.  Do not apply any of these to your feet:    -  Over the counter medicine for corns or warts    -  Harsh chemicals like boric acid    -  Do not self-treat corns, cuts, blisters or infections. Always consult your doctor.       4.  Do not wear sandals, slippers or walk barefoot, especially on hot sand or concrete or other harsh surfaces.     5.  If you smoke, stop!!!      WOUND CARE RECOMMENDATIONS    1)  Keep the wound covered by a bandage when bathing.    2)  Gently clean the wound with soap water, separate from bath/shower water.      3)  Each day, apply a topical antibiotic ointment to the wound (Neosporin, Triple antibiotic, Bacitracin).   Cover with large band-aid or gauze.      4)  Please seek immediate medical attention if any increasing redness, swelling, drainage, smell, or pain related to the wound.     5)  Please return to clinic in the period of time requested.    [facility] LOCATIONS  [facility]  [address]  [place] [address]  Phone: [phone]  Fax: [phone] [facility]  [address]  [place] [address]  Phone: [phone]   Fax: [phone]   [facility]  [address]  [place] [address]  Phone: [phone]  Fax: [phone] [facility]  [address]  [place] [address]  Phone: [phone]   Fax: [phone]    [facility]   [address]  [place] [address]  Phone: [phone]   Fax: [phone] * Please call any location listed to make an appointment for a casting/fitting. Your referral was sent to their central office and they will all have the order on file.     [facility] SCHEDULING  They should be calling you within 24 hours to schedule your scan.  If not, please call the location discussed at your appointment.    1) [facility]:       [phone]      [address] [place] [address]    2) [facility]:      [phone]      [address]      [place] [address]    3) [facility]:       [phone]      [address] [place] [address]    * We will call you with the results. Please allow 24-48 hours for the results to be read and final.          BODY WEIGHT AND YOUR FEET  The following information is included in the after visit summary for all patients. Body weight can be a sensitive issue to discuss in clinic, but we think the following information is very important. Although we focus on the feet and ankles, we do support the overall health of our patients.     Many things can cause foot and ankle problems. Foot structure, activity level, foot mechanics and injuries are common causes of pain. One very important issue that often goes unmentioned, is body weight. Extra weight can cause increased stress on muscles, ligaments, bones and tendons. Sometimes just a few extra pounds is all it takes to put one over her/his threshold. Without reducing that stress, it can be difficult to alleviate pain. As Foot & Ankle specialists, our job is addressing the lower extremity problem and possible causes. Regarding extra body weight, we encourage patients to discuss diet and weight management plans with their primary care doctors. It is this team approach that gives you the best opportunity for pain relief and getting you  back on your feet.      [facility] has a Comprehensive Weight Management Program. This program includes counseling, education, non-surgical and surgical approaches to weight loss. If you are interested in learning more either talk to you primary care provider or call [phone].

## 2019-06-18 NOTE — PROGRESS NOTES
PATIENT HISTORY:  Beulah Jane is a 69 year old male who presents to clinic for left foot wound.  I was requested to see this patient for this issue by Dr López.  Pt reports 1 week of L foot wound that was started by an ACE wrap rubbing on the area.  6/10 pain.  Hx of DM, neuropathy, polymyositis,  Chronic pain.  Changing a dressing daily.  No current f/c.  Hx of sob with activity, fatigue, swelling.    Review of Systems:  Rest of 10 pt ROS neg except for HPI.     PAST MEDICAL HISTORY:   Past Medical History:   Diagnosis Date     Aortic stenosis      Chronic pain      DM (diabetes mellitus), type 2 (H)     on insulin     Hyperlipidemia      JUAN on CPAP      Pneumonia      Polymyositis (H)      Pulmonary fibrosis, unspecified (H)      Seasonal allergies         PAST SURGICAL HISTORY:   Past Surgical History:   Procedure Laterality Date     ARTHROSCOPY KNEE  1970     COLONOSCOPY       DECOMPRESSION, FUSION LUMBAR POSTERIOR TWO LEVELS, COMBINED  1997     FUSION LUMBAR ANTERIOR THREE+ LEVELS N/A 4/22/2019    Procedure: Lumbar 1 Or Lumbar 2-5 Anterior Lumbar Interbody Fusion with BMP Stage 1 Of Two Procedure:;  Surgeon: Carlos Enrique Dial MD;  Location: [facility] OR     HERNIA REPAIR  2006     lumbar spine hardware removal  1999     OPTICAL TRACKING SYSTEM FUSION POSTERIOR SPINE THORACIC THREE+ LEVELS N/A 4/25/2019    Procedure: O-Arm/Stealth Assisted Thoracic 10-Pelvis Instrumented Fusion T11-2, L1-2, L2-3 Transforminal Lumbar Interbody Fusion (TLIF) And Smith Borges Osteotomies,and L3-4 SPO as well, Use Of BMP, Debridement Of Seroma;  Surgeon: Carlos Enrique Dial MD;  Location: [facility] OR     PICC INSERTION Right 05/06/2019    4Fr - 37cm, Basilic vein, low SVC     REMOVAL PROSTHETIC MATERIAL/MESH, ABD WALL NECRO TISS INFEXN  2012     ROTATOR CUFF REPAIR RT/LT  2003        MEDICATIONS:   Current Outpatient Medications:      acetaminophen (TYLENOL) 325 MG tablet, Take 2 tablets (650 mg) by mouth every 4  hours as needed for mild pain or fever, Disp: , Rfl:      albuterol (2.5 MG/3ML) 0.083% neb solution, Take 1 vial by nebulization every 6 hours as needed for shortness of breath / dyspnea or wheezing, Disp: , Rfl:      albuterol (PROAIR HFA/PROVENTIL HFA/VENTOLIN HFA) 108 (90 BASE) MCG/ACT Inhaler, Inhale 2 puffs into the lungs every 6 hours as needed for shortness of breath / dyspnea or wheezing, Disp: , Rfl:      bacitracin-neomycin-polymyxin (NEOSPORIN) 400-5-5000 external ointment, Apply topically 2 times daily, Disp: 28.35 g, Rfl: 3     calcium carbonate (OS-DMITRIY 500 MG [place]. [place]) 500 MG tablet, Take 1 tablet (500 mg) by mouth 2 times daily, Disp: 180 tablet, Rfl: 3     Cholecalciferol (VITAMIN D) 2000 units tablet, Take 2,000 Units by mouth daily, Disp: 100 tablet, Rfl: 3     diphenhydrAMINE (BENADRYL) 50 MG capsule, Take 50 mg by mouth every 6 hours as needed for itching or allergies, Disp: , Rfl:      FOLIC ACID PO, Take 1 mg by mouth daily, Disp: , Rfl:      GABAPENTIN PO, Take 600 mg by mouth 3 times daily, Disp: , Rfl:      GEMFIBROZIL PO, Take 600 mg by mouth 2 times daily, Disp: , Rfl:      hydrOXYzine (ATARAX) 25 MG tablet, Take 1 tablet (25 mg) by mouth every 6 hours as needed for other (adjuvant pain), Disp: , Rfl:      insulin aspart (NOVOLOG PEN) 100 UNIT/ML pen, Inject 1-7 Units Subcutaneous 3 times daily (before meals), Disp: , Rfl:      insulin aspart (NOVOLOG PEN) 100 UNIT/ML pen, Inject 1-5 Units Subcutaneous At Bedtime, Disp: , Rfl:      ipratropium - albuterol 0.5 mg/2.5 mg/3 mL (DUONEB) 0.5-2.5 (3) MG/3ML neb solution, Take 1 vial by nebulization 3 times daily, Disp: , Rfl:      LANsoprazole (PREVACID) 30 MG DR capsule, Take 30 mg by mouth every morning (before breakfast), Disp: , Rfl:      levothyroxine (SYNTHROID/LEVOTHROID) 50 MCG tablet, Take 50 mcg by mouth daily, Disp: , Rfl:      methotrexate 2.5 MG tablet, Take 10 tablets (25 mg) by mouth once a week Thursdays, Disp: , Rfl:       multivitamin w/minerals (THERA-VIT-M) tablet, Take 1 tablet by mouth daily, Disp: , Rfl:      omega 3 1000 MG CAPS, Take 2 capsules by mouth 2 times daily, Disp: , Rfl:      oxyCODONE-acetaminophen (PERCOCET) 5-325 MG tablet, Take 1 tablet by mouth 2 times daily as needed for severe pain Take percocet 5/325 TID at 7am, 2pm and 9pm, Disp: 20 tablet, Rfl: 0     oxyCODONE-acetaminophen (PERCOCET) 5-325 MG tablet, Take 1 tablet by mouth 3 times daily, Disp: , Rfl:      senna-docusate (SENOKOT-S/PERICOLACE) 8.6-50 MG tablet, Take 2 tablets by mouth 2 times daily, Disp: , Rfl:      sulfamethoxazole-trimethoprim (BACTRIM DS/SEPTRA DS) 800-160 MG tablet, Take 1 tablet by mouth daily for 16 days, Disp: 16 tablet, Rfl: 0     traMADol (ULTRAM) 50 MG tablet, Take 0.5 tablets (25 mg) by mouth 3 times daily, Disp: 40 tablet, Rfl: 0     verapamil ER (CALAN-SR) 120 MG CR tablet, Take 120 mg by mouth At Bedtime, Disp: , Rfl:     Current Facility-Administered Medications:      ipratropium - albuterol 0.5 mg/2.5 mg/3 mL (DUONEB) neb solution 3 mL, 3 mL, Nebulization, TID, Venita Mullen NP     ALLERGIES:  No Known Allergies     SOCIAL HISTORY:   Social History     Socioeconomic History     Marital status:      Spouse name: Not on file     Number of children: Not on file     Years of education: Not on file     Highest education level: Not on file   Occupational History     Not on file   Social Needs     Financial resource strain: Not on file     Food insecurity:     Worry: Not on file     Inability: Not on file     Transportation needs:     Medical: Not on file     Non-medical: Not on file   Tobacco Use     Smoking status: Former Smoker     Packs/day: 0.25     Last attempt to quit: 1970     Years since quittin.0     Smokeless tobacco: Never Used   Substance and Sexual Activity     Alcohol use: Not on file     Comment: few times monthly     Drug use: Not on file     Sexual activity: Not on file   Lifestyle      "Physical activity:     Days per week: Not on file     Minutes per session: Not on file     Stress: Not on file   Relationships     Social connections:     Talks on phone: Not on file     Gets together: Not on file     Attends Buddhist service: Not on file     Active member of club or organization: Not on file     Attends meetings of clubs or organizations: Not on file     Relationship status: Not on file     Intimate partner violence:     Fear of current or ex partner: Not on file     Emotionally abused: Not on file     Physically abused: Not on file     Forced sexual activity: Not on file   Other Topics Concern     Not on file   Social History Narrative     Not on file        FAMILY HISTORY: No family history on file.     EXAM:Vitals: /64   Pulse 89   Ht 1.575 m (5' 2\")   Wt 71.7 kg (158 lb)   BMI 28.90 kg/m    BMI= Body mass index is 28.9 kg/m .    General appearance: Patient is alert and fully cooperative with history & exam.  No sign of distress is noted during the visit.     Psychiatric: Affect is pleasant & appropriate.  Patient appears motivated to improve health.     Respiratory: Breathing is regular & unlabored while sitting.     HEENT: Hearing is intact to spoken word.  Speech is clear.  No gross evidence of visual impairment that would impact ambulation.     Dermatologic: Anterior proximal foot on L with 1.5 x 2.0 cm area of skin breakdown.  Wound appears clean.  Skin is intact to both lower extremities without significant lesions, rash or abrasion.  No paronychia or evidence of soft tissue infection is noted.     Vascular:  R DP is palpable.  I had difficultly locating other pedal pulses today.  No significant edema or varicosities noted.  CFT and skin temperature are normal to both lower extremities.     Neurologic: Lower extremity sensation is diminished to monofilament exam b/l     Musculoskeletal: Patient is ambulatory without assistive device or brace.  No gross ankle deformity noted.  No " foot or ankle joint effusion is noted.     ASSESSMENT:   L foot wound  DM II with neuropathy  PAD     PLAN:  Reviewed patient's chart in epic.  Discussed condition and treatment options including pros and cons.    Treatment alternatives for foot ulceration were discussed.  Local wound care options were explained.  Healing will be a significant challenge based on the weight bearing / pressure location of the ulcer.  I explained that the ulcer may become complicated at some point.  Risk of deep infection will become greater if the wound becomes larger or deeper.    Potential for infection was described including the soft tissues, bone or joints.  Surgery would likely involve hospitalization and partial limb amputation.     WOUND CARE RECOMMENDATIONS    1)  Keep the wound covered by a bandage when bathing.    2)  Gently clean the wound with soap water, separate from bath/shower water.      3)  Each day, apply a topical antibiotic ointment to the wound (Neosporin, Triple antibiotic, Bacitracin).   Cover with large band-aid or gauze.      4)  Please seek immediate medical attention if any increasing redness, swelling, drainage, smell, or pain related to the wound.     5)  Please return to clinic in the period of time requested.    Offloading advised.  Will send for ABIs.  DM shoes/inserts ordered.  Advised daily foot checks.    F/u in 4 wks.    Joseluis Borges DPM, FACFAS

## 2019-06-18 NOTE — LETTER
6/18/2019         RE: Beulah Jane  [address]  [facility] [address]        Dear Colleague,    Thank you for referring your patient, Beulah Jane, to the [facility]. Please see a copy of my visit note below.    PATIENT HISTORY:  Beulah Jane is a 69 year old male who presents to clinic for left foot wound.  I was requested to see this patient for this issue by Dr López.  Pt reports 1 week of L foot wound that was started by an ACE wrap rubbing on the area.  6/10 pain.  Hx of DM, neuropathy, polymyositis,  Chronic pain.  Changing a dressing daily.  No current f/c.  Hx of sob with activity, fatigue, swelling.    Review of Systems:  Rest of 10 pt ROS neg except for HPI.     PAST MEDICAL HISTORY:   Past Medical History:   Diagnosis Date     Aortic stenosis      Chronic pain      DM (diabetes mellitus), type 2 (H)     on insulin     Hyperlipidemia      JUAN on CPAP      Pneumonia      Polymyositis (H)      Pulmonary fibrosis, unspecified (H)      Seasonal allergies         PAST SURGICAL HISTORY:   Past Surgical History:   Procedure Laterality Date     ARTHROSCOPY KNEE  1970     COLONOSCOPY       DECOMPRESSION, FUSION LUMBAR POSTERIOR TWO LEVELS, COMBINED  1997     FUSION LUMBAR ANTERIOR THREE+ LEVELS N/A 4/22/2019    Procedure: Lumbar 1 Or Lumbar 2-5 Anterior Lumbar Interbody Fusion with BMP Stage 1 Of Two Procedure:;  Surgeon: Carlos Enrique Dial MD;  Location: [facility] OR     HERNIA REPAIR  2006     lumbar spine hardware removal  1999     OPTICAL TRACKING SYSTEM FUSION POSTERIOR SPINE THORACIC THREE+ LEVELS N/A 4/25/2019    Procedure: O-Arm/Stealth Assisted Thoracic 10-Pelvis Instrumented Fusion T11-2, L1-2, L2-3 Transforminal Lumbar Interbody Fusion (TLIF) And Smith Borges Osteotomies,and L3-4 SPO as well, Use Of BMP, Debridement Of Seroma;  Surgeon: Carlos Enrique Dial MD;  Location: [facility] OR     PICC INSERTION Right 05/06/2019    4Fr - 37cm, Basilic vein, low SVC      REMOVAL PROSTHETIC MATERIAL/MESH, ABD WALL NECRO TISS INFEXN  2012     ROTATOR CUFF REPAIR RT/LT  2003        MEDICATIONS:   Current Outpatient Medications:      acetaminophen (TYLENOL) 325 MG tablet, Take 2 tablets (650 mg) by mouth every 4 hours as needed for mild pain or fever, Disp: , Rfl:      albuterol (2.5 MG/3ML) 0.083% neb solution, Take 1 vial by nebulization every 6 hours as needed for shortness of breath / dyspnea or wheezing, Disp: , Rfl:      albuterol (PROAIR HFA/PROVENTIL HFA/VENTOLIN HFA) 108 (90 BASE) MCG/ACT Inhaler, Inhale 2 puffs into the lungs every 6 hours as needed for shortness of breath / dyspnea or wheezing, Disp: , Rfl:      bacitracin-neomycin-polymyxin (NEOSPORIN) 400-5-5000 external ointment, Apply topically 2 times daily, Disp: 28.35 g, Rfl: 3     calcium carbonate (OS-DMITRIY 500 MG [place]. [place]) 500 MG tablet, Take 1 tablet (500 mg) by mouth 2 times daily, Disp: 180 tablet, Rfl: 3     Cholecalciferol (VITAMIN D) 2000 units tablet, Take 2,000 Units by mouth daily, Disp: 100 tablet, Rfl: 3     diphenhydrAMINE (BENADRYL) 50 MG capsule, Take 50 mg by mouth every 6 hours as needed for itching or allergies, Disp: , Rfl:      FOLIC ACID PO, Take 1 mg by mouth daily, Disp: , Rfl:      GABAPENTIN PO, Take 600 mg by mouth 3 times daily, Disp: , Rfl:      GEMFIBROZIL PO, Take 600 mg by mouth 2 times daily, Disp: , Rfl:      hydrOXYzine (ATARAX) 25 MG tablet, Take 1 tablet (25 mg) by mouth every 6 hours as needed for other (adjuvant pain), Disp: , Rfl:      insulin aspart (NOVOLOG PEN) 100 UNIT/ML pen, Inject 1-7 Units Subcutaneous 3 times daily (before meals), Disp: , Rfl:      insulin aspart (NOVOLOG PEN) 100 UNIT/ML pen, Inject 1-5 Units Subcutaneous At Bedtime, Disp: , Rfl:      ipratropium - albuterol 0.5 mg/2.5 mg/3 mL (DUONEB) 0.5-2.5 (3) MG/3ML neb solution, Take 1 vial by nebulization 3 times daily, Disp: , Rfl:      LANsoprazole (PREVACID) 30 MG DR capsule, Take 30 mg by mouth every  morning (before breakfast), Disp: , Rfl:      levothyroxine (SYNTHROID/LEVOTHROID) 50 MCG tablet, Take 50 mcg by mouth daily, Disp: , Rfl:      methotrexate 2.5 MG tablet, Take 10 tablets (25 mg) by mouth once a week Thursdays, Disp: , Rfl:      multivitamin w/minerals (THERA-VIT-M) tablet, Take 1 tablet by mouth daily, Disp: , Rfl:      omega 3 1000 MG CAPS, Take 2 capsules by mouth 2 times daily, Disp: , Rfl:      oxyCODONE-acetaminophen (PERCOCET) 5-325 MG tablet, Take 1 tablet by mouth 2 times daily as needed for severe pain Take percocet 5/325 TID at 7am, 2pm and 9pm, Disp: 20 tablet, Rfl: 0     oxyCODONE-acetaminophen (PERCOCET) 5-325 MG tablet, Take 1 tablet by mouth 3 times daily, Disp: , Rfl:      senna-docusate (SENOKOT-S/PERICOLACE) 8.6-50 MG tablet, Take 2 tablets by mouth 2 times daily, Disp: , Rfl:      sulfamethoxazole-trimethoprim (BACTRIM DS/SEPTRA DS) 800-160 MG tablet, Take 1 tablet by mouth daily for 16 days, Disp: 16 tablet, Rfl: 0     traMADol (ULTRAM) 50 MG tablet, Take 0.5 tablets (25 mg) by mouth 3 times daily, Disp: 40 tablet, Rfl: 0     verapamil ER (CALAN-SR) 120 MG CR tablet, Take 120 mg by mouth At Bedtime, Disp: , Rfl:     Current Facility-Administered Medications:      ipratropium - albuterol 0.5 mg/2.5 mg/3 mL (DUONEB) neb solution 3 mL, 3 mL, Nebulization, TID, Venita Mullen NP     ALLERGIES:  No Known Allergies     SOCIAL HISTORY:   Social History     Socioeconomic History     Marital status:      Spouse name: Not on file     Number of children: Not on file     Years of education: Not on file     Highest education level: Not on file   Occupational History     Not on file   Social Needs     Financial resource strain: Not on file     Food insecurity:     Worry: Not on file     Inability: Not on file     Transportation needs:     Medical: Not on file     Non-medical: Not on file   Tobacco Use     Smoking status: Former Smoker     Packs/day: 0.25     Last attempt to quit:  "1970     Years since quittin.0     Smokeless tobacco: Never Used   Substance and Sexual Activity     Alcohol use: Not on file     Comment: few times monthly     Drug use: Not on file     Sexual activity: Not on file   Lifestyle     Physical activity:     Days per week: Not on file     Minutes per session: Not on file     Stress: Not on file   Relationships     Social connections:     Talks on phone: Not on file     Gets together: Not on file     Attends Protestant service: Not on file     Active member of club or organization: Not on file     Attends meetings of clubs or organizations: Not on file     Relationship status: Not on file     Intimate partner violence:     Fear of current or ex partner: Not on file     Emotionally abused: Not on file     Physically abused: Not on file     Forced sexual activity: Not on file   Other Topics Concern     Not on file   Social History Narrative     Not on file        FAMILY HISTORY: No family history on file.     EXAM:Vitals: /64   Pulse 89   Ht 1.575 m (5' 2\")   Wt 71.7 kg (158 lb)   BMI 28.90 kg/m     BMI= Body mass index is 28.9 kg/m .    General appearance: Patient is alert and fully cooperative with history & exam.  No sign of distress is noted during the visit.     Psychiatric: Affect is pleasant & appropriate.  Patient appears motivated to improve health.     Respiratory: Breathing is regular & unlabored while sitting.     HEENT: Hearing is intact to spoken word.  Speech is clear.  No gross evidence of visual impairment that would impact ambulation.     Dermatologic: Anterior proximal foot on L with 1.5 x 2.0 cm area of skin breakdown.  Wound appears clean.  Skin is intact to both lower extremities without significant lesions, rash or abrasion.  No paronychia or evidence of soft tissue infection is noted.     Vascular:  R DP is palpable.  I had difficultly locating other pedal pulses today.  No significant edema or varicosities noted.  CFT and skin " temperature are normal to both lower extremities.     Neurologic: Lower extremity sensation is diminished to monofilament exam b/l     Musculoskeletal: Patient is ambulatory without assistive device or brace.  No gross ankle deformity noted.  No foot or ankle joint effusion is noted.     ASSESSMENT:   L foot wound  DM II with neuropathy  PAD     PLAN:  Reviewed patient's chart in epic.  Discussed condition and treatment options including pros and cons.    Treatment alternatives for foot ulceration were discussed.  Local wound care options were explained.  Healing will be a significant challenge based on the weight bearing / pressure location of the ulcer.  I explained that the ulcer may become complicated at some point.  Risk of deep infection will become greater if the wound becomes larger or deeper.    Potential for infection was described including the soft tissues, bone or joints.  Surgery would likely involve hospitalization and partial limb amputation.     WOUND CARE RECOMMENDATIONS    1)  Keep the wound covered by a bandage when bathing.    2)  Gently clean the wound with soap water, separate from bath/shower water.      3)  Each day, apply a topical antibiotic ointment to the wound (Neosporin, Triple antibiotic, Bacitracin).   Cover with large band-aid or gauze.      4)  Please seek immediate medical attention if any increasing redness, swelling, drainage, smell, or pain related to the wound.     5)  Please return to clinic in the period of time requested.    Offloading advised.  Will send for ABIs.  DM shoes/inserts ordered.  Advised daily foot checks.    F/u in 4 wks.    Joseluis Borges DPM, FACFAS          Again, thank you for allowing me to participate in the care of your patient.        Sincerely,        Joseluis Borges DPM

## 2019-06-20 NOTE — TELEPHONE ENCOUNTER
6/20/19  Beulah was called back by RN, number busy.  Pt's son Beulah was called again, voicemail as left that we are checking in again to see how his father is doing since the call to us on 6/14/19 with reports of increased pain near the surgical site, T10-Pelvis fusion 4/25/19.    According to the chart, pt has been seen with Dr Ghosh in Sports Medicine since his call as well as with Podiatry.  Lois Brannon RN

## 2019-06-24 NOTE — PROGRESS NOTES
[facility] now requests orders and shares plan of care/discharge summaries for some patients through [employer].  Please REPLY TO THIS MESSAGE OR ROUTE BACK TO THE AUTHOR in order to give authorization for orders when needed.  This is considered a verbal order, you will still receive a faxed copy of orders for signature.  Thank you for your assistance in improving collaboration for our patients.    ORDER: PT 1w1, 2w2 for strength, balance, and aerobic endurance following spinal surgery.     Griselda Szymanski, PT, DPT  [email or web address]  [phone]

## 2019-06-25 NOTE — PROGRESS NOTES
This is a recent snapshot of the patient's [facility] medical record.  For current drug dose and complete information and questions, call [phone] or In Basket pool, [facility] home infusion ([id number])  CSN Number:  [id number]

## 2019-06-28 NOTE — TELEPHONE ENCOUNTER
Please call pt.  ABIs are abnormal, suggesting vascular disease.  Given his wound, I would like him to see Vascular Surgery.   Referral pended.  Please inform pt.

## 2019-06-28 NOTE — LETTER
July 5, 2019      Beulah Jane  [address]   [place] [facility] [address]        Dear Beulah,     We have made 3 attempts to contact you by phone regarding your recent Doppler Ultrasound of your legs on 6/27/19. We do not appear to have your current phone number. The results were abnormal suggesting there is vascular disease present. Due to this possibly affecting wound healing, I would like you to see a Vascular Surgeon. A referral has been placed, and you may contact the [facility]: [phone], to make an appointment. Please contact the clinic for further information or schedule an appointment to further discuss.    Sincerely,        Joseluis Broges DPM

## 2019-07-01 NOTE — TELEPHONE ENCOUNTER
Reason for call:  Symptom   Symptom or request: Shortness of breath x 1 week  and bilateral swelling in legs x 2 weeks as reported by CARI Villalobos with  Homecare.  Griselda ph# [phone]    Duration (how long have symptoms been present): 2 weeks  Have you been treated for this before? No    Additional comments:     Phone number to reach patient:  Home number on file [phone] (home)    Best Time:      Can we leave a detailed message on this number?  YES

## 2019-07-01 NOTE — TELEPHONE ENCOUNTER
Tried calling pt - busy signal, busy signal at son's number too    Called Griselda [facility] tried compression wraps for ankle edema     Cardiology appointment is next week     Does have albuterol inhaler but not on diuretics    SOB ongoing 1-2 week, SOB worse     120 HR with simple walking     Discussed pt should be seen right away if SOB worsening and pt has bilateral LE swelling. Advised her did try reaching pt but phone was busy, will try to call him again.     Tried calling again, both pt's and son's phones busy. Called other listed number [phone] and M asking pt to call back     Sindhu MUELLER RN      Reason for Disposition    [1] Difficulty breathing with exertion (e.g., walking) AND [2] new onset or worsening    Additional Information    Swelling of both ankles (i.e., pedal edema)    Negative: Severe difficulty breathing (e.g., struggling for each breath, speaks in single words)    Negative: Looks like a broken bone or dislocated joint (e.g., crooked or deformed)    Negative: Sounds like a life-threatening emergency to the triager    Negative: Chest pain    Negative: Followed a leg injury    Negative: [1] Small area of swelling AND [2] followed an insect bite to the area    Negative: Swelling of one ankle joint    Negative: Swelling of knee is main symptom    Negative: Pregnant    Negative: Postpartum (< 1 month since delivery)    Negative: Difficulty breathing at rest    Negative: [1] Can't walk or can barely walk AND [2] new onset    Negative: Entire foot is cool or blue in comparison to other side    Negative: [1] Red area or streak AND [2] fever    Negative: [1] Swelling is painful to touch AND [2] fever    Negative: [1] Cast on leg or ankle AND [2] now increased pain    Negative: Patient sounds very sick or weak to the triager    Protocols used: LEG SWELLING AND EDEMA-A-AH, ANKLE SWELLING-A-AH

## 2019-07-01 NOTE — TELEPHONE ENCOUNTER
Health Call Center    Phone Message    May a detailed message be left on voicemail: yes    Reason for Call: Symptoms or Concerns     If patient has red-flag symptoms, warm transfer to triage line    Current symptom or concern: Pt reported to his home care nurse that he is experiencing swelling in legs, shortness of breath (not currently experiencing, just with activity). Pt is also asking about diuretics.     Symptoms have been present for: unknown    Has patient previously been seen for this? No    Are there any new or worsening symptoms? No      Action Taken: Message routed to:  [facility] ([facility] Cardiology

## 2019-07-01 NOTE — TELEPHONE ENCOUNTER
Attempted to reach patient at home number: [phone] and got a busy signal. Attempted to reach son, David Alexander at [phone] (Consent to communicate on file to speak with patient or son, David, at this number) and line was busy. Will try again later, but it appears we may have incorrect numbers.     LEAH Espinal, RN

## 2019-07-03 NOTE — TELEPHONE ENCOUNTER
Jesse Villalobos that Beulah is not currently an established pt here in cardiology and that they should reach out to his primary care provider for assistance with this issues.    Beulah is scheduled to see the valve team on 7/11.

## 2019-07-03 NOTE — TELEPHONE ENCOUNTER
See Encounter dated 7/3/19-    Writer spoke with pt's son who lives out of state and asked that he have pt call back to clinic    NOTE: Number on file is a [place] number and will not be able to be reached by a clinic land line telephone. (son mentions we will have to call him from a cell phone)     Note states: Best way to get ahold of pt is through [employer] (PR[employer])hart- however, no Portrt message have been viewed.     Mojgan WILCOX RN

## 2019-07-05 PROBLEM — I35.0 AORTIC STENOSIS: Status: ACTIVE | Noted: 2019-01-01

## 2019-07-05 NOTE — ED PROVIDER NOTES
History     Chief Complaint:  Respiratory Problems    HPI   Beulah Jane is a 69 year old male with a history of a PE and aortic stenosis who presents with shortness of breath and leg swelling. The patient reports that his home nurse saw his legs were swollen and recommended that he call his PCP. He reports he has had leg swelling and increased shortness of breath since 6/28/19; he has also had decreased exercise tolerance.  Patient also notes that his heart rate has been increased from baseline.  Denies chest pain, fever, or cough.    4/22/19 Echo Complete  Left ventricular size is normal.  The Ejection Fraction is estimated at 50-55%.  Apical wall akinesis is present.  Right ventricular function, chamber size, wall motion, and thickness are  normal.  Moderate aortic insufficiency is present.  Severe aortic stenosis is present.  With contrast,peak aortic velocity 4.8m/sec and mean aortic gradient 55mmHg.  Averaged peak velocity 4.1m/sec and mean aortic gradient 40mmHg.  Calculated valve area 0.8 cm2.  Partly thr increased gradients could be due to aortic regurgitation. Recommend  additional evaluation to assess severity of aortic stenosis.  Pulmonary artery systolic pressure cannot be assessed.  The inferior vena cava is normal.  No pericardial effusion is present.  Increase in aortic gradients and apical wallmotion abnormality are new since  study from 2017.    CARDIAC RISK FACTORS:  Sex:    Male  Tobacco:   Former  Hypertension:   Yes  Hyperlipidemia:  Yes  Diabetes:   Yes  Family History:  No    PE/DVT RISK FACTORS:  Sex:    Male  Hormones:   No  Tobacco:   Former  Cancer:   No  Travel:   No  Surgery:   No  Other immobilization: No  Personal history:  No  Family history:  No    Allergies:  No known drug allergies    Medications:    Verapamil    Past Medical History:    Aortic stenosis  Chronic pain  Diabetes mellitus, Type 2  Hyperlipidemia  Pneumonia  Polymyositis  Pulmonary  "fibrosis  Hypertension  Hypothyroidism    Past Surgical History:    Decompression, fusion lumbar posterior two levels, combined  Fusion lumbar anterior three+ levels  Hernia repai  Lumbar spine hardware removal  Optical tracking system fuison posterior spine thoracic three+ levels  PICC insertion  Removal Prosthetic material/mesh, abd wall necro tiss infexn  Rotator cuff repair    Family History:    History reviewed. No pertinent family history.     Social History:  Smoking status: Former, quit 6/29/1070  Alcohol use: Yes, few times monthly  Drug use: No  PCP: Hoa López  Presents to the ED with his wife  Marital Status:   [2]    Review of Systems   Constitutional: Negative for fever.   Respiratory: Positive for shortness of breath. Negative for cough.    Cardiovascular: Positive for leg swelling. Negative for chest pain.   All other systems reviewed and are negative.      Physical Exam     Patient Vitals for the past 24 hrs:   BP Temp Temp src Pulse Heart Rate Resp SpO2 Height Weight   07/05/19 1930 123/64 -- -- 104 104 19 98 % -- --   07/05/19 1925 -- -- -- 132 -- -- 93 % -- --   07/05/19 1900 104/57 -- -- 101 101 23 95 % -- --   07/05/19 1720 139/64 -- -- 110 -- -- 99 % -- --   07/05/19 1616 107/57 98  F (36.7  C) Temporal 109 -- 18 96 % 1.651 m (5' 5\") 73 kg (161 lb)     Physical Exam  General: Alert and cooperative with exam. Patient in mild distress. Normal mentation.  Head:  Scalp is NC/AT  Eyes:  No scleral icterus, PERRL  ENT:  The external nose and ears are normal. The oropharynx is normal and without erythema; mucus membranes are moist. Uvula midline, no evidence of deep space infection.  Neck:  Normal range of motion without rigidity.  CV:  Regular rate and rhythm    Moderate systolic murmur.   Resp:  Faint crackles in lung bases.      Non-labored, no retractions or accessory muscle use  GI:  Abdomen is soft, no distension, no tenderness. No peritoneal signs  MS:  Post bilateral lower extremity " edema.  Skin:  Warm and dry, No rash or lesions noted.  Neuro: Oriented x 3. No gross motor deficits.\    Emergency Department Course   ECG (17:33:24):  Rate 104 bpm. SD interval 166. QRS duration 138. QT/QTc 366/481. P-R-T axes 43 102 -34. Sinus tachycardia. Possible Left atrial enlargement. Rightward axis. Nonspecific intraventricular block. T wave abnormality, consider inferior ischemia. Abnormal ECG. No significant change compared to EKG dated 6/2/19. Interpreted at 1740 by Carlos Enrique Davis DO.    Imaging:  Radiographic findings were communicated with the patient who voiced understanding of the findings.    XR Chest 2 Views  Diffuse bilateral interstitial opacities suggestive of  edema. No definite pleural effusions appreciated. Cardiomegaly as  before.   As read by Radiology.    Laboratory:  Troponin I (1738): 0.024  Nt probnp inpatient: 2613 (H)  BMP: Glucose 161 (H) WNL (Creatinine 0.85)  CBC: HGB 9.1 (L) o/w WNL (WBC 7.8, )    Emergency Department Course:  Past medical records, nursing notes, and vitals reviewed.  1717: I performed an exam of the patient and obtained history, as documented above.  EKG performed, results above.  IV inserted and blood drawn.  The patient was sent for a chest X-ray while in the emergency department, findings above.    1928: Findings and plan explained to the Patient who consents to admission.     1947: Discussed the patient with Dr. Bryson, who will admit the patient to a cardiac telemetry bed for further monitoring, evaluation, and treatment.     Impression & Plan    Medical Decision Making:  Patient is a 69-year-old male presents with increasing lower extremity edema as well as increasing exertional shortness of breath; history of severe aortic stenosis.  Patient's medical history and records were reviewed.  Initial consideration for, not limited to, arrhythmia, hypervolemia, worsening aortic stenosis, anemia, electrolyte abnormality, atypical ACS/MI, among others.   Labs, EKG, imaging was obtained.  EKG demonstrates mild sinus tachycardia without significant change from previous EKG.  Patient was noted to have 2+ pitting edema to his lower extremities and a mildly elevated BNP; exam consistent with volume overload.  Troponin was detectable but not significantly elevated patient has chronic anemia (hemoglobin 9.1).  Chest x-ray concerning for diffuse bilateral interstitial opacities suggestive of edema.  He maintains a normal oxygen saturations both at rest and with exertion though heart rate did increase into the 130s with exertion with noted dyspnea.  While I did consider PE, at this time patient's presentation is more concerning for symptoms secondary to worsening aortic stenosis and hypervolemia.  Will defer diuresis to hospitialist service. He will be admitted to the hospitalist service for further evaluation and care.  Patient remained stable throughout ED course.    Diagnosis:    ICD-10-CM    1. Aortic valve stenosis, etiology of cardiac valve disease unspecified I35.0    2. Hypervolemia, unspecified hypervolemia type E87.70    3. Shortness of breath R06.02    4. Acute pulmonary edema (H) J81.0        Disposition:  Admitted to cardiac telemetry    Osman Menon  7/5/2019    EMERGENCY DEPARTMENT  Osman SANTOYO am serving as a scribe at 5:17 PM on 7/5/2019 to document services personally performed by Carlos Enrique Davis DO based on my observations and the provider's statements to me.      Carlos Enrique Davis DO  07/06/19 0003

## 2019-07-05 NOTE — TELEPHONE ENCOUNTER
Pt referred to [facility] by Dr. Joseluis Borges for PAD, LLE wound.    6/27/19 TIFFANIE no exercise in Epic.    Pt needs to be scheduled for consult with Vascular Surgery.  Will route to scheduling to coordinate an appointment at next available.    RADHA ShahidN RN

## 2019-07-05 NOTE — PROGRESS NOTES
Subjective     Beulah Jane is a 69 year old male who presents to clinic today for the following health issues:    HPI   SOB and Edema      Duration: 1 week    Description (location/character/radiation):  SOB and bi-lateral ankle and leg swelling    Intensity:  severe    Accompanying signs and symptoms: Pitting    History (similar episodes/previous evaluation): yes    Precipitating or alleviating factors: Diabetic?    Therapies tried and outcome: None     Pt presents with significant increase in bilateral TRUDI x3 days.   He has breathing issues but his GONZALEZ is now worse as well.   Even walking to the exam room today he got very SOB half way which was only about 50ft  He also states his oxygen when checked yesterday was 92%   Pulse has been more elevated recently the past few days as well   Overall he is not feeling well today  Patient's HGB June 3rd, 9.5, and recheck on Martha 11th 10.5    Past Medical History:   Diagnosis Date     Aortic stenosis      Chronic pain      DM (diabetes mellitus), type 2 (H)     on insulin     Hyperlipidemia      JUAN on CPAP      Pneumonia      Polymyositis (H)      Pulmonary fibrosis, unspecified (H)      Seasonal allergies      No family history on file.  Past Surgical History:   Procedure Laterality Date     ARTHROSCOPY KNEE  1970     COLONOSCOPY       DECOMPRESSION, FUSION LUMBAR POSTERIOR TWO LEVELS, COMBINED  1997     FUSION LUMBAR ANTERIOR THREE+ LEVELS N/A 4/22/2019    Procedure: Lumbar 1 Or Lumbar 2-5 Anterior Lumbar Interbody Fusion with BMP Stage 1 Of Two Procedure:;  Surgeon: Carlos Enrique Dial MD;  Location: [facility] OR     HERNIA REPAIR  2006     lumbar spine hardware removal  1999     OPTICAL TRACKING SYSTEM FUSION POSTERIOR SPINE THORACIC THREE+ LEVELS N/A 4/25/2019    Procedure: O-Arm/Stealth Assisted Thoracic 10-Pelvis Instrumented Fusion T11-2, L1-2, L2-3 Transforminal Lumbar Interbody Fusion (TLIF) And Smith Borges Osteotomies,and L3-4 SPO as well, Use Of  BMP, Debridement Of Seroma;  Surgeon: Carlos Enrique Dial MD;  Location: [facility] OR     PICC INSERTION Right 2019    4Fr - 37cm, Basilic vein, low SVC     REMOVAL PROSTHETIC MATERIAL/MESH, ABD WALL NECRO TISS INFEXN       ROTATOR CUFF REPAIR RT/LT       Social History     Tobacco Use     Smoking status: Former Smoker     Packs/day: 0.25     Last attempt to quit: 1970     Years since quittin.0     Smokeless tobacco: Never Used   Substance Use Topics     Alcohol use: Yes     Comment: few times monthly     Current Outpatient Medications   Medication Sig Dispense Refill     acetaminophen (TYLENOL) 325 MG tablet Take 2 tablets (650 mg) by mouth every 4 hours as needed for mild pain or fever       albuterol (2.5 MG/3ML) 0.083% neb solution Take 1 vial by nebulization every 6 hours as needed for shortness of breath / dyspnea or wheezing       albuterol (PROAIR HFA/PROVENTIL HFA/VENTOLIN HFA) 108 (90 BASE) MCG/ACT Inhaler Inhale 2 puffs into the lungs every 6 hours as needed for shortness of breath / dyspnea or wheezing       calcium carbonate (OS-DMITRIY 500 MG [place]. [place]) 500 MG tablet Take 1 tablet (500 mg) by mouth 2 times daily 180 tablet 3     Cholecalciferol (VITAMIN D) 2000 units tablet Take 2,000 Units by mouth daily 100 tablet 3     diphenhydrAMINE (BENADRYL) 50 MG capsule Take 50 mg by mouth every 6 hours as needed for itching or allergies       FOLIC ACID PO Take 1 mg by mouth daily       GABAPENTIN PO Take 600 mg by mouth 3 times daily       GEMFIBROZIL PO Take 600 mg by mouth 2 times daily       hydrocortisone 2.5 % cream Apply topically 2 times daily Apply to Rash on Back topically two times a day       insulin aspart (NOVOLOG PEN) 100 UNIT/ML pen Inject 1-7 Units Subcutaneous 3 times daily (before meals)       insulin aspart (NOVOLOG PEN) 100 UNIT/ML pen Inject 1-5 Units Subcutaneous At Bedtime       ipratropium - albuterol 0.5 mg/2.5 mg/3 mL (DUONEB) 0.5-2.5 (3) MG/3ML neb solution Take  1 vial by nebulization 3 times daily       LANsoprazole (PREVACID) 30 MG DR capsule Take 30 mg by mouth every morning (before breakfast)       levothyroxine (SYNTHROID/LEVOTHROID) 50 MCG tablet Take 50 mcg by mouth daily       methotrexate 2.5 MG tablet Take 10 tablets (25 mg) by mouth once a week Thursdays       multivitamin w/minerals (THERA-VIT-M) tablet Take 1 tablet by mouth daily       omega 3 1000 MG CAPS Take 2 capsules by mouth 2 times daily       oxyCODONE-acetaminophen (PERCOCET) 5-325 MG tablet Take 1 tablet by mouth 2 times daily as needed for severe pain Take percocet 5/325 TID at 7am, 2pm and 9pm 20 tablet 0     traMADol (ULTRAM) 50 MG tablet Take 0.5 tablets (25 mg) by mouth 3 times daily 40 tablet 0     verapamil ER (CALAN-SR) 120 MG CR tablet Take 120 mg by mouth At Bedtime       No Known Allergies    Reviewed and updated as needed this visit by clinical staff and provider     Review of Systems   Detailed as above       Objective    /63 (BP Location: Right arm, Patient Position: Chair, Cuff Size: Adult Regular)   Pulse 122   Temp 98.7  F (37.1  C) (Tympanic)   Wt 73 kg (161 lb)   SpO2 90%   BMI 29.45 kg/m    Body mass index is 29.45 kg/m .  Physical Exam   Constitutional: He appears well-developed.   Cardiovascular: Tachycardia present.   Murmur (significant blowing murmur) heard.  Pulmonary/Chest: Effort normal.   Musculoskeletal: He exhibits edema (4+ bilat LE).   Neurological: He is alert.   Psychiatric: He has a normal mood and affect. Judgment normal.          Assessment & Plan       ICD-10-CM    1. Bilateral lower extremity edema R60.0    2. Tachycardia R00.0    3. Hypoxia R09.02         New bilateral TRUDI for 3 days. Also notes that he has been more tachy and O2 has been lower as well. HR did not slow after getting to exam room and he was hypoxic today when sitting in comparison to previous readings. With his acute worsening symptoms, will send to ED with concerns for cardiac  decompensation. Of note he has a very loud murmur on exam and has plans to meet with cardiology next week regarding valve replacement. Pt and family agree to this plan.       ANASTACIA Zavala [facility]

## 2019-07-05 NOTE — ED TRIAGE NOTES
Pt here from internal med clinic. Having issues with low sats,  leg swelling tachy cardia that is new today and GONZALEZ. Has an appt with cardiology regarding potential valve replacement .

## 2019-07-05 NOTE — TELEPHONE ENCOUNTER
Attempted to reach patient and son at home number and son's number listed. Both lines immediately ring busy again. They don't appear to be in service.   Patient has not been checking [employer] messages.    Phone call to [facility] Scheduling, and they do not have any other numbers to reach patient.     LEAH Espinal, RN

## 2019-07-05 NOTE — TELEPHONE ENCOUNTER
Called patient's son, Beulah Duque phone number from [employer]. Informed that we have been trying to reach him and patient but phone number rings busy. He states the numbers we have for his father and brother have an area code for [place] and will not go through if dialing from a land line. They will go through if calling from a cell phone. He said it has been an ongoing issue.  He is currently in [place].  Informed we are not able to contact him via cell phone. Asked that he inform patient of abnormal Doppler Ultrasound and need for Vascular Surgeon Referral. Provided phone number: [phone],  for [facility] in [facility]. He asks if that can be the cause of the swelling the patient is having in his feet and legs. Informed that it can. He will inform patient.     LEAH Espinal RN

## 2019-07-05 NOTE — ED NOTES
Bed: ED12  Expected date: 7/5/19  Expected time: 4:50 PM  Means of arrival:   Comments:  Triage - resp problems

## 2019-07-06 NOTE — PROGRESS NOTES
RECEIVING UNIT ED HANDOFF REVIEW    ED Nurse Handoff Report was reviewed by: Sindhu Kim on July 5, 2019 at 8:36 PM

## 2019-07-06 NOTE — ED NOTES
"[facility]  ED Nurse Handoff Report    ED Chief complaint: Respiratory Problems      ED Diagnosis:   Final diagnoses:   None       Code Status: Full Code    Allergies: No Known Allergies    Activity level - Baseline/Home:  Independent  Activity Level - Current:   Stand with Assist    Patient's Preferred language: Iranian-speaking, fluent in English   Needed?: No    Isolation: No  Infection: Not Applicable  Bariatric?: No    Vital Signs:   Vitals:    07/05/19 1720 07/05/19 1900 07/05/19 1925 07/05/19 1930   BP: 139/64 104/57  123/64   Pulse: 110 101 132 104   Resp:  23 19   Temp:       TempSrc:       SpO2: 99% 95% 93% 98%   Weight:       Height:           Cardiac Rhythm: ,        Pain level:      Is this patient confused?: No   Does this patient have a guardian?  No         If yes, is there guardianship documents in the Epic \"Code/ACP\" activity?  N/A         Guardian Notified?  No  [place] - Suicide Severity Rating Scale Completed?  No, secondary to n/a  If yes, what color did the patient score?  N/A    Patient Report: Initial Complaint: Increased swelling in legs, noted to be tachy and low SpO2 at clinic.   Focused Assessment: VSS. HR tachy, SpO2 95-98% RA. Denies sob at rest, but is with activity. Edema to BLEs, legs are rachana when dependent. Denies pain, except with palpation in lower abdomen.  Tests Performed: Chest xray, labs  Abnormal Results:   Labs Ordered and Resulted from Time of ED Arrival Up to the Time of Departure from the ED   NT PROBNP INPATIENT - Abnormal; Notable for the following components:       Result Value    N-Terminal Pro BNP Inpatient 2,613 (*)     All other components within normal limits   BASIC METABOLIC PANEL - Abnormal; Notable for the following components:    Glucose 161 (*)     All other components within normal limits   CBC WITH PLATELETS DIFFERENTIAL - Abnormal; Notable for the following components:    RBC Count 4.07 (*)     Hemoglobin 9.1 (*)     " Hematocrit 31.5 (*)     MCV 77 (*)     MCH 22.4 (*)     MCHC 28.9 (*)     RDW 19.4 (*)     All other components within normal limits   TROPONIN I       Treatments provided: Monitoring    Family Comments: Wife at bedside (speaks only Estonian but is able to understand some English)    OBS brochure/video discussed/provided to patient/family: No              Name of person given brochure if not patient: n/a              Relationship to patient: n/a    ED Medications: Medications - No data to display    Drips infusing?:  No    For the majority of the shift this patient was Green.   Interventions performed were n/a.    Severe Sepsis OR Septic Shock Diagnosis Present: No    To be done/followed up on inpatient unit:  Continue plan of care    ED NURSE PHONE NUMBER: [phone]

## 2019-07-06 NOTE — PHARMACY-ADMISSION MEDICATION HISTORY
Admission medication history interview status for the 7/5/2019  admission is complete. See EPIC admission navigator for prior to admission medications     Medication history source reliability:Good    Actions taken by pharmacist (provider contacted, etc): Chart and SureScript review, discussed with patient.    Additional medication history information not noted on PTA med list :  - Hydrocortisone cream and Duonebs removed from medication list.  - Insulin aspart changed to Humulin N (NPH) which patient givens BID prn for elevated blood glucose (no set sliding scale)  - Bactrim DS 1 tab three times weekly stopped ~1 week ago (did not add to list).  - Patient has a pain syndrome in which he takes Percocet, gabapentin, and tramadol scheduled three times a day at 0700, 1400, and 2100.    Medication reconciliation/reorder completed by provider prior to medication history? Yes    Time spent in this activity: 20 minutes    Prior to Admission medications    Medication Sig Last Dose Taking? Auth Provider   acetaminophen (TYLENOL) 325 MG tablet Take 2 tablets (650 mg) by mouth every 4 hours as needed for mild pain or fever prn Yes Sofía Retana MD   albuterol (2.5 MG/3ML) 0.083% neb solution Take 1 vial by nebulization every 6 hours as needed for shortness of breath / dyspnea or wheezing prn Yes Unknown, Entered By History   albuterol (PROAIR HFA/PROVENTIL HFA/VENTOLIN HFA) 108 (90 BASE) MCG/ACT Inhaler Inhale 2 puffs into the lungs every 6 hours as needed for shortness of breath / dyspnea or wheezing prn Yes Unknown, Entered By History   calcium carbonate (OS-DMITRIY 500 MG [place]. [place]) 500 MG tablet Take 1 tablet (500 mg) by mouth 2 times daily  Patient taking differently: Take 1 tablet by mouth daily  7/4/2019 at Unknown time Yes Carlos Enrique Dial MD   Cholecalciferol (VITAMIN D) 2000 units tablet Take 2,000 Units by mouth daily 7/5/2019 at am Yes Carlos Enrique Dial MD   diphenhydrAMINE (BENADRYL) 50 MG  capsule Take 50 mg by mouth every 6 hours as needed for itching or allergies prn Yes Unknown, Entered By History   folic acid (FOLVITE) 1 MG tablet Take 1 mg by mouth daily  7/5/2019 at am Yes Unknown, Entered By History   gabapentin (NEURONTIN) 600 MG tablet Take 600 mg by mouth 3 times daily 0700, 1400, 2100 7/5/2019 at x2 doses Yes Unknown, Entered By History   gemfibrozil (LOPID) 600 MG tablet Take 600 mg by mouth 2 times daily  7/5/2019 at am Yes Unknown, Entered By History   insulin NPH (HUMULIN N/NOVOLIN N VIAL) 100 UNIT/ML vial Inject 2-6 Units Subcutaneous 2 times daily Will usually give if blood glucose >140. Does not have specific sliding scale. Past Week at Unknown time Yes Unknown, Entered By History   LANsoprazole (PREVACID) 30 MG DR capsule Take 30 mg by mouth every morning (before breakfast) 7/5/2019 at am Yes Unknown, Entered By History   levothyroxine (SYNTHROID/LEVOTHROID) 50 MCG tablet Take 50 mcg by mouth daily 7/5/2019 at am Yes Unknown, Entered By History   methotrexate 2.5 MG tablet Take 25 mg by mouth once a week Tuesday 7/2/2019 Yes Unknown, Entered By History   multivitamin w/minerals (THERA-VIT-M) tablet Take 1 tablet by mouth daily 7/5/2019 at am Yes Sofía Retana MD   omega 3 1000 MG CAPS Take 2 capsules by mouth daily  7/5/2019 at am Yes Unknown, Entered By History   oxyCODONE-acetaminophen (PERCOCET) 5-325 MG tablet Take 1 tablet by mouth 2 times daily as needed for severe pain Take percocet 5/325 TID at 7am, 2pm and 9pm  Patient taking differently: Take 1 tablet by mouth 3 times daily 7am, 2pm and 9pm 7/5/2019 at x2 doses Yes Hoa López MD   traMADol (ULTRAM) 50 MG tablet Take 0.5 tablets (25 mg) by mouth 3 times daily  Patient taking differently: Take 25 mg by mouth 3 times daily 0700, 1400, 2100 7/5/2019 at x2 doses Yes Hoa López MD   verapamil ER (CALAN-SR) 120 MG CR tablet Take 120 mg by mouth At Bedtime 7/4/2019 at pm Yes Unknown, Entered By History

## 2019-07-06 NOTE — CONSULTS
[facility]    Cardiology Consultation     Date of Admission:  7/5/2019    Assessment & Plan   Beulah Jane is a 69 year old male who was admitted on 7/5/2019. I was asked to see the patient for shortness of breath.    1. Severe aortic valve stenosis undergoing outpatient TAVR workup next week  2. Shortness of breath and dyspnea on exertion  3. Acute on chronic diastolic congestive heart failure  4. Inferolateral and lateral RWMA seen on cath  5. Hypertension  6. Hyperlipidemia  7. DM2  8. Pulmonary fibrosis  9. JUAN  10. Dermatomyositis on methotrexate    It was a pleasure seeing Mr. Catherine Jane today with a cardiology consult service.  I reviewed these recommendations with him in the hospital in detail today.    He presents with worsening shortness of breath and dyspnea on exertion as well as weight gain and lower extremity edema.  His echocardiogram demonstrates stable, severe aortic valve stenosis.  He also has moderate aortic valve regurgitation and apical inferior and lateral regional wall motion abnormalities.  His ejection fraction is normal.  I discussed with him that all of these issues are likely contributing to his current presentation.    Fortunately, with diuresis, he feels considerably better.  His lower extremity edema by his report is significantly improved and the shortness of breath is also better.  He continues to show I recommend that we continue diuresis today and tomorrow and reassess his volume status on Monday.  At that time, we can discuss whether or not we would perform expedited TAVR work-up versus continued outpatient work-up with his next appointment scheduled on 7/11/2019.    Lastly, he does have evidence of a mildly elevated troponin at 0.02 with a trend of 0.03, 0.02, and undetectable.  This in the setting of severe aortic valve stenosis and regional wall motion abnormalities.  He is not having chest pain or chest discomfort.  Would not favor stress testing at  this point as he will have a coronary angiography as part of his pre-TAVR work-up.    Recommendations:  1.  Continue IV diuresis.  Will reassess volume status daily and adjust diuretics as appropriate.  2.  Reassessment of his symptoms after achieving euvolemia.  At that time, he can discuss expedited TAVR work-up versus continued outpatient work-up as planned for 7/11/2019.    Rich Amaral MD    Code Status    Full Code    Reason for Consult   Reason for consult: shortness of breath    Primary Care Physician   Hoa López    Chief Complaint   Shortness of breath    History is obtained from the patient    History of Present Illness   Beulah Jane is a 69 year old male who presents with shortness of breath and dyspnea on exertion as well as lower extremity edema.  He has a past medical history of severe aortic valve stenosis currently plan to undergo TAVR work-up beginning 7/11, hypertension, diabetes mellitus, pulmonary fibrosis, and dermatomyositis on treatment with methotrexate.    He tells me that beginning approximately 3 weeks ago he began developing lower extremity edema as well as some shortness of breath.  This is gradually progressed since that time.  He began getting short of breath with walking across the room.  He had no shortness of breath at rest.  He was not having symptoms of orthopnea or paroxysmal nocturnal dyspnea prior to admission to the hospital. His visiting nurse saw him and due to his lower extremity edema advised that he be seen by his primary care physician.  He presented for outpatient evaluation yesterday and was advised to present to the emergency department for further evaluation.    In the emergency department, his BNP was elevated at 2613.  His troponin was elevated at 0.02 with a subsequent trend of 0.03, 0.02, and undetectable.  He had stable anemia with a hemoglobin of 9.1.  Chest x-ray demonstrated cardiomegaly and pulmonary edema consistent with congestive heart  failure.  He was admitted to the hospital for further cares and cardiology was consulted for recommendations.    At this point, he tells me he feels considerably better than when he first came into the hospital.  He denies any chest pain or chest discomfort of any kind.  He denies any orthopnea or paroxysmal nocturnal dyspnea.  He denies any shortness of breath at rest.  He has not yet ambulated to know if his dyspnea on exertion is improved.  However, he does state that his breathing overall seems improved compared to baseline.  His lower extremity edema has substantially improved with IV diuresis.    Past Medical History   I have reviewed this patient's medical history and updated it with pertinent information if needed.   Past Medical History:   Diagnosis Date     Aortic stenosis      Chronic pain      DM (diabetes mellitus), type 2 (H)     on insulin     Hyperlipidemia      JUAN on CPAP      Pneumonia      Polymyositis (H)      Pulmonary fibrosis, unspecified (H)      Seasonal allergies        Past Surgical History   I have reviewed this patient's surgical history and updated it with pertinent information if needed.  Past Surgical History:   Procedure Laterality Date     ARTHROSCOPY KNEE  1970     COLONOSCOPY       DECOMPRESSION, FUSION LUMBAR POSTERIOR TWO LEVELS, COMBINED  1997     FUSION LUMBAR ANTERIOR THREE+ LEVELS N/A 4/22/2019    Procedure: Lumbar 1 Or Lumbar 2-5 Anterior Lumbar Interbody Fusion with BMP Stage 1 Of Two Procedure:;  Surgeon: Carlos Enrique Dial MD;  Location: [facility] OR     HERNIA REPAIR  2006     lumbar spine hardware removal  1999     OPTICAL TRACKING SYSTEM FUSION POSTERIOR SPINE THORACIC THREE+ LEVELS N/A 4/25/2019    Procedure: O-Arm/Stealth Assisted Thoracic 10-Pelvis Instrumented Fusion T11-2, L1-2, L2-3 Transforminal Lumbar Interbody Fusion (TLIF) And Smith Borges Osteotomies,and L3-4 SPO as well, Use Of BMP, Debridement Of Seroma;  Surgeon: Carlos Enrique Dial MD;   Location: [facility] OR     PICC INSERTION Right 05/06/2019    4Fr - 37cm, Basilic vein, low SVC     REMOVAL PROSTHETIC MATERIAL/MESH, ABD WALL NECRO TISS INFEXN  2012     ROTATOR CUFF REPAIR RT/LT  2003       Prior to Admission Medications   Prior to Admission Medications   Prescriptions Last Dose Informant Patient Reported? Taking?   Cholecalciferol (VITAMIN D) 2000 units tablet 7/5/2019 at am Self No Yes   Sig: Take 2,000 Units by mouth daily   LANsoprazole (PREVACID) 30 MG DR capsule 7/5/2019 at am Self Yes Yes   Sig: Take 30 mg by mouth every morning (before breakfast)   acetaminophen (TYLENOL) 325 MG tablet prn Self No Yes   Sig: Take 2 tablets (650 mg) by mouth every 4 hours as needed for mild pain or fever   albuterol (2.5 MG/3ML) 0.083% neb solution prn Self Yes Yes   Sig: Take 1 vial by nebulization every 6 hours as needed for shortness of breath / dyspnea or wheezing   albuterol (PROAIR HFA/PROVENTIL HFA/VENTOLIN HFA) 108 (90 BASE) MCG/ACT Inhaler prn Self Yes Yes   Sig: Inhale 2 puffs into the lungs every 6 hours as needed for shortness of breath / dyspnea or wheezing   calcium carbonate (OS-DMITRIY 500 MG [place]. [place]) 500 MG tablet 7/4/2019 at Unknown time Self No Yes   Sig: Take 1 tablet (500 mg) by mouth 2 times daily   Patient taking differently: Take 1 tablet by mouth daily    diphenhydrAMINE (BENADRYL) 50 MG capsule prn Self Yes Yes   Sig: Take 50 mg by mouth every 6 hours as needed for itching or allergies   folic acid (FOLVITE) 1 MG tablet 7/5/2019 at am Self Yes Yes   Sig: Take 1 mg by mouth daily    gabapentin (NEURONTIN) 600 MG tablet 7/5/2019 at x2 doses Self Yes Yes   Sig: Take 600 mg by mouth 3 times daily 0700, 1400, 2100   gemfibrozil (LOPID) 600 MG tablet 7/5/2019 at am Self Yes Yes   Sig: Take 600 mg by mouth 2 times daily    insulin NPH (HUMULIN N/NOVOLIN N VIAL) 100 UNIT/ML vial Past Week at Unknown time Self Yes Yes   Sig: Inject 2-6 Units Subcutaneous 2 times daily Will usually give if blood  glucose >140. Does not have specific sliding scale.   levothyroxine (SYNTHROID/LEVOTHROID) 50 MCG tablet 7/5/2019 at am Self Yes Yes   Sig: Take 50 mcg by mouth daily   methotrexate 2.5 MG tablet 7/2/2019 Self Yes Yes   Sig: Take 25 mg by mouth once a week Tuesday   multivitamin w/minerals (THERA-VIT-M) tablet 7/5/2019 at am Self No Yes   Sig: Take 1 tablet by mouth daily   omega 3 1000 MG CAPS 7/5/2019 at am Self Yes Yes   Sig: Take 2 capsules by mouth daily    oxyCODONE-acetaminophen (PERCOCET) 5-325 MG tablet 7/5/2019 at x2 doses Self No Yes   Sig: Take 1 tablet by mouth 2 times daily as needed for severe pain Take percocet 5/325 TID at 7am, 2pm and 9pm   Patient taking differently: Take 1 tablet by mouth 3 times daily 7am, 2pm and 9pm   traMADol (ULTRAM) 50 MG tablet 7/5/2019 at x2 doses Self No Yes   Sig: Take 0.5 tablets (25 mg) by mouth 3 times daily   Patient taking differently: Take 25 mg by mouth 3 times daily 0700, 1400, 2100   verapamil ER (CALAN-SR) 120 MG CR tablet 7/4/2019 at pm Self Yes Yes   Sig: Take 120 mg by mouth At Bedtime      Facility-Administered Medications: None     Allergies   No Known Allergies    Social History   I have reviewed this patient's social history and updated it with pertinent information if needed. Beulah Jane  reports that he quit smoking about 49 years ago. He smoked 0.25 packs per day. He has never used smokeless tobacco. He reports that he drinks alcohol. He reports that he does not use drugs.    Family History   I have reviewed this patient's family history and updated it with pertinent information if needed.   No family history on file.    Review of Systems   The 10 point Review of Systems is negative other than noted in the HPI or here.     Physical Exam   Temp: 99.2  F (37.3  C) Temp src: Oral BP: 97/57 Pulse: 91 Heart Rate: 107 Resp: 18 SpO2: 92 % O2 Device: None (Room air)    Vital Signs with Ranges  Temp:  [98  F (36.7  C)-99.2  F (37.3  C)] 99.2  F (37.3   C)  Pulse:  [] 91  Heart Rate:  [] 107  Resp:  [15-25] 18  BP: ()/(45-70) 97/57  SpO2:  [91 %-98 %] 92 %  159 lbs 14.4 oz    Constitutional: No apparent distress.   Eyes: No xanthelasma or conjunctivitis  HEENT: Moist oral mucosa  Respiratory: Crackles at the bases bilaterally.  Cardiovascular: Regular rate and rhythm. Normal S1 with preserved S2. Grade 3/6 VICKY heard best at RUSB. Additionally heard is a short diastolic murmur heard best at the LLSB with breath held.   Lymph/Hematologic: No purpura or petechiae.  Skin: No stasis dermatitis. No major rashes.  Extremities: 2+ bilateral peripheral edema.  Neurologic: Moving all extremities. No facial assymmetry.  Psychiatric: Alert and oriented. Answers questions appropriately.    Data   Results for orders placed or performed during the hospital encounter of 07/05/19 (from the past 24 hour(s))   XR Chest 2 Views    Narrative    XR CHEST 2 VW 7/5/2019 6:01 PM    HISTORY: Shortness of breath.     COMPARISON: May 6, 2019.       Impression    IMPRESSION: Diffuse bilateral interstitial opacities suggestive of  edema. No definite pleural effusions appreciated. Cardiomegaly as  before.     BAM GREGORY MD   Lipid panel reflex to direct LDL   Result Value Ref Range    Cholesterol 153 <200 mg/dL    Triglycerides 139 <150 mg/dL    HDL Cholesterol 22 (L) >39 mg/dL    LDL Cholesterol Calculated 103 (H) <100 mg/dL    Non HDL Cholesterol 131 (H) <130 mg/dL   Troponin I - Now then in 6 hours x 3   Result Value Ref Range    Troponin I ES 0.029 0.000 - 0.045 ug/L   INR   Result Value Ref Range    INR 1.11 0.86 - 1.14   Platelet count   Result Value Ref Range    Platelet Count 417 150 - 450 10e9/L   Creatinine   Result Value Ref Range    Creatinine 0.77 0.66 - 1.25 mg/dL    GFR Estimate >90 >60 mL/min/[1.73_m2]    GFR Estimate If Black >90 >60 mL/min/[1.73_m2]   Glucose by meter   Result Value Ref Range    Glucose 84 70 - 99 mg/dL   Troponin I - Now then in 6 hours x  3   Result Value Ref Range    Troponin I ES 0.023 0.000 - 0.045 ug/L   Glucose by meter   Result Value Ref Range    Glucose 95 70 - 99 mg/dL   Troponin I - Now then in 6 hours x 3   Result Value Ref Range    Troponin I ES <0.015 0.000 - 0.045 ug/L   Echocardiogram Complete    Narrative    [id number]  [id number]  [id number]  [id number]^[id number]           [facility]  Echocardiography Laboratory  [address] [place] [address]        Name: DANELLE SIMMS  MRN: [id number]  : 1950  Study Date: 2019 12:30 PM  Age: 69 yrs  Gender: Male  Patient [facility]: [facility]  Reason For Study: Aortic Valve Disorder  Ordering Physician: ISSAC RIOJAS  Referring Physician: Hoa López  Performed By: Demi Lee     BSA: 1.8 m2  Height: 65 in  Weight: 159 lb  BP: 98/55 mmHg  _____________________________________________________________________________  __        Procedure  Complete Portable Echo Adult. Optison (NDC #[id number]) given intravenously.  _____________________________________________________________________________  __        Interpretation Summary     Left ventricular systolic function is low normal. The visual ejection fraction  is estimated at 50-55%. There is severe hypokinesis of the apex and the apical  segments of the lateral and inferolateral walls.  There is moderate (2+) aortic regurgitation. Severe valvular aortic stenosis.  The peak AoV pressure gradient is 66.0 mmHg. The mean AoV pressure gradient is  34.6 mmHg.  Compared to the study from 2019, the gradients across the AV are lower  although the calculated PETE is the same. Wall motion abnormalities are similar  to previous study.  _____________________________________________________________________________  __        Left Ventricle  The left ventricle is normal in size. Left ventricular systolic function is  low normal. The visual ejection fraction is estimated at 50-55%. Grade I or  early diastolic dysfunction. There  is severe hypokinesis of the apex and the  apical segments of the lateral and inferolateral walls.     Right Ventricle  The right ventricle is normal in size and function.     Atria  The left atrium is mildly dilated. Right atrial size is normal.     Mitral Valve  The mitral valve leaflets appear thickened, but open well. There is no mitral  regurgitation noted.        Tricuspid Valve  The tricuspid valve is not well visualized.     Aortic Valve  There is moderate (2+) aortic regurgitation. Severe valvular aortic stenosis.  The peak AoV pressure gradient is 66.0 mmHg. The mean AoV pressure gradient is  34.6 mmHg.     Pulmonic Valve  The pulmonic valve is not well visualized.     Vessels  Normal size aorta. Normal size ascending aorta. IVC diameter <2.1 cm  collapsing >50% with sniff suggests a normal RA pressure of 3 mmHg.     Pericardium  There is no pericardial effusion.     _____________________________________________________________________________  __  MMode/2D Measurements & Calculations  IVSd: 1.5 cm  LVIDd: 5.8 cm  LVIDs: 4.0 cm  LVPWd: 1.3 cm  FS: 31.3 %     LV mass(C)d: 381.9 grams  LV mass(C)dI: 212.9 grams/m2  Ao root diam: 3.0 cm  LA dimension: 3.7 cm  asc Aorta Diam: 3.4 cm  LA/Ao: 1.2  LVOT diam: 2.0 cm  LVOT area: 3.1 cm2  LA Volume (BP): 58.2 ml  LA Volume Index (BP): 32.5 ml/m2  RWT: 0.46        Doppler Measurements & Calculations  MV E max nico: 63.5 cm/sec  MV A max nico: 115.9 cm/sec  MV E/A: 0.55     MV dec slope: 473.9 cm/sec2  Ao V2 max: 406.1 cm/sec  Ao max P.0 mmHg  Ao V2 mean: 276.3 cm/sec  Ao mean P.6 mmHg  Ao V2 VTI: 78.8 cm  PETE(I,D): 0.87 cm2  PETE(V,D): 0.87 cm2  AI P1/2t: 368.6 msec  LV V1 max P.2 mmHg  LV V1 max: 114.0 cm/sec  LV V1 VTI: 22.1 cm  SV(LVOT): 68.7 ml  SI(LVOT): 38.3 ml/m2  AV Nico Ratio (DI): 0.28  PETE Index (cm2/m2): 0.49  E/E' av.1  Lateral E/e': 13.3  Medial E/e': 15.0               _____________________________________________________________________________  __        Report approved by: Zackery Villasenor 07/06/2019 04:56 PM      Glucose by meter   Result Value Ref Range    Glucose 83 70 - 99 mg/dL

## 2019-07-06 NOTE — PLAN OF CARE
Plan: Cardiology consult and echo    Neuro: WDL    Cardiac: SR/ST, 2+ edema in lower extremities, verapamil on hold for now    Respiratory: room air    GI/: no issues    Incisions: Back and lower abdomen - healing    Pain: Chronic pain and recent 2 stage back surgery in June. Patient usually takes tramadol, percocet and gabapentin three times a day.     Activity: stand by, uses walker for longer walks    Discharge:  TBD, had home health care prior to hospitalization    FYI: Hebrew speaking, fluent in english. Wife is Hebrew speaking and will need an .

## 2019-07-06 NOTE — PLAN OF CARE
Pt is A&O, VSS on RA, Tele: SR. +2 edema to BLE. CPAP ordered for JUAN. Echo done today. Reg diet. SBA. Use walker and on long walks. Discharge pending.

## 2019-07-06 NOTE — PROGRESS NOTES
[facility]  Hospitalist Progress Note  Name: Beulah Jane    MRN: [id number]  Physician:  Benito Rosas DO, [facility] (Text Page)    Summary of Stay: Beulah Jane is a 69 year old male who was admitted on 7/5/2019.  He has known aortic stenosis and presented with apparent acute pulmonary edema.    Assessment & Plan    Congestive Heart Failure Exacerbation likely related to aortic stenosis:  -  Hold on additional diuretics for now.  He is on room air, edema improving, and SBP is low normal range.  Consider some further diuretics later today or tomorrow AM.  Monitor I/O and weights.  -  Obtaining updated echo today  -  Cardiology consult    HTN hx with recent lower normal range BP's:  -  Controlled, low normal BP's today after some diuresis.  Holding verapamil.    Recent back surgery Spring 2019:  -  He feels recovering well.  Continue pain medications he is on chronically for this.    DM2:  -  sliding scale insulin, controlled currently    Recent Labs   Lab 07/06/19  0939 07/05/19  2240 07/05/19  1738   GLC  --   --  161*   BGM 95 84  --      Hypothyroidism:  -  Levothyroxine    Dermatomyositis on methotrexate baseline:  -  No new complaints with his issue.  Holding methotrexate.   -  Formerly on prednisone, reports no longer taking    Interstitial Lung Disease:  -  No acute change.  This makes lung exam hard to interpret with crackles/volume concerns.  -  Formerly on theophylline and bactrim proph - reports no longer taking/felt needed    JUAN:  -  CPAP         Diet: Combination Diet Regular Diet Adult; No Caffeine Diet; No Caffeine for 24 hours (once tests completed, may have caffeine)    DVT Prophylaxis: Enoxaparin (Lovenox) SQ  [place] Catheter: not present  Code Status: Full Code      Disposition Plan   Expected discharge unclear, depends on valve status with echo and cardiology recommendations.     Entered: Benito Rosas 07/06/2019, 11:42 AM       Interval History   Assumed care,  history reviewed.  Mr. Catherine Jane feels much better after lasix.  He thinks his LE edema is improved and he is breathing well today.  He denies dizziness or chest pain.  No other acute complaints.    -Data reviewed today: I reviewed all new labs and imaging reports over the last 24 hours. I personally reviewed no images or EKG's today.    Physical Exam   Temp: 99  F (37.2  C) Temp src: Oral BP: 98/55 Pulse: 91 Heart Rate: 97 Resp: 16 SpO2: 95 % O2 Device: None (Room air)    Vitals:    07/05/19 1616 07/06/19 0635   Weight: 73 kg (161 lb) 72.5 kg (159 lb 14.4 oz)     Vital Signs with Ranges  Temp:  [98  F (36.7  C)-99  F (37.2  C)] 99  F (37.2  C)  Pulse:  [] 91  Heart Rate:  [] 97  Resp:  [15-25] 16  BP: ()/(45-70) 98/55  SpO2:  [90 %-99 %] 95 %  I/O last 3 completed shifts:  In: 240 [P.O.:240]  Out: 1425 [Urine:1425]    GEN:  Alert, oriented x 3, appears comfortable, no overt distress.  HEENT:  Normocephalic/atraumatic, no scleral icterus, no nasal discharge, mouth moist.  CV:  Regular rate and rhythm, III/IV systolic murmur, no rub.  LUNGS:  Clear upper with mild bibasilar crackles.  No wheezes/retractions.  Symmetric chest rise on inhalation noted.  ABD:  Active bowel sounds, soft, non-tender/non-distended.  No rebound/guarding/rigidity.  EXT:  Trace ankle edema.  No cyanosis.  SKIN:  Dry to touch, mild stasis skin changes LE's.  No exanthems noted in the visualized areas otherwise.    Medications     - MEDICATION INSTRUCTIONS -         calcium carbonate 500 mg (elemental)  500 mg Oral BID     enoxaparin  40 mg Subcutaneous Q24H     fish oil-omega-3 fatty acids  2,000 mg Oral BID     folic acid  1 mg Oral Daily     gabapentin  600 mg Oral TID     gemfibrozil  600 mg Oral BID     insulin aspart  1-7 Units Subcutaneous TID AC     insulin aspart  1-5 Units Subcutaneous At Bedtime     levothyroxine  50 mcg Oral Daily     multivitamin w/minerals  1 tablet Oral Daily     oxyCODONE-acetaminophen  1  tablet Oral TID     pantoprazole  40 mg Oral QAM AC     senna-docusate  1 tablet Oral BID    Or     senna-docusate  2 tablet Oral BID     sodium chloride (PF)  3 mL Intracatheter Q8H     traMADol  25 mg Oral TID     vitamin D3  2,000 Units Oral Daily     Data     Recent Labs   Lab 07/05/19 2237 07/05/19  1738   WBC  --  7.8   HGB  --  9.1*   HCT  --  31.5*   MCV  --  77*    418     Recent Labs   Lab 07/05/19 2237 07/05/19  1738   NA  --  140   POTASSIUM  --  3.9   CHLORIDE  --  106   CO2  --  30   ANIONGAP  --  4   GLC  --  161*   BUN  --  15   CR 0.77 0.85   GFRESTIMATED >90 89   GFRESTBLACK >90 >90   DMITRIY  --  8.9       Recent Results (from the past 24 hour(s))   XR Chest 2 Views    Narrative    XR CHEST 2 VW 7/5/2019 6:01 PM    HISTORY: Shortness of breath.     COMPARISON: May 6, 2019.       Impression    IMPRESSION: Diffuse bilateral interstitial opacities suggestive of  edema. No definite pleural effusions appreciated. Cardiomegaly as  before.     BAM GREGORY MD

## 2019-07-06 NOTE — H&P
[facility]    History and Physical  Hospitalist       Date of Admission:  7/5/2019    Assessment & Plan   Beulah Jane is a 69 year old male with a history of a PE, HTN, DMII, dyslipidemia, dermatomyositis, and severe aortic stenosis who presents with shortness of breath and leg swelling. In the ED, patient had an EKG done which showed heart rate of 104.  Chest x-ray showed diffuse bilateral interstitial opacities consistent of edema.  Troponin was normal.  NT proBNP was up at 2613.  Patient is clearly volume up on exam.  Basically admitted for a heart failure exacerbation given in the setting of severe aortic stenosis and cardiology evaluation.    Problem 1: Volume overloaded in setting of severe aortic stenosis.   -Patient clearly volume up at this time.  NT proBNP up.  Chest x-ray shows pulmonary edema.  This is worrisome in the setting of severe aortic stenosis.  We will diurese patient gently with 40 mg 1 dose of IV Lasix.  We will repeat echocardiogram to see if there have been any new changes.  Patient may need aortic valve operated on with percutaneous intervention or surgical intervention sooner rather than later given new heart failure admission.  -Cardiology consult for further assessment.  -Would avoid any beta-blockers or negative inotropes at this time.  -holding verapamil for this reason.    Problem 2: HTN  - cont home meds    3. DMII: cont home insulin    4. Pulmonary fibrosis: no interventions, cont inhalers    5. JUAN    6. Dermatomyositis on MTX: stable      # Pain Assessment:  Current Pain Score 6/17/2019   Patient currently in pain? -   Pain score (0-10) 7   Pain location -   Pain descriptors -   Beulah s pain level was assessed and he currently denies pain.      DVT Prophylaxis: Enoxaparin (Lovenox) SQ  Code Status: Full Code    Disposition: Expected discharge in 2-3 days once diuresed, seen cardiology.    Sammy Bryson MD    Primary Care Physician   Hoa HOWE  Maribel    Chief Complaint   SOB, weakness, severe AS    History is obtained from the patient    History of Present Illness   Beulah Jane is a 69 year old male with a history of a PE, HTN, DMII, dyslipidemia, dermatomyositis, and severe aortic stenosis who presents with shortness of breath and leg swelling. The patient reports that his home nurse saw his legs were swollen and recommended that he call his PCP. He reports he has had leg swelling and shortness of breath since 6/28/19. When he called him, he set up an appointment. He states that his nurse recommended that he come to the ED. He note abdominal pain but denies cough and chest pain.     In the ED, patient had an EKG done which showed heart rate of 104.  Chest x-ray showed diffuse bilateral interstitial opacities consistent of edema.  Troponin was normal.  NT proBNP was up at 2613.  Patient is clearly volume up on exam.  Basically admitted for a heart failure exacerbation given in the setting of severe aortic stenosis and cardiology evaluation.    Past Medical History    I have reviewed this patient's medical history and updated it with pertinent information if needed.   Past Medical History:   Diagnosis Date     Aortic stenosis      Chronic pain      DM (diabetes mellitus), type 2 (H)     on insulin     Hyperlipidemia      JUAN on CPAP      Pneumonia      Polymyositis (H)      Pulmonary fibrosis, unspecified (H)      Seasonal allergies        Past Surgical History   I have reviewed this patient's surgical history and updated it with pertinent information if needed.  Past Surgical History:   Procedure Laterality Date     ARTHROSCOPY KNEE  1970     COLONOSCOPY       DECOMPRESSION, FUSION LUMBAR POSTERIOR TWO LEVELS, COMBINED  1997     FUSION LUMBAR ANTERIOR THREE+ LEVELS N/A 4/22/2019    Procedure: Lumbar 1 Or Lumbar 2-5 Anterior Lumbar Interbody Fusion with BMP Stage 1 Of Two Procedure:;  Surgeon: Carlos Enrique Dial MD;  Location:  OR      HERNIA REPAIR  2006     lumbar spine hardware removal  1999     OPTICAL TRACKING SYSTEM FUSION POSTERIOR SPINE THORACIC THREE+ LEVELS N/A 4/25/2019    Procedure: O-Arm/Stealth Assisted Thoracic 10-Pelvis Instrumented Fusion T11-2, L1-2, L2-3 Transforminal Lumbar Interbody Fusion (TLIF) And Smith Borges Osteotomies,and L3-4 SPO as well, Use Of BMP, Debridement Of Seroma;  Surgeon: Carlos Enrique Dial MD;  Location: [facility] OR     PICC INSERTION Right 05/06/2019    4Fr - 37cm, Basilic vein, low SVC     REMOVAL PROSTHETIC MATERIAL/MESH, ABD WALL NECRO TISS INFEXN  2012     ROTATOR CUFF REPAIR RT/LT  2003       Prior to Admission Medications   Prior to Admission Medications   Prescriptions Last Dose Informant Patient Reported? Taking?   Cholecalciferol (VITAMIN D) 2000 units tablet   No No   Sig: Take 2,000 Units by mouth daily   FOLIC ACID PO   Yes No   Sig: Take 1 mg by mouth daily   GABAPENTIN PO   Yes No   Sig: Take 600 mg by mouth 3 times daily   GEMFIBROZIL PO   Yes No   Sig: Take 600 mg by mouth 2 times daily   LANsoprazole (PREVACID) 30 MG DR capsule   Yes No   Sig: Take 30 mg by mouth every morning (before breakfast)   acetaminophen (TYLENOL) 325 MG tablet   No No   Sig: Take 2 tablets (650 mg) by mouth every 4 hours as needed for mild pain or fever   albuterol (2.5 MG/3ML) 0.083% neb solution   Yes No   Sig: Take 1 vial by nebulization every 6 hours as needed for shortness of breath / dyspnea or wheezing   albuterol (PROAIR HFA/PROVENTIL HFA/VENTOLIN HFA) 108 (90 BASE) MCG/ACT Inhaler   Yes No   Sig: Inhale 2 puffs into the lungs every 6 hours as needed for shortness of breath / dyspnea or wheezing   calcium carbonate (OS-DMITRIY 500 MG [place]. [place]) 500 MG tablet   No No   Sig: Take 1 tablet (500 mg) by mouth 2 times daily   diphenhydrAMINE (BENADRYL) 50 MG capsule   Yes No   Sig: Take 50 mg by mouth every 6 hours as needed for itching or allergies   hydrocortisone 2.5 % cream   Yes No   Sig: Apply topically 2  times daily Apply to Rash on Back topically two times a day   insulin aspart (NOVOLOG PEN) 100 UNIT/ML pen   No No   Sig: Inject 1-7 Units Subcutaneous 3 times daily (before meals)   insulin aspart (NOVOLOG PEN) 100 UNIT/ML pen   No No   Sig: Inject 1-5 Units Subcutaneous At Bedtime   ipratropium - albuterol 0.5 mg/2.5 mg/3 mL (DUONEB) 0.5-2.5 (3) MG/3ML neb solution   Yes No   Sig: Take 1 vial by nebulization 3 times daily   levothyroxine (SYNTHROID/LEVOTHROID) 50 MCG tablet   Yes No   Sig: Take 50 mcg by mouth daily   methotrexate 2.5 MG tablet   No No   Sig: Take 10 tablets (25 mg) by mouth once a week Thursdays   multivitamin w/minerals (THERA-VIT-M) tablet   No No   Sig: Take 1 tablet by mouth daily   omega 3 1000 MG CAPS   Yes No   Sig: Take 2 capsules by mouth 2 times daily   oxyCODONE-acetaminophen (PERCOCET) 5-325 MG tablet   No No   Sig: Take 1 tablet by mouth 2 times daily as needed for severe pain Take percocet 5/325 TID at 7am, 2pm and 9pm   traMADol (ULTRAM) 50 MG tablet   No No   Sig: Take 0.5 tablets (25 mg) by mouth 3 times daily   verapamil ER (CALAN-SR) 120 MG CR tablet   Yes No   Sig: Take 120 mg by mouth At Bedtime      Facility-Administered Medications Last Administration Doses Remaining   ipratropium - albuterol 0.5 mg/2.5 mg/3 mL (DUONEB) neb solution 3 mL None recorded         Allergies   No Known Allergies    Social History   I have reviewed this patient's social history and updated it with pertinent information if needed. Beulah Jane  reports that he quit smoking about 49 years ago. He smoked 0.25 packs per day. He has never used smokeless tobacco. He reports that he drinks alcohol. He reports that he does not use drugs.    Family History   I have reviewed this patient's family history and updated it with pertinent information if needed.   No family history on file.    Review of Systems   The 10 point Review of Systems is negative other than noted in the HPI or here.     Physical  Exam   Temp: 98  F (36.7  C) Temp src: Temporal BP: 123/64 Pulse: 104(after resting) Heart Rate: 104 Resp: 19 SpO2: 98 % O2 Device: None (Room air)    Vital Signs with Ranges  Temp:  [98  F (36.7  C)-98.7  F (37.1  C)] 98  F (36.7  C)  Pulse:  [101-132] 104  Heart Rate:  [101-104] 104  Resp:  [18-23] 19  BP: (104-139)/(57-64) 123/64  SpO2:  [90 %-99 %] 98 %  161 lbs 0 oz    Head:            Scalp is NC/AT  Eyes:             No scleral icterus, PERRL  ENT:              The external nose and ears are normal. The oropharynx is normal and without erythema; mucus membranes are moist. Uvula midline, no evidence of deep space infection.  Neck:            Normal range of motion without rigidity.  CV:                Regular rate and rhythm                        Moderate systolic murmur.   Resp:            Faint crackles in lung bases.                         Non-labored, no retractions or accessory muscle use  GI:                 Abdomen is soft, no distension, no tenderness. No peritoneal signs  MS:                Post bilateral lower extremity edema.  Skin:             Warm and dry, No rash or lesions noted.  Neuro:          Oriented x 3. No gross motor deficits    Data   Data reviewed today:  I personally reviewed no images or EKG's today.  Recent Labs   Lab 07/05/19  1738   WBC 7.8   HGB 9.1*   MCV 77*         POTASSIUM 3.9   CHLORIDE 106   CO2 30   BUN 15   CR 0.85   ANIONGAP 4   DMITRIY 8.9   *   TROPI 0.024       Imaging:  Recent Results (from the past 24 hour(s))   XR Chest 2 Views    Narrative    XR CHEST 2 VW 7/5/2019 6:01 PM    HISTORY: Shortness of breath.     COMPARISON: May 6, 2019.       Impression    IMPRESSION: Diffuse bilateral interstitial opacities suggestive of  edema. No definite pleural effusions appreciated. Cardiomegaly as  before.     BAM GREGORY MD

## 2019-07-07 NOTE — PLAN OF CARE
"Pt A&O x4. Tachy in 100s at times, otherwise vss. Pt has +2 edema in legs, but reports improved edema. Pt is GONZALEZ, LS clear in all fields. PT refusing CPAP or O2 overnight. C/o back pain and bilateral \"pins and needles\" sensation noted in BLE, utilizing scheduled gabapentin, tramadol, and percocet as well as PRN Norco. Refusing heat or ice applications. Up independently in room, tolerating regular diet. Plan on continuing to monitor and TAVR workup as outpatient. Tele SR with occasional PAC  "

## 2019-07-07 NOTE — PROGRESS NOTES
[facility]  Hospitalist Progress Note  Name: Beulah Jane    MRN: [id number]  Physician:  Benito Rosas DO, DANE (Text Page)    Summary of Stay: Beulah Jane is a 69 year old male who was admitted on 7/5/2019.  He has known aortic stenosis and presented with apparent acute pulmonary edema.    Assessment & Plan    Congestive Heart Failure Exacerbation felt related to aortic stenosis:  -  Continue lasix 40 mg IV daily.  Having good response.  I suspect he needs 1-2 more days of IV diuretics. Monitor I/O and weights.  -  Cardiology service assistance appreciated.  Plan for TAVR discussion tomorrow noted.  -  He has a little rib/back pain with movement.  I don't believe that it is cardiac pain.    HTN hx with recent lower normal range BP's:  -  Controlled with some recent borderline hypotension yesterday after diuretics.  Holding verapamil.  Consider resuming tomorrow depending on BP trend.    Recent back surgery Spring 2019:  -  He feels recovering well.  Continue pain medications he is on chronically for this.    DM2:  -  Controlled currently, holding on ssi as he hasn't needed any/is controlled with just diet.  On regular but knows how to manage and orders appropriately per his report.    Recent Labs   Lab 07/07/19  0611 07/06/19  2112 07/06/19  1738 07/06/19  1436 07/06/19  0939 07/05/19  2240 07/05/19  1738   *  --   --   --   --   --  161*   BGM  --  105* 134* 83 95 84  --      Hypothyroidism:  -  Levothyroxine    Dermatomyositis on methotrexate baseline:  -  No new complaints with his issue.  Holding methotrexate.   -  Formerly on prednisone, reports no longer taking    Interstitial Lung Disease:  -  No acute change.  This makes lung exam hard to interpret with crackles/volume concerns.  -  Formerly on theophylline and bactrim proph - reports no longer taking/felt needed    JUAN:  -  CPAP         Diet: Combination Diet Regular Diet Adult; 2 gm NA Diet; No Caffeine Diet; No  Caffeine for 24 hours (once tests completed, may have caffeine)    DVT Prophylaxis: Enoxaparin (Lovenox) SQ  [place] Catheter: not present  Code Status: Full Code      Disposition Plan   Expected discharge unclear, depends on valve status with echo and cardiology recommendations.     Entered: Benito Rosas 07/07/2019, 9:38 AM       Interval History   Mr. Catherine Jane is gradually feeling better.  SOB improved but still present when walking.  LE edema also gradually improving.  He has some back/rib pain with rolling/twisting.    -Data reviewed today: I reviewed all new labs and imaging reports over the last 24 hours. I personally reviewed no images or EKG's today.    Physical Exam   Temp: 98.5  F (36.9  C) Temp src: Oral BP: 119/64   Heart Rate: 102 Resp: 18 SpO2: 93 % O2 Device: None (Room air)    Vitals:    07/05/19 1616 07/06/19 0635 07/07/19 0532   Weight: 73 kg (161 lb) 72.5 kg (159 lb 14.4 oz) 70.7 kg (155 lb 12.8 oz)     Vital Signs with Ranges  Temp:  [98.5  F (36.9  C)-99.2  F (37.3  C)] 98.5  F (36.9  C)  Heart Rate:  [] 102  Resp:  [16-18] 18  BP: ()/(55-75) 119/64  SpO2:  [91 %-95 %] 93 %  I/O last 3 completed shifts:  In: 840 [P.O.:840]  Out: 700 [Urine:700]    GEN:  Alert, oriented x 3, appears comfortable, no overt distress.  HEENT:  Normocephalic/atraumatic, no scleral icterus, no nasal discharge, mouth moist.  CV:  Regular rate and rhythm, III/IV systolic murmur, no rub.  LUNGS:  Clear upper with improved bibasilar crackles.  No wheezes/retractions.  Symmetric chest rise on inhalation noted.  ABD:  Active bowel sounds, soft, non-tender/non-distended.  No rebound/guarding/rigidity.  EXT:  Trace ankle edema.  No cyanosis.  SKIN:  Dry to touch, mild stasis skin changes LE's.  No exanthems noted in the visualized areas otherwise.    Medications     - MEDICATION INSTRUCTIONS -         calcium carbonate 500 mg (elemental)  500 mg Oral BID     enoxaparin  40 mg Subcutaneous Q24H     fish  oil-omega-3 fatty acids  2,000 mg Oral BID     folic acid  1 mg Oral Daily     furosemide  40 mg Intravenous Daily     gabapentin  600 mg Oral TID     gemfibrozil  600 mg Oral BID     levothyroxine  50 mcg Oral Daily     multivitamin w/minerals  1 tablet Oral Daily     oxyCODONE-acetaminophen  1 tablet Oral TID     pantoprazole  40 mg Oral QAM AC     senna-docusate  1 tablet Oral BID    Or     senna-docusate  2 tablet Oral BID     sodium chloride (PF)  3 mL Intracatheter Q8H     traMADol  25 mg Oral TID     vitamin D3  2,000 Units Oral Daily     Data     Recent Labs   Lab 07/05/19 2237 07/05/19  1738   WBC  --  7.8   HGB  --  9.1*   HCT  --  31.5*   MCV  --  77*    418     Recent Labs   Lab 07/07/19  0611 07/05/19 2237 07/05/19  1738     --  140   POTASSIUM 4.3  --  3.9   CHLORIDE 103  --  106   CO2 29  --  30   ANIONGAP 5  --  4   *  --  161*   BUN 14  --  15   CR 0.87 0.77 0.85   GFRESTIMATED 88 >90 89   GFRESTBLACK >90 >90 >90   DMITRIY 9.2  --  8.9       No results found for this or any previous visit (from the past 24 hour(s)).

## 2019-07-07 NOTE — PROGRESS NOTES
[facility]    Cardiology Progress Note     Assessment & Plan   Beulah Jane is a 69 year old male who was admitted on 7/5/2019. I was asked to see the patient for shortness of breath.     1. Severe aortic valve stenosis undergoing outpatient TAVR workup next week  2. Shortness of breath and dyspnea on exertion  3. Acute on chronic diastolic congestive heart failure  4. Inferolateral and lateral RWMA seen on cath  5. Hypertension  6. Hyperlipidemia  7. DM2  8. Pulmonary fibrosis  9. JUAN  10. Dermatomyositis on methotrexate    Overall improved today.  His lungs are now clear to auscultation.  He does have significant lower extremity edema.  He was net -1.4 L for the day yesterday.    I discussed with him that I would plan for continued IV diuresis today and I expect he will be close to euvolemic tomorrow.  Will discuss with the interventional team if they would like to begin inpatient TAVR work-up at that time versus continued outpatient follow-up scheduled to begin later this week.    Cardiology will continue to follow along.  Please page with any questions or concerns.    Rich Amaral MD    Interval History   Overnight no acute events.  Feels better today than yesterday.  Shortness of breath is improving.  No chest pain or chest discomfort.  Lower extremity edema still present.    Physical Exam   Temp: 98.5  F (36.9  C) Temp src: Oral BP: 119/64   Heart Rate: 102 Resp: 18 SpO2: 93 % O2 Device: None (Room air)    Vitals:    07/05/19 1616 07/06/19 0635 07/07/19 0532   Weight: 73 kg (161 lb) 72.5 kg (159 lb 14.4 oz) 70.7 kg (155 lb 12.8 oz)     Vital Signs with Ranges  Temp:  [98.5  F (36.9  C)-99.2  F (37.3  C)] 98.5  F (36.9  C)  Heart Rate:  [] 102  Resp:  [16-18] 18  BP: ()/(55-75) 119/64  SpO2:  [91 %-95 %] 93 %  I/O last 3 completed shifts:  In: 840 [P.O.:840]  Out: 700 [Urine:700]    Constitutional: No apparent distress.   Eyes: No xanthelasma or conjunctivitis  HEENT:  Moist oral mucosa  Respiratory: Clear to auscultation.  Cardiovascular: Regular rate and rhythm. Normal S1 with preserved S2. Grade 3/6 VICKY heard best at RUSB. Additionally heard is a short diastolic murmur heard best at the LLSB with breath held.   Extremities: 2+ bilateral peripheral edema.  Neurologic: Moving all extremities. No facial assymmetry.  Psychiatric: Alert and oriented. Answers questions appropriately.    Medications     - MEDICATION INSTRUCTIONS -         calcium carbonate 500 mg (elemental)  500 mg Oral BID     enoxaparin  40 mg Subcutaneous Q24H     fish oil-omega-3 fatty acids  2,000 mg Oral BID     folic acid  1 mg Oral Daily     furosemide  40 mg Intravenous Daily     gabapentin  600 mg Oral TID     gemfibrozil  600 mg Oral BID     levothyroxine  50 mcg Oral Daily     multivitamin w/minerals  1 tablet Oral Daily     oxyCODONE-acetaminophen  1 tablet Oral TID     pantoprazole  40 mg Oral QAM AC     senna-docusate  1 tablet Oral BID    Or     senna-docusate  2 tablet Oral BID     sodium chloride (PF)  3 mL Intracatheter Q8H     traMADol  25 mg Oral TID     vitamin D3  2,000 Units Oral Daily       Data   Results for orders placed or performed during the hospital encounter of 19 (from the past 24 hour(s))   Glucose by meter   Result Value Ref Range    Glucose 95 70 - 99 mg/dL   Troponin I - Now then in 6 hours x 3   Result Value Ref Range    Troponin I ES <0.015 0.000 - 0.045 ug/L   Echocardiogram Complete    Narrative    [id number]  [id number]  [id number]  [id number]^[id number]           [facility]  Echocardiography Laboratory  [address] [place] [address]        Name: DANELLE SIMMS  MRN: [id number]  : 1950  Study Date: 2019 12:30 PM  Age: 69 yrs  Gender: Male  Patient Location: [facility]  Reason For Study: Aortic Valve Disorder  Ordering Physician: ISSAC RIOJAS  Referring Physician: Hoa López  Performed By: Demi Lee     BSA: 1.8 m2  Height: 65  in  Weight: 159 lb  BP: 98/55 mmHg  _____________________________________________________________________________  __        Procedure  Complete Portable Echo Adult. Optison (NDC #4298-8099) given intravenously.  _____________________________________________________________________________  __        Interpretation Summary     Left ventricular systolic function is low normal. The visual ejection fraction  is estimated at 50-55%. There is severe hypokinesis of the apex and the apical  segments of the lateral and inferolateral walls.  There is moderate (2+) aortic regurgitation. Severe valvular aortic stenosis.  The peak AoV pressure gradient is 66.0 mmHg. The mean AoV pressure gradient is  34.6 mmHg.  Compared to the study from 4/29/2019, the gradients across the AV are lower  although the calculated PETE is the same. Wall motion abnormalities are similar  to previous study.  _____________________________________________________________________________  __        Left Ventricle  The left ventricle is normal in size. Left ventricular systolic function is  low normal. The visual ejection fraction is estimated at 50-55%. Grade I or  early diastolic dysfunction. There is severe hypokinesis of the apex and the  apical segments of the lateral and inferolateral walls.     Right Ventricle  The right ventricle is normal in size and function.     Atria  The left atrium is mildly dilated. Right atrial size is normal.     Mitral Valve  The mitral valve leaflets appear thickened, but open well. There is no mitral  regurgitation noted.        Tricuspid Valve  The tricuspid valve is not well visualized.     Aortic Valve  There is moderate (2+) aortic regurgitation. Severe valvular aortic stenosis.  The peak AoV pressure gradient is 66.0 mmHg. The mean AoV pressure gradient is  34.6 mmHg.     Pulmonic Valve  The pulmonic valve is not well visualized.     Vessels  Normal size aorta. Normal size ascending aorta. IVC diameter <2.1  cm  collapsing >50% with sniff suggests a normal RA pressure of 3 mmHg.     Pericardium  There is no pericardial effusion.     _____________________________________________________________________________  __  MMode/2D Measurements & Calculations  IVSd: 1.5 cm  LVIDd: 5.8 cm  LVIDs: 4.0 cm  LVPWd: 1.3 cm  FS: 31.3 %     LV mass(C)d: 381.9 grams  LV mass(C)dI: 212.9 grams/m2  Ao root diam: 3.0 cm  LA dimension: 3.7 cm  asc Aorta Diam: 3.4 cm  LA/Ao: 1.2  LVOT diam: 2.0 cm  LVOT area: 3.1 cm2  LA Volume (BP): 58.2 ml  LA Volume Index (BP): 32.5 ml/m2  RWT: 0.46        Doppler Measurements & Calculations  MV E max nico: 63.5 cm/sec  MV A max nico: 115.9 cm/sec  MV E/A: 0.55     MV dec slope: 473.9 cm/sec2  Ao V2 max: 406.1 cm/sec  Ao max P.0 mmHg  Ao V2 mean: 276.3 cm/sec  Ao mean P.6 mmHg  Ao V2 VTI: 78.8 cm  PETE(I,D): 0.87 cm2  PETE(V,D): 0.87 cm2  AI P1/2t: 368.6 msec  LV V1 max P.2 mmHg  LV V1 max: 114.0 cm/sec  LV V1 VTI: 22.1 cm  SV(LVOT): 68.7 ml  SI(LVOT): 38.3 ml/m2  AV Nico Ratio (DI): 0.28  PETE Index (cm2/m2): 0.49  E/E' av.1  Lateral E/e': 13.3  Medial E/e': 15.0              _____________________________________________________________________________  __        Report approved by: Zackery Villasenor 2019 04:56 PM      Glucose by meter   Result Value Ref Range    Glucose 83 70 - 99 mg/dL   Glucose by meter   Result Value Ref Range    Glucose 134 (H) 70 - 99 mg/dL   Glucose by meter   Result Value Ref Range    Glucose 105 (H) 70 - 99 mg/dL   Basic metabolic panel   Result Value Ref Range    Sodium 137 133 - 144 mmol/L    Potassium 4.3 3.4 - 5.3 mmol/L    Chloride 103 94 - 109 mmol/L    Carbon Dioxide 29 20 - 32 mmol/L    Anion Gap 5 3 - 14 mmol/L    Glucose 121 (H) 70 - 99 mg/dL    Urea Nitrogen 14 7 - 30 mg/dL    Creatinine 0.87 0.66 - 1.25 mg/dL    GFR Estimate 88 >60 mL/min/[1.73_m2]    GFR Estimate If Black >90 >60 mL/min/[1.73_m2]    Calcium 9.2 8.5 - 10.1 mg/dL

## 2019-07-07 NOTE — PLAN OF CARE
A&Ox4. VSS on RA. Ambulates independently. IV SL. Heart healthy diet well tolerated. Tele SR with PAC. Lung sounds diminished, clear. GONZALEZ. BLE tingling, rachana and edematous. Back pain managed with scheduled percocet, tramadol and gabapentin; also prn norco. Voiding per urinal. Continue to monitor.

## 2019-07-08 NOTE — PROGRESS NOTES
[facility]  Cardiology Progress Note  Date of Service: 07/08/2019    Assessment & Plan    Beulah Jane is a 69 year old male with past medical history significant for PE, HTN, HLP, T2DM, dermatomyositis and severe aortic stenosis admitted on 7/5/2019 with symptoms of shortness of breath and edema.    Assessment:   1. Severe aortic stenosis    Echocardiogram showed severe aortic stenosis (peak gradient 66 mmHg, mean gradient 34.6 mmHg and PETE 0.87 cm2)     TAVR outpatient work up scheduled 7/11 at [facility]    2. Acute HFpEF exacerbation    LVEF 50-55% on echocardiogram 7/6/19    NT pro BNP was 2613    CXR showed pulmonary edema    Net negative 2.9 L and weight down 7 lbs from admisison    Diuresis with furosemide IV 40 mg daily    3. Inferolateral and lateral RWMA noted on echo    4. Hypertension [PTA: Verapamil  mg at hs]    5. Hyperlipidemia [PTA: gemfibrozil 600 mg BID]    6. JUAN    7. Pulmonary fibrosis    8. Dermatomyositis on methotrexate    Plan:  1. Change to PO Lasix 40 mg daily  2. Outpatient TAVR workup next week as scheduled    ANASTACIA ARCHIBALD CNP  Pager:  [phone]   (7am - 5pm, M-F)    Interval History   Symptomatic improvement with diuresis    Physical Exam   Temp: 97.8  F (36.6  C) Temp src: Oral BP: 110/49 Pulse: 92 Heart Rate: 96 Resp: 18 SpO2: 93 % O2 Device: None (Room air)    Vitals:    07/06/19 0635 07/07/19 0532 07/08/19 0500   Weight: 72.5 kg (159 lb 14.4 oz) 70.7 kg (155 lb 12.8 oz) 70.1 kg (154 lb 8 oz)       Constitutional:   NAD   Skin:   Warm and dry   Head:   Nontraumatic   Neck:   no JVD   Lungs:   symmetric, clear to auscultation   Cardiovascular:   regular rate and rhythm, normal S1 and S2 and 3/6 VICKY at RUSB and radiating to carotids   Abdomen:   Benign   Extremities and Back:   Edema 1+   Neurological:   Grossly nonfocal       Medications     - MEDICATION INSTRUCTIONS -         calcium carbonate 500 mg (elemental)  500 mg Oral BID     enoxaparin   40 mg Subcutaneous Q24H     fish oil-omega-3 fatty acids  2,000 mg Oral BID     folic acid  1 mg Oral Daily     furosemide  40 mg Intravenous Daily     gabapentin  600 mg Oral TID     gemfibrozil  600 mg Oral BID     levothyroxine  50 mcg Oral Daily     multivitamin w/minerals  1 tablet Oral Daily     oxyCODONE-acetaminophen  1 tablet Oral TID     pantoprazole  40 mg Oral QAM AC     senna-docusate  1 tablet Oral BID    Or     senna-docusate  2 tablet Oral BID     sodium chloride (PF)  3 mL Intracatheter Q8H     traMADol  25 mg Oral TID     vitamin D3  2,000 Units Oral Daily       Data     Most Recent 3 CBC's:  Recent Labs   Lab Test 07/08/19  0634 07/05/19 2237 07/05/19 1738 06/03/19  0700 06/02/19  0840   WBC  --   --  7.8 9.8 11.1*   HGB  --   --  9.1* 9.4* 9.8*   MCV  --   --  77* 81 81    417 418 549* 581*     Most Recent 3 BMP's:  Recent Labs   Lab Test 07/08/19 0634 07/07/19 0611 07/05/19 2237 07/05/19 1738    137  --  140   POTASSIUM 4.1 4.3  --  3.9   CHLORIDE 100 103  --  106   CO2 30 29  --  30   BUN 17 14  --  15   CR 0.92 0.87 0.77 0.85   ANIONGAP 9 5  --  4   DMITRIY 9.3 9.2  --  8.9   * 121*  --  161*     Most Recent 3 Troponin's:  Recent Labs   Lab Test 07/06/19  1037 07/06/19  0434 07/05/19 2237   TROPI <0.015 0.023 0.029     Most Recent 3 BNP's:  Recent Labs   Lab Test 07/05/19  1738 06/02/19  0840 05/12/19  2321   NTBNPI 2,613* 2,344* 1,850*     Most Recent Cholesterol Panel:  Recent Labs   Lab Test 07/05/19 2237   CHOL 153   *   HDL 22*   TRIG 139

## 2019-07-08 NOTE — PLAN OF CARE
A&Ox4, VSS. SR with PACs. Room air. BLE rachana and red. Trace edema in feet. Numbness present. Independent OOB, voiding spontaneously. C/o back pain 7/10 Norco given once with positive result. Can make needs known.

## 2019-07-08 NOTE — PLAN OF CARE
Pt here with CHF exacerbation related to aortic stenosis. A&O x4. Bilateral lower extremity swelling is better with baseline numbness. VSS on room air. Tele SR with occasional PACs. Heart healthy diet, thin liquids, no caffeine. Takes pills whole. Up independently in room. Norco 1 tablet given for chronic low back pain. Plan to discharge home tomorrow.

## 2019-07-08 NOTE — PROGRESS NOTES
[facility]    Medicine Progress Note - Hospitalist Service       Date of Admission:  7/5/2019    Assessment & Plan    kayla Jane is a 69 year old male who was admitted on 7/5/2019.  He has known aortic stenosis and presented with apparent acute pulmonary edema.    Congestive Heart Failure Exacerbation  Severe Aortic Stenosis  Presented with SOB, BLE edema with CXR findings c/w pulmonary edema. EKG with nonspecific T-wave abnormality in inferior leads. Troponins have been detectable but nonelevated, flat. TTE with EF 50-55%, severe hypokinesis of apex and apical lateral/inferolateral walls with associated mod AR severe Aortic stenosis. In comparison to prior, WMAs are similar.  - Cardiology consulted and following, transitioned to PO Lasix 40mg daily 7/8  - Monitor I/O and weights  - Pt previously had TAVR workup scheduled as an outpatient at [facility] on 7/11, to proceed with outpatient evaluation per Cardiology     HTN   PTA on Verapamil 120mg at bedtime. Blood pressures have been well controlled while holding medication and receiving diuresis.  - Continue holding PTA verapamil     Chronic Pain  Recent back surgery Spring 2019  -  He feels recovering well.  Continue pain medications he is on chronically for this.     DM2  Primarily diet-controlled as outpatient, has insulin used for BG values > 140. Last Hgb A1c 6.3% in 01/2019.  - Accuchecks QID     Hypothyroidism  -  Levothyroxine     Dermatomyositis on methotrexate baseline  No new complaints with his issue.    - Holding methotrexate  -  Formerly on prednisone, reports no longer taking     Interstitial Lung Disease  Chronic and stable.  -  Formerly on theophylline and bactrim proph - reports no longer taking/felt needed     JUAN  -  CPAP    PAD  Diagnosed as an outpatient per podiatry, pt has been referred to Vascular Surgery due to L foot wound.   - Further management/workup as outpatient    Diet: Combination Diet Regular Diet Adult; 2 gm  NA Diet; No Caffeine Diet; No Caffeine for 24 hours (once tests completed, may have caffeine)    DVT Prophylaxis: Pneumatic Compression Devices  [place] Catheter: not present  Code Status: Full Code      Disposition Plan   Expected discharge: Tomorrow, recommended to prior living arrangement once euvolemic.  Entered: Spencer Salcido PA-C 07/08/2019, 3:51 PM       The patient's care was discussed with the Attending Physician, Dr. Hurd, Bedside Nurse, Care Coordinator/ and Patient.    Spencer Salcido PA-C  Hospitalist Service  [facility]    ______________________________________________________________________    Interval History   Pt seen & evaluated. Sitting up at edge of bed on interview, denies sob, cp. Leg swelling has improved. Hoping to discharge soon.    Data reviewed today: I reviewed all medications, new labs and imaging results over the last 24 hours. I personally reviewed no images or EKG's today.    Physical Exam   Vital Signs: Temp: 97.8  F (36.6  C) Temp src: Oral BP: 106/59 Pulse: 104 Heart Rate: 96 Resp: 18 SpO2: 94 % O2 Device: None (Room air)    Weight: 154 lbs 8 oz  GEN: well-developed, well-nourished, appears comfortable  HEENT: NCAT, PERRL, EOM intact bilaterally, sclera clear, conjunctiva normal, nose & mouth patent, mucous membranes moist  CHEST: lungs CTA bilaterally, no increased work of breathing, no wheeze, rales, rhonchi  HEART: RRR, S1 & S2, loud systolic murmur present to left sternal border  MSK: full AROM bilateral UE/LE  SKIN: warm & dry without rash, 1+ pitting pedal edema    Data   Recent Labs   Lab 07/08/19  0634 07/07/19  0611 07/06/19  1037 07/06/19  0434 07/05/19  7317 07/05/19  1738   WBC  --   --   --   --   --  7.8   HGB  --   --   --   --   --  9.1*   MCV  --   --   --   --   --  77*     --   --   --  417 418   INR  --   --   --   --  1.11  --     137  --   --   --  140   POTASSIUM 4.1 4.3  --   --   --  3.9   CHLORIDE 100 103  --    --   --  106   CO2 30 29  --   --   --  30   BUN 17 14  --   --   --  15   CR 0.92 0.87  --   --  0.77 0.85   ANIONGAP 9 5  --   --   --  4   DMITRIY 9.3 9.2  --   --   --  8.9   * 121*  --   --   --  161*   TROPI  --   --  <0.015 0.023 0.029 0.024     No results found for this or any previous visit (from the past 24 hour(s)).  Medications     - MEDICATION INSTRUCTIONS -         calcium carbonate 500 mg (elemental)  500 mg Oral BID     enoxaparin  40 mg Subcutaneous Q24H     fish oil-omega-3 fatty acids  2,000 mg Oral BID     folic acid  1 mg Oral Daily     [START ON 7/9/2019] furosemide  40 mg Oral Daily     gabapentin  600 mg Oral TID     gemfibrozil  600 mg Oral BID     levothyroxine  50 mcg Oral Daily     multivitamin w/minerals  1 tablet Oral Daily     oxyCODONE-acetaminophen  1 tablet Oral TID     pantoprazole  40 mg Oral QAM AC     senna-docusate  1 tablet Oral BID    Or     senna-docusate  2 tablet Oral BID     sodium chloride (PF)  3 mL Intracatheter Q8H     traMADol  25 mg Oral TID     vitamin D3  2,000 Units Oral Daily

## 2019-07-08 NOTE — PLAN OF CARE
A&Ox4. VSS on RA. Tele NSR w PACs. Ambulates independently but motion limited in upper extremities 2/2 rotator cuff tear. IV SL. Heart healthy diet well tolerated. Lung sounds diminished, clear. GONZALEZ. BLE tingling/numb, rachana, cool and edematous. Back/rib pain managed with scheduled percocet, tramadol and gabapentin; also prn norco. Voiding per urinal. Slept well.

## 2019-07-09 NOTE — PLAN OF CARE
Pt A&Ox4, VSS on RA, up independent. Pain 2/10, has chronic back pain, scheduled percocet and tramadol, PRN norco effectively managing pain. Pt also has old rotator cuff injury bilaterally, has difficulty lifting his arms. Cardiac diet with no caffeine. BLE numbness, edema +1. Tele NSR. Discharging 7/9 on PO diuretic.

## 2019-07-09 NOTE — DISCHARGE SUMMARY
[facility]    Discharge Summary  Hospitalist    Date of Admission:  7/5/2019  Date of Discharge:  7/9/2019  Discharging Provider: Spencer Salcido PA-C    Discharge Diagnoses      Aortic valve stenosis, etiology of cardiac valve disease unspecified  Hypervolemia, unspecified hypervolemia type  Shortness of breath  Acute pulmonary edema (H)  Acute diastolic congestive heart failure (H)    History of Present Illness   Beulah Jane is an 69 year old male who presented with BLE edema and sob, identified to have acute CHF exacerbation.    HPI from admission H&P:  Beulah Jane is a 69 year old male with a history of a PE, HTN, DMII, dyslipidemia, dermatomyositis, and severe aortic stenosis who presents with shortness of breath and leg swelling. The patient reports that his home nurse saw his legs were swollen and recommended that he call his PCP. He reports he has had leg swelling and shortness of breath since 6/28/19. When he called him, he set up an appointment. He states that his nurse recommended that he come to the ED. He note abdominal pain but denies cough and chest pain.      In the ED, patient had an EKG done which showed heart rate of 104.  Chest x-ray showed diffuse bilateral interstitial opacities consistent of edema.  Troponin was normal.  NT proBNP was up at 2613.  Patient is clearly volume up on exam.  Basically admitted for a heart failure exacerbation given in the setting of severe aortic stenosis and cardiology evaluation.    Hospital Course   Beulah Jane was admitted on 7/5/2019.  The following problems were addressed during his hospitalization:    Congestive Heart Failure Exacerbation  Severe Aortic Stenosis  Presented with SOB, BLE edema with CXR findings c/w pulmonary edema. EKG with nonspecific T-wave abnormality in inferior leads. Troponins have been detectable but nonelevated, flat. TTE with EF 50-55%, severe hypokinesis of apex and apical  lateral/inferolateral walls with associated mod AR severe Aortic stenosis. In comparison to prior, WMAs are similar.  - Cardiology consulted and following, transitioned to PO Lasix 40mg daily 7/8, recommended discharge on oral lasix and continuing to proceed with TAVR evaluation as scheduled as an outpatient on 7/11  - Follow-up in Cardiology clinic as directed     HTN   PTA on Verapamil 120mg at bedtime, held on admission due to soft blood pressures and addition of lasix. Blood pressures controlled while holding, monitor as outpatient.     Chronic Pain  Recent back surgery Spring 2019  -  He feels recovering well.  Continue pain medications he is on chronically for this.     DM2  Primarily diet-controlled as outpatient, has insulin used for BG values > 140. Last Hgb A1c 6.3% in 01/2019.  - Management per PCP     Hypothyroidism  -  Levothyroxine     Dermatomyositis on methotrexate baseline  No new complaints with his issue.    - Holding methotrexate  - Formerly on prednisone, reports no longer taking     Interstitial Lung Disease  Chronic and stable.  - Formerly on theophylline and bactrim proph - reports no longer taking/felt needed, management per PCP     JUAN  -  CPAP with home settings     PAD  Diagnosed as an outpatient per podiatry, pt has been referred to Vascular Surgery due to L foot wound.   - Further management/workup as outpatient    Spencer Salcido PA-C    Significant Results and Procedures   As noted    Pending Results   These results will be followed up by n/a  Unresulted Labs Ordered in the Past 30 Days of this Admission     No orders found from 5/6/2019 to 7/6/2019.          Code Status   Full Code       Primary Care Physician   Hoa López    Physical Exam   Temp: 98.5  F (36.9  C) Temp src: Oral BP: 115/75 Pulse: 98   Resp: 16 SpO2: 93 % O2 Device: None (Room air)    Vitals:    07/07/19 0532 07/08/19 0500 07/09/19 0535   Weight: 70.7 kg (155 lb 12.8 oz) 70.1 kg (154 lb 8 oz) 69.6 kg (153 lb 6.4  oz)     Vital Signs with Ranges  Temp:  [98.5  F (36.9  C)-98.7  F (37.1  C)] 98.5  F (36.9  C)  Pulse:  [] 98  Resp:  [15-16] 16  BP: (106-116)/(53-75) 115/75  SpO2:  [93 %-99 %] 93 %  I/O last 3 completed shifts:  In: 240 [P.O.:240]  Out: 1600 [Urine:1600]    GEN: well-developed, well-nourished, appears comfortable  HEENT: NCAT, PERRL, EOM intact bilaterally, sclera clear, conjunctiva normal, nose & mouth patent, mucous membranes moist  CHEST: lungs CTA bilaterally, no increased work of breathing, no wheeze, rales, rhonchi  HEART: RRR, S1 & S2, loud systolic murmur present  MSK: full AROM bilateral UE/LE  SKIN: warm & dry without rash, 1+ pitting bilateral pedal edema    Discharge Disposition   Discharged to home  Condition at discharge: Stable    Consultations This Hospital Stay   CARDIOLOGY IP CONSULT  SMOKING CESSATION PROGRAM IP CONSULT    Time Spent on this Encounter   I, Spencer Salcido, personally saw the patient today and spent greater than 30 minutes discharging this patient.    Discharge Orders      Reason for your hospital stay    CHF exacerbation, likely due to underlying aortic stenosis     Follow-up and recommended labs and tests     Follow up with primary care provider, Hoa López, within 7 days to evaluate medication change and for hospital follow- up.      Follow up with Cardiology as scheduled on 7/11/19 for ongoing TAVR workup as an outpatient.     Activity    Your activity upon discharge: activity as tolerated     Monitor and record    fluid intake and output daily   weight every day, call cardiology clinic if gain > 2 lbs on consecutive days     Diet    Follow this diet upon discharge: Orders Placed This Encounter      Combination Diet Regular Diet Adult; 2 gm NA Diet     Discharge Medications   Current Discharge Medication List      START taking these medications    Details   furosemide (LASIX) 40 MG tablet Take 1 tablet (40 mg) by mouth daily  Qty: 30 tablet, Refills: 0    Associated  Diagnoses: Acute diastolic congestive heart failure (H)         CONTINUE these medications which have NOT CHANGED    Details   acetaminophen (TYLENOL) 325 MG tablet Take 2 tablets (650 mg) by mouth every 4 hours as needed for mild pain or fever    Associated Diagnoses: Lumbar pain      albuterol (2.5 MG/3ML) 0.083% neb solution Take 1 vial by nebulization every 6 hours as needed for shortness of breath / dyspnea or wheezing      albuterol (PROAIR HFA/PROVENTIL HFA/VENTOLIN HFA) 108 (90 BASE) MCG/ACT Inhaler Inhale 2 puffs into the lungs every 6 hours as needed for shortness of breath / dyspnea or wheezing      calcium carbonate (OS-DMITRIY 500 MG [place]) 500 MG tablet Take 1 tablet (500 mg) by mouth 2 times daily  Qty: 180 tablet, Refills: 3    Associated Diagnoses: Pre-operative examination; Spinal stenosis of lumbar region with neurogenic claudication      Cholecalciferol (VITAMIN D) 2000 units tablet Take 2,000 Units by mouth daily  Qty: 100 tablet, Refills: 3    Associated Diagnoses: Pre-operative examination; Spinal stenosis of lumbar region with neurogenic claudication      diphenhydrAMINE (BENADRYL) 50 MG capsule Take 50 mg by mouth every 6 hours as needed for itching or allergies      folic acid (FOLVITE) 1 MG tablet Take 1 mg by mouth daily       gabapentin (NEURONTIN) 600 MG tablet Take 600 mg by mouth 3 times daily 0700, 1400, 2100      gemfibrozil (LOPID) 600 MG tablet Take 600 mg by mouth 2 times daily       insulin NPH (HUMULIN N/NOVOLIN N VIAL) 100 UNIT/ML vial Inject 2-6 Units Subcutaneous 2 times daily Will usually give if blood glucose >140. Does not have specific sliding scale.      LANsoprazole (PREVACID) 30 MG DR capsule Take 30 mg by mouth every morning (before breakfast)      levothyroxine (SYNTHROID/LEVOTHROID) 50 MCG tablet Take 50 mcg by mouth daily      methotrexate 2.5 MG tablet Take 25 mg by mouth once a week Tuesday      multivitamin w/minerals (THERA-VIT-M) tablet Take 1 tablet by mouth  daily    Associated Diagnoses: Routine health maintenance      omega 3 1000 MG CAPS Take 2 capsules by mouth daily       oxyCODONE-acetaminophen (PERCOCET) 5-325 MG tablet Take 1 tablet by mouth 2 times daily as needed for severe pain Take percocet 5/325 TID at 7am, 2pm and 9pm  Qty: 20 tablet, Refills: 0    Associated Diagnoses: Lumbar pain; Spinal stenosis of lumbar region with neurogenic claudication      traMADol (ULTRAM) 50 MG tablet Take 0.5 tablets (25 mg) by mouth 3 times daily  Qty: 40 tablet, Refills: 0    Associated Diagnoses: Lumbar pain; Spinal stenosis of lumbar region with neurogenic claudication      verapamil ER (CALAN-SR) 120 MG CR tablet Take 120 mg by mouth At Bedtime         STOP taking these medications       hydrocortisone 2.5 % cream Comments:   Reason for Stopping:         ipratropium - albuterol 0.5 mg/2.5 mg/3 mL (DUONEB) 0.5-2.5 (3) MG/3ML neb solution Comments:   Reason for Stopping:             Allergies   No Known Allergies  Data   Most Recent 3 CBC's:  Recent Labs   Lab Test 07/08/19  0634 07/05/19 2237 07/05/19  1738 06/03/19  0700 06/02/19  0840   WBC  --   --  7.8 9.8 11.1*   HGB  --   --  9.1* 9.4* 9.8*   MCV  --   --  77* 81 81    417 418 549* 581*      Most Recent 3 BMP's:  Recent Labs   Lab Test 07/08/19  0634 07/07/19  0611 07/05/19 2237 07/05/19  1738    137  --  140   POTASSIUM 4.1 4.3  --  3.9   CHLORIDE 100 103  --  106   CO2 30 29  --  30   BUN 17 14  --  15   CR 0.92 0.87 0.77 0.85   ANIONGAP 9 5  --  4   DMITRIY 9.3 9.2  --  8.9   * 121*  --  161*     Most Recent 2 LFT's:  Recent Labs   Lab Test 04/27/19  2113 11/30/17  0618   AST 90* 14   ALT 56 14   ALKPHOS 95 70   BILITOTAL 0.3 0.3     Most Recent INR's and Anticoagulation Dosing History:  Anticoagulation Dose History     Recent Dosing and Labs Latest Ref Rng & Units 4/28/2019 4/29/2019 4/30/2019 5/1/2019 5/2/2019 5/12/2019 7/5/2019    Warfarin 2.5 mg - 2.5 mg 2.5 mg - - - - -    INR 0.86 - 1.14  1.29(H) 1.27(H) 1.24(H) 1.15(H) 1.18(H) 1.21(H) 1.11        Most Recent 3 Troponin's:  Recent Labs   Lab Test 07/06/19  1037 07/06/19  0434 07/05/19  2237   TROPI <0.015 0.023 0.029     Most Recent Cholesterol Panel:  Recent Labs   Lab Test 07/05/19  2237   CHOL 153   *   HDL 22*   TRIG 139     Most Recent 6 Bacteria Isolates From Any Culture (See EPIC Reports for Culture Details):  Recent Labs   Lab Test 05/15/19  1650 05/09/19  2317 05/09/19  2146 05/09/19  2048 04/30/19  2033 04/30/19 2028   CULT No growth  No growth <10,000 colonies/mL  urogenital marlee  Susceptibility testing not routinely done   No growth No growth No growth No growth     Most Recent TSH, T4 and A1c Labs:  Recent Labs   Lab Test 01/29/19  1707   A1C 6.3*     Results for orders placed or performed during the hospital encounter of 07/05/19   XR Chest 2 Views    Narrative    XR CHEST 2 VW 7/5/2019 6:01 PM    HISTORY: Shortness of breath.     COMPARISON: May 6, 2019.       Impression    IMPRESSION: Diffuse bilateral interstitial opacities suggestive of  edema. No definite pleural effusions appreciated. Cardiomegaly as  before.     BAM GREGORY MD

## 2019-07-09 NOTE — PROGRESS NOTES
[facility]  Patient is currently open to home care services with [facility]. The patient is currently receiving Skilled Nursing and PT services. [facility]  and team have been notified of patient admission. [facility] liaison will continue to follow patient during stay. If appropriate provide orders to resume home care at time of discharge.

## 2019-07-09 NOTE — PLAN OF CARE
Patient discharged home with family. Patient to have TAVR followup on July 11th. Discharge instructions and medications discussed with no questions or concerns.  used during discharge. Furosemide prescription given.     Heart Failure Care Pathway  GOALS TO BE MET BEFORE DISCHARGE:    1. Decrease congestion and/or edema with diuretic therapy to achieve near      optimal volume status.            Dyspnea improved:  yes            Edema improved:     Yes        Net I/O and Weights since admission:          06/09 1500 - 07/09 1459  In: 1800 [P.O.:1800]  Out: 5900 [Urine:5900]  Net: -4100            Vitals:    07/05/19 1616 07/06/19 0635 07/07/19 0532 07/08/19 0500   Weight: 73 kg (161 lb) 72.5 kg (159 lb 14.4 oz) 70.7 kg (155 lb 12.8 oz) 70.1 kg (154 lb 8 oz)    07/09/19 0535   Weight: 69.6 kg (153 lb 6.4 oz)         2.  O2 sats > 92% on RA or at prior home O2 therapy level.          Current oxygenation status:       SpO2: 93 %         O2 Device: None (Room air),            Able to wean O2 this shift to keep sats > 92%:  Yes       Does patient use Home O2? No    3.  Tolerates ambulation and mobility near baseline: Yes        How many times did the patient ambulate with nursing staff this shift? 3    Please review the Heart Failure Care Pathway for additional HF goal outcomes.    Sindhu Kim RN  7/9/2019

## 2019-07-09 NOTE — DISCHARGE INSTRUCTIONS
-The patient will discharge home with resumption orders for [facility] with RN/PT.  [facility] will contact the patient directly to arrange the first visit.  [facility]'s phone number is [phone].

## 2019-07-10 NOTE — PROGRESS NOTES
Clinic Care Coordination Contact  [facility]/Voicemail    Referral Source: IP Report  [facility]-  Discharge Diagnoses        Aortic valve stenosis, etiology of cardiac valve disease unspecified  Hypervolemia, unspecified hypervolemia type  Shortness of breath  Acute pulmonary edema (H)  Acute diastolic congestive heart failure (H)  Clinical Data: Care Coordinator Outreach  Outreach attempted x 1. Busy signal   Plan:  Care Coordinator will try to reach patient again in 1-2 business days.  Yesica Manning RN, Care Coordinator   [facility] -Care Coordination  [facility] , [facility] and [facility]   [phone]

## 2019-07-10 NOTE — TELEPHONE ENCOUNTER
Spoke with [facility] nurse Valery, gave verbal on requested HC orders    Sindhu MUELLER RN

## 2019-07-10 NOTE — TELEPHONE ENCOUNTER
Reason for Call:  Home Health Care    Valery with [facility] called regarding (reason for call): PT and OT Skilled Nursing visits     Orders are needed for this patient.     PT & OT: To evaluate within the next 10 days     Skilled Nursing:once a week for 1 week, twice a week for 2 weeks, once a week for 1 week. 3 prn    Pt Provider: Maribel    Phone Number Homecare Nurse can be reached at: [phone]    Can we leave a detailed message on this number? YES    Phone number patient can be reached at: Home number on file [phone] (home)    Best Time:Anytime     Call taken on 7/10/2019 at 3:12 PM by Aurora Oneal

## 2019-07-10 NOTE — TELEPHONE ENCOUNTER
ED / Discharge Outreach Protocol    Patient Contact    Attempt # 1    Was call answered?  No.  Unable to leave message. Busy Signal    Next 5 appointments (look out 90 days)    Jul 18, 2019  4:00 PM CDT  Return Visit with Joseluis Borges DPM  [facility] ([facility]) [address] [place] [id number]  [phone]   Aug 28, 2019 11:00 AM CDT  Return Visit with Apryl Moura MD  [facility], Infectious Disease ([facility]) [address]  [address] [place] [id number]  [phone]        Sindhu MUELLER RN

## 2019-07-10 NOTE — LETTER
[facility] CARE COORDINATION  [address]  [facility] [address]    July 11, 2019    Beulah Jane  [address]   [facility] [address]      Dear Beulah,    I am a clinic care coordinator who works with Hoa López MD at [facility] . I have been trying to reach you recently to introduce Clinic Care Coordination and to see if there was anything I could assist you with.  I wanted to introduce myself and provide you with my contact information so that you can call me with questions or concerns about your health care. Below is a description of clinic care coordination and how I can further assist you.     The clinic care coordinator is a registered nurse and/or  who understand the health care system. The goal of clinic care coordination is to help you manage your health and improve access to the [facility] system in the most efficient manner. The registered nurse can assist you in meeting your health care goals by providing education, coordinating services, and strengthening the communication among your providers. The  can assist you with financial, behavioral, psychosocial, chemical dependency, counseling, and/or psychiatric resources.    Please feel free to contact me at [phone], with any questions or concerns. We at [facility] are focused on providing you with the highest-quality healthcare experience possible and that all starts with you.     Sincerely,     Yesica Manning RN, Care Coordinator   [facility] -Care Coordination  [facility] , [facility] and [facility]   [phone]     Enclosed: I have enclosed a copy of a 24 Hour Access Plan. This has helpful phone numbers for you to call when needed. Please keep this in an easy to access place to use as needed.

## 2019-07-10 NOTE — TELEPHONE ENCOUNTER
Chief Complaint: Aortic Valve Stenosis, Etiology Of Cardiac Valve Disease Unspecified, Acute Diastolic Congestive Heart Failure (H),TUE 09-JUL-2019 ,ed/ip 3 / 2    [phone] (home)

## 2019-07-10 NOTE — LETTER
Health Care Home - Access Care Plan    About Me:    Patient Name:  Danelle Jane    YOB: 1950  Age:                      69 year old   Adi MRN:     [id number] Telephone Information:   Home Phone [phone]   Mobile [phone]       Address:  [address]  [facility] [address] Email address:  [email or web address]      Emergency Contact(s)   Name Relationship Lgl Grd Work Phone Home Phone Mobile Phone   1. JUSTINA FUNG Son No   [phone]   2. DANELLE FUNG Son    [phone]             Health Maintenance: Routine Health maintenance Reviewed: Not assessed    My Access Plan  Medical Emergency 911   Questions or concerns during clinic hours Primary Clinic Line, I will call the clinic directly: [facility] - [phone]   24 Hour Appointment Line [phone] or  [phone]) (toll free)   24 Hour Nurse Line [phone] (toll free)   Questions or concerns outside clinic hours 24 Hour Appointment Line, I will call the after-hours on-call line:   [facility] [phone] or [phone]) (toll-free)   Preferred Urgent Care [facility], [phone]   Preferred Hospital [facility]  [phone]   Preferred Pharmacy [facility] - [address]     Behavioral Health Crisis Line The National Suicide Prevention Lifeline at 1-893.644.5765 or 911                     My Care Team Members  Patient Care Team       Relationship Specialty Notifications Start End    Hoa López MD PCP - General Internal Medicine  6/13/19     Phone: [phone] Fax: [phone] [address] [facility] [address]    Hilda Alegria MD MD Vascular Surgery  2/19/19     Phone: [phone] Fax: [phone]         [address] [facility] [address]    Bobbi Baker APRN CNP Assigned PCP   5/19/19     Phone: [phone] Fax: [phone]          [address] [facility] [address]    [facility] ([facility] ([place])  6/13/19     Phone: [phone]         Meenakshi Irwin, RN Nurse Coordinator Cardiology Admissions 6/28/19     TAVR    Phone: [phone]         Yesica Manning, RN Clinic Care Coordinator Primary Care - CC Admissions 7/10/19     Phone: [phone] Fax: [phone]               My Medical and Care Information  Problem List   Patient Active Problem List   Diagnosis     Acute hypoxemic respiratory failure (H)     SOB (shortness of breath)     Spinal stenosis of lumbar region with neurogenic claudication     Postprocedural seroma of a musculoskeletal structure following other procedure     Lumbar pain     Other secondary kyphosis, thoracolumbar region     Severe aortic stenosis     DDD (degenerative disc disease), lumbar     Dermatomyositis (H)     Family history of colon cancer     Hypertension     Hypothyroidism     Mixed hyperlipidemia     Inflammatory arthritis     Pulmonary fibrosis (H)     Tachycardia     Seroma due to trauma (H)     Type 2 diabetes mellitus with neurologic complication, with long-term current use of insulin (H)     Controlled substance agreement signed     S/P spinal surgery     Status post lumbar spine surgery for decompression of spinal cord     Acute pulmonary embolism (H)     Status post non-ST elevation myocardial infarction (NSTEMI)     Slow transit constipation     Physical deconditioning     Elevated temperature     Malaise     Other atopic dermatitis     Anemia due to blood loss, acute     Acute right-sided thoracic back pain     Aortic stenosis      Current Medications and Allergies:  See printed Medication Report

## 2019-07-11 NOTE — NURSING NOTE
TAVR testing that was performed:    KCCQ-12 questionnaire collected: Yes, gave patient a questionnaire, did not have one.   Test: Yes  5M walk: Yes  Picture taken: YES     Julia Hudson CMA    1:52 PM

## 2019-07-11 NOTE — NURSING NOTE
Vitals completed successfully and medication reconciled.     Julia Hudson, EZEQUIEL  1:53 PM  Chief Complaint   Patient presents with     New Patient     1st TAVR

## 2019-07-11 NOTE — PROGRESS NOTES
Clinic Care Coordination Contact  [facility]/Voicemail    Referral Source: IP Report  Clinical Data: Care Coordinator Outreach  Outreach attempted x 2. Busy signal   Plan: Care Coordinator will mail out care coordination introduction letter with care coordinator contact information and explanation of care coordination services. Care Coordinator will try to reach patient again in 3-5 business days.  Yesica Manning RN, Care Coordinator   [facility] -Care Coordination  [facility] , [facility]   [phone]

## 2019-07-11 NOTE — LETTER
7/11/2019      RE: Beulah Jane  [address]  [facility] [address]       Dear Colleague,    Thank you for the opportunity to participate in the care of your patient, Beulah Jane, at the [facility]. Please see a copy of my visit note below.    [facility]  CARDIOVASCULAR DIVISION    VALVE CLINIC INITIAL CONSULTATION    PRIMARY CARDIOLOGIST: Dr. Rich Amaral (Inpatient)      PERTINENT CLINICAL HISTORY:     Beulah Jane is a very pleasant 69 year old male with small subsegmental PE post-back surgery in 4/2019, DM, HL, JUAN on CPAP, moderate pulmonary fibrosis, recent back surgery in 4/2019, and severe trileaflet aortic valvular stenosis referred to our clinic for evaluation and consideration for potential transcatheter aortic valve replacement (TAVR).  He was hospitalized last week for acute HFpEF and diuresed.  He reports that over the past few months he has had a significant drop in exertional capacity, now gets exertional dyspnea and chest pressure walking across the house.  Also has orthopnea/PND, and his LE dyspnea is increasing since discharge from the hospital on Lasix 40 daily PO.    His severe aortic stenosis is characterized with an area of 0.8 cm2, mean gradient 35 mmHg and peak velocity of 4.1 m/sec with LVEF 50-55% by echocardiogram on 7/5/2019.  April 2019 TTE showed peak velocity of 4.8 and mean gradient of 55.           PAST MEDICAL HISTORY:     Past Medical History:   Diagnosis Date     Aortic stenosis      Chronic pain      DM (diabetes mellitus), type 2 (H)     on insulin     Hyperlipidemia      JUAN on CPAP      Pneumonia      Polymyositis (H)      Pulmonary fibrosis, unspecified (H)      Seasonal allergies         PAST SURGICAL HISTORY:     Past Surgical History:   Procedure Laterality Date     ARTHROSCOPY KNEE  1970     COLONOSCOPY       DECOMPRESSION, FUSION LUMBAR POSTERIOR TWO LEVELS, COMBINED  1997      FUSION LUMBAR ANTERIOR THREE+ LEVELS N/A 4/22/2019    Procedure: Lumbar 1 Or Lumbar 2-5 Anterior Lumbar Interbody Fusion with BMP Stage 1 Of Two Procedure:;  Surgeon: Carlos Enrique Dial MD;  Location: [facility] OR     HERNIA REPAIR  2006     lumbar spine hardware removal  1999     OPTICAL TRACKING SYSTEM FUSION POSTERIOR SPINE THORACIC THREE+ LEVELS N/A 4/25/2019    Procedure: O-Arm/Stealth Assisted Thoracic 10-Pelvis Instrumented Fusion T11-2, L1-2, L2-3 Transforminal Lumbar Interbody Fusion (TLIF) And Smith Borges Osteotomies,and L3-4 SPO as well, Use Of BMP, Debridement Of Seroma;  Surgeon: Carlos Enrique Dial MD;  Location: [facility] OR     PICC INSERTION Right 05/06/2019    4Fr - 37cm, Basilic vein, low SVC     REMOVAL PROSTHETIC MATERIAL/MESH, ABD WALL NECRO TISS INFEXN  2012     ROTATOR CUFF REPAIR RT/LT  2003        CURRENT MEDICATIONS:     Current Outpatient Medications   Medication Sig Dispense Refill     acetaminophen (TYLENOL) 325 MG tablet Take 2 tablets (650 mg) by mouth every 4 hours as needed for mild pain or fever       albuterol (2.5 MG/3ML) 0.083% neb solution Take 1 vial by nebulization every 6 hours as needed for shortness of breath / dyspnea or wheezing       albuterol (PROAIR HFA/PROVENTIL HFA/VENTOLIN HFA) 108 (90 BASE) MCG/ACT Inhaler Inhale 2 puffs into the lungs every 6 hours as needed for shortness of breath / dyspnea or wheezing       calcium carbonate (OS-DMITRIY 500 MG [place]. [place]) 500 MG tablet Take 1 tablet (500 mg) by mouth 2 times daily (Patient taking differently: Take 1 tablet by mouth daily ) 180 tablet 3     cetirizine (ZYRTEC) 10 MG tablet Take 10 mg by mouth daily       Cholecalciferol (VITAMIN D) 2000 units tablet Take 2,000 Units by mouth daily 100 tablet 3     diphenhydrAMINE (BENADRYL) 50 MG capsule Take 50 mg by mouth every 6 hours as needed for itching or allergies       folic acid (FOLVITE) 1 MG tablet Take 1 mg by mouth daily        furosemide (LASIX) 40 MG tablet  Take 1 tablet (40 mg) by mouth daily 30 tablet 0     gabapentin (NEURONTIN) 600 MG tablet Take 600 mg by mouth 3 times daily 0700, 1400, 2100       gemfibrozil (LOPID) 600 MG tablet Take 600 mg by mouth 2 times daily        insulin NPH (HUMULIN N/NOVOLIN N VIAL) 100 UNIT/ML vial Inject 2-6 Units Subcutaneous 2 times daily Will usually give if blood glucose >140. Does not have specific sliding scale.       LANsoprazole (PREVACID) 30 MG DR capsule Take 30 mg by mouth every morning (before breakfast)       levothyroxine (SYNTHROID/LEVOTHROID) 50 MCG tablet Take 50 mcg by mouth daily       methotrexate 2.5 MG tablet Take 25 mg by mouth once a week Tuesday       multivitamin w/minerals (THERA-VIT-M) tablet Take 1 tablet by mouth daily       omega 3 1000 MG CAPS Take 2 capsules by mouth daily        oxyCODONE-acetaminophen (PERCOCET) 5-325 MG tablet Take 1 tablet by mouth 2 times daily as needed for severe pain Take percocet 5/325 TID at 7am, 2pm and 9pm (Patient taking differently: Take 1 tablet by mouth 3 times daily 7am, 2pm and 9pm) 20 tablet 0     predniSONE (DELTASONE) 10 MG tablet Take 10 mg by mouth daily       predniSONE (DELTASONE) 5 MG tablet Take 5 mg by mouth daily       traMADol (ULTRAM) 50 MG tablet Take 0.5 tablets (25 mg) by mouth 3 times daily (Patient taking differently: Take 25 mg by mouth 3 times daily 0700, 1400, 2100) 40 tablet 0     sulfamethoxazole-trimethoprim (BACTRIM DS/SEPTRA DS) 800-160 MG tablet TK 1 T PO 3 TIMES A WK  0        ALLERGIES:   No Known Allergies     FAMILY HISTORY:   No family history on file.     SOCIAL HISTORY:     Social History     Socioeconomic History     Marital status:      Spouse name: Not on file     Number of children: Not on file     Years of education: Not on file     Highest education level: Not on file   Occupational History     Not on file   Social Needs     Financial resource strain: Not on file     Food insecurity:     Worry: Not on file     Inability:  "Not on file     Transportation needs:     Medical: Not on file     Non-medical: Not on file   Tobacco Use     Smoking status: Former Smoker     Packs/day: 0.25     Last attempt to quit: 1970     Years since quittin.0     Smokeless tobacco: Never Used   Substance and Sexual Activity     Alcohol use: Yes     Comment: few times monthly     Drug use: Never     Sexual activity: Not Currently   Lifestyle     Physical activity:     Days per week: Not on file     Minutes per session: Not on file     Stress: Not on file   Relationships     Social connections:     Talks on phone: Not on file     Gets together: Not on file     Attends Yazidism service: Not on file     Active member of club or organization: Not on file     Attends meetings of clubs or organizations: Not on file     Relationship status: Not on file     Intimate partner violence:     Fear of current or ex partner: Not on file     Emotionally abused: Not on file     Physically abused: Not on file     Forced sexual activity: Not on file   Other Topics Concern     Not on file   Social History Narrative     Not on file        REVIEW OF SYSTEMS:     All other systems reviewed with patient and negative except as noted in the HPI        PHYSICAL EXAMINATION:     /56 (BP Location: Right arm, Patient Position: Chair, Cuff Size: Adult Regular)   Pulse 107   Ht 1.676 m (5' 6\")   Wt 71.8 kg (158 lb 6.4 oz)   SpO2 95%   BMI 25.57 kg/m       JVP = 12  Carotid normal upstroke, bilateral radiation of aortic stenosis murmur  Lungs, CTA  COR RRR, 3/6 late-peaking systolic murmur at base, + S3  Abdomen soft and non-tender  Extremities: 2+ BL LEdema  Neuro: non focal       LABORATORY DATA:     LIPID RESULTS:  Lab Results   Component Value Date    CHOL 153 2019    HDL 22 (L) 2019     (H) 2019    TRIG 139 2019       LIVER ENZYME RESULTS:  Lab Results   Component Value Date    AST 17 2019    ALT 14 2019       CBC " RESULTS:  Lab Results   Component Value Date    WBC 8.9 07/11/2019    RBC 4.73 07/11/2019    HGB 10.5 (L) 07/11/2019    HCT 37.9 (L) 07/11/2019    MCV 80 07/11/2019    MCH 22.2 (L) 07/11/2019    MCHC 27.7 (L) 07/11/2019    RDW 19.9 (H) 07/11/2019     (H) 07/11/2019       BMP RESULTS:  Lab Results   Component Value Date     07/11/2019    POTASSIUM 3.8 07/11/2019    CHLORIDE 99 07/11/2019    CO2 29 07/11/2019    ANIONGAP 7 07/11/2019     (H) 07/11/2019    BUN 22 07/11/2019    CR 0.92 07/11/2019    GFRESTIMATED 84 07/11/2019    GFRESTBLACK >90 07/11/2019    DMITRIY 9.2 07/11/2019        A1C RESULTS:  Lab Results   Component Value Date    A1C 6.3 (H) 01/29/2019       INR RESULTS:  Lab Results   Component Value Date    INR 1.11 07/05/2019    INR 1.21 (H) 05/12/2019          PROCEDURES & FURTHER ASSESSMENTS:     ECG 7/5/2019:  Sinus tach, NS IVCD, rightward axis, inferior TWI's     Echocardiogram 7/5/2019:  Left ventricular systolic function is low normal. The visual ejection fraction  is estimated at 50-55%. There is severe hypokinesis of the apex and the apical  segments of the lateral and inferolateral walls.  There is moderate (2+) aortic regurgitation. Severe valvular aortic stenosis.  The peak AoV pressure gradient is 66.0 mmHg. The mean AoV pressure gradient is  34.6 mmHg.  Compared to the study from 4/29/2019, the gradients across the AV are lower  although the calculated PETE is the same. Wall motion abnormalities are similar  to previous study.      CT TAVR:  Pending      Coronary Angiogram and Right Heart Catheterization:  Pending      PFTs:  Done at OSH, requesting records      Carotid Ultrasound:  Completed, final read pending      STS RISK SCORE: 3.9  FRAILTY SCORE: 3/5  NYHA CLASS: III       CLINICAL IMPRESSION:     Beulah Jane is a 69 year old male with symptomatic severe aortic stenosis who is a good candidate for transcatheter aortic valve replacement 3.9% based on the STS score  plus 3/5 elements of frailty.    We will f/u on the results of the CT TAVR, PFT's, and carotid ultrasound, and schedule him for angiogram.  He will then be presented to the Heart team for discussion during our multi-disciplinary conference for consensus decision and procedural planning.    Plan Summary:  1) Severe Aortic Stenosis:  - F/u CT TAVR, PFT's, carotid US  - Angiogram  - Presentation of data to the Heart team to assess the optimal treatment of his severe aortic stenosis  2) HFpEF  - Increase Lasix from 40 to 60 daily and 1-2 week f/u with labs for further diuretic adjustment    Patient was evaluated in clinic with Dr. Garcia of interventional cardiology and Dr. Najera of cardiothoracic surgery.      Maikel Goddard MD  Cardiology Fellow, PGY-7      CC  Patient Care Team:  Hoa López MD as PCP - General (Internal Medicine)  Hilda Alegria MD as MD (Vascular Surgery)  Bobbi Baker APRN CNP as Assigned PCP  Care, [facility] ([facility]ALTH AGENCY ([facility] ([place]  Meenakshi Irwin, RN as Nurse Coordinator (Cardiology)  Yesica Manning, CARI as Clinic Care Coordinator (Primary Care - CC)  INPATIENT, HOSPITALIST PHYSICIAN [facility]      Patient seen and examined with Cardiovascular fellow and agree with the assessment and plan described above.     Pj Garcia M.D.  Interventional Cardiology  [facility]

## 2019-07-11 NOTE — LETTER
7/11/2019      RE: Beulah Jane  [address]  [facility] [address]       Dear Colleague,    Thank you for the opportunity to participate in the care of your patient, Beulah Jane, at the [facility] HEART [facility] at [facility]. Please see a copy of my visit note below.    [facility] CARDIOTHORACIC SURGERY CONSULT  Patient Name: Beulah Jane  Medical Record Number: [id number]  YOB: 1950  Room Number: Room/bed info not found  Referring Physician: Dr. Mike    CC: Severe aortic stenosis - evaluate for TAVR    History of Present Illness: Beulah Jane is a 69 year old male with small subsegmental PE post-back surgery in 4/2019, DM, HL, JUAN on CPAP, moderate pulmonary fibrosis, recent back surgery in 4/2019, and severe trileaflet aortic valvular stenosis referred to our clinic for evaluation and consideration for potential transcatheter aortic valve replacement (TAVR).  He was hospitalized last week for acute HFpEF and diuresed.  He reports that over the past few months he has had a significant drop in exertional capacity, now gets exertional dyspnea and chest pressure walking across the house.  Also has orthopnea/PND, and his LE dyspnea is increasing since discharge from the hospital on Lasix 40 daily PO.     His severe aortic stenosis is characterized with an area of 0.8 cm2, mean gradient 35 mmHg and peak velocity of 4.1 m/sec with LVEF 50-55% by echocardiogram on 7/5/2019.  April 2019 TTE showed peak velocity of 4.8 and mean gradient of 55.      He is seen in clinic today.  He is pleasant.  He is eager to proceed with TAVR.    Assessment and Plan:  Beulah Jane is a 69 year old male with severe aortic stenosis  1) Agree with workup for TAVR - intermediate risk for surgery given age, comorbidities and frailty  2) Please call with questions or concerns.     Thank you for the opportunity to participate in the care of this  patient.    Triston Najera MD  Cardiothoracic Surgery  [phone]    Past Medical History:  Past Medical History:   Diagnosis Date     Aortic stenosis      Chronic pain      DM (diabetes mellitus), type 2 (H)     on insulin     Hyperlipidemia      JUAN on CPAP      Pneumonia      Polymyositis (H)      Pulmonary fibrosis, unspecified (H)      Seasonal allergies        Past Surgical History:  Past Surgical History:   Procedure Laterality Date     ARTHROSCOPY KNEE  1970     COLONOSCOPY       DECOMPRESSION, FUSION LUMBAR POSTERIOR TWO LEVELS, COMBINED  1997     FUSION LUMBAR ANTERIOR THREE+ LEVELS N/A 4/22/2019    Procedure: Lumbar 1 Or Lumbar 2-5 Anterior Lumbar Interbody Fusion with BMP Stage 1 Of Two Procedure:;  Surgeon: Carlos Enrique Dial MD;  Location: [facility] OR     HERNIA REPAIR  2006     lumbar spine hardware removal  1999     OPTICAL TRACKING SYSTEM FUSION POSTERIOR SPINE THORACIC THREE+ LEVELS N/A 4/25/2019    Procedure: O-Arm/Stealth Assisted Thoracic 10-Pelvis Instrumented Fusion T11-2, L1-2, L2-3 Transforminal Lumbar Interbody Fusion (TLIF) And Smith Borges Osteotomies,and L3-4 SPO as well, Use Of BMP, Debridement Of Seroma;  Surgeon: Carlos Enrique Dial MD;  Location: [facility] OR     PICC INSERTION Right 05/06/2019    4Fr - 37cm, Basilic vein, low SVC     REMOVAL PROSTHETIC MATERIAL/MESH, ABD WALL NECRO TISS INFEXN  2012     ROTATOR CUFF REPAIR RT/LT  2003        Family History:   No family history on file.    Social History:  Social History     Socioeconomic History     Marital status:      Spouse name: Not on file     Number of children: Not on file     Years of education: Not on file     Highest education level: Not on file   Occupational History     Not on file   Social Needs     Financial resource strain: Not on file     Food insecurity:     Worry: Not on file     Inability: Not on file     Transportation needs:     Medical: Not on file     Non-medical: Not on file   Tobacco Use      Smoking status: Former Smoker     Packs/day: 0.25     Last attempt to quit: 1970     Years since quittin.0     Smokeless tobacco: Never Used   Substance and Sexual Activity     Alcohol use: Yes     Comment: few times monthly     Drug use: Never     Sexual activity: Not Currently   Lifestyle     Physical activity:     Days per week: Not on file     Minutes per session: Not on file     Stress: Not on file   Relationships     Social connections:     Talks on phone: Not on file     Gets together: Not on file     Attends Zoroastrianism service: Not on file     Active member of club or organization: Not on file     Attends meetings of clubs or organizations: Not on file     Relationship status: Not on file     Intimate partner violence:     Fear of current or ex partner: Not on file     Emotionally abused: Not on file     Physically abused: Not on file     Forced sexual activity: Not on file   Other Topics Concern     Not on file   Social History Narrative     Not on file       Allergies:   No Known Allergies    Medications:  Current Outpatient Medications   Medication     acetaminophen (TYLENOL) 325 MG tablet     albuterol (2.5 MG/3ML) 0.083% neb solution     albuterol (PROAIR HFA/PROVENTIL HFA/VENTOLIN HFA) 108 (90 BASE) MCG/ACT Inhaler     calcium carbonate (OS-DMITRIY 500 MG [place]. [place]) 500 MG tablet     cetirizine (ZYRTEC) 10 MG tablet     Cholecalciferol (VITAMIN D) 2000 units tablet     diphenhydrAMINE (BENADRYL) 50 MG capsule     folic acid (FOLVITE) 1 MG tablet     gabapentin (NEURONTIN) 600 MG tablet     gemfibrozil (LOPID) 600 MG tablet     insulin NPH (HUMULIN N/NOVOLIN N VIAL) 100 UNIT/ML vial     LANsoprazole (PREVACID) 30 MG DR capsule     levothyroxine (SYNTHROID/LEVOTHROID) 50 MCG tablet     methotrexate 2.5 MG tablet     multivitamin w/minerals (THERA-VIT-M) tablet     omega 3 1000 MG CAPS     oxyCODONE-acetaminophen (PERCOCET) 5-325 MG tablet     predniSONE (DELTASONE) 10 MG tablet     predniSONE  "(DELTASONE) 5 MG tablet     sulfamethoxazole-trimethoprim (BACTRIM DS/SEPTRA DS) 800-160 MG tablet     traMADol (ULTRAM) 50 MG tablet     furosemide (LASIX) 20 MG tablet     No current facility-administered medications for this visit.        Review of Systems:   A 10 point ROS was performed and is negative other than HPI.    Physical Exam:  /56 (BP Location: Right arm, Patient Position: Chair, Cuff Size: Adult Regular)   Pulse 107   Ht 1.676 m (5' 6\")   Wt 71.8 kg (158 lb 6.4 oz)   SpO2 95%   BMI 25.57 kg/m         Gen: NAD, resting comfortably in chair   Lungs: CTAB, non-labored breathing   CV: regular rhythm, normal rate   Abd: Soft, not tender, not distended   Ext: Motor, sensation, pulses intact   Neuro: AOx3    Labs:  Lab Results   Component Value Date    WBC 8.9 07/11/2019     Lab Results   Component Value Date    RBC 4.73 07/11/2019     Lab Results   Component Value Date    HGB 10.5 07/11/2019     Lab Results   Component Value Date    HCT 37.9 07/11/2019     No components found for: MCT  Lab Results   Component Value Date    MCV 80 07/11/2019     Lab Results   Component Value Date    MCH 22.2 07/11/2019     Lab Results   Component Value Date    MCHC 27.7 07/11/2019     Lab Results   Component Value Date    RDW 19.9 07/11/2019     Lab Results   Component Value Date     07/11/2019     Last Comprehensive Metabolic Panel:  Sodium   Date Value Ref Range Status   07/11/2019 135 133 - 144 mmol/L Final     Potassium   Date Value Ref Range Status   07/11/2019 3.8 3.4 - 5.3 mmol/L Final     Chloride   Date Value Ref Range Status   07/11/2019 99 94 - 109 mmol/L Final     Carbon Dioxide   Date Value Ref Range Status   07/11/2019 29 20 - 32 mmol/L Final     Anion Gap   Date Value Ref Range Status   07/11/2019 7 3 - 14 mmol/L Final     Glucose   Date Value Ref Range Status   07/11/2019 156 (H) 70 - 99 mg/dL Final     Urea Nitrogen   Date Value Ref Range Status   07/11/2019 22 7 - 30 mg/dL Final "     Creatinine   Date Value Ref Range Status   2019 0.92 0.66 - 1.25 mg/dL Final     GFR Estimate   Date Value Ref Range Status   2019 84 >60 mL/min/[1.73_m2] Final     Comment:     Non  GFR Calc  Starting 2018, serum creatinine based estimated GFR (eGFR) will be   calculated using the Chronic Kidney Disease Epidemiology Collaboration   (CKD-EPI) equation.       Calcium   Date Value Ref Range Status   2019 9.2 8.5 - 10.1 mg/dL Final     Imaging:  Transthoracic echocardiogram (19):  Echocardiogram Complete   Order: [id number]   Status:  Edited Result - FINAL   Visible to patient:  Yes (MyChart) Next appt:  2019 at 03:00 PM in Pain & Palliative Care (Judson Stockton MD)   Details     Reading Physician Reading Date Result Priority   Maynor Graves MD 2019       Narrative     [id number]  [id number]  [id number]  [id number]^SHINE^ISSAC           [facility]  Echocardiography Laboratory  [address] [place] [address]        Name: DANELLE SIMMS  MRN: [id number]  : 1950  Study Date: 2019 12:30 PM  Age: 69 yrs  Gender: Male  Patient Location: [facility]  Reason For Study: Aortic Valve Disorder  Ordering Physician: ISSAC RIOJAS  Referring Physician: Hoa López  Performed By: Demi Lee     BSA: 1.8 m2  Height: 65 in  Weight: 159 lb  BP: 98/55 mmHg  _____________________________________________________________________________  __        Procedure  Complete Portable Echo Adult. Optison (NDC #2162-9914) given intravenously.  _____________________________________________________________________________  __        Interpretation Summary     Left ventricular systolic function is low normal. The visual ejection fraction  is estimated at 50-55%. There is severe hypokinesis of the apex and the apical  segments of the lateral and inferolateral walls.  There is moderate (2+) aortic regurgitation. Severe valvular aortic stenosis.  The peak  AoV pressure gradient is 66.0 mmHg. The mean AoV pressure gradient is  34.6 mmHg.  Compared to the study from 4/29/2019, the gradients across the AV are lower  although the calculated PETE is the same. Wall motion abnormalities are similar  to previous study.  _____________________________________________________________________________  __        Left Ventricle  The left ventricle is normal in size. Left ventricular systolic function is  low normal. The visual ejection fraction is estimated at 50-55%. Grade I or  early diastolic dysfunction. There is severe hypokinesis of the apex and the  apical segments of the lateral and inferolateral walls.     Right Ventricle  The right ventricle is normal in size and function.     Atria  The left atrium is mildly dilated. Right atrial size is normal.     Mitral Valve  The mitral valve leaflets appear thickened, but open well. There is no mitral  regurgitation noted.        Tricuspid Valve  The tricuspid valve is not well visualized.     Aortic Valve  There is moderate (2+) aortic regurgitation. Severe valvular aortic stenosis.  The peak AoV pressure gradient is 66.0 mmHg. The mean AoV pressure gradient is  34.6 mmHg.     Pulmonic Valve  The pulmonic valve is not well visualized.     Vessels  Normal size aorta. Normal size ascending aorta. IVC diameter <2.1 cm  collapsing >50% with sniff suggests a normal RA pressure of 3 mmHg.     Pericardium  There is no pericardial effusion.     _____________________________________________________________________________  __  MMode/2D Measurements & Calculations  IVSd: 1.5 cm  LVIDd: 5.8 cm  LVIDs: 4.0 cm  LVPWd: 1.3 cm  FS: 31.3 %     LV mass(C)d: 381.9 grams  LV mass(C)dI: 212.9 grams/m2  Ao root diam: 3.0 cm  LA dimension: 3.7 cm  asc Aorta Diam: 3.4 cm  LA/Ao: 1.2  LVOT diam: 2.0 cm  LVOT area: 3.1 cm2  LA Volume (BP): 58.2 ml  LA Volume Index (BP): 32.5 ml/m2  RWT: 0.46        Doppler Measurements & Calculations  MV E max charissa: 63.5  cm/sec  MV A max nico: 115.9 cm/sec  MV E/A: 0.55     MV dec slope: 473.9 cm/sec2  Ao V2 max: 406.1 cm/sec  Ao max P.0 mmHg  Ao V2 mean: 276.3 cm/sec  Ao mean P.6 mmHg  Ao V2 VTI: 78.8 cm  PETE(I,D): 0.87 cm2  PETE(V,D): 0.87 cm2  AI P1/2t: 368.6 msec  LV V1 max P.2 mmHg  LV V1 max: 114.0 cm/sec  LV V1 VTI: 22.1 cm  SV(LVOT): 68.7 ml  SI(LVOT): 38.3 ml/m2  AV Nico Ratio (DI): 0.28  PETE Index (cm2/m2): 0.49  E/E' av.1  Lateral E/e': 13.3  Medial E/e': 15.0               Coronary angiogram - pending    CT Chest (19):  Study Result     Procedure: CT ANGIOGRAM TAVR   Examination Date: 2019 10:55 AM   Indication: Severe aortic stenosis, Pre TAVR  Ordering Provider: OLGA LIDIA ANDREWS     TECHNIQUE: ECG gated CT of the heart, aorta and nongated CT of the  chest abdomen pelvis without and with IV contrast 120 mL was performed  per the EDGAR protocol on a Siemens Dual Source Flash scanner without  incident. A noncontrast CT of the chest abdomen and pelvis was  performed followed by contrast-enhanced CTA of the heart in a spiral  gated mode with limited field-of-view followed by gated high pitch  spiral CTA of the chest, abdomen, pelvis at 120 kVP to evaluate the  thoracoabdominal aorta and iliac vasculature. Scan protocol was  optimized to minimize radiation exposure. The total radiation exposure  was 1144 DLP and 16 mSv. Images were reconstructed and analyzed on a  [employer] Workstation.                                                                       IMPRESSION:     1.  See below for TAVR related aortic annular and vascular  measurements.   2.  No acute aortic dissection, intramural hematoma or contained  rupture noted.  3.  Please review detailed radiology report, to follow separately, for  incidental non-cardiovascular findings.     FINDINGS:     1.  Moderately calcified aortic valve. Aortic valve calcium score is  1384. The LVOT is not calcified. The ascending aorta is mildly  calcified,  aortic arch is  mildly calcified, the descending thoracic  aorta is mildly calcified. There is mild mitral annular calcification.  2.  Left coronary height 8.3 mm above the annular plane.   3.  There are significant native coronary calcifications.   4.  The arch vessel branching pattern is normal.   5.  The suprarenal abdominal aorta is mildly calcified; infrarenal  abdominal aorta is mildly calcified  6.  Widely patent origins of superior mesenteric artery and inferior  mesenteric artery.  Celiac artery is moderately stenotic at its  origin. Single bilateral renal arteries with widely patent origins.   7.  There is no acute aortic pathology, such as dissection, intramural  hematoma, or contained rupture.      MEASUREMENTS:   Representative dimensions of the thoracoabdominal aorta are as  follows:     1. AORTIC ANNULUS MEASUREMENTS:     1.  The average aortic annulus diameter is 24.7 mm, (long diameter is  27.7 mm and short diameter is 21.6 mm)  2.  Aortic annulus area is 4.78 cm2  3.  Aortic annulus perimeter is 79.1 mm  4.  Suggested 3-cusp coaxial angle for aortic valve is (LAO3 , CAU 10)  and alternate A-P coaxial angle is (LAO0 , CAU7 ), these  angles will  vary depending upon the patient's body position  5.  Aortic root angle is 64.9 degrees  6.  Left main - Annulus distance: 8.3 mm, RCA - Annulus distance: 19.3  mm.     2. LVOT MEASUREMENTS:     1.  The average LVOT diameter (measured 4 mm below annular plane) is  25.7 mm  2.  LVOT area is 5.18 cm2  3.  LVOT perimeter is 83.9 mm     3. AORTA MEASUREMENTS     1.  The aortic root at the sinuses of Valsalva (left cusp, right cusp,  non cusp): 30.5 mm x  28.5 mm x 29.8 mm  2.  The sinotubular junction:  25.6 mm x 24.9 mm  3.  Sinotubular junction height: 19.2 mm x 18.8 mm.  4.  Proximal ascending aorta: 37.6 mm x 36.2 mm  5.  Distal abdominal aorta proximal to the bifurcation: 15.3 mm x 14.1  mm  6.  Innominate artery 3 cm from ostium: 12.7 mm x 10.5 mm  7.  Left  common carotid artery 3 cm from ostium: 6.6 mm x 6.0 mm     4. ILIOFEMORAL MEASUREMENTS:     RIGHT:  1.  Right common iliac artery: 10.1 mm x 7.9 mm   2.  Right external iliac artery: 7.5 mm x 6.5 mm   3.  Right common femoral artery: 10.1 mm x 6.6 mm  4.  Tortuosity: moderate   5.  Calcification: mild      LEFT:  1.  Left common iliac artery: 10.7 mm x  7.3 mm at the ostium.  2.  Left external iliac artery: 7.7 mm x 7.3 mm  3.  Left common femoral artery: 8.5 mm x 8.0 mm  4.  Tortuosity: mild   5.  Calcification: mild      5. SUBCLAVIAN MEASUREMENTS:     RIGHT:  1. Minimal luminal diameter: 7.1 mm x  6.4 mm  2. Tortuosity: moderate   3. Calcification: mild      LEFT:  1. Minimal luminal diameter: 10 mm x  9 mm  2. Tortuosity: mild   3. Calcification: mild      OTHER FINDINGS:   1. Main pulmonary artery is mildly dilated (3.8 cm.)  2. Normal pulmonary venous anatomy with all four pulmonary veins  draining into the left atrium.    3. There is no left ventricular mass or thrombus.   4. Please review radiology review for incidental non-cardiovascular  findings including lungs, abdominal organs, bone, soft tissue, nodes  to follow separately     I have personally reviewed the examination and initial interpretation  and I agree with the findings.     MARLENE GEORGE MD         STS Risk Calculation:  Risk of mortality: 3.9%      Please do not hesitate to contact me if you have any questions/concerns.     Sincerely,     Triston Najera MD

## 2019-07-11 NOTE — PROGRESS NOTES
[facility]  CARDIOVASCULAR DIVISION    VALVE CLINIC INITIAL CONSULTATION    PRIMARY CARDIOLOGIST: Dr. Rich Amaral (Inpatient)      PERTINENT CLINICAL HISTORY:     Beulah Jane is a very pleasant 69 year old male with small subsegmental PE post-back surgery in 4/2019, DM, HL, JUAN on CPAP, moderate pulmonary fibrosis, recent back surgery in 4/2019, and severe trileaflet aortic valvular stenosis referred to our clinic for evaluation and consideration for potential transcatheter aortic valve replacement (TAVR).  He was hospitalized last week for acute HFpEF and diuresed.  He reports that over the past few months he has had a significant drop in exertional capacity, now gets exertional dyspnea and chest pressure walking across the house.  Also has orthopnea/PND, and his LE dyspnea is increasing since discharge from the hospital on Lasix 40 daily PO.    His severe aortic stenosis is characterized with an area of 0.8 cm2, mean gradient 35 mmHg and peak velocity of 4.1 m/sec with LVEF 50-55% by echocardiogram on 7/5/2019.  April 2019 TTE showed peak velocity of 4.8 and mean gradient of 55.           PAST MEDICAL HISTORY:     Past Medical History:   Diagnosis Date     Aortic stenosis      Chronic pain      DM (diabetes mellitus), type 2 (H)     on insulin     Hyperlipidemia      JUAN on CPAP      Pneumonia      Polymyositis (H)      Pulmonary fibrosis, unspecified (H)      Seasonal allergies         PAST SURGICAL HISTORY:     Past Surgical History:   Procedure Laterality Date     ARTHROSCOPY KNEE  1970     COLONOSCOPY       DECOMPRESSION, FUSION LUMBAR POSTERIOR TWO LEVELS, COMBINED  1997     FUSION LUMBAR ANTERIOR THREE+ LEVELS N/A 4/22/2019    Procedure: Lumbar 1 Or Lumbar 2-5 Anterior Lumbar Interbody Fusion with BMP Stage 1 Of Two Procedure:;  Surgeon: Carlos Enrique Dial MD;  Location: [facility] OR     HERNIA REPAIR  2006     lumbar spine hardware removal  1999     OPTICAL TRACKING SYSTEM FUSION  POSTERIOR SPINE THORACIC THREE+ LEVELS N/A 4/25/2019    Procedure: O-Arm/Stealth Assisted Thoracic 10-Pelvis Instrumented Fusion T11-2, L1-2, L2-3 Transforminal Lumbar Interbody Fusion (TLIF) And Smith Borges Osteotomies,and L3-4 SPO as well, Use Of BMP, Debridement Of Seroma;  Surgeon: Carlos Enrique Dial MD;  Location: [facility] OR     PICC INSERTION Right 05/06/2019    4Fr - 37cm, Basilic vein, low SVC     REMOVAL PROSTHETIC MATERIAL/MESH, ABD WALL NECRO TISS INFEXN  2012     ROTATOR CUFF REPAIR RT/LT  2003        CURRENT MEDICATIONS:     Current Outpatient Medications   Medication Sig Dispense Refill     acetaminophen (TYLENOL) 325 MG tablet Take 2 tablets (650 mg) by mouth every 4 hours as needed for mild pain or fever       albuterol (2.5 MG/3ML) 0.083% neb solution Take 1 vial by nebulization every 6 hours as needed for shortness of breath / dyspnea or wheezing       albuterol (PROAIR HFA/PROVENTIL HFA/VENTOLIN HFA) 108 (90 BASE) MCG/ACT Inhaler Inhale 2 puffs into the lungs every 6 hours as needed for shortness of breath / dyspnea or wheezing       calcium carbonate (OS-DMITRIY 500 MG [place]. [place]) 500 MG tablet Take 1 tablet (500 mg) by mouth 2 times daily (Patient taking differently: Take 1 tablet by mouth daily ) 180 tablet 3     cetirizine (ZYRTEC) 10 MG tablet Take 10 mg by mouth daily       Cholecalciferol (VITAMIN D) 2000 units tablet Take 2,000 Units by mouth daily 100 tablet 3     diphenhydrAMINE (BENADRYL) 50 MG capsule Take 50 mg by mouth every 6 hours as needed for itching or allergies       folic acid (FOLVITE) 1 MG tablet Take 1 mg by mouth daily        furosemide (LASIX) 40 MG tablet Take 1 tablet (40 mg) by mouth daily 30 tablet 0     gabapentin (NEURONTIN) 600 MG tablet Take 600 mg by mouth 3 times daily 0700, 1400, 2100       gemfibrozil (LOPID) 600 MG tablet Take 600 mg by mouth 2 times daily        insulin NPH (HUMULIN N/NOVOLIN N VIAL) 100 UNIT/ML vial Inject 2-6 Units Subcutaneous 2 times  daily Will usually give if blood glucose >140. Does not have specific sliding scale.       LANsoprazole (PREVACID) 30 MG DR capsule Take 30 mg by mouth every morning (before breakfast)       levothyroxine (SYNTHROID/LEVOTHROID) 50 MCG tablet Take 50 mcg by mouth daily       methotrexate 2.5 MG tablet Take 25 mg by mouth once a week Tuesday       multivitamin w/minerals (THERA-VIT-M) tablet Take 1 tablet by mouth daily       omega 3 1000 MG CAPS Take 2 capsules by mouth daily        oxyCODONE-acetaminophen (PERCOCET) 5-325 MG tablet Take 1 tablet by mouth 2 times daily as needed for severe pain Take percocet 5/325 TID at 7am, 2pm and 9pm (Patient taking differently: Take 1 tablet by mouth 3 times daily 7am, 2pm and 9pm) 20 tablet 0     predniSONE (DELTASONE) 10 MG tablet Take 10 mg by mouth daily       predniSONE (DELTASONE) 5 MG tablet Take 5 mg by mouth daily       traMADol (ULTRAM) 50 MG tablet Take 0.5 tablets (25 mg) by mouth 3 times daily (Patient taking differently: Take 25 mg by mouth 3 times daily 0700, 1400, 2100) 40 tablet 0     sulfamethoxazole-trimethoprim (BACTRIM DS/SEPTRA DS) 800-160 MG tablet TK 1 T PO 3 TIMES A WK  0        ALLERGIES:   No Known Allergies     FAMILY HISTORY:   No family history on file.     SOCIAL HISTORY:     Social History     Socioeconomic History     Marital status:      Spouse name: Not on file     Number of children: Not on file     Years of education: Not on file     Highest education level: Not on file   Occupational History     Not on file   Social Needs     Financial resource strain: Not on file     Food insecurity:     Worry: Not on file     Inability: Not on file     Transportation needs:     Medical: Not on file     Non-medical: Not on file   Tobacco Use     Smoking status: Former Smoker     Packs/day: 0.25     Last attempt to quit: 1970     Years since quittin.0     Smokeless tobacco: Never Used   Substance and Sexual Activity     Alcohol use: Yes      "Comment: few times monthly     Drug use: Never     Sexual activity: Not Currently   Lifestyle     Physical activity:     Days per week: Not on file     Minutes per session: Not on file     Stress: Not on file   Relationships     Social connections:     Talks on phone: Not on file     Gets together: Not on file     Attends Religion service: Not on file     Active member of club or organization: Not on file     Attends meetings of clubs or organizations: Not on file     Relationship status: Not on file     Intimate partner violence:     Fear of current or ex partner: Not on file     Emotionally abused: Not on file     Physically abused: Not on file     Forced sexual activity: Not on file   Other Topics Concern     Not on file   Social History Narrative     Not on file        REVIEW OF SYSTEMS:     All other systems reviewed with patient and negative except as noted in the HPI        PHYSICAL EXAMINATION:     /56 (BP Location: Right arm, Patient Position: Chair, Cuff Size: Adult Regular)   Pulse 107   Ht 1.676 m (5' 6\")   Wt 71.8 kg (158 lb 6.4 oz)   SpO2 95%   BMI 25.57 kg/m      JVP = 12  Carotid normal upstroke, bilateral radiation of aortic stenosis murmur  Lungs, CTA  COR RRR, 3/6 late-peaking systolic murmur at base, + S3  Abdomen soft and non-tender  Extremities: 2+ BL LEdema  Neuro: non focal       LABORATORY DATA:     LIPID RESULTS:  Lab Results   Component Value Date    CHOL 153 07/05/2019    HDL 22 (L) 07/05/2019     (H) 07/05/2019    TRIG 139 07/05/2019       LIVER ENZYME RESULTS:  Lab Results   Component Value Date    AST 17 07/11/2019    ALT 14 07/11/2019       CBC RESULTS:  Lab Results   Component Value Date    WBC 8.9 07/11/2019    RBC 4.73 07/11/2019    HGB 10.5 (L) 07/11/2019    HCT 37.9 (L) 07/11/2019    MCV 80 07/11/2019    MCH 22.2 (L) 07/11/2019    MCHC 27.7 (L) 07/11/2019    RDW 19.9 (H) 07/11/2019     (H) 07/11/2019       BMP RESULTS:  Lab Results   Component Value Date "     07/11/2019    POTASSIUM 3.8 07/11/2019    CHLORIDE 99 07/11/2019    CO2 29 07/11/2019    ANIONGAP 7 07/11/2019     (H) 07/11/2019    BUN 22 07/11/2019    CR 0.92 07/11/2019    GFRESTIMATED 84 07/11/2019    GFRESTBLACK >90 07/11/2019    DMITRIY 9.2 07/11/2019        A1C RESULTS:  Lab Results   Component Value Date    A1C 6.3 (H) 01/29/2019       INR RESULTS:  Lab Results   Component Value Date    INR 1.11 07/05/2019    INR 1.21 (H) 05/12/2019          PROCEDURES & FURTHER ASSESSMENTS:     ECG 7/5/2019:  Sinus tach, NS IVCD, rightward axis, inferior TWI's     Echocardiogram 7/5/2019:  Left ventricular systolic function is low normal. The visual ejection fraction  is estimated at 50-55%. There is severe hypokinesis of the apex and the apical  segments of the lateral and inferolateral walls.  There is moderate (2+) aortic regurgitation. Severe valvular aortic stenosis.  The peak AoV pressure gradient is 66.0 mmHg. The mean AoV pressure gradient is  34.6 mmHg.  Compared to the study from 4/29/2019, the gradients across the AV are lower  although the calculated PETE is the same. Wall motion abnormalities are similar  to previous study.      CT TAVR:  Pending      Coronary Angiogram and Right Heart Catheterization:  Pending      PFTs:  Done at OSH, requesting records      Carotid Ultrasound:  Completed, final read pending      STS RISK SCORE: 3.9  FRAILTY SCORE: 3/5  NYHA CLASS: III       CLINICAL IMPRESSION:     Beulah Jane is a 69 year old male with symptomatic severe aortic stenosis who is a good candidate for transcatheter aortic valve replacement 3.9% based on the STS score plus 3/5 elements of frailty.    We will f/u on the results of the CT TAVR, PFT's, and carotid ultrasound, and schedule him for angiogram.  He will then be presented to the Heart team for discussion during our multi-disciplinary conference for consensus decision and procedural planning.    Plan Summary:  1) Severe Aortic  Stenosis:  - F/u CT TAVR, PFT's, carotid US  - Angiogram  - Presentation of data to the Heart team to assess the optimal treatment of his severe aortic stenosis  2) HFpEF  - Increase Lasix from 40 to 60 daily and 1-2 week f/u with labs for further diuretic adjustment    Patient was evaluated in clinic with Dr. Garcia of interventional cardiology and Dr. Najera of cardiothoracic surgery.      Maikel Goddard MD  Cardiology Fellow, PGY-7      CC  Patient Care Team:  Hoa López MD as PCP - General (Internal Medicine)  Hilda Alegria MD as MD (Vascular Surgery)  Bobbi Baker APRN CNP as Assigned PCP  Care, [facility] (HOME HEALTH AGENCY ([facility]), ([place]))  Meenakshi Irwin, RN as Nurse Coordinator (Cardiology)  Yesica Manning, CARI as Clinic Care Coordinator (Primary Care - CC)  INPATIENT, HOSPITALIST PHYSICIAN [facility]      Patient seen and examined with Cardiovascular fellow and agree with the assessment and plan described above.     Pj Garcia M.D.  Interventional Cardiology  [facility]

## 2019-07-11 NOTE — NURSING NOTE
Vitals completed successfully and medication reconciled. EKG done.   Julia Hudson CMA  1:51 PM  Chief Complaint   Patient presents with     New Patient     New TAVR Eval

## 2019-07-11 NOTE — PATIENT INSTRUCTIONS
"You were seen today in the Cardiovascular Clinic at the [facility].      Cardiology Providers you saw during your visit:  Dr. Garcia and Dr. Najera    Recommendations:    Coronary angiogram  INCREASE your lasix to 60 mg, by mouth, daily  Establish care with a cardiologist, in one week  Labs prior to the cardiologist appointment      Pre-procedure Instructions:    (Coronary angiogram):  You will be scheduled for a Coronary Angiogram at the [facility], [address] [place] [address], [place] [address]. You are to check in at the [facility] Waiting Area.   Pre procedure instructions:  1. Please make arrangements to have a  as you will not be allowed to drive following the procedure.  2. Do not eat or drink after midnight the day of your procedure.  3. You may take your usual morning medications with a sip of water the morning of your procedure.  4. Take a 325 mg aspirin the day before and the day of your procedure.  5. Make arrangements to have someone stay with you the night after your procedure.      After all of your imaging/testing is completed, your case will be discussed at our Valve conference.  After this conference, the recommendations will be discussed with you, along with the appropriate follow up appointments.        Nursing questions: [phone] option 1 then chose option 4 to \"leave a message for your care team\" and then ask to speak with Sandra.  For emergencies call 911.    It was a pleasure to see you in clinic.  If you have any questions at all, please feel free to give me a call.      Sandra Ba RN  Structural Heart Care Coordinator   TAVR and MitraClip Programs  [facility]  Office: [phone]    Clinics and Surgery Center  [address] [place] [address]  Cardiology Clinic [address]  [place] [address]      "

## 2019-07-12 NOTE — PROGRESS NOTES
[facility] now requests orders and shares plan of care/discharge summaries for some patients through [employer].  Please REPLY TO THIS MESSAGE OR ROUTE BACK TO THE AUTHOR in order to give authorization for orders when needed.  This is considered a verbal order, you will still receive a faxed copy of orders for signature.  Thank you for your assistance in improving collaboration for our patients.    ORDER: PT 1w3 for strength, balance, and aerobic endurance.     Griselda Szymanski, PT, DPT  [email or web address]  [phone]

## 2019-07-12 NOTE — TELEPHONE ENCOUNTER
RECORDS RECEIVED FROM: Internal/Care Everywhere   DATE RECEIVED: 7-17   NOTES STATUS DETAILS   OFFICE NOTE from referring provider    N/A    OFFICE NOTE from other cardiologist    Internal    DISCHARGE SUMMARY from hospital    Internal    DISCHARGE REPORT from the ER   Internal    OPERATIVE REPORT    N/A    MEDICATION LIST   Internal    LABS     BMP   Internal 7-8-19   CBC   Internal 7-11-19   CMP   Internal 7-11-19   Lipids   Internal 7-5-19   TSH   Care Everywhere 1-10-19   DIAGNOSTIC PROCEDURES     EKG   Internal    Monitor Reports   N/A    IMAGING (DISC & REPORT)      Echo   In process    Stress Tests   N/A    Cath   N/A    MRI/MRA   N/A    CT/CTA   N/A      Action    Action Taken 7-12: Requested echos from [facility]

## 2019-07-15 PROBLEM — R93.1 ABNORMAL FINDINGS DIAGNOSTIC IMAGING OF HEART AND CORONARY CIRCULATION: Status: ACTIVE | Noted: 2019-01-01

## 2019-07-15 PROBLEM — I51.89 OTHER ILL-DEFINED HEART DISEASES: Status: ACTIVE | Noted: 2019-01-01

## 2019-07-15 NOTE — PROGRESS NOTES
[facility] CARDIOTHORACIC SURGERY CONSULT  Patient Name: Beulah Jane  Medical Record Number: [id number]  YOB: 1950  Room Number: Room/bed info not found  Referring Physician: Dr. Mike    CC: Severe aortic stenosis - evaluate for TAVR    History of Present Illness: Beulah Jane is a 69 year old male with small subsegmental PE post-back surgery in 4/2019, DM, HL, JUAN on CPAP, moderate pulmonary fibrosis, recent back surgery in 4/2019, and severe trileaflet aortic valvular stenosis referred to our clinic for evaluation and consideration for potential transcatheter aortic valve replacement (TAVR).  He was hospitalized last week for acute HFpEF and diuresed.  He reports that over the past few months he has had a significant drop in exertional capacity, now gets exertional dyspnea and chest pressure walking across the house.  Also has orthopnea/PND, and his LE dyspnea is increasing since discharge from the hospital on Lasix 40 daily PO.     His severe aortic stenosis is characterized with an area of 0.8 cm2, mean gradient 35 mmHg and peak velocity of 4.1 m/sec with LVEF 50-55% by echocardiogram on 7/5/2019.  April 2019 TTE showed peak velocity of 4.8 and mean gradient of 55.      He is seen in clinic today.  He is pleasant.  He is eager to proceed with TAVR.    Assessment and Plan:  Beulah Jane is a 69 year old male with severe aortic stenosis  1) Agree with workup for TAVR - intermediate risk for surgery given age, comorbidities and frailty  2) Please call with questions or concerns.     Thank you for the opportunity to participate in the care of this patient.    Triston Najera MD  Cardiothoracic Surgery  [phone]    Past Medical History:  Past Medical History:   Diagnosis Date     Aortic stenosis      Chronic pain      DM (diabetes mellitus), type 2 (H)     on insulin     Hyperlipidemia      JUAN on CPAP      Pneumonia      Polymyositis (H)      Pulmonary fibrosis,  unspecified (H)      Seasonal allergies        Past Surgical History:  Past Surgical History:   Procedure Laterality Date     ARTHROSCOPY KNEE       COLONOSCOPY       DECOMPRESSION, FUSION LUMBAR POSTERIOR TWO LEVELS, COMBINED       FUSION LUMBAR ANTERIOR THREE+ LEVELS N/A 2019    Procedure: Lumbar 1 Or Lumbar 2-5 Anterior Lumbar Interbody Fusion with BMP Stage 1 Of Two Procedure:;  Surgeon: Carlos Enrique Dial MD;  Location: [facility] OR     HERNIA REPAIR  2006     lumbar spine hardware removal       OPTICAL TRACKING SYSTEM FUSION POSTERIOR SPINE THORACIC THREE+ LEVELS N/A 2019    Procedure: O-Arm/Stealth Assisted Thoracic 10-Pelvis Instrumented Fusion T11-2, L1-2, L2-3 Transforminal Lumbar Interbody Fusion (TLIF) And Smith Borges Osteotomies,and L3-4 SPO as well, Use Of BMP, Debridement Of Seroma;  Surgeon: Carlos Enrique Dial MD;  Location: [facility] OR     PICC INSERTION Right 2019    4Fr - 37cm, Basilic vein, low SVC     REMOVAL PROSTHETIC MATERIAL/MESH, ABD WALL NECRO TISS INFEXN       ROTATOR CUFF REPAIR RT/LT          Family History:   No family history on file.    Social History:  Social History     Socioeconomic History     Marital status:      Spouse name: Not on file     Number of children: Not on file     Years of education: Not on file     Highest education level: Not on file   Occupational History     Not on file   Social Needs     Financial resource strain: Not on file     Food insecurity:     Worry: Not on file     Inability: Not on file     Transportation needs:     Medical: Not on file     Non-medical: Not on file   Tobacco Use     Smoking status: Former Smoker     Packs/day: 0.25     Last attempt to quit: 1970     Years since quittin.0     Smokeless tobacco: Never Used   Substance and Sexual Activity     Alcohol use: Yes     Comment: few times monthly     Drug use: Never     Sexual activity: Not Currently   Lifestyle     Physical activity:      Days per week: Not on file     Minutes per session: Not on file     Stress: Not on file   Relationships     Social connections:     Talks on phone: Not on file     Gets together: Not on file     Attends Restorationism service: Not on file     Active member of club or organization: Not on file     Attends meetings of clubs or organizations: Not on file     Relationship status: Not on file     Intimate partner violence:     Fear of current or ex partner: Not on file     Emotionally abused: Not on file     Physically abused: Not on file     Forced sexual activity: Not on file   Other Topics Concern     Not on file   Social History Narrative     Not on file       Allergies:   No Known Allergies    Medications:  Current Outpatient Medications   Medication     acetaminophen (TYLENOL) 325 MG tablet     albuterol (2.5 MG/3ML) 0.083% neb solution     albuterol (PROAIR HFA/PROVENTIL HFA/VENTOLIN HFA) 108 (90 BASE) MCG/ACT Inhaler     calcium carbonate (OS-DMITRIY 500 MG [place]) 500 MG tablet     cetirizine (ZYRTEC) 10 MG tablet     Cholecalciferol (VITAMIN D) 2000 units tablet     diphenhydrAMINE (BENADRYL) 50 MG capsule     folic acid (FOLVITE) 1 MG tablet     gabapentin (NEURONTIN) 600 MG tablet     gemfibrozil (LOPID) 600 MG tablet     insulin NPH (HUMULIN N/NOVOLIN N VIAL) 100 UNIT/ML vial     LANsoprazole (PREVACID) 30 MG DR capsule     levothyroxine (SYNTHROID/LEVOTHROID) 50 MCG tablet     methotrexate 2.5 MG tablet     multivitamin w/minerals (THERA-VIT-M) tablet     omega 3 1000 MG CAPS     oxyCODONE-acetaminophen (PERCOCET) 5-325 MG tablet     predniSONE (DELTASONE) 10 MG tablet     predniSONE (DELTASONE) 5 MG tablet     sulfamethoxazole-trimethoprim (BACTRIM DS/SEPTRA DS) 800-160 MG tablet     traMADol (ULTRAM) 50 MG tablet     furosemide (LASIX) 20 MG tablet     No current facility-administered medications for this visit.        Review of Systems:   A 10 point ROS was performed and is negative other than  "HPI.    Physical Exam:  /56 (BP Location: Right arm, Patient Position: Chair, Cuff Size: Adult Regular)   Pulse 107   Ht 1.676 m (5' 6\")   Wt 71.8 kg (158 lb 6.4 oz)   SpO2 95%   BMI 25.57 kg/m        Gen: NAD, resting comfortably in chair   Lungs: CTAB, non-labored breathing   CV: regular rhythm, normal rate   Abd: Soft, not tender, not distended   Ext: Motor, sensation, pulses intact   Neuro: AOx3    Labs:  Lab Results   Component Value Date    WBC 8.9 07/11/2019     Lab Results   Component Value Date    RBC 4.73 07/11/2019     Lab Results   Component Value Date    HGB 10.5 07/11/2019     Lab Results   Component Value Date    HCT 37.9 07/11/2019     No components found for: MCT  Lab Results   Component Value Date    MCV 80 07/11/2019     Lab Results   Component Value Date    MCH 22.2 07/11/2019     Lab Results   Component Value Date    MCHC 27.7 07/11/2019     Lab Results   Component Value Date    RDW 19.9 07/11/2019     Lab Results   Component Value Date     07/11/2019     Last Comprehensive Metabolic Panel:  Sodium   Date Value Ref Range Status   07/11/2019 135 133 - 144 mmol/L Final     Potassium   Date Value Ref Range Status   07/11/2019 3.8 3.4 - 5.3 mmol/L Final     Chloride   Date Value Ref Range Status   07/11/2019 99 94 - 109 mmol/L Final     Carbon Dioxide   Date Value Ref Range Status   07/11/2019 29 20 - 32 mmol/L Final     Anion Gap   Date Value Ref Range Status   07/11/2019 7 3 - 14 mmol/L Final     Glucose   Date Value Ref Range Status   07/11/2019 156 (H) 70 - 99 mg/dL Final     Urea Nitrogen   Date Value Ref Range Status   07/11/2019 22 7 - 30 mg/dL Final     Creatinine   Date Value Ref Range Status   07/11/2019 0.92 0.66 - 1.25 mg/dL Final     GFR Estimate   Date Value Ref Range Status   07/11/2019 84 >60 mL/min/[1.73_m2] Final     Comment:     Non  GFR Calc  Starting 12/18/2018, serum creatinine based estimated GFR (eGFR) will be   calculated using the Chronic " Kidney Disease Epidemiology Collaboration   (CKD-EPI) equation.       Calcium   Date Value Ref Range Status   2019 9.2 8.5 - 10.1 mg/dL Final     Imaging:  Transthoracic echocardiogram (19):  Echocardiogram Complete   Order: [id number]   Status:  Edited Result - FINAL   Visible to patient:  Yes (Christian) Next appt:  2019 at 03:00 PM in [facility] Pall[facility] (Judson Stockton MD)   Details     Reading Physician Reading Date Result Priority   Maynor Graves MD 2019       Narrative     [id number]  [id number]  [id number]  [id number]^SHINE^ISSAC           [facility]  Echocardiography Laboratory  [facility] [place] [address]        Name: DANELLE SIMMS  MRN: [id number]  : 1950  Study Date: 2019 12:30 PM  Age: 69 yrs  Gender: Male  Patient Location: [facility]  Reason For Study: Aortic Valve Disorder  Ordering Physician: ISSAC RIOJAS  Referring Physician: Hoa López  Performed By: Demi Lee     BSA: 1.8 m2  Height: 65 in  Weight: 159 lb  BP: 98/55 mmHg  _____________________________________________________________________________  __        Procedure  Complete Portable Echo Adult. Optison (NDC #[id number]) given intravenously.  _____________________________________________________________________________  __        Interpretation Summary     Left ventricular systolic function is low normal. The visual ejection fraction  is estimated at 50-55%. There is severe hypokinesis of the apex and the apical  segments of the lateral and inferolateral walls.  There is moderate (2+) aortic regurgitation. Severe valvular aortic stenosis.  The peak AoV pressure gradient is 66.0 mmHg. The mean AoV pressure gradient is  34.6 mmHg.  Compared to the study from 2019, the gradients across the AV are lower  although the calculated PETE is the same. Wall motion abnormalities are similar  to previous  study.  _____________________________________________________________________________  __        Left Ventricle  The left ventricle is normal in size. Left ventricular systolic function is  low normal. The visual ejection fraction is estimated at 50-55%. Grade I or  early diastolic dysfunction. There is severe hypokinesis of the apex and the  apical segments of the lateral and inferolateral walls.     Right Ventricle  The right ventricle is normal in size and function.     Atria  The left atrium is mildly dilated. Right atrial size is normal.     Mitral Valve  The mitral valve leaflets appear thickened, but open well. There is no mitral  regurgitation noted.        Tricuspid Valve  The tricuspid valve is not well visualized.     Aortic Valve  There is moderate (2+) aortic regurgitation. Severe valvular aortic stenosis.  The peak AoV pressure gradient is 66.0 mmHg. The mean AoV pressure gradient is  34.6 mmHg.     Pulmonic Valve  The pulmonic valve is not well visualized.     Vessels  Normal size aorta. Normal size ascending aorta. IVC diameter <2.1 cm  collapsing >50% with sniff suggests a normal RA pressure of 3 mmHg.     Pericardium  There is no pericardial effusion.     _____________________________________________________________________________  __  MMode/2D Measurements & Calculations  IVSd: 1.5 cm  LVIDd: 5.8 cm  LVIDs: 4.0 cm  LVPWd: 1.3 cm  FS: 31.3 %     LV mass(C)d: 381.9 grams  LV mass(C)dI: 212.9 grams/m2  Ao root diam: 3.0 cm  LA dimension: 3.7 cm  asc Aorta Diam: 3.4 cm  LA/Ao: 1.2  LVOT diam: 2.0 cm  LVOT area: 3.1 cm2  LA Volume (BP): 58.2 ml  LA Volume Index (BP): 32.5 ml/m2  RWT: 0.46        Doppler Measurements & Calculations  MV E max charissa: 63.5 cm/sec  MV A max charissa: 115.9 cm/sec  MV E/A: 0.55     MV dec slope: 473.9 cm/sec2  Ao V2 max: 406.1 cm/sec  Ao max P.0 mmHg  Ao V2 mean: 276.3 cm/sec  Ao mean P.6 mmHg  Ao V2 VTI: 78.8 cm  PETE(I,D): 0.87 cm2  PETE(V,D): 0.87 cm2  AI P1/2t: 368.6  msec  LV V1 max P.2 mmHg  LV V1 max: 114.0 cm/sec  LV V1 VTI: 22.1 cm  SV(LVOT): 68.7 ml  SI(LVOT): 38.3 ml/m2  AV Nico Ratio (DI): 0.28  PETE Index (cm2/m2): 0.49  E/E' av.1  Lateral E/e': 13.3  Medial E/e': 15.0               Coronary angiogram - pending    CT Chest (19):  Study Result     Procedure: CT ANGIOGRAM TAVR   Examination Date: 2019 10:55 AM   Indication: Severe aortic stenosis, Pre TAVR  Ordering Provider: OLGA LIDIA ANDREWS     TECHNIQUE: ECG gated CT of the heart, aorta and nongated CT of the  chest abdomen pelvis without and with IV contrast 120 mL was performed  per the EDGAR protocol on a Siemens Dual Source Flash scanner without  incident. A noncontrast CT of the chest abdomen and pelvis was  performed followed by contrast-enhanced CTA of the heart in a spiral  gated mode with limited field-of-view followed by gated high pitch  spiral CTA of the chest, abdomen, pelvis at 120 kVP to evaluate the  thoracoabdominal aorta and iliac vasculature. Scan protocol was  optimized to minimize radiation exposure. The total radiation exposure  was 1144 DLP and 16 mSv. Images were reconstructed and analyzed on a  [employer] Workstation.                                                                       IMPRESSION:     1.  See below for TAVR related aortic annular and vascular  measurements.   2.  No acute aortic dissection, intramural hematoma or contained  rupture noted.  3.  Please review detailed radiology report, to follow separately, for  incidental non-cardiovascular findings.     FINDINGS:     1.  Moderately calcified aortic valve. Aortic valve calcium score is  1384. The LVOT is not calcified. The ascending aorta is mildly  calcified, aortic arch is  mildly calcified, the descending thoracic  aorta is mildly calcified. There is mild mitral annular calcification.  2.  Left coronary height 8.3 mm above the annular plane.   3.  There are significant native coronary calcifications.   4.  The  arch vessel branching pattern is normal.   5.  The suprarenal abdominal aorta is mildly calcified; infrarenal  abdominal aorta is mildly calcified  6.  Widely patent origins of superior mesenteric artery and inferior  mesenteric artery.  Celiac artery is moderately stenotic at its  origin. Single bilateral renal arteries with widely patent origins.   7.  There is no acute aortic pathology, such as dissection, intramural  hematoma, or contained rupture.      MEASUREMENTS:   Representative dimensions of the thoracoabdominal aorta are as  follows:     1. AORTIC ANNULUS MEASUREMENTS:     1.  The average aortic annulus diameter is 24.7 mm, (long diameter is  27.7 mm and short diameter is 21.6 mm)  2.  Aortic annulus area is 4.78 cm2  3.  Aortic annulus perimeter is 79.1 mm  4.  Suggested 3-cusp coaxial angle for aortic valve is (LAO3 , CAU 10)  and alternate A-P coaxial angle is (LAO0 , CAU7 ), these  angles will  vary depending upon the patient's body position  5.  Aortic root angle is 64.9 degrees  6.  Left main - Annulus distance: 8.3 mm, RCA - Annulus distance: 19.3  mm.     2. LVOT MEASUREMENTS:     1.  The average LVOT diameter (measured 4 mm below annular plane) is  25.7 mm  2.  LVOT area is 5.18 cm2  3.  LVOT perimeter is 83.9 mm     3. AORTA MEASUREMENTS     1.  The aortic root at the sinuses of Valsalva (left cusp, right cusp,  non cusp): 30.5 mm x  28.5 mm x 29.8 mm  2.  The sinotubular junction:  25.6 mm x 24.9 mm  3.  Sinotubular junction height: 19.2 mm x 18.8 mm.  4.  Proximal ascending aorta: 37.6 mm x 36.2 mm  5.  Distal abdominal aorta proximal to the bifurcation: 15.3 mm x 14.1  mm  6.  Innominate artery 3 cm from ostium: 12.7 mm x 10.5 mm  7.  Left common carotid artery 3 cm from ostium: 6.6 mm x 6.0 mm     4. ILIOFEMORAL MEASUREMENTS:     RIGHT:  1.  Right common iliac artery: 10.1 mm x 7.9 mm   2.  Right external iliac artery: 7.5 mm x 6.5 mm   3.  Right common femoral artery: 10.1 mm x 6.6 mm  4.   Tortuosity: moderate   5.  Calcification: mild      LEFT:  1.  Left common iliac artery: 10.7 mm x  7.3 mm at the ostium.  2.  Left external iliac artery: 7.7 mm x 7.3 mm  3.  Left common femoral artery: 8.5 mm x 8.0 mm  4.  Tortuosity: mild   5.  Calcification: mild      5. SUBCLAVIAN MEASUREMENTS:     RIGHT:  1. Minimal luminal diameter: 7.1 mm x  6.4 mm  2. Tortuosity: moderate   3. Calcification: mild      LEFT:  1. Minimal luminal diameter: 10 mm x  9 mm  2. Tortuosity: mild   3. Calcification: mild      OTHER FINDINGS:   1. Main pulmonary artery is mildly dilated (3.8 cm.)  2. Normal pulmonary venous anatomy with all four pulmonary veins  draining into the left atrium.    3. There is no left ventricular mass or thrombus.   4. Please review radiology review for incidental non-cardiovascular  findings including lungs, abdominal organs, bone, soft tissue, nodes  to follow separately     I have personally reviewed the examination and initial interpretation  and I agree with the findings.     MARLENE GEORGE MD         STS Risk Calculation:  Risk of mortality: 3.9%

## 2019-07-16 NOTE — LETTER
[facility]  07/16/19    Patient: Beulah Jane  YOB: 1950  Medical Record Number: [id number]                                                                  Opioid / Opioid Plus Controlled Substance Agreement    I understand that my care provider has prescribed an opioid (narcotic) controlled substance to help manage my condition(s). I am taking this medicine to help me function or work. I know this is strong medicine, and that it can cause serious side effects. Opioid medicine can be sedating, addicting and may cause a dependency on the drug. They can affect my ability to drive or think, and cause depression. They need to be taken exactly as prescribed. Combining opioids with certain medicines or chemicals (such as cocaine, sedatives and tranquilizers, sleeping pills, meth) can be dangerous or even fatal. Also, if I stop opioids suddenly, I may have severe withdrawal symptoms. Last, I understand that opioids do not work for all types of pain nor for all patients. If not helpful, I may be asked to stop them.         The risks, benefits, and side effects of these medicine(s) were explained to me. I agree that:    1. I will take part in other treatments as advised by my care team. This may be psychiatry or counseling, physical therapy, behavioral therapy, group treatment or a referral to a pain clinic. I will reduce or stop my medicine when my care team tells me to do so.  2. I will take my medicines as prescribed. I will not change the dose or schedule unless my care team tells me to. There will be no refills if I  run out early.   I may be contactedwithout warning and asked to complete a urine drug test or pill count at any time.   3. I will keep all my appointments, and understand this is part of the monitoring of opioids. My care team may require an office visit for EVERY opioid/controlled substance refill. If I miss appointments or don t follow instructions, my care team may stop  my medicine.  4. I will not ask other providers to prescribe controlled substances, and I will not accept controlled substances from other people. If I need another prescribed controlled substance for a new reason, I will tell my care team within 1 business day.  5. I will use one pharmacy to fill all of my controlled substance prescriptions, and it is up to me to make sure that I do not run out of my medicines on weekends or holidays. If my care team is willing to refill my opioid prescription without a visit, I must request refills only during office hours, refills may take up to 3 days to process, and it may take up to 5 to 7 days for my medicine to be mailed and ready at my pharmacy. Prescriptions will not be mailed anywhere except my pharmacy.        [id number]  Rev 12/18         Registration to scan to EHR                             Page 1 of 2               Controlled Substance Agreement Opioid        [facility]  07/16/19  Patient: Beulah Jane  YOB: 1950  Medical Record Number: [id number]                                                                  6. I am responsible for my prescriptions. If the medicine/prescription is lost or stolen, it will not be replaced. I also agree not to share controlled substance medicines with anyone.  7. I agree to not use ANY illegal or recreational drugs. This includes marijuana, cocaine, bath salts or other drugs. I agree not to use alcohol unless my care team says I may.          I agree to give urine samples whenever asked. If I don t give a urine sample, the care team may stop my medicine.    8. If I enroll in the [place] [facility] Marijuana program, I will tell my care team. I will also sign an agreement to share my medical records with my care team.   9. I will bring in my list of medicines (or my medicine bottles) each time I come to the clinic.   10. I will tell my care team right away if I become pregnant or have a new medical problem  treated outside of my regular clinic.  11. I understand that this medicine can affect my thinking and judgment. It may be unsafe for me to drive, use machinery and do dangerous tasks. I will not do any of these things until I know how the medicine affects me. If my dose changes, I will wait to see how it affects me. I will contact my care team if I have concerns about medicine side effects.    I understand that if I do not follow any of the conditions above, my prescriptions or treatment may be stopped.      I agree that my provider, clinic care team, and pharmacy may work with any city, state or federal law enforcement agency that investigates the misuse, sale, or other diversion of my controlled medicine. I will allow my provider to discuss my care with or share a copy of this agreement with any other treating provider, pharmacy or emergency room where I receive care. I agree to give up (waive) any right of privacy or confidentiality with respect to these consents.     I have read this agreement and have asked questions about anything I did not understand.      ________________________________________________________________________  Patient signature - Date/Time -  Beulah Jane                                      ________________________________________________________________________  Witness signature                                                            ________________________________________________________________________  Provider signature - Judson Stockton MD      [id number]  Rev 12/18         Registration to scan to EHR                         Page 2 of 2                   Controlled Substance Agreement Opioid           Page 1 of 2  Opioid Pain Medicines (also known as Narcotics)  What You Need to Know    What are opioids?   Opioids are pain medicines that must be prescribed by a doctor.  They are also known as narcotics.    Examples are:     morphine (MS Contin, Agatha)    oxycodone  (Oxycontin)    oxycodone and acetaminophen (Percocet)    hydrocodone and acetaminophen (Vicodin, Norco)     fentanyl patch (Duragesic)     hydromorphone (Dilaudid)     methadone     What do opioids do well?   Opioids are best for short-term pain after a surgery or injury. They also work well for cancer pain. Unlike other pain medicines, they do not cause liver or kidney failure or ulcers. They may help some people with long-lasting (chronic) pain.     What do opioids NOT do well?   Opioids never get rid of pain entirely, and they do not work well for most patients with chronic pain. Opioids do not reduce swelling, one of the causes of pain. They also don t work well for nerve pain.                           For informational purposes only.  Not to replace the advice of your care provider.  Copyright 201 [facility]. All right reserved. [place] 345456-Jiv 02/18.      Page 2 of 2    Risks and side effects   Talk to your doctor before you start or decide to keep taking one of these medicines. Side effects include:    Lowering your breathing rate enough to cause death    Overdose, including death, especially if taking higher than prescribed doses    Long-term opioid use    Worse depression symptoms; less pleasure in things you usually enjoy    Feeling tired or sluggish    Slower thoughts or cloudy thinking    Being more sensitive to pain over time; pain is harder to control    Trouble sleeping or restless sleep    Changes in hormone levels (for example, less testosterone)    Changes in sex drive or ability to have sex    Constipation    Unsafe driving    Itching and sweating    Feeling dizzy    Nausea, vomiting and dry mouth    What else should I know about opioids?  When someone takes opioids for too long or too often, they become dependent. This means that if you stop or reduce the medicine too quickly, you will have withdrawal symptoms.    Dependence is not the same as addiction. Addiction is when  people keep using a substance that harms their body, their mind or their relations with others. If you have a history of drug or alcohol abuse, taking opioids can cause a relapse.    Over time, opioids don t work as well. Most people will need higher and higher doses. The higher the dose, the more serious the side effects. We don t know the long-term effects of opioids.      Prescribed opioids aren't the best way to manage chronic pain    Other ways to manage pain include:      Ibuprofen or acetaminophen.  You should always try this first.      Treat health problems that may be causing pain.      acupuncture or massage, deep breathing, meditation, visual imagery, aromatherapy.      Use heat or ice at the pain site      Physical therapy and exercise      Stop smoking      See a counselor or therapist                                                  People who have used opioids for a long time may have a lower quality of life, worse depression, higher levels of pain and more visits to doctors.    Never share your opioids with others. Be sure to store opioids in a secure place, locked if possible.Young children can easily swallow them and overdose.     You can overdose on opioids.  Signs of overdose include decrease or loss of consciousness, slowed breathing, trouble waking and blue lips.  If someone is worried about overdose, they should call 911.    If you are at risk for overdose, you may get naloxone (Narcan, a medicine that reverses the effects of opioids.  If you overdose, a friend or family member can give you Narcan while waiting for the ambulance.  They need to know the signs of overdose and how to give Narcan.    While you're taking opioids:    Don't use alcohol or street drugs. Taking them together can cause death.    Don't take any of these medicines unless your doctor says its okay.  Taking these with opioids can cause death.    Benzodiazepines (such as lorazepam         or diazepam)    Muscle relaxers  (such as cyclobenzaprine)    sleeping pills    other opioids    Safe disposal of opioids  Find your area drug take-back program, your pharmacy mail-back program, buy a special disposal bag (such as Deterra) from your pharmacy or flush them down the toilet.  Use the guidelines at:  www.fda.gov/drugs/resourcesforyou

## 2019-07-16 NOTE — PROGRESS NOTES
[facility] Consultation    Date of visit: 7/16/2019    Reason for consultation:    Beulah Jane is a 69 year old male who is seen in consultation today at the request of his primary care physician, Hoa López.     Consultation and Evaluation for: Chronic pain    Review of [place] Prescription Monitoring Program (): Today I have also reviewed the patient's history of controlled substance use, as provided by [place] licensed pharmacies and prescriber dispensers.     Review of Pain Questionnaire: Please see the [facility] health questionnaire, which the patient completed and reviewed with me in detail.    Review of Electronic Chart: Today I have also reviewed available medical information in the patient's medical record at [facility] including relevant provider notes, laboratory work, and imaging.     Beulah Jane has not been seen at a pain clinic in the past.      Chief Complaint:    Chief Complaint   Patient presents with     Pain       Pain history:  Beulah Jane is a 69 year old male who presents for initial evaluation of chief pain complaint of chronic pain.     He was diagnosed initially with polymyositis by his rheumatologist in [place]. After trying different pain medications he was given a regimen of percocet 5/325 and tramadol 50mg three tablets each daily. He has been taking one percocet and one tramadol together three times daily for about 10 years now.    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 His current rheumatologist is discussing various other disease modifying treatment options with him currently. He states that he followed up with Dr. López after a hospital visit recently and was told he needs to be seen by a chronic pain provider for management of his pain issues.    He also underwent a  "lumbar decompression/fusion surgery in April of 2019 by Dr. Dial for lumbar spinal stenosis. He had a surgery int he past as well with hardware removal following the surgery.    At this time he has intermittent pains throughout his body, shoulder girdle, hip girdle, low back and the feet are the worst. He has a neuropathic type pain in his feet with numbness, tingling and needle like sensations. If he skips any of his doses of medications he starts having severe pain in his feet.     Onset: 10-12 years ago  Location/Radiation: \"Joints, muscles all body\"  Quality: \"Aching, sharp, shooting\" \"constant when no medicated\"  Severity/Intensity: 10/10 at worst, 2/10 at best, 8/10 on average  Aggravating factors include: \"laying in bed, walking, sitting long time, cannot perform much activity\"  Relieving factors include: \"none\"  Red Flags: The patient denies bowel or bladder incontinence, parasthesias, weakness, saddle anesthesia, unintentional weight loss, or fever/chills/sweats.         Pain Treatments:  1. Medications:       Current pain medications:  -Tramadol 50mg TID  -Percocet 5/325 TID  -Gabapentin 600mg TID  -Tylenol prn  -Ibuprofen prn (tries to avoid)       Previous pain medications:  OPIOIDS: Codeine (Tylenol #3).  Medication was unsure  Oxycodone- (Percocet, Oxycontin).  Medication was helpful  Tramadol (Ultram).  Medication was helpful  NSAIDS: Ibuprofen (Advil, Motrin).  Medication was not helpful  Ketorolac (Toradol).  Medication was unsure  Naproxen (Aleve). Medication was not helpful  MUSCLE RELAXANTS:  Cyclobenzaprine (Flexeril).  Medication was unsure  ANTI-CONVULSANTS: Gabapentin (Neurontin)  TOPICALS:  Lidocaine (Lidoderm patches or ointment).  Medication was not helpful  OTHER MEDICATIONS FOR PAIN:  Acetaminophen (Tylenol).  Medication was not helpful  Steroids (Prednisone, Medrol).  Medication was helpful  2. Physical Therapy: No help, last in 2016     TENS unit: hasn't tried  3. Pain psychology: " hasn't tried  4. Surgery: hasn't tried  5. Injections: hasn't tried  6. Alternative Therapies:    Chiropractic: last in 2008, [place]    Acupuncture: hasn't tried      Diagnostic tests:  Exam: Full spine radiographs using EOS      History: Spinal stenosis of lumbar region with neurogenic claudication     Techniques: AP and lateral images of full spine were submitted for  interpretation.     Comparison: Radiographs 6/26/2018, chest CT 6/2/2019, radiographs  4/29/2019     Findings:     12 rib bearing vertebral bodies and 5 lumbar type vertebral bodies are  identified. Stable postsurgical changes of [id number]  anterior/posterior fusion instrumentation. As seen previously, the  left T10 screw extends to the margin of the superior endplate cortex.  No hardware fracture. Multilevel degenerative changes throughout the  spine, including mild anterolisthesis at C3-4. No global coronal  imbalance. Positive global sagittal imbalance. Moderate bilateral hip  joint space narrowing. Heart is enlarged. Stable interstitial  opacities and bibasilar consolidation. Right PICC tip projects over  the low SVC. Normal bowel gas pattern.                                                                      Impression:  1. Stable postsurgical changes of [id number] anterior/posterior fusion  instrumentation.   2. Positive global sagittal imbalance.  3. Stable cardiomegaly.  4. Pulmonary fibrosis with superimposed bibasilar consolidation,  similar in appearance to most recent CT 6/2/2019.  5. Moderate bilateral hip osteoarthrosis.     CAROLINA MYERS MD      CT LUMBAR SPINE W/O CONTRAST 1/29/2019 2:04 PM     History: Back pain, prior surgery, new or progressive sx; Spinal  stenosis of lumbar region with neurogenic claudication; Fluid  collection at surgical site, subsequent encounter; Lumbar pain.  ICD-10: Spinal stenosis of lumbar region with neurogenic claudication;  Fluid collection at surgical site, subsequent encounter; Lumbar  pain     Comparison: 7/2/2018.     Technique: Using multidetector thin collimation helical acquisition  technique, axial, coronal and sagittal CT images through the lumbar  spine were obtained without intravenous contrast.      Findings: There are 5 lumbar type vertebrae. Diffuse osteoporotic  appearance of the vertebrae. Regarding alignment, the lumbar spine  alignment appears grossly preserved. There is diffuse disc space  narrowing throughout the lumbar spine. No definite lesions are noted  within the vertebra. Postsurgical changes of L4-5 laminectomy.  Findings on a level by level basis are as follows:     L1-L2: Disc osteophyte complex and facet arthropathy resulting in mild  bilateral neural foraminal stenosis. No significant spinal canal  stenosis..     L2-L3:  Disc osteophyte complex and bilateral facet arthropathy  resulting in moderate right and mild left neural foraminal stenosis.  No significant spinal canal stenosis..     L3-L4: Disc bulge and bilateral facet arthropathy resulting in severe  bilateral neural foraminal stenosis. No significant spinal canal  stenosis..     L4-L5: Disc osteophyte complex, resulting in bilateral moderate neural  foraminal stenosis.  Moderate spinal canal stenosis     L5-S1: Disc osteophyte complex and bilateral facet arthropathy  resulting in moderate bilateral neural foraminal stenosis. No  significant spinal canal stenosis..     Minimally reduced right soft tissue paraspinal fluid collection  measuring up to 9.2 cm                                                                      Impression:  1. Large disc osteophyte complex at the L4-5 resulting in moderate  spinal canal stenosis, slightly worse compared to 7/2/2018. Multilevel  neural foraminal stenosis are not significantly changed.  2. Reduced soft tissue paraspinal fluid collection     I have personally reviewed the examination and initial interpretation  and I agree with the findings.     VALENTÍN VICTOR,  MD        Labs:   Lab Results   Component Value Date    WBC 8.9 07/11/2019     Lab Results   Component Value Date    RBC 4.73 07/11/2019     Lab Results   Component Value Date    HGB 10.5 07/11/2019     Lab Results   Component Value Date    HCT 37.9 07/11/2019     Lab Results   Component Value Date    MCV 80 07/11/2019     Lab Results   Component Value Date    MCH 22.2 07/11/2019     Lab Results   Component Value Date    MCHC 27.7 07/11/2019     Lab Results   Component Value Date    RDW 19.9 07/11/2019     Lab Results   Component Value Date     07/11/2019     Last Comprehensive Metabolic Panel:  Sodium   Date Value Ref Range Status   07/11/2019 135 133 - 144 mmol/L Final     Potassium   Date Value Ref Range Status   07/11/2019 3.8 3.4 - 5.3 mmol/L Final     Chloride   Date Value Ref Range Status   07/11/2019 99 94 - 109 mmol/L Final     Carbon Dioxide   Date Value Ref Range Status   07/11/2019 29 20 - 32 mmol/L Final     Anion Gap   Date Value Ref Range Status   07/11/2019 7 3 - 14 mmol/L Final     Glucose   Date Value Ref Range Status   07/11/2019 156 (H) 70 - 99 mg/dL Final     Urea Nitrogen   Date Value Ref Range Status   07/11/2019 22 7 - 30 mg/dL Final     Creatinine   Date Value Ref Range Status   07/11/2019 0.92 0.66 - 1.25 mg/dL Final     GFR Estimate   Date Value Ref Range Status   07/11/2019 84 >60 mL/min/[1.73_m2] Final     Comment:     Non  GFR Calc  Starting 12/18/2018, serum creatinine based estimated GFR (eGFR) will be   calculated using the Chronic Kidney Disease Epidemiology Collaboration   (CKD-EPI) equation.       Calcium   Date Value Ref Range Status   07/11/2019 9.2 8.5 - 10.1 mg/dL Final     Bilirubin Total   Date Value Ref Range Status   07/11/2019 0.4 0.2 - 1.3 mg/dL Final     Alkaline Phosphatase   Date Value Ref Range Status   07/11/2019 202 (H) 40 - 150 U/L Final     ALT   Date Value Ref Range Status   07/11/2019 14 0 - 70 U/L Final     AST   Date Value Ref Range  Status   07/11/2019 17 0 - 45 U/L Final                 Past Medical History:  Past Medical History:   Diagnosis Date     Aortic stenosis      Chronic pain      DM (diabetes mellitus), type 2 (H)     on insulin     Hyperlipidemia      JUAN on CPAP      Pneumonia      Polymyositis (H)      Pulmonary fibrosis, unspecified (H)      Seasonal allergies        Past Surgical History:  Past Surgical History:   Procedure Laterality Date     ARTHROSCOPY KNEE  1970     COLONOSCOPY       DECOMPRESSION, FUSION LUMBAR POSTERIOR TWO LEVELS, COMBINED  1997     FUSION LUMBAR ANTERIOR THREE+ LEVELS N/A 4/22/2019    Procedure: Lumbar 1 Or Lumbar 2-5 Anterior Lumbar Interbody Fusion with BMP Stage 1 Of Two Procedure:;  Surgeon: Carlos Enrique Dial MD;  Location: [facility] OR     HERNIA REPAIR  2006     lumbar spine hardware removal  1999     OPTICAL TRACKING SYSTEM FUSION POSTERIOR SPINE THORACIC THREE+ LEVELS N/A 4/25/2019    Procedure: O-Arm/Stealth Assisted Thoracic 10-Pelvis Instrumented Fusion T11-2, L1-2, L2-3 Transforminal Lumbar Interbody Fusion (TLIF) And Smith Borges Osteotomies,and L3-4 SPO as well, Use Of BMP, Debridement Of Seroma;  Surgeon: Carlos Enrique Dial MD;  Location: [facility] OR     PICC INSERTION Right 05/06/2019    4Fr - 37cm, Basilic vein, low SVC     REMOVAL PROSTHETIC MATERIAL/MESH, ABD WALL NECRO TISS INFEXN  2012     ROTATOR CUFF REPAIR RT/LT  2003       Medications:  Current Outpatient Medications   Medication Sig Dispense Refill     acetaminophen (TYLENOL) 325 MG tablet Take 2 tablets (650 mg) by mouth every 4 hours as needed for mild pain or fever       albuterol (2.5 MG/3ML) 0.083% neb solution Take 1 vial by nebulization every 6 hours as needed for shortness of breath / dyspnea or wheezing       albuterol (PROAIR HFA/PROVENTIL HFA/VENTOLIN HFA) 108 (90 BASE) MCG/ACT Inhaler Inhale 2 puffs into the lungs every 6 hours as needed for shortness of breath / dyspnea or wheezing       calcium carbonate  (OS-DMITRIY 500 MG [place]) 500 MG tablet Take 1 tablet (500 mg) by mouth 2 times daily (Patient taking differently: Take 1 tablet by mouth daily ) 180 tablet 3     cetirizine (ZYRTEC) 10 MG tablet Take 10 mg by mouth daily       Cholecalciferol (VITAMIN D) 2000 units tablet Take 2,000 Units by mouth daily 100 tablet 3     diphenhydrAMINE (BENADRYL) 50 MG capsule Take 50 mg by mouth every 6 hours as needed for itching or allergies       folic acid (FOLVITE) 1 MG tablet Take 1 mg by mouth daily        furosemide (LASIX) 20 MG tablet Take 3 tablets (60 mg) by mouth daily 270 tablet 3     gabapentin (NEURONTIN) 600 MG tablet Take 2 tablets (1,200 mg) by mouth 3 times daily 180 tablet 0     gabapentin (NEURONTIN) 600 MG tablet Take 600 mg by mouth 3 times daily 0700, 1400, 2100       gemfibrozil (LOPID) 600 MG tablet Take 600 mg by mouth 2 times daily        insulin NPH (HUMULIN N/NOVOLIN N VIAL) 100 UNIT/ML vial Inject 2-6 Units Subcutaneous 2 times daily Will usually give if blood glucose >140. Does not have specific sliding scale.       LANsoprazole (PREVACID) 30 MG DR capsule Take 30 mg by mouth every morning (before breakfast)       levothyroxine (SYNTHROID/LEVOTHROID) 50 MCG tablet Take 50 mcg by mouth daily       methotrexate 2.5 MG tablet Take 25 mg by mouth once a week Tuesday       multivitamin w/minerals (THERA-VIT-M) tablet Take 1 tablet by mouth daily       omega 3 1000 MG CAPS Take 2 capsules by mouth daily        oxyCODONE-acetaminophen (PERCOCET) 5-325 MG tablet Take 1 tablet by mouth 2 times daily as needed for severe pain Take percocet 5/325 TID at 7am, 2pm and 9pm (Patient taking differently: Take 1 tablet by mouth 3 times daily 7am, 2pm and 9pm) 20 tablet 0     oxyCODONE-acetaminophen (PERCOCET) 7.5-325 MG per tablet Take 1 tablet by mouth every 6 hours as needed for severe pain (max 3 tablets daily) 90 tablet 0     traMADol (ULTRAM) 50 MG tablet Take 0.5 tablets (25 mg) by mouth 3 times daily (Patient  taking differently: Take 25 mg by mouth 3 times daily 0700, 1400, 2100) 40 tablet 0     predniSONE (DELTASONE) 10 MG tablet Take 10 mg by mouth daily       predniSONE (DELTASONE) 5 MG tablet Take 5 mg by mouth daily       sulfamethoxazole-trimethoprim (BACTRIM DS/SEPTRA DS) 800-160 MG tablet TK 1 T PO 3 TIMES A WK  0       Allergies:   No Known Allergies    Social History:  Home situation: Lives in [place]  Occupation/Schooling: Retired, [employer]   Tobacco use: denies  Drug use:denies  Alcohol use: 2-3/month  History of chemical dependency treatment: denies  Mental health admissions: denies    Family history:  No family history on file.    Review of Systems:    POSTIVE IN BOLD  GENERAL: fever/chills, fatigue, general unwell feeling, weight gain/loss.  HEAD/EYES:  headache, dizziness, or vision changes.    EARS/NOSE/THROAT:  Nosebleeds, hearing loss, sinus infection, earache, tinnitus.  IMMUNE:  Allergies, cancer, immune deficiency, or infections.  SKIN:  Urticaria, rash, hives  HEME/Lymphatic:   anemia, easy bruising, easy bleeding.  RESPIRATORY:  cough, wheezing, or shortness of breath  CARDIOVASCULAR/Circulation:  Extremity edema, syncope, hypertension, tachycardia, or angina.  GASTROINTESTINAL:  abdominal pain, nausea/emesis, diarrhea, constipation,  hematochezia, or melena.  ENDOCRINE:  Diabetes, steroid use,  thyroid disease or osteoporosis.  MUSCULOSKELETAL: neck pain, back pain, arthralgia, arthritis, or gout.  GENITOURINARY:  frequency, urgency, dysuria, difficulty voiding, hematuria or incontinence.  NEUROLOGIC:  weakness, numbness, paresthesias, seizure, tremor, stroke or memory loss.  PSYCHIATRIC:  depression, anxiety, stress, suicidal thoughts or mood swings.     Physical Exam:  Vitals:    07/16/19 1449   BP: 93/52   Pulse: 95   SpO2: 98%   Weight: 71.7 kg (158 lb 1.6 oz)     Exam:  Constitutional: Well developed, well nourished, appears stated age.  HEENT: Head atraumatic,  normocephalic. Eyes without conjunctival injection or jaundice. Oropharynx clear. Neck supple. No obvious neck masses.  Cardiovascular: Regular rate/rhythm.  Respiratory: Lungs clear to auscultation bilaterally.   Gastrointestinal: Normoactive bowel sounds. Abdomen soft.  Skin: No rash, lesions, or petechiae of exposed skin.   Extremities: Peripheral pulses intact. No clubbing, cyanosis. Mild bilateral edema.  Psychiatric/mental status: Alert, without lethargy or stupor. Speech fluent. Appropriate affect. Mood normal. Able to follow commands without difficulty.     Musculoskeletal exam:  Gait/Station/Posture: Antalgic gait, uses wheeled walker for longer distances. Reduced stride and arms wing.   Cervical spine:  Range of motion is mildly reduced in all planes  Myofascial tenderness: bilateral lower cervical paraspinals and shoulder girdle muscles  No focal spinous process tenderness.   Spurling's negative bilaterally.      Lumbar spine: Large midline incision is well healed  Range of motion mildly reduced in flexion, severely reduced in extension and rotation.   Rotation/ext to right: painful    Rotation/ext to left: painful   Myofascial tenderness:  Bilateral paraspinals  Focal tenderness: No SI joint, gluteal, piriformis, GT, or IT tenderness  SLR: negative  Jewel's maneuver: negative    Shoulder exam: Severe reduction in ROM in abduction and flexion bilaterally.       Neurologic exam:  CN:  Cranial nerves 2-12 are grossly intact  Motor Strength:  5/5 symmetric UE and LE strength, within his functional ROM which is decreased specifically at the shoulders bilaterally      Reflexes:     Biceps C5:     R:  1/4 L: 1/4   Brachioradialis  C6: R:  1/4 L: 1/4   Triceps C7:  R:  1/4 L: 1/4   Patella L4:  R:  1/4 L: 1/4   Achilles S1:  R:  0/4 L: 0/4    Other reflexes:  Toes downgoing, No ankle clonus    Sensory:  (upper and lower extremities):   Light touch and monofilament testing decreased to mid foot  bilaterally        Opioid Risk Tool (ORT) score is calculated as follows:   Family History of Substance Abuse:    Alcohol = 0 pt (no)   Illegal Drugs = 0 pt (no)   Prescription Drugs = 0 pt (no)     Personal History of Substance Abuse:   Alcohol = 0 pt (no)   Illegal Drugs = 0 pt (no)   Prescription Drugs = 0 pt (no)   Age: 0 pt (age < 16; age > 45)     History of Pre-adolescent Sexual Abuse: 0 pt (no)     Psychological Disease: 0         ORT Score = 0        0-3: Low risk for opioid abuse       4-7: Moderate risk for opioid abuse       >/= 8: High risk for opioid abuse      Assessment:  Mr. Catherine Jane is a 69 year old with past medical history including: DM II, Severe aortic stenosis, Hypertension, hypothyroidism, Hyperlipidemia, pulmonary fibrosis, PE, Lumbar spinal stenosis s/p decompression, who presents for evaluation and treatment of the following chronic pain conditions:    1. Chronic myofascial pain: He describes intermittent pains throughout his entire body at the joints and muscles which is likely due to a combination of Dermatomyositis and deconditioning. He was followed by Dr. Call at [facility] in [place], unclear based on our interaction today whether he continues to follow with Dr. Call regularly.    2. Lumbar spine surgery: Underwent an extensive thoraco-sacral fusion surgery by Dr. Dial and Dr. Alegria. Continues to have some activity restrictions as a result. Back pain/cladication symptoms not significant at this time per patient.    3. Peripheral neuropathy: Patient describes a long standing history of painful paresthesias in his feet which are more bothersome at night. On exam sensation is decreased to the mid foot bilaterally. Symptoms are consistent with a diabetic peripheral neuropathy.    Patient was not a very clear historian at the visit which complicates his evaluation and treatment.        Plan:  The following recommendations were given to the patient. Diagnosis, treatment  options, risks, benefits, and alternatives were discussed, and all questions were answered. The patient expressed understanding of the plan for management.     I am recommending a multidisciplinary treatment plan to help this patient better manage his pain.  This includes:     1. Physical Therapy: Currently participating in home care PT, will have him work with our chronic pain therapist once he has completed this.  2. Clinical Health Pain Psychologist: Pain phd referral placed. If memory appears to be a factor in retaining the principles or participation, then the pain phd provider can decline further visits.   1. Therapy can help reduce physical and psychosocial triggers or reinforcers of pain by adapting thoughts, feelings and behaviors to reduce symptoms and increase quality of life.  Evidence indicates that the practice of relaxation, meditation, and mindfulness techniques can significantly affect pain levels and overall well being.  3. Self Care Recommendations: Gentle progressive exercise that does not increase pain - gradually increase daily walking program.  Take mini breaks - 5 minutes of mindfullness a couple times a day.   4. Diagnostic Studies: none at this time  5. Medication Management:   1. He was previously on a regimen of tramadol 50mg and percocet 5/325 tid prn. He was taking both of these medications together at the same time. In interest of reducing polypharmacy, I recommended taking just percocet at a slightly higher dose and hew as in agreement with this.  2. Prescribed percocet 7.5/325 q6h prn, max 3 tablets/day, 90 tablets/month.  3. Increase gabapentin from 600mg TID to 1200mg TID in 600mg increments every 3-4 days.  4. Urine toxicology screen today - completed 7/16/19  5. Opioid agreement signed - Completed 7/16/19  6. Further procedures recommended: none at this time  7. Referrals: Could consider acupuncture in the future  8. Follow up: 1 month in clinic      I spent 60 minutes of time  face to face with the patient.  Greater than 50% of this time was spent in patient counseling and/or coordination of care regarding principles of multidisciplinary care, medication management, and treatment options as discussed above.         DO [facility]

## 2019-07-16 NOTE — PATIENT INSTRUCTIONS
1. Start increasing your gabapentin as detailed below:    Start gabapentin as follows:  1 tab= 600mg      AM   PM   Bedtime  600   600   1200mg (2 tab).  After 3-4 days, increase as tolerated to the next line  1200mg (2 tab)  600   1200 (2 tab).  After 3-4 days, increase as tolerated to the next line  1200mg (2 tab)  1200mg (2 tab)  1200 (2 tab).  After 3-4 days, increase as tolerated to the next line    Call with any problems or when you are at this dose.     Avoid activities that require mental alertness or coordination until you realize the effects of gabapentin as it can cause dizziness, sleepiness, fatigue and incoordination.    If you experience any side effects call your primary care provider or the pain clinic to discuss changes to your medications.    Do not suddenly discontinue this medication as you may have withdrawal symptoms (seizures), speak with your primary physician or pain provider prior to discontinuing.    Avoid use of alcohol with this medication as it can cause worsening sedation.    2. We are going to change your tramadol and oxycodone regimen to just oxycodone 7.5/325mg tablets.    3. I ordered a pain psychology consult, my clinic will call you to schedule.    4. Please sign the opioid use agreement and do the urine drug screen before you leave today.    5. Follow up in clinic in 1 month.  ----------------------------------------------------------------  Clinic Number:  [phone]     Call with any questions about your care and for scheduling assistance.     Calls are returned Monday through Friday between 8 AM and 4:30 PM. We usually get back to you within 2 business days depending on the issue/request.    If we are prescribing your medications:    For opioid medication refills, call the clinic or send a [facility] message 7 days in advance.  Please include:    Name of requested medication    Name of the pharmacy.    For non-opioid medications, call your pharmacy directly to request a  refill. Please allow 3-4 days to be processed.     Per [place] State Law:    All controlled substance prescriptions must be filled within 30 days of being written.      For those controlled substances allowing refills, pickup must occur within 30 days of last fill.      We believe regular attendance is key to your success in our program!      Any time you are unable to keep your appointment we ask that you call us at least 24 hours in advance to cancel.This will allow us to offer the appointment time to another patient.     Multiple missed appointments may lead to dismissal from the clinic.

## 2019-07-17 NOTE — NURSING NOTE
Chief Complaint   Patient presents with     New Patient     new - establish care     Vitals were taken and medications reconciled.     Mati Carey CMA  3:57 PM
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
## 2019-07-17 NOTE — PATIENT INSTRUCTIONS
You were seen today in the Cardiovascular Clinic at the [facility].     Cardiology Providers you saw during your visit: Dr. Solorio     Diagnosis:   Encounter Diagnosis   Name Primary?     Heart failure with preserved ejection fraction, NYHA class I (H)         Results: Discussed with you today BMP/Echo/CTA      Orders:   Orders Placed This Encounter   Procedures     Basic metabolic panel     Follow-Up with Cardiologist       Current Medication List  Current Outpatient Medications   Medication Sig Dispense Refill     acetaminophen (TYLENOL) 325 MG tablet Take 2 tablets (650 mg) by mouth every 4 hours as needed for mild pain or fever       albuterol (2.5 MG/3ML) 0.083% neb solution Take 1 vial by nebulization every 6 hours as needed for shortness of breath / dyspnea or wheezing       albuterol (PROAIR HFA/PROVENTIL HFA/VENTOLIN HFA) 108 (90 BASE) MCG/ACT Inhaler Inhale 2 puffs into the lungs every 6 hours as needed for shortness of breath / dyspnea or wheezing       calcium carbonate (OS-DMITRIY 500 MG [place]. [place]) 500 MG tablet Take 1 tablet (500 mg) by mouth 2 times daily (Patient taking differently: Take 1 tablet by mouth daily ) 180 tablet 3     cetirizine (ZYRTEC) 10 MG tablet Take 10 mg by mouth daily       Cholecalciferol (VITAMIN D) 2000 units tablet Take 2,000 Units by mouth daily 100 tablet 3     diphenhydrAMINE (BENADRYL) 50 MG capsule Take 50 mg by mouth every 6 hours as needed for itching or allergies       folic acid (FOLVITE) 1 MG tablet Take 1 mg by mouth daily        furosemide (LASIX) 20 MG tablet Take 3 tablets (60 mg) by mouth daily 270 tablet 3     gabapentin (NEURONTIN) 600 MG tablet Take 2 tablets (1,200 mg) by mouth 3 times daily 180 tablet 0     gabapentin (NEURONTIN) 600 MG tablet Take 600 mg by mouth 3 times daily 0700, 1400, 2100       gemfibrozil (LOPID) 600 MG tablet Take 600 mg by mouth 2 times daily        insulin NPH (HUMULIN N/NOVOLIN N VIAL) 100 UNIT/ML vial Inject 2-6  "Units Subcutaneous 2 times daily Will usually give if blood glucose >140. Does not have specific sliding scale.       LANsoprazole (PREVACID) 30 MG DR capsule Take 30 mg by mouth every morning (before breakfast)       levothyroxine (SYNTHROID/LEVOTHROID) 50 MCG tablet Take 50 mcg by mouth daily       methotrexate 2.5 MG tablet Take 25 mg by mouth once a week Tuesday       multivitamin w/minerals (THERA-VIT-M) tablet Take 1 tablet by mouth daily       omega 3 1000 MG CAPS Take 2 capsules by mouth daily        oxyCODONE-acetaminophen (PERCOCET) 5-325 MG tablet Take 1 tablet by mouth 2 times daily as needed for severe pain Take percocet 5/325 TID at 7am, 2pm and 9pm (Patient taking differently: Take 1 tablet by mouth 3 times daily 7am, 2pm and 9pm) 20 tablet 0     oxyCODONE-acetaminophen (PERCOCET) 7.5-325 MG per tablet Take 1 tablet by mouth every 6 hours as needed for severe pain (max 3 tablets daily) 90 tablet 0     predniSONE (DELTASONE) 10 MG tablet Take 10 mg by mouth daily       predniSONE (DELTASONE) 5 MG tablet Take 5 mg by mouth daily       sulfamethoxazole-trimethoprim (BACTRIM DS/SEPTRA DS) 800-160 MG tablet TK 1 T PO 3 TIMES A WK  0     traMADol (ULTRAM) 50 MG tablet Take 0.5 tablets (25 mg) by mouth 3 times daily (Patient not taking: Reported on 2019) 40 tablet 0         Medications Discontinued:  There are no discontinued medications.      Recommendations:   1. No change in lasix dose  2. Avoid drinking too much water and avoid salt  3. Consider restricting water to 1 glass with meals and less than 2000 mg of salt    Follow-up: 2 weeks with lab work, BMP and EKG         Please feel free to call me with any questions or concerns.       Kaz Grider LPN      Questions: [phone].   First press #1 for the [employer]IT and then press #3 for \"Medical Questions\" to reach us Cardiology Nurses.     [phone].   First press #1 for [employer] and then press #1      On Call " Cardiologist for after hours or on weekends: [phone]   option #4 and ask to speak to the on-call Cardiologist.          If you need a medication refill please contact your pharmacy.  Please allow 3 business days for your refill to be completed.  ________________________________________________________________________________________________________________________________
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
## 2019-07-18 NOTE — LETTER
"    7/18/2019         RE: Beulah Jane  [address]  [facility] [address]        Dear Colleague,    Thank you for referring your patient, Beulah Jane, to the [facility]. Please see a copy of my visit note below.    PATIENT HISTORY:  Beulah Jane is a 69 year old male who presents to clinic for recheck of L ankle wound.  Pt states it is healed.  Recent ABIs suggested arterial insufficiency.  Pt has an upcoming coronary angiogram.  No pain today, but reports some burning pain in legs with activity at times.  No fevers, chills.  Nonsmoker.  Not working.  Diabetic.  No related family hx.     EXAM:Vitals: /69   Pulse 112   Ht 1.676 m (5' 6\")   Wt 73.3 kg (161 lb 9.6 oz)   BMI 26.08 kg/m     BMI= Body mass index is 26.08 kg/m .    General appearance: Patient is alert and fully cooperative with history & exam.  No sign of distress is noted during the visit.     Dermatologic: L anterior ankle wound appears healed.  L 1st toe with some slight mottling to skin, but toe is warm with normal CFT.  No paronychia or evidence of soft tissue infection is noted.     Vascular: DP & PT pulses are absent b/l.  No significant edema or varicosities noted.  CFT and skin temperature are normal to both lower extremities.     Neurologic: Lower extremity sensation is diminished to light touch b/l.     Musculoskeletal: Patient is ambulatory.  No gross ankle deformity noted.  No foot or ankle joint effusion is noted.    ABIs reviewed:    IMPRESSION:  1. Suspect falsely elevated resting TIFFANIE values due to calcified  arteries given waveform distribution.  2. Distal waveforms are monophasic bilaterally suggestive of at least  moderate arterial insufficiency bilaterally.     MERCEDES CORRIGAN MD     ASSESSMENT:   L ankle wound, healed  PAD  DM II with neuropathy     PLAN:  Reviewed patient's chart in epic.  Discussed condition and treatment options including pros and cons.    Wound is " healed.    Discussed PAD.  L 1st toe changes chronic per pt, does not appear acutely necrotic at this time; advised monitoring closely for any further changes such as darkening of the skin, increased pain.  F/u asap if any present.  Pt was referred to Vascular prior and I still think it would be worthwhile for him to f/u with them given his PAD and suggestion of claudication pain.      Discussed diabetic foot care and prevention.   Discussed risk of ulceration, infection, amputation related to neuropathy.  I recommended a foot check at least once a year.  No barefoot walking.  Check feet daily.  I encouraged proper diabetes management including diet, blood sugar control.    DM shoes and inserts ordered prior.    F/u prn with me.       Joseluis Borges DPM, FACFAS            Again, thank you for allowing me to participate in the care of your patient.        Sincerely,        Joseluis Borges DPM

## 2019-07-18 NOTE — PATIENT INSTRUCTIONS
"Vascular Surgery:  [phone]      Thank you for choosing [facility]!    Follow up as needed    DR. CARDONA'S CLINIC LOCATIONS     MONDAY  [place] TUESDAY & FRIDAY AM  [place]   [facility]   [address]   [place] [address] [place] Christensen [address]   [phone]  [phone]       WEDNESDAY  [place] SCHEDULE SURGERY: [phone]   [address] [place] [address] APPOINTMENTS: [phone]   [place] [address] BILLING QUESTIONS: [phone]   [phone]         DIABETES AND YOUR FEET  Diabetes can result in several problems in the feet including ulcers (open sores) and amputations. Two of the most important reasons why people develop foot problems when they have diabetes is : 1. Neuropathy (loss of feeling)  2. Vascular disease (loss or decrease of blood flow).    Neuropathy is a term used to describe a loss of nerve function.  Patients with diabetes are at risk of developing neuropathy if their sugars continue to run high and are above the normal value. One theory for neuropathy is that the \"extra\" sugar in the body enters the nerves and is broken down. These by-products build up in the nerve causing it to swell and impairing nerve function. Often times, this can be prevented by controlling your sugars, dieting and exercise.    When a person develops neuropathy, they usually begin to feel numbness or tingling in their feet and sometime in their legs.  Other symptoms may include painful burning or hot feet, tingling or feeling like insects or ants are crawling on your feet or legs.  If the diabetes is sever and the sugars run high for long periods of time, neuropathy can also occur in the hands.    Vascular disease  is a term used to describe a loss or decrease in circulation (blood flow). There is a problem in getting blood and oxygen to areas that need it. Similar to neuropathy, sugars can build up in the " walls of the arteries (blood vessels) and cause them to become swollen, thickened and hardened. This decreases the amount of blood that can go to an area that needs it. Though this is common in the legs of diabetic patients, it can also affect other arteries (blood vessels) in the body such as in the heart and eyes.    In the legs, vascular disease usually results in cramping. Patients who develop leg cramps after walking the same distance every time (i.e. One block, half a mile, ect.) need to let their doctors know so that their circulation may be checked. Cramps causing severe pain in the feet and/or legs while sleeping and the cramps go away when you stand or hang your legs off the side of the bed, may also be a sign of poor blood circulation.  Occasional cramping in cold weather or on rare occasions with activity may not be due to poor circulation, but you should inform your doctor.    PREVENTION OF THESE DISEASES  The key to prevention is good blood sugar control. Poor blood sugar control is a big reason many of these problems start. Physical activity (exercise) is a very good way to help decrease your blood sugars. Exercise can lower your blood sugar, blood pressure, and cholesterol. It also reduces your risk for heart disease and stroke, relieves stress, and strengthens your heart, muscles and bones.  In addition, regular activity helps insulin work better, improves your blood circulation, and keeps your joints flexible. If you're trying to lose weight, a combination of exercise and wise food choices can help you reach your target weight and maintain it.      PAIN MANAGEMENT  1.Blood Sugar Control - Most important  2. Medications such as:  Amytriptylline, duloxetine, gabapentin, lyrica, tramadol  3. Nutritional therapy:  Vitamin B6 (100mg daily), Vitamin B12 (75mcg daily), Vitamin D 2000 IU daily), Alpha-Lipoic Acid (600-1800mg daily), Acetyl-L-Carnitine (500-1000mg TID, L-methyl folate (1500mcg daily)    **  Metformin can block Vitamin B6 and B12 so it is important to supplement**    FOOT CARE RECOMMENDATIONS   1. Wash your feet with lukewarm water and a mild soap and then dry them thoroughly, especially between the toes.     2. Examine your feet daily looking for cuts, corns, blisters, cracks, ect, especially after wearing new shoes. Make sure to look between your toes. If you cannot see the bottom of your feet, set a mirror on the floor and hold your foot over it, or ask a spouse, friend or family member to examine your feet for you. Contact your doctor immediately if new problems are noted or if sores are not healing.     3. Immediately apply moisturizer to the tops and bottoms of your feet, avoiding areas between the toes. Hand lotion (Intesive Care, Sangeeta, Eucerin, Neutrogena, Curel, ect) is sufficient unless your doctor prescribes a medicated lotion. Apply sunscreen to your feet when going swimming outside.     4. Use clean comfortable shoes, wear white socks (if you have any bleeding or drainage, you will see it on white socks). Socks should not have thick seams or cut off the circulation around the leg. Break in new shoes slowly and rotate with older shoes until broken in. Check the inside of your shoes with your hand to look for areas of irritation or objects that may have fallen into your shoes.       5. Keep slippers by the side of your bed for use during the night.     6.  Shoes should be fitted by a professional and should not cause areas of irritation.  Check your feet regularly when wearing a new pair of shoes and replace them as needed.     7.  Talk to your doctor about proper exercise. Exercise and stretching stimulate blood flow to your feet and maintain proper glucose levels.     8.  Monitor your blood glucose level as instructed by your doctor. Notify your doctor immediately if your blood sugar is abnormally high or low.    9. Cut your nails straight across, but then gently round any sharp edges with  a cardboard nail file. If you have neuropathy, peripheral vascular disease or cannot see that well to trim your own toenails contact [facility] ([phone]) or [facility] ([phone]).      THINGS TO AVOID DOING   1.  Do not soak your feet if you have an open sore. Use only lukewarm water and always check the temperature with your hand as hot water can easily burn your feet.       2.  Never use a hot water bottle or heating pad on your feet. Also do not apply cold compresses to your feet. With decreased sensation, you could burn or freeze your feet.       3.  Do not apply any of these to your feet:    -  Over the counter medicine for corns or warts    -  Harsh chemicals like boric acid    -  Do not self-treat corns, cuts, blisters or infections. Always consult your doctor.       4.  Do not wear sandals, slippers or walk barefoot, especially on hot sand or concrete or other harsh surfaces.     5.  If you smoke, stop!!!        Wilmerd to follow up with Primary Care provider regarding elevated blood pressure.      BODY WEIGHT AND YOUR FEET  The following information is included in the after visit summary for all patients. Body weight can be a sensitive issue to discuss in clinic, but we think the following information is very important. Although we focus on the feet and ankles, we do support the overall health of our patients.     Many things can cause foot and ankle problems. Foot structure, activity level, foot mechanics and injuries are common causes of pain. One very important issue that often goes unmentioned, is body weight. Extra weight can cause increased stress on muscles, ligaments, bones and tendons. Sometimes just a few extra pounds is all it takes to put one over her/his threshold. Without reducing that stress, it can be difficult to alleviate pain. As Foot & Ankle specialists, our job is addressing the lower extremity problem and possible causes. Regarding extra body weight, we encourage patients to  discuss diet and weight management plans with their primary care doctors. It is this team approach that gives you the best opportunity for pain relief and getting you back on your feet.      [facility] has a Comprehensive Weight Management Program. This program includes counseling, education, non-surgical and surgical approaches to weight loss. If you are interested in learning more either talk to you primary care provider or call [phone].

## 2019-07-19 NOTE — TELEPHONE ENCOUNTER
Routing to Dr López for advise regarding patient's complaint of constipation and HC nurse order for Rx stool softener.      Ledy FOWLER RN,BSN

## 2019-07-19 NOTE — TELEPHONE ENCOUNTER
Reason for Call:  Other     Detailed comments: Valery, Home care, calling to find out if Dr. López would like to prescribe a stool softer.  Beulah is taking two narcotics and is experiencing some constipation     Phone Number Patient can be reached at: [phone]    Best Time: any    Can we leave a detailed message on this number? YES    Call taken on 7/19/2019 at 11:43 AM by Mahsa Chambers

## 2019-07-19 NOTE — PROGRESS NOTES
"PATIENT HISTORY:  Beulah Jane is a 69 year old male who presents to clinic for recheck of L ankle wound.  Pt states it is healed.  Recent ABIs suggested arterial insufficiency.  Pt has an upcoming coronary angiogram.  No pain today, but reports some burning pain in legs with activity at times.  No fevers, chills.  Nonsmoker.  Not working.  Diabetic.  No related family hx.     EXAM:Vitals: /69   Pulse 112   Ht 1.676 m (5' 6\")   Wt 73.3 kg (161 lb 9.6 oz)   BMI 26.08 kg/m    BMI= Body mass index is 26.08 kg/m .    General appearance: Patient is alert and fully cooperative with history & exam.  No sign of distress is noted during the visit.     Dermatologic: L anterior ankle wound appears healed.  L 1st toe with some slight mottling to skin, but toe is warm with normal CFT.  No paronychia or evidence of soft tissue infection is noted.     Vascular: DP & PT pulses are absent b/l.  No significant edema or varicosities noted.  CFT and skin temperature are normal to both lower extremities.     Neurologic: Lower extremity sensation is diminished to light touch b/l.     Musculoskeletal: Patient is ambulatory.  No gross ankle deformity noted.  No foot or ankle joint effusion is noted.    ABIs reviewed:    IMPRESSION:  1. Suspect falsely elevated resting TIFFANIE values due to calcified  arteries given waveform distribution.  2. Distal waveforms are monophasic bilaterally suggestive of at least  moderate arterial insufficiency bilaterally.     MERCEDES CORRIGAN MD     ASSESSMENT:   L ankle wound, healed  PAD  DM II with neuropathy     PLAN:  Reviewed patient's chart in epic.  Discussed condition and treatment options including pros and cons.    Wound is healed.    Discussed PAD.  L 1st toe changes chronic per pt, does not appear acutely necrotic at this time; advised monitoring closely for any further changes such as darkening of the skin, increased pain.  F/u asap if any present.  Pt was referred to Vascular prior " and I still think it would be worthwhile for him to f/u with them given his PAD and suggestion of claudication pain.      Discussed diabetic foot care and prevention.   Discussed risk of ulceration, infection, amputation related to neuropathy.  I recommended a foot check at least once a year.  No barefoot walking.  Check feet daily.  I encouraged proper diabetes management including diet, blood sugar control.    DM shoes and inserts ordered prior.    F/u prn with me.       Joseluis Borges, DONOVAN, FACFAS

## 2019-07-22 NOTE — TELEPHONE ENCOUNTER
LM on son's number per comments.  I LM asking that he call our office to schedule consult. Berna Chester,

## 2019-07-24 NOTE — PROGRESS NOTES
No return call.  [facility] is receiving [facility] .  Not opened to clinic care coordination     Yesica Manning RN, Care Coordinator   [facility] -Care Coordination  [facility] and [facility]   [phone]

## 2019-07-26 NOTE — TELEPHONE ENCOUNTER
Notified by TAVR Nurse Coordinator that home health care nurse saw patient today and contacted Cardiology re: concerning symptoms patient is having. Symptoms as described by home health care nurse are characteristic of stable angina (symptoms with exertion which resolve after ~5 minutes of rest). Patient is being evaluated for TAVR, was seen by Cardiology earlier this month, and has cors/RHC/LHC scheduled for 8/8/19. TTE on 7/5/19 showed EF of 50-55%, moderate aortic regurg, severe aortic stenosis (PETE 0.87, mean gradient 34), and severe hypokinesis of apex and apical segments of lateral/inferolateral walls. Attempted to contact pt and home health care nurse - no answer. Discussed w/ Dr. Garcia; per MD, since symptoms are characteristic of stable angina, pt does not need to present to ED right now however procedure scheduled for 8/8/19 should be rescheduled sooner. Procedure rescheduled for 7/30/19. All information was discussed with pt's son, David, who is in agreement w/ earlier appt. David provided the following phone number to reach the patient: [phone] (although no answer when attempted to call).

## 2019-07-26 NOTE — TELEPHONE ENCOUNTER
Left message on home number for patient's son to call back to schedule consult appointment with Vascular Surgery.

## 2019-07-26 NOTE — PROGRESS NOTES
Message received from  Home care Nurse regarding symptoms that Beluah is having.  Phone call will be made to Beulah to assess the symptoms, and the timing of angiogram scheduled on 8/8/19:  Should he come into ER, or reschedule for early next week?  Will continue to follow up.

## 2019-07-29 PROBLEM — M54.50 LUMBAR PAIN: Status: RESOLVED | Noted: 2019-01-01 | Resolved: 2019-01-01

## 2019-07-29 PROBLEM — M54.6 ACUTE RIGHT-SIDED THORACIC BACK PAIN: Status: RESOLVED | Noted: 2019-01-01 | Resolved: 2019-01-01

## 2019-07-29 PROBLEM — T79.2XXA SEROMA DUE TO TRAUMA (H): Status: RESOLVED | Noted: 2017-12-21 | Resolved: 2019-01-01

## 2019-07-29 PROBLEM — R53.81 MALAISE: Status: RESOLVED | Noted: 2019-01-01 | Resolved: 2019-01-01

## 2019-07-29 PROBLEM — J96.01 ACUTE HYPOXEMIC RESPIRATORY FAILURE (H): Status: RESOLVED | Noted: 2017-11-28 | Resolved: 2019-01-01

## 2019-07-29 PROBLEM — Z80.0 FAMILY HISTORY OF COLON CANCER: Status: RESOLVED | Noted: 2018-05-03 | Resolved: 2019-01-01

## 2019-07-29 PROBLEM — K59.01 SLOW TRANSIT CONSTIPATION: Status: RESOLVED | Noted: 2019-01-01 | Resolved: 2019-01-01

## 2019-07-29 PROBLEM — R53.81 PHYSICAL DECONDITIONING: Status: RESOLVED | Noted: 2019-01-01 | Resolved: 2019-01-01

## 2019-07-29 PROBLEM — R50.9 ELEVATED TEMPERATURE: Status: RESOLVED | Noted: 2019-01-01 | Resolved: 2019-01-01

## 2019-07-29 PROBLEM — M96.843 POSTPROCEDURAL SEROMA OF A MUSCULOSKELETAL STRUCTURE FOLLOWING OTHER PROCEDURE: Status: RESOLVED | Noted: 2017-12-15 | Resolved: 2019-01-01

## 2019-07-29 PROBLEM — L20.89 OTHER ATOPIC DERMATITIS: Status: RESOLVED | Noted: 2019-01-01 | Resolved: 2019-01-01

## 2019-07-29 PROBLEM — R06.02 SOB (SHORTNESS OF BREATH): Status: RESOLVED | Noted: 2017-12-05 | Resolved: 2019-01-01

## 2019-07-29 NOTE — PROGRESS NOTES
Beulah was called and I was able to speak with him.  He had received the messages about his angiogram being rescheduled to Tuesday, July 30.  Prep information was reviewed with Beulah.  Medication instructions were reviewed with Beulah.

## 2019-07-30 PROBLEM — Z98.890 STATUS POST CORONARY ANGIOGRAM: Status: ACTIVE | Noted: 2019-01-01

## 2019-07-30 NOTE — Clinical Note
Single balloon inflation. The first balloon was inserted into the left anterior descending and middle left anterior descending.Max pressure = 12 luis e. Total duration = 10 seconds.

## 2019-07-30 NOTE — PROGRESS NOTES
Pt to unit 2a for coronary angiogram, right heart cath, and left heart cath. Wife and  at bedside. Groin prep completed with pedal pulses marked.

## 2019-07-30 NOTE — Clinical Note
Stent deployed in the middle left anterior descending. Max pressure = 12 luis e. Total duration = 5 seconds.

## 2019-07-30 NOTE — TELEPHONE ENCOUNTER
Humulin N insulin is not covered by insurance. New Rx for covered medication is pended for approval.    Ba King, EZEQUIEL on 7/30/2019 at 2:33 PM

## 2019-07-30 NOTE — DISCHARGE INSTRUCTIONS
1. Please start 90mg oral Brilinta twice daily; this medication will help keep your stent open.  You must not forget this medication.  2. Please start 81mg oral aspirin once daily; this medication will help keep your stent open.  You must not forget this medication.  3. Please start 12.5mg metoprolol, twice daily  4. Please start 40mg oral atorvastatin, daily  5. Please be seen in cardiology clinic in approximately 1 week for stent follow up.      Going Home after Coronary Angioplasty or Stent Placement        Name: Beulah Jane  Medical Record Number:  [id number]  Today's Date: July 30, 2019        For 24 hours:         Have an adult stay with you for 24 hours.         Relax and take it easy.         Drink plenty of fluids.         You may eat your normal diet, unless your doctor tells you otherwise.         Do NOT make any important or legal decisions.         Do NOT drive or operate machines at home or at work.         Do NOT drink alcohol.      Do NOT smoke.     Medicines:         If you have begun Plavix (clopidogrel), Effient (prasugrel), or Brilinta (ticagrelor), do not stop taking it until you talk to your heart doctor (cardiologist).         If you are on metformin (Glucophage), do not restart it until you have blood tests (within 2 to 3 days after discharge). When your doctor tells you it is safe, you may restart the metformin.         If you have stopped any other medicines, check with your nurse or provider about when to restart them.    Care of groin site:         Remove the Band-Aid after 24 hours. If there is minor oozing, apply another Band-aid and remove it after 12 hours.          Do NOT take a bath, or use a hot tub or pool for at least 3 days. You may shower.          It is normal to have a small bruise or lump at the site.         Do not scrub the site.         Do not use lotion or powder near the puncture site for 3 days.         For the first 2 days: Do not stoop or squat. When you  cough, sneeze or move your bowels, hold your hand over the puncture site and press gently.         Do not lift more than 10 pounds for at least 3 to 5 days.         For 2 days, do NOT have sex or do any heavy exercise.     If you start bleeding from the site in your groin:  Lie down flat and press firmly on the site.  Call your physician immediately, or, come to the emergency room.      Call 911 right away if you have bleeding that is heavy or does not stop.     Call your doctor if:         You have a large or growing hard lump around the site.         The site is red, swollen, hot or tender.         Blood or fluid is draining from the site.         You have chills or a fever greater than 101 F (38 C).         Your leg or arm turns bluish, feels numb or cool.         You have hives, a rash or unusual itching.       [facility] [place]:   [phone] (7 days a week)      Cardiology Fellow on call (24 hours per day) at [facility], [place]:   [phone] (ask for Cardiology Fellow on call)

## 2019-07-30 NOTE — PROGRESS NOTES
The procedural details of the coronary angiogram/RHC were discussed in detail with the patient.  Risks and benefits were discussed; discussion included possible allergy to contrast dye in addition to:    Risks:  - Radiation exposure (X-ray)    (100.0% of patients)  - Bleeding (femoral, retroperitoneal)   (up to 7.0% of patients)  - Renal injury/JOSE     (~3.0% of patients)  - Transient/Permenant arrythmia  (~3.0% of patients)  - Dissection (coronaries, great vessels)  (1-3.0% of patients)  - Inefection      (<1.0% of patients)  - Emergent open heart surgery  (<0.1% of patients)  - CVA (ischemic)    (~0.07% of patinets)  - MI       (~0.1% of patinets)  - Death     (<0.1% of patients)    Benefit: TAVR work up    Patient expressed understanding and agrees to move forward with the procedure at this point in time.  All questions were answered.      Nita Barger PA-C

## 2019-07-31 PROBLEM — Z95.5 S/P DRUG ELUTING CORONARY STENT PLACEMENT: Status: ACTIVE | Noted: 2019-01-01

## 2019-07-31 PROBLEM — I50.32: Status: ACTIVE | Noted: 2019-01-01

## 2019-07-31 PROBLEM — I25.118 CORONARY ARTERY DISEASE INVOLVING NATIVE CORONARY ARTERY OF NATIVE HEART WITH OTHER FORM OF ANGINA PECTORIS (H): Status: ACTIVE | Noted: 2019-01-01

## 2019-07-31 PROBLEM — R06.02 SOB (SHORTNESS OF BREATH): Status: ACTIVE | Noted: 2017-12-05

## 2019-07-31 NOTE — DISCHARGE SUMMARY
Pt ambulated without any issues. Pt's groin site clean dry and intact-no bleeding or hematoma noted. Pt was not dizzy this time with walking. Pt spontaneously voided without any issues. Pt able to tolerate regular diet with no issues noted.    Discharge instructions went over with patient. All questions answered at this time. Patient had all belongings with them at time of discharge. PIV taken out before leaving floor. CN notified. Tele notified.

## 2019-07-31 NOTE — PATIENT INSTRUCTIONS
You were seen today in the Cardiovascular Clinic at the [facility].     Cardiology Providers you saw during your visit: Dr. Solorio     Diagnosis:   Encounter Diagnoses   Name Primary?     Congestive heart failure, NYHA class III, chronic, diastolic (H) Yes     Aortic valve stenosis, etiology of cardiac valve disease unspecified      SOB (shortness of breath)      Coronary artery disease involving native coronary artery of native heart with other form of angina pectoris (H)      S/P drug eluting coronary stent placement         Results: Discussed with you today EKG/BMP      Orders:   Orders Placed This Encounter   Procedures     Basic metabolic panel     Follow-Up with Cardiologist       Current Medication List  Current Outpatient Medications   Medication Sig Dispense Refill     acetaminophen (TYLENOL) 325 MG tablet Take 2 tablets (650 mg) by mouth every 4 hours as needed for mild pain or fever       albuterol (2.5 MG/3ML) 0.083% neb solution Take 1 vial by nebulization every 6 hours as needed for shortness of breath / dyspnea or wheezing       albuterol (PROAIR HFA/PROVENTIL HFA/VENTOLIN HFA) 108 (90 BASE) MCG/ACT Inhaler Inhale 2 puffs into the lungs every 6 hours as needed for shortness of breath / dyspnea or wheezing       aspirin (ASA) 81 MG EC tablet Take 1 tablet (81 mg) by mouth daily Start tomorrow morning. 90 tablet 3     atorvastatin (LIPITOR) 40 MG tablet Take 1 tablet (40 mg) by mouth daily 90 tablet 3     calcium carbonate (OS-DMITRIY 500 MG [place]. [place]) 500 MG tablet Take 1 tablet (500 mg) by mouth 2 times daily (Patient taking differently: Take 1 tablet by mouth daily ) 180 tablet 3     cetirizine (ZYRTEC) 10 MG tablet Take 10 mg by mouth daily       Cholecalciferol (VITAMIN D) 2000 units tablet Take 2,000 Units by mouth daily 100 tablet 3     diphenhydrAMINE (BENADRYL) 50 MG capsule Take 50 mg by mouth every 6 hours as needed for itching or allergies       docusate sodium (COLACE) 100 MG  "capsule Take 1 capsule (100 mg) by mouth 2 times daily 180 capsule 11     folic acid (FOLVITE) 1 MG tablet Take 1 mg by mouth daily        furosemide (LASIX) 20 MG tablet Take 3 tablets (60 mg) by mouth daily 270 tablet 3     gabapentin (NEURONTIN) 600 MG tablet Take 2 tablets (1,200 mg) by mouth 3 times daily 180 tablet 0     gabapentin (NEURONTIN) 600 MG tablet Take 600 mg by mouth 3 times daily 0700, 1400, 2100       gemfibrozil (LOPID) 600 MG tablet Take 600 mg by mouth 2 times daily        insulin NPH (HUMULIN N/NOVOLIN N VIAL) 100 UNIT/ML vial Inject 2-6 Units Subcutaneous 2 times daily Will usually give if blood glucose >140. Does not have specific sliding scale. 10 mL 3     insulin syringe-needle U-100 (29G X 1/2\" 1 ML) 29G X 1/2\" 1 ML miscellaneous Inject 1 Syringe Subcutaneous 2 times daily 200 each 11     LANsoprazole (PREVACID) 30 MG DR capsule Take 30 mg by mouth every morning (before breakfast)       levothyroxine (SYNTHROID/LEVOTHROID) 50 MCG tablet Take 50 mcg by mouth daily       methotrexate 2.5 MG tablet Take 25 mg by mouth once a week Tuesday       metoprolol tartrate (LOPRESSOR) 25 MG tablet Take 0.5 tablets (12.5 mg) by mouth 2 times daily Hold IF heart rate less than 55. 180 tablet 3     multivitamin w/minerals (THERA-VIT-M) tablet Take 1 tablet by mouth daily       omega 3 1000 MG CAPS Take 2 capsules by mouth daily        oxyCODONE-acetaminophen (PERCOCET) 5-325 MG tablet Take 1 tablet by mouth 2 times daily as needed for severe pain Take percocet 5/325 TID at 7am, 2pm and 9pm (Patient taking differently: Take 1 tablet by mouth 3 times daily 7am, 2pm and 9pm) 20 tablet 0     oxyCODONE-acetaminophen (PERCOCET) 7.5-325 MG per tablet Take 1 tablet by mouth every 6 hours as needed for severe pain (max 3 tablets daily) 90 tablet 0     polyethylene glycol (MIRALAX/GLYCOLAX) packet Take 17 g by mouth daily as needed for constipation 100 packet 11     predniSONE (DELTASONE) 10 MG tablet Take 10 mg " "by mouth daily       predniSONE (DELTASONE) 5 MG tablet Take 5 mg by mouth daily       sulfamethoxazole-trimethoprim (BACTRIM DS/SEPTRA DS) 800-160 MG tablet TK 1 T PO 3 TIMES A WK  0     ticagrelor (BRILINTA) 90 MG tablet Take 1 tablet (90 mg) by mouth 2 times daily Start this evening. 180 tablet 3     traMADol (ULTRAM) 50 MG tablet Take 0.5 tablets (25 mg) by mouth 3 times daily 40 tablet 0     insulin NPH (HUMULIN N/NOVOLIN N VIAL) 100 UNIT/ML vial Inject 2-6 Units Subcutaneous 2 times daily Will usually give if blood glucose >140 10 mL 11         Medications Discontinued:  There are no discontinued medications.      Recommendations:   1. Continue Plavix for at least one year  2. Continue with furosemide 60 mg in morning  3. If leg swelling worse, increase to 80 mg in morning and call us after 1-2 days    Follow-up: 1 month with EKG and blood work         Please feel free to call me with any questions or concerns.       Kaz Grider LPN      Questions: [phone].   First press #1 for the ice and then press #3 for \"Medical Questions\" to reach us Cardiology Nurses.     [phone].   First press #1 for the ice and then press #1      On Call Cardiologist for after hours or on weekends: [phone]   option #4 and ask to speak to the on-call Cardiologist.          If you need a medication refill please contact your pharmacy.  Please allow 3 business days for your refill to be completed.  ________________________________________________________________________________________________________________________________        "
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
## 2019-07-31 NOTE — PROGRESS NOTES
6 Pashto arterial sheath pulled from right groin at 1923 and 7 Pashto venous sheath pulled from right groin at 1928 by Jean POWELL RN. Patient received Fentanyl 25mcg prior to start of sheath pull at 1921, otherwise, patient tolerated procedure well and without issue. Manual pressure held for 20 minutes. Patient to remain on flat bedrest until 2345. Patient and wife educated on bedrest restrictions, both verbalized understanding. Right groin is flat and soft to palpation with no bleeding or hematoma. Will continue to monitor.
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
## 2019-08-02 NOTE — NURSING NOTE
"Reason For Visit:   Chief Complaint   Patient presents with     RECHECK     patient having pain        Primary MD: Hoa López  Ref. MD: Self   Date of surgery: none   Type of surgery: none .  Smoker: No  Request smoking cessation information: No    Ht 1.676 m (5' 6\")   Wt 73 kg (161 lb)   BMI 25.99 kg/m      Pain Assessment  Patient Currently in Pain: Yes    Oswestry (LAKEISHA) Questionnaire    OSWESTRY DISABILITY INDEX 4/2/2019   Count 10   Sum 25   Oswestry Score (%) 50            Neck Disability Index (NDI) Questionnaire    No flowsheet data found.                Promis 10 Assessment    PROMIS 10 4/2/2019   In general, would you say your health is: Poor   In general, would you say your quality of life is: Poor   In general, how would you rate your physical health? Poor   In general, how would you rate your mental health, including your mood and your ability to think? Very good   In general, how would you rate your satisfaction with your social activities and relationships? Poor   In general, please rate how well you carry out your usual social activities and roles Poor   To what extent are you able to carry out your everyday physical activities such as walking, climbing stairs, carrying groceries, or moving a chair? Moderately   How often have you been bothered by emotional problems such as feeling anxious, depressed or irritable? Rarely   How would you rate your fatigue on average? Severe   How would you rate your pain on average?   0 = No Pain  to  10 = Worst Imaginable Pain 7   Some recent data might be hidden                Alessio Jha ATC  "

## 2019-08-02 NOTE — PROGRESS NOTES
Spine Surgery Return Clinic Visit      Chief Complaint:   RECHECK (patient having pain )      Interval HPI:  Symptom Profile Including: location of symptoms, onset, severity, exacerbating/alleviating factors, previous treatments:        Beulah Jane is a 69 year old male who returns today now approximately 4 to 5 months status post complex revision spinal fusion surgery.  He is doing very well.  He feels like he is standing up straighter with less back pain than before surgery.  He does have a chronic rheumatoid condition and he notes some tenderness in the knees and diffusely in his muscles today but otherwise is quite pleased with his back surgery.    At the time of his last visit with me I noticed some proximal junctional kyphosis on his imaging and I had recommended a extension type brace.  He did get this but is not been able to wear because it is been so uncomfortable.            Past Medical History:     Past Medical History:   Diagnosis Date     Aortic stenosis      Chronic pain      DM (diabetes mellitus), type 2 (H)     on insulin     Hyperlipidemia      JUAN on CPAP      Pneumonia      Polymyositis (H)      Pulmonary fibrosis, unspecified (H)      Seasonal allergies             Past Surgical History:     Past Surgical History:   Procedure Laterality Date     ARTHROSCOPY KNEE  1970     COLONOSCOPY       DECOMPRESSION, FUSION LUMBAR POSTERIOR TWO LEVELS, COMBINED  1997     FUSION LUMBAR ANTERIOR THREE+ LEVELS N/A 4/22/2019    Procedure: Lumbar 1 Or Lumbar 2-5 Anterior Lumbar Interbody Fusion with BMP Stage 1 Of Two Procedure:;  Surgeon: Carlos Enrique Dial MD;  Location: [facility] OR     HERNIA REPAIR  2006     lumbar spine hardware removal  1999     OPTICAL TRACKING SYSTEM FUSION POSTERIOR SPINE THORACIC THREE+ LEVELS N/A 4/25/2019    Procedure: O-Arm/Stealth Assisted Thoracic 10-Pelvis Instrumented Fusion T11-2, L1-2, L2-3 Transforminal Lumbar Interbody Fusion (TLIF) And Hung Borges  "Osteotomies,and L3-4 SPO as well, Use Of BMP, Debridement Of Seroma;  Surgeon: Carlos Enrique Dial MD;  Location: [facility] OR     PICC INSERTION Right 2019    4Fr - 37cm, Basilic vein, low SVC     REMOVAL PROSTHETIC MATERIAL/MESH, ABD WALL NECRO TISS INFEXN       ROTATOR CUFF REPAIR RT/LT              Social History:     Social History     Tobacco Use     Smoking status: Former Smoker     Packs/day: 0.25     Last attempt to quit: 1970     Years since quittin.1     Smokeless tobacco: Never Used   Substance Use Topics     Alcohol use: Yes     Comment: few times monthly            Family History:   No family history on file.         Allergies:   No Known Allergies         Medications:     Current Outpatient Medications   Medication     acetaminophen (TYLENOL) 325 MG tablet     albuterol (2.5 MG/3ML) 0.083% neb solution     albuterol (PROAIR HFA/PROVENTIL HFA/VENTOLIN HFA) 108 (90 BASE) MCG/ACT Inhaler     aspirin (ASA) 81 MG EC tablet     atorvastatin (LIPITOR) 40 MG tablet     calcium carbonate (OS-DMITRIY 500 MG [place]. CA) 500 MG tablet     cetirizine (ZYRTEC) 10 MG tablet     Cholecalciferol (VITAMIN D) 2000 units tablet     diphenhydrAMINE (BENADRYL) 50 MG capsule     docusate sodium (COLACE) 100 MG capsule     folic acid (FOLVITE) 1 MG tablet     furosemide (LASIX) 20 MG tablet     gabapentin (NEURONTIN) 600 MG tablet     gabapentin (NEURONTIN) 600 MG tablet     gemfibrozil (LOPID) 600 MG tablet     insulin NPH (HUMULIN N/NOVOLIN N VIAL) 100 UNIT/ML vial     insulin NPH (HUMULIN N/NOVOLIN N VIAL) 100 UNIT/ML vial     insulin syringe-needle U-100 (29G X 1/2\" 1 ML) 29G X 1/2\" 1 ML miscellaneous     LANsoprazole (PREVACID) 30 MG DR capsule     levothyroxine (SYNTHROID/LEVOTHROID) 50 MCG tablet     methotrexate 2.5 MG tablet     metoprolol tartrate (LOPRESSOR) 25 MG tablet     multivitamin w/minerals (THERA-VIT-M) tablet     omega 3 1000 MG CAPS     oxyCODONE-acetaminophen (PERCOCET) 5-325 MG " "tablet     oxyCODONE-acetaminophen (PERCOCET) 7.5-325 MG per tablet     polyethylene glycol (MIRALAX/GLYCOLAX) packet     predniSONE (DELTASONE) 10 MG tablet     predniSONE (DELTASONE) 5 MG tablet     sulfamethoxazole-trimethoprim (BACTRIM DS/SEPTRA DS) 800-160 MG tablet     ticagrelor (BRILINTA) 90 MG tablet     traMADol (ULTRAM) 50 MG tablet     No current facility-administered medications for this visit.              Review of Systems:   A focused musculoskeletal and neurologic ROS was performed with pertinent positives and negatives noted in the HPI.  Additional systems were also reviewed and are documented at the bottom of the note.         Physical Exam:   Vitals: Ht 1.676 m (5' 6\")   Wt 73 kg (161 lb)   BMI 25.99 kg/m    Musculoskeletal, Neurologic, and Spine:     Lumbar Spine:                          Appearance - No gross stepoffs or deformities                          Motor -                           L2-3: Hip flexion 5/5 R and 5/5 L strength                           L3/4:  Knee extension R 5/5 and L 5/5 strength                          L4/5:  Foot dorsiflexion R 5/5 L 5/5 and                                       EHL dorsiflexion R 4/5 L 4/5 strength                          S1:  Plantarflexion/Peroneal Muscles  R 5/5 and L 5/5 strength                          Sensation: intact to light touch L3-S1 distribution BLE                             Neurologic:                            REFLEXES Left Right                           Patella 1+ 1+   Ankle jerk 1+ 1+   Babinski No upgoing great toe No upgoing great toe   Clonus 0 beats 0 beats      Hip Exam:  No pain with hip log roll and no tenderness over the greater trochanters.     Alignment:  Patient stands with a neutral standing sagittal balance.    He has no tenderness of the proximal aspect of his incision and no upper back pain.         Imaging:   We ordered and independently reviewed new radiographs at this clinic visit. The results were " discussed with the patient. Findings include:     Images today show no change in his sagittal alignment compared to the previous images.  There is some proximal junctional kyphosis but this looks unchanged compared to the June 7 images.       Assessment and Plan:     69 year old male with good clinical progress at this point.  I think he is now far enough out from surgery and given that his images are stable, that he can discontinue the brace particular given how uncomfortable it has been for him.  I would like him to continue on vitamin D treatment.  I encouraged him to be walking as much as possible.  I would like him to follow-up with me again in 6 months with repeat standing scoliosis radiographs at that time.           Respectfully,  Carlos Enrique Dial MD  Spine Surgery  [facility]    Answers for HPI/ROS submitted by the patient on 8/2/2019   General Symptoms: Yes  Skin Symptoms: Yes  HENT Symptoms: No  EYE SYMPTOMS: No  HEART SYMPTOMS: Yes  LUNG SYMPTOMS: Yes  INTESTINAL SYMPTOMS: Yes  URINARY SYMPTOMS: No  REPRODUCTIVE SYMPTOMS: No  SKELETAL SYMPTOMS: Yes  BLOOD SYMPTOMS: No  NERVOUS SYSTEM SYMPTOMS: No  MENTAL HEALTH SYMPTOMS: No  Fever: No  Loss of appetite: No  Weight loss: No  Weight gain: No  Fatigue: Yes  Night sweats: No  Chills: No  Increased stress: No  Excessive hunger: No  Excessive thirst: Yes  Feeling hot or cold when others believe the temperature is normal: Yes  Loss of height: No  Post-operative complications: No  Surgical site pain: No  Hallucinations: No  Change in or Loss of Energy: Yes  Hyperactivity: No  Confusion: No  Changes in hair: No  Changes in moles/birth marks: No  Itching: Yes  Rashes: No  Changes in nails: No  Acne: No  Change in facial hair: No  Warts: No  Non-healing sores: No  Scarring: No  Flaking of skin: Yes  Color changes of hands/feet in cold : Yes  Sun sensitivity: Yes  Skin thickening: Yes  Cough: Yes  Sputum or phlegm: No  Coughing up blood:  No  Difficulty breating or shortness of breath: Yes  Snoring: No  Wheezing: No  Difficulty breathing on exertion: Yes  Nighttime Cough: No  Difficulty breathing when lying flat: No  Chest pain or pressure: Yes  Fast or irregular heartbeat: Yes  Pain in legs with walking: Yes  Trouble breathing while lying down: No  Fingers or toes appear blue: No  High blood pressure: No  Low blood pressure: Yes  Fainting: No  Murmurs: Yes  Pacemaker: No  Varicose veins: No  Edema or swelling: Yes  Wake up at night with shortness of breath: Yes  Light-headedness: No  Exercise intolerance: Yes  Heart burn or indigestion: No  Nausea: No  Vomiting: No  Abdominal pain: No  Bloating: Yes  Constipation: Yes  Diarrhea: No  Blood in stool: No  Black stools: No  Rectal or Anal pain: No  Fecal incontinence: No  Yellowing of skin or eyes: No  Vomit with blood: No  Change in stools: No  Back pain: Yes  Muscle aches: Yes  Neck pain: Yes  Swollen joints: Yes  Joint pain: Yes  Bone pain: Yes  Muscle cramps: Yes  Muscle weakness: Yes  Joint stiffness: Yes  Bone fracture: No

## 2019-08-02 NOTE — LETTER
8/2/2019       RE: Beulah Jane  [address]  [facility] [address]     Dear Colleague,    Thank you for referring your patient, Beulah Jane, to the [facility]. Please see a copy of my visit note below.    Spine Surgery Return Clinic Visit      Chief Complaint:   RECHECK (patient having pain )      Interval HPI:  Symptom Profile Including: location of symptoms, onset, severity, exacerbating/alleviating factors, previous treatments:        Beulah Jane is a 69 year old male who returns today now approximately 4 to 5 months status post complex revision spinal fusion surgery.  He is doing very well.  He feels like he is standing up straighter with less back pain than before surgery.  He does have a chronic rheumatoid condition and he notes some tenderness in the knees and diffusely in his muscles today but otherwise is quite pleased with his back surgery.    At the time of his last visit with me I noticed some proximal junctional kyphosis on his imaging and I had recommended a extension type brace.  He did get this but is not been able to wear because it is been so uncomfortable.            Past Medical History:     Past Medical History:   Diagnosis Date     Aortic stenosis      Chronic pain      DM (diabetes mellitus), type 2 (H)     on insulin     Hyperlipidemia      JUAN on CPAP      Pneumonia      Polymyositis (H)      Pulmonary fibrosis, unspecified (H)      Seasonal allergies             Past Surgical History:     Past Surgical History:   Procedure Laterality Date     ARTHROSCOPY KNEE  1970     COLONOSCOPY       DECOMPRESSION, FUSION LUMBAR POSTERIOR TWO LEVELS, COMBINED  1997     FUSION LUMBAR ANTERIOR THREE+ LEVELS N/A 4/22/2019    Procedure: Lumbar 1 Or Lumbar 2-5 Anterior Lumbar Interbody Fusion with BMP Stage 1 Of Two Procedure:;  Surgeon: Carlos Enrique Dial MD;  Location: [facility]     HERNIA REPAIR  2006      "lumbar spine hardware removal       OPTICAL TRACKING SYSTEM FUSION POSTERIOR SPINE THORACIC THREE+ LEVELS N/A 2019    Procedure: O-Arm/Stealth Assisted Thoracic 10-Pelvis Instrumented Fusion T11-2, L1-2, L2-3 Transforminal Lumbar Interbody Fusion (TLIF) And Smith Borges Osteotomies,and L3-4 SPO as well, Use Of BMP, Debridement Of Seroma;  Surgeon: Carlos Enrique Dial MD;  Location: [facility] OR     PICC INSERTION Right 2019    4Fr - 37cm, Basilic vein, low SVC     REMOVAL PROSTHETIC MATERIAL/MESH, ABD WALL NECRO TISS INFEXN       ROTATOR CUFF REPAIR RT/LT              Social History:     Social History     Tobacco Use     Smoking status: Former Smoker     Packs/day: 0.25     Last attempt to quit: 1970     Years since quittin.1     Smokeless tobacco: Never Used   Substance Use Topics     Alcohol use: Yes     Comment: few times monthly            Family History:   No family history on file.         Allergies:   No Known Allergies         Medications:     Current Outpatient Medications   Medication     acetaminophen (TYLENOL) 325 MG tablet     albuterol (2.5 MG/3ML) 0.083% neb solution     albuterol (PROAIR HFA/PROVENTIL HFA/VENTOLIN HFA) 108 (90 BASE) MCG/ACT Inhaler     aspirin (ASA) 81 MG EC tablet     atorvastatin (LIPITOR) 40 MG tablet     calcium carbonate (OS-DMITRIY 500 MG [place]. CA) 500 MG tablet     cetirizine (ZYRTEC) 10 MG tablet     Cholecalciferol (VITAMIN D) 2000 units tablet     diphenhydrAMINE (BENADRYL) 50 MG capsule     docusate sodium (COLACE) 100 MG capsule     folic acid (FOLVITE) 1 MG tablet     furosemide (LASIX) 20 MG tablet     gabapentin (NEURONTIN) 600 MG tablet     gabapentin (NEURONTIN) 600 MG tablet     gemfibrozil (LOPID) 600 MG tablet     insulin NPH (HUMULIN N/NOVOLIN N VIAL) 100 UNIT/ML vial     insulin NPH (HUMULIN N/NOVOLIN N VIAL) 100 UNIT/ML vial     insulin syringe-needle U-100 (29G X 1/2\" 1 ML) 29G X 1/2\" 1 ML miscellaneous     LANsoprazole " "(PREVACID) 30 MG DR capsule     levothyroxine (SYNTHROID/LEVOTHROID) 50 MCG tablet     methotrexate 2.5 MG tablet     metoprolol tartrate (LOPRESSOR) 25 MG tablet     multivitamin w/minerals (THERA-VIT-M) tablet     omega 3 1000 MG CAPS     oxyCODONE-acetaminophen (PERCOCET) 5-325 MG tablet     oxyCODONE-acetaminophen (PERCOCET) 7.5-325 MG per tablet     polyethylene glycol (MIRALAX/GLYCOLAX) packet     predniSONE (DELTASONE) 10 MG tablet     predniSONE (DELTASONE) 5 MG tablet     sulfamethoxazole-trimethoprim (BACTRIM DS/SEPTRA DS) 800-160 MG tablet     ticagrelor (BRILINTA) 90 MG tablet     traMADol (ULTRAM) 50 MG tablet     No current facility-administered medications for this visit.              Review of Systems:   A focused musculoskeletal and neurologic ROS was performed with pertinent positives and negatives noted in the HPI.  Additional systems were also reviewed and are documented at the bottom of the note.         Physical Exam:   Vitals: Ht 1.676 m (5' 6\")   Wt 73 kg (161 lb)   BMI 25.99 kg/m     Musculoskeletal, Neurologic, and Spine:     Lumbar Spine:                          Appearance - No gross stepoffs or deformities                          Motor -                           L2-3: Hip flexion 5/5 R and 5/5 L strength                           L3/4:  Knee extension R 5/5 and L 5/5 strength                          L4/5:  Foot dorsiflexion R 5/5 L 5/5 and                                       EHL dorsiflexion R 4/5 L 4/5 strength                          S1:  Plantarflexion/Peroneal Muscles  R 5/5 and L 5/5 strength                          Sensation: intact to light touch L3-S1 distribution BLE                             Neurologic:                            REFLEXES Left Right                           Patella 1+ 1+   Ankle jerk 1+ 1+   Babinski No upgoing great toe No upgoing great toe   Clonus 0 beats 0 beats      Hip Exam:  No pain with hip log roll and no tenderness over the greater " trochanters.     Alignment:  Patient stands with a neutral standing sagittal balance.    He has no tenderness of the proximal aspect of his incision and no upper back pain.         Imaging:   We ordered and independently reviewed new radiographs at this clinic visit. The results were discussed with the patient. Findings include:     Images today show no change in his sagittal alignment compared to the previous images.  There is some proximal junctional kyphosis but this looks unchanged compared to the June 7 images.       Assessment and Plan:     69 year old male with good clinical progress at this point.  I think he is now far enough out from surgery and given that his images are stable, that he can discontinue the brace particular given how uncomfortable it has been for him.  I would like him to continue on vitamin D treatment.  I encouraged him to be walking as much as possible.  I would like him to follow-up with me again in 6 months with repeat standing scoliosis radiographs at that time.     Respectfully,  Carlos Enrique Dial MD  Spine Surgery  [facility]

## 2019-08-07 NOTE — TELEPHONE ENCOUNTER
Reason for Call:  Medication or medication refill:methotrexate 2.5 MG tablet    Do you use a [facility]?  Name of the pharmacy and phone number for the current request:         [id number] - [place], [place] - [address]      Name of the medication requested: methotrexate 2.5 MG tablet    Other request: Pt called requesting refill for methotrexate 2.5 MG tablet    Can we leave a detailed message on this number? YES    Phone number patient can be reached at: [phone]    Best Time: Anytime     Call taken on 8/7/2019 at 10:33 AM by Aurora Oneal

## 2019-08-07 NOTE — TELEPHONE ENCOUNTER
Methotrexate 2.5 mg    Last Written Prescription Date:  ?  Last Fill Quantity: ?,  # refills: ?   Last office visit: 06/17/19 with prescribing provider: Maribel  Future Office Visit:   Next 5 appointments (look out 90 days)    Aug 27, 2019  4:00 PM CDT  Return Visit with Judson Stockton MD  [facility] ([place] [facility]) [address] [facility]  [address]  [facility] [address]  [phone]   Aug 28, 2019 11:00 AM CDT  Return Visit with Apryl Moura MD  [facility], [place], Infectious Disease ([facility]) [address]  [address]  [facility] [id number] [phone]         Routing refill request to provider for review/approval because:  Medication is reported/historical

## 2019-08-14 NOTE — TELEPHONE ENCOUNTER
[facility] Call Center    Phone Message    May a detailed message be left on voicemail: yes    Reason for Call: Other: Pt's son called to see if there are any updates about getting the pt set up for surgery. He has not heard from anyone about it in a while, and is looking for an update. Please give him a call back.     Action Taken: Message routed to:  [facility] ([facility] Cardiology

## 2019-08-15 NOTE — TELEPHONE ENCOUNTER
Received call from patient requesting refill(s) of oxyCODONE-acetaminophen (PERCOCET) 7.5-325 MG per tablet     Last picked up from pharmacy on 07/16/19    Pt last seen by prescribing provider on 07/16/19    Next appt scheduled for 08/27/19     checked in the past 6 months? Yes If no, print current report and give to RN    Last urine drug screen date 07/16/19  Current opioid agreement on file (completed within the last year) Yes Date of opioid agreement: 07/16/19    Processing (pick one and delete the others):      E-prescribe to    [id number] - [place], [place] - [address] [facility]  [address] [address]  Phone: [phone] Fax: [phone]    Will route to nursing pool for review and preparation of prescription(s).     Eli Maravilla [facility]  [place]

## 2019-08-15 NOTE — TELEPHONE ENCOUNTER
[facility] Call Center    Phone Message    May a detailed message be left on voicemail: yes    Reason for Call: Other: pt has been feeling dizzy for about 3 days, pt does not have an appetite because he feels sick to his stomach, he's getting depressed, he's wondering what else he can do to feel better, please call to discuss options     Action Taken: Message routed to:  [facility] Cardiology

## 2019-08-15 NOTE — TELEPHONE ENCOUNTER
Spoke with patient's son. Patient is currently mid-workup for TAVR and will be getting a call from TAVR group in the next few days to follow up, possibly schedule for the procedure.

## 2019-08-15 NOTE — TELEPHONE ENCOUNTER
Reason for call:  Medication   If this is a refill request, has the caller requested the refill from the pharmacy already? No  Will the patient be using a [facility]? No  Name of the pharmacy and phone number for the current request: [facility] [address]    Name of the medication requested: oxyCODONE-acetaminophen (PERCOCET) 7.5-325 MG per tablet    Other request: n/a    Phone number to reach patient:  Home number on file [phone] (home)    Best Time:  n/a    Can we leave a detailed message on this number?  YES           Mala VELA    [facility]

## 2019-08-15 NOTE — TELEPHONE ENCOUNTER
Spoke with Jeny who confirmed that patient's TAVR committee mtg was scheduled for today and that Meenakshi would be calling to follow up, expected within the next several business days. Called patient's son back, relayed this info.

## 2019-08-16 NOTE — PROGRESS NOTES
Beulah's case was discussed at TAVR conference.    The plan will be for him to proceed with TAVR   Medtronic Valve  Size 29 mm  Approach: TF  [employer]: Yes    Additional testings/orders/information discussed:   No addition testing needed at this time.  Spoke with Beulah son to update him to procedure date.

## 2019-08-16 NOTE — TELEPHONE ENCOUNTER
Script Eprescribed to pharmacy    Will send this to MA team to notify patient.    Signed Prescriptions:                        Disp   Refills    oxyCODONE-acetaminophen (PERCOCET) 7.5-325*90 tab*0        Sig: Take 1 tablet by mouth every 6 hours as needed for           severe pain (max 3 tablets daily) Dispense           08/16/19. OK to start 08/16/19  Authorizing Provider: RALPH CANO MD  [facility]

## 2019-08-16 NOTE — TELEPHONE ENCOUNTER
Routing to provider to review medication prepped per below    Percocet 7.5-325, #90, Refill:NO  Sig:Dispense 08/16/19. OK to start 08/16/19  Last picked up 07/16/19   Due:08/16/19    Per last OV note 07/16/19:  1. Medication Management:   1. He was previously on a regimen of tramadol 50mg and percocet 5/325 tid prn. He was taking both of these medications together at the same time. In interest of reducing polypharmacy, I recommended taking just percocet at a slightly higher dose and hew as in agreement with this.  2. Prescribed percocet 7.5/325 q6h prn, max 3 tablets/day, 90 tablets/month.  2. Follow up: 1 month in clinic      Kami CAMARGO, RN Care Coordinator  [facility]

## 2019-08-16 NOTE — PROGRESS NOTES
Date: 8/16/2019    Time of Call: 12:07 PM     Diagnosis:  Severe aortic stenosis     [ TORB ] Ordering provider: Pj Garcia MD  Order: 1. PAC clinic visit  2. TAVR scheduling orders  3. Intra TAVR procedure ECHO     Order received by: Meenakshi Irwin RN     Follow-up/additional notes:

## 2019-08-16 NOTE — TELEPHONE ENCOUNTER
"Mr. Bazan  Wife called to say he did not feel well, very dizzy and chest pains. When I spoke to the patient , he said its not exactly chest pains. It more abdominal pain. And he is dizzy because he was doing stuff in his office and was bending down for a long time. So he just had to lay down for a bit. He says his wife was just worried about him. He says\" its not smething to send me to the hospital for\".     I spoke with Meenakshi to notify her. We told the patient if something earlier comes up for the TAVR we will call him but right now this is the earliest time we could get.    I also let the patient and his wife know about his appointment for PAC on 8/21/19@ 10:15am and his procedure is on 9/18/19.     Let the patient know to call me if they have any questions at all.   "

## 2019-08-19 NOTE — TELEPHONE ENCOUNTER
FUTURE VISIT INFORMATION      SURGERY INFORMATION:    Date: 19    Location: [facility] OR    Surgeon:  Pj Garcia, Donald Denis    Anesthesia Type:  MAC    RECORDS REQUESTED FROM:       [facility]    Pertinent Medical History: Pulmonary fibrosis, acute pulmonary embolism, Severa Aortic stenosis, Hypertension, Tachycardia    Most recent EKG+ Tracin19    Most recent ECHO: 19    Most recent Coronary Angiogram: 19

## 2019-08-20 NOTE — TELEPHONE ENCOUNTER
I called and spoke to patient and informed him of Rx details below. Patient states he picked up Rx 8/17/19. No further action needed at this time.    Indiana Khan, CMA

## 2019-08-21 NOTE — PATIENT INSTRUCTIONS
Patient Instructions:  It was a pleasure to see you in the cardiology clinic today.      If you have any questions, call  Elysia Merino RN, at [phone].  Press Option #1 for the [facility], and then press Option #4  We are encouraging the use of Vault[employer] to communicate with your HealthCare Provider    Note the new medications: increase your lasix from 60 mg daily to 120 mg daily  DAILY WEIGHTS    Stop the following medications: none    The results from today include: labs this Friday, August 24, 2019  Please follow up with Dr. Flores with a phone call on Friday      If you have an urgent need after hours (8:00 am to 4:30 pm) please call [phone] and ask for the cardiology fellow on call.

## 2019-08-21 NOTE — PROGRESS NOTES
CARDIOLOGY NEW OFFICE VISIT    HPI: Beulah Jane is a 69 year old male being seen today for evaluation of heart failure.   The patient's risk factor profile is: (+) HTN, Type II DM, (+) hypercholesterolemia, (+)  prior 5 pack-year tobacco use, (?) fam Hx premature CAD.  He had a history of CAD with PCI of his LAD as part of TAVR work up, severe aortic stenosis being currently worked for TAVR and scheduled for September. He has been having issues with hypervolemia and diastolic heart failure, has been on Lasix with increase dosage recently up from 40 to 60 mg daily. Despite this, he was seen in pre op today with anesthesia who felt that he needed to be evaluated for his heart failure by a cardiologist.  He complains of severe shortness of breath that has worsened over the past week. He reports as little as 10 steps will evoke dyspnea. He does not have orthopnea or PND. He has not had any chest pain. He has noticed his dyspnea has worsened since his most recent cath on July 30th, maybe 3 weeks ago. At that time his RA was 10 and PCWP was 20 and his Lasix dose has not been changed in a while as far as I can see.     The patient denies a history of chest discomfort, PND, orthopnea, pedal edema, palpitations, lightheadedness, and syncope.      PAST MEDICAL HISTORY:  Past Medical History:   Diagnosis Date     Aortic stenosis      Chronic pain      DM (diabetes mellitus), type 2 (H)     on insulin     Hyperlipidemia      JUAN on CPAP      Pneumonia      Polymyositis (H)      Pulmonary fibrosis, unspecified (H)      Seasonal allergies        CURRENT MEDICATIONS:  Current Outpatient Medications   Medication Sig Dispense Refill     albuterol (2.5 MG/3ML) 0.083% neb solution Take 1 vial by nebulization every 6 hours as needed for shortness of breath / dyspnea or wheezing       albuterol (PROAIR HFA/PROVENTIL HFA/VENTOLIN HFA) 108 (90 BASE) MCG/ACT Inhaler Inhale 2 puffs into the lungs as needed for shortness of breath /  "dyspnea or wheezing        aspirin (ASA) 81 MG EC tablet Take 1 tablet (81 mg) by mouth daily Start tomorrow morning. (Patient taking differently: Take 81 mg by mouth every morning ) 90 tablet 3     atorvastatin (LIPITOR) 40 MG tablet Take 1 tablet (40 mg) by mouth daily (Patient taking differently: Take 40 mg by mouth every morning ) 90 tablet 3     calcium carbonate (OS-DMITRIY 500 MG Alutiiq. CA) 500 MG tablet Take 1 tablet (500 mg) by mouth 2 times daily (Patient taking differently: Take 1 tablet by mouth every morning ) 180 tablet 3     cetirizine (ZYRTEC) 10 MG tablet Take 10 mg by mouth daily       Cholecalciferol (VITAMIN D) 2000 units tablet Take 2,000 Units by mouth daily (Patient taking differently: Take 2,000 Units by mouth every morning ) 100 tablet 3     diphenhydrAMINE (BENADRYL) 50 MG capsule Take 50 mg by mouth as needed for itching or allergies        folic acid (FOLVITE) 1 MG tablet Take 1 mg by mouth every morning        furosemide (LASIX) 40 MG tablet Take 3 tablets (120 mg) by mouth daily 90 tablet 0     gabapentin (NEURONTIN) 600 MG tablet Take 2 tablets (1,200 mg) by mouth 3 times daily 180 tablet 0     gemfibrozil (LOPID) 600 MG tablet Take 600 mg by mouth 2 times daily        insulin NPH (HUMULIN N/NOVOLIN N VIAL) 100 UNIT/ML vial Inject 2-6 Units Subcutaneous 2 times daily Will usually give if blood glucose >140 (Patient taking differently: Inject 2-6 Units Subcutaneous 2 times daily as needed Will usually give if blood glucose >140) 10 mL 11     insulin syringe-needle U-100 (29G X 1/2\" 1 ML) 29G X 1/2\" 1 ML miscellaneous Inject 1 Syringe Subcutaneous 2 times daily 200 each 11     LANsoprazole (PREVACID) 30 MG DR capsule Take 30 mg by mouth every morning (before breakfast)       levothyroxine (SYNTHROID/LEVOTHROID) 50 MCG tablet Take 50 mcg by mouth every morning        methotrexate 2.5 MG tablet Take 10 tablets (25 mg) by mouth once a week Tuesday 120 tablet 3     metoprolol tartrate (LOPRESSOR) " 25 MG tablet Take 0.5 tablets (12.5 mg) by mouth 2 times daily Hold IF heart rate less than 55. 180 tablet 3     multivitamin w/minerals (THERA-VIT-M) tablet Take 1 tablet by mouth daily (Patient taking differently: Take 1 tablet by mouth every morning )       omega 3 1000 MG CAPS Take 2 capsules by mouth every morning        oxyCODONE-acetaminophen (PERCOCET) 7.5-325 MG per tablet Take 1 tablet by mouth every 6 hours as needed for severe pain (max 3 tablets daily) Dispense 08/16/19. OK to start 08/16/19 (Patient taking differently: Take 1 tablet by mouth 3 times daily Dispense 08/16/19. OK to start 08/16/19) 90 tablet 0     ticagrelor (BRILINTA) 90 MG tablet Take 1 tablet (90 mg) by mouth 2 times daily Start this evening. 180 tablet 3       PAST SURGICAL HISTORY:  Past Surgical History:   Procedure Laterality Date     ARTHROSCOPY KNEE  1970     COLONOSCOPY       CV CORONARY ANGIOGRAM N/A 7/30/2019    Procedure: CV CORONARY ANGIOGRAM;  Surgeon: Leonidas Mike MD;  Location:  HEART CARDIAC CATH LAB     CV LEFT HEART CATH N/A 7/30/2019    Procedure: CV LEFT HEART CATH;  Surgeon: Leonidas Mike MD;  Location:  HEART CARDIAC CATH LAB     CV PCI STENT DRUG ELUTING Left 7/30/2019    Procedure: PCI Stent Drug Eluting;  Surgeon: Leonidas Mike MD;  Location:  HEART CARDIAC CATH LAB     CV RIGHT HEART CATH N/A 7/30/2019    Procedure: CV RIGHT HEART CATH;  Surgeon: Leonidas Mike MD;  Location:  HEART CARDIAC CATH LAB     DECOMPRESSION, FUSION LUMBAR POSTERIOR TWO LEVELS, COMBINED  1997     FUSION LUMBAR ANTERIOR THREE+ LEVELS N/A 4/22/2019    Procedure: Lumbar 1 Or Lumbar 2-5 Anterior Lumbar Interbody Fusion with BMP Stage 1 Of Two Procedure:;  Surgeon: Carlos Enrique Dial MD;  Location:  OR     HERNIA REPAIR  2006     lumbar spine hardware removal  1999     OPTICAL TRACKING SYSTEM FUSION POSTERIOR SPINE THORACIC THREE+ LEVELS N/A 4/25/2019    Procedure: O-Arm/Stealth  Assisted Thoracic 10-Pelvis Instrumented Fusion T11-2, L1-2, L2-3 Transforminal Lumbar Interbody Fusion (TLIF) And Smith Borges Osteotomies,and L3-4 SPO as well, Use Of BMP, Debridement Of Seroma;  Surgeon: Carlos Enrique Dial MD;  Location: [facility] OR     PICC INSERTION Right 2019    4Fr - 37cm, Basilic vein, low SVC     REMOVAL PROSTHETIC MATERIAL/MESH, ABD WALL NECRO TISS INFEXN       ROTATOR CUFF REPAIR RT/LT         ALLERGIES  Patient has no known allergies.    FAMILY HX:  Family History   Problem Relation Age of Onset     Colon Cancer Mother      Hypertension Father         did not know father well       SOCIAL HX:  Social History     Socioeconomic History     Marital status:      Spouse name: Not on file     Number of children: Not on file     Years of education: Not on file     Highest education level: Not on file   Occupational History     Not on file   Social Needs     Financial resource strain: Not on file     Food insecurity:     Worry: Not on file     Inability: Not on file     Transportation needs:     Medical: Not on file     Non-medical: Not on file   Tobacco Use     Smoking status: Former Smoker     Packs/day: 0.25     Last attempt to quit: 1970     Years since quittin.1     Smokeless tobacco: Never Used   Substance and Sexual Activity     Alcohol use: Yes     Comment: few times monthly     Drug use: Never     Sexual activity: Not Currently   Lifestyle     Physical activity:     Days per week: Not on file     Minutes per session: Not on file     Stress: Not on file   Relationships     Social connections:     Talks on phone: Not on file     Gets together: Not on file     Attends Yazdanism service: Not on file     Active member of club or organization: Not on file     Attends meetings of clubs or organizations: Not on file     Relationship status: Not on file     Intimate partner violence:     Fear of current or ex partner: Not on file     Emotionally abused: Not on  "file     Physically abused: Not on file     Forced sexual activity: Not on file   Other Topics Concern     Not on file   Social History Narrative     Not on file       ROS:  Constitutional: No fever, chills, or sweats. No weight gain/loss.   ENT: No visual disturbance, ear ache, epistaxis, sore throat.   Allergies/Immunologic: Negative.   Respiratory: No cough, hemoptysis.   Cardiovascular: As per HPI.   GI: No nausea, vomiting, hematemesis, melena, or hematochezia.   : No urinary frequency, dysuria, or hematuria.   Integument: Negative.   Psychiatric: Negative.   Neuro: Negative.   Endocrinology: Negative.   Musculoskeletal: No myalgia.    VITAL SIGNS:  BP 92/53   Pulse 92   Ht 1.651 m (5' 5\")   Wt 71.2 kg (157 lb)   SpO2 91%   BMI 26.13 kg/m    Body mass index is 26.13 kg/m .  Wt Readings from Last 2 Encounters:   08/21/19 71.2 kg (157 lb)   08/21/19 71.4 kg (157 lb 6.4 oz)       PHYSICAL EXAM  Beulah Jane is a 69 year old male in no acute distress.  HEENT: Unremarkable.  Neck: JVP up at 10 cm.  Carotids +4/4 bilaterally without bruits.  Lungs: bilateral crackles.  Cor: RRR. Normal S1, absent s2, III/VI systolic murmur, harsh at LUSB.  PMI in Lf 5th ICS.  Abd: Soft, nontender, nondistended.  NABS.  No pulsatile mass.  Extremities: bilateral 1+ TRUDI.  Pulses +4/4 symmetric in upper and lower extremities.  Neuro: Grossly intact.    LABS    Lab Results   Component Value Date    WBC 12.5 08/21/2019     Lab Results   Component Value Date    RBC 4.87 08/21/2019     Lab Results   Component Value Date    HGB 10.4 08/21/2019     Lab Results   Component Value Date    HCT 37.4 08/21/2019     No components found for: MCT  Lab Results   Component Value Date    MCV 77 08/21/2019     Lab Results   Component Value Date    MCH 21.4 08/21/2019     Lab Results   Component Value Date    MCHC 27.8 08/21/2019     Lab Results   Component Value Date    RDW 22.4 08/21/2019     Lab Results   Component Value Date     " 2019      Recent Labs   Lab Test 19  0716 19  1212    138   POTASSIUM 4.0 4.2   CHLORIDE 103 100   CO2 29 31   ANIONGAP 4 6   * 127*   BUN 18 21   CR 0.88 1.02   DMITRIY 8.8 9.1     Recent Labs   Lab Test 19  2237   CHOL 153   HDL 22*   *   TRIG 139   NHDL 131*        EK19  Sinus, non specific intraventricular conduction delay    ECHO: 19  Interpretation Summary     Left ventricular systolic function is low normal. The visual ejection fraction  is estimated at 50-55%. There is severe hypokinesis of the apex and the apical  segments of the lateral and inferolateral walls.  There is moderate (2+) aortic regurgitation. Severe valvular aortic stenosis.  The peak AoV pressure gradient is 66.0 mmHg. The mean AoV pressure gradient is  34.6 mmHg.  Compared to the study from 2019, the gradients across the AV are lower  although the calculated PETE is the same. Wall motion abnormalities are similar  to previous study.    STRESS TEST:  N/A    CARDIAC CATH:  19  Conclusion       Two-vessel obstructive CAD (diffuse calcified disease of the prox-mid LAD;  of small, non-dominant RCA with R->R collaterals)    Successful PCI of the prox-mid LAD with Synergy CHAU x 2 (3.0 x 12 in prox-LAD, 2.75 x 24 in distal LAD)    Severe pulmonary HTN with mildly elevated PCWP    Low-normal cardiac index by Eunice, normal cardiac index by thermodilution    Simultaneous LV-Ao mean gradient = 41.9, calculated PETE = 0.70 cm2          Plan       Follow bedrest per protocol    Continued medical management and lifestyle modifications for cardiovascular risk factor optimizations.  * Aspirin 81 lifelong  * Dual anti-platelet therapy for at least 1 year, and potentially longer if tolerated  * High-intensity statin  * Cardiac rehab referral  * TAVR planning per Valve Team   Coronary Findings     Diagnostic   Dominance: Left   Left Main   The vessel was visualized by selective angiography and is  moderate in size. There was 0% vessel disease.   Left Anterior Descending   Ost LAD lesion is 30% stenosed. The lesion is discrete.   Prox LAD lesion is 70% stenosed. The lesion is type C - high risk and discrete. The lesion is severely calcified. Just proximal to D1   Prox LAD to Mid LAD lesion is 60% stenosed. The lesion is type C - high risk and eccentric. The lesion is mildly calcified. diffuse   First Diagonal Branch   Ost 1st Diag lesion is 70% stenosed. The lesion is discrete. < 2 mm vessel   Ramus Intermedius   The vessel is small.   Ost Ramus lesion is 60% stenosed. The lesion is discrete. Small vessel   Left Circumflex   First Obtuse Marginal Branch   The vessel is large.   Ost 1st Mrg to 1st Mrg lesion is 30% stenosed.   Right Coronary Artery   The vessel is small.   Mid RCA lesion is 100% stenosed. The lesion is discrete. Small, non-dominant right with R->R collaterals   Acute Marginal Branch   Collaterals   Acute Mrg filled by collaterals from Prox RCA.      Intervention     Prox LAD lesion   Stent   Lesion length: 8 mm. The pre-interventional distal flow is normal (KEANU 3). The post-interventional distal flow is normal (KEANU 3). Using an XB 3.5 guide, a BMW wire was advanced across the lesion and parked in the distal LAD. The lesion was pre-dilated with a 2.0 semi-compliant balloon and stented with a 3.0 x 12 Synergy CHAU. Finally, the mid-LAD lesion was directly stented with a 2.75 x 24 Synergy CHAU, sparing the ostium of the first diagonal. Final angiogram reveals excellent stent expansion and apposition without evidence of immediate complications.   There is a 0% residual stenosis post intervention.   Prox LAD to Mid LAD lesion   Stent   Lesion length: 20 mm. The pre-interventional distal flow is normal (KEANU 3). The post-interventional distal flow is normal (KEANU 3). See prox-LAD stent details   There is a 0% residual stenosis post intervention.   Hemodynamics     Right Heart Pressures     - Severe  pulmonary HTN with mildly elevated PCWP  - Low-normal cardiac index by Eunice, normal cardiac index by thermodilution  - Simultaneous LV-Ao mean gradient = 41.9, calculated PETE = 0.70 cm2   Pressures Phase: Baseline      Time Systolic Diastolic Mean A Wave V Wave EDP Max dp/dt HR   RA Pressures  3:46 PM   11 mmHg    18 mmHg    15 mmHg      89 bpm      RV Pressures  3:30 PM       1296 mmHg/sec         3:46 PM 65 mmHg        23 mmHg     90 bpm      PA Pressures  3:55 PM 62 mmHg    20 mmHg    39 mmHg        90 bpm      PCW Pressures  3:55 PM   20 mmHg    25 mmHg    28 mmHg      89 bpm      AO Pressures  4:09 PM 96 mmHg    51 mmHg    70 mmHg        88 bpm        4:12 PM 92 mmHg    56 mmHg    72 mmHg        87 bpm      LV Pressures  4:09  mmHg        11 mmHg     88 bpm      Blood Flow Results Phase: Baseline      Time Results Indexed Values   QP  3:30 PM 3.76 L/min    2.07 L/min/m2      QS  3:30 PM 3.76 L/min    2.07 L/min/m2      Blood Oximetry Phase: Baseline      Time Hb SAT(%) PO2 Content PA Sat   PA  3:30 PM  52.8 %      52.8 %      Art  3:30 PM  100 %     12.51 mL/dL       Cardiac Output Phase: Baseline      Time TDCO TDCI Eunice C.O. Eunice C.I. Eunice HR   Cardiac Output Results  3:30 PM 4.77 L/min    2.62 L/min/m2    3.76 L/min    2.07 L/min/m2         3:58 PM 4.77 L/min          Resistance Results Phase: Baseline      Time PVR SVR PVR-I SVR-I TPR TVR TPR-I TVR-I PVR/SVR TPR/TVR   Resistance Results (Metric)  3:30 .17 dsc-5    1297.61 dsc-5    734.09 dsc-5/m2    2356.83 dsc-5/m2    829.62 dsc-5    1531.6 dsc-5    1506.82 dsc-5/m2    2781.83 dsc-5/m2    0.31    0.54      Resistance Results (Wood)  3:30 PM 5.05 PATEL    16.22 PATEL    9.18 PATEL/m2    29.47 PATEL/m2    10.37 PATEL    19.15 PATEL    18.84 PATEL/m2    34.78 PATEL/m2    0.31    0.54      Stoke Volume Results Phase: Baseline      Time RVSW LVSW RVSW-I LVSW-I   Stroke Work Results  3:30 PM 15.91 gm*m    30.56 gm*m    8.76 gm*m/m2    16.83 gm*m/m2      Valve Results Phase:  Baseline      Time Valve Mean Gradient Peak Gradient Area Area Index Sep per Dfp Flow   Valve  3:30 PM    0.7 cm2    0.39 cm2/m2    23.66 sec/beat    201.47 cc        4:09 PM Aortic                ASSESSMENT AND PLAN:    1. Severe aortic stenosis  -- planned for TAVR in September  -- heart failure likely related to his severe stenosis with diastolic dysfunction as well  -- increase Lasix from 60 daily to 60 BID, BMP and Mag this Friday  -- we will check on in him in the next 1-2 days to make sure that the Lasix is providing relief, if it is not he will come in to the hospital for IV diuresis and expedited TAVR if needed    2. Diastolic heart failure, NYHA IV, stage C, PCWP 20 mmHg in July during his cath, RA 10 mmHg, hypervolemic today  -- as above, increase Lasix, labs this Friday, follow up with cardiology    3. CAD s/p PCI to the LAD  -- this is stable, will continue with DAPT   -- statin    4. HLD  -- statin as above    RTC PRN

## 2019-08-21 NOTE — H&P
"  Pre-Operative H & P     CC:  Preoperative exam to assess for increased cardiopulmonary risk while undergoing surgery and anesthesia.    Date of Encounter: 8/21/2019  Primary Care Physician:  Hoa López  Associated Diagnosis: severe aortic stenosis    GEMA Jane is a 69 year old male who presents for pre-operative H & P in preparation for TAVR with Dr. Garcia and Dr. Duron on 9/18/2019 at [facility] under MAC.    This is a 69 year old male with hyperension, severe aortic stenosis, CAD s/p DESx2 7/30/19, HFpEF, NYHA III, JUAN, pulmonary fibrosis, dermatomyositis, PE s/p spinal surgery 4/2019, hypothyroidism, IDDM, and chronic pain.  Patient had admission to hospital early July 2019 for acute excerbation of CHF and was diuresed.  Patient was subsequently evaluated for TAVR with plans to proceed with workup in August.  He developed woresening symptoms and stable angina so angiogram/heart cath performed earlier on 7/30/2019. His case was discussed at TAVR conference 8/16/19 and it was decided for him to proceed.      Of note, pt comes to preop visit today complaining of worsening SOB.  He states his dyspnea has gotten worse over the last week or so.  He says he cannot even stand long enough to brush his teeth without needing to sit and rest.  He has trouble walking a few steps before feeling the need to sit.  He also complains that he has been getting dizzy upon standing over the last couple of days.  He says he stands up then needs to sit down or he will \"go to the floor\".       History is obtained from the patient and the electronic medical record.    Past Medical History  Past Medical History:   Diagnosis Date     Aortic stenosis      Chronic pain      DM (diabetes mellitus), type 2 (H)     on insulin     Hyperlipidemia      JUAN on CPAP      Pneumonia      Polymyositis (H)      Pulmonary fibrosis, unspecified (H)      Seasonal allergies  "       Past Surgical History  Past Surgical History:   Procedure Laterality Date     ARTHROSCOPY KNEE  1970     COLONOSCOPY       CV CORONARY ANGIOGRAM N/A 7/30/2019    Procedure: CV CORONARY ANGIOGRAM;  Surgeon: Leonidas Mike MD;  Location:  [facility]ATH [facility]     CV LEFT HEART CATH N/A 7/30/2019    Procedure: CV LEFT HEART CATH;  Surgeon: Leonidas Mike MD;  Location:  [facility]ARD[facility] CATH [facility]     CV PCI STENT DRUG ELUTING Left 7/30/2019    Procedure: PCI Stent Drug Eluting;  Surgeon: Leonidas Mike MD;  Location:  HEART CARDIAC CATH LAB     CV RIGHT HEART CATH N/A 7/30/2019    Procedure: CV RIGHT HEART CATH;  Surgeon: Leonidas Mike MD;  Location:  HEART CARDIAC CATH LAB     DECOMPRESSION, FUSION LUMBAR POSTERIOR TWO LEVELS, COMBINED  1997     FUSION LUMBAR ANTERIOR THREE+ LEVELS N/A 4/22/2019    Procedure: Lumbar 1 Or Lumbar 2-5 Anterior Lumbar Interbody Fusion with BMP Stage 1 Of Two Procedure:;  Surgeon: Carlos Enrique Dial MD;  Location: [facility] OR     HERNIA REPAIR  2006     lumbar spine hardware removal  1999     OPTICAL TRACKING SYSTEM FUSION POSTERIOR SPINE THORACIC THREE+ LEVELS N/A 4/25/2019    Procedure: O-Arm/Stealth Assisted Thoracic 10-Pelvis Instrumented Fusion T11-2, L1-2, L2-3 Transforminal Lumbar Interbody Fusion (TLIF) And Smith Borges Osteotomies,and L3-4 SPO as well, Use Of BMP, Debridement Of Seroma;  Surgeon: Carlos Enrique Dial MD;  Location: [facility] OR     PICC INSERTION Right 05/06/2019    4Fr - 37cm, Basilic vein, low SVC     REMOVAL PROSTHETIC MATERIAL/MESH, ABD WALL NECRO TISS INFEXN  2012     ROTATOR CUFF REPAIR RT/LT  2003       Hx of Blood transfusions/reactions: none     Hx of abnormal bleeding or anti-platelet use: Pt on Brilinta and ASA.  Directions per TAVR team.    Menstrual history: No LMP for male patient.:     Steroid use in the last year: pt has been on prednisone this year but hasn't been on it for 4-5 months    Personal  or FH with difficulty with Anesthesia:  none    Prior to Admission Medications  Current Outpatient Medications   Medication Sig Dispense Refill     albuterol (2.5 MG/3ML) 0.083% neb solution Take 1 vial by nebulization every 6 hours as needed for shortness of breath / dyspnea or wheezing       aspirin (ASA) 81 MG EC tablet Take 1 tablet (81 mg) by mouth daily Start tomorrow morning. (Patient taking differently: Take 81 mg by mouth every morning ) 90 tablet 3     atorvastatin (LIPITOR) 40 MG tablet Take 1 tablet (40 mg) by mouth daily (Patient taking differently: Take 40 mg by mouth every morning ) 90 tablet 3     calcium carbonate (OS-DMITRIY 500 MG [place]) 500 MG tablet Take 1 tablet (500 mg) by mouth 2 times daily (Patient taking differently: Take 1 tablet by mouth every morning ) 180 tablet 3     Cholecalciferol (VITAMIN D) 2000 units tablet Take 2,000 Units by mouth daily (Patient taking differently: Take 2,000 Units by mouth every morning ) 100 tablet 3     folic acid (FOLVITE) 1 MG tablet Take 1 mg by mouth every morning        furosemide (LASIX) 20 MG tablet Take 3 tablets (60 mg) by mouth daily (Patient taking differently: Take 60 mg by mouth every morning ) 270 tablet 3     gabapentin (NEURONTIN) 600 MG tablet Take 2 tablets (1,200 mg) by mouth 3 times daily 180 tablet 0     gemfibrozil (LOPID) 600 MG tablet Take 600 mg by mouth 2 times daily        insulin NPH (HUMULIN N/NOVOLIN N VIAL) 100 UNIT/ML vial Inject 2-6 Units Subcutaneous 2 times daily Will usually give if blood glucose >140 (Patient taking differently: Inject 2-6 Units Subcutaneous 2 times daily as needed Will usually give if blood glucose >140) 10 mL 11     LANsoprazole (PREVACID) 30 MG DR capsule Take 30 mg by mouth every morning (before breakfast)       levothyroxine (SYNTHROID/LEVOTHROID) 50 MCG tablet Take 50 mcg by mouth every morning        methotrexate 2.5 MG tablet Take 10 tablets (25 mg) by mouth once a week Tuesday 120 tablet 3      "metoprolol tartrate (LOPRESSOR) 25 MG tablet Take 0.5 tablets (12.5 mg) by mouth 2 times daily Hold IF heart rate less than 55. 180 tablet 3     multivitamin w/minerals (THERA-VIT-M) tablet Take 1 tablet by mouth daily (Patient taking differently: Take 1 tablet by mouth every morning )       omega 3 1000 MG CAPS Take 2 capsules by mouth every morning        ticagrelor (BRILINTA) 90 MG tablet Take 1 tablet (90 mg) by mouth 2 times daily Start this evening. 180 tablet 3     albuterol (PROAIR HFA/PROVENTIL HFA/VENTOLIN HFA) 108 (90 BASE) MCG/ACT Inhaler Inhale 2 puffs into the lungs as needed for shortness of breath / dyspnea or wheezing        cetirizine (ZYRTEC) 10 MG tablet Take 10 mg by mouth daily       diphenhydrAMINE (BENADRYL) 50 MG capsule Take 50 mg by mouth as needed for itching or allergies        insulin syringe-needle U-100 (29G X 1/2\" 1 ML) 29G X 1/2\" 1 ML miscellaneous Inject 1 Syringe Subcutaneous 2 times daily 200 each 11     oxyCODONE-acetaminophen (PERCOCET) 7.5-325 MG per tablet Take 1 tablet by mouth every 6 hours as needed for severe pain (max 3 tablets daily) Dispense 19. OK to start 19 (Patient taking differently: Take 1 tablet by mouth 3 times daily Dispense 19. OK to start 19) 90 tablet 0       Allergies  No Known Allergies    Social History  Social History     Socioeconomic History     Marital status:      Spouse name: Not on file     Number of children: Not on file     Years of education: Not on file     Highest education level: Not on file   Occupational History     Not on file   Social Needs     Financial resource strain: Not on file     Food insecurity:     Worry: Not on file     Inability: Not on file     Transportation needs:     Medical: Not on file     Non-medical: Not on file   Tobacco Use     Smoking status: Former Smoker     Packs/day: 0.25     Last attempt to quit: 1970     Years since quittin.1     Smokeless tobacco: Never Used "   Substance and Sexual Activity     Alcohol use: Yes     Comment: few times monthly     Drug use: Never     Sexual activity: Not Currently   Lifestyle     Physical activity:     Days per week: Not on file     Minutes per session: Not on file     Stress: Not on file   Relationships     Social connections:     Talks on phone: Not on file     Gets together: Not on file     Attends Restorationist service: Not on file     Active member of club or organization: Not on file     Attends meetings of clubs or organizations: Not on file     Relationship status: Not on file     Intimate partner violence:     Fear of current or ex partner: Not on file     Emotionally abused: Not on file     Physically abused: Not on file     Forced sexual activity: Not on file   Other Topics Concern     Not on file   Social History Narrative     Not on file       Family History  Family History   Problem Relation Age of Onset     Colon Cancer Mother      Hypertension Father         did not know father well       ROS/MED HX  The complete review of systems is negative other than noted in the HPI or here.   ENT/Pulmonary:     (+)sleep apnea, uses CPAP , . 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    Neurologic:  - neg neurologic ROS     Cardiovascular:     (+) Dyslipidemia, hypertension--CAD, -past MI (NSTEMI 7/2019),-stent,7/30/2019  2 Drug Eluting Stent .. Taking blood thinners : . CHF Last EF: 50-55% date: 7/5/2019 NYHA classification: III. GONZALEZ, . :. valvular problems/murmurs type: AS severe:. Previous cardiac testing Echodate:7/5/2019results:date: results: date: results:Cath date: 7/30/2019 results:          METS/Exercise Tolerance:  1 - Eating, dressing   Hematologic:     (+) History of blood clots pt is anticoagulated, no previous transfusion reaction -      Musculoskeletal: Comment: Inflammatory arthritis        GI/Hepatic:  - neg GI/hepatic ROS       Renal/Genitourinary:     (+) Nephrolithiasis ,       Endo: Comment: Uses NPH when BS >  "140  Maybe once per week    (+) type II DM Last HgA1c: 6.3 date: 1/2019 thyroid problem hypothyroidism, .      Psychiatric:  - neg psychiatric ROS       Infectious Disease:  - neg infectious disease ROS       Malignancy:      - no malignancy   Other:    (+) H/O Chronic Pain,H/O chronic opiod use ,          Temp: 98.1  F (36.7  C) Temp src: Oral BP: 92/53 Pulse: 92   Resp: 18 SpO2: 91 %         157 lbs 6.4 oz  5' 5\"   Body mass index is 26.19 kg/m .       Physical Exam  Constitutional: Awake, alert, cooperative, and appears stated age. Difficult for patient to walk down hallway to bathroom.  SOB.  Eyes: Pupils equal, round and reactive to light, extra ocular muscles intact, sclera clear, conjunctiva normal.  HENT: Normocephalic, oral pharynx with moist mucus membranes, good dentition.   Respiratory: diminished respiratory effort   Cardiovascular: Regular rate and rhythm, normal S1 and loud systolic murmur radiating to bilateral neck.  Carotids +2, no bruits. Bilateral LE edema. Palpable pulses to radial, DP and PT arteries difficult to palpate due to edema.   GI: Normal bowel sounds, soft, non-distended, non-tender.  Lymph/Hematologic: No cervical lymphadenopathy and no supraclavicular lymphadenopathy.  Genitourinary:  deferred  Skin: Warm and dry.   Musculoskeletal: Full ROM of neck. There is no redness, warmth, or swelling of the joints. Gross motor strength is normal.    Neurologic: Awake, alert, oriented to name, place and time. Cranial nerves II-XII are grossly intact. Gait is slow with walker.   Neuropsychiatric: Calm, cooperative. Normal affect.     Labs: (personally reviewed)  Last Comprehensive Metabolic Panel:  Sodium   Date Value Ref Range Status   08/21/2019 137 133 - 144 mmol/L Final     Potassium   Date Value Ref Range Status   08/21/2019 4.1 3.4 - 5.3 mmol/L Final     Chloride   Date Value Ref Range Status   08/21/2019 101 94 - 109 mmol/L Final     Carbon Dioxide   Date Value Ref Range Status "   08/21/2019 30 20 - 32 mmol/L Final     Anion Gap   Date Value Ref Range Status   08/21/2019 6 3 - 14 mmol/L Final     Glucose   Date Value Ref Range Status   08/21/2019 106 (H) 70 - 99 mg/dL Final     Urea Nitrogen   Date Value Ref Range Status   08/21/2019 23 7 - 30 mg/dL Final     Creatinine   Date Value Ref Range Status   08/21/2019 1.02 0.66 - 1.25 mg/dL Final     GFR Estimate   Date Value Ref Range Status   08/21/2019 74 >60 mL/min/[1.73_m2] Final     Comment:     Non  GFR Calc  Starting 12/18/2018, serum creatinine based estimated GFR (eGFR) will be   calculated using the Chronic Kidney Disease Epidemiology Collaboration   (CKD-EPI) equation.       Calcium   Date Value Ref Range Status   08/21/2019 8.8 8.5 - 10.1 mg/dL Final     Bilirubin Total   Date Value Ref Range Status   08/21/2019 0.8 0.2 - 1.3 mg/dL Final     Alkaline Phosphatase   Date Value Ref Range Status   08/21/2019 168 (H) 40 - 150 U/L Final     ALT   Date Value Ref Range Status   08/21/2019 17 0 - 70 U/L Final     AST   Date Value Ref Range Status   08/21/2019 16 0 - 45 U/L Final     Lab Results   Component Value Date    WBC 12.5 08/21/2019     Lab Results   Component Value Date    RBC 4.87 08/21/2019     Lab Results   Component Value Date    HGB 10.4 08/21/2019     Lab Results   Component Value Date    HCT 37.4 08/21/2019     Lab Results   Component Value Date    MCV 77 08/21/2019     Lab Results   Component Value Date    MCH 21.4 08/21/2019     Lab Results   Component Value Date    MCHC 27.8 08/21/2019     Lab Results   Component Value Date    RDW 22.4 08/21/2019     Lab Results   Component Value Date     08/21/2019     N-Terminal Pro Bnp 6,094  High   0 - 125 pg/mL Final 08/21/2019  1:24 PM     CT CHEST PULMONARY EMBOLISM WITH CONTRAST  6/2/2019 10:36 AM     IMPRESSION:   1. Stable to slightly smaller, very small, bilateral upper lobe  pulmonary emboli. No new acute pulmonary embolism in the interval.  2. Stable  prominent bibasilar and subpleural regions of consolidation  with interstitial prominence. No new airspace disease identified.  3. Stable mildly prominent chest lymph nodes.  4. Stable cardiomegaly.    EKG: Personally reviewed but formal cardiology read pendin2019  NSR  Possible left atrial enlargement  Nonspecific intraventricular block    EKG previous:  19 with  bpm, NSIVD, right axis and NSSTC.  continues to have sinus rhythm at 89 bpm with rightward axis and nonspecific intraventricular conduction delay plus diffuse T wave inversions in anterolateral and inferior leads as before.      Cardiac echo: 2019  Interpretation Summary  Left ventricular systolic function is low normal. The visual ejection fraction  is estimated at 50-55%. There is severe hypokinesis of the apex and the apical  segments of the lateral and inferolateral walls.  There is moderate (2+) aortic regurgitation. Severe valvular aortic stenosis.  The peak AoV pressure gradient is 66.0 mmHg. The mean AoV pressure gradient is  34.6 mmHg.  Compared to the study from 2019, the gradients across the AV are lower  although the calculated PETE is the same. Wall motion abnormalities are similar  to previous study.      CARDIAC CATH  2019    Two-vessel obstructive CAD (diffuse calcified disease of the prox-mid LAD;  of small, non-dominant RCA with R->R collaterals)    Successful PCI of the prox-mid LAD with Synergy CHAU x 2 (3.0 x 12 in prox-LAD, 2.75 x 24 in distal LAD)    Severe pulmonary HTN with mildly elevated PCWP    Low-normal cardiac index by Eunice, normal cardiac index by thermodilution    Simultaneous LV-Ao mean gradient = 41.9, calculated PETE = 0.70 cm2      EXAM: CT CHEST WO IV CONT HI-RES 7/3/2019  CONCLUSION:   1.  Findings of chronic interstitial lung disease are unchanged since 2019 and have mildly progressed since 2017.    2.  Lobulated nodular density in the posterior segment left upper lobe is  new since 2017 and has increased since 1/11/2019. This has an appearance of an arteriovenous malformation.    3.  Stable ectasia of the ascending thoracic aorta.    PFT's: 7/2019 (records requested)  SPIROMETRY / LUPE / ACT 3/1/2018 6/13/2019   FVC Best 2.22 2.11   FVC % 66 60   FEV1 Best 1.72 1.57   FEV1 % 69 61   FEV1/FVC 77.4 74.4   Spirometry consistent with moderate restrictive defect.      Outside records reviewed from: care everywhere    ASSESSMENT and PLAN  Beulah Jane is a 69 year old male scheduled for TAVR on 9/18/2019 by Dr. Garcia and Dr. Duron in treatment of severe aortic stenosis.  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  Cardiac:  Functional status- METS 1.   2.  Pulm:  Airway feasible.  JUAN risk: Pt has known JUAN, uses CPAP  3.  GI:  Risk of PONV score = 1.  If > 2, anti-emetic intervention recommended.    VTE risk: 3% (age, gender, history of VTE)    #cardiac  -severe aortic stenosis, s/p heart cath 7/30/2019 showing severe Pulmonary htn, mildly elevated PCWP, and PETE of 0.7cm2 with mean gradient of 41.9mm Hg.  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 Not on AC.  Most recent CT of chest pulmonary embolism:  Stable to slightly smaller, very small, bilateral upper lobe  pulmonary emboli. No new acute pulmonary embolism in the interval.  -recent PFTs 7/2019 (requested records from [facility])    #endo  -hypothyroidism (levothyroxine)  -DM (NPH when BS > 40)    #rheum  -dermatomyositis,   (methotrexate weekly)  -has had one infusion of rituximab in March 2019    #Considered sending patient to ED for declining symptoms and O2 sats hovering at 89-90 on room air.  Contacted cardiology who was able to get him in this afternoon in the hopes of tuning up his heart failure. He may ultimately need to go to ED.    Please see cardiology note from 8/21/2019      Patient was discussed with Dr Perez.    Caroline Marrero PA-C  Preoperative Assessment Center  [facility]  [facility]  Phone: [phone]  Fax: [phone]

## 2019-08-21 NOTE — NURSING NOTE
Chief Complaint   Patient presents with     New Patient     1 month follow up      Vitals were taken and medications were reconciled.     Venita Johnson CMA    1:58 PM

## 2019-08-21 NOTE — ANESTHESIA PREPROCEDURE EVALUATION
Anesthesia Pre-Procedure Evaluation    Patient: Beulah Jane   MRN:     [id number] Gender:   male   Age:    69 year old :      1950        Preoperative Diagnosis: heart valve condition   Procedure(s):  Transcatheter ([employer]) Aortic Valve Replacement  Cardiopulmonary Bypass Standby     Past Medical History:   Diagnosis Date     Aortic stenosis      Chronic pain      DM (diabetes mellitus), type 2 (H)     on insulin     Hyperlipidemia      JUAN on CPAP      Pneumonia      Polymyositis (H)      Pulmonary fibrosis, unspecified (H)      Seasonal allergies       Past Surgical History:   Procedure Laterality Date     ARTHROSCOPY KNEE  1970     COLONOSCOPY       CV CORONARY ANGIOGRAM N/A 2019    Procedure: CV CORONARY ANGIOGRAM;  Surgeon: Leonidas Mike MD;  Location:  [facility]     CV LEFT HEART CATH N/A 2019    Procedure: CV LEFT HEART CATH;  Surgeon: Leonidas Mike MD;  Location:  [facility]     CV PCI STENT DRUG ELUTING Left 2019    Procedure: PCI Stent Drug Eluting;  Surgeon: Leonidas Mike MD;  Location:  [facility]     CV RIGHT HEART CATH N/A 2019    Procedure: CV RIGHT HEART CATH;  Surgeon: Leonidas Mike MD;  Location:  [facility]     DECOMPRESSION, FUSION LUMBAR POSTERIOR TWO LEVELS, COMBINED       FUSION LUMBAR ANTERIOR THREE+ LEVELS N/A 2019    Procedure: Lumbar 1 Or Lumbar 2-5 Anterior Lumbar Interbody Fusion with BMP Stage 1 Of Two Procedure:;  Surgeon: Carlos Enrique Dial MD;  Location:  OR     HERNIA REPAIR       lumbar spine hardware removal       OPTICAL TRACKING SYSTEM FUSION POSTERIOR SPINE THORACIC THREE+ LEVELS N/A 2019    Procedure: O-Arm/Stealth Assisted Thoracic 10-Pelvis Instrumented Fusion T11-2, L1-2, L2-3 Transforminal Lumbar Interbody Fusion (TLIF) And Smith Borges Osteotomies,and L3-4 SPO as well, Use Of BMP, Debridement Of Seroma;   Surgeon: Carlos Enrique Dial MD;  Location: [facility] OR     PICC INSERTION Right 05/06/2019    4Fr - 37cm, Basilic vein, low SVC     REMOVAL PROSTHETIC MATERIAL/MESH, ABD WALL NECRO TISS INFEXN  2012     ROTATOR CUFF REPAIR RT/LT  2003          Anesthesia Evaluation     . Pt has had prior anesthetic. Type: General    No history of anesthetic complications          ROS/MED HX    ENT/Pulmonary:     (+)sleep apnea, uses CPAP , . 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    Neurologic:  - neg neurologic ROS     Cardiovascular:     (+) Dyslipidemia, hypertension--CAD, -past MI (NSTEMI 7/2019),-stent,7/30/2019  2 Drug Eluting Stent .. Taking blood thinners : . CHF Last EF: 50-55% date: 7/5/2019 NYHA classification: III. GONZALEZ, . :. valvular problems/murmurs type: AS severe:. Previous cardiac testing Echodate:7/5/2019results:date: results: date: results:Cath date: 7/30/2019 results:          METS/Exercise Tolerance:  1 - Eating, dressing   Hematologic:     (+) History of blood clots pt is anticoagulated, no previous transfusion reaction -      Musculoskeletal: Comment: Inflammatory arthritis        GI/Hepatic:  - neg GI/hepatic ROS       Renal/Genitourinary:     (+) Nephrolithiasis ,       Endo: Comment: Uses NPH when BS > 140  Maybe once per week    (+) type II DM Last HgA1c: 6.3 date: 1/2019 thyroid problem hypothyroidism, .      Psychiatric:  - neg psychiatric ROS       Infectious Disease:  - neg infectious disease ROS       Malignancy:      - no malignancy   Other:    (+) H/O Chronic Pain,H/O chronic opiod use ,                        PHYSICAL EXAM:   Mental Status/Neuro: A/A/O; Age Appropriate   Airway: Facies: Feasible  Mallampati: III  Mouth/Opening: Full  TM distance: > 6 cm  Neck ROM: Full   Respiratory: Auscultation: CTAB     Resp. Rate: Normal     Respiratory Effort: decreased respiratory effort.  Rales bilateral       CV:   Heart: Murmur (Systolic; loud)  Edema: RLE; LLE  Neck: Murmur radiating to  "neck   Comments:                      LABS:  CBC:   Lab Results   Component Value Date    WBC 9.4 07/30/2019    WBC 10.3 07/17/2019    HGB 10.3 (L) 07/30/2019    HGB 10.2 (L) 07/17/2019    HCT 37.6 (L) 07/30/2019    HCT 36.6 (L) 07/17/2019     07/30/2019     (H) 07/17/2019     BMP:   Lab Results   Component Value Date     07/31/2019     07/30/2019    POTASSIUM 4.0 07/31/2019    POTASSIUM 4.2 07/30/2019    CHLORIDE 103 07/31/2019    CHLORIDE 100 07/30/2019    CO2 29 07/31/2019    CO2 31 07/30/2019    BUN 18 07/31/2019    BUN 21 07/30/2019    CR 0.88 07/31/2019    CR 1.02 07/30/2019     (H) 07/31/2019     (H) 07/30/2019     COAGS:   Lab Results   Component Value Date    PTT 37 04/25/2019    INR 1.11 07/05/2019    FIBR 551 (H) 04/25/2019     POC:   Lab Results   Component Value Date     (H) 07/30/2019     OTHER:   Lab Results   Component Value Date    PH 7.43 04/25/2019    LACT 2.1 (H) 05/09/2019    A1C 6.3 (H) 01/29/2019    DMITRIY 8.8 07/31/2019    MAG 1.8 05/01/2019    ALBUMIN 3.7 07/11/2019    PROTTOTAL 7.4 07/11/2019    ALT 14 07/11/2019    AST 17 07/11/2019    ALKPHOS 202 (H) 07/11/2019    BILITOTAL 0.4 07/11/2019    CRP 42.9 (H) 06/03/2019     (H) 06/03/2019        Preop Vitals    BP Readings from Last 3 Encounters:   07/31/19 111/56   07/30/19 126/66   07/18/19 126/69    Pulse Readings from Last 3 Encounters:   07/31/19 87   07/30/19 97   07/18/19 112      Resp Readings from Last 3 Encounters:   07/09/19 16   06/10/19 18   06/10/19 18    SpO2 Readings from Last 3 Encounters:   07/31/19 95%   07/30/19 96%   07/17/19 93%      Temp Readings from Last 1 Encounters:   07/30/19 97.8  F (36.6  C) (Oral)    Ht Readings from Last 1 Encounters:   08/02/19 1.676 m (5' 6\")      Wt Readings from Last 1 Encounters:   08/02/19 73 kg (161 lb)    Estimated body mass index is 25.99 kg/m  as calculated from the following:    Height as of 8/2/19: 1.676 m (5' 6\").    Weight as of " 8/2/19: 73 kg (161 lb).     LDA:  Right Groin Interventional Procedure Access (Active)   Site Assessment [facility] 7/30/2019 11:30 PM   Hemostasis management Unchanged 7/30/2019 11:30 PM   Femoral Bruit not present 7/30/2019 11:30 PM   CMS Right Extremity [facility] 7/30/2019 11:30 PM   Dorsalis Pulse - Right Leg Normal 7/30/2019 11:30 PM   Posterior Tibial Pulse - Right Leg Normal 7/30/2019 11:30 PM   Number of days: 22       Right Groin Interventional Procedure Access (Active)   Site Assessment [facility] 7/30/2019 11:30 PM   Hemostasis management Unchanged 7/30/2019 11:30 PM   Femoral Bruit not present 7/30/2019 11:30 PM   CMS Right Extremity [facility] 7/30/2019 11:30 PM   Popliteal Pulse - Right Leg Normal 7/30/2019 11:30 PM   Posterior Tibial Pulse - Right Leg Normal 7/30/2019 11:30 PM   Number of days: 22        Assessment:   ASA SCORE: 4       NPO Status: NPO Appropriate     Plan:   Anes. Type:  MAC   Pre-Medication: None   Induction:  N/a   Airway: Native Airway   Access/Monitoring: PIV; 2nd PIV   Maintenance: N/a     Postop Plan:   Postop Pain: None  Postop Sedation/Airway: Not planned  Disposition: ICU     PONV Management:    Prevention: Ondansetron     CONSENT: Direct conversation   Plan and risks discussed with: Patient   Blood Products: Consented (ALL Blood Products)       Comments for Plan/Consent:  69M with ILD and severe aortic stenosis here for TF TAVR.  ASA 4.  Plan: MAC, shared art line / central line with cardiology.              PAC Discussion and Assessment    ASA Classification: 3  Case is suitable for: [place]  Anesthetic techniques and relevant risks discussed: MAC with GA as backup  Invasive monitoring and risk discussed: No  Types:   Possibility and Risk of blood transfusion discussed: Yes  NPO instructions given:   Additional anesthetic preparation and risks discussed:   Needs early admission to pre-op area:   Other:     PAC Resident/NP Anesthesia Assessment:  Beulah Jane is a 69 year old male scheduled  for TAVR on 9/18/2019 by Dr. Garcia and Dr. Duron in treatment of severe aortic stenosis.  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  Cardiac:  Functional status- METS 1.   2.  Pulm:  Airway feasible.  JUAN risk: Pt has known JUAN, uses CPAP  3.  GI:  Risk of PONV score = 1.  If > 2, anti-emetic intervention recommended.    VTE risk: 3% (age, gender, history of VTE)    #cardiac  -severe aortic stenosis, s/p heart cath 7/30/2019 showing severe Pulmonary htn, mildly elevated PCWP, and PETE of 0.7cm2 with mean gradient of 41.9mm Hg.  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 Not on AC.  Most recent CT of chest pulmonary embolism:  Stable to slightly smaller, very small, bilateral upper lobe  pulmonary emboli. No new acute pulmonary embolism in the interval.  -recent PFTs 7/2019 (requested records from [facility])  -Previous:SPIROMETRY / LUPE / ACT 3/1/2018 6/13/2019   FVC Best 2.22 2.11   FVC % 66 60   FEV1 Best 1.72 1.57   FEV1 % 69 61   FEV1/FVC 77.4 74.4   Spirometry consistent with moderate restrictive defect.    #endo  -hypothyroidism (levothyroxine)  -DM (NPH when BS > 40)    #rheum  -dermatomyositis,  (methotrexate weekly)  -has had one infusion of rituximab in March 2019    Patient is optimized and is acceptable candidate for the proposed procedure.  No further diagnostic evaluation is needed.     Patient also evaluated by Dr. Perez.      **For further details of assessment, testing, and physical exam please see H and P completed on same  date.          Caroline Marrero PA-C, [facility]      Reviewed and Signed by PAC Mid-Level Provider/Resident  Mid-Level Provider/Resident: Caroline Marrero  Date: 8/21/2019  Time:     Attending Anesthesiologist Anesthesia Assessment:        Anesthesiologist:   Date:   Time:   Pass/Fail:   Disposition:     PAC Pharmacist Assessment:        Pharmacist:   Date:   Time:    Caroline Marrero PA-C

## 2019-08-21 NOTE — PATIENT INSTRUCTIONS
Preparing for Your Surgery      Name:  Beulah Jane   MRN:  [id number]   :  1950   Today's Date:  2019     Arriving for surgery:  Surgery date:  2019  Arrival time:  5:30 am  Please come to:       [facility] [address]  [address] [place]  [address]    - ? parking is available in front of the hospital      -    Please proceed to [address] on the 3rd floor. [phone]?     - ?If you are in need of directions, wheelchair or escort please stop at the Information Desk in the lobby.  Inform the information person that you are here for surgery; a wheelchair and escort to [address] will be provided.?     What can I eat or drink?  -  You may have solid food or milk products until 8 hours prior to your surgery.(19 at 11 pm)  -  You may have water, apple juice or 7up/Sprite until 2 hours prior to your surgery.(until 5:30 am arrival time)    Which medicines can I take?        Take aspirin as directed by Cardiology       Hold all NSAIDS (ibuprofen/naproxen) for 10 days before surgery       Take Brilinta as directed by cardiology      -  Please take these medications the day of surgery:  You will be directed by your cardiology       How do I prepare myself?  -  Take two showers: one the night before surgery; and one the morning of surgery.         Use Scrubcare or Hibiclens to wash from neck down.  You may use your own shampoo and conditioner. No other hair products.   -  Do NOT use lotion, powder, deodorant, or antiperspirant the day of your surgery.  -  Do NOT wear any makeup, fingernail polish or jewelry.  - Do not bring your own medications to the hospital, except for inhalers and eye drops.  -  Bring your ID and insurance card.  Bring your CPAP machine to hospital       Questions or Concerns:  -If you have questions or concerns regarding the day of surgery, please call   The Pre Admission Nursing Office at [phone].       -For questions after  surgery please call your surgeons office.           AFTER YOUR SURGERY  Breathing exercises   Breathing exercises help you recover faster. Take deep breaths and let the air out slowly. This will:     Help you wake up after surgery.    Help prevent complications like pneumonia.  Preventing complications will help you go home sooner.   We may give you a breathing device (incentive spirometer) to encourage you to breathe deeply.   Nausea and vomiting   You may feel sick to your stomach after surgery; if so, let your nurse know.    Pain control:  After surgery, you may have pain. Our goal is to help you manage your pain. Pain medicine will help you feel comfortable enough to do activities that will help you heal.  These activities may include breathing exercises, walking and physical therapy.   To help your health care team treat your pain we will ask: 1) If you have pain  2) where it is located 3) describe your pain in your words  Methods of pain control include medications given by mouth, vein or by nerve block for some surgeries.  Sequential Compression Device (SCD) or Pneumo Boots:  You may need to wear SCD S on your legs or feet. These are wraps connected to a machine that pumps in air and releases it. The repeated pumping helps prevent blood clots from forming.    normal...

## 2019-08-21 NOTE — LETTER
8/21/2019      RE: Beulah Jane  [address]  [facility] [address]       Dear Colleague,    Thank you for the opportunity to participate in the care of your patient, Beulah Jane, at the [facility]. Please see a copy of my visit note below.    CARDIOLOGY NEW OFFICE VISIT    HPI: Beulah Jane is a 69 year old male being seen today for evaluation of heart failure.   The patient's risk factor profile is: (+) HTN, Type II DM, (+) hypercholesterolemia, (+)  prior 5 pack-year tobacco use, (?) fam Hx premature CAD.  He had a history of CAD with PCI of his LAD as part of TAVR work up, severe aortic stenosis being currently worked for TAVR and scheduled for September. He has been having issues with hypervolemia and diastolic heart failure, has been on Lasix with increase dosage recently up from 40 to 60 mg daily. Despite this, he was seen in pre op today with anesthesia who felt that he needed to be evaluated for his heart failure by a cardiologist.  He complains of severe shortness of breath that has worsened over the past week. He reports as little as 10 steps will evoke dyspnea. He does not have orthopnea or PND. He has not had any chest pain. He has noticed his dyspnea has worsened since his most recent cath on July 30th, maybe 3 weeks ago. At that time his RA was 10 and PCWP was 20 and his Lasix dose has not been changed in a while as far as I can see.     The patient denies a history of chest discomfort, PND, orthopnea, pedal edema, palpitations, lightheadedness, and syncope.      PAST MEDICAL HISTORY:  Past Medical History:   Diagnosis Date     Aortic stenosis      Chronic pain      DM (diabetes mellitus), type 2 (H)     on insulin     Hyperlipidemia      JUAN on CPAP      Pneumonia      Polymyositis (H)      Pulmonary fibrosis, unspecified (H)      Seasonal allergies        CURRENT MEDICATIONS:  Current Outpatient Medications  "  Medication Sig Dispense Refill     albuterol (2.5 MG/3ML) 0.083% neb solution Take 1 vial by nebulization every 6 hours as needed for shortness of breath / dyspnea or wheezing       albuterol (PROAIR HFA/PROVENTIL HFA/VENTOLIN HFA) 108 (90 BASE) MCG/ACT Inhaler Inhale 2 puffs into the lungs as needed for shortness of breath / dyspnea or wheezing        aspirin (ASA) 81 MG EC tablet Take 1 tablet (81 mg) by mouth daily Start tomorrow morning. (Patient taking differently: Take 81 mg by mouth every morning ) 90 tablet 3     atorvastatin (LIPITOR) 40 MG tablet Take 1 tablet (40 mg) by mouth daily (Patient taking differently: Take 40 mg by mouth every morning ) 90 tablet 3     calcium carbonate (OS-DMITRIY 500 MG [place]) 500 MG tablet Take 1 tablet (500 mg) by mouth 2 times daily (Patient taking differently: Take 1 tablet by mouth every morning ) 180 tablet 3     cetirizine (ZYRTEC) 10 MG tablet Take 10 mg by mouth daily       Cholecalciferol (VITAMIN D) 2000 units tablet Take 2,000 Units by mouth daily (Patient taking differently: Take 2,000 Units by mouth every morning ) 100 tablet 3     diphenhydrAMINE (BENADRYL) 50 MG capsule Take 50 mg by mouth as needed for itching or allergies        folic acid (FOLVITE) 1 MG tablet Take 1 mg by mouth every morning        furosemide (LASIX) 40 MG tablet Take 3 tablets (120 mg) by mouth daily 90 tablet 0     gabapentin (NEURONTIN) 600 MG tablet Take 2 tablets (1,200 mg) by mouth 3 times daily 180 tablet 0     gemfibrozil (LOPID) 600 MG tablet Take 600 mg by mouth 2 times daily        insulin NPH (HUMULIN N/NOVOLIN N VIAL) 100 UNIT/ML vial Inject 2-6 Units Subcutaneous 2 times daily Will usually give if blood glucose >140 (Patient taking differently: Inject 2-6 Units Subcutaneous 2 times daily as needed Will usually give if blood glucose >140) 10 mL 11     insulin syringe-needle U-100 (29G X 1/2\" 1 ML) 29G X 1/2\" 1 ML miscellaneous Inject 1 Syringe Subcutaneous 2 times daily 200 " each 11     LANsoprazole (PREVACID) 30 MG DR capsule Take 30 mg by mouth every morning (before breakfast)       levothyroxine (SYNTHROID/LEVOTHROID) 50 MCG tablet Take 50 mcg by mouth every morning        methotrexate 2.5 MG tablet Take 10 tablets (25 mg) by mouth once a week Tuesday 120 tablet 3     metoprolol tartrate (LOPRESSOR) 25 MG tablet Take 0.5 tablets (12.5 mg) by mouth 2 times daily Hold IF heart rate less than 55. 180 tablet 3     multivitamin w/minerals (THERA-VIT-M) tablet Take 1 tablet by mouth daily (Patient taking differently: Take 1 tablet by mouth every morning )       omega 3 1000 MG CAPS Take 2 capsules by mouth every morning        oxyCODONE-acetaminophen (PERCOCET) 7.5-325 MG per tablet Take 1 tablet by mouth every 6 hours as needed for severe pain (max 3 tablets daily) Dispense 08/16/19. OK to start 08/16/19 (Patient taking differently: Take 1 tablet by mouth 3 times daily Dispense 08/16/19. OK to start 08/16/19) 90 tablet 0     ticagrelor (BRILINTA) 90 MG tablet Take 1 tablet (90 mg) by mouth 2 times daily Start this evening. 180 tablet 3       PAST SURGICAL HISTORY:  Past Surgical History:   Procedure Laterality Date     ARTHROSCOPY KNEE  1970     COLONOSCOPY       CV CORONARY ANGIOGRAM N/A 7/30/2019    Procedure: CV CORONARY ANGIOGRAM;  Surgeon: Leonidas Mike MD;  Location:  HEART CARDIAC CATH LAB     CV LEFT HEART CATH N/A 7/30/2019    Procedure: CV LEFT HEART CATH;  Surgeon: Leonidas Mike MD;  Location:  HEART CARDIAC CATH LAB     CV PCI STENT DRUG ELUTING Left 7/30/2019    Procedure: PCI Stent Drug Eluting;  Surgeon: Leonidas Mike MD;  Location:  HEART CARDIAC CATH LAB     CV RIGHT HEART CATH N/A 7/30/2019    Procedure: CV RIGHT HEART CATH;  Surgeon: Leonidas Mike MD;  [facility]     DECOMPRESSION, FUSION LUMBAR POSTERIOR TWO LEVELS, COMBINED  1997     FUSION LUMBAR ANTERIOR THREE+ LEVELS N/A 4/22/2019    Procedure:  Lumbar 1 Or Lumbar 2-5 Anterior Lumbar Interbody Fusion with BMP Stage 1 Of Two Procedure:;  Surgeon: Carlos Enrique Dial MD;  Location: [facility] OR     HERNIA REPAIR  2006     lumbar spine hardware removal       OPTICAL TRACKING SYSTEM FUSION POSTERIOR SPINE THORACIC THREE+ LEVELS N/A 2019    Procedure: O-Arm/Stealth Assisted Thoracic 10-Pelvis Instrumented Fusion T11-2, L1-2, L2-3 Transforminal Lumbar Interbody Fusion (TLIF) And Smith Boregs Osteotomies,and L3-4 SPO as well, Use Of BMP, Debridement Of Seroma;  Surgeon: Carlos Enrique Dial MD;  Location: [facility] OR     PICC INSERTION Right 2019    4Fr - 37cm, Basilic vein, low SVC     REMOVAL PROSTHETIC MATERIAL/MESH, ABD WALL NECRO TISS INFEXN       ROTATOR CUFF REPAIR RT/LT         ALLERGIES  Patient has no known allergies.    FAMILY HX:  Family History   Problem Relation Age of Onset     Colon Cancer Mother      Hypertension Father         did not know father well       SOCIAL HX:  Social History     Socioeconomic History     Marital status:      Spouse name: Not on file     Number of children: Not on file     Years of education: Not on file     Highest education level: Not on file   Occupational History     Not on file   Social Needs     Financial resource strain: Not on file     Food insecurity:     Worry: Not on file     Inability: Not on file     Transportation needs:     Medical: Not on file     Non-medical: Not on file   Tobacco Use     Smoking status: Former Smoker     Packs/day: 0.25     Last attempt to quit: 1970     Years since quittin.1     Smokeless tobacco: Never Used   Substance and Sexual Activity     Alcohol use: Yes     Comment: few times monthly     Drug use: Never     Sexual activity: Not Currently   Lifestyle     Physical activity:     Days per week: Not on file     Minutes per session: Not on file     Stress: Not on file   Relationships     Social connections:     Talks on phone: Not on file      "Gets together: Not on file     Attends [employer] service: Not on file     Active member of club or organization: Not on file     Attends meetings of clubs or organizations: Not on file     Relationship status: Not on file     Intimate partner violence:     Fear of current or ex partner: Not on file     Emotionally abused: Not on file     Physically abused: Not on file     Forced sexual activity: Not on file   Other Topics Concern     Not on file   Social History Narrative     Not on file       ROS:  Constitutional: No fever, chills, or sweats. No weight gain/loss.   ENT: No visual disturbance, ear ache, epistaxis, sore throat.   Allergies/Immunologic: Negative.   Respiratory: No cough, hemoptysis.   Cardiovascular: As per HPI.   GI: No nausea, vomiting, hematemesis, melena, or hematochezia.   : No urinary frequency, dysuria, or hematuria.   Integument: Negative.   Psychiatric: Negative.   Neuro: Negative.   Endocrinology: Negative.   Musculoskeletal: No myalgia.    VITAL SIGNS:  BP 92/53   Pulse 92   Ht 1.651 m (5' 5\")   Wt 71.2 kg (157 lb)   SpO2 91%   BMI 26.13 kg/m     Body mass index is 26.13 kg/m .  Wt Readings from Last 2 Encounters:   08/21/19 71.2 kg (157 lb)   08/21/19 71.4 kg (157 lb 6.4 oz)       PHYSICAL EXAM  Beulah Jane is a 69 year old male in no acute distress.  HEENT: Unremarkable.  Neck: JVP up at 10 cm.  Carotids +4/4 bilaterally without bruits.  Lungs: bilateral crackles.  Cor: RRR. Normal S1, absent s2, III/VI systolic murmur, harsh at LUSB.  PMI in Lf 5th ICS.  Abd: Soft, nontender, nondistended.  NABS.  No pulsatile mass.  Extremities: bilateral 1+ TRUDI.  Pulses +4/4 symmetric in upper and lower extremities.  Neuro: Grossly intact.    LABS    Lab Results   Component Value Date    WBC 12.5 08/21/2019     Lab Results   Component Value Date    RBC 4.87 08/21/2019     Lab Results   Component Value Date    HGB 10.4 08/21/2019     Lab Results   Component Value Date    HCT 37.4 " 2019     No components found for: MCT  Lab Results   Component Value Date    MCV 77 2019     Lab Results   Component Value Date    MCH 21.4 2019     Lab Results   Component Value Date    MCHC 27.8 2019     Lab Results   Component Value Date    RDW 22.4 2019     Lab Results   Component Value Date     2019      Recent Labs   Lab Test 19  0716 19  1212    138   POTASSIUM 4.0 4.2   CHLORIDE 103 100   CO2 29 31   ANIONGAP 4 6   * 127*   BUN 18 21   CR 0.88 1.02   DMITRIY 8.8 9.1     Recent Labs   Lab Test 19  2237   CHOL 153   HDL 22*   *   TRIG 139   NHDL 131*        EK19  Sinus, non specific intraventricular conduction delay    ECHO: 19  Interpretation Summary     Left ventricular systolic function is low normal. The visual ejection fraction  is estimated at 50-55%. There is severe hypokinesis of the apex and the apical  segments of the lateral and inferolateral walls.  There is moderate (2+) aortic regurgitation. Severe valvular aortic stenosis.  The peak AoV pressure gradient is 66.0 mmHg. The mean AoV pressure gradient is  34.6 mmHg.  Compared to the study from 2019, the gradients across the AV are lower  although the calculated PETE is the same. Wall motion abnormalities are similar  to previous study.    STRESS TEST:  N/A    CARDIAC CATH:  19  Conclusion       Two-vessel obstructive CAD (diffuse calcified disease of the prox-mid LAD;  of small, non-dominant RCA with R->R collaterals)    Successful PCI of the prox-mid LAD with Synergy CHAU x 2 (3.0 x 12 in prox-LAD, 2.75 x 24 in distal LAD)    Severe pulmonary HTN with mildly elevated PCWP    Low-normal cardiac index by Eunice, normal cardiac index by thermodilution    Simultaneous LV-Ao mean gradient = 41.9, calculated PETE = 0.70 cm2          Plan       Follow bedrest per protocol    Continued medical management and lifestyle modifications for cardiovascular risk  factor optimizations.  * Aspirin 81 lifelong  * Dual anti-platelet therapy for at least 1 year, and potentially longer if tolerated  * High-intensity statin  * Cardiac rehab referral  * TAVR planning per Valve Team   Coronary Findings     Diagnostic   Dominance: Left   Left Main   The vessel was visualized by selective angiography and is moderate in size. There was 0% vessel disease.   Left Anterior Descending   Ost LAD lesion is 30% stenosed. The lesion is discrete.   Prox LAD lesion is 70% stenosed. The lesion is type C - high risk and discrete. The lesion is severely calcified. Just proximal to D1   Prox LAD to Mid LAD lesion is 60% stenosed. The lesion is type C - high risk and eccentric. The lesion is mildly calcified. diffuse   First Diagonal Branch   Ost 1st Diag lesion is 70% stenosed. The lesion is discrete. < 2 mm vessel   Ramus Intermedius   The vessel is small.   Ost Ramus lesion is 60% stenosed. The lesion is discrete. Small vessel   Left Circumflex   First Obtuse Marginal Branch   The vessel is large.   Ost 1st Mrg to 1st Mrg lesion is 30% stenosed.   Right Coronary Artery   The vessel is small.   Mid RCA lesion is 100% stenosed. The lesion is discrete. Small, non-dominant right with R->R collaterals   Acute Marginal Branch   Collaterals   Acute Mrg filled by collaterals from Prox RCA.      Intervention     Prox LAD lesion   Stent   Lesion length: 8 mm. The pre-interventional distal flow is normal (KEANU 3). The post-interventional distal flow is normal (KEANU 3). Using an XB 3.5 guide, a BMW wire was advanced across the lesion and parked in the distal LAD. The lesion was pre-dilated with a 2.0 semi-compliant balloon and stented with a 3.0 x 12 Synergy CHAU. Finally, the mid-LAD lesion was directly stented with a 2.75 x 24 Synergy CHAU, sparing the ostium of the first diagonal. Final angiogram reveals excellent stent expansion and apposition without evidence of immediate complications.   There is a 0%  residual stenosis post intervention.   Prox LAD to Mid LAD lesion   Stent   Lesion length: 20 mm. The pre-interventional distal flow is normal (KEANU 3). The post-interventional distal flow is normal (KEANU 3). See prox-LAD stent details   There is a 0% residual stenosis post intervention.   Hemodynamics     Right Heart Pressures     - Severe pulmonary HTN with mildly elevated PCWP  - Low-normal cardiac index by Eunice, normal cardiac index by thermodilution  - Simultaneous LV-Ao mean gradient = 41.9, calculated PETE = 0.70 cm2   Pressures Phase: Baseline      Time Systolic Diastolic Mean A Wave V Wave EDP Max dp/dt HR   RA Pressures  3:46 PM   11 mmHg    18 mmHg    15 mmHg      89 bpm      RV Pressures  3:30 PM       1296 mmHg/sec         3:46 PM 65 mmHg        23 mmHg     90 bpm      PA Pressures  3:55 PM 62 mmHg    20 mmHg    39 mmHg        90 bpm      PCW Pressures  3:55 PM   20 mmHg    25 mmHg    28 mmHg      89 bpm      AO Pressures  4:09 PM 96 mmHg    51 mmHg    70 mmHg        88 bpm        4:12 PM 92 mmHg    56 mmHg    72 mmHg        87 bpm      LV Pressures  4:09  mmHg        11 mmHg     88 bpm      Blood Flow Results Phase: Baseline      Time Results Indexed Values   QP  3:30 PM 3.76 L/min    2.07 L/min/m2      QS  3:30 PM 3.76 L/min    2.07 L/min/m2      Blood Oximetry Phase: Baseline      Time Hb SAT(%) PO2 Content PA Sat   PA  3:30 PM  52.8 %      52.8 %      Art  3:30 PM  100 %     12.51 mL/dL       Cardiac Output Phase: Baseline      Time TDCO TDCI Eunice C.O. Eunice C.I. Eunice HR   Cardiac Output Results  3:30 PM 4.77 L/min    2.62 L/min/m2    3.76 L/min    2.07 L/min/m2         3:58 PM 4.77 L/min          Resistance Results Phase: Baseline      Time PVR SVR PVR-I SVR-I TPR TVR TPR-I TVR-I PVR/SVR TPR/TVR   Resistance Results (Metric)  3:30 .17 dsc-5    1297.61 dsc-5    734.09 dsc-5/m2    2356.83 dsc-5/m2    829.62 dsc-5    1531.6 dsc-5    1506.82 dsc-5/m2    2781.83 dsc-5/m2    0.31    0.54       Resistance Results (Wood)  3:30 PM 5.05 PATEL    16.22 PATEL    9.18 PATEL/m2    29.47 PATEL/m2    10.37 PATEL    19.15 PATEL    18.84 PATEL/m2    34.78 PATEL/m2    0.31    0.54      Stoke Volume Results Phase: Baseline      Time RVSW LVSW RVSW-I LVSW-I   Stroke Work Results  3:30 PM 15.91 gm*m    30.56 gm*m    8.76 gm*m/m2    16.83 gm*m/m2      Valve Results Phase: Baseline      Time Valve Mean Gradient Peak Gradient Area Area Index Sep per Dfp Flow   Valve  3:30 PM    0.7 cm2    0.39 cm2/m2    23.66 sec/beat    201.47 cc        4:09 PM Aortic                ASSESSMENT AND PLAN:    1. Severe aortic stenosis  -- planned for TAVR in September  -- heart failure likely related to his severe stenosis with diastolic dysfunction as well  -- increase Lasix from 60 daily to 60 BID, BMP and Mag this Friday  -- we will check on in him in the next 1-2 days to make sure that the Lasix is providing relief, if it is not he will come in to the hospital for IV diuresis and expedited TAVR if needed    2. Diastolic heart failure, NYHA IV, stage C, PCWP 20 mmHg in July during his cath, RA 10 mmHg, hypervolemic today  -- as above, increase Lasix, labs this Friday, follow up with cardiology    3. CAD s/p PCI to the LAD  -- this is stable, will continue with DAPT   -- statin    4. HLD  -- statin as above    RTC PRN        Please do not hesitate to contact me if you have any questions/concerns.     Sincerely,     Steve Flores MD

## 2019-08-21 NOTE — NURSING NOTE
Labs: Patient was given results of the laboratory testing obtained today. Patient was instructed to return for the next laboratory testing in 2 days . Patient demonstrated understanding of this information and agreed to call with further questions or concerns.   Med Reconcile: Reviewed and verified all current medications with the patient. The updated medication list was printed and given to the patient.  Return Appointment: Patient given instructions regarding scheduling next clinic visit. Patient demonstrated understanding of this information and agreed to call with further questions or concerns.  Medication Change: Patient was educated regarding prescribed medication change, including discussion of the indication, administration, side effects, and when to report to MD or RN. Patient demonstrated understanding of this information and agreed to call with further questions or concerns.  Patient stated he understood all health information given and agreed to call with further questions or concerns.

## 2019-08-26 NOTE — TELEPHONE ENCOUNTER
Routing refill request to provider for review/approval because:  Medication is reported/historical    Ledy FOWLER RN,BSN

## 2019-08-26 NOTE — TELEPHONE ENCOUNTER
[facility] Call Center    Phone Message    May a detailed message be left on voicemail: yes    Reason for Call: Other: Pt is returning a call from someone at the clinic regarding an appointment. He said that someone called his son and asked him to call us regarding his upcoming appointment with Dr. Solorio. Pt thinks that they were wanting him to get in for a sooner appointment. I did not see any encounter, so I'm unsure who had called. Please give the pt a call back.     Action Taken: Message routed to:  [facility] ([facility] Cardiology

## 2019-08-26 NOTE — TELEPHONE ENCOUNTER
Reason for Call:  Medication or medication refill:    Do you use a [facility]?  Name of the pharmacy and phone number for the current request:       [id number] - [place], [place] - [address]      Name of the medication requested: folic acid (FOLVITE) 1 MG tablet    Other request: Pt only has 1 remaining     Can we leave a detailed message on this number? YES    Phone number patient can be reached at: Cell number on file:    Telephone Information:   Mobile [phone]       Best Time: any    Call taken on 8/26/2019 at 2:52 PM by Leann Triplett

## 2019-08-27 NOTE — TELEPHONE ENCOUNTER
"Patient's son is resistant to bringing Danelle for an office visit. He states it takes to much out of him. He is however willing to bring him in for labs but only at [facility].   Let son know that it is important that a medical professional examine his father.  Again, son stated that he thinks his father is '\"stable\" with breathing.  Let him know we are concerned with his volume status and wanted to titrate meds. Asked him how much lasix he was on and he said \"whatever you put him on\"  When last seen his lasix was increased to 120 mg daily.  Labs checked 2 days later were:  Results for DANELLE SIMMS (MRN [id number]) as of 8/27/2019 11:09   Ref. Range 8/23/2019 12:33   Sodium Latest Ref Range: 133 - 144 mmol/L 137   Potassium Latest Ref Range: 3.4 - 5.3 mmol/L 4.3   Chloride Latest Ref Range: 94 - 109 mmol/L 101   Carbon Dioxide Latest Ref Range: 20 - 32 mmol/L 28   Urea Nitrogen Latest Ref Range: 7 - 30 mg/dL 22   Creatinine Latest Ref Range: 0.66 - 1.25 mg/dL 1.13   GFR Estimate Latest Ref Range: >60 mL/min/1.73_m2 66   GFR Estimate If Black Latest Ref Range: >60 mL/min/1.73_m2 76   Calcium Latest Ref Range: 8.5 - 10.1 mg/dL 9.6   Anion Gap Latest Ref Range: 3 - 14 mmol/L 8   N-Terminal Pro Bnp Latest Ref Range: 0 - 125 pg/mL 5,465 (H)   Glucose Latest Ref Range: 70 - 99 mg/dL 128 (H)     Son is willing to have patient come in for labs tomorrow at [facility] at 10 am.     Let family know about the appointment and son was able to confirm that lasix is still at 120 daily.  "

## 2019-08-29 NOTE — H&P
Cardiology History and Physical  Beulah Jane MRN: [id number]  Age: 69 year old, : 1950  Primary care provider: Hoa López            Assessment and Plan:     Mr. Catherine Jane is a 70 y/o M w/ severe aortic stenosis (mPG 42 mmHg, PETE 0.70 cm^2), CAD s/p PCI p-mLAD and dLAD 19 presenting with exertional CP, lightheadedness, GONZALEZ.    #Angina, lightheadedness, GONZALEZ:  Impression is that these symptoms are all mostly related to his severe aortic stenosis, but likely also a component of decompensated heart failure as well given volume up on exam. ECG unchanged from prior and not concerning for acute ischemia. Elevated troponin likely related to ventricular stain with severe aortic stenosis and CHF, and not likely due to acute ischemia.  -Diuresis with goal net negative 1.0-1.5L over next 24hrs; patient very preload dependent  -Lasix 60 mg IV once this evening  -Continue metoprolol tartrate 12.5 mg PO BID    #Severe Aortic Stenosis:  mPG 42 mmHg, PETE 0.70 cm^2. Impression is that patient's severe aortic stenosis is driving patient's symptoms as well as decompensation of his valvular cardiomyopathy. Patient likely needs consideration of TAVR procedure sooner than September 18th day, which is when it is scheduled.    #CAD s/p PCI:  s/p PCI p-mLAD and dLAD 19.  -Continue ASA, ticagrelor  -Continue atorvastatin    #Hypothyroidism:  -Continue levothyroxine    #DM:  -ISS as inpatient    FEN: Low NA diet  PPX: SQH    Code Status: FULL CODE     Patient to be formally staffed in AM by Dr. Mccain.    Jeff Cordova MD  Cardiology Fellow  Pager: [phone]            Chief Complaint:     Chest pain          History of Present Illness:     Mr. Catherine Jane is a 70 y/o M w/ severe aortic stenosis (mPG 42 mmHg, PETE 0.70 cm^2), CAD s/p PCI p-mLAD and dLAD 19 presenting with chest pain.    Patient reports that over the past 1.5 weeks he has been having increased SOB at rest as well as GONZALEZ. He  reports that about 2 weeks ago he was able to walk from kitchen to bathroom before needing to stop due to symptoms, but now just 5-10 steps causes severe GONZALEZ and if he continues to walk, he gets lightheadedness and dizziness, and some times will then get substernal chest pressure. The lightheadedness and chest pressure resolve with rest, but the SOB is persistent for up to 10-20 minutes. Also having more orthopnea, but no PND. No syncope.    Of note, per outpatient note, he has been having problems with hypovolemia diastolic heart failure (RA 11, PCWP 20 from 7/30 [facility]). Lasix dosing has been increasing as a result. On 8/21, lasix was increased from 60 mg every day to 60 mg PO BID. Patient reports that he is actually taking 120 mg PO every day.          Past Medical History:     Past Medical History:   Diagnosis Date     Aortic stenosis      Chronic pain      DM (diabetes mellitus), type 2 (H)     on insulin     Hyperlipidemia      JUAN on CPAP      Pneumonia      Polymyositis (H)      Pulmonary fibrosis, unspecified (H)      Seasonal allergies               Past Surgical History:      Past Surgical History:   Procedure Laterality Date     ARTHROSCOPY KNEE  1970     COLONOSCOPY       CV CORONARY ANGIOGRAM N/A 7/30/2019    Procedure: CV CORONARY ANGIOGRAM;  Surgeon: Leonidas Mike MD;  [facility]     CV LEFT HEART CATH N/A 7/30/2019    Procedure: CV LEFT HEART CATH;  Surgeon: Leonidas Mike MD;  [facility]     CV PCI STENT DRUG ELUTING Left 7/30/2019    Procedure: PCI Stent Drug Eluting;  Surgeon: Leonidas Mike MD;  [facility]     CV RIGHT HEART CATH N/A 7/30/2019    Procedure: CV RIGHT HEART CATH;  Surgeon: Leonidas Mike MD;  [facility]     DECOMPRESSION, FUSION LUMBAR POSTERIOR TWO LEVELS, COMBINED  1997     FUSION LUMBAR ANTERIOR THREE+ LEVELS N/A 4/22/2019    Procedure: Lumbar 1 Or  Lumbar 2-5 Anterior Lumbar Interbody Fusion with BMP Stage 1 Of Two Procedure:;  Surgeon: Carlos Enrique Dial MD;  [facility]     HERNIA REPAIR  2006     lumbar spine hardware removal       OPTICAL TRACKING SYSTEM FUSION POSTERIOR SPINE THORACIC THREE+ LEVELS N/A 2019    Procedure: O-Arm/Stealth Assisted Thoracic 10-Pelvis Instrumented Fusion T11-2, L1-2, L2-3 Transforminal Lumbar Interbody Fusion (TLIF) And Smith Borges Osteotomies,and L3-4 SPO as well, Use Of BMP, Debridement Of Seroma;  Surgeon: Carlos Enrique Dial MD;  [facility]     PICC INSERTION Right 2019    4Fr - 37cm, Basilic vein, low SVC     REMOVAL PROSTHETIC MATERIAL/MESH, ABD WALL NECRO TISS INFEXN       ROTATOR CUFF REPAIR RT/LT                Social History:     Social History     Socioeconomic History     Marital status:      Spouse name: Not on file     Number of children: Not on file     Years of education: Not on file     Highest education level: Not on file   Occupational History     Not on file   Social Needs     Financial resource strain: Not on file     Food insecurity:     Worry: Not on file     Inability: Not on file     Transportation needs:     Medical: Not on file     Non-medical: Not on file   Tobacco Use     Smoking status: Former Smoker     Packs/day: 0.25     Last attempt to quit: 1970     Years since quittin.2     Smokeless tobacco: Never Used   Substance and Sexual Activity     Alcohol use: Yes     Comment: few times monthly     Drug use: Never     Sexual activity: Not Currently   Lifestyle     Physical activity:     Days per week: Not on file     Minutes per session: Not on file     Stress: Not on file   Relationships     Social connections:     Talks on phone: Not on file     Gets together: Not on file     Attends [employer] service: Not on file     Active member of club or organization: Not on file     Attends meetings of clubs or organizations: Not on file      Relationship status: Not on file     Intimate partner violence:     Fear of current or ex partner: Not on file     Emotionally abused: Not on file     Physically abused: Not on file     Forced sexual activity: Not on file   Other Topics Concern     Not on file   Social History Narrative     Not on file              Family History:     Family History   Problem Relation Age of Onset     Colon Cancer Mother      Hypertension Father         did not know father well     Family history reviewed and updated in EPIC          Allergies:     No Known Allergies           Medications:       (Not in a hospital admission)                 Physical Exam:     B/P: 97/59, T: 98, P: 80, R: 16    Wt Readings from Last 4 Encounters:   08/29/19 73.6 kg (162 lb 4.8 oz)   08/21/19 71.2 kg (157 lb)   08/21/19 71.4 kg (157 lb 6.4 oz)   08/02/19 73 kg (161 lb)       No intake or output data in the 24 hours ending 08/29/19 1858    Gen: No acute distress  HEENT: NC/AT  PULM/THORAX: Clear to auscultation bilaterally, no rales/rhonchi/wheezes  CV: normal rate, regular rhythm, 2/6 systolic peaking murmur at RUSB; soft 1/6 diastolic murmur; JVP 12 mc H2O  ABD: Soft, NTND, bowel sounds present, no masses  EXT: WWP. Trace LE edema bilaterally  NEURO: CN II-XII grossly intact. A&Ox3            Data:     Labs Reviewed on Admission  Pertinent for:  Lab Results   Component Value Date    TROPI 0.409 (HH) 08/29/2019    TROPI <0.015 07/06/2019    TROPI 0.023 07/06/2019    TROPI 0.029 07/05/2019    TROPI 0.024 07/05/2019               Most Recent Imaging:           Echo:   Interpretation Summary   (7/6/19)  Left ventricular systolic function is low normal. The visual ejection fraction  is estimated at 50-55%. There is severe hypokinesis of the apex and the apical  segments of the lateral and inferolateral walls.  There is moderate (2+) aortic regurgitation. Severe valvular aortic stenosis.  The peak AoV pressure gradient is 66.0 mmHg. The mean AoV pressure  gradient is  34.6 mmHg.  Compared to the study from 4/29/2019, the gradients across the AV are lower  although the calculated PETE is the same. Wall motion abnormalities are similar  to previous study.    Cath:   (7/30/19)    Coronary Angiogram  Left Main   The vessel was visualized by selective angiography and is moderate in size. There was 0% vessel disease.   Left Anterior Descending   Ost LAD lesion is 30% stenosed. The lesion is discrete.   Prox LAD lesion is 70% stenosed. The lesion is type C - high risk and discrete. The lesion is severely calcified. Just proximal to D1   Prox LAD to Mid LAD lesion is 60% stenosed. The lesion is type C - high risk and eccentric. The lesion is mildly calcified. diffuse   First Diagonal Branch   Ost 1st Diag lesion is 70% stenosed. The lesion is discrete. < 2 mm vessel   Ramus Intermedius   The vessel is small.   Ost Ramus lesion is 60% stenosed. The lesion is discrete. Small vessel   Left Circumflex   First Obtuse Marginal Branch   The vessel is large.   Ost 1st Mrg to 1st Mrg lesion is 30% stenosed.   Right Coronary Artery   The vessel is small.   Mid RCA lesion is 100% stenosed. The lesion is discrete. Small, non-dominant right with R->R collaterals   Acute Marginal Branch   Collaterals   Acute Mrg filled by collaterals from Prox RCA.       Right Heart Pressures   - Severe pulmonary HTN with mildly elevated PCWP  - Low-normal cardiac index by Eunice, normal cardiac index by thermodilution  - Simultaneous LV-Ao mean gradient = 41.9, calculated PETE = 0.70 cm2

## 2019-08-29 NOTE — ED PROVIDER NOTES
[facility] EMERGENCY DEPARTMENT ([facility])  8/29/19    History     Chief Complaint   Patient presents with     Shortness of Breath     History was provided by the patient, his son and medical records.      Beulah Jane is a 69 year old male with a history of hypertension, severe aortic stenosis, CAD status post CHAU x2 (7/30/2019), HFpEF, NYHA III, pulmonary fibrosis, PE s/p spinal surgery 4/2019 who presents emergency department for evaluation of shortness of breath.  Patient reports of a syncopal episode that happened earlier this morning.  Since his angiogram, patient has complained of worsening shortness of breath, dizziness and chest pressure.  His SOB is worse with ambulation and exertion.  He has intermittent chest pressure which gets worse with exertion past 5 steps.  At this time he is not on home O2.  Past Medical History:   Diagnosis Date     Aortic stenosis      Chronic pain      DM (diabetes mellitus), type 2 (H)     on insulin     Hyperlipidemia      JUAN on CPAP      Pneumonia      Polymyositis (H)      Pulmonary fibrosis, unspecified (H)      Seasonal allergies        Past Surgical History:   Procedure Laterality Date     ARTHROSCOPY KNEE  1970     COLONOSCOPY       CV CORONARY ANGIOGRAM N/A 7/30/2019    Procedure: CV CORONARY ANGIOGRAM;  Surgeon: Leonidas Mike MD;  [facility]     CV LEFT HEART CATH N/A 7/30/2019    Procedure: CV LEFT HEART CATH;  Surgeon: Leonidas Mike MD;  [facility]     CV PCI STENT DRUG ELUTING Left 7/30/2019    Procedure: PCI Stent Drug Eluting;  Surgeon: Leonidas Mike MD;  [facility]     CV RIGHT HEART CATH N/A 7/30/2019    Procedure: CV RIGHT HEART CATH;  Surgeon: Leonidas Mike MD;  [facility]     DECOMPRESSION, FUSION LUMBAR POSTERIOR TWO LEVELS, COMBINED  1997     FUSION LUMBAR ANTERIOR THREE+ LEVELS N/A 4/22/2019     Procedure: Lumbar 1 Or Lumbar 2-5 Anterior Lumbar Interbody Fusion with BMP Stage 1 Of Two Procedure:;  Surgeon: Carlos Enrique Dial MD;  [facility]     HERNIA REPAIR  2006     lumbar spine hardware removal       OPTICAL TRACKING SYSTEM FUSION POSTERIOR SPINE THORACIC THREE+ LEVELS N/A 2019    Procedure: O-Arm/Stealth Assisted Thoracic 10-Pelvis Instrumented Fusion T11-2, L1-2, L2-3 Transforminal Lumbar Interbody Fusion (TLIF) And Smith Borges Osteotomies,and L3-4 SPO as well, Use Of BMP, Debridement Of Seroma;  Surgeon: Carlos Enrique Dial MD;  [facility]     PICC INSERTION Right 2019    4Fr - 37cm, Basilic vein, low SVC     REMOVAL PROSTHETIC MATERIAL/MESH, ABD WALL NECRO TISS INFEXN       ROTATOR CUFF REPAIR RT/LT         Family History   Problem Relation Age of Onset     Colon Cancer Mother      Hypertension Father         did not know father well       Social History     Tobacco Use     Smoking status: Former Smoker     Packs/day: 0.25     Last attempt to quit: 1970     Years since quittin.2     Smokeless tobacco: Never Used   Substance Use Topics     Alcohol use: Yes     Comment: few times monthly      No Known Allergies     Current Facility-Administered Medications   Medication     0.9% sodium chloride BOLUS     Current Outpatient Medications   Medication     albuterol (2.5 MG/3ML) 0.083% neb solution     albuterol (PROAIR HFA/PROVENTIL HFA/VENTOLIN HFA) 108 (90 BASE) MCG/ACT Inhaler     aspirin (ASA) 81 MG EC tablet     atorvastatin (LIPITOR) 40 MG tablet     calcium carbonate (OS-DMITRIY 500 MG [place]. [place]) 500 MG tablet     cetirizine (ZYRTEC) 10 MG tablet     Cholecalciferol (VITAMIN D) 2000 units tablet     diphenhydrAMINE (BENADRYL) 50 MG capsule     folic acid (FOLVITE) 1 MG tablet     furosemide (LASIX) 40 MG tablet     gabapentin (NEURONTIN) 600 MG tablet     gemfibrozil (LOPID) 600 MG tablet     insulin NPH (HUMULIN N/NOVOLIN N VIAL) 100 UNIT/ML  "vial     insulin syringe-needle U-100 (29G X 1/2\" 1 ML) 29G X 1/2\" 1 ML miscellaneous     LANsoprazole (PREVACID) 30 MG DR capsule     levothyroxine (SYNTHROID/LEVOTHROID) 50 MCG tablet     methotrexate 2.5 MG tablet     metoprolol tartrate (LOPRESSOR) 25 MG tablet     multivitamin w/minerals (THERA-VIT-M) tablet     omega 3 1000 MG CAPS     oxyCODONE-acetaminophen (PERCOCET) 7.5-325 MG per tablet     ticagrelor (BRILINTA) 90 MG tablet     I have reviewed the Medications, Allergies, Past Medical and Surgical History, and Social History in the Epic system.    Review of Systems   Constitutional: Negative for fever.   HENT: Negative for congestion.    Eyes: Negative for redness.   Respiratory: Positive for cough, chest tightness and shortness of breath.    Cardiovascular: Negative for chest pain.   Gastrointestinal: Negative for abdominal pain.   Genitourinary: Negative for difficulty urinating.   Musculoskeletal: Negative for arthralgias and neck stiffness.   Skin: Negative for color change.   Neurological: Positive for dizziness. Negative for headaches.   Psychiatric/Behavioral: Negative for confusion.       Physical Exam   BP: 100/51  Heart Rate: 99  Temp: 98  F (36.7  C)  Resp: 20  Height: 170.2 cm (5' 7\")  Weight: 73.6 kg (162 lb 4.8 oz)  SpO2: 97 %      Physical Exam   Constitutional: He is oriented to person, place, and time. He appears distressed.   Pale on standing   HENT:   Head: Atraumatic.   Mouth/Throat: Oropharynx is clear and moist.   Eyes: Pupils are equal, round, and reactive to light. No scleral icterus.   Neck: Neck supple.   Cardiovascular: Intact distal pulses.   Murmur heard.  Pulmonary/Chest: Tachypnea ( Mild) noted. He has decreased breath sounds in the right lower field and the left lower field. Rhonchi:  Slight, intermittent.   Abdominal: Soft. Bowel sounds are normal. There is no tenderness.   Musculoskeletal: He exhibits no edema or tenderness.   Neurological: He is alert and oriented to " person, place, and time.   Skin: Skin is warm. No rash noted. He is not diaphoretic. There is pallor ( On standing).       ED Course        Procedures             EKG Interpretation:      Interpreted by Milton Pool MD  Time reviewed: 1535  Symptoms at time of EKG: Shortness of breath  Rhythm: normal sinus   Rate: normal  Axis: normal  Ectopy: none  Conduction: Bifascicular block  ST Segments/ T Waves: No ST-T wave changes  Q Waves: none  Comparison to prior: Unchanged    Clinical Impression: normal EKG          Critical Care time:  none             Labs Ordered and Resulted from Time of ED Arrival Up to the Time of Departure from the ED   CBC WITH PLATELETS DIFFERENTIAL - Abnormal; Notable for the following components:       Result Value    Hemoglobin 9.7 (*)     Hematocrit 35.5 (*)     MCV 77 (*)     MCH 21.0 (*)     MCHC 27.3 (*)     RDW 23.3 (*)     Platelet Count 460 (*)     Absolute Neutrophil 8.6 (*)     All other components within normal limits   BASIC METABOLIC PANEL - Abnormal; Notable for the following components:    Glucose 134 (*)     All other components within normal limits   INR - Abnormal; Notable for the following components:    INR 1.38 (*)     All other components within normal limits   TROPONIN I - Abnormal; Notable for the following components:    Troponin I ES 0.409 (*)     All other components within normal limits   NT PROBNP INPATIENT - Abnormal; Notable for the following components:    N-Terminal Pro BNP Inpatient 7,655 (*)     All other components within normal limits   PERIPHERAL IV CATHETER   CARDIAC CONTINUOUS MONITORING            Assessments & Plan (with Medical Decision Making)   The patient has no acute EKG changes but has ongoing slight chest pressure and dyspnea which is worse with any exertion.  His troponin is mildly elevated at 0.4 consistent with a type II MI.  He has had the symptoms for several weeks but is now having more frequent episodes of syncope.  This is  likely related to his critical aortic stenosis with an PETE of 0.7.  The patient is unable to walk more than 4 or 5 steps at home due to lightheadedness.  Although his BNP is elevated he appears slightly hypovolemic clinically.  He has underlying pulmonary hypertension and the cardiologist felt that the BNP might be elevated secondary to the aortic stenosis.  He was given a fluid bolus to help with his symptoms and will be admitted to the cardiology service for further management of his medications and possible moving up of the TAVR if possible.    I have reviewed the nursing notes.    I have reviewed the findings, diagnosis, plan and need for follow up with the patient.    New Prescriptions    No medications on file       Final diagnoses:   Critical aortic valve stenosis   Type 2 myocardial infarction (H)     IJuan Carlos, am serving as a trained medical scribe to document services personally performed by Milton Pool MD, based on the provider's statements to me.      IMilton MD, was physically present and have reviewed and verified the accuracy of this note documented by Juan Carlos Rosenthal.     8/29/2019   [facility], [place], EMERGENCY DEPARTMENT     Milton Pool MD  08/29/19 [id number]

## 2019-08-29 NOTE — Clinical Note
Pt's native rhythm tested, temp pacer decreased. Pt's underlying rhythm is asystole. Temp pacer turned back on to 60 beats per minute.

## 2019-08-29 NOTE — Clinical Note
Potential access sites were evaluated for patency using ultrasound.   The right femoral artery was selected. Access was obtained with Sonosite and Fluoroscopic guidance using a standard 18 gauge needle with direct visualization of needle entry.

## 2019-08-29 NOTE — Clinical Note
Potential access sites were evaluated for patency using ultrasound.   The right radial artery was selected. Access was obtained with Sonosite guidance using a micropuncture 21 gauge needle with direct visualization of needle entry.

## 2019-08-29 NOTE — TELEPHONE ENCOUNTER
Spoke with Beulah (patient) and let him know what his labs were. Asked him how he was feeling and he said he was still short of breath and at times felt like he couldn't breathe at all. He is still taking Lasix at 120 mg daily. Instructed patient to go to the ER at the [facility] today. If his son can't take him he should call 911. Patient stated he would go today.

## 2019-08-29 NOTE — ED NOTES
"ED Triage Provider Note  [facility]  Encounter Date: Aug 29, 2019    History:  Chief Complaint   Patient presents with     Shortness of Breath     Beulah Jane is a 69 year old male who presents to the ED with SOB, worsening for 2 weeks. Having cough, no fever. Pain/pressure around diaphragm with coughing. Need valve replacement, no surgery for this yet. No recent med changes. Fainted 4 hours ago; gets dizzy, SOB and then has syncopal episodes starting yesterday. Denies injury from these \"falls\", states he gets down on the ground or sits when they are coming.     Review of Systems:  Positive for syncope    Exam:  There were no vitals taken for this visit.  General: No acute distress. Appears stated age.   Cardio: Regular rate, extremities well perfused. Loud systolic murmur noted.   Resp: Normal work of breathing, grossly normal respiratory rate. Lungs clear to asculation  Neuro: Alert. CN II-XII grossly intact. Grossly intact strength.       Medical Decision Making:  Patient arriving to the ED with problem as above. A medical screening exam was performed. EKG, labs and CXR orders initiated from Triage. The patient is appropriate to be immediately roomed.      Camilo Escalante MD on 8/29/2019 at 3:18 PM       Camilo Escalante MD  08/29/19 1523       Camilo Escalante MD  08/29/19 1526    "

## 2019-08-29 NOTE — Clinical Note
Potential access sites were evaluated for patency using ultrasound.   The right jugular vein was selected. Access was obtained with Sonosite and Fluoroscopic guidance using a standard 18 gauge needle with direct visualization of needle entry.

## 2019-08-29 NOTE — ED TRIAGE NOTES
Presents with increasing SOB over the past couple weeks. Patient also states he has been fainting when he exerts himself, last episode this morning.

## 2019-08-29 NOTE — ED NOTES
[facility], [place]   ED Nurse to Floor Handoff     Beulah Jane is a 69 year old male who speaks Croatian/English and lives with a spouse,  in a home  They arrived in the ED by car from home    ED Chief Complaint: Shortness of Breath    ED Dx;   Final diagnoses:   Critical aortic valve stenosis   Type 2 myocardial infarction (H)         Needed?: No    Allergies: No Known Allergies.  Past Medical Hx:   Past Medical History:   Diagnosis Date     Aortic stenosis      Chronic pain      DM (diabetes mellitus), type 2 (H)     on insulin     Hyperlipidemia      JUAN on CPAP      Pneumonia      Polymyositis (H)      Pulmonary fibrosis, unspecified (H)      Seasonal allergies       Baseline Mental status: WDL  Current Mental Status changes: at basesline    Infection present or suspected this encounter: no  Sepsis suspected: No  Isolation type: No active isolations     Activity level - Baseline/Home:  Independent with walker  Activity Level - Current:   Stand with Assist    Bariatric equipment needed?: No    In the ED these meds were given:   Medications   0.9% sodium chloride BOLUS (500 mLs Intravenous New Bag 8/29/19 9014)       Drips running?  Yes IV bolus infusing    Home pump  No    Current LDAs  Peripheral IV 08/29/19 Right Upper forearm (Active)   Site Assessment WDL 8/29/2019  3:25 PM   Line Status Saline locked 8/29/2019  3:25 PM   Phlebitis Scale 0-->no symptoms 8/29/2019  3:25 PM   Number of days: 0       Right Groin Interventional Procedure Access (Active)   Number of days: 30       Right Groin Interventional Procedure Access (Active)   Number of days: 30       Labs results:   Labs Ordered and Resulted from Time of ED Arrival Up to the Time of Departure from the ED   CBC WITH PLATELETS DIFFERENTIAL - Abnormal; Notable for the following components:       Result Value    Hemoglobin 9.7 (*)     Hematocrit 35.5 (*)     MCV 77 (*)     MCH 21.0 (*)     MCHC 27.3 (*)     RDW  "23.3 (*)     Platelet Count 460 (*)     Absolute Neutrophil 8.6 (*)     All other components within normal limits   BASIC METABOLIC PANEL - Abnormal; Notable for the following components:    Glucose 134 (*)     All other components within normal limits   INR - Abnormal; Notable for the following components:    INR 1.38 (*)     All other components within normal limits   TROPONIN I - Abnormal; Notable for the following components:    Troponin I ES 0.409 (*)     All other components within normal limits   NT PROBNP INPATIENT - Abnormal; Notable for the following components:    N-Terminal Pro BNP Inpatient 7,655 (*)     All other components within normal limits   PERIPHERAL IV CATHETER   CARDIAC CONTINUOUS MONITORING       Imaging Studies:   Recent Results (from the past 24 hour(s))   XR Chest 2 Views    Narrative    Exam: XR CHEST 2 VW, 8/29/2019 3:50 PM    Indication: SOB    Comparison: 7/5/2019    Findings:   PA and lateral views of the chest. Partially visualized posterior  spinal fusion hardware, similar to prior. The trachea is stably  deviated mildly to the right. Additional tubular lucency within the  mediastinum may represent dilated esophagus. The cardiomediastinal  silhouette is stable and enlarged. No pneumothorax or pleural  effusion. Diffuse patchy interstitial opacities, similar to prior  examination. The visualized upper abdomen is unremarkable.      Impression    Impression: Cardiomegaly with patchy perihilar interstitial opacities,  compatible with pulmonary edema. Superimposed infection is difficult  to exclude.    I have personally reviewed the examination and initial interpretation  and I agree with the findings.    GIRMA ROQUE MD       Recent vital signs:   BP 94/54   Temp 98  F (36.7  C) (Oral)   Resp 20   Ht 1.702 m (5' 7\")   Wt 73.6 kg (162 lb 4.8 oz)   SpO2 99%   BMI 25.42 kg/m      Tarun Coma Scale Score: 15 (08/29/19 1519)       Cardiac Rhythm: Other  Pt needs tele? " Yes  Skin/wound Issues: have not completed full skin assessment    Code Status: Full Code    Pain control: pt had none    Nausea control: pt had none    Abnormal labs/tests/findings requiring intervention:     Family present during ED course? Yes   Family Comments/Social Situation comments: son and wife at bedside    Tasks needing completion: None    Madalyn Vieira, RN    [phone] [facility]

## 2019-08-29 NOTE — Clinical Note
dry, intact, no bleeding and no hematoma. Right sided pocket sutured by physician. Stei strips applied. Primapore dressing.

## 2019-08-29 NOTE — Clinical Note
8 Fr Angio-Seal utilized for closure of sight.  Manual pressure applied by scrub person; hemostasis achieved.

## 2019-08-29 NOTE — Clinical Note
femoral artery Cine(s)  injected and visualized utilizing power injector system. Bilateral iliac and femoral angiography performed

## 2019-08-29 NOTE — Clinical Note
Prepped per 's instructions. Valve secured onto the delivery system balloon and correct orientation confirmed (blue to blue)

## 2019-08-29 NOTE — Clinical Note
dry, intact, no bleeding and no hematoma. Sheath removed from the RIJ vein. Manual pressure held. Hemostasis obtained. Band aid applied.

## 2019-08-29 NOTE — LETTER
August 29, 2019      TO: Danelle Catherine Jane  [address]  [place] [address]         Dear Danelle,    Enclosed are your recent labs.  Results for DANELLE SIMMS (MRN [id number]) as of 8/29/2019 11:58   Ref. Range 8/28/2019 10:08   Sodium Latest Ref Range: 133 - 144 mmol/L 138   Potassium Latest Ref Range: 3.4 - 5.3 mmol/L 3.7   Chloride Latest Ref Range: 94 - 109 mmol/L 103   Carbon Dioxide Latest Ref Range: 20 - 32 mmol/L 29   Urea Nitrogen Latest Ref Range: 7 - 30 mg/dL 19   Creatinine Latest Ref Range: 0.66 - 1.25 mg/dL 0.97   GFR Estimate Latest Ref Range: >60 mL/min/1.73_m2 79   GFR Estimate If Black Latest Ref Range: >60 mL/min/1.73_m2 >90   Calcium Latest Ref Range: 8.5 - 10.1 mg/dL 9.3   Anion Gap Latest Ref Range: 3 - 14 mmol/L 6   N-Terminal Pro Bnp Latest Ref Range: 0 - 125 pg/mL 6,607 (H)   Glucose Latest Ref Range: 70 - 99 mg/dL 163 (H)       Your NT proBNP is elevated, this indicates heart failure.  Please do not hesitate to call me if you have any questions or concerns.    Sincerely,    Elysia Merino RN  Nurse Coordinator for Steve Flores MD  [phone]

## 2019-08-29 NOTE — Clinical Note
Potential access sites were evaluated for patency using ultrasound.   The left femoral artery and left femoral vein were selected. Access was obtained with Sonosite and Fluoroscopic guidance using a standard 18 gauge needle with direct visualization of needle entry.

## 2019-08-29 NOTE — PHARMACY-ADMISSION MEDICATION HISTORY
Admission Medication History status for the 8/29/2019 admission is complete.  See EPIC admission navigator for Prior to Admission medications.    Medication history sources:  Patient, his son and wife, care everywhere ([facility]), [facility], Pharmacy dispense report (via outside meds tab).     Medication history source reliability: Good. Pt was familiar with medications and brought with him a med list from [facility].    Medication adherence:  Good. Pt reports taking his medications regularly, estimates that he misses a dose less than once a month.    Changes made to PTA medication list (reason)  Added: N/A  Deleted: N/A  Changed: N/A    Additional medication history information (including reliability of information, actions taken by pharmacist):   -Pt was alert and responsive to questions. Relied on his family a few times for clarification about which medications he had already taken today.  -Pt typically takes his percocet three times a day at 7am, 2pm, and 9pm every day. He reports that if he doesn't take it on a schedule like this the pain comes back. He has had 2 doses today already (8/29/19), last dose at 5pm.  -Pt has had 2 doses of gabapentin today (8/29/19), last dose at 5pm.  -Pt confirmed that he takes methotrexate 2.5mg tablets (10 tablets) on Tuesdays, and that he took his dose on Tuesday this week.    Medication history completed by: Sonali Liu, PD2 Pharmacy Intern    Prior to Admission medications    Medication Sig Last Dose Taking? Auth Provider   albuterol (2.5 MG/3ML) 0.083% neb solution Take 1 vial by nebulization every 6 hours as needed for shortness of breath / dyspnea or wheezing Past Month at Unknown time Yes Unknown, Entered By History   aspirin (ASA) 81 MG EC tablet Take 1 tablet (81 mg) by mouth daily Start tomorrow morning. 8/29/2019 at AM Yes Nita Barger PA-C   atorvastatin (LIPITOR) 40 MG tablet Take 1 tablet (40 mg) by mouth daily 8/29/2019 at AM Yes Charbel  "Nita Petit PA-C   calcium carbonate (OS-DMITRIY 500 MG [place]. [place]) 500 MG tablet Take 1 tablet (500 mg) by mouth 2 times daily 8/29/2019 at AM Yes Carlos Enrique Dial MD   Cholecalciferol (VITAMIN D) 2000 units tablet Take 2,000 Units by mouth daily 8/29/2019 at AM Yes Carlos Enrique Dial MD   diphenhydrAMINE (BENADRYL) 50 MG capsule Take 50 mg by mouth as needed for itching or allergies  Past Month at Unknown time Yes Unknown, Entered By History   folic acid (FOLVITE) 1 MG tablet Take 1 tablet (1 mg) by mouth every morning 8/29/2019 at AM Yes Hoa López MD   furosemide (LASIX) 40 MG tablet Take 3 tablets (120 mg) by mouth daily 8/29/2019 at AM Yes Steve Flores MD   gabapentin (NEURONTIN) 600 MG tablet Take 2 tablets (1,200 mg) by mouth 3 times daily 8/29/2019 at 5pm Yes Judson Stockton MD   gemfibrozil (LOPID) 600 MG tablet Take 600 mg by mouth 2 times daily  8/29/2019 at AM Yes Unknown, Entered By History   insulin NPH (HUMULIN N/NOVOLIN N VIAL) 100 UNIT/ML vial Inject 2-6 Units Subcutaneous 2 times daily Will usually give if blood glucose >140 Past Month at Unknown time Yes Hoa López MD   insulin syringe-needle U-100 (29G X 1/2\" 1 ML) 29G X 1/2\" 1 ML miscellaneous Inject 1 Syringe Subcutaneous 2 times daily Past Month at Unknown time Yes Hoa López MD   LANsoprazole (PREVACID) 30 MG DR capsule Take 30 mg by mouth every morning (before breakfast) 8/29/2019 at AM Yes Unknown, Entered By History   levothyroxine (SYNTHROID/LEVOTHROID) 50 MCG tablet Take 50 mcg by mouth every morning  8/29/2019 at AM Yes Unknown, Entered By History   methotrexate 2.5 MG tablet Take 10 tablets (25 mg) by mouth once a week Tuesday 8/27/2019 Yes Hoa López MD   metoprolol tartrate (LOPRESSOR) 25 MG tablet Take 0.5 tablets (12.5 mg) by mouth 2 times daily Hold IF heart rate less than 55. 8/29/2019 at AM Yes Nita Barger PA-C   multivitamin w/minerals " (THERA-VIT-M) tablet Take 1 tablet by mouth daily 8/29/2019 at AM Yes Sofía Retana MD   omega 3 1000 MG CAPS Take 2 capsules by mouth every morning  8/29/2019 at AM Yes Unknown, Entered By History   oxyCODONE-acetaminophen (PERCOCET) 7.5-325 MG per tablet Take 1 tablet by mouth every 6 hours as needed for severe pain (max 3 tablets daily) Dispense 08/16/19. OK to start 08/16/19  Patient taking differently: Take 1 tablet by mouth 3 times daily Dispense 08/16/19. OK to start 08/16/19 8/29/2019 at 5pm Yes Nayla Terrazas MD   ticagrelor (BRILINTA) 90 MG tablet Take 1 tablet (90 mg) by mouth 2 times daily Start this evening. 8/29/2019 at AM Yes Nita Barger PA-C   albuterol (PROAIR HFA/PROVENTIL HFA/VENTOLIN HFA) 108 (90 BASE) MCG/ACT Inhaler Inhale 2 puffs into the lungs as needed for shortness of breath / dyspnea or wheezing  More than a month at Unknown time  Unknown, Entered By History   cetirizine (ZYRTEC) 10 MG tablet Take 10 mg by mouth daily as needed  More than a month at Unknown time  Reported, Patient

## 2019-08-30 NOTE — PROGRESS NOTES
"S: \"Beulah\" (he/him/his pronouns) was admitted 8/29 with exertional chest pain, lightheadness, GONZALEZ most likely related to hypervolemia and severe aortic stenosis.    B: Prior medical history of severe aortic stenosis, CAD s/p PCI 7/30/19     A: Monitored vitals and assessed pt status.   Changed: one-time lasix bolus 60mg given; daily miralax started  Running: none  PRN: none  Tele: NSR 70s-80s  O2: 2L nasal cannula  Mobility: SBA     Neuro: A&O x 4  Cardiac: BPs soft, team notified (88/51); BLE edema, R>L; murmur detected on auscultation   Respiratory: GONZALEZ; coarse crackles on expiration for bilateral lower lobes  GI/: voiding adequately (uses urinal); reports constipation   Diet/appetite: 2g Na diet, tolerating; appetite good; blood sugars (123, 109), no sliding scale insulin given  Activity: up to bathroom x2, lightheaded with exertion   Pain: no chest pain reported; chronic back and leg pain, well controlled with scheduled percocet and gabapentin   Skin: no areas of concern noted  LDAs: R PIV, saline locked    R: Continue to monitor Pt status and report changes to treatment team - cards 1.    "

## 2019-08-30 NOTE — PLAN OF CARE
Admission from [facility] for SOBx 2 weeks, fainted 4 hours before ER, recurrent dizziness. He has severe aortic stenosis, and needs surgery. He has pain in diaphragm with coughing. When he feels dizzy he sits on the floor to avoid falling. He has pulmonary fibrosis. Spinal surgery He had a NS flush in the ER for lower BP. Family is present and he was settled by others. He is eating supper now  I/reason for bed alarm to be reviewed with him and fall band to be applied  P/do admission questions when he finishes his meal,monitor for changes  I.given evening pills after he ate some food, He says the oxycodone and Gabapentin help with his restless legs.  I let him and family know we have  scheduled for the am between 8-10 am.   D/He told me wife does not speak English and we discussed translater phone and or  talking to her via phone also. I let the family know we will do what helps so when we give more complete, complex info we will have translation available. They said they were pleased with this of course.

## 2019-08-30 NOTE — PROGRESS NOTES
Date: 8/30/2019    Time of Call: 12:13 PM     Diagnosis:  Severe aortic stenosis     [ TORB ] Ordering provider: Pj Garcia MD  Order: 1. TAVR pre procedure order set.  2. Blood components.     Order received by: Meenakshi Irwin RN     Follow-up/additional notes:

## 2019-08-30 NOTE — PLAN OF CARE
D: Patient admitted on 8/29/19 for multiple days of shortness of breath, lightheadedness and GONZALEZ. And was found to have severe aortic stenosis and fluid overload.    I: Monitored vitals and assessed pt status.   Changed:n/a  Running:n/a  PRN:n/a    A: A&0x4. AVSS and SR in 80s with 2L NC of oxygen.  Patient was given 60mg of IV lasix with 1L UO.       P: Continue to monitor Pt status and report changes to treatment team.  Interpretor for Latvian language ordered from 8am-10am.

## 2019-08-31 NOTE — PROGRESS NOTES
"S: \"Beulah\" (he/him/his pronouns) was admitted 8/29 with exertional chest pain, lightheadness, GONZALEZ most likely related to hypervolemia and severe aortic stenosis.     B: Prior medical history of severe aortic stenosis, CAD s/p PCI 7/30/19      A: Monitored vitals and assessed pt status.   Changed:  lasix bolus 60mg given, evening dose held for soft BPs  Running: none  PRN: miralax x1  Tele: NSR 70s-80s  O2: 2-3 LPM nasal cannula  Mobility: SBA     Neuro: A&O x 4  Cardiac: BPs soft, team notified (89/53); BLE edema; murmur detected on auscultation   Respiratory: GONZALEZ; coarse crackles on expiration for bilateral lower lobes  GI/: voiding adequately (uses urinal); reports constipation, no BM this shift   Diet/appetite: 2g Na diet, tolerating; appetite fair/good (no breakfast, late lunch); blood sugar (126), no sliding scale insulin given  Activity: up to bathroom x2, shower today  Pain: no chest pain reported; chronic back and leg pain, well controlled with scheduled percocet and gabapentin   Skin: no areas of concern noted  LDAs: R PIV, saline locked     R: Anticipate TAVR Wednesday 9/4. Continue to monitor Pt status and report changes to treatment team - cards 1.  "

## 2019-08-31 NOTE — PROGRESS NOTES
Cardiology Progress Note  Beulah Jane MRN: [id number]  Age: 69 year old, : 1950  Date: 2019          Assessment and Plan:     Mr. Catherine Jane is a 70 y/o M w/ severe aortic stenosis (mPG 42 mmHg, PETE 0.70 cm^2), CAD s/p PCI p-mLAD and dLAD 19, Chronic diastolic heart failure, presenting with exertional CP, lightheadedness, GONZALEZ, admitted for decompensated diastolic heart failure c/b severe aortic stenosis, preload dependent.        #Decompensated Severe Aortic Stenosis  #Acute on ChronicDecompensated Diastolic Heart Failure  mPG 42 mmHg, PETE 0.70 cm^2. CP, GONZALEZ  Likely related to his severe aortic stenosis, but likely also a component of decompensated heart failure as well given volume up on exam. ECG unchanged from prior and not concerning for acute ischemia. Elevated troponin likely related to ventricular stain with severe aortic stenosis and CHF, and not likely due to acute ischemia trending down.  -Daily weights, I&O, 1.8L fluid restriction 2g Na restriction  -Diuresis with goal net negative 1.0L over next 24hrs; patient very preload dependent  -Lasix 60 mg IV BID  -Continue metoprolol tartrate 12.5 mg PO BID  -Defer ACEi, Aldosterone inhibitor, afterload reduction due to low BP  -TAVR planned 19, remain inpatient for medical optimization pre-procedure     #CAD s/p PCI:  s/p PCI p-mLAD and dLAD 19.  -Continue ASA, ticagrelor  -Continue atorvastatin     #Hypothyroidism:  -Continue levothyroxine     #DM:  -ISS as inpatient     FEN: Low NA diet  PPX: SQH     Code Status: FULL CODE    Staffed with Dr. Kalyn Severino MD PGY2  Internal Medicine  Cardiology 1 Service   [phone]            Subjective     Patient reports no subjective improvement in breathing, chest pain is now absent.           Objective     B/P: 95/56, T: 98, P: 79, R: 18    Intake/Output Summary (Last 24 hours) at 2019 1055  Last data filed at 2019 0800  Gross per  24 hour   Intake 850 ml   Output 1675 ml   Net -825 ml     Vitals:    08/30/19 0635 08/30/19 1021 08/31/19 0405   Weight: 72.6 kg (160 lb) 71.6 kg (157 lb 14.4 oz) 71.2 kg (157 lb)       Gen: AA&Ox3, no acute distress, laying at 30 degres in bed, breathing comfortably  HEENT:AT/ NC,  EOM grossly intact,  BACK: Midline bruising  PULM/THORAX: Bilateral crackles  CV:rrr loud aortic systolic murmur  ABD:  nontender, nondistended.   EXT: trace edema, no clubbing or cyanosis. warm well perfused No asymmetrical edema.              Data:     Hemodynamics reviewed and pertinent for: Soft blood pressures, acceptable HR, in sinus    LABS reviewed and pertinent for: troponin trending down, hgb stable at 8.8, lytes and renal fxn acceptable    Anticoagulation:  ASA, ticagrelor    Imaging: no new      Results for orders placed or performed during the hospital encounter of 08/29/19 (from the past 24 hour(s))   Glucose by meter   Result Value Ref Range    Glucose 123 (H) 70 - 99 mg/dL   Basic metabolic panel   Result Value Ref Range    Sodium 136 133 - 144 mmol/L    Potassium 3.8 3.4 - 5.3 mmol/L    Chloride 101 94 - 109 mmol/L    Carbon Dioxide 28 20 - 32 mmol/L    Anion Gap 7 3 - 14 mmol/L    Glucose 129 (H) 70 - 99 mg/dL    Urea Nitrogen 24 7 - 30 mg/dL    Creatinine 1.05 0.66 - 1.25 mg/dL    GFR Estimate 72 >60 mL/min/[1.73_m2]    GFR Estimate If Black 83 >60 mL/min/[1.73_m2]    Calcium 8.4 (L) 8.5 - 10.1 mg/dL   Magnesium   Result Value Ref Range    Magnesium 2.2 1.6 - 2.3 mg/dL   Glucose by meter   Result Value Ref Range    Glucose 140 (H) 70 - 99 mg/dL   CBC with platelets   Result Value Ref Range    WBC 9.7 4.0 - 11.0 10e9/L    RBC Count 4.23 (L) 4.4 - 5.9 10e12/L    Hemoglobin 8.8 (L) 13.3 - 17.7 g/dL    Hematocrit 33.4 (L) 40.0 - 53.0 %    MCV 79 78 - 100 fl    MCH 20.8 (L) 26.5 - 33.0 pg    MCHC 26.3 (L) 31.5 - 36.5 g/dL    RDW 22.9 (H) 10.0 - 15.0 %    Platelet Count 375 150 - 450 10e9/L   Basic metabolic panel   Result  Value Ref Range    Sodium 137 133 - 144 mmol/L    Potassium 4.3 3.4 - 5.3 mmol/L    Chloride 101 94 - 109 mmol/L    Carbon Dioxide 31 20 - 32 mmol/L    Anion Gap 5 3 - 14 mmol/L    Glucose 115 (H) 70 - 99 mg/dL    Urea Nitrogen 24 7 - 30 mg/dL    Creatinine 1.02 0.66 - 1.25 mg/dL    GFR Estimate 74 >60 mL/min/[1.73_m2]    GFR Estimate If Black 86 >60 mL/min/[1.73_m2]    Calcium 8.7 8.5 - 10.1 mg/dL   Magnesium   Result Value Ref Range    Magnesium 2.4 (H) 1.6 - 2.3 mg/dL           Medications       Current Facility-Administered Medications:      acetaminophen (TYLENOL) Suppository 650 mg, 650 mg, Rectal, Q4H PRN, Jeff Cordova MD     acetaminophen (TYLENOL) tablet 650 mg, 650 mg, Oral, Q4H PRN, Jeff Cordova MD     albuterol (PROVENTIL) neb solution 2.5 mg, 2.5 mg, Nebulization, Q6H PRN, Jeff Cordova MD     alum & mag hydroxide-simethicone (MYLANTA ES/MAALOX  ES) suspension 30 mL, 30 mL, Oral, Q4H PRN, Jeff Cordova MD     aspirin EC tablet 81 mg, 81 mg, Oral, Daily, Jeff Cordova MD, 81 mg at 08/31/19 0756     atorvastatin (LIPITOR) tablet 40 mg, 40 mg, Oral, Daily, Jeff Cordova MD, 40 mg at 08/31/19 0756     calcium carbonate 500 mg (elemental) (OSCAL;OYSTER SHELL CALCIUM) tablet 500 mg, 1 tablet, Oral, BID, Jeff Cordova MD, 500 mg at 08/31/19 0756     glucose gel 15-30 g, 15-30 g, Oral, Q15 Min PRN **OR** dextrose 50 % injection 25-50 mL, 25-50 mL, Intravenous, Q15 Min PRN **OR** glucagon injection 1 mg, 1 mg, Subcutaneous, Q15 Min PRN, Jeff Cordova MD     fish oil-omega-3 fatty acids capsule 1 g, 1 g, Oral, Daily, Darius Mccain MD     folic acid (FOLVITE) tablet 1 mg, 1 mg, Oral, QAM, Jeff Cordova MD, 1 mg at 08/31/19 075     furosemide (LASIX) injection 60 mg, 60 mg, Intravenous, BID, Darius Mccain MD     gabapentin (NEURONTIN) tablet 1,200 mg, 1,200 mg, Oral, TID, Jeff Cordova MD, 1,200 mg at 08/31/19 0750      gemfibrozil (LOPID) tablet 600 mg, 600 mg, Oral, BID, Jeff Cordova MD, 600 mg at 08/31/19 0755     heparin sodium injection 5,000 Units, 5,000 Units, Subcutaneous, Q12H, Jeff Cordova MD, 5,000 Units at 08/31/19 0801     insulin aspart (NovoLOG) inj (RAPID ACTING), 1-7 Units, Subcutaneous, TID AC, Jeff Cordova MD     insulin aspart (NovoLOG) inj (RAPID ACTING), 1-5 Units, Subcutaneous, At Bedtime, Jeff Cordova MD     LANsoprazole (PREVACID) CR capsule 30 mg, 30 mg, Oral, QAM AC, Jeff Cordova MD, 30 mg at 08/31/19 0756     levothyroxine (SYNTHROID/LEVOTHROID) tablet 50 mcg, 50 mcg, Oral, QAM, Jeff Cordova MD, 50 mcg at 08/31/19 0756     lidocaine (LMX4) cream, , Topical, Q1H PRN, Jeff Cordova MD     lidocaine 1 % 0.1-1 mL, 0.1-1 mL, Other, Q1H PRN, Jeff Cordova MD     medication instruction, , Does not apply, Continuous PRN, Jeff Cordova MD     metoprolol tartrate (LOPRESSOR) half-tab 12.5 mg, 12.5 mg, Oral, BID, Jeff Cordova MD, 12.5 mg at 08/31/19 0756     naloxone (NARCAN) injection 0.1-0.4 mg, 0.1-0.4 mg, Intravenous, Q2 Min PRN, Darius Mccain MD     oxyCODONE-acetaminophen (PERCOCET) 7.5-325 MG per tablet 1 tablet, 1 tablet, Oral, TID, Jeff Cordova MD, 1 tablet at 08/31/19 0756     polyethylene glycol (MIRALAX/GLYCOLAX) Packet 17 g, 17 g, Oral, Daily PRN, Darius Mccain MD     polyethylene glycol (MIRALAX/GLYCOLAX) Packet 17 g, 17 g, Oral, Daily, Glenn Finney MD, 17 g at 08/31/19 0755     senna-docusate (SENOKOT-S/PERICOLACE) 8.6-50 MG per tablet 1 tablet, 1 tablet, Oral, At Bedtime, Darius Mccain MD     sodium chloride (PF) 0.9% PF flush 3 mL, 3 mL, Intracatheter, q1 min prn, Jeff Cordova MD, 3 mL at 08/30/19 0848     sodium chloride (PF) 0.9% PF flush 3 mL, 3 mL, Intracatheter, Q8H, Jeff Cordova MD, 3 mL at 08/31/19 0432     ticagrelor (BRILINTA) tablet 90 mg, 90 mg, Oral, BID,  Jeff Cordova MD, 90 mg at 08/31/19 0753     vitamin D3 (CHOLECALCIFEROL) 1000 units (25 mcg) tablet 2,000 Units, 2,000 Units, Oral, Daily, Jeff Cordova MD, 2,000 Units at 08/31/19 0750

## 2019-08-31 NOTE — PLAN OF CARE
D: Pt admitted 8/29 with exertional chest pain, GONZALEZ, lightheadedness. Hx severe aortic stenosis, CAD s/p PCI 7/30/19, hypothyroidism, DM.  I: Monitored vitals and assessed pt status.  A: A0x4. VSS on 2LPM via NC. SR on tele. Afebrile. Denies pain. SOB with activity; pt feels it takes him longer to recover his breath after activity than it did a couple days ago. Voiding adequately. Up SBA. Pt slept between cares, making needs known.  P: Continue to monitor and report changes/concerns to Cards 1.

## 2019-09-01 NOTE — PLAN OF CARE
D: Pt admitted 8/29 with exertional chest pain, GONZALEZ, lightheadedness. Hx severe aortic stenosis, CAD s/p PCI 7/30/19, hypothyroidism, DM.  I: Monitored vitals and assessed pt status. Sched percocet. Evening metoprolol held per cards cross cover for soft MAPs.  A: A0x4. VSS on 2LPM via NC. SR on tele. Afebrile. Pt reports chronic back/leg pain is well controlled on sched pain regimen. SOB with activity. Voiding adequately. Up SBA. Pt slept between cares, making needs known.  P: Continue to monitor and report changes/concerns to Cards 1. Plan for TAVR 9/4.

## 2019-09-01 NOTE — PROGRESS NOTES
Cardiology Progress Note  Beulah Jane MRN: [id number]  Age: 69 year old, : 1950  Date: 2019          Assessment and Plan:     Mr. Catherine Jane is a 68 y/o M w/ severe aortic stenosis (mPG 42 mmHg, PETE 0.70 cm^2), CAD s/p PCI p-mLAD and dLAD 19, Chronic diastolic heart failure, presenting with exertional CP, lightheadedness, GONZALEZ, admitted for decompensated diastolic heart failure c/b severe aortic stenosis, preload dependent.      Updates:   -reduce metoprolol  -continue gentle diuresis, lasix 60 mg daily  -TAVR on     #Decompensated Severe Aortic Stenosis  #Acute on ChronicDecompensated Diastolic Heart Failure  mPG 42 mmHg, PETE 0.70 cm^2. CP, GONZALEZ  Likely related to his severe aortic stenosis, but likely also a component of decompensated heart failure as well given volume up on exam. ECG unchanged from prior and not concerning for acute ischemia. Elevated troponin likely related to ventricular stain with severe aortic stenosis and CHF, and not likely due to acute ischemia trending down.  -Daily weights, I&O, 1.8L fluid restriction 2g Na restriction  -Diuresis with goal net negative 1.0L/24hrs; patient very preload dependent  -Lasix 60 mg IV daily  -Reduce metop to 6.25mg  -Defer ACEi, Aldosterone inhibitor, afterload reduction due to low BP  -TAVR planned 19, remain inpatient for medical optimization pre-procedure     #CAD s/p PCI:  s/p PCI p-mLAD and dLAD 19.  -Continue ASA, ticagrelor  -Continue atorvastatin  -Reduce metoprolol to 6.25mg BID     #Hypothyroidism:  -Continue levothyroxine     #DM:  -ISS as inpatient     FEN: Low NA diet  PPX: SQH     Code Status: FULL CODE    Staffed with Dr. Kalyn Severino MD PGY2  Internal Medicine  Cardiology 1 Service   [phone]            Subjective     Patient reports no subjective improvement in breathing, chest pain is now absent.  Able to walk to bathroom without dyspnea or needing to stop to  rest.           Objective     Temp: 98.3  F (36.8  C) Temp src: Axillary BP: 112/51   Heart Rate: 91 Resp: 18 SpO2: 94 % O2 Device: Nasal cannula Oxygen Delivery: 2 LPM  Vitals:    08/30/19 1021 08/31/19 0405 09/01/19 0345   Weight: 71.6 kg (157 lb 14.4 oz) 71.2 kg (157 lb) 71.1 kg (156 lb 12.8 oz)       Intake/Output Summary (Last 24 hours) at 9/1/2019 1005  Last data filed at 9/1/2019 0900  Gross per 24 hour   Intake 890 ml   Output 2375 ml   Net -1485 ml       Gen: AA&Ox3, no acute distress, laying flat in bed, breathing comfortably  HEENT:AT/ NC,  EOM grossly intact,  BACK: Midline bruising  PULM/THORAX: Bilateral crackles  CV:rrr loud aortic systolic murmur  ABD:  nontender, nondistended.   EXT: trace edema, no clubbing or cyanosis. warm well perfused No asymmetrical edema.              Data:     Hemodynamics reviewed and pertinent for: Soft blood pressures, acceptable HR, in sinus    LABS reviewed and pertinent for:  lytes and renal fxn acceptable    Anticoagulation:  ASA, ticagrelor    Imaging: no new      Results for orders placed or performed during the hospital encounter of 08/29/19 (from the past 24 hour(s))   Glucose by meter   Result Value Ref Range    Glucose 126 (H) 70 - 99 mg/dL   Basic metabolic panel   Result Value Ref Range    Sodium 137 133 - 144 mmol/L    Potassium 4.2 3.4 - 5.3 mmol/L    Chloride 100 94 - 109 mmol/L    Carbon Dioxide 31 20 - 32 mmol/L    Anion Gap 7 3 - 14 mmol/L    Glucose 129 (H) 70 - 99 mg/dL    Urea Nitrogen 20 7 - 30 mg/dL    Creatinine 1.02 0.66 - 1.25 mg/dL    GFR Estimate 74 >60 mL/min/[1.73_m2]    GFR Estimate If Black 86 >60 mL/min/[1.73_m2]    Calcium 8.5 8.5 - 10.1 mg/dL   Magnesium   Result Value Ref Range    Magnesium 2.3 1.6 - 2.3 mg/dL   Glucose by meter   Result Value Ref Range    Glucose 101 (H) 70 - 99 mg/dL   Glucose by meter   Result Value Ref Range    Glucose 146 (H) 70 - 99 mg/dL   CBC with platelets   Result Value Ref Range    WBC 10.1 4.0 - 11.0 10e9/L     RBC Count 4.32 (L) 4.4 - 5.9 10e12/L    Hemoglobin 9.2 (L) 13.3 - 17.7 g/dL    Hematocrit 33.5 (L) 40.0 - 53.0 %    MCV 78 78 - 100 fl    MCH 21.3 (L) 26.5 - 33.0 pg    MCHC 27.5 (L) 31.5 - 36.5 g/dL    RDW 23.2 (H) 10.0 - 15.0 %    Platelet Count 390 150 - 450 10e9/L   Basic metabolic panel   Result Value Ref Range    Sodium 138 133 - 144 mmol/L    Potassium 4.6 3.4 - 5.3 mmol/L    Chloride 100 94 - 109 mmol/L    Carbon Dioxide 33 (H) 20 - 32 mmol/L    Anion Gap 5 3 - 14 mmol/L    Glucose 111 (H) 70 - 99 mg/dL    Urea Nitrogen 21 7 - 30 mg/dL    Creatinine 1.11 0.66 - 1.25 mg/dL    GFR Estimate 67 >60 mL/min/[1.73_m2]    GFR Estimate If Black 78 >60 mL/min/[1.73_m2]    Calcium 8.9 8.5 - 10.1 mg/dL   Magnesium   Result Value Ref Range    Magnesium 2.4 (H) 1.6 - 2.3 mg/dL           Medications       Current Facility-Administered Medications:      acetaminophen (TYLENOL) Suppository 650 mg, 650 mg, Rectal, Q4H PRN, Jeff Cordova MD     acetaminophen (TYLENOL) tablet 650 mg, 650 mg, Oral, Q4H PRN, Jeff Cordova MD     albuterol (PROVENTIL) neb solution 2.5 mg, 2.5 mg, Nebulization, Q6H PRN, Jeff Cordova MD     alum & mag hydroxide-simethicone (MYLANTA ES/MAALOX  ES) suspension 30 mL, 30 mL, Oral, Q4H PRN, Jeff Cordova MD     aspirin EC tablet 81 mg, 81 mg, Oral, Daily, Jeff Cordova MD, 81 mg at 08/31/19 0756     atorvastatin (LIPITOR) tablet 40 mg, 40 mg, Oral, Daily, Jeff Cordova MD, 40 mg at 08/31/19 0756     calcium carbonate 500 mg (elemental) (OSCAL;OYSTER SHELL CALCIUM) tablet 500 mg, 1 tablet, Oral, BID, Jeff Cordova MD, 500 mg at 08/31/19 1957     glucose gel 15-30 g, 15-30 g, Oral, Q15 Min PRN **OR** dextrose 50 % injection 25-50 mL, 25-50 mL, Intravenous, Q15 Min PRN **OR** glucagon injection 1 mg, 1 mg, Subcutaneous, Q15 Min PRN, Jeff Cordova MD     fish oil-omega-3 fatty acids capsule 1 g, 1 g, Oral, Daily, Darius Mccain MD, 1 g  at 08/31/19 1150     folic acid (FOLVITE) tablet 1 mg, 1 mg, Oral, MINI, Jeff Cordova MD, 1 mg at 08/31/19 0756     furosemide (LASIX) injection 60 mg, 60 mg, Intravenous, BID, Darius Mccain MD, 60 mg at 08/31/19 1149     gabapentin (NEURONTIN) tablet 1,200 mg, 1,200 mg, Oral, TID, Jeff Cordova MD, 1,200 mg at 08/31/19 1956     gemfibrozil (LOPID) tablet 600 mg, 600 mg, Oral, BID, Jeff Cordova MD, 600 mg at 08/31/19 1956     heparin sodium injection 5,000 Units, 5,000 Units, Subcutaneous, Q12H, Jeff Cordova MD, 5,000 Units at 08/31/19 1957     insulin aspart (NovoLOG) inj (RAPID ACTING), 1-7 Units, Subcutaneous, TID ERICH, Jeff Cordova MD     insulin aspart (NovoLOG) inj (RAPID ACTING), 1-5 Units, Subcutaneous, At Bedtime, Jeff Cordova MD     LANsoprazole (PREVACID) CR capsule 30 mg, 30 mg, Oral, QAM AC, Jeff Cordova MD, 30 mg at 08/31/19 0756     levothyroxine (SYNTHROID/LEVOTHROID) tablet 50 mcg, 50 mcg, Oral, QATasha Paul Thomas, MD, 50 mcg at 08/31/19 0756     lidocaine (LMX4) cream, , Topical, Q1H PRN, Jeff Cordova MD     lidocaine 1 % 0.1-1 mL, 0.1-1 mL, Other, Q1H PRN, Jeff Cordova MD     medication instruction, , Does not apply, Continuous PRN, Jeff Cordova MD     metoprolol tartrate (LOPRESSOR) quarter-tab 6.25 mg, 6.25 mg, Oral, BID, Darius Mccain MD     naloxone (NARCAN) injection 0.1-0.4 mg, 0.1-0.4 mg, Intravenous, Q2 Min PRN, Darius Mccain MD     oxyCODONE-acetaminophen (PERCOCET) 7.5-325 MG per tablet 1 tablet, 1 tablet, Oral, TID PRN, Darius Mccain MD     polyethylene glycol (MIRALAX/GLYCOLAX) Packet 17 g, 17 g, Oral, Daily PRN, Darius Mccain MD, 17 g at 08/31/19 1512     polyethylene glycol (MIRALAX/GLYCOLAX) Packet 17 g, 17 g, Oral, Daily, Glenn Finney MD, 17 g at 08/31/19 0755     senna-docusate (SENOKOT-S/PERICOLACE) 8.6-50 MG per tablet 1 tablet, 1 tablet, Oral, At Bedtime,  Darius Mccain MD, 1 tablet at 08/31/19 2248     sodium chloride (PF) 0.9% PF flush 3 mL, 3 mL, Intracatheter, q1 min prn, Jeff Cordova MD, 3 mL at 08/30/19 0848     sodium chloride (PF) 0.9% PF flush 3 mL, 3 mL, Intracatheter, Q8H, Jeff Cordova MD, 3 mL at 09/01/19 0404     ticagrelor (BRILINTA) tablet 90 mg, 90 mg, Oral, BID, Jeff Cordova MD, 90 mg at 08/31/19 2757     vitamin D3 (CHOLECALCIFEROL) 1000 units (25 mcg) tablet 2,000 Units, 2,000 Units, Oral, Daily, Jeff Cordova MD, 2,000 Units at 08/31/19 1828

## 2019-09-01 NOTE — PROGRESS NOTES
"S: \"Beulah\" (he/him/his pronouns) was admitted 8/29 with exertional chest pain, lightheadness, GONZALEZ most likely related to hypervolemia and severe aortic stenosis.     B: Prior medical history of severe aortic stenosis, CAD s/p PCI 7/30/19      A: Monitored vitals and assessed pt status.   Changed: metoprolol dose halved due to soft BPs  Running: none  PRN: percocet, tylenol for chronic pain  Tele: NSR 70s-100s  O2: 2-3 LPM nasal cannula  Mobility: SBA     Neuro: A&O x 4  Cardiac: BPs soft; BLE edema improving; cardiac murmur  Respiratory: GONZALEZ; fine crackles for bilateral lower lobes; pt reports improving SOB  GI/: voiding adequately (uses urinal); +BM this shift, stool hard and uncomfortable, continue miralax + senna  Diet/appetite: 2g Na diet, tolerating; appetite fair/good; blood sugars (#, #, #); sliding scale insulin as ordered  Activity: up to bathroom x1; up to chair for 1/2 of shift, less somnolent today  Pain: no chest pain reported; chronic back and leg pain, less controlled today per pt report   Skin: no areas of concern noted  LDAs: R PIV, saline locked     R: Social work contacted to help pt notarize POA before his TAVR. Anticipate TAVR Wednesday 9/4. Continue to monitor Pt status and report changes to treatment team - cards 1.  "

## 2019-09-02 NOTE — PROGRESS NOTES
Cardiology Progress Note  Beulah Jane MRN: [id number]  Age: 69 year old, : 1950  Date: 2019          Assessment and Plan:     Mr. Catherine Jane is a 70 y/o M w/ severe aortic stenosis (mPG 42 mmHg, PETE 0.70 cm^2), CAD s/p PCI p-mLAD and dLAD 19, Chronic diastolic heart failure, presenting with exertional CP, lightheadedness, GONZALEZ, admitted for decompensated diastolic heart failure c/b severe aortic stenosis, preload dependent.      Updates:   -methotrexate dose tomorrow   -continue gentle diuresis, lasix 60 mg daily  -TAVR on     #Decompensated Severe Aortic Stenosis  #Acute on ChronicDecompensated Diastolic Heart Failure  mPG 42 mmHg, PETE 0.70 cm^2. CP, GONZALEZ  Likely related to his severe aortic stenosis, but likely also a component of decompensated heart failure as well on top of comorbidity of ILD.. ECG unchanged from prior and not concerning for acute ischemia. Elevated troponin likely related to ventricular strain with severe aortic stenosis and CHF, and not likely due to acute ischemia, trending down.  -Daily weights, I&O, 1.8L fluid restriction 2g Na restriction  -Diuresis with goal net negative 1.0L/24hrs; patient very preload dependent  -Lasix 60 mg IV daily  -metop to 6.25mg  -Defer ACEi, Aldosterone inhibitor, afterload reduction due to low BP  -TAVR planned 19, remain inpatient for medical optimization pre-procedure     #CAD s/p PCI:  s/p PCI p-mLAD and dLAD 19.  -Continue ASA, ticagrelor  -Continue atorvastatin  -Reduce metoprolol to 6.25mg BID    #Dermatomyositis w/Shannon-1 Positivity:  Patient has been diagnosed with polymyositis clinically, not by muscle biopsy. He did have a lung biopsy that showed fibrosis of unknown etiology. Has ILD on CT and blood work showed a positive Shannon-1 antibody. PFTs showed worsening DLCO by 11% from 2018-2019. Having significant foot and leg pain patient states this is how his dermatomyositis tends to flare  "up typically gets like this in the days leading up to his scheduled methotrexate dose.   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 Currently stable on MTX. Worsened since last PFTs but unclear if this is related to his diastolic heart failure w/ severe AS, with recent back surgery and decrease in TLC vs from his DM. CT chest showed stable ILD arguing against DM activity.     #Hypothyroidism:  -Continue levothyroxine     #DM2:  -ISS as inpatient     FEN: Low NA diet  PPX: SQH     Code Status: FULL CODE    Staffed with Dr. Kalyn Severino MD PGY2  Internal Medicine  Cardiology 1 Service   [phone]            Subjective     Having significant foot and leg pain, feels like his feet are in \"boiling water\" or pins and needles and electric shocks.Patient reports no subjective improvement in breathing, chest pain is now absent.  Able to walk to bathroom without dyspnea or needing to stop to rest.           Objective     Temp: 98.2  F (36.8  C) Temp src: Oral BP: 100/56   Heart Rate: 66 Resp: 16 SpO2: 98 % O2 Device: Nasal cannula Oxygen Delivery: 2 LPM  Vitals:    08/31/19 0405 09/01/19 0345 09/02/19 0337   Weight: 71.2 kg (157 lb) 71.1 kg (156 lb 12.8 oz) 71.4 kg (157 lb 6.4 oz)         Intake/Output Summary (Last 24 hours) at 9/2/2019 0859  Last data filed at 9/2/2019 0500  Gross per 24 hour   Intake 1220 ml   Output 2765 ml   Net -1545 ml       Gen: AA&Ox3, no acute distress, laying flat in bed, breathing comfortably  HEENT:AT/ NC,  EOM grossly intact,  BACK: Midline bruising/scar  PULM/THORAX: Bilateral crackles  CV:rrr loud aortic systolic murmur  ABD:  nontender, nondistended.   EXT: no edema, no clubbing or cyanosis. warm well perfused No asymmetrical edema.  Skin: some erythema on bilateral dorsal surface of feet  MSK: no " swelling or joint deformity            Data:     Hemodynamics reviewed and pertinent for: Soft blood pressures, acceptable HR, in sinus    LABS reviewed and pertinent for:  lytes and renal fxn acceptable    Anticoagulation:  ASA, ticagrelor, sqheparin    Imaging: no new      Results for orders placed or performed during the hospital encounter of 08/29/19 (from the past 24 hour(s))   Glucose by meter   Result Value Ref Range    Glucose 105 (H) 70 - 99 mg/dL   Basic metabolic panel   Result Value Ref Range    Sodium 135 133 - 144 mmol/L    Potassium 3.8 3.4 - 5.3 mmol/L    Chloride 96 94 - 109 mmol/L    Carbon Dioxide 31 20 - 32 mmol/L    Anion Gap 8 3 - 14 mmol/L    Glucose 114 (H) 70 - 99 mg/dL    Urea Nitrogen 22 7 - 30 mg/dL    Creatinine 1.08 0.66 - 1.25 mg/dL    GFR Estimate 69 >60 mL/min/[1.73_m2]    GFR Estimate If Black 80 >60 mL/min/[1.73_m2]    Calcium 8.7 8.5 - 10.1 mg/dL   Magnesium   Result Value Ref Range    Magnesium 2.4 (H) 1.6 - 2.3 mg/dL   Glucose by meter   Result Value Ref Range    Glucose 128 (H) 70 - 99 mg/dL   Glucose by meter   Result Value Ref Range    Glucose 145 (H) 70 - 99 mg/dL   Basic metabolic panel   Result Value Ref Range    Sodium 136 133 - 144 mmol/L    Potassium 4.0 3.4 - 5.3 mmol/L    Chloride 98 94 - 109 mmol/L    Carbon Dioxide 32 20 - 32 mmol/L    Anion Gap 5 3 - 14 mmol/L    Glucose 100 (H) 70 - 99 mg/dL    Urea Nitrogen 24 7 - 30 mg/dL    Creatinine 1.02 0.66 - 1.25 mg/dL    GFR Estimate 74 >60 mL/min/[1.73_m2]    GFR Estimate If Black 86 >60 mL/min/[1.73_m2]    Calcium 8.7 8.5 - 10.1 mg/dL   Magnesium   Result Value Ref Range    Magnesium 2.4 (H) 1.6 - 2.3 mg/dL           Medications       Current Facility-Administered Medications:      acetaminophen (TYLENOL) Suppository 650 mg, 650 mg, Rectal, Q4H PRN, Jeff Cordova MD     acetaminophen (TYLENOL) tablet 650 mg, 650 mg, Oral, Q4H PRN, Jeff Cordova MD, 650 mg at 09/01/19 2238     albuterol (PROVENTIL) neb  solution 2.5 mg, 2.5 mg, Nebulization, Q6H PRN, Jeff Cordova MD     alum & mag hydroxide-simethicone (MYLANTA ES/MAALOX  ES) suspension 30 mL, 30 mL, Oral, Q4H PRN, Jeff Cordova MD     aspirin EC tablet 81 mg, 81 mg, Oral, Daily, Jeff Cordova MD, 81 mg at 09/01/19 0857     atorvastatin (LIPITOR) tablet 40 mg, 40 mg, Oral, Daily, Jeff Cordova MD, 40 mg at 09/01/19 0857     calcium carbonate 500 mg (elemental) (OSCAL;OYSTER SHELL CALCIUM) tablet 500 mg, 1 tablet, Oral, BID, Jeff Cordova MD, 500 mg at 09/01/19 2004     glucose gel 15-30 g, 15-30 g, Oral, Q15 Min PRN **OR** dextrose 50 % injection 25-50 mL, 25-50 mL, Intravenous, Q15 Min PRN **OR** glucagon injection 1 mg, 1 mg, Subcutaneous, Q15 Min PRN, Jeff Cordova MD     fish oil-omega-3 fatty acids capsule 1 g, 1 g, Oral, Daily, Darius Mccain MD, 1 g at 09/01/19 0857     folic acid (FOLVITE) tablet 1 mg, 1 mg, Oral, QAM, Jeff Cordova MD, 1 mg at 09/01/19 0857     furosemide (LASIX) injection 60 mg, 60 mg, Intravenous, Daily, Darius Mccain MD, 60 mg at 09/01/19 0906     gabapentin (NEURONTIN) tablet 1,200 mg, 1,200 mg, Oral, TID, Jeff Cordova MD, 1,200 mg at 09/02/19 0759     gemfibrozil (LOPID) tablet 600 mg, 600 mg, Oral, BID, Jeff Cordova MD, 600 mg at 09/01/19 2004     heparin sodium injection 5,000 Units, 5,000 Units, Subcutaneous, Q12H, Jeff Cordova MD, 5,000 Units at 09/01/19 2004     insulin aspart (NovoLOG) inj (RAPID ACTING), 1-7 Units, Subcutaneous, TID AC, Jeff Cordova MD     insulin aspart (NovoLOG) inj (RAPID ACTING), 1-5 Units, Subcutaneous, At Bedtime, Jeff Cordova MD     LANsoprazole (PREVACID) CR capsule 30 mg, 30 mg, Oral, MINI AC, Jeff Cordova MD, 30 mg at 09/01/19 0857     levothyroxine (SYNTHROID/LEVOTHROID) tablet 50 mcg, 50 mcg, Oral, Tasha MORSE Paul Thomas, MD, 50 mcg at 09/01/19 0857     lidocaine (LMX4) cream,  , Topical, Q1H PRN, Jeff Cordova MD     lidocaine 1 % 0.1-1 mL, 0.1-1 mL, Other, Q1H PRN, Jeff Cordova MD     medication instruction, , Does not apply, Continuous PRN, Jeff Cordova MD     metoprolol tartrate (LOPRESSOR) quarter-tab 6.25 mg, 6.25 mg, Oral, BID, Darius Mccain MD, 6.25 mg at 09/01/19 2008     naloxone (NARCAN) injection 0.1-0.4 mg, 0.1-0.4 mg, Intravenous, Q2 Min PRN, Darius Mccain MD     oxyCODONE-acetaminophen (PERCOCET) 7.5-325 MG per tablet 1 tablet, 1 tablet, Oral, TID PRN, Darius Mccain MD, 1 tablet at 09/02/19 0758     polyethylene glycol (MIRALAX/GLYCOLAX) Packet 17 g, 17 g, Oral, Daily PRN, Darius Mccain MD, 17 g at 08/31/19 1512     polyethylene glycol (MIRALAX/GLYCOLAX) Packet 17 g, 17 g, Oral, Daily, Glenn Finney MD, 17 g at 09/01/19 0855     senna-docusate (SENOKOT-S/PERICOLACE) 8.6-50 MG per tablet 1 tablet, 1 tablet, Oral, At Bedtime, Darius Mccain MD, 1 tablet at 09/01/19 2237     sodium chloride (PF) 0.9% PF flush 3 mL, 3 mL, Intracatheter, q1 min prn, Jeff Cordova MD, 3 mL at 09/01/19 0856     sodium chloride (PF) 0.9% PF flush 3 mL, 3 mL, Intracatheter, Q8H, Jeff Cordova MD, 3 mL at 09/02/19 0346     ticagrelor (BRILINTA) tablet 90 mg, 90 mg, Oral, BID, Jeff Cordova MD, 90 mg at 09/01/19 2004     vitamin D3 (CHOLECALCIFEROL) 1000 units (25 mcg) tablet 2,000 Units, 2,000 Units, Oral, Daily, Jeff Cordova MD, 2,000 Units at 09/01/19 0814

## 2019-09-02 NOTE — PLAN OF CARE
D: Pt admitted 8/29 with exertional chest pain, GONZALEZ, lightheadedness. Hx severe aortic stenosis, CAD s/p PCI 7/30/19, hypothyroidism, DM.  I: Monitored vitals and assessed pt status. PRN percocet, tylenol.  A: A0x4. VSS on 2LPM via NC. SBP low 80s at 2330, see flowsheet; pt denied lightheadedness and SBP improved to 100 at 0330 (cross-cover MD notified via text page). SR on tele. Afebrile. Pt reported chronic back/leg pain was worse overnight, though somewhat improved with PRN meds. SOB with activity. +BM. Voiding adequately. Up SBA. Pt slept between cares, making needs known.  P: Continue to monitor and report changes/concerns to Cards 1. Plan for TAVR 9/4.

## 2019-09-02 NOTE — PLAN OF CARE
0700-1930  Patient is a 69year old male admitted for8/29 with exertional chest pain, lightheadness, GONZALEZ most likely related to hypervolemia and severe aortic stenosis  with a PMH ofsevere aortic stenosis, CAD s/p PCI 7/30/19.    Neuro: AOx4  Cardiac: BLE mild edema, blood pressures soft. Metoprolol held.   Telemetry: SR with widened QRS complex rate of 70-90s  Respiratory: 2L NC to maintain oxygen saturation above 90%  GI/: voiding via urinal, IV lasix 60mg in AM.  Endocrine: BS with s/s insulin given per orders  Diet/Appetite: 2gNa with 2L FR, good appetite  Skin: BLE rachana, reports cool hands. Covered skin tear on right arm, dressing C/D/I.  LDA: PIV R forearm  Activity: Up SBA  Pain: pins and needles in BLE, percocet given. Changed back to scheduled.  Plan: waiting for TAVR procedure on 9/4, monitor. Contact Cards 1 with any changes.

## 2019-09-03 PROBLEM — I50.32: Chronic | Status: ACTIVE | Noted: 2019-01-01

## 2019-09-03 PROBLEM — J84.10 PULMONARY FIBROSIS (H): Chronic | Status: ACTIVE | Noted: 2017-12-15

## 2019-09-03 PROBLEM — M51.369 DDD (DEGENERATIVE DISC DISEASE), LUMBAR: Status: RESOLVED | Noted: 2018-05-03 | Resolved: 2019-01-01

## 2019-09-03 PROBLEM — Z98.890 STATUS POST CORONARY ANGIOGRAM: Status: RESOLVED | Noted: 2019-01-01 | Resolved: 2019-01-01

## 2019-09-03 PROBLEM — Z95.5 S/P DRUG ELUTING CORONARY STENT PLACEMENT: Status: RESOLVED | Noted: 2019-01-01 | Resolved: 2019-01-01

## 2019-09-03 PROBLEM — E11.49 TYPE 2 DIABETES MELLITUS WITH NEUROLOGIC COMPLICATION, WITH LONG-TERM CURRENT USE OF INSULIN (H): Chronic | Status: ACTIVE | Noted: 2017-12-15

## 2019-09-03 PROBLEM — M40.15 OTHER SECONDARY KYPHOSIS, THORACOLUMBAR REGION: Status: RESOLVED | Noted: 2019-01-01 | Resolved: 2019-01-01

## 2019-09-03 PROBLEM — I51.89 OTHER ILL-DEFINED HEART DISEASES: Status: RESOLVED | Noted: 2019-01-01 | Resolved: 2019-01-01

## 2019-09-03 PROBLEM — I35.0 AORTIC STENOSIS: Status: RESOLVED | Noted: 2019-01-01 | Resolved: 2019-01-01

## 2019-09-03 PROBLEM — M33.13 DERMATOMYOSITIS (H): Status: RESOLVED | Noted: 2017-12-15 | Resolved: 2019-01-01

## 2019-09-03 PROBLEM — R00.0 TACHYCARDIA: Status: RESOLVED | Noted: 2017-12-15 | Resolved: 2019-01-01

## 2019-09-03 PROBLEM — R06.02 SOB (SHORTNESS OF BREATH): Status: RESOLVED | Noted: 2017-12-05 | Resolved: 2019-01-01

## 2019-09-03 PROBLEM — Z79.4 TYPE 2 DIABETES MELLITUS WITH NEUROLOGIC COMPLICATION, WITH LONG-TERM CURRENT USE OF INSULIN (H): Chronic | Status: ACTIVE | Noted: 2017-12-15

## 2019-09-03 PROBLEM — D62 ANEMIA DUE TO BLOOD LOSS, ACUTE: Status: RESOLVED | Noted: 2019-01-01 | Resolved: 2019-01-01

## 2019-09-03 PROBLEM — I10 HYPERTENSION: Chronic | Status: ACTIVE | Noted: 2017-12-15

## 2019-09-03 PROBLEM — E78.2 MIXED HYPERLIPIDEMIA: Chronic | Status: ACTIVE | Noted: 2018-08-13

## 2019-09-03 PROBLEM — I25.118 CORONARY ARTERY DISEASE INVOLVING NATIVE CORONARY ARTERY OF NATIVE HEART WITH OTHER FORM OF ANGINA PECTORIS (H): Chronic | Status: ACTIVE | Noted: 2019-01-01

## 2019-09-03 PROBLEM — Z98.890 STATUS POST LUMBAR SPINE SURGERY FOR DECOMPRESSION OF SPINAL CORD: Status: RESOLVED | Noted: 2019-01-01 | Resolved: 2019-01-01

## 2019-09-03 PROBLEM — R93.1 ABNORMAL FINDINGS DIAGNOSTIC IMAGING OF HEART AND CORONARY CIRCULATION: Status: RESOLVED | Noted: 2019-01-01 | Resolved: 2019-01-01

## 2019-09-03 PROBLEM — M19.90 INFLAMMATORY ARTHRITIS: Chronic | Status: ACTIVE | Noted: 2019-01-01

## 2019-09-03 PROBLEM — Z98.890 S/P SPINAL SURGERY: Status: RESOLVED | Noted: 2019-01-01 | Resolved: 2019-01-01

## 2019-09-03 PROBLEM — E03.9 HYPOTHYROIDISM: Chronic | Status: ACTIVE | Noted: 2017-12-15

## 2019-09-03 NOTE — PLAN OF CARE
Patient is a 69year old male admitted for8/29 with exertional chest pain, lightheadness, GONZALEZ most likely related to hypervolemia and severe aortic stenosis  with a PMH ofsevere aortic stenosis, CAD s/p PCI 7/30/19.     Neuro: AOx4  Cardiac: BLE mild edema, blood pressures soft. Metoprolol held.   Telemetry: SR with widened QRS complex rate of 70-90s  Respiratory: 1L NC to maintain oxygen saturation above 90%, patient reports GONZALEZ and nonproductive cough, cannot cough up but feels congested. Crackles in devonte bases.  GI/: voiding via urinal, IV lasix 60mg in AM.  Endocrine: BS with s/s insulin given per orders  Diet/Appetite: 2gNa with 2L Fr, schedule for meals late.  Skin: BLE rachana, reports cool hands. Covered skin tear on right arm, dressing C/D/I.  LDA: PIV R forearm  Activity: Up SBA, reports increased weakness in legs  Pain: pins and needles in BLE, percocet given. Changed back to scheduled.  Plan: waiting for TAVR procedure on 9/4 (tomorrow). NPO at midnight, first case in the AM. Surgical shower prep needed. Contact Cards 1 with any changes.

## 2019-09-03 NOTE — PLAN OF CARE
D: Pt with severe aortic stenosis awaiting TAVR, undergoing gentle diuresis (pre-load dependent). Hx CAD s/p PCI, CHF, dermatomyositis/ILD.   I/A: Scheduled percocet/gabapentin for chronic neuralgia. Massage with essential oils provided before HS. 1x PRN oxycodone for overnight coverage. BP soft- PM metoprolol held. SR with TWI, PAC's. Voiding well.  P: Continue to monitor and assess pt condition and contact treatment team with questions or concerns.

## 2019-09-03 NOTE — PROGRESS NOTES
Cardiology Progress Note  Beulah Jane MRN: [id number]  Age: 69 year old, : 1950  Date: 2019          Assessment and Plan:     Mr. Catherine Jane is a 70 y/o M w/ severe aortic stenosis (mPG 42 mmHg, PETE 0.70 cm^2), CAD s/p PCI p-mLAD and dLAD 19, Chronic diastolic heart failure, presenting with exertional CP, lightheadedness, GONZALEZ, admitted for decompensated diastolic heart failure c/b severe aortic stenosis, preload dependent.      Updates:   -methotrexate dose today  -continue gentle diuresis, lasix 60 mg daily  -TAVR on , NPO MN    #Decompensated Severe Aortic Stenosis  #Acute on ChronicDecompensated Diastolic Heart Failure  mPG 42 mmHg, PETE 0.70 cm^2. CP, GONZALEZ  Likely related to his severe aortic stenosis, but likely also a component of decompensated heart failure as well on top of comorbidity of ILD.. ECG unchanged from prior and not concerning for acute ischemia. Elevated troponin likely related to ventricular strain with severe aortic stenosis and CHF, and not likely due to acute ischemia, trending down.  -Daily weights, I&O, 1.8L fluid restriction 2g Na restriction  -Diuresis with goal net negative 1.0L/24hrs; patient very preload dependent  -Lasix 60 mg IV daily  -metop to 6.25mg  -Defer ACEi, Aldosterone inhibitor, afterload reduction due to low BP  -TAVR planned 19, remain inpatient for medical optimization pre-procedure     #CAD s/p PCI:  s/p PCI p-mLAD and dLAD 19.  -Continue ASA, ticagrelor  -Continue atorvastatin  -Metoprolol to 6.25mg BID    #Dermatomyositis w/Shannon-1 Positivity:  #Chronic pain  Patient has been diagnosed with polymyositis clinically, not by muscle biopsy. He did have a lung biopsy that showed fibrosis of unknown etiology. Has ILD on CT and blood work showed a positive Shannon-1 antibody. PFTs showed worsening DLCO by 11% from 2018-2019. Having significant foot and leg pain patient states this is how his dermatomyositis  tends to flare up typically gets like this in the days leading up to his scheduled methotrexate dose.   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 Currently stable on MTX. Worsened since last PFTs but unclear if this is related to his diastolic heart failure w/ severe AS, with recent back surgery and decrease in TLC vs from his DM. CT chest showed stable ILD arguing against DM activity.     #Hypothyroidism:  -Continue levothyroxine     #DM2:  -ISS as inpatient     FEN: Low NA diet  PPX: SQH     Code Status: FULL CODE    Staffed with Dr. Cyndee King, PA-C  [facility] Cardiology Team            Subjective     Complains of burning LE pain overnight. Still feels some shortness of breath with activity  Denies chest pain, nausea, headache, fever, chills          Objective     Temp: 98  F (36.7  C) Temp src: Oral BP: 102/47 Pulse: 84 Heart Rate: 86 Resp: 16 SpO2: 99 % O2 Device: Nasal cannula Oxygen Delivery: 2 LPM  Vitals:    09/01/19 0345 09/02/19 0337 09/03/19 0643   Weight: 71.1 kg (156 lb 12.8 oz) 71.4 kg (157 lb 6.4 oz) 69.4 kg (152 lb 14.4 oz)       Intake/Output Summary (Last 24 hours) at 9/3/2019 1259  Last data filed at 9/3/2019 1117  Gross per 24 hour   Intake 660 ml   Output 2750 ml   Net -2090 ml     Gen: AA&Ox3, no acute distress, laying flat in bed, breathing comfortably  HEENT:AT/ NC,  EOM grossly intact,  PULM/THORAX: Faint bibasilar crackles  CV:rrr 2/6 aortic systolic murmur  ABD:  nontender, nondistended.   EXT: no edema, no clubbing or cyanosis. warm well perfused No asymmetrical edema.  Skin: some erythema on bilateral dorsal surface of feet  MSK: no swelling or joint deformity            Data:     Hemodynamics reviewed and pertinent for: Soft blood pressures,  acceptable HR, in sinus    LABS reviewed and pertinent for:  lytes and renal fxn acceptable    Anticoagulation:  ASA, ticagrelor, sqheparin    Imaging: no new      Results for orders placed or performed during the hospital encounter of 08/29/19 (from the past 24 hour(s))   Glucose by meter   Result Value Ref Range    Glucose 140 (H) 70 - 99 mg/dL   Basic metabolic panel   Result Value Ref Range    Sodium 135 133 - 144 mmol/L    Potassium 3.9 3.4 - 5.3 mmol/L    Chloride 97 94 - 109 mmol/L    Carbon Dioxide 32 20 - 32 mmol/L    Anion Gap 6 3 - 14 mmol/L    Glucose 117 (H) 70 - 99 mg/dL    Urea Nitrogen 21 7 - 30 mg/dL    Creatinine 0.97 0.66 - 1.25 mg/dL    GFR Estimate 79 >60 mL/min/[1.73_m2]    GFR Estimate If Black >90 >60 mL/min/[1.73_m2]    Calcium 8.6 8.5 - 10.1 mg/dL   Magnesium   Result Value Ref Range    Magnesium 2.3 1.6 - 2.3 mg/dL   Glucose by meter   Result Value Ref Range    Glucose 107 (H) 70 - 99 mg/dL   Glucose by meter   Result Value Ref Range    Glucose 108 (H) 70 - 99 mg/dL   Basic metabolic panel   Result Value Ref Range    Sodium 137 133 - 144 mmol/L    Potassium 4.0 3.4 - 5.3 mmol/L    Chloride 100 94 - 109 mmol/L    Carbon Dioxide 30 20 - 32 mmol/L    Anion Gap 8 3 - 14 mmol/L    Glucose 106 (H) 70 - 99 mg/dL    Urea Nitrogen 20 7 - 30 mg/dL    Creatinine 0.89 0.66 - 1.25 mg/dL    GFR Estimate 87 >60 mL/min/[1.73_m2]    GFR Estimate If Black >90 >60 mL/min/[1.73_m2]    Calcium 8.9 8.5 - 10.1 mg/dL   Magnesium   Result Value Ref Range    Magnesium 2.4 (H) 1.6 - 2.3 mg/dL   Glucose by meter   Result Value Ref Range    Glucose 101 (H) 70 - 99 mg/dL           Medications       Current Facility-Administered Medications:      acetaminophen (TYLENOL) Suppository 650 mg, 650 mg, Rectal, Q4H PRN, Jeff Cordova MD     acetaminophen (TYLENOL) tablet 650 mg, 650 mg, Oral, Q4H PRN, Jeff Cordova MD, 650 mg at 09/01/19 2238     albuterol (PROVENTIL) neb solution 2.5 mg, 2.5 mg, Nebulization,  Q6H PRN, Jeff Cordova MD     alum & mag hydroxide-simethicone (MYLANTA ES/MAALOX  ES) suspension 30 mL, 30 mL, Oral, Q4H PRN, Jeff Cordova MD     aspirin EC tablet 81 mg, 81 mg, Oral, Daily, Jeff Cordova MD, 81 mg at 09/03/19 0835     atorvastatin (LIPITOR) tablet 40 mg, 40 mg, Oral, Daily, Jeff Cordova MD, 40 mg at 09/03/19 0829     calcium carbonate 500 mg (elemental) (OSCAL;OYSTER SHELL CALCIUM) tablet 500 mg, 1 tablet, Oral, BID, Jeff Cordova MD, 500 mg at 09/03/19 0829     glucose gel 15-30 g, 15-30 g, Oral, Q15 Min PRN **OR** dextrose 50 % injection 25-50 mL, 25-50 mL, Intravenous, Q15 Min PRN **OR** glucagon injection 1 mg, 1 mg, Subcutaneous, Q15 Min PRN, Jeff Cordova MD     fish oil-omega-3 fatty acids capsule 1 g, 1 g, Oral, Daily, Darius Mccain MD, 1 g at 09/03/19 0830     folic acid (FOLVITE) tablet 1 mg, 1 mg, Oral, QAM, Jeff Cordova MD, 1 mg at 09/03/19 0829     furosemide (LASIX) injection 60 mg, 60 mg, Intravenous, Daily, Darius Mccain MD, 60 mg at 09/03/19 0831     gabapentin (NEURONTIN) tablet 1,200 mg, 1,200 mg, Oral, TID, Jeff Cordova MD, 1,200 mg at 09/03/19 0825     gemfibrozil (LOPID) tablet 600 mg, 600 mg, Oral, BID, Jeff Cordova MD, 600 mg at 09/03/19 0829     heparin sodium injection 5,000 Units, 5,000 Units, Subcutaneous, Q12H, Jeff Cordova MD, 5,000 Units at 09/03/19 0834     insulin aspart (NovoLOG) inj (RAPID ACTING), 1-7 Units, Subcutaneous, TID AC, Jeff Cordova MD, 1 Units at 09/02/19 1456     insulin aspart (NovoLOG) inj (RAPID ACTING), 1-5 Units, Subcutaneous, At Bedtime, Jeff Cordova MD     LANsoprazole (PREVACID) CR capsule 30 mg, 30 mg, Oral, MINI JUNG, Jeff Cordova MD, 30 mg at 09/03/19 0828     levothyroxine (SYNTHROID/LEVOTHROID) tablet 50 mcg, 50 mcg, Oral, Tasha MORSE Paul Thomas, MD, 50 mcg at 09/03/19 0828     lidocaine (LMX4) cream, , Topical,  Q1H PRN, Jeff Cordova MD     lidocaine 1 % 0.1-1 mL, 0.1-1 mL, Other, Q1H PRN, Jeff Cordova MD     medication instruction, , Does not apply, Continuous PRN, Jeff Cordova MD     methotrexate tablet CHEMO 25 mg, 25 mg, Oral, Q7 Days, Darius Mccain MD, 25 mg at 09/03/19 0837     metoprolol tartrate (LOPRESSOR) quarter-tab 6.25 mg, 6.25 mg, Oral, BID, Darius Mccain MD, 6.25 mg at 09/03/19 0831     naloxone (NARCAN) injection 0.1-0.4 mg, 0.1-0.4 mg, Intravenous, Q2 Min PRN, Darius Mccain MD     oxyCODONE (ROXICODONE) tablet 5 mg, 5 mg, Oral, Q6H PRN, Gita Walter MD, 5 mg at 09/03/19 0157     oxyCODONE-acetaminophen (PERCOCET) 7.5-325 MG per tablet 1 tablet, 1 tablet, Oral, TID, Darius Mccain MD, 1 tablet at 09/03/19 0824     polyethylene glycol (MIRALAX/GLYCOLAX) Packet 17 g, 17 g, Oral, Daily PRN, Darius Mccain MD, 17 g at 08/31/19 1512     polyethylene glycol (MIRALAX/GLYCOLAX) Packet 17 g, 17 g, Oral, Daily, Glenn Finney MD, 17 g at 09/03/19 0827     senna-docusate (SENOKOT-S/PERICOLACE) 8.6-50 MG per tablet 1 tablet, 1 tablet, Oral, At Bedtime, Darius Mccain MD, 1 tablet at 09/01/19 2237     sodium chloride (PF) 0.9% PF flush 3 mL, 3 mL, Intracatheter, q1 min prn, Jeff Cordova MD, 3 mL at 09/01/19 0856     sodium chloride (PF) 0.9% PF flush 3 mL, 3 mL, Intracatheter, Q8H, Jeff Cordova MD, 3 mL at 09/02/19 2017     ticagrelor (BRILINTA) tablet 90 mg, 90 mg, Oral, BID, Jeff Cordova MD, 90 mg at 09/03/19 0830     vitamin D3 (CHOLECALCIFEROL) 1000 units (25 mcg) tablet 2,000 Units, 2,000 Units, Oral, Daily, Jeff Cordova MD, 2,000 Units at 09/03/19 0828

## 2019-09-04 PROBLEM — Z95.2 S/P TAVR (TRANSCATHETER AORTIC VALVE REPLACEMENT): Status: ACTIVE | Noted: 2019-01-01

## 2019-09-04 NOTE — PLAN OF CARE
Transfer  Transferred to: 3C  Via: Stretcher  Reason for transfer: TAVR  Family: Aware of transfer   Belongings: Remain in room  Chart:Sent with pt  Report called to Maxine  Transport provided by pt transport and float float RN

## 2019-09-04 NOTE — PROVIDER NOTIFICATION
Notified CSI that patient having runs of ELVI KEBEDE to order for K level.  Also notified that family awaiting update, have paged  services X2, awaiting .

## 2019-09-04 NOTE — ANESTHESIA POSTPROCEDURE EVALUATION
Anesthesia POST Procedure Evaluation    Patient: Beulah Jane   MRN:     [id number] Gender:   male   Age:    69 year old :      1950        Preoperative Diagnosis: heart valve condition   Procedure(s):  Transcatheter (Medtronic - 29mm) Aortic Valve Replacement, trans thorasic echocardiogram, perfusion and cardiac surgery standby, temporary pacemeker placement  Cardiopulmonary Bypass Standby   Postop Comments: No value filed.       Anesthesia Type:  Not documented  MAC    Reportable Event: NO     PAIN: Uncomplicated   Sign Out status: Comfortable, Well controlled pain     PONV: No PONV   Sign Out status:  No Nausea or Vomiting     Neuro/Psych: Uneventful perioperative course   Sign Out Status: Preoperative baseline; Age appropriate mentation     Airway/Resp.: Uneventful perioperative course   Sign Out Status: Non labored breathing, age appropriate RR; Resp. Status within EXPECTED Parameters     CV: Uneventful perioperative course   Sign Out status: Appropriate BP and perfusion indices; Appropriate HR/Rhythm     Disposition:   Sign Out in:  ICU  Disposition:  ICU  Recovery Course: Recovery in ICU  Follow-Up: Not required     Comments/Narrative:  3rd degree AV block following valve deployment --> RIJ transvenous pacing wire placed.           Last Anesthesia Record Vitals:  CRNA VITALS  2019 1003 - 2019 1103      2019             Resp Rate (observed):  1  (Abnormal)           Last PACU Vitals:  Vitals Value Taken Time   BP 98/64 2019 11:10 AM   Temp     Pulse 80 2019 11:10 AM   Resp 20 2019 11:00 AM   SpO2 99 % 2019 11:13 AM   Temp src     NIBP     Pulse     SpO2     Resp     Temp     Ht Rate     Temp 2     Vitals shown include unvalidated device data.      Electronically Signed By: Anaya Figueroa MD, 2019, 11:15 AM

## 2019-09-04 NOTE — OR NURSING
Paged Dr. Madalyn MD, cardiothoracic on call to request preop orders, she advised to call Dr. Sameer MD, paged Dr. Sameer MD he returned call and stated to call primary, paged Dr. Garcia to request preop orders

## 2019-09-04 NOTE — ANESTHESIA CARE TRANSFER NOTE
Patient: Beulah Jane    Procedure(s):  Transcatheter (Medtronic - 29mm) Aortic Valve Replacement, trans thorasic echocardiogram, perfusion and cardiac surgery standby, temporary pacemeker placement  Cardiopulmonary Bypass Standby    Diagnosis: heart valve condition  Diagnosis Additional Information: No value filed.    Anesthesia Type:   MAC     Note:  Airway :Nasal Cannula  Patient transferred to:PACU  Handoff Report: Identifed the Patient, Identified the Reponsible Provider, Reviewed the pertinent medical history, Discussed the surgical course, Reviewed Intra-OP anesthesia mangement and issues during anesthesia, Set expectations for post-procedure period and Allowed opportunity for questions and acknowledgement of understanding      Vitals: (Last set prior to Anesthesia Care Transfer)    CRNA VITALS  9/4/2019 1003 - 9/4/2019 1038      9/4/2019             Resp Rate (observed): 18                 Electronically Signed By: ANASTACIA Church CRNA  September 4, 2019  10:38 AM

## 2019-09-04 NOTE — OP NOTE
EP PROCEDURE NOTE    PROCEDURE:  Insertion of temporary, active-fixation pacing lead.    Attending: Dr. Shefali Sylvester  EP Fellow: Dr. Yfn Thomas  Procedure Date: 8/27/2018    Pre-operative Diagnosis & Device Indication:  Immediate complete heart block following transcatheter aortic valve replacement.  Post-operative diagnosis:   Successful placement of temporary pacing lead.  Complications: None.     Fluoroscopy time/dose: see procedure log      Clinical Profile:  59 yo male with pre-existing RBBB who developed complete heart block following deployment of a Medtronic transcatheter aortic valve prosthesis.    PROCEDURAL DETAILS  The need for possible temporary or permanent pacemaker insertion had reportedly been discussed with the patient prior to undergoing transcatheter aortic valve replacement, the risks of the procedure including, but not limited to, pain, bleeding, blood transfusions and transfusion reactions, infection, pneumothorax, perforation of vessels or heart, pericardial effusion, and death. Informed Consent had been obtained prior to TAVR.  The patient was prepped and draped in a sterile fashion.    A time out had been performed prior to the TAVR procedure and the patient's identity and procedure were confirmed.    The right neck and upper chest was vigorously washed, prepped, and sterilely draped. An appropriate vascular access site was identified with ultrasonography, marked, and then anesthetized using local infiltration with lidocaine.    Sedation was managed by the anesthesiology team.  Heart rate, blood pressure, oxygen saturation, and patient responses were monitored throughout the procedure with the assistance of the RN.     The right internal jugular vein was accessed with a 21 gauge needle under ultrasound guidance.  A guidewire was inserted into the internal jugular vein.  The needle was withdrawn and a 7Fr locking sheath was inserted into the right internal jugular vein over the  guidewire.  The guidewire and dilator were removed. A right ventricular lead was inserted through the sheath down to the apical RV septum and was screwed into the myocardium. The lead was connected to the temporary pacing box via an adapter and showed good sensing and pacing threshold at this position. There was no diaphragmatic stimulation at maximum output.    The suture sleeve was advanced to plug the proximal end of the sterile sleeve extending from the locking sheath.  The sheath was sutured into place and a dressing was applied over the site.    MEASUREMENTS:  The RV lead was sensing appropriately and was capturing at an output of 1 mA.    FINAL PROGRAMMING:  VVI 80 at an output of 10 mA    PLAN:  Please obtain a portable CXR post-procedure.  We will observe him over the next 48 hours and monitor for recovery of conduction.  If he continues to demonstrate any need for pacing by Friday morning, 9/6, we will implant a dual chamber permanent pacemaker that day.  Please keep him NPO after midnight tomorrow night (00:00 on 9/6).  Please page [phone] with any questions.    Dr. Shefali Sylvester was present throughout the entire procedure.    Yfn Thomas MD  Cardiac electrophysiology fellow      I was present during the entire procedure.    Shefali Sylvester MD, MS  EP/Cardiology Staff

## 2019-09-04 NOTE — PLAN OF CARE
D: Admitted on 8/29 with severe aortic stenosis, CAD s/p PCI p-mLAD and dLAD 7/30, presenting with decompensated diastolic HF, exertional CP, lightheadedness and GONZALEZ.    I/A: A/Ox4. ST/SR AoVSS on 2L per NC. Denies pain. Dry cough. Skin tear right arm, dressing CDI. Gauze on abdomen for oozing old heparin injection site. NPO @midnight for TAVR. PIV right, infusing NS @75ml/hr. 2x Surgical prep completed. Up SBA.    P: TAVR scheduled today. Continue with POC and notify cards 1 with changes.

## 2019-09-04 NOTE — PLAN OF CARE
D: 61 yo male with pre-existing RBBB who developed complete heart block following deployment of a Medtronic transcatheter aortic valve prosthesis.  I/A:  Neuro: A&OX4, moves all extremities PERRLA. C/o chronic back pain, receiving scheduled Percocet, has PRN Dilaudid available for breakthrough pain.  Pulm: Lungs clear, O2S 99 on 2L NC. Capnography w/EtCO2 32-40, IPI 10.  CV: Was 100% V paced @ 80 bpm w/ R internal jugular TVP upon arrival, now in ST w/T wave inversion and R BBB, occasional pacer spikes. Soft BPs w/MAP 65-70, CSI aware. T max 99.  PV: 2+ radial, 1+/dopplerable DP pulses. B/l groin sites w/small amount of ecchymosis, soft. Q1h checks until 2000.  GI: Hypoactive bowel tones. Diet advance as tolerated, passed bedside swallow eval. Currently eating soup. LBM 9/3/2019.  : Voids spontaneously without difficulty, received 60 mg IV Lasix with good response.  Skin: See flowsheets.  MS: Generalized deconditioned, uses walker at baseline. Up in chair now that is off bedrest.  Psych/Social: Wife at bedside. Patient and wife's primary language [place]ian,  utilized at times.  Lines: R internal jugular TVP, B/l PIVs.  Drips: None.  Plan: Continue to monitor, notify CSI of any concerns.

## 2019-09-04 NOTE — PROGRESS NOTES
Admitted/transferred from: PACU  Reason for admission/transfer: S/p TAVR w/Medtronic valve, TVP insertion d/t CHB, hemodynamic monitoring.  Patient status upon admission/transfer: A&OX4, MAXWELL, HR 79-80 100% V paced.  Interventions: Full assessment, notified CSI of admission.  Plan: Monitor closely, continue groin and pulse checks as ordered.  2 RN skin assessment: completed by writer and Benito LAWRENCE RN  Result of skin assessment and interventions/actions:  Skin tears already documented by Lucila BEARDEN RN.  Height, weight, drug calc weight: done  Patient belongings: on 6C.  MDRO education (if applicable): n/a

## 2019-09-04 NOTE — PROVIDER NOTIFICATION
Text-paged CSI, requested to reorder any AM meds given/ All were auto-held and now not due until tomorrow morning

## 2019-09-04 NOTE — OR NURSING
Patient arrived awake, follows commands, denying pain with temporary pacemaker set at rate of 80 with 10 shivani amps. . Meenakshi Junior arrived with report from TAVR procedure.  Mayda Alberts at bedside at 1050 She called Dr Rodgers who stated TR band to stay inflated till 1200 noon. Right hand warm with good cap refill.  Bilateral groins soft with no evidence of hematoma.  Nina DUPREE  at bedside at 1115  To see patient and do assessment.  She stated she would change transfer order to ICU as patient has cordis and has  temporary pacemaker as patient went into third degree heart block during procedure.

## 2019-09-04 NOTE — PROVIDER NOTIFICATION
1524: Notified CSI that patient having runs of VTach. Of note, also beginning to have own rhythm of sinus tachycardia in 90-100s. TVP dressing and catheter secure, does not appear to have migrated, dressing clean, dry, and intact. Patient talking, denies chest pain. Also notified that BP 88/53 w/MAP 64. CSI verbalized understanding, will come assess patient.  1540: HR , ST w/ R BBB, flipped T waves. CSI at bedside, aware, EKG done. Stat ECHO ordered. CSI updated family with  present.  1555: Upon review of EKG, appears that having less runs of VT and more breakthrough of own rhythm of sinus tach.

## 2019-09-04 NOTE — PROGRESS NOTES
[facility]   Cardiology Progress      Interval History: Patient seen in PACU s/p TAVR.  Denies fever, chills, nausea, vomiting, new CP, worsening SOB, cough, abdominal pain, LE pain prior to TAVR today.  States that his breathing and LE swelling have markedly improved.  No longer requiring oxygen.    Physical Exam:  Temp:  [98  F (36.7  C)-98.4  F (36.9  C)] 98.2  F (36.8  C)  Pulse:  [84-99] 89  Heart Rate:  [] 84  Resp:  [15-18] 15  BP: ()/(47-69) 102/54  SpO2:  [93 %-99 %] 99 %    GEN: NAD  Pulm: bibasilar inspiratory rales. Otherwise CTAB  Cardiac: Normal S1 and S2. JVP not appreciably elevate, no murmurs.  Trace ankle LE edema.  Vascular: TAVR access site are clean, dry, intact.  No appreciable hematoma  GI: soft, non distended  Neuro: CN II-XII grossly intact. Alert and oriented x4.    Medications:    aspirin  81 mg Oral Daily     aspirin  81 mg Oral Daily     [Auto Hold] atorvastatin  40 mg Oral Daily     [Auto Hold] calcium carbonate 500 mg (elemental)  1 tablet Oral BID     ceFAZolin  2 g Intravenous Pre-Op/Pre-procedure x 1 dose     ceFAZolin  2 g Intravenous Pre-Op/Pre-procedure x 1 dose     [Auto Hold] fish oil-omega-3 fatty acids  1 g Oral Daily     [Auto Hold] folic acid  1 mg Oral QAM     [Auto Hold] gabapentin  1,200 mg Oral TID     [Auto Hold] gemfibrozil  600 mg Oral BID     [Auto Hold] heparin  5,000 Units Subcutaneous Q12H     [Auto Hold] insulin aspart  1-7 Units Subcutaneous TID AC     [Auto Hold] insulin aspart  1-5 Units Subcutaneous At Bedtime     [Auto Hold] LANsoprazole  30 mg Oral QAM AC     [Auto Hold] levothyroxine  50 mcg Oral QAM     [Auto Hold] methotrexate  25 mg Oral Q7 Days     [Auto Hold] metoprolol tartrate  6.25 mg Oral BID     [Auto Hold] oxyCODONE-acetaminophen  1 tablet Oral TID     [Auto Hold] polyethylene glycol  17 g Oral Daily     [Auto Hold] senna-docusate  1 tablet Oral At Bedtime     sodium chloride (PF)  3 mL  Intracatheter Q8H     sodium chloride (PF)  3 mL Intracatheter Q8H     [Auto Hold] sodium chloride (PF)  3 mL Intracatheter Q8H     [Auto Hold] ticagrelor  90 mg Oral BID     [Auto Hold] vitamin D3  2,000 Units Oral Daily       EPINEPHrine IV infusion ADULT       - MEDICATION INSTRUCTIONS -       norepinephrine       - MEDICATION INSTRUCTIONS -       - MEDICATION INSTRUCTIONS -       sodium chloride       sodium chloride 75 mL/hr at 09/04/19 0431       Labs:   CMP  Recent Labs   Lab 09/04/19  0435 09/03/19  1659 09/03/19  0524 09/02/19  1721    135 137 135   POTASSIUM 4.4 4.1 4.0 3.9   CHLORIDE 99 96 100 97   CO2 32 32 30 32   ANIONGAP 7 7 8 6   * 102* 106* 117*   BUN 22 19 20 21   CR 1.04 0.94 0.89 0.97   GFRESTIMATED 73 82 87 79   GFRESTBLACK 84 >90 >90 >90   DMITRIY 9.1 9.1 8.9 8.6   MAG 2.4* 2.5* 2.4* 2.3     CBC  Recent Labs   Lab 09/04/19  0435 09/01/19  0506 08/31/19  0554 08/30/19  0527  08/29/19  1525   WBC  --  10.1 9.7 8.5  --  9.8   RBC  --  4.32* 4.23* 3.91*  --  4.62   HGB  --  9.2* 8.8* 8.1*  --  9.7*   HCT  --  33.5* 33.4* 30.2*  --  35.5*   MCV  --  78 79 77*  --  77*   MCH  --  21.3* 20.8* 20.7*  --  21.0*   MCHC  --  27.5* 26.3* 26.8*  --  27.3*   RDW  --  23.2* 22.9* 22.9*  --  23.3*    390 375 361   < > 460*    < > = values in this interval not displayed.     INR  Recent Labs   Lab 08/29/19  1525   INR 1.38*     ASSESSMENT/PLAN:  Mr. Catherine Jane is a 70 y/o M w/ severe aortic stenosis (mPG 42 mmHg, PETE 0.70 cm^2), CAD s/p PCI p-mLAD and dLAD 7/30/19, Chronic diastolic heart failure, presenting with exertional CP, lightheadedness, GONZALEZ, admitted for decompensated diastolic heart failure c/b severe aortic stenosis, preload dependent.      # Severe aortic stenosis s/p TAVR, 29mm Medtronic Evolut  # Complete Heart Block with Temporary Pacing Wire  # CP, elevated troponin, GONZALEZ  mPG 42 mmHg, PETE 0.70 cm^2. CP, GONZALEZ  Likely related to his severe aortic stenosis, but likely also a  component of decompensated heart failure as well on top of comorbidity of ILD (see below). ECG unchanged from prior and not concerning for acute ischemia. Elevated troponin likely related to ventricular strain with severe aortic stenosis and CHF, and not likely due to acute ischemia, trending down. TAVR procedure complicated by complete heart block s/p valve deployment, otherwise uncomplicated.  Now with temporary pacing wire in place  - bedrest per protocol  - hold PTA metorpolol  - EP consult; possible pacemaker Friday  - ECHO, CXR, EKG in AM  - lifelong dental prophylaxis    # Acute on Chronic Decompensated Diastolic Heart Failure  Admitted with volume overload.  Was placed on 60mg IV lasix once daily x5 days. Net negative 6705cc this admission.  Now sating well on RA. Previously on 120mg PO lasix daily. Bilateral inspiratory rales appreciable s/p TAVR  - will give 60mg IV lasix x1 today  - would anticipate patient will be ready for PO tomorrow    # CAD s/p PCI:  s/p PCI p-mLAD and dLAD 7/30/19.  - Continue ASA, ticagrelor  - Continue atorvastatin  - BB: hold BB given CHB    #Dermatomyositis w/Shannon-1 Positivity:  #Chronic pain  Patient has been diagnosed with polymyositis clinically, not by muscle biopsy. He did have a lung biopsy that showed fibrosis of unknown etiology. Has ILD on CT and blood work showed a positive Shannon-1 antibody. PFTs showed worsening DLCO by 11% from 7/2018-1/2019. Having significant foot and leg pain patient states this is how his dermatomyositis tends to flare up typically gets like this in the days leading up to his scheduled methotrexate dose.   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 Currently stable on MTX. Worsened since last PFTs but unclear if  this is related to his diastolic heart failure w/ severe AS, with recent back surgery and decrease in TLC vs from his DM. CT chest showed stable ILD arguing against DM activity.     #Hypothyroidism:   -Continue levothyroxine     #DM2:  - ISS as inpatient      Anticipated Disposition: discharge Thursday versus Friday    Patient seen and discussed with Dr. Luis Rob, who agrees with above plan.    Nita Barger PA-C  Cardiology - [facility]

## 2019-09-05 NOTE — PROGRESS NOTES
[facility]   Cardiology Progress      Interval History: S/P TAVR c/w CHB status post temp pacer with screw in lead, 100% V-paced overnight and this morning (yesterday intermittently pacing). Turned down pacing rate to see if intrinsic rhythm would surface but patient remained 100% paced. Soft BP/MAP but mentation normal and feeling well; VS improving as he wakes. Otherwise, denies fever, chills, nausea, vomiting, new CP, worsening SOB, cough, abdominal pain. He appears euvolemic on exam.     Plan For Today   -EP following, appreciate their assistance   -Continue temp pacing @ 80 bpm  -Diuretic holiday today   -NPO MN for dual chamber PPM tomorrow unless recovery of conduction     Physical Exam:  Temp:  [97.5  F (36.4  C)-99  F (37.2  C)] 97.9  F (36.6  C)  Pulse:  [79-80] 79  Heart Rate:  [] 79  Resp:  [12-24] 17  BP: ()/(53-79) 88/67  SpO2:  [92 %-100 %] 94 %    GEN: NAD  Pulm: bibasilar inspiratory rales. Otherwise CTAB  Cardiac: Normal S1 and S2. JVP not appreciably elevate, no murmurs.  Trace ankle LE edema.  Vascular: TAVR access site are clean, dry, intact. RIJ temp pacer line in place without fluctuance or erythema. No appreciable hematoma  GI: soft, non distended  Neuro: CN II-XII grossly intact. Alert and oriented x4.    Medications:    aspirin  81 mg Oral Daily     atorvastatin  40 mg Oral Daily     calcium carbonate 500 mg (elemental)  1 tablet Oral BID     fish oil-omega-3 fatty acids  1 g Oral Daily     folic acid  1 mg Oral QAM     gabapentin  1,200 mg Oral TID     gemfibrozil  600 mg Oral BID     heparin  5,000 Units Subcutaneous Q12H     insulin aspart  1-7 Units Subcutaneous TID AC     insulin aspart  1-5 Units Subcutaneous At Bedtime     LANsoprazole  30 mg Oral QAM AC     levothyroxine  50 mcg Oral QAM     methotrexate  25 mg Oral Q7 Days     oxyCODONE-acetaminophen  1 tablet Oral TID     polyethylene glycol  17 g Oral Daily     senna-docusate  1  tablet Oral At Bedtime     sodium chloride (PF)  3 mL Intracatheter Q8H     ticagrelor  90 mg Oral BID     vitamin D3  2,000 Units Oral Daily       - MEDICATION INSTRUCTIONS -         Labs:   CMP  Recent Labs   Lab 09/05/19  0403 09/04/19  1559 09/04/19  1109 09/04/19  0435    136 138 138   POTASSIUM 4.4 4.4  4.3 4.0 4.4   CHLORIDE 101 100 100 99   CO2 27 29 31 32   ANIONGAP 8 7 7 7   * 131* 118* 130*   BUN 19 17 18 22   CR 1.03 0.86 0.83 1.04   GFRESTIMATED 73 88 90 73   GFRESTBLACK 85 >90 >90 84   DMITRIY 8.6 8.7 8.4* 9.1   MAG 2.3 2.3 2.3 2.4*   PHOS 3.7  --  3.7  --    PROTTOTAL  --   --  6.2*  --    ALBUMIN  --   --  2.9*  --    BILITOTAL  --   --  0.4  --    ALKPHOS  --   --  142  --    AST  --   --  55*  --    ALT  --   --  35  --      CBC  Recent Labs   Lab 09/05/19  0403 09/04/19  1109 09/04/19  0435 09/01/19  0506 08/31/19  0554   WBC 12.3* 15.0*  --  10.1 9.7   RBC 4.54 4.46  --  4.32* 4.23*   HGB 9.3* 9.3*  --  9.2* 8.8*   HCT 35.5* 34.9*  --  33.5* 33.4*   MCV 78 78  --  78 79   MCH 20.5* 20.9*  --  21.3* 20.8*   MCHC 26.2* 26.6*  --  27.5* 26.3*   RDW 23.4* 23.3*  --  23.2* 22.9*    433 437 390 375     INR  Recent Labs   Lab 08/29/19  1525   INR 1.38*     ASSESSMENT/PLAN:  Mr. Catherine Jane is a 68 y/o M w/ severe aortic stenosis (mPG 42 mmHg, PETE 0.70 cm^2), CAD s/p PCI p-mLAD and dLAD 7/30/19,pre-existing RBBB, chronic diastolic heart failure, who presenting with exertional CP, lightheadedness, GONZALEZ and was admitted for decompensated diastolic heart failure c/b severe aortic stenosis. He is now status post diuresis and successful deployment of a 29 mm Medtronic Evolut, now with CHB and temp pacer.       # Severe aortic stenosis s/p TAVR, 29mm Medtronic Evolut  # Complete Heart Block with Temporary Pacing Wire  # CP, elevated troponin, GONZALEZ  mPG 42 mmHg, PETE 0.70 cm^2. CP, GONZALEZ  Likely related to his severe aortic stenosis, but likely also a component of decompensated heart failure as  well on top of comorbidity of ILD (see below). ECG unchanged from prior and not concerning for acute ischemia. Elevated troponin likely related to ventricular strain with severe aortic stenosis and CHF, and not likely due to acute ischemia, trending down with diuresis. TAVR procedure complicated by complete heart block after valve deployment, otherwise uncomplicated.  Patient did have a pre-existing RBBB. Now with temporary pacing wire in place and 100% V-paced, plan is for PPM 9/6. Post TAVR TTE images show a well seated valve with normal gradient and no regurg or PVL noted.   - Challenged coming down on pacing rate for intrinsic rhythm, patient requiring 100% pacing rate of 60. Increased back to 80 bpm  - hold PTA metoprolol  - EP consult; NPO MN for PPM Friday  - ECHO, CXR, EKG in AM  - lifelong dental prophylaxis  - cardiac rehab     # Acute on Chronic Decompensated Diastolic Heart Failure  Admitted with volume overload.  Was placed on 60mg IV lasix once daily x5 days. Net negative 6705cc this admission. Presenting weight: 162 lbs, present weight 154 lbs (EDW).  Now sating well on RA. Previously on 120mg PO lasix daily. Bilateral inspiratory rales appreciable s/p TAVR atelectasis versus congestion versus ILD. Soft BP and MAPs, holding BB. Will resume PO lasix today. IVC dilated and estimated high RA pressure on TTE but exam unremarkable and at EDW.   - S/P 60mg IV lasix x1 yesterday  - MAPS and BP soft, diuresis holiday until BP improves than resume.     # CAD s/p PCI:  s/p PCI p-mLAD and dLAD 7/30/19.  - Continue ASA, ticagrelor  - Continue atorvastatin  - BB: hold BB given CHB    #Dermatomyositis w/Shannon-1 Positivity:  #Chronic pain  Patient has been diagnosed with polymyositis clinically, not by muscle biopsy. He did have a lung biopsy that showed fibrosis of unknown etiology. Has ILD on CT and blood work showed a positive Shannon-1 antibody. PFTs showed worsening DLCO by 11% from 7/2018-1/2019. Having significant  foot and leg pain patient states this is how his dermatomyositis tends to flare up typically gets like this in the days leading up to his scheduled methotrexate dose.   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 Currently stable on MTX. Worsened since last PFTs but unclear if this is related to his diastolic heart failure w/ severe AS, with recent back surgery and decrease in TLC vs from his DM. CT chest showed stable ILD arguing against DM activity.  - outpatient follow up as arranged        #Hypothyroidism:   -Continue levothyroxine     #DM2:  - ISS as inpatient      Anticipated Disposition: Discharge to home this weekend     Patient seen and discussed with Dr. Garcia, who agrees with above plan.    Juan Kimbrough PA-C  [facility]  Pager [phone]        Physician Attestation   I, Pj Garcia, saw and evaluated Beulah Jane as part of a shared visit.  I have reviewed and discussed with the advanced practice provider their history, physical and plan.    I personally reviewed the vital signs, medications, labs and imaging.    My key history or physical exam findings: Complete heart block    Key management decisions made by me:   -Continue temp pacing @ 80 bpm  -Diuretic holiday today   -NPO MN for dual chamber PPM tomorrow unless recovery of conduction     Pj Garcia

## 2019-09-05 NOTE — PROGRESS NOTES
Date: 9/5/2019    Time of Call: 10:07 AM     Diagnosis:  S/p TAVR     [ TORB ] Ordering provider: Pj Garcia MD  Order: 1. Labs- BMP and CBC  2. ECHO     Order received by: Meenakshi Irwin RN     Follow-up/additional notes: 30 day follow up

## 2019-09-05 NOTE — PROGRESS NOTES
Clinical Nutrition Services- Brief Note    Reviewed nutrition risk factors due to LOS. Pt is tolerating a Regular diet, eating well per nursing documentation and patient report. No nutrition issues identified at this time. RD will continue to follow per nutrition protocol.  Jenny Beavers RD, MS, LD  SICU: [phone]

## 2019-09-05 NOTE — PROGRESS NOTES
09/05/19 0953   Quick Adds   Type of Visit Initial Occupational Therapy Evaluation   Living Environment   Lives With spouse;child(day), adult   Living Arrangements apartment   Transportation Anticipated family or friend will provide   Living Environment Comment lives with spouse and 2 adult sons.   Self-Care   Usual Activity Tolerance good   Current Activity Tolerance fair   Regular Exercise No   Equipment Currently Used at Home shower chair;walker, rolling;cane, straight  (4WW; reacher, sock aide)   Activity/Exercise/Self-Care Comment per pt. recently limited activity 2/2 SOB.   Functional Level   Ambulation 1-->assistive equipment  (4WW for distances)   Transferring 0-->independent   Toileting 0-->independent   Bathing 0-->independent   Dressing 0-->independent   Eating 0-->independent   Communication 0-->understands/communicates without difficulty   Swallowing 0-->swallows foods/liquids without difficulty   Cognition 0 - no cognition issues reported   Fall history within last six months no   Which of the above functional risks had a recent onset or change? toileting;bathing;dressing;ambulation;transferring   Prior Functional Level Comment Pt. indep. at baseline with ADLs/mobility;using 4WW recently 2/2 SOB.  Per pt. he had spouse A after recent spine surgery with LB dressing, also has A.E.       Present yes  (initially; pt. waived  for therapy)   General Information   Onset of Illness/Injury or Date of Surgery - Date 08/29/19   Referring Physician Doroteo Rodgers MD   Additional Occupational Profile Info/Pertinent History of Current Problem  S/p TAVR   Precautions/Limitations other (see comments)  (TAVR precautions)   General Info Comments activity orders; ambulate   Cognitive Status Examination   Orientation orientation to person, place and time   Level of Consciousness alert   Follows Commands (Cognition) WFL   Sensory Examination   Sensory Quick Adds No deficits were  identified   Pain Assessment   Patient Currently in Pain No   Range of Motion (ROM)   ROM Comment BUE shoulder flex. very limited -baseline per pt. , Bilat elbow,wrist, finger ROM WNL   Strength   Strength Comments BUE strength appears WFL; general post op weakness   Transfer Skill: Bed to Chair/Chair to Bed   Level of Ray: Bed to Chair minimum assist (75% patients effort)   Physical Assist/Nonphysical Assist: Bed to Chair set-up required;1 person assist   Transfer Skill: Sit to Stand   Level of Ray: Sit/Stand minimum assist (75% patients effort)   Physical Assist/Nonphysical Assist: Sit/Stand set-up required;1 person assist   Transfer Skill: Toilet Transfer   Level of Ray: Toilet minimum assist (75% patients effort)   Physical Assist/Nonphysical Assist: Toilet set-up required;1 person assist   Upper Body Dressing   Level of Ray: Dress Upper Body minimum assist (75% patients effort)   Physical Assist/Nonphysical Assist: Dress Upper Body set-up required;1 person assist   Lower Body Dressing   Level of Ray: Dress Lower Body moderate assist (50% patients effort)   Physical Assist/Nonphysical Assist: Dress Lower Body set-up required;1 person assist   Grooming   Level of Ray: Grooming stand-by assist  (from sitting)   Physical Assist/Nonphysical Assist: Grooming set-up required;1 person assist   Eating/Self Feeding   Level of Ray: Eating independent   Instrumental Activities of Daily Living (IADL)   Previous Responsibilities finances;medication management;meal prep;housekeeping;laundry;shopping  (spouse A prn with IADLs)   Activities of Daily Living Analysis   Impairments Contributing to Impaired Activities of Daily Living post surgical precautions;strength decreased   General Therapy Interventions   Planned Therapy Interventions ADL retraining;strengthening;transfer training;home program guidelines;progressive activity/exercise   Clinical Impression  "  Criteria for Skilled Therapeutic Interventions Met yes, treatment indicated   OT Diagnosis impaired ADls/mobility   Influenced by the following impairments post op precautions   Assessment of Occupational Performance 3-5 Performance Deficits   Identified Performance Deficits dressing, toileting,bathing, home mgmt.   Clinical Decision Making (Complexity) Low complexity   Therapy Frequency Daily   Predicted Duration of Therapy Intervention (days/wks) ~ 1 week   Anticipated Equipment Needs at Discharge   (TBD)   Anticipated Discharge Disposition Home with Assist;Home with Outpatient Therapy   Risks and Benefits of Treatment have been explained. Yes   Patient, Family & other staff in agreement with plan of care Yes   [facility] TM \"6 Clicks\"   2016, Trustees of [facility], under license to [employer].  All rights reserved.   6 Clicks Short Forms Daily Activity Inpatient Short Form   [facility]  \"6 Clicks\" Daily Activity Inpatient Short Form   1. Putting on and taking off regular lower body clothing? 3 - A Little   2. Bathing (including washing, rinsing, drying)? 3 - A Little   3. Toileting, which includes using toilet, bedpan or urinal? 3 - A Little   4. Putting on and taking off regular upper body clothing? 3 - A Little   5. Taking care of personal grooming such as brushing teeth? 4 - None   6. Eating meals? 4 - None   Daily Activity Raw Score (Score out of 24.Lower scores equate to lower levels of function) 20   Total Evaluation Time   Total Evaluation Time (Minutes) 10     "

## 2019-09-05 NOTE — PLAN OF CARE
"Discharge Planner OT   Patient plan for discharge: home  Current status: Pt. Min/CGA with BADLs and functional mobility, ambulating throughout room/hallway 100+ ft. With 4WW. Pt. Amb. At slow steady pace with no LOB.  Pt. reports feeling better than before surgery.  Per pt.  \"I could only walk 5 ft. before I would lose my breath, so this is much better.\" VSS throughout on 3L nc; o2 100%, HR 79, BP 99/62  Barriers to return to prior living situation: medical readiness  Recommendations for discharge: home with A prn and OP CR Phase II  Rationale for recommendations: increase activity patricio./strength to max. Safety/indep. With ADLs/mobility in home/community setting.       Entered by: Dana Barros 09/05/2019 1:36 PM      "

## 2019-09-05 NOTE — PLAN OF CARE
-Neuro: alert and oriented x4. PERRL. Follows commands and moves all extremities with equal strength  -Cardiac: % V paced, rate of 80. BP remains soft but MAP always >65. MD overnight aware. Patient asymptomatic. Afebrile. +2 radial pulses. +doppler pedal and tibial pulses.   -Resp: lungs clear, dim in bases on 1 L NC  -GI: tolerating reg diet, no nausea. Last BM 9/3  -: voiding spontaneously without difficulty  -Pain: scheduled percocet (home med) for chronic pain  -Skin: bilateral groin sites stable. R groin site with small amt of bruising/hematoma. No change overnight. Dressings CDI  -Activity: up with SBA and walker  -Access: PIV x2  -Labs: WNL    Plan: EP consult, possible pacemaker placement    Julieta Guerrero RN  9/5/2019  6:56 AM

## 2019-09-05 NOTE — PROGRESS NOTES
Neuro: Pt A/O x4, PERRL, follows commands, call light appropriate, and moves all extremities with equal strength. Afebrile.     CV: % V paced, rate of 80. SBP have been 90-100s, maintained MAP goal of >65. Pulses +2 in UE, doppled on the LE. Pt has baseline peripheral neuropathy and rachana color in LEs.     Resp: Lungs clear, diminished in bases, on 1.5 NC.     GI/: Tolerating regular diet, ate 100% of all meals. Medium BM 9/5/2019. Voiding spontaneously.     Skin: Bilateral groin sites are stable and unchanged.     Activity: Walked half the unit 2x today w/o SOB or dizziness. Up with standby assist and walker.     Plan: NPO at midnight for possible pacemaker placement tomorrow.

## 2019-09-05 NOTE — CONSULTS
Social Work was consulted to assess needs of patient following stroke. Chart was reviewed and discussed with interdisciplinary team. Per review, Pt can return home when medically cleared, with assist as needed from family and out-patient cardiac rehab. Social work needs are not identified at this time. Please re-consult social work if additional needs are identified.       Solange Dao, [facility]  ICU   Pager: [phone]

## 2019-09-06 NOTE — PRE-PROCEDURE
GENERAL PRE-PROCEDURE:   Procedure:  PPM implant  Date/Time:  9/6/2019 4:00 PM    Verbal consent obtained?: Yes    Written consent obtained?: Yes    Risks and benefits: Risks, benefits and alternatives were discussed    Consent given by:  Patient ( present)  Patient states understanding of procedure being performed: Yes    Patient's understanding of procedure matches consent: Yes    Procedure consent matches procedure scheduled: Yes    Appropriately NPO:  Yes  ASA Class:  Class 2- mild systemic disease, no acute problems, no functional limitations  Mallampati  :  Grade 3- soft palate visible, posterior pharyngeal wall not visible  Lungs:  Lungs clear with good breath sounds bilaterally  Heart:  RRR  History & Physical reviewed:  History and physical reviewed and no updates needed  Statement of review:  I have reviewed the lab findings, diagnostic data, medications, and the plan for sedation    ANASTACIA Short CNP  Electrophysiology Consult Service  Pager: [phone]

## 2019-09-06 NOTE — PLAN OF CARE
Neuro: Pt A/O x4, PERRL, follows commands, MSK: 4/5. Afebrile.      CV: % V paced, rate of 80. SBP have been 70-90s, Map goal >65. Pt fell below MAP 65 twice throughout night, MD informed to continue to monitor.  Pulses +2 in UE, doppled on the LE. Pt has baseline peripheral neuropathy and rachana color in LEs.      Resp: Lungs clear, diminished in bases, on 1.5 NC.      GI/: Tolerating regular diet, curteousy meal given at 2300 (100% consumed). NPO after midnight. Voiding spontaneously w/ AUO.      Skin: Bilateral groin sites are stable and unchanged.      Activity: Up with standby assist and walker.      Plan: Pacemaker placement in IR later today.

## 2019-09-06 NOTE — DISCHARGE INSTRUCTIONS
Home Care after a Pacemaker Implant    Wound care:  Keep your incision (surgery wound) dry for 3 days.  After 3 days, you may remove the outer bandage.  Keep the strips of tape on.  They will be removed at your clinic visit.  Check for signs of infection each day.  These include increased redness, swelling, drainage, bleeding or a fever over 101 F (38.3 C).  Call us immediately if you see any of these signs.  If there are no signs of infection, you may shower in 3 days.  Do not submerge the incision (in a bath tub, hot tub, or swimming pool) until fully healed.    Pain:   You may have mild to moderate pain for 3 to 5 days.  Take acetaminophen (Tylenol) or ibuprofen (Advil) for the pain.  Call us if the pain is severe or lasts more than 5 days.    Activity:  After 24 hours, slowly return to your normal activities.   Healing will take 4 to 6 weeks.  No driving for 3 days  Avoid climbing a ladder alone.  It is best to stay within 4 feet of the ground.  Avoid anything that may cause rough contact or a hard hit to your chest.  This includes football, hockey, and other contact sports.  For at least 4 weeks:  Do not raise your affected arm above your shoulder.  Do not use your affected arm to push, pull, or lift anything over 10 pounds.  Avoid repetitive upper body activities for 6 weeks (ie: golf, swimming, and weight lifting)    Follow Up Visits:  Return to the clinic in 7 to 10 days to have your device and wound checked.      Telling others about your device:  Before you have any medical tests or treatments, tell the doctors, dentists, and other care providers about your device.  There are a few tests and treatments that may interfere with your device.  (These include MRI, radiation therapy, electrocautery, and others.)  Your care team may need to take special steps to keep you safe.  Before you leave the hospital, you will receive a temporary ID card.  A permanent card will be mailed to you about 6 to 8 weeks later.   Always carry the ID card with you.  It has important details about your device.  You should also get a MedicAlert ID.  Please ask us for a MedicAlert brochure, or go to [email or web address].    Safety near electrical equipment:  All of these are safe to use when in good repair:    Microwaves    Radios    Cordless phone    Remote controls    Small electrical tools  Cell phones: Keep cell phones at least 6 inches from your device.  Do not carry the phone in a pocket near your device.  Security leal: It is okay to walk through security leal at the airports and department stores.  Tell airport security that you have a pacemaker.  They should keep the screening wand at least 6 inches from your device.  Full-body scanners are safe.  Avoid the following:    MRI tests in the hospital unless you have a MRI safe pacemaker.           Arc welding, chain saws and high-powered industrial or commercial tools.    Power lines, power plants and large power generators.    Electric body fat scales.    Magnetic mattress pads or pillow.    Questions?  Please call [facility].    Device Nurse:          Business Hours:  [phone]                           After Hours:  [phone]   Choose option 4, then ask for the on-call device nurse at job code [id number].    Your next device clinic appointment is scheduled on:    Friday September 13th at 8:30 am.            [facility]  [facility] [address]  [address] [place] [address]  [phone]

## 2019-09-06 NOTE — PLAN OF CARE
VSS. NC 1.5L; attempted to wean; pt desats to 82%. C/o right neck pain at transvenous pacer site; VVI 80; perm pacer placed this afternoon. NPO since midnight. 40mg lasix x1; 525cc UO. SBA in room; dyspneic on exertion without 02. English speaking; wife is English speaking only;  provided for procedure.    Will continue to assess and notify MD of any changes in exam.       Problem: Arrhythmia/Dysrhythmia (Heart Failure)  Goal: Stable Heart Rate and Rhythm  Outcome: No Change     Problem: Sleep Disordered Breathing (Heart Failure)  Goal: Effective Breathing Pattern During Sleep  Outcome: No Change

## 2019-09-06 NOTE — PLAN OF CARE
OT 4AB: pt. declined at time of attempt. Pt. wanting to wait till tomorrow 2/2 awaiting pacemaker placement in IR later today.

## 2019-09-06 NOTE — CONSULTS
Electrophysiology Consultation Note   EP Attending: .   Reason for consultation: CHB s/p TAVR.   Provider requesting consultation: Ms. Charbel PA-C [facility].  Date of Service: 9/6/2019      HPI:   Mr. Catherine Jane is a 69 year old male who has a past medical history significant for CAD s/p PCI p-mLAD and dLAD 7/30/19, HTN, HLD, DM2, ILD, dermatomyositis w/Shannon-1 Positivity, RBBB, hypothyroidism, JUAN (uses CPAP), and severe aortic stenosis now s/p TAVR (29 mm MDT Evolut) 9/4/19.   He presented was admitted with chest pain and GONZALEZ related to known aortic stenosis with component of decompensated HFpEF and ILD. His ECG was unchanged from prior and not concerning for acute ischemia. He had mildly elevated troponin up to 0.5 which trended down. He underwent diuresis. He had previously undergone evaluation and was scheduled TAVR procedure for severe aortic stenosis (mPG 42 mmHg, PETE 0.70 cm^2). He underwent TAVR on 9/4/19. He developed CHB after valve deployment. He had pre-existing RBBB. He had temporary pacemaker placed during procedure. He remains 100% in CHB post TAVR. Post TAVR echo shows normal valve function, well seated valve, and trace PVL. LVEF 55-60%. IVC dilated. He has been on diuretic. Renal function and electrolytes stable. VSS.     Past Medical History:   Past Medical History:   Diagnosis Date     Aortic stenosis      Chronic pain      DM (diabetes mellitus), type 2 (H)     on insulin     Hyperlipidemia      JUAN on CPAP      Pneumonia      Polymyositis (H)      Pulmonary fibrosis, unspecified (H)      Seasonal allergies      Past Surgical History:   Past Surgical History:   Procedure Laterality Date     ARTHROSCOPY KNEE  1970     COLONOSCOPY       CV CORONARY ANGIOGRAM N/A 7/30/2019    Procedure: CV CORONARY ANGIOGRAM;  Surgeon: Leonidas Mike MD;  Location:  HEART CARDIAC CATH LAB     CV LEFT HEART CATH N/A 7/30/2019    Procedure: CV LEFT HEART CATH;  Surgeon: Leonidas Mike,  MD;  Location:  HEART CARDIAC CATH LAB     CV PCI STENT DRUG ELUTING Left 7/30/2019    Procedure: PCI Stent Drug Eluting;  Surgeon: Leonidas Mike MD;  Location:  HEART CARDIAC CATH LAB     CV RIGHT HEART CATH N/A 7/30/2019    Procedure: CV RIGHT HEART CATH;  Surgeon: Leonidas Mike MD;  Location:  HEART CARDIAC CATH LAB     CV TRANSCATHETER AORTIC VALVE REPLACEMENT N/A 9/4/2019    Procedure: Transcatheter (Medtronic - 29mm) Aortic Valve Replacement, trans thorasic echocardiogram, perfusion and cardiac surgery standby, temporary pacemeker placement;  Surgeon: Pj Garcia MD;  Location: [facility] OR     DECOMPRESSION, FUSION LUMBAR POSTERIOR TWO LEVELS, COMBINED  1997     FUSION LUMBAR ANTERIOR THREE+ LEVELS N/A 4/22/2019    Procedure: Lumbar 1 Or Lumbar 2-5 Anterior Lumbar Interbody Fusion with BMP Stage 1 Of Two Procedure:;  Surgeon: Carlos Enrique Dial MD;  Location: [facility] OR     HEART CATH FEMORAL CANNULIZATION WITH OPEN STANDBY REPAIR AORTIC VALVE N/A 9/4/2019    Procedure: Cardiopulmonary Bypass Standby;  Surgeon: Hamilton Carnes MD;  Location: [facility] OR     HERNIA REPAIR  2006     lumbar spine hardware removal  1999     OPTICAL TRACKING SYSTEM FUSION POSTERIOR SPINE THORACIC THREE+ LEVELS N/A 4/25/2019    Procedure: O-Arm/Stealth Assisted Thoracic 10-Pelvis Instrumented Fusion T11-2, L1-2, L2-3 Transforminal Lumbar Interbody Fusion (TLIF) And Smith Borges Osteotomies,and L3-4 SPO as well, Use Of BMP, Debridement Of Seroma;  Surgeon: Carlos Enrique Dial MD;  Location: [facility] OR     PICC INSERTION Right 05/06/2019    4Fr - 37cm, Basilic vein, low SVC     REMOVAL PROSTHETIC MATERIAL/MESH, ABD WALL NECRO TISS INFEXN  2012     ROTATOR CUFF REPAIR RT/LT  2003     Allergies: Per MAR   No Known Allergies  Medications:   Per MAR current outpatient cardiovascular medications include:   Facility-Administered Medications Prior to Admission   Medication Dose Route Frequency Provider  "Last Rate Last Dose     0.9% sodium chloride BOLUS  1-250 mL Intravenous Q1H PRN Pj Garcia MD         0.9% sodium chloride BOLUS  1-250 mL Intravenous Q1H PRN Pj Garcia MD         Medications Prior to Admission   Medication Sig Dispense Refill Last Dose     albuterol (2.5 MG/3ML) 0.083% neb solution Take 1 vial by nebulization every 6 hours as needed for shortness of breath / dyspnea or wheezing   Past Month at Unknown time     aspirin (ASA) 81 MG EC tablet Take 1 tablet (81 mg) by mouth daily Start tomorrow morning. 90 tablet 3 8/29/2019 at AM     atorvastatin (LIPITOR) 40 MG tablet Take 1 tablet (40 mg) by mouth daily 90 tablet 3 8/29/2019 at AM     calcium carbonate (OS-DMITRIY 500 MG [place]. [place]) 500 MG tablet Take 1 tablet (500 mg) by mouth 2 times daily 180 tablet 3 8/29/2019 at AM     Cholecalciferol (VITAMIN D) 2000 units tablet Take 2,000 Units by mouth daily 100 tablet 3 8/29/2019 at AM     diphenhydrAMINE (BENADRYL) 50 MG capsule Take 50 mg by mouth as needed for itching or allergies    Past Month at Unknown time     folic acid (FOLVITE) 1 MG tablet Take 1 tablet (1 mg) by mouth every morning 90 tablet 3 8/29/2019 at AM     furosemide (LASIX) 40 MG tablet Take 3 tablets (120 mg) by mouth daily 90 tablet 0 8/29/2019 at AM     gabapentin (NEURONTIN) 600 MG tablet Take 2 tablets (1,200 mg) by mouth 3 times daily 180 tablet 0 8/29/2019 at 5pm     gemfibrozil (LOPID) 600 MG tablet Take 600 mg by mouth 2 times daily    8/29/2019 at AM     insulin NPH (HUMULIN N/NOVOLIN N VIAL) 100 UNIT/ML vial Inject 2-6 Units Subcutaneous 2 times daily Will usually give if blood glucose >140 10 mL 11 Past Month at Unknown time     insulin syringe-needle U-100 (29G X 1/2\" 1 ML) 29G X 1/2\" 1 ML miscellaneous Inject 1 Syringe Subcutaneous 2 times daily 200 each 11 Past Month at Unknown time     LANsoprazole (PREVACID) 30 MG DR capsule Take 30 mg by mouth every morning (before breakfast)   8/29/2019 at AM     " levothyroxine (SYNTHROID/LEVOTHROID) 50 MCG tablet Take 50 mcg by mouth every morning    8/29/2019 at AM     methotrexate 2.5 MG tablet Take 10 tablets (25 mg) by mouth once a week Tuesday 120 tablet 3 8/27/2019     metoprolol tartrate (LOPRESSOR) 25 MG tablet Take 0.5 tablets (12.5 mg) by mouth 2 times daily Hold IF heart rate less than 55. 180 tablet 3 8/29/2019 at AM     multivitamin w/minerals (THERA-VIT-M) tablet Take 1 tablet by mouth daily   8/29/2019 at AM     omega 3 1000 MG CAPS Take 2 capsules by mouth every morning    8/29/2019 at AM     oxyCODONE-acetaminophen (PERCOCET) 7.5-325 MG per tablet Take 1 tablet by mouth every 6 hours as needed for severe pain (max 3 tablets daily) Dispense 08/16/19. OK to start 08/16/19 (Patient taking differently: Take 1 tablet by mouth 3 times daily Dispense 08/16/19. OK to start 08/16/19) 90 tablet 0 8/29/2019 at 5pm     ticagrelor (BRILINTA) 90 MG tablet Take 1 tablet (90 mg) by mouth 2 times daily Start this evening. 180 tablet 3 8/29/2019 at AM     albuterol (PROAIR HFA/PROVENTIL HFA/VENTOLIN HFA) 108 (90 BASE) MCG/ACT Inhaler Inhale 2 puffs into the lungs as needed for shortness of breath / dyspnea or wheezing    More than a month at Unknown time     cetirizine (ZYRTEC) 10 MG tablet Take 10 mg by mouth daily as needed    More than a month at Unknown time     No current outpatient medications on file.     Current Facility-Administered Medications   Medication Dose Route Frequency     aspirin  81 mg Oral Daily     atorvastatin  40 mg Oral Daily     calcium carbonate 500 mg (elemental)  1 tablet Oral BID     ceFAZolin  2 g Intravenous Pre-Op/Pre-procedure x 1 dose     fish oil-omega-3 fatty acids  1 g Oral Daily     folic acid  1 mg Oral QAM     gabapentin  1,200 mg Oral TID     gemfibrozil  600 mg Oral BID     heparin  5,000 Units Subcutaneous Q12H     insulin aspart  1-7 Units Subcutaneous TID AC     insulin aspart  1-5 Units Subcutaneous At Bedtime     LANsoprazole   "30 mg Oral QAM AC     levothyroxine  50 mcg Oral QAM     methotrexate  25 mg Oral Q7 Days     oxyCODONE-acetaminophen  1 tablet Oral TID     polyethylene glycol  17 g Oral Daily     senna-docusate  1 tablet Oral At Bedtime     sodium chloride (PF)  3 mL Intracatheter Q8H     sodium chloride (PF)  3 mL Intracatheter Q8H     ticagrelor  90 mg Oral BID     vitamin D3  2,000 Units Oral Daily     Family History:   Family History   Problem Relation Age of Onset     Colon Cancer Mother      Hypertension Father         did not know father well     Social History:   Social History     Tobacco Use     Smoking status: Former Smoker     Packs/day: 0.25     Last attempt to quit: 1970     Years since quittin.2     Smokeless tobacco: Never Used   Substance Use Topics     Alcohol use: Yes     Comment: few times monthly       ROS:   A comprehensive 10 point ROS was negative other than as mentioned in HPI.    Physical Examination:   VITALS: BP 93/61 (BP Location: Left arm)   Pulse 79   Temp 98.5  F (36.9  C) (Oral)   Resp 18   Ht 1.702 m (5' 7\")   Wt 69.2 kg (152 lb 8.9 oz)   SpO2 100%   BMI 23.89 kg/m    GENERAL APPEARANCE: AxO, NAD   HEENT: NCAT, EOMI, MMM. PERRLA.   NECK: Supple.  internal jugular access present.   CHEST: CTAB   CARDIOVASCULAR: S1S2, Reg, No m/r/g.   ABDOMEN: BS+, soft, NT, ND.   EXTREMITIES: trace pedal edema. Distal pulses intact. WWP.  NEURO: Grossly nonfocal.   PSYCH: Normal affect.  SKIN: Warm and dry.   Data:   Labs:  BMP  Recent Labs   Lab 19  0403 19  1559 19  1109    136 136 138   POTASSIUM 4.2 4.4 4.4  4.3 4.0   CHLORIDE 102 101 100 100   DMITRIY 8.6 8.6 8.7 8.4*   CO2 28 27 29 31   BUN  17 18   CR 0.88 1.03 0.86 0.83   * 132* 131* 118*     CBC  Recent Labs   Lab 19  0447 19  0403 19  1109 19  0435 19  0506   WBC 9.6 12.3* 15.0*  --  10.1   RBC 3.78* 4.54 4.46  --  4.32*   HGB 8.1* 9.3* 9.3*  --  9.2*   HCT " 29.2* 35.5* 34.9*  --  33.5*   MCV 77* 78 78  --  78   MCH 21.4* 20.5* 20.9*  --  21.3*   MCHC 27.7* 26.2* 26.6*  --  27.5*   RDW 23.1* 23.4* 23.3*  --  23.2*    391 433 437 390     INRNo lab results found in last 7 days.  No results found for: CKTOTAL, CKMB, TROPN  Cholesterol (mg/dL)   Date Value   2019 153     HDL Cholesterol (mg/dL)   Date Value   2019 22 (L)     LDL Cholesterol Calculated (mg/dL)   Date Value   2019 103 (H)     EK/4/19 ECHO:   Interpretation Summary  Intraprocedural TTE for TAVR with 29 mm Medtronic Evolut Pro prosthesis.     Preprocedural images show a severely calcified aortic valve with severe AS  (peak velocity 4.1 m/s) and mild-to-moderate AI.  Postprocedural images show well-seated 29 mm Evolut Pro bioprosthesis, with  resolution of aortic stenosis (peak velocity 1.6 m/s, mean gradient 4 mmHg.)  There is no valvular or paravalular AI.  The TAVR valve abuts the based of the anterior mitral leaflet but does not  appear to impinge on mitral valve opening or result in significant stenosis on  the available color Doppler images.  There is no pericardial effusion on the pre- or postprocedural images.     This study was compared with the study from 19: There has been interval  TAVR with resolution of aortic stenosis on the postprocedural images.  Assessment:   Mr. Catherine Jane is a 69 year old male who has a past medical history significant for CAD s/p PCI p-mLAD and dLAD 19, HTN, HLD, DM2, ILD, dermatomyositis w/Shannon-1 Positivity, RBBB, hypothyroidism, JUAN (uses CPAP), and severe aortic stenosis now s/p TAVR (29 mm MDT Evolut) 19.   He presented was admitted with chest pain and GONZALEZ related to known aortic stenosis with component of decompensated HFpEF and ILD. His ECG was unchanged from prior and not concerning for acute ischemia. He had mildly elevated troponin up to 0.5 which trended down. He underwent diuresis. He had previously undergone  evaluation and was scheduled TAVR procedure for severe aortic stenosis (mPG 42 mmHg, PETE 0.70 cm^2). He underwent TAVR on 9/4/19. He developed CHB after valve deployment. He had pre-existing RBBB. He had temporary pacemaker placed during procedure. He remains 100% in CHB post TAVR. Post TAVR echo shows normal valve function, well seated valve, and trace PVL. LVEF 55-60%. IVC dilated. He has been on diuretic. Renal function and electrolytes stable. VSS.     EP Recommendations:  Severe aortic stenosis s/p TAVR (29 mm MDT Evolut) 9/4/19 c/b CHB:  - He continues to be in CHB and we will proceed with PPM implant. The risk of pacemaker placement include: over sedation, reaction to local anesthetic, reaction to narcotics or benzodiazipines used for moderate secation, localized bleeding, internal bleeding, collapsed lung, and acute or late infections. There is the possibilty of unforseen complications as well such as device or lead failure or lead dislodgement. He states understanding and is agreeable to proceed with PPM. We will arrange this for tomorrow 9/6/19. NPO after midnight.     The patient states understanding and is agreeable with plan.   Thank you for allowing us to participate in the care of this patient.       ANASTACIA Short CNP  Electrophysiology Consult Service  Pager: [phone]

## 2019-09-07 NOTE — DISCHARGE SUMMARY
[address] [address]  p: [phone]    Discharge Summary: Cardiology Service    Beulah Jane MRN# [id number]   YOB: 1950 Age: 69 year old       Admission Date: 8/29/2019  Discharge Date: 09/07/19      Discharge Diagnoses:  1. Severe aortic stenosis S/P TAVR (29mm Medtronic Evolut)  2. CHB secondary to above, status post PPM   3. HFpEF   4. NSTEMI type II secondary to above   5. CAD status post PCI p-mLAD, dLAD 7/30/19  6. Dermatomyositis with Shannon-1 positivity   7. ILD   8. DM II  9. Hypothyroidism     Pertinent Procedures:  TAVR  EP PPM     Consults:  EP, PT, OT    Imaging with results:  Echocardiogram 9/5/19 status post TAVR:  Status post TAVR with 29 mm Medtronic Evolut Pro prosthesis.  The prosthesis is well seated, with normal gradient and no regurgitation.  Mild right ventricular systolic dysfunction with moderate pulmonary  hypertension and dilated IVC suggesting increased RA pressure.    Left Ventricle  Left ventricular size is normal. Left ventricular function is normal.The EF is  55-60%. Moderate concentric wall thickening consistent with left ventricular  hypertrophy is present. Left ventricular diastolic function is not assessable.  Abnormal septal motion consistent with pacemaker is present.     Right Ventricle  Mild right ventricular dilation is present. Global right ventricular function  is mildly reduced.     Atria  Mild biatrial enlargement is present.     Mitral Valve  The mitral valve is normal.     Aortic Valve  TAVR with 29 mm Medtronic Evolut Pro. No aortic regurgitation is present.  Doppler interrogation of the aortic valve is normal. The mean gradient is 7  mmHg.     Tricuspid Valve  Trace tricuspid insufficiency is present. The right ventricular systolic  pressure is approximated at 60.2 mmHg plus the right atrial pressure. Moderate  (pulmonary artery systolic pressure 50-75mmHg) pulmonary hypertension  is  present.     Pulmonic Valve  The pulmonic valve is normal. RVOT acceleration time is decreased at 70 ms and  notching of Doppler envelope suggesting increased pulmonary vascular  resistance.     Vessels  The thoracic aorta cannot be assessed. IVC diameter >2.1 cm collapsing <50%  with sniff suggests a high RA pressure estimated at 15 mmHg or greater.     Pericardium  No pericardial effusion is present.    Coronary Angiogram 7/30/19:  Left Main   The vessel was visualized by selective angiography and is moderate in size. There was 0% vessel disease.   Left Anterior Descending   Ost LAD lesion is 30% stenosed. The lesion is discrete.   Prox LAD lesion is 70% stenosed. The lesion is type C - high risk and discrete. The lesion is severely calcified. Just proximal to D1   Prox LAD to Mid LAD lesion is 60% stenosed. The lesion is type C - high risk and eccentric. The lesion is mildly calcified. diffuse   First Diagonal Branch   Ost 1st Diag lesion is 70% stenosed. The lesion is discrete. < 2 mm vessel   Ramus Intermedius   The vessel is small.   Ost Ramus lesion is 60% stenosed. The lesion is discrete. Small vessel   Left Circumflex   First Obtuse Marginal Branch   The vessel is large.   Ost 1st Mrg to 1st Mrg lesion is 30% stenosed.   Right Coronary Artery   The vessel is small.   Mid RCA lesion is 100% stenosed. The lesion is discrete. Small, non-dominant right with R->R collaterals   Acute Marginal Branch   Collaterals   Acute Mrg filled by collaterals from Prox RCA.      Intervention     Prox LAD lesion   Stent   Lesion length: 8 mm. The pre-interventional distal flow is normal (KEANU 3). The post-interventional distal flow is normal (KEANU 3). Using an XB 3.5 guide, a BMW wire was advanced across the lesion and parked in the distal LAD. The lesion was pre-dilated with a 2.0 semi-compliant balloon and stented with a 3.0 x 12 Synergy CHAU. Finally, the mid-LAD lesion was directly stented with a 2.75 x 24 Synergy CHAU,  sparing the ostium of the first diagonal. Final angiogram reveals excellent stent expansion and apposition without evidence of immediate complications.   There is a 0% residual stenosis post intervention.   Prox LAD to Mid LAD lesion   Stent   Lesion length: 20 mm. The pre-interventional distal flow is normal (KEANU 3). The post-interventional distal flow is normal (KEANU 3). See prox-LAD stent details   There is a 0% residual stenosis post intervention.     RHC  - Severe pulmonary HTN with mildly elevated PCWP  - Low-normal cardiac index by Eunice, normal cardiac index by thermodilution  - Simultaneous LV-Ao mean gradient = 41.9, calculated PETE = 0.70 cm2      Other imaging studies:  CXR  1. Right chest wall pacemaker leads in the right atrium and right  ventricle. No pneumothorax.  2. Increased bibasilar opacities and mixed interstitial/airspace  opacities, increasing pulmonary edema with underlying interstitial  fibrosis.    Brief HPI:  Mr. Catherine Jane is a 68 y/o M w/ severe aortic stenosis (mPG 42 mmHg, PETE 0.70 cm^2), CAD s/p PCI p-mLAD and dLAD 7/30/19,pre-existing RBBB, chronic diastolic heart failure, who presenting with exertional CP, lightheadedness, GONZALEZ and was admitted for decompensated diastolic heart failure c/b severe aortic stenosis. He is now status post diuresis and successful deployment of a 29 mm Medtronic Evolut. When the TAVR valve was deployed the patient did go into complete heart block and required a temporary pacemaker bridge to permanent pacemaker. He is now status post permanent pacemaker.     Mr. Catherine jane did well throughout his stay. On the day of discharge he was feeling quite well. He had a dry cough that cleared with taking his PPI. His oxygenation was hovering around 90-92% off SpO2 which he does not typically require but he did not feel short of breath. He was given an additional IV dose of lasix and sent home to resume his oral 120 mg daily as well as PRN metolazone for  increasing shortness of breath, weight gain, or orthopnea. The etiology of his SOB is likely low lung reserve in the setting of ILD and pulmonary edema, however his weight was at his EDW. He was anxious to leave the hospital and otherwise in an excellent condition.     Hospital Course by Diagnosis:  # Severe aortic stenosis s/p TAVR, 29mm Medtronic Evolut  # Complete Heart Block status post PPM  # HFpEF  Patient admitted with acute HFpEF exacerbation, GONZALEZ, and NSTEMI II. TTE showed progression of his severe aortic stenosis (mPG 42 mmHg, PETE 0.70 cm^2). CP, GONZALEZ felt ikely related to his severe aortic stenosis, but likely also a component of decompensated heart failure as well on top of comorbidity of ILD (see below). ECG unchanged from prior and not concerning for acute ischemia. Elevated troponin likely related to ventricular strain with severe aortic stenosis and CHF, and not likely due to acute ischemia, trending down with diuresis. He was scheduled for outpatient TAVR but it was completed inpatient. TAVR procedure complicated by complete heart block after valve deployment, otherwise uncomplicated; he did have a pre-existing RBBB. He was bridged to PPM with a temporary pacing wire and remained 100% V-paced after POD 0. Post TAVR TTE images show a well seated valve with normal gradient and no regurg or PVL. Post PPM CXR showed a well situated valve and no evidence of pneumothorax. His device interrogation was normal per report, final pending. He did have increased RA pressures on TTE and pulmonary edema on CXR and trace crackles in his lung bases however he was feeling quite well, was able to lie supine in bed, and his exam was not impressive for decompensation heart failure. His weight was at his estimated dry weight 152 lbs. His oxygenation was 88-92% off oxygen but was improving with IS, nebs- he has little reserve with ILD. He was given an additional IV dose of Lasix with good UOP and sent out to continue his  oral medications as well as PRN Metolazone to use for worsening SOB and edema/weight gain (greater than 3 pounds). He was advised that should he have weight gain and worsening SOB/orthopnea to call his cardiologist.   PPM EQUIPMENT  1.The Pulse Generator is a  [place] [employer] Model L331 Serial Number [id number]  2.The RA Lead is a  Medtronic Model number 5076, Serial Number [id number]  3.The RV Lead is a [place] [employer] Model Number 7741, Serial Number [id number]    - Device interrogation in 7-10 days   - Keflex 250 mg TID for 5 days for antiobiotic prophylaxis   - Stopped PTA metoprolol  - lifelong dental prophylaxis s/p TAVR  - cardiac rehab. Per OT, home with outpatient therapy.  - Lasix 120 mg daily. Salt restricted diet 2g/day. 2L/day fluid restriction. Advised he take his weight daily until follow up   - PRN Metolazone 2.5 mg, indication: weight gain >3 lbs or worsening SOB/edema       # CAD s/p PCI:  s/p PCI p-mLAD and dLAD 7/30/19.  - Continue ASA, ticagrelor  - Continue atorvastatin  - BB: hold BB given CHB     #Dermatomyositis w/Shannon-1 Positivity:  #Chronic pain  Patient has been diagnosed with polymyositis clinically, not by muscle biopsy. He did have a lung biopsy that showed fibrosis of unknown etiology. Has ILD on CT and blood work showed a positive Shannon-1 antibody. PFTs showed worsening DLCO by 11% from 7/2018-1/2019. Having significant foot and leg pain patient states this is how his dermatomyositis tends to flare up typically gets like this in the days leading up to his scheduled methotrexate dose. His pain was manageable and his next methotrexate does is in 3 days from discharge date.   - continue methotrexate 25 mg weekly, next dose 9/10  - Gabapentin 1200mg tid  - Oxycodone 7.5/325mg tid  - continue rituximab with next dose due 9/10/2019   - Continue folic acid 1 mg daily    #ILD:  Most likely 2/2 Shannon-1 DM. Currently stable on MTX. Worsened since last PFTs but unclear if this is related to his diastolic  heart failure w/ severe AS, with recent back surgery and decrease in TLC vs from his DM. CT chest showed stable ILD arguing against DM activity. He at the time of discharge had sub-optimal but acceptable oxygenation without symptoms and deferred oxygen therapy for ongoing outpatient diuresis.   - outpatient follow up as arranged         #Hypothyroidism:   -Continue levothyroxine     #DM2:  - ISS as inpatient         Condition on discharge  Temp:  [97.6  F (36.4  C)-98.8  F (37.1  C)] 97.6  F (36.4  C)  Pulse:  [] 86  Heart Rate:  [] 76  Resp:  [10-42] 11  BP: ()/(57-74) 110/66  SpO2:  [56 %-100 %] 100 %  General: Alert, interactive, NAD  Eyes: sclera anicteric, EOMI  Neck: JVP 8 cm, carotid 2+ bilaterally  Cardiovascular: paced rhythm, soft 2/6 VICKY hard best at RUSB, no gallops or rubs  Resp: Expiratory rales, ?bibasilar crackles   GI: Soft, nontender, nondistended. +BS.  No HSM or masses, no rebound or guarding.  Extremities: No edema, no cyanosis or clubbing, dorsalis pedis and posterior tibialis pulses 2+ bilaterally. RF access site with soft tissue ecchymosis, no hematoma, C/D/I  Skin: Warm and dry, no jaundice or rash  Neuro: CN 2-12 intact, moves all extremities equally  Psych: Alert & oriented x 3      Discharge medications:   Current Discharge Medication List      CONTINUE these medications which have NOT CHANGED    Details   albuterol (2.5 MG/3ML) 0.083% neb solution Take 1 vial by nebulization every 6 hours as needed for shortness of breath / dyspnea or wheezing      aspirin (ASA) 81 MG EC tablet Take 1 tablet (81 mg) by mouth daily Start tomorrow morning.  Qty: 90 tablet, Refills: 3    Associated Diagnoses: Coronary artery disease due to lipid rich plaque      atorvastatin (LIPITOR) 40 MG tablet Take 1 tablet (40 mg) by mouth daily  Qty: 90 tablet, Refills: 3    Associated Diagnoses: Coronary artery disease due to lipid rich plaque      calcium carbonate (OS-DMITRIY 500 MG [place]. [place]) 500 MG  "tablet Take 1 tablet (500 mg) by mouth 2 times daily  Qty: 180 tablet, Refills: 3    Associated Diagnoses: Pre-operative examination; Spinal stenosis of lumbar region with neurogenic claudication      Cholecalciferol (VITAMIN D) 2000 units tablet Take 2,000 Units by mouth daily  Qty: 100 tablet, Refills: 3    Associated Diagnoses: Pre-operative examination; Spinal stenosis of lumbar region with neurogenic claudication      diphenhydrAMINE (BENADRYL) 50 MG capsule Take 50 mg by mouth as needed for itching or allergies       folic acid (FOLVITE) 1 MG tablet Take 1 tablet (1 mg) by mouth every morning  Qty: 90 tablet, Refills: 3    Associated Diagnoses: Pulmonary fibrosis (H)      furosemide (LASIX) 40 MG tablet Take 3 tablets (120 mg) by mouth daily  Qty: 90 tablet, Refills: 0    Associated Diagnoses: Heart failure with preserved ejection fraction, NYHA class I (H)      gabapentin (NEURONTIN) 600 MG tablet Take 2 tablets (1,200 mg) by mouth 3 times daily  Qty: 180 tablet, Refills: 0    Associated Diagnoses: Diabetic polyneuropathy associated with type 2 diabetes mellitus (H); Chronic pain syndrome      gemfibrozil (LOPID) 600 MG tablet Take 600 mg by mouth 2 times daily       insulin NPH (HUMULIN N/NOVOLIN N VIAL) 100 UNIT/ML vial Inject 2-6 Units Subcutaneous 2 times daily Will usually give if blood glucose >140  Qty: 10 mL, Refills: 11    Associated Diagnoses: Type 2 diabetes mellitus with diabetic neuropathy, with long-term current use of insulin (H)      insulin syringe-needle U-100 (29G X 1/2\" 1 ML) 29G X 1/2\" 1 ML miscellaneous Inject 1 Syringe Subcutaneous 2 times daily  Qty: 200 each, Refills: 11    Associated Diagnoses: Type 2 diabetes mellitus with other neurologic complication, with long-term current use of insulin (H)      LANsoprazole (PREVACID) 30 MG DR capsule Take 30 mg by mouth every morning (before breakfast)      levothyroxine (SYNTHROID/LEVOTHROID) 50 MCG tablet Take 50 mcg by mouth every morning  "      methotrexate 2.5 MG tablet Take 10 tablets (25 mg) by mouth once a week Tuesday  Qty: 120 tablet, Refills: 3    Associated Diagnoses: Inflammatory arthritis      metoprolol tartrate (LOPRESSOR) 25 MG tablet Take 0.5 tablets (12.5 mg) by mouth 2 times daily Hold IF heart rate less than 55.  Qty: 180 tablet, Refills: 3    Associated Diagnoses: Coronary artery disease due to lipid rich plaque      multivitamin w/minerals (THERA-VIT-M) tablet Take 1 tablet by mouth daily    Associated Diagnoses: Routine health maintenance      omega 3 1000 MG CAPS Take 2 capsules by mouth every morning       oxyCODONE-acetaminophen (PERCOCET) 7.5-325 MG per tablet Take 1 tablet by mouth every 6 hours as needed for severe pain (max 3 tablets daily) Dispense 08/16/19. OK to start 08/16/19  Qty: 90 tablet, Refills: 0    Associated Diagnoses: Chronic pain syndrome; Dermatomyositis (H)      ticagrelor (BRILINTA) 90 MG tablet Take 1 tablet (90 mg) by mouth 2 times daily Start this evening.  Qty: 180 tablet, Refills: 3    Associated Diagnoses: Coronary artery disease due to lipid rich plaque      albuterol (PROAIR HFA/PROVENTIL HFA/VENTOLIN HFA) 108 (90 BASE) MCG/ACT Inhaler Inhale 2 puffs into the lungs as needed for shortness of breath / dyspnea or wheezing       cetirizine (ZYRTEC) 10 MG tablet Take 10 mg by mouth daily as needed              Labs or imaging requiring follow-up after discharge:  CBC, BMP       Follow-up:  Follow up with [facility] Device [facility] in 7-10 days   Follow up with our cardiology valve team as arranged   Follow up with EP in 1 month as arranged     Code status:  Full    Discharge plan discussed with Dr. Rob.     Juan MENARD   Cardiology  Pager [phone]    Patient Care Team:  Hoa López MD as PCP - General (Internal Medicine)  Hilda Alegria MD as MD (Vascular Surgery)  [facility]  Meenkashi Irwin, RN as Nurse Coordinator (Cardiology)  Mejia  Cherise SANTOS as Specialty Care Coordinator (Cardiology)  Samuel, Bobbi Rodriguez, ANASTACIA TRUJILLO as Assigned PCP

## 2019-09-07 NOTE — PROGRESS NOTES
Sats before and after Nebulizer were 88% on room air; no change. Administered IS and acapella after neb to encourage patient's deep breathing. Achieved 750 ml on IS with good effort. Sats slightly increase to 91% after IS and acapella usage. Still not maintained at that level however. BS clear in upper lobes and crackles in bases consistent with pulmonary edema from chest xray. Patient said he has an albuterol inhaler as home med prn and hasn't needed it. Also, uses a cpap machine at night but overall no O2 use at home. Reported findings to RN.

## 2019-09-07 NOTE — PLAN OF CARE
VSS. Afebrile. Tolerating off room air; pt using IS and acapella well and taking deeper breaths which seem to help improve his sats. 60mg lasix given with >1L UO response. BM x1 today. Good appetite and eating well. Discharge summary printed and reviewed with patient. New medications reviewed with patient. Wife to pick-up pt at 1615.     Problem: Sleep Disordered Breathing (Heart Failure)  Goal: Effective Breathing Pattern During Sleep  9/7/2019 1556 by Angela Jensen RN  Outcome: Adequate for Discharge  9/7/2019 0446 by Evelin Casas RN  Outcome: Improving     Problem: Respiratory Compromise (Heart Failure)  Goal: Effective Oxygenation and Ventilation  9/7/2019 0446 by Evelin Casas RN  Outcome: Improving     Problem: Oral Intake Inadequate (Heart Failure)  Goal: Optimal Nutrition Intake  9/7/2019 1556 by Angela Jensen RN  Outcome: Adequate for Discharge  9/7/2019 0446 by Evelin Casas RN  Outcome: Improving     Problem: Functional Ability Impaired (Heart Failure)  Goal: Optimal Functional Ability  9/7/2019 1556 by Angela Jensen RN  Outcome: Adequate for Discharge  9/7/2019 0446 by Evelin Casas RN  Outcome: Improving     Problem: Fluid Imbalance (Heart Failure)  Goal: Fluid Balance  9/7/2019 1556 by Angela Jensen RN  Outcome: Adequate for Discharge  9/7/2019 0446 by Evelin Casas RN  Outcome: Improving     Problem: Cardiac Output Decreased (Heart Failure)  Goal: Optimal Cardiac Output  9/7/2019 1556 by Angela Jensen RN  Outcome: Adequate for Discharge  9/7/2019 0446 by Evelin Casas RN  Outcome: Improving     Problem: Arrhythmia/Dysrhythmia (Heart Failure)  Goal: Stable Heart Rate and Rhythm  9/7/2019 1556 by Angela Jensen RN  Outcome: Adequate for Discharge  9/7/2019 0446 by Evelin Casas RN  Outcome: Improving     Problem: Adjustment to Illness (Heart Failure)  Goal: Optimal Coping  9/7/2019 1556 by Angela Jensen RN  Outcome: Adequate for Discharge

## 2019-09-07 NOTE — PLAN OF CARE
Discharge Planner OT   Patient plan for discharge: home with OP CR  Current status: Pt completed simple grooming tasks while standing with SBA and set up.  Pt needed an extended seated rest break after due to fatigue.  Pt educated regarding TAVR and pacemaker precautions, issued handout.  Pt will attend OP CR at [facility].  Pt ambulated in hallway ~200 feet with 4WW on 1L of O2.   Barriers to return to prior living situation: fatigue, medical status  Recommendations for discharge: home with OP CR  Rationale for recommendations: Pt moving well, has assist at home.       Entered by: Gela King 09/07/2019 12:35 PM

## 2019-09-07 NOTE — PLAN OF CARE
D/I/A: Patient admitted on 8/29/19 s/p TAVR c/w CHB (permanent pacemaker placed 9/6/19).   Neuro: Alert and oriented x4. PERRL. Follows commands and moves all extremities with equal strength  Cardiac: % V paced, rate of 80. MAP > 65 and SBP > 85 without intervention.  Afebrile. +2 radial pulses. +doppler pedal and tibial pulses. No edema noted.   Resp: Lungs clear on 2 L NC.   GI: Tolerating reg diet, no nausea. Last BM 9/5. Refused Senna. Abdomen soft, non tender. BS auscultated X 4.  : Voiding spontaneously without difficulty. Bladder scan X 1.   Pain: Scheduled percocet (home med) for chronic pain effective.   Skin: Bilateral groin sites stable. R groin site with small amt of bruising/hematoma. FADI R chest site, dressing CDI.   Activity: Up with assist X 1 and walker. Sling RUE per order.   Access: PIV x 1  Labs: Pending   P: Continue to monitor and notify MD of any changes.

## 2019-09-08 NOTE — PLAN OF CARE
Occupational Therapy Discharge Summary     Reason for therapy discharge:    Discharged to home w/ OP CR.     Progress towards therapy goal(s). See goals on Care Plan in [facility] electronic health record for goal details.  Goals partially met.  Barriers to achieving goals:   discharge from facility.     Therapy recommendation(s):    Continued therapy is recommended.  Rationale/Recommendations:  Continued OT at OP CR  to increase ind in ADLS/IADLS and work towards unmet goals.

## 2019-09-09 NOTE — PROGRESS NOTES
Clinic Care Coordination Contact  [facility]/Voicemail    Referral Source: IP Report  Discharge Diagnoses:  1. Severe aortic stenosis S/P TAVR (29mm Medtronic Evolut)  2. CHB secondary to above, status post PPM   3. HFpEF   4. NSTEMI type II secondary to above   5. CAD status post PCI p-mLAD, dLAD 7/30/19  6. Dermatomyositis with Shannon-1 positivity   7. ILD   8. DM II  9. Hypothyroidism   [facility] hospitalization 8/29-9/7/2019-  Clinical Data: Care Coordinator Outreach    Outreach attempted x 1.  Left message on patient's voicemail with call back information and requested return call.    Plan: . Care Coordinator will try to reach patient again in 1-2 business days.    Yesica Manning RN, Care Coordinator   [facility] -Care Coordination  [facility] [facility]   [phone]

## 2019-09-10 NOTE — LETTER
September 11, 2019      Beulah Jane  [address]   [place] [address]        Dear Beulah,     Your hospital care team has recommended you schedule a Medication Therapy Management (MTM) appointment. MTM is designed to help you get the most of out of your medicines.     During an MTM appointment a specially trained pharmacist will review all of your medicines, both prescription and over-the-counter. They will make sure your medicines are the best choice for you and are safe and convenient for you.  MTM pharmacists work together with you and your doctor to help you understand your medicines, solve any problems related to your medicines and help you get the best results from taking your medicines.     At [facility], we strongly believe in a team approach to health care. We want to help you understand your medicines and health conditions. To learn more about how you might benefit from MTM services, watch the patient video at [email or web address].     To make an appointment, please call the clinic at [phone] or the MTM scheduling line at [phone] (toll-free at [phone]).    We look forward to hearing from you!        Venita Galvan PharmD  Medication Therapy Management Resident  Pager: [phone]    Enriqueta Valentino PharmD, [facility]  Medication Therapy Management Provider  Pager: [phone]

## 2019-09-10 NOTE — TELEPHONE ENCOUNTER
Called patient LM for him to call back to verify medication dosage.   This medication currently being titrated and also LM to remind that patient is due to follow up.       Per last OV note:    1. Increase gabapentin from 600mg TID to 1200mg TID in 600mg increments every 3-4 days.  2. Urine toxicology screen today - completed 7/16/19  3. Opioid agreement signed - Completed 7/16/19  2. Further procedures recommended: none at this time  3. Referrals: Could consider acupuncture in the future  4. Follow up: 1 month in clinic

## 2019-09-10 NOTE — TELEPHONE ENCOUNTER
Reason for call:  Medication   If this is a refill request, has the caller requested the refill from the pharmacy already? No  Will the patient be using a [facility]? No  Name of the pharmacy and phone number for the current request: [facility] in [place]     Name of the medication requested: gabapentin (NEURONTIN) 600 MG tablet    Other request:     Phone number to reach patient:  Cell number on file:    Telephone Information:   Mobile [phone]       Best Time:      Can we leave a detailed message on this number?  YES     Harriet Chacko    [facility]

## 2019-09-10 NOTE — PROGRESS NOTES
Incoming call from the patient requesting a return call on his cell phone     Clinic Care Coordination Contact  [facility]/Voicemail    Referral Source: IP Report  Referral Source: IP Report  Discharge Diagnoses:  1. Severe aortic stenosis S/P TAVR (29mm Medtronic Evolut)  2. CHB secondary to above, status post PPM   3. HFpEF   4. NSTEMI type II secondary to above   5. CAD status post PCI p-mLAD, dLAD 7/30/19  6. Dermatomyositis with Shannon-1 positivity   7. ILD   8. DM II  9. Hypothyroidism   [facility] hospitalization 8/29-9/7/2019-  Clinical Data: Care Coordinator Outreach  Outreach attempted x 2.  Busy signal   Plan: Care Coordinator will try to reach patient again in 1-2 business days.  Yesica Manning RN, Care Coordinator   [facility] -Care Coordination  [facility] , [facility] and [facility]   [phone]

## 2019-09-10 NOTE — TELEPHONE ENCOUNTER
MTM referral from: Transitions of Care (recent hospital discharge or ED visit)    MTM referral outreach attempt #2 on September 10, 2019 at 4:06 PM      Outcome: Patient not reachable after several attempts, will route to MTM Pharmacist/Provider as an FYI. Thank you for the referral.    Fariba Bernard, MTM Coordinator

## 2019-09-11 NOTE — PROGRESS NOTES
CC has made several outreach calls and the line is busy on cell phone number .  CC called Beulah son and he will have the patient call CC back and will make a hospital follow up appointment with Dr Maribel Manning RN, Care Coordinator   [facility] -Care Coordination  [facility] , [facility] and [facility]   [phone]

## 2019-09-11 NOTE — TELEPHONE ENCOUNTER
Sent referral letter.    Caleb FraustoD, [facility]  Medication Therapy Management Provider  Pager: [phone]

## 2019-09-12 NOTE — PROGRESS NOTES
[facility]  CARDIOVASCULAR DIVISION    VALVE CLINIC RETURN VISIT    PRIMARY CARDIOLOGIST: Dr. Solorio       PERTINENT CLINICAL HISTORY & REASON FOR CONSULTATION:     Beulah Jane is a very pleasant 69 year old male with CAD s/p PCI of the LAD 7/2019, HFpEF, JUAN on CPAP, dermatomyositis, ILD, pulmonary hypertension and severe aortic valvular stenosis that was treated with transfemoral transcatheter aortic valve replacement (TAVR) with a 29 mm Medtronic Evolut Pro on 9/4/19 by Ed Garcia and Ana Maria who presents for 1 week TAVR follow-up. The patient was admitted to [facility] on 8/29 with acute diastolic heart failure and was diuresed from 157 lbs to 152 lbs. He underwent successful TAVR on 9/4/19. The TAVR and post-procedure course were complicated by complete heart block requiring permanent pacemaker implantation on 9/6. His post-TAVR echo showed mean PG of 7 mmHg with no valvular or paravalvular AI. He was discharged home on ASA 81 mg, Brilinta 90 BID, lasix 120 mg daily and PRN metolazone.     The patient reports overall improvement in his dyspnea since his valve replacement. He states that his breathing gets a little easier everyday. Yesterday he had a difficult day and was very short of breath just walking from his bedroom to the bathroom. Today he reports dramatic improvement in symptoms. He denies dyspnea at rest, but continues to have mild exertional dyspnea. He is able to ambulate 50-75 feet before having to stop and rest, before his valve replacement he was only able to walk about 10 feet. He continues to have a dry cough and nasal congestion, this is unchanged. He sleeps with 2 pillows at night. He uses a CPAP for JUAN. He reports occasional episodes of PND. He reports mild leg swelling that has markedly improved with diuresis. His weight is down from 152 lbs on discharge-->147 lbs today. He has been taking lasix 120 mg daily and metolazone 2.5 mg PRN. He has taken metolazone 5  times over the past 9 days for episodes of shortness of breath. He has not been weighing himself regularly, only yesterday and he was 147 lbs. He denies palpitations or syncope. He reports new onset dizziness over the past 1-2 weeks and also increased thirst. He is not participating in cardiac rehab but would like to. He is compliant with his medication without significant side effects.      PAST MEDICAL HISTORY:     Past Medical History:   Diagnosis Date     Aortic stenosis      Chronic pain      DM (diabetes mellitus), type 2 (H)     on insulin     Hyperlipidemia      JUAN on CPAP      Pneumonia      Polymyositis (H)      Pulmonary fibrosis, unspecified (H)      Seasonal allergies         PAST SURGICAL HISTORY:     Past Surgical History:   Procedure Laterality Date     ARTHROSCOPY KNEE  1970     COLONOSCOPY       CV CORONARY ANGIOGRAM N/A 7/30/2019    Procedure: CV CORONARY ANGIOGRAM;  Surgeon: Leonidas Mike MD;  Location:  HEART CARDIAC CATH LAB     CV LEFT HEART CATH N/A 7/30/2019    Procedure: CV LEFT HEART CATH;  Surgeon: Leonidas Mike MD;  Location:  HEART CARDIAC CATH LAB     CV PCI STENT DRUG ELUTING Left 7/30/2019    Procedure: PCI Stent Drug Eluting;  Surgeon: Leonidas Mike MD;  Location:  HEART CARDIAC CATH LAB     CV RIGHT HEART CATH N/A 7/30/2019    Procedure: CV RIGHT HEART CATH;  Surgeon: Leonidas Mike MD;  Location:  HEART CARDIAC CATH LAB     CV TRANSCATHETER AORTIC VALVE REPLACEMENT N/A 9/4/2019    Procedure: Transcatheter (Medtronic - 29mm) Aortic Valve Replacement, trans thorasic echocardiogram, perfusion and cardiac surgery standby, temporary pacemeker placement;  Surgeon: Pj Garcia MD;  Location:  OR     DECOMPRESSION, FUSION LUMBAR POSTERIOR TWO LEVELS, COMBINED  1997     EP PACEMAKER N/A 9/6/2019    Procedure: EP PACEMAKER;  Surgeon: Shefali Sylvester MD;  Location:  HEART CARDIAC CATH LAB     FUSION LUMBAR ANTERIOR THREE+ LEVELS N/A  4/22/2019    Procedure: Lumbar 1 Or Lumbar 2-5 Anterior Lumbar Interbody Fusion with BMP Stage 1 Of Two Procedure:;  Surgeon: Carlos Enrique Dial MD;  Location: [facility] OR     HEART CATH FEMORAL CANNULIZATION WITH OPEN STANDBY REPAIR AORTIC VALVE N/A 9/4/2019    Procedure: Cardiopulmonary Bypass Standby;  Surgeon: Hamilton Carnes MD;  Location: [facility] OR     HERNIA REPAIR  2006     lumbar spine hardware removal  1999     OPTICAL TRACKING SYSTEM FUSION POSTERIOR SPINE THORACIC THREE+ LEVELS N/A 4/25/2019    Procedure: O-Arm/Stealth Assisted Thoracic 10-Pelvis Instrumented Fusion T11-2, L1-2, L2-3 Transforminal Lumbar Interbody Fusion (TLIF) And Smith Borges Osteotomies,and L3-4 SPO as well, Use Of BMP, Debridement Of Seroma;  Surgeon: Carlos Enrique Dial MD;  Location: [facility] OR     PICC INSERTION Right 05/06/2019    4Fr - 37cm, Basilic vein, low SVC     REMOVAL PROSTHETIC MATERIAL/MESH, ABD WALL NECRO TISS INFEXN  2012     ROTATOR CUFF REPAIR RT/LT  2003        CURRENT MEDICATIONS:     Current Outpatient Medications   Medication Sig Dispense Refill     albuterol (2.5 MG/3ML) 0.083% neb solution Take 1 vial by nebulization every 6 hours as needed for shortness of breath / dyspnea or wheezing       albuterol (PROAIR HFA/PROVENTIL HFA/VENTOLIN HFA) 108 (90 BASE) MCG/ACT Inhaler Inhale 2 puffs into the lungs as needed for shortness of breath / dyspnea or wheezing        aspirin (ASA) 81 MG EC tablet Take 1 tablet (81 mg) by mouth daily Start tomorrow morning. 90 tablet 3     atorvastatin (LIPITOR) 40 MG tablet Take 1 tablet (40 mg) by mouth daily 90 tablet 3     benzocaine-menthol (CEPACOL) 15-3.6 MG lozenge Place 1 lozenge inside cheek every hour as needed for sore throat 60 lozenge 0     calcium carbonate (OS-DMITRIY 500 MG [place]. [place]) 500 MG tablet Take 1 tablet (500 mg) by mouth 2 times daily 180 tablet 3     cephALEXin (KEFLEX) 250 MG capsule Take 1 capsule (250 mg) by mouth 3 times daily for 5 days 15  "capsule 0     cetirizine (ZYRTEC) 10 MG tablet Take 10 mg by mouth daily as needed        Cholecalciferol (VITAMIN D) 2000 units tablet Take 2,000 Units by mouth daily 100 tablet 3     diphenhydrAMINE (BENADRYL) 50 MG capsule Take 50 mg by mouth as needed for itching or allergies        folic acid (FOLVITE) 1 MG tablet Take 1 tablet (1 mg) by mouth every morning 90 tablet 3     furosemide (LASIX) 40 MG tablet Take 3 tablets (120 mg) by mouth daily 90 tablet 0     gabapentin (NEURONTIN) 600 MG tablet Take 2 tablets (1,200 mg) by mouth 3 times daily 180 tablet 0     gemfibrozil (LOPID) 600 MG tablet Take 600 mg by mouth 2 times daily        insulin NPH (HUMULIN N/NOVOLIN N VIAL) 100 UNIT/ML vial Inject 2-6 Units Subcutaneous 2 times daily Will usually give if blood glucose >140 10 mL 11     insulin syringe-needle U-100 (29G X 1/2\" 1 ML) 29G X 1/2\" 1 ML miscellaneous Inject 1 Syringe Subcutaneous 2 times daily 200 each 11     LANsoprazole (PREVACID) 30 MG DR capsule Take 30 mg by mouth every morning (before breakfast)       levothyroxine (SYNTHROID/LEVOTHROID) 50 MCG tablet Take 50 mcg by mouth every morning        methotrexate 2.5 MG tablet Take 10 tablets (25 mg) by mouth once a week Tuesday 120 tablet 3     metolazone (ZAROXOLYN) 2.5 MG tablet TAKE 1 TABLET (2.5 MG) BY MOUTH AS NEEDED FOR WEIGHT GAIN GREATER THAN 3 POUNDS OR WORSENING SHORTNESS OF BREATH 5 tablet 0     multivitamin w/minerals (THERA-VIT-M) tablet Take 1 tablet by mouth daily       omega 3 1000 MG CAPS Take 2 capsules by mouth every morning        oxyCODONE-acetaminophen (PERCOCET) 7.5-325 MG per tablet Take 1 tablet by mouth every 6 hours as needed for severe pain (max 3 tablets daily) Dispense 08/16/19. OK to start 08/16/19 (Patient taking differently: Take 1 tablet by mouth 3 times daily Dispense 08/16/19. OK to start 08/16/19) 90 tablet 0     ticagrelor (BRILINTA) 90 MG tablet Take 1 tablet (90 mg) by mouth 2 times daily Start this evening. 180 " tablet 3        ALLERGIES:   No Known Allergies     FAMILY HISTORY:     Family History   Problem Relation Age of Onset     Colon Cancer Mother      Hypertension Father         did not know father well        SOCIAL HISTORY:     Social History     Socioeconomic History     Marital status:      Spouse name: Not on file     Number of children: Not on file     Years of education: Not on file     Highest education level: Not on file   Occupational History     Not on file   Social Needs     Financial resource strain: Not on file     Food insecurity:     Worry: Not on file     Inability: Not on file     Transportation needs:     Medical: Not on file     Non-medical: Not on file   Tobacco Use     Smoking status: Former Smoker     Packs/day: 0.25     Last attempt to quit: 1970     Years since quittin.2     Smokeless tobacco: Never Used   Substance and Sexual Activity     Alcohol use: Yes     Comment: few times monthly     Drug use: Never     Sexual activity: Not Currently   Lifestyle     Physical activity:     Days per week: Not on file     Minutes per session: Not on file     Stress: Not on file   Relationships     Social connections:     Talks on phone: Not on file     Gets together: Not on file     Attends Holiness service: Not on file     Active member of club or organization: Not on file     Attends meetings of clubs or organizations: Not on file     Relationship status: Not on file     Intimate partner violence:     Fear of current or ex partner: Not on file     Emotionally abused: Not on file     Physically abused: Not on file     Forced sexual activity: Not on file   Other Topics Concern     Not on file   Social History Narrative     Not on file        REVIEW OF SYSTEMS:     Constitutional: No fevers or chills  Ears/Nose/Throat: + rhinorrhea  Respiratory: + dry cough  Cardiovascular: See HPI  Musculoskeletal: No new back pain, neck pain or muscle pain  Neurologic: No new headaches, focal weakness or  "behavior changes  Psychiatric: No hallucinations, excessive alcohol consumption or illegal drug usage  Hematologic/Lymphatic/Immunologic: No bleeding, chills, fever, night sweats or weight loss  Endocrine: No new cold intolerance, heat intolerance, polyphagia, polydipsia or polyuria      PHYSICAL EXAMINATION:     /70 (BP Location: Right arm, Patient Position: Chair, Cuff Size: Adult Regular)   Pulse 95   Ht 1.651 m (5' 5\")   Wt 67.1 kg (148 lb)   SpO2 93%   BMI 24.63 kg/m      GENERAL: No acute distress.  HEENT: EOMI. Sclerae white, not injected. Nares clear. Pharynx without erythema or exudate.   Neck: No adenopathy. No thyromegaly. No jugular venous distension.   Heart: Regular rate and rhythm. No murmur.   Lungs: Clear to auscultation. No ronchi, wheezes, rales.   Abdomen: Soft, nontender, nondistended. Bowel sounds present.  Extremities: No clubbing, cyanosis. Mild edmea  Neurologic: Alert and oriented to person/place/time, normal speech and affect. No focal deficits.  Skin: No petechiae, purpura or rash. Right upper chest pacer site dry and intact     LABORATORY DATA:     LIPID RESULTS:  Lab Results   Component Value Date    CHOL 153 07/05/2019    HDL 22 (L) 07/05/2019     (H) 07/05/2019    TRIG 139 07/05/2019       LIVER ENZYME RESULTS:  Lab Results   Component Value Date    AST 55 (H) 09/04/2019    ALT 35 09/04/2019       CBC RESULTS:  Lab Results   Component Value Date    WBC 9.7 09/07/2019    RBC 3.64 (L) 09/07/2019    HGB 7.7 (L) 09/07/2019    HCT 28.3 (L) 09/07/2019    MCV 78 09/07/2019    MCH 21.2 (L) 09/07/2019    MCHC 27.2 (L) 09/07/2019    RDW 23.0 (H) 09/07/2019     09/07/2019       BMP RESULTS:  Lab Results   Component Value Date     09/07/2019    POTASSIUM 4.2 09/07/2019    CHLORIDE 103 09/07/2019    CO2 29 09/07/2019    ANIONGAP 5 09/07/2019    GLC 92 09/07/2019    BUN 13 09/07/2019    CR 0.69 09/07/2019    GFRESTIMATED >90 09/07/2019    GFRESTBLACK >90 09/07/2019    " DMITRIY 8.4 (L) 09/07/2019        A1C RESULTS:  Lab Results   Component Value Date    A1C 6.7 (H) 08/29/2019       INR RESULTS:  Lab Results   Component Value Date    INR 1.38 (H) 08/29/2019    INR 1.11 07/05/2019          PROCEDURES & FURTHER ASSESSMENTS:     EKG post-TAVR 9/7: SR V-paced HR 78    Echocardiogram 9/5/19 status post TAVR:  Status post TAVR with 29 mm Medtronic Evolut Pro prosthesis.  The prosthesis is well seated, with normal gradient and no regurgitation.  Mild right ventricular systolic dysfunction with moderate pulmonary  hypertension and dilated IVC suggesting increased RA pressure.    Left Ventricle  Left ventricular size is normal. Left ventricular function is normal.The EF is  55-60%. Moderate concentric wall thickening consistent with left ventricular  hypertrophy is present. Left ventricular diastolic function is not assessable.  Abnormal septal motion consistent with pacemaker is present.     Right Ventricle  Mild right ventricular dilation is present. Global right ventricular function  is mildly reduced.     Atria  Mild biatrial enlargement is present.     Mitral Valve  The mitral valve is normal.     Aortic Valve  TAVR with 29 mm Medtronic Evolut Pro. No aortic regurgitation is present.  Doppler interrogation of the aortic valve is normal. The mean gradient is 7  mmHg.     Tricuspid Valve  Trace tricuspid insufficiency is present. The right ventricular systolic  pressure is approximated at 60.2 mmHg plus the right atrial pressure. Moderate  (pulmonary artery systolic pressure 50-75mmHg) pulmonary hypertension is  present.     Pulmonic Valve  The pulmonic valve is normal. RVOT acceleration time is decreased at 70 ms and  notching of Doppler envelope suggesting increased pulmonary vascular  resistance.     Vessels  The thoracic aorta cannot be assessed. IVC diameter >2.1 cm collapsing <50%  with sniff suggests a high RA pressure estimated at 15 mmHg or greater.     Pericardium  No pericardial  effusion is present.     Coronary Angiogram and RHC 7/30/19:    Two-vessel obstructive CAD (diffuse calcified disease of the prox-mid LAD;  of small, non-dominant RCA with R->R collaterals)    Successful PCI of the prox-mid LAD with Synergy CHAU x 2 (3.0 x 12 in prox-LAD, 2.75 x 24 in distal LAD)    Severe pulmonary HTN with mildly elevated PCWP    Low-normal cardiac index by Eunice, normal cardiac index by thermodilution    Simultaneous LV-Ao mean gradient = 41.9, calculated PETE = 0.70 cm2            Coronary Findings     Diagnostic   Dominance: Left   Left Main   The vessel was visualized by selective angiography and is moderate in size. There was 0% vessel disease.   Left Anterior Descending   Ost LAD lesion is 30% stenosed. The lesion is discrete.   Prox LAD lesion is 70% stenosed. The lesion is type C - high risk and discrete. The lesion is severely calcified. Just proximal to D1   Prox LAD to Mid LAD lesion is 60% stenosed. The lesion is type C - high risk and eccentric. The lesion is mildly calcified. diffuse   First Diagonal Branch   Ost 1st Diag lesion is 70% stenosed. The lesion is discrete. < 2 mm vessel   Ramus Intermedius   The vessel is small.   Ost Ramus lesion is 60% stenosed. The lesion is discrete. Small vessel   Left Circumflex   First Obtuse Marginal Branch   The vessel is large.   Ost 1st Mrg to 1st Mrg lesion is 30% stenosed.   Right Coronary Artery   The vessel is small.   Mid RCA lesion is 100% stenosed. The lesion is discrete. Small, non-dominant right with R->R collaterals   Acute Marginal Branch   Collaterals   Acute Mrg filled by collaterals from Prox RCA.      Intervention     Prox LAD lesion   Stent   Lesion length: 8 mm. The pre-interventional distal flow is normal (KEANU 3). The post-interventional distal flow is normal (KEANU 3). Using an XB 3.5 guide, a BMW wire was advanced across the lesion and parked in the distal LAD. The lesion was pre-dilated with a 2.0 semi-compliant balloon  and stented with a 3.0 x 12 Synergy CHAU. Finally, the mid-LAD lesion was directly stented with a 2.75 x 24 Synergy CHAU, sparing the ostium of the first diagonal. Final angiogram reveals excellent stent expansion and apposition without evidence of immediate complications.   There is a 0% residual stenosis post intervention.   Prox LAD to Mid LAD lesion   Stent   Lesion length: 20 mm. The pre-interventional distal flow is normal (KEANU 3). The post-interventional distal flow is normal (KEANU 3). See prox-LAD stent details   There is a 0% residual stenosis post intervention.     Hemodynamics     Right Heart Pressures     - Severe pulmonary HTN with mildly elevated PCWP  - Low-normal cardiac index by Eunice, normal cardiac index by thermodilution  - Simultaneous LV-Ao mean gradient = 41.9, calculated PETE = 0.70 cm2   Pressures Phase: Baseline      Time Systolic Diastolic Mean A Wave V Wave EDP Max dp/dt HR   RA Pressures  3:46 PM   11 mmHg    18 mmHg    15 mmHg      89 bpm      RV Pressures  3:30 PM       1296 mmHg/sec         3:46 PM 65 mmHg        23 mmHg     90 bpm      PA Pressures  3:55 PM 62 mmHg    20 mmHg    39 mmHg        90 bpm      PCW Pressures  3:55 PM   20 mmHg    25 mmHg    28 mmHg      89 bpm      AO Pressures  4:09 PM 96 mmHg    51 mmHg    70 mmHg        88 bpm        4:12 PM 92 mmHg    56 mmHg    72 mmHg        87 bpm      LV Pressures  4:09  mmHg        11 mmHg     88 bpm      Blood Flow Results Phase: Baseline      Time Results Indexed Values   QP  3:30 PM 3.76 L/min    2.07 L/min/m2      QS  3:30 PM 3.76 L/min    2.07 L/min/m2      Blood Oximetry Phase: Baseline      Time Hb SAT(%) PO2 Content PA Sat   PA  3:30 PM  52.8 %      52.8 %      Art  3:30 PM  100 %     12.51 mL/dL       Cardiac Output Phase: Baseline      Time TDCO TDCI Eunice C.O. Eunice C.I. Eunice HR   Cardiac Output Results  3:30 PM 4.77 L/min    2.62 L/min/m2    3.76 L/min    2.07 L/min/m2         3:58 PM 4.77 L/min          Resistance  Results Phase: Baseline      Time PVR SVR PVR-I SVR-I TPR TVR TPR-I TVR-I PVR/SVR TPR/TVR   Resistance Results (Metric)  3:30 .17 dsc-5    1297.61 dsc-5    734.09 dsc-5/m2    2356.83 dsc-5/m2    829.62 dsc-5    1531.6 dsc-5    1506.82 dsc-5/m2    2781.83 dsc-5/m2    0.31    0.54      Resistance Results (Wood)  3:30 PM 5.05 PATEL    16.22 PATEL    9.18 PATEL/m2    29.47 PATEL/m2    10.37 PATEL    19.15 PATEL    18.84 PATEL/m2    34.78 PATEL/m2    0.31    0.54           CLINICAL IMPRESSION:   Beulah Jane is a very pleasant 69 year old male with CAD s/p PCI of the LAD 7/2019, HFpEF, JUAN on CPAP, dermatomyositis, ILD, pulmonary hypertension and severe aortic valvular stenosis that was treated with transfemoral transcatheter aortic valve replacement (TAVR) with a 29 mm Medtronic Evolut Pro on 9/4/19 by Ed Garcia and Ana Maria who presents for 1 week TAVR follow-up. The patient was admitted to [facility] on 8/29 with acute diastolic heart failure and was diuresed with IV lasix from 157 lbs to 152 lbs. He underwent successful TAVR on 9/4/19. The TAVR and post-procedure course were complicated by complete heart block requiring permanent pacemaker implantation on 9/6. His post-TAVR echo showed mean PG of 7 mmHg with no valvular or paravalvular AI. He was discharged home on ASA, Brilinta 90 mg BID, lasix 120 mg daily and PRN metolazone.     Patient reports improvement in dyspnea since his TAVR. He also reports significant improvement in leg swelling and additional 5 lbs weight loss since hospital discharge. He has been taking lasix 120 mg daily and metolazone PRN for episodes of dyspnea. He reports dizziness over the last few days, increased thirst and has dry mucous membranes. I suspect he is slightly hypovolemic. Plan to hold metolazone and decrease lasix to 80 mg daily with labs today. Follow-up with [facility] next week. Will also place pulmonary referral as patient would like to establish care here at [facility].     Plan Summary:  1)  Aspirin 81 mg daily lifelong  2) Continue Brilinta through 7/2020 (12 months post PCI)   3) Lifelong antibiotic prophylaxis prior to all dental procedures.  4) Decrease lasix to 80 mg daily and stop metolazone   5) Recommend daily weights  6) Labs today  7) [facility] early next week  8) Dr. Solorio in 1 month with EKG, echo and labs prior  9) Pulmonology referral     ANASTACIA Carlisle, CNP  [facility] Cardiology Team      CC  Patient Care Team:  Hoa Giron MD as PCP - General (Internal Medicine)  Hilda Alegria MD as MD (Vascular Surgery)  [facility] ([facility] ([facility] ([place]  Meenakshi Irwin, RN as Nurse Coordinator (Cardiology)  Cherise Ba as Specialty Care Coordinator (Cardiology)  Bobbi Baker APRN CNP as Assigned PCP  Yesica Manning RN as Clinic Care Coordinator (Primary Care - CC)  [facility]

## 2019-09-12 NOTE — TELEPHONE ENCOUNTER
Called patient. They will call back to verify what dose he is taking    Kami GARCIAN, RN Care Coordinator  [facility]

## 2019-09-13 NOTE — NURSING NOTE
Chief Complaint   Patient presents with     Follow Up     S/P TAVR 1 week Follow up      Vitals were taken and medications reconciled.     Mati Carey CMA  2:10 PM

## 2019-09-13 NOTE — PATIENT INSTRUCTIONS
It was a pleasure to see you in clinic today. Please do not hesitate to call with any questions or concerns.  We look forward to seeing you in clinic at your next device check in 3 months    Lolis Gan, RN  Electrophysiology Nurse Clinician  [facility]  During business hours call:  [phone]  After business hours please call: [phone]- select option #4 and ask for job code [phone].

## 2019-09-13 NOTE — LETTER
9/13/2019      RE: Beulah Jane  [address]  [facility] [address]       Dear Colleague,    Thank you for the opportunity to participate in the care of your patient, Beulah Jane, at the [facility] HEART [facility] at [facility]. Please see a copy of my visit note below.    [facility] HEART CARE  CARDIOVASCULAR DIVISION    VALVE CLINIC RETURN VISIT    PRIMARY CARDIOLOGIST: Dr. Solorio       PERTINENT CLINICAL HISTORY & REASON FOR CONSULTATION:     Beulah Jane is a very pleasant 69 year old male with CAD s/p PCI of the LAD 7/2019, HFpEF, JUAN on CPAP, dermatomyositis, ILD, pulmonary hypertension and severe aortic valvular stenosis that was treated with transfemoral transcatheter aortic valve replacement (TAVR) with a 29 mm Medtronic Evolut Pro on 9/4/19 by Ed Garcia and Ana Maria who presents for 1 week TAVR follow-up. The patient was admitted to [facility] on 8/29 with acute diastolic heart failure and was diuresed from 157 lbs to 152 lbs. He underwent successful TAVR on 9/4/19. The TAVR and post-procedure course were complicated by complete heart block requiring permanent pacemaker implantation on 9/6. His post-TAVR echo showed mean PG of 7 mmHg with no valvular or paravalvular AI. He was discharged home on ASA 81 mg, Brilinta 90 BID, lasix 120 mg daily and PRN metolazone.     The patient reports overall improvement in his dyspnea since his valve replacement. He states that his breathing gets a little easier everyday. Yesterday he had a difficult day and was very short of breath just walking from his bedroom to the bathroom. Today he reports dramatic improvement in symptoms. He denies dyspnea at rest, but continues to have mild exertional dyspnea. He is able to ambulate 50-75 feet before having to stop and rest, before his valve replacement he was only able to walk about 10 feet. He continues to have a dry cough and nasal congestion, this is unchanged. He  sleeps with 2 pillows at night. He uses a CPAP for JUAN. He reports occasional episodes of PND. He reports mild leg swelling that has markedly improved with diuresis. His weight is down from 152 lbs on discharge-->147 lbs today. He has been taking lasix 120 mg daily and metolazone 2.5 mg PRN. He has taken metolazone 5 times over the past 9 days for episodes of shortness of breath. He has not been weighing himself regularly, only yesterday and he was 147 lbs. He denies palpitations or syncope. He reports new onset dizziness over the past 1-2 weeks and also increased thirst. He is not participating in cardiac rehab but would like to. He is compliant with his medication without significant side effects.      PAST MEDICAL HISTORY:     Past Medical History:   Diagnosis Date     Aortic stenosis      Chronic pain      DM (diabetes mellitus), type 2 (H)     on insulin     Hyperlipidemia      JUAN on CPAP      Pneumonia      Polymyositis (H)      Pulmonary fibrosis, unspecified (H)      Seasonal allergies         PAST SURGICAL HISTORY:     Past Surgical History:   Procedure Laterality Date     ARTHROSCOPY KNEE  1970     COLONOSCOPY       CV CORONARY ANGIOGRAM N/A 7/30/2019    Procedure: CV CORONARY ANGIOGRAM;  Surgeon: Leonidas Mike MD;  Location:  HEART CARDIAC CATH LAB     CV LEFT HEART CATH N/A 7/30/2019    Procedure: CV LEFT HEART CATH;  Surgeon: Leonidas Mike MD;  Location:  HEART CARDIAC CATH LAB     CV PCI STENT DRUG ELUTING Left 7/30/2019    Procedure: PCI Stent Drug Eluting;  Surgeon: Leonidas Mike MD;  Location:  HEART CARDIAC CATH LAB     CV RIGHT HEART CATH N/A 7/30/2019    Procedure: CV RIGHT HEART CATH;  Surgeon: Leonidas Mike MD;  Location:  HEART CARDIAC CATH LAB     CV TRANSCATHETER AORTIC VALVE REPLACEMENT N/A 9/4/2019    Procedure: Transcatheter (Medtronic - 29mm) Aortic Valve Replacement, trans thorasic echocardiogram, perfusion and cardiac surgery standby,  temporary pacemeker placement;  Surgeon: Pj Garcia MD;  Location: [facility] OR     DECOMPRESSION, FUSION LUMBAR POSTERIOR TWO LEVELS, COMBINED  1997     EP PACEMAKER N/A 9/6/2019    Procedure: EP PACEMAKER;  Surgeon: Shefali Sylvester MD;  Location:  HEART CARDIAC CATH LAB     FUSION LUMBAR ANTERIOR THREE+ LEVELS N/A 4/22/2019    Procedure: Lumbar 1 Or Lumbar 2-5 Anterior Lumbar Interbody Fusion with BMP Stage 1 Of Two Procedure:;  Surgeon: Carlos Enrique Dial MD;  Location: [facility] OR     HEART CATH FEMORAL CANNULIZATION WITH OPEN STANDBY REPAIR AORTIC VALVE N/A 9/4/2019    Procedure: Cardiopulmonary Bypass Standby;  Surgeon: Hamilton Carnes MD;  Location: [facility] OR     HERNIA REPAIR  2006     lumbar spine hardware removal  1999     OPTICAL TRACKING SYSTEM FUSION POSTERIOR SPINE THORACIC THREE+ LEVELS N/A 4/25/2019    Procedure: O-Arm/Stealth Assisted Thoracic 10-Pelvis Instrumented Fusion T11-2, L1-2, L2-3 Transforminal Lumbar Interbody Fusion (TLIF) And Smith Borges Osteotomies,and L3-4 SPO as well, Use Of BMP, Debridement Of Seroma;  Surgeon: Carlos Enrique Dial MD;  Location: [facility] OR     PICC INSERTION Right 05/06/2019    4Fr - 37cm, Basilic vein, low SVC     REMOVAL PROSTHETIC MATERIAL/MESH, ABD WALL NECRO TISS INFEXN  2012     ROTATOR CUFF REPAIR RT/LT  2003        CURRENT MEDICATIONS:     Current Outpatient Medications   Medication Sig Dispense Refill     albuterol (2.5 MG/3ML) 0.083% neb solution Take 1 vial by nebulization every 6 hours as needed for shortness of breath / dyspnea or wheezing       albuterol (PROAIR HFA/PROVENTIL HFA/VENTOLIN HFA) 108 (90 BASE) MCG/ACT Inhaler Inhale 2 puffs into the lungs as needed for shortness of breath / dyspnea or wheezing        aspirin (ASA) 81 MG EC tablet Take 1 tablet (81 mg) by mouth daily Start tomorrow morning. 90 tablet 3     atorvastatin (LIPITOR) 40 MG tablet Take 1 tablet (40 mg) by mouth daily 90 tablet 3     benzocaine-menthol  "(CEPACOL) 15-3.6 MG lozenge Place 1 lozenge inside cheek every hour as needed for sore throat 60 lozenge 0     calcium carbonate (OS-DMITRIY 500 MG [place]. [place]) 500 MG tablet Take 1 tablet (500 mg) by mouth 2 times daily 180 tablet 3     cephALEXin (KEFLEX) 250 MG capsule Take 1 capsule (250 mg) by mouth 3 times daily for 5 days 15 capsule 0     cetirizine (ZYRTEC) 10 MG tablet Take 10 mg by mouth daily as needed        Cholecalciferol (VITAMIN D) 2000 units tablet Take 2,000 Units by mouth daily 100 tablet 3     diphenhydrAMINE (BENADRYL) 50 MG capsule Take 50 mg by mouth as needed for itching or allergies        folic acid (FOLVITE) 1 MG tablet Take 1 tablet (1 mg) by mouth every morning 90 tablet 3     furosemide (LASIX) 40 MG tablet Take 3 tablets (120 mg) by mouth daily 90 tablet 0     gabapentin (NEURONTIN) 600 MG tablet Take 2 tablets (1,200 mg) by mouth 3 times daily 180 tablet 0     gemfibrozil (LOPID) 600 MG tablet Take 600 mg by mouth 2 times daily        insulin NPH (HUMULIN N/NOVOLIN N VIAL) 100 UNIT/ML vial Inject 2-6 Units Subcutaneous 2 times daily Will usually give if blood glucose >140 10 mL 11     insulin syringe-needle U-100 (29G X 1/2\" 1 ML) 29G X 1/2\" 1 ML miscellaneous Inject 1 Syringe Subcutaneous 2 times daily 200 each 11     LANsoprazole (PREVACID) 30 MG DR capsule Take 30 mg by mouth every morning (before breakfast)       levothyroxine (SYNTHROID/LEVOTHROID) 50 MCG tablet Take 50 mcg by mouth every morning        methotrexate 2.5 MG tablet Take 10 tablets (25 mg) by mouth once a week Tuesday 120 tablet 3     metolazone (ZAROXOLYN) 2.5 MG tablet TAKE 1 TABLET (2.5 MG) BY MOUTH AS NEEDED FOR WEIGHT GAIN GREATER THAN 3 POUNDS OR WORSENING SHORTNESS OF BREATH 5 tablet 0     multivitamin w/minerals (THERA-VIT-M) tablet Take 1 tablet by mouth daily       omega 3 1000 MG CAPS Take 2 capsules by mouth every morning        oxyCODONE-acetaminophen (PERCOCET) 7.5-325 MG per tablet Take 1 tablet by mouth " every 6 hours as needed for severe pain (max 3 tablets daily) Dispense 19. OK to start 19 (Patient taking differently: Take 1 tablet by mouth 3 times daily Dispense 19. OK to start 19) 90 tablet 0     ticagrelor (BRILINTA) 90 MG tablet Take 1 tablet (90 mg) by mouth 2 times daily Start this evening. 180 tablet 3        ALLERGIES:   No Known Allergies     FAMILY HISTORY:     Family History   Problem Relation Age of Onset     Colon Cancer Mother      Hypertension Father         did not know father well        SOCIAL HISTORY:     Social History     Socioeconomic History     Marital status:      Spouse name: Not on file     Number of children: Not on file     Years of education: Not on file     Highest education level: Not on file   Occupational History     Not on file   Social Needs     Financial resource strain: Not on file     Food insecurity:     Worry: Not on file     Inability: Not on file     Transportation needs:     Medical: Not on file     Non-medical: Not on file   Tobacco Use     Smoking status: Former Smoker     Packs/day: 0.25     Last attempt to quit: 1970     Years since quittin.2     Smokeless tobacco: Never Used   Substance and Sexual Activity     Alcohol use: Yes     Comment: few times monthly     Drug use: Never     Sexual activity: Not Currently   Lifestyle     Physical activity:     Days per week: Not on file     Minutes per session: Not on file     Stress: Not on file   Relationships     Social connections:     Talks on phone: Not on file     Gets together: Not on file     Attends Restorationism service: Not on file     Active member of club or organization: Not on file     Attends meetings of clubs or organizations: Not on file     Relationship status: Not on file     Intimate partner violence:     Fear of current or ex partner: Not on file     Emotionally abused: Not on file     Physically abused: Not on file     Forced sexual activity: Not on file   Other  "Topics Concern     Not on file   Social History Narrative     Not on file        REVIEW OF SYSTEMS:     Constitutional: No fevers or chills  Ears/Nose/Throat: + rhinorrhea  Respiratory: + dry cough  Cardiovascular: See HPI  Musculoskeletal: No new back pain, neck pain or muscle pain  Neurologic: No new headaches, focal weakness or behavior changes  Psychiatric: No hallucinations, excessive alcohol consumption or illegal drug usage  Hematologic/Lymphatic/Immunologic: No bleeding, chills, fever, night sweats or weight loss  Endocrine: No new cold intolerance, heat intolerance, polyphagia, polydipsia or polyuria      PHYSICAL EXAMINATION:     /70 (BP Location: Right arm, Patient Position: Chair, Cuff Size: Adult Regular)   Pulse 95   Ht 1.651 m (5' 5\")   Wt 67.1 kg (148 lb)   SpO2 93%   BMI 24.63 kg/m       GENERAL: No acute distress.  HEENT: EOMI. Sclerae white, not injected. Nares clear. Pharynx without erythema or exudate.   Neck: No adenopathy. No thyromegaly. No jugular venous distension.   Heart: Regular rate and rhythm. No murmur.   Lungs: Clear to auscultation. No ronchi, wheezes, rales.   Abdomen: Soft, nontender, nondistended. Bowel sounds present.  Extremities: No clubbing, cyanosis. Mild edmea  Neurologic: Alert and oriented to person/place/time, normal speech and affect. No focal deficits.  Skin: No petechiae, purpura or rash. Right upper chest pacer site dry and intact     LABORATORY DATA:     LIPID RESULTS:  Lab Results   Component Value Date    CHOL 153 07/05/2019    HDL 22 (L) 07/05/2019     (H) 07/05/2019    TRIG 139 07/05/2019       LIVER ENZYME RESULTS:  Lab Results   Component Value Date    AST 55 (H) 09/04/2019    ALT 35 09/04/2019       CBC RESULTS:  Lab Results   Component Value Date    WBC 9.7 09/07/2019    RBC 3.64 (L) 09/07/2019    HGB 7.7 (L) 09/07/2019    HCT 28.3 (L) 09/07/2019    MCV 78 09/07/2019    MCH 21.2 (L) 09/07/2019    MCHC 27.2 (L) 09/07/2019    RDW 23.0 (H) " 09/07/2019     09/07/2019       BMP RESULTS:  Lab Results   Component Value Date     09/07/2019    POTASSIUM 4.2 09/07/2019    CHLORIDE 103 09/07/2019    CO2 29 09/07/2019    ANIONGAP 5 09/07/2019    GLC 92 09/07/2019    BUN 13 09/07/2019    CR 0.69 09/07/2019    GFRESTIMATED >90 09/07/2019    GFRESTBLACK >90 09/07/2019    DMITRIY 8.4 (L) 09/07/2019        A1C RESULTS:  Lab Results   Component Value Date    A1C 6.7 (H) 08/29/2019       INR RESULTS:  Lab Results   Component Value Date    INR 1.38 (H) 08/29/2019    INR 1.11 07/05/2019          PROCEDURES & FURTHER ASSESSMENTS:     EKG post-TAVR 9/7: SR V-paced HR 78    Echocardiogram 9/5/19 status post TAVR:  Status post TAVR with 29 mm Medtronic Evolut Pro prosthesis.  The prosthesis is well seated, with normal gradient and no regurgitation.  Mild right ventricular systolic dysfunction with moderate pulmonary  hypertension and dilated IVC suggesting increased RA pressure.    Left Ventricle  Left ventricular size is normal. Left ventricular function is normal.The EF is  55-60%. Moderate concentric wall thickening consistent with left ventricular  hypertrophy is present. Left ventricular diastolic function is not assessable.  Abnormal septal motion consistent with pacemaker is present.     Right Ventricle  Mild right ventricular dilation is present. Global right ventricular function  is mildly reduced.     Atria  Mild biatrial enlargement is present.     Mitral Valve  The mitral valve is normal.     Aortic Valve  TAVR with 29 mm Medtronic Evolut Pro. No aortic regurgitation is present.  Doppler interrogation of the aortic valve is normal. The mean gradient is 7  mmHg.     Tricuspid Valve  Trace tricuspid insufficiency is present. The right ventricular systolic  pressure is approximated at 60.2 mmHg plus the right atrial pressure. Moderate  (pulmonary artery systolic pressure 50-75mmHg) pulmonary hypertension is  present.     Pulmonic Valve  The pulmonic valve  is normal. RVOT acceleration time is decreased at 70 ms and  notching of Doppler envelope suggesting increased pulmonary vascular  resistance.     Vessels  The thoracic aorta cannot be assessed. IVC diameter >2.1 cm collapsing <50%  with sniff suggests a high RA pressure estimated at 15 mmHg or greater.     Pericardium  No pericardial effusion is present.     Coronary Angiogram  and RHC 7/30/19:    Two-vessel obstructive CAD (diffuse calcified disease of the prox-mid LAD;  of small, non-dominant RCA with R->R collaterals)    Successful PCI of the prox-mid LAD with Synergy CHAU x 2 (3.0 x 12 in prox-LAD, 2.75 x 24 in distal LAD)    Severe pulmonary HTN with mildly elevated PCWP    Low-normal cardiac index by Eunice, normal cardiac index by thermodilution    Simultaneous LV-Ao mean gradient = 41.9, calculated PETE = 0.70 cm2            Coronary Findings     Diagnostic   Dominance: Left   Left Main   The vessel was visualized by selective angiography and is moderate in size. There was 0% vessel disease.   Left Anterior Descending   Ost LAD lesion is 30% stenosed. The lesion is discrete.   Prox LAD lesion is 70% stenosed. The lesion is type C - high risk and discrete. The lesion is severely calcified. Just proximal to D1   Prox LAD to Mid LAD lesion is 60% stenosed. The lesion is type C - high risk and eccentric. The lesion is mildly calcified. diffuse   First Diagonal Branch   Ost 1st Diag lesion is 70% stenosed. The lesion is discrete. < 2 mm vessel   Ramus Intermedius   The vessel is small.   Ost Ramus lesion is 60% stenosed. The lesion is discrete. Small vessel   Left Circumflex   First Obtuse Marginal Branch   The vessel is large.   Ost 1st Mrg to 1st Mrg lesion is 30% stenosed.   Right Coronary Artery   The vessel is small.   Mid RCA lesion is 100% stenosed. The lesion is discrete. Small, non-dominant right with R->R collaterals   Acute Marginal Branch   Collaterals   Acute Mrg filled by collaterals from Prox RCA.       Intervention     Prox LAD lesion   Stent   Lesion length: 8 mm. The pre-interventional distal flow is normal (EKANU 3). The post-interventional distal flow is normal (KEANU 3). Using an XB 3.5 guide, a BMW wire was advanced across the lesion and parked in the distal LAD. The lesion was pre-dilated with a 2.0 semi-compliant balloon and stented with a 3.0 x 12 Synergy CHAU. Finally, the mid-LAD lesion was directly stented with a 2.75 x 24 Synergy CHAU, sparing the ostium of the first diagonal. Final angiogram reveals excellent stent expansion and apposition without evidence of immediate complications.   There is a 0% residual stenosis post intervention.   Prox LAD to Mid LAD lesion   Stent   Lesion length: 20 mm. The pre-interventional distal flow is normal (KEANU 3). The post-interventional distal flow is normal (KEANU 3). See prox-LAD stent details   There is a 0% residual stenosis post intervention.     Hemodynamics     Right Heart Pressures     - Severe pulmonary HTN with mildly elevated PCWP  - Low-normal cardiac index by Eunice, normal cardiac index by thermodilution  - Simultaneous LV-Ao mean gradient = 41.9, calculated PETE = 0.70 cm2   Pressures Phase: Baseline      Time Systolic Diastolic Mean A Wave V Wave EDP Max dp/dt HR   RA Pressures  3:46 PM   11 mmHg    18 mmHg    15 mmHg      89 bpm      RV Pressures  3:30 PM       1296 mmHg/sec         3:46 PM 65 mmHg        23 mmHg     90 bpm      PA Pressures  3:55 PM 62 mmHg    20 mmHg    39 mmHg        90 bpm      PCW Pressures  3:55 PM   20 mmHg    25 mmHg    28 mmHg      89 bpm      AO Pressures  4:09 PM 96 mmHg    51 mmHg    70 mmHg        88 bpm        4:12 PM 92 mmHg    56 mmHg    72 mmHg        87 bpm      LV Pressures  4:09  mmHg        11 mmHg     88 bpm      Blood Flow Results Phase: Baseline      Time Results Indexed Values   QP  3:30 PM 3.76 L/min    2.07 L/min/m2      QS  3:30 PM 3.76 L/min    2.07 L/min/m2      Blood Oximetry Phase: Baseline       Time Hb SAT(%) PO2 Content PA Sat   PA  3:30 PM  52.8 %      52.8 %      Art  3:30 PM  100 %     12.51 mL/dL       Cardiac Output Phase: Baseline      Time TDCO TDCI Eunice C.O. Eunice C.I. Eunice HR   Cardiac Output Results  3:30 PM 4.77 L/min    2.62 L/min/m2    3.76 L/min    2.07 L/min/m2         3:58 PM 4.77 L/min          Resistance Results Phase: Baseline      Time PVR SVR PVR-I SVR-I TPR TVR TPR-I TVR-I PVR/SVR TPR/TVR   Resistance Results (Metric)  3:30 .17 dsc-5    1297.61 dsc-5    734.09 dsc-5/m2    2356.83 dsc-5/m2    829.62 dsc-5    1531.6 dsc-5    1506.82 dsc-5/m2    2781.83 dsc-5/m2    0.31    0.54      Resistance Results (Wood)  3:30 PM 5.05 PATEL    16.22 PATEL    9.18 PATEL/m2    29.47 PATEL/m2    10.37 PATEL    19.15 PATEL    18.84 PATEL/m2    34.78 PATEL/m2    0.31    0.54           CLINICAL IMPRESSION:   Beulah Jane is a very pleasant 69 year old male with CAD s/p PCI of the LAD 7/2019, HFpEF, JUAN on CPAP, dermatomyositis, ILD, pulmonary hypertension and severe aortic valvular stenosis that was treated with transfemoral transcatheter aortic valve replacement (TAVR) with a 29 mm Medtronic Evolut Pro on 9/4/19 by Ed Garcia and Ana Maria who presents for 1 week TAVR follow-up. The patient was admitted to [facility] on 8/29 with acute diastolic heart failure and was diuresed with IV lasix from 157 lbs to 152 lbs. He underwent successful TAVR on 9/4/19. The TAVR and post-procedure course were complicated by complete heart block requiring permanent pacemaker implantation on 9/6. His post-TAVR echo showed mean PG of 7 mmHg with no valvular or paravalvular AI. He was discharged home on ASA, Brilinta 90 mg BID, lasix 120 mg daily and PRN metolazone.     Patient reports improvement in dyspnea since his TAVR. He also reports significant improvement in leg swelling and additional 5 lbs weight loss since hospital discharge. He has been taking lasix 120 mg daily and metolazone PRN for episodes of dyspnea. He reports  dizziness over the last few days, increased thirst and has dry mucous membranes. I suspect he is slightly hypovolemic. Plan to hold metolazone and decrease lasix to 80 mg daily with labs today. Follow-up with CORE clinic next week. Will also place pulmonary referral as patient would like to establish care here at [facility].     Plan Summary:  1) Aspirin 81 mg daily lifelong  2) Continue Brilinta through 7/2020 (12 months post PCI)   3) Lifelong antibiotic prophylaxis prior to all dental procedures.  4) Decrease lasix to 80 mg daily and stop metolazone   5) Recommend daily weights  6) Labs today  7) CORE clinic early next week  8) Dr. Solorio in 1 month with EKG, echo and labs prior  9) Pulmonology referral     ANASTACIA Carlisle, CNP  [facility] Cardiology Team      CC  Patient Care Team:  Hoa Giron MD as PCP - General (Internal Medicine)  Hilda Alegria MD as MD (Vascular Surgery)  [facility] (HOME HEALTH AGENCY ([facility]), ([place]))  Meenakshi Irwin, RN as Nurse Coordinator (Cardiology)  Cherise Ba as Specialty Care Coordinator (Cardiology)  Bobbi Baker APRN CNP as Assigned PCP  Yesica Manning RN as Clinic Care Coordinator (Primary Care - CC)  HOA GIRON

## 2019-09-13 NOTE — PATIENT INSTRUCTIONS
You were seen today in the Cardiovascular Clinic at the [facility].    Cardiology provider you saw during your visit Tamar Tillman NP.    1. Your vitals signs are within normal limits.  2. I think your fluid status is a little low.  3. Stop taking metolazone and decrease lasix to 80 mg daily.   4. Gets labs today on your way out.  5. Weigh yourself daily and call if you gain > 2 lbs in a day or > 5 lbs in a week.  6. Make appt with the heart failure clinic early next week with labs prior.   7. Establish care with lung doctor as soon as possible.   8. See Dr. Solorio in 1 month.     Questions and [phone].   First press #1 for the [place] and then press #3 for Medical Questions to reach the Cardiology triage nurse.     On Call Cardiologist for after hours or on weekends: [phone], press option #4 and ask to speak to the on-call Cardiologist.

## 2019-09-17 NOTE — NURSING NOTE
Chief Complaint   Patient presents with     New Patient     NEW CORE: 69 year old male presents with diastolic HF s/p TAVR for follow up and to establish care in CORE clinic with labs prior

## 2019-09-17 NOTE — PATIENT INSTRUCTIONS
Take your medicines every day, as directed    Changes made today:  o Decrease lasix to 1 tablet (40mg) a day  o Lab next week during your apt with Dr. López.  o    Monitor Your Weight and Symptoms    Contact us if you:      Gain 2 pounds in one day or 5 pounds in one week    Feel more short of breath    Notice more leg swelling    Feel lightheadeded   Change your lifestyle    Limit Salt or Sodium:    2000 mg  Limit Fluids:    2000 mL or approximately 64 ounces  Eat a Heart Healthy Diet    Low in saturated fats  Stay Active:    Aim to move at least 150 minutes every  week         To Contact us    During Business Hours:  [phone], option # 1 (University)  Then option # 4 (medical questions)     After hours, weekends or holidays:   [phone], Option #4  Ask to speak to the On-Call Cardiologist. Inform them you are a CORE/heart failure patient at the [place].     Use [employer] allows you to communicate directly with your heart team through secure messaging.    [employer] can be accessed any time on your phone, computer, or tablet.    If you need assistance, we'd be happy to help!         Keep your Heart Appointments:    Oct 9th with Dr. Roque.

## 2019-09-17 NOTE — TELEPHONE ENCOUNTER
Called pt. Line busy.     Reviewed chart. Last Rx was written by Dr. Stockton on 7/16 for #180.     patient requesting refill(s) for gabapentin 600 mg    Last refilled on 9/11, #270; directions 1 tab TID. Rx completed by Dr. Sosa.     Pt last seen on 9/10/19  Next appt scheduled for none on file.     It is good that patient received a supply of gabapentin since this is not a medication that should be discontinued abruptly.     It would still be helpful to clarify patient's dose.  No further appointments scheduled at this time.     Kennedi Collins, RADHAN, RN-BC  Patient Care Supervisor/Care Coordinator  [facility]

## 2019-09-17 NOTE — PROGRESS NOTES
HPI: 69 yr old male returns to [facility] [place] after TAVR  - 29mm Medtronic Evolut (done 94/19)    S/P PPM due to complete heart block following TAVR(PPM EQUIPMENT  1.The Pulse Generator is a  [employer] Model L331 Serial Number [id number]  2.The RA Lead is a  Medtronic Model number 5076, Serial Number [id number]  3.The RV Lead is a [employer] Model Number 7741, Serial Number [id number])   HFpEF, CAD S/P PCI to proximal and mid LAD (7/30/19); DMII, Hypothyroidism; ILD with fibrosis of unknown etiology. Demratomyositis with chronic pain.    The history is taken with the help of interpretor, although patient speaks and understand English well.  He states he feels better since the TAVR, but continues with a cough with occasionally coughing up thick phlegm. He does have an apt with Pulmonology this week.   Pt denies SOB, orthopnea, PND, chest pain, belly bloating, loss of appetite, LE edema. Pt states energy level is poor. He does have  lightheadedness with position change and this is new for him. His weight this am was 144 # on home scale. He was previously 155-160 prior to procedure - His weight prior to getting ill was 175#  He states his cough is his biggest concern - it has not improved with the extra lasix. He does think when he was taking the metolazone that it helped his cough.   He states he feels weak and when I stated I thought he was dehydrated, he agreed.        PAST MEDICAL HISTORY:  Past Medical History:   Diagnosis Date     Aortic stenosis      Chronic pain      DM (diabetes mellitus), type 2 (H)     on insulin     Hyperlipidemia      JUAN on CPAP      Pneumonia      Polymyositis (H)      Pulmonary fibrosis, unspecified (H)      Seasonal allergies        FAMILY HISTORY:  Family History   Problem Relation Age of Onset     Colon Cancer Mother      Hypertension Father         did not know father well       SOCIAL HISTORY:  Social History     Socioeconomic History     Marital status:       Spouse name: Not on file     Number of children: Not on file     Years of education: Not on file     Highest education level: Not on file   Occupational History     Not on file   Social Needs     Financial resource strain: Not on file     Food insecurity:     Worry: Not on file     Inability: Not on file     Transportation needs:     Medical: Not on file     Non-medical: Not on file   Tobacco Use     Smoking status: Former Smoker     Packs/day: 0.25     Last attempt to quit: 1970     Years since quittin.2     Smokeless tobacco: Never Used   Substance and Sexual Activity     Alcohol use: Yes     Comment: few times monthly     Drug use: Never     Sexual activity: Not Currently   Lifestyle     Physical activity:     Days per week: Not on file     Minutes per session: Not on file     Stress: Not on file   Relationships     Social connections:     Talks on phone: Not on file     Gets together: Not on file     Attends Bahai service: Not on file     Active member of club or organization: Not on file     Attends meetings of clubs or organizations: Not on file     Relationship status: Not on file     Intimate partner violence:     Fear of current or ex partner: Not on file     Emotionally abused: Not on file     Physically abused: Not on file     Forced sexual activity: Not on file   Other Topics Concern     Not on file   Social History Narrative     Not on file       CURRENT MEDICATIONS:  Current Outpatient Medications   Medication Sig Dispense Refill     albuterol (2.5 MG/3ML) 0.083% neb solution Take 1 vial by nebulization every 6 hours as needed for shortness of breath / dyspnea or wheezing       albuterol (PROAIR HFA/PROVENTIL HFA/VENTOLIN HFA) 108 (90 BASE) MCG/ACT Inhaler Inhale 2 puffs into the lungs as needed for shortness of breath / dyspnea or wheezing        aspirin (ASA) 81 MG EC tablet Take 1 tablet (81 mg) by mouth daily Start tomorrow morning. 90 tablet 3     atorvastatin (LIPITOR) 40 MG tablet  "Take 1 tablet (40 mg) by mouth daily 90 tablet 3     benzocaine-menthol (CEPACOL) 15-3.6 MG lozenge Place 1 lozenge inside cheek every hour as needed for sore throat 60 lozenge 0     calcium carbonate (OS-DMITRIY 500 MG [place]. [place]) 500 MG tablet Take 1 tablet (500 mg) by mouth 2 times daily 180 tablet 3     cetirizine (ZYRTEC) 10 MG tablet Take 10 mg by mouth daily as needed        Cholecalciferol (VITAMIN D) 2000 units tablet Take 2,000 Units by mouth daily 100 tablet 3     diphenhydrAMINE (BENADRYL) 50 MG capsule Take 50 mg by mouth as needed for itching or allergies        folic acid (FOLVITE) 1 MG tablet Take 1 tablet (1 mg) by mouth every morning 90 tablet 3     furosemide (LASIX) 40 MG tablet Take 2 tablets (80 mg) by mouth daily 90 tablet 0     gabapentin (NEURONTIN) 600 MG tablet Take 2 tablets (1,200 mg) by mouth 3 times daily 180 tablet 0     gemfibrozil (LOPID) 600 MG tablet Take 600 mg by mouth 2 times daily        insulin NPH (HUMULIN N/NOVOLIN N VIAL) 100 UNIT/ML vial Inject 2-6 Units Subcutaneous 2 times daily Will usually give if blood glucose >140 10 mL 11     insulin syringe-needle U-100 (29G X 1/2\" 1 ML) 29G X 1/2\" 1 ML miscellaneous Inject 1 Syringe Subcutaneous 2 times daily 200 each 11     LANsoprazole (PREVACID) 30 MG DR capsule Take 30 mg by mouth every morning (before breakfast)       levothyroxine (SYNTHROID/LEVOTHROID) 50 MCG tablet Take 50 mcg by mouth every morning        methotrexate 2.5 MG tablet Take 10 tablets (25 mg) by mouth once a week Tuesday 120 tablet 3     multivitamin w/minerals (THERA-VIT-M) tablet Take 1 tablet by mouth daily       omega 3 1000 MG CAPS Take 2 capsules by mouth every morning        oxyCODONE-acetaminophen (PERCOCET) 7.5-325 MG per tablet Take 1 tablet by mouth every 6 hours as needed for severe pain (max 3 tablets daily) Dispense 08/16/19. OK to start 08/16/19 (Patient taking differently: Take 1 tablet by mouth 3 times daily Dispense 08/16/19. OK to start " "08/16/19) 90 tablet 0     ticagrelor (BRILINTA) 90 MG tablet Take 1 tablet (90 mg) by mouth 2 times daily Start this evening. 180 tablet 3       ROS:   Constitutional: No fever, chills, or sweats.+ weight loss.   ENT: No visual disturbance, ear ache, epistaxis, sore throat.   Allergies/Immunologic: Negative.   Respiratory: No cough, hemoptysis.   Cardiovascular: As per HPI.   GI: No nausea, vomiting, hematemesis, melena, or hematochezia.   : No urinary frequency, dysuria, or hematuria.   Integument: Negative.   Psychiatric: Negative.   Neuro: Negative.   Endocrinology: Negative.   Musculoskeletal: Negative.    EXAM:  BP 91/59 (BP Location: Right arm, Patient Position: Chair, Cuff Size: Adult Regular)   Pulse 95   Ht 1.715 m (5' 7.5\")   Wt 70.8 kg (156 lb)   SpO2 92%   BMI 24.07 kg/m    General: appears comfortable, alert and articulate ; coughing throughout exam  Head: normocephalic, atraumatic  Eyes: anicteric sclera, EOMI  Neck: no adenopathy  Orophyarynx: moist mucosa, no lesions, dentition intact  Heart: regular, S1/S2, 2/6 systolic murmur,  No gallop, rub, estimated JVP 8cm  Lungs: clear, no rales or wheezing - although decreased breath sounds at the bases  Abdomen: soft, non-tender, bowel sounds present, no hepatosplenomegaly  Extremities: no clubbing, cyanosis or edema  Neurological: normal speech and affect, no gross motor deficits    Labs:  CBC RESULTS:  Lab Results   Component Value Date    WBC 9.7 09/07/2019    RBC 3.64 (L) 09/07/2019    HGB 7.7 (L) 09/07/2019    HCT 28.3 (L) 09/07/2019    MCV 78 09/07/2019    MCH 21.2 (L) 09/07/2019    MCHC 27.2 (L) 09/07/2019    RDW 23.0 (H) 09/07/2019     09/07/2019       CMP RESULTS:  Lab Results   Component Value Date     (L) 09/17/2019    POTASSIUM 3.4 09/17/2019    CHLORIDE 91 (L) 09/17/2019    CO2 34 (H) 09/17/2019    ANIONGAP 5 09/17/2019     (H) 09/17/2019    BUN 32 (H) 09/17/2019    CR 1.30 (H) 09/17/2019    GFRESTIMATED 55 (L) " 09/17/2019    GFRESTBLACK 64 09/17/2019    DMITRIY 8.8 09/17/2019    BILITOTAL 0.4 09/04/2019    ALBUMIN 2.9 (L) 09/04/2019    ALKPHOS 142 09/04/2019    ALT 35 09/04/2019    AST 55 (H) 09/04/2019        INR RESULTS:  Lab Results   Component Value Date    INR 1.38 (H) 08/29/2019       Lab Results   Component Value Date    MAG 2.2 09/07/2019     Lab Results   Component Value Date    NTBNPI 7,655 (H) 08/29/2019     Lab Results   Component Value Date    NTBNP 6,607 (H) 08/28/2019       Assessment and Plan:   1. Chronic diatstolic heart failure secondary to valvular heart disease.    Stage C  NYHA Class III  HR - well controlled  BP - low BP today with orthostatic hypotension  Fluid status hypovolemic with increased CR and orthostatic hypotension  NSAID use: no  Sleep Apnea Evaluation: uses CPAP    2. CAD: S/P stent to mid and proximal LAD: no anginal sx; on ASA, Not on BB due to low HR in hospital. Willl need to have this started, but with hypotension today, will hold for now. Continue Brrilinta through 7/2020.    3. Valvular heart disease; S/P TAVR 9/4/19 - pt states symptoms of SOB have improved significantly.    4. DMII; pt states he doesn't check his glucose at home.    5. Dermatomyositis with chronic pain:     6. Complete heart block following TAVR - S/P PPM     7. Hypotension and increased CR - suspect due to overdiuresis. Will decrease lasix and recheck labs in 1 week when he sees his PCP. RNCC will call patient to assess wt and sx on lower lasix dose.     Follow-up Oct 9 with Dr. Cole.     CC  Patient Care Team:  Hoa López MD as PCP - General (Internal Medicine)  Hilda Alegria MD as MD (Vascular Surgery)  [facility] ([facility] AGENCY ([facility] ([place]  Meenakshi Irwin RN as Nurse Coordinator (Cardiology)  Cherise Ba as Specialty Care Coordinator (Cardiology)  Bobbi Baker APRN CNP as Assigned PCP  Yesica Manning RN as Clinic Care Coordinator (Primary Care -  CC)  Jasmin Crisostomo, RN as Specialty Care Coordinator (Cardiology)  Kinza Junior APRN CNP as Nurse Practitioner (Nurse Practitioner - Gerontology)  KESHAV BRUCE

## 2019-09-17 NOTE — LETTER
9/17/2019      RE: Beulah Jane  [address]  [place] [address]       Dear Colleague,    Thank you for the opportunity to participate in the care of your patient, Beulah Jane, at the [facility] at [facility]. Please see a copy of my visit note below.    HPI: 69 yr old male returns to [facility] for follow up after TAVR  - 29mm Medtronic Evolut (done 94/19)    S/P PPM due to complete heart block following TAVR(PPM EQUIPMENT  1.The Pulse Generator is a  [place] [employer] Model L331 Serial Number [id number]  2.The RA Lead is a  Medtronic Model number 5076, Serial Number [id number]  3.The RV Lead is a [employer] Model Number 7741, Serial Number [id number])   HFpEF, CAD S/P PCI to proximal and mid LAD (7/30/19); DMII, Hypothyroidism; ILD with fibrosis of unknown etiology. Demratomyositis with chronic pain.    The history is taken with the help of interpretor, although patient speaks and understand English well.  He states he feels better since the TAVR, but continues with a cough with occasionally coughing up thick phlegm. He does have an apt with Pulmonology this week.   Pt denies SOB, orthopnea, PND, chest pain, belly bloating, loss of appetite, LE edema. Pt states energy level is poor. He does have  lightheadedness with position change and this is new for him. His weight this am was 144 # on home scale. He was previously 155-160 prior to procedure - His weight prior to getting ill was 175#  He states his cough is his biggest concern - it has not improved with the extra lasix. He does think when he was taking the metolazone that it helped his cough.   He states he feels weak and when I stated I thought he was dehydrated, he agreed.        PAST MEDICAL HISTORY:  Past Medical History:   Diagnosis Date     Aortic stenosis      Chronic pain      DM (diabetes mellitus), type 2 (H)     on insulin     Hyperlipidemia      JUAN on CPAP      Pneumonia       Polymyositis (H)      Pulmonary fibrosis, unspecified (H)      Seasonal allergies        FAMILY HISTORY:  Family History   Problem Relation Age of Onset     Colon Cancer Mother      Hypertension Father         did not know father well       SOCIAL HISTORY:  Social History     Socioeconomic History     Marital status:      Spouse name: Not on file     Number of children: Not on file     Years of education: Not on file     Highest education level: Not on file   Occupational History     Not on file   Social Needs     Financial resource strain: Not on file     Food insecurity:     Worry: Not on file     Inability: Not on file     Transportation needs:     Medical: Not on file     Non-medical: Not on file   Tobacco Use     Smoking status: Former Smoker     Packs/day: 0.25     Last attempt to quit: 1970     Years since quittin.2     Smokeless tobacco: Never Used   Substance and Sexual Activity     Alcohol use: Yes     Comment: few times monthly     Drug use: Never     Sexual activity: Not Currently   Lifestyle     Physical activity:     Days per week: Not on file     Minutes per session: Not on file     Stress: Not on file   Relationships     Social connections:     Talks on phone: Not on file     Gets together: Not on file     Attends Jew service: Not on file     Active member of club or organization: Not on file     Attends meetings of clubs or organizations: Not on file     Relationship status: Not on file     Intimate partner violence:     Fear of current or ex partner: Not on file     Emotionally abused: Not on file     Physically abused: Not on file     Forced sexual activity: Not on file   Other Topics Concern     Not on file   Social History Narrative     Not on file       CURRENT MEDICATIONS:  Current Outpatient Medications   Medication Sig Dispense Refill     albuterol (2.5 MG/3ML) 0.083% neb solution Take 1 vial by nebulization every 6 hours as needed for shortness of breath / dyspnea or  "wheezing       albuterol (PROAIR HFA/PROVENTIL HFA/VENTOLIN HFA) 108 (90 BASE) MCG/ACT Inhaler Inhale 2 puffs into the lungs as needed for shortness of breath / dyspnea or wheezing        aspirin (ASA) 81 MG EC tablet Take 1 tablet (81 mg) by mouth daily Start tomorrow morning. 90 tablet 3     atorvastatin (LIPITOR) 40 MG tablet Take 1 tablet (40 mg) by mouth daily 90 tablet 3     benzocaine-menthol (CEPACOL) 15-3.6 MG lozenge Place 1 lozenge inside cheek every hour as needed for sore throat 60 lozenge 0     calcium carbonate (OS-DMITRIY 500 MG [place]. [place]) 500 MG tablet Take 1 tablet (500 mg) by mouth 2 times daily 180 tablet 3     cetirizine (ZYRTEC) 10 MG tablet Take 10 mg by mouth daily as needed        Cholecalciferol (VITAMIN D) 2000 units tablet Take 2,000 Units by mouth daily 100 tablet 3     diphenhydrAMINE (BENADRYL) 50 MG capsule Take 50 mg by mouth as needed for itching or allergies        folic acid (FOLVITE) 1 MG tablet Take 1 tablet (1 mg) by mouth every morning 90 tablet 3     furosemide (LASIX) 40 MG tablet Take 2 tablets (80 mg) by mouth daily 90 tablet 0     gabapentin (NEURONTIN) 600 MG tablet Take 2 tablets (1,200 mg) by mouth 3 times daily 180 tablet 0     gemfibrozil (LOPID) 600 MG tablet Take 600 mg by mouth 2 times daily        insulin NPH (HUMULIN N/NOVOLIN N VIAL) 100 UNIT/ML vial Inject 2-6 Units Subcutaneous 2 times daily Will usually give if blood glucose >140 10 mL 11     insulin syringe-needle U-100 (29G X 1/2\" 1 ML) 29G X 1/2\" 1 ML miscellaneous Inject 1 Syringe Subcutaneous 2 times daily 200 each 11     LANsoprazole (PREVACID) 30 MG DR capsule Take 30 mg by mouth every morning (before breakfast)       levothyroxine (SYNTHROID/LEVOTHROID) 50 MCG tablet Take 50 mcg by mouth every morning        methotrexate 2.5 MG tablet Take 10 tablets (25 mg) by mouth once a week Tuesday 120 tablet 3     multivitamin w/minerals (THERA-VIT-M) tablet Take 1 tablet by mouth daily       omega 3 1000 MG CAPS " "Take 2 capsules by mouth every morning        oxyCODONE-acetaminophen (PERCOCET) 7.5-325 MG per tablet Take 1 tablet by mouth every 6 hours as needed for severe pain (max 3 tablets daily) Dispense 08/16/19. OK to start 08/16/19 (Patient taking differently: Take 1 tablet by mouth 3 times daily Dispense 08/16/19. OK to start 08/16/19) 90 tablet 0     ticagrelor (BRILINTA) 90 MG tablet Take 1 tablet (90 mg) by mouth 2 times daily Start this evening. 180 tablet 3       ROS:   Constitutional: No fever, chills, or sweats.+ weight loss.   ENT: No visual disturbance, ear ache, epistaxis, sore throat.   Allergies/Immunologic: Negative.   Respiratory: No cough, hemoptysis.   Cardiovascular: As per HPI.   GI: No nausea, vomiting, hematemesis, melena, or hematochezia.   : No urinary frequency, dysuria, or hematuria.   Integument: Negative.   Psychiatric: Negative.   Neuro: Negative.   Endocrinology: Negative.   Musculoskeletal: Negative.    EXAM:  BP 91/59 (BP Location: Right arm, Patient Position: Chair, Cuff Size: Adult Regular)   Pulse 95   Ht 1.715 m (5' 7.5\")   Wt 70.8 kg (156 lb)   SpO2 92%   BMI 24.07 kg/m     General: appears comfortable, alert and articulate ; coughing throughout exam  Head: normocephalic, atraumatic  Eyes: anicteric sclera, EOMI  Neck: no adenopathy  Orophyarynx: moist mucosa, no lesions, dentition intact  Heart: regular, S1/S2, 2/6 systolic murmur,  No gallop, rub, estimated JVP 8cm  Lungs: clear, no rales or wheezing - although decreased breath sounds at the bases  Abdomen: soft, non-tender, bowel sounds present, no hepatosplenomegaly  Extremities: no clubbing, cyanosis or edema  Neurological: normal speech and affect, no gross motor deficits    Labs:  CBC RESULTS:  Lab Results   Component Value Date    WBC 9.7 09/07/2019    RBC 3.64 (L) 09/07/2019    HGB 7.7 (L) 09/07/2019    HCT 28.3 (L) 09/07/2019    MCV 78 09/07/2019    MCH 21.2 (L) 09/07/2019    MCHC 27.2 (L) 09/07/2019    RDW 23.0 (H) " 09/07/2019     09/07/2019       CMP RESULTS:  Lab Results   Component Value Date     (L) 09/17/2019    POTASSIUM 3.4 09/17/2019    CHLORIDE 91 (L) 09/17/2019    CO2 34 (H) 09/17/2019    ANIONGAP 5 09/17/2019     (H) 09/17/2019    BUN 32 (H) 09/17/2019    CR 1.30 (H) 09/17/2019    GFRESTIMATED 55 (L) 09/17/2019    GFRESTBLACK 64 09/17/2019    DMITRIY 8.8 09/17/2019    BILITOTAL 0.4 09/04/2019    ALBUMIN 2.9 (L) 09/04/2019    ALKPHOS 142 09/04/2019    ALT 35 09/04/2019    AST 55 (H) 09/04/2019        INR RESULTS:  Lab Results   Component Value Date    INR 1.38 (H) 08/29/2019       Lab Results   Component Value Date    MAG 2.2 09/07/2019     Lab Results   Component Value Date    NTBNPI 7,655 (H) 08/29/2019     Lab Results   Component Value Date    NTBNP 6,607 (H) 08/28/2019       Assessment and Plan:   1. Chronic diatstolic heart failure secondary to valvular heart disease.    Stage C  NYHA Class III  HR - well controlled  BP - low BP today with orthostatic hypotension  Fluid status hypovolemic with increased CR and orthostatic hypotension  NSAID use: no  Sleep Apnea Evaluation: uses CPAP    2. CAD: S/P stent to mid and proximal LAD: no anginal sx; on ASA, Not on BB due to low HR in hospital. Willl need to have this started, but with hypotension today, will hold for now. Continue Brrilinta through 7/2020.    3. Valvular heart disease; S/P TAVR 9/4/19 - pt states symptoms of SOB have improved significantly.    4. DMII; pt states he doesn't check his glucose at home.    5. Dermatomyositis with chronic pain:     6. Complete heart block following TAVR - S/P PPM     7. Hypotension and increased CR - suspect due to overdiuresis. Will decrease lasix and recheck labs in 1 week when he sees his PCP. RNCC will call patient to assess wt and sx on lower lasix dose.     Follow-up Oct 9 with Dr. Cole.     CC  Patient Care Team:  Hoa López MD as PCP - General (Internal Medicine)  Hilda Alegria MD as MD  (Vascular Surgery)  [facility] ([facility] ([facility] ([place]  Meenakshi Irwin RN as Nurse Coordinator (Cardiology)  Cherise Ba as Specialty Care Coordinator (Cardiology)  Bobbi Baker APRN CNP as Assigned PCP  Yesica Manning RN as Clinic Care Coordinator (Primary Care - CC)  Jasmin Crisostomo RN as Specialty Care Coordinator (Cardiology)  Kinza Junior APRN CNP as Nurse Practitioner (Nurse Practitioner - Gerontology)  KESHAV BRUCE      Please do not hesitate to contact me if you have any questions/concerns.     Sincerely,     ANASTACIA Dickey CNP

## 2019-09-20 NOTE — TELEPHONE ENCOUNTER
Called and LM to call back with dosing and advise that follow up needed      Kami GARCIAN, RN Care Coordinator  [facility]

## 2019-09-20 NOTE — TELEPHONE ENCOUNTER
Called number below. Busy x2    Kami Pinzon  BSN, RN Care Coordinator  [facility]

## 2019-09-20 NOTE — TELEPHONE ENCOUNTER
[facility] Call Center    Phone Message    May a detailed message be left on voicemail: yes    Reason for Call: Other: Tyrel calling to request a call back. He states he observed a 4lb weight increase from yesterday to today. Please call him back to discuss.      Action Taken: Message routed to:  [facility] ([facility]

## 2019-09-20 NOTE — TELEPHONE ENCOUNTER
Juju returned call. He reports that his weight is increasing and is up 4lb since yesterday. He reports weight today is 150.4lb by his home scale. He is feeling more SOB and continues to report a cough. He feels his legs are 'a little bit bigger.' Confirmed he has been taking decreased dose of lasix 40mg daily.   Will route to provider for further review.     Date: 9/20/2019    Time of Call: 3:09 PM     Diagnosis:  Heart failure     [ VORB ] Ordering provider: Kinza Junior NP    Order: Increase lasix to alternating daily doses of 80mg and 40mg.     Order received by: Jasmin Crisostomo RN     Follow-up/additional notes: Called Juju to discuss. Left message asking for call back.    3:25 PM : second call to patient to discuss.     3:59 PM  Discussed with Juju. No further questions at this time.

## 2019-09-20 NOTE — TELEPHONE ENCOUNTER
Pt returning call to nursing, please call [phone].      Mala VELA    [facility] [place]

## 2019-09-23 NOTE — TELEPHONE ENCOUNTER
Called patient. Number busy.    Kami GARCIAN, RN Care Coordinator  [facility]

## 2019-09-24 NOTE — PROGRESS NOTES
Beulah calls in reporting he is feeling worse. He tells me he has been taking the alternating daily doses of lasix 80mg/40mg. This morning he was feeling very poor and his weight increased 3lb since yesterday (weight today was 154.2lb) so he took 80mg of lasix as directed as well as a metolazone. He tells me he is peeing more since taking this today but still feeling unwell. He reports he is very short of breath even after just 5-7 steps where he has to stop. He reports that over the last week he has been experiencing this. He tells me that after he stops, he 'feels like hes going to explode' where he has 'a lot of pressure in his body' and that he might faint. After a minute or two of sitting, he reports this resolves. He reports he has not had this sensation until this last week. He reports feeling very fatigued. He tells me he has been able to lay down to sleep as he uses his CPAP and reports that his legs 'are a little swollen but not much.' He still has a cough but reports he is not coughing anything up. Of note, he does sound slightly short of breath while talking to me on the phone. Will route to provider for further review.     Date: 9/24/2019  Time of Call: 3:50 PM  Diagnosis:  Diastolic HF    [ VORB ] Ordering provider: Kinza Junior NP    Order: Increase lasix to 80mg BID. Repeat BMP tomorrow at PCP appointment.    Order received by: Jasmin Crisostomo RN    Follow-up/additional notes: Called Beulah to discuss. He states understanding and repeated back instructions. Will follow up once lab results received tomorrow.

## 2019-09-24 NOTE — TELEPHONE ENCOUNTER
EP Scheduling called the patient to schedule 3month follow up post pacemaker implant with Barbara Alvarado NP and device. The number [phone] was left for the patient to return the call and schedule the procedure.    Mayda Garcia  Periop Electrophysiology   [phone]

## 2019-09-24 NOTE — TELEPHONE ENCOUNTER
patient requesting refill(s)   oxyCODONE-acetaminophen (PERCOCET) 7.5-325 MG     Last picked up from pharmacy on 09/11/19   Quanity: 60    x30 day supply    Pt last seen by prescribing provider on 07/16/19  Next appt scheduled for NONE     checked in the past 6 months? Yes If no, print current report and give to RN    Last urine drug screen date 07/16/19  Current opioid agreement on file (completed within the last year) Yes Date of opioid agreement: 07/16/19      E-prescribe to   [id number] - [place] - [address] AT [address] [place] [address] [address] [address]  Phone: [phone] Fax: [phone]       Will route to nursing [place] for review and preparation of prescription(s).               ------------------------------  Roseann Tsang [facility]  September 24, 2019

## 2019-09-24 NOTE — TELEPHONE ENCOUNTER
Will route to MA team to contact pt and let him know that an appt is needed before any further refills.     RADHA EspinalN, RN-BC  Patient Care Supervisor/Care Coordinator  [facility] [place]

## 2019-09-24 NOTE — TELEPHONE ENCOUNTER
Will sign script but patient will have to follow up in clinic before any further refills.    DO [facility]

## 2019-09-24 NOTE — TELEPHONE ENCOUNTER
Routing to provider to review medication prepped per below.  **MA pool-when calling to advise that script has been signed-if you are able to get a hold of pt- please confirm how he is taking gabapentin (encounter 09/10/19).     Called pharmacy to verify information provided.   Last picked up #90 on 08/17/19    Percocet 7.5-325, #90, Refill:No  Sig:Dispense 09/23/19. OK to start  09/25/19  Last picked up 08/17/19 with start on 08/16/19  Due: Now    Per last OV note 07/16/19:  1. Prescribed percocet 7.5/325 q6h prn, max 3 tablets/day, 90 tablets/month.      Kami GARCIAN, RN Care Coordinator  [facility]

## 2019-09-24 NOTE — TELEPHONE ENCOUNTER
Reason for call:  Medication   If this is a refill request, has the caller requested the refill from the pharmacy already? No  Will the patient be using a [facility]? No  Name of the pharmacy and phone number for the current request: [facility]    Name of the medication requested: oxyCODONE-acetaminophen (PERCOCET) 7.5-325 MG per tablet    Other request:     Phone number to reach patient:  Cell number on file:    Telephone Information:   Mobile [phone]       Best Time:      Can we leave a detailed message on this number?  YES       Harriet Chacko    [facility]

## 2019-09-25 PROBLEM — R06.09 DOE (DYSPNEA ON EXERTION): Status: ACTIVE | Noted: 2019-01-01

## 2019-09-25 NOTE — H&P
[facility]    History and Physical  Hospitalist    Beulah Jane MRN# [id number]   Age: 69 year old YOB: 1950     Date of Admission:  9/25/2019    Primary care provider: Hoa López          Assessment and Plan:     Beulah Jane is a 69 year old  male with medical history of severe aortic stenosis, recent Susan, complete heart block, recent pacemaker placement, heart failure with preserved ejection fraction, hypertension, hyperlipidemia, diabetes, dermatomyositis scented to ED from primary care clinic with ongoing shortness of breath.    Dyspnea on exertion multifactorial, recent surgery, exacerbation of heart failure with preserved ejection fraction, physical deconditioning.  Acute exacerbation of systolic heart failure.  Severe aortic stenosis s/p TAVR, 29mm Medtronic Evolut  Complete Heart Block status post PPM  HFpEF  Admitted from 8/29 - 9/7 at [facility] cardiology unit for acute exacerbation of heart failure with preserved ejection fraction and NSTEMI.  Then had severe aortic stenosis, underwent TAVR on 9/4. TAVR procedure complicated by complete heart block after valve deployment, otherwise uncomplicated; he did have a pre-existing RBBB. He was bridged to pacemaker (9/6) with a temporary pacing wire and remained 100% V-paced  after surgery. Estimated dry weight around 152 pounds, oxygen saturations improved and was dismissed home on Lasix and metolazone.    Presented with worsening shortness of breath, leg swelling, unable to walk few steps. Increase in dose of Lasix to 80 mg twice daily not very successful   ED saturation 92% on room air, heart rate 104, blood pressure 86/57.  Bilateral decreased breath sounds, faint crackles lower lobes.  1+ pitting pedal edema.  WBC 13.9, hemoglobin 9.9, platelets 699.  Glucose 103, BUN 34, potassium 3.3, creatinine 1.20.  proBNP 31323, troponin 0 0.106.  EKG sinus rhythm, left axis deviation.  Nonspecific  ST-T wave changes.  Echocardiogram left ventricle systolic function normal, estimated EF at 55 to 60%.  Right ventricle mild to moderately dilated, mildly decreased right ventricular systolic function.S/P #29 Medtronic Evolute Pro bioprosthetic TAVR; appears well seated with grossly normal function  Chest x-ray dual-lead pacemaker in the right hemithorax unchanged.  Scattered bibasilar opacities again seen.  Patchy areas of grounds place opacities since previous exam have improved.    Admit to inpatient.  Diuresis with intravenous Lasix 40 mg twice daily for 4 doses.  Reassess volume status, monitor renal function and electrolytes.  Hold PTA oral Lasix.  Continue PTA aspirin and Brilanta and atorvastatin.  Will trend troponins.  Telemetry monitoring.  If any chest pain will consider IV heparin.    PRN morphine for severe pain, Percocet for more mild to severe pain.  Tylenol for moderate pain..  Cardiology consult requested.  Low-salt diet, 2 L fluid restriction daily.  Strict input output monitoring, daily weights.  Aggressive incentive spirometry.    Possible acute bronchitis.  Patient having ongoing cough.  Afebrile.  Wife recently treated for pneumonia.  Having WBC count of 13.2.  We will check procalcitonin level.  We will treat for bronchitis with IV levofloxacin pending work-up.    CAD s/p PCI:  s/p PCI p-mLAD and dLAD 7/30/19.  Continue PTA ASA, ticagrelor  Continue PTA atorvastatin  PTA not on beta-blocker given complete heart block    Interstitial lung disease  Most likely 2/2 Shannon-1 DM. Currently stable on MTX.   Postprocedure patient needed oxygen, due to see pulmonology on 10/9/2019.  Recent imaging reviewed.  Will check ESR, CRP levels.  Hold PTA methotrexate during hospitalization.  Hold PTA albuterol inhaler, as needed albuterol.  Scheduled DuoNeb 4 times a day.  If continues to have worsening shortness of breath will consider pulmonology eval, CT chest.  Aggressive incentive  spirometry.    Dermatomyositis w/Shannon-1 Positivity:  Chronic pain.  Patient has been diagnosed with polymyositis clinically, not by muscle biopsy. He did have a lung biopsy that showed fibrosis of unknown etiology. Has ILD on CT and blood work showed a positive Shannon-1 antibody. PFTs showed worsening DLCO by 11% from 7/2018-1/2019.   Hold PTA methotrexate 25 mg weekly, next dose 9/10  Continue folic acid 1 mg daily  PTA Gabapentin 1200mg tid, decrease dose to 600 mg 3 times daily given kidney injury.  PTA PRN Oxycodone 7.5/325mg tid continued.    Chronic Pain  Recent back surgery Spring 2019  -  He feels recovering well.  Continue pain medications he is on chronically for this.     Diabetes mellitus hemoglobin A1c of 6.7.  PTA administers NPH insulin 2 to 4 units twice daily if blood sugars greater than 140.  PTA NPH insulin and placed on sliding scale.  Monitor blood sugars during hospitalization.    Hyperlipidemia  Continue PTA atorvastatin.  Hold PTA gemfibrozil as patient complaining of muscle aches.  Check lipid panel in a.m.     Hypothyroidism  Continue prior to admission levothyroxine.  Check TSH level.     JUAN  Continue home CPAP at home settings     PAD  Diagnosed as an outpatient per podiatry, pt has been referred to Vascular Surgery due to L foot wound.   Further management/workup as outpatient    Back pain Status post back surgery 4/2019.  Underwent T11-2, L1-2, L2-3 Transforminal Lumbar Interbody Fusion (TLIF) And Smith Borges Osteotomies,and L3-4 SPO as well.  Has intermittent back pain.    Mild hypokalemia from aggressive diuresis.  Mild acute kidney injury likely from volume overload, questionable diuresis.  Potassium 3.3.  Creatinine 1.20.  Baseline creatinine between 0.9-1.0.  We will check magnesium levels.  High potassium replacement protocol.  Monitor levels  Monitor renal function closely while on intravenous diuresis.    Anemia of chronic disease.  Baseline hemoglobin between 8-10.  Per patient  last colonoscopy when he was in [place] many years of back.  Globin currently at 9.9 and baseline.  Monitor levels periodically, defer age-appropriate health maintenance including colonoscopy to all PCP.  Will check iron studies.      Acute constipation from narcotics   Patient currently on narcotics, gabapentin.  Physical deconditioning and minimal mobility.  Scheduled docusate, MiraLAX twice daily.  PRN Dulcolax.    Physical deconditioning from acute illness, chronic back pain.  Patient having assistance from wife, sons at home.  PT, OT assessment.  Will check UA.    DVT Prophylaxis: SCD, ambulate.  Code Status: Full Code, discussed with patient and wife.    Disposition: Expected discharge in 2 days pending clinical improvement.  Patient, family, interdisciplinary team involved in care and agrees with plan.    Negar Mustafa MD          Chief Complaint:     History is obtained from patient, wife by bedside and medical records.    Beulah Jane is a 69 year old  male with medical history of severe aortic stenosis, recent Susan, complete heart block, recent pacemaker placement, heart failure with preserved ejection fraction, hypertension, hyperlipidemia, diabetes, dermatomyositis scented to ED from primary care clinic with ongoing shortness of breath.    Admitted from 8/29 - 9/7 at [facility] cardiology unit for acute exacerbation of heart failure with preserved ejection fraction and NSTEMI.  Then had severe aortic stenosis, underwent TAVR on 9/4. TAVR procedure complicated by complete heart block after valve deployment, otherwise uncomplicated; he did have a pre-existing RBBB. He was bridged to pacemaker (9/6) with a temporary pacing wire and remained 100% V-paced  after surgery. Estimated dry weight around 152 pounds, oxygen saturations improved and was dismissed home on Lasix and metolazone.    Patient states since discharge he has been having worsening shortness of breath over the last  3 weeks, barely able to walk few steps over the last few days.  Complaining of leg swelling, using 2-3 pillows to sleep at night.  No orthopnea no PND.  Uses CPAP at bedtime.  Had called the cardiology clinic who instructed patient to increase Lasix to 80 mg twice daily, states no relief of symptoms. Expressing frustration that he is not able to walk more than 7-10 steps.  No fevers, no recent travel, exposed to his wife who has recently been diagnosed with pneumonia.  ED saturation 92% on room air, heart rate 104, blood pressure 86/57.  Bilateral decreased breath sounds, faint crackles lower lobes.  1+ pitting pedal edema.  WBC 13.9, hemoglobin 9.9, platelets 699.  Glucose 103, BUN 34, potassium 3.3, creatinine 1.20.  proBNP 77254, troponin 0 0.106.  EKG sinus rhythm, left axis deviation.  Nonspecific ST-T wave changes.  Echocardiogram left ventricle systolic function normal, estimated EF at 55 to 60%.  Right ventricle mild to moderately dilated, mildly decreased right ventricular systolic function.S/P #29 Medtronic Evolute Pro bioprosthetic TAVR; appears well seated with grossly normal function  Chest x-ray dual-lead pacemaker in the right hemithorax unchanged.  Scattered bibasilar opacities again seen.  Patchy areas of grounds place opacities since previous exam have improved.         Review of Systems:     GENERAL: no fever or chills  EENT:  no difficulty swallowing, no hearing difficulty  PULMONARY: See HPI  CARDIAC: Having chest pressure from coughing., no irregular or fast heart beats   GI: Having abdominal discomfort from coughing.  No nausea or vomiting.  No black or bloody stools.  : No burning/pain with urination, no hematuria.   NEURO: No significant headaches, no seizures.  Dizziness on and off with this SOB.  ENDOCRINE: No excessive thirst, no excessive bruising.  MUSCULOSKELETAL: No joint pain or swelling.  SKIN: No skin rashes  PSYCHIATRY no anxiety or depression    Medical History:     Past  Medical History:   Diagnosis Date     Aortic stenosis      Chronic pain      DM (diabetes mellitus), type 2 (H)     on insulin     Hyperlipidemia      JUAN on CPAP      Pneumonia      Polymyositis (H)      Pulmonary fibrosis, unspecified (H)      Seasonal allergies         Surgical History:      Past Surgical History:   Procedure Laterality Date     ARTHROSCOPY KNEE  1970     COLONOSCOPY       CV CORONARY ANGIOGRAM N/A 7/30/2019    Procedure: CV CORONARY ANGIOGRAM;  Surgeon: Leonidas Mike MD;  Location:  HEART CARDIAC CATH LAB     CV LEFT HEART CATH N/A 7/30/2019    Procedure: CV LEFT HEART CATH;  Surgeon: Leonidas Mike MD;  Location:  HEART CARDIAC CATH LAB     CV PCI STENT DRUG ELUTING Left 7/30/2019    Procedure: PCI Stent Drug Eluting;  Surgeon: Leonidas Mike MD;  Location:  HEART CARDIAC CATH LAB     CV RIGHT HEART CATH N/A 7/30/2019    Procedure: CV RIGHT HEART CATH;  Surgeon: Leonidas Mike MD;  Location:  HEART CARDIAC CATH LAB     CV TRANSCATHETER AORTIC VALVE REPLACEMENT N/A 9/4/2019    Procedure: Transcatheter (Medtronic - 29mm) Aortic Valve Replacement, trans thorasic echocardiogram, perfusion and cardiac surgery standby, temporary pacemeker placement;  Surgeon: Pj Garcia MD;  Location:  OR     DECOMPRESSION, FUSION LUMBAR POSTERIOR TWO LEVELS, COMBINED  1997     EP PACEMAKER N/A 9/6/2019    Procedure: EP PACEMAKER;  Surgeon: Shefali Sylvester MD;  Location:  HEART CARDIAC CATH LAB     FUSION LUMBAR ANTERIOR THREE+ LEVELS N/A 4/22/2019    Procedure: Lumbar 1 Or Lumbar 2-5 Anterior Lumbar Interbody Fusion with BMP Stage 1 Of Two Procedure:;  Surgeon: Carlos Enrique Dial MD;  Location:  OR     HEART [facility] FEMORAL CANNULIZATION WITH OPEN STANDBY REPAIR AORTIC VALVE N/A 9/4/2019    Procedure: Cardiopulmonary Bypass Standby;  Surgeon: Hamilton Carnes MD;  Location:  OR     HERNIA REPAIR  2006     lumbar spine hardware removal  1999      OPTICAL TRACKING SYSTEM FUSION POSTERIOR SPINE THORACIC THREE+ LEVELS N/A 2019    Procedure: O-Arm/Stealth Assisted Thoracic 10-Pelvis Instrumented Fusion T11-2, L1-2, L2-3 Transforminal Lumbar Interbody Fusion (TLIF) And Smith Borges Osteotomies,and L3-4 SPO as well, Use Of BMP, Debridement Of Seroma;  Surgeon: Carlos Enrique Dial MD;  Location: [facility] OR     PICC INSERTION Right 2019    4Fr - 37cm, Basilic vein, low SVC     REMOVAL PROSTHETIC MATERIAL/MESH, ABD WALL NECRO TISS INFEXN       ROTATOR CUFF REPAIR RT/LT               Social History:      Social History     Tobacco Use     Smoking status: Former Smoker     Packs/day: 0.25     Last attempt to quit: 1970     Years since quittin.2     Smokeless tobacco: Never Used   Substance Use Topics     Alcohol use: Yes     Comment: few times monthly             Family History:     Family History   Problem Relation Age of Onset     Colon Cancer Mother      Hypertension Father         did not know father well             Allergies:   No Known Allergies          Medications:   Home meds reviewed          Physical Exam      Admission Weight: 69.9 kg (154 lb)  Current Weight: 69.9 kg (154 lb)    Vital Signs with Ranges  Temp:  [97.9  F (36.6  C)-98.1  F (36.7  C)] 98.1  F (36.7  C)  Pulse:  [] 93  Heart Rate:  [96-98] 96  Resp:  [18-25] 21  BP: ()/(57-70) 107/70  FiO2 (%):  [2 %] 2 %  SpO2:  [90 %-99 %] 97 %      PHYSICAL EXAM  GENERAL: Patient is in no distress. Alert and oriented.  HEENT: Oropharynx pink, moist. Pupils equal  HEART: irregular rate and rhythm. S1S2.  Systolic murmur aortic area.  LUNGS: Bilateral decreased breath sounds, faint crackles lower lobes.  1+ pitting pedal edema  ABDOMEN: Soft, no abdominal tenderness, bowel sounds heard   NEURO: Cranial nerves II-XII intact. Motor and sensory system in normal range  EXTREMITIES: 2+ peripheral pulses. 1+ pitting pedal edema.  SKIN: Warm, dry.   PSYCHIATRY  Cooperative         Data:   All new lab and imaging data was reviewed.

## 2019-09-25 NOTE — PROGRESS NOTES
Chief Complaint:       Beulah Jane is a 69 year old male who presents to clinic today for the following health issues:    Dyspnea on exertion, shortness of breath      HPI:   Patient Beulah Jane is a very pleasant 69 year old male with history of recent TAVR, pacemaker implantation who presents to Internal Medicine clinic today for evaluation of recent worsening dyspnea on exertion, shortness of breath symptoms. No chest pain, headaches, fever or chills. Regarding the patient's recent worsening dyspnea on exertion and shortness of breath symptoms s/p recent TAVR and pacemaker implantation in the setting of chronic CHF, the patient is also complaining of worsening dizziness and hypotensive BP readings. Patient reports that he has been compliant with his cardiac medications including his current Lasix diuretic therapy. He is currently ambulating with the assistance of a walker.    Current Medications:     Current Outpatient Medications   Medication Sig Dispense Refill     albuterol (2.5 MG/3ML) 0.083% neb solution Take 1 vial by nebulization every 6 hours as needed for shortness of breath / dyspnea or wheezing       albuterol (PROAIR HFA/PROVENTIL HFA/VENTOLIN HFA) 108 (90 BASE) MCG/ACT Inhaler Inhale 2 puffs into the lungs as needed for shortness of breath / dyspnea or wheezing        aspirin (ASA) 81 MG EC tablet Take 1 tablet (81 mg) by mouth daily Start tomorrow morning. 90 tablet 3     atorvastatin (LIPITOR) 40 MG tablet Take 1 tablet (40 mg) by mouth daily 90 tablet 3     benzocaine-menthol (CEPACOL) 15-3.6 MG lozenge Place 1 lozenge inside cheek every hour as needed for sore throat 60 lozenge 0     calcium carbonate (OS-DMITRIY 500 MG [place]. [place]) 500 MG tablet Take 1 tablet (500 mg) by mouth 2 times daily 180 tablet 3     cetirizine (ZYRTEC) 10 MG tablet Take 10 mg by mouth daily as needed        Cholecalciferol (VITAMIN D) 2000 units tablet Take 2,000 Units by mouth daily 100 tablet 3      "diphenhydrAMINE (BENADRYL) 50 MG capsule Take 50 mg by mouth as needed for itching or allergies        folic acid (FOLVITE) 1 MG tablet Take 1 tablet (1 mg) by mouth every morning 90 tablet 3     furosemide (LASIX) 40 MG tablet Take 2 tablets (80 mg) by mouth 2 times daily 360 tablet 3     gabapentin (NEURONTIN) 600 MG tablet Take 2 tablets (1,200 mg) by mouth 3 times daily 180 tablet 0     gemfibrozil (LOPID) 600 MG tablet Take 600 mg by mouth 2 times daily        insulin NPH (HUMULIN N/NOVOLIN N VIAL) 100 UNIT/ML vial Inject 2-6 Units Subcutaneous 2 times daily Will usually give if blood glucose >140 10 mL 11     insulin syringe-needle U-100 (29G X 1/2\" 1 ML) 29G X 1/2\" 1 ML miscellaneous Inject 1 Syringe Subcutaneous 2 times daily 200 each 11     LANsoprazole (PREVACID) 30 MG DR capsule Take 30 mg by mouth every morning (before breakfast)       levothyroxine (SYNTHROID/LEVOTHROID) 50 MCG tablet Take 50 mcg by mouth every morning        methotrexate 2.5 MG tablet Take 10 tablets (25 mg) by mouth once a week Tuesday 120 tablet 3     multivitamin w/minerals (THERA-VIT-M) tablet Take 1 tablet by mouth daily       omega 3 1000 MG CAPS Take 2 capsules by mouth every morning        oxyCODONE-acetaminophen (PERCOCET) 7.5-325 MG per tablet Take 1 tablet by mouth every 6 hours as needed for severe pain (max 3 tablets daily) Dispense 09/23/19. OK to start  09/25/19 90 tablet 0     ticagrelor (BRILINTA) 90 MG tablet Take 1 tablet (90 mg) by mouth 2 times daily Start this evening. 180 tablet 3         Allergies:    No Known Allergies         Past Medical History:     Past Medical History:   Diagnosis Date     Aortic stenosis      Chronic pain      DM (diabetes mellitus), type 2 (H)     on insulin     Hyperlipidemia      JUAN on CPAP      Pneumonia      Polymyositis (H)      Pulmonary fibrosis, unspecified (H)      Seasonal allergies          Past Surgical History:     Past Surgical History:   Procedure Laterality Date     " ARTHROSCOPY KNEE  1970     COLONOSCOPY       CV CORONARY ANGIOGRAM N/A 7/30/2019    Procedure: CV CORONARY ANGIOGRAM;  Surgeon: Leonidas Mike MD;  Location:  [facility] CATH [facility]     CV LEFT HEART CATH N/A 7/30/2019    Procedure: CV LEFT HEART CATH;  Surgeon: Leonidas Mike MD;  Location:  [facility] CATH LAB     CV PCI STENT DRUG ELUTING Left 7/30/2019    Procedure: PCI Stent Drug Eluting;  Surgeon: Leonidas Mike MD;  Location:  HEART CARD[facility] CATH LAB     CV RIGHT HEART CATH N/A 7/30/2019    Procedure: CV RIGHT HEART CATH;  Surgeon: Leonidas Mike MD;  Location:  HEART CARDIAC CATH LAB     CV TRANSCATHETER AORTIC VALVE REPLACEMENT N/A 9/4/2019    Procedure: Transcatheter (Medtronic - 29mm) Aortic Valve Replacement, trans thorasic echocardiogram, perfusion and cardiac surgery standby, temporary pacemeker placement;  Surgeon: Pj Garcia MD;  Location:  OR     DECOMPRESSION, FUSION LUMBAR POSTERIOR TWO LEVELS, COMBINED  1997     EP PACEMAKER N/A 9/6/2019    Procedure: EP PACEMAKER;  Surgeon: Shefali Sylvester MD;  Location: [facility] CARDIAC CATH LAB     FUSION LUMBAR ANTERIOR THREE+ LEVELS N/A 4/22/2019    Procedure: Lumbar 1 Or Lumbar 2-5 Anterior Lumbar Interbody Fusion with BMP Stage 1 Of Two Procedure:;  Surgeon: Carlos Enrique Dial MD;  Location:  OR     HEART [facility] FEMORAL CANNULIZATION WITH OPEN STANDBY REPAIR AORTIC VALVE N/A 9/4/2019    Procedure: Cardiopulmonary Bypass Standby;  Surgeon: Hamilton Carnes MD;  Location:  OR     HERNIA REPAIR  2006     lumbar spine hardware removal  1999     OPTICAL TRACKING SYSTEM FUSION POSTERIOR SPINE THORACIC THREE+ LEVELS N/A 4/25/2019    Procedure: O-Arm/Stealth Assisted Thoracic 10-Pelvis Instrumented Fusion T11-2, L1-2, L2-3 Transforminal Lumbar Interbody Fusion (TLIF) And Smith Borges Osteotomies,and L3-4 SPO as well, Use Of BMP, Debridement Of Seroma;  Surgeon: Carlos Enrique Dial,  MD;  Location: [facility] OR     PICC INSERTION Right 2019    4Fr - 37cm, Basilic vein, low SVC     REMOVAL PROSTHETIC MATERIAL/MESH, ABD WALL NECRO TISS INFEXN       ROTATOR CUFF REPAIR RT/LT           Family Medical History:     Family History   Problem Relation Age of Onset     Colon Cancer Mother      Hypertension Father         did not know father well         Social History:     Social History     Socioeconomic History     Marital status:      Spouse name: Not on file     Number of children: Not on file     Years of education: Not on file     Highest education level: Not on file   Occupational History     Not on file   Social Needs     Financial resource strain: Not on file     Food insecurity:     Worry: Not on file     Inability: Not on file     Transportation needs:     Medical: Not on file     Non-medical: Not on file   Tobacco Use     Smoking status: Former Smoker     Packs/day: 0.25     Last attempt to quit: 1970     Years since quittin.2     Smokeless tobacco: Never Used   Substance and Sexual Activity     Alcohol use: Yes     Comment: few times monthly     Drug use: Never     Sexual activity: Not Currently   Lifestyle     Physical activity:     Days per week: Not on file     Minutes per session: Not on file     Stress: Not on file   Relationships     Social connections:     Talks on phone: Not on file     Gets together: Not on file     Attends Caodaism service: Not on file     Active member of club or organization: Not on file     Attends meetings of clubs or organizations: Not on file     Relationship status: Not on file     Intimate partner violence:     Fear of current or ex partner: Not on file     Emotionally abused: Not on file     Physically abused: Not on file     Forced sexual activity: Not on file   Other Topics Concern     Not on file   Social History Narrative     Not on file           Review of System:     Constitutional: Negative for fever or chills. Positive for  "hypotension  Skin: Negative for rashes  Ears/Nose/Throat: Negative for nasal congestion, sore throat  Respiratory: positive for shortness of breath, dyspnea on exertion  Cardiovascular: Negative for chest pain, positive for recent TAVR, pacemaker implantation  Gastrointestinal: Negative for nausea, vomiting  Genitourinary: Negative for dysuria, hematuria  Musculoskeletal: Negative for myalgias  Neurologic: Negative for headaches, positive for dizziness  Psychiatric: Negative for depression, anxiety  Hematologic/Lymphatic/Immunologic: positive for bilateral leg peripheral edema  Endocrine: Negative  Behavioral: Negative for tobacco use       Physical Exam:   BP (!) 87/61 (BP Location: Right arm, Patient Position: Sitting, Cuff Size: Adult Regular)   Pulse 110   Temp 97.9  F (36.6  C) (Oral)   Ht 1.715 m (5' 7.5\")   Wt 69.9 kg (154 lb)   SpO2 92%   BMI 23.76 kg/m      GENERAL: chronically ill appearing older male, alert and no acute distress  EYES: eyes grossly normal to inspection, and conjunctivae and sclerae normal  HENT: Normocephalic atraumatic. Nose and mouth without ulcers or lesions  NECK: supple  RESP: dyspnea symptoms present  CV: pacemaker present  LYMPH: bilateral leg peripheral edema present  ABDOMEN: nondistended  MS: diffuse weakness noted, He is currently ambulating with the assistance of a walker.  SKIN: no suspicious lesions or rashes  NEURO: Alert & Oriented x 3.   PSYCH: mentation appears normal, affect normal        Diagnostic Test Results:     Diagnostic Test Results:  Results for orders placed or performed in visit on 09/17/19   N terminal pro BNP outpatient   Result Value Ref Range    N-Terminal Pro Bnp 5,144 (H) 0 - 125 pg/mL   Basic metabolic panel   Result Value Ref Range    Sodium 131 (L) 133 - 144 mmol/L    Potassium 3.4 3.4 - 5.3 mmol/L    Chloride 91 (L) 94 - 109 mmol/L    Carbon Dioxide 34 (H) 20 - 32 mmol/L    Anion Gap 5 3 - 14 mmol/L    Glucose 108 (H) 70 - 99 mg/dL    Urea " Nitrogen 32 (H) 7 - 30 mg/dL    Creatinine 1.30 (H) 0.66 - 1.25 mg/dL    GFR Estimate 55 (L) >60 mL/min/[1.73_m2]    GFR Estimate If Black 64 >60 mL/min/[1.73_m2]    Calcium 8.8 8.5 - 10.1 mg/dL       ASSESSMENT/PLAN:       (R42) Dizziness  (I95.9) Hypotension, unspecified hypotension type  (R06.09) Dyspnea on exertion  (primary encounter diagnosis)  (I50.32) Chronic diastolic heart failure (H)  (R06.02) Shortness of breath  (Z95.0) S/P placement of cardiac pacemaker  (Z95.2) S/P TAVR (transcatheter aortic valve replacement)  Comment: recent worsening dyspnea on exertion and shortness of breath symptoms s/p recent TAVR and pacemaker implantation in the setting of chronic CHF. Also complaining of worsening dizziness and hypotensive BP.  Plan: given the patient's worsening dyspnea on exertion and shortness of breath symptoms and hypotension, I have decided to transfer the patient to the [facility] ER for further evaluation and management today.    Follow Up Plan:     Patient is instructed to return to Internal Medicine clinic for follow-up visit in 1 month.        Hoa López MD  Internal Medicine  [facility]

## 2019-09-25 NOTE — ED NOTES
"[facility]  ED Nurse Handoff Report    ED Chief complaint: Shortness of Breath (sob for 2 weeks sent from clinic)      ED Diagnosis:   Final diagnoses:   Shortness of breath       Code Status: Full Code    Allergies: No Known Allergies    Activity level - Baseline/Home:  Independent  Activity Level - Current:   Independent    Patient's Preferred language: Hungarian   Needed?: Yes    Isolation: No  Infection: Not Applicable  Bariatric?: No    Vital Signs:   Vitals:    09/25/19 1410 09/25/19 1415 09/25/19 1430 09/25/19 1500   BP:  92/61 96/69 107/70   Pulse:  97 94 93   Resp:  25 25 21   Temp:       TempSrc:       SpO2: 99% 95% 95% 97%   Weight:       Height:           Cardiac Rhythm: ,   Cardiac  Cardiac Rhythm: Ventricular paced    Pain level: 0-10 Pain Scale: 2    Is this patient confused?: No   Does this patient have a guardian?  No         If yes, is there guardianship documents in the Epic \"Code/ACP\" activity?  N/A         Guardian Notified?  N/A  [place] - Suicide Severity Rating Scale Completed?  Yes  If yes, what color did the patient score?  White    Patient Report: Initial Complaint: Pt presents with SOB since TAVR procedure. Pt reports SOB worsening in past few days. Attempted to resolve with increased lasix dosing. Pt SOB persisted. Pt also reports that the has continued bilat leg swelling and weakness.   Focused Assessment: +SOB 90% on RA. Bilat leg swelling.   Tests Performed: Labs, Xray, and Echo  Abnormal Results:   Results for ANTONIETA LERMA DANELLE (MRN [id number]) as of 9/25/2019 16:57   Ref. Range 9/25/2019 14:11   Sodium Latest Ref Range: 133 - 144 mmol/L 134   Potassium Latest Ref Range: 3.4 - 5.3 mmol/L 3.3 (L)   Chloride Latest Ref Range: 94 - 109 mmol/L 95   Carbon Dioxide Latest Ref Range: 20 - 32 mmol/L 32   Urea Nitrogen Latest Ref Range: 7 - 30 mg/dL 34 (H)   Creatinine Latest Ref Range: 0.66 - 1.25 mg/dL 1.20   GFR Estimate Latest Ref Range: >60 mL/min/1.73_m2 " 61   GFR Estimate If Black Latest Ref Range: >60 mL/min/1.73_m2 71   Calcium Latest Ref Range: 8.5 - 10.1 mg/dL 8.9   Anion Gap Latest Ref Range: 3 - 14 mmol/L 7   Albumin Latest Ref Range: 3.4 - 5.0 g/dL 3.4   Protein Total Latest Ref Range: 6.8 - 8.8 g/dL 7.2   Bilirubin Total Latest Ref Range: 0.2 - 1.3 mg/dL 0.8   Alkaline Phosphatase Latest Ref Range: 40 - 150 U/L 144   ALT Latest Ref Range: 0 - 70 U/L 20   AST Latest Ref Range: 0 - 45 U/L 26   N-Terminal Pro BNP Inpatient Latest Ref Range: 0 - 900 pg/mL 10,794 (H)   Troponin I ES Latest Ref Range: 0.000 - 0.045 ug/L 0.106 (H)   Glucose Latest Ref Range: 70 - 99 mg/dL 103 (H)   WBC Latest Ref Range: 4.0 - 11.0 10e9/L 13.9 (H)   Hemoglobin Latest Ref Range: 13.3 - 17.7 g/dL 9.9 (L)   Hematocrit Latest Ref Range: 40.0 - 53.0 % 34.6 (L)   Platelet Count Latest Ref Range: 150 - 450 10e9/L 699 (H)   RBC Count Latest Ref Range: 4.4 - 5.9 10e12/L 4.62   MCV Latest Ref Range: 78 - 100 fl 75 (L)   MCH Latest Ref Range: 26.5 - 33.0 pg 21.4 (L)   MCHC Latest Ref Range: 31.5 - 36.5 g/dL 28.6 (L)   RDW Latest Ref Range: 10.0 - 15.0 % 24.1 (H)   Diff Method Unknown Automated Method   % Neutrophils Latest Units: % 83.3   % Lymphocytes Latest Units: % 5.0   % Monocytes Latest Units: % 10.5   % Eosinophils Latest Units: % 0.7   % Basophils Latest Units: % 0.2   % Immature Granulocytes Latest Units: % 0.3   Nucleated RBCs Latest Ref Range: 0 /100 0   Absolute Neutrophil Latest Ref Range: 1.6 - 8.3 10e9/L 11.5 (H)   Absolute Lymphocytes Latest Ref Range: 0.8 - 5.3 10e9/L 0.7 (L)   Absolute Monocytes Latest Ref Range: 0.0 - 1.3 10e9/L 1.5 (H)   Absolute Eosinophils Latest Ref Range: 0.0 - 0.7 10e9/L 0.1   Absolute Basophils Latest Ref Range: 0.0 - 0.2 10e9/L 0.0   Abs Immature Granulocytes Latest Ref Range: 0 - 0.4 10e9/L 0.0   Absolute Nucleated RBC Unknown 0.0     IMPRESSION: Since September 7, 2019, heart size is normal. Dual lead  pacemaker in the right hemithorax is  unchanged. Scattered bibasilar  opacities again identified, similar to previous exam. Technique  interstitial pattern noted throughout both lungs. Patchy areas of  groundglass opacities previous exam have improved. No pleural effusion  or pneumothorax. Nevertheless, there are residual reticulointerstitial  patterns. Extensive spine hardware, unchanged.     Treatments provided: 2L O2 provided.     Family Comments: Wife at bedside    OBS brochure/video discussed/provided to patient/family: N/A              Name of person given brochure if not patient: NA              Relationship to patient: NA    ED Medications: Medications - No data to display    Drips infusing?:  No    For the majority of the shift this patient was Green.   Interventions performed were NA.    Severe Sepsis OR Septic Shock Diagnosis Present: No    To be done/followed up on inpatient unit:  Cont to Monitor    ED NURSE PHONE NUMBER: [id number]

## 2019-09-25 NOTE — PROGRESS NOTES
RECEIVING UNIT ED HANDOFF REVIEW    ED Nurse Handoff Report was reviewed by: Bruno King on September 25, 2019 at 6:01 PM

## 2019-09-25 NOTE — Clinical Note
Potential access sites were evaluated for patency using ultrasound.   The left internal jugular vein was selected. Access was obtained under with Sonosite guidance using a micropuncture 21 guage needle with direct visualization of needle entry.

## 2019-09-25 NOTE — PHARMACY-ADMISSION MEDICATION HISTORY
Admission medication history interview status for the 9/25/2019  admission is complete. See EPIC admission navigator for prior to admission medications     Medication history source reliability:Good- patient, medication list - via     Actions taken by pharmacist (provider contacted, etc): Reviewed [email or web address], [facility] & Epic     Additional medication history information not noted on PTA med list :None    Medication reconciliation/reorder completed by provider prior to medication history? No    Time spent in this activity: 15 min    Prior to Admission medications    Medication Sig Last Dose Taking? Auth Provider   albuterol (2.5 MG/3ML) 0.083% neb solution Take 1 vial by nebulization every 6 hours as needed for shortness of breath / dyspnea or wheezing prn Yes Unknown, Entered By History   aspirin (ASA) 81 MG EC tablet Take 1 tablet (81 mg) by mouth daily Start tomorrow morning. 9/25/2019 at am Yes Nita Barger PA-C   atorvastatin (LIPITOR) 40 MG tablet Take 1 tablet (40 mg) by mouth daily 9/25/2019 at am Yes Nita Barger PA-C   benzocaine-menthol (CEPACOL) 15-3.6 MG lozenge Place 1 lozenge inside cheek every hour as needed for sore throat prn Yes Juan Kimbrough PA-C   calcium carbonate (OS-DMITRIY 500 MG [place]. [place]) 500 MG tablet Take 1 tablet (500 mg) by mouth 2 times daily 9/25/2019 at am Yes Carlos Enrique Dial MD   cetirizine (ZYRTEC) 10 MG tablet Take 10 mg by mouth daily as needed  Past Week at Unknown time Yes Reported, Patient   Cholecalciferol (VITAMIN D) 2000 units tablet Take 2,000 Units by mouth daily 9/25/2019 at am Yes Carlos Enrique Dial MD   diphenhydrAMINE (BENADRYL) 50 MG capsule Take 50 mg by mouth as needed for itching or allergies  Past Month at Unknown time Yes Unknown, Entered By History   folic acid (FOLVITE) 1 MG tablet Take 1 tablet (1 mg) by mouth every morning 9/25/2019 at am Yes Hoa López MD   furosemide (LASIX) 40 MG tablet  "Take 2 tablets (80 mg) by mouth 2 times daily 9/25/2019 at am Yes Kinza Junior APRN CNP   gabapentin (NEURONTIN) 600 MG tablet Take 2 tablets (1,200 mg) by mouth 3 times daily 9/25/2019 at am Yes Judson Stockton MD   gemfibrozil (LOPID) 600 MG tablet Take 600 mg by mouth 2 times daily  9/25/2019 at am Yes Unknown, Entered By History   LANsoprazole (PREVACID) 30 MG DR capsule Take 30 mg by mouth every morning (before breakfast) 9/25/2019 at am Yes Unknown, Entered By History   levothyroxine (SYNTHROID/LEVOTHROID) 50 MCG tablet Take 50 mcg by mouth every morning  9/25/2019 at am Yes Unknown, Entered By History   methotrexate 2.5 MG tablet Take 10 tablets (25 mg) by mouth once a week Tuesday 9/24/2019 at am Yes Hoa López MD   multivitamin w/minerals (THERA-VIT-M) tablet Take 1 tablet by mouth daily 9/25/2019 at am Yes Sofía Retana MD   omega 3 1000 MG CAPS Take 2 capsules by mouth every morning  9/25/2019 at am Yes Unknown, Entered By History   oxyCODONE-acetaminophen (PERCOCET) 7.5-325 MG per tablet Take 1 tablet by mouth every 6 hours as needed for severe pain (max 3 tablets daily) Dispense 09/23/19. OK to start  09/25/19 9/25/2019 at am Yes Judson Stockton MD   ticagrelor (BRILINTA) 90 MG tablet Take 1 tablet (90 mg) by mouth 2 times daily Start this evening. 9/25/2019 at am Yes Nita Barger PA-C   albuterol (PROAIR HFA/PROVENTIL HFA/VENTOLIN HFA) 108 (90 BASE) MCG/ACT Inhaler Inhale 2 puffs into the lungs as needed for shortness of breath / dyspnea or wheezing  Not using  Unknown, Entered By History   insulin NPH (HUMULIN N/NOVOLIN N VIAL) 100 UNIT/ML vial Inject 2-6 Units Subcutaneous 2 times daily Will usually give if blood glucose >140 More than a month at Unknown time  Hoa López MD   insulin syringe-needle U-100 (29G X 1/2\" 1 ML) 29G X 1/2\" 1 ML miscellaneous Inject 1 Syringe Subcutaneous 2 times daily   Hoa López MD        "

## 2019-09-25 NOTE — TELEPHONE ENCOUNTER
Left message on voicemail that Rx was E-Prepcribed to:       [id number] - [place], [place] - [address] [place] [address]  Phone: [phone] Fax: [phone]      Patient notified of dispense and start date. Patient notified to make an appointment prior to future refills.       Eli Maravilla CMA  [facility]  [place]

## 2019-09-25 NOTE — ED PROVIDER NOTES
History     Chief Complaint:  Shortness of Breath     The history is provided by the patient.      Beulah Jane is a left-handed 69 year old male, with a history of severe aortic stenosis, CAD, CHF, among others, s/p TAVR surgery three weeks (9/4/2019) and pacemaker placement (9/6/2019), who presents with his wife for shortness of breath since his TAVR procedure, which he states is worsening. Patient underwent an ECHO the day after his TAVR (9/5/2019) but has not undergone any cardiac testing since then.Per nurse, patient's shortness of breath has only worsened over the past three weeks, so he reached out to his cardiologist who instructed the patient to increase his Lasix. Patient is frustrated that his shortness of breath has not resolved despite having a new valve as well as a pacemaker, so he returned to his primary clinic who prompted the patient to the ED. Here, patient states he had an onset of new bilateral leg swelling today and is also more weak.   No other complaints.    ECHO on 9/5/2019:  Intraprocedural TTE for TAVR with 29 mm Medtronic Evolut Pro prosthesis.     Preprocedural images show a severely calcified aortic valve with severe AS  (peak velocity 4.1 m/s) and mild-to-moderate AI.  Postprocedural images show well-seated 29 mm Evolut Pro bioprosthesis, with  resolution of aortic stenosis (peak velocity 1.6 m/s, mean gradient 4 mmHg.)  There is no valvular or paravalular AI.  The TAVR valve abuts the based of the anterior mitral leaflet but does not  appear to impinge on mitral valve opening or result in significant stenosis on  the available color Doppler images.  There is no pericardial effusion on the pre- or postprocedural images.     This study was compared with the study from 7/6/19: There has been interval  TAVR with resolution of aortic stenosis on the postprocedural images.    Cardiac Risk Factors   Sex: Male    Tobacco: Positive   Hypertension: Positive   Diabetes: Positive    Hyperlipidemia: Positive  Family History: Positive     Allergies:  NKDA     Medications:    Albuterol neb solution  Albuterol inhaler  Aspirin 81 mg tablet  Lipitor   Cepacol   Calcium carbonate  Zyrtec  Vitamin D  Benadryl   Folic acid   Lasix   Gabapentin  Lopid  Insulin NPH  Lansoprazole  Levothyroxine  Methotrexate  Thera-vit-m   Omega 3   Percocet  Brilinta    Past Medical History:   CAD   CHF  Pulmonary embolism   Spinal stenosis   NSTEMI  Inflammatory arthritis  HLD  Severe aortic stenosis  HTN  Hypothyroidism   Pulmonary fibrosis  Type 2 diabetes   JUAN on CPAP  Pneumonia  Polymyositis   Seasonal allergies   ILD  Dermatomyositis   DDD  Seroma due to trauma   Aortic valve stenosis     Past Surgical History:    TAVR, 9/4/2019  Knee arthroscopy  Colonoscopy  Coronary angiogram  Left heart catheterization  PCI stent   Right heart catheterization  Transcatheter aortic valve replacement  Decompression  EP pacemaker  Lumbar fusion  [place]earth catheterization femoral cannulization  Hernia repair   Lumbar spine hardware removal   Optical tracking system fusion posterior spine  PICC insertion   Abdominal wall necrotic tissue removal  Rotator cuff repair     Family History:    Colon cancer   HTN - father     Social History:  Former smoker.   Positive for alcohol use.   Negative for drug use.  Marital Status:  .     Review of Systems   Respiratory: Positive for shortness of breath.    Cardiovascular: Positive for leg swelling (bilateral).   Neurological: Positive for weakness.   10 point review of systems performed and is negative except as above and in HPI.      Physical Exam     Patient Vitals for the past 24 hrs:   BP Temp Temp src Pulse Heart Rate Resp SpO2 Height Weight   09/25/19 1500 107/70 -- -- 93 96 21 97 % -- --   09/25/19 1430 96/69 -- -- 94 98 25 95 % -- --   09/25/19 1415 92/61 -- -- 97 98 25 95 % -- --   09/25/19 1410 -- -- -- -- -- -- 99 % -- --   09/25/19 1400 109/70 -- -- 100 -- -- 90 % -- --  "  09/25/19 1328 (!) 86/57 98.1  F (36.7  C) Oral 104 -- 18 92 % 1.702 m (5' 7\") 69.9 kg (154 lb)     Physical Exam  General: Resting on the gurney, appears uncomfortable  Head:  The scalp, face, and head appear normal  Mouth/Throat: Mucus membranes are moist  CV:  Regular rate and rhythm    Normal S1 and S2  No pathological murmur   Resp:  Breath sounds slightly diminished in the bases but no pronounced crackles    Non-labored, no retractions or accessory muscle use    No coarseness    No wheezing   GI:  Abdomen is soft, no rigidity    No tenderness to palpation    No rebound  MS:  Normal motor assessment of all extremities.    Good capillary refill noted.    Bilateral pitting edema to the knees   Skin:  No rash or lesions noted.  Neuro:   Speech is normal and fluent. No apparent deficit.  Psych: Awake. Alert.  Normal affect.      Appropriate interactions.    Emergency Department Course   Imaging:  Radiographic findings were communicated with the patient who voiced understanding of the findings.    Echocardiogram:   1. Left ventricular systolic function is normal. The visual ejection fraction  is estimated at 55-60%.  2. Septal wall motion abnormality may reflect pacemaker activation.  3. The right ventricle is mild to moderately dilated. Mildly decreased right  ventricular systolic function  4. S/P #29 Medtronic Evolute Pro bioprosthetic TAVR; appears well seated with  grossly normal function, mean gradient 6 mmHg, peak velocity 1.7 m/sec.     XR Chest 2 Views   Final Result   IMPRESSION: Since September 7, 2019, heart size is normal. Dual lead   pacemaker in the right hemithorax is unchanged. Scattered bibasilar   opacities again identified, similar to previous exam. Technique   interstitial pattern noted throughout both lungs. Patchy areas of   groundglass opacities previous exam have improved. No pleural effusion   or pneumothorax. Nevertheless, there are residual reticulointerstitial   patterns. Extensive spine " hardware, unchanged.      MERCEDES CORRIGAN MD        Laboratory:  CBC: WBC: 13.9 (H), HGB: 9.9 (L), PLT: 699 (H)  CMP: Glucose 103 (H), BUN 34 (H), Potassium 3.3 (L), o/w WNL (Creatinine: 1.20)  BNP: 66280 (H)  1411 Troponin: 0.106 (H)    Emergency Department Course:  1426 Nursing notes and vitals reviewed. I performed an exam of the patient as documented above.     Blood drawn. This was sent to the lab for further testing, results above.    The patient was sent for a chest XR while in the emergency department, findings above.     1630  I consulted with Dr. Mustafa of the hospitalist services. They are in agreement to accept the patient for admission.    Findings and plan explained to the Patient who consents to admission. Discussed the patient with Dr. Mustafa, who will admit the patient to an inpatient bed for further monitoring, evaluation, and treatment.    Impression & Plan    Medical Decision Making:    Beulah Jane is a 69 year old male who presents to the emergency department with shortness of breath.  The patient recently had a TAVR as well as pacemaker placement and has noted ongoing shortness of breath which has become worse today.  Laboratory evaluation was undertaken and does show an elevated troponin that this is likely not fully cleared from his recent procedure and will be trended by the hospitalist.  He has no chest pain and I therefore will not start heparin at this time.  He does have increased lower extremity edema but has not had increased shortness of breath when lying flat he was concerned about a valvular issue and therefore a echo was obtained and was reviewed verbally with cardiology who report that there is not anything that looks acutely abnormal to them and that the valve is unlikely to be contributing to his symptoms.  They requested he be given IV Lasix and admitted to the hospitalist service for likely heart failure exacerbation.  Hospitalist was contacted and patient will be  admitted to [facility] before diuresis as well as further evaluation by cardiology.    Diagnosis:    ICD-10-CM    1. Shortness of breath R06.02 CRP inflammation     CRP inflammation     Iron and iron binding capacity     Iron and iron binding capacity     Magnesium     Magnesium     Erythrocyte sedimentation rate auto     Erythrocyte sedimentation rate auto     CANCELED: CRP inflammation     CANCELED: Erythrocyte sedimentation rate auto     CANCELED: Magnesium     CANCELED: Iron and iron binding capacity     Disposition:  Admitted to the inpatient floor.     Scribe Disposition  I, Savi Scales, am serving as a scribe on 9/25/2019 at 2:42 PM to personally document services performed by Samira Darling MD based on my observations and the provider's statements to me.     Savi Scales  9/25/2019    EMERGENCY DEPARTMENT       Samira Darling MD  09/25/19 4704

## 2019-09-26 NOTE — PLAN OF CARE
Heart Failure Care Pathway  GOALS TO BE MET BEFORE DISCHARGE:    1. Decrease congestion and/or edema with diuretic therapy to achieve near      optimal volume status.            Dyspnea improved:  Yes            Edema improved:     No, please explain: pt still has dependent edema.         Net I/O and Weights since admission:          08/27 0700 - 09/26 0659  In: 500 [P.O.:400; I.V.:100]  Out: 1575 [Urine:1575]  Net: -1075            Vitals:    09/25/19 1328 09/26/19 0159   Weight: 69.9 kg (154 lb) 67.4 kg (148 lb 8 oz)       2.  O2 sats > 92% on RA or at prior home O2 therapy level.          Current oxygenation status:       SpO2: 99 %         O2 Device: Nasal cannula,  Oxygen Delivery: 3 LPM         Able to wean O2 this shift to keep sats > 92%:  No, please explain: New admit.       Does patient use Home O2? No    3.  Tolerates ambulation and mobility near baseline: No, please explain: dyspnea on exertion          How many times did the patient ambulate with nursing staff this shift? 0, pt stood at bedside and didn't ambulate @ night.    Please review the Heart Failure Care Pathway for additional HF goal outcomes.    VSS. 2L of O2 NC. 100 % V-paced. SBA. NPO@ midnight-cardiology consult. Lactic acid fired MD notified and continue to monitor, levels are decreasing with recheck. K replaced. Pt takes gabapentin w/percocet for pain.       Juli Mckeon RN  9/26/2019

## 2019-09-26 NOTE — PLAN OF CARE
Discharge Planner PT   Patient plan for discharge: Defer to OT note    Current status: PT orders received, chart reviewed, discussed with OT. Pt lives in condo with family. Mod IND with AD at baseline. Pt currently mobilizing IND/Mod IND, limited by SOB and decreased functional activity tolerance. OT to address CR needs/CHF education and progress activity tolerance while IP. No IP PT needs identified.    Barriers to return to prior living situation: Defer to OT    Recommendations for discharge: Defer to OT    Rationale for recommendations: Needs being met by OT/CR while IP, no PT needs identified. PT orders completed.       Entered by: Jasmin Weiss 09/26/2019 2:11 PM

## 2019-09-26 NOTE — PLAN OF CARE
Discharge Planner OT   Patient plan for discharge: Home.   Current status: Eval complete and brief treatment initiated. Patient able to MIP with walker and MOD I for 1 min but with increased SOB requiring seated rest. RT turned O2 to 1L and patients sats ~90%.   Barriers to return to prior living situation: None.   Recommendations for discharge: Home with OP CR @ [facility] after TAVR.   Rationale for recommendations: Anticipate with continued medical management and continued CR, patient will return to baseline activity status.        Entered by: Sylvia Perla 09/26/2019 11:24 AM

## 2019-09-26 NOTE — PROGRESS NOTES
09/26/19 1105   Quick Adds   Type of Visit Initial Occupational Therapy Evaluation   Living Environment   Lives With child(day), adult;spouse   Living Arrangements condominium   Functional Level   Ambulation 1-->assistive equipment   Transferring 1-->assistive equipment   Toileting 0-->independent   Bathing 0-->independent   Dressing 0-->independent   Eating 0-->independent   Communication 0-->understands/communicates without difficulty   Swallowing 0-->swallows foods/liquids without difficulty   Cognition 0 - no cognition issues reported       Present yes   Language Georgian interp present- but apparently for his wife. Patient speaks English.    General Information   Onset of Illness/Injury or Date of Surgery - Date 09/25/19   Referring Physician Moon   Patient/Family Goals Statement Go home.   Additional Occupational Profile Info/Pertinent History of Current Problem Admitted with ongoing SOB. Dx wtih CHF exacerbation. Recent PPM and TAVR at the .    Precautions/Limitations oxygen therapy device and L/min  (1L)   Cognitive Status Examination   Cognitive Comment No obvious cognitive deficits noted. Will monitor.    Pain Assessment   Patient Currently in Pain No   Range of Motion (ROM)   ROM Comment WFL   Transfer Skill: Sit to Stand   Level of [place]: Sit/Stand independent   Lower Body Dressing   Level of [place]: Dress Lower Body independent   Eating/Self Feeding   Level of [place]: Eating independent   Activities of Daily Living Analysis   Impairments Contributing to Impaired Activities of Daily Living   (decreased exercise tolerance)   General Therapy Interventions   Planned Therapy Interventions home program guidelines;progressive activity/exercise   Clinical Impression   Criteria for Skilled Therapeutic Interventions Met yes, treatment indicated   OT Diagnosis Decreased exercise tolerance   Influenced by the following impairments need for monitored exercise, CHF  education   Assessment of Occupational Performance 1-3 Performance Deficits   Identified Performance Deficits exercise   Clinical Decision Making (Complexity) Low complexity   Therapy Frequency Daily   Predicted Duration of Therapy Intervention (days/wks) 3 days   Anticipated Discharge Disposition Home with Outpatient Therapy   Risks and Benefits of Treatment have been explained. Yes   Patient, Family & other staff in agreement with plan of care Yes   Total Evaluation Time   Total Evaluation Time (Minutes) 15

## 2019-09-26 NOTE — PROGRESS NOTES
[facility]  Hospitalist Progress Note   09/26/2019     Encounter in the presence of a .         Assessment and Plan:       Beulah Jane is a 69 year old  male with medical history of severe aortic stenosis, recent Susan, complete heart block, recent pacemaker placement, heart failure with preserved ejection fraction, hypertension, hyperlipidemia, diabetes, dermatomyositis admitted 9/25/2019 for shortness of breath.    Dyspnea on exertion multifactorial, recent surgery, exacerbation of heart failure with preserved ejection fraction, physical deconditioning.  Acute exacerbation of systolic heart failure.  Severe aortic stenosis s/p TAVR, 29mm Medtronic Evolut  Complete Heart Block status post PPM  HFpEF  Admitted from 8/29 - 9/7 at [facility] cardiology unit for acute exacerbation of heart failure with preserved ejection fraction and NSTEMI.  Then had severe AS, underwent TAVR on 9/4. TAVR procedure complicated by complete heart block after valve deployment, otherwise uncomplicated; he did have a pre-existing RBBB. He was bridged to pacemaker (9/6) with a temporary pacing wire and remained 100% V-paced  after surgery.Dismissed home on Lasix and PRN metolazone.    Presented with worsening shortness of breath, leg swelling, unable to walk few steps. Increase in dose of Lasix to 80 mg twice daily not very successful weight 154 pounds.  On exam 1+ pitting pedal edema with bilateral decreased breath sounds or crackles.  WBC 13.9, hemoglobin 9.9,  potassium 3.3, creatinine 1.20.  proBNP 81110  troponin 0.106 and has been flat.  EKG sinus rhythm, left axis deviation.  Nonspecific ST-T wave changes.  Echocardiogram left ventricle systolic function normal, estimated EF at 55 to 60%.  Right ventricle mild to moderately dilated, mildly decreased right ventricular systolic function.S/P #29 Medtronic Evolute Pro bioprosthetic TAVR; appears well seated with grossly normal  function  Chest x-ray dual-lead pacemaker in the right hemithorax unchanged.  Scattered bibasilar opacities again seen. Patchy areas of grounds place opacities since previous exam have improved.     Has been undergoing diuresis with intravenous Lasix 40 mg twice daily, weight loss by 8 pounds overnight.  We will continue IV diuresis until cardiology evaluation.  Hold PTA oral Lasix.  Continue PTA aspirin and Brilanta and atorvastatin.  Telemetry monitoring.  PRN morphine for severe pain, Percocet for more mild to severe pain.  Tylenol for moderate pain..  Cardiology following.  Appreciate recommendation.  Low-salt diet, 2 L fluid restriction daily.  Strict input output monitoring, daily weights.  Aggressive incentive spirometry.     Possible acute bronchitis.  Patient having ongoing cough.  Afebrile.  WBC 13.2.  Procalcitonin 0.40.  Wife recently treated for pneumonia.  Continue empirical IV levofloxacin for bronchitis.     CAD s/p PCI:  s/p PCI p-mLAD and dLAD 7/30/19.  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 Currently stable on MTX.   Postprocedure patient needed oxygen, due to see pulmonology on 10/9/2019.  Recent imaging reviewed.  CRP 22.8 ESR 24.  Hold PTA methotrexate during hospitalization.  Hold PTA albuterol inhaler, as needed albuterol.  Scheduled DuoNeb 4 times a day.  If continues to have worsening shortness of breath will consider pulmonology eval, CT chest.  Aggressive incentive spirometry.     Dermatomyositis w/Shannon-1 Positivity:  Chronic pain.  Patient has been diagnosed with polymyositis clinically, not by muscle biopsy. He did have a lung biopsy that showed fibrosis of unknown etiology. Has ILD on CT and blood work showed a positive Shannon-1 antibody. PFTs showed worsening DLCO by 11% from 7/2018-1/2019.   Hold PTA methotrexate 25 mg weekly, next dose 9/10  Continue folic acid 1 mg  daily  PTA Gabapentin 1200mg tid, decrease dose to 600 mg 3 times daily given kidney injury.  PTA PRN Oxycodone 7.5/325mg tid continued.    Reactive thrombocytosis.  Platelets improving from [id number].  Continue to monitor.    Status post lactic acidosis likely from hypoxia and volume overload.  Lactic acid 3.0 yesterday, with intravenous diuresis trended down to 1.2 today.     Chronic Pain  Recent back surgery Spring 2019  Continue pain medications he is on chronically for this.     Diabetes mellitus hemoglobin A1c of 6.7.  PTA administers NPH insulin 2 to 4 units twice daily if blood sugars greater than 140.  PTA NPH insulin and placed on sliding scale.  Monitor blood sugars during hospitalization.     Hyperlipidemia  Triglycerides 97, LDL 58, HDL 19.  Discontinued PTA gemfibrozil as patient complaining of muscle aches.  Continue PTA atorvastatin.    Hypothyroidism  TSH 0.46.  Continue PTA levothyroxine.     JUAN  Continue home CPAP at home settings     PAD  Diagnosed as an outpatient per podiatry, pt has been referred to Vascular Surgery due to L foot wound.   Further management/workup as outpatient     Back pain Status post back surgery 4/2019.  Underwent T11-2, L1-2, L2-3 Transforminal Lumbar Interbody Fusion (TLIF) And Smith Borges Osteotomies,and L3-4 SPO as well.  Has intermittent back pain.  New pain meds as above.     Mild hypokalemia from aggressive diuresis.  Mild acute kidney injury likely from volume overload, questionable diuresis.  Potassium 3.3.  Creatinine 1.20.  Magnesium 2.2.  Monitor renal function, electrolytes while on diuresis and replace accordingly.     Anemia of chronic disease.  Baseline hemoglobin between 8-10.  Per patient last colonoscopy when he was in [place] many years of back. Hb @ 9.  Mild saturation index 14.  Monitor levels periodically, defer age-appropriate health maintenance including colonoscopy to all PCP.  Will start on oral ferrous gluconate x1 daily.     Acute  constipation from narcotics   Patient currently on narcotics, gabapentin.  Physical deconditioning and minimal mobility.  Scheduled docusate, MiraLAX twice daily.  PRN Dulcolax.     Physical deconditioning from acute illness, chronic back pain.  Patient having assistance from wife, sons at home.  UA negative   PT, OT assessment.     Orders Placed This Encounter      Combination Diet 9050-2377 Calories: Moderate Consistent CHO (4-6 CHO units/meal); Low Saturated Fat Na <2400mg Diet      DVT Prophylaxis: SCD, ambulate.  Code Status: Full Code  Disposition: Expected discharge in 1 to 2 days pending clinical improvement.    Discussed with patient, bedside RN  Total time greater than 35 minutes, using  services.    Negar Mustafa MD        Interval History:      Patient sitting up in chair, continues to complain of shortness of breath.  Afebrile overnight.  Admits to being quite a bit, on IV diuresis.  Has had 8 pound weight loss overnight with intravenous diuresis.  Denies any chest pain.  No nausea vomiting, tolerating oral diet.  No headache or dizziness.  Lactic acid 3.0 yesterday, with intravenous diuresis trended down to 1.2 today.         Physical Exam:        Physical Exam   Temp:  [97.9  F (36.6  C)-98.3  F (36.8  C)] 98.1  F (36.7  C)  Pulse:  [] 96  Heart Rate:  [] 99  Resp:  [18-25] 18  BP: ()/(57-77) 91/68  FiO2 (%):  [2 %] 2 %  SpO2:  [90 %-99 %] 98 %    Intake/Output Summary (Last 24 hours) at 9/26/2019 1056  Last data filed at 9/26/2019 0900  Gross per 24 hour   Intake 903 ml   Output 2155 ml   Net -1252 ml       Admission Weight: 69.9 kg (154 lb)  Current Weight: 67.4 kg (148 lb 8 oz)    PHYSICAL EXAM  GENERAL: Patient is in no distress. Alert and oriented.  CVS irregular rate and rhythm. S1S2.  Systolic murmur aortic area.  LUNGS: Bilateral decreased breath sounds, faint crackles lower lobes.  1+ pitting pedal edema  NEURO moving all extremities, no focal  weakness.  EXTREMITIES: 2+ peripheral pulses. 1+ pitting pedal edema.  SKIN: Warm, dry.   PSYCHIATRY Cooperative         Medications:          aspirin  81 mg Oral Daily     atorvastatin  40 mg Oral Daily     calcium carbonate 500 mg (elemental)  1 tablet Oral BID     docusate sodium  100 mg Oral BID     fish oil-omega-3 fatty acids  2 capsule Oral QAM     folic acid  1 mg Oral QAM     furosemide  40 mg Intravenous BID     gabapentin  600 mg Oral TID     insulin aspart  1-7 Units Subcutaneous TID AC     insulin aspart  1-5 Units Subcutaneous At Bedtime     ipratropium - albuterol 0.5 mg/2.5 mg/3 mL  3 mL Nebulization 4x daily     levofloxacin  500 mg Intravenous Q24H     levothyroxine  50 mcg Oral QAM AC     multivitamin w/minerals  1 tablet Oral Daily     pantoprazole  40 mg Oral QAM AC     polyethylene glycol  17 g Oral BID     sodium chloride (PF)  3 mL Intracatheter Q8H     ticagrelor  90 mg Oral BID     acetaminophen, acetaminophen, albuterol, benzocaine-menthol, bisacodyl, calcium carbonate, cetirizine, glucose **OR** dextrose **OR** glucagon, HYDROmorphone, lidocaine 4%, lidocaine (buffered or not buffered), magnesium sulfate, magnesium sulfate, melatonin, naloxone, ondansetron **OR** ondansetron, oxyCODONE-acetaminophen, potassium chloride, potassium chloride with lidocaine, potassium chloride, potassium chloride, potassium chloride, sodium chloride (PF)         Data:      All new lab and imaging data was reviewed.

## 2019-09-26 NOTE — CONSULTS
[facility]    Cardiology Consultation     Primary Cardiologist: Dr. Nate Solorio   Date of Admission:  9/25/2019  Service date: 9/26/2019    Summary:   Mr. Beulah Jane is a very pleasant 69 year old male with a past medical history of severe aortic stenosis, status post recent TAVR on September 4, 2019, complicated by complete heart block, status post pacemaker implant, diabetes mellitus type 2, hypothyroidism, hyperlipidemia, hypertension, coronary artery disease, status post recent stenting of proximal-mid and distal LAD in July 2019, pulmonary fibrosis, obstructive sleep apnea on CPAP, chronic anemia, and dermatomyositis with chronic pain. He was admitted to [facility] on 9/25/19 for worsening dyspnea on exertion over the past week.     Assessment & Plan     1. Acute on chronic heart failure with preserved ejection fraction (HFpEF)   - Echo yesterday shows normal LV systolic function with EF 55-60%, RV is mild to moderately dilated, and septal wall motion abnormality consistent with pacemaker activation. IVC diameter >2.1 cm collapsing <50% with sniff suggesting high RA pressure estimated at 15 mmHg or greater.  - NT pro BNP elevated at 10,794, increased from previous at 5,144 on 9/17/19.  - Admit wt 154 lbs, current wt 146 lbs. Dry weight previously thought to be around 145-150 lbs, but he seems to have lost some nutritional weight with his recent illness.  - PTA on lasix 80 mg bid.  - GONZALEZ reportedly ongoing since his TAVR, but worse over the past week with 10 lb wt gain. Now improving some with IV lasix.  2. Severe aortic stenosis, status post recent TAVR with 29mm Medtronic Evolut bioprosthetic valve on 9/4/19  - Valve appears well seated on echo with grossly normal function, mean gradient 6 mmHg, peak velocity 1.7 m/sec.  3. Coronary artery disease  - Coronary angiogram 7/30/19 showed two vessel obstructive CAD with diffuse calcified disease of the prox-mid LAD  and  of small, non-dominant RCA with R-R collaterals. Two stents were placed to the proximal-mid LAD and distal LAD.  - Initial troponin elevated at 0.106 and flat. Suspect this is demand ischemia.  - EKG without clear ischemic changes in comparison to prior EKG, remains V. Paced.    - On DAPT with brilinta and aspirin, as well as atorvastatin.  - Denies chest pain, but does note feeling pressure in his chest with GONZALEZ.   4. Complete Heart Block status post PPM  - Last device check 9/13/19 with normal pacemaker function, no atrial or ventricular arrhythmias recorded. Intrinsic rhythm of SR 93 bpm w/ CHB.   5. Chronic anemia  6. Type 2 diabetes mellitus, insulin dependent   7. Obstructive sleep apnea on CPAP  8. Dermatomyositis with chronic pain   9. Interstitial lung disease  10. Hypothyroidism    Plan:  Suspect symptoms are primarily related to decompensated HFpEF, though likely multifactorial with interstitial lung disease contributing. Agree with plan for diuresis with IV Lasix 40 mg twice daily, daily BMP, low-salt diet, 2 L fluid restriction daily, with strict input output monitoring, daily weights. If his symptoms do not continue to improve with diuresis, further ischemic workup could be considered.    Thank you for the opportunity to participate in this pleasant patient's care.     Silas Mendez NP  Text Page  (8am - 5pm, M-F)    Code Status    Full Code    Reason for Consult   Reason for consult: I was asked by Dr. Negar Mustafa to evaluate this patient for dyspnea on exertion.    Primary Care Physician   Dr. Hoa López    Chief Complaint   Shortness of breath    History is obtained from the patient with the assistance of a professional .    History of Present Illness   Mr. Beulah Jane is a very pleasant 69 year old male with a past medical history of severe aortic stenosis, status post recent TAVR on September 4, 2019, complicated by complete heart block, status post  pacemaker implant, diabetes mellitus type 2, hyperlipidemia, hypertension, coronary artery disease, status post recent stenting of the proximal and mid LAD in July 2019, pulmonary fibrosis, obstructive sleep apnea on CPAP, chronic anemia, and dermatomyositis with chronic pain. He presented to [facility] yesterday with worsening dyspnea on exertion over the past week.     He was recently admitted from 8/29/19 to 9/7/19 at [facility] for an acute exacerbation of heart failure with preserved ejection fraction and NSTEMI. Coronary angiogram 7/30/19 showed two vessel obstructive CAD with diffuse calcified disease of the prox-mid LAD and  of small, non-dominant RCA with R-R collaterals. Two stents were placed to the proximal-mid LAD and distal LAD. He had a 70% stenosis of the first diagonal and 60% stenosis of the ramus. He was also found to have severe aortic stenosis and underwent TAVR on 9/4/19. He developed complete heart block post procedure and required pacemaker implantation.     He unfortunately has had a difficult recovery from his TAVR and pacemaker implantation earlier this month and reports feeling generally unwell since his discharge home with ongoing nausea, dry cough, dizziness when going from a sit to stand, dyspnea on exertion, and exertional chest pressure. His symptoms have been worse over the past week with associated 10 pound weight gain. [place] [facility] and felt to be hypovolemic with soft blood pressures and a slight increase in his creatinine at that time so his Lasix dose was decreased to 40 mg once daily. This was then titrated back up to alternating daily doses of 80mg and 40 mg and then 80 mg twice daily due to weight gain and worsening symptoms.    He feels he is unable to walk more than about 10 steps without feeling dyspneic. His symptoms improve with rest and he feels they have improved some since receiving IV Lasix upon  admission. He denies chest pain, palpitations, syncope, fever, chills, or night sweats. He does not feel that he has had orthopnea or PND, but wears CPAP at night for JUAN.     Past Medical History   I have reviewed this patient's medical history and updated it with pertinent information if needed.   Past Medical History:   Diagnosis Date     Aortic stenosis      Chronic pain      DM (diabetes mellitus), type 2 (H)     on insulin     Hyperlipidemia      JUAN on CPAP      Pneumonia      Polymyositis (H)      Pulmonary fibrosis, unspecified (H)      Seasonal allergies      Past Surgical History   I have reviewed this patient's surgical history and updated it with pertinent information if needed.  Past Surgical History:   Procedure Laterality Date     ARTHROSCOPY KNEE  1970     COLONOSCOPY       CV CORONARY ANGIOGRAM N/A 7/30/2019    Procedure: CV CORONARY ANGIOGRAM;  Surgeon: Leonidas Mike MD;  Location:  HEART CARDIAC CATH LAB     CV LEFT HEART CATH N/A 7/30/2019    Procedure: CV LEFT HEART CATH;  Surgeon: Leonidas Mike MD;  Location:  HEART CARDIAC CATH LAB     CV PCI STENT DRUG ELUTING Left 7/30/2019    Procedure: PCI Stent Drug Eluting;  Surgeon: Leonidas Mike MD;  Location:  HEART CARDIAC CATH LAB     CV RIGHT HEART CATH N/A 7/30/2019    Procedure: CV RIGHT HEART CATH;  Surgeon: Leonidas Mike MD;  Location:  HEART CARDIAC CATH LAB     CV TRANSCATHETER AORTIC VALVE REPLACEMENT N/A 9/4/2019    Procedure: Transcatheter (Medtronic - 29mm) Aortic Valve Replacement, trans thorasic echocardiogram, perfusion and cardiac surgery standby, temporary pacemeker placement;  Surgeon: Pj Garcia MD;  Location:  OR     DECOMPRESSION, FUSION LUMBAR POSTERIOR TWO LEVELS, COMBINED  1997     EP PACEMAKER N/A 9/6/2019    Procedure: EP PACEMAKER;  Surgeon: Shefali Sylvester MD;  Location:  HEART CARDIAC CATH LAB     FUSION LUMBAR ANTERIOR THREE+ LEVELS N/A 4/22/2019    Procedure:  Lumbar 1 Or Lumbar 2-5 Anterior Lumbar Interbody Fusion with BMP Stage 1 Of Two Procedure:;  Surgeon: Carlos Enrique Dial MD;  Location: [facility] OR     HEART CATH FEMORAL CANNULIZATION WITH OPEN STANDBY REPAIR AORTIC VALVE N/A 9/4/2019    Procedure: Cardiopulmonary Bypass Standby;  Surgeon: Hamilton Carnes MD;  Location: [facility] OR     HERNIA REPAIR  2006     lumbar spine hardware removal  1999     OPTICAL TRACKING SYSTEM FUSION POSTERIOR SPINE THORACIC THREE+ LEVELS N/A 4/25/2019    Procedure: O-Arm/Stealth Assisted Thoracic 10-Pelvis Instrumented Fusion T11-2, L1-2, L2-3 Transforminal Lumbar Interbody Fusion (TLIF) And Smith Borges Osteotomies,and L3-4 SPO as well, Use Of BMP, Debridement Of Seroma;  Surgeon: Carlos Enrique Dial MD;  Location: [facility] OR     PICC INSERTION Right 05/06/2019    4Fr - 37cm, Basilic vein, low SVC     REMOVAL PROSTHETIC MATERIAL/MESH, ABD WALL NECRO TISS INFEXN  2012     ROTATOR CUFF REPAIR RT/LT  2003       Prior to Admission Medications   Prior to Admission Medications   Prescriptions Last Dose Informant Patient Reported? Taking?   Cholecalciferol (VITAMIN D) 2000 units tablet 9/25/2019 at am Self No Yes   Sig: Take 2,000 Units by mouth daily   LANsoprazole (PREVACID) 30 MG DR capsule 9/25/2019 at am Self Yes Yes   Sig: Take 30 mg by mouth every morning (before breakfast)   albuterol (2.5 MG/3ML) 0.083% neb solution prn Self Yes Yes   Sig: Take 1 vial by nebulization every 6 hours as needed for shortness of breath / dyspnea or wheezing   albuterol (PROAIR HFA/PROVENTIL HFA/VENTOLIN HFA) 108 (90 BASE) MCG/ACT Inhaler Not using Self Yes No   Sig: Inhale 2 puffs into the lungs as needed for shortness of breath / dyspnea or wheezing    aspirin (ASA) 81 MG EC tablet 9/25/2019 at am  No Yes   Sig: Take 1 tablet (81 mg) by mouth daily Start tomorrow morning.   atorvastatin (LIPITOR) 40 MG tablet 9/25/2019 at am  No Yes   Sig: Take 1 tablet (40 mg) by mouth daily  "  benzocaine-menthol (CEPACOL) 15-3.6 MG lozenge prn  No Yes   Sig: Place 1 lozenge inside cheek every hour as needed for sore throat   calcium carbonate (OS-DMITRIY 500 MG [place]. [place]) 500 MG tablet 9/25/2019 at am Self No Yes   Sig: Take 1 tablet (500 mg) by mouth 2 times daily   cetirizine (ZYRTEC) 10 MG tablet Past Week at Unknown time  Yes Yes   Sig: Take 10 mg by mouth daily as needed    diphenhydrAMINE (BENADRYL) 50 MG capsule Past Month at Unknown time Self Yes Yes   Sig: Take 50 mg by mouth as needed for itching or allergies    folic acid (FOLVITE) 1 MG tablet 9/25/2019 at am  No Yes   Sig: Take 1 tablet (1 mg) by mouth every morning   furosemide (LASIX) 40 MG tablet 9/25/2019 at am  No Yes   Sig: Take 2 tablets (80 mg) by mouth 2 times daily   gabapentin (NEURONTIN) 600 MG tablet 9/25/2019 at am  No Yes   Sig: Take 2 tablets (1,200 mg) by mouth 3 times daily   gemfibrozil (LOPID) 600 MG tablet 9/25/2019 at am Self Yes Yes   Sig: Take 600 mg by mouth 2 times daily    insulin NPH (HUMULIN N/NOVOLIN N VIAL) 100 UNIT/ML vial More than a month at Unknown time  No No   Sig: Inject 2-6 Units Subcutaneous 2 times daily Will usually give if blood glucose >140   insulin syringe-needle U-100 (29G X 1/2\" 1 ML) 29G X 1/2\" 1 ML miscellaneous   No No   Sig: Inject 1 Syringe Subcutaneous 2 times daily   levothyroxine (SYNTHROID/LEVOTHROID) 50 MCG tablet 9/25/2019 at am Self Yes Yes   Sig: Take 50 mcg by mouth every morning    methotrexate 2.5 MG tablet 9/24/2019 at am  No Yes   Sig: Take 10 tablets (25 mg) by mouth once a week Tuesday   multivitamin w/minerals (THERA-VIT-M) tablet 9/25/2019 at am Self No Yes   Sig: Take 1 tablet by mouth daily   omega 3 1000 MG CAPS 9/25/2019 at am Self Yes Yes   Sig: Take 2 capsules by mouth every morning    oxyCODONE-acetaminophen (PERCOCET) 7.5-325 MG per tablet 9/25/2019 at am  No Yes   Sig: Take 1 tablet by mouth every 6 hours as needed for severe pain (max 3 tablets daily) Dispense " 09/23/19. OK to start  09/25/19   ticagrelor (BRILINTA) 90 MG tablet 9/25/2019 at am  No Yes   Sig: Take 1 tablet (90 mg) by mouth 2 times daily Start this evening.      Facility-Administered Medications Last Administration Doses Remaining   0.9% sodium chloride BOLUS None recorded    0.9% sodium chloride BOLUS None recorded         Allergies   No Known Allergies    Social History   I have reviewed this patient's social history and updated it with pertinent information if needed. Beulah Jane  reports that he quit smoking about 49 years ago. He smoked 0.25 packs per day. He has never used smokeless tobacco. He reports current alcohol use. He reports that he does not use drugs.    Family History   I have reviewed this patient's family history and updated it with pertinent information if needed.   Family History   Problem Relation Age of Onset     Colon Cancer Mother      Hypertension Father         did not know father well     Review of Systems   The 10 point Review of Systems is negative other than noted in the HPI or here.     Physical Exam   Temp: 98.1  F (36.7  C) Temp src: Oral BP: 91/68 Pulse: 96 Heart Rate: 99 Resp: 18 SpO2: 98 % O2 Device: Nasal cannula Oxygen Delivery: 3 LPM  Vital Signs with Ranges  Temp:  [97.9  F (36.6  C)-98.3  F (36.8  C)] 98.1  F (36.7  C)  Pulse:  [] 96  Heart Rate:  [] 99  Resp:  [18-25] 18  BP: ()/(57-77) 91/68  FiO2 (%):  [2 %] 2 %  SpO2:  [90 %-99 %] 98 %  148 lbs 8 oz    Constitutional: Appears his stated age, sitting up in bed, and in no acute distress.  Eyes: Pupils equal, round. Sclerae anicteric.   HEENT: Normocephalic, atraumatic.   Neck: Supple. Carotid pulses full and equal. No carotid bruit. JVP elevated to the neck.   Respiratory: Breathing non-labored. Lung sounds diminished with faint crackles in the bases bilaterally. No wheezes.  Cardiovascular: Regular rate and rhythm, normal S1 and S2. Soft 2/6 systolic murmur at RSB. No rub or  gallop.  GI: Soft, non-tender, bowel sounds present in all four quadrants.  Skin: Warm, dry. Pacemaker incision site mildly tender, but well approximated without erythema, drainage or edema. No rashes, cyanosis, or xanthelasma.  Musculoskeletal/Extremities: Moves all extremities well and symmetrically. 1+ edema in the ankles bilaterally, no pedal edema.  Neurologic: No gross motor deficits. Alert and oriented to person, place and time.  Psychiatric: Affect and mood appropriate. Answering questions appropriately.    Data   Results for orders placed or performed during the hospital encounter of 09/25/19 (from the past 24 hour(s))   EKG 12-lead, tracing only   Result Value Ref Range    Interpretation ECG Click View Image link to view waveform and result    CBC with platelets differential   Result Value Ref Range    WBC 13.9 (H) 4.0 - 11.0 10e9/L    RBC Count 4.62 4.4 - 5.9 10e12/L    Hemoglobin 9.9 (L) 13.3 - 17.7 g/dL    Hematocrit 34.6 (L) 40.0 - 53.0 %    MCV 75 (L) 78 - 100 fl    MCH 21.4 (L) 26.5 - 33.0 pg    MCHC 28.6 (L) 31.5 - 36.5 g/dL    RDW 24.1 (H) 10.0 - 15.0 %    Platelet Count 699 (H) 150 - 450 10e9/L    Diff Method Automated Method     % Neutrophils 83.3 %    % Lymphocytes 5.0 %    % Monocytes 10.5 %    % Eosinophils 0.7 %    % Basophils 0.2 %    % Immature Granulocytes 0.3 %    Nucleated RBCs 0 0 /100    Absolute Neutrophil 11.5 (H) 1.6 - 8.3 10e9/L    Absolute Lymphocytes 0.7 (L) 0.8 - 5.3 10e9/L    Absolute Monocytes 1.5 (H) 0.0 - 1.3 10e9/L    Absolute Eosinophils 0.1 0.0 - 0.7 10e9/L    Absolute Basophils 0.0 0.0 - 0.2 10e9/L    Abs Immature Granulocytes 0.0 0 - 0.4 10e9/L    Absolute Nucleated RBC 0.0    Comprehensive metabolic panel   Result Value Ref Range    Sodium 134 133 - 144 mmol/L    Potassium 3.3 (L) 3.4 - 5.3 mmol/L    Chloride 95 94 - 109 mmol/L    Carbon Dioxide 32 20 - 32 mmol/L    Anion Gap 7 3 - 14 mmol/L    Glucose 103 (H) 70 - 99 mg/dL    Urea Nitrogen 34 (H) 7 - 30 mg/dL     Creatinine 1.20 0.66 - 1.25 mg/dL    GFR Estimate 61 >60 mL/min/[1.73_m2]    GFR Estimate If Black 71 >60 mL/min/[1.73_m2]    Calcium 8.9 8.5 - 10.1 mg/dL    Bilirubin Total 0.8 0.2 - 1.3 mg/dL    Albumin 3.4 3.4 - 5.0 g/dL    Protein Total 7.2 6.8 - 8.8 g/dL    Alkaline Phosphatase 144 40 - 150 U/L    ALT 20 0 - 70 U/L    AST 26 0 - 45 U/L   Troponin I   Result Value Ref Range    Troponin I ES 0.106 (H) 0.000 - 0.045 ug/L   Nt probnp inpatient (BNP)   Result Value Ref Range    N-Terminal Pro BNP Inpatient 10,794 (H) 0 - 900 pg/mL   CRP inflammation   Result Value Ref Range    CRP Inflammation 22.8 (H) 0.0 - 8.0 mg/L   Iron and iron binding capacity   Result Value Ref Range    Iron 57 35 - 180 ug/dL    Iron Binding Cap 413 240 - 430 ug/dL    Iron Saturation Index 14 (L) 15 - 46 %   Magnesium   Result Value Ref Range    Magnesium 2.2 1.6 - 2.3 mg/dL   Erythrocyte sedimentation rate auto   Result Value Ref Range    Sed Rate 24 (H) 0 - 20 mm/h   Echocardiogram Complete    Narrative    [id number]  [id number]  [id number]  [id number]^[id number]^M           [facility]  Echocardiography Laboratory  [address] [place] [id number]        Name: DANELLE SIMMS  MRN: [id number]  : 1950  Study Date: 2019 02:53 PM  Age: 69 yrs  Gender: Male  Patient Location: [facility]  Reason For Study: Dyspnea  Ordering Physician: PAULETTE BEASLEY  Referring Physician: Hoa López  Performed By: Jori Moura     BSA: 1.8 m2  Height: 67 in  Weight: 154 lb  HR: 96  BP: 96/69 mmHg  _____________________________________________________________________________  __        Procedure  Complete Portable Echo Adult.  _____________________________________________________________________________  __        Interpretation Summary     1. Left ventricular systolic function is normal. The visual ejection fraction  is estimated at 55-60%.  2. Septal wall motion abnormality may reflect pacemaker activation.  3. The right  ventricle is mild to moderately dilated. Mildly decreased right  ventricular systolic function  4. S/P #29 Medtronic Evolute Pro bioprosthetic TAVR; appears well seated with  grossly normal function, mean gradient 6 mmHg, peak velocity 1.7 m/sec.  _____________________________________________________________________________  __        Left Ventricle  The left ventricle is normal in size. There is mild concentric left  ventricular hypertrophy. The visual ejection fraction is estimated at 55-60%.  Left ventricular systolic function is normal. Diastolic Doppler findings (E/E'  ratio and/or other parameters) suggest left ventricular filling pressures are  increased. Septal wall motion abnormality may reflect pacemaker activation.     Right Ventricle  The right ventricle is mild to moderately dilated. There is a  catheter/pacemaker lead seen in the right ventricle. Mildly decreased right  ventricular systolic function.     Atria  The left atrium is mildly dilated. Right atrial size is normal.     Mitral Valve  The mitral valve is not well visualized. There is trace mitral regurgitation.        Tricuspid Valve  The tricuspid valve is normal in structure and function. There is trace  tricuspid regurgitation.     Aortic Valve  There is a bioprosthetic aortic valve. S/P #29 Medtronic Evolute Pro  bioprostheticTAVR; appears well seated with grossly normal function, mean  gradient 6 mmHg, peak velocity 1.7 m/sec.     Pulmonic Valve  The pulmonic valve is normal in structure and function.     Vessels  Normal size aorta. IVC diameter >2.1 cm collapsing <50% with sniff suggests a  high RA pressure estimated at 15 mmHg or greater.     Pericardium  There is no pericardial effusion.        Rhythm  The rhythm was paced.  _____________________________________________________________________________  __  MMode/2D Measurements & Calculations  IVSd: 1.2 cm     LVIDd: 3.5 cm  LVIDs: 2.8 cm  LVPWd: 1.5 cm  FS: 20.0 %  LV mass(C)d: 167.2  grams  LV mass(C)dI: 92.4 grams/m2  LA dimension: 3.3 cm  asc Aorta Diam: 3.4 cm  LVOT diam: 2.9 cm  LVOT area: 6.4 cm2  LA Volume (BP): 48.8 ml  LA Volume Index (BP): 27.0 ml/m2  RWT: 0.90           Doppler Measurements & Calculations  MV E max nico: 87.8 cm/sec  MV A max nico: 107.2 cm/sec  MV E/A: 0.82  MV dec slope: 604.1 cm/sec2  Ao V2 max: 167.7 cm/sec  Ao max P.0 mmHg  Ao V2 mean: 118.8 cm/sec  Ao mean P.4 mmHg  Ao V2 VTI: 24.7 cm  PETE(I,D): 3.2 cm2  PETE(V,D): 3.0 cm2  LV V1 max P.4 mmHg  LV V1 max: 78.1 cm/sec  LV V1 VTI: 12.1 cm  SV(LVOT): 78.2 ml  SI(LVOT): 43.2 ml/m2  PA acc time: 0.05 sec  Pulm Sys Nico: 69.6 cm/sec  Pulm Bustillo Nico: 44.9 cm/sec  Pulm S/D: 1.6  AV Nico Ratio (DI): 0.47  PETE Index (cm2/m2): 1.7  E/E' av.4  Lateral E/e': 15.8  Medial E/e': 13.0              _____________________________________________________________________________  __        Report approved by: Susan Ulrich 2019 04:31 PM      XR Chest 2 Views    Narrative    CHEST TWO VIEWS  2019 4:14 PM     HISTORY: 69-year-old patient with shortness of breath.       Impression    IMPRESSION: Since 2019, heart size is normal. Dual lead  pacemaker in the right hemithorax is unchanged. Scattered bibasilar  opacities again identified, similar to previous exam. Technique  interstitial pattern noted throughout both lungs. Patchy areas of  groundglass opacities previous exam have improved. No pleural effusion  or pneumothorax. Nevertheless, there are residual reticulointerstitial  patterns. Extensive spine hardware, unchanged.    MERCEDES CORRIGAN MD   Blood culture   Result Value Ref Range    Specimen Description Blood Left Arm     Special Requests Aerobic and anaerobic bottles received     Culture Micro No growth after 6 hours    Procalcitonin   Result Value Ref Range    Procalcitonin 0.40 ng/ml   Troponin I   Result Value Ref Range    Troponin I ES 0.119 (H) 0.000 - 0.045 ug/L   Glucose by meter    Result Value Ref Range    Glucose 165 (H) 70 - 99 mg/dL   Lactic acid level STAT   Result Value Ref Range    Lactate for Sepsis Protocol 3.0 (H) 0.7 - 2.0 mmol/L   Glucose by meter   Result Value Ref Range    Glucose 112 (H) 70 - 99 mg/dL   Lactic acid whole blood (AM Draw)   Result Value Ref Range    Lactic Acid 2.3 (H) 0.7 - 2.0 mmol/L   UA with Microscopic reflex to Culture   Result Value Ref Range    Color Urine Light Yellow     Appearance Urine Clear     Glucose Urine Negative NEG^Negative mg/dL    Bilirubin Urine Negative NEG^Negative    Ketones Urine Negative NEG^Negative mg/dL    Specific Gravity Urine 1.008 1.003 - 1.035    Blood Urine Negative NEG^Negative    pH Urine 7.0 5.0 - 7.0 pH    Protein Albumin Urine Negative NEG^Negative mg/dL    Urobilinogen mg/dL Normal 0.0 - 2.0 mg/dL    Nitrite Urine Negative NEG^Negative    Leukocyte Esterase Urine Negative NEG^Negative    Source Midstream Urine     WBC Urine <1 0 - 5 /HPF    RBC Urine 0 0 - 2 /HPF    Mucous Urine Present (A) NEG^Negative /LPF    Hyaline Casts 1 0 - 2 /LPF   Basic metabolic panel   Result Value Ref Range    Sodium 136 133 - 144 mmol/L    Potassium 4.3 3.4 - 5.3 mmol/L    Chloride 98 94 - 109 mmol/L    Carbon Dioxide 33 (H) 20 - 32 mmol/L    Anion Gap 5 3 - 14 mmol/L    Glucose 122 (H) 70 - 99 mg/dL    Urea Nitrogen 30 7 - 30 mg/dL    Creatinine 1.10 0.66 - 1.25 mg/dL    GFR Estimate 68 >60 mL/min/[1.73_m2]    GFR Estimate If Black 79 >60 mL/min/[1.73_m2]    Calcium 8.7 8.5 - 10.1 mg/dL   CBC with platelets   Result Value Ref Range    WBC 10.1 4.0 - 11.0 10e9/L    RBC Count 4.21 (L) 4.4 - 5.9 10e12/L    Hemoglobin 9.0 (L) 13.3 - 17.7 g/dL    Hematocrit 31.6 (L) 40.0 - 53.0 %    MCV 75 (L) 78 - 100 fl    MCH 21.4 (L) 26.5 - 33.0 pg    MCHC 28.5 (L) 31.5 - 36.5 g/dL    RDW 23.6 (H) 10.0 - 15.0 %    Platelet Count 493 (H) 150 - 450 10e9/L   Troponin I   Result Value Ref Range    Troponin I ES 0.107 (H) 0.000 - 0.045 ug/L   TSH with free T4  reflex   Result Value Ref Range    TSH 0.46 0.40 - 4.00 mU/L   Lipid panel reflex to direct LDL   Result Value Ref Range    Cholesterol 96 <200 mg/dL    Triglycerides 97 <150 mg/dL    HDL Cholesterol 19 (L) >39 mg/dL    LDL Cholesterol Calculated 58 <100 mg/dL    Non HDL Cholesterol 77 <130 mg/dL   Lactic acid whole blood (AM Draw)   Result Value Ref Range    Lactic Acid 1.2 0.7 - 2.0 mmol/L   Glucose by meter   Result Value Ref Range    Glucose 118 (H) 70 - 99 mg/dL

## 2019-09-26 NOTE — PROGRESS NOTES
MD Notification:     Notified Person: MD    Notified Person Name: Moon    Notification Date/Time: 9/25/2019 @ 2135     Notification Interaction: Telephone     Purpose of Notification: Critical Lab, Lactic Acid of 3.0    Orders Received: Lactic Acid recheck @ 0200 & 0600    Comments: VSS, pt asymptomatic.

## 2019-09-26 NOTE — CONSULTS
Care Transition Initial Assessment - SW     Met with: Patient    Active Problems:    GONZALEZ (dyspnea on exertion)       DATA  Lives With: child(day), adult, spouse   Living Arrangements: condominium  Quality of Family Relationships: helpful, involved  Description of Support System: Supportive, Involved  Who is your support system?: Wife, Children  Support Assessment: Adequate family and caregiver support.   Identified issues/concerns regarding health management:     Quality of Family Relationships: helpful, involved     Patient was admitted on 9/25/19 due shortness of breath.  Patient's anticipated date of discharge is TBD.  Social work met with patient and reviewed patient's chart.  Patient is a 68yo male with a history of aortic stenosis, and a recent TAVR on 9/4/19 complicated by complete heart block.  Patient lives with his wife and adult sons in a condo. Patient has adequate family and social supports.  Patient was moderately independent in functioning prior to this hospitalization.  Patient speaks English, but requests  to be present for his wife.  Per OT note, the recommendation for patient as discharge is to discharge home with outpatient cardiac rehab.  Patient is in agreement with the recommendation.    ASSESSMENT  Cognitive Status:  awake, alert and oriented  Concerns to be addressed: Discharge planning, home with outpatient cardiac rehab.     PLAN  Financial costs for the patient includes: TBD.  Patient/family is agreeable to the plan?  Yes  Transportation:  TBD  Patient Goals and Preferences: Home with outpatient cardiac rehab.  Patient anticipates discharging to: Home.    Social work will continue to monitor and follow for a safe, discharge plan.

## 2019-09-27 PROBLEM — I27.20 PULMONARY HYPERTENSION (H): Status: ACTIVE | Noted: 2019-01-01

## 2019-09-27 PROBLEM — I47.10 SVT (SUPRAVENTRICULAR TACHYCARDIA) (H): Status: ACTIVE | Noted: 2019-01-01

## 2019-09-27 NOTE — PLAN OF CARE
OT/CR: attempted session. Pt with another provider ~10 minutes.       Addendum- Attempted session, pt reporting fatigue and not receptive to working with therapy, despite encouragement.

## 2019-09-27 NOTE — PLAN OF CARE
A&O. VSS. Tele pacer- has sinus tach.  Mod carb diet.  Up with sba- on bedrest until 1900 from right heart cath. Denies pain. Pt scoring green on the Aggression Stop Light Tool. Plan:  adjusting meds, possible discharge tmrw.

## 2019-09-27 NOTE — PRE-PROCEDURE
GENERAL PRE-PROCEDURE:     Written consent obtained?: Yes    Risks and benefits: Risks, benefits and alternatives were discussed    DC Plan: Appropriate discharge home plan in place for patients who are going home after procedure   Consent given by:  Patient  Patient states understanding of procedure being performed: Yes    Patient's understanding of procedure matches consent: Yes    Procedure consent matches procedure scheduled: Yes    Expected level of sedation:  Moderate  Appropriately NPO:  Yes  ASA Class:  Class 3- Severe systemic disease, definite functional limitations  Mallampati  :  Grade 3- soft palate visible, posterior pharyngeal wall not visible  Lungs:  Lungs clear with good breath sounds bilaterally  Heart:  Normal heart sounds and rate  History & Physical reviewed:  History and physical reviewed and no updates needed  Statement of review:  I have reviewed the lab findings, diagnostic data, medications, and the plan for sedation

## 2019-09-27 NOTE — PROGRESS NOTES
Cardiology progress note addendum    Discussed with Dr. Jose, he believes that the tachycardia is likely sinus rather than persistent atrial tachycardia.  I suspect that he is probably right.  He has been treated very aggressively with diuretic therapy and I am concerned that he may be hypovolemic resulting in persistent sinus tachycardia.    As I have stated below, I am concerned that much of his shortness of breath symptoms may have been due to pulmonary hypertension and restrictive lung disease.  His previous angiogram on 7/30/2019 demonstrated mildly elevated pulmonary capillary wedge pressure prior to replacing the aortic valve for treatment of severe aortic stenosis and moderate aortic regurgitation and prior to stenting of his coronary artery disease.  At that time, he had severe pulmonary hypertension indicating that his pulmonary hypertension was not due to left heart disease predominantly but rather to pulmonary vascular disease or pulmonary fibrosis.      Will pursue right heart catheterization today to understand his pulmonary hypertension and volume status better and potentially redirect therapy. He might benefit from a vasodilator study as well.     I have discussed the risks including bleeding and infection. He agrees to proceed.     Jason Gregg MD          2nd Addendum:    RHC pressures reviewed with Dr. Williamson.    Rec trial of sildenifil 3 mg po TID as BP tolerates  Continue oral diuretic.  Follow up with Dr. Williamson in [facility] clinic as outpatient, next available.    Jason Gregg MD

## 2019-09-27 NOTE — PLAN OF CARE
Discharge Planner OT   Patient plan for discharge: Home.     Current status: BP sitting EOB 90/63. No reports of dizziness. O2 sats at 92% pre activity. Pt ambulated with SBA and 4ww x 75 ft. Slow pace, fair tolerance. O2 sats post exercise 90%. /71.     Barriers to return to prior living situation: None.     Recommendations for discharge: Home with OP CR @ [facility] after TAVR.     Rationale for recommendations: pt demos improved tolerance for exercise from session prior. Anticipate pt will continue to progress with IP CR for safe discharge home with OP CR after TAVR.        Entered by: Julia Marlow 09/27/2019 11:23 AM

## 2019-09-27 NOTE — PROVIDER NOTIFICATION
Notified dr. Gregg that bp is 85- order for lasix to be dcd- no other interventions at this time.

## 2019-09-27 NOTE — PLAN OF CARE
A&O x4. Pt denied pain. VSS, on 1 L NC. Up w/ SBA. Tele shows V Paced.  No new complaints. Continue to monitor.

## 2019-09-27 NOTE — PROGRESS NOTES
[facility]    Cardiology Progress Note    Primary Cardiologist: Dr. Nate Solorio   Date of Admission:  9/25/2019  Service date: 9/27/2019    Summary:  Mr. Beulah Jane is a very pleasant 69 year old male with a past medical history of severe aortic stenosis, status post recent TAVR on September 4, 2019, complicated by complete heart block, status post pacemaker implant, diabetes mellitus type 2, hypothyroidism, hyperlipidemia, hypertension, coronary artery disease, status post recent stenting of proximal-mid and distal LAD in July 2019, pulmonary fibrosis, obstructive sleep apnea on CPAP, chronic anemia, and dermatomyositis with chronic pain. He was admitted to [facility] on 9/25/19 for worsening dyspnea on exertion over the past week.     Assessment & Plan   1. Acute on chronic heart failure with preserved ejection fraction (HFpEF)   - Echo 9/25/19 showing normal LV systolic function with EF 55-60%, RV is mild to moderately dilated, and septal wall motion abnormality consistent with pacemaker activation. IVC diameter >2.1 cm collapsing <50% with sniff suggesting high RA pressure estimated at 15 mmHg or greater.  - NT pro BNP elevated at 10,794, increased from previous at 5,144 on 9/17/19.  - Admit wt 154 lbs, current wt 151 lbs. Dry weight previously thought to be around 150 lbs.  - Good urine output with -2.3 L out yesterday. Kidney function and electrolytes stable with creatinine 1.05 today.  - Unfortunately, continues to feel poorly with shortness of breath unchanged and now weakness and lightheadedness with soft BPs.  2. Severe pulmonary hypertension  - Right heart cath 7/30/19 demonstrated severe pulmonary hypertension with mildly elevated pulmonary capillary wedge pressure.    - Suspect a significant component of pulmonary vascular disease contributing to the pulmonary hypertension.  3. Severe aortic stenosis, status post recent TAVR with 29mm Medtronic Evolut  bioprosthetic valve on 9/4/19  - Valve appears well seated on echo with grossly normal function, mean gradient 6 mmHg, peak velocity 1.7 m/sec.  4. Coronary artery disease  - Coronary angiogram 7/30/19 showed two vessel obstructive CAD with diffuse calcified disease of the prox-mid LAD and  of small, non-dominant RCA with R-R collaterals. Two stents were placed to the proximal-mid LAD and distal LAD.  - Initial troponin elevated at 0.106 and flat. Suspect elevation from demand ischemia.  - EKG without clear ischemic changes in comparison to prior EKG, remains V. Paced.    - On DAPT with brilinta and aspirin, as well as atorvastatin.  - Denies chest pain.  5. Complete Heart Block status post PPM  - Last device check 9/13/19 with normal pacemaker function, no atrial or ventricular arrhythmias recorded. Intrinsic rhythm of SR 93 bpm w/ CHB.   - He has been persistently tachycardic on admission with HR in the 90s-100s at rest. Plan for a device interrogation today to further evaluate this.   6. Chronic anemia  7. Type 2 diabetes mellitus, insulin dependent   8. Obstructive sleep apnea on CPAP  9. Dermatomyositis with chronic pain   10. Interstitial lung disease  11. Hypothyroidism    Plan:   1. Will transition from 40 mg IV lasix b.i.d. to 40 mg PO once daily given soft blood pressures and lack of improvement in symptoms with stronger diuresis yesterday.   2. Device interrogation today to further evaluate persistent tachycardia.  3. Will discuss the plan with Dr. Gregg regarding repeat right heart catheterization for reassessment of pulmonary hypertension.     Disposition Plan   Expected discharge in 1-3 days to prior living arrangement pending plan for right heart catheterization and findings on device interrogation.     Entered: Silas Mendez 09/27/2019, 9:24 AM     Interval History   Mr. Catherine Jane unfortunately continues to feel poorly without significant improvement in his shortness of breath despite good  urine output with 2.3 L out yesterday. His BP has been soft and he has felt lightheaded and fatigued. He denies chest pain or palpitations.     We discussed that severe pulmonary hypertension and interstitial lung disease are likely the primary cause of his shortness of breath. Reviewed that a right heart catheterization could help further assess this and direct treatment. Reviewed the plan to interrogate his pacemaker today to evaluate why he has been tachycardic to determine if this may be an underlying cause contributing to his symptoms as well. He stated understanding and agreement with this plan.    Telemetry: V. Paced with HR 90s to low 100s    Thank you for the opportunity to participate in this pleasant patient's care.     Silas Mendez NP  Text Page  (8am - 5pm, M-F)    Patient Active Problem List   Diagnosis     Spinal stenosis of lumbar region with neurogenic claudication     Severe aortic stenosis     Hypertension     Hypothyroidism     Mixed hyperlipidemia     Inflammatory arthritis     Pulmonary fibrosis (H)     Type 2 diabetes mellitus with neurologic complication, with long-term current use of insulin (H)     Controlled substance agreement signed     Acute pulmonary embolism (H)     Status post non-ST elevation myocardial infarction (NSTEMI)     Coronary artery disease involving native coronary artery of native heart with other form of angina pectoris (H)     Congestive heart failure, NYHA class III, chronic, diastolic (H)     S/P TAVR (transcatheter aortic valve replacement)     GONZALEZ (dyspnea on exertion)       Physical Exam   Temp: 98  F (36.7  C) Temp src: Oral BP: 95/66 Pulse: 109 Heart Rate: 110 Resp: 18 SpO2: 95 % O2 Device: Nasal cannula Oxygen Delivery: 1 LPM  Vitals:    09/25/19 1328 09/26/19 0159 09/27/19 0609   Weight: 69.9 kg (154 lb) 67.4 kg (148 lb 8 oz) 68.9 kg (151 lb 14.4 oz)     Vital Signs with Ranges  Temp:  [97.7  F (36.5  C)-98  F (36.7  C)] 98  F (36.7  C)  Pulse:  [109]  109  Heart Rate:  [105-110] 110  Resp:  [18] 18  BP: ()/(62-67) 95/66  SpO2:  [95 %-96 %] 95 %  I/O last 3 completed shifts:  In: 603 [P.O.:600; I.V.:3]  Out: 2305 [Urine:2305]    Constitutional: Appears his stated age, sitting up in bed, and in no acute distress.  Eyes: Pupils equal, round. Sclerae anicteric.   HEENT: Normocephalic, atraumatic.   Neck: Estimated JVP 10 cm   Respiratory: Breathing non-labored. Lungs diminished with fine crackles in the bases.  Cardiovascular: Regular rhythm, rapid rate, normal S1 and S2. No murmur, rub, or gallop.  GI: Soft, non-distended, non-tender.  Skin: Warm, dry. No rashes, cyanosis, or xanthelasma.  Musculoskeletal/Extremities: Moves all extremities well and symmetrically. 1+ lower extremity edema bilaterally.  Neurologic: No gross motor deficits. Alert and oriented to person, place and time.  Psychiatric: Affect appropriate.      Medications       aspirin  81 mg Oral Daily     atorvastatin  40 mg Oral Daily     calcium carbonate 500 mg (elemental)  1 tablet Oral BID     docusate sodium  100 mg Oral BID     ferrous gluconate  324 mg Oral Daily with breakfast     fish oil-omega-3 fatty acids  2 capsule Oral QAM     folic acid  1 mg Oral QAM     furosemide  40 mg Intravenous BID     gabapentin  600 mg Oral TID     insulin aspart  1-7 Units Subcutaneous TID AC     insulin aspart  1-5 Units Subcutaneous At Bedtime     ipratropium - albuterol 0.5 mg/2.5 mg/3 mL  3 mL Nebulization 4x daily     levofloxacin  500 mg Intravenous Q24H     levothyroxine  50 mcg Oral QAM AC     multivitamin w/minerals  1 tablet Oral Daily     pantoprazole  40 mg Oral QAM AC     polyethylene glycol  17 g Oral BID     sodium chloride (PF)  3 mL Intracatheter Q8H     ticagrelor  90 mg Oral BID       Data   Results for orders placed or performed during the hospital encounter of 09/25/19 (from the past 24 hour(s))   Glucose by meter   Result Value Ref Range    Glucose 167 (H) 70 - 99 mg/dL   Glucose by  meter   Result Value Ref Range    Glucose 149 (H) 70 - 99 mg/dL   Basic metabolic panel   Result Value Ref Range    Sodium 135 133 - 144 mmol/L    Potassium 4.0 3.4 - 5.3 mmol/L    Chloride 95 94 - 109 mmol/L    Carbon Dioxide 33 (H) 20 - 32 mmol/L    Anion Gap 7 3 - 14 mmol/L    Glucose 139 (H) 70 - 99 mg/dL    Urea Nitrogen 28 7 - 30 mg/dL    Creatinine 1.18 0.66 - 1.25 mg/dL    GFR Estimate 62 >60 mL/min/[1.73_m2]    GFR Estimate If Black 72 >60 mL/min/[1.73_m2]    Calcium 9.0 8.5 - 10.1 mg/dL   Glucose by meter   Result Value Ref Range    Glucose 140 (H) 70 - 99 mg/dL   Glucose by meter   Result Value Ref Range    Glucose 168 (H) 70 - 99 mg/dL   Glucose by meter   Result Value Ref Range    Glucose 111 (H) 70 - 99 mg/dL   Basic metabolic panel   Result Value Ref Range    Sodium 132 (L) 133 - 144 mmol/L    Potassium 4.0 3.4 - 5.3 mmol/L    Chloride 94 94 - 109 mmol/L    Carbon Dioxide 33 (H) 20 - 32 mmol/L    Anion Gap 5 3 - 14 mmol/L    Glucose 124 (H) 70 - 99 mg/dL    Urea Nitrogen 23 7 - 30 mg/dL    Creatinine 1.05 0.66 - 1.25 mg/dL    GFR Estimate 72 >60 mL/min/[1.73_m2]    GFR Estimate If Black 83 >60 mL/min/[1.73_m2]    Calcium 8.8 8.5 - 10.1 mg/dL   Hemoglobin   Result Value Ref Range    Hemoglobin 9.5 (L) 13.3 - 17.7 g/dL   Platelet count   Result Value Ref Range    Platelet Count 504 (H) 150 - 450 10e9/L   Glucose by meter   Result Value Ref Range    Glucose 114 (H) 70 - 99 mg/dL

## 2019-09-27 NOTE — CONSULTS
[facility]    Cardiac Electrophysiology Consultation     Date of Admission:  9/25/2019  Date of Consult (When I saw the patient): 09/27/19    Assessment & Plan   Beulah Jane is a 69 year old male who was admitted on 9/25/2019. I was asked to see the patient for ?SVT. Complicated with CAD and severe aortic stenosis, s/p PCI and subsequent TAVR several weeks ago at the [facility] complicated by complete heart block required a ppm. Pt has intrinsic RBBB and chb not unexpected post TAVR. Presented with worsening sob. ECG shows tachycardia with ventricular pacing at a rate of 100 bpm. Occasionally rate would go up to 120's.     Device interrogated by me personally reveals one-to-one AV conduction. ECG shows similar p-wave morphology as with sinus rhythm along with known RBBB pre-TAVR. Doubtful this is a focal AT but rather just sinus tachycardia likely secondary to underlying cause of sob. Could forced RV pacing be a contributing factor with dyssynchrony? Given nl LVEF is unlikely but nevertheless I programmed max AV delay to promote intrinsic AV conduction. No need for EP study and eval pulmonary as you have.     Ronnie Vu    Code Status    Full Code    Primary Care Physician   Hoa López    History is obtained from the patient    Past Medical History   I have reviewed this patient's medical history and updated it with pertinent information if needed.   Past Medical History:   Diagnosis Date     Aortic stenosis      Chronic pain      DM (diabetes mellitus), type 2 (H)     on insulin     Hyperlipidemia      JUAN on CPAP      Pneumonia      Polymyositis (H)      Pulmonary fibrosis, unspecified (H)      Seasonal allergies        Past Surgical History   I have reviewed this patient's surgical history and updated it with pertinent information if needed.  Past Surgical History:   Procedure Laterality Date     ARTHROSCOPY KNEE  1970     COLONOSCOPY       CV CORONARY ANGIOGRAM N/A 7/30/2019     Procedure: CV CORONARY ANGIOGRAM;  Surgeon: Leonidas Mike MD;  Location:  HEART CARDIAC CATH LAB     CV LEFT HEART CATH N/A 7/30/2019    Procedure: CV LEFT HEART CATH;  Surgeon: Leonidas Mike MD;  Location:  HEART CARDIAC CATH LAB     CV PCI STENT DRUG ELUTING Left 7/30/2019    Procedure: PCI Stent Drug Eluting;  Surgeon: Leonidas Mike MD;  Location:  HEART CARDIAC CATH LAB     CV RIGHT HEART CATH N/A 7/30/2019    Procedure: CV RIGHT HEART CATH;  Surgeon: Leonidas Mike MD;  Location:  HEART CARDIAC CATH LAB     CV TRANSCATHETER AORTIC VALVE REPLACEMENT N/A 9/4/2019    Procedure: Transcatheter (Medtronic - 29mm) Aortic Valve Replacement, trans thorasic echocardiogram, perfusion and cardiac surgery standby, temporary pacemeker placement;  Surgeon: Pj Garcia MD;  Location: [facility] OR     DECOMPRESSION, FUSION LUMBAR POSTERIOR TWO LEVELS, COMBINED  1997     EP PACEMAKER N/A 9/6/2019    Procedure: EP PACEMAKER;  Surgeon: Shefali Sylvester MD;  Location: [facility] CARDIAC CATH LAB     FUSION LUMBAR ANTERIOR THREE+ LEVELS N/A 4/22/2019    Procedure: Lumbar 1 Or Lumbar 2-5 Anterior Lumbar Interbody Fusion with BMP Stage 1 Of Two Procedure:;  Surgeon: Carlos Enrique Dial MD;  Location:  OR     HEART CATH FEMORAL CANNULIZATION WITH OPEN STANDBY REPAIR AORTIC VALVE N/A 9/4/2019    Procedure: Cardiopulmonary Bypass Standby;  Surgeon: Hamilton Carnes MD;  Location:  OR     HERNIA REPAIR  2006     lumbar spine hardware removal  1999     OPTICAL TRACKING SYSTEM FUSION POSTERIOR SPINE THORACIC THREE+ LEVELS N/A 4/25/2019    Procedure: O-Arm/Stealth Assisted Thoracic 10-Pelvis Instrumented Fusion T11-2, L1-2, L2-3 Transforminal Lumbar Interbody Fusion (TLIF) And Smith Borges Osteotomies,and L3-4 SPO as well, Use Of BMP, Debridement Of Seroma;  Surgeon: Carlos Enrique Dial MD;  Location: [facility] OR     PICC INSERTION Right 05/06/2019    4Fr - 37cm, Basilic vein,  low SVC     REMOVAL PROSTHETIC MATERIAL/MESH, ABD WALL NECRO TISS INFEXN  2012     ROTATOR CUFF REPAIR RT/LT  2003       Prior to Admission Medications   Prior to Admission Medications   Prescriptions Last Dose Informant Patient Reported? Taking?   Cholecalciferol (VITAMIN D) 2000 units tablet 9/25/2019 at am Self No Yes   Sig: Take 2,000 Units by mouth daily   LANsoprazole (PREVACID) 30 MG DR capsule 9/25/2019 at am Self Yes Yes   Sig: Take 30 mg by mouth every morning (before breakfast)   albuterol (2.5 MG/3ML) 0.083% neb solution prn Self Yes Yes   Sig: Take 1 vial by nebulization every 6 hours as needed for shortness of breath / dyspnea or wheezing   albuterol (PROAIR HFA/PROVENTIL HFA/VENTOLIN HFA) 108 (90 BASE) MCG/ACT Inhaler Not using Self Yes No   Sig: Inhale 2 puffs into the lungs as needed for shortness of breath / dyspnea or wheezing    aspirin (ASA) 81 MG EC tablet 9/25/2019 at am  No Yes   Sig: Take 1 tablet (81 mg) by mouth daily Start tomorrow morning.   atorvastatin (LIPITOR) 40 MG tablet 9/25/2019 at am  No Yes   Sig: Take 1 tablet (40 mg) by mouth daily   benzocaine-menthol (CEPACOL) 15-3.6 MG lozenge prn  No Yes   Sig: Place 1 lozenge inside cheek every hour as needed for sore throat   calcium carbonate (OS-DMITRIY 500 MG Yuhaaviatam. CA) 500 MG tablet 9/25/2019 at am Self No Yes   Sig: Take 1 tablet (500 mg) by mouth 2 times daily   cetirizine (ZYRTEC) 10 MG tablet Past Week at Unknown time  Yes Yes   Sig: Take 10 mg by mouth daily as needed    diphenhydrAMINE (BENADRYL) 50 MG capsule Past Month at Unknown time Self Yes Yes   Sig: Take 50 mg by mouth as needed for itching or allergies    folic acid (FOLVITE) 1 MG tablet 9/25/2019 at am  No Yes   Sig: Take 1 tablet (1 mg) by mouth every morning   furosemide (LASIX) 40 MG tablet 9/25/2019 at am  No Yes   Sig: Take 2 tablets (80 mg) by mouth 2 times daily   gabapentin (NEURONTIN) 600 MG tablet 9/25/2019 at am  No Yes   Sig: Take 2 tablets (1,200 mg) by mouth  "3 times daily   gemfibrozil (LOPID) 600 MG tablet 9/25/2019 at am Self Yes Yes   Sig: Take 600 mg by mouth 2 times daily    insulin NPH (HUMULIN N/NOVOLIN N VIAL) 100 UNIT/ML vial More than a month at Unknown time  No No   Sig: Inject 2-6 Units Subcutaneous 2 times daily Will usually give if blood glucose >140   insulin syringe-needle U-100 (29G X 1/2\" 1 ML) 29G X 1/2\" 1 ML miscellaneous   No No   Sig: Inject 1 Syringe Subcutaneous 2 times daily   levothyroxine (SYNTHROID/LEVOTHROID) 50 MCG tablet 9/25/2019 at am Self Yes Yes   Sig: Take 50 mcg by mouth every morning    methotrexate 2.5 MG tablet 9/24/2019 at am  No Yes   Sig: Take 10 tablets (25 mg) by mouth once a week Tuesday   multivitamin w/minerals (THERA-VIT-M) tablet 9/25/2019 at am Self No Yes   Sig: Take 1 tablet by mouth daily   omega 3 1000 MG CAPS 9/25/2019 at am Self Yes Yes   Sig: Take 2 capsules by mouth every morning    oxyCODONE-acetaminophen (PERCOCET) 7.5-325 MG per tablet 9/25/2019 at am  No Yes   Sig: Take 1 tablet by mouth every 6 hours as needed for severe pain (max 3 tablets daily) Dispense 09/23/19. OK to start  09/25/19   ticagrelor (BRILINTA) 90 MG tablet 9/25/2019 at am  No Yes   Sig: Take 1 tablet (90 mg) by mouth 2 times daily Start this evening.      Facility-Administered Medications Last Administration Doses Remaining   0.9% sodium chloride BOLUS None recorded    0.9% sodium chloride BOLUS None recorded         Allergies   No Known Allergies    Social History   I have reviewed this patient's social history and updated it with pertinent information if needed. Beulah Walshjoshua Jane  reports that he quit smoking about 49 years ago. He smoked 0.25 packs per day. He has never used smokeless tobacco. He reports current alcohol use. He reports that he does not use drugs.    Family History   I have reviewed this patient's family history and updated it with pertinent information if needed.   Family History   Problem Relation Age of Onset     " Colon Cancer Mother      Hypertension Father         did not know father well       Review of Systems   Comprehensive review of systems was performed with pertinent positives and negatives listed in assessment and plan section.    Physical Exam   Temp: 98.1  F (36.7  C) Temp src: Oral BP: (!) 85/56 Pulse: 109 Heart Rate: 112 Resp: 18 SpO2: 90 % O2 Device: None (Room air) Oxygen Delivery: 1 LPM  Vital Signs with Ranges  Temp:  [97.6  F (36.4  C)-98.1  F (36.7  C)] 98.1  F (36.7  C)  Pulse:  [109] 109  Heart Rate:  [] 112  Resp:  [18] 18  BP: ()/(56-72) 85/56  SpO2:  [90 %-96 %] 90 %  151 lbs 14.4 oz    Constitutional: awake, alert, cooperative, no apparent distress, and appears stated age  Eyes: Lids and lashes normal, pupils equal, round and reactive to light, extra ocular muscles intact, sclera clear, conjunctiva normal  ENT: Normocephalic, without obvious abnormality, atraumatic, sinuses nontender on palpation, external ears without lesions, oral pharynx with moist mucous membranes, tonsils without erythema or exudates, gums normal and good dentition.  Hematologic / Lymphatic: no cervical lymphadenopathy  Respiratory: No increased work of breathing, good air exchange, clear to auscultation bilaterally, no crackles or wheezing  Cardiovascular: tachycardic with regular rhythm  GI: No scars, normal bowel sounds, soft, non-distended, non-tender, no masses palpated, no hepatosplenomegally  Skin: no bruising or bleeding  Musculoskeletal: There is no redness, warmth, or swelling of the joints.  Full range of motion noted.  Neurologic: Awake, alert,   Neuropsychiatric: General: normal, calm and normal eye contact    Data   I personally reviewed all recent ECGs and images.  Results for orders placed or performed during the hospital encounter of 09/25/19 (from the past 24 hour(s))   Glucose by meter   Result Value Ref Range    Glucose 149 (H) 70 - 99 mg/dL   Basic metabolic panel   Result Value Ref Range     Sodium 135 133 - 144 mmol/L    Potassium 4.0 3.4 - 5.3 mmol/L    Chloride 95 94 - 109 mmol/L    Carbon Dioxide 33 (H) 20 - 32 mmol/L    Anion Gap 7 3 - 14 mmol/L    Glucose 139 (H) 70 - 99 mg/dL    Urea Nitrogen 28 7 - 30 mg/dL    Creatinine 1.18 0.66 - 1.25 mg/dL    GFR Estimate 62 >60 mL/min/[1.73_m2]    GFR Estimate If Black 72 >60 mL/min/[1.73_m2]    Calcium 9.0 8.5 - 10.1 mg/dL   Glucose by meter   Result Value Ref Range    Glucose 140 (H) 70 - 99 mg/dL   Glucose by meter   Result Value Ref Range    Glucose 168 (H) 70 - 99 mg/dL   Glucose by meter   Result Value Ref Range    Glucose 111 (H) 70 - 99 mg/dL   Basic metabolic panel   Result Value Ref Range    Sodium 132 (L) 133 - 144 mmol/L    Potassium 4.0 3.4 - 5.3 mmol/L    Chloride 94 94 - 109 mmol/L    Carbon Dioxide 33 (H) 20 - 32 mmol/L    Anion Gap 5 3 - 14 mmol/L    Glucose 124 (H) 70 - 99 mg/dL    Urea Nitrogen 23 7 - 30 mg/dL    Creatinine 1.05 0.66 - 1.25 mg/dL    GFR Estimate 72 >60 mL/min/[1.73_m2]    GFR Estimate If Black 83 >60 mL/min/[1.73_m2]    Calcium 8.8 8.5 - 10.1 mg/dL   Hemoglobin   Result Value Ref Range    Hemoglobin 9.5 (L) 13.3 - 17.7 g/dL   Platelet count   Result Value Ref Range    Platelet Count 504 (H) 150 - 450 10e9/L   Glucose by meter   Result Value Ref Range    Glucose 114 (H) 70 - 99 mg/dL   Glucose by meter   Result Value Ref Range    Glucose 136 (H) 70 - 99 mg/dL

## 2019-09-27 NOTE — PROGRESS NOTES
[facility]  Hospitalist Progress Note   09/27/2019     Encounter in the presence of a .         Assessment and Plan:       Beulah Jane is a 69 year old  male with medical history of severe aortic stenosis, recent Susan, complete heart block, recent pacemaker placement, heart failure with preserved ejection fraction, hypertension, hyperlipidemia, diabetes, dermatomyositis admitted 9/25/2019 for shortness of breath.    Dyspnea on exertion multifactorial, recent surgery, exacerbation of heart failure with preserved ejection fraction, pulm hypertension, tachycardia physical deconditioning.  Acute exacerbation of heart failure with preserved EF improved.  Severe aortic stenosis s/p TAVR, 29mm Medtronic Evolut  Complete Heart Block status post PPM  HFpEF  Admitted from 8/29 - 9/7 at [facility] cardiology unit for acute exacerbation of heart failure with preserved ejection fraction and NSTEMI.  Then had severe AS, underwent TAVR on 9/4. TAVR procedure complicated by complete heart block after valve deployment, otherwise uncomplicated; he did have a pre-existing RBBB. He was bridged to pacemaker (9/6) with a temporary pacing wire and remained 100% V-paced  after surgery.Dismissed home on Lasix and PRN metolazone.    Presented with worsening shortness of breath, leg swelling, unable to walk few steps. Increase in dose of Lasix to 80 mg twice daily not very successful weight 154 pounds.  On exam 1+ pitting pedal edema with bilateral decreased breath sounds or crackles.  WBC 13.9, hemoglobin 9.9,  potassium 3.3, creatinine 1.20.  proBNP 27528  troponin 0.106 and has been flat.  EKG sinus rhythm, left axis deviation.  Nonspecific ST-T wave changes.  Echocardiogram left ventricle systolic function normal, estimated EF at 55 to 60%.  Right ventricle mild to moderately dilated, mildly decreased right ventricular systolic function.S/P #29 Medtronic Evolute Pro bioprosthetic  TAVR; appears well seated with grossly normal function  Chest x-ray dual-lead pacemaker in the right hemithorax unchanged.  Scattered bibasilar opacities again seen. Patchy areas of grounds place opacities since previous exam have improved.     Has been undergoing diuresis with intravenous Lasix 40 mg twice daily, appears to be euvolemic, discussed with cardiology and switch to Lasix 20 mg oral twice daily.  Used to have tachycardia, cardiology will discuss with EP and consider right heart cath.  Appreciate comanagement  Continue PTA aspirin and Brilanta and atorvastatin.  Telemetry monitoring.  Low-salt diet, 2 L fluid restriction daily.  Strict input output monitoring, daily weights.  Aggressive incentive spirometry.     Possible acute bronchitis.  Patient having ongoing cough.  Afebrile.  WBC 13.2.  Procalcitonin 0.40.  Wife recently treated for pneumonia.  Is on IV levofloxacin [9/25], switch to oral Levaquin for total of 5 days.  Supportive care.     CAD s/p PCI:  s/p PCI p-mLAD and dLAD 7/30/19.  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 Currently stable on MTX.   Postprocedure patient needed oxygen, due to see pulmonology on 10/9/2019.  Recent imaging reviewed.  CRP 22.8 ESR 24.  Hold PTA methotrexate during hospitalization.  Hold PTA albuterol inhaler, as needed albuterol.  Switch to prn DuoNeb 4 times a day.  If worsening shortness of breath will consider pulmonology eval, CT chest.  Aggressive incentive spirometry.     Dermatomyositis w/Shannon-1 Positivity:  Chronic pain.  Patient has been diagnosed with polymyositis clinically, not by muscle biopsy. He did have a lung biopsy that showed fibrosis of unknown etiology. Has ILD on CT and blood work showed a positive Shannon-1 antibody. PFTs showed worsening DLCO by 11% from 7/2018-1/2019.   Hold PTA methotrexate 25 mg weekly, next dose  9/10  Continue folic acid 1 mg daily  PTA Gabapentin 1200mg tid, decrease dose to 600 mg 3 times daily given kidney injury.  PTA PRN Oxycodone 7.5/325 mg tid continued.    Reactive thrombocytosis.  Platelets improving from 699 > 504  Continue to monitor.    Status post lactic acidosis likely from hypoxia and volume overload.  Lactic acid 3.0, with intravenous diuresis trended down to 1.2     Diabetes mellitus hemoglobin A1c of 6.7.  PTA administers NPH insulin 2 to 4 units twice daily if blood sugars greater than 140.  PTA NPH insulin on hold and placed on sliding scale.  Monitor blood sugars during hospitalization.     Hyperlipidemia  Triglycerides 97, LDL 58, HDL 19.  Discontinued PTA gemfibrozil as patient complaining of muscle aches and lipid panel at goal.  Continue PTA atorvastatin.    Hypothyroidism  TSH 0.46.  Continue PTA levothyroxine.     JUAN  Continue home CPAP at home settings     PAD  Diagnosed as an outpatient per podiatry, pt has been referred to Vascular Surgery due to L foot wound.   Further management/workup as outpatient     Back pain Status post back surgery 4/2019.  Underwent T11-2, L1-2, L2-3 Transforminal Lumbar Interbody Fusion (TLIF) And Smith Borges Osteotomies,and L3-4 SPO as well.  Has intermittent back pain.  Continue pain meds as above.     Mild hypokalemia from aggressive diuresis.  Mild acute kidney injury likely from volume overload, questionable diuresis.  Potassium 3.3.  Creatinine 1.20.  Magnesium 2.2.  Monitor renal function, electrolytes while on diuresis and replace accordingly.     Anemia of chronic disease.  Baseline hemoglobin between 8-10.  Per patient last colonoscopy when he was in [place] many years of back. Hb @ 9.  Mild saturation index 14.  Monitor levels periodically, defer age-appropriate health maintenance including colonoscopy to all PCP.  Will start on oral ferrous gluconate x1 daily.     Acute constipation from narcotics   Patient currently on narcotics,  gabapentin.  Physical deconditioning and minimal mobility.  Scheduled docusate, MiraLAX twice daily.  PRN Dulcolax.     Physical deconditioning from acute illness, chronic back pain.  Patient having assistance from wife, sons at home.  UA negative, TSH within normal limits.  PT, OT assessment-Home with outpatient cardiac rehab.     Orders Placed This Encounter      Combination Diet 6216-8623 Calories: Moderate Consistent CHO (4-6 CHO units/meal); Low Saturated Fat Na <2400mg Diet      DVT Prophylaxis: SCD, ambulate.  Code Status: Full Code  Disposition: Expected discharge in 1 to 2 days pending clinical improvement.    Discussed with patient, bedside RN, cardiologist.  Total time greater than 25 minutes, using  services.    Negar Mustafa MD        Interval History:      Patient lying in bed, admits to improvement with shortness of breath, complaining of generalized weakness.  Afebrile overnight.  Denies any chest pain or palpitation.  No nausea vomiting, tolerating oral diet.  No headache or dizziness.         Physical Exam:        Physical Exam   Temp:  [97.6  F (36.4  C)-98.1  F (36.7  C)] 98.1  F (36.7  C)  Pulse:  [108-109] 108  Heart Rate:  [] 112  Resp:  [18] 18  BP: ()/(56-72) 85/56  SpO2:  [90 %-96 %] 90 %    Intake/Output Summary (Last 24 hours) at 9/26/2019 1056  Last data filed at 9/26/2019 0900  Gross per 24 hour   Intake 903 ml   Output 2155 ml   Net -1252 ml       Admission Weight: 69.9 kg (154 lb)  Current Weight: 67.4 kg (148 lb 8 oz)    PHYSICAL EXAM  GENERAL: Patient is in no distress. Alert and oriented.  CVS irregular rate and rhythm. S1S2.  Systolic murmur aortic area.  LUNGS: Bilateral decreased breath sounds, no crackles.  NEURO moving all extremities, no focal weakness.  EXTREMITIES: 2+ peripheral pulses. trace pedal edema.  SKIN: Warm, dry.   PSYCHIATRY Cooperative         Medications:          aspirin  81 mg Oral Daily     atorvastatin  40 mg Oral Daily      calcium carbonate 500 mg (elemental)  1 tablet Oral BID     docusate sodium  100 mg Oral BID     ferrous gluconate  324 mg Oral Daily with breakfast     fish oil-omega-3 fatty acids  2 capsule Oral QAM     folic acid  1 mg Oral QAM     furosemide  40 mg Oral BID     gabapentin  600 mg Oral TID     insulin aspart  1-7 Units Subcutaneous TID AC     insulin aspart  1-5 Units Subcutaneous At Bedtime     ipratropium - albuterol 0.5 mg/2.5 mg/3 mL  3 mL Nebulization 4x daily     levofloxacin  500 mg Intravenous Q24H     levothyroxine  50 mcg Oral QAM AC     multivitamin w/minerals  1 tablet Oral Daily     pantoprazole  40 mg Oral QAM AC     polyethylene glycol  17 g Oral BID     sodium chloride (PF)  3 mL Intracatheter Q8H     ticagrelor  90 mg Oral BID     acetaminophen, acetaminophen, albuterol, benzocaine-menthol, bisacodyl, calcium carbonate, cetirizine, glucose **OR** dextrose **OR** glucagon, HYDROmorphone, lidocaine 4%, lidocaine (buffered or not buffered), magnesium sulfate, magnesium sulfate, melatonin, naloxone, ondansetron **OR** ondansetron, oxyCODONE-acetaminophen, potassium chloride, potassium chloride with lidocaine, potassium chloride, potassium chloride, potassium chloride, sodium chloride (PF)         Data:      All new lab and imaging data was reviewed.

## 2019-09-28 NOTE — PLAN OF CARE
Discharge Planner OT   Patient plan for discharge: Home.     Current status: Pt very slow to engage in activity due to SOB. Pt ambulated x 80 ft but needed one seated rest break. Pt reports feeling SOB, unable to obtain accurate O2 sat reading due to cold fingers. BP stable.  at rest and post exercise. Pt very fatigued this date and unable to tolerate much exercise.     Barriers to return to prior living situation: None.     Recommendations for discharge: Home with OP CR @ [facility] after TAVR. Family A with cooking, cleaning, laundry due to poor ax tolerance.     Rationale for recommendations: pt limited by feeling SOB and not tolerating much exercise this date. Pt reports he has family that can A with higher level IADL's at discharge.  Anticipate pt will continue to progress with IP CR for safe discharge home with OP CR after TAVR.        Entered by: Julia Marlow 09/28/2019 10:14 AM

## 2019-09-28 NOTE — PROGRESS NOTES
"EP- Cardiology Progress Note           Assessment and Plan:     68 yo M with a  Hx of DM, HL, HTN,  pulmonary fibrosis. JUAN on CPAP, chronic anemia, dermatomyositis, CAD (PCI to LAD in 07/2019) and severe aortic stenosis (TAVR on 09/04/ 2019), complicated by CHB (PPM implant), who p/w CHF.    Plan:  1. CHF.  He is mildly fluid overload on physical exam.  There is a good possibility shortness of breath was related to underlying lung disease.  Plan:   -Continue diuresis.  Goal of a liter negative next 24 hours.    -Strict I/O.    -Increase torsemide to 20 mg twice a day.  -No wheezing on physical exam, however I will consider switching Brilinta to Plavix if respiratory symptoms persist despite of diuresis.     2. HTN.  BP is well controlled.  3. CAD (PCI to LAD 07/2019).  Continue aspirin/Brilinta and Lipitor.   4.  Lung fibrosis.  Plan is detailed above.  5.  JUAN.  Continue CPAP at night.    Physical Exam:  Vitals: /67   Pulse 95   Temp 98.3  F (36.8  C) (Oral)   Resp 18   Ht 1.702 m (5' 7\")   Wt 67.2 kg (148 lb 1.6 oz)   SpO2 91%   BMI 23.20 kg/m        Intake/Output Summary (Last 24 hours) at 9/28/2019 1024  Last data filed at 9/28/2019 0659  Gross per 24 hour   Intake 865 ml   Output 1300 ml   Net -435 ml     Vitals:    09/25/19 1328 09/26/19 0159 09/27/19 0609 09/28/19 0500   Weight: 69.9 kg (154 lb) 67.4 kg (148 lb 8 oz) 68.9 kg (151 lb 14.4 oz) 67.2 kg (148 lb 1.6 oz)       Constitutional:  AAO x3.  Pt is in NAD.  HEAD: Normocephalic.  SKIN: Skin normal color, texture and turgor with no lesions or eruptions.  Eyes: PERRL, EOMI.  ENT:  Supple, normal JVP. No lymphadenopathy or thyroid enlargement.  Chest:  Rales in R-base  Cardiac:  RRR, normal  S1 and S2.  No murmurs rubs or gallop.   Abdomen:  Normal BS.  Soft, non-tender and non-distended.  No rebound or guarding.    Extremities:  Pedious pulses palpable B/L.  No LE edema noticed.   Neurological: Strength and sensation grossly symmetric and " intact throughout.                            Review of Systems:   As per subjective, otherwise 5 systems reviewed and negative.         Medications:          aspirin  81 mg Oral Daily     atorvastatin  40 mg Oral Daily     calcium carbonate 500 mg (elemental)  1 tablet Oral BID     docusate sodium  100 mg Oral BID     ferrous gluconate  324 mg Oral Daily with breakfast     fish oil-omega-3 fatty acids  2 capsule Oral QAM     folic acid  1 mg Oral QAM     gabapentin  600 mg Oral TID     [START ON 9/29/2019] influenza Vac Split High-Dose  0.5 mL Intramuscular Prior to discharge     insulin aspart  1-7 Units Subcutaneous TID AC     insulin aspart  1-5 Units Subcutaneous At Bedtime     ipratropium - albuterol 0.5 mg/2.5 mg/3 mL  3 mL Nebulization 4x daily     levofloxacin  500 mg Oral Daily     levothyroxine  50 mcg Oral QAM AC     multivitamin w/minerals  1 tablet Oral Daily     pantoprazole  40 mg Oral QAM AC     polyethylene glycol  17 g Oral BID     sildenafil  5 mg Oral TID     sodium chloride (PF)  3 mL Intracatheter Q8H     ticagrelor  90 mg Oral BID     torsemide  20 mg Oral Daily     PRN Meds: acetaminophen, acetaminophen, albuterol, benzocaine-menthol, bisacodyl, calcium carbonate, cetirizine, glucose **OR** dextrose **OR** glucagon, HYDROmorphone, lidocaine 4%, lidocaine (buffered or not buffered), magnesium sulfate, magnesium sulfate, melatonin, naloxone, ondansetron **OR** ondansetron, oxyCODONE-acetaminophen, potassium chloride, potassium chloride with lidocaine, potassium chloride, potassium chloride, potassium chloride, sodium chloride (PF)             Data:     Recent Labs   Lab 09/28/19  0533 09/27/19  0522 09/26/19  1700 09/26/19  0539 09/25/19  2029 09/25/19  1411   WBC  --   --   --  10.1  --  13.9*   HGB  --  9.5*  --  9.0*  --  9.9*   MCV  --   --   --  75*  --  75*    504*  --  493*  --  699*   * 132* 135 136  --  134   POTASSIUM 3.7 4.0 4.0 4.3  --  3.3*   CHLORIDE 98 94 95 98  --   95   CO2 30 33* 33* 33*  --  32   BUN 17 23 28 30  --  34*   CR 0.90 1.05 1.18 1.10  --  1.20   ANIONGAP 4 5 7 5  --  7   DMITRIY 8.7 8.8 9.0 8.7  --  8.9   * 124* 139* 122*  --  103*   ALBUMIN  --   --   --   --   --  3.4   PROTTOTAL  --   --   --   --   --  7.2   BILITOTAL  --   --   --   --   --  0.8   ALKPHOS  --   --   --   --   --  144   ALT  --   --   --   --   --  20   AST  --   --   --   --   --  26   TROPI  --   --   --  0.107* 0.119* 0.106*

## 2019-09-28 NOTE — PLAN OF CARE
Heart Failure Care Pathway  GOALS TO BE MET BEFORE DISCHARGE:    1. Decrease congestion and/or edema with diuretic therapy to achieve near      optimal volume status.            Dyspnea improved:  yes            Edema improved:     Yes        Net I/O and Weights since admission:          08/29 0700 - 09/28 0659  In: 1468 [P.O.:1240; I.V.:228]  Out: 4780 [Urine:4780]  Net: -3312            Vitals:    09/25/19 1328 09/26/19 0159 09/27/19 0609 09/28/19 0500   Weight: 69.9 kg (154 lb) 67.4 kg (148 lb 8 oz) 68.9 kg (151 lb 14.4 oz) 67.2 kg (148 lb 1.6 oz)       2.  O2 sats > 92% on RA or at prior home O2 therapy level.          Current oxygenation status:       SpO2: 100 %         O2 Device: Nasal cannula,  Oxygen Delivery: 1.5 LPM         Able to wean O2 this shift to keep sats > 92%:  No, please explain: cont on 1.5L due to GONZALEZ       Does patient use Home O2? No    3.  Tolerates ambulation and mobility near baseline: No, please explain: GONZALEZ        How many times did the patient ambulate with nursing staff this shift? 1  Pt alert SBA with walker. GONZALEZ, SR/ST BBB occ  V paced beats.Up in BR for 2 hours due to constipation, prn suppository with very small results.Declined cpap.    Please review the Heart Failure Care Pathway for additional HF goal outcomes.    Valery Nielson RN RN  9/28/2019

## 2019-09-28 NOTE — PROGRESS NOTES
[facility]  Hospitalist Progress Note   09/28/2019     Encounter in the presence of a .         Assessment and Plan:       Beulah Jane is a 69 year old  male with medical history of severe aortic stenosis, recent Susan, complete heart block, recent pacemaker placement, heart failure with preserved ejection fraction, hypertension, hyperlipidemia, diabetes, dermatomyositis admitted 9/25/2019 for shortness of breath.    Dyspnea on exertion multifactorial, recent surgery, exacerbation of heart failure with preserved ejection fraction, pulm hypertension, tachycardia physical deconditioning.  Acute exacerbation of heart failure with preserved EF  Severe aortic stenosis s/p TAVR, 29mm Medtronic Evolut  Complete Heart Block status post PPM  Admitted from 8/29 - 9/7 at [facility] cardiology unit for acute exacerbation of heart failure with preserved ejection fraction and NSTEMI.  Then had severe AS, underwent TAVR on 9/4. TAVR procedure complicated by complete heart block after valve deployment, otherwise uncomplicated; he did have a pre-existing RBBB. He was bridged to pacemaker (9/6) with a temporary pacing wire and remained 100% V-paced  after surgery.Dismissed home on Lasix and PRN metolazone.    Presented with worsening shortness of breath, leg swelling, weight gain. On exam 1+ pitting pedal edema with bilateral decreased breath sounds or crackles.  WBC 13.9, hemoglobin 9.9,  potassium 3.3, creatinine 1.20.  proBNP 33479  Troponin 0.106 and has been flat.  EKG sinus rhythm, left axis deviation.  Nonspecific ST-T wave changes.  Echocardiogram left ventricle systolic function normal, estimated EF at 55 to 60%.  Right ventricle mild to moderately dilated, mildly decreased right ventricular systolic function.S/P #29 Medtronic Evolute Pro bioprosthetic TAVR; appears well seated with grossly normal function  Chest x-ray dual-lead pacemaker in the right hemithorax  unchanged.  Scattered bibasilar opacities again seen. Patchy areas of grounds place opacities since previous exam have improved.  Right heart cath 9/27/2019 Baseline: Severe pulmonary HTN with mildly elevated PCWP.  Vasodilator study positive.    Has undergone diuresis with intravenous Lasix, switch to oral torsemide per cardiology [9/28] Appreciate comanagement.   Continue sildenafil 5 mg 3 times daily [9/27]  Continue PTA aspirin and atorvastatin.  PTA Berlant to switch to Plavix by cardiology.  Telemetry monitoring.  Low-salt diet, 2 L fluid restriction daily.  Strict input output monitoring, daily weights.  Aggressive incentive spirometry.     Possible acute bronchitis.  Patient having ongoing cough.  Afebrile.  WBC 13.2.  Procalcitonin 0.40.  Wife recently treated for pneumonia.  Is on IV levofloxacin [9/25- 9/27], switched to oral Levaquin for total of 5 days.  Supportive care.     CAD s/p PCI:  s/p PCI p-mLAD and dLAD 7/30/19.  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 Currently stable on MTX.   Recent imaging reviewed.  CRP 22.8 ESR 24.  Hold PTA methotrexate during hospitalization.  Hold PTA albuterol inhaler, as needed albuterol.  Switch to prn DuoNeb 4 times a day.  Continues to have ongoing shortness of breath despite current therapy will consider CT imaging of chest a.m.  We will also consider pulmonology evaluation if needed. due to see pulmonology on 10/9/2019.  Aggressive incentive spirometry.     Dermatomyositis w/Shannon-1 Positivity:  Chronic pain.  Patient has been diagnosed with polymyositis clinically, not by muscle biopsy. He did have a lung biopsy that showed fibrosis of unknown etiology. Has ILD on CT and blood work showed a positive Shannon-1 antibody. PFTs showed worsening DLCO by 11% from 7/2018-1/2019.   Hold PTA methotrexate 25 mg weekly, next dose 9/10  Continue folic acid 1 mg  daily  PTA Gabapentin 1200mg tid, decrease dose to 600 mg 3 times daily given kidney injury.  PTA PRN Oxycodone 7.5/325 mg tid continued.    Reactive thrombocytosis.  Platelets improving from 699 > 504  Continue to monitor.    Status post lactic acidosis likely from hypoxia and volume overload.  Lactic acid 3.0, with intravenous diuresis trended down to 1.2     Diabetes mellitus hemoglobin A1c of 6.7.  PTA administers NPH insulin 2 to 4 units twice daily if blood sugars greater than 140.  PTA NPH insulin on hold and placed on sliding scale.  Monitor blood sugars during hospitalization.     Hyperlipidemia  Triglycerides 97, LDL 58, HDL 19.  Discontinued PTA gemfibrozil as patient complaining of muscle aches and lipid panel at goal.  Continue PTA atorvastatin.    Hypothyroidism  TSH 0.46.  Continue PTA levothyroxine.     JUAN  Continue home CPAP at home settings     PAD  Diagnosed as an outpatient per podiatry, pt has been referred to Vascular Surgery due to L foot wound.   Further management/workup as outpatient     Back pain Status post back surgery 4/2019.  Underwent T11-2, L1-2, L2-3 Transforminal Lumbar Interbody Fusion (TLIF) And Smith Borges Osteotomies,and L3-4 SPO as well.  Has intermittent back pain.  Continue pain meds as above.     Mild hypokalemia from aggressive diuresis - corrected   Mild acute kidney injury likely from volume overload, questionable diuresis - improved   Potassium 3.3.  Creatinine 1.20.  Magnesium 2.2.  Monitor renal function, electrolytes while on diuresis and replace accordingly.     Anemia of chronic disease.  Baseline hemoglobin between 8-10.  Per patient last colonoscopy when he was in [place] many years of back. Hb @ 9.  Mild saturation index 14.  Monitor levels periodically, defer age-appropriate health maintenance including colonoscopy to all PCP.  Will start on oral ferrous gluconate x1 daily.     Acute constipation  Patient currently on narcotics. Physical deconditioning  and minimal mobility.  Scheduled docusate, MiraLAX twice daily.  PRN Dulcolax.     Physical deconditioning from acute illness, chronic back pain.  Patient having assistance from wife, sons at home.  UA negative, TSH within normal limits.  PT, OT assessment-Home with outpatient cardiac rehab.     Orders Placed This Encounter      Combination Diet 5569-7736 Calories: Moderate Consistent CHO (4-6 CHO units/meal); Low Saturated Fat Na <2400mg Diet      DVT Prophylaxis: SCD, ambulate.  Code Status: Full Code  Disposition: Expected discharge in 1 to 2 days pending clinical improvement.    Discussed with patient, bedside RN, cardiologist.  Total time greater than 25 minutes, using  services.    Negar Mustafa MD        Interval History:      Patient lying in bed, admits to improvement with shortness of breath, complaining of generalized weakness.  Afebrile overnight.  Denies any chest pain or palpitation.  No nausea vomiting, tolerating oral diet.  No headache or dizziness.         Physical Exam:        Physical Exam   Temp:  [97.8  F (36.6  C)-98.3  F (36.8  C)] 98.3  F (36.8  C)  Pulse:  [95] 95  Heart Rate:  [] 118  Resp:  [16-18] 18  BP: ()/(57-80) 106/67  SpO2:  [88 %-100 %] 94 %    Intake/Output Summary (Last 24 hours) at 9/26/2019 1056  Last data filed at 9/26/2019 0900  Gross per 24 hour   Intake 903 ml   Output 2155 ml   Net -1252 ml       Admission Weight: 69.9 kg (154 lb)  Current Weight: 67.4 kg (148 lb 8 oz)    PHYSICAL EXAM  GENERAL: Patient is in no distress. Alert and oriented.  CVS irregular rate and rhythm. S1S2.  Systolic murmur aortic area.  LUNGS: Bilateral decreased breath sounds, no crackles.  NEURO moving all extremities, no focal weakness.  EXTREMITIES: 2+ peripheral pulses. 1+ pedal edema.  SKIN: Warm, dry.   PSYCHIATRY Cooperative         Medications:          aspirin  81 mg Oral Daily     atorvastatin  40 mg Oral Daily     calcium carbonate 500 mg (elemental)  1 tablet  Oral BID     [START ON 9/29/2019] clopidogrel  75 mg Oral Daily     docusate sodium  100 mg Oral BID     ferrous gluconate  324 mg Oral Daily with breakfast     fish oil-omega-3 fatty acids  2 capsule Oral QAM     folic acid  1 mg Oral QAM     gabapentin  600 mg Oral TID     [START ON 9/29/2019] influenza Vac Split High-Dose  0.5 mL Intramuscular Prior to discharge     insulin aspart  1-7 Units Subcutaneous TID AC     insulin aspart  1-5 Units Subcutaneous At Bedtime     ipratropium - albuterol 0.5 mg/2.5 mg/3 mL  3 mL Nebulization 4x daily     levofloxacin  500 mg Oral Daily     levothyroxine  50 mcg Oral QAM AC     multivitamin w/minerals  1 tablet Oral Daily     pantoprazole  40 mg Oral QAM AC     polyethylene glycol  17 g Oral BID     sildenafil  5 mg Oral TID     sodium chloride (PF)  3 mL Intracatheter Q8H     torsemide  20 mg Oral BID     acetaminophen, acetaminophen, albuterol, benzocaine-menthol, bisacodyl, calcium carbonate, cetirizine, glucose **OR** dextrose **OR** glucagon, HYDROmorphone, lidocaine 4%, lidocaine (buffered or not buffered), magnesium sulfate, magnesium sulfate, melatonin, naloxone, ondansetron **OR** ondansetron, oxyCODONE-acetaminophen, potassium chloride, potassium chloride with lidocaine, potassium chloride, potassium chloride, potassium chloride, sodium chloride (PF)         Data:      All new lab and imaging data was reviewed.

## 2019-09-28 NOTE — PLAN OF CARE
Pt here with fast heart rate/sob.  A&O. VSS. Tele st 100.  Mod cho/cardiac diet.  Up with sba and walker.  Generalized pain. Pt scoring green on the Aggression Stop Light Tool. Plan:  monitor sob, and strict I/o for 24 hrs, possible discharge tmrw.

## 2019-09-28 NOTE — PROGRESS NOTES
Patient is on RA with SpO2 in the low to mid 90's. BS diminished with an increase in aeration post neb treatments. All nebs were given as ordered.  Will cont to follow.  9/28/2019  Lolis Melton RRT

## 2019-09-29 NOTE — PLAN OF CARE
A&O. BP 85-90 systolic.  Tele sr to st 100.  Mod cho diet, Up with sba and walker. Denies pain. Pt scoring green on the Aggression Stop Light Tool. Plan:  monitoring to see if bp gets better or if we need to adjust meds.   Pt. States sob is better than yesterday

## 2019-09-29 NOTE — PROGRESS NOTES
Patient is on RA with SpO2 in the low to mid 90's. BS clear and diminished. All nebs were given as ordered.  Will cont follow.  9/29/2019  Lolis Melton RRT

## 2019-09-29 NOTE — PLAN OF CARE
Discharge Planner OT   Patient plan for discharge: Home.     Current status: Pt with improved tolerance this date. Pt ambulated at a very slow pace with SBA and 4ww x 200 ft. Pt nearly tripled distance covered from session prior. Stable CV response, BP's soft. 98/60 to start, 98/80 after exercise.     Barriers to return to prior living situation: None.     Recommendations for discharge: Home with OP CR @ [facility] after TAVR. Family A with cooking, cleaning, laundry due to poor ax tolerance.     Rationale for recommendations: pt with significant improvement with activity tolerance this date. Pt reports he has family that can A with higher level IADL's at discharge.  Anticipate pt will continue to progress with IP CR for safe discharge home with family A.        Entered by: Julia Marlow 09/29/2019 10:28 AM

## 2019-09-29 NOTE — PLAN OF CARE
Patient alert, SBA with walker. Still c/o GONZALEZ ,movement in bed. Crackles at base, O2 sat 90--92% on RA, 1L N/C  applied. Hypotensive, given  HS meds per parameters BP 92 systolic. Recheck systolic 77, pt sitting up at edge of bed with no symptoms. Back to supine and systolic back to 101, no change in heart rate which remains in 90's. -SR,BG 99,120, pt took HS snack, pt states he did not eat dinner. POC reviewed with patient

## 2019-09-29 NOTE — PROGRESS NOTES
"EP- Cardiology Progress Note           Assessment and Plan:     68 yo M with a  Hx of DM, HL, HTN,  pulmonary fibrosis. JUAN on CPAP, chronic anemia, dermatomyositis, CAD (PCI to LAD in 07/2019) and severe aortic stenosis (TAVR on 09/04/ 2019), complicated by CHB (PPM implant), who p/w CHF.    RHC (09/27): Severe pulmonary HTN with mildly elevated PCWP (16). Positive vasodilator study:     Plan:  1. CHF.  He diuresed well (-1 L).  Creatinine remains normal and respiratory symptoms seems to be improved.  He appears to be close to euvolemia. Brilinta was changed to Plavix yesterday.   We will decrease diuretics to daily.       2. HTN.  BP is low.  However he seems to be asymptomatic.  We will continue sildenafil and monitor blood pressure.  If blood pressure is stabilized and patient is asymptomatic we will continue current therapy.  3. CAD (PCI to LAD 07/2019).  Continue aspirin/Plavix and Lipitor.   4.  Lung fibrosis.  Plan is detailed above.  5.  JUAN.  Continue CPAP at night.      Physical Exam:  Vitals: BP (!) (P) 80/58   Pulse 96   Temp (P) 98.3  F (36.8  C)   Resp (P) 16   Ht 1.702 m (5' 7\")   Wt 67.2 kg (148 lb 3.2 oz)   SpO2 (P) 95%   BMI 23.21 kg/m        Intake/Output Summary (Last 24 hours) at 9/29/2019 0838  Last data filed at 9/29/2019 0200  Gross per 24 hour   Intake 840 ml   Output 1500 ml   Net -660 ml     Vitals:    09/25/19 1328 09/26/19 0159 09/27/19 0609 09/28/19 0500   Weight: 69.9 kg (154 lb) 67.4 kg (148 lb 8 oz) 68.9 kg (151 lb 14.4 oz) 67.2 kg (148 lb 1.6 oz)    09/29/19 0256   Weight: 67.2 kg (148 lb 3.2 oz)       Constitutional:  AAO x3.  Pt is in NAD.  HEAD: Normocephalic.  SKIN: Skin normal color, texture and turgor with no lesions or eruptions.  Eyes: PERRL, EOMI.  ENT:  Supple, normal JVP. No lymphadenopathy or thyroid enlargement.  Chest:  Course in base B/L.   Cardiac:   RRR, normal  S1 and S2.  No murmurs rubs or gallop.    Abdomen:  Normal BS.  Soft, non-tender and " non-distended.  No rebound or guarding.    Extremities:  Pedious pulses palpable B/L.  No LE edema noticed.   Neurological: Strength and sensation grossly symmetric and intact throughout.                            Review of Systems:   As per subjective, otherwise 5 systems reviewed and negative.         Medications:          aspirin  81 mg Oral Daily     atorvastatin  40 mg Oral Daily     calcium carbonate 500 mg (elemental)  1 tablet Oral BID     clopidogrel  75 mg Oral Daily     docusate sodium  100 mg Oral BID     ferrous gluconate  324 mg Oral Daily with breakfast     fish oil-omega-3 fatty acids  2 capsule Oral QAM     folic acid  1 mg Oral QAM     gabapentin  600 mg Oral TID     influenza Vac Split High-Dose  0.5 mL Intramuscular Prior to discharge     insulin aspart  1-7 Units Subcutaneous TID AC     insulin aspart  1-5 Units Subcutaneous At Bedtime     ipratropium - albuterol 0.5 mg/2.5 mg/3 mL  3 mL Nebulization 4x daily     levofloxacin  500 mg Oral Daily     levothyroxine  50 mcg Oral QAM AC     multivitamin w/minerals  1 tablet Oral Daily     pantoprazole  40 mg Oral QAM AC     polyethylene glycol  17 g Oral BID     sildenafil  5 mg Oral TID     sodium chloride (PF)  3 mL Intracatheter Q8H     torsemide  20 mg Oral BID     PRN Meds: acetaminophen, acetaminophen, albuterol, benzocaine-menthol, bisacodyl, calcium carbonate, cetirizine, glucose **OR** dextrose **OR** glucagon, HYDROmorphone, lidocaine 4%, lidocaine (buffered or not buffered), magnesium sulfate, magnesium sulfate, melatonin, naloxone, ondansetron **OR** ondansetron, oxyCODONE-acetaminophen, potassium chloride, potassium chloride with lidocaine, potassium chloride, potassium chloride, potassium chloride, sodium chloride (PF)             Data:     Recent Labs   Lab 09/29/19  0526 09/28/19  0533 09/27/19  0522  09/26/19  0539 09/25/19 2029 09/25/19  1411   WBC  --   --   --   --  10.1  --  13.9*   HGB  --   --  9.5*  --  9.0*  --  9.9*   MCV   --   --   --   --  75*  --  75*   PLT  --  448 504*  --  493*  --  699*    132* 132*   < > 136  --  134   POTASSIUM 4.2 3.7 4.0   < > 4.3  --  3.3*   CHLORIDE 98 98 94   < > 98  --  95   CO2 31 30 33*   < > 33*  --  32   BUN 17 17 23   < > 30  --  34*   CR 1.11 0.90 1.05   < > 1.10  --  1.20   ANIONGAP 5 4 5   < > 5  --  7   DMITRIY 8.9 8.7 8.8   < > 8.7  --  8.9   * 116* 124*   < > 122*  --  103*   ALBUMIN  --   --   --   --   --   --  3.4   PROTTOTAL  --   --   --   --   --   --  7.2   BILITOTAL  --   --   --   --   --   --  0.8   ALKPHOS  --   --   --   --   --   --  144   ALT  --   --   --   --   --   --  20   AST  --   --   --   --   --   --  26   TROPI  --   --   --   --  0.107* 0.119* 0.106*    < > = values in this interval not displayed.

## 2019-09-29 NOTE — PROGRESS NOTES
[facility]  Hospitalist Progress Note   09/29/2019            Assessment and Plan:       Beulah Jane is a 69 year old  male with medical history of severe aortic stenosis, recent TAVR, complete heart block, recent pacemaker placement, heart failure with preserved ejection fraction, hypertension, hyperlipidemia, diabetes, dermatomyositis admitted 9/25/2019 for shortness of breath.    Dyspnea on exertion multifactorial, recent surgery, exacerbation of heart failure with preserved ejection fraction, pulm hypertension, tachycardia physical deconditioning.  Acute exacerbation of heart failure with preserved EF  Pulmonary hypertension with positive vasodilator study.  Severe aortic stenosis s/p TAVR, 29mm Medtronic Evolut  Complete Heart Block status post PPM  Admitted 8/29 - 9/7 at [facility] for acute exacerbation of heart failure with preserved ejection fraction and NSTEMI.  Then had severe AS, underwent TAVR on 9/4. TAVR procedure complicated by complete heart block after valve deployment, otherwise uncomplicated; he did have a pre-existing RBBB. He was bridged to pacemaker (9/6) with a temporary pacing wire and remained 100% V-paced  after surgery.Dismissed home on Lasix and PRN metolazone.    Presented with worsening shortness of breath, leg swelling and weight gain.  On exam 1+ pitting pedal edema with bilateral decreased breath sounds or crackles.  WBC 13.9, hemoglobin 9.9,  potassium 3.3, creatinine 1.20.  proBNP 99243  Troponin 0.106 and has been flat.  EKG sinus rhythm, left axis deviation.  Nonspecific ST-T wave changes.  Echocardiogram left ventricle systolic function normal, estimated EF at 55 to 60%.  Right ventricle mild to moderately dilated, mildly decreased right ventricular systolic function.S/P #29 Medtronic Evolute Pro bioprosthetic TAVR; appears well seated with grossly normal function  Chest x-ray dual-lead pacemaker in the right hemithorax unchanged.   Scattered bibasilar opacities again seen. Patchy areas of grounds place opacities since previous exam have improved.  Right heart cath 9/27/2019 Baseline: Severe pulmonary HTN with mildly elevated PCWP.  Vasodilator study positive.  Has undergone diuresis with intravenous Lasix, switched to oral torsemide [9/28], has been having slightly low systolic blood pressure, decrease torsemide dosing to once daily today.  Continue sildenafil 5 mg 3 times daily [9/27]  PTA Brillanta switched to Plavix [9/28]   Continue PTA aspirin and atorvastatin. Appreciate cardiology comanagement.   Telemetry monitoring.  Low-salt diet, 2 L fluid restriction daily.  Strict input output monitoring, daily weights.  Aggressive incentive spirometry.  Limb elevation, Ace wraps.     Possible acute bronchitis.  Patient having ongoing cough.  Afebrile.  WBC 13.2.  Procalcitonin 0.40.  Wife recently treated for pneumonia.  Treated with levofloxacin for total of 5 days.  Supportive care.     CAD s/p PCI:  s/p PCI p-mLAD and dLAD 7/30/19.  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 Currently stable on MTX.   Recent imaging reviewed.  CRP 22.8 ESR 24.  Hold PTA methotrexate during hospitalization.  Hold PTA albuterol inhaler, as needed albuterol.  Switch to prn DuoNeb 4 times a day.  Continues to have ongoing shortness of breath despite current therapy will consider CT imaging of chest a.m.  Patient off supplemental oxygen, started on sildenafil.  Follow-up with pulmonology on scheduled 10/9/2019.  Aggressive incentive spirometry.     Dermatomyositis w/Shannon-1 Positivity:  Chronic pain.  Patient has been diagnosed with polymyositis clinically, not by muscle biopsy. He did have a lung biopsy that showed fibrosis of unknown etiology. Has ILD on CT and blood work showed a positive Shannon-1 antibody. PFTs showed worsening DLCO by 11% from  7/2018-1/2019.   Hold PTA methotrexate 25 mg weekly  Continue folic acid 1 mg daily  PTA Gabapentin 1200mg tid, decrease dose to 600 mg 3 times daily given kidney injury.  PTA PRN Oxycodone 7.5/325 mg tid continued.    Reactive thrombocytosis.  Platelets improving from 699 > 504 >448    Status post lactic acidosis likely from hypoxia and volume overload.  Lactic acid 3.0, with intravenous diuresis trended down to 1.2     Diabetes mellitus hemoglobin A1c of 6.7.  PTA administers NPH insulin 2 to 4 units twice daily if blood sugars greater than 140.  PTA NPH insulin on hold and placed on sliding scale.  Monitor blood sugars during hospitalization.     Hyperlipidemia  Triglycerides 97, LDL 58, HDL 19.  Discontinued PTA gemfibrozil as patient complaining of muscle aches and lipid panel at goal.  Continue PTA atorvastatin.    Hypothyroidism  TSH 0.46.  Continue PTA levothyroxine.     JUAN  Continue home CPAP at home settings     PAD  Diagnosed as an outpatient per podiatry, pt has been referred to Vascular Surgery due to L foot wound.   Further management/workup as outpatient     Back pain Status post back surgery 4/2019.  Underwent T11-2, L1-2, L2-3 Transforminal Lumbar Interbody Fusion (TLIF) And Smith Borges Osteotomies,and L3-4 SPO as well.  Has intermittent back pain.  Continue pain meds as above.     Mild hypokalemia, hyponatremia, SHIRIN from aggressive diuresis, volume load- improved.  Monitor renal function, electrolytes while on diuresis and replace accordingly.     Anemia of chronic disease.  Baseline hemoglobin between 8-10.  Per patient last colonoscopy when he was in [place] many years of back.    Hemoglobin levels at baseline, iron saturation index 14.  Started on ferrous gluconate supplements oral daily.  Next  Monitor levels periodically, defer age-appropriate health maintenance including colonoscopy to all PCP.     Acute constipation  Patient currently on narcotics. Physical deconditioning and minimal  mobility.  Scheduled docusate, MiraLAX twice daily.  PRN Dulcolax.     Physical deconditioning from acute illness, chronic back pain.  Patient having assistance from wife, sons at home.  UA negative, TSH within normal limits.  PT, OT assessment-  Home with outpatient cardiac rehab.     Orders Placed This Encounter      Combination Diet [id number] Calories: Moderate Consistent CHO (4-6 CHO units/meal); Low Saturated Fat Na <2400mg Diet      DVT Prophylaxis: SCD, ambulate.  Code Status: Full Code  Disposition: Expected discharge in 1 to 2 days pending cardiology clearance.    Discussed with patient, floor RN  Total time greater than 25 minutes    Negar Mustafa MD        Interval History:      Patient lying in bed, admits to mild improvement with shortness of breath  Continues to complain of generalized weakness.  Denies any chest pain or palpitation.  No nausea vomiting, tolerating oral diet.  No headache or dizziness.  Has had borderline low blood pressure with systolic in 80s.         Physical Exam:        Physical Exam   Temp:  [97.9  F (36.6  C)-98.3  F (36.8  C)] 98.3  F (36.8  C)  Pulse:  [92-96] 96  Heart Rate:  [] 104  Resp:  [16-18] 16  BP: ()/(55-80) 98/80  SpO2:  [92 %-98 %] 95 %    Intake/Output Summary (Last 24 hours) at 9/26/2019 1056  Last data filed at 9/26/2019 0900  Gross per 24 hour   Intake 903 ml   Output 2155 ml   Net -1252 ml       Admission Weight: 69.9 kg (154 lb)  Current Weight: 67.4 kg (148 lb 8 oz)    PHYSICAL EXAM  GENERAL: Patient is in no distress. Alert and oriented.  CVS irregular rate and rhythm. S1S2.  Systolic murmur aortic area.  LUNGS: Bilateral decreased breath sounds, no crackles, no wheezing.  NEURO moving all extremities, no focal weakness.  EXTREMITIES: 2+ peripheral pulses. 1+ pedal edema.  SKIN: Warm, dry.   PSYCHIATRY Cooperative         Medications:          aspirin  81 mg Oral Daily     atorvastatin  40 mg Oral Daily     calcium carbonate 500 mg  (elemental)  1 tablet Oral BID     clopidogrel  75 mg Oral Daily     docusate sodium  100 mg Oral BID     ferrous gluconate  324 mg Oral Daily with breakfast     fish oil-omega-3 fatty acids  2 capsule Oral QAM     folic acid  1 mg Oral QAM     gabapentin  600 mg Oral TID     influenza Vac Split High-Dose  0.5 mL Intramuscular Prior to discharge     insulin aspart  1-7 Units Subcutaneous TID AC     insulin aspart  1-5 Units Subcutaneous At Bedtime     ipratropium - albuterol 0.5 mg/2.5 mg/3 mL  3 mL Nebulization 4x daily     levothyroxine  50 mcg Oral QAM AC     multivitamin w/minerals  1 tablet Oral Daily     pantoprazole  40 mg Oral QAM AC     polyethylene glycol  17 g Oral BID     sildenafil  5 mg Oral TID     sodium chloride (PF)  3 mL Intracatheter Q8H     [START ON 9/30/2019] torsemide  20 mg Oral Daily     acetaminophen, acetaminophen, albuterol, benzocaine-menthol, bisacodyl, calcium carbonate, cetirizine, glucose **OR** dextrose **OR** glucagon, HYDROmorphone, lidocaine 4%, lidocaine (buffered or not buffered), magnesium sulfate, magnesium sulfate, melatonin, naloxone, ondansetron **OR** ondansetron, oxyCODONE-acetaminophen, potassium chloride, potassium chloride with lidocaine, potassium chloride, potassium chloride, potassium chloride, sodium chloride (PF)         Data:      All new lab and imaging data was reviewed.

## 2019-09-30 NOTE — PROGRESS NOTES
[facility]    Cardiology Progress Note    Primary Cardiologist: Dr. Nate Solorio     Service date: 9/30/2019    Mr. Beulah Jane is a very pleasant 69 year old male admitted on 9/25/19 for worsening dyspnea on exertion x1 week.     Assessment & Plan   1. Acute on chronic heart failure with preserved ejection fraction (HFpEF)   - Objectively improved with good urine output and now at suspected dry wt.  - Subjectively, unfortunately continues to feel short of breath without significant improvement in his symptoms and remains on 2L O2 this morning.  2. Severe pulmonary hypertension  - Right heart cath 9/27/19 confirmed severe pulmonary hypertension with mildly elevated PCWP. The vasodilator study was positive.  - Started on sildenafil 5 mg PO t.i.d.  3. Severe aortic stenosis, status post recent TAVR with 29mm Medtronic Evolut bioprosthetic valve on 9/4/19  - Valve appears well seated on echo with grossly normal function, mean gradient 6 mmHg, peak velocity 1.7 m/sec.  4. Coronary artery disease  - Coronary angiogram 7/30/19 showed two vessel obstructive CAD with diffuse calcified disease of the prox-mid LAD and  of small, non-dominant RCA with R-R collaterals. Two stents were placed to the proximal-mid LAD and distal LAD.  - Initial troponin elevated at 0.106 and flat. Suspect elevation from demand ischemia.  - Switched from brilinta to plavix due to possibility of brilinta contributing to SOB.   - On plavix, aspirin, and atorvastatin. Not on beta blocker due to CHB s/p PPM.   5. Complete Heart Block status post PPM  - Device interrogation 9/27/19 showed one-to-one AV conduction with sinus tachycardia likely secondary to underlying shortness of breath.  6. Chronic anemia  7. Type 2 diabetes mellitus, insulin dependent   8. Obstructive sleep apnea on CPAP  9. Dermatomyositis with chronic pain   10. Interstitial lung disease  11. Hypothyroidism    Plan:   1. Continue torsemide 20 mg PO  once daily and sildenafil 5 mg PO t.i.d. Suspect his symptoms are multifactorial with HFpEF, severe pulmonary HTN, underlying interstitial lung disease, and deconditioning related to his recent prolonged hospital stays all contributing.  2. Recommend working with Cardiac rehab today and continuing outpatient.   3. Possible discharge today or tomorrow if his BP remains stable and he tolerates cardiac rehab.   4. Recommend follow up as previously planned with his primary Cardiologist, Dr. Solorio and the [facility] in 1 week with a repeat echo post TAVR and to establish with Pulmonology on 10/23/19.   5. Will arrange for follow up with Dr. Williamson or Dr. Chavez for further management of Pulmonary hypertension.      Disposition Plan   Expected discharge today or tomorrow to prior living arrangement pending cardiac rehab and if BP remains stable.     Entered: Silas Mendez 09/30/2019, 9:36 AM     Interval History   Mr. Catherine Jane reports feeling okay this morning. He had an episode of shortness of breath with associated chest pressure after waking up early this morning. He sat up and was put on 2L of oxygen and his symptoms subsequently resolved after a few minutes. He currently denies chest pain or shortness of breath. He feels that he had a good day yesterday with less shortness of breath and was able to tolerate a bit more walking. His BP has remained soft and he continues to feel weak with mild lightheadedness at times.      Telemetry: Sinus rhythm with RBBB and HR 90s to low 100s.     Thank you for the opportunity to participate in this pleasant patient's care.     Silas Mendez, NP  Text Page  (8am - 5pm, M-F)    Patient Active Problem List   Diagnosis     Spinal stenosis of lumbar region with neurogenic claudication     Severe aortic stenosis     Hypertension     Hypothyroidism     Mixed hyperlipidemia     Inflammatory arthritis     Pulmonary fibrosis (H)     Type 2 diabetes mellitus with  neurologic complication, with long-term current use of insulin (H)     Controlled substance agreement signed     Acute pulmonary embolism (H)     Status post non-ST elevation myocardial infarction (NSTEMI)     Coronary artery disease involving native coronary artery of native heart with other form of angina pectoris (H)     Congestive heart failure, NYHA class III, chronic, diastolic (H)     S/P TAVR (transcatheter aortic valve replacement)     GONZALEZ (dyspnea on exertion)       Physical Exam   Temp: 98.3  F (36.8  C) Temp src: Oral BP: (P) 98/67 Pulse: 119 Heart Rate: (P) 100 Resp: 18 SpO2: (P) 90 % O2 Device: (P) None (Room air) Oxygen Delivery: 2 LPM  Vitals:    09/28/19 0500 09/29/19 0256 09/30/19 0617   Weight: 67.2 kg (148 lb 1.6 oz) 67.2 kg (148 lb 3.2 oz) 67.9 kg (149 lb 11.2 oz)     Vital Signs with Ranges  Temp:  [98.3  F (36.8  C)] 98.3  F (36.8  C)  Pulse:  [] 119  Heart Rate:  [100-115] (P) 100  Resp:  [16-18] 18  BP: (68-98)/(47-80) (P) 98/67  SpO2:  [85 %-100 %] (P) 90 %  I/O last 3 completed shifts:  In: 960 [P.O.:960]  Out: 2000 [Urine:2000]    Constitutional: Appears his stated age, sitting up in bed, and in no acute distress.  Eyes: Pupils equal, round. Sclerae anicteric.   HEENT: Normocephalic, atraumatic.   Neck: No JVD appreciated.  Respiratory: Breathing non-labored. Lungs clear to auscultation bilaterally with no wheezes or crackles.  Cardiovascular: Regular rhythm, rapid rate, normal S1 and S2. No murmur, rub, or gallop.  GI: Soft, non-distended, non-tender.  Skin: Warm, dry. Right femoral site soft, non-tender, no ecchymosis or bruit. No rashes, cyanosis, or xanthelasma.  Musculoskeletal/Extremities: Moves all extremities well and symmetrically. No lower extremity edema bilaterally.  Neurologic: No gross motor deficits. Alert and oriented to person, place and time.  Psychiatric: Affect appropriate. Answers questions appropriately.      Medications       aspirin  81 mg Oral Daily      atorvastatin  40 mg Oral Daily     calcium carbonate 500 mg (elemental)  1 tablet Oral BID     clopidogrel  75 mg Oral Daily     docusate sodium  100 mg Oral BID     ferrous gluconate  324 mg Oral Daily with breakfast     fish oil-omega-3 fatty acids  2 capsule Oral QAM     folic acid  1 mg Oral QAM     gabapentin  600 mg Oral TID     insulin aspart  1-7 Units Subcutaneous TID AC     insulin aspart  1-5 Units Subcutaneous At Bedtime     ipratropium - albuterol 0.5 mg/2.5 mg/3 mL  3 mL Nebulization 4x daily     levothyroxine  50 mcg Oral QAM AC     multivitamin w/minerals  1 tablet Oral Daily     pantoprazole  40 mg Oral QAM AC     polyethylene glycol  17 g Oral BID     sildenafil  5 mg Oral TID     sodium chloride (PF)  3 mL Intracatheter Q8H     torsemide  20 mg Oral Daily       Data   Results for orders placed or performed during the hospital encounter of 09/25/19 (from the past 24 hour(s))   Glucose by meter   Result Value Ref Range    Glucose 84 70 - 99 mg/dL   Lactic acid level STAT   Result Value Ref Range    Lactate for Sepsis Protocol 1.3 0.7 - 2.0 mmol/L   Glucose by meter   Result Value Ref Range    Glucose 123 (H) 70 - 99 mg/dL   Glucose by meter   Result Value Ref Range    Glucose 155 (H) 70 - 99 mg/dL   EKG 12-lead, tracing only   Result Value Ref Range    Interpretation ECG Click View Image link to view waveform and result

## 2019-09-30 NOTE — PLAN OF CARE
Discharge Planner OT   Patient plan for discharge: home  Current status: pt seen for CR in PM. Tolerated very minimal activity out of bed and became lightheaded, SOB. BP 87/60, 02 dropped to 85-87%, .   Barriers to return to prior living situation: decreased level of activity tolerance, drop in 02 with out of bed activity  Recommendations for discharge: Home with OP CR @ [facility] once medically stable. Family A with cooking, cleaning, laundry due to poor activity tolerance.    Rationale for recommendations:  Pt reports he has family that can A with higher level IADL's at discharge.  Anticipate pt will continue to progress with IP CR for safe discharge home with family A.        Entered by: Arslan Pearson 09/30/2019 2:44 PM

## 2019-09-30 NOTE — PLAN OF CARE
Patient alert, SBA with walker. Hypotension continues, given HS meds because parameters met. Asymptomatic, BP 70-80 systolic, when sitting up at edge of bed, high 80's when supine. Sat mid 90's on RA all night. This am pt c/o SOB and chest pressure, sat was 84% on RA, BP 70's. Back to supine and O2 at @ 2L applied -115 ST. SOB and chest pressure resolved within 10 minutes of O2. EKG, no sig change. LS continue to have crackles at base. POC reviewed with patient.

## 2019-09-30 NOTE — PROGRESS NOTES
[facility]  Hospitalist Progress Note   09/30/2019            Assessment and Plan:       Beulah Jane is a 69 year old  male with medical history of severe aortic stenosis, recent TAVR, complete heart block, recent pacemaker placement, heart failure with preserved ejection fraction, hypertension, hyperlipidemia, diabetes, dermatomyositis admitted 9/25/2019 for shortness of breath.    Dyspnea on exertion multifactorial, recent surgery, exacerbation of heart failure with preserved ejection fraction, pulm hypertension, tachycardia physical deconditioning.  Acute exacerbation of heart failure with preserved EF  Pulmonary hypertension with positive vasodilator study.  Severe aortic stenosis s/p TAVR, 29mm Medtronic Evolut  Complete Heart Block status post PPM  Admitted 8/29 - 9/7 at [facility] for acute exacerbation of heart failure with preserved ejection fraction and NSTEMI.  Then had severe AS, underwent TAVR on 9/4. TAVR procedure complicated by complete heart block after valve deployment, otherwise uncomplicated; he did have a pre-existing RBBB. He was bridged to pacemaker (9/6) with a temporary pacing wire and remained 100% V-paced  after surgery. Dismissed home on Lasix and PRN metolazone.    Presented with worsening shortness of breath, leg swelling and weight gain.  On exam 1+ pitting pedal edema with bilateral decreased breath sounds or crackles.  WBC 13.9, hemoglobin 9.9,  potassium 3.3, creatinine 1.20.  proBNP 39073  Troponin 0.106 and has been flat.  EKG sinus rhythm, left axis deviation.  Nonspecific ST-T wave changes.  Echocardiogram left ventricle systolic function normal, estimated EF at 55 to 60%.  Right ventricle mild to moderately dilated, mildly decreased right ventricular systolic function.S/P #29 Medtronic Evolute Pro bioprosthetic TAVR; appears well seated with grossly normal function  Chest x-ray dual-lead pacemaker in the right hemithorax unchanged.   Scattered bibasilar opacities again seen. Patchy areas of grounds place opacities since previous exam have improved.  Right heart cath 9/27/2019 Baseline: Severe pulmonary HTN with mildly elevated PCWP.  Vasodilator study positive.  Has undergone diuresis with intravenous Lasix, switched to oral torsemide [9/28], has been having low systolic blood pressure, decrease torsemide dosing to once daily (9/29).  Continue to monitor blood pressure, also tachycardic.  Continue sildenafil 5 mg 3 times daily [9/27]  PTA Brillanta switched to Plavix [9/28]   Continue PTA aspirin and atorvastatin. Appreciate cardiology comanagement.   Telemetry monitoring.  Low-salt diet, 2 L fluid restriction daily.  Strict input output monitoring, daily weights.  Aggressive incentive spirometry.  Limb elevation, Ace wraps [patient slightly reluctant to use as fragile skin]     Possible acute bronchitis.  Patient having ongoing cough.  Afebrile.  WBC 13.2.  Procalcitonin 0.40.  Wife recently treated for pneumonia.  Treated with levofloxacin for total of 5 days [9/25 to 9/30].  Supportive care.     CAD s/p PCI:  s/p PCI p-mLAD and dLAD 7/30/19.  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 Currently stable on MTX.   Recent imaging reviewed.  CRP 22.8 ESR 24.  Hold PTA methotrexate during hospitalization.  PTA albuterol inhaler as needed.  Switch to prn DuoNeb 4 times a day.  Continues to have ongoing shortness of breath despite current therapy and tachycardic. Patient on and off supplemental oxygen, started on sildenafil.  Has follow-up with pulmonology on scheduled 10/9/2019.  Inpatient pulmonary consult requested.  Aggressive incentive spirometry.    JUAN  Recommended home CPAP at home settings, has not been compliant with CPAP during hospitalization [does not like device from hospital] hoping his family will be able to bring in  home CPAP today.     Dermatomyositis w/Shannon-1 Positivity:  Chronic pain.  Patient has been diagnosed with polymyositis clinically, not by muscle biopsy. He did have a lung biopsy that showed fibrosis of unknown etiology. Has ILD on CT and blood work showed a positive Shannon-1 antibody. PFTs showed worsening DLCO by 11% from 7/2018-1/2019.   Hold PTA methotrexate 25 mg weekly  Continue folic acid 1 mg daily  PTA Gabapentin 1200mg tid, decrease dose to 600 mg 3 times daily given kidney injury.  PTA PRN Oxycodone 7.5/325 mg tid continued.    Reactive thrombocytosis.  Platelets improving from 699 > 504 >448    Status post lactic acidosis likely from hypoxia and volume overload.  Lactic acid 3.0, with intravenous diuresis trended down to 1.2     Diabetes mellitus hemoglobin A1c of 6.7.  PTA administers NPH insulin 2 to 4 units twice daily if blood sugars greater than 140.  PTA NPH insulin on hold and placed on sliding scale.  Monitor blood sugars during hospitalization.     Hyperlipidemia  Triglycerides 97, LDL 58, HDL 19.  Discontinued PTA gemfibrozil as patient complaining of muscle aches and lipid panel at goal.  Continue PTA atorvastatin.    Hypothyroidism  TSH 0.46.  Continue PTA levothyroxine.     PAD  Diagnosed as an outpatient per podiatry, pt has been referred to Vascular Surgery due to L foot wound.   Further management/workup as outpatient     Back pain Status post back surgery 4/2019.  Underwent T11-2, L1-2, L2-3 Transforminal Lumbar Interbody Fusion (TLIF) And Smith Borges Osteotomies,and L3-4 SPO as well.  Has intermittent back pain.  Continue pain meds as above.     Mild hypokalemia, hyponatremia, SHIRIN from aggressive diuresis, volume load- improved.  Monitor renal function, electrolytes while on diuresis and replace accordingly.     Anemia of chronic disease.  Baseline hemoglobin between 8-10.  Per patient last colonoscopy when he was in [place] many years of back.    Hemoglobin levels at baseline, iron  saturation index 14.  Started on ferrous gluconate supplements oral daily.  Monitor levels periodically, defer age-appropriate health maintenance including colonoscopy to all PCP.     Acute constipation  Patient currently on narcotics. Physical deconditioning and minimal mobility.  Scheduled docusate, MiraLAX twice daily.  PRN Dulcolax.     Physical deconditioning from acute illness, chronic back pain.  Patient having assistance from wife, sons at home.  UA negative, TSH within normal limits.  PT, OT assessment -  Home with outpatient cardiac rehab.     Orders Placed This Encounter      Combination Diet [id number] Calories: Moderate Consistent CHO (4-6 CHO units/meal); Low Saturated Fat Na <2400mg Diet      DVT Prophylaxis: SCD, ambulate.  Code Status: Full Code  Disposition: Expected discharge in 1 to 2 days pending clinical improvement.    Discussed with patient, floor RN  Total time greater than 25 minutes    Negar Mustafa MD        Interval History:      Patient lying in bed, continues to complain of shortness of breath.  Continues to complain of generalized weakness.  Denies any chest pain or palpitation.  No nausea vomiting, tolerating oral diet.  No headache or dizziness.  Has had borderline low blood pressure with systolic in 80s and tachycardia  Not been compliant with CPAP overnight, reluctant for ACE labs as fragile skin.         Physical Exam:        Physical Exam   Temp:  [98.2  F (36.8  C)-98.4  F (36.9  C)] 98.2  F (36.8  C)  Pulse:  [] 119  Heart Rate:  [100-115] 104  Resp:  [16-18] 18  BP: (68-98)/(47-69) 87/60  SpO2:  [85 %-100 %] 87 %    Intake/Output Summary (Last 24 hours) at 9/26/2019 1056  Last data filed at 9/26/2019 0900  Gross per 24 hour   Intake 903 ml   Output 2155 ml   Net -1252 ml       Admission Weight: 69.9 kg (154 lb)  Current Weight: 67.4 kg (148 lb 8 oz)    PHYSICAL EXAM  GENERAL: Patient is in no distress. Alert and oriented.  CVS irregular rate and rhythm. S1S2.   Systolic murmur aortic area.  LUNGS: Bilateral decreased breath sounds, no crackles, no wheezing.  NEURO moving all extremities, no focal weakness.  EXTREMITIES: 2+ peripheral pulses. 1+ pedal edema.  SKIN: Warm, dry.   PSYCHIATRY Cooperative         Medications:          aspirin  81 mg Oral Daily     atorvastatin  40 mg Oral Daily     calcium carbonate 500 mg (elemental)  1 tablet Oral BID     clopidogrel  75 mg Oral Daily     docusate sodium  100 mg Oral BID     ferrous gluconate  324 mg Oral Daily with breakfast     fish oil-omega-3 fatty acids  2 capsule Oral QAM     folic acid  1 mg Oral QAM     gabapentin  600 mg Oral TID     insulin aspart  1-7 Units Subcutaneous TID AC     insulin aspart  1-5 Units Subcutaneous At Bedtime     ipratropium - albuterol 0.5 mg/2.5 mg/3 mL  3 mL Nebulization 4x daily     levothyroxine  50 mcg Oral QAM AC     multivitamin w/minerals  1 tablet Oral Daily     pantoprazole  40 mg Oral QAM AC     polyethylene glycol  17 g Oral BID     sildenafil  5 mg Oral TID     sodium chloride (PF)  3 mL Intracatheter Q8H     torsemide  20 mg Oral Daily     acetaminophen, acetaminophen, albuterol, benzocaine-menthol, bisacodyl, calcium carbonate, cetirizine, glucose **OR** dextrose **OR** glucagon, HYDROmorphone, lidocaine 4%, lidocaine (buffered or not buffered), magnesium sulfate, magnesium sulfate, melatonin, naloxone, ondansetron **OR** ondansetron, oxyCODONE-acetaminophen, potassium chloride, potassium chloride with lidocaine, potassium chloride, potassium chloride, potassium chloride, sodium chloride (PF)         Data:      All new lab and imaging data was reviewed.

## 2019-09-30 NOTE — PLAN OF CARE
BP soft with SBP in 80's. C/o GONZALEZ, chronic pain. Per cardiac rehab, pt was desating quickly with ambulation and became very fatigued. Gave PRN percocet x2 this shift per pt request with reported decrease in pain. Up with A1, walker. Plan for pulmonology to see.

## 2019-10-01 NOTE — CONSULTS
Pulmonary Medicine Consultation      Date of Admission: 9/25/2019  Primary Attending:  Negar Mustafa MD  Consulting Physician: Alexa Bobby MD    History:    Beulah Jane is a 69 year old  male with medical history of severe aortic stenosis, recent Susan, complete heart block, recent pacemaker placement, heart failure with preserved ejection fraction, hypertension, hyperlipidemia, diabetes, dermatomyositis scented to ED from primary care clinic with ongoing shortness of breath.     Admitted from 8/29 - 9/7 at [facility] cardiology unit for acute exacerbation of heart failure with preserved ejection fraction and NSTEMI.  Then had severe aortic stenosis, underwent TAVR on 9/4. TAVR procedure complicated by complete heart block after valve deployment, otherwise uncomplicated; he did have a pre-existing RBBB. He was bridged to pacemaker (9/6) with a temporary pacing wire and remained 100% V-paced  after surgery. Estimated dry weight around 152 pounds, oxygen saturations improved and was dismissed home on Lasix and metolazone.    Patient states since discharge he has been having worsening shortness of breath over the last 3 weeks, barely able to walk few steps over the last few days.  Complaining of leg swelling, using 2-3 pillows to sleep at night.  No orthopnea no PND.  Uses CPAP at bedtime.  Had called the cardiology clinic who instructed patient to increase Lasix to 80 mg twice daily, states no relief of symptoms. Expressing frustration that he is not able to walk more than 7-10 steps.  No fevers, no recent travel, exposed to his wife who has recently been diagnosed with pneumonia.  ED saturation 92% on room air, heart rate 104, blood pressure 86/57.  Bilateral decreased breath sounds, faint crackles lower lobes.  1+ pitting pedal edema.  WBC 13.9, hemoglobin 9.9, platelets 699.  Glucose 103, BUN 34, potassium 3.3, creatinine 1.20.  proBNP 04355, troponin 0 0.106.  EKG sinus rhythm, left  axis deviation.  Nonspecific ST-T wave changes.  Echocardiogram left ventricle systolic function normal, estimated EF at 55 to 60%.  Right ventricle mild to moderately dilated, mildly decreased right ventricular systolic function.S/P #29 Medtronic Evolute Pro bioprosthetic TAVR; appears well seated with grossly normal function  Chest x-ray dual-lead pacemaker in the right hemithorax unchanged.  Scattered bibasilar opacities again seen.  Patchy areas of grounds place opacities since previous exam have improved.      Review of Systems - A 10-system ROS is negative except for items mentioned above and in HPI.       Prior medical history:  Past Medical History:   Diagnosis Date     Aortic stenosis      Chronic pain      DM (diabetes mellitus), type 2 (H)     on insulin     Hyperlipidemia      JUAN on CPAP      Pneumonia      Polymyositis (H)      Pulmonary fibrosis, unspecified (H)      Seasonal allergies        Past Surgical History:   Procedure Laterality Date     ARTHROSCOPY KNEE  1970     COLONOSCOPY       CV CORONARY ANGIOGRAM N/A 7/30/2019    Procedure: CV CORONARY ANGIOGRAM;  Surgeon: Leonidas Mike MD;  Location:  HEART CARDIAC CATH LAB     CV LEFT HEART CATH N/A 7/30/2019    Procedure: CV LEFT HEART CATH;  Surgeon: Leonidas Mike MD;  Location:  HEART CARDIAC CATH LAB     CV PCI STENT DRUG ELUTING Left 7/30/2019    Procedure: PCI Stent Drug Eluting;  Surgeon: Leonidas Mike MD;  Location:  HEART CARDIAC CATH LAB     CV RIGHT HEART CATH N/A 7/30/2019    Procedure: CV RIGHT HEART CATH;  Surgeon: Leonidas Mike MD;  Location:  HEART CARDIAC CATH LAB     CV TRANSCATHETER AORTIC VALVE REPLACEMENT N/A 9/4/2019    Procedure: Transcatheter (Medtronic - 29mm) Aortic Valve Replacement, trans thorasic echocardiogram, perfusion and cardiac surgery standby, temporary pacemeker placement;  Surgeon: Pj Garcia MD;  Location:  OR     DECOMPRESSION, FUSION LUMBAR POSTERIOR TWO  LEVELS, COMBINED  1997     EP PACEMAKER N/A 9/6/2019    Procedure: EP PACEMAKER;  Surgeon: Shefali Sylvester MD;  Location: [facility]     FUSION LUMBAR ANTERIOR THREE+ LEVELS N/A 4/22/2019    Procedure: Lumbar 1 Or Lumbar 2-5 Anterior Lumbar Interbody Fusion with BMP Stage 1 Of Two Procedure:;  Surgeon: Carlos Enrique Dial MD;  Location: [facility] OR     HEART CATH FEMORAL CANNULIZATION WITH OPEN STANDBY REPAIR AORTIC VALVE N/A 9/4/2019    Procedure: Cardiopulmonary Bypass Standby;  Surgeon: Hamilton Carnes MD;  Location: [facility] OR     HERNIA REPAIR  2006     lumbar spine hardware removal  1999     OPTICAL TRACKING SYSTEM FUSION POSTERIOR SPINE THORACIC THREE+ LEVELS N/A 4/25/2019    Procedure: O-Arm/Stealth Assisted Thoracic 10-Pelvis Instrumented Fusion T11-2, L1-2, L2-3 Transforminal Lumbar Interbody Fusion (TLIF) And Smith Borges Osteotomies,and L3-4 SPO as well, Use Of BMP, Debridement Of Seroma;  Surgeon: Carlos Enrique Dial MD;  Location: [facility] OR     PICC INSERTION Right 05/06/2019    4Fr - 37cm, Basilic vein, low SVC     REMOVAL PROSTHETIC MATERIAL/MESH, ABD WALL NECRO TISS INFEXN  2012     ROTATOR CUFF REPAIR RT/LT  2003       Patient Active Problem List   Diagnosis     Spinal stenosis of lumbar region with neurogenic claudication     Severe aortic stenosis     Hypertension     Hypothyroidism     Mixed hyperlipidemia     Inflammatory arthritis     Pulmonary fibrosis (H)     Type 2 diabetes mellitus with neurologic complication, with long-term current use of insulin (H)     Controlled substance agreement signed     Acute pulmonary embolism (H)     Status post non-ST elevation myocardial infarction (NSTEMI)     Coronary artery disease involving native coronary artery of native heart with other form of angina pectoris (H)     Congestive heart failure, NYHA class III, chronic, diastolic (H)     S/P TAVR (transcatheter aortic valve replacement)     GONZALEZ (dyspnea on exertion)     SVT  (supraventricular tachycardia) (H)     Pulmonary hypertension (H)       Social History     Social History     Socioeconomic History     Marital status:      Spouse name: Not on file     Number of children: Not on file     Years of education: Not on file     Highest education level: Not on file   Occupational History     Not on file   Social Needs     Financial resource strain: Not on file     Food insecurity:     Worry: Not on file     Inability: Not on file     Transportation needs:     Medical: Not on file     Non-medical: Not on file   Tobacco Use     Smoking status: Former Smoker     Packs/day: 0.25     Last attempt to quit: 1970     Years since quittin.2     Smokeless tobacco: Never Used   Substance and Sexual Activity     Alcohol use: Yes     Comment: few times monthly     Drug use: Never     Sexual activity: Not Currently   Lifestyle     Physical activity:     Days per week: Not on file     Minutes per session: Not on file     Stress: Not on file   Relationships     Social connections:     Talks on phone: Not on file     Gets together: Not on file     Attends Oriental orthodox service: Not on file     Active member of club or organization: Not on file     Attends meetings of clubs or organizations: Not on file     Relationship status: Not on file     Intimate partner violence:     Fear of current or ex partner: Not on file     Emotionally abused: Not on file     Physically abused: Not on file     Forced sexual activity: Not on file   Other Topics Concern     Not on file   Social History Narrative     Not on file         Family History  Family History   Problem Relation Age of Onset     Colon Cancer Mother      Hypertension Father         did not know father well         Medications  No current outpatient medications on file.     Current Facility-Administered Medications Ordered in Epic   Medication Dose Route Frequency Last Rate Last Dose     acetaminophen (TYLENOL) Suppository 650 mg  650 mg Rectal  Q4H PRN         acetaminophen (TYLENOL) tablet 650 mg  650 mg Oral Q4H PRN         albuterol (PROVENTIL) neb solution 2.5 mg  2.5 mg Nebulization Q2H PRN         aspirin EC tablet 81 mg  81 mg Oral Daily   81 mg at 09/30/19 0902     atorvastatin (LIPITOR) tablet 40 mg  40 mg Oral Daily   40 mg at 09/30/19 0902     benzocaine-menthol (CHLORASEPTIC) 6-10 MG lozenge 1 lozenge  1 lozenge Buccal Q1H PRN         bisacodyl (DULCOLAX) Suppository 10 mg  10 mg Rectal Daily PRN   10 mg at 09/28/19 0657     calcium carbonate (TUMS) chewable tablet 1,000 mg  1,000 mg Oral 4x Daily PRN         calcium carbonate 500 mg (elemental) (OSCAL;OYSTER SHELL CALCIUM) tablet 500 mg  1 tablet Oral BID   500 mg at 09/30/19 0902     cetirizine (zyrTEC) tablet 10 mg  10 mg Oral Daily PRN         clopidogrel (PLAVIX) tablet 75 mg  75 mg Oral Daily   75 mg at 09/30/19 0902     glucose gel 15-30 g  15-30 g Oral Q15 Min PRN        Or     dextrose 50 % injection 25-50 mL  25-50 mL Intravenous Q15 Min PRN        Or     glucagon injection 1 mg  1 mg Subcutaneous Q15 Min PRN         docusate sodium (COLACE) capsule 100 mg  100 mg Oral BID   100 mg at 09/30/19 0902     ferrous gluconate (FERGON) tablet 324 mg  324 mg Oral Daily with breakfast   324 mg at 09/30/19 0902     fish oil-omega-3 fatty acids capsule 2 capsule  2 capsule Oral QAM   2 capsule at 09/30/19 0902     folic acid (FOLVITE) tablet 1 mg  1 mg Oral QAM   1 mg at 09/30/19 0902     gabapentin (NEURONTIN) capsule 600 mg  600 mg Oral TID   600 mg at 09/30/19 1719     HYDROmorphone (PF) (DILAUDID) injection 0.2 mg  0.2 mg Intravenous Q2H PRN         insulin aspart (NovoLOG) inj (RAPID ACTING)  1-7 Units Subcutaneous TID AC   1 Units at 09/26/19 1440     insulin aspart (NovoLOG) inj (RAPID ACTING)  1-5 Units Subcutaneous At Bedtime         ipratropium - albuterol 0.5 mg/2.5 mg/3 mL (DUONEB) neb solution 3 mL  3 mL Nebulization 4x daily   3 mL at 09/30/19 9917     levothyroxine  (SYNTHROID/LEVOTHROID) tablet 50 mcg  50 mcg Oral QAM AC   50 mcg at 09/30/19 0628     lidocaine (LMX4) cream   Topical Q1H PRN         lidocaine 1 % 0.1-1 mL  0.1-1 mL Other Q1H PRN         magnesium sulfate 2 g in water intermittent infusion  2 g Intravenous Daily PRN         magnesium sulfate 4 g in 100 mL sterile water (premade)  4 g Intravenous Q4H PRN         melatonin tablet 1 mg  1 mg Oral At Bedtime PRN         multivitamin w/minerals (THERA-VIT-M) tablet 1 tablet  1 tablet Oral Daily   1 tablet at 09/30/19 0902     naloxone (NARCAN) injection 0.1-0.4 mg  0.1-0.4 mg Intravenous Q2 Min PRN         ondansetron (ZOFRAN-ODT) ODT tab 4 mg  4 mg Oral Q6H PRN        Or     ondansetron (ZOFRAN) injection 4 mg  4 mg Intravenous Q6H PRN         oxyCODONE-acetaminophen (PERCOCET) 7.5-325 MG per tablet 1 tablet  1 tablet Oral Q6H PRN   1 tablet at 09/30/19 1722     pantoprazole (PROTONIX) EC tablet 40 mg  40 mg Oral QAM AC   40 mg at 09/30/19 0628     polyethylene glycol (MIRALAX/GLYCOLAX) Packet 17 g  17 g Oral BID   17 g at 09/30/19 0902     potassium chloride (KLOR-CON) Packet 20-40 mEq  20-40 mEq Oral or Feeding Tube Q2H PRN         potassium chloride 10 mEq in 100 mL intermittent infusion with 10 mg lidocaine  10 mEq Intravenous Q1H PRN         potassium chloride 10 mEq in 100 mL sterile water intermittent infusion (premix)  10 mEq Intravenous Q1H PRN         potassium chloride 20 mEq in 50 mL intermittent infusion  20 mEq Intravenous Q1H PRN         potassium chloride ER (K-DUR/KLOR-CON M) CR tablet 20-40 mEq  20-40 mEq Oral Q2H PRN   20 mEq at 09/28/19 0705     sildenafil (REVATIO) quarter-tab 5 mg  5 mg Oral TID   5 mg at 09/30/19 1719     sodium chloride (PF) 0.9% PF flush 3 mL  3 mL Intracatheter q1 min prn         sodium chloride (PF) 0.9% PF flush 3 mL  3 mL Intracatheter Q8H   3 mL at 09/29/19 2134     torsemide (DEMADEX) tablet 20 mg  20 mg Oral Daily   20 mg at 09/30/19 0902     No current Epic-ordered  outpatient medications on file.       No Known Allergies      Physical Examination:   Vitals:    19 0906 19 1140 19 1442 19 1540   BP: 98/67 (!) 86/63 (!) 87/60 91/65   BP Location: Right arm Right arm  Right arm   Pulse:       Resp:  18  18   Temp: 98.4  F (36.9  C) 98.2  F (36.8  C)  97.5  F (36.4  C)   TempSrc: Oral Oral  Oral   SpO2: 90% 98% (!) 87% 92%   Weight:       Height:         Body mass index is 23.45 kg/m .  Temp (24hrs), Av  F (36.7  C), Min:97.5  F (36.4  C), Max:98.4  F (36.9  C)        Constitutional:  Appears comfortable.  HENT:  mucous membranes moist.  Eyes: PERRLA, no icterus, no pallor.   Neck: No lymphadenopathy or thyromegaly, trachea midline, no carotid bruits.  Cardiovascular: Regular rate and rythym, no murmurs, rubs or gallops, no peripheral edema.  Respiratory/Chest: bibasilar rales  Musculoskeletal: No clubbing or cyanosis, full range of motion in all extremities.  Neurological: No focal motor or sensory deficits. DTR's are 2+ and symmetric.  Skin: No skin rash, hives, petechiae, or breakdown.      CMP  Recent Labs   Lab 19  0526 19  0533 19  0522 19  1700  19  1411    132* 132* 135   < > 134   POTASSIUM 4.2 3.7 4.0 4.0   < > 3.3*   CHLORIDE 98 98 94 95   < > 95   CO2 31 30 33* 33*   < > 32   ANIONGAP 5 4 5 7   < > 7   * 116* 124* 139*   < > 103*   BUN 17 17 23 28   < > 34*   CR 1.11 0.90 1.05 1.18   < > 1.20   GFRESTIMATED 67 86 72 62   < > 61   GFRESTBLACK 78 >90 83 72   < > 71   DMITRIY 8.9 8.7 8.8 9.0   < > 8.9   MAG  --   --   --   --   --  2.2   PROTTOTAL  --   --   --   --   --  7.2   ALBUMIN  --   --   --   --   --  3.4   BILITOTAL  --   --   --   --   --  0.8   ALKPHOS  --   --   --   --   --  144   AST  --   --   --   --   --  26   ALT  --   --   --   --   --  20    < > = values in this interval not displayed.     CBC  Recent Labs   Lab 19  0533 19  0522 19  0539 19  1411   WBC  --   --   10.1 13.9*   RBC  --   --  4.21* 4.62   HGB  --  9.5* 9.0* 9.9*   HCT  --   --  31.6* 34.6*   MCV  --   --  75* 75*   MCH  --   --  21.4* 21.4*   MCHC  --   --  28.5* 28.6*   RDW  --   --  23.6* 24.1*    504* 493* 699*     INRNo lab results found in last 7 days.  Arterial Blood GasNo lab results found in last 7 days.  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 Please call with any questions.      Alexa Bobby M.D.  Pulmonary, Critical Care and Sleep Medicine  [facility]   Pager: [phone]  Office:[phone]

## 2019-10-01 NOTE — PLAN OF CARE
A/O x4. AVSS on RA ex soft BPs and tachycardic. Tele ST w/BBB. Denies pain. LS crackles, dyspnea on exertion. +2 edema, rachana BLE. Cardia diet, 2000ml fluid restriction, . Assist x1, walker. Discharge pending clinical course, will continue to monitor.

## 2019-10-01 NOTE — PROGRESS NOTES
SPIRITUAL HEALTH SERVICES Progress Note  [facility]    Initial visit per LOS.  The  was not present.  Pt indicates interest in a  visit, but asks that an  be present for it.   affirmed pt's desire to have an  and will plan to visit pt tomorrow when the  is here.                                                                                                                                           Anthony Yu M.Div., [facility]  Staff   Pager [phone]

## 2019-10-01 NOTE — PROGRESS NOTES
[facility]  Hospitalist Progress Note   10/01/2019            Assessment and Plan:       Beulah Jane is a 69 year old  male with medical history of severe aortic stenosis, recent TAVR, complete heart block, recent pacemaker placement, heart failure with preserved ejection fraction, hypertension, hyperlipidemia, diabetes, dermatomyositis admitted 9/25/2019 for shortness of breath.    Dyspnea on exertion multifactorial, recent surgery, exacerbation of heart failure with preserved ejection fraction, pulm hypertension, tachycardia physical deconditioning.  Acute exacerbation of heart failure with preserved EF  Pulmonary hypertension with positive vasodilator study.  Severe aortic stenosis s/p TAVR, 29mm Medtronic Evolut  Complete Heart Block status post PPM  Admitted 8/29 - 9/7 at [facility] for acute exacerbation of heart failure with preserved ejection fraction and NSTEMI.  Then had severe AS, underwent TAVR on 9/4. TAVR procedure complicated by complete heart block after valve deployment, otherwise uncomplicated; he did have a pre-existing RBBB. He was bridged to pacemaker (9/6) with a temporary pacing wire and remained 100% V-paced  after surgery. Dismissed home on Lasix and PRN metolazone.    Presented with worsening shortness of breath, leg swelling and weight gain.  On exam 1+ pitting pedal edema with bilateral decreased breath sounds or crackles.  WBC 13.9, hemoglobin 9.9,  potassium 3.3, creatinine 1.20.  proBNP 87042  Troponin 0.106 and has been flat.  EKG sinus rhythm, left axis deviation.  Nonspecific ST-T wave changes.  Echocardiogram left ventricle systolic function normal, estimated EF at 55 to 60%.  Right ventricle mild to moderately dilated, mildly decreased right ventricular systolic function.S/P #29 Medtronic Evolute Pro bioprosthetic TAVR; appears well seated with grossly normal function  Chest x-ray dual-lead pacemaker in the right hemithorax unchanged.   Scattered bibasilar opacities again seen. Patchy areas of grounds place opacities since previous exam have improved.  Right heart cath 9/27/2019 Baseline: Severe pulmonary HTN with mildly elevated PCWP.  Vasodilator study positive.    Has undergone diuresis with intravenous Lasix, switched to oral torsemide [9/28], has been having low systolic blood pressure, decreased torsemide dosing to once daily (9/29).  Continue to monitor blood pressure, tachycardia improving.  Continue sildenafil 5 mg 3 times daily [9/27]  PTA Brillanta switched to Plavix [9/28]   Continue PTA aspirin and atorvastatin. Appreciate cardiology comanagement.   Telemetry monitoring.  Low-salt diet, 2 L fluid restriction daily.  Strict input output monitoring, daily weights.  Aggressive incentive spirometry.  Limb elevation, Ace wraps [patient slightly reluctant to use as fragile skin]     Possible acute bronchitis.  Patient having ongoing cough.  Afebrile.  WBC 13.2.  Procalcitonin 0.40.  Wife recently treated for pneumonia.  Treated with levofloxacin for total of 5 days [9/25 to 9/30].  Supportive care.     CAD s/p PCI:  s/p PCI p-mLAD and dLAD 7/30/19.  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 Currently stable on MTX.   Recent imaging reviewed.  CRP 22.8 ESR 24.  Hold PTA methotrexate during hospitalization.  PTA albuterol inhaler as needed.  Switch to prn DuoNeb 4 times a day.  Pulmonology [9/30] recommend treat pulmonary hypertension per cardiology.  Follow-up as outpatient.  Aggressive incentive spirometry.    JUAN  Recommended home CPAP at home settings, has not been compliant with CPAP during hospitalization [does not like device from hospital], unfortunately family not able to bring in CPAP [fumigation today at home]     Dermatomyositis w/Shannon-1 Positivity:  Chronic pain.  Patient has been diagnosed with polymyositis  clinically, not by muscle biopsy. He did have a lung biopsy that showed fibrosis of unknown etiology. Has ILD on CT and blood work showed a positive Shannon-1 antibody. PFTs showed worsening DLCO by 11% from 7/2018-1/2019.   Hold PTA methotrexate 25 mg weekly  Continue folic acid 1 mg daily  PTA Gabapentin 1200mg tid, decrease dose to 600 mg 3 times daily given kidney injury.  PTA PRN Oxycodone 7.5/325 mg tid continued.    Reactive thrombocytosis.  Platelets improving from 699 > 504 >448    Status post lactic acidosis likely from hypoxia and volume overload.  Lactic acid 3.0, with intravenous diuresis trended down to 1.2     Diabetes mellitus hemoglobin A1c of 6.7.  PTA administers NPH insulin 2 to 4 units twice daily if blood sugars greater than 140.  PTA NPH insulin on hold and placed on sliding scale.  Monitor blood sugars during hospitalization.     Hyperlipidemia  Triglycerides 97, LDL 58, HDL 19.  Discontinued PTA gemfibrozil as patient complaining of muscle aches and lipid panel at goal.  Continue PTA atorvastatin.    Hypothyroidism  TSH 0.46.  Continue PTA levothyroxine.     PAD  Diagnosed as an outpatient per podiatry, pt has been referred to Vascular Surgery due to L foot wound.   Further management/workup as outpatient     Back pain Status post back surgery 4/2019.  Underwent T11-2, L1-2, L2-3 Transforminal Lumbar Interbody Fusion (TLIF) And Smith Borges Osteotomies,and L3-4 SPO as well.  Has intermittent back pain.  Continue pain meds as above.     Mild hypokalemia, hyponatremia, SHIRIN from aggressive diuresis, volume load- improved.  Monitor renal function, electrolytes while on diuresis and replace accordingly.     Anemia of chronic disease.  Baseline hemoglobin between 8-10.  Per patient last colonoscopy when he was in [place] many years of back.    Hemoglobin levels at baseline, iron saturation index 14.  Started on ferrous gluconate supplements oral daily.  Monitor levels periodically, defer  age-appropriate health maintenance including colonoscopy to all PCP.     Acute constipation  Patient currently on narcotics. Physical deconditioning and minimal mobility.  Scheduled docusate, MiraLAX twice daily.  PRN Dulcolax.     Physical deconditioning from acute illness, chronic back pain.  Patient having assistance from wife, sons at home.  UA negative, TSH within normal limits.  PT, OT assessment -  Home with home care.     Orders Placed This Encounter      Combination Diet [id number] Calories: Moderate Consistent CHO (4-6 CHO units/meal); Low Saturated Fat Na <2400mg Diet      DVT Prophylaxis: SCD, ambulate.  Code Status: Full Code  Disposition: Patient tentatively plan for discharge today, was evaluated by rehab team and recommended home with home care.  Patient having social issues, concern for access to his medication, CPAP given no fumigation at home.  Family will try to obtain medications, CPAP and plan for discharge tomorrow as high risk for readmission, unsafe discharge today.    Discussed with patient, floor RN  Total time greater than 25 minutes    Negar Mustafa MD        Interval History:      Patient continues to complain of generalized weakness and shortness of breath.  Denies any chest pain or palpitation.  No nausea vomiting, tolerating oral diet.  No headache or dizziness.  Pressure slightly improved, systolics in the 90s, 100s.  Not been compliant with CPAP overnight  Reluctant for ACE labs as fragile skin.  Patient concerned with discharge plan, feels would not be ideal for him to go home today.  Has been having fumigation at his house, was recommended to stay at home, his family have rented a hotel room for 2 days.  Does not have access to his CPAP or his medication.         Physical Exam:        Physical Exam   Temp:  [97.6  F (36.4  C)-97.8  F (36.6  C)] 97.8  F (36.6  C)  Heart Rate:  [] 74  Resp:  [18] 18  BP: ()/(61-64) 91/61  SpO2:  [94 %-99 %] 96 %    Intake/Output  Summary (Last 24 hours) at 9/26/2019 1056  Last data filed at 9/26/2019 0900  Gross per 24 hour   Intake 903 ml   Output 2155 ml   Net -1252 ml       Admission Weight: 69.9 kg (154 lb)  Current Weight: 67.4 kg (148 lb 8 oz)    PHYSICAL EXAM  GENERAL: Patient is in no distress. Alert and oriented.  CVS irregular rate and rhythm. S1S2.  Systolic murmur aortic area.  LUNGS: Bilateral decreased breath sounds, no crackles, no wheezing.  NEURO moving all extremities, no focal weakness.  EXTREMITIES: 2+ peripheral pulses. 2 + pedal edema.  SKIN: Warm, dry.   PSYCHIATRY Cooperative         Medications:          aspirin  81 mg Oral Daily     atorvastatin  40 mg Oral Daily     calcium carbonate 500 mg (elemental)  1 tablet Oral BID     clopidogrel  75 mg Oral Daily     docusate sodium  100 mg Oral BID     ferrous gluconate  324 mg Oral Daily with breakfast     fish oil-omega-3 fatty acids  2 capsule Oral QAM     folic acid  1 mg Oral QAM     gabapentin  600 mg Oral TID     insulin aspart  1-7 Units Subcutaneous TID AC     insulin aspart  1-5 Units Subcutaneous At Bedtime     ipratropium - albuterol 0.5 mg/2.5 mg/3 mL  3 mL Nebulization 4x daily     levothyroxine  50 mcg Oral QAM AC     multivitamin w/minerals  1 tablet Oral Daily     pantoprazole  40 mg Oral QAM AC     polyethylene glycol  17 g Oral BID     sildenafil  5 mg Oral TID     sodium chloride (PF)  3 mL Intracatheter Q8H     torsemide  20 mg Oral Daily     acetaminophen, acetaminophen, albuterol, benzocaine-menthol, bisacodyl, calcium carbonate, cetirizine, glucose **OR** dextrose **OR** glucagon, HYDROmorphone, lidocaine 4%, lidocaine (buffered or not buffered), magnesium sulfate, magnesium sulfate, melatonin, naloxone, ondansetron **OR** ondansetron, oxyCODONE-acetaminophen, potassium chloride, potassium chloride with lidocaine, potassium chloride, potassium chloride, potassium chloride, sodium chloride (PF)         Data:      All new lab and imaging data was  reviewed.

## 2019-10-01 NOTE — PLAN OF CARE
Neuro- A&O  Most Recent Vitals- Temp: 97.8  F (36.6  C) Temp src: Oral BP: 97/64 Pulse: 119 Heart Rate: 102 Resp: 18 SpO2: 99 % O2 Device: None (Room air) Oxygen Delivery: 2 LPM  Tele/Cardiac- ST w/ BBB  Resp- 2 L 02  Activity- A/1 w/ walker  Pain- Generalized  Drips- none  Drains/Tubes- PIV  Skin- Red/rachana/edematous BLE   GI/- WNL  Aggression Color- Green  Plan- Plan to continue to diurese, monitor BP's (soft), F/U w/ pulmonology outpt  Misc- [place] Islander speaking, understands english proficiently-interpretor sched for wife

## 2019-10-01 NOTE — PLAN OF CARE
Discharge Planner OT   Patient plan for discharge: pt and spouse to stay in hotel for 2 nights before return home to their apartment as facility being fumigated.    Current status: pt seen for OT/cardiac rehab in am. Much improved today. Ambulated with 4ww approx 220ft, then sat on walker seat for rest break and 02 95% on RA, -107bpm. After 3min rest resumed amb additional 150ft before returning to EOB. 02 93-96% on RA, /70, -108bpm. During session pt completed toileting task with near supervision no assist. He reports feeling much better today as compared to yesterday.   Barriers to return to prior living situation: none noted  Recommendations for discharge: home w/home health PT to progress general strength and activity tolerance/functional mobility (with monitoring of VS) and home OT to assess needs for simple modifications or AE and training in work simplification to maximize indep and safety with daily tasks. Rationale for recommendations: Patient and spouse report they will not be back into home until this Thursday once the fumigation services are completed. Both are in agreement with this discharge recommendation. They have declined OP CR as neither of them drive and live too far away to walk to location of services.       Entered by: Arslan Pearson 10/01/2019 12:31 PM

## 2019-10-02 NOTE — PROVIDER NOTIFICATION
Sats 87-88% on RA during ambulation, recovers to 90-91% a few minutes after, did need to stop and rest for approx. 5 min during one walk d/t SOB. Text page to Dr. Mustafa to update.

## 2019-10-02 NOTE — PROGRESS NOTES
Received intake call for home oxygen at 3:04PM. Reviewed patient's chart; Patient qualifies under Medicare guidelines but need the ANILA on O2 Rx to be listed in months, not weeks  3:20PM-  Spoke with care coordinator, Mari, informing her we need the ANILA on the Rx changed to months, not weeks.  Mari was able to have this completed right away.  All documentation is now complete and qualifies under medicare guidelines.  Mari informed me that patient will temporarily be staying at [facility] in [place] until he can get back into his apartment.  Address:  [address] [place] [address]  [id number].   3:25PM- Called to offer choice and patient is okay with [facility] setting them up. Discussed equipment with patient and informed them that we would be to bedside with oxygen in the next 2 hours.   3:52PM-POC delivered to patient at bedside.

## 2019-10-02 NOTE — PLAN OF CARE
VSS. Tele ST in low 100's with BBB. Denies CP. C/o SOB, GONZALEZ and chronic generalized pain. Gave percocet x2 per pt request. Ace wraps were applied to bilateral lower extremities. Pt was able to tolerate for around 5 hours before requesting they be removed. Edema +2-3 to both feet. Encouraged pt to elevate feet when resting. BP more stable today, tolerating cardiac rehab. Plan for possible discharge home tomorrow with home PT/OT.

## 2019-10-02 NOTE — PLAN OF CARE
Discharge Planner OT   Patient plan for discharge: home w/spouse and HH therapies (to hotel for one night then home to apartment)  Current status: pt seen for CR session in am. Back on 2L 02 and 99% at rest. Removed NC for ambulation on RA and patient ambulated 325ft with 4ww SBA, 02 monitored throughout and lowest reading was 86% however difficult to know if correct d/t his low perfusion and readings increased fairly rapidly with high readings to 92%. BP's continue to be low at rest (98/59) and fairly steady with ambulation (87/60). -107bpm. Pt reports progressive fatigue with distance, no lightheadedness. Education completed in CHF management including use of STOP sign tool, adherence to low-sodium diet, daily-weight taking. Pt asked relevant questions and suggestions made to modify their specific food choices which normally include use of a significant amount of salt. Handouts issued for reinforcement of all information presented.   Barriers to return to prior living situation: none noted  Recommendations for discharge: home w/HH OT and PT  Rationale for recommendations: will benefit from HH PT to progress general strength and activity tolerance/functional mobility (with monitoring of VS) and home OT to assess needs for simple modifications or AE and training in work simplification to maximize indep and safety with daily tasks.       Entered by: Arslan Pearson 10/02/2019 10:23 AM

## 2019-10-02 NOTE — PROGRESS NOTES
HOME OXYGEN REQUEST     I certify that this patient, Beulah Jane has been under my care and that I, or a nurse practitioner or physician's assistant working with me, had a face-to-face encounter that meets face-to-face encounter requirements with this patient on October 2, 2019.    Beulah Jane is now in a chronic stable state and continues to require supplemental oxygen due to continued oxygen desaturation.  This patient has been treated in part, or in whole for the following medical condition(s):  Chronic Heart Failure I50  Pulmonary Fibrosis J84.10  Pulmonary Hypertension I27.20  Treatments tried and failed or ruled out to treat hypoxemia include started on diuretics, sildenafil.  Will need 1 L of oxygen with nasal cannula on activity with the use of portable tank.  Despite duration of treatment for 2 to 4 weeks.  If portability is ordered, is the patient mobile within the home? Yes    Pulse oximetry (SpO2) = 90-91% on room air at rest while awake.     *SpO2 improved to 95-96% on 2 liters/minute at rest.     *SpO2 = 87% on room air during activity/with exercise.     *SpO2 improved to 92% on 2 liters/minute during activity/with exercise.       Attending Provider: Negar Mustafa MD  Physician signature: See electronic signature associated with these discharge orders  Date of Order: October 2, 2019

## 2019-10-02 NOTE — TELEPHONE ENCOUNTER
Unable to get a hold of patient after multiple attempts. Review of chart shows patient had a cardiac procedure done on 09/27/19.  Will close call.    Marlys Méndez RN  Care Coordinator  [facility]

## 2019-10-02 NOTE — CONSULTS
Care Transition Initial Assessment - RN        Met with: Patient.  DATA   Principal Problem:    SVT (supraventricular tachycardia) (H)  Active Problems:    GONZALEZ (dyspnea on exertion)    Pulmonary hypertension (H)       Cognitive Status: awake and alert.        Contact information and PCP information verified: Yes  Lives With: child(day), adult, spouse   Living Arrangements: condominium  Quality of Family Relationships: helpful, involved  Description of Support System: Supportive, Involved   Who is your support system?: Wife, Children   Support Assessment: Adequate family and caregiver support   Insurance concerns: No Insurance issues identified  ASSESSMENT  Patient currently receives the following services:  none        Identified issues/concerns regarding health management: The pt was admitted on 9/25 for GONZALEZ.  Pt is now requiring home oxygen which has been ordered through  Home [facility].  Pt states he cannot get to cardiac rehab, he is too dyspneic. Pt prefers home care.   Offered patient a choice of home care agencies. Pt would like to use [facility]. Pt understands they must be homebound. Pt informed of the plan and in agreement with the plan. E-mail referral sent to [facility] updating them on the orders.  Home Care phone number placed on discharge instructions. The pt will be staying at the [facility] while his apartment is being fumigated.      [address] [address]  [phone]    PLAN  Financial costs for the patient include; copays.  Patient given options and choices for discharge; yes.  Patient/family is agreeable to the plan?  Yes:   Patient anticipates discharging to home with  Home care with new home oxygen through  Home [facility] .        Patient anticipates needs for home equipment: Yes, home oxygen  Transportation/person available to transport on day of discharge is TBD.   Plan/Disposition: Home, with Homecare and home  oxygen  Appointments:   -See AVS    Care  (CTS) will continue to follow as needed.    Yvette Perez RN, BAN   Care Coordinator  Ph. [phone]

## 2019-10-02 NOTE — PLAN OF CARE
Pt remains hypotensive, SBP 90s and pt asymptomatic. Educated about sidenafil and BP monitoring, slow position changes. SpO2 right around 90% on RA at rest, however pt requiring supplemental O2 for activity. Home O2 ordered. LS with crackles in bases, mild BLE edema, refused ACE wraps. Discharge instructions and medications reviewed with pt, he verbalizes understanding about follow up appointments and heart failure monitoring. Plan to discharge to [facility] in [facility] via son once discharge meds and O2 arrive.

## 2019-10-02 NOTE — PROVIDER NOTIFICATION
Pt on 2 LPM sat 94%. very tired. Was able to use the IS, Flutter valve used with neb. Lung sounds diminished.     10/02/19 0737   Oxygen Therapy   SpO2 94 %   O2 Device Nasal cannula   Oxygen Delivery 2 LPM   Nebulizer Pre Assessment   %RT Use ONLY Delivery Method Nebulizer - Initial   Nebulizer Device Mouthpiece   Pretreatment Heart Rate (beats/min) 90   Pretreatment Resp Rate (breaths/min) 14   Pretreatment O2 sats - (TCU only) 95   Pretreat Breath Sounds - Bilat - All Lobes diminished   Breath Sounds Post-Respiratory Treatment   Posttreatment Heart Rate (beats/min) 94   Posttreatment Resp Rate (breaths/min) 16   Post treatment O2 Sats - (TCU only) 100   Throughout All Fields Post-Treatment no change   Resp Acapella   Acapella Done    Cj  RT

## 2019-10-02 NOTE — PROGRESS NOTES
Patient's son verbalized concern over patient's cough as of today and increased swelling in legs and feet bilaterally.  Oxygen status, appears dyspneic and slightly labored.  Has frequent cough and states this is new today.  Relayed concerns to off going hospitalist/ and with further discussion called the cross covering hospitalist who discontinued the discharge order.  Patient and family comfortable with the plan.  Will continue to monitor and reevaluate tomorrow.

## 2019-10-02 NOTE — PROGRESS NOTES
SPIRITUAL HEALTH SERVICES Progress Note  [facility]    SH visited w pt with the help of a .  Pt's spouse also listened to this conversation by phone.  Pt described the health challenges he has experienced during this past year and notes that this long time of illness feels--at times--discouraging.  Pt invited prayer for improvement in his physical health, and states that he has good support from his family.  SH provided emotional/spiritual support and prayer.  Pt cites no other needs at this time.                                                                                                                                           Anthony Yu M.Div., [facility]  Staff   Pager [phone]

## 2019-10-02 NOTE — PROGRESS NOTES
Xcoverage: paged by RN- pt was supposed to be discharged but RN concerned about possible fluid overload (his legs are edematous, there are crackles on lungs auscultation, also reports some cough); cancel discharge order now and reassess in am by rounding hospitalist.    Sara Matos MD

## 2019-10-02 NOTE — PLAN OF CARE
A&Ox4. BP soft 's, asymptomatic. HR tachy in the 100's. Other VSS on 2L NC overnight. Pt does not like our CPAP so wears oxygen instead. Up independently in room with walker, steady gait. PRN Percocet given for chronic pain. Plan for cardiac rehab. Possibly discharge today pending wife's ability to get pt's home meds and CPAP. Pt's house being fumigated so must stay in hotel for a couple days. Continue to monitor.      Heart Failure Care Pathway  GOALS TO BE MET BEFORE DISCHARGE:    1. Decrease congestion and/or edema with diuretic therapy to achieve near      optimal volume status.            Dyspnea improved:  No, please explain: GONZALEZ/SOB reported            Edema improved:     No, please explain: 2-3+ BLE        Net I/O and Weights since admission:          09/02 0700 - 10/02 0659  In: 4368 [P.O.:4140; I.V.:228]  Out: 45206 [Urine:92376]  Net: -6112            Vitals:    09/25/19 1328 09/26/19 0159 09/27/19 0609 09/28/19 0500   Weight: 69.9 kg (154 lb) 67.4 kg (148 lb 8 oz) 68.9 kg (151 lb 14.4 oz) 67.2 kg (148 lb 1.6 oz)    09/29/19 0256 09/30/19 0617 10/02/19 0616   Weight: 67.2 kg (148 lb 3.2 oz) 67.9 kg (149 lb 11.2 oz) 68.4 kg (150 lb 14.4 oz)       2.  O2 sats > 92% on RA or at prior home O2 therapy level.          Current oxygenation status:       SpO2: 98 %         O2 Device: Nasal cannula,  Oxygen Delivery: 2 LPM         Able to wean O2 this shift to keep sats > 92%:  No, please explain: 2 L overnight       Does patient use Home O2? No wears CPAP at home    3.  Tolerates ambulation and mobility near baseline: No, please explain: GONZALEZ        How many times did the patient ambulate with nursing staff this shift? 1    Please review the Heart Failure Care Pathway for additional HF goal outcomes.    JIM RAYGOZA RN RN  10/2/2019

## 2019-10-03 NOTE — PLAN OF CARE
Patient wt up 2 pounds.  Given Lasix IV- [id number] after lasix. Bps soft 80-90 SBP but asymptomatic.   Experience CP 3/10 after CT scan with contrast.  EKG the same as 9/30.  Improved pain with oxygen to 0'/10.  CT neg for PE but showed increasing pericardial effusion which results were called to Cardiology NP.  Patient resting in bed this afternoon. Declined PT.  Continue to monitor.

## 2019-10-03 NOTE — PROGRESS NOTES
[facility]    Cardiology Progress Note    Primary Cardiologist: Dr. Nate Solorio     Service date: 10/3/2019    Mr. Beulah Jane is a very pleasant 69 year old male admitted on 9/25/19 for worsening dyspnea on exertion x1 week.     Assessment & Plan   1. Acute on chronic heart failure with preserved ejection fraction (HFpEF)   - Wt trending back up slightly to 150 lbs. Admit wt 154 lbs, but was down to 148 lbs on 9/29/19.  - Feels his shortness of breath is unchanged with increasing cough and lower extremity edema over the past day or two. Difficult to assess fluid status with comorbid pulmonary HTN and lung disease. Suspect mildly fluid up, but not significantly hypervolemic.   - Again suspect his symptoms are multifactorial with HFpEF, severe pulmonary HTN, underlying interstitial lung disease, possible recent acute bronchitis, and deconditioning related to his recent prolonged hospital stays all contributing.   2. Severe pulmonary hypertension  - Right heart cath 9/27/19 confirmed severe pulmonary hypertension with mildly elevated PCWP. The vasodilator study was positive.  - On sildenafil 5 mg PO t.i.d. and tolerating this well.  3. Severe aortic stenosis, status post recent TAVR with 29mm Medtronic Evolut bioprosthetic valve on 9/4/19  - Valve appears well seated on echo with grossly normal function, mean gradient 6 mmHg, peak velocity 1.7 m/sec.  4. Coronary artery disease  - Coronary angiogram 7/30/19 showed two vessel obstructive CAD with diffuse calcified disease of the prox-mid LAD and  of small, non-dominant RCA with R-R collaterals. Two stents were placed to the proximal-mid LAD and distal LAD.  - Initial troponin elevated at 0.106 and flat. Suspect elevation from demand ischemia.  - Switched from brilinta to plavix due to possibility of brilinta contributing to SOB.   - On plavix, aspirin, and atorvastatin. Not on beta blocker due to CHB s/p PPM.   5. Complete Heart  Block status post PPM  - Device interrogation 9/27/19 showed one-to-one AV conduction with sinus tachycardia likely secondary to underlying shortness of breath.  6. Chronic anemia  7. Type 2 diabetes mellitus, insulin dependent   8. Obstructive sleep apnea on CPAP  9. Dermatomyositis with chronic pain   10. Interstitial lung disease  11. Hypothyroidism    Plan:   1. Increase torsemide from 20 mg PO once daily to 20 mg PO b.i.d. with a repeat BMP tomorrow.  2. Will continue to monitor his BP and volume status with this change and could possibly discharge tomorrow if stable. Home oxygen has been arranged.   3. Continue with outpatient cardiac rehab.  4. Follow up as previously planned with his primary Cardiologist, Dr. Solorio, at the [facility] in 1 week with a repeat echo post TAVR and to establish with Pulmonology on 10/23/19.  5. Will arrange for follow up with Dr. Williamson or Dr. Mccain for further management of Pulmonary hypertension.      Disposition Plan   Expected discharge tommorrow to prior living arrangement pending .     Entered: Silas Mendez 10/03/2019, 10:09 AM     Interval History   Mr. Catherine Jane reports feeling well this morning. He currently denies chest pain or shortness of breath. He denies dizziness or lightheadedness and has been ambulating in his room getting up to the bathroom and tolerating this well. He feels his strength and stamina has improved somewhat since his admission. He feels his lower extremity edema has increased over the past day or two and he continues to have a cough.     Telemetry: Sinus rhythm with RBBB and HR 80s-90s.    Thank you for the opportunity to participate in this pleasant patient's care.     Silas Mendez NP  Text Page  (8am - 5pm, M-F)    Patient Active Problem List   Diagnosis     Spinal stenosis of lumbar region with neurogenic claudication     Severe aortic stenosis     Hypertension     Hypothyroidism     Mixed hyperlipidemia      Inflammatory arthritis     Pulmonary fibrosis (H)     Type 2 diabetes mellitus with neurologic complication, with long-term current use of insulin (H)     Controlled substance agreement signed     Acute pulmonary embolism (H)     Status post non-ST elevation myocardial infarction (NSTEMI)     Coronary artery disease involving native coronary artery of native heart with other form of angina pectoris (H)     Congestive heart failure, NYHA class III, chronic, diastolic (H)     S/P TAVR (transcatheter aortic valve replacement)     GONZALEZ (dyspnea on exertion)       Physical Exam   Temp: 97.9  F (36.6  C) Temp src: Oral BP: 93/60 Pulse: 74 Heart Rate: 118 Resp: 16 SpO2: 95 % O2 Device: Nasal cannula Oxygen Delivery: 2 LPM  Vitals:    09/30/19 0617 10/02/19 0616 10/03/19 0504   Weight: 67.9 kg (149 lb 11.2 oz) 68.4 kg (150 lb 14.4 oz) 68.2 kg (150 lb 4.8 oz)     Vital Signs with Ranges  Temp:  [97.4  F (36.3  C)-98.2  F (36.8  C)] 97.9  F (36.6  C)  Pulse:  [] 74  Heart Rate:  [118] 118  Resp:  [16-20] 16  BP: ()/(58-67) 93/60  SpO2:  [87 %-97 %] 95 %  I/O last 3 completed shifts:  In: 1098 [P.O.:1098]  Out: 2000 [Urine:2000]    Constitutional: Appears his stated age, resting in bed, and in no acute distress.  Eyes: Pupils equal, round. Sclerae anicteric.   HEENT: Normocephalic, atraumatic.   Neck: Supple. JVP elevated to the level of the jaw.   Respiratory: Breathing non-labored. Lungs diminished with crackles in the bases bilaterally. No wheezes, rales, or rhonchi.  Cardiovascular: Regular rhythm, rapid rate, normal S1 and S2. No murmur, rub, or gallop.  GI: Soft, non-distended, non-tender.  Skin: Warm, dry. Right femoral site soft, non-tender, no ecchymosis or bruit. No rashes, cyanosis, or xanthelasma.  Musculoskeletal/Extremities: Moves all extremities well and symmetrically. 2+ pitting edema in the feet and ankles bilaterally.   Neurologic: No gross motor deficits. Alert and oriented to person, place and  time.  Psychiatric: Affect appropriate. Answers questions appropriately.      Medications       aspirin  81 mg Oral Daily     atorvastatin  40 mg Oral Daily     calcium carbonate 500 mg (elemental)  1 tablet Oral BID     clopidogrel  75 mg Oral Daily     docusate sodium  100 mg Oral BID     ferrous gluconate  324 mg Oral Daily with breakfast     fish oil-omega-3 fatty acids  2 capsule Oral QAM     folic acid  1 mg Oral QAM     furosemide  40 mg Intravenous Once     gabapentin  600 mg Oral TID     insulin aspart  1-7 Units Subcutaneous TID AC     insulin aspart  1-5 Units Subcutaneous At Bedtime     ipratropium - albuterol 0.5 mg/2.5 mg/3 mL  3 mL Nebulization 4x daily     levothyroxine  50 mcg Oral QAM AC     multivitamin w/minerals  1 tablet Oral Daily     pantoprazole  40 mg Oral QAM AC     polyethylene glycol  17 g Oral BID     sildenafil  5 mg Oral TID     sodium chloride (PF)  3 mL Intracatheter Q8H     torsemide  20 mg Oral Daily       Data   Results for orders placed or performed during the hospital encounter of 09/25/19 (from the past 24 hour(s))   Care Transition RN/SW IP Consult    Narrative    Yvette Perez RN     10/2/2019  3:41 PM  Care Transition Initial Assessment - RN        Met with: Patient.  DATA   Principal Problem:    SVT (supraventricular tachycardia) (H)  Active Problems:    GONZALEZ (dyspnea on exertion)    Pulmonary hypertension (H)       Cognitive Status: awake and alert.        Contact information and PCP information verified: Yes  Lives With: child(day), adult, spouse   Living Arrangements: condominium  Quality of Family Relationships: helpful, involved  Description of Support System: Supportive, Involved   Who is your support system?: Wife, Children   Support Assessment: Adequate family and caregiver support   Insurance concerns: No Insurance issues identified  ASSESSMENT  Patient currently receives the following services:  none        Identified issues/concerns regarding health  management: The pt   was admitted on 9/25 for GONZALEZ.  Pt is now requiring home oxygen   which has been ordered through  [facility].  Pt states he   cannot get to cardiac rehab, he is too dyspneic. Pt prefers home   care.   Offered patient a choice of home care agencies. Pt would   like to use [facility]. Pt understands they must be   homebound. Pt informed of the plan and in agreement with the   plan. E-mail referral sent to [facility] updating them on   the orders.  Home Care phone number placed on discharge   instructions. The pt will be staying at the [facility] while his apartment is being fumigated.      [id number] [place] [address]  [phone]    PLAN  Financial costs for the patient include; copays.  Patient given options and choices for discharge; yes.  Patient/family is agreeable to the plan?  Yes:   Patient anticipates discharging to home with  Home care with   new home oxygen through  [facility] .        Patient anticipates needs for home equipment: Yes, home oxygen  Transportation/person available to transport on day of discharge   is TBD.   Plan/Disposition: Home, with Homecare and home oxygen  Appointments:   -See AVS    Care  (CTS) will continue to follow as   needed.    Yvette Perez RN, BAN   Care Coordinator  Ph. [phone]            Glucose by meter   Result Value Ref Range    Glucose 180 (H) 70 - 99 mg/dL   Glucose by meter   Result Value Ref Range    Glucose 82 70 - 99 mg/dL   Glucose by meter   Result Value Ref Range    Glucose 113 (H) 70 - 99 mg/dL   Glucose by meter   Result Value Ref Range    Glucose 118 (H) 70 - 99 mg/dL

## 2019-10-03 NOTE — PLAN OF CARE
OT/CR: Attempted intervention; however, pt declined due to fatigue. RN aware of pt's decline and states pt recently had episode of chest pain.

## 2019-10-03 NOTE — PROGRESS NOTES
Pt received all nebs per MD order. Pt BS clear, diminished. Pt Spo2 has been 92-97%    RT will continue to monitor.     10/2/2019  Taryn Reid, RT

## 2019-10-03 NOTE — PROGRESS NOTES
"BRIEF NUTRITION ASSESSMENT      REASON FOR ASSESSMENT:  Beulah Jane is a 69 year old male seen by Registered Dietitian for LOS (overdue x1 day as discharge orders canceled last evening)    NUTRITION HISTORY:  H/o severe aortic stenosis, recent pacemaker placement, HF, HTN, diabetes, admitted with SOB. No known food allergies     Per H&P = \"Admitted from 8/29 - 9/7 at [facility] cardiology unit for acute exacerbation of heart failure with preserved ejection fraction and NSTEMI.  Then had severe aortic stenosis, underwent TAVR on 9/4. TAVR procedure complicated by complete heart block after valve deployment\"    CURRENT DIET AND INTAKE:  Diet: Mod CHO, Low saturated fat, <2400 mg Na     Chart reviewed, patient consistently ordering meals ~2x per day and consuming 100% in flow sheets     Patient requires an  (not present at this time)     ANTHROPOMETRICS:  Height: 5' 7\"  Weight:  150 lbs 4.8 oz  Body mass index is 23.54 kg/m .   Weight Status: Normal BMI  IBW:  67.3 kg  %IBW: 101%  Weight History: weights have remained fairly stable over the past 3 months   Wt Readings from Last 10 Encounters:   10/03/19 68.2 kg (150 lb 4.8 oz)   09/25/19 69.9 kg (154 lb)   09/17/19 70.8 kg (156 lb)   09/13/19 67.1 kg (148 lb)   09/07/19 69.2 kg (152 lb 8.9 oz)   08/21/19 71.2 kg (157 lb)   08/21/19 71.4 kg (157 lb 6.4 oz)   08/02/19 73 kg (161 lb)   07/31/19 73.2 kg (161 lb 4.8 oz)   07/30/19 70.6 kg (155 lb 11.2 oz)     LABS:  Labs noted  Lab Results   Component Value Date    A1C 6.7 08/29/2019    A1C 6.3 01/29/2019    A1C 5.6 06/26/2018    A1C 6.3 12/06/2017    A1C 6.2 11/28/2017     Recent Labs   Lab 10/03/19  0805 10/03/19  0201 10/02/19  2103 10/02/19  1844 10/02/19  0904 10/02/19  0217  10/01/19  0523  09/29/19  0526  09/28/19  0533  09/27/19  0522  09/26/19  1700   GLC  --   --   --   --   --   --   --  129*  --  107*  --  116*  --  124*  --  139*   * 113* 82 180* 95 102*   < >  --    < >  " --    < >  --    < >  --    < >  --     < > = values in this interval not displayed.       MALNUTRITION:  Patient does not meet two of the following criteria necessary for diagnosing malnutrition: significant weight loss, reduced intake, subcutaneous fat loss, muscle loss or fluid retention. Nutrition Focused Physical Assessment (NFPA) not appropriate at this time.    NUTRITION INTERVENTION:  Nutrition Diagnosis:  No nutrition diagnosis at this time.    Implementation:  Nutrition Education: Per Provider order if indicated     FOLLOW UP/MONITORING:   Will re-evaluate in 7 - 10 days, or sooner, if re-consulted.      Hanny Cisneros RD, LD  Clinical Dietitian

## 2019-10-03 NOTE — PROVIDER NOTIFICATION
Pt taking nebs: Sat 97% lungs clear. Increased aeration.     10/03/19 0710   Oxygen Therapy   SpO2 97 %   O2 Device Nasal cannula   Oxygen Delivery 2 LPM   Nebulizer Pre Assessment   %RT Use ONLY Delivery Method Nebulizer - Initial   Nebulizer Device Mouthpiece   Pretreatment Heart Rate (beats/min) 89   Pretreatment Resp Rate (breaths/min) 14   Pretreatment O2 sats - (TCU only) 98   Pretreat Breath Sounds - Bilat - All Lobes clear;diminished   Breath Sounds Post-Respiratory Treatment   Posttreatment Heart Rate (beats/min) 90   Posttreatment Resp Rate (breaths/min) 16   Post treatment O2 Sats - (TCU only) 100   Throughout All Fields Post-Treatment aeration increased;clear   Resp Acapella   Acapella Done   Repetitions (indicate number of repetitions) 10   Resistance Level (indicate level) 5   Cj  RT

## 2019-10-03 NOTE — PROGRESS NOTES
[facility]    Medicine Progress Note - Hospitalist Service       Date of Admission:  9/25/2019  Assessment & Plan   Beulah Jane is a 69 year old  male with medical history of severe aortic stenosis, recent TAVR, complete heart block, recent pacemaker placement, heart failure with preserved ejection fraction, hypertension, hyperlipidemia, diabetes, dermatomyositis admitted 9/25/2019 for shortness of breath.     Dyspnea on exertion multifactorial, recent surgery, exacerbation of heart failure with preserved ejection fraction, pulm hypertension, tachycardia physical deconditioning.  Acute exacerbation of heart failure with preserved EF  Pulmonary hypertension with positive vasodilator study.  Severe aortic stenosis s/p TAVR, 29mm Medtronic Evolut  Complete Heart Block status post PPM  Admitted 8/29 - 9/7 at [facility] for acute exacerbation of heart failure with preserved ejection fraction and NSTEMI.  Then had severe AS, underwent TAVR on 9/4. TAVR procedure complicated by complete heart block after valve deployment, otherwise uncomplicated; he did have a pre-existing RBBB. He was bridged to pacemaker (9/6) with a temporary pacing wire and remained 100% V-paced  after surgery. Dismissed home on Lasix and PRN metolazone.    Presented with worsening shortness of breath, leg swelling and weight gain.  On exam 1+ pitting pedal edema with bilateral decreased breath sounds or crackles.  WBC 13.9, hemoglobin 9.9,  potassium 3.3, creatinine 1.20.  proBNP 06365  Troponin 0.106 and has been flat.  EKG sinus rhythm, left axis deviation.  Nonspecific ST-T wave changes.  Echocardiogram left ventricle systolic function normal, estimated EF at 55 to 60%.  Right ventricle mild to moderately dilated, mildly decreased right ventricular systolic function.S/P #29 Medtronic Evolute Pro bioprosthetic TAVR; appears well seated with grossly normal function  Chest x-ray dual-lead pacemaker in the right  hemithorax unchanged.  Scattered bibasilar opacities again seen. Patchy areas of grounds place opacities since previous exam have improved.  Right heart cath 9/27/2019 Baseline: Severe pulmonary HTN with mildly elevated PCWP.  Vasodilator study positive.     Was followed by cardiology during hospitalization, had undergone diuresis with intravenous Lasix, switched to oral torsemide [9/28], has been having low systolic blood pressure, decreased torsemide dosing to once daily (9/29).    Continue sildenafil 5 mg 3 times daily [9/27]  PTA Brillanta switched to Plavix [9/28]   Continue PTA aspirin and atorvastatin.   Low-salt diet, 2 L fluid restriction daily.  Monitor daily weights.  Aggressive incentive spirometry.  Limb elevation, Ace wraps [patient non compliant to ACE despite education multiple times ]    October 3- patient not discharged due to concern for worsening heart failure.  His weight is up a couple of pounds over the last couple of days and he has some increase in his leg edema.  1 dose of IV furosemide given and I asked the cardiology service to reevaluate him and adjust diuretics as needed.     Acute hypoxia on exertion, competent of heart failure with preserved EF, pulmonary hypertension, interstitial lung disease, obstructive sleep apnea not compliant with CPAP  Oxygen saturation dropped to 86- 87 % on ambulation, prescribed 1 L supplemental nasal oxygen while ambulation, wean off as tolerated.  Goal oxygen saturation 90%.      Possible acute bronchitis.  Patient having ongoing cough.  Afebrile.  WBC 13.2.  Procalcitonin 0.40.  Wife recently treated for pneumonia.  Treated with levofloxacin for total of 5 days [9/25 to 9/30].  Minimal improvement in cough.  Supportive care.     CAD s/p PCI:  s/p PCI p-mLAD and dLAD 7/30/19.  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 Currently stable on  MTX.   Recent imaging reviewed.  CRP 22.8 ESR 24.  Held PTA methotrexate during hospitalization.  PTA albuterol inhaler as needed.    Pulmonology [9/30] recommend treat pulmonary hypertension per cardiology.  Follow-up as outpatient.  Aggressive incentive spirometry.     JUAN  Recommended home CPAP at home settings, has not been compliant with CPAP during hospitalization [does not like device from hospital], unfortunately family was not able to bring in home CPAP during hospital stay despite education multiple times   At time of discharge emphasize compliance with CPAP.     Dermatomyositis w/Shannon-1 Positivity:  Chronic pain.  Patient has been diagnosed with polymyositis clinically, not by muscle biopsy. He did have a lung biopsy that showed fibrosis of unknown etiology. Has ILD on CT and blood work showed a positive Shannon-1 antibody. PFTs showed worsening DLCO by 11% from 7/2018-1/2019.   Held PTA methotrexate 25 mg weekly during hospitalization.  Continue folic acid 1 mg daily  PTA Gabapentin 1200mg tid, decreaseD dose to 600 mg 3 times daily given kidney injury.  PTA PRN Oxycodone 7.5/325 mg tid needs to be de-escalated, tapered off as outpatient.     Reactive thrombocytosis.  Platelets improving from 699 > 504 >448     Status post lactic acidosis likely from hypoxia and volume overload.  Lactic acid 3.0, with intravenous diuresis trended down to 1.2     Diabetes mellitus hemoglobin A1c of 6.7.  PTA administers NPH insulin 2 to 4 units twice daily if blood sugars greater than 140.  During hospitalization was on sliding scale insulin, restart PTA regimen on discharge.     Hyperlipidemia  Triglycerides 97, LDL 58, HDL 19.  Discontinued PTA gemfibrozil as patient complaining of muscle aches and lipid panel at goal.  Continue PTA atorvastatin.     Hypothyroidism  TSH 0.46.  Continue PTA levothyroxine.     PAD  Further management/workup as outpatient     Back pain Status post back surgery 4/2019.  Underwent T11-2, L1-2, L2-3  Transforminal Lumbar Interbody Fusion (TLIF) And Smith Borges Osteotomies,and L3-4 SPO as well.  Has intermittent back pain.  Continue gabapentin, Percocet as above.  Wean off Percocet as outpatient.  Compress.  PRN Tylenol.     Mild hypokalemia, hyponatremia, SHIRIN from aggressive diuresis, volume load- improved.  Monitor BMP in 1 week.     Anemia of chronic disease.  Baseline hemoglobin between 8-10.    Per patient last colonoscopy when he was in [place] many years of back.    Hemoglobin levels at baseline, iron saturation index 14.  Started on ferrous gluconate supplements oral daily.  Monitor levels in 1 week, defer age-appropriate health maintenance including colonoscopy to all PCP.     Acute constipation  Patient currently on narcotics. Physical deconditioning and minimal mobility.  docusate, MiraLAX prn.  Consider cutting back, tapering off narcotics.     Physical deconditioning from acute illness, chronic back pain.  Patient having assistance from wife, sons at home.  UA negative, TSH within normal limits.  PT, OT assessment -  Home with home care.     Diet: Fluid restriction 2000 ML FLUID  Combination Diet 6090-9623 Calories: Moderate Consistent CHO (4-6 CHO units/meal); Low Saturated Fat Na <2400mg Diet  Diet    DVT Prophylaxis: Pneumatic Compression Devices  [place] Catheter: not present  Code Status: Full Code      Disposition Plan   Expected discharge: 1-2 days, recommended to prior living arrangement once cardiac status is stable .  Entered: Carlos Enrique Grande MD 10/03/2019, 1:11 PM       The patient's care was discussed with the Patient and cardiology Consultant.    Carlos Enrique Grande MD  Hospitalist Service  [facility]    ______________________________________________________________________    Interval History   No new specific complaints- not significantly more short of breath.  He is a little concerned about more leg swelling though.    Data reviewed today: I reviewed  all medications, new labs and imaging results over the last 24 hours. I personally reviewed no images or EKG's today.    Physical Exam   Vital Signs: Temp: 97.9  F (36.6  C) Temp src: Oral BP: 97/70 Pulse: 74   Resp: 16 SpO2: 95 % O2 Device: Nasal cannula Oxygen Delivery: 2 LPM  Weight: 150 lbs 4.8 oz  Constitutional: awake, alert, cooperative, no apparent distress, and appears stated age  Respiratory: No increased work of breathing, good air exchange, bibasilar rales present  Cardiovascular: regular rate and rhythm, normal S1 and S2, no S3 or S4, and no murmur noted; there is 1+ pitting edema bilaterally up over the shin area  GI: No scars, normal bowel sounds, soft, non-distended, non-tender, no masses palpated  Skin: no rashes  Neuropsychiatric: General: normal, calm and normal eye contact    Data   Recent Labs   Lab 10/03/19  1133 10/01/19  0523 09/29/19  0526 09/28/19  0533 09/27/19  0522   HGB  --  8.7*  --   --  9.5*   PLT  --   --   --  448 504*   * 133 134 132* 132*   POTASSIUM 3.9 4.3 4.2 3.7 4.0   CHLORIDE 99 100 98 98 94   CO2 30 29 31 30 33*   BUN 22 20 17 17 23   CR 0.98 1.07 1.11 0.90 1.05   ANIONGAP 3 4 5 4 5   DMITRIY 8.3* 8.4* 8.9 8.7 8.8   * 129* 107* 116* 124*     No results found for this or any previous visit (from the past 24 hour(s)).  Medications       aspirin  81 mg Oral Daily     atorvastatin  40 mg Oral Daily     calcium carbonate 500 mg (elemental)  1 tablet Oral BID     clopidogrel  75 mg Oral Daily     docusate sodium  100 mg Oral BID     ferrous gluconate  324 mg Oral Daily with breakfast     fish oil-omega-3 fatty acids  2 capsule Oral QAM     folic acid  1 mg Oral QAM     gabapentin  600 mg Oral TID     insulin aspart  1-7 Units Subcutaneous TID AC     insulin aspart  1-5 Units Subcutaneous At Bedtime     ipratropium - albuterol 0.5 mg/2.5 mg/3 mL  3 mL Nebulization 4x daily     levothyroxine  50 mcg Oral QAM AC     multivitamin w/minerals  1 tablet Oral Daily      pantoprazole  40 mg Oral QAM AC     polyethylene glycol  17 g Oral BID     sildenafil  5 mg Oral TID     sodium chloride (PF)  3 mL Intracatheter Q8H     torsemide  20 mg Oral BID

## 2019-10-03 NOTE — PLAN OF CARE
A&Ox4. BP soft 's. Other VSS on 2 L NC. PRN and Tylenol Percocet given for BLE pain. Tele SR/ST BBB. Up independently in room with walker. Interpretor scheduled for 9am and 1pm. Plan to re-evaluate for CHF. Continue to monitor.

## 2019-10-04 NOTE — PROVIDER NOTIFICATION
Patient has been assessed for Home Oxygen needs. Oxygen readings:    *Pulse oximetry (SpO2) = 88 % on room air at rest while awake.    *SpO2 improved to 94% on 2 liters/minute at rest.    *SpO2 = 85% on room air during activity/with exercise.    *SpO2 improved to 92% on 2 liters/minute during activity/with exercise.

## 2019-10-04 NOTE — PROGRESS NOTES
[facility]    Medicine Progress Note - Hospitalist Service       Date of Admission:  9/25/2019  Assessment & Plan   Beulah Jane is a 69 year old  male with medical history of severe aortic stenosis, recent TAVR, complete heart block, recent pacemaker placement, heart failure with preserved ejection fraction, hypertension, hyperlipidemia, diabetes, dermatomyositis admitted 9/25/2019 for shortness of breath.     Dyspnea on exertion multifactorial, recent surgery, exacerbation of heart failure with preserved ejection fraction, pulm hypertension, tachycardia physical deconditioning.  Acute exacerbation of heart failure with preserved EF  Pulmonary hypertension with positive vasodilator study.  Severe aortic stenosis s/p TAVR, 29mm Medtronic Evolut  Complete Heart Block status post PPM  Admitted 8/29 - 9/7 at [facility] for acute exacerbation of heart failure with preserved ejection fraction and NSTEMI.  Then had severe AS, underwent TAVR on 9/4. TAVR procedure complicated by complete heart block after valve deployment, otherwise uncomplicated; he did have a pre-existing RBBB. He was bridged to pacemaker (9/6) with a temporary pacing wire and remained 100% V-paced  after surgery. Dismissed home on Lasix and PRN metolazone.    Presented with worsening shortness of breath, leg swelling and weight gain.  On exam 1+ pitting pedal edema with bilateral decreased breath sounds or crackles.  WBC 13.9, hemoglobin 9.9,  potassium 3.3, creatinine 1.20.  proBNP 25969  Troponin 0.106 and has been flat.  EKG sinus rhythm, left axis deviation.  Nonspecific ST-T wave changes.  Echocardiogram left ventricle systolic function normal, estimated EF at 55 to 60%.  Right ventricle mild to moderately dilated, mildly decreased right ventricular systolic function.S/P #29 Medtronic Evolute Pro bioprosthetic TAVR; appears well seated with grossly normal function  Chest x-ray dual-lead pacemaker in the right  hemithorax unchanged.  Scattered bibasilar opacities again seen. Patchy areas of grounds place opacities since previous exam have improved.  Right heart cath 9/27/2019 Baseline: Severe pulmonary HTN with mildly elevated PCWP.  Vasodilator study positive.     Was followed by cardiology during hospitalization, had undergone diuresis with intravenous Lasix, switched to oral torsemide [9/28], has been having low systolic blood pressure, decreased torsemide dosing to once daily (9/29).    Continue sildenafil 5 mg 3 times daily [9/27]  PTA Brillanta switched to Plavix [9/28]   Continue PTA aspirin and atorvastatin.   Low-salt diet, 2 L fluid restriction daily.  Monitor daily weights.  Aggressive incentive spirometry.  Limb elevation, Ace wraps [patient non compliant to ACE despite education multiple times ]    October 3- patient not discharged due to concern for worsening heart failure.  His weight is up a couple of pounds over the last couple of days and he has some increase in his leg edema.  1 dose of IV furosemide given and I asked the cardiology service to reevaluate him and adjust diuretics as needed.    October 4- Weight is stable overnight.  He still has a little shortness of breath and edema.  Continue oral torsemide which was increased to bid yesterday.     Acute hypoxia on exertion, competent of heart failure with preserved EF, pulmonary hypertension, interstitial lung disease, obstructive sleep apnea not compliant with CPAP  Oxygen saturation dropped to 86- 87 % on ambulation, prescribed 1 L supplemental nasal oxygen while ambulation, wean off as tolerated.  Goal oxygen saturation 90%.      Hypotension  Asymptomatic, urine output good and creatinine stable    Monitor     Cortisol level in the morning     Possible acute bronchitis.  Patient having ongoing cough.  Afebrile.  WBC 13.2.  Procalcitonin 0.40.  Wife recently treated for pneumonia.  Treated with levofloxacin for total of 5 days [9/25 to 9/30].   Minimal improvement in cough.  Supportive care.     CAD s/p PCI:  s/p PCI p-mLAD and dLAD 7/30/19.  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 Currently stable on MTX.   Recent imaging reviewed.  CRP 22.8 ESR 24.  Held PTA methotrexate during hospitalization.  PTA albuterol inhaler as needed.    Pulmonology [9/30] recommend treat pulmonary hypertension per cardiology.  Follow-up as outpatient.  Aggressive incentive spirometry.     JUAN  Recommended home CPAP at home settings, has not been compliant with CPAP during hospitalization [does not like device from hospital], unfortunately family was not able to bring in home CPAP during hospital stay despite education multiple times   At time of discharge emphasize compliance with CPAP.     Dermatomyositis w/Shannon-1 Positivity:  Chronic pain.  Patient has been diagnosed with polymyositis clinically, not by muscle biopsy. He did have a lung biopsy that showed fibrosis of unknown etiology. Has ILD on CT and blood work showed a positive Shannon-1 antibody. PFTs showed worsening DLCO by 11% from 7/2018-1/2019.   Held PTA methotrexate 25 mg weekly during hospitalization.  Continue folic acid 1 mg daily  PTA Gabapentin 1200mg tid, decreaseD dose to 600 mg 3 times daily given kidney injury.  PTA PRN Oxycodone 7.5/325 mg tid needs to be de-escalated, tapered off as outpatient.     Reactive thrombocytosis.  Platelets improving from 699 > 504 >448     Status post lactic acidosis likely from hypoxia and volume overload.  Lactic acid 3.0, with intravenous diuresis trended down to 1.2     Diabetes mellitus hemoglobin A1c of 6.7.  PTA administers NPH insulin 2 to 4 units twice daily if blood sugars greater than 140.  During hospitalization was on sliding scale insulin, restart PTA regimen on discharge.     Hyperlipidemia  Triglycerides 97, LDL 58, HDL 19.  Discontinued PTA  gemfibrozil as patient complaining of muscle aches and lipid panel at goal.  Continue PTA atorvastatin.     Hypothyroidism  TSH 0.46.  Continue PTA levothyroxine.     PAD  Further management/workup as outpatient     Back pain Status post back surgery 4/2019.  Underwent T11-2, L1-2, L2-3 Transforminal Lumbar Interbody Fusion (TLIF) And Smith Borges Osteotomies,and L3-4 SPO as well.  Has intermittent back pain.  Continue gabapentin, Percocet as above.  Wean off Percocet as outpatient.  Compress.  PRN Tylenol.     Mild hypokalemia, hyponatremia, SHIRIN from aggressive diuresis, volume load- improved.  Monitor BMP in 1 week.     Anemia of chronic disease.  Baseline hemoglobin between 8-10.    Per patient last colonoscopy when he was in [place] many years of back.    Hemoglobin levels at baseline, iron saturation index 14.  Started on ferrous gluconate supplements oral daily.  Monitor levels in 1 week, defer age-appropriate health maintenance including colonoscopy to all PCP.     Acute constipation  Patient currently on narcotics. Physical deconditioning and minimal mobility.  docusate, MiraLAX prn.  Consider cutting back, tapering off narcotics.     Physical deconditioning from acute illness, chronic back pain.  Patient having assistance from wife, sons at home.  UA negative, TSH within normal limits.  PT, OT assessment -  Home with home care.     Diet: Fluid restriction 2000 ML FLUID  Combination Diet [id number] Calories: Moderate Consistent CHO (4-6 CHO units/meal); Low Saturated Fat Na <2400mg Diet  Diet    DVT Prophylaxis: Pneumatic Compression Devices  [place] Catheter: not present  Code Status: Full Code      Disposition Plan   Expected discharge: 1-2 days, recommended to prior living arrangement once cardiac status is stable .  Entered: Carlos Enrique Grande MD 10/04/2019, 2:14 PM       The patient's care was discussed with the patient and nurse     Carlos Enrique Grande MD  Hospitalist Service  [place]  [facility]    ______________________________________________________________________    Interval History   Still concerned about the leg swelling.  He has mild shortness of breath.    Data reviewed today: I reviewed all medications, new labs and imaging results over the last 24 hours. I personally reviewed no images or EKG's today.    Physical Exam   Vital Signs: Temp: 98.1  F (36.7  C) Temp src: Oral BP: (!) 85/56 Pulse: 89 Heart Rate: 93 Resp: 16 SpO2: 99 % O2 Device: Nasal cannula Oxygen Delivery: 1 LPM  Weight: 150 lbs 9.6 oz  Constitutional: awake, alert, cooperative, no apparent distress, and appears stated age  Respiratory: No increased work of breathing, good air exchange, bibasilar rales present  Cardiovascular: regular rate and rhythm, normal S1 and S2, no S3 or S4, and no murmur noted; there is 1+ pitting edema bilaterally up over the shin area  GI: No scars, normal bowel sounds, soft, non-distended, non-tender, no masses palpated  Skin: no rashes  Neuropsychiatric: General: normal, calm and normal eye contact    Data   Recent Labs   Lab 10/04/19  0537 10/03/19  1133 10/01/19  0523  09/28/19  0533   HGB  --   --  8.7*  --   --    PLT  --   --   --   --  448    132* 133   < > 132*   POTASSIUM 3.6 3.9 4.3   < > 3.7   CHLORIDE 101 99 100   < > 98   CO2 30 30 29   < > 30   BUN 23 22 20   < > 17   CR 0.94 0.98 1.07   < > 0.90   ANIONGAP 5 3 4   < > 4   DMITRIY 8.1* 8.3* 8.4*   < > 8.7   GLC 87 110* 129*   < > 116*    < > = values in this interval not displayed.     No results found for this or any previous visit (from the past 24 hour(s)).  Medications       aspirin  81 mg Oral Daily     atorvastatin  40 mg Oral Daily     calcium carbonate 500 mg (elemental)  1 tablet Oral BID     clopidogrel  75 mg Oral Daily     docusate sodium  100 mg Oral BID     ferrous gluconate  324 mg Oral Daily with breakfast     fish oil-omega-3 fatty acids  2 capsule Oral QAM     folic acid  1 mg Oral QAM     gabapentin   600 mg Oral TID     insulin aspart  1-7 Units Subcutaneous TID AC     insulin aspart  1-5 Units Subcutaneous At Bedtime     ipratropium - albuterol 0.5 mg/2.5 mg/3 mL  3 mL Nebulization 4x daily     levothyroxine  50 mcg Oral QAM AC     multivitamin w/minerals  1 tablet Oral Daily     pantoprazole  40 mg Oral QAM AC     polyethylene glycol  17 g Oral BID     potassium chloride  40 mEq Oral Daily     sildenafil  5 mg Oral TID     sodium chloride (PF)  3 mL Intracatheter Q8H     torsemide  20 mg Oral BID

## 2019-10-04 NOTE — PLAN OF CARE
A&O x4. VSS on 1-2 L O2, except SBP 85-95's. Held revatio and torsemide today. Tele SR w/ BBB. C/o of bilateral foot pain, gabapentin and percocet given. Bilat feet rachana/red w/ +2 edema. Denies CP. Slightly GONZALEZ. Up independently w/ walker. Replaced K today, recheck tomorrow. Cards signed off today, plan for possible discharge tomorrow.

## 2019-10-04 NOTE — PROVIDER NOTIFICATION
MD Notification    Notified Person: MD    Notified Person Name: Dr. Grande     Notification Date/Time: 10/4 0848     Notification Interaction: page      Purpose of Notification: Pt hypotensive -BP 86/52. Pt asymptomatic     Orders Received: Recheck BP, continue to monitor    Comments:

## 2019-10-04 NOTE — PROGRESS NOTES
[facility]    Cardiology Progress Note    Primary Cardiologist: Dr. Nate Solorio     Service date: 10/4/2019    Mr. Beulah Jane is a very pleasant 69 year old male admitted on 9/25/19 for worsening dyspnea on exertion x1 week.     Assessment & Plan   1. Acute on chronic heart failure with preserved ejection fraction (HFpEF)   - Limited echo yesterday stable with EF 60-65%, trivial pericardial effusion, no evidence of tamponade.   - Good urine output with 1.6 L out yesterday, net 7.5 L out since admit.  - Kidney function and electrolytes stable.   - Wt stable at 150 lbs today. Admit wt 154 lbs.   - He feels his cough and lower extremity edema are improved this morning following the dose of IV lasix 40 mg yesterday and increase in torsemide to 20 mg b.i.d.  - BP remains soft at times, but denies lightheadedness or dizziness.   2. Severe pulmonary hypertension  - Right heart cath 9/27/19 confirmed severe pulmonary hypertension with mildly elevated PCWP. The vasodilator study was positive.  - On sildenafil 5 mg PO t.i.d. and tolerating this well.  3. Severe aortic stenosis, status post recent TAVR with 29mm Medtronic Evolut bioprosthetic valve on 9/4/19  - Valve appears well seated on echo with grossly normal function, mean gradient 6 mmHg, peak velocity 1.7 m/sec.  4. Coronary artery disease  - Coronary angiogram 7/30/19 showed two vessel obstructive CAD with diffuse calcified disease of the prox-mid LAD and  of small, non-dominant RCA with R-R collaterals. Two stents were placed to the proximal-mid LAD and distal LAD.  - Initial troponin elevated at 0.106 and flat. Suspect elevation due to demand ischemia.  - Switched from brilinta to plavix due to possibility of brilinta contributing to SOB.   - On plavix, aspirin, and atorvastatin. Not on beta blocker due to CHB s/p PPM.   5. Complete Heart Block status post PPM  - Device interrogation 9/27/19 showed one-to-one AV conduction with  sinus tachycardia likely secondary to underlying shortness of breath.  6. Chronic anemia  7. Type 2 diabetes mellitus, insulin dependent   8. Obstructive sleep apnea on CPAP  9. Dermatomyositis with chronic pain   10. Interstitial lung disease  11. History of small bilateral pulmonary emboli following back surgery in 4/2019  - CT chest yesterday to rule out new or worsening PE showed no pulmonary embolus.   12. Hypothyroidism    Plan:   1. Continue torsemide 20 mg PO twice daily.  2. Okay from a cardiac standpoint for discharge today. Home oxygen has been arranged.   3. Continue with outpatient cardiac rehab.  4. Recommend close follow up as previously planned with his primary Cardiologist, Dr. Solorio, at the [facility] in 1 week and to establish with Pulmonology on 10/23/19.  5. Will arrange for follow up with Dr. Williamson or Dr. Mccain for further management of Pulmonary hypertension.      Disposition Plan   Expected discharge today or tomorrow to prior living arrangement.     Entered: Silas Mendez 10/04/2019, 7:44 AM     Interval History   Mr. Catherine Jane reports feeling well this morning. He had an episode of 3/10 neck pressure radiating to the chest yesterday afternoon lasting several minutes following his CT. This resolved with oxygen applied at 2 LPM. An EKG was completed without significant changes from his prior EKG.     He denies recurrent chest pain or shortness of breath. He denies dizziness or lightheadedness. He feels his cough was improved overnight. He has been ambulating in his room getting up to the bathroom independently and tolerating this well.    Telemetry: Normal sinus rhythm with RBBB and HR 80s-90s.    Thank you for the opportunity to participate in this pleasant patient's care.     Silas Mendez NP  Text Page  (8am - 5pm, M-F)    Patient Active Problem List   Diagnosis     Spinal stenosis of lumbar region with neurogenic claudication     Severe aortic stenosis      Hypertension     Hypothyroidism     Mixed hyperlipidemia     Inflammatory arthritis     Pulmonary fibrosis (H)     Type 2 diabetes mellitus with neurologic complication, with long-term current use of insulin (H)     Controlled substance agreement signed     Acute pulmonary embolism (H)     Status post non-ST elevation myocardial infarction (NSTEMI)     Coronary artery disease involving native coronary artery of native heart with other form of angina pectoris (H)     Congestive heart failure, NYHA class III, chronic, diastolic (H)     S/P TAVR (transcatheter aortic valve replacement)     GONZALEZ (dyspnea on exertion)       Physical Exam   Temp: 98  F (36.7  C) Temp src: Oral BP: 97/69 Pulse: 89 Heart Rate: 93 Resp: 17 SpO2: 99 % O2 Device: Nasal cannula Oxygen Delivery: 2 LPM  Vitals:    10/02/19 0616 10/03/19 0504 10/04/19 0100   Weight: 68.4 kg (150 lb 14.4 oz) 68.2 kg (150 lb 4.8 oz) 68.3 kg (150 lb 9.6 oz)     Vital Signs with Ranges  Temp:  [97.8  F (36.6  C)-98  F (36.7  C)] 98  F (36.7  C)  Pulse:  [74-89] 89  Heart Rate:  [93] 93  Resp:  [16-17] 17  BP: (83-97)/(60-70) 97/69  SpO2:  [95 %-99 %] 99 %  I/O last 3 completed shifts:  In: 1060 [P.O.:1060]  Out: 1600 [Urine:1600]    Constitutional: Appears his stated age, resting in bed, and in no acute distress.  Eyes: Pupils equal, round. Sclerae anicteric.   HEENT: Normocephalic, atraumatic.   Respiratory: Breathing non-labored. Lungs with scant crackles in the bases bilaterally. No wheezes, rales, or rhonchi.  Cardiovascular: Regular rate and rhythm, normal S1 and S2. No murmur, rub, or gallop.  GI: Soft, non-distended, non-tender.   Skin: Warm, dry. No rashes, cyanosis, or xanthelasma.  Musculoskeletal/Extremities: Moves all extremities well and symmetrically. 1+ edema in the feet and ankles bilaterally.   Neurologic: No gross motor deficits. Alert and oriented to person, place and time.  Psychiatric: Affect and mood appropriate. Answers questions appropriately.       Medications       aspirin  81 mg Oral Daily     atorvastatin  40 mg Oral Daily     calcium carbonate 500 mg (elemental)  1 tablet Oral BID     clopidogrel  75 mg Oral Daily     docusate sodium  100 mg Oral BID     ferrous gluconate  324 mg Oral Daily with breakfast     fish oil-omega-3 fatty acids  2 capsule Oral QAM     folic acid  1 mg Oral QAM     gabapentin  600 mg Oral TID     insulin aspart  1-7 Units Subcutaneous TID AC     insulin aspart  1-5 Units Subcutaneous At Bedtime     ipratropium - albuterol 0.5 mg/2.5 mg/3 mL  3 mL Nebulization 4x daily     levothyroxine  50 mcg Oral QAM AC     multivitamin w/minerals  1 tablet Oral Daily     pantoprazole  40 mg Oral QAM AC     polyethylene glycol  17 g Oral BID     sildenafil  5 mg Oral TID     sodium chloride (PF)  3 mL Intracatheter Q8H     torsemide  20 mg Oral BID       Data   Results for orders placed or performed during the hospital encounter of 09/25/19 (from the past 24 hour(s))   Glucose by meter   Result Value Ref Range    Glucose 118 (H) 70 - 99 mg/dL   Basic metabolic panel   Result Value Ref Range    Sodium 132 (L) 133 - 144 mmol/L    Potassium 3.9 3.4 - 5.3 mmol/L    Chloride 99 94 - 109 mmol/L    Carbon Dioxide 30 20 - 32 mmol/L    Anion Gap 3 3 - 14 mmol/L    Glucose 110 (H) 70 - 99 mg/dL    Urea Nitrogen 22 7 - 30 mg/dL    Creatinine 0.98 0.66 - 1.25 mg/dL    GFR Estimate 78 >60 mL/min/[1.73_m2]    GFR Estimate If Black >90 >60 mL/min/[1.73_m2]    Calcium 8.3 (L) 8.5 - 10.1 mg/dL   CT Chest Pulmonary Embolism w Contrast    Narrative    PROCEDURE:  CTA of of the chest    DATE OF PROCEDURE:  10/3/2019 1:14 PM    CLINICAL HISTORY/INDICATION:  Shortness of breath; Hx small bilateral PE, continued SOB, rule out  new or worsening PE    COMPARISON:  Multiple prior CTs most recent 7/11/2019    TECHNIQUE:  CT of the chest was performed following the administration of  intravenous contrast. Coronal reformats and MIPS were performed.  Radiation dose for  this scan was reduced using automated exposure  control, adjustment of the mA and/or kV according to patient size, or  iterative reconstruction technique.    DLP:  421 mGycm    FINDINGS:  No pulmonary embolism. Extensive coronary atherosclerotic disease.  Interval endovascular aortic valvular repair. Pacemaker leads  terminate in the right atrium and right ventricle. The thyroid gland  is unremarkable. Increasing pericardial effusion. No pleural effusion.  No pneumothorax. Cardiomegaly. Stable periaortic/mediastinal lymph  nodes. No new hilar lymphadenopathy. Dilation of the main pulmonary  artery, 4.1 cm suggesting underlying pulmonary hypertension. Continued  basilar predominant fibrotic changes.      Impression    IMPRESSION:  1.  No pulmonary embolus.  2.  Basilar predominant fibrosis.  3.  Extensive coronary atherosclerotic disease.  4.  Increasing pericardial effusion.    KELSI TRINH MD   EKG 12-lead, tracing only   Result Value Ref Range    Interpretation ECG Click View Image link to view waveform and result    Glucose by meter   Result Value Ref Range    Glucose 89 70 - 99 mg/dL   Echocardiogram Limited    Narrative    [id number]  [id number]  [id number]  [id number]^KINZA^VALENTÍN           [facility]  Echocardiography Laboratory  [address] [place] [address]        Name: DANELLE SIMMS  MRN: [id number]  : 1950  Study Date: 10/03/2019 02:53 PM  Age: 69 yrs  Gender: Male  Patient Location: [facility]  Reason For Study: Pericardial Effusion  Ordering Physician: VALENTÍN ECHOLS  Referring Physician: Hoa López  Performed By: Jori Moura     BSA: 1.8 m2  Height: 67 in  Weight: 150 lb  HR: 91  BP: 83/60 mmHg  _____________________________________________________________________________  __        Procedure  Limited Portable Echo Adult.  _____________________________________________________________________________  __        Interpretation Summary     The visual  ejection fraction is estimated at 60-65%.  Trivial pericardial effusion  There are no echocardiographic indications of cardiac tamponade.  There is a bioprosthetic aortic valve.  The prosthetic aortic valve is well-seated.  The prosthetic aortic valve appears to open well.  _____________________________________________________________________________  __        Left Ventricle  The visual ejection fraction is estimated at 60-65%.     Right Ventricle  The right ventricle is mildly dilated.     Mitral Valve  There is trace mitral regurgitation.     Tricuspid Valve  There is trace tricuspid regurgitation. IVC diameter and respiratory changes  fall into an intermediate range suggesting an RA pressure of 8 mmHg.        Aortic Valve  No aortic stenosis is present. There is a bioprosthetic aortic valve. The  prosthetic aortic valve is well-seated. The prosthetic aortic valve appears to  open well.     Pericardium  Trivial pericardial effusion. There are no echocardiographic indications of  cardiac tamponade.     Rhythm  Sinus rhythm was noted.  _____________________________________________________________________________  __                                Report approved by: Zackery Almeida 10/03/2019 04:15 PM                    _____________________________________________________________________________  __      Glucose by meter   Result Value Ref Range    Glucose 56 (L) 70 - 99 mg/dL   Glucose by meter   Result Value Ref Range    Glucose 123 (H) 70 - 99 mg/dL   Glucose by meter   Result Value Ref Range    Glucose 98 70 - 99 mg/dL   Glucose by meter   Result Value Ref Range    Glucose 100 (H) 70 - 99 mg/dL   Basic metabolic panel   Result Value Ref Range    Sodium 136 133 - 144 mmol/L    Potassium 3.6 3.4 - 5.3 mmol/L    Chloride 101 94 - 109 mmol/L    Carbon Dioxide 30 20 - 32 mmol/L    Anion Gap 5 3 - 14 mmol/L    Glucose 87 70 - 99 mg/dL    Urea Nitrogen 23 7 - 30 mg/dL    Creatinine 0.94 0.66 - 1.25 mg/dL    GFR  Estimate 82 >60 mL/min/[1.73_m2]    GFR Estimate If Black >90 >60 mL/min/[1.73_m2]    Calcium 8.1 (L) 8.5 - 10.1 mg/dL

## 2019-10-05 NOTE — PLAN OF CARE
A&Ox4. VSS on 2L at NOC ex soft BP's, improved. Denies SOB, but states sometimes GONZALEZ. Denies chest pain. C/o bilateral foot pain, gabapentin and percocet given x1. BLE rachana, red, +2 edema, skin firm. ACE wraps off, pt refused in evening, encouraged elevating feet in bed. K recheck for this morning. Cards signed off. Possible discharge home today.

## 2019-10-05 NOTE — PLAN OF CARE
Discharge Planner OT   Patient plan for discharge: home  Current status: Pt seated at EOB, BP hypotensive at rest, denies any lightheadedness. BP taken standing and BP stayed around the same. Pt agreed to try walking and sit <> stand with with SBA ambulated with 4ww with SBA for safety, slowly for approx 2-3 mins, pt with dyspnea, RA and needed to sit on 4ww seat for 1-2 mins cues for PLB. Pt walked back another 2-3 mins slowly and cont's with dyspnea and fatigue. Vitals stable after walk, O2 sats 90-92% with O2 replaced 1 L after walk and BP stable, back up.   Barriers to return to prior living situation: none with family A and Home therapies.   Recommendations for discharge: home with family A with ADL/IADL's as needed ie LE dress, shower transfer, etc and heavier IADL's ie driving, heavier cleaning and home OT for ADL's and home safety and PT for balance, strength and activity tolerance.   Rationale for recommendations: pt cont's to be limited due to activity tolerance decreased with SOB with min activity and will require home therapies and increased A with ADL/IADL's as above as needed to insure I and safety at home.        Entered by: Sylvia Booker 10/05/2019 2:59 PM

## 2019-10-05 NOTE — PROGRESS NOTES
Nebs given as ordered. LS are clear and diminished t/o and pt is on 1L. Will continue to follow.  Dangelo Armas

## 2019-10-05 NOTE — DISCHARGE SUMMARY
[facility]    Discharge Summary  Hospitalist    Date of Admission:  9/25/2019  Date of Discharge:  10/5/2019  Discharging Provider: Papo Liu MD    Discharge Diagnoses     Dyspnea on exertion multifactorial  Acute exacerbation of heart failure with preserved EF  Pulmonary hypertension with positive vasodilator study.  Severe aortic stenosis s/p TAVR, 29mm Medtronic Evolut  Recent complete Heart Block status post PPM   Acute hypoxia on exertion  Interstitial lung disease  Asymptomatic hypotension  Possible acute bronchitis.  CAD s/p PCI   JUAN  Dermatomyositis  Chronic pain.  Reactive thrombocytosis.  Diabetes mellitus hemoglobin A1c of 6.7.  Hyperlipidemia   Hypothyroidism  PAD  SHIRIN from aggressive diuresis, volume load- improved.  Anemia of chronic disease.  Physical deconditioning from acute illness    Hospital Course:  Beulah Jane is a 69 year old  male with medical history of severe aortic stenosis, recent TAVR, complete heart block, recent pacemaker placement, heart failure with preserved ejection fraction, hypertension, hyperlipidemia, diabetes, dermatomyositis admitted 9/25/2019 for shortness of breath.     Dyspnea on exertion multifactorial, recent surgery, exacerbation of heart failure with preserved ejection fraction, pulm hypertension, tachycardia physical deconditioning.  Acute exacerbation of heart failure with preserved EF  Pulmonary hypertension with positive vasodilator study.  Severe aortic stenosis s/p TAVR, 29mm Medtronic Evolut  Complete Heart Block status post PPM  Admitted 8/29 - 9/7 at [facility] for acute exacerbation of heart failure with preserved ejection fraction and NSTEMI.  Then had severe AS, underwent TAVR on 9/4. TAVR procedure complicated by complete heart block after valve deployment, otherwise uncomplicated; he did have a pre-existing RBBB. He was bridged to pacemaker (9/6) with a temporary pacing wire and remained 100% V-paced  after  surgery. Dismissed home on Lasix and PRN metolazone.    Presented with worsening shortness of breath, leg swelling and weight gain.  On exam 1+ pitting pedal edema with bilateral decreased breath sounds or crackles.  WBC 13.9, hemoglobin 9.9,  potassium 3.3, creatinine 1.20.  proBNP 73703  Troponin 0.106 and has been flat.  EKG sinus rhythm, left axis deviation.  Nonspecific ST-T wave changes.  Echocardiogram left ventricle systolic function normal, estimated EF at 55 to 60%.  Right ventricle mild to moderately dilated, mildly decreased right ventricular systolic function.S/P #29 Medtronic Evolute Pro bioprosthetic TAVR; appears well seated with grossly normal function  Chest x-ray dual-lead pacemaker in the right hemithorax unchanged.  Scattered bibasilar opacities again seen. Patchy areas of grounds place opacities since previous exam have improved.  Right heart cath 9/27/2019 Baseline: Severe pulmonary HTN with mildly elevated PCWP. Vasodilator study positive.   Was followed by cardiology during hospitalization, had undergone diuresis with intravenous Lasix, switched to oral torsemide [9/28]    Continue sildenafil 5 mg 3 times daily [9/27]  PTA Brillanta switched to Plavix [9/28]   Continue PTA aspirin and atorvastatin.   Low-salt diet, 2 L fluid restriction daily.  Monitor daily weights.  Aggressive incentive spirometry.  Limb elevation, Ace wraps [patient non compliant to ACE despite education multiple times ]  -Cardiology has now signed off, recommending torsemide 20 mg p.o. twice daily along with sildenafil.  -Continues to have intermittent hypotension but is asymptomatic with no lightheadedness or dizziness.  We will continue sildenafil at the moment.  If he becomes symptomatic with this that this might have to be discontinued.  Patient has a follow-up appointment with Dr. Williamson on October 7.  This can be followed up then.      Acute hypoxia on exertion, competent of heart failure with preserved EF, pulmonary  hypertension, interstitial lung disease, obstructive sleep apnea not compliant with CPAP  -Will discharge with oxygen at 2 L/min.     Chronic hypotension  -Asymptomatic, urine output good and creatinine stable  -Cortisol level is at 8.8- drawn this morning at 5:30 AM, likely this represents normal  -On reviewing old outpatient records it does appear like his systolic blood pressure has been in the 80s and 90s in the office too, likely made slightly worse by sildenafil, as mentioned above monitor as outpatient.  Moreover patient is asymptomatic from this.     Possible acute bronchitis.  Patient having ongoing cough.  Afebrile.  WBC 13.2.  Procalcitonin 0.40.  Wife recently treated for pneumonia.  Treated with levofloxacin for total of 5 days [9/25 to 9/30].  Minimal improvement in cough.  Supportive care.     CAD s/p PCI:  s/p PCI p-mLAD and dLAD 7/30/19.  Continue PTA ASA, plavix  Continue PTA atorvastatin  PTA not on beta-blocker given complete heart block     Interstitial lung disease  Most likely 2/2 Shannno-1 DM. Currently stable on MTX.   Recent imaging reviewed.  CRP 22.8 ESR 24.  Held PTA methotrexate during hospitalization.  PTA albuterol inhaler as needed.    Pulmonology [9/30] recommend treat pulmonary hypertension per cardiology.  Follow-up as outpatient.  Aggressive incentive spirometry.  Outpatient pulmonary rehab     JUAN  Recommended home CPAP at home settings, has not been compliant with CPAP during hospitalization [does not like device from hospital], unfortunately family was not able to bring in home CPAP during hospital stay despite education multiple times   At time of discharge emphasize compliance with CPAP.     Dermatomyositis  Chronic pain.  Patient has been diagnosed with polymyositis clinically, not by muscle biopsy. He did have a lung biopsy that showed fibrosis of unknown etiology. Has ILD on CT and blood work showed a positive Shannon-1 antibody. PFTs showed worsening DLCO by 11% from  7/2018-1/2019.   Held PTA methotrexate 25 mg weekly during hospitalization.  Continue folic acid 1 mg daily  PTA Gabapentin 1200mg tid, decreaseD dose to 600 mg 3 times daily given kidney injury.  PTA PRN Oxycodone 7.5/325 mg tid needs to be de-escalated, tapered off as outpatient.     Reactive thrombocytosis.  Platelets improving from 699 > 504 >448     Diabetes mellitus hemoglobin A1c of 6.7.  PTA administers NPH insulin 2 to 4 units twice daily if blood sugars greater than 140.  During hospitalization was on sliding scale insulin, restart PTA regimen on discharge.     Hyperlipidemia  Triglycerides 97, LDL 58, HDL 19.  Discontinued PTA gemfibrozil as patient complaining of muscle aches and lipid panel at goal.  Continue PTA atorvastatin.     Hypothyroidism  TSH 0.46.  Continue PTA levothyroxine.     PAD  Further management/workup as outpatient     Anemia of chronic disease.  Baseline hemoglobin between 8-10.    Per patient last colonoscopy when he was in [place] many years of back.    Hemoglobin levels at baseline, iron saturation index 14.  Started on ferrous gluconate supplements oral daily.  -Follow-up outpatient with PCP.    Physical deconditioning from acute illness, chronic back pain.  Patient having assistance from wife, sons at home.  UA negative, TSH within normal limits.  PT, OT assessment -  Home with home care.     Papo Liu MD    Significant Results and Procedures   See below    Pending Results     Unresulted Labs Ordered in the Past 30 Days of this Admission     Date and Time Order Name Status Description    10/5/2019 0001 Cortisol In process           Code Status   Full Code       Primary Care Physician   Hoa López    Physical Exam   Temp: 97.9  F (36.6  C) Temp src: Oral BP: 98/63(right arm read 85/58) Pulse: 73   Resp: 16 SpO2: 94 % O2 Device: Nasal cannula Oxygen Delivery: 2 LPM    Constitutional: AAOX3  Respiratory: CTA B/L, Normal WOB  Cardiovascular: RRR, No murmur  GI: Soft,  Non- tender, BS- normoactive  Skin/Integument: Warm and dry, no rashes  MSK: No joint deformity or swelling,1+ edema  Neuro: CN- grossly intact, Motor strength 5/5 on all 4 extremities.     Discharge Disposition   Discharged to home  Condition at discharge: Stable    Consultations This Hospital Stay   CARDIOLOGY IP CONSULT  SOCIAL WORK IP CONSULT  PHYSICAL THERAPY ADULT IP CONSULT  OCCUPATIONAL THERAPY ADULT IP CONSULT  CARE TRANSITION RN/SW IP CONSULT  CARDIAC REHAB IP CONSULT  PULMONARY IP CONSULT  CARE TRANSITION RN/SW IP CONSULT    Time Spent on this Encounter   IPapo MD, personally saw the patient today and spent greater than 30 minutes discharging this patient.    Discharge Orders      Discharge Order: F/U with Cardiac  OLIVIER      Follow-Up with Cardiologist      Home Care PT Referral for Hospital Discharge      Home Care OT Referral for Hospital Discharge      Home care nursing referral      PULMONARY REHAB REFERRAL      MD face to face encounter    Documentation of Face to Face and Certification for Home Health Services    I certify that patient: Beulah Jane is under my care and that I, or a nurse practitioner or physician's assistant working with me, had a face-to-face encounter that meets the physician face-to-face encounter requirements with this patient on: 10/2/2019.    This encounter with the patient was in whole, or in part, for the following medical condition, which is the primary reason for home health care: Dyspnea on exertion multifactorial, pulmonary hypertension.  Diastolic heart failure.  Interstitial lung disease..    I certify that, based on my findings, the following services are medically necessary home health services: Occupational Therapy and Physical Therapy.    My clinical findings support the need for the above services because: Occupational Therapy Services are needed to assess and treat cognitive ability and address ADL safety due to deconditioning, respiratory  issues.and Physical Therapy Services are needed to assess and treat the following functional impairments: Deconditioning, dyspnea on exertion from pulmonary hypertension and from pulmonary hypertension and interstitial lung disease    Further, I certify that my clinical findings support that this patient is homebound (i.e. absences from home require considerable and taxing effort and are for medical reasons or Mandae[facility] services or infrequently or of short duration when for other reasons) because: Leaving home is medically contraindicated for the following reason(s): Dyspnea on exertion, deconditioning.    Based on the above findings. I certify that this patient is confined to the home and needs intermittent skilled nursing care, physical therapy and/or speech therapy.  The patient is under my care, and I have initiated the establishment of the plan of care.  This patient will be followed by a physician who will periodically review the plan of care.  Physician/Provider to provide follow up care: Hoa López    Attending hospital physician (the Medicare certified PECOS provider): Negar Mustafa MD  Physician Signature: See electronic signature associated with these discharge orders.  Date: 10/2/2019     Reason for your hospital stay    You were admitted to the hospital with shortness of breath, multifactorial.  Underwent right heart cath, noted to have severe pulmonary hypertension and started on sildenafil.  Followed by cardiology, pulmonary during hospitalization.     Follow-up and recommended labs and tests     Follow up with primary care provider, Hoa López, within 7 days for hospital follow- up.  The following labs/tests are recommended: BMP and hemoglobin in 1 week.  Follow-up with cardiology in clinic per schedule.    Follow-up with pulmonary in clinic per schedule.  Age-appropriate health maintenance on PCP visit.     Discharge Instructions    Aggressive incentive spirometry.  Strict fluid  restriction to 2000 mL/day.  Emphasize compliance to limb elevation, Ace wraps.  Emphasized and encouraged compliance to CPAP.    Will need to consider de-escalating doses of narcotics and tapering off.  Monitor daily blood pressure, daily weights and review on provider visit.     Activity    Your activity upon discharge: activity as tolerated and no driving until PCP visit.     When to contact your care team    Contact medical provider if you have any chest pain or shortness of breath or palpitation.     Oxygen Adult    [place] [facility] Order 1 liter(s) by nasal cannula with activity with use of portable tank. Expected treatment length is 99 months.Test on conserving device as applicable.    Patients who qualify for home O2 coverage under the CMS guidelines require ABG tests or O2 sat readings obtained closest to, but no earlier than 2 days prior to the discharge, as evidence of the need for home oxygen therapy. Testing must be performed while patient is in the chronic stable state. See notes for O2 sats.    I certify that this patient, Beulah Jane has been under my care and that I, or a nurse practitioner or physician's assistant working with me, had a face-to-face encounter that meets the face-to-face encounter requirements with this patient on 10/2/2019. The patient, Beulah Jane was evaluated or treated in whole, or in part, for the following medical condition, which necessitates the use of the ordered oxygen. Treatment Diagnosis:   Pulmonary hypertension.  Interstitial lung disease.  Diastolic heart failure.      Attending Provider: Negar Mustafa MD  Physician signature: See electronic signature associated with these discharge orders  Date of Order: October 2, 2019     Diet    Follow this diet upon discharge: Orders Placed This Encounter      Fluid restriction 2000 ML FLUID      Combination Diet [id number] Calories: Moderate Consistent CHO (4-6 CHO units/meal); Low Saturated Fat Na <2400mg  Diet     Discharge Medications   Current Discharge Medication List      START taking these medications    Details   acetaminophen (TYLENOL) 325 MG tablet Take 2 tablets (650 mg) by mouth every 8 hours as needed for pain    Associated Diagnoses: Pulmonary hypertension (H)      clopidogrel (PLAVIX) 75 MG tablet Take 1 tablet (75 mg) by mouth daily  Qty: 30 tablet, Refills: 0    Comments: Future refills by PCP Dr. Hoa López with phone number [phone].  Associated Diagnoses: Pulmonary hypertension (H)      docusate sodium (COLACE) 100 MG capsule Take 1 capsule (100 mg) by mouth At Bedtime For constipation. Hold for loose stools  Qty: 30 capsule, Refills: 0    Comments: Future refills by PCP Dr. Hoa López with phone number [phone].  Associated Diagnoses: Iron deficiency anemia, unspecified iron deficiency anemia type      ferrous gluconate (FERGON) 324 (38 Fe) MG tablet Take 1 tablet (324 mg) by mouth daily (with breakfast)  Qty: 30 tablet, Refills: 0    Comments: Future refills by PCP Dr. Hoa López with phone number [phone].  Associated Diagnoses: Iron deficiency anemia, unspecified iron deficiency anemia type      gabapentin (NEURONTIN) 300 MG capsule Take 2 capsules (600 mg) by mouth 3 times daily  Qty: 60 capsule, Refills: 0    Associated Diagnoses: Spinal stenosis of lumbar region with neurogenic claudication      potassium chloride ER (K-DUR/KLOR-CON M) 20 MEQ CR tablet Take 2 tablets (40 mEq) by mouth daily  Qty: 30 tablet, Refills: 0    Comments: Future refills by PCP Dr. Hoa López with phone number [phone].  Associated Diagnoses: Acute on chronic heart failure with preserved ejection fraction (H)      sildenafil (REVATIO) 20 MG tablet Take 0.25 tablets (5 mg) by mouth 3 times daily Avoid taking with fatty meal.      HOLD for SBP < 90  Qty: 90 tablet, Refills: 0    Comments: Future refills by PCP Dr. Hoa López with phone number [phone].  Associated Diagnoses: Pulmonary  hypertension (H)      torsemide (DEMADEX) 20 MG tablet Take 1 tablet (20 mg) by mouth 2 times daily  Qty: 60 tablet, Refills: 0    Associated Diagnoses: Acute on chronic heart failure with preserved ejection fraction (H)         CONTINUE these medications which have NOT CHANGED    Details   albuterol (2.5 MG/3ML) 0.083% neb solution Take 1 vial by nebulization every 6 hours as needed for shortness of breath / dyspnea or wheezing      aspirin (ASA) 81 MG EC tablet Take 1 tablet (81 mg) by mouth daily Start tomorrow morning.  Qty: 90 tablet, Refills: 3    Associated Diagnoses: Coronary artery disease due to lipid rich plaque      atorvastatin (LIPITOR) 40 MG tablet Take 1 tablet (40 mg) by mouth daily  Qty: 90 tablet, Refills: 3    Associated Diagnoses: Coronary artery disease due to lipid rich plaque      benzocaine-menthol (CEPACOL) 15-3.6 MG lozenge Place 1 lozenge inside cheek every hour as needed for sore throat  Qty: 60 lozenge, Refills: 0    Associated Diagnoses: Cough      calcium carbonate (OS-DMITRIY 500 MG [place]. [place]) 500 MG tablet Take 1 tablet (500 mg) by mouth 2 times daily  Qty: 180 tablet, Refills: 3    Associated Diagnoses: Pre-operative examination; Spinal stenosis of lumbar region with neurogenic claudication      Cholecalciferol (VITAMIN D) 2000 units tablet Take 2,000 Units by mouth daily  Qty: 100 tablet, Refills: 3    Associated Diagnoses: Pre-operative examination; Spinal stenosis of lumbar region with neurogenic claudication      diphenhydrAMINE (BENADRYL) 50 MG capsule Take 50 mg by mouth as needed for itching or allergies       folic acid (FOLVITE) 1 MG tablet Take 1 tablet (1 mg) by mouth every morning  Qty: 90 tablet, Refills: 3    Associated Diagnoses: Pulmonary fibrosis (H)      LANsoprazole (PREVACID) 30 MG DR capsule Take 30 mg by mouth every morning (before breakfast)      levothyroxine (SYNTHROID/LEVOTHROID) 50 MCG tablet Take 50 mcg by mouth every morning       methotrexate 2.5 MG tablet  "Take 10 tablets (25 mg) by mouth once a week Tuesday  Qty: 120 tablet, Refills: 3    Associated Diagnoses: Inflammatory arthritis      multivitamin w/minerals (THERA-VIT-M) tablet Take 1 tablet by mouth daily    Associated Diagnoses: Routine health maintenance      omega 3 1000 MG CAPS Take 2 capsules by mouth every morning       oxyCODONE-acetaminophen (PERCOCET) 7.5-325 MG per tablet Take 1 tablet by mouth every 6 hours as needed for severe pain (max 3 tablets daily) Dispense 09/23/19. OK to start  09/25/19  Qty: 90 tablet, Refills: 0    Associated Diagnoses: Chronic pain syndrome; Dermatomyositis (H)      albuterol (PROAIR HFA/PROVENTIL HFA/VENTOLIN HFA) 108 (90 BASE) MCG/ACT Inhaler Inhale 2 puffs into the lungs as needed for shortness of breath / dyspnea or wheezing       insulin NPH (HUMULIN N/NOVOLIN N VIAL) 100 UNIT/ML vial Inject 2-6 Units Subcutaneous 2 times daily Will usually give if blood glucose >140  Qty: 10 mL, Refills: 11    Associated Diagnoses: Type 2 diabetes mellitus with diabetic neuropathy, with long-term current use of insulin (H)      insulin syringe-needle U-100 (29G X 1/2\" 1 ML) 29G X 1/2\" 1 ML miscellaneous Inject 1 Syringe Subcutaneous 2 times daily  Qty: 200 each, Refills: 11    Associated Diagnoses: Type 2 diabetes mellitus with other neurologic complication, with long-term current use of insulin (H)         STOP taking these medications       cetirizine (ZYRTEC) 10 MG tablet Comments:   Reason for Stopping:         furosemide (LASIX) 40 MG tablet Comments:   Reason for Stopping:         gabapentin (NEURONTIN) 600 MG tablet Comments:   Reason for Stopping:         gemfibrozil (LOPID) 600 MG tablet Comments:   Reason for Stopping:         ticagrelor (BRILINTA) 90 MG tablet Comments:   Reason for Stopping:             Allergies   No Known Allergies  Data   Most Recent 3 CBC's:  Recent Labs   Lab Test 10/01/19  0523 09/28/19  0533 09/27/19  0522 09/26/19  0539 09/25/19  1411 " 09/07/19  0731   WBC  --   --   --  10.1 13.9* 9.7   HGB 8.7*  --  9.5* 9.0* 9.9* 7.7*   MCV  --   --   --  75* 75* 78   PLT  --  448 504* 493* 699* 335      Most Recent 3 BMP's:  Recent Labs   Lab Test 10/05/19  0530 10/04/19  0537 10/03/19  1133 10/01/19  0523   NA  --  136 132* 133   POTASSIUM 4.1 3.6 3.9 4.3   CHLORIDE  --  101 99 100   CO2  --  30 30 29   BUN  --  23 22 20   CR  --  0.94 0.98 1.07   ANIONGAP  --  5 3 4   DMITRIY  --  8.1* 8.3* 8.4*   GLC  --  87 110* 129*     Most Recent 2 LFT's:  Recent Labs   Lab Test 09/25/19  1411 09/04/19  1109   AST 26 55*   ALT 20 35   ALKPHOS 144 142   BILITOTAL 0.8 0.4     Most Recent INR's and Anticoagulation Dosing History:  Anticoagulation Dose History     Recent Dosing and Labs Latest Ref Rng & Units 4/29/2019 4/30/2019 5/1/2019 5/2/2019 5/12/2019 7/5/2019 8/29/2019    Warfarin 2.5 mg - 2.5 mg - - - - - -    INR 0.86 - 1.14 1.27(H) 1.24(H) 1.15(H) 1.18(H) 1.21(H) 1.11 1.38(H)        Most Recent 3 Troponin's:  Recent Labs   Lab Test 09/26/19  0539 09/25/19 2029 09/25/19  1411   TROPI 0.107* 0.119* 0.106*     Most Recent Cholesterol Panel:  Recent Labs   Lab Test 09/26/19  0539   CHOL 96   LDL 58   HDL 19*   TRIG 97     Most Recent 6 Bacteria Isolates From Any Culture (See EPIC Reports for Culture Details):  Recent Labs   Lab Test 09/25/19  2028 05/15/19  1650 05/09/19  2317 05/09/19  2146 05/09/19 2048 04/30/19 2033   CULT No growth No growth  No growth <10,000 colonies/mL  urogenital marlee  Susceptibility testing not routinely done   No growth No growth No growth     Most Recent TSH, T4 and A1c Labs:  Recent Labs   Lab Test 09/26/19  0539 08/29/19 2053   TSH 0.46  --    A1C  --  6.7*       Results for orders placed or performed during the hospital encounter of 09/25/19   XR Chest 2 Views    Narrative    CHEST TWO VIEWS  9/25/2019 4:14 PM     HISTORY: 69-year-old patient with shortness of breath.       Impression    IMPRESSION: Since September 7, 2019, heart size  is normal. Dual lead  pacemaker in the right hemithorax is unchanged. Scattered bibasilar  opacities again identified, similar to previous exam. Technique  interstitial pattern noted throughout both lungs. Patchy areas of  groundglass opacities previous exam have improved. No pleural effusion  or pneumothorax. Nevertheless, there are residual reticulointerstitial  patterns. Extensive spine hardware, unchanged.    MERCEDES CORRIGAN MD   CT Chest Pulmonary Embolism w Contrast    Narrative    PROCEDURE:  CTA of of the chest    DATE OF PROCEDURE:  10/3/2019 1:14 PM    CLINICAL HISTORY/INDICATION:  Shortness of breath; Hx small bilateral PE, continued SOB, rule out  new or worsening PE    COMPARISON:  Multiple prior CTs most recent 7/11/2019    TECHNIQUE:  CT of the chest was performed following the administration of  intravenous contrast. Coronal reformats and MIPS were performed.  Radiation dose for this scan was reduced using automated exposure  control, adjustment of the mA and/or kV according to patient size, or  iterative reconstruction technique.    DLP:  421 mGycm    FINDINGS:  No pulmonary embolism. Extensive coronary atherosclerotic disease.  Interval endovascular aortic valvular repair. Pacemaker leads  terminate in the right atrium and right ventricle. The thyroid gland  is unremarkable. Increasing pericardial effusion. No pleural effusion.  No pneumothorax. Cardiomegaly. Stable periaortic/mediastinal lymph  nodes. No new hilar lymphadenopathy. Dilation of the main pulmonary  artery, 4.1 cm suggesting underlying pulmonary hypertension. Continued  basilar predominant fibrotic changes.      Impression    IMPRESSION:  1.  No pulmonary embolus.  2.  Basilar predominant fibrosis.  3.  Extensive coronary atherosclerotic disease.  4.  Increasing pericardial effusion.    KELSI TRINH MD

## 2019-10-05 NOTE — PROGRESS NOTES
Patient is on RA with SpO2 94%. BS clear and diminished with an increase in aeration post neb treatments. All nebs were given as ordered.  Will cont to follow.  10/4/2019  Lolis Melton RRT

## 2019-10-05 NOTE — PLAN OF CARE
A&O x4. VSS on 1-2 L O2, except SBP 85-95's. Tele SR w/ BBB. C/o of bilateral foot pain, gabapentin and percocet given. Bilat feet rachana/red w/ +2 edema. Denies CP. Pt appears GONZALEZ. Up independently w/ walker. Cardiology re-consulted today, they will see pt in AM.

## 2019-10-05 NOTE — PROGRESS NOTES
[facility]    Medicine Progress Note - Hospitalist Service        Date of Admission:  9/25/2019  1:51 PM    Assessment & Plan:   Beulah Jane is a 69 year old  male with medical history of severe aortic stenosis, recent TAVR, complete heart block, recent pacemaker placement, heart failure with preserved ejection fraction, hypertension, hyperlipidemia, diabetes, dermatomyositis admitted 9/25/2019 for shortness of breath.     Dyspnea on exertion multifactorial, recent surgery, exacerbation of heart failure with preserved ejection fraction, pulm hypertension, tachycardia physical deconditioning.  Acute exacerbation of heart failure with preserved EF  Pulmonary hypertension with positive vasodilator study.  Severe aortic stenosis s/p TAVR, 29mm Medtronic Evolut  Complete Heart Block status post PPM  Admitted 8/29 - 9/7 at [facility] for acute exacerbation of heart failure with preserved ejection fraction and NSTEMI.  Then had severe AS, underwent TAVR on 9/4. TAVR procedure complicated by complete heart block after valve deployment, otherwise uncomplicated; he did have a pre-existing RBBB. He was bridged to pacemaker (9/6) with a temporary pacing wire and remained 100% V-paced  after surgery. Dismissed home on Lasix and PRN metolazone.    Presented with worsening shortness of breath, leg swelling and weight gain.  On exam 1+ pitting pedal edema with bilateral decreased breath sounds or crackles.  WBC 13.9, hemoglobin 9.9,  potassium 3.3, creatinine 1.20.  proBNP 69785  Troponin 0.106 and has been flat.  EKG sinus rhythm, left axis deviation.  Nonspecific ST-T wave changes.  Echocardiogram left ventricle systolic function normal, estimated EF at 55 to 60%.  Right ventricle mild to moderately dilated, mildly decreased right ventricular systolic function.S/P #29 Medtronic Evolute Pro bioprosthetic TAVR; appears well seated with grossly normal function  Chest x-ray dual-lead pacemaker  in the right hemithorax unchanged.  Scattered bibasilar opacities again seen. Patchy areas of grounds place opacities since previous exam have improved.  Right heart cath 9/27/2019 Baseline: Severe pulmonary HTN with mildly elevated PCWP. Vasodilator study positive.   Was followed by cardiology during hospitalization, had undergone diuresis with intravenous Lasix, switched to oral torsemide [9/28]    Continue sildenafil 5 mg 3 times daily [9/27]  PTA Brillanta switched to Plavix [9/28]   Continue PTA aspirin and atorvastatin.   Low-salt diet, 2 L fluid restriction daily.  Monitor daily weights.  Aggressive incentive spirometry.  Limb elevation, Ace wraps [patient non compliant to ACE despite education multiple times ]  -Cardiology has although patient appears to be asymptomatic.  Discussed with the son he was very concerned about signed off, recommending torsemide 20 mg p.o. twice daily along with sildenafil.  -Continues to have intermittent hypotension discharging with blood pressure in the 80s.  will address  with cardiology in the morning.  I have placed a reconsult.  Discontinue sildenafil ?  -Will discharge home on oxygen at 2 L/min eventually.     Chronic hypotension  -Asymptomatic, urine output good and creatinine stable  -Cortisol level is at 8.8- drawn this morning at 5:30 AM, ?likely this represents normal   - Appears on the lower side for the degree of hypotension and stress.  We will check a stim test in the morning.    Possible acute bronchitis.  Patient having ongoing cough.  Afebrile.  WBC 13.2.  Procalcitonin 0.40.  Wife recently treated for pneumonia.  Treated with levofloxacin for total of 5 days [9/25 to 9/30].  Minimal improvement in cough.  Supportive care.     CAD s/p PCI:  s/p PCI p-mLAD and dLAD 7/30/19.  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 Currently stable on  MTX.   Recent imaging reviewed.  CRP 22.8 ESR 24.  Held PTA methotrexate during hospitalization.  PTA albuterol inhaler as needed.    Pulmonology [9/30] recommend treat pulmonary hypertension per cardiology.  Follow-up as outpatient.  Aggressive incentive spirometry.  Outpatient pulmonary rehab     JUAN  Recommended home CPAP at home settings, has not been compliant with CPAP during hospitalization [does not like device from hospital], unfortunately family was not able to bring in home CPAP during hospital stay despite education multiple times   At time of discharge emphasize compliance with CPAP.     Dermatomyositis  Chronic pain.  Patient has been diagnosed with polymyositis clinically, not by muscle biopsy. He did have a lung biopsy that showed fibrosis of unknown etiology. Has ILD on CT and blood work showed a positive Shannon-1 antibody. PFTs showed worsening DLCO by 11% from 7/2018-1/2019.   Held PTA methotrexate 25 mg weekly during hospitalization.  Continue folic acid 1 mg daily  PTA Gabapentin 1200mg tid, decreaseD dose to 600 mg 3 times daily given kidney injury.  PTA PRN Oxycodone 7.5/325 mg tid needs to be de-escalated, tapered off as outpatient.     Reactive thrombocytosis.  Platelets improving from 699 > 504 >448     Diabetes mellitus hemoglobin A1c of 6.7.  PTA administers NPH insulin 2 to 4 units twice daily if blood sugars greater than 140.  During hospitalization was on sliding scale insulin, restart PTA regimen on discharge.     Hyperlipidemia  Triglycerides 97, LDL 58, HDL 19.  Discontinued PTA gemfibrozil as patient complaining of muscle aches and lipid panel at goal.  Continue PTA atorvastatin.     Hypothyroidism  TSH 0.46.  Continue PTA levothyroxine.     PAD  Further management/workup as outpatient     Anemia of chronic disease.  Baseline hemoglobin between 8-10.    Per patient last colonoscopy when he was in [place] many years of back.    Hemoglobin levels at baseline, iron saturation index  "14.  Started on ferrous gluconate supplements oral daily.  -Follow-up outpatient with PCP.     Physical deconditioning from acute illness, chronic back pain.  Patient having assistance from wife, sons at home.  UA negative, TSH within normal limits.  PT, OT assessment -  Home with home care.    Diet: Fluid restriction 2000 ML FLUID  Combination Diet 9715-1581 Calories: Moderate Consistent CHO (4-6 CHO units/meal); Low Saturated Fat Na <2400mg Diet  Diet     DVT Prophylaxis: Pneumatic Compression Devices   [place] Catheter: not present  Code Status: Full Code     Disposition Plan    Expected discharge: 1-2 days, home with [facility]  Entered: Papo Liu MD 10/05/2019, 5:03 PM        The patient's care was discussed with the Bedside Nurse, Patient and Patient's Family.    Papo Liu MD  Hospitalist Service  [facility]    ______________________________________________________________________    Interval History   Patient reports intermittent dyspnea.  Intermittently blood pressures have been in the 80s although he appears to be asymptomatic with no lightheadedness or dizziness.  Son concerned about discharging him with such low blood pressures.    Data reviewed today: I reviewed all medications, new labs and imaging results over the last 24 hours. I personally reviewed no images or EKG's today.    Physical Exam   Vital signs:  Temp: 98.1  F (36.7  C) Temp src: Oral BP: (!) 87/54 Pulse: 96 Heart Rate: 114 Resp: 16 SpO2: 92 % O2 Device: Nasal cannula Oxygen Delivery: 1 LPM Height: 170.2 cm (5' 7\") Weight: 68.4 kg (150 lb 14.4 oz)  Estimated body mass index is 23.63 kg/m  as calculated from the following:    Height as of this encounter: 1.702 m (5' 7\").    Weight as of this encounter: 68.4 kg (150 lb 14.4 oz).      Wt Readings from Last 2 Encounters:   10/05/19 68.4 kg (150 lb 14.4 oz)   09/25/19 69.9 kg (154 lb)       Gen: AAOX3, NAD  HEENT: Supple neck, moist oral mucosa, no pallor  Resp: CTA B/L, " normal WOB, no crackles, no wheezes  CVS: RRR, no murmur  Abd/GI: Soft, non-tender. BS- normoactive.  No G/R/R  Skin: Warm, dry no rashes  MSK: No joint deformities, 1+ edema  Neuro- CN- intact. No focal deficits.        Data   Recent Labs   Lab 10/05/19  0530 10/04/19  0537 10/03/19  1133 10/01/19  0523   HGB  --   --   --  8.7*   NA  --  136 132* 133   POTASSIUM 4.1 3.6 3.9 4.3   CHLORIDE  --  101 99 100   CO2  --  30 30 29   BUN  --  23 22 20   CR  --  0.94 0.98 1.07   ANIONGAP  --  5 3 4   DMITRIY  --  8.1* 8.3* 8.4*   GLC  --  87 110* 129*       No results found for this or any previous visit (from the past 24 hour(s)).  Medications       aspirin  81 mg Oral Daily     atorvastatin  40 mg Oral Daily     calcium carbonate 500 mg (elemental)  1 tablet Oral BID     clopidogrel  75 mg Oral Daily     [START ON 10/6/2019] cosyntropin  250 mcg Intravenous Once     docusate sodium  100 mg Oral BID     ferrous gluconate  324 mg Oral Daily with breakfast     fish oil-omega-3 fatty acids  2 capsule Oral QAM     folic acid  1 mg Oral QAM     gabapentin  600 mg Oral TID     insulin aspart  1-7 Units Subcutaneous TID AC     insulin aspart  1-5 Units Subcutaneous At Bedtime     ipratropium - albuterol 0.5 mg/2.5 mg/3 mL  3 mL Nebulization 4x daily     levothyroxine  50 mcg Oral QAM AC     multivitamin w/minerals  1 tablet Oral Daily     pantoprazole  40 mg Oral QAM AC     polyethylene glycol  17 g Oral BID     potassium chloride  40 mEq Oral Daily     sildenafil  5 mg Oral TID     sodium chloride (PF)  3 mL Intracatheter Q8H     torsemide  20 mg Oral BID         1}

## 2019-10-06 NOTE — PLAN OF CARE
A&OX4,BP soft, denies SOB, on 1 L nasal cannula.Tele-SR with BBB, complains of bilateral foot pain,PO percocet administered this shift with good relief. On mod CHO diet, up with SBA with walker. 2000 ml fluid restriction.  and  122.

## 2019-10-06 NOTE — PROGRESS NOTES
[facility]  Hospitalist Progress Note for 10/6/2019:          Assessment and Plan:   Beulah Jane is a 69 year old  male with medical history of severe aortic stenosis, recent TAVR, complete heart block, recent pacemaker placement, heart failure with preserved ejection fraction, hypertension, hyperlipidemia, diabetes, dermatomyositis admitted 9/25/2019 for shortness of breath.     Dyspnea on exertion multifactorial, recent surgery, exacerbation of heart failure with preserved ejection fraction, pulm hypertension, tachycardia physical deconditioning.  Acute exacerbation of heart failure with preserved EF  Pulmonary hypertension with positive vasodilator study.  Severe aortic stenosis s/p TAVR, 29mm Medtronic Evolut  Complete Heart Block status post PPM  Admitted 8/29 - 9/7 at [facility] for acute exacerbation of heart failure with preserved ejection fraction and NSTEMI.  Then had severe AS, underwent TAVR on 9/4. TAVR procedure complicated by complete heart block after valve deployment, otherwise uncomplicated; he did have a pre-existing RBBB. He was bridged to pacemaker (9/6) with a temporary pacing wire and remained 100% V-paced  after surgery. Dismissed home on Lasix and PRN metolazone.    Presented with worsening shortness of breath, leg swelling and weight gain.  On exam 1+ pitting pedal edema with bilateral decreased breath sounds or crackles.  WBC 13.9, hemoglobin 9.9,  potassium 3.3, creatinine 1.20.  proBNP 23810  Troponin 0.106 and has been flat.  EKG sinus rhythm, left axis deviation.  Nonspecific ST-T wave changes.  Echocardiogram left ventricle systolic function normal, estimated EF at 55 to 60%.  Right ventricle mild to moderately dilated, mildly decreased right ventricular systolic function.S/P #29 Medtronic Evolute Pro bioprosthetic TAVR; appears well seated with grossly normal function  Chest x-ray dual-lead pacemaker in the right hemithorax unchanged.  Scattered  bibasilar opacities again seen. Patchy areas of grounds place opacities since previous exam have improved.  Right heart cath 9/27/2019 Baseline: Severe pulmonary HTN with mildly elevated PCWP. Vasodilator study positive.   Was followed by cardiology during hospitalization, had undergone diuresis with intravenous Lasix, switched to oral torsemide [9/28]    Continue sildenafil 5 mg 3 times daily [9/27]  PTA Brillanta switched to Plavix [9/28]   Continue PTA aspirin and atorvastatin.   Low-salt diet, 2 L fluid restriction daily.  Monitor daily weights.  Aggressive incentive spirometry.  Limb elevation, Ace wraps [patient non compliant to ACE despite education multiple times ]  -Cardiology has although patient appears to be asymptomatic.  Discussed with the son he was very concerned about signed off, recommending torsemide 20 mg p.o. twice daily along with sildenafil.  -Continues to have intermittent hypotension discharging with blood pressure in the 80s.  will address  with cardiology in the morning.  I have placed a reconsult.  Discontinue sildenafil ?  -Will discharge home on oxygen at 2 L/min eventually.     Chronic hypotension  Symptomatic orthostatic hypotension:  -Asymptomatic, urine output good and creatinine stable  -Cortisol level is at 8.8- drawn this morning at 5:30 AM, ?likely this represents normal   - Appears on the lower side for the degree of hypotension and stress.  We will check a stim test in the morning.  - cont significant leg edema, on current Diuretics but with symptomatic orthostatic hypotension. Pt unable to discharge home as previously planned   - diff situtation- wait for cardiology recommendations     Possible acute bronchitis.  Patient having ongoing cough.  Afebrile.  WBC 13.2.  Procalcitonin 0.40.  Wife recently treated for pneumonia.  Treated with levofloxacin for total of 5 days [9/25 to 9/30].  Minimal improvement in cough.  Supportive care.     CAD s/p PCI:  s/p PCI p-mLAD and dLAD  7/30/19.  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 Currently stable on MTX.   Recent imaging reviewed.  CRP 22.8 ESR 24.  Held PTA methotrexate during hospitalization.  PTA albuterol inhaler as needed.    Pulmonology [9/30] recommend treat pulmonary hypertension per cardiology.  Follow-up as outpatient.  Aggressive incentive spirometry.  Outpatient pulmonary rehab     JUAN  Recommended home CPAP at home settings, has not been compliant with CPAP during hospitalization [does not like device from hospital], unfortunately family was not able to bring in home CPAP during hospital stay despite education multiple times   At time of discharge emphasize compliance with CPAP.     Dermatomyositis  Chronic pain.  Patient has been diagnosed with polymyositis clinically, not by muscle biopsy. He did have a lung biopsy that showed fibrosis of unknown etiology. Has ILD on CT and blood work showed a positive Shannon-1 antibody. PFTs showed worsening DLCO by 11% from 7/2018-1/2019.   Held PTA methotrexate 25 mg weekly during hospitalization.  Continue folic acid 1 mg daily  PTA Gabapentin 1200mg tid, decreaseD dose to 600 mg 3 times daily given kidney injury.  PTA PRN Oxycodone 7.5/325 mg tid needs to be de-escalated, tapered off as outpatient.     Reactive thrombocytosis.  Platelets improving from 699 > 504 >448     Diabetes mellitus hemoglobin A1c of 6.7.  PTA administers NPH insulin 2 to 4 units twice daily if blood sugars greater than 140.  During hospitalization was on sliding scale insulin, restart PTA regimen on discharge.     Hyperlipidemia  Triglycerides 97, LDL 58, HDL 19.  Discontinued PTA gemfibrozil as patient complaining of muscle aches and lipid panel at goal.  Continue PTA atorvastatin.     Hypothyroidism  TSH 0.46.  Continue PTA levothyroxine.     PAD  Further management/workup as outpatient     Anemia of  chronic disease.  Baseline hemoglobin between 8-10.    Per patient last colonoscopy when he was in [place] many years of back.    Hemoglobin levels at baseline, iron saturation index 14.  Started on ferrous gluconate supplements oral daily.  -Follow-up outpatient with PCP.     Physical deconditioning from acute illness, chronic back pain.  Patient having assistance from wife, sons at home.  UA negative, TSH within normal limits.  PT, OT assessment -  Home with home care.     Diet: Fluid restriction 2000 ML FLUID  Combination Diet 0526-2003 Calories: Moderate Consistent CHO (4-6 CHO units/meal); Low Saturated Fat Na <2400mg Diet  Diet     DVT Prophylaxis: Pneumatic Compression Devices   [place] Catheter: not present  Code Status: Full Code        Disposition Plan: was to discharge home yest but discharge postponed as above.Plan discharge  home with [facility] when better- 1-2 more days.    Magda Cook MD.  Hospitalist [phone] (7am -6 pm)                 Interval History:   Cont hypoxia with exertion. Orthostatic hypotension- symptomatic.              Medications:       aspirin  81 mg Oral Daily     atorvastatin  40 mg Oral Daily     calcium carbonate 500 mg (elemental)  1 tablet Oral BID     clopidogrel  75 mg Oral Daily     docusate sodium  100 mg Oral BID     ferrous gluconate  324 mg Oral Daily with breakfast     fish oil-omega-3 fatty acids  2 capsule Oral QAM     folic acid  1 mg Oral QAM     gabapentin  600 mg Oral TID     insulin aspart  1-7 Units Subcutaneous TID AC     insulin aspart  1-5 Units Subcutaneous At Bedtime     ipratropium - albuterol 0.5 mg/2.5 mg/3 mL  3 mL Nebulization 4x daily     levothyroxine  50 mcg Oral QAM AC     multivitamin w/minerals  1 tablet Oral Daily     pantoprazole  40 mg Oral QAM AC     polyethylene glycol  17 g Oral BID     potassium chloride  40 mEq Oral Daily     sildenafil  5 mg Oral TID     sodium chloride (PF)  3 mL Intracatheter Q8H     torsemide  20 mg Oral BID  "    acetaminophen, acetaminophen, albuterol, benzocaine-menthol, bisacodyl, calcium carbonate, cetirizine, glucose **OR** dextrose **OR** glucagon, HYDROmorphone, lidocaine 4%, lidocaine (buffered or not buffered), magnesium sulfate, magnesium sulfate, melatonin, naloxone, ondansetron **OR** ondansetron, oxyCODONE-acetaminophen, sodium chloride (PF)               Physical Exam:   Blood pressure (!) 87/53, pulse 99, temperature 98.2  F (36.8  C), temperature source Oral, resp. rate 18, height 1.702 m (5' 7\"), weight 68 kg (149 lb 14.4 oz), SpO2 95 %.  Wt Readings from Last 4 Encounters:   10/06/19 68 kg (149 lb 14.4 oz)   19 69.9 kg (154 lb)   19 70.8 kg (156 lb)   19 67.1 kg (148 lb)         Vital Sign Ranges  Temperature Temp  Av.1  F (36.7  C)  Min: 98  F (36.7  C)  Max: 98.2  F (36.8  C)   Blood pressure Systolic (24hrs), Av , Min:73 , Max:99        Diastolic (24hrs), Av, Min:49, Max:71      Pulse Pulse  Av  Min: 91  Max: 99   Respirations Resp  Av  Min: 16  Max: 20   Pulse oximetry SpO2  Av.4 %  Min: 93 %  Max: 96 %         Intake/Output Summary (Last 24 hours) at 10/6/2019 1511  Last data filed at 10/6/2019 1434  Gross per 24 hour   Intake 820 ml   Output 1900 ml   Net -1080 ml       Constitutional: Anxious, awake, alert, cooperative, no apparent distress   Lungs: diminished to auscultation bilaterally, no crackles or wheezing   Cardiovascular: Regular rate and rhythm, normal S1 and S2   Abdomen: Normal bowel sounds, soft, non-distended, non-tender   Skin: No rashes, no cyanosis, 2+ bilat leg edema   Neuro:                Data:   All laboratory data reviewed    "

## 2019-10-06 NOTE — PROGRESS NOTES
[facility] emailed with update regarding patient's address at discharge.  Patient's home is no longer being fumigated so he can return to his home address at discharge.

## 2019-10-06 NOTE — PLAN OF CARE
A&O x4. VSS on 1-2 L O2, SBP continue to be soft 75-95's. Tele SR w/ BBB. C/o of bilateral foot pain, gabapentin and percocet given. Bilat feet rachana/red w/ +2 edema. Compression stockings in place. Denies CP. Pt appears GONZALEZ. Up independently w/ walker. Cardiology recommending to hold revatio at this time. Possible right heart cath 10/7. Will continue to monitor.

## 2019-10-06 NOTE — PROGRESS NOTES
[facility] [place]    Cardiology Progress Note     Assessment & Plan   Beulah Jane is a 69 year old male who was admitted on 9/25/2019.     1. Acute on chronic heart failure with preserved ejection fraction (HFpEF)   2. Severe pulmonary hypertension  3. Severe aortic stenosis, status post recent TAVR with 29mm Medtronic Evolut bioprosthetic valve on 9/4/19  4. Coronary artery disease  5. Complete Heart Block status post PPM  6. Chronic anemia  7. Type 2 diabetes mellitus, insulin dependent   8. Obstructive sleep apnea on CPAP  9. Dermatomyositis with chronic pain   10. Interstitial lung disease  11. Hypothyroidism     It was a pleasure seeing Mr. Catherine Jane today with the cardiology consult service.  I reviewed the initial consultation notes by cardiology as well as the sign off note by Dr. Joseph on 10/3/2019.    Ultimately, I am unsure of what medical therapy he will be able to tolerate.  I spoke to his family on the phone today and there is significantly concerned about his systolic blood pressures in the 70s especially if he were to be dismissed to home.  I am unsure that he will be able to tolerate pulmonary hypertension therapy.  The other question is what his current volume status is.  He has significant lower extremity edema but his lungs are clear his JVP is difficult to fully know.  At his last heart catheterization his right atrial pressures were around 13 mmHg but he has been continually diuresed since that time.  Question is whether or not he is been overly diuresed leading to his current hypotension and with less diuresis would he be able to tolerate the sildenafil.    Given these questions, I do think that he requires further evaluation in the inpatient setting especially as his family is uncomfortable with him going home today.  We will hold his sildenafil at this time as he is already missed several doses.  I think he would benefit from a repeat right heart catheterization to assess  "atrial pressures and determine what his true volume status is. He will be n.p.o. at midnight.  I did not have a  available to consent him for right heart catheterization.  This will need to be done tomorrow with  support.    Cardiology will continue to follow along.  Please page with any questions or concerns.    Rich Amaral MD    Interval History   I was asked to see Mr. Catherine Jane today for ongoing hypotension in the setting of severe pulmonary hypertension and heart failure with preserved ejection fraction.  His blood pressures have continued to be low with the initiation of sildenafil.  Today he had cardiac rehabilitation his blood pressures were in the 70 systolic.  He states that he overall just \"does not feel well\" but was not having dizziness or lightheadedness.  He has significant shortness of breath with any activities.  He denies any chest pain or chest discomfort.  He has ongoing lower extremity edema.  He denies orthopnea.    Physical Exam   Temp: 98.2  F (36.8  C) Temp src: Oral BP: (!) 84/60 Pulse: 99 Heart Rate: 98 Resp: 18 SpO2: 98 % O2 Device: Nasal cannula Oxygen Delivery: 2 LPM  Vitals:    10/04/19 0100 10/05/19 0621 10/06/19 0648   Weight: 68.3 kg (150 lb 9.6 oz) 68.4 kg (150 lb 14.4 oz) 68 kg (149 lb 14.4 oz)     Vital Signs with Ranges  Temp:  [98  F (36.7  C)-98.2  F (36.8  C)] 98.2  F (36.8  C)  Pulse:  [91-99] 99  Heart Rate:  [] 98  Resp:  [18-20] 18  BP: (73-99)/(49-71) 84/60  SpO2:  [93 %-98 %] 98 %  I/O last 3 completed shifts:  In: 820 [P.O.:820]  Out: 1900 [Urine:1900]    Constitutional: No apparent distress.   Eyes: No xanthelasma or conjunctivitis  Respiratory: Clear to auscultation bilaterally. No crackles or wheezes.  Cardiovascular: Grade 2/6 systolic murmur heard best at the left lower sternal border.  Extremities: 3+ bilateral peripheral edema.  Neurologic: Moving all extremities. No facial assymmetry.  Psychiatric: Alert and oriented. Answers " questions appropriately.     Medications       aspirin  81 mg Oral Daily     atorvastatin  40 mg Oral Daily     calcium carbonate 500 mg (elemental)  1 tablet Oral BID     clopidogrel  75 mg Oral Daily     docusate sodium  100 mg Oral BID     ferrous gluconate  324 mg Oral Daily with breakfast     fish oil-omega-3 fatty acids  2 capsule Oral QAM     folic acid  1 mg Oral QAM     gabapentin  600 mg Oral TID     insulin aspart  1-7 Units Subcutaneous TID AC     insulin aspart  1-5 Units Subcutaneous At Bedtime     ipratropium - albuterol 0.5 mg/2.5 mg/3 mL  3 mL Nebulization 4x daily     levothyroxine  50 mcg Oral QAM AC     multivitamin w/minerals  1 tablet Oral Daily     pantoprazole  40 mg Oral QAM AC     polyethylene glycol  17 g Oral BID     potassium chloride  40 mEq Oral Daily     sildenafil  5 mg Oral TID     sodium chloride (PF)  3 mL Intracatheter Q8H     torsemide  20 mg Oral BID       Data   Results for orders placed or performed during the hospital encounter of 09/25/19 (from the past 24 hour(s))   Glucose by meter   Result Value Ref Range    Glucose 94 70 - 99 mg/dL   Glucose by meter   Result Value Ref Range    Glucose 149 (H) 70 - 99 mg/dL   Glucose by meter   Result Value Ref Range    Glucose 122 (H) 70 - 99 mg/dL   Basic metabolic panel   Result Value Ref Range    Sodium 135 133 - 144 mmol/L    Potassium 4.2 3.4 - 5.3 mmol/L    Chloride 99 94 - 109 mmol/L    Carbon Dioxide 30 20 - 32 mmol/L    Anion Gap 6 3 - 14 mmol/L    Glucose 128 (H) 70 - 99 mg/dL    Urea Nitrogen 24 7 - 30 mg/dL    Creatinine 0.91 0.66 - 1.25 mg/dL    GFR Estimate 85 >60 mL/min/[1.73_m2]    GFR Estimate If Black >90 >60 mL/min/[1.73_m2]    Calcium 9.2 8.5 - 10.1 mg/dL   Glucose by meter   Result Value Ref Range    Glucose 88 70 - 99 mg/dL   Cortisol   Result Value Ref Range    Cortisol Serum 16.1 4 - 22 ug/dL   CBC with platelets   Result Value Ref Range    WBC 13.3 (H) 4.0 - 11.0 10e9/L    RBC Count 4.46 4.4 - 5.9 10e12/L     Hemoglobin 9.6 (L) 13.3 - 17.7 g/dL    Hematocrit 33.9 (L) 40.0 - 53.0 %    MCV 76 (L) 78 - 100 fl    MCH 21.5 (L) 26.5 - 33.0 pg    MCHC 28.3 (L) 31.5 - 36.5 g/dL    RDW 24.7 (H) 10.0 - 15.0 %    Platelet Count 439 150 - 450 10e9/L   Lactic acid level STAT   Result Value Ref Range    Lactate for Sepsis Protocol 1.5 0.7 - 2.0 mmol/L   Glucose by meter   Result Value Ref Range    Glucose 152 (H) 70 - 99 mg/dL

## 2019-10-06 NOTE — PROVIDER NOTIFICATION
MD Notification    Notified Person: MD    Notified Person Name: Dr. Cook    Notification Date/Time: 10/6 1151     Notification Interaction: text page     Purpose of Notification: FYI: During cardiac rehab - BP 73/49 and 78/57. Current BP 87/53.     Orders Received: Continue to monitor BP. Will discuss meds with cards.    Comments:

## 2019-10-06 NOTE — PLAN OF CARE
Discharge Planner OT   Patient plan for discharge: Home     Current status: Pt went from sit<>Stand on 5 occassions with SBA. At rest BP: 88/66. Blood pressure hypotensive with activity: 73/49. RN aware. Did not ambulate in the hallway today secondary to low blood pressure. See vitals flow sheet for details.     Barriers to return to prior living situation: none with family A and Home therapies    Recommendations for discharge: home with family A with ADL/IADL's as needed (including LE dress, shower transfer, driving, cleaning) and home OT/PT     Rationale for recommendations: Pt is below baseline in I/ADLs. Home OT to address independence in ADL's and home safety, and PT for balance, strength and activity tolerance.        Entered by: Iftikhar Jin 10/06/2019 11:25 AM

## 2019-10-07 NOTE — PROGRESS NOTES
[facility] Cardiology Progress Note  Date of Service: 10/07/2019  Primary Cardiologist: Follows at the  with Dr. Osmin Riojas Catherine Jane is a 69 year old male admitted on 9/25/2019 with progressive exertional dyspnea.    Interim Hx: Unable to discharge yesterday d/t hypotension. Question regarding over-diuresis and ability to tolerate newly-initiated sildenafil, now held.   Subjective: Feels GONZALEZ improved somewhat from admit, but still limiting. Notes he's been fairly sedentary here.    Assessment:  1. Multifactorial progressive exertional dyspnea, only mild improvement with diuresis. Now on torsemide 20 mg BID.  2. Acute on chronic HFpEF - RAP 13 mmHg on 9/27, diuresed since then although weight stable. Currently on 2L O2 (none PTA).   3. Severe PHTN with +vasodilator study - started on sildenafil, now held due to hypotension  4. Interstitial Lung Disease  5. Severe aortic stenosis, s/p TAVR with 29mm Medtronic bioprosthetic valve on 9/4 - good fn noted on inpt TTE  6. Hx of small PE's noted pre-TAVR, none seen on CT this adm  7. Trivial-Small pericardial effusion  8. CAD, s/p PCI 7/2019  9. CHB s/p PPM - SR/ST noted on tele  10. JUAN on CPAP  11. Dermatomyositis with chronic pain  12. Hypothyroidism  13. Chronic anemia    Plan:   1. RHC today to reassess volume status. Patient agreeable to proceed. Consented with a  although patient speaks English well.   2. Will determine whether we can back off on diuretics and re-initiate sildenafil pending those results.   3. Will need to discharge with supplemental O2.   4. Will have f/u arranged with Dr. Williamson or Dr. Carlos in [facility] clinic.  5. Recommend outpatient f/u with hematology regarding anemia and hx of small PE's, not currently anticoagulated.   6. Cardiac rehab.  7. Continue compression stockings, on during day / off at night.     Venita Chavira PA-C, [facility]  Pager: [phone]  Text Page  (7:30am - 4pm M-F)    __________________________________________________________________________  Physical Exam   Temp: 98.3  F (36.8  C) Temp src: Oral BP: (!) 89/55 Pulse: 86 Heart Rate: 91 Resp: 18 SpO2: 91 % O2 Device: Nasal cannula Oxygen Delivery: 2 LPM  Vitals:    10/05/19 0621 10/06/19 0648 10/07/19 0606   Weight: 68.4 kg (150 lb 14.4 oz) 68 kg (149 lb 14.4 oz) 68.3 kg (150 lb 9.2 oz)       GENERAL:  The patient is in no apparent distress.   NECK: CVP appears normal, no masses or thyromegaly.  PULMONARY:  There is a normal respiratory effort. Bibasilar dry crackles appreciated.   CARDIOVASCULAR:  RRR, normal S1 S2, no m/r/g.  GI:  Non tender abdomen with normoactive bowel sounds and no hepatosplenomegaly. There are no masses palpable.   EXTREMITIES:  No clubbing, cyanosis or edema. Wearing compression stockings.   VASCULAR: 2+ Pulses bilaterally in upper and lower extremities.    Medications       aspirin  81 mg Oral Daily     atorvastatin  40 mg Oral Daily     calcium carbonate 500 mg (elemental)  1 tablet Oral BID     clopidogrel  75 mg Oral Daily     docusate sodium  100 mg Oral BID     ferrous gluconate  324 mg Oral Daily with breakfast     fish oil-omega-3 fatty acids  2 capsule Oral QAM     folic acid  1 mg Oral QAM     gabapentin  600 mg Oral TID     insulin aspart  1-7 Units Subcutaneous TID AC     insulin aspart  1-5 Units Subcutaneous At Bedtime     ipratropium - albuterol 0.5 mg/2.5 mg/3 mL  3 mL Nebulization 4x daily     levothyroxine  50 mcg Oral QAM AC     multivitamin w/minerals  1 tablet Oral Daily     pantoprazole  40 mg Oral QAM AC     polyethylene glycol  17 g Oral BID     potassium chloride  40 mEq Oral Daily     sildenafil  5 mg Oral TID     sodium chloride (PF)  3 mL Intracatheter Q8H     torsemide  20 mg Oral BID       Data   Most Recent 3 CBC's:  Recent Labs   Lab Test 10/06/19  0957 10/01/19  0523 09/28/19  0533 09/27/19  0522 09/26/19  0539 09/25/19  1411   WBC 13.3*  --   --   --  10.1 13.9*   HGB  9.6* 8.7*  --  9.5* 9.0* 9.9*   MCV 76*  --   --   --  75* 75*     --  448 504* 493* 699*     Most Recent 3 BMP's:  Recent Labs   Lab Test 10/06/19  0535 10/05/19  0530 10/04/19  0537 10/03/19  1133     --  136 132*   POTASSIUM 4.2 4.1 3.6 3.9   CHLORIDE 99  --  101 99   CO2 30  --  30 30   BUN 24  --  23 22   CR 0.91  --  0.94 0.98   ANIONGAP 6  --  5 3   DMITRIY 9.2  --  8.1* 8.3*   *  --  87 110*     Most Recent 3 Troponin's:  Recent Labs   Lab Test 09/26/19  0539 09/25/19 2029 09/25/19  1411   TROPI 0.107* 0.119* 0.106*     Most Recent 3 BNP's:  Recent Labs   Lab Test 09/25/19  1411 09/17/19  1559 08/29/19  1525 08/28/19  1008 08/23/19  1233  07/05/19  1738   NTBNPI 10,794*  --  7,655*  --   --   --  2,613*   NTBNP  --  5,144*  --  6,607* 5,465*   < >  --     < > = values in this interval not displayed.     Most Recent Cholesterol Panel:  Recent Labs   Lab Test 09/26/19  0539   CHOL 96   LDL 58   HDL 19*   TRIG 97     Most Recent TSH and T4:  Recent Labs   Lab Test 09/26/19  0539   TSH 0.46     Most Recent Hemoglobin A1c:  Recent Labs   Lab Test 08/29/19  2053   A1C 6.7*     Most Recent Anemia Panel:  Recent Labs   Lab Test 10/06/19  0957  09/25/19  1411   WBC 13.3*   < > 13.9*   HGB 9.6*   < > 9.9*   HCT 33.9*   < > 34.6*   MCV 76*   < > 75*      < > 699*   IRON  --   --  57   IRONSAT  --   --  14*   FEB  --   --  413    < > = values in this interval not displayed.

## 2019-10-07 NOTE — PLAN OF CARE
Discharge Planner OT   Patient plan for discharge: Home.  Current status: Ambulated for 7.5 min in hallway with MOD I, stable BP, sats 89% on 2L.   Barriers to return to prior living situation: None.  Recommendations for discharge: home with family A with ADL/IADL's as needed (including LE dress, shower transfer, driving, cleaning) and home OT/PT    Rationale for recommendations: Pt is below baseline in IADLs due to impaired activity tolerance and need for supplemental o2. Home OT to address independence in IADL's and home safety, and PT for activity tolerance and exercise.      Entered by: Sylvia Perla 10/07/2019 11:40 AM

## 2019-10-07 NOTE — PROGRESS NOTES
[facility]  Hospitalist Progress Note    Assessment & Plan   Beulah Jane is a 69 year old  male with medical history of severe aortic stenosis, recent TAVR, complete heart block, recent pacemaker placement, heart failure with preserved ejection fraction, hypertension, hyperlipidemia, diabetes, dermatomyositis admitted 9/25/2019 for shortness of breath.  -Dyspnea on exertion multifactorial, recent surgery, exacerbation of heart failure with preserved ejection fraction, pulm hypertension, tachycardia physical deconditioning.  -Acute on chronic hypoxic respiratory failure  -Acute exacerbation of heart failure with preserved EF  -Pulmonary hypertension with positive vasodilator study.  -Severe aortic stenosis s/p TAVR, 29mm Medtronic Evolut  Complete Heart Block status post PPM  Admitted 8/29 - 9/7 at [facility] for acute exacerbation of heart failure with preserved ejection fraction and NSTEMI.  Then had severe AS, underwent TAVR on 9/4. TAVR procedure complicated by complete heart block after valve deployment, otherwise uncomplicated; he did have a pre-existing RBBB. He was bridged to pacemaker (9/6) with a temporary pacing wire and remained 100% V-paced  after surgery. Dismissed home on Lasix and PRN metolazone.    Presented with worsening shortness of breath, leg swelling and weight gain.  EKG sinus rhythm, left axis deviation.  Nonspecific ST-T wave changes.  Echocardiogram left ventricle systolic function normal, estimated EF at 55 to 60%.  Right ventricle mild to moderately dilated, mildly decreased right ventricular systolic function.S/P #29 Medtronic Evolute Pro bioprosthetic TAVR; appears well seated with grossly normal function  Chest x-ray dual-lead pacemaker in the right hemithorax unchanged.  Scattered bibasilar opacities again seen. Patchy areas of grounds place opacities since previous exam have improved.  Right heart cath 9/27/2019 Baseline: Severe pulmonary HTN  with mildly elevated PCWP. Vasodilator study positive.   Was followed by cardiology during hospitalization, had undergone diuresis with intravenous Lasix, switched to oral torsemide [9/28]    Continue sildenafil 5 mg 3 times daily [9/27]  PTA Brilinta switched to Plavix [9/28]   Continue PTA aspirin and atorvastatin.   Low-salt diet, 2 L fluid restriction daily.  Aggressive incentive spirometry.  Limb elevation, Ace wraps--patient non compliant to ACE despite education multiple times  Son he was very concerned about cardiology having signed off while continuing on torsemide 20 mg p.o. twice daily along with sildenafil.  -Continues to have intermittent hypotension with blood pressure in the 80s.   -Sildenafil on hold pending right heart cath today  -Anticipate discharge home on supplemental oxygen if unable to completely wean off     Chronic hypotension  Symptomatic orthostatic hypotension  -Asymptomatic, urine output good and creatinine stable  -Cortisol level is at 8.8- drawn this morning at 5:30 AM, ?likely this represents normal   - Appears on the lower side for the degree of hypotension and stress.  We will check a stim test in the morning.  - cont significant leg edema, on current Diuretics but with symptomatic orthostatic hypotension. Pt unable to discharge home as previously planned      Possible acute bronchitis  Patient having ongoing cough.  Afebrile.  WBC 13.2.  Procalcitonin 0.40.  Wife recently treated for pneumonia.  Treated with levofloxacin for total of 5 days [9/25 to 9/30].  Minimal improvement in cough.  Supportive care.     CAD s/p PCI  s/p PCI p-mLAD and dLAD 7/30/19.  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 Currently stable on MTX.   Recent imaging reviewed.  CRP 22.8 ESR 24.  Held PTA methotrexate during hospitalization.  PTA albuterol inhaler as needed.    Pulmonology [9/30]  recommend treat pulmonary hypertension per cardiology.  Follow-up as outpatient.  Aggressive incentive spirometry.  Outpatient pulmonary rehab     JUAN  Recommended home CPAP at home settings, has not been compliant with CPAP during hospitalization [does not like device from hospital], unfortunately family was not able to bring in home CPAP during hospital stay despite education multiple times   At time of discharge emphasize compliance with CPAP.     Dermatomyositis  Chronic pain  Patient has been diagnosed with polymyositis clinically, not by muscle biopsy. He did have a lung biopsy that showed fibrosis of unknown etiology. Has ILD on CT and blood work showed a positive Shannon-1 antibody. PFTs showed worsening DLCO by 11% from 7/2018-1/2019.   Held PTA methotrexate 25 mg weekly during hospitalization.  Continue folic acid 1 mg daily  PTA Gabapentin 1200mg tid, decreaseD dose to 600 mg 3 times daily given kidney injury.  PTA PRN Oxycodone 7.5/325 mg tid needs to be de-escalated, tapered off as outpatient.     Reactive thrombocytosis, improving  Recent Labs   Lab 10/06/19  0957        Diabetes mellitus hemoglobin A1c of 6.7%  PTA administers NPH insulin 2 to 4 units twice daily if blood sugars greater than 140.  During hospitalization was on sliding scale insulin, restart PTA regimen on discharge.     Hyperlipidemia  Triglycerides 97, LDL 58, HDL 19.  Discontinued PTA gemfibrozil as patient complaining of muscle aches and lipid panel at goal.  Continue PTA atorvastatin.     Hypothyroidism  TSH 0.46.  Continue PTA levothyroxine.     PAD  Further management/workup as outpatient     Anemia of chronic disease.  Baseline hemoglobin between 8-10.    Per patient last colonoscopy when he was in [place] many years of back.    Hemoglobin levels at baseline, iron saturation index 14.  Started on ferrous gluconate supplements oral daily.  -Follow-up outpatient with PCP.     Physical deconditioning from acute  illness, chronic back pain.  Patient having assistance from wife, sons at home.  UA negative, TSH within normal limits.    Orders Placed This Encounter      Diet      NPO for Medical/Clinical Reasons Except for: Meds    DVT prophylaxis: Pneumatic Compression Devices  Code Status: Full Code    Disposition: Expected discharge to home with [facility] in 1-2 days depending upon right heart cath result.    Kami Sorenson MD  [phone] (7am - 6pm)  Text Page  ~~~~~~~~~~~~~~~~~~~~~~~~~~~~~~~~~~~~~~~~~~~~~~~  Interval History   Anticipate right heart cath today. No new complaints. Ongoing low blood pressures noted.     -Data reviewed today: I reviewed all new labs and imaging results over the last 24 hours.    Physical Exam   Temp: 98.2  F (36.8  C) Temp src: Oral BP: 95/62 Pulse: 86 Heart Rate: 81 Resp: 18 SpO2: 95 % O2 Device: Nasal cannula with humidification Oxygen Delivery: 2 LPM  Vitals:    10/05/19 0621 10/06/19 0648 10/07/19 0606   Weight: 68.4 kg (150 lb 14.4 oz) 68 kg (149 lb 14.4 oz) 68.3 kg (150 lb 9.2 oz)     Vital Signs with Ranges  Temp:  [98  F (36.7  C)-98.2  F (36.8  C)] 98.2  F (36.8  C)  Pulse:  [86-99] 86  Heart Rate:  [] 81  Resp:  [18-20] 18  BP: ()/(49-68) 95/62  SpO2:  [93 %-98 %] 95 %  I/O last 3 completed shifts:  In: 1700 [P.O.:1700]  Out: 1900 [Urine:1900]    Constitutional: Alert, NAD, pleasant and interactive, resting flat in bed  HEENT: mmm, sclerae anicteric  Respiratory: Lungs CTAB, no wheezes or crackles  Cardiovascular: RRR, no murmurs  no LE edema  GI: soft, non-tender, nondistended  Skin/Integument: warm, dry, no acute rashes  Psych: not anxious, no confusion      Medications       aspirin  81 mg Oral Daily     atorvastatin  40 mg Oral Daily     calcium carbonate 500 mg (elemental)  1 tablet Oral BID     clopidogrel  75 mg Oral Daily     docusate sodium  100 mg Oral BID     ferrous gluconate  324 mg Oral Daily with breakfast     fish oil-omega-3 fatty acids  2 capsule Oral  QAM     folic acid  1 mg Oral QAM     gabapentin  600 mg Oral TID     insulin aspart  1-7 Units Subcutaneous TID AC     insulin aspart  1-5 Units Subcutaneous At Bedtime     ipratropium - albuterol 0.5 mg/2.5 mg/3 mL  3 mL Nebulization 4x daily     levothyroxine  50 mcg Oral QAM AC     multivitamin w/minerals  1 tablet Oral Daily     pantoprazole  40 mg Oral QAM AC     polyethylene glycol  17 g Oral BID     potassium chloride  40 mEq Oral Daily     sildenafil  5 mg Oral TID     sodium chloride (PF)  3 mL Intracatheter Q8H     torsemide  20 mg Oral BID       Data   Recent Labs   Lab 10/06/19  0957 10/06/19  0535 10/05/19  0530 10/04/19  0537 10/03/19  1133 10/01/19  0523   WBC 13.3*  --   --   --   --   --    HGB 9.6*  --   --   --   --  8.7*   MCV 76*  --   --   --   --   --      --   --   --   --   --    NA  --  135  --  136 132* 133   POTASSIUM  --  4.2 4.1 3.6 3.9 4.3   CHLORIDE  --  99  --  101 99 100   CO2  --  30  --  30 30 29   BUN  --  24  --  23 22 20   CR  --  0.91  --  0.94 0.98 1.07   ANIONGAP  --  6  --  5 3 4   DMITRIY  --  9.2  --  8.1* 8.3* 8.4*   GLC  --  128*  --  87 110* 129*       Imaging:  No results found for this or any previous visit (from the past 24 hour(s)).

## 2019-10-07 NOTE — PLAN OF CARE
A&OX4, BP soft 95/64, 106/68, 95/62 on 2 L nasal cannula with humidification.Tele-SR, gabapentin and percocet given for bilateral foot pain and that provided good relief. NPO since midnight for right heart cath, up with SBA with walker.  and  110

## 2019-10-07 NOTE — PLAN OF CARE
Heart Failure Care Pathway  GOALS TO BE MET BEFORE DISCHARGE:    1. Decrease congestion and/or edema with diuretic therapy to achieve near      optimal volume status.            Dyspnea improved:  No, please explain: getting another r. Heart cath today.             Edema improved:     No, please explain: still has mild edema in feet         Net I/O and Weights since admission:          09/07 1500 - 10/07 1459  In: 07081 [P.O.:47232; I.V.:228]  Out: 19830 [Urine:19830]  Net: -9254            Vitals:    09/25/19 1328 09/26/19 0159 09/27/19 0609 09/28/19 0500   Weight: 69.9 kg (154 lb) 67.4 kg (148 lb 8 oz) 68.9 kg (151 lb 14.4 oz) 67.2 kg (148 lb 1.6 oz)    09/29/19 0256 09/30/19 0617 10/02/19 0616 10/03/19 0504   Weight: 67.2 kg (148 lb 3.2 oz) 67.9 kg (149 lb 11.2 oz) 68.4 kg (150 lb 14.4 oz) 68.2 kg (150 lb 4.8 oz)    10/04/19 0100 10/05/19 0621 10/06/19 0648 10/07/19 0606   Weight: 68.3 kg (150 lb 9.6 oz) 68.4 kg (150 lb 14.4 oz) 68 kg (149 lb 14.4 oz) 68.3 kg (150 lb 9.2 oz)       2.  O2 sats > 92% on RA or at prior home O2 therapy level.          Current oxygenation status:       SpO2: 91 %         O2 Device: Nasal cannula,  Oxygen Delivery: 2 LPM         Able to wean O2 this shift to keep sats > 92%:  No, please explain: 02 2l nc        Does patient use Home O2? No    3.  Tolerates ambulation and mobility near baseline: Yes        How many times did the patient ambulate with nursing staff this shift? 2    Please review the Heart Failure Care Pathway for additional HF goal outcomes.    Isabel Stone RN RN  10/7/2019

## 2019-10-07 NOTE — PROGRESS NOTES
CLINICAL NUTRITION SERVICES - REASSESSMENT NOTE    Recommendations Ordered by Registered Dietitian (RD):   --Allow patient to order supplements/snacks PRN after procedure    Malnutrition:   % Weight Loss:  None noted  % Intake:  No decreased intake noted  Subcutaneous Fat Loss:  None observed  Muscle Loss:  None observed  Fluid Retention:  Mild in ankles and feet.     Malnutrition Diagnosis: Patient does not meet two of the above criteria necessary for diagnosing malnutrition       EVALUATION OF PROGRESS TOWARD GOALS   Diet:  NPO (from Moderate Consistent Carbohydrate and 2g Na Diet) Patient is NPO for angiogram.    Intake/Tolerance:  Patient has been consuming 100% of meals since admission per RN note in flow sheet. The patient reported that his diet restricts him on what he can eat but he has been finding food to eat. He feels he has been eating very good while in the hospital. He would like to be able to order snacks/supplements with and in between his meals.       NEW FINDINGS:   Labs  Recent Labs   Lab 10/07/19  0923 10/07/19  0210 10/06/19  2112 10/06/19  1756 10/06/19  1432 10/06/19  0844 10/06/19  0535  10/04/19  0537  10/03/19  1133  10/01/19  0523   GLC  --   --   --   --   --   --  128*  --  87  --  110*  --  129*   BGM 96 101* 129* 146* 152* 88  --    < >  --    < >  --    < >  --     < > = values in this interval not displayed.     Meds  Fish-oil-omega-3 fatty acids (2 capsules per day)   Folvite (1 mg every day)   Insulin (1-5 units at bedtime and 1-7 units 3x daily before meals)   Theravite (1 tablet daily)   Electrolyte replacement protocol in place     GI: 3 stools since admission, last one on 10/4.    Edema: Trace-Mild Edema (1-2+) in ankles and feet     Previous Goals:   No previous goals recorded.   Evaluation: Unable to evaluate due to no previous goals recorded.     Previous Nutrition Diagnosis:   No nutrition diagnosis at this time.  Evaluation: No change      MALNUTRITION  % Weight Loss:   None noted  % Intake:  No decreased intake noted  Subcutaneous Fat Loss:  None observed  Muscle Loss:  None observed  Fluid Retention:  Mild in ankles and feet.     Malnutrition Diagnosis: Patient does not meet two of the above criteria necessary for diagnosing malnutrition    CURRENT NUTRITION DIAGNOSIS  No nutrition diagnosis identified at this time.    INTERVENTIONS  Recommendations / Nutrition Prescription  --Continue with Moderate Consistent Carb and 2g Na Diet after procedure  --Allow patient to order snacks/supplements PRN after procedure    Implementation  Medical Food Supplement - allow patient to order snacks/supplements PRN    Goals  Patient to consume 100% of meals TID.     MONITORING AND EVALUATION:  Progress towards goals will be monitored and evaluated per protocol and Practice Guidelines    Anaya Sharp  Dietetic Intern

## 2019-10-08 NOTE — PROGRESS NOTES
Received phone call from Yvette at 1:40pm stating that the patient did not leave on 10/2/2019 and now they are ready to be discharged and she was wanting to make sure we had everything.   Received corrected face to face notes, order, and o2 sats at 2:40pm. Patient already had equipment at bedside to be sent home with at discharge.

## 2019-10-08 NOTE — PROGRESS NOTES
[facility]  Met with patient to discuss plans for HC. Patient to be discharged home 10/8/19 and has agreed to have [facility] follow with RN PT OT. Patient care support center processing referral. Patient verbalized understanding that initial visit is scheduled for 10/9 or 10/10. Patient has 24 hour phone number for [facility] for any questions or concerns.

## 2019-10-08 NOTE — PROGRESS NOTES
sPatient has been assessed for Home Oxygen needs. Oxygen readings:    *Pulse oximetry (SpO2) = 86% on room air at rest while awake.    *SpO2 improved to 96% on 2liters/minute at rest.    *SpO2 = 82% on room air during activity/with exercise.    *SpO2 improved to 93% on 2liters/minute during activity/with exercise.

## 2019-10-08 NOTE — PLAN OF CARE
Heart Failure Care Pathway  GOALS TO BE MET BEFORE DISCHARGE:    1. Decrease congestion and/or edema with diuretic therapy to achieve near      optimal volume status.            Dyspnea improved:  Yes            Edema improved:     Yes        Net I/O and Weights since admission:          09/07 2300 - 10/07 2259  In: 35505 [P.O.:84241; I.V.:228]  Out: 89468 [Urine:31994]  Net: -37789            Vitals:    09/25/19 1328 09/26/19 0159 09/27/19 0609 09/28/19 0500   Weight: 69.9 kg (154 lb) 67.4 kg (148 lb 8 oz) 68.9 kg (151 lb 14.4 oz) 67.2 kg (148 lb 1.6 oz)    09/29/19 0256 09/30/19 0617 10/02/19 0616 10/03/19 0504   Weight: 67.2 kg (148 lb 3.2 oz) 67.9 kg (149 lb 11.2 oz) 68.4 kg (150 lb 14.4 oz) 68.2 kg (150 lb 4.8 oz)    10/04/19 0100 10/05/19 0621 10/06/19 0648 10/07/19 0606   Weight: 68.3 kg (150 lb 9.6 oz) 68.4 kg (150 lb 14.4 oz) 68 kg (149 lb 14.4 oz) 68.3 kg (150 lb 9.2 oz)       2.  O2 sats > 92% on RA or at prior home O2 therapy level.          Current oxygenation status:       SpO2: 97 %         O2 Device: None (Room air),  Oxygen Delivery: 2 LPM         Able to wean O2 this shift to keep sats > 92%:  No, please explain: n         Does patient use Home O2? Yes-  2 L     3.  Tolerates ambulation and mobility near baseline: Yes        How many times did the patient ambulate with nursing staff this shift? 1    Please review the Heart Failure Care Pathway for additional HF goal outcomes.    Christie Gomez RN RN  10/7/2019    Neuro:intact  CV/Rhythm:sr  Resp/02:2 L NC  GI/Diet:miralax  : voiding  Skin/Incisions/Sites:intact, L neck intact  Pulses/CMS:intact  Edema:BLE, teds off for noc  Activity/Falls Risk:fall risk, sba walker  Lines/Drains/IVs:PIV  Labs/BGM:BGM 82 at HS  Test/Procedures:r heart cath today  VS/Pain:low bp, gave sidanafil per cards, now low, held torsemide  DC Plan:BP improved, med management  Other:walker, SBA

## 2019-10-08 NOTE — PLAN OF CARE
Heart Failure Care Pathway  GOALS TO BE MET BEFORE DISCHARGE:    1. Decrease congestion and/or edema with diuretic therapy to achieve near      optimal volume status.            Dyspnea improved:  No, please explain: GONZALEZ            Edema improved:             Net I/O and Weights since admission:          09/08 0700 - 10/08 0659  In: 38920 [P.O.:83641; I.V.:228]  Out: 26873 [Urine:69486]  Net: -32272            Vitals:    09/25/19 1328 09/26/19 0159 09/27/19 0609 09/28/19 0500   Weight: 69.9 kg (154 lb) 67.4 kg (148 lb 8 oz) 68.9 kg (151 lb 14.4 oz) 67.2 kg (148 lb 1.6 oz)    09/29/19 0256 09/30/19 0617 10/02/19 0616 10/03/19 0504   Weight: 67.2 kg (148 lb 3.2 oz) 67.9 kg (149 lb 11.2 oz) 68.4 kg (150 lb 14.4 oz) 68.2 kg (150 lb 4.8 oz)    10/04/19 0100 10/05/19 0621 10/06/19 0648 10/07/19 0606   Weight: 68.3 kg (150 lb 9.6 oz) 68.4 kg (150 lb 14.4 oz) 68 kg (149 lb 14.4 oz) 68.3 kg (150 lb 9.2 oz)       2.  O2 sats > 92% on RA or at prior home O2 therapy level.          Current oxygenation status:       SpO2: 98 %         O2 Device: Nasal cannula,  Oxygen Delivery: 2 LPM         Able to wean O2 this shift to keep sats > 92%:  No, please explain: Pt on 2L oxygen via NC at NOC       Does patient use Home O2? No    3.  Tolerates ambulation and mobility near baseline: Yes        How many times did the patient ambulate with nursing staff this shift? 0    Please review the Heart Failure Care Pathway for additional HF goal outcomes.    Enriqueta Burkett RN RN  10/8/2019     Pt A&Ox4.  VSS on oxygen via nasal cannula at 2 liters, soft SBP in 90s-100s.  Tele SR w/BBB.  Pain managed w/Percocet.  CMS intact.  Up independently w/walker in room.  PIV SL.  Voiding adequately.  GONZALEZ. Nursing to continue to monitor.

## 2019-10-08 NOTE — PLAN OF CARE
Occupational Therapy Discharge Summary    Reason for therapy discharge:    Discharged to home with home therapy.    Progress towards therapy goal(s). See goals on Care Plan in [facility] electronic health record for goal details.  Goals partially met.  Barriers to achieving goals:   discharge from facility.    Therapy recommendation(s):    Continued therapy is recommended.  Rationale/Recommendations:  To maximize exercise tolerance.

## 2019-10-08 NOTE — PROGRESS NOTES
SW:  D:  Received discharge orders for patient.  Patient is planning on returning home, to his house, versus the hotel, upon discharge with [facility] for RN/PT/OT.  Email referral sent to [facility] and process explained.   Patient will also discharge to home with a new home oxygen set-up and a portable oxygen tank for transport from [facility].  Patient informed of the plan and in agreement to the plan.      CARLOS Olsen, LICSW  [phone]  [facility]

## 2019-10-08 NOTE — PROGRESS NOTES
[facility]    Cardiology Progress Note     Assessment & Plan   Beulah Jane is a 69 year old male who was admitted on 9/25/2019.     69-year-old male with past medical history of severe aortic stenosis status post TAVR with 29mm Medtronic valve on 9/4/2019 complicated by complete heart block status post PPM, type 2 diabetes, JUAN on CPAP and coronary disease (PCI to LAD in 07/2019) who presented with dyspnea on exertion.  Right heart catheterization showed severe pulmonary hypertension with transpulmonary gradient of 30 mmHg and severely elevated PVR of 10.  He also had positive vasodilator study.  He developed hypotension and was unable to tolerate sildenafil and diuresis.  Hence right heart catheterization was repeated and showed near normal pressures pulmonary capillary wedge of 9, PA 56/20/33, right atrial pressure of 8 mmHg. He was restarted back on a tiny dose of Sildenafil and is tolerating it well so far.     1.  Severe pulmonary hypertension- repeat RHC showed a mean PA pressure of 33 mmHg consistent with moderately elevated pulmonary pressure.  2.  Aortic stenosis status post TAVR with 29 mm Medtronic valve on 9/4/2019  3.  Complete heart block status post PPM in the setting of TAVR  4.  Coronary artery disease status post PCI to LAD in 07/2019  5.  JUAN on CPAP    Recommendations  1.  Continue current dose of sildenafil.  Uptitrate as tolerated by blood pressure.    2.  Continue 20 mg of torsemide twice daily. He is -4 L since admission and seems to be at his dry weight now.  3.  He will need to be seen in pulmonary hypertension clinic for further management after discharge.  4.  Continue aspirin and Plavix uninterrupted for a total of 12 months since PCI of LAD  5.  He has mild hypoxemia on exertion and is requiring 2 L of O2.  He is being set up for home oxygen and will likely need it long-term.  Prior to discharge he will work with physical therapy.     Maynor Graves  Date of  Service (when I saw the patient): 10/07/19    Maynor Graves MD  Text Page (7am - 5pm, M-F)    Interval History   Feels well.  Shortness of breath has more or less resolved.    Physical Exam   Temp: 98.5  F (36.9  C) Temp src: Oral BP: 102/63 Pulse: 83 Heart Rate: 83 Resp: 16 SpO2: 100 % O2 Device: Nasal cannula Oxygen Delivery: 2 LPM  Vitals:    10/06/19 0648 10/07/19 0606 10/08/19 0655   Weight: 68 kg (149 lb 14.4 oz) 68.3 kg (150 lb 9.2 oz) 68.3 kg (150 lb 8 oz)     Vital Signs with Ranges  Temp:  [97.6  F (36.4  C)-98.5  F (36.9  C)] 98.5  F (36.9  C)  Pulse:  [] 83  Heart Rate:  [77-83] 83  Resp:  [16-18] 16  BP: ()/(54-93) 102/63  SpO2:  [81 %-100 %] 100 %  I/O last 3 completed shifts:  In: 1010 [P.O.:1010]  Out: 2150 [Urine:2150]  Patient Active Problem List   Diagnosis     Spinal stenosis of lumbar region with neurogenic claudication     Severe aortic stenosis     Hypertension     Hypothyroidism     Mixed hyperlipidemia     Inflammatory arthritis     Pulmonary fibrosis (H)     Type 2 diabetes mellitus with neurologic complication, with long-term current use of insulin (H)     Controlled substance agreement signed     Acute pulmonary embolism (H)     Status post non-ST elevation myocardial infarction (NSTEMI)     Coronary artery disease involving native coronary artery of native heart with other form of angina pectoris (H)     Congestive heart failure, NYHA class III, chronic, diastolic (H)     S/P TAVR (transcatheter aortic valve replacement)     GONZALEZ (dyspnea on exertion)     SVT (supraventricular tachycardia) (H)     Pulmonary hypertension (H)     Constitutional: awake, alert, no distress  Skin: Warm and dry to touch  Head: Normocephalic, atraumatic  Eyes: Conjunctivae and lids unremarkable, sclera white  ENT: No pallor or cyanosis  Respiratory: Normal breath sounds, clear to auscultation  Cardiac: Regular rate and rhythm, S1-S2 normal.  No murmurs gallops or rubs.   No pedal edema.    Extremities and musculoskeletal: No gross motor deficit  Neurological.  Affect normal  Psych: Alert and oriented x3      Medications     sodium chloride         aspirin  81 mg Oral Daily     atorvastatin  40 mg Oral Daily     calcium carbonate 500 mg (elemental)  1 tablet Oral BID     clopidogrel  75 mg Oral Daily     docusate sodium  100 mg Oral BID     ferrous gluconate  324 mg Oral Daily with breakfast     fish oil-omega-3 fatty acids  2 capsule Oral QAM     folic acid  1 mg Oral QAM     gabapentin  600 mg Oral TID     insulin aspart  1-7 Units Subcutaneous TID AC     insulin aspart  1-5 Units Subcutaneous At Bedtime     ipratropium - albuterol 0.5 mg/2.5 mg/3 mL  3 mL Nebulization 4x daily     levothyroxine  50 mcg Oral QAM AC     multivitamin w/minerals  1 tablet Oral Daily     pantoprazole  40 mg Oral QAM AC     polyethylene glycol  17 g Oral BID     potassium chloride  40 mEq Oral Daily     sildenafil  5 mg Oral TID     sodium chloride (PF)  3 mL Intracatheter Q8H     sodium chloride (PF)  3 mL Intracatheter Q8H     torsemide  20 mg Oral BID       Data   Results for orders placed or performed during the hospital encounter of 09/25/19 (from the past 24 hour(s))   Glucose by meter   Result Value Ref Range    Glucose 97 70 - 99 mg/dL   ISTAT gases venous POCT   Result Value Ref Range    Ph Venous 7.39 7.32 - 7.43 pH    PCO2 Venous 46 40 - 50 mm Hg    PO2 Venous 33 25 - 47 mm Hg    Bicarbonate Venous 28 21 - 28 mmol/L    O2 Sat Venous 62 %   Cardiac Catheterization    Narrative      Right sided filling pressures are mildly elevated.    Moderately elevated pulmonary artery hypertension.    Left sided filling pressures are normal.    Normal cardiac output level.      ISTAT gases lactate barrera POCT   Result Value Ref Range    Ph Venous 7.40 7.32 - 7.43 pH    PCO2 Venous 44 40 - 50 mm Hg    PO2 Venous 35 25 - 47 mm Hg    Bicarbonate Venous 27 21 - 28 mmol/L    O2 Sat Venous 67 %    Lactic Acid 0.7 0.7 - 2.1 mmol/L    Glucose by meter   Result Value Ref Range    Glucose 104 (H) 70 - 99 mg/dL   Glucose by meter   Result Value Ref Range    Glucose 82 70 - 99 mg/dL   Glucose by meter   Result Value Ref Range    Glucose 89 70 - 99 mg/dL   Glucose by meter   Result Value Ref Range    Glucose 87 70 - 99 mg/dL   Basic metabolic panel   Result Value Ref Range    Sodium 138 133 - 144 mmol/L    Potassium 4.5 3.4 - 5.3 mmol/L    Chloride 104 94 - 109 mmol/L    Carbon Dioxide 29 20 - 32 mmol/L    Anion Gap 5 3 - 14 mmol/L    Glucose 132 (H) 70 - 99 mg/dL    Urea Nitrogen 15 7 - 30 mg/dL    Creatinine 0.79 0.66 - 1.25 mg/dL    GFR Estimate >90 >60 mL/min/[1.73_m2]    GFR Estimate If Black >90 >60 mL/min/[1.73_m2]    Calcium 9.3 8.5 - 10.1 mg/dL   Magnesium   Result Value Ref Range    Magnesium 2.2 1.6 - 2.3 mg/dL

## 2019-10-08 NOTE — DISCHARGE SUMMARY
[facility]  Discharge Summary  Hospitalist    Date of Admission:  9/25/2019  Date of Discharge:  10/8/2019 to home with HHC, PT, OT  Discharging Provider: Kami Sorenson MD    Discharge Diagnoses   Acute on chronic hypoxic respiratory failure, present on admission. Multifactorial. Due to acute on chronic diastolic heart failure, interstitial lung disease and JUAN.  Acute exacerbation of heart failure with preserved EF  Pulmonary hypertension with positive vasodilator study.  Severe aortic stenosis s/p TAVR, 29mm Medtronic Evolut  Recent complete Heart Block status post PPM   Acute hypoxia on exertion  Interstitial lung disease  Asymptomatic hypotension  Possible acute bronchitis.  CAD s/p PCI   JUAN  Dermatomyositis  Chronic pain.  Reactive thrombocytosis.  Diabetes mellitus hemoglobin A1c of 6.7.  Hyperlipidemia   Hypothyroidism  PAD  SHIRIN from aggressive diuresis, volume load- improved.  Anemia of chronic disease.  Physical deconditioning from acute illness    History of Present Illness   Beulah Jane is an 69 year old male who presented with shortness of breath. Please see admission H&P for complete details.     Hospital Course   The following problems were addressed during his hospitalization:    Beulah Jane is a 69 year old  male with medical history of severe aortic stenosis, recent TAVR, complete heart block, recent pacemaker placement, heart failure with preserved ejection fraction, hypertension, hyperlipidemia, diabetes, dermatomyositis admitted 9/25/2019 for shortness of breath.  Dyspnea on exertion multifactorial, recent surgery, exacerbation of heart failure with preserved ejection fraction, pulm hypertension, tachycardia physical deconditioning.  Acute on chronic hypoxic respiratory failure, multifactorial, present on admission. Due to acute on chronic diastolic heart failure, interstitial lung disease and JUAN.  Acute exacerbation of heart failure with preserved  EF  Pulmonary hypertension with positive vasodilator study.  Severe aortic stenosis s/p TAVR, 29mm Medtronic Evolut  Complete Heart Block status post PPM  Admitted 8/29 - 9/7 at [facility] for acute exacerbation of heart failure with preserved ejection fraction and NSTEMI.  Then had severe AS, underwent TAVR on 9/4. TAVR procedure complicated by complete heart block after valve deployment, otherwise uncomplicated; he did have a pre-existing RBBB. He was bridged to pacemaker (9/6) with a temporary pacing wire and remained 100% V-paced  after surgery. Dismissed home on Lasix and PRN metolazone.    Presented with worsening shortness of breath, leg swelling and weight gain.  EKG sinus rhythm, left axis deviation.  Nonspecific ST-T wave changes.  Echocardiogram left ventricle systolic function normal, estimated EF at 55 to 60%.  Right ventricle mild to moderately dilated, mildly decreased right ventricular systolic function.S/P #29 Medtronic Evolute Pro bioprosthetic TAVR; appears well seated with grossly normal function  Chest x-ray dual-lead pacemaker in the right hemithorax unchanged.  Scattered bibasilar opacities again seen. Patchy areas of grounds place opacities since previous exam have improved.  Right heart cath 9/27/2019 Baseline: Severe pulmonary HTN with mildly elevated PCWP. Vasodilator study positive.   Was followed by cardiology during hospitalization, had undergone diuresis with intravenous Lasix, switched to oral torsemide [9/28]    Continue sildenafil 5 mg 3 times daily [9/27]  PTA Brilinta switched to Plavix [9/28]   Continue PTA aspirin and atorvastatin.   Low-salt diet, 2 L fluid restriction daily.  Monitor daily weights.  Aggressive incentive spirometry.  Limb elevation, Ace wraps--patient non compliant to ACE despite education multiple times  -right heart cath on 10/7 with mean PA pressure of 33 mmHg consistent with moderately elevated pulmonary pressure  -Sildenafil resumed with  normal blood pressures  -Discharge home on supplemental oxygen (lifetime)     Chronic hypotension  Symptomatic orthostatic hypotension  -Asymptomatic, urine output good and creatinine stable  -Cortisol level is at 8.8- drawn this morning at 5:30 AM, ?likely this represents normal   - Appears on the lower side for the degree of hypotension and stress.  We will check a stim test in the morning.  - cont significant leg edema, on current Diuretics but with symptomatic orthostatic hypotension. Pt unable to discharge home as previously planned      Possible acute bronchitis  Patient having ongoing cough.  Afebrile.  WBC 13.2.  Procalcitonin 0.40.  Wife recently treated for pneumonia.  Treated with levofloxacin for total of 5 days [9/25 to 9/30].  Minimal improvement in cough.  Supportive care.     CAD s/p PCI  s/p PCI p-mLAD and dLAD 7/30/19.  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 Currently stable on MTX.   Recent imaging reviewed.  CRP 22.8 ESR 24.  Held PTA methotrexate during hospitalization.  PTA albuterol inhaler as needed.    Pulmonology [9/30] recommend treat pulmonary hypertension per cardiology.  Follow-up as outpatient.  Aggressive incentive spirometry.  Outpatient pulmonary rehab     JUAN  Recommended home CPAP at home settings, has not been compliant with CPAP during hospitalization [does not like device from hospital], unfortunately family was not able to bring in home CPAP during hospital stay despite education multiple times   At time of discharge emphasize compliance with CPAP.     Dermatomyositis  Chronic pain  Patient has been diagnosed with polymyositis clinically, not by muscle biopsy. He did have a lung biopsy that showed fibrosis of unknown etiology. Has ILD on CT and blood work showed a positive Shannon-1 antibody. PFTs showed worsening DLCO by 11% from 7/2018-1/2019.   Held  PTA methotrexate 25 mg weekly during hospitalization.  Continue folic acid 1 mg daily  PTA Gabapentin 1200mg tid, decreaseD dose to 600 mg 3 times daily given kidney injury.  PTA PRN Oxycodone 7.5/325 mg tid needs to be de-escalated, tapered off as outpatient.     Diabetes mellitus hemoglobin A1c of 6.7%  PTA administers NPH insulin 2 to 4 units twice daily if blood sugars greater than 140.  During hospitalization was on sliding scale insulin, restart PTA regimen on discharge.     Hyperlipidemia  Triglycerides 97, LDL 58, HDL 19.  Discontinued PTA gemfibrozil as patient complaining of muscle aches and lipid panel at goal.  Continue PTA atorvastatin.     Hypothyroidism  TSH 0.46.  Continue PTA levothyroxine.     PAD  Further management/workup as outpatient     Anemia of chronic disease  Baseline hemoglobin between 8-10.    Per patient last colonoscopy when he was in [place] many years of back.    Hemoglobin levels at baseline, iron saturation index 14.  Started on ferrous gluconate supplements oral daily.  -Follow-up outpatient with PCP.     Physical deconditioning from acute illness, chronic back pain  Patient having assistance from wife, sons at home.  UA negative, TSH within normal limits.    I certify that this patient, Beulah Jane has been under my care and that I, or a nurse practitioner or physician's assistant working with me, had a face-to-face encounter that meets face-to-face encounter requirements with this patient on October 8, 2019.    Beulah Jane is now in a chronic stable state and continues to require supplemental oxygen due to continued oxygen desaturation.  This patient has been treated in part, or in whole for the following medical condition(s):  Chronic Bronchitis J41.0  Chronic Heart Failure I50  Pulmonary Hypertension I27.20  Treatments tried and failed or ruled out to treat hypoxemia include weaning from oxygen, maximal inhalers/nebs, maximal heart failure treatment.  If  portability is ordered, is the patient mobile within the home? yes  Kami Sorenson MD  ~~~~~~~~~~~~~~~~~~~~~~~~~~~~~~~~~~~~~~~~~~~~~~~~~~~~~~~~~~~    Pending Results   These results will be followed up by Hospitalist.  Unresulted Labs Ordered in the Past 30 Days of this Admission     No orders found from 8/26/2019 to 9/26/2019.        Code Status   Full Code       Primary Care Physician   Hoa López    Physical Exam   Temp: 98.5  F (36.9  C) Temp src: Oral BP: 102/63 Pulse: 83 Heart Rate: 83 Resp: 16 SpO2: 100 % O2 Device: Nasal cannula Oxygen Delivery: 2 LPM  Vitals:    10/06/19 0648 10/07/19 0606 10/08/19 0655   Weight: 68 kg (149 lb 14.4 oz) 68.3 kg (150 lb 9.2 oz) 68.3 kg (150 lb 8 oz)     Vital Signs with Ranges  Temp:  [97.6  F (36.4  C)-98.5  F (36.9  C)] 98.5  F (36.9  C)  Pulse:  [] 83  Heart Rate:  [77-83] 83  Resp:  [16-18] 16  BP: ()/(54-93) 102/63  SpO2:  [81 %-100 %] 100 %  I/O last 3 completed shifts:  In: 1010 [P.O.:1010]  Out: 2150 [Urine:2150]    Constitutional: Alert, NAD, pleasant and cooperative    Discharge Disposition   Discharged to home  Condition at discharge: Stable    Consultations This Hospital Stay   CARDIOLOGY IP CONSULT  SOCIAL WORK IP CONSULT  PHYSICAL THERAPY ADULT IP CONSULT  OCCUPATIONAL THERAPY ADULT IP CONSULT  CARE TRANSITION RN/SW IP CONSULT  CARDIAC REHAB IP CONSULT  PULMONARY IP CONSULT  CARE TRANSITION RN/SW IP CONSULT  CARDIOLOGY IP CONSULT  PHARMACY IP CONSULT  PHARMACY IP CONSULT  SMOKING CESSATION PROGRAM IP CONSULT    Time Spent on this Encounter   I, Kami Sorenson MD, personally saw the patient today and spent 35 minutes discharging this patient.    Discharge Orders      Discharge Order: F/U with Cardiac  OLIVIER      Follow-Up with Cardiologist      Home Care PT Referral for Hospital Discharge      Home Care OT Referral for Hospital Discharge      Home care nursing referral      PULMONARY REHAB REFERRAL      Reason for your hospital stay    You were  admitted to the hospital with shortness of breath, multifactorial.  Underwent right heart cath, noted to have severe pulmonary hypertension and started on sildenafil.  Followed by cardiology, pulmonary during hospitalization.     Follow-up and recommended labs and tests     Follow up with primary care provider, Hoa López, within 7 days for hospital follow- up.  The following labs/tests are recommended: BMP and hemoglobin in 1 week.  Follow-up with cardiology in clinic per schedule.    Follow-up with pulmonary in clinic per schedule.  Age-appropriate health maintenance on PCP visit.     Discharge Instructions    Aggressive incentive spirometry.  Strict fluid restriction to 2000 mL/day.  Emphasize compliance to limb elevation, Ace wraps.  Emphasized and encouraged compliance to CPAP.    Will need to consider de-escalating doses of narcotics and tapering off.  Monitor daily blood pressure, daily weights and review on provider visit.     Activity    Your activity upon discharge: activity as tolerated and no driving until PCP visit.     When to contact your care team    Contact medical provider if you have any chest pain or shortness of breath or palpitation.     MD face to face encounter    Documentation of Face to Face and Certification for Home Health Services    I certify that patient: Beulah Jane is under my care and that I, or a nurse practitioner or physician's assistant working with me, had a face-to-face encounter that meets the physician face-to-face encounter requirements with this patient on: 10/8/2019.    This encounter with the patient was in whole, or in part, for the following medical condition, which is the primary reason for home health care: Dyspnea on exertion multifactorial, pulmonary hypertension.  Diastolic heart failure.  Interstitial lung disease..    I certify that, based on my findings, the following services are medically necessary home health services: Occupational Therapy and  Physical Therapy.    My clinical findings support the need for the above services because: Occupational Therapy Services are needed to assess and treat cognitive ability and address ADL safety due to deconditioning, respiratory issues.and Physical Therapy Services are needed to assess and treat the following functional impairments: Deconditioning, dyspnea on exertion from pulmonary hypertension and from pulmonary hypertension and interstitial lung disease    Further, I certify that my clinical findings support that this patient is homebound (i.e. absences from home require considerable and taxing effort and are for medical reasons or [employer] services or infrequently or of short duration when for other reasons) because: Leaving home is medically contraindicated for the following reason(s): Dyspnea on exertion, deconditioning.    Based on the above findings. I certify that this patient is confined to the home and needs intermittent skilled nursing care, physical therapy and/or speech therapy.  The patient is under my care, and I have initiated the establishment of the plan of care.  This patient will be followed by a physician who will periodically review the plan of care.  Physician/Provider to provide follow up care: Hoa López    Attending hospital physician (the Medicare certified P[facility] provider): aKmi Sorenson MD  Physician Signature: See electronic signature associated with these discharge orders.  Date: 10/8/2019     Oxygen Adult    [place] [facility] Order 1 liter(s) by nasal cannula with activity with use of portable tank. Expected treatment length is 99 months.Test on conserving device as applicable.    Patients who qualify for home O2 coverage under the CMS guidelines require ABG tests or O2 sat readings obtained closest to, but no earlier than 2 days prior to the discharge, as evidence of the need for home oxygen therapy. Testing must be performed while patient is in the chronic stable state.  See notes for O2 sats.    I certify that this patient, Beulah Jane has been under my care and that I, or a nurse practitioner or physician's assistant working with me, had a face-to-face encounter that meets the face-to-face encounter requirements with this patient on 10/2/2019. The patient, Beulah Jane was evaluated or treated in whole, or in part, for the following medical condition, which necessitates the use of the ordered oxygen. Treatment Diagnosis:   Pulmonary hypertension.  Interstitial lung disease.  Diastolic heart failure.      Attending Provider: Negar Mustafa MD  Physician signature: See electronic signature associated with these discharge orders  Date of Order: October 2, 2019     Oxygen Adult    [place] [facility]xygen Order 2 liter(s) by nasal cannula continuously with use of portable tank. Expected treatment length is indefinite (99 months).. Test on conserving device as applicable.    Patients who qualify for home O2 coverage under the CMS guidelines require ABG tests or O2 sat readings obtained closest to, but no earlier than 2 days prior to the discharge, as evidence of the need for home oxygen therapy. Testing must be performed while patient is in the chronic stable state. See notes for O2 sats.    I certify that this patient, Beulah Jane has been under my care and that I, or a nurse practitioner or physician's assistant working with me, had a face-to-face encounter that meets the face-to-face encounter requirements with this patient on October 8, 2019. The patient, Beulah Jane was evaluated or treated in whole, or in part, for the following medical condition, which necessitates the use of the ordered oxygen. Treatment Diagnosis: heart failure, COPD, obstructive sleep apnea    Attending Provider: Kami Sorenson MD  Physician signature: See electronic signature associated with these discharge orders  Date of Order: October 8, 2019     Diet    Follow this diet  upon discharge: Orders Placed This Encounter      Fluid restriction 2000 ML FLUID      Combination Diet 4501-7274 Calories: Moderate Consistent CHO (4-6 CHO units/meal); Low Saturated Fat Na <2400mg Diet     Discharge Medications   Current Discharge Medication List      START taking these medications    Details   acetaminophen (TYLENOL) 325 MG tablet Take 2 tablets (650 mg) by mouth every 8 hours as needed for pain    Associated Diagnoses: Pulmonary hypertension (H)      clopidogrel (PLAVIX) 75 MG tablet Take 1 tablet (75 mg) by mouth daily  Qty: 30 tablet, Refills: 0    Comments: Future refills by PCP Dr. Hoa López with phone number [phone].  Associated Diagnoses: Pulmonary hypertension (H)      docusate sodium (COLACE) 100 MG capsule Take 1 capsule (100 mg) by mouth At Bedtime For constipation. Hold for loose stools  Qty: 30 capsule, Refills: 0    Comments: Future refills by PCP Dr. Hoa López with phone number [phone].  Associated Diagnoses: Iron deficiency anemia, unspecified iron deficiency anemia type      ferrous gluconate (FERGON) 324 (38 Fe) MG tablet Take 1 tablet (324 mg) by mouth daily (with breakfast)  Qty: 30 tablet, Refills: 0    Comments: Future refills by PCP Dr. Hoa López with phone number [phone].  Associated Diagnoses: Iron deficiency anemia, unspecified iron deficiency anemia type      gabapentin (NEURONTIN) 300 MG capsule Take 2 capsules (600 mg) by mouth 3 times daily  Qty: 60 capsule, Refills: 0    Associated Diagnoses: Spinal stenosis of lumbar region with neurogenic claudication      potassium chloride ER (K-DUR/KLOR-CON M) 20 MEQ CR tablet Take 2 tablets (40 mEq) by mouth daily  Qty: 30 tablet, Refills: 0    Comments: Future refills by PCP Dr. Hoa López with phone number [phone].  Associated Diagnoses: Acute on chronic heart failure with preserved ejection fraction (H)      sildenafil (REVATIO) 20 MG tablet Take 0.25 tablets (5 mg) by mouth 3 times daily  Avoid taking with fatty meal.      HOLD for SBP < 90  Qty: 90 tablet, Refills: 0    Comments: Future refills by PCP Dr. Hoa López with phone number [phone].  Associated Diagnoses: Pulmonary hypertension (H)      torsemide (DEMADEX) 20 MG tablet Take 1 tablet (20 mg) by mouth 2 times daily  Qty: 60 tablet, Refills: 0    Associated Diagnoses: Acute on chronic heart failure with preserved ejection fraction (H)         CONTINUE these medications which have NOT CHANGED    Details   albuterol (2.5 MG/3ML) 0.083% neb solution Take 1 vial by nebulization every 6 hours as needed for shortness of breath / dyspnea or wheezing      aspirin (ASA) 81 MG EC tablet Take 1 tablet (81 mg) by mouth daily Start tomorrow morning.  Qty: 90 tablet, Refills: 3    Associated Diagnoses: Coronary artery disease due to lipid rich plaque      atorvastatin (LIPITOR) 40 MG tablet Take 1 tablet (40 mg) by mouth daily  Qty: 90 tablet, Refills: 3    Associated Diagnoses: Coronary artery disease due to lipid rich plaque      benzocaine-menthol (CEPACOL) 15-3.6 MG lozenge Place 1 lozenge inside cheek every hour as needed for sore throat  Qty: 60 lozenge, Refills: 0    Associated Diagnoses: Cough      calcium carbonate (OS-DMITRIY 500 MG [place]. [place]) 500 MG tablet Take 1 tablet (500 mg) by mouth 2 times daily  Qty: 180 tablet, Refills: 3    Associated Diagnoses: Pre-operative examination; Spinal stenosis of lumbar region with neurogenic claudication      Cholecalciferol (VITAMIN D) 2000 units tablet Take 2,000 Units by mouth daily  Qty: 100 tablet, Refills: 3    Associated Diagnoses: Pre-operative examination; Spinal stenosis of lumbar region with neurogenic claudication      diphenhydrAMINE (BENADRYL) 50 MG capsule Take 50 mg by mouth as needed for itching or allergies       folic acid (FOLVITE) 1 MG tablet Take 1 tablet (1 mg) by mouth every morning  Qty: 90 tablet, Refills: 3    Associated Diagnoses: Pulmonary fibrosis (H)      LANsoprazole  "(PREVACID) 30 MG DR capsule Take 30 mg by mouth every morning (before breakfast)      levothyroxine (SYNTHROID/LEVOTHROID) 50 MCG tablet Take 50 mcg by mouth every morning       methotrexate 2.5 MG tablet Take 10 tablets (25 mg) by mouth once a week Tuesday  Qty: 120 tablet, Refills: 3    Associated Diagnoses: Inflammatory arthritis      multivitamin w/minerals (THERA-VIT-M) tablet Take 1 tablet by mouth daily    Associated Diagnoses: Routine health maintenance      omega 3 1000 MG CAPS Take 2 capsules by mouth every morning       oxyCODONE-acetaminophen (PERCOCET) 7.5-325 MG per tablet Take 1 tablet by mouth every 6 hours as needed for severe pain (max 3 tablets daily) Dispense 09/23/19. OK to start  09/25/19  Qty: 90 tablet, Refills: 0    Associated Diagnoses: Chronic pain syndrome; Dermatomyositis (H)      albuterol (PROAIR HFA/PROVENTIL HFA/VENTOLIN HFA) 108 (90 BASE) MCG/ACT Inhaler Inhale 2 puffs into the lungs as needed for shortness of breath / dyspnea or wheezing       insulin NPH (HUMULIN N/NOVOLIN N VIAL) 100 UNIT/ML vial Inject 2-6 Units Subcutaneous 2 times daily Will usually give if blood glucose >140  Qty: 10 mL, Refills: 11    Associated Diagnoses: Type 2 diabetes mellitus with diabetic neuropathy, with long-term current use of insulin (H)      insulin syringe-needle U-100 (29G X 1/2\" 1 ML) 29G X 1/2\" 1 ML miscellaneous Inject 1 Syringe Subcutaneous 2 times daily  Qty: 200 each, Refills: 11    Associated Diagnoses: Type 2 diabetes mellitus with other neurologic complication, with long-term current use of insulin (H)         STOP taking these medications       cetirizine (ZYRTEC) 10 MG tablet Comments:   Reason for Stopping:         furosemide (LASIX) 40 MG tablet Comments:   Reason for Stopping:         gabapentin (NEURONTIN) 600 MG tablet Comments:   Reason for Stopping:         gemfibrozil (LOPID) 600 MG tablet Comments:   Reason for Stopping:         ticagrelor (BRILINTA) 90 MG tablet Comments: "   Reason for Stopping:             Allergies   No Known Allergies  Data

## 2019-10-09 NOTE — NURSING NOTE
Chief Complaint   Patient presents with     Follow Up     follow up after Wick-- 30 day S/P TAVR follow up with Echo and labs prior     Vitals were taken and medications were reconciled. EKG was performed.    Venita Johnson CMA    10:44 AM

## 2019-10-09 NOTE — PROGRESS NOTES
CARDIOLOGY RETURN VISIT      HPI: Beulah Jane is a 69 year old male from [place], seen today for follow up of TAVR/PCI/ pulmonary HTN with primary complaint of weakness following discharge from [facility] where he was admitted for GONZALEZ from 9/25/19-10/8/19.   Prior history of CAD s/p PCI of the LAD 7/2019, occluded RCA, severe aortic valvular stenosis that was treated with transfemoral transcatheter aortic valve replacement (TAVR) with a 29 mm Medtronic Evolut Pro on 9/4/19 by Ed Garcia, 3rd degree AV block and PPM 9/6/19,  HFpEF, JUAN on CPAP, dermatomyositis, ILD, moderate pulmonary hypertension and chronic shoulder/back pain treated with percocet.  Today greatest concern is drop in blood pressure without symptoms and general weakness with intermittent need for taking deep breath while sitting at rest. Dyspnea with minimal exertion and weak muscles.   The patient denies a history of chest discomfort, PND (paroxysmal nocturnal dyspnea), orthopnea,  palpitations, lightheadedness and syncope. Mild leg edema at night.     Able to walk <1 flight of stairs (FOS) without stopping at normal or fast pace.       PAST MEDICAL HISTORY:  Past Medical History:   Diagnosis Date     Aortic stenosis      Chronic pain      DM (diabetes mellitus), type 2 (H)     on insulin     Hyperlipidemia      JUAN on CPAP      Pneumonia      Polymyositis (H)      Pulmonary fibrosis, unspecified (H)      Seasonal allergies        CURRENT MEDICATIONS:  Current Outpatient Medications   Medication Sig Dispense Refill     acetaminophen (TYLENOL) 325 MG tablet Take 2 tablets (650 mg) by mouth every 8 hours as needed for pain       albuterol (2.5 MG/3ML) 0.083% neb solution Take 1 vial by nebulization every 6 hours as needed for shortness of breath / dyspnea or wheezing       albuterol (PROAIR HFA/PROVENTIL HFA/VENTOLIN HFA) 108 (90 BASE) MCG/ACT Inhaler Inhale 2 puffs into the lungs as needed for shortness of breath /  "dyspnea or wheezing        aspirin (ASA) 81 MG EC tablet Take 1 tablet (81 mg) by mouth daily Start tomorrow morning. 90 tablet 3     atorvastatin (LIPITOR) 40 MG tablet Take 1 tablet (40 mg) by mouth daily 90 tablet 3     benzocaine-menthol (CEPACOL) 15-3.6 MG lozenge Place 1 lozenge inside cheek every hour as needed for sore throat 60 lozenge 0     calcium carbonate (OS-DMITRIY 500 MG [place]. [place]) 500 MG tablet Take 1 tablet (500 mg) by mouth 2 times daily 180 tablet 3     Cholecalciferol (VITAMIN D) 2000 units tablet Take 2,000 Units by mouth daily 100 tablet 3     clopidogrel (PLAVIX) 75 MG tablet Take 1 tablet (75 mg) by mouth daily 30 tablet 0     diphenhydrAMINE (BENADRYL) 50 MG capsule Take 50 mg by mouth as needed for itching or allergies        docusate sodium (COLACE) 100 MG capsule Take 1 capsule (100 mg) by mouth At Bedtime For constipation. Hold for loose stools 30 capsule 0     ferrous gluconate (FERGON) 324 (38 Fe) MG tablet Take 1 tablet (324 mg) by mouth daily (with breakfast) 30 tablet 0     folic acid (FOLVITE) 1 MG tablet Take 1 tablet (1 mg) by mouth every morning 90 tablet 3     gabapentin (NEURONTIN) 300 MG capsule Take 2 capsules (600 mg) by mouth 3 times daily 60 capsule 0     insulin NPH (HUMULIN N/NOVOLIN N VIAL) 100 UNIT/ML vial Inject 2-6 Units Subcutaneous 2 times daily Will usually give if blood glucose >140 10 mL 11     insulin syringe-needle U-100 (29G X 1/2\" 1 ML) 29G X 1/2\" 1 ML miscellaneous Inject 1 Syringe Subcutaneous 2 times daily 200 each 11     LANsoprazole (PREVACID) 30 MG DR capsule Take 30 mg by mouth every morning (before breakfast)       levothyroxine (SYNTHROID/LEVOTHROID) 50 MCG tablet Take 50 mcg by mouth every morning        methotrexate 2.5 MG tablet Take 10 tablets (25 mg) by mouth once a week Tuesday 120 tablet 3     multivitamin w/minerals (THERA-VIT-M) tablet Take 1 tablet by mouth daily       omega 3 1000 MG CAPS Take 2 capsules by mouth every morning        " oxyCODONE-acetaminophen (PERCOCET) 7.5-325 MG per tablet Take 1 tablet by mouth every 6 hours as needed for severe pain (max 3 tablets daily) Dispense 09/23/19. OK to start  09/25/19 90 tablet 0     potassium chloride ER (K-DUR/KLOR-CON M) 20 MEQ CR tablet Take 2 tablets (40 mEq) by mouth daily 30 tablet 0     sildenafil (REVATIO) 20 MG tablet Take 0.25 tablets (5 mg) by mouth 3 times daily Avoid taking with fatty meal.      HOLD for SBP < 90 90 tablet 0     torsemide (DEMADEX) 20 MG tablet Take 1 tablet (20 mg) by mouth 2 times daily 60 tablet 0       PAST SURGICAL HISTORY:  Past Surgical History:   Procedure Laterality Date     ARTHROSCOPY KNEE  1970     COLONOSCOPY       CV CORONARY ANGIOGRAM N/A 7/30/2019    Procedure: CV CORONARY ANGIOGRAM;  Surgeon: Leonidas Mike MD;  Location:  HEART CARDIAC CATH LAB     CV LEFT HEART CATH N/A 7/30/2019    Procedure: CV LEFT HEART CATH;  Surgeon: Leonidas Mike MD;  Location:  HEART CARDIAC CATH LAB     CV PCI STENT DRUG ELUTING Left 7/30/2019    Procedure: PCI Stent Drug Eluting;  Surgeon: Leonidas Mike MD;  Location: [facility] CARDIAC CATH LAB     CV RIGHT HEART CATH N/A 7/30/2019    Procedure: CV RIGHT HEART CATH;  Surgeon: Leonidas Mike MD;  Location:  HEART CARDIAC CATH LAB     CV RIGHT HEART CATH N/A 9/27/2019    Procedure: Right Heart Cath;  Surgeon: Altaf Camacho MD;  Location:  HEART CARDIAC CATH LAB     CV TRANSCATHETER AORTIC VALVE REPLACEMENT N/A 9/4/2019    Procedure: Transcatheter (Medtronic - 29mm) Aortic Valve Replacement, trans thorasic echocardiogram, perfusion and cardiac surgery standby, temporary pacemeker placement;  Surgeon: Pj Garcia MD;  Location:  OR     DECOMPRESSION, FUSION LUMBAR POSTERIOR TWO LEVELS, COMBINED  1997     EP PACEMAKER N/A 9/6/2019    Procedure: EP PACEMAKER;  Surgeon: Shefali Sylvester MD;  Location:  HEART CARDIAC CATH LAB     FUSION LUMBAR ANTERIOR THREE+ LEVELS N/A  2019    Procedure: Lumbar 1 Or Lumbar 2-5 Anterior Lumbar Interbody Fusion with BMP Stage 1 Of Two Procedure:;  Surgeon: Carlos Enrique Dial MD;  Location: [facility] OR     HEART CATH FEMORAL CANNULIZATION WITH OPEN STANDBY REPAIR AORTIC VALVE N/A 2019    Procedure: Cardiopulmonary Bypass Standby;  Surgeon: Hamilton Carnes MD;  Location: [facility] OR     HERNIA REPAIR       lumbar spine hardware removal       OPTICAL TRACKING SYSTEM FUSION POSTERIOR SPINE THORACIC THREE+ LEVELS N/A 2019    Procedure: O-Arm/Stealth Assisted Thoracic 10-Pelvis Instrumented Fusion T11-2, L1-2, L2-3 Transforminal Lumbar Interbody Fusion (TLIF) And Smith Borges Osteotomies,and L3-4 SPO as well, Use Of BMP, Debridement Of Seroma;  Surgeon: Carlos Enrique Dial MD;  Location: [facility] OR     PICC INSERTION Right 2019    4Fr - 37cm, Basilic vein, low SVC     REMOVAL PROSTHETIC MATERIAL/MESH, ABD WALL NECRO TISS INFEXN       ROTATOR CUFF REPAIR RT/LT         ALLERGIES  Patient has no known allergies.    FAMILY HX:  Family History   Problem Relation Age of Onset     Colon Cancer Mother      Hypertension Father         did not know father well       SOCIAL HX:  Social History     Socioeconomic History     Marital status:      Spouse name: None     Number of children: None     Years of education: None     Highest education level: None   Occupational History     None   Social Needs     Financial resource strain: None     Food insecurity:     Worry: None     Inability: None     Transportation needs:     Medical: None     Non-medical: None   Tobacco Use     Smoking status: Former Smoker     Packs/day: 0.25     Last attempt to quit: 1970     Years since quittin.3     Smokeless tobacco: Never Used   Substance and Sexual Activity     Alcohol use: Yes     Comment: few times monthly     Drug use: Never     Sexual activity: Not Currently   Lifestyle     Physical activity:     Days per week:  "None     Minutes per session: None     Stress: None   Relationships     Social connections:     Talks on phone: None     Gets together: None     Attends Hinduism service: None     Active member of club or organization: None     Attends meetings of clubs or organizations: None     Relationship status: None     Intimate partner violence:     Fear of current or ex partner: None     Emotionally abused: None     Physically abused: None     Forced sexual activity: None   Other Topics Concern     None   Social History Narrative     None       ROS:  Constitutional: No recent fever, chills, or sweats. No significant weight gain/loss.   ENT: No epistaxis.  Allergies/Immunologic: As above.   Respiratory: No hemoptysis.   Cardiovascular: As per HPI.   GI: No hematemesis, melena, or hematochezia.   : No hematuria.   Skin: No petechia or ecchymosis.   Endocrinology: Positive for diabetes   Musculoskeletal: No myalgia.    VITAL SIGNS:  BP (!) 81/54 (BP Location: Left arm, Patient Position: Chair, Cuff Size: Adult Regular)   Pulse 91   Ht 1.702 m (5' 7\")   Wt 68.5 kg (151 lb)   SpO2 95%   BMI 23.65 kg/m      Body mass index is 23.65 kg/m .  Wt Readings from Last 2 Encounters:   10/09/19 68.5 kg (151 lb)   10/08/19 68.3 kg (150 lb 8 oz)       PHYSICAL EXAM  Beulah Jane is a 69 year old male in no acute distress.  HEENT: Unremarkable.  Neck: JVP normal.  Carotids bilaterally without bruits.  Lungs: CTA.  Cor: RRR. Normal S1 and S2.  No murmur, rub, or gallop.   Abd: Soft, nontender, nondistended.  NABS.  No pulsatile mass.  Extremities: No C/C/E.  Pulses +symmetric in upper and lower extremities.  Neuro: Grossly intact.    LABS    Lab Results   Component Value Date    WBC 13.5 10/09/2019     Lab Results   Component Value Date    RBC 4.73 10/09/2019     Lab Results   Component Value Date    HGB 10.0 10/09/2019     Lab Results   Component Value Date    HCT 37.1 10/09/2019     No components found for: MCT  Lab Results "   Component Value Date    MCV 78 10/09/2019     Lab Results   Component Value Date    MCH 21.1 10/09/2019     Lab Results   Component Value Date    MCHC 27.0 10/09/2019     Lab Results   Component Value Date    RDW 25.1 10/09/2019     Lab Results   Component Value Date     10/09/2019      Recent Labs   Lab Test 10/09/19  0909 10/08/19  1118    138   POTASSIUM 4.6 4.5   CHLORIDE 101 104   CO2 31 29   ANIONGAP 4 5   GLC 94 132*   BUN 19 15   CR 1.09 0.79   DMITRIY 9.1 9.3     Recent Labs   Lab Test 09/26/19  0539 07/05/19  2237   CHOL 96 153   HDL 19* 22*   LDL 58 103*   TRIG 97 139   NHDL 77 131*        EKG:  Today SR 90 bpm,  ms, RBBB with  ms and nonspecific T wave changes.    10/3/19      ECHO: Today  Interpretation Summary  S/P #29 Medtronic Evolute Pro bioprosthetic TAVR. Mean gradient 8mmHg. No  valvular or paravalvular aortic regurgitation.  There has been no change.  _____________________________________________________________________________  __        Left Ventricle  Global and regional left ventricular function is normal with an EF of 55-60%.  Left ventricular wall thickness is normal. Left ventricular size is normal.  Left ventricular diastolic function is indeterminate. No regional wall motion  abnormalities are seen.     Right Ventricle  Global right ventricular function is normal. Mild right ventricular dilation  is present.     Atria  The left atrium appears normal. Mild to moderate left atrial enlargement is  present.     Mitral Valve  The mitral valve is normal.     Aortic Valve  S/P #29 Medtronic Evolute Pro bioprosthetic TAVR. Mean gradient 8mmHg. No  valvular or paravalvular aortic regurgitation.     Tricuspid Valve  The tricuspid valve is normal. Trace to mild tricuspid insufficiency is  present. The right ventricular systolic pressure is approximated at 37.3 mmHg  plus the right atrial pressure.     Vessels  The pulmonary artery and bifurcation cannot be assessed. The  inferior vena  cava is normal. Ascending aorta 3.7 cm.      CARDIAC CATH:  10/7/19       ASSESSMENT AND PLAN:    1. Congestive heart failure, NYHA class III, chronic, diastolic (H)    2. Aortic valve stenosis, etiology of cardiac valve disease unspecified    3. SOB (shortness of breath)    4. Coronary artery disease involving native coronary artery of native heart with other form of angina pectoris (H)      69 year old male from [place], seen today for follow up of TAVR/PCI/ pulmonary HTN with primary complaint of weakness following discharge from [facility] where he was admitted for GONZALEZ from 9/25/19-10/8/19.     Prior history of CAD s/p PCI of the LAD 7/2019, chronically occluded RCA, TAVR on 9/4/19 by Ed Garcia, 3rd degree AV block and PPM 9/6/19, HFpEF, JUAN on CPAP, dermatomyositis, ILD, moderate pulmonary hypertension treated with sildenafil and chronic shoulder/back pain treated with percocet.     Today greatest concern is drop in blood pressure without symptoms and general weakness with intermittent need for taking deep breath while sitting at rest. Dyspnea with minimal exertion and weak muscles.     CAD S/p PCI LAD with occluded RCA. No change in medications, aspirin/atorvastatin/clopidogrel. Plavix need for 12 months or until July 2020.     TAVR with normal valve gradients today and plan to continue aspirin/clopidogrel as before. Plavix duration based on coronary stenting for 12 months but both could be shorter if bleeding issues.     PPM with normal function last month during check. No pacing noted on EKG today.     Diastolic heart failure treated with torsemide 20 mg bid and tolerating well with stable BMP today.     Pulmonary hypertension treated with low dose sildenafil TID when SBP above 90. Patient not with oxygen today but urged to use with exertion or at night.     Weakness most likely multifactorial but in part due to severe deconditioning and prolonged hospital admissions.  Encouraged starting rehab, including exercise at home.     Plan:  Cardiac rehab encouraged    No change in medications    Follow up: 1 month with PHTN team (patient requesting Amparo visits)      Nate Solorio MD    Division of Cardiology  [facility] [address] [address] [phone] Appointments  [phone] Fax  [phone] After hours    Clinic nurse:  Kaz Grider LPN   Nurse Care Coordinator- Heart Care   [phone] option 1, than option 3    Academic Mailing address:  [facility]  Department of Internal Medicine () [address] [place] [address]

## 2019-10-09 NOTE — LETTER
10/9/2019    RE: Beulah Jane  [address]  [facility] [address]       Dear Colleague,    Thank you for the opportunity to participate in the care of your patient, Beulah Jane, at the [facility]. Please see a copy of my visit note below.        CARDIOLOGY RETURN VISIT      HPI: Beulah Jane is a 69 year old male from [place], seen today for follow up of TAVR/PCI/ pulmonary HTN with primary complaint of weakness following discharge from [facility] where he was admitted for GONZALEZ from 9/25/19-10/8/19.   Prior history of CAD s/p PCI of the LAD 7/2019, occluded RCA, severe aortic valvular stenosis that was treated with transfemoral transcatheter aortic valve replacement (TAVR) with a 29 mm Medtronic Evolut Pro on 9/4/19 by Ed Garcia, 3rd degree AV block and PPM 9/6/19,  HFpEF, JUAN on CPAP, dermatomyositis, ILD, moderate pulmonary hypertension and chronic shoulder/back pain treated with percocet.  Today greatest concern is drop in blood pressure without symptoms and general weakness with intermittent need for taking deep breath while sitting at rest. Dyspnea with minimal exertion and weak muscles.   The patient denies a history of chest discomfort, PND (paroxysmal nocturnal dyspnea), orthopnea,  palpitations, lightheadedness and syncope. Mild leg edema at night.     Able to walk <1 flight of stairs (FOS) without stopping at normal or fast pace.       PAST MEDICAL HISTORY:  Past Medical History:   Diagnosis Date     Aortic stenosis      Chronic pain      DM (diabetes mellitus), type 2 (H)     on insulin     Hyperlipidemia      JUAN on CPAP      Pneumonia      Polymyositis (H)      Pulmonary fibrosis, unspecified (H)      Seasonal allergies        CURRENT MEDICATIONS:  Current Outpatient Medications   Medication Sig Dispense Refill     acetaminophen (TYLENOL) 325 MG tablet Take 2 tablets (650 mg) by mouth every 8 hours  "as needed for pain       albuterol (2.5 MG/3ML) 0.083% neb solution Take 1 vial by nebulization every 6 hours as needed for shortness of breath / dyspnea or wheezing       albuterol (PROAIR HFA/PROVENTIL HFA/VENTOLIN HFA) 108 (90 BASE) MCG/ACT Inhaler Inhale 2 puffs into the lungs as needed for shortness of breath / dyspnea or wheezing        aspirin (ASA) 81 MG EC tablet Take 1 tablet (81 mg) by mouth daily Start tomorrow morning. 90 tablet 3     atorvastatin (LIPITOR) 40 MG tablet Take 1 tablet (40 mg) by mouth daily 90 tablet 3     benzocaine-menthol (CEPACOL) 15-3.6 MG lozenge Place 1 lozenge inside cheek every hour as needed for sore throat 60 lozenge 0     calcium carbonate (OS-DMITRIY 500 MG [place]. [place]) 500 MG tablet Take 1 tablet (500 mg) by mouth 2 times daily 180 tablet 3     Cholecalciferol (VITAMIN D) 2000 units tablet Take 2,000 Units by mouth daily 100 tablet 3     clopidogrel (PLAVIX) 75 MG tablet Take 1 tablet (75 mg) by mouth daily 30 tablet 0     diphenhydrAMINE (BENADRYL) 50 MG capsule Take 50 mg by mouth as needed for itching or allergies        docusate sodium (COLACE) 100 MG capsule Take 1 capsule (100 mg) by mouth At Bedtime For constipation. Hold for loose stools 30 capsule 0     ferrous gluconate (FERGON) 324 (38 Fe) MG tablet Take 1 tablet (324 mg) by mouth daily (with breakfast) 30 tablet 0     folic acid (FOLVITE) 1 MG tablet Take 1 tablet (1 mg) by mouth every morning 90 tablet 3     gabapentin (NEURONTIN) 300 MG capsule Take 2 capsules (600 mg) by mouth 3 times daily 60 capsule 0     insulin NPH (HUMULIN N/NOVOLIN N VIAL) 100 UNIT/ML vial Inject 2-6 Units Subcutaneous 2 times daily Will usually give if blood glucose >140 10 mL 11     insulin syringe-needle U-100 (29G X 1/2\" 1 ML) 29G X 1/2\" 1 ML miscellaneous Inject 1 Syringe Subcutaneous 2 times daily 200 each 11     LANsoprazole (PREVACID) 30 MG DR capsule Take 30 mg by mouth every morning (before breakfast)       levothyroxine " (SYNTHROID/LEVOTHROID) 50 MCG tablet Take 50 mcg by mouth every morning        methotrexate 2.5 MG tablet Take 10 tablets (25 mg) by mouth once a week Tuesday 120 tablet 3     multivitamin w/minerals (THERA-VIT-M) tablet Take 1 tablet by mouth daily       omega 3 1000 MG CAPS Take 2 capsules by mouth every morning        oxyCODONE-acetaminophen (PERCOCET) 7.5-325 MG per tablet Take 1 tablet by mouth every 6 hours as needed for severe pain (max 3 tablets daily) Dispense 09/23/19. OK to start  09/25/19 90 tablet 0     potassium chloride ER (K-DUR/KLOR-CON M) 20 MEQ CR tablet Take 2 tablets (40 mEq) by mouth daily 30 tablet 0     sildenafil (REVATIO) 20 MG tablet Take 0.25 tablets (5 mg) by mouth 3 times daily Avoid taking with fatty meal.      HOLD for SBP < 90 90 tablet 0     torsemide (DEMADEX) 20 MG tablet Take 1 tablet (20 mg) by mouth 2 times daily 60 tablet 0       PAST SURGICAL HISTORY:  Past Surgical History:   Procedure Laterality Date     ARTHROSCOPY KNEE  1970     COLONOSCOPY       CV CORONARY ANGIOGRAM N/A 7/30/2019    Procedure: CV CORONARY ANGIOGRAM;  Surgeon: Leonidas Mike MD;  Location:  HEART CARDIAC CATH LAB     CV LEFT HEART CATH N/A 7/30/2019    Procedure: CV LEFT HEART CATH;  Surgeon: Leonidas Mike MD;  Location:  HEART CARDIAC CATH LAB     CV PCI STENT DRUG ELUTING Left 7/30/2019    Procedure: PCI Stent Drug Eluting;  Surgeon: Leonidas Mike MD;  Location:  HEART CARDIAC CATH LAB     CV RIGHT HEART CATH N/A 7/30/2019    Procedure: CV RIGHT HEART CATH;  Surgeon: Leonidas Mike MD;  Location:  HEART CARDIAC CATH LAB     CV RIGHT HEART CATH N/A 9/27/2019    Procedure: Right Heart Cath;  Surgeon: Altaf Camacho MD;  Location:  HEART CARDIAC CATH LAB     CV TRANSCATHETER AORTIC VALVE REPLACEMENT N/A 9/4/2019    Procedure: Transcatheter (Medtronic - 29mm) Aortic Valve Replacement, trans thorasic echocardiogram, perfusion and cardiac surgery standby,  temporary pacemeker placement;  Surgeon: Pj Garcia MD;  Location: [facility] OR     DECOMPRESSION, FUSION LUMBAR POSTERIOR TWO LEVELS, COMBINED  1997     EP PACEMAKER N/A 9/6/2019    Procedure: EP PACEMAKER;  Surgeon: Shefali Sylvester MD;  Location:  HEART CARDIAC CATH LAB     FUSION LUMBAR ANTERIOR THREE+ LEVELS N/A 4/22/2019    Procedure: Lumbar 1 Or Lumbar 2-5 Anterior Lumbar Interbody Fusion with BMP Stage 1 Of Two Procedure:;  Surgeon: Carlos Enrique Dial MD;  Location: [facility] OR     HEART CATH FEMORAL CANNULIZATION WITH OPEN STANDBY REPAIR AORTIC VALVE N/A 9/4/2019    Procedure: Cardiopulmonary Bypass Standby;  Surgeon: Hamilton Carnes MD;  Location: [facility] OR     HERNIA REPAIR  2006     lumbar spine hardware removal  1999     OPTICAL TRACKING SYSTEM FUSION POSTERIOR SPINE THORACIC THREE+ LEVELS N/A 4/25/2019    Procedure: O-Arm/Stealth Assisted Thoracic 10-Pelvis Instrumented Fusion T11-2, L1-2, L2-3 Transforminal Lumbar Interbody Fusion (TLIF) And Smith Borges Osteotomies,and L3-4 SPO as well, Use Of BMP, Debridement Of Seroma;  Surgeon: Carlos Enrique Dial MD;  Location: [facility] OR     PICC INSERTION Right 05/06/2019    4Fr - 37cm, Basilic vein, low SVC     REMOVAL PROSTHETIC MATERIAL/MESH, ABD WALL NECRO TISS INFEXN  2012     ROTATOR CUFF REPAIR RT/LT  2003       ALLERGIES  Patient has no known allergies.    FAMILY HX:  Family History   Problem Relation Age of Onset     Colon Cancer Mother      Hypertension Father         did not know father well       SOCIAL HX:  Social History     Socioeconomic History     Marital status:      Spouse name: None     Number of children: None     Years of education: None     Highest education level: None   Occupational History     None   Social Needs     Financial resource strain: None     Food insecurity:     Worry: None     Inability: None     Transportation needs:     Medical: None     Non-medical: None   Tobacco Use     Smoking status: Former  "Smoker     Packs/day: 0.25     Last attempt to quit: 1970     Years since quittin.3     Smokeless tobacco: Never Used   Substance and Sexual Activity     Alcohol use: Yes     Comment: few times monthly     Drug use: Never     Sexual activity: Not Currently   Lifestyle     Physical activity:     Days per week: None     Minutes per session: None     Stress: None   Relationships     Social connections:     Talks on phone: None     Gets together: None     Attends Latter day service: None     Active member of club or organization: None     Attends meetings of clubs or organizations: None     Relationship status: None     Intimate partner violence:     Fear of current or ex partner: None     Emotionally abused: None     Physically abused: None     Forced sexual activity: None   Other Topics Concern     None   Social History Narrative     None       ROS:  Constitutional: No recent fever, chills, or sweats. No significant weight gain/loss.   ENT: No epistaxis.  Allergies/Immunologic: As above.   Respiratory: No hemoptysis.   Cardiovascular: As per HPI.   GI: No hematemesis, melena, or hematochezia.   : No hematuria.   Skin: No petechia or ecchymosis.   Endocrinology: Positive for diabetes   Musculoskeletal: No myalgia.    VITAL SIGNS:  BP (!) 81/54 (BP Location: Left arm, Patient Position: Chair, Cuff Size: Adult Regular)   Pulse 91   Ht 1.702 m (5' 7\")   Wt 68.5 kg (151 lb)   SpO2 95%   BMI 23.65 kg/m       Body mass index is 23.65 kg/m .  Wt Readings from Last 2 Encounters:   10/09/19 68.5 kg (151 lb)   10/08/19 68.3 kg (150 lb 8 oz)       PHYSICAL EXAM  Beulah Jane is a 69 year old male in no acute distress.  HEENT: Unremarkable.  Neck: JVP normal.  Carotids bilaterally without bruits.  Lungs: CTA.  Cor: RRR. Normal S1 and S2.  No murmur, rub, or gallop.   Abd: Soft, nontender, nondistended.  NABS.  No pulsatile mass.  Extremities: No C/C/E.  Pulses +symmetric in upper and lower extremities.  " Neuro: Grossly intact.    LABS    Lab Results   Component Value Date    WBC 13.5 10/09/2019     Lab Results   Component Value Date    RBC 4.73 10/09/2019     Lab Results   Component Value Date    HGB 10.0 10/09/2019     Lab Results   Component Value Date    HCT 37.1 10/09/2019     No components found for: MCT  Lab Results   Component Value Date    MCV 78 10/09/2019     Lab Results   Component Value Date    MCH 21.1 10/09/2019     Lab Results   Component Value Date    MCHC 27.0 10/09/2019     Lab Results   Component Value Date    RDW 25.1 10/09/2019     Lab Results   Component Value Date     10/09/2019      Recent Labs   Lab Test 10/09/19  0909 10/08/19  1118    138   POTASSIUM 4.6 4.5   CHLORIDE 101 104   CO2 31 29   ANIONGAP 4 5   GLC 94 132*   BUN 19 15   CR 1.09 0.79   DMITRIY 9.1 9.3     Recent Labs   Lab Test 09/26/19  0539 07/05/19  2237   CHOL 96 153   HDL 19* 22*   LDL 58 103*   TRIG 97 139   NHDL 77 131*        EKG:  Today SR 90 bpm,  ms, RBBB with  ms and nonspecific T wave changes.    10/3/19      ECHO: Today  Interpretation Summary  S/P #29 Medtronic Evolute Pro bioprosthetic TAVR. Mean gradient 8mmHg. No  valvular or paravalvular aortic regurgitation.  There has been no change.  _____________________________________________________________________________  __        Left Ventricle  Global and regional left ventricular function is normal with an EF of 55-60%.  Left ventricular wall thickness is normal. Left ventricular size is normal.  Left ventricular diastolic function is indeterminate. No regional wall motion  abnormalities are seen.     Right Ventricle  Global right ventricular function is normal. Mild right ventricular dilation  is present.     Atria  The left atrium appears normal. Mild to moderate left atrial enlargement is  present.     Mitral Valve  The mitral valve is normal.     Aortic Valve  S/P #29 Medtronic Evolute Pro bioprosthetic TAVR. Mean gradient 8mmHg.  No  valvular or paravalvular aortic regurgitation.     Tricuspid Valve  The tricuspid valve is normal. Trace to mild tricuspid insufficiency is  present. The right ventricular systolic pressure is approximated at 37.3 mmHg  plus the right atrial pressure.     Vessels  The pulmonary artery and bifurcation cannot be assessed. The inferior vena  cava is normal. Ascending aorta 3.7 cm.      CARDIAC CATH:  10/7/19     ASSESSMENT AND PLAN:    1. Congestive heart failure, NYHA class III, chronic, diastolic (H)    2. Aortic valve stenosis, etiology of cardiac valve disease unspecified    3. SOB (shortness of breath)    4. Coronary artery disease involving native coronary artery of native heart with other form of angina pectoris (H)      69 year old male from [place], seen today for follow up of TAVR/PCI/ pulmonary HTN with primary complaint of weakness following discharge from [facility] where he was admitted for GONZALEZ from 9/25/19-10/8/19.     Prior history of CAD s/p PCI of the LAD 7/2019, chronically occluded RCA, TAVR on 9/4/19 by Ed Garcia, 3rd degree AV block and PPM 9/6/19, HFpEF, JUAN on CPAP, dermatomyositis, ILD, moderate pulmonary hypertension treated with sildenafil and chronic shoulder/back pain treated with percocet.     Today greatest concern is drop in blood pressure without symptoms and general weakness with intermittent need for taking deep breath while sitting at rest. Dyspnea with minimal exertion and weak muscles.     CAD S/p PCI LAD with occluded RCA. No change in medications, aspirin/atorvastatin/clopidogrel. Plavix need for 12 months or until July 2020.     TAVR with normal valve gradients today and plan to continue aspirin/clopidogrel as before. Plavix duration based on coronary stenting for 12 months but both could be shorter if bleeding issues.     PPM with normal function last month during check. No pacing noted on EKG today.     Diastolic heart failure treated with torsemide 20 mg  bid and tolerating well with stable BMP today.     Pulmonary hypertension treated with low dose sildenafil TID when SBP above 90. Patient not with oxygen today but urged to use with exertion or at night.     Weakness most likely multifactorial but in part due to severe deconditioning and prolonged hospital admissions. Encouraged starting rehab, including exercise at home.     Plan:  Cardiac rehab encouraged    No change in medications    Follow up: 1 month with PHTN team (patient requesting [place] visits)    Nate Solorio MD    Division of Cardiology  [facility] [address] [address] [phone] Appointments  [phone] Fax  [phone] After hours    Clinic nurse:  Kaz Grider LPN   Nurse Care Coordinator- Heart Care   [phone] option 1, than option 3    Academic Mailing address:  [facility]  Department of Internal Medicine ([facility] [address])  [address] [place] [address]

## 2019-10-09 NOTE — TELEPHONE ENCOUNTER
Chief Complaint: Shortness Of Breath, Pulmonary Hypertension (H),TUE 08-OCT-2019,ed/ip 3 / 4    [phone] (home)

## 2019-10-09 NOTE — PROGRESS NOTES
Messaged to Dr Williamson to review chart for follow up. Plan per discharge notes to follow with Dr Williamson or Dr Sanford as outpatient. Patient medication on discharge included:    sildenafil (REVATIO) 20 MG tablet Take 0.25 tablets (5 mg) by mouth 3 times daily Avoid taking with fatty meal.      HOLD for SBP < 90  Qty: 90 tablet, Refills: 0     Comments: Future refills by PCP Dr. Hoa López with phone number [phone].  Associated Diagnoses: Pulmonary hypertension (H)         Plan per Caroline RANGEL notes and Dr Mcguire.  rylee Trejo RN 10/09/19 8:56 AM    --------------------------------------------------------    Henrietta Willimason MD  You 20 hours ago (1:04 PM)        Difficult to make recommendations without actually meeting the patient.  Best to set up follow-up with myself, Dr. Carlos or Queenie Burks.  In the interim, he will continue on what has been started.    Thank you.  Dr. Williamson    Routing comment      ----------------------------------------------------------  Attempted to contact patient. Spoke with Cornelius Riojas. On Review patient is scheduled with DR Williamson for 11/22/2019.     Future Appointments   Date Time Provider Department Center   10/22/2019  3:00 PM UC PFL C [facility]   10/22/2019  3:30 PM  PFL 6 MINUTE WALK 1 [facility]   10/23/2019 10:00 AM Terri Cedeno MD [facility]   11/22/2019 11:45 AM Henrietta Williamson MD [facility] PSA CLIN   1/29/2020 12:00 AM UC ICD REMOTE [facility]     Vesna Trejo RN 10/11/19 9:53 AM

## 2019-10-09 NOTE — PROGRESS NOTES
Clinic Care Coordination Contact  [facility]/Voicemail    Referral Source: IP Report    Hospital admission-9//2019-  Dyspnea on exertion multifactorial  Acute exacerbation of heart failure with preserved EF  Pulmonary hypertension with positive vasodilator study.  Severe aortic stenosis s/p TAVR, 29mm Medtronic Evolut  Recent complete Heart Block status post PPM   Acute hypoxia on exertion  Interstitial lung disease  Asymptomatic hypotension  Possible acute bronchitis.  CAD s/p PCI   JUAN  Dermatomyositis  Chronic pain.  Reactive thrombocytosis.  Diabetes mellitus hemoglobin A1c of 6.7.  Hyperlipidemia   Hypothyroidism  PAD  SHIRIN from aggressive diuresis, volume load- improved.  Anemia of chronic disease.  Physical deconditioning from acute illness     Clinical Data: Care Coordinator Outreach  Outreach attempted x 1.  Left message on patient's voicemail with call back information and requested return call. Left a message CC has a hospital follow up call tomorrow at 1:45 with Dr López    Plan:. Care Coordinator will try to reach patient again in 1-2 business days.    [facility]     Yesica Manning RN Care Coordinator   [facility]   Phone: [phone]  Email :  [email or web address]

## 2019-10-10 NOTE — TELEPHONE ENCOUNTER
Reason for Call:  Home Health Care    Izzy with [facility] called regarding (reason for call): Orders    Orders are needed for this patient.     PT: eval and treat    OT: eval and treat    Skilled Nursing: eval    Pt Provider: Dr. López    Phone Number Homecare Nurse can be reached at: [phone]    Can we leave a detailed message on this number? YES    Phone number patient can be reached at: Home number on file [phone] (home)    Best Time:     Call taken on 10/10/2019 at 3:22 PM by Sandra Quiñonez

## 2019-10-10 NOTE — PROGRESS NOTES
Incoming call from the patient . Left a message to cancel today's hospital follow up appointment .  CC left a message with David /son to reschedule appointment   Patients mobile number is busy times 3     [facility]     Yesica Manning  RN Care Coordinator   [facility] -[facility] -[facility]   Phone: [phone]  Email :  [email or web address]

## 2019-10-14 PROBLEM — R55 SYNCOPE: Status: ACTIVE | Noted: 2019-01-01

## 2019-10-14 NOTE — PLAN OF CARE
0245 - Admit from ED. Walked from cart in [place] to bed, pt felt like he was about to 'faint', assisted onto bed, no loss of consciousness. VSS on 2L NC.    [id number]: A&Ox4. VSS on 1.5L NC (baseline). Denies CP or shortness of breath. LS fine crackles in bases. BLE ace wrapped, +2 edema. Uses urinal at bedside. Tele SR BBB.

## 2019-10-14 NOTE — H&P
[facility]    History and Physical  Hospitalist       Date of Admission:  10/13/2019  Date of Service (when I saw the patient): 10/14/19    Assessment & Plan   Beulah Jane is a 69 year old male who presents with syncope    Syncope  Recently hospitalized from 9/25 through 10/8 with complex hospitalization and cardiac history. With onset of SOB with ambulation then syncopal event. Denies cp.  In ED afebrile, bp  systolic (which appears to be consistent with previous). HR 's. O2 sats upper 90%'s on 2L. Exam with bilateral crackles on exam, no murmur. Weight 150 lbs (appears stable from previous/ baseline). CT head negative. CXR with cardiomegaly and pulmonary vascular congestion. WBC elevated at 13.0 (elevated since 10.6). BMP normal. Hgb stable from previous. Troponin elevated but stable from previous. UA bland. PPM interrogated in ED and no events noted. EKG with V2 an V3 changes from previous. Etiology unclear. Pulmonary event (PE) less likely given prompt recovery. PPM interrogation in ED reportedly normal. With troponin elevation but stable from previous, no chest pain. No recent diuretic changes apparent and weight is stable from previous. No seizure activity reported.   - cardiology consult  - telemetry  - check orthostatics  - repeat troponin in the am  - defer to cardiology for repeat echo (just had on 10/9)  - will continue pta torsemide dosing for now    Recent hospitalization for acute on chronic respiratory failure thought 2/2 diastolic heart failure,      ILD and JUAN  HFpEF  Pulmonary hypertension  Severe aortic stenosis s/p TAVR  Complete heart block s/p ppm 9/6  PTA on torsemide 20 mg BID, sildenafil 5 mg TID. 8/29-9.7 admit at [facility] for acute exacerbation of heart failure and NSTEMI. Also with severe aortic stenosis and underwent TAVR 9/4. TAVR complicated by CHB and is now s/p ppm. Most recent hospitalization here 9/25-10/8 for CHF exacerbation. During that  hospital stay R heart cath with severe pulmonary HTN. Underwent diuresis with fuorsemide IV and changed to torsemide. BNP on admission 6492, improved from previous. Respiratory status appears stable.   - as above  - continue sildenafil, torsemide    CAD s/p PCI  PAD  PTA on ASA 81 mg daily, plavix 75 mg daily.  S/p PCI p-mLAD and dLAD 7/30. Troponin elevated on admission at 0.182 (previous 0.107 9/26, 0.119 9/25)  - continue ASA, plavix  - telemetry  - repeat troponin in the am.  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 Treated with methotrexate  - hold methotrexate, resume at discharge    Chronic Hypotension  Complicating diuresis and CHF management. Monitoring with above.    JUAN  Management as per outpatient    Dermatomyositis  Chronic pain  PTA on methotrexate 25 mg weekly on Tuesday, folic acid 1 mg daily, gabapentin 600 mg TID. Patient has been diagnosed with polymyositis clinically, not by muscle biopsy. He did have a lung biopsy that showed fibrosis of unknown etiology. Has ILD on CT and blood work showed a positive Shannon-1 antibody. PFTs showed worsening DLCO by 11% from 7/2018-1/2019.   -Continue folic acid 1 mg daily  -continue gabapentin 600 mg TID  -pta percocet 1 tab q6 prn    DM II   Hgb A1C 6.7% on 8/29. On NPH as ouptatient, takes 2-6 units BID if glucose >140.   - BID and HS blood sugars  - sliding scale insulin as indicated    HLD  PTA on atorvastatin 40 mg daily and continue    Hypothyroidism  PTA on levothyroxine 30 mg daily and continue    Anemia  PTA on FeSO4 325 mg daily. Hgb 9.2 on admission (stable from previous)    Thrombocytosis  plts at 546, roughly stable from previous    DVT Prophylaxis: Pneumatic Compression Devices  Code Status: Full Code    Disposition: Expected discharge in 2+ days pending workup of syncope    Jason Davila MD  [phone] (P)  Text Page     Primary Care Physician   Dr. Hoa López    Chief Complaint    syncope    History is obtained from the patient and medical records    History of Present Illness   Beulah Jane is a 69 year old male who presents with syncope.  He has a very complex past medical history especially as a recent including recent hospitalization for acute on chronic respiratory failure, diastolic heart failure, pulmonary hypertension, severe aortic stenosis status post TAVR, complete heart block status post pacemaker, coronary disease, peripheral artery disease, interstitial lung disease, chronic hypertension, obstructive sleep apnea, dermatomyositis, diabetes mellitus, hyperlipidemia, hypothyroidism and anemia.  He was most recently hospitalized at [facility] from September 25 through October 8 for acute on chronic respiratory failure.  He was discharged home with therapies.  He still has a cardiologist on October 9 and was doing okay.  On the day of presentation he states he was ambulating down his hallway with his walker and he had sudden onset of shortness of breath.  He reports he went to sit down and that the last he remembers.  His son found him and states he was unconscious and unresponsive move for approximately 1 minute.  He denied any chest pain or chest heaviness with any of this.  He does say he was dizzy.  He has no history of syncopal episodes.  Denies abdominal pain.  No nausea or vomiting.  He does have constipation.  His edema is stable.    Past Medical History    I have reviewed this patient's medical history and updated it with pertinent information if needed.   Past Medical History:   Diagnosis Date     Aortic stenosis      Chronic pain      DM (diabetes mellitus), type 2 (H)     on insulin     Hyperlipidemia      JUAN on CPAP      Pneumonia      Polymyositis (H)      Pulmonary fibrosis, unspecified (H)      Seasonal allergies        Past Surgical History   I have reviewed this patient's surgical history and updated it with pertinent information if needed.  Past Surgical  History:   Procedure Laterality Date     ARTHROSCOPY KNEE  1970     COLONOSCOPY       CV CORONARY ANGIOGRAM N/A 7/30/2019    Procedure: CV CORONARY ANGIOGRAM;  Surgeon: Leonidas Mike MD;  Location:  HEART CARDIAC CATH LAB     CV LEFT HEART CATH N/A 7/30/2019    Procedure: CV LEFT HEART CATH;  Surgeon: Leonidas Mike MD;  Location:  HEART CARDIAC CATH LAB     CV PCI STENT DRUG ELUTING Left 7/30/2019    Procedure: PCI Stent Drug Eluting;  Surgeon: Leonidas Mike MD;  Location:  HEART CARDIAC CATH LAB     CV RIGHT HEART CATH N/A 7/30/2019    Procedure: CV RIGHT HEART CATH;  Surgeon: Leonidas Mike MD;  Location:  HEART CARDIAC CATH LAB     CV RIGHT HEART CATH N/A 9/27/2019    Procedure: Right Heart Cath;  Surgeon: Altaf Camacho MD;  Location:  HEART CARDIAC CATH LAB     CV RIGHT HEART CATH N/A 10/7/2019    Procedure: Right Heart Cath;  Surgeon: Benjamin Mancuso MD;  Location:  HEART CARDIAC CATH LAB     CV TRANSCATHETER AORTIC VALVE REPLACEMENT N/A 9/4/2019    Procedure: Transcatheter (Medtronic - 29mm) Aortic Valve Replacement, trans thorasic echocardiogram, perfusion and cardiac surgery standby, temporary pacemeker placement;  Surgeon: Pj Garcia MD;  Location:  OR     DECOMPRESSION, FUSION LUMBAR POSTERIOR TWO LEVELS, COMBINED  1997     EP PACEMAKER N/A 9/6/2019    Procedure: EP PACEMAKER;  Surgeon: Shefali Sylvester MD;  Location: [facility] CARDIAC CATH LAB     FUSION LUMBAR ANTERIOR THREE+ LEVELS N/A 4/22/2019    Procedure: Lumbar 1 Or Lumbar 2-5 Anterior Lumbar Interbody Fusion with BMP Stage 1 Of Two Procedure:;  Surgeon: Carlos Enrique Dial MD;  Location:  OR     HEART CATH FEMORAL CANNULIZATION WITH OPEN STANDBY REPAIR AORTIC VALVE N/A 9/4/2019    Procedure: Cardiopulmonary Bypass Standby;  Surgeon: Hamilton Carnes MD;  Location:  OR     HERNIA REPAIR  2006     lumbar spine hardware removal  1999     OPTICAL TRACKING SYSTEM  FUSION POSTERIOR SPINE THORACIC THREE+ LEVELS N/A 4/25/2019    Procedure: O-Arm/Stealth Assisted Thoracic 10-Pelvis Instrumented Fusion T11-2, L1-2, L2-3 Transforminal Lumbar Interbody Fusion (TLIF) And Smith Borges Osteotomies,and L3-4 SPO as well, Use Of BMP, Debridement Of Seroma;  Surgeon: Carlos Enrique Dial MD;  Location: [facility] OR     PICC INSERTION Right 05/06/2019    4Fr - 37cm, Basilic vein, low SVC     REMOVAL PROSTHETIC MATERIAL/MESH, ABD WALL NECRO TISS INFEXN  2012     ROTATOR CUFF REPAIR RT/LT  2003       Prior to Admission Medications   Prior to Admission Medications   Prescriptions Last Dose Informant Patient Reported? Taking?   Cholecalciferol (VITAMIN D) 2000 units tablet  Self No No   Sig: Take 2,000 Units by mouth daily   LANsoprazole (PREVACID) 30 MG DR capsule  Self Yes No   Sig: Take 30 mg by mouth every morning (before breakfast)   acetaminophen (TYLENOL) 325 MG tablet   No No   Sig: Take 2 tablets (650 mg) by mouth every 8 hours as needed for pain   albuterol (2.5 MG/3ML) 0.083% neb solution  Self Yes No   Sig: Take 1 vial by nebulization every 6 hours as needed for shortness of breath / dyspnea or wheezing   albuterol (PROAIR HFA/PROVENTIL HFA/VENTOLIN HFA) 108 (90 BASE) MCG/ACT Inhaler  Self Yes No   Sig: Inhale 2 puffs into the lungs as needed for shortness of breath / dyspnea or wheezing    aspirin (ASA) 81 MG EC tablet   No No   Sig: Take 1 tablet (81 mg) by mouth daily Start tomorrow morning.   atorvastatin (LIPITOR) 40 MG tablet   No No   Sig: Take 1 tablet (40 mg) by mouth daily   benzocaine-menthol (CEPACOL) 15-3.6 MG lozenge   No No   Sig: Place 1 lozenge inside cheek every hour as needed for sore throat   calcium carbonate (OS-DMITRIY 500 MG [place]) 500 MG tablet  Self No No   Sig: Take 1 tablet (500 mg) by mouth 2 times daily   clopidogrel (PLAVIX) 75 MG tablet   No No   Sig: Take 1 tablet (75 mg) by mouth daily   diphenhydrAMINE (BENADRYL) 50 MG capsule  Self Yes No   Sig:  "Take 50 mg by mouth as needed for itching or allergies    docusate sodium (COLACE) 100 MG capsule   No No   Sig: Take 1 capsule (100 mg) by mouth At Bedtime For constipation. Hold for loose stools   ferrous gluconate (FERGON) 324 (38 Fe) MG tablet   No No   Sig: Take 1 tablet (324 mg) by mouth daily (with breakfast)   folic acid (FOLVITE) 1 MG tablet   No No   Sig: Take 1 tablet (1 mg) by mouth every morning   gabapentin (NEURONTIN) 300 MG capsule   No No   Sig: Take 2 capsules (600 mg) by mouth 3 times daily   insulin NPH (HUMULIN N/NOVOLIN N VIAL) 100 UNIT/ML vial   No No   Sig: Inject 2-6 Units Subcutaneous 2 times daily Will usually give if blood glucose >140   insulin syringe-needle U-100 (29G X 1/2\" 1 ML) 29G X 1/2\" 1 ML miscellaneous   No No   Sig: Inject 1 Syringe Subcutaneous 2 times daily   levothyroxine (SYNTHROID/LEVOTHROID) 50 MCG tablet  Self Yes No   Sig: Take 50 mcg by mouth every morning    methotrexate 2.5 MG tablet   No No   Sig: Take 10 tablets (25 mg) by mouth once a week Tuesday   multivitamin w/minerals (THERA-VIT-M) tablet  Self No No   Sig: Take 1 tablet by mouth daily   omega 3 1000 MG CAPS  Self Yes No   Sig: Take 2 capsules by mouth every morning    oxyCODONE-acetaminophen (PERCOCET) 7.5-325 MG per tablet   No No   Sig: Take 1 tablet by mouth every 6 hours as needed for severe pain (max 3 tablets daily) Dispense 09/23/19. OK to start  09/25/19   potassium chloride ER (K-DUR/KLOR-CON M) 20 MEQ CR tablet   No No   Sig: Take 2 tablets (40 mEq) by mouth daily   sildenafil (REVATIO) 20 MG tablet   No No   Sig: Take 0.25 tablets (5 mg) by mouth 3 times daily Avoid taking with fatty meal.      HOLD for SBP < 90   torsemide (DEMADEX) 20 MG tablet   No No   Sig: Take 1 tablet (20 mg) by mouth 2 times daily      Facility-Administered Medications Last Administration Doses Remaining   0.9% sodium chloride BOLUS None recorded    0.9% sodium chloride BOLUS None recorded         Allergies   No Known " Allergies    Social History   I have reviewed this patient's social history and updated it with pertinent information if needed. Beulah Jane  reports that he quit smoking about 49 years ago. He smoked 0.25 packs per day. He has never used smokeless tobacco. He reports current alcohol use. He reports that he does not use drugs.    Family History   I have reviewed this patient's family history and updated it with pertinent information if needed.   Family History   Problem Relation Age of Onset     Colon Cancer Mother      Hypertension Father         did not know father well       Review of Systems   The 10 point Review of Systems is negative other than noted in the HPI or here.     Physical Exam   Temp: 98.2  F (36.8  C) Temp src: Oral BP: 96/61 Pulse: 91 Heart Rate: 102 Resp: 18 SpO2: 98 % O2 Device: Nasal cannula Oxygen Delivery: 2 LPM  Vital Signs with Ranges  154 lbs 12.8 oz    Constitutional: alert, oriented and in no acute distress  Eyes: EOMI, PERRL  HEENT: OP clear  Respiratory: diffuse crackles, mostly fine, throughout lung field, possibly worse at bases  Cardiovascular: ? Split S2. No VICKY. Tr peripheral edema  GI: soft, nontender, nondistended, no HSM  Lymph/Hematologic: no cervical LAD  Genitourinary: deferred  Skin: no rashes or lesions grossly  Musculoskeletal: no deformities or arthritis  Neurologic: CN II-XII, MAXWELL, sensation grossly intact  Psychiatric: mood and affect wnl    Data   Data reviewed today:  I personally reviewed the EKG tracing showing paced rhythm, ? changes in V2-3 from previous, the chest x-ray image(s) showing CHF and the head CT image(s) showing no acute bleed.  Recent Labs   Lab 10/13/19  2303 10/09/19  0909 10/08/19  1118   WBC 13.0* 13.5*  --    HGB 9.2* 10.0*  --    MCV 77* 78  --    * 478*  --     136 138   POTASSIUM 4.2 4.6 4.5   CHLORIDE 103 101 104   CO2 26 31 29   BUN 22 19 15   CR 1.06 1.09 0.79   ANIONGAP 8 4 5   DMITRIY 8.5 9.1 9.3   * 94 132*    ALBUMIN 3.1*  --   --    PROTTOTAL 6.8  --   --    BILITOTAL 0.3  --   --    ALKPHOS 135  --   --    ALT 20  --   --    AST 27  --   --    TROPI 0.182*  --   --        Recent Results (from the past 24 hour(s))   XR Chest 2 Views    Narrative    XR CHEST 2 VW  10/13/2019 11:54 PM     HISTORY: Shortness of breath, syncope, left chest pain/rales.    COMPARISON: 9/25/2019.    FINDINGS: Right subclavian cardiac device in place. Aortic valve  prosthesis. No pneumothorax. The heart is enlarged. There is pulmonary  vascular congestion. No pulmonary edema. There are bibasilar  infiltrates. The lungs are otherwise clear. No pleural effusion.  Spinal rods in place.      Impression    IMPRESSION:  1. Cardiomegaly and pulmonary vascular congestion.  2. Bibasilar atelectasis or scarring.   Head CT w/o contrast    Narrative    EXAM: CT HEAD W/O CONTRAST  LOCATION: [facility]  DATE/TIME: 10/14/2019 12:04 AM    INDICATION: Head injury  COMPARISON: None  TECHNIQUE: Routine without IV contrast. Multiplanar reformats. Dose reduction techniques were used.    FINDINGS:  INTRACRANIAL CONTENTS: No intracranial hemorrhage, extraaxial collection, or mass effect.  No CT evidence of acute infarct. Mild presumed chronic small vessel ischemic changes. Moderate atrophy. Normal ventricles and sulci.     VISUALIZED ORBITS/SINUSES/MASTOIDS: No intraorbital abnormality. No paranasal sinus mucosal disease. No middle ear or mastoid effusion.    BONES/SOFT TISSUES: No acute abnormality.      Impression    IMPRESSION:  1.  No definite acute intracranial process.

## 2019-10-14 NOTE — CONSULTS
Consult Date:  10/14/2019      REASON FOR CONSULTATION:  Syncope.      HISTORY OF PRESENT ILLNESS:  This is a patient who is a 69-year-old gentleman.  He has a complex cardiac history, especially in the last couple of weeks.  He has severe aortic valve stenosis.  He was discharged from [facility] on 09/07/2019, having been there for severe aortic stenosis.  He had a TAVR with a 29 mm Medtronic Evolut.  He had a complete heart block secondary to that and had a permanent pacemaker placed.  He has a history of heart failure with preserved ejection fraction.  He had a non-STEMI due to his aortic stenosis.  He had an angioplasty to the mid and distal LAD on 07/2019.  He has a history of dermatomyositis, interstitial lung disease, hypothyroidism, and diabetes.  He was hospitalized at [facility] and discharged on 10/02/2019, with dyspnea on exertion, multifactorial, thought to be due to heart failure with preserved EF, pulmonary hypertension and physical deconditioning.  He was noted to have pulmonary hypertension with a positive vasodilator trial and was placed on Revatio.  He presented to the hospital during that admission with worsening shortness of breath, leg swelling, weight gain and an elevated BNP.  Echo listed preserved LV function, right ventricle was noted to be mild to moderately dilated with mild decreased RV systolic function.  The TAVR valve was working well, as was the pacemaker.  Right heart catheterization was performed showing severe pulmonary hypertension with mildly elevated pulmonary capillary wedge pressure.  The transpulmonary gradient was 30 mmHg.  The PVR was 10.9 Wood units.  Vasodilator trial was done with inhaled nitric oxide, and it was actually a positive trial in that the mean PA dropped from 46 down to 36, cardiac output increased and the PVR dropped from 10.9, down to 4.2 Wood's units.  At the time of the heart catheterization at baseline, PA pressure was 72/31 with a mean of 46.   Aortic pressure was 88/58 with a mean of 69.  The RA mean was 13.  The wedge mean was 16.  At the peak of the nitric oxide, RA pressure was 11, PA 60/25 with a mean of 36 and aortic pressure maintained at 95/54.  Cardiac output went from 4.9 up to 7.15 liters per minute.  He had a repeat right heart cath on 10/07/2019.  PA pressure 56/20 with a mean of 33, wedge mean was 9.  RA mean was   8.  Cardiac output 4.3 liters per minute.  The PVR was 5.47 Wood units.  The patient noted when he went home that he would be a little lightheaded when he would first stand up.  According to the report, he was using his walker walking down the [place] at his home and he became increasingly short of breath and then passed out for approximately a minute.  His current weight is 68 kg, which is also his discharge weight from the previous hospitalization.  He reports he has no further ankle edema, although he is wearing Ace wraps when I examined him today.  He reports no real chest pain, slight ache in his left rib cage when he was moving in the bed today.      PAST MEDICAL HISTORY:  Pulmonary fibrosis, polymyositis, pneumonia, obstructive sleep apnea on CPAP, hyperlipidemia, diabetes, chronic pain syndrome, aortic stenosis status post TAVR, permanent pacemaker following the TAVR, rotator cuff surgery, left and right.  He had a spine surgery using the anterior approach to his abdomen and there is mesh and indeed, I see a small hernia there.       PAST SURGICAL HISTORY:  He had stenting to his LAD, as I mentioned, in 07/2009, colonoscopy and he had knee surgery in the 1970s.      MEDICATIONS ON ADMISSION:   1.  Tylenol.   2.  Albuterol nebs.   3.  Aspirin 81 mg daily.   4.  Lipitor 40 mg daily.   5.  Os-Mamadou 500 mg b.i.d.   6.  Vitamin D 2000 units daily.   7.  Plavix 75 mg daily.   8.  Benadryl p.r.n.   9.  Fergon 324 mg daily.   10.  Folic acid 1 mg daily.   11.  Neurontin 600 mg t.i.d.   12.  Prevacid 30 mg daily.   13.  Synthroid 50 mcg  daily.   14.  Methotrexate 25 mg once a week (for interstitial lung disease).     15.  Fish oil 2 capsules daily, 2000 mg total.   16.  Percocet as needed.   17.  Potassium 40 mEq daily.     18.  Revatio 5 mg t.i.d.   19.  Demadex 20 mg b.i.d.   20.  Insulin.      ALLERGIES:  NONE.      FAMILY HISTORY:  Mother with colon cancer.  Father with hypertension, details unknown.      SOCIAL HISTORY:  He is .  He is not working.  He stopped smoking in 1970.  He has apparently very minimal alcohol use.      REVIEW OF SYSTEMS:     CARDIAC:  As above.   RESPIRATORY:  As above.   GASTROINTESTINAL:  As above with GERD, on PPI.   NEUROLOGIC:  As above with chronic Neurontin.   The remainder of the review of systems is otherwise stable from the last hospitalization.      PHYSICAL EXAMINATION:   GENERAL:  Reveals a weight currently at between 68 and 70 kg on 2 different dates.  I examined him today.   VITAL SIGNS:  Supine blood pressure ranged between 92 and 105, systolic standing it dropped to 65 and he became lightheaded, weak and we had to assist him back to sitting down on the bed.  He is afebrile at 36.8 centigrade.   SKIN:  No petechiae or rash.   HEENT:  Nonicteric.   NECK:  Supple without thyromegaly or adenopathy.   CHEST:  Reveals coarse crackles in both bases.   CARDIAC:  Minimal systolic murmur.  A pacemaker is noted in the right chest and healing well.  Carotid upstrokes are 2+, femoral pulses are 2+.  I do not feel dorsal pedal pulses because he has Ace wraps.  Neck veins are mildly distended.   ABDOMEN:  Has a somewhat recent AP scar and a slight hernia, but overall healing well.  There is no entrapment.  Bowel sounds are active.   EXTREMITIES:  There is no cyanosis or clubbing.  There is no edema now, but he does have an Ace wrap on.   NEUROLOGIC:  Alert and oriented.  Cranial nerves II II-XII are intact.  Motor and sensory intact.      LABORATORY DATA:  Please see all previous labs.  Troponin is  nonspecifically elevated at 0.182 and 0.195.  Sodium 137, potassium 4.2, creatinine 1.06, BUN 22, albumin 3.1.  Transaminases normal.  NT BNP 6492.  Glucose 190.  White count 13.0, hemoglobin 9.2, MCV 77, platelet count 546,000.        Chest x-ray:  Cardiomegaly, pulmonary vascular congestion, bibasilar atelectasis or scarring.        CT of the head without contrast:  No acute intracranial process, no bleed.        EKG on admission is probably sinus tachycardia, right bundle, RVH pattern.  He interestingly is apparently not paced, even though apparently he had a complete heart [facility] [place] [facility] [place] [facility].  This EKG looks like the one from 09/30/2019.  The last echo was dated 10/09/2019.  By report, LV function at 55%-60%, RV function normal, mild RV dilatation.  They report that the left atrium may or may not be dilated.  The report is unclear if they are talking about the left or right atrium.  If there is a misprint, they report mitral valve normal, TAVR valve working well with a mean gradient of 8 and no paravalvular leak.  Tricuspid valve had mild TR with RVSP of 37 plus RA pressure.  They report the inferior vena cava was normal in size.  The ascending aorta was borderline dilated.  There was a trivial pericardial effusion.      IMPRESSION:  The patient had shortness of breath while walking and then passed out.  He is clearly orthostatic here and nearly passed out simply having him stand up at the bedside.  He probably has an element of heart failure because the chest x-ray suggested fluid and the BNP is elevated, even though on peripheral exam, there is no ankle edema, but there is neck vein distention.  It is possible that his lung disease is so severe that when he goes for a walk, he cannot transmit blood from the RV across the lung bed to the LV and causes a drop in blood pressure when he is walking.  We will certainly want to walk him here and check the O2 sat and make sure he is not dropping.  We  will check orthostatics here.  I am going to stop Revatio for now.  He may need Midodrine, and this is a little counter intuitive for the problem with the pulmonary hypertension with vasodilator trial, but the syncope and recurrent orthostatic hypotension here was going to trump much of the lung problem.  We may have our pulmonary hypertension team to see him and consider specific inhalers for pulmonary hypertension, rather than Revatio, which is much more likely to trigger orthostasis.  I am going to decrease the diuretic for now while he is in the hospital 10 mg to 20 mg of Demadex.  We are going to have to watch for fluid gain and again, it looks like his weight is starting to go up here, even though I have not changed the dose yet.  I am going to get a quick repeat of the echocardiogram to make sure the small pericardial effusion is not causing a new problem because of that, and plus, he did report slight left chest pain when I was examining him and having him sit up.  We will follow with you.         JT JONES MD             D: 10/14/2019   T: 10/14/2019   MT: MAGY      Name:     DANELLE SIMMS   MRN:      [id number]        Account:       [id number]   :      1950           Consult Date:  10/14/2019      Document: [id number]       cc: Ronnie López MD

## 2019-10-14 NOTE — PROGRESS NOTES
Pt admitted earlier this am for syncope.  H&P reviewed.  Cardiology consult reviewed - plans for holding Revatio, decreasing diuretics and obtaining ECHO (currently in process).  Found to have positive orthostatics earlier today and midodrine ordered; likely to be cause of syncope.

## 2019-10-14 NOTE — ED NOTES
Resting with eyes closed.  Regular respiratory rate. Wakes to voice.  Denies feeling lightheaded or dizzy.

## 2019-10-14 NOTE — ED PROVIDER NOTES
History     Chief Complaint:  Syncope      The history is provided by the patient and a relative.      Beulah Jane is a 69 year old male with a medical history of diabetes mellitus type 2, hypertension, hyperlipidemia, CAD, CHF, NSTEMI, acute PE, on Plavix who presents to the emergency department via EMS for evaluation after a syncopal episode. The patient reports he was walking to his room using his walker when he felt a sudden onset shortness of breath and did not have time to sit down before having a syncopal episode. The patient states he hit his head and remembers hitting the floor, but did lose consciousness. The patient's son endorses that the patient was unconscious and unresponsive for at least one minute and was assessed by paramedics at the scene. The patient denies chest pain, chest heaviness, or palpitations prior to the syncopal event and denies headaches or neck pain following the incident. He endorses back pain at baseline due to spine surgery and laying down exacerbates the pain. The patient states that he experiences shortness of breath with activity and exercise several times a day and has to sit down to regain his breath. Of note, the patient has a pacemaker and uses 1.5 L of O2 at home.     Pacemaker - Medtronic  Implant date 04-sept-2019  Serial number: [id number]  Model Number: [id number]  Position: Aortic     Allergies:  NKDA     Medications:    Albuterol  Aspirin  Lipitor  Cepacol  Calcium carbonate  Plavix  Colace  Fergon  Folvite  Gabapentin  Insulin NPH  Prevacid  Levothyroxine  Methotrexate  Potassium chloride  Revatio  Lasix  Demadex    Past Medical History:    Aortic stenosis  Chronic pain  Diabetes mellitus type 2  Hyperlipidemia  JUAN on CPAP  Polymyositis  Pulmonary fibrosis  Seasonal allergies  Spinal stenosis of lumbar region with neurogenic claudication  Hypertension   Hypothyroidism   Inflammatory arthritis   Acute pulmonary embolism   NSTEMI  CAD  CHF  Dyspnea on  exertion  Supraventricular tachycardia  Interstitial lung disease  Degenerative disc disease, lumbar  Acute hypoxemic respiratory failure    Past Surgical History:    Transcatheter aortic valve replacement  Anterior interbody lumbar spinal fusion (L2-5)  Arthroscopy knee  Colonoscopy  Left heart cath  Right heart cath x3  PCI stent drug eluting  EP pacemaker  Decompression, fusion lumbar posterior two levels, combined  Heart cath femoral cannulization with open standby repair aortic valve  Hernia repiar   Lumbar spine hardware removal   Optical tracking system fusion posterior spine thoracic three+ levels, T11-2, L1-2, L2-3  PICC insertion  Removal prosthetic material/mesh, abd wall necro tiss infexn  Rotator cuff repair RT/LF    Family History:    Mother: colon cancer  Father hypertension     Social History:  The patient was accompanied to the ED by his two sons.  Smoking Status: Former Smoker, quit 49.3 years ago  Smokeless Tobacco: Never Used  Alcohol Use: Positive  Drug Use: Negative  Marital Status:        Review of Systems   Respiratory: Positive for shortness of breath.    Cardiovascular: Negative for chest pain and palpitations.   Musculoskeletal: Positive for back pain. Negative for neck pain.   Neurological: Positive for syncope. Negative for headaches.   All other systems reviewed and are negative.        Physical Exam     Patient Vitals for the past 24 hrs:   BP Temp Temp src Pulse Heart Rate Resp SpO2 Height Weight   10/14/19 0420 110/74 -- -- 93 -- -- 94 % -- --   10/14/19 0254 96/61 98.2  F (36.8  C) Oral 91 -- 18 98 % -- --   10/14/19 0250 -- -- -- -- -- -- -- -- 70.2 kg (154 lb 12.8 oz)   10/14/19 0244 97/68 -- -- 104 -- 18 95 % -- --   10/14/19 0200 104/64 -- -- 99 102 20 97 % -- --   10/14/19 0150 101/65 -- -- -- 96 22 100 % -- --   10/14/19 0140 -- -- -- -- 95 17 100 % -- --   10/14/19 0130 (!) 87/62 -- -- 93 93 -- 98 % -- --   10/14/19 0120 97/69 -- -- -- 93 19 99 % -- --   10/14/19 0110  "-- -- -- -- 96 21 98 % -- --   10/14/19 0100 (!) 87/64 -- -- 97 97 19 98 % -- --   10/14/19 0050 (!) 81/60 -- -- -- 98 19 99 % -- --   10/14/19 0040 -- -- -- -- 100 19 99 % -- --   10/14/19 0030 (!) 86/61 -- -- 101 100 23 99 % -- --   10/14/19 0025 (!) 85/56 -- -- 101 101 18 99 % -- --   10/14/19 0020 (!) 85/56 -- -- -- 101 19 99 % -- --   10/14/19 0010 (!) 80/57 -- -- -- 101 23 98 % -- --   10/14/19 0007 -- -- -- -- 102 15 96 % -- --   10/13/19 2340 -- -- -- -- 101 -- 95 % -- --   10/13/19 2330 (!) 88/62 -- -- 87 103 -- 95 % -- --   10/13/19 2320 93/63 -- -- -- 105 20 93 % -- --   10/13/19 2313 -- -- -- -- 103 24 92 % -- --   10/13/19 2310 -- -- -- -- 106 15 96 % -- --   10/13/19 2300 109/63 98  F (36.7  C) Oral 112 115 21 (!) 89 % -- --   10/13/19 2249 111/62 -- -- -- 114 28 (!) 86 % 1.702 m (5' 7\") 68 kg (150 lb)         Physical Exam  Constitutional:  Appears well-developed and well-nourished. Cooperative.   HENT:   Head:    Atraumatic.   Mouth/Throat:   Oropharynx is without erythema or exudate and mucous     membranes are moist. Tacky oral mucosa  Eyes:    Conjunctivae normal and EOM are normal.      Pupils are equal, round, and reactive to light.   Neck:    Normal range of motion. Neck supple.   Cardiovascular:  Normal rate, regular rhythm, normal heart sounds and radial and    dorsalis pedis pulses are 2+ and symmetric.    Pulmonary/Chest:  Effort normal and breath sounds normal. . Rales left base to mid lung and right base  Abdominal:   Soft. Bowel sounds are normal.      No splenomegaly or hepatomegaly. No tenderness. No rebound.   Musculoskeletal:  Normal range of motion. No edema and no tenderness. Superficial abrasion and contusion left parietal scalp. Trace edema both legs. Extremities nontender. Right no midline or neck tenderness . Chest wall pelvis stable not tender.  Neurological:  Alert. Normal strength. No cranial nerve deficit. GCS 15.  Skin:    Skin is warm and dry.   Psychiatric:   Normal mood " and affect.       Emergency Department Course   ECG:  Time: 2250  Vent. Rate 117 bpm. GA interval 192. QRS duration 140. QT/QTc 374/521. P-R-T axis 66 146 -26.  Sinus tachycardia  Possible Left atrial enlargement  Right bundle branch block  T wave abnormality, consider inferior ischemia  Abnormal ECG  Rate increased  Inferior T wave changes more pronounced  Now with criteria for LAE  No significant change compared to EKG dated 10/9/19.  Read time: 2258      Imaging:  Radiographic findings were communicated with the patient who voiced understanding of the findings.    XR Chest 2 Views  IMPRESSION:  1. Cardiomegaly and pulmonary vascular congestion.  2. Bibasilar atelectasis or scarring. As per radiology    Head CT w/o contrast  IMPRESSION:  1.  No definite acute intracranial process. As per radiology     Laboratory:  CBC: WBC: 13.0 (H), HGB 9.2 (L), PLT: 546 (H)    CMP: Glucose 190 (H), Albumin 3.1 (L), o/w WNL (Creatinine: 1.06)    BNP: 6492 (H)    2303 Troponin I: 0.182 (H)      Emergency Department Course:  Past medical records, nursing notes, and vitals reviewed.  2302: I performed an exam of the patient and obtained history, as documented above.    EKG obtained in the ED, see results above.     The patient was sent for a XR Chest and Head CT, while in the emergency department, results above.     IV was inserted and blood was drawn for laboratory testing, results above.    0128 I rechecked and updated the patient.    0140 I spoke with Dr. Davila, hospitalist, who agreed to admit the patient.    Findings and plan explained to the Patient who consents to admission. Discussed the patient with Dr. Davila, who will admit the patient to a [facility] bed for further monitoring, evaluation, and treatment.    I personally reviewed the laboratory results with the Patient and answered all related questions prior to admission.    Impression & Plan        Medical Decision Making:  Beulah Jane is a 69 year old male who  presents after an episode of syncope. He said he was walking in his hallway when he suddenly felt very short of breath. He tried to sit down, which is his normal response when he has this, but he fainted prior to be able to sit. He struck his head. Family noted he was unconscious for about a minute.     Patient reports right now he is feeling pretty normal. He does not have any chest discomfort, racing heart. He is not having any headache, vision changes, or any focal weakness.     EKG shows sinus tachycardia and there is some inferior anterior T wave changes that are more prominent than previously seen. There is no ST elevation or depression. Patient's white cell count is mildly elevated at 13. His hemoglobin is stable at 9.2. He denies any fevers or systemic symptoms of infection. Metabolic panel looks unremarkable. Chest x-ray shows some cardiomegaly and signs concerning for congestive heart failure. There is no infiltrate. BNP is positive, but a little better than when he was recently hospitalized. His troponin, however, is positive and higher than when last checked.     CT scan of the patient's head does not show evidence for any injury or acute process.     Patient's pacemaker was interrogated, it appears to be functioning normally and no abnormal rhythms were detected during the patient's syncope tonight.     Patient drops his oxygen saturations when off 2, but this is normal for him . He is on 1.5L of oxygen at home. On his baseline 1.5 L, sats have remained normal. Blood pressures were intermittently borderline low, but the patient was asymptomatic with this. His MAP were always above 65.     Given the elevated troponin, generalized weakness, and dyspnea, I will bring the patient in to the hospital for further evaluation. I remain uncertain as to the cause of his syncope.     I talked to Dr. Davila, on call for the hospitalist, and he agrees to accept the patient for admission. patient was admitted in  stable condition.      Critical Care time: none    Diagnosis:    ICD-10-CM    1. Syncope, unspecified syncope type R55    2. Acute on chronic congestive heart failure, unspecified heart failure type (H) I50.9    3. Elevated troponin R79.89    4. Contusion of scalp, initial encounter S00.03XA    5. Dyspnea, unspecified type R06.00        Disposition:  Admitted to Dr. Giovanni SANTOYO, Julia Robertson, am serving as a scribe on 10/13/2019 at 11:01 PM to personally document services performed by Castro Felix MD based on my observations and the provider's statements to me.       Julia Robertson  10/13/2019    EMERGENCY DEPARTMENT       Castro Felix MD  10/14/19 0752

## 2019-10-14 NOTE — PROGRESS NOTES
RECEIVING UNIT ED HANDOFF REVIEW    ED Nurse Handoff Report was reviewed by: Savi Simon RN on October 14, 2019 at 2:29 AM

## 2019-10-14 NOTE — CONSULTS
Cardio see consult dictated  Stop viagra  Dec diuretic  Watch for chf  Check bp orthostatic while walking and o2 sat while walking -starting tomorrow  Start low dose midodrine  May need to be transferred to [place] to get inhaler pulmonary vasodilator  Follow-up echo   We will follow  1 hr consult and discussed with dr whiteside from pulm htn service

## 2019-10-14 NOTE — ED NOTES
"[facility]  ED Nurse Handoff Report    ED Chief complaint: Syncope      ED Diagnosis:   Final diagnoses:   None       Code Status: Full Code    Allergies: No Known Allergies    Activity level - Baseline/Home:  Independent  Activity Level - Current:   Stand with Assist    Patient's Preferred language: eng   Needed?: No    Isolation: No  Infection: Not Applicable  Bariatric?: No    Vital Signs:   Vitals:    10/14/19 0130 10/14/19 0140 10/14/19 0150 10/14/19 0200   BP: (!) 87/62  101/65 104/64   Pulse: 93   99   Resp:  17 22 20   Temp:       TempSrc:       SpO2: 98% 100% 100% 97%   Weight:       Height:           Cardiac Rhythm: ,   Cardiac  Cardiac Rhythm: Normal sinus rhythm    Pain level:      Is this patient confused?: No   Does this patient have a guardian?  No         If yes, is there guardianship documents in the Epic \"Code/ACP\" activity?  N/A         Guardian Notified?  N/A  [place] - Suicide Severity Rating Scale Completed?  Yes  If yes, what color did the patient score?  White    Patient Report: Initial Complaint: syncope  Focused Assessment: same  Tests Performed: labs, scan  Abnormal Results:   Labs Ordered and Resulted from Time of ED Arrival Up to the Time of Departure from the ED   CBC WITH PLATELETS DIFFERENTIAL - Abnormal; Notable for the following components:       Result Value    WBC 13.0 (*)     RBC Count 4.38 (*)     Hemoglobin 9.2 (*)     Hematocrit 33.8 (*)     MCV 77 (*)     MCH 21.0 (*)     MCHC 27.2 (*)     RDW 23.9 (*)     Platelet Count 546 (*)     Absolute Neutrophil 9.9 (*)     All other components within normal limits   TROPONIN I - Abnormal; Notable for the following components:    Troponin I ES 0.182 (*)     All other components within normal limits   COMPREHENSIVE METABOLIC PANEL - Abnormal; Notable for the following components:    Glucose 190 (*)     Albumin 3.1 (*)     All other components within normal limits   NT PROBNP INPATIENT - Abnormal; Notable for " the following components:    N-Terminal Pro BNP Inpatient 6,492 (*)     All other components within normal limits   PULSE OXIMETRY NURSING   CARDIAC CONTINUOUS MONITORING   PERIPHERAL IV CATHETER   PATIENT CARE ORDER     Treatments provided: none    Family Comments: with pt    OBS brochure/video discussed/provided to patient/family: N/A              Name of person given brochure if not patient: .              Relationship to patient: .    ED Medications:   Medications   0.9% sodium chloride BOLUS (has no administration in time range)       Drips infusing?:  No    For the majority of the shift this patient was Green.   Interventions performed were .    Severe Sepsis OR Septic Shock Diagnosis Present: No    To be done/followed up on inpatient unit:  na    ED NURSE PHONE NUMBER: [id number]

## 2019-10-14 NOTE — PLAN OF CARE
PT: Eval orders received, chart reviewed. Pt with low BP, positive orthostatics. PT will hold this day. RN aware

## 2019-10-14 NOTE — PHARMACY-ADMISSION MEDICATION HISTORY
Admission medication history interview status for the 10/13/2019  admission is complete. See EPIC admission navigator for prior to admission medications     Medication history source reliability:Good    Actions taken by pharmacist (provider contacted, etc): [facility] ([phone])     Additional medication history information not noted on PTA med list : patient reported that he has been holding sildenafil due to recent low blood pressure readings.  He usually takes gabapentin along with Percocet three times daily for pain.     Additionally, patient reported that he cannot remember if he took his methotrexate dose last week, but he does usually take it on Tuesdays.     Medication reconciliation/reorder completed by provider prior to medication history? No    Time spent in this activity: 20 mins    Prior to Admission medications    Medication Sig Last Dose Taking? Auth Provider   acetaminophen (TYLENOL) 325 MG tablet Take 2 tablets (650 mg) by mouth every 8 hours as needed for pain  at PRN Yes Negar Mustafa MD   albuterol (2.5 MG/3ML) 0.083% neb solution Take 1 vial by nebulization every 6 hours as needed for shortness of breath / dyspnea or wheezing  at PRN Yes Unknown, Entered By History   aspirin (ASA) 81 MG EC tablet Take 1 tablet (81 mg) by mouth daily Start tomorrow morning. 10/13/2019 at AM Yes Nita Barger PA-C   atorvastatin (LIPITOR) 40 MG tablet Take 1 tablet (40 mg) by mouth daily 10/13/2019 at AM Yes Nita Barger PA-C   benzocaine-menthol (CEPACOL) 15-3.6 MG lozenge Place 1 lozenge inside cheek every hour as needed for sore throat  at PRN Yes Juan Kimbrough PA-C   calcium carbonate (OS-DMITRIY 500 MG [place]) 500 MG tablet Take 1 tablet (500 mg) by mouth 2 times daily 10/13/2019 at PM Yes Carlos Enrique Dial MD   Cholecalciferol (VITAMIN D) 2000 units tablet Take 2,000 Units by mouth daily 10/13/2019 at AM Yes Carlos Enrique Dial MD   clopidogrel  (PLAVIX) 75 MG tablet Take 1 tablet (75 mg) by mouth daily 10/13/2019 at AM Yes Negar Mustafa MD   diphenhydrAMINE (BENADRYL) 50 MG capsule Take 50 mg by mouth as needed for itching or allergies   at PRN Yes Unknown, Entered By History   docusate sodium (COLACE) 100 MG capsule Take 1 capsule (100 mg) by mouth At Bedtime For constipation. Hold for loose stools 10/13/2019 at HS Yes Negar Mustafa MD   ferrous gluconate (FERGON) 324 (38 Fe) MG tablet Take 1 tablet (324 mg) by mouth daily (with breakfast) 10/13/2019 at AM Yes Negar Mustafa MD   folic acid (FOLVITE) 1 MG tablet Take 1 tablet (1 mg) by mouth every morning 10/13/2019 at AM Yes Hoa López MD   gabapentin (NEURONTIN) 300 MG capsule Take 2 capsules (600 mg) by mouth 3 times daily 10/13/2019 at PM Yes Negar Mustafa MD   insulin NPH (HUMULIN N/NOVOLIN N VIAL) 100 UNIT/ML vial Inject 2-6 Units Subcutaneous 2 times daily Will usually give if blood glucose >140  at PRN Yes Hoa López MD   LANsoprazole (PREVACID) 30 MG DR capsule Take 30 mg by mouth every morning (before breakfast) 10/13/2019 at AM Yes Unknown, Entered By History   levothyroxine (SYNTHROID/LEVOTHROID) 50 MCG tablet Take 50 mcg by mouth every morning  10/13/2019 at AM Yes Unknown, Entered By History   methotrexate 2.5 MG tablet Take 10 tablets (25 mg) by mouth once a week Tuesday Past Month at Unknown time Yes Hoa López MD   multivitamin w/minerals (THERA-VIT-M) tablet Take 1 tablet by mouth daily 10/13/2019 at AM Yes Sofía Retana MD   omega 3 1000 MG CAPS Take 2 capsules by mouth every morning  10/13/2019 at AM Yes Unknown, Entered By History   oxyCODONE-acetaminophen (PERCOCET) 7.5-325 MG per tablet Take 1 tablet by mouth every 6 hours as needed for severe pain (max 3 tablets daily) Dispense 09/23/19. OK to start  09/25/19 10/13/2019 at PM Yes Judson Stockton MD   potassium chloride ER (K-DUR/KLOR-CON M) 20 MEQ CR tablet Take 2  "tablets (40 mEq) by mouth daily 10/13/2019 at AM Yes Papo Liu MD   torsemide (DEMADEX) 20 MG tablet Take 1 tablet (20 mg) by mouth 2 times daily 10/13/2019 at PM Yes Papo Liu MD   insulin syringe-needle U-100 (29G X 1/2\" 1 ML) 29G X 1/2\" 1 ML miscellaneous Inject 1 Syringe Subcutaneous 2 times daily   Hoa López MD   sildenafil (REVATIO) 20 MG tablet Take 0.25 tablets (5 mg) by mouth 3 times daily Avoid taking with fatty meal.      HOLD for SBP < 90 HOLDING  Negar Mustafa MD       "

## 2019-10-14 NOTE — PLAN OF CARE
Pt AO, VSS on 2L O2 ex soft BPs (very orthostatic). Percocet x 2 for leg/foot pain. Tele SR with BBB. Seen by cards, made some med changes. IV SL. Up SBA. Cardiac diet. BLE wrapped, +1-2 edema. BGS 91, 93. Continue to monitor.     Addendum 7915-9580: Foot pain improved this ashleigh. Still with soft BPs. On Midodrine. . Legs unwrapped. Echo done.

## 2019-10-14 NOTE — ED TRIAGE NOTES
Was walking in hallway with walker and had syncopal episode. Hit head. Takes plavix. Seen by paramedics at scene. Uses home O2

## 2019-10-15 NOTE — PLAN OF CARE
PLAN: midodrine increased today, continued daily orthostatic BP checks. Pt dropped to 70's SBP with sitting/standing today, so PT did not work with patient. Feels symptomatic with shortness of breath, chest pressure during these episodes.     CNS: A&O X 4, peripheral neuropathy pain to bilat LE treated with scheduled gabapentin and PRN percocet X 1. Afebrile.   CVS: SR with BBB, had some NSVT. Mild periph edema. Positive orthos.   RESP: 1.5 LPM NC (baseline from home). Crackles throughout. GONZALEZ.  GI: cardiac diet. Eating well. Constipated needs senna tonight. Wheeled on walker to BR this evening.   : voiding per urinal at bedside, stands with 1P SBA to assure no syncope.   IV: PIV S/L.   MOB: 1PA with GB and W/W, no walking today due to drops in BP with standing.   SKIN: No concerns, scattered bruises. Periph neuropathy feet.   LABS: BG stable today, no insulin needed. BG 96, 134, 94.

## 2019-10-15 NOTE — PROGRESS NOTES
MD Notification    Notified Person: MD    Notified Person Name: Dr. Burger    Notification Date/Time: 10/14/2019 11:54 PM    Notification Interaction: Paged    Purpose of Notification: Pt SOB requesting neb treatment, HR tachy could we switch neb treatment from albuterol to xopenex? Also pt complaining of itchiness, takes benadryl at home?     Orders Received:    Comments:

## 2019-10-15 NOTE — PROGRESS NOTES
"[facility]  Cardiology Progress Note         Assessment and Plan:     Syncope  Orthostatic hypotension-may be partly meds and cor pulm and inability to shunt blood R to L across lungs vasculature  Severe ILD  Cor pulmonale  TAVR  CHF (left with pEF and right with cor pulmonale)--weight no change over noc on lower dose meds    Pt still orthostatic so I will increase midodrine. He is off revatio and on lower dose diuretic-RN didn't check O2 sat with walking due to [place]             Interval History:   Was able to stand for 5 min to urinate and didn't faint today (yesterday had near syncope when I checked orthostatics)              Medications:   Scheduled Meds:     aspirin  81 mg Oral Daily     atorvastatin  40 mg Oral Daily     clopidogrel  75 mg Oral Daily     docusate sodium  100 mg Oral At Bedtime     ferrous gluconate  324 mg Oral Daily with breakfast     folic acid  1 mg Oral QAM     gabapentin  600 mg Oral TID     insulin aspart  1-7 Units Subcutaneous TID AC     insulin aspart  1-5 Units Subcutaneous At Bedtime     levothyroxine  50 mcg Oral QAM     midodrine  2.5 mg Oral TID w/meals     pantoprazole  40 mg Oral QAM AC     potassium chloride ER  40 mEq Oral Daily     sodium chloride (PF)  3 mL Intracatheter Q8H     torsemide  10 mg Oral BID     PRN Meds: acetaminophen, acetaminophen, albuterol, bisacodyl, glucose **OR** dextrose **OR** glucagon, lidocaine 4%, lidocaine (buffered or not buffered), magnesium hydroxide, melatonin, naloxone, ondansetron **OR** ondansetron, oxyCODONE-acetaminophen, - MEDICATION INSTRUCTIONS -, senna-docusate **OR** senna-docusate, sodium chloride (PF)         Physical Exam:   Blood pressure (!) 88/65, pulse 95, temperature 97.8  F (36.6  C), temperature source Oral, resp. rate 18, height 1.702 m (5' 7\"), weight 69.9 kg (154 lb 3.2 oz), SpO2 (!) 88 %.  Vitals:    10/13/19 2249 10/14/19 0250 10/15/19 0002   Weight: 68 kg (150 lb) 70.2 kg (154 lb 12.8 oz) 69.9 kg (154 lb " 3.2 oz)       Intake/Output Summary (Last 24 hours) at 10/15/2019 1038  Last data filed at 10/15/2019 0900  Gross per 24 hour   Intake 900 ml   Output 1475 ml   Net -575 ml           Vital Sign Ranges  Temperature Temp  Av.2  F (36.8  C)  Min: 97.8  F (36.6  C)  Max: 99  F (37.2  C)   Blood pressure Systolic (24hrs), Av , Min:88 , Max:115        Diastolic (24hrs), Av, Min:54, Max:79      Pulse Pulse  Av.9  Min: 87  Max: 100   Respirations Resp  Av.1  Min: 16  Max: 20   Pulse oximetry SpO2  Av.4 %  Min: 88 %  Max: 98 %             Constitutional: Awake, alert, cooperative, no apparent distress       Skin: No rash, petechia, cyanosis       Neck: No thyromegaly or adenopathy       Lungs: Fibrotic crackles R>L       Cardiovasc: RRR soft tio       Abdomen: Normal bowel sounds, soft, non-distended, non-tender, no hepatomegaly       Neuro: Alert and oriented x3, non focal exam       Extremities: No edema or trivial at most       Other:             Data:     Recent Labs   Lab Test 10/15/19  0333 10/13/19  2303  08/29/19  1525  19  2237   WBC 12.5* 13.0*   < > 9.8   < >  --    HGB 9.0* 9.2*   < > 9.7*   < >  --    MCV 77* 77*   < > 77*   < >  --     546*   < > 460*   < > 417   INR  --   --   --  1.38*  --  1.11    < > = values in this interval not displayed.      Recent Labs   Lab Test 10/15/19  0333 10/13/19  2303    137   POTASSIUM 4.5 4.2   CHLORIDE 102 103   BUN 20 22   CR 0.90 1.06     Recent Labs   Lab 10/15/19  0333 10/13/19  2303 10/09/19  0909 10/08/19  1118   * 190* 94 132*     Recent Labs   Lab Test 10/13/19  2303 09/25/19  1411   ALT 20 20   AST 27 26     No components found for: TROPONINIES    Lab Results   Component Value Date    CHOL 96 2019     Lab Results   Component Value Date    HDL 19 2019     Lab Results   Component Value Date    LDL 58 2019     Lab Results   Component Value Date    TRIG 97 2019     No results found for:  CHOLHDLRATIO       TSH   Date Value Ref Range Status   09/26/2019 0.46 0.40 - 4.00 mU/L Final

## 2019-10-15 NOTE — PLAN OF CARE
PT: Pt continues to have positive orthostatics this AM. Not appropriate for PT. Per chart up SBA. Rn would like therapy to check back in PM

## 2019-10-15 NOTE — PROGRESS NOTES
[facility]  Patient is currently open to home care services with [facility]. The patient is currently receiving RN, PT, OT services. [facility]  and team have been notified of patient admission. [facility] liaison will continue to follow patient during stay. If appropriate provide orders to resume home care at time of discharge.

## 2019-10-15 NOTE — PLAN OF CARE
DATE & TIME: 10/14/19 4134-1115    Cognitive Concerns/ Orientation : A&Ox4   BEHAVIOR & AGGRESSION TOOL COLOR: Green  ABNL VS/O2: VSS on 1.5 LPM via NC, baseline.  SBP's 80's-90's, improving with midodrine per patient.  MOBILITY: SBA to standing at bedside  PAIN MANAGMENT: Scheduled Neurontin and PRN Percocet for BLE neuropathy pain, somewhat effective.  DIET: Cardiac, no caffeine  BOWEL/BLADDER: Continent of urine, no BM  ABNL LAB/BG: BNP 6492, last trop 0.195, WBC 12.5.  , 137.  DRAIN/DEVICES: New PIV SL'd  TELEMETRY RHYTHM: SR w/ BBB  SKIN: Intact, bruised, dry, flaky, itchy.  TESTS/PROCEDURES: N/A  OTHER IMPORTANT INFO: Respirations labored and shallow at one point.  Dyspneic with movement from lying to sitting and needs rest before he can stand.  Crackles throughout.  Trace edema BLE's.  Baseline tingling/needle sensation to feet.  LA 1.1 overnight for sepsis risk BPA.

## 2019-10-15 NOTE — CONSULTS
Care Transition Initial Assessment -      Met with: Family  (son Beulah)  Active Problems:    Syncope       DATA  Lives With: spouse, child(day), adult      Quality of Family Relationships: involved, supportive  Description of Support System: Supportive, Involved  Who is your support system?: Wife, Children  Support Assessment: Adequate family and caregiver support.   Identified issues/concerns regarding health management:      Quality of Family Relationships: involved, supportive     Per care transitions consult for discharge planning.  Patient was admitted on 10-13-19 after a syncopal episode.  The tentative date of discharge is yet to be determined.  Reviewed chart and spoke with patient's son Beulah regarding discharge plans.  Per patient's son's report, patient lives with his wife and son in an apartment.  Patient is independent at baseline.  Patient uses home oxygen from [facility].  Patient's son was asking about assisted living on discharge.  Explained that I can leave a senior housing guide in the room.  Explained what is involved with assisted living and paying for assisted living.  Also discussed the elderly waiver.  Patient's son is asking for information to be left in the room.  Explained that patient has not had therapy as of yet, but if they recommend TCU then patient's insurance would likely pay for tcu.  Patient's son interested in having patient go to tcu on discharge.  Explained to patient's son that I can call him after patient has therapy.  Patient's son is in agreement.  Left a TCU sheet in patient's room as well.  Patient is currently open to [facility] for RN/PT/OT.      ASSESSMENT  Cognitive Status:  Did not meet with patient, he was sleeping, spoke with patient's son on the phone.    Concerns to be addressed: discharge planning, possible tcu placement on discharge.     PLAN  Financial costs for the patient includes N/A.  Patient given options and choices for discharge  N/A.  Patient/family is agreeable to the plan?  Yes  Transportation/person available to transport on day of discharge  is TBD and have they been notified/set up TBD  Patient Goals and Preferences: TCU placement on discharge.  Patient anticipates discharging to:  TCU.    Will continue to follow and assist with a safe discharge plan.    CARLOS Olsen, [facility]  [phone]  [facility]

## 2019-10-15 NOTE — PROGRESS NOTES
[facility]  Hospitalist Progress Note    Admit Date:  10/13/2019  Date of Service (when I saw the patient): 10/15/2019   Provider:  Allie Lutz, DO    Assessment & Plan   Beulah Jane is a 69 year old male who was admitted on 10/13/2019 with syncope.    Problem List:  1.  Syncope and positive orthostatic hypotension  Appreciate cardiology consult and recs  Revatio given at recent hospital discharge now held  demadex dose decreased (10mg po bid from 20mg po bid)  Started on po tid midodrine  Will recheck orthostatic BP and HR lying, sitting and standing this am.   continue to monitor closely clinically  Echo yesterday - reviewed     2. H/o NSTEMI  [place] to be secondary to severe aortic stenosis  Angioplasty to the mid and distal LAD - 7/2019  On plavix, asa   no new c/o CP    3.  H/O severe aortic stenosis and complete heart block   Recent discharge from [facility] 9/7/19 after TAVR and PPM  PPM interrogated in the ED and uneventful     4.  H/O pulmonary HTN  Recent discharge from [facility] 10/2/19  Had a right heart cath during that admission that revealed severe pulm HTN  He had a positive vasodilator trial and was placed on Revatio at time of that hospital discharge  May need inhaler pulmonary vasodilator - [facility]     5.  H/O pulmonary fibrosis, interstitial lung disease and JUAN  On home CPAP  On PTA methotrexate for his interstitial lung disease     6.  DM  On prn sliding scale insulin, for now, and diabetic diet  No scheduled home meds/insulin chronically for DM     7.  Chronic pain syndrome  On home gabapentin, percocet       Diet: Combination Diet Low Saturated Fat Na <2400mg Diet, No Caffeine Diet    DVT Prophylaxis: Anti-embolisim stockings (TEDs) and Ambulate every shift  [place] Catheter: not present  Code Status: Full Code      Disposition Plan   Expected discharge: 1-2 days, recommended to prior living arrangement once blood pressure greater than 95 systolic and not  "orthostatic.  Entered: Allie Lutz DO 10/15/2019, 7:38 AM       The patient's care was discussed with the Patient.    Interval History   No syncope since admit.  Up to the bedside to urinate overnight - able to stand 5 min without lightheadedness.  Has not yet been up to the bathroom.  No anterior CP.  \"my breathing is about the same\".  No new HA.  Uses insulin at home \"only as needed\" - no scheduled doses.    -Data reviewed today: I reviewed all new labs and imaging results over the last 24 hours. I personally reviewed no images or EKG's today.    Repeat ECHO - 10/14/19 -  The right ventricle is moderately dilated.  The right ventricular systolic function is mild to moderately reduced.  There is mild (1+) tricuspid regurgitation.  Dilation of the inferior vena cava is present with abnormal respiratory  variation in diameter.  Right ventricular systolic pressure is elevated, consistent with moderate to  severe pulmonary hypertension.  The aortic valve is not well visualized.  PA pressure may be higher when compared with the numerous recent studies. The  study was technically difficult.      Physical Exam   Temp: 97.8  F (36.6  C) Temp src: Axillary BP: 93/66 Pulse: 94 Heart Rate: 91 Resp: 18 SpO2: 97 % O2 Device: Nasal cannula Oxygen Delivery: 1.5 LPM  Vitals:    10/13/19 2249 10/14/19 0250 10/15/19 0002   Weight: 68 kg (150 lb) 70.2 kg (154 lb 12.8 oz) 69.9 kg (154 lb 3.2 oz)     Vital Signs with Ranges  Temp:  [97.8  F (36.6  C)-99  F (37.2  C)] 97.8  F (36.6  C)  Pulse:  [87-94] 94  Heart Rate:  [] 91  Resp:  [12-20] 18  BP: ()/(59-79) 93/66  SpO2:  [94 %-97 %] 97 %  I/O last 3 completed shifts:  In: 780 [P.O.:780]  Out: 1475 [Urine:1475]    GEN:  Alert, oriented x 3, comfortable, no overt distress.  No conversational dyspnea  HEENT:  Normocephalic/atraumatic, no scleral icterus, no nasal discharge, mouth and membranes moist, no oral ulcers or thrush noted.  NECK:  No clear thyromegaly " of clear JVD  CV:  Somewhat distant but regular rate and rhythm, sys murmur to ausc.  S1 + S2 noted, no S3 or S4.  LUNGS:  Clear to auscultation ant/lat bilaterally. Dry crackles bibasilar to somewhat limited post exam.  No clear rhonchi/ or wheezing auscultated bilaterally.  No costal retractions bilaterally.  Symmetric chest rise on inhalation noted.  ABD:  Active bowel sounds, soft, non-tender/non-distended.  No rebound/guarding/rigidity.  No masses palpated.  No obvious HSM to exam.  EXT:  No significant seble or cyanosis bilaterally. No joint synovitis noted.  No calf-tenderness or asymmetry noted.  SKIN:  Dry to touch, no rashes or jaundice noted.  PSYCH:  Mood appropriate, Not tearful or depressed.  Maintains direct eye contact.  NEURO:  No tremors at rest ,speech clear and appropriate.  CN 2-12 intact to gross testing bilaterally.    Data   Labs:  Recent Labs   Lab 10/15/19  0333 10/13/19  2303 10/09/19  0909    137 136   POTASSIUM 4.5 4.2 4.6   CHLORIDE 102 103 101   CO2 31 26 31   ANIONGAP 3 8 4   * 190* 94   BUN 20 22 19   CR 0.90 1.06 1.09   GFRESTIMATED 86 71 69   GFRESTBLACK >90 82 79   DMITRIY 8.5 8.5 9.1     Recent Labs   Lab 10/15/19  0333 10/13/19  2303 10/09/19  0909   WBC 12.5* 13.0* 13.5*   HGB 9.0* 9.2* 10.0*   HCT 32.1* 33.8* 37.1*   MCV 77* 77* 78    546* 478*     Recent Labs   Lab 10/14/19  0536 10/13/19  2303   TROPI 0.195* 0.182*      Recent Imaging:   No results found for this or any previous visit (from the past 24 hour(s)).    Medications     - MEDICATION INSTRUCTIONS -         aspirin  81 mg Oral Daily     atorvastatin  40 mg Oral Daily     clopidogrel  75 mg Oral Daily     docusate sodium  100 mg Oral At Bedtime     ferrous gluconate  324 mg Oral Daily with breakfast     folic acid  1 mg Oral QAM     gabapentin  600 mg Oral TID     insulin aspart  1-7 Units Subcutaneous TID AC     insulin aspart  1-5 Units Subcutaneous At Bedtime     levothyroxine  50 mcg Oral QAM      midodrine  2.5 mg Oral TID w/meals     pantoprazole  40 mg Oral QAM AC     potassium chloride ER  40 mEq Oral Daily     sodium chloride (PF)  3 mL Intracatheter Q8H     torsemide  10 mg Oral BID

## 2019-10-16 NOTE — PROGRESS NOTES
SW:  D:  Received a call back from patient's son Beulah regarding discharge plans.  Patient's son is in agreement with TCU placement on discharge and he is asking for a referral to be sent to [place] as patient was there earlier this year.  If they don't have a bed patient's son is asking for patient to go to a tcu as close to home as possible.  Referral sent, via discharge on the double, to check bed availability.  Patent's son is also asking for the hospitalist to call him with an update, [phone].  P:  Will continue to follow.    CARLOS Olsen, LICSW  [phone]  [facility]

## 2019-10-16 NOTE — PLAN OF CARE
Pt a/o low bp ,c/o pain in feet percocet given with relief SoB on exertion Bp drops and sao2 lower when sitting at side of bed.standing with sba to void

## 2019-10-16 NOTE — PROGRESS NOTES
[facility]  Hospitalist Progress Note    Admit Date:  10/13/2019  Date of Service (when I saw the patient): 10/16/2019       Assessment & Plan   Beulah Jane is a 69 year old male with complicated PMH of severe aortic stenosis, recent TAVR, complete heart block, recent pacemaker placement, heart failure with preserved ejection fraction, hypertension, hyperlipidemia, JUAN, severe pulm HTN, ILD, diabetes, dermatomyositis - admitted for evaluation of syncope.    1.  Syncope and symptomatic orthostatic hypotension  - recent prolonged hospitalization at [facility] from 09/25- 10/08 for acute on chronic hypoxic resp failure- multifactorial due to diastolic CHF, ILD, JUAN  - he was aggressively diuresed and discharge on Torsemide 20 mg po BID  - has known severe pulm HTN and was started on Revatio during prior hospitalization  - CT head negative  - Echo 10/14- EF 55-60%, mod- sever pulm HTN, mild-mod decreased RVSF, mild TR  - Cardiology consult appreciated  - Revatio held on admission, PTA Torsemide was decreased to 10 mg po BID  - started on Midodrine 2.5 mg po TID and increased to 5 mg po TID  - compression stockings  - BP low in am but better throughout the day  - Card following    2. H/O severe aortic stenosis and complete heart block   Recent discharge from [facility] 9/7/19 after TAVR and PPM  PPM interrogated in the ED and uneventful     3. Elevated troponins     Known CAD  - flat 0.182--0.195  - no chest pain  - Cardiac cath 7/2019-  CHAU to to the mid and distal LAD - 7/201  - continue PTA plavix, asa, statin    4. H/O severe pulmonary HTN  Recent discharge from [facility] 10/2/19  Had a right heart cath during that admission that revealed severe pulm HTN  He had a positive vasodilator trial and was placed on Revatio at time of that hospital discharge but now stopped because of orthostatic hypotension  May need inhaler pulmonary vasodilator - [facility]     5.  H/O pulmonary fibrosis, interstitial lung  disease       Dermatomyositis       JUAN  - supposed to use CPAP but it seems that he is not very compliant, did not bring in his CPAP machine during his long recent hospitalization  - was discharged on O2 1.5 liters  - PTA methotrexate for his interstitial lung disease- on hold for now  - follow up with Pulm as outpatient     6.  DM type 2  - PTA on NPH 2-6 units BID- now held  - ISS  - BS 91--110     7.  Chronic pain syndrome  - on PTA gabapentin, percocet    8. Hypothyroidism  - continue PTA Synthroid    9. HLP  - continue PTA statin       Diet: Combination Diet Low Saturated Fat Na <2400mg Diet, No Caffeine Diet    DVT Prophylaxis: Anti-embolisim stockings (TEDs) and Ambulate every shift  [place] Catheter: not present  Code Status: Full Code      Disposition Plan   Expected discharge: 1-2 days, recommended to prior living arrangement once blood pressure greater than 95 systolic and not orthostatic.  Entered: Laura Matos MD 10/16/2019, 6:41 PM       The patient's care was discussed with the Patient and his son.    I have spent 35 minutes taking care and reviewing the chart of Mr Alexander with more than 50% of time spent counseling the patient and his family.    Interval History   Feeling OK when he lies down but felt dizzy and lightheaded with orthostatic hypotension when he got up to void; no chest pain, no SOB at rest; no N/V, no abd pain; discussed with son and bedside RN.    -Data reviewed today: I reviewed all new labs and imaging results over the last 24 hours. I personally reviewed no images or EKG's today.    Repeat ECHO - 10/14/19 -  The right ventricle is moderately dilated.  The right ventricular systolic function is mild to moderately reduced.  There is mild (1+) tricuspid regurgitation.  Dilation of the inferior vena cava is present with abnormal respiratory  variation in diameter.  Right ventricular systolic pressure is elevated, consistent with moderate to  severe pulmonary hypertension.  The  aortic valve is not well visualized.  PA pressure may be higher when compared with the numerous recent studies. The  study was technically difficult.      Physical Exam   Temp: 98.7  F (37.1  C) Temp src: Oral BP: 95/65 Pulse: 98 Heart Rate: 84 Resp: 20 SpO2: 95 % O2 Device: Nasal cannula Oxygen Delivery: 2 LPM  Vitals:    10/14/19 0250 10/15/19 0002 10/16/19 0032   Weight: 70.2 kg (154 lb 12.8 oz) 69.9 kg (154 lb 3.2 oz) 68.4 kg (150 lb 11.2 oz)     Vital Signs with Ranges  Temp:  [98.1  F (36.7  C)-98.7  F (37.1  C)] 98.7  F (37.1  C)  Pulse:  [] 98  Heart Rate:  [] 84  Resp:  [16-25] 20  BP: ()/(46-74) 95/65  SpO2:  [86 %-96 %] 95 %  I/O last 3 completed shifts:  In: 3189 [P.O.:3189]  Out: 600 [Urine:600]    GEN:  Alert, oriented x 3, comfortable, no overt distress.  No conversational dyspnea  HEENT:  Normocephalic/atraumatic, no scleral icterus, no nasal discharge, mouth and membranes moist, no oral ulcers or thrush noted.  NECK:  No clear thyromegaly of clear JVD  CV:  S1S2, RRR, no murmurs, no rubs  LUNGS:  Bilateral crackles posterior lung fields- ILD, no rales, no wheezing  ABD:  abd- soft, nonT, nonD, BS present  EXT:  Trace edema b/l LE, no calf tenderness  SKIN:  Dry to touch, no rashes or jaundice noted.  PSYCH:  Mood appropriate  NEURO:  AAOX3, no FNDs    Data   Labs:  Recent Labs   Lab 10/16/19  0420 10/15/19  0333 10/13/19  2303    136 137   POTASSIUM 4.3 4.5 4.2   CHLORIDE 102 102 103   CO2 31 31 26   ANIONGAP 3 3 8   * 123* 190*   BUN 16 20 22   CR 0.99 0.90 1.06   GFRESTIMATED 77 86 71   GFRESTBLACK 89 >90 82   DMITRIY 8.6 8.5 8.5     Recent Labs   Lab 10/15/19  0333 10/13/19  2303   WBC 12.5* 13.0*   HGB 9.0* 9.2*   HCT 32.1* 33.8*   MCV 77* 77*    546*     Recent Labs   Lab 10/14/19  0536 10/13/19  2303   TROPI 0.195* 0.182*      Recent Imaging:   No results found for this or any previous visit (from the past 24 hour(s)).    Medications     - MEDICATION  INSTRUCTIONS -         aspirin  81 mg Oral Daily     atorvastatin  40 mg Oral Daily     clopidogrel  75 mg Oral Daily     docusate sodium  100 mg Oral At Bedtime     ferrous gluconate  324 mg Oral BID w/meals     folic acid  1 mg Oral QAM     gabapentin  600 mg Oral TID     insulin aspart  1-7 Units Subcutaneous TID AC     insulin aspart  1-5 Units Subcutaneous At Bedtime     levothyroxine  50 mcg Oral QAM     midodrine  5 mg Oral TID w/meals     pantoprazole  40 mg Oral QAM AC     sodium chloride (PF)  3 mL Intracatheter Q8H     torsemide  5 mg Oral BID

## 2019-10-16 NOTE — PROGRESS NOTES
"[facility]  Cardiology Progress Note         Assessment and Plan:     Syncope  Orthostatic hypotension-may be partly meds and cor pulm and inability to shunt blood R to L across lungs vasculature  Severe ILD  Cor pulmonale  TAVR  CHF (left with pEF and right with cor pulmonale)--weight no change over noc on lower dose meds    10/16 today was the smallest drop in bp with standing  Could raise midodrine in next 1-2 days if needed but need to be careful if there is any cross over and it worsens pulm htn  Would like to get him up walking, check bp and O2 sat with walking-will consider tomorrow if little OH  Nando hose for now   Weight still down on lower dose diuretic, may be able to dec diuretic further              Interval History:   No new issues              Medications:   Scheduled Meds:     aspirin  81 mg Oral Daily     atorvastatin  40 mg Oral Daily     clopidogrel  75 mg Oral Daily     docusate sodium  100 mg Oral At Bedtime     ferrous gluconate  324 mg Oral BID w/meals     folic acid  1 mg Oral QAM     gabapentin  600 mg Oral TID     insulin aspart  1-7 Units Subcutaneous TID AC     insulin aspart  1-5 Units Subcutaneous At Bedtime     levothyroxine  50 mcg Oral QAM     midodrine  5 mg Oral TID w/meals     pantoprazole  40 mg Oral QAM AC     sodium chloride (PF)  3 mL Intracatheter Q8H     torsemide  10 mg Oral BID     PRN Meds: acetaminophen, acetaminophen, bisacodyl, glucose **OR** dextrose **OR** glucagon, diphenhydrAMINE, levalbuterol, lidocaine 4%, lidocaine (buffered or not buffered), magnesium hydroxide, melatonin, naloxone, ondansetron **OR** ondansetron, oxyCODONE-acetaminophen, - MEDICATION INSTRUCTIONS -, senna-docusate **OR** senna-docusate, sodium chloride (PF)         Physical Exam:   Blood pressure 90/67, pulse 98, temperature 98.7  F (37.1  C), temperature source Oral, resp. rate 20, height 1.702 m (5' 7\"), weight 68.4 kg (150 lb 11.2 oz), SpO2 95 %.  Vitals:    10/13/19 2249 " 10/14/19 0250 10/15/19 0002 10/16/19 0032   Weight: 68 kg (150 lb) 70.2 kg (154 lb 12.8 oz) 69.9 kg (154 lb 3.2 oz) 68.4 kg (150 lb 11.2 oz)       Intake/Output Summary (Last 24 hours) at 10/15/2019 1038  Last data filed at 10/15/2019 0900  Gross per 24 hour   Intake 900 ml   Output 1475 ml   Net -575 ml           Vital Sign Ranges  Temperature Temp  Av.2  F (36.8  C)  Min: 97.8  F (36.6  C)  Max: 99  F (37.2  C)   Blood pressure Systolic (24hrs), Av , Min:88 , Max:115        Diastolic (24hrs), Av, Min:54, Max:79      Pulse Pulse  Av.9  Min: 87  Max: 100   Respirations Resp  Av.1  Min: 16  Max: 20   Pulse oximetry SpO2  Av.4 %  Min: 88 %  Max: 98 %             Constitutional: Awake, alert, cooperative, no apparent distress       Skin: No rash, petechia, cyanosis       Neck:        Lungs:        Cardiovasc:        Abdomen:        Neuro: Alert and oriented x3, non focal exam       Extremities: Trace LE edema       Other:             Data:     Recent Labs   Lab Test 10/15/19  0333 10/13/19  2303  08/29/19  1525  19  2237   WBC 12.5* 13.0*   < > 9.8   < >  --    HGB 9.0* 9.2*   < > 9.7*   < >  --    MCV 77* 77*   < > 77*   < >  --     546*   < > 460*   < > 417   INR  --   --   --  1.38*  --  1.11    < > = values in this interval not displayed.      Recent Labs   Lab Test 10/16/19  0420 10/15/19  0333    136   POTASSIUM 4.3 4.5   CHLORIDE 102 102   BUN 16 20   CR 0.99 0.90     Recent Labs   Lab 10/16/19  0420 10/15/19  0333 10/13/19  2303   * 123* 190*     Recent Labs   Lab Test 10/13/19  2303 09/25/19  1411   ALT 20 20   AST 27 26     No components found for: TROPONINIES    Lab Results   Component Value Date    CHOL 96 2019     Lab Results   Component Value Date    HDL 19 2019     Lab Results   Component Value Date    LDL 58 2019     Lab Results   Component Value Date    TRIG 97 2019     No results found for: CHOLHDLRATIO       TSH   Date  Value Ref Range Status   09/26/2019 0.46 0.40 - 4.00 mU/L Final

## 2019-10-16 NOTE — PROGRESS NOTES
SW:  D: Call placed and message left for patient's son Beulah to discuss discharge planning.  Awaiting a return call.  P:  Will continue to follow.    CARLOS Olsen, [facility]  [phone]  [facility]

## 2019-10-16 NOTE — PLAN OF CARE
PLAN: Daily orthostatic BP checks. SBP dropped while sitting and standing with systolic pressures in 60's and 70's. Patient feels light headed and SOB while standing.      CNS:A&Ox4. Baseline peripheral neuropathy to BLE. Pain increased this AM, however pressures soft. RN gave 0900 gabapentin early to avoid narcotic use. Patient also states our gabapentin dose is less than what his pain doctor has prescribed.  CVS: SR with BBB. Positive orthostatic VS.   RESP: 1.5 L NC at baseline. Required bump after standing up; recovered back to baseline. Coarse crackles in lung bases.   GI: Cardiac diet. Senna and Docusate given. No results yet.   : Voiding in bedside urinal.   MOBILITY: up with one, GB, wheeled walker to stand only. Very orthostatic.   IV: PIV saline locked   SKIN: Heels blanchable. Healing L shoulder pacemaker site.   Blood glucose: No sliding scale needed this shift.   Lactate triggered at 0430: 1.8. Afebrile.

## 2019-10-16 NOTE — PLAN OF CARE
Discharge Planner PT   Patient plan for discharge: Not stated  Current status: Orders received, chart reviewed, PT evaluation completed and treatment initiated. Patient admitted on 10/13/19 for evaluation of SOB with ambulation then syncopal event. Patient with recent stay at [facility] on 9/7/19 and now s/p TAVR and PPM placement. Patient with severe syptomatic orthostatic hypotension during this hospital admission. Patient lives in a condo with his spouse and uses a 4WW at baseline.     Patient supine in bed upon arrival of therapist, agreeable to working with PT. Educated patient on role of PT and PT POC. Vitals in supine noted as /78 HR 91 SpO2 97%. Supine>sit with Min A, patient able to scoot towards HOB with CGA. Patient sat at EOB for ~2-3 minutes, then noting increased dizziness. Vitals sitting at EOB noted as 80/54 HR 90 SpO2 92%. Patient returned to supine at end of session with all needs in reach and bed alarm on.   Barriers to return to prior living situation: Decreased tolerance to activity, current level of A  Recommendations for discharge: TCU  Rationale for recommendations: Patient unable to tolerate much activity 2/2 to severe symptomatic orthostatic hypotension. Requiring Min A for bed mobility and only able to tolerate sitting at EOB for ~2-3 minutes before becoming symptomatic. Patient would benefit from continued skilled therapy to further improve strength, balance, and independence with mobility and ambulation to address functional limitations and decrease falls risk.         Entered by: Eugenia Jerome 10/16/2019 10:37 AM

## 2019-10-16 NOTE — PROGRESS NOTES
10/16/19 1000   Quick Adds   Type of Visit Initial PT Evaluation   Living Environment   Lives With spouse   Living Arrangements condominium   Self-Care   Usual Activity Tolerance moderate   Current Activity Tolerance fair   Functional Level Prior   Ambulation 1-->assistive equipment   Transferring 1-->assistive equipment   Toileting 0-->independent   Bathing 0-->independent   Fall history within last six months yes   Number of times patient has fallen within last six months 1   Which of the above functional risks had a recent onset or change? ambulation;transferring   General Information   Onset of Illness/Injury or Date of Surgery - Date 10/13/19   Referring Physician Jason Davila MD   Patient/Family Goals Statement Not stated   Pertinent History of Current Problem (include personal factors and/or comorbidities that impact the POC) Patient admitted on 10/13/19 for evaluation of SOB with ambulation then syncopal event. Patient with recent stay at [facility] on 9/7/19 and now s/p TAVR and PPM placement. Patient with severe syptomatic orthostatic hypotension during this hospital admission.    Precautions/Limitations fall precautions   Weight-Bearing Status - LLE full weight-bearing   Weight-Bearing Status - RLE full weight-bearing   General Observations Patient supine in bed upon arrival of therapist, agreeable to working with PT    Cognitive Status Examination   Orientation orientation to person, place and time   Level of Consciousness alert   Follows Commands and Answers Questions 100% of the time;able to follow multistep instructions   Pain Assessment   Patient Currently in Pain Yes, see Vital Sign flowsheet  (notes baseline pain in B LE (feet))   Posture    Posture Forward head position   Range of Motion (ROM)   ROM Comment B LE ROM WNL    Strength   Strength Comments Not formally or functionally assessed, unable to tolerate sitting at EOB more than 3 minutes   Bed Mobility   Bed Mobility  "Comments Supine>sit with Min A   Transfer Skills   Transfer Comments Not assessed, unable to tolerate sitting at EOB more than 3 minutes   Gait   Gait Comments Not assessed, unable to tolerate sitting at EOB more than 3 minutes   Balance   Balance Comments Sitting balance at EOB good with SBA and B UE support on bed   General Therapy Interventions   Planned Therapy Interventions balance training;bed mobility training;gait training;strengthening;transfer training;home program guidelines   Clinical Impression   Criteria for Skilled Therapeutic Intervention yes, treatment indicated   PT Diagnosis Impaired mobility and gait   Influenced by the following impairments decreased tolerance to activity, severe symptomatic orthostatic hypotension   Functional limitations due to impairments bed mobility, transfers, gait, stairs   Clinical Presentation Stable/Uncomplicated   Clinical Presentation Rationale Based on PMH, current presentation, and social support    Clinical Decision Making (Complexity) Low complexity   Therapy Frequency Daily   Predicted Duration of Therapy Intervention (days/wks) 5 days   Anticipated Discharge Disposition Transitional Care Facility   Risk & Benefits of therapy have been explained Yes   Patient, Family & other staff in agreement with plan of care Yes   [facility] [employer] \"6 Clicks\"   2016, Trustees of [facility], under license to [employer].  All rights reserved.   6 Clicks Short Forms Basic Mobility Inpatient Short Form   [facility] [employer]  \"6 Clicks\" V.2 Basic Mobility Inpatient Short Form   1. Turning from your back to your side while in a flat bed without using bedrails? 4 - None   2. Moving from lying on your back to sitting on the side of a flat bed without using bedrails? 3 - A Little   3. Moving to and from a bed to a chair (including a wheelchair)? 3 - A Little   4. Standing up from a chair using your arms (e.g., wheelchair, or bedside chair)? 3 - A Little   5. To " walk in hospital room? 2 - A Lot   6. Climbing 3-5 steps with a railing? 2 - A Lot   Basic Mobility Raw Score (Score out of 24.Lower scores equate to lower levels of function) 17   Total Evaluation Time   Total Evaluation Time (Minutes) 8

## 2019-10-17 NOTE — PROGRESS NOTES
[facility]  Hospitalist Progress Note    Admit Date:  10/13/2019  Date of Service (when I saw the patient): 10/17/2019       Assessment & Plan   Beulah Jane is a 69 year old male with complicated PMH of severe aortic stenosis, recent TAVR, complete heart block, recent pacemaker placement, heart failure with preserved ejection fraction, hypertension, hyperlipidemia, JUAN, severe pulm HTN, ILD, diabetes, dermatomyositis - admitted for evaluation of syncope.    1.  Syncope and symptomatic orthostatic hypotension  - recent prolonged hospitalization at [facility] from 09/25- 10/08 for acute on chronic hypoxic resp failure- multifactorial due to diastolic CHF, ILD, JUAN  - he was aggressively diuresed and discharge on Torsemide 20 mg po BID  - has known severe pulm HTN and was started on Revatio during prior hospitalization  - CT head negative  - Echo 10/14- EF 55-60%, mod- sever pulm HTN, mild-mod decreased RVSF, mild TR  - Cardiology consult appreciated  - Revatio held on admission, PTA Torsemide was decreased to 10 mg po BID  - started on Midodrine 2.5 mg po TID and increased to 5 mg po TID  - compression stockings  - BP better now, but dropped to 76/56 with ambulation with PT  - PT rec TCU  - Card following- finds difficult to manage simultaneously pulm HTN and need for diuretic with orthostatic hypotension, on Midodrine  - compression stockings  - may further increase Midodrine in the next few days?    2. H/O severe aortic stenosis and complete heart block   Recent discharge from [facility] 9/7/19 after TAVR and PPM  PPM interrogated in the ED and uneventful     3. Elevated troponins     Known CAD  - flat 0.182--0.195  - no chest pain  - Cardiac cath 7/2019-  CHAU to to the mid and distal LAD - 7/201  - continue PTA plavix, asa, statin    4. H/O severe pulmonary HTN  Recent discharge from [facility] 10/2/19  Had a right heart cath during that admission that revealed severe pulm HTN  He had a positive  vasodilator trial and was placed on Revatio at time of that hospital discharge but now stopped because of orthostatic hypotension  - May need inhaler pulmonary vasodilator  - follow up with Dr Ally Williamson after d/c     5.  H/O pulmonary fibrosis, interstitial lung disease       Dermatomyositis       JUAN  - supposed to use CPAP but it seems that he is not very compliant, did not bring in his CPAP machine during his long recent hospitalization  - was discharged on O2 1.5 liters  - PTA methotrexate for his interstitial lung disease- on hold for now  - was seen by Pulm during prior hospitalization  - follow up with Pulm as outpatient as scheduled     6.  DM type 2  - PTA on NPH 2-6 units BID- now held  - ISS  - BS 91--110     7.  Chronic pain syndrome  - on PTA gabapentin, percocet    8. Hypothyroidism  - continue PTA Synthroid    9. HLP  - continue PTA statin       Diet: Combination Diet Low Saturated Fat Na <2400mg Diet, No Caffeine Diet    DVT Prophylaxis: Anti-embolisim stockings (TEDs) and Ambulate every shift  [place] Catheter: not present  Code Status: Full Code      Disposition Plan   Expected discharge: 1-2 days, recommended to TCU vs  prior living arrangement once blood pressure greater than 95 systolic and not orthostatic.  Entered: Laura Matos MD 10/17/2019, 2:33 PM       The patient's care was discussed with the Patient and his wife.      Interval History    Concerned that his BP dropped again with walking; feels fine when he lies down; no chest pain; mild SOB at rest- at baseline; no N/V, no abd pain; he may agree to go to TCU- awaiting to talk with SW; discussed with wife and RN    -Data reviewed today: I reviewed all new labs and imaging results over the last 24 hours. I personally reviewed no images or EKG's today.    Repeat ECHO - 10/14/19 -  The right ventricle is moderately dilated.  The right ventricular systolic function is mild to moderately reduced.  There is mild (1+) tricuspid  regurgitation.  Dilation of the inferior vena cava is present with abnormal respiratory  variation in diameter.  Right ventricular systolic pressure is elevated, consistent with moderate to  severe pulmonary hypertension.  The aortic valve is not well visualized.  PA pressure may be higher when compared with the numerous recent studies. The  study was technically difficult.      Physical Exam   Temp: 98.5  F (36.9  C) Temp src: Oral BP: 95/60 Pulse: 82 Heart Rate: 100 Resp: 16 SpO2: 93 % O2 Device: Nasal cannula Oxygen Delivery: 2 LPM  Vitals:    10/15/19 0002 10/16/19 0032 10/17/19 0527   Weight: 69.9 kg (154 lb 3.2 oz) 68.4 kg (150 lb 11.2 oz) 69 kg (152 lb 3.2 oz)     Vital Signs with Ranges  Temp:  [97.6  F (36.4  C)-98.8  F (37.1  C)] 98.5  F (36.9  C)  Pulse:  [82-98] 82  Heart Rate:  [] 100  Resp:  [15-20] 16  BP: ()/(60-80) 95/60  SpO2:  [93 %-97 %] 93 %  I/O last 3 completed shifts:  In: 1735 [P.O.:1735]  Out: 850 [Urine:850]    GEN:  Alert, oriented x 3, comfortable, no overt distress., laying flat;  HEENT:  Normocephalic/atraumatic, no scleral icterus, no nasal discharge, mouth and membranes moist, no oral ulcers or thrush noted.  NECK:  No clear thyromegaly of clear JVD  CV:  S1S2, RRR, no murmurs, no rubs  LUNGS:  Bilateral crackles posterior lung fields- ILD, no rales, no wheezing  ABD:  abd- soft, nonT, nonD, BS present  EXT:  Trace edema b/l LE, no calf tenderness  SKIN:  Dry to touch, no rashes or jaundice noted.  PSYCH:  Normal mood, normal affect  NEURO:  AAOX3, no FNDs    Data   Labs:  Recent Labs   Lab 10/16/19  0420 10/15/19  0333 10/13/19  2303    136 137   POTASSIUM 4.3 4.5 4.2   CHLORIDE 102 102 103   CO2 31 31 26   ANIONGAP 3 3 8   * 123* 190*   BUN 16 20 22   CR 0.99 0.90 1.06   GFRESTIMATED 77 86 71   GFRESTBLACK 89 >90 82   DMITRIY 8.6 8.5 8.5     Recent Labs   Lab 10/15/19  0333 10/13/19  2303   WBC 12.5* 13.0*   HGB 9.0* 9.2*   HCT 32.1* 33.8*   MCV 77* 77*   PLT  426 546*     Recent Labs   Lab 10/14/19  0536 10/13/19  2303   TROPI 0.195* 0.182*      Recent Imaging:   No results found for this or any previous visit (from the past 24 hour(s)).    Medications     - MEDICATION INSTRUCTIONS -         aspirin  81 mg Oral Daily     atorvastatin  40 mg Oral Daily     clopidogrel  75 mg Oral Daily     docusate sodium  100 mg Oral At Bedtime     ferrous gluconate  324 mg Oral BID w/meals     folic acid  1 mg Oral QAM     gabapentin  600 mg Oral TID     insulin aspart  1-7 Units Subcutaneous TID AC     insulin aspart  1-5 Units Subcutaneous At Bedtime     levothyroxine  50 mcg Oral QAM     midodrine  5 mg Oral TID w/meals     pantoprazole  40 mg Oral QAM AC     sodium chloride (PF)  3 mL Intracatheter Q8H     torsemide  5 mg Oral BID

## 2019-10-17 NOTE — PROGRESS NOTES
SW:  D:  Received a call back from Home.  They have a bed available for patient tomorrow.  P:  Will continue to follow.    CARLOS Olsen, [facility]  [phone]  [facility]

## 2019-10-17 NOTE — PLAN OF CARE
Pt here due to syncope, found to have significant orthostatic hypotension, now on midodrine. Pt speaks [place] but understands/speaks english well. A&O. LS clear, Tele SR w/ BBB. On 2 L O2, BP in 90s - not OOB this shift. MAHENDRA hose ordered from [facility] [place]. Plan to ambulate tomorrow and monitor O2 and BP during ambulation. Discharge pending.

## 2019-10-17 NOTE — PLAN OF CARE
Discharge Planner PT   Patient plan for discharge: Not stated  Current status:  Supine in bed with legs elevated higher than head resting /80, using bed to elevate head and lowering legs into long sitting position, 2L O2 via NC, pt completing LE exercises in long sitting SaO2 range 82-94%. Supine to sit w/ min A, seated BP 89/66 improving to 100/65 with sitting EOB x 5 min. Sit to stand w/ CGA and WW, standing BPs 92/62 then 98/80 after standing 5 min. Trial of ambulation 40ft w/ WW and CGA, BP dropping to 76/56 with ambulation, increased reports of SOB SaO2 after walking 84%. Sit to supine with min A, BP in supine after 2 min 116/75. Dyspnea present and variable throughout session, pt denied any dizziness until after walking  Barriers to return to prior living situation: Decreased tolerance to activity, current level of A  Recommendations for discharge: TCU  Rationale for recommendations: Pt with orthostatic BP sitting into standing, low but asymptomatic with static positions, pt becoming symptomatic after short walk.   Patient would benefit from continued skilled therapy to further improve strength, balance, and independence with mobility and ambulation to address functional limitations and decrease falls risk.         Entered by: Edelmira Magallon 10/17/2019 10:10 AM

## 2019-10-17 NOTE — PROGRESS NOTES
[facility]  Cardiology Progress Note         Assessment and Plan:     Syncope  Orthostatic hypotension-may be partly meds and cor pulm and inability to shunt blood R to L across lungs vasculature  Severe ILD  Cor pulmonale  TAVR  CHF (left with pEF and right with cor pulmonale)--weight no change over noc on lower dose meds    10/17 today was small drop in bp with sitting   We never got an O2 sat with walking due to his OH  Could raise midodrine in next 1-2 days if needed but need to be careful if there is any cross over and it worsens pulm htn  Would like to get him up walking, check bp and O2 sat with walking-will consider tomorrow if little OH  Marquise hose    Weight starting to go up--may need to reinstate higher dose diuretic but that will worsen OH  I THINK OVERALL HE IS BETTER--I DONT THINK WE CAN FIX THESE TWO COMPETING PROBLEMS (COR PULMONALE AND NEED FOR DIURETIC, PULM VASODILATORS AND OH AND NEED FOR LESS DIURETIC AND MORE MIDODRINE AND LESS PULM VASODILATORS)   HOME OR NH OK TODAY PER IM  WE COULD RAISE MIDODRINE AGAIN IN A FEW DAYS BUT HES ON THE AVERAGE DOSE NOW  He will follow-up with pulm htn clinic [facility] for poss inhaler etc for PHTN  Marquise hose never placed, will try for marquise hose and walk him and check bp to see if safe   here today but pt with full english understanding             Interval History:   No new issues              Medications:   Scheduled Meds:     aspirin  81 mg Oral Daily     atorvastatin  40 mg Oral Daily     clopidogrel  75 mg Oral Daily     docusate sodium  100 mg Oral At Bedtime     ferrous gluconate  324 mg Oral BID w/meals     folic acid  1 mg Oral QAM     gabapentin  600 mg Oral TID     insulin aspart  1-7 Units Subcutaneous TID AC     insulin aspart  1-5 Units Subcutaneous At Bedtime     levothyroxine  50 mcg Oral QAM     midodrine  5 mg Oral TID w/meals     pantoprazole  40 mg Oral QAM AC     sodium chloride (PF)  3 mL Intracatheter Q8H      "torsemide  5 mg Oral BID     PRN Meds: acetaminophen, acetaminophen, bisacodyl, glucose **OR** dextrose **OR** glucagon, diphenhydrAMINE, levalbuterol, lidocaine 4%, lidocaine (buffered or not buffered), magnesium hydroxide, melatonin, naloxone, ondansetron **OR** ondansetron, oxyCODONE-acetaminophen, - MEDICATION INSTRUCTIONS -, senna-docusate **OR** senna-docusate, sodium chloride (PF)         Physical Exam:   Blood pressure 114/80, pulse 82, temperature 97.8  F (36.6  C), temperature source Oral, resp. rate 16, height 1.702 m (5' 7\"), weight 69 kg (152 lb 3.2 oz), SpO2 96 %.  Vitals:    10/13/19 2249 10/14/19 0250 10/15/19 0002 10/16/19 0032   Weight: 68 kg (150 lb) 70.2 kg (154 lb 12.8 oz) 69.9 kg (154 lb 3.2 oz) 68.4 kg (150 lb 11.2 oz)    10/17/19 0527   Weight: 69 kg (152 lb 3.2 oz)       Intake/Output Summary (Last 24 hours) at 10/15/2019 1038  Last data filed at 10/15/2019 0900  Gross per 24 hour   Intake 900 ml   Output 1475 ml   Net -575 ml           Vital Sign Ranges  Temperature Temp  Av.2  F (36.8  C)  Min: 97.8  F (36.6  C)  Max: 99  F (37.2  C)   Blood pressure Systolic (24hrs), Av , Min:88 , Max:115        Diastolic (24hrs), Av, Min:54, Max:79      Pulse Pulse  Av.9  Min: 87  Max: 100   Respirations Resp  Av.1  Min: 16  Max: 20   Pulse oximetry SpO2  Av.4 %  Min: 88 %  Max: 98 %             Constitutional: Awake, alert, cooperative, no apparent distress       Skin: No rash, petechia, cyanosis       Neck:        Lungs:        Cardiovasc:        Abdomen:        Neuro: Alert and oriented x3, non focal exam       Extremities: Trace LE edema       Other:             Data:     Recent Labs   Lab Test 10/15/19  0333 10/13/19  2303  19  1525  19  8337   WBC 12.5* 13.0*   < > 9.8   < >  --    HGB 9.0* 9.2*   < > 9.7*   < >  --    MCV 77* 77*   < > 77*   < >  --     546*   < > 460*   < > 417   INR  --   --   --  1.38*  --  1.11    < > = values in this interval not " displayed.      Recent Labs   Lab Test 10/16/19  0420 10/15/19  0333    136   POTASSIUM 4.3 4.5   CHLORIDE 102 102   BUN 16 20   CR 0.99 0.90     Recent Labs   Lab 10/16/19  0420 10/15/19  0333 10/13/19  2303   * 123* 190*     Recent Labs   Lab Test 10/13/19  2303 09/25/19  1411   ALT 20 20   AST 27 26     No components found for: TROPONINIES    Lab Results   Component Value Date    CHOL 96 09/26/2019     Lab Results   Component Value Date    HDL 19 09/26/2019     Lab Results   Component Value Date    LDL 58 09/26/2019     Lab Results   Component Value Date    TRIG 97 09/26/2019     No results found for: CHOLHDLRATIO       TSH   Date Value Ref Range Status   09/26/2019 0.46 0.40 - 4.00 mU/L Final

## 2019-10-17 NOTE — PLAN OF CARE
Orthostatic vital signs completed this AM. BP dropped to 88/66 with sitting, but this is significant improvement for patient. Patient slept well overnight. Ambulate in [place] with O2 monitoring today.      CNS: A&Ox4. Samoan speaking, but very good english as well.   CVS: SR with BBB. Soft BP's. Positive orthostatic BP's.   RESP: Lungs with crackles in bases. 1.5-2 L NC.   GI: Last BM 10/16. Eating well.   : Voids infrequently.   MOBILITY: 1-2 assist. Very orthostatic.   IV: capped.   SKIN: No concerns.   Pain: Gabapentin and percocet for bilateral foot apin.   Blood glucose without concern.

## 2019-10-18 NOTE — PROGRESS NOTES
[facility]  Cardiology Progress Note         Assessment and Plan:   45 min critical care today   here--45 min visit with pt and wife/family on phone for the whole time and rehab rn in room for plan  Very nice man  S/p tavr but the real problem was prob ILD with hypoxia high PVR limiting cardiac output and flow from right heart to left heart leading to hypotension/syncope  I stopped revatio and dec diuretic  He is now barely orthostatic and no longer passing out with standing, his O2 sat does drop with walk so needs continuous O2--he may be candidate for bosentan or pulm vasodilator inhalers so will set up with pulm htn clinic (scheduled for 11-22 dr [facility] but will try to move up date)  I also dec diuretic and slow weight inc but on exam doesn't look fluid overloaded so I talked to pt (and wife via phone_) for him to do daily weights and he will take extra demadex if weight inc 2-3# two days in a row.      Syncope  Orthostatic hypotension-may be partly meds and cor pulm and inability to shunt blood R to L across lungs vasculature  Severe ILD  Cor pulmonale  TAVR  CHF (left with pEF and right with cor pulmonale)--weight slow inc on lower dose meds      [place]ando hose    Weight starting to go up--may need to reinstate higher dose diuretic but that will worsen OH  I THINK OVERALL HE IS BETTER--I DONT THINK WE CAN FIX THESE TWO COMPETING PROBLEMS (COR PULMONALE AND NEED FOR DIURETIC, PULM VASODILATORS AND OH AND NEED FOR LESS DIURETIC AND MORE MIDODRINE AND LESS PULM VASODILATORS)   HOME OR [facility] OK TODAY PER IM  Cardio will sign off  WE COULD RAISE MIDODRINE AGAIN IN A FEW DAYS BUT HES ON THE AVERAGE DOSE NOW and for most part OH BP better  He needs genl cardio follow-up and pulm htn follow-up      here today but pt with full english understanding             Interval History:   No new issues-says hes better              Medications:   Scheduled Meds:     aspirin  81 mg Oral Daily      "atorvastatin  40 mg Oral Daily     clopidogrel  75 mg Oral Daily     docusate sodium  100 mg Oral At Bedtime     ferrous gluconate  324 mg Oral BID w/meals     folic acid  1 mg Oral QAM     gabapentin  600 mg Oral TID     insulin aspart  1-7 Units Subcutaneous TID AC     insulin aspart  1-5 Units Subcutaneous At Bedtime     levothyroxine  50 mcg Oral QAM     midodrine  5 mg Oral TID w/meals     pantoprazole  40 mg Oral QAM AC     sodium chloride (PF)  3 mL Intracatheter Q8H     torsemide  5 mg Oral BID     PRN Meds: acetaminophen, acetaminophen, bisacodyl, glucose **OR** dextrose **OR** glucagon, diphenhydrAMINE, levalbuterol, lidocaine 4%, lidocaine (buffered or not buffered), magnesium hydroxide, melatonin, naloxone, ondansetron **OR** ondansetron, oxyCODONE-acetaminophen, - MEDICATION INSTRUCTIONS -, senna-docusate **OR** senna-docusate, sodium chloride (PF)         Physical Exam:   Blood pressure 112/78, pulse 87, temperature 98.3  F (36.8  C), temperature source Oral, resp. rate 16, height 1.702 m (5' 7\"), weight 70.7 kg (155 lb 14.4 oz), SpO2 96 %.  Vitals:    10/14/19 0250 10/15/19 0002 10/16/19 0032 10/17/19 0527   Weight: 70.2 kg (154 lb 12.8 oz) 69.9 kg (154 lb 3.2 oz) 68.4 kg (150 lb 11.2 oz) 69 kg (152 lb 3.2 oz)    10/18/19 0613   Weight: 70.7 kg (155 lb 14.4 oz)       Intake/Output Summary (Last 24 hours) at 10/15/2019 1038  Last data filed at 10/15/2019 0900  Gross per 24 hour   Intake 900 ml   Output 1475 ml   Net -575 ml           Vital Sign Ranges  Temperature Temp  Av.2  F (36.8  C)  Min: 97.8  F (36.6  C)  Max: 99  F (37.2  C)   Blood pressure Systolic (24hrs), Av , Min:88 , Max:115        Diastolic (24hrs), Av, Min:54, Max:79      Pulse Pulse  Av.9  Min: 87  Max: 100   Respirations Resp  Av.1  Min: 16  Max: 20   Pulse oximetry SpO2  Av.4 %  Min: 88 %  Max: 98 %             Constitutional: Awake, alert, cooperative, no apparent distress       Skin: No rash, petechia, " cyanosis       Neck: jvp inc       Lungs: Fibrotic crackles       Cardiovasc: No change       Abdomen: NT bs+       Neuro: Alert and oriented x3, non focal exam       Extremities: Trace LE edema with marquise hose       Other:             Data:     Recent Labs   Lab Test 10/15/19  0333 10/13/19  2303  08/29/19  1525  07/05/19  2237   WBC 12.5* 13.0*   < > 9.8   < >  --    HGB 9.0* 9.2*   < > 9.7*   < >  --    MCV 77* 77*   < > 77*   < >  --     546*   < > 460*   < > 417   INR  --   --   --  1.38*  --  1.11    < > = values in this interval not displayed.      Recent Labs   Lab Test 10/16/19  0420 10/15/19  0333    136   POTASSIUM 4.3 4.5   CHLORIDE 102 102   BUN 16 20   CR 0.99 0.90     Recent Labs   Lab 10/16/19  0420 10/15/19  0333 10/13/19  2303   * 123* 190*     Recent Labs   Lab Test 10/13/19  2303 09/25/19  1411   ALT 20 20   AST 27 26     No components found for: TROPONINIES    Lab Results   Component Value Date    CHOL 96 09/26/2019     Lab Results   Component Value Date    HDL 19 09/26/2019     Lab Results   Component Value Date    LDL 58 09/26/2019     Lab Results   Component Value Date    TRIG 97 09/26/2019     No results found for: CHOLHDLRATIO       TSH   Date Value Ref Range Status   09/26/2019 0.46 0.40 - 4.00 mU/L Final

## 2019-10-18 NOTE — PLAN OF CARE
A&Ox4 VSS. 2L of O2 NC. Tele is SR. Percocet given x1 for back/leg pain. No complaints overnight. Continue to monitor.

## 2019-10-18 NOTE — PROGRESS NOTES
SW:  D:  Received discharge orders for patient.  Bed available at [facility] for today.  Patient will transport, via the skyway, around 15:00 today.  Patient informed of the plan and in agreement to the plan.  Call placed to update patient's son Beulah and he is also in agreement to the plan.  Call placed to update [facility] and faxed the orders and the PAS.    PAS-RR    completed D: Per [facility] regulation, SW and submitted PAS-RR to [place] [employer] Direct Connect via the [employer] Line.  PAS-RR confirmation # is : [id number].    I: SW spoke with patient and son Beulah and they are aware a PAS-RR has been submitted.  SW reviewed with patient and son Beulah that they may be contacted for a follow up appointment within 10 days of hospital discharge if their SNF stay is < 30 days.  Contact information for [facility] Line was also provided.    A: Patient and joanne Riojas verbalized understanding.    P: Further questions may be directed to [facility] Line at #[phone], option #4 for PAS-RR staff.      CARLOS Olsen, LICSW  [phone]  [facility]

## 2019-10-18 NOTE — DISCHARGE INSTRUCTIONS
Patient will discharge to [facility] today, [facility], around 15:00.  [facility]'s phone number is [phone].

## 2019-10-18 NOTE — PLAN OF CARE
A/O, VSS ex SBP drops occasionally with position change/activity, 70s while ambulating with rehab and somewhat dizzy. Otherwise 90s-110s/60s-80s. O2sats 97% on 2 L. Tele SR BBB. Plan for possible discharge tomorrow if BP stable.

## 2019-10-18 NOTE — PLAN OF CARE
Discharge Planner PT   Patient plan for discharge: TCU  Current status: Pt seated on EOB. BP in supine 119/77, in sitting 92/71, standing 93/64. Pt able to ambulate 60' with 4WW and CGA, became SOB and dizzy, needing to sit quickly, BP 82/52, , O2 sats difficult to get accurate reading due to cold fingers but appear to be in mid 80's. Pt recovered after 3 minutes of seated rest, able to walk another 40' into room with CGA using 4WW.  Barriers to return to prior living situation: Decreased activity tolerance  Recommendations for discharge: TCU  Rationale for recommendations: Pt is making improvements with mobility and activity tolerance during hospital stay, would benefit from continued PT for strengthening, balance, and mobility training to optimize functional independence and safety prior to return to home.       Entered by: Leann Rhodes 10/18/2019 1:36 PM      Physical Therapy Discharge Summary    Reason for therapy discharge:    Discharged to transitional care facility.    Progress towards therapy goal(s). See goals on Care Plan in [facility] electronic health record for goal details.  Goals not met.  Barriers to achieving goals:   discharge from facility.    Therapy recommendation(s):    Continued therapy is recommended.  Rationale/Recommendations:  cont PT at TCU to optimize functional independence and safety prior to return home.

## 2019-10-18 NOTE — PROGRESS NOTES
Per RONNIE Lee's request, message sent to Dr. Williamson's pulmonary hypertension (PH) nurse requesting they help facilitate pt getting sooner follow up than 11/22 with PH provider either in [place] or at [facility].    CARI Manning 10:36 AM 10/18/2019

## 2019-10-18 NOTE — PLAN OF CARE
Ortho BP positive this AM and BP dropped when working with rehab. Tele: SR c/ BBB. IV out and monitor off. TCU packet sent with Melquiades from [facility]. Family brought rest of belongings to TCU.

## 2019-10-18 NOTE — DISCHARGE SUMMARY
[facility]    Discharge Summary  Hospitalist    Date of Admission:  10/13/2019  Date of Discharge:  10/18/2019  3:54 PM  Discharging Provider: Laura Matos MD  Date of Service (when I saw the patient): 10/18/19    Discharge Diagnoses   Syncope  Symptomatic orthostatic hypotension  Severe Pulm HTN    Chronic and stable medical problems:  CAD  H/o severe Ao stenosis s/p TAVR at [facility] on 09/07/2019  Pulmonary fibrosis/ Interstitial lung disease  Chronic Hypoxic respiratory failure  Chronic pain syndrome  JUAN  DM type 2  Hypothyroidism  HLP      History of Present Illness   Beulah Jane is a 69 year old male with complicated PMH of severe aortic stenosis, recent TAVR, complete heart block, recent pacemaker placement, heart failure with preserved ejection fraction, hypertension, hyperlipidemia, JUAN, severe pulm HTN, ILD, diabetes, dermatomyositis - admitted for evaluation of syncope; for a detailed HPI- please refer to H&P done by Dr Jason Davila on 10/14/2019.    Hospital Course   Beulah Jane was admitted on 10/13/2019.  The following problems were addressed during his hospitalization:    1.  Syncope        Symptomatic orthostatic hypotension  - recent prolonged hospitalization at [facility] from 09/25- 10/08 for acute on chronic hypoxic resp failure- multifactorial due to diastolic CHF, ILD, JUAN  - he was aggressively diuresed and discharge on Torsemide 20 mg po BID  - has known severe pulm HTN and was started on Revatio during prior hospitalization  - CT head negative  - Echo 10/14- EF 55-60%, mod- sever pulm HTN, mild-mod decreased RVSF, mild TR  - Cardiology consult appreciated  - Revatio held on admission, PTA Torsemide was decreased to 10 mg po BID, then further decreased to 5 mg po BID  - started on Midodrine 2.5 mg po TID and increased to 5 mg po TID  - compression stockings  - BP improved, still dropping with ambulation with PT, overall much improved  - long discussion with  the patient, tried to explain to him the difficulty to manage simultaneously pulm HTN and need for diuretic with orthostatic hypotension, on Midodrine; advised him to sit at the edge of the bed for 5 minutes before getting up to walk, then if he starts feeling dizzy while walking- to try to sit down as soon as possible  - plan to discharge him on decreased dose of Torsemide 5 mg po BID (along with decreased dose of KCl 20 mEq po daily) and Midodrine 5 mg po TID; he will need daily weights and advised to take an extra tab of Torsemide if weight up 2lbs/day  - may further increase Midodrine in the future?- defer to Cardiology as outpatient  - PT rec TCU    2. H/O severe aortic stenosis and complete heart block   Recent discharge from [facility] 9/7/19 after TAVR and PPM  PPM interrogated in the ED and uneventful     3. Elevated troponins     Known CAD  - flat 0.182--0.195  - no chest pain  - Cardiac cath 7/2019-  CHAU to to the mid and distal LAD   - continue PTA Plavix, Asa, statin     4. H/O severe pulmonary HTN  Recent discharge from [facility] 10/2/19  Had a right heart cath during that admission that revealed severe pulm HTN  He had a positive vasodilator trial and was placed on Revatio at time of that hospital discharge but now stopped because of orthostatic hypotension  - May need inhaler pulmonary vasodilator  - follow up with Dr Ally Williamson after d/c     5.  H/O pulmonary fibrosis, interstitial lung disease       Dermatomyositis        JUAN  - supposed to use CPAP but it seems that he is not very compliant, did not bring in his CPAP machine during his long recent hospitalization  - was discharged on O2 1.5 liters  - PTA methotrexate for his interstitial lung disease held during admission, resumed after discharge  - was seen by Pulm during prior hospitalization  - follow up with Pulm as outpatient as scheduled  - continue supplemental O2 1.5-2 liters  - states that he has CPAP at home but never brought it to the  hospital and does not seem willing to use it, states that he will continue to use supplemental O2.     6.  DM type 2  - PTA on NPH 2-6 units BID- held on admission and will not continue after discharge since BS varied between 90--137  - ISS     7.  Chronic pain syndrome  - continue PTA gabapentin and Percocet prn     8. Hypothyroidism  - continue PTA Synthroid     9. HLP  - continue PTA statin    Laura Matos MD    Significant Results and Procedures   See below    Echocardiogram 10/14/2019    Interpretation Summary     The right ventricle is moderately dilated.  The right ventricular systolic function is mild to moderately reduced.  There is mild (1+) tricuspid regurgitation.  Dilation of the inferior vena cava is present with abnormal respiratory  variation in diameter.  Right ventricular systolic pressure is elevated, consistent with moderate to  severe pulmonary hypertension.  The aortic valve is not well visualized.  PA pressure may be higher when compared with the numerous recent studies. The  study was technically difficult.  _____________________________________________________________________________    Left Ventricle  The left ventricle is normal in size. There is mild concentric left  ventricular hypertrophy. The visual ejection fraction is estimated at 55-60%.      Pending Results   None  Unresulted Labs Ordered in the Past 30 Days of this Admission     No orders found from 9/13/2019 to 10/14/2019.          Code Status   Full Code       Primary Care Physician   Hoa López    Physical Exam   Temp: 98.3  F (36.8  C) Temp src: Oral BP: 112/78 Pulse: 87 Heart Rate: 82 Resp: 16 SpO2: 96 % O2 Device: Nasal cannula Oxygen Delivery: 2 LPM  Vitals:    10/16/19 0032 10/17/19 0527 10/18/19 0613   Weight: 68.4 kg (150 lb 11.2 oz) 69 kg (152 lb 3.2 oz) 70.7 kg (155 lb 14.4 oz)     Vital Signs with Ranges  Temp:  [98.1  F (36.7  C)-98.5  F (36.9  C)] 98.3  F (36.8  C)  Pulse:  [87-92] 87  Heart Rate:   [] 82  Resp:  [16-17] 16  BP: ()/(60-78) 112/78  SpO2:  [93 %-98 %] 96 %  I/O last 3 completed shifts:  In: 570 [P.O.:570]  Out: 650 [Urine:650]    GEN:  Alert, oriented x 3, comfortable, no overt distress., laying flat;  HEENT:  Normocephalic/atraumatic, no scleral icterus, no nasal discharge  NECK:  No clear thyromegaly of clear JVD  CV:  S1S2, RRR, no murmurs, no rubs  LUNGS:  Bilateral crackles posterior lung fields- ILD, no rales, no wheezing  ABD:  abd- soft, nonT, nonD, BS present  EXT:  Trace edema b/l LE, no calf tenderness  SKIN:  Dry to touch, no rashes or jaundice noted.  PSYCH:  Normal mood, normal affect  NEURO:  AAOX3, no FNDs    Discharge Disposition   Discharged to short-term care facility  Condition at discharge: Satisfactory    Consultations This Hospital Stay   CARDIOLOGY IP CONSULT  PHYSICAL THERAPY ADULT IP CONSULT  CARE TRANSITION RN/SW IP CONSULT  PHYSICAL THERAPY ADULT IP CONSULT  OCCUPATIONAL THERAPY ADULT IP CONSULT    Time Spent on this Encounter   I, Laura Matos MD, personally saw the patient today and spent greater than 30 minutes discharging this patient.    Discharge Orders      General info for SNF    Length of Stay Estimate: Short Term Care: Estimated # of Days <30  Condition at Discharge: Improving  Level of care:skilled   Rehabilitation Potential: Fair  Admission H&P remains valid and up-to-date: Yes  Recent Chemotherapy: N/A  Use Nursing Home Standing Orders: Yes     Mantoux instructions    Give two-step Mantoux (PPD) Per Facility Policy Yes     Reason for your hospital stay    Syncope due to symptomatic orthostatic hypotension.     Glucose monitor nursing POCT    Before meals and at bedtime     Daily weights    Call Provider for weight gain of more than 2 pounds per day or 5 pounds per week.     Additional Discharge Instructions    Compression stockings     Follow Up and recommended labs and tests    Follow up with Nursing home physician in 2 days.  The  following labs/tests are recommended: BMP.  Follow up with primary care provider after discharge home.  Follow up with Pulmonology as scheduled.  Follow up with [facility], Dr Ally Williamson.     Activity - Up with nursing assistance     Full Code     Physical Therapy Adult Consult    Evaluate and treat as clinically indicated.    Reason:  Orthostatic hypotension, deconditioning.     Occupational Therapy Adult Consult    Evaluate and treat as clinically indicated.    Reason:  Orthostatic hypotension, deconditioning.     Oxygen - Nasal cannula    1.5-2 Lpm by nasal cannula to keep O2 sats 92% or greater.     Fall precautions     Advance Diet as Tolerated    Follow this diet upon discharge: Orders Placed This Encounter      Combination Diet Low Saturated Fat Na <2400mg Diet, Mod carb diet 1600-1900cal.     Discharge Medications   Current Discharge Medication List      START taking these medications    Details   insulin aspart (NOVOLOG VIAL) 100 UNITS/ML vial Give before meals and before bed:  For Pre-Meal Glucose:  140-189 give 1 unit   190-239 give 2 units   240-289 give 3 units   290-339 give 4 units   = or >340 give 5 units     For Bedtime Glucose  200 - 239 give 1 units   240 - 289 give 1.5 units  290 - 339 give 2 units  = or >340 give 2.5 units  Qty: 10 mL, Refills: 0    Associated Diagnoses: Type 2 diabetes mellitus with diabetic neuropathy, with long-term current use of insulin (H)      midodrine (PROAMATINE) 5 MG tablet Take 1 tablet (5 mg) by mouth 3 times daily (with meals)    Associated Diagnoses: Orthostatic hypotension         CONTINUE these medications which have CHANGED    Details   oxyCODONE-acetaminophen (PERCOCET) 7.5-325 MG per tablet Take 1 tablet by mouth every 6 hours as needed for severe pain (max 3 tablets daily) Dispense 09/23/19. OK to start  09/25/19  Qty: 20 tablet, Refills: 0    Associated Diagnoses: Chronic pain syndrome; Dermatomyositis (H)      potassium chloride ER  (K-DUR/KLOR-CON M) 20 MEQ CR tablet Take 1 tablet (20 mEq) by mouth daily  Qty: 30 tablet, Refills: 0    Associated Diagnoses: Acute on chronic heart failure with preserved ejection fraction (H)      !! torsemide (DEMADEX) 5 MG tablet Take 1 tablet (5 mg) by mouth 2 times daily    Associated Diagnoses: Congestive heart failure, NYHA class III, chronic, diastolic (H)      !! torsemide (DEMADEX) 5 MG tablet Take 1 tablet (5 mg) by mouth daily as needed (if weight increases more than 2 lbs per day)    Associated Diagnoses: Congestive heart failure, NYHA class III, chronic, diastolic (H)       !! - Potential duplicate medications found. Please discuss with provider.      CONTINUE these medications which have NOT CHANGED    Details   acetaminophen (TYLENOL) 325 MG tablet Take 2 tablets (650 mg) by mouth every 8 hours as needed for pain    Associated Diagnoses: Pulmonary hypertension (H)      albuterol (2.5 MG/3ML) 0.083% neb solution Take 1 vial by nebulization every 6 hours as needed for shortness of breath / dyspnea or wheezing      aspirin (ASA) 81 MG EC tablet Take 1 tablet (81 mg) by mouth daily Start tomorrow morning.  Qty: 90 tablet, Refills: 3    Associated Diagnoses: Coronary artery disease due to lipid rich plaque      atorvastatin (LIPITOR) 40 MG tablet Take 1 tablet (40 mg) by mouth daily  Qty: 90 tablet, Refills: 3    Associated Diagnoses: Coronary artery disease due to lipid rich plaque      benzocaine-menthol (CEPACOL) 15-3.6 MG lozenge Place 1 lozenge inside cheek every hour as needed for sore throat  Qty: 60 lozenge, Refills: 0    Associated Diagnoses: Cough      calcium carbonate (OS-DMITRIY 500 MG [place]. [place]) 500 MG tablet Take 1 tablet (500 mg) by mouth 2 times daily  Qty: 180 tablet, Refills: 3    Associated Diagnoses: Pre-operative examination; Spinal stenosis of lumbar region with neurogenic claudication      Cholecalciferol (VITAMIN D) 2000 units tablet Take 2,000 Units by mouth daily  Qty: 100 tablet,  Refills: 3    Associated Diagnoses: Pre-operative examination; Spinal stenosis of lumbar region with neurogenic claudication      clopidogrel (PLAVIX) 75 MG tablet Take 1 tablet (75 mg) by mouth daily  Qty: 30 tablet, Refills: 0    Comments: Future refills by PCP Dr. Hoa López with phone number [phone].  Associated Diagnoses: Pulmonary hypertension (H)      diphenhydrAMINE (BENADRYL) 50 MG capsule Take 50 mg by mouth as needed for itching or allergies       docusate sodium (COLACE) 100 MG capsule Take 1 capsule (100 mg) by mouth At Bedtime For constipation. Hold for loose stools  Qty: 30 capsule, Refills: 0    Comments: Future refills by PCP Dr. Hoa López with phone number [phone].  Associated Diagnoses: Iron deficiency anemia, unspecified iron deficiency anemia type      ferrous gluconate (FERGON) 324 (38 Fe) MG tablet Take 1 tablet (324 mg) by mouth daily (with breakfast)  Qty: 30 tablet, Refills: 0    Comments: Future refills by PCP Dr. Hoa López with phone number [phone].  Associated Diagnoses: Iron deficiency anemia, unspecified iron deficiency anemia type      folic acid (FOLVITE) 1 MG tablet Take 1 tablet (1 mg) by mouth every morning  Qty: 90 tablet, Refills: 3    Associated Diagnoses: Pulmonary fibrosis (H)      gabapentin (NEURONTIN) 300 MG capsule Take 2 capsules (600 mg) by mouth 3 times daily  Qty: 60 capsule, Refills: 0    Associated Diagnoses: Spinal stenosis of lumbar region with neurogenic claudication      LANsoprazole (PREVACID) 30 MG DR capsule Take 30 mg by mouth every morning (before breakfast)      levothyroxine (SYNTHROID/LEVOTHROID) 50 MCG tablet Take 50 mcg by mouth every morning       methotrexate 2.5 MG tablet Take 10 tablets (25 mg) by mouth once a week Tuesday  Qty: 120 tablet, Refills: 3    Associated Diagnoses: Inflammatory arthritis      multivitamin w/minerals (THERA-VIT-M) tablet Take 1 tablet by mouth daily    Associated Diagnoses: Routine health  "maintenance      omega 3 1000 MG CAPS Take 2 capsules by mouth every morning       insulin syringe-needle U-100 (29G X 1/2\" 1 ML) 29G X 1/2\" 1 ML miscellaneous Inject 1 Syringe Subcutaneous 2 times daily  Qty: 200 each, Refills: 11    Associated Diagnoses: Type 2 diabetes mellitus with other neurologic complication, with long-term current use of insulin (H)         STOP taking these medications       insulin NPH (HUMULIN N/NOVOLIN N VIAL) 100 UNIT/ML vial Comments:   Reason for Stopping:         sildenafil (REVATIO) 20 MG tablet Comments:   Reason for Stopping:             Allergies   No Known Allergies  Data   Most Recent 3 CBC's:  Recent Labs   Lab Test 10/15/19  0333 10/13/19  2303 10/09/19  0909   WBC 12.5* 13.0* 13.5*   HGB 9.0* 9.2* 10.0*   MCV 77* 77* 78    546* 478*      Most Recent 3 BMP's:  Recent Labs   Lab Test 10/16/19  0420 10/15/19  0333 10/13/19  2303    136 137   POTASSIUM 4.3 4.5 4.2   CHLORIDE 102 102 103   CO2 31 31 26   BUN 16 20 22   CR 0.99 0.90 1.06   ANIONGAP 3 3 8   DMITRIY 8.6 8.5 8.5   * 123* 190*     Most Recent 2 LFT's:  Recent Labs   Lab Test 10/13/19  2303 09/25/19  1411   AST 27 26   ALT 20 20   ALKPHOS 135 144   BILITOTAL 0.3 0.8     Most Recent INR's and Anticoagulation Dosing History:  Anticoagulation Dose History     Recent Dosing and Labs Latest Ref Rng & Units 4/29/2019 4/30/2019 5/1/2019 5/2/2019 5/12/2019 7/5/2019 8/29/2019    Warfarin 2.5 mg - 2.5 mg - - - - - -    INR 0.86 - 1.14 1.27(H) 1.24(H) 1.15(H) 1.18(H) 1.21(H) 1.11 1.38(H)        Most Recent 3 Troponin's:  Recent Labs   Lab Test 10/14/19  0536 10/13/19  2303 09/26/19  0539   TROPI 0.195* 0.182* 0.107*     Most Recent Cholesterol Panel:  Recent Labs   Lab Test 09/26/19  0539   CHOL 96   LDL 58   HDL 19*   TRIG 97     Most Recent 6 Bacteria Isolates From Any Culture (See EPIC Reports for Culture Details):  Recent Labs   Lab Test 09/25/19  2028 05/15/19  1650 05/09/19  2317 05/09/19  2146 " 05/09/19 2048 04/30/19 2033   CULT No growth No growth  No growth <10,000 colonies/mL  urogenital marlee  Susceptibility testing not routinely done   No growth No growth No growth     Most Recent TSH, T4 and A1c Labs:  Recent Labs   Lab Test 09/26/19  0539 08/29/19 2053   TSH 0.46  --    A1C  --  6.7*     Results for orders placed or performed during the hospital encounter of 10/13/19   XR Chest 2 Views    Narrative    XR CHEST 2 VW  10/13/2019 11:54 PM     HISTORY: Shortness of breath, syncope, left chest pain/rales.    COMPARISON: 9/25/2019.    FINDINGS: Right subclavian cardiac device in place. Aortic valve  prosthesis. No pneumothorax. The heart is enlarged. There is pulmonary  vascular congestion. No pulmonary edema. There are bibasilar  infiltrates. The lungs are otherwise clear. No pleural effusion.  Spinal rods in place.      Impression    IMPRESSION:  1. Cardiomegaly and pulmonary vascular congestion.  2. Bibasilar atelectasis or scarring.    MEME PARKS MD   Head CT w/o contrast    Narrative    EXAM: CT HEAD W/O CONTRAST  LOCATION: [facility]  DATE/TIME: 10/14/2019 12:04 AM    INDICATION: Head injury  COMPARISON: None  TECHNIQUE: Routine without IV contrast. Multiplanar reformats. Dose reduction techniques were used.    FINDINGS:  INTRACRANIAL CONTENTS: No intracranial hemorrhage, extraaxial collection, or mass effect.  No CT evidence of acute infarct. Mild presumed chronic small vessel ischemic changes. Moderate atrophy. Normal ventricles and sulci.     VISUALIZED ORBITS/SINUSES/MASTOIDS: No intraorbital abnormality. No paranasal sinus mucosal disease. No middle ear or mastoid effusion.    BONES/SOFT TISSUES: No acute abnormality.      Impression    IMPRESSION:  1.  No definite acute intracranial process.

## 2019-10-21 PROBLEM — Z95.0 CARDIAC RESYNCHRONIZATION THERAPY PACEMAKER (CRT-P) IN PLACE: Status: ACTIVE | Noted: 2019-01-01

## 2019-10-21 PROBLEM — I95.1 ORTHOSTATIC HYPOTENSION: Status: ACTIVE | Noted: 2019-01-01

## 2019-10-21 NOTE — LETTER
10/21/2019        RE: Beulah Jane  [address]  [place] [address]        [facility]  PRIMARY CARE PROVIDER AND CLINIC:  Hoa López MD, [address] / [place] [address]  Chief Complaint   Patient presents with     Hospital F/U     [facility] Medical Record Number:  [id number]  Place of Service where encounter took place:  [facility] [[id number]]    Beulah Jane  is a 69 year old  (1950), admitted to the above facility from  [facility]. Hospital stay 10/13/2019 through 10/18/2019..  Admitted to this facility for  rehab, medical management and nursing care.    HPI:    HPI information obtained from: facility chart records, facility staff, patient report and [facility] chart review.   Brief Summary of Hospital Course: recent hospitalization due to syncopal episode. PMH includes DM2, hypertension, CAD (angioplasty to mid and distal LAD 7/2019), CHF, severe aortic valve stenosis s/p TAVR 9/2019 PM placed 9/2019, s/p NSTEMI, h/o PULMONARY EMBOLISM, pacemaker in place, chronic hypoxia, pulmonary fibrosis, JUAN, ILD, DDD-lumbar, dermatmyositis, chronic pain. Work up in ED revealed SBP 80s, HR 100s, CXR with pulmonary vascular congestion, BB atelectasis, EKG with inferior anterior T wave changes, trop 0.182. Patient was treated for orthostatic hypotension. Cardiology was consulted- thought due to meds or cor pulmonale. He was started on midodrine, torsemide was reduced, revatio stopped. MAHENDRA hose recommended. He was transferred to TCU for ongoing monitoring and therapy.   Updates on Status Since Skilled nursing Admission: patient reports feeling short of breath with getting out of bed to wheelchair. He reports for past three days that when he coughs he feels pain, like an elephant sitting on his chest.     CODE STATUS/ADVANCE DIRECTIVES DISCUSSION:   CPR/Full code   Patient's living condition: lives with spouse  ALLERGIES:  Patient has no known allergies.  PAST MEDICAL HISTORY:  has a past medical history of Aortic stenosis, Chronic pain, DM (diabetes mellitus), type 2 (H), Hyperlipidemia, JUAN on CPAP, Pneumonia, Polymyositis (H), Pulmonary fibrosis, unspecified (H), and Seasonal allergies.  PAST SURGICAL HISTORY:   has a past surgical history that includes hernia repair (2006); removal prosthetic material/mesh, abd wall necro tiss infexn (2012); Decompression, fusion lumbar posterior two levels, combined (1997); lumbar spine hardware removal (1999); rotator cuff repair rt/lt (2003); Arthroscopy knee (1970); colonoscopy; Fusion lumbar anterior three+ levels (N/A, 4/22/2019); Optical tracking system fusion posterior spine thoracic three+ levels (N/A, 4/25/2019); picc insertion (Right, 05/06/2019); Coronary Angiogram (N/A, 7/30/2019); Right Heart Cath (N/A, 7/30/2019); Left Heart Cath (N/A, 7/30/2019); PCI Stent Drug Eluting (Left, 7/30/2019); EP Pacemaker (N/A, 9/6/2019); Transcatheter Aortic Valve Replacement (N/A, 9/4/2019); Heart Cath Femoral Cannulization With Open Standby Repair Aortic Valve (N/A, 9/4/2019); Right Heart Cath (N/A, 9/27/2019); and Right Heart Cath (N/A, 10/7/2019).  FAMILY HISTORY: family history includes Colon Cancer in his mother; Hypertension in his father.  SOCIAL HISTORY:   reports that he quit smoking about 49 years ago. He smoked 0.25 packs per day. He has never used smokeless tobacco. He reports current alcohol use. He reports that he does not use drugs.    Post Discharge Medication Reconciliation Status: discharge medications reconciled, continue medications without change    Current Outpatient Medications   Medication Sig Dispense Refill     acetaminophen (TYLENOL) 325 MG tablet Take 2 tablets (650 mg) by mouth every 8 hours as needed for pain       albuterol (2.5 MG/3ML) 0.083% neb solution Take 1 vial by nebulization every 6 hours as needed for shortness of breath / dyspnea or wheezing       aspirin  "(ASA) 81 MG EC tablet Take 1 tablet (81 mg) by mouth daily Start tomorrow morning. 90 tablet 3     atorvastatin (LIPITOR) 40 MG tablet Take 1 tablet (40 mg) by mouth daily 90 tablet 3     benzocaine-menthol (CEPACOL) 15-3.6 MG lozenge Place 1 lozenge inside cheek every hour as needed for sore throat 60 lozenge 0     calcium carbonate (OS-DMITRIY 500 MG [place]. [place]) 500 MG tablet Take 1 tablet (500 mg) by mouth 2 times daily 180 tablet 3     Cholecalciferol (VITAMIN D) 2000 units tablet Take 2,000 Units by mouth daily 100 tablet 3     clopidogrel (PLAVIX) 75 MG tablet Take 1 tablet (75 mg) by mouth daily 30 tablet 0     diphenhydrAMINE (BENADRYL) 50 MG capsule Take 50 mg by mouth as needed for itching or allergies        docusate sodium (COLACE) 100 MG capsule Take 1 capsule (100 mg) by mouth At Bedtime For constipation. Hold for loose stools 30 capsule 0     ferrous gluconate (FERGON) 324 (38 Fe) MG tablet Take 1 tablet (324 mg) by mouth daily (with breakfast) 30 tablet 0     folic acid (FOLVITE) 1 MG tablet Take 1 tablet (1 mg) by mouth every morning 90 tablet 3     gabapentin (NEURONTIN) 300 MG capsule Take 2 capsules (600 mg) by mouth 3 times daily 60 capsule 0     insulin aspart (NOVOLOG VIAL) 100 UNITS/ML vial Give before meals and before bed:  For Pre-Meal Glucose:  140-189 give 1 unit   190-239 give 2 units   240-289 give 3 units   290-339 give 4 units   = or >340 give 5 units     For Bedtime Glucose  200 - 239 give 1 units   240 - 289 give 1.5 units  290 - 339 give 2 units  = or >340 give 2.5 units 10 mL 0     insulin syringe-needle U-100 (29G X 1/2\" 1 ML) 29G X 1/2\" 1 ML miscellaneous Inject 1 Syringe Subcutaneous 2 times daily 200 each 11     LANsoprazole (PREVACID) 30 MG DR capsule Take 30 mg by mouth every morning (before breakfast)       levothyroxine (SYNTHROID/LEVOTHROID) 50 MCG tablet Take 50 mcg by mouth every morning        methotrexate 2.5 MG tablet Take 10 tablets (25 mg) by mouth once a week Tuesday " "120 tablet 3     midodrine (PROAMATINE) 5 MG tablet Take 1 tablet (5 mg) by mouth 3 times daily (with meals)       multivitamin w/minerals (THERA-VIT-M) tablet Take 1 tablet by mouth daily       omega 3 1000 MG CAPS Take 2 capsules by mouth every morning        oxyCODONE-acetaminophen (PERCOCET) 7.5-325 MG per tablet Take 1 tablet by mouth every 6 hours as needed for severe pain (max 3 tablets daily) Dispense 09/23/19. OK to start  09/25/19 20 tablet 0     potassium chloride ER (K-DUR/KLOR-CON M) 20 MEQ CR tablet Take 1 tablet (20 mEq) by mouth daily 30 tablet 0     torsemide (DEMADEX) 5 MG tablet Take 1 tablet (5 mg) by mouth 2 times daily       torsemide (DEMADEX) 5 MG tablet Take 1 tablet (5 mg) by mouth daily as needed (if weight increases more than 2 lbs per day)         ROS:  4 point ROS including Respiratory, CV, GI and , other than that noted in the HPI,  is negative    Vitals:  /75   Pulse 87   Temp 99.3  F (37.4  C)   Resp 20   Ht 1.575 m (5' 2\")   Wt 69.7 kg (153 lb 9.6 oz)   SpO2 95%   BMI 28.09 kg/m     Exam:  GENERAL APPEARANCE:  Alert, in no distress  ENT:  Mouth and posterior oropharynx normal, moist mucous membranes, hearing acuity adequate   EYES:  EOM, conjunctivae, lids, pupils and irises normal    RESP:  respiratory effort and palpation of chest normal, no respiratory distress, Lung sounds rales half way up on left. And in right base.   CV:  Palpation and auscultation of heart done , rate and rhythm irreg, no murmur, no rub or gallop, Edema trace  ABDOMEN:  normal bowel sounds, soft, nontender, no hepatosplenomegaly or other masses  M/S:   Gait and station not observed, Digits and nails normal   SKIN:  Inspection/Palpation of skin and subcutaneous tissue no rash  NEURO: 2-12 in normal limits and at patient's baseline  PSYCH:  insight and judgement, memory intact , affect and mood normal    Lab/Diagnostic data:  CBC RESULTS:   Recent Labs   Lab Test 10/15/19  0333 10/13/19  2303 "   WBC 12.5* 13.0*   RBC 4.19* 4.38*   HGB 9.0* 9.2*   HCT 32.1* 33.8*   MCV 77* 77*   MCH 21.5* 21.0*   MCHC 28.0* 27.2*   RDW 23.9* 23.9*    546*       Last Basic Metabolic Panel:  Recent Labs   Lab Test 10/16/19  0420 10/15/19  0333    136   POTASSIUM 4.3 4.5   CHLORIDE 102 102   DMITRIY 8.6 8.5   CO2 31 31   BUN 16 20   CR 0.99 0.90   * 123*       Liver Function Studies -   Recent Labs   Lab Test 10/13/19  2303 09/25/19  1411   PROTTOTAL 6.8 7.2   ALBUMIN 3.1* 3.4   BILITOTAL 0.3 0.8   ALKPHOS 135 144   AST 27 26   ALT 20 20       TSH   Date Value Ref Range Status   09/26/2019 0.46 0.40 - 4.00 mU/L Final   ]    Lab Results   Component Value Date    A1C 6.7 08/29/2019    A1C 6.3 01/29/2019           ASSESSMENT/PLAN:  (I95.1) Orthostatic hypotension  (primary encounter diagnosis)  (I27.20) Pulmonary hypertension (H)  (J84.10) Pulmonary fibrosis (H)  Comment: recent hospitalization due to syncopal episode. Cardiology was consulted. This situation is complicated due to need to balance poor cardiac output and interstitial lung disease. During this hospitalization revatio was stopped and midodrine was started. Inhaled pulmonary vasodilators are recommended thus follow up with pulmonology on 10/22 is critical.  Diuretic was reduced as well. Today patient reports he felt very dizzy and short of breath moving from bed to wheelchair. His BPs are running 90s to 100s. He reports feeling like an elephant is sitting on his chest and sharp chest pain when he coughs, which has been going on for three days.  He continues to use oxygen. He does have elevated troponins. I did discuss this with Dr Cotton.    Plan: follow up with Pulmonology 10/22 and cardiology on 10/23  Daily wts  Midodrine 5mg TID  Supplemental oxygen.   Methotrexate weekly for ILD  Change positions slowly.     (I50.32) Congestive heart failure, NYHA class III, chronic, diastolic (H)  (Z95.2) S/P TAVR (transcatheter aortic valve  replacement)  (I25.118) Coronary artery disease involving native coronary artery of native heart with other form of angina pectoris (H)  (Z95.0) Cardiac resynchronization therapy pacemaker (CRT-P) in place  Comment: s/p TAVR in September 2019, PM placed at that time. In July 2019 patient had CHAU to to the mid and distal LAD. As above, patient reports chest pressure and chest pain when takes a deep breath and coughs. . He has had elevated troponins. During hospitalization diuretics were reduced. Cardiology did follow in hospital. He has little edema.   Plan: continue torsemide 5mg BID  Daily wts   BMP in am.   Follow up with cardiology 10/23      (M19.90) Inflammatory arthritis  Comment: patient has chronic pain and attributes it to dermatomyositis. He follows with pain clinic and with rheumatology. Patient reports he takes 1200mg of gabapentin TID and not 600mg TID as prescribed upon discharge.   Plan: schedule percocet 7.5/325 TID along with gabapentin 1200mg TID (dose changed from hospital discharge)    (E11.40,  Z79.4) Type 2 diabetes mellitus with diabetic neuropathy, with long-term current use of insulin (H)  Comment: BG running 110-140. PTA he was on NPH. This was stopped in hospital.   Plan:continue novolog sliding scale for meals      (R53.81) Physical deconditioning  Comment: [facility] At this point he becomes short of breath with getting out of bed.   Plan: physical therapy and OCCUPATIONAL THERAPY- monitor O2 sats and BP with activity      Total time spent with patient visit at the skilled nursing facility was 45 min including patient visit, review of past records and discussion with Dr Cotton due to sharp chest pain and chest pressure with cough in patient with severe heart disease and severe lung disease. Greater than 50% of total time spent with counseling and coordinating care due to multiple medical issues with severe shortness of breath and pain with cough.     Electronically  signed by:  ANASTACIA Mcmanus CNP                         Sincerely,        ANASTACIA Mcmanus CNP

## 2019-10-21 NOTE — PROGRESS NOTES
[facility]  PRIMARY CARE PROVIDER AND CLINIC:  Hoa López MD, [address] / [place] [address]  Chief Complaint   Patient presents with     Hospital F/U     [facility] Medical Record Number:  [id number]  Place of Service where encounter took place:  [facility] [[id number]]    Beulah Jane  is a 69 year old  (1950), admitted to the above facility from  [facility]. Hospital stay 10/13/2019 through 10/18/2019..  Admitted to this facility for  rehab, medical management and nursing care.    HPI:    HPI information obtained from: facility chart records, facility staff, patient report and [facility] chart review.   Brief Summary of Hospital Course: recent hospitalization due to syncopal episode. PMH includes DM2, hypertension, CAD (angioplasty to mid and distal LAD 7/2019), CHF, severe aortic valve stenosis s/p TAVR 9/2019 PM placed 9/2019, s/p NSTEMI, h/o PULMONARY EMBOLISM, pacemaker in place, chronic hypoxia, pulmonary fibrosis, JUAN, ILD, DDD-lumbar, dermatmyositis, chronic pain. Work up in ED revealed SBP 80s, HR 100s, CXR with pulmonary vascular congestion, BB atelectasis, EKG with inferior anterior T wave changes, trop 0.182. Patient was treated for orthostatic hypotension. Cardiology was consulted- thought due to meds or cor pulmonale. He was started on midodrine, torsemide was reduced, revatio stopped. MAHENDRA hose recommended. He was transferred to TCU for ongoing monitoring and therapy.   Updates on Status Since Skilled nursing Admission: patient reports feeling short of breath with getting out of bed to wheelchair. He reports for past three days that when he coughs he feels pain, like an elephant sitting on his chest.     CODE STATUS/ADVANCE DIRECTIVES DISCUSSION:   CPR/Full code   Patient's living condition: lives with spouse  ALLERGIES: Patient has no known allergies.  PAST MEDICAL HISTORY:  has a past medical history of Aortic  stenosis, Chronic pain, DM (diabetes mellitus), type 2 (H), Hyperlipidemia, JUAN on CPAP, Pneumonia, Polymyositis (H), Pulmonary fibrosis, unspecified (H), and Seasonal allergies.  PAST SURGICAL HISTORY:   has a past surgical history that includes hernia repair (2006); removal prosthetic material/mesh, abd wall necro tiss infexn (2012); Decompression, fusion lumbar posterior two levels, combined (1997); lumbar spine hardware removal (1999); rotator cuff repair rt/lt (2003); Arthroscopy knee (1970); colonoscopy; Fusion lumbar anterior three+ levels (N/A, 4/22/2019); Optical tracking system fusion posterior spine thoracic three+ levels (N/A, 4/25/2019); picc insertion (Right, 05/06/2019); Coronary Angiogram (N/A, 7/30/2019); Right Heart Cath (N/A, 7/30/2019); Left Heart Cath (N/A, 7/30/2019); PCI Stent Drug Eluting (Left, 7/30/2019); EP Pacemaker (N/A, 9/6/2019); Transcatheter Aortic Valve Replacement (N/A, 9/4/2019); Heart Cath Femoral Cannulization With Open Standby Repair Aortic Valve (N/A, 9/4/2019); Right Heart Cath (N/A, 9/27/2019); and Right Heart Cath (N/A, 10/7/2019).  FAMILY HISTORY: family history includes Colon Cancer in his mother; Hypertension in his father.  SOCIAL HISTORY:   reports that he quit smoking about 49 years ago. He smoked 0.25 packs per day. He has never used smokeless tobacco. He reports current alcohol use. He reports that he does not use drugs.    Post Discharge Medication Reconciliation Status: discharge medications reconciled, continue medications without change    Current Outpatient Medications   Medication Sig Dispense Refill     acetaminophen (TYLENOL) 325 MG tablet Take 2 tablets (650 mg) by mouth every 8 hours as needed for pain       albuterol (2.5 MG/3ML) 0.083% neb solution Take 1 vial by nebulization every 6 hours as needed for shortness of breath / dyspnea or wheezing       aspirin (ASA) 81 MG EC tablet Take 1 tablet (81 mg) by mouth daily Start tomorrow morning. 90 tablet 3  "    atorvastatin (LIPITOR) 40 MG tablet Take 1 tablet (40 mg) by mouth daily 90 tablet 3     benzocaine-menthol (CEPACOL) 15-3.6 MG lozenge Place 1 lozenge inside cheek every hour as needed for sore throat 60 lozenge 0     calcium carbonate (OS-DMITRIY 500 MG [place]. [place]) 500 MG tablet Take 1 tablet (500 mg) by mouth 2 times daily 180 tablet 3     Cholecalciferol (VITAMIN D) 2000 units tablet Take 2,000 Units by mouth daily 100 tablet 3     clopidogrel (PLAVIX) 75 MG tablet Take 1 tablet (75 mg) by mouth daily 30 tablet 0     diphenhydrAMINE (BENADRYL) 50 MG capsule Take 50 mg by mouth as needed for itching or allergies        docusate sodium (COLACE) 100 MG capsule Take 1 capsule (100 mg) by mouth At Bedtime For constipation. Hold for loose stools 30 capsule 0     ferrous gluconate (FERGON) 324 (38 Fe) MG tablet Take 1 tablet (324 mg) by mouth daily (with breakfast) 30 tablet 0     folic acid (FOLVITE) 1 MG tablet Take 1 tablet (1 mg) by mouth every morning 90 tablet 3     gabapentin (NEURONTIN) 300 MG capsule Take 2 capsules (600 mg) by mouth 3 times daily 60 capsule 0     insulin aspart (NOVOLOG VIAL) 100 UNITS/ML vial Give before meals and before bed:  For Pre-Meal Glucose:  140-189 give 1 unit   190-239 give 2 units   240-289 give 3 units   290-339 give 4 units   = or >340 give 5 units     For Bedtime Glucose  200 - 239 give 1 units   240 - 289 give 1.5 units  290 - 339 give 2 units  = or >340 give 2.5 units 10 mL 0     insulin syringe-needle U-100 (29G X 1/2\" 1 ML) 29G X 1/2\" 1 ML miscellaneous Inject 1 Syringe Subcutaneous 2 times daily 200 each 11     LANsoprazole (PREVACID) 30 MG DR capsule Take 30 mg by mouth every morning (before breakfast)       levothyroxine (SYNTHROID/LEVOTHROID) 50 MCG tablet Take 50 mcg by mouth every morning        methotrexate 2.5 MG tablet Take 10 tablets (25 mg) by mouth once a week Tuesday 120 tablet 3     midodrine (PROAMATINE) 5 MG tablet Take 1 tablet (5 mg) by mouth 3 times daily " "(with meals)       multivitamin w/minerals (THERA-VIT-M) tablet Take 1 tablet by mouth daily       omega 3 1000 MG CAPS Take 2 capsules by mouth every morning        oxyCODONE-acetaminophen (PERCOCET) 7.5-325 MG per tablet Take 1 tablet by mouth every 6 hours as needed for severe pain (max 3 tablets daily) Dispense 09/23/19. OK to start  09/25/19 20 tablet 0     potassium chloride ER (K-DUR/KLOR-CON M) 20 MEQ CR tablet Take 1 tablet (20 mEq) by mouth daily 30 tablet 0     torsemide (DEMADEX) 5 MG tablet Take 1 tablet (5 mg) by mouth 2 times daily       torsemide (DEMADEX) 5 MG tablet Take 1 tablet (5 mg) by mouth daily as needed (if weight increases more than 2 lbs per day)         ROS:  4 point ROS including Respiratory, CV, GI and , other than that noted in the HPI,  is negative    Vitals:  /75   Pulse 87   Temp 99.3  F (37.4  C)   Resp 20   Ht 1.575 m (5' 2\")   Wt 69.7 kg (153 lb 9.6 oz)   SpO2 95%   BMI 28.09 kg/m    Exam:  GENERAL APPEARANCE:  Alert, in no distress  ENT:  Mouth and posterior oropharynx normal, moist mucous membranes, hearing acuity adequate   EYES:  EOM, conjunctivae, lids, pupils and irises normal    RESP:  respiratory effort and palpation of chest normal, no respiratory distress, Lung sounds rales half way up on left. And in right base.   CV:  Palpation and auscultation of heart done , rate and rhythm irreg, no murmur, no rub or gallop, Edema trace  ABDOMEN:  normal bowel sounds, soft, nontender, no hepatosplenomegaly or other masses  M/S:   Gait and station not observed, Digits and nails normal   SKIN:  Inspection/Palpation of skin and subcutaneous tissue no rash  NEURO: 2-12 in normal limits and at patient's baseline  PSYCH:  insight and judgement, memory intact , affect and mood normal    Lab/Diagnostic data:  CBC RESULTS:   Recent Labs   Lab Test 10/15/19  0333 10/13/19  2303   WBC 12.5* 13.0*   RBC 4.19* 4.38*   HGB 9.0* 9.2*   HCT 32.1* 33.8*   MCV 77* 77*   MCH 21.5* " 21.0*   MCHC 28.0* 27.2*   RDW 23.9* 23.9*    546*       Last Basic Metabolic Panel:  Recent Labs   Lab Test 10/16/19  0420 10/15/19  0333    136   POTASSIUM 4.3 4.5   CHLORIDE 102 102   DMITRIY 8.6 8.5   CO2 31 31   BUN 16 20   CR 0.99 0.90   * 123*       Liver Function Studies -   Recent Labs   Lab Test 10/13/19  2303 09/25/19  1411   PROTTOTAL 6.8 7.2   ALBUMIN 3.1* 3.4   BILITOTAL 0.3 0.8   ALKPHOS 135 144   AST 27 26   ALT 20 20       TSH   Date Value Ref Range Status   09/26/2019 0.46 0.40 - 4.00 mU/L Final   ]    Lab Results   Component Value Date    A1C 6.7 08/29/2019    A1C 6.3 01/29/2019           ASSESSMENT/PLAN:  (I95.1) Orthostatic hypotension  (primary encounter diagnosis)  (I27.20) Pulmonary hypertension (H)  (J84.10) Pulmonary fibrosis (H)  Comment: recent hospitalization due to syncopal episode. Cardiology was consulted. This situation is complicated due to need to balance poor cardiac output and interstitial lung disease. During this hospitalization revatio was stopped and midodrine was started. Inhaled pulmonary vasodilators are recommended thus follow up with pulmonology on 10/22 is critical.  Diuretic was reduced as well. Today patient reports he felt very dizzy and short of breath moving from bed to wheelchair. His BPs are running 90s to 100s. He reports feeling like an elephant is sitting on his chest and sharp chest pain when he coughs, which has been going on for three days.  He continues to use oxygen. He does have elevated troponins. I did discuss this with Dr Cotton.    Plan: follow up with Pulmonology 10/22 and cardiology on 10/23  Daily wts  Midodrine 5mg TID  Supplemental oxygen.   Methotrexate weekly for ILD  Change positions slowly.     (I50.32) Congestive heart failure, NYHA class III, chronic, diastolic (H)  (Z95.2) S/P TAVR (transcatheter aortic valve replacement)  (I25.118) Coronary artery disease involving native coronary artery of native heart with other form of  angina pectoris (H)  (Z95.0) Cardiac resynchronization therapy pacemaker (CRT-P) in place  Comment: s/p TAVR in September 2019, PM placed at that time. In July 2019 patient had CHAU to to the mid and distal LAD. As above, patient reports chest pressure and chest pain when takes a deep breath and coughs. . He has had elevated troponins. During hospitalization diuretics were reduced. Cardiology did follow in hospital. He has little edema.   Plan: continue torsemide 5mg BID  Daily wts   BMP in am.   Follow up with cardiology 10/23      (M19.90) Inflammatory arthritis  Comment: patient has chronic pain and attributes it to dermatomyositis. He follows with pain clinic and with rheumatology. Patient reports he takes 1200mg of gabapentin TID and not 600mg TID as prescribed upon discharge.   Plan: schedule percocet 7.5/325 TID along with gabapentin 1200mg TID (dose changed from hospital discharge)    (E11.40,  Z79.4) Type 2 diabetes mellitus with diabetic neuropathy, with long-term current use of insulin (H)  Comment: BG running 110-140. PTA he was on NPH. This was stopped in hospital.   Plan:continue novolog sliding scale for meals      (R53.81) Physical deconditioning  Comment: [facility]. At this point he becomes short of breath with getting out of bed.   Plan: physical therapy and OCCUPATIONAL THERAPY- monitor O2 sats and BP with activity      Total time spent with patient visit at the skilled nursing facility was 45 min including patient visit, review of past records and discussion with Dr Cotton due to sharp chest pain and chest pressure with cough in patient with severe heart disease and severe lung disease. Greater than 50% of total time spent with counseling and coordinating care due to multiple medical issues with severe shortness of breath and pain with cough.     Electronically signed by:  ANASTACIA Mcmanus CNP

## 2019-10-21 NOTE — PROGRESS NOTES
Clinic Care Coordination Contact  Care Coordination Transition Communication    Referral Source: IP Report    Clinical Data: Patient was hospitalized at [facility]  from 10/13-10/18/2019-  with diagnosis of   Syncope  Symptomatic orthostatic hypotension  Severe Pulm HTN     Chronic and stable medical problems:  CAD  H/o severe Ao stenosis s/p TAVR at [facility] on 09/07/2019  Pulmonary fibrosis/ Interstitial lung disease  Chronic Hypoxic respiratory failure  Chronic pain syndrome  JUAN  DM type 2  Hypothyroidism  HLP    Transition to Facility:              [facility]: [facility]               Contact name and phone number/fax: Contact information faxed to facility to call back when discharged from TCU     Plan: RN/SW Care Coordinator will await notification from facility staff informing RN/SW Care Coordinator of patient's discharge plans/needs. RN/SW Care Coordinator will review chart and outreach to facility staff every 4 weeks and as needed.     [facility]     Yesica Manning RN Care Coordinator   [facility] / [facility] -[facility] -[facility]   Phone: [phone]  Email :  [email or web address]

## 2019-10-21 NOTE — LETTER
To:             Please give to facility    From:   Yesica Manning RN, Care Coordinator   [facility] -Care Coordination  [facility]   E-mail [email or web address]   [phone]    Patient Name:  Beulah Jane  YOB: 1950   Admit date: 10/18/2019      *Information Needed:  Please contact me when the patient will discharge (or if they will move to long term care)- include the discharge date, disposition, and main diagnosis   - If the patient is discharged with home care services, please provide the name of the agency    Also- Please inform me if a care conference is being held.   Yesica Manning RN, Care Coordinator   [facility] -Care Coordination  [facility]   E-mail [email or web address]   [phone]                              Thank you

## 2019-10-22 NOTE — TELEPHONE ENCOUNTER
Patient was evaluated by cardiology while inpatient for SOB and syncopal episode. PMH of severe aortic stenosis and recent TAVR and subsequent CHB and PPM implantation, cor pulmonale, pHTN. Syncope believed to be related to orthostatic hypotension. Revatio discontinued and Midodrine increased and Torsemide dosage adjusted. RN called [facility] to confirm the follow up plan for patient with Care Coordinator. RN confirmed with Care Coordinator that pt is scheduled to see Dr. Williamson for pHTN 11/22/19. General cardiology f/u is scheduled at [facility] on 10/23/19. KVNG Parr RN.

## 2019-10-22 NOTE — PROGRESS NOTES
Cardiology Clinic Note  October 23, 2019      HPI:  Beulah Jane is a 69 year old with a complex medical history notable for chronic hypoxic respiratory failure secondary to interstitial lung disease, polymyositis, pulmonary hypertension, RV failure, HFpEF, JUAN, CAD s/p PCI of the LAD 7/2019 and aortic stenosis s/p TAVR on 9/4/19 complicated by complete heart block s/p PPM and HFpEF exacerbation who presents for hospital follow-up after two recent admissions.    The patient was hospitalized at [facility] 9/25/19-10/08/2019, with progressive dyspnea, leg swelling and weight gain. Echo showed preserved LV function, right ventricle was noted to be mild to moderately dilated with mildly decreased RV function. Echo showed normal bioprosthetic valve function. Right heart catheterization was performed on 9/25 showing severe pulmonary hypertension with mildly elevated pulmonary capillary wedge pressure. His mean PA pressure was 46, PAW of 16 and with a PVR 10.9 wood units. Vasodilator study was positive in that the mean PA dropped from 46 down to 36, cardiac output increased from 4.9 to 7.1 and the PVR dropped from 10.9, down to 4.2 wood units. He was started on sildenafil 5 mg TID and diuresed to a dry weight of 150 lbs and had a repeat right heart cath on 10/07/2019 which showed RA mean of 8, PA pressure 56/20 with a mean of 33, wedge mean was 9. The PVR was 5.47 wood units. Cardiac output 4.3 liters per minute. He was discharged home on sildenafil 5 mg TID and torsemide 20 mg BID.     He was readmitted to [facility] on 10/13 after a syncopal episode, was found to have orthostatic hypotension. His sildenafil was discontinued, he was started on midodrine 5 mg TID and his torsemide was decreased to 5 mg BID. He was also advised to wear thigh high compression hosiery. He was discharged to acute rehab on 10/18 with plans to establish care with pulmonary hypertension and pulmonology as  outpatient.    The patient presents to clinic accompanied by his wife and a professional . Since hospital discharge he has continued to have frequent episodes of dizziness with standing. He also reports having 3 episodes of syncope since discharge. He had an episode on 10/18 when he arrived at the rehab facility and was ambulating in the hallway. He had an episode of unresponsiveness last evening when he was in bed and states that he felt like he was dreaming and could hear his nurse but couldn't wake up. He then had an episode of loss of consciousness this morning when he has standing in the bathroom and got extremely short of breath and dizzy, then sat in the wheelchair and passed out. He awoke to sternal rub. His vital signs were noted to be stable, though oxygen saturations were 87%, his oxygen was increased from 2L to 5L and his oxygen increased to 98%. In regards to his dyspnea, he continues to have shortness of breath with minimal activity, though it has improved since starting supplement oxygen. He denies any chest pain, palpitations, orthopnea or PND. He reports increased leg swelling today. He has not been wearing compression stockings at rehab. He has also not been using his CPAP machine. His states that his weight has been stable at 150-155 lbs at rehab, though per our clinic scale his weight is up 10 lbs.      Past medical history:  Past Medical History:   Diagnosis Date     Aortic stenosis      Chronic pain      DM (diabetes mellitus), type 2 (H)     on insulin     Hyperlipidemia      JUAN on CPAP      Pneumonia      Polymyositis (H)      Pulmonary fibrosis, unspecified (H)      Seasonal allergies          Medications:  acetaminophen (TYLENOL) 325 MG tablet, Take 2 tablets (650 mg) by mouth every 8 hours as needed for pain  albuterol (2.5 MG/3ML) 0.083% neb solution, Take 1 vial by nebulization every 6 hours as needed for shortness of breath / dyspnea or wheezing  aspirin (ASA) 81 MG  "EC tablet, Take 1 tablet (81 mg) by mouth daily Start tomorrow morning.  atorvastatin (LIPITOR) 40 MG tablet, Take 1 tablet (40 mg) by mouth daily  benzocaine-menthol (CEPACOL) 15-3.6 MG lozenge, Place 1 lozenge inside cheek every hour as needed for sore throat  calcium carbonate (OS-DMITRIY 500 MG [place]. [place]) 500 MG tablet, Take 1 tablet (500 mg) by mouth 2 times daily  Cholecalciferol (VITAMIN D) 2000 units tablet, Take 2,000 Units by mouth daily  clopidogrel (PLAVIX) 75 MG tablet, Take 1 tablet (75 mg) by mouth daily  diphenhydrAMINE (BENADRYL) 50 MG capsule, Take 50 mg by mouth as needed for itching or allergies   docusate sodium (COLACE) 100 MG capsule, Take 1 capsule (100 mg) by mouth At Bedtime For constipation. Hold for loose stools  ferrous gluconate (FERGON) 324 (38 Fe) MG tablet, Take 1 tablet (324 mg) by mouth daily (with breakfast)  folic acid (FOLVITE) 1 MG tablet, Take 1 tablet (1 mg) by mouth every morning  gabapentin (NEURONTIN) 300 MG capsule, Take 2 capsules (600 mg) by mouth 3 times daily  insulin aspart (NOVOLOG VIAL) 100 UNITS/ML vial, Give before meals and before bed:  For Pre-Meal Glucose:  140-189 give 1 unit   190-239 give 2 units   240-289 give 3 units   290-339 give 4 units   = or >340 give 5 units     For Bedtime Glucose  200 - 239 give 1 units   240 - 289 give 1.5 units  290 - 339 give 2 units  = or >340 give 2.5 units  insulin syringe-needle U-100 (29G X 1/2\" 1 ML) 29G X 1/2\" 1 ML miscellaneous, Inject 1 Syringe Subcutaneous 2 times daily  LANsoprazole (PREVACID) 30 MG DR capsule, Take 30 mg by mouth every morning (before breakfast)  levothyroxine (SYNTHROID/LEVOTHROID) 50 MCG tablet, Take 50 mcg by mouth every morning   methotrexate 2.5 MG tablet, Take 10 tablets (25 mg) by mouth once a week Tuesday  midodrine (PROAMATINE) 5 MG tablet, Take 1 tablet (5 mg) by mouth 3 times daily (with meals)  multivitamin w/minerals (THERA-VIT-M) tablet, Take 1 tablet by mouth daily  omega 3 1000 MG CAPS, " Take 2 capsules by mouth every morning   oxyCODONE-acetaminophen (PERCOCET) 7.5-325 MG per tablet, Take 1 tablet by mouth 3 times daily  potassium chloride ER (K-DUR/KLOR-CON M) 20 MEQ CR tablet, Take 1 tablet (20 mEq) by mouth daily  torsemide (DEMADEX) 5 MG tablet, Take 1 tablet (5 mg) by mouth 2 times daily  torsemide (DEMADEX) 5 MG tablet, Take 1 tablet (5 mg) by mouth daily as needed (if weight increases more than 2 lbs per day)    No current facility-administered medications on file prior to visit.       Allergies:    No Known Allergies    Family and social history:    Family History   Problem Relation Age of Onset     Colon Cancer Mother      Hypertension Father         did not know father well       Social History     Socioeconomic History     Marital status:      Spouse name: Not on file     Number of children: Not on file     Years of education: Not on file     Highest education level: Not on file   Occupational History     Not on file   Social Needs     Financial resource strain: Not on file     Food insecurity:     Worry: Not on file     Inability: Not on file     Transportation needs:     Medical: Not on file     Non-medical: Not on file   Tobacco Use     Smoking status: Former Smoker     Packs/day: 0.25     Last attempt to quit: 1970     Years since quittin.3     Smokeless tobacco: Never Used   Substance and Sexual Activity     Alcohol use: Yes     Comment: few times monthly     Drug use: Never     Sexual activity: Not Currently   Lifestyle     Physical activity:     Days per week: Not on file     Minutes per session: Not on file     Stress: Not on file   Relationships     Social connections:     Talks on phone: Not on file     Gets together: Not on file     Attends [employer] service: Not on file     Active member of club or organization: Not on file     Attends meetings of clubs or organizations: Not on file     Relationship status: Not on file     Intimate partner violence:     Fear  "of current or ex partner: Not on file     Emotionally abused: Not on file     Physically abused: Not on file     Forced sexual activity: Not on file   Other Topics Concern     Not on file   Social History Narrative     Not on file     Review of Systems:  Skin: No skin rash or ulcers.  Eyes: No red eye.  Ears/Nose/Throat: No ear discharge, nasal congestion, sore throat or dysphagia.  Respiratory: No cough or hemoptysis.  Cardiovascular: See HPI.    Gastrointestinal: No abdominal pain, nausea, vomiting, hematemesis or melena.  Genitourinary: No increased frequency or urgency of urine. No dysuria or hematuria.  Musculoskeletal: No polyarthralgia or myalgias.  Neurologic: No headaches, seizure or focal weakness.  Psychiatric: No hallucinations.  Hematologic/Lymphatic/Immunologic: No bleeding tendency.  Endocrine: No heat or cold intolerance, abnormal facial hair or alopecia.    Vital signs:  BP 94/61 (BP Location: Right arm, Patient Position: Chair, Cuff Size: Adult Regular)   Pulse 101   Ht 1.676 m (5' 6\")   Wt 73.6 kg (162 lb 3.2 oz)   SpO2 94%   BMI 26.18 kg/m     Wt Readings from Last 2 Encounters:   10/21/19 69.7 kg (153 lb 9.6 oz)   10/18/19 70.7 kg (155 lb 14.4 oz)     Physical Exam:  Gen: NAD.    HEENT: No conjunctival pallor or scleral icterus, MMM. Clear oropharynx.    Neck:. No thyroid enlargement or cervical adenopathy.    Chest: Bibasilar crackles.     CV: Normal first and second heart sounds. No murmurs or gallop appreciated. JVP 8 cm  Abdomen: Soft, non-tender, non-distended, BS+.  Ext: 1-2+ pitting edema BLE. Warm and well perfused with normal capillary refill. Wound on tip of left great toe.   Skin: No skin rash or ulcers.  Neuro: alert, oriented and appropriately conversant.    Psych: Normal affect and speech.    Labs:  Last Basic Metabolic Panel:  Lab Results   Component Value Date     10/22/2019      Lab Results   Component Value Date    POTASSIUM 4.2 10/22/2019     Lab Results   Component " Value Date    CHLORIDE 104 10/22/2019     Lab Results   Component Value Date    DMITRIY 8.5 10/22/2019     Lab Results   Component Value Date    CO2 26 10/22/2019     Lab Results   Component Value Date    BUN 26 10/22/2019     Lab Results   Component Value Date    CR 1.08 10/22/2019     Lab Results   Component Value Date     10/22/2019       Lab Results   Component Value Date    WBC 14.3 10/22/2019     Lab Results   Component Value Date    RBC 4.58 10/22/2019     Lab Results   Component Value Date    HGB 9.7 10/22/2019     Lab Results   Component Value Date    HCT 35.4 10/22/2019     Lab Results   Component Value Date    MCV 77 10/22/2019     Lab Results   Component Value Date    MCH 21.2 10/22/2019     Lab Results   Component Value Date    MCHC 27.4 10/22/2019     Lab Results   Component Value Date    RDW 23.6 10/22/2019     Lab Results   Component Value Date     10/22/2019       Diagnostics:    RHC 9/25:    Vasdilator Study (Inhaled nitric oxide @ 80 ppm):  Borderline reduction in PA pressures (mean PA reduced from 46 to 36) with improvement in cardiac output by Eunice, leading to substantial reduction in SVR from 10.9 Woods units to 4.2 Woods units.   Pressures Phase: Baseline      Time Systolic Diastolic Mean A Wave V Wave EDP Max dp/dt HR   RA Pressures  4:07 PM   13 mmHg    17 mmHg    12 mmHg      99 bpm      PA Pressures  4:05 PM 72 mmHg    31 mmHg    46 mmHg        100 bpm      PCW Pressures  4:07 PM   16 mmHg    13 mmHg    18 mmHg      95 bpm      AO Pressures  4:05 PM 88 mmHg    58 mmHg    69 mmHg        97 bpm      Pressures Phase: Drug Study Level 1      Time Systolic Diastolic Mean A Wave V Wave EDP Max dp/dt HR   PA Pressures  4:15 PM 61 mmHg    26 mmHg    37 mmHg        91 bpm      Pressures Phase: Drug Study Level 2      Time Systolic Diastolic Mean A Wave V Wave EDP Max dp/dt HR   RA Pressures  4:18 PM   11 mmHg    14 mmHg    9 mmHg      93 bpm      RV Pressures  4:15 PM       336 mmHg/sec          4:20 PM 62 mmHg    8 mmHg       20 mmHg     94 bpm      PA Pressures  4:16 PM 60 mmHg    25 mmHg    36 mmHg        89 bpm      PCW Pressures  4:17 PM   18 mmHg    19 mmHg    20 mmHg      90 bpm        4:17 PM   20 mmHg    17 mmHg    21 mmHg      81 bpm      AO Pressures  4:16 PM 95 mmHg    54 mmHg    69 mmHg        88 bpm      Blood Flow Results Phase: Baseline      Time Results Indexed Values   QP  3:38 PM 4.94 L/min    2.75 L/min/m2      QS  3:38 PM 4.94 L/min    2.75 L/min/m2      Blood Flow Results Phase: Drug Study Level 2      Time Results Indexed Values   QP  4:15 PM 7.15 L/min    3.98 L/min/m2      QS  4:15 PM 7.15 L/min    3.98 L/min/m2      Blood Oximetry Phase: Baseline      Time Hb SAT(%) PO2 Content PA Sat   PA  3:38 PM  56 %      56 %      Art  3:38 PM  98 %     12.66 mL/dL       Blood Oximetry Phase: Drug Study Level 2      Time Hb SAT(%) PO2 Content PA Sat   PA  4:15 PM  70 %      70 %      Art  4:15 PM  99 %     12.79 mL/dL       Cardiac Output Phase: Baseline      Time TDCO TDCI Eunice C.O. Eunice C.I. Eunice HR   Cardiac Output Results  3:38 PM   4.94 L/min    2.75 L/min/m2       Cardiac Output Phase: Drug Study Level 2      Time TDCO TDCI Eunice C.O. Eunice C.I. Eunice HR   Cardiac Output Results  4:15 PM   7.15 L/min    3.98 L/min/m2         [facility] 10/7:    Conclusion       Right sided filling pressures are mildly elevated.    Moderately elevated pulmonary artery hypertension.    Left sided filling pressures are normal.    Normal cardiac output level.          Plan     * Bedrest per protocol  * Further plans per inpatient team   Hemodynamics     Right Heart Pressures     Right sided filling pressures are mildly elevated.Left sided filling pressures are normal. Moderately elevated pulmonary artery hypertension.Normal cardiac output level.   Pressures Phase: Baseline      Time Systolic Diastolic Mean A Wave V Wave EDP Max dp/dt HR   RA Pressures  3:16 PM   8 mmHg    11 mmHg    9 mmHg      85 bpm      RV  Pressures  2:41 PM       288 mmHg/sec         3:16 PM 57 mmHg    3 mmHg       13 mmHg     85 bpm      PA Pressures  3:18 PM 56 mmHg    20 mmHg    33 mmHg        87 bpm      PCW Pressures  3:17 PM   9 mmHg    8 mmHg    10 mmHg      79 bpm      Blood Flow Results Phase: Baseline      Time Results Indexed Values   QP  2:41 PM 4.38 L/min    2.45 L/min/m2      QS  2:41 PM 4.38 L/min    2.45 L/min/m2      Blood Oximetry Phase: Baseline      Time Hb SAT(%) PO2 Content PA Sat   PA  2:41 PM  62 %      62 %      Art  2:41 PM  100 %     13.06 mL/dL       Cardiac Output Phase: Baseline      Time TDCO TDCI Eunice C.O. Eunice C.I. Eunice HR   Cardiac Output Results  2:41 PM   4.38 L/min    2.45 L/min/m2       Resistance Results Phase: Baseline      Time PVR SVR PVR-I SVR-I TPR TVR TPR-I TVR-I PVR/SVR TPR/TVR   Resistance Results (Metric)  2:41 .79 dsc-5     784.04 dsc-5/m2     601.97 dsc-5     1078.06 dsc-5/m2         Resistance Results (Wood)  2:41 PM 5.47 PATEL     9.8 PATEL/m2     7.53 PATEL     13.48 PATEL/m2           ECHO 10/9/19 (30 day post-TAVR):  Interpretation Summary  S/P #29 Medtronic Evolute Pro bioprosthetic TAVR. Mean gradient 8mmHg. No  valvular or paravalvular aortic regurgitation.  There has been no change.    ECG today: NSR RBBB HR 95    Assessment and Plan:  Beulah Jane is a 69 year old with a complex medical history notable for chronic hypoxic respiratory failure secondary to interstitial lung disease, polymyositis, pulmonary hypertension, RV failure, HFpEF, JUAN, CAD s/p PCI of the LAD 7/2019 and aortic stenosis s/p TAVR on 9/4/19 complicated by complete heart block s/p PPM and HFpEF who presents for hospital follow-up after two recent admissions.     1. Orthostatic hypotension with recurrent syncope: Admitted  on 10/13 after a syncopal episode secondary to orthostatic hypotension following aggressive diuresis and initiation of sildenafil for PH. His sildenafil was discontinued, he was started on midodrine 5 mg  TID and his torsemide was decreased to 5 mg BID. He continues to have frequent dizziness and recurrent syncope. Plan to increase midodrine to 10 mg TID and wear thigh high compression as previously prescribed. Also recommended reducing narcotic dose as this could be contributing to hypotension. Will order remote pacer interrogation to rule out arrhythmogenic syncope.   2. HFpEF: He appears slightly hypervolemic today with neck veins 8 cm and 1-2+ pitting edema. Also, his weight is also up from EDW of 150 lbs to 161 lbs. Will increase torsemide to 10 mg in the morning and 5 mg in the afternoon. I am unable to increase the dose further due to orthostasis. Recommended that his rehab continues to monitor weight closely.   3. Pulmonary hypertension, WHO group II and III: Suspect his PH is mainly due to chronic lung disease but chronic diastolic heart failure likely also contributing. Continue to hold sildenafil given recent admission for syncope secondary to orthostatic hypotension. Plan for patient to follow-up in pulmonary hypertension clinic on Monday to see if there are any additional therapies that can be offered.  4. ILD: Follow-up with pulmonology later today.   5. TAVR: Recent 30 day post-TAVR echo shows normal bioprosthetic valve function with mean PG of 8 and no aortic insufficiency. Continue ASA lifelong and Plavix through 7/2020 for PCI. Patient will prior prophylactic antibiotics prior to any dental procedure. Return to valve clinic in 1 year with echo, ecg and labs prior.   6. CAD: Denies angina. Continue ASA lifelong and Plavix through 7/2020.  7. CHB s/p PPM: Remote interrogation today to rule-out arrhythmogenic syncope.   8. JUAN: Recommend that patient start wearing CPAP at TCU.   9. Chronic pain: Secondary to polymyositis. Patient states that his percocet dose was increased two months ago to 7.5/325 TID. Recommend decreasing back down to 5/325 mg TID as this may ne contributing to his hypotension.  Patient is agreeable.   10. Left great toe ulcer: Ongoing management per rehab MD.     Follow-up: Establish care with PH on Monday with labs prior. Return to valve clinic in 1 year with echo prior to visit.

## 2019-10-23 NOTE — NURSING NOTE
Chief Complaint   Patient presents with     Consult     ILD/ Dermatomyositis     Medications reviewed and vital signs taken.   Alicia Soler CMA

## 2019-10-23 NOTE — LETTER
10/23/2019       RE: Beulah Jane  [address]  [place] [address]     Dear Colleague,    Thank you for referring your patient, Beulah Jane, to the [facility] at [facility]. Please see a copy of my visit note below.    [facility]  ILD Clinic - Initial Visit -  October 23, 2019         Assessment and Plan:   Beulah Jane is a 69 year old male who presents for initial evaluation of interstitial lung disease.    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  Overall appears radiographically stable since 2017.    - While Methotrexate is associated with pneumonitis and there has been reported cases of fibrotic lung disease from MTX, it is difficult to establish causality.  Rituximab has been used in treatment of ILD associated with antisynthetase syndrome and I would agree with continuation of this.  While his functional status and subjective dyspnea has progressed in the last few months, I think this is multifactorial (see below) and less likely due to progression of his ILD.  It is reasonable to switch him from MTX to other agents such as CellCept that has more data in treatment of ILD associated with Antisynthetase syndrome if his ILD progresses.       - We had a long conversation regarding what his goals are today.  Him and his wife seem to be struggling with his new lack of independence and mobility, even with the help of their sons.  She stated they are looking into an assisted living facility. I will refer him to palliative care which he agrees with.     - His dyspnea is multifactorial. He states he has not ambulated since his hospitalization earlier in the month due to symptomatic orthostatic hypotension.  He was discharged to a transitional care unit for continued physical rehabilitation, but his  participation has been limited due to this.  While his spirometry today is suggestive of severe restriction, he was unable to complete lung volumes or six-minute walk test.  In light of his relative radiographic stability of his NSIP pattern ILD, I would think there is a large component of deconditioning that is contributing to his dyspnea and decline in lung function.  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  Lastly, his right heart catheterization demonstrated pulmonary hypertension for which is is currently on Sildenafil for.     2) Pulmonary nodule  - He has a 1.6cm nodule seen on his most recent CT from October 9.  He will need a repeat CT in 3-6 months and this will be communicated to him, his PCP and Dr. Lennox since he does not know if he will follow up here.     - He is not sure if he would like to follow here at the [facility] since he has a pulmonologist, Dr. Lennox, in [facility].  I think the more pressing issue now is his lack of mobility due to orthostatic hypotension which will result in further deconditioning given his age and comorbidities.  I will leave his return appointment open.     Influenza and other vaccinations: He has received a flu shot for this season already.     Terri Cedeno MD  Pulmonary and Critical Care Medicine          History of Present Illness:     Beulah Jane is a 69 year old male with a complicated medical history who presents for initial evaluation of interstitial lung disease.     He has a complicated history including antisynthetase syndrome (positive Shannon-1 antibody) with ILD in a pattern consistent with fibrotic NSIP, on MTX, CAD, history of severe symptomatic aortic stenosis s/p TAVR September 2019 c/b CHB requiring PPM, pulmonary hypertension with moderate RV dysfunction on Sildenafil, orthostatic hypotension on Midodrine, DM, lumbar stenosis s/p fusion in April 2019.     Reviewing his chart, he has had  multiple admissions this year which are summarized below:   4/22 - 5/8 @ [facility] for lumbar fusion c/b hypoxemia which was thought to be secondary to subsegmental PE, ILD and severe aortic stenosis.   7/5-9 [facility] for b/l lower extremity edema and SOB, thought to be 2/2 ADHF and severe AS  8/29-9/7 @ [facility] for symptomatic severe AS (Chest pain, lightheadedness and dyspnea, ADHF) and underwent TAVR (29mm Medtronic Evolut) on 9/4.  Went into complete heart block and required pacemaker placement.   9/25-10/8 @ [facility] for dyspnea, started on Sildenafil for pulmonary hypertension; RHC (9/25) showed RA 8, RV 57/3, PA 56/20 (33), PCWP 9, CO/CI (Eunice) 4.38/2.45  10/13-18 @ [facility] syncope thought to be 2/2 orthostatic hypotension, started on Midodrine 5mg TID but still hypotensive with standing upright or ambulating.  Tosemide dose was decreased from 20mg BID to 5mg TID.     He arrives today in a wheelchair accompanied by his wife.  History was  obtained with the help of a .  He was recently discharged from rehab facility ([facility]).  This had been started on Midrin and diuretic to be decreased, he has been having trouble walking due to continued symptomatic orthotic hypertension.  He states that he has not ambulated since his last hospitalization.    He states that prior to his last admission, he was ambulating short distances with the help of walker.  He currently is able to do ADLs with assistance, including showering and changing his clothes.  He lives with his wife and 2 sons, who are able to help him with daily activities.    He states that he feels it will ever since the lumbar fusion in April, his respiratory and functional status has not been the same.  He feels as though every time he goes into the hospital and has a procedure, things get worse.    He states that he was diagnosed with pyomyositis about 16 years ago with regard.  He has intermittently been on prednisone.  Upon review of records, he has been on  methotrexate 25 mg weekly.  He received a dose of Rituximab in March 2019 and per Rheumatology note from August, was planned to receive his next dose in September.  It is unclear to me whether he has received a dose yet.             Review of Systems:     Please see HPI, otherwise the complete 10 point ROS is negative.           Past Medical and Surgical History:     Past Medical History:   Diagnosis Date     Aortic stenosis      Chronic pain      DM (diabetes mellitus), type 2 (H)     on insulin     Hyperlipidemia      JUAN on CPAP      Pneumonia      Polymyositis (H)      Pulmonary fibrosis, unspecified (H)      Seasonal allergies      Past Surgical History:   Procedure Laterality Date     ARTHROSCOPY KNEE  1970     COLONOSCOPY       CV CORONARY ANGIOGRAM N/A 7/30/2019    Procedure: CV CORONARY ANGIOGRAM;  Surgeon: Leonidas Mike MD;  Location:  HEART CARDIAC CATH LAB     CV LEFT HEART CATH N/A 7/30/2019    Procedure: CV LEFT HEART CATH;  Surgeon: Leonidas Mike MD;  Location: [facility] CARDIAC CATH LAB     CV PCI STENT DRUG ELUTING Left 7/30/2019    Procedure: PCI Stent Drug Eluting;  Surgeon: Leonidas Mike MD;  Location:  HEART CARDIAC CATH LAB     CV RIGHT HEART CATH N/A 7/30/2019    Procedure: CV RIGHT HEART CATH;  Surgeon: Leonidas Mike MD;  Location:  HEART CARDIAC CATH LAB     CV RIGHT HEART CATH N/A 9/27/2019    Procedure: Right Heart Cath;  Surgeon: Altaf Camacho MD;  Location:  HEART CARDIAC CATH LAB     CV RIGHT HEART CATH N/A 10/7/2019    Procedure: Right Heart Cath;  Surgeon: Benjamin Mancuso MD;  Location:  HEART CARDIAC CATH LAB     CV TRANSCATHETER AORTIC VALVE REPLACEMENT N/A 9/4/2019    Procedure: Transcatheter (Medtronic - 29mm) Aortic Valve Replacement, trans thorasic echocardiogram, perfusion and cardiac surgery standby, temporary pacemeker placement;  Surgeon: Pj Garcia MD;  Location:  OR     DECOMPRESSION, FUSION LUMBAR  POSTERIOR TWO LEVELS, COMBINED  1997     EP PACEMAKER N/A 9/6/2019    Procedure: EP PACEMAKER;  Surgeon: Shefali Sylvester MD;  Location: [facility] HEART CARDIAC CATH LAB     FUSION LUMBAR ANTERIOR THREE+ LEVELS N/A 4/22/2019    Procedure: Lumbar 1 Or Lumbar 2-5 Anterior Lumbar Interbody Fusion with BMP Stage 1 Of Two Procedure:;  Surgeon: Carlos Enrique Dial MD;  Location: [facility] OR     HEART CATH FEMORAL CANNULIZATION WITH OPEN STANDBY REPAIR AORTIC VALVE N/A 9/4/2019    Procedure: Cardiopulmonary Bypass Standby;  Surgeon: Hamilton Canres MD;  Location: [facility] OR     HERNIA REPAIR  2006     lumbar spine hardware removal  1999     OPTICAL TRACKING SYSTEM FUSION POSTERIOR SPINE THORACIC THREE+ LEVELS N/A 4/25/2019    Procedure: O-Arm/Stealth Assisted Thoracic 10-Pelvis Instrumented Fusion T11-2, L1-2, L2-3 Transforminal Lumbar Interbody Fusion (TLIF) And Smith Borges Osteotomies,and L3-4 SPO as well, Use Of BMP, Debridement Of Seroma;  Surgeon: Carlos Enrique Dial MD;  Location: [facility] OR     PICC INSERTION Right 05/06/2019    4Fr - 37cm, Basilic vein, low SVC     REMOVAL PROSTHETIC MATERIAL/MESH, ABD WALL NECRO TISS INFEXN  2012     ROTATOR CUFF REPAIR RT/LT  2003           Family History:     Family History   Problem Relation Age of Onset     Colon Cancer Mother      Hypertension Father         did not know father well            Social History:     Social History     Socioeconomic History     Marital status:      Spouse name: Not on file     Number of children: Not on file     Years of education: Not on file     Highest education level: Not on file   Occupational History     Not on file   Social Needs     Financial resource strain: Not on file     Food insecurity:     Worry: Not on file     Inability: Not on file     Transportation needs:     Medical: Not on file     Non-medical: Not on file   Tobacco Use     Smoking status: Former Smoker     Packs/day: 0.25     Last attempt to quit: 6/29/1970      "Years since quittin.3     Smokeless tobacco: Never Used   Substance and Sexual Activity     Alcohol use: Yes     Comment: few times monthly     Drug use: Never     Sexual activity: Not Currently   Lifestyle     Physical activity:     Days per week: Not on file     Minutes per session: Not on file     Stress: Not on file   Relationships     Social connections:     Talks on phone: Not on file     Gets together: Not on file     Attends Mu-ism service: Not on file     Active member of club or organization: Not on file     Attends meetings of clubs or organizations: Not on file     Relationship status: Not on file     Intimate partner violence:     Fear of current or ex partner: Not on file     Emotionally abused: Not on file     Physically abused: Not on file     Forced sexual activity: Not on file   Other Topics Concern     Not on file   Social History Narrative    Originally from [place].          Moved to [place]         He currently lives with his wife and two sons. Thinking about transitioning to a nursing home.              Medications:     Current Outpatient Medications   Medication     acetaminophen (TYLENOL) 325 MG tablet     albuterol (2.5 MG/3ML) 0.083% neb solution     aspirin (ASA) 81 MG EC tablet     atorvastatin (LIPITOR) 40 MG tablet     benzocaine-menthol (CEPACOL) 15-3.6 MG lozenge     calcium carbonate (OS-DMITRIY 500 MG [place]. [place]) 500 MG tablet     Cholecalciferol (VITAMIN D) 2000 units tablet     clopidogrel (PLAVIX) 75 MG tablet     diphenhydrAMINE (BENADRYL) 50 MG capsule     docusate sodium (COLACE) 100 MG capsule     ferrous gluconate (FERGON) 324 (38 Fe) MG tablet     folic acid (FOLVITE) 1 MG tablet     gabapentin (NEURONTIN) 300 MG capsule     insulin aspart (NOVOLOG VIAL) 100 UNITS/ML vial     insulin syringe-needle U-100 (29G X 1/2\" 1 ML) 29G X 1/2\" 1 ML miscellaneous     LANsoprazole (PREVACID) 30 MG DR capsule     levothyroxine (SYNTHROID/LEVOTHROID) 50 MCG tablet     " "methotrexate 2.5 MG tablet     midodrine (PROAMATINE) 10 MG tablet     multivitamin w/minerals (THERA-VIT-M) tablet     omega 3 1000 MG CAPS     oxyCODONE-acetaminophen (PERCOCET) 7.5-325 MG per tablet     potassium chloride ER (K-DUR/KLOR-CON M) 20 MEQ CR tablet     sildenafil (REVATIO) 20 MG tablet     torsemide (DEMADEX) 5 MG tablet     torsemide (DEMADEX) 5 MG tablet     No current facility-administered medications for this visit.             Physical Exam:   BP 94/61   Pulse 101   Ht 1.676 m (5' 6\")   Wt 73.6 kg (162 lb 4.1 oz)   SpO2 94%   BMI 26.19 kg/m       GENERAL: frail appearing gentleman in a wheelchair, in NAD  HEENT: NCAT, EOMI, no scleral icterus, oral mucosa moist and without lesions  Neck: no cervical or supraclavicular adenopathy  Lungs: limited airflow, inspiratory crackles  CV: RRR, S1S2, no murmurs noted  Abdomen: normoactive BS, soft, non tender  Neuro: AAO X 3  Psychiatric: normal affect  Skin: no rash, jaundice or lesions on limited exam  Extremities: No clubbing or cyanosis.  4-/5 hip flexion, otherwise 4+ to 5/5 arm extension/flexion, , knee extension and flexion.          Imaging:     CT Chest High resolution (10/9/19)  Impression:   1.  Pulmonary fibrosis in a pattern favoring NSIP pattern, potentially  related to patient's known polymyositis.  2. Mediastinal lymphadenopathy.  3. Enlarged pulmonary artery.  4. Stable pericardial effusion.  5. Extensive coronary atherosclerotic disease, similar to previous.  6. Nodular opacity in the left upper lobe . Indeterminant.   Infectious, inflammatory such as organizing pneumonia or neoplastic.   Continued follow up recommended.3-6 months.      [Consider Follow Up: Lung nodule]         Pulmonary Function Tests:     PFT interpretation (October 23, 2019):  Maneuver: Meets ATS criteria  Spirometry suggests severe restriction, lung volumes are needed to confirm.   Severe impairment in diffusing capacity.            Laboratory:     2018 " ([facility])  DORENE - 1:40  ANCA - neg  Anti-SSA - neg  ANti-SSB - neg  Anti-Scl-70 - neg  Anti-Centromere - neg  Anti-RNP - neg  Anti-RF - neg  Anti-CCP - neg  Anti-Shannon 1 - positive        Recent Results (from the past 168 hour(s))   Glucose by meter    Collection Time: 10/16/19  4:38 PM   Result Value Ref Range    Glucose 121 (H) 70 - 99 mg/dL   Glucose by meter    Collection Time: 10/16/19  9:17 PM   Result Value Ref Range    Glucose 110 (H) 70 - 99 mg/dL   Glucose by meter    Collection Time: 10/17/19  8:41 AM   Result Value Ref Range    Glucose 92 70 - 99 mg/dL   Glucose by meter    Collection Time: 10/17/19  5:07 PM   Result Value Ref Range    Glucose 105 (H) 70 - 99 mg/dL   Glucose by meter    Collection Time: 10/17/19  9:04 PM   Result Value Ref Range    Glucose 117 (H) 70 - 99 mg/dL   Glucose by meter    Collection Time: 10/18/19  1:51 AM   Result Value Ref Range    Glucose 107 (H) 70 - 99 mg/dL   Glucose by meter    Collection Time: 10/18/19  7:41 AM   Result Value Ref Range    Glucose 91 70 - 99 mg/dL   Glucose by meter    Collection Time: 10/18/19 12:49 PM   Result Value Ref Range    Glucose 139 (H) 70 - 99 mg/dL   Basic metabolic panel    Collection Time: 10/22/19 10:55 AM   Result Value Ref Range    Sodium 138 133 - 144 mmol/L    Potassium 4.2 3.4 - 5.3 mmol/L    Chloride 104 94 - 109 mmol/L    Carbon Dioxide 26 20 - 32 mmol/L    Anion Gap 8 3 - 14 mmol/L    Glucose 135 (H) 70 - 99 mg/dL    Urea Nitrogen 26 7 - 30 mg/dL    Creatinine 1.08 0.66 - 1.25 mg/dL    GFR Estimate 69 >60 mL/min/[1.73_m2]    GFR Estimate If Black 80 >60 mL/min/[1.73_m2]    Calcium 8.5 8.5 - 10.1 mg/dL   CBC with platelets    Collection Time: 10/22/19 10:55 AM   Result Value Ref Range    WBC 14.3 (H) 4.0 - 11.0 10e9/L    RBC Count 4.58 4.4 - 5.9 10e12/L    Hemoglobin 9.7 (L) 13.3 - 17.7 g/dL    Hematocrit 35.4 (L) 40.0 - 53.0 %    MCV 77 (L) 78 - 100 fl    MCH 21.2 (L) 26.5 - 33.0 pg    MCHC 27.4 (L) 31.5 - 36.5 g/dL    RDW  23.6 (H) 10.0 - 15.0 %    Platelet Count 469 (H) 150 - 450 10e9/L   General PFT Lab (Please always keep checked)    Collection Time: 10/22/19  3:14 PM   Result Value Ref Range    FVC-Pred 3.86 L    FVC-Pre 1.31 L    FVC-%Pred-Pre 33 %    FEV1-Pre 1.15 L    FEV1-%Pred-Pre 39 %    FEV1FVC-Pred 77 %    FEV1FVC-Pre 88 %    FEFMax-Pred 7.80 L/sec    FEFMax-Pre 4.45 L/sec    FEFMax-%Pred-Pre 57 %    FEF2575-Pred 2.32 L/sec    FEF2575-Pre 1.65 L/sec    RFL0357-%Pred-Pre 70 %    FEF2575-Post 1.06 L/sec    ZWM6618-%Pred-Post 45 %    ExpTime-Pre 6.90 sec    FIFMax-Pre 1.71 L/sec    VC-Pred 4.26 L    VC-Pre 1.33 L    VC-%Pred-Pre 31 %    IC-Pred 3.18 L    IC-Pre 0.95 L    IC-%Pred-Pre 29 %    ERV-Pred 1.07 L    ERV-Pre 0.38 L    ERV-%Pred-Pre 35 %    FEV1FEV6-Pred 78 %    FEV1FEV6-Pre 88 %    DLCOunc-Pred 23.74 ml/min/mmHg    DLCOunc-Pre 6.40 ml/min/mmHg    DLCOunc-%Pred-Pre 26 %    DLCOcor-Pre 7.73 ml/min/mmHg    DLCOcor-%Pred-Pre 32 %    VA-Pre 2.24 L    VA-%Pred-Pre 38 %    FEV1SVC-Pred 69 %    FEV1SVC-Pre 87 %   EKG 12-lead, tracing only (Same Day)    Collection Time: 10/23/19  8:42 AM   Result Value Ref Range    Interpretation ECG Click View Image link to view waveform and result               Cardiac:     TTE (10/14/19)  The right ventricle is moderately dilated.  The right ventricular systolic function is mild to moderately reduced.  There is mild (1+) tricuspid regurgitation.  Dilation of the inferior vena cava is present with abnormal respiratory  variation in diameter.  Right ventricular systolic pressure is elevated, consistent with moderate to  severe pulmonary hypertension.  The aortic valve is not well visualized.  PA pressure may be higher when compared with the numerous recent studies. The  study was technically difficult.    TTE (10/9/19)  Left Ventricle  Global and regional left ventricular function is normal with an EF of 55-60%.  Left ventricular wall thickness is normal. Left ventricular size is  normal.  Left ventricular diastolic function is indeterminate. No regional wall motion  abnormalities are seen.  Right Ventricle  Global right ventricular function is normal. Mild right ventricular dilation  is present.    RHC (10/7/19)  RA 8, RV 57/3, PA 56/20 (33), PCWP 9, CO/CI(Eunice) 4.38/2.45    RHC (9/25/19)  RA 13, PA 72/31 (46), PCWP 18, CO/CI (Eunice) 4.94/2.75    Vasodilator study: POSITIVE STUDY. With inhaled nitric oxide at 80 ppm, there is a borderline reduction in PA pressures (mean PA reduced from 46 to 36) with improvement in cardiac output by Eunice, leading to substantial reduction in SVR from 10.9 Woods units to 4.2 Woods units.              Other:         Again, thank you for allowing me to participate in the care of your patient.      Sincerely,    Terri Cedeno MD

## 2019-10-23 NOTE — LETTER
10/23/2019      RE: Beulah Jane  [address]  [facility] [address]       Dear Colleague,    Thank you for the opportunity to participate in the care of your patient, Beulah Jane, at the [facility] at [facility]. Please see a copy of my visit note below.    Cardiology Clinic Note  October 23, 2019      HPI:  Beulah Jane is a 69 year old with a complex medical history notable for chronic hypoxic respiratory failure secondary to interstitial lung disease, polymyositis, pulmonary hypertension, RV failure, HFpEF, JUAN, CAD s/p PCI of the LAD 7/2019 and aortic stenosis s/p TAVR on 9/4/19 complicated by complete heart block s/p PPM and HFpEF exacerbation who presents for hospital follow-up after two recent admissions.    The patient was hospitalized at [facility] 9/25/19-10/08/2019, with progressive dyspnea, leg swelling and weight gain. Echo showed preserved LV function, right ventricle was noted to be mild to moderately dilated with mildly decreased RV function. Echo showed normal bioprosthetic valve function. Right heart catheterization was performed on 9/25 showing severe pulmonary hypertension with mildly elevated pulmonary capillary wedge pressure. His mean PA pressure was 46, PAW of 16 and with a PVR 10.9 wood units. Vasodilator study was positive in that the mean PA dropped from 46 down to 36, cardiac output increased from 4.9 to 7.1 and the PVR dropped from 10.9, down to 4.2 wood units. He was started on sildenafil 5 mg TID and diuresed to a dry weight of 150 lbs and had a repeat right heart cath on 10/07/2019 which showed RA mean of 8, PA pressure 56/20 with a mean of 33, wedge mean was 9. The PVR was 5.47 wood units. Cardiac output 4.3 liters per minute. He was discharged home on sildenafil 5 mg TID and torsemide 20 mg BID.     He was readmitted to [facility] on 10/13 after a syncopal episode, was found to have  orthostatic hypotension. His sildenafil was discontinued, he was started on midodrine 5 mg TID and his torsemide was decreased to 5 mg BID. He was also advised to wear thigh high compression hosiery. He was discharged to acute rehab on 10/18 with plans to establish care with pulmonary hypertension and pulmonology as outpatient.    The patient presents to clinic accompanied by his wife and a professional . Since hospital discharge he has continued to have frequent episodes of dizziness with standing. He also reports having 3 episodes of syncope since discharge. He had an episode on 10/18 when he arrived at the rehab facility and was ambulating in the hallway. He had an episode of unresponsiveness last evening when he was in bed and states that he felt like he was dreaming and could hear his nurse but couldn't wake up. He then had an episode of loss of consciousness this morning when he has standing in the bathroom and got extremely short of breath and dizzy, then sat in the wheelchair and passed out. He awoke to sternal rub. His vital signs were noted to be stable, though oxygen saturations were 87%, his oxygen was increased from 2L to 5L and his oxygen increased to 98%. In regards to his dyspnea, he continues to have shortness of breath with minimal activity, though it has improved since starting supplement oxygen. He denies any chest pain, palpitations, orthopnea or PND. He reports increased leg swelling today. He has not been wearing compression stockings at rehab. He has also not been using his CPAP machine. His states that his weight has been stable at 150-155 lbs at rehab, though per our clinic scale his weight is up 10 lbs.      Past medical history:  Past Medical History:   Diagnosis Date     Aortic stenosis      Chronic pain      DM (diabetes mellitus), type 2 (H)     on insulin     Hyperlipidemia      JUAN on CPAP      Pneumonia      Polymyositis (H)      Pulmonary fibrosis, unspecified  "(H)      Seasonal allergies          Medications:  acetaminophen (TYLENOL) 325 MG tablet, Take 2 tablets (650 mg) by mouth every 8 hours as needed for pain  albuterol (2.5 MG/3ML) 0.083% neb solution, Take 1 vial by nebulization every 6 hours as needed for shortness of breath / dyspnea or wheezing  aspirin (ASA) 81 MG EC tablet, Take 1 tablet (81 mg) by mouth daily Start tomorrow morning.  atorvastatin (LIPITOR) 40 MG tablet, Take 1 tablet (40 mg) by mouth daily  benzocaine-menthol (CEPACOL) 15-3.6 MG lozenge, Place 1 lozenge inside cheek every hour as needed for sore throat  calcium carbonate (OS-DMITRIY 500 MG [place]. [place]) 500 MG tablet, Take 1 tablet (500 mg) by mouth 2 times daily  Cholecalciferol (VITAMIN D) 2000 units tablet, Take 2,000 Units by mouth daily  clopidogrel (PLAVIX) 75 MG tablet, Take 1 tablet (75 mg) by mouth daily  diphenhydrAMINE (BENADRYL) 50 MG capsule, Take 50 mg by mouth as needed for itching or allergies   docusate sodium (COLACE) 100 MG capsule, Take 1 capsule (100 mg) by mouth At Bedtime For constipation. Hold for loose stools  ferrous gluconate (FERGON) 324 (38 Fe) MG tablet, Take 1 tablet (324 mg) by mouth daily (with breakfast)  folic acid (FOLVITE) 1 MG tablet, Take 1 tablet (1 mg) by mouth every morning  gabapentin (NEURONTIN) 300 MG capsule, Take 2 capsules (600 mg) by mouth 3 times daily  insulin aspart (NOVOLOG VIAL) 100 UNITS/ML vial, Give before meals and before bed:  For Pre-Meal Glucose:  140-189 give 1 unit   190-239 give 2 units   240-289 give 3 units   290-339 give 4 units   = or >340 give 5 units     For Bedtime Glucose  200 - 239 give 1 units   240 - 289 give 1.5 units  290 - 339 give 2 units  = or >340 give 2.5 units  insulin syringe-needle U-100 (29G X 1/2\" 1 ML) 29G X 1/2\" 1 ML miscellaneous, Inject 1 Syringe Subcutaneous 2 times daily  LANsoprazole (PREVACID) 30 MG DR capsule, Take 30 mg by mouth every morning (before breakfast)  levothyroxine (SYNTHROID/LEVOTHROID) 50 MCG " tablet, Take 50 mcg by mouth every morning   methotrexate 2.5 MG tablet, Take 10 tablets (25 mg) by mouth once a week Tuesday  midodrine (PROAMATINE) 5 MG tablet, Take 1 tablet (5 mg) by mouth 3 times daily (with meals)  multivitamin w/minerals (THERA-VIT-M) tablet, Take 1 tablet by mouth daily  omega 3 1000 MG CAPS, Take 2 capsules by mouth every morning   oxyCODONE-acetaminophen (PERCOCET) 7.5-325 MG per tablet, Take 1 tablet by mouth 3 times daily  potassium chloride ER (K-DUR/KLOR-CON M) 20 MEQ CR tablet, Take 1 tablet (20 mEq) by mouth daily  torsemide (DEMADEX) 5 MG tablet, Take 1 tablet (5 mg) by mouth 2 times daily  torsemide (DEMADEX) 5 MG tablet, Take 1 tablet (5 mg) by mouth daily as needed (if weight increases more than 2 lbs per day)    No current facility-administered medications on file prior to visit.       Allergies:    No Known Allergies    Family and social history:    Family History   Problem Relation Age of Onset     Colon Cancer Mother      Hypertension Father         did not know father well       Social History     Socioeconomic History     Marital status:      Spouse name: Not on file     Number of children: Not on file     Years of education: Not on file     Highest education level: Not on file   Occupational History     Not on file   Social Needs     Financial resource strain: Not on file     Food insecurity:     Worry: Not on file     Inability: Not on file     Transportation needs:     Medical: Not on file     Non-medical: Not on file   Tobacco Use     Smoking status: Former Smoker     Packs/day: 0.25     Last attempt to quit: 1970     Years since quittin.3     Smokeless tobacco: Never Used   Substance and Sexual Activity     Alcohol use: Yes     Comment: few times monthly     Drug use: Never     Sexual activity: Not Currently   Lifestyle     Physical activity:     Days per week: Not on file     Minutes per session: Not on file     Stress: Not on file   Relationships      "Social connections:     Talks on phone: Not on file     Gets together: Not on file     Attends Spiritism service: Not on file     Active member of club or organization: Not on file     Attends meetings of clubs or organizations: Not on file     Relationship status: Not on file     Intimate partner violence:     Fear of current or ex partner: Not on file     Emotionally abused: Not on file     Physically abused: Not on file     Forced sexual activity: Not on file   Other Topics Concern     Not on file   Social History Narrative     Not on file     Review of Systems:  Skin: No skin rash or ulcers.  Eyes: No red eye.  Ears/Nose/Throat: No ear discharge, nasal congestion, sore throat or dysphagia.  Respiratory: No cough or hemoptysis.  Cardiovascular: See HPI.    Gastrointestinal: No abdominal pain, nausea, vomiting, hematemesis or melena.  Genitourinary: No increased frequency or urgency of urine. No dysuria or hematuria.  Musculoskeletal: No polyarthralgia or myalgias.  Neurologic: No headaches, seizure or focal weakness.  Psychiatric: No hallucinations.  Hematologic/Lymphatic/Immunologic: No bleeding tendency.  Endocrine: No heat or cold intolerance, abnormal facial hair or alopecia.    Vital signs:  BP 94/61 (BP Location: Right arm, Patient Position: Chair, Cuff Size: Adult Regular)   Pulse 101   Ht 1.676 m (5' 6\")   Wt 73.6 kg (162 lb 3.2 oz)   SpO2 94%   BMI 26.18 kg/m      Wt Readings from Last 2 Encounters:   10/21/19 69.7 kg (153 lb 9.6 oz)   10/18/19 70.7 kg (155 lb 14.4 oz)     Physical Exam:  Gen: NAD.    HEENT: No conjunctival pallor or scleral icterus, MMM. Clear oropharynx.    Neck:. No thyroid enlargement or cervical adenopathy.    Chest: Bibasilar crackles.     CV: Normal first and second heart sounds. No murmurs or gallop appreciated. JVP 8 cm  Abdomen: Soft, non-tender, non-distended, BS+.  Ext: 1-2+ pitting edema BLE. Warm and well perfused with normal capillary refill. Wound on tip of left " great toe.   Skin: No skin rash or ulcers.  Neuro: alert, oriented and appropriately conversant.    Psych: Normal affect and speech.    Labs:  Last Basic Metabolic Panel:  Lab Results   Component Value Date     10/22/2019      Lab Results   Component Value Date    POTASSIUM 4.2 10/22/2019     Lab Results   Component Value Date    CHLORIDE 104 10/22/2019     Lab Results   Component Value Date    DMITRIY 8.5 10/22/2019     Lab Results   Component Value Date    CO2 26 10/22/2019     Lab Results   Component Value Date    BUN 26 10/22/2019     Lab Results   Component Value Date    CR 1.08 10/22/2019     Lab Results   Component Value Date     10/22/2019       Lab Results   Component Value Date    WBC 14.3 10/22/2019     Lab Results   Component Value Date    RBC 4.58 10/22/2019     Lab Results   Component Value Date    HGB 9.7 10/22/2019     Lab Results   Component Value Date    HCT 35.4 10/22/2019     Lab Results   Component Value Date    MCV 77 10/22/2019     Lab Results   Component Value Date    MCH 21.2 10/22/2019     Lab Results   Component Value Date    MCHC 27.4 10/22/2019     Lab Results   Component Value Date    RDW 23.6 10/22/2019     Lab Results   Component Value Date     10/22/2019       Diagnostics:    RHC 9/25:    Vasdilator Study (Inhaled nitric oxide @ 80 ppm):  Borderline reduction in PA pressures (mean PA reduced from 46 to 36) with improvement in cardiac output by Eunice, leading to substantial reduction in SVR from 10.9 Woods units to 4.2 Woods units.   Pressures Phase: Baseline      Time Systolic Diastolic Mean A Wave V Wave EDP Max dp/dt HR   RA Pressures  4:07 PM   13 mmHg    17 mmHg    12 mmHg      99 bpm      PA Pressures  4:05 PM 72 mmHg    31 mmHg    46 mmHg        100 bpm      PCW Pressures  4:07 PM   16 mmHg    13 mmHg    18 mmHg      95 bpm      AO Pressures  4:05 PM 88 mmHg    58 mmHg    69 mmHg        97 bpm      Pressures Phase: Drug Study Level 1      Time Systolic Diastolic  Mean A Wave V Wave EDP Max dp/dt HR   PA Pressures  4:15 PM 61 mmHg    26 mmHg    37 mmHg        91 bpm      Pressures Phase: Drug Study Level 2      Time Systolic Diastolic Mean A Wave V Wave EDP Max dp/dt HR   RA Pressures  4:18 PM   11 mmHg    14 mmHg    9 mmHg      93 bpm      RV Pressures  4:15 PM       336 mmHg/sec         4:20 PM 62 mmHg    8 mmHg       20 mmHg     94 bpm      PA Pressures  4:16 PM 60 mmHg    25 mmHg    36 mmHg        89 bpm      PCW Pressures  4:17 PM   18 mmHg    19 mmHg    20 mmHg      90 bpm        4:17 PM   20 mmHg    17 mmHg    21 mmHg      81 bpm      AO Pressures  4:16 PM 95 mmHg    54 mmHg    69 mmHg        88 bpm      Blood Flow Results Phase: Baseline      Time Results Indexed Values   QP  3:38 PM 4.94 L/min    2.75 L/min/m2      QS  3:38 PM 4.94 L/min    2.75 L/min/m2      Blood Flow Results Phase: Drug Study Level 2      Time Results Indexed Values   QP  4:15 PM 7.15 L/min    3.98 L/min/m2      QS  4:15 PM 7.15 L/min    3.98 L/min/m2      Blood Oximetry Phase: Baseline      Time Hb SAT(%) PO2 Content PA Sat   PA  3:38 PM  56 %      56 %      Art  3:38 PM  98 %     12.66 mL/dL       Blood Oximetry Phase: Drug Study Level 2      Time Hb SAT(%) PO2 Content PA Sat   PA  4:15 PM  70 %      70 %      Art  4:15 PM  99 %     12.79 mL/dL       Cardiac Output Phase: Baseline      Time TDCO TDCI Eunice C.O. Eunice C.I. Eunice HR   Cardiac Output Results  3:38 PM   4.94 L/min    2.75 L/min/m2       Cardiac Output Phase: Drug Study Level 2      Time TDCO TDCI Eunice C.O. Eunice C.I. Eunice HR   Cardiac Output Results  4:15 PM   7.15 L/min    3.98 L/min/m2         RHC 10/7:    Conclusion       Right sided filling pressures are mildly elevated.    Moderately elevated pulmonary artery hypertension.    Left sided filling pressures are normal.    Normal cardiac output level.          Plan     * Bedrest per protocol  * Further plans per inpatient team   Hemodynamics     Right Heart Pressures     Right sided  filling pressures are mildly elevated.Left sided filling pressures are normal. Moderately elevated pulmonary artery hypertension.Normal cardiac output level.   Pressures Phase: Baseline      Time Systolic Diastolic Mean A Wave V Wave EDP Max dp/dt HR   RA Pressures  3:16 PM   8 mmHg    11 mmHg    9 mmHg      85 bpm      RV Pressures  2:41 PM       288 mmHg/sec         3:16 PM 57 mmHg    3 mmHg       13 mmHg     85 bpm      PA Pressures  3:18 PM 56 mmHg    20 mmHg    33 mmHg        87 bpm      PCW Pressures  3:17 PM   9 mmHg    8 mmHg    10 mmHg      79 bpm      Blood Flow Results Phase: Baseline      Time Results Indexed Values   QP  2:41 PM 4.38 L/min    2.45 L/min/m2      QS  2:41 PM 4.38 L/min    2.45 L/min/m2      Blood Oximetry Phase: Baseline      Time Hb SAT(%) PO2 Content PA Sat   PA  2:41 PM  62 %      62 %      Art  2:41 PM  100 %     13.06 mL/dL       Cardiac Output Phase: Baseline      Time TDCO TDCI Eunice C.O. Eunice C.I. Eunice HR   Cardiac Output Results  2:41 PM   4.38 L/min    2.45 L/min/m2       Resistance Results Phase: Baseline      Time PVR SVR PVR-I SVR-I TPR TVR TPR-I TVR-I PVR/SVR TPR/TVR   Resistance Results (Metric)  2:41 .79 dsc-5     784.04 dsc-5/m2     601.97 dsc-5     1078.06 dsc-5/m2         Resistance Results (Wood)  2:41 PM 5.47 PATEL     9.8 PATEL/m2     7.53 PATEL     13.48 PATEL/m2           ECHO 10/9/19 (30 day post-TAVR):  Interpretation Summary  S/P #29 Medtronic Evolute Pro bioprosthetic TAVR. Mean gradient 8mmHg. No  valvular or paravalvular aortic regurgitation.  There has been no change.    ECG today: NSR RBBB HR 95    Assessment and Plan:  Beulah Jane is a 69 year old with a complex medical history notable for chronic hypoxic respiratory failure secondary to interstitial lung disease, polymyositis, pulmonary hypertension, RV failure, HFpEF, JUAN, CAD s/p PCI of the LAD 7/2019 and aortic stenosis s/p TAVR on 9/4/19 complicated by complete heart block s/p PPM and HFpEF who  presents for hospital follow-up after two recent admissions.     1. Orthostatic hypotension with recurrent syncope: Admitted  on 10/13 after a syncopal episode secondary to orthostatic hypotension following aggressive diuresis and initiation of sildenafil for PH. His sildenafil was discontinued, he was started on midodrine 5 mg TID and his torsemide was decreased to 5 mg BID. He continues to have frequent dizziness and recurrent syncope. Plan to increase midodrine to 10 mg TID and wear thigh high compression as previously prescribed. Also recommended reducing narcotic dose as this could be contributing to hypotension. Will order remote pacer interrogation to rule out arrhythmogenic syncope.   2. HFpEF: He appears slightly hypervolemic today with neck veins 8 cm and 1-2+ pitting edema. Also, his weight is also up from EDW of 150 lbs to 161 lbs. Will increase torsemide to 10 mg in the morning and 5 mg in the afternoon. I am unable to increase the dose further due to orthostasis. Recommended that his rehab continues to monitor weight closely.   3. Pulmonary hypertension, WHO group II and III: Suspect his PH is mainly due to chronic lung disease but chronic diastolic heart failure likely also contributing. Continue to hold sildenafil given recent admission for syncope secondary to orthostatic hypotension. Plan for patient to follow-up in pulmonary hypertension clinic on Monday to see if there are any additional therapies that can be offered.  4. ILD: Follow-up with pulmonology later today.   5. TAVR: Recent 30 day post-TAVR echo shows normal bioprosthetic valve function with mean PG of 8 and no aortic insufficiency. Continue ASA lifelong and Plavix through 7/2020 for PCI. Patient will prior prophylactic antibiotics prior to any dental procedure. Return to valve clinic in 1 year with echo, ecg and labs prior.   6. CAD: Denies angina. Continue ASA lifelong and Plavix through 7/2020.  7. CHB s/p PPM: Remote interrogation  today to rule-out arrhythmogenic syncope.   8. JUAN: Recommend that patient start wearing CPAP at TCU.   9. Chronic pain: Secondary to polymyositis. Patient states that his percocet dose was increased two months ago to 7.5/325 TID. Recommend decreasing back down to 5/325 mg TID as this may ne contributing to his hypotension. Patient is agreeable.   10. Left great toe ulcer: Ongoing management per rehab MD.     Follow-up: Establish care with PH on Monday with labs prior. Return to valve clinic in 1 year with echo prior to visit.     Please do not hesitate to contact me if you have any questions/concerns.     Sincerely,     Tamar Tillman NP

## 2019-10-23 NOTE — PROGRESS NOTES
[facility]  ILD Clinic - Initial Visit -  October 23, 2019         Assessment and Plan:   Beulah Jane is a 69 year old male who presents for initial evaluation of interstitial lung disease.    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  Overall appears radiographically stable since 2017.    - While Methotrexate is associated with pneumonitis and there has been reported cases of fibrotic lung disease from MTX, it is difficult to establish causality.  Rituximab has been used in treatment of ILD associated with antisynthetase syndrome and I would agree with continuation of this.  While his functional status and subjective dyspnea has progressed in the last few months, I think this is multifactorial (see below) and less likely due to progression of his ILD.  It is reasonable to switch him from MTX to other agents such as CellCept that has more data in treatment of ILD associated with Antisynthetase syndrome if his ILD progresses.       - We had a long conversation regarding what his goals are today.  Him and his wife seem to be struggling with his new lack of independence and mobility, even with the help of their sons.  She stated they are looking into an assisted living facility. I will refer him to palliative care which he agrees with.     - His dyspnea is multifactorial. He states he has not ambulated since his hospitalization earlier in the month due to symptomatic orthostatic hypotension.  He was discharged to a transitional care unit for continued physical rehabilitation, but his participation has been limited due to this.  While his spirometry today is suggestive of severe restriction, he was unable to complete lung volumes or six-minute walk test.  In light of his relative radiographic stability of his NSIP pattern ILD, I would think there is a large component of deconditioning that is  contributing to his dyspnea and decline in lung function.  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  Lastly, his right heart catheterization demonstrated pulmonary hypertension for which is is currently on Sildenafil for.     2) Pulmonary nodule  - He has a 1.6cm nodule seen on his most recent CT from October 9.  He will need a repeat CT in 3-6 months and this will be communicated to him, his PCP and Dr. Lennox since he does not know if he will follow up here.     - He is not sure if he would like to follow here at the [facility] since he has a pulmonologist, Dr. Lennox, in [facility].  I think the more pressing issue now is his lack of mobility due to orthostatic hypotension which will result in further deconditioning given his age and comorbidities.  I will leave his return appointment open.     Influenza and other vaccinations: He has received a flu shot for this season already.     Terri Cedeno MD  Pulmonary and Critical Care Medicine          History of Present Illness:     Beulah Jane is a 69 year old male with a complicated medical history who presents for initial evaluation of interstitial lung disease.     He has a complicated history including antisynthetase syndrome (positive Shannon-1 antibody) with ILD in a pattern consistent with fibrotic NSIP, on MTX, CAD, history of severe symptomatic aortic stenosis s/p TAVR September 2019 c/b CHB requiring PPM, pulmonary hypertension with moderate RV dysfunction on Sildenafil, orthostatic hypotension on Midodrine, DM, lumbar stenosis s/p fusion in April 2019.     Reviewing his chart, he has had multiple admissions this year which are summarized below:   4/22 - 5/8 @ [facility] for lumbar fusion c/b hypoxemia which was thought to be secondary to subsegmental PE, ILD and severe aortic stenosis.   7/5-9 @[facility] for b/l lower extremity edema and SOB, thought to be 2/2 ADHF and severe AS  8/29-9/7 @ [facility] for  symptomatic severe AS (Chest pain, lightheadedness and dyspnea, ADHF) and underwent TAVR (29mm Medtronic Evolut) on 9/4.  Went into complete heart block and required pacemaker placement.   9/25-10/8 @ [facility] for dyspnea, started on Sildenafil for pulmonary hypertension; RHC (9/25) showed RA 8, RV 57/3, PA 56/20 (33), PCWP 9, CO/CI (Eunice) 4.38/2.45  10/13-18 @ [facility] syncope thought to be 2/2 orthostatic hypotension, started on Midodrine 5mg TID but still hypotensive with standing upright or ambulating.  Tosemide dose was decreased from 20mg BID to 5mg TID.     He arrives today in a wheelchair accompanied by his wife.  History was  obtained with the help of a .  He was recently discharged from rehab facility ([facility]).  This had been started on Midrin and diuretic to be decreased, he has been having trouble walking due to continued symptomatic orthotic hypertension.  He states that he has not ambulated since his last hospitalization.    He states that prior to his last admission, he was ambulating short distances with the help of walker.  He currently is able to do ADLs with assistance, including showering and changing his clothes.  He lives with his wife and 2 sons, who are able to help him with daily activities.    He states that he feels it will ever since the lumbar fusion in April, his respiratory and functional status has not been the same.  He feels as though every time he goes into the hospital and has a procedure, things get worse.    He states that he was diagnosed with pyomyositis about 16 years ago with regard.  He has intermittently been on prednisone.  Upon review of records, he has been on methotrexate 25 mg weekly.  He received a dose of Rituximab in March 2019 and per Rheumatology note from August, was planned to receive his next dose in September.  It is unclear to me whether he has received a dose yet.             Review of Systems:     Please see HPI, otherwise the complete 10 point  ROS is negative.           Past Medical and Surgical History:     Past Medical History:   Diagnosis Date     Aortic stenosis      Chronic pain      DM (diabetes mellitus), type 2 (H)     on insulin     Hyperlipidemia      JUAN on CPAP      Pneumonia      Polymyositis (H)      Pulmonary fibrosis, unspecified (H)      Seasonal allergies      Past Surgical History:   Procedure Laterality Date     ARTHROSCOPY KNEE  1970     COLONOSCOPY       CV CORONARY ANGIOGRAM N/A 7/30/2019    Procedure: CV CORONARY ANGIOGRAM;  Surgeon: Leonidas Mike MD;  Location:  HEART CARDIAC CATH LAB     CV LEFT HEART CATH N/A 7/30/2019    Procedure: CV LEFT HEART CATH;  Surgeon: Leonidas Mike MD;  Location:  HEART CARDIAC CATH LAB     CV PCI STENT DRUG ELUTING Left 7/30/2019    Procedure: PCI Stent Drug Eluting;  Surgeon: Leonidas Mike MD;  Location:  HEART CARDIAC CATH LAB     CV RIGHT HEART CATH N/A 7/30/2019    Procedure: CV RIGHT HEART CATH;  Surgeon: Leonidas Mike MD;  Location:  HEART CARDIAC CATH LAB     CV RIGHT HEART CATH N/A 9/27/2019    Procedure: Right Heart Cath;  Surgeon: Altaf Camacho MD;  Location:  HEART CARDIAC CATH LAB     CV RIGHT HEART CATH N/A 10/7/2019    Procedure: Right Heart Cath;  Surgeon: Benjamin Mancuso MD;  Location:  HEART CARDIAC CATH LAB     CV TRANSCATHETER AORTIC VALVE REPLACEMENT N/A 9/4/2019    Procedure: Transcatheter (Medtronic - 29mm) Aortic Valve Replacement, trans thorasic echocardiogram, perfusion and cardiac surgery standby, temporary pacemeker placement;  Surgeon: Pj Garcia MD;  Location:  OR     DECOMPRESSION, FUSION LUMBAR POSTERIOR TWO LEVELS, COMBINED  1997     EP PACEMAKER N/A 9/6/2019    Procedure: EP PACEMAKER;  Surgeon: Shefali Sylvester MD;  Location:  HEART CARDIAC CATH LAB     FUSION LUMBAR ANTERIOR THREE+ LEVELS N/A 4/22/2019    Procedure: Lumbar 1 Or Lumbar 2-5 Anterior Lumbar Interbody Fusion with BMP Stage 1 Of Two  Procedure:;  Surgeon: Carlos Enrique Dial MD;  Location: [facility] OR     HEART CATH FEMORAL CANNULIZATION WITH OPEN STANDBY REPAIR AORTIC VALVE N/A 2019    Procedure: Cardiopulmonary Bypass Standby;  Surgeon: Hamilton Carnes MD;  Location: [facility] OR     HERNIA REPAIR  2006     lumbar spine hardware removal       OPTICAL TRACKING SYSTEM FUSION POSTERIOR SPINE THORACIC THREE+ LEVELS N/A 2019    Procedure: O-Arm/Stealth Assisted Thoracic 10-Pelvis Instrumented Fusion T11-2, L1-2, L2-3 Transforminal Lumbar Interbody Fusion (TLIF) And Smith Borges Osteotomies,and L3-4 SPO as well, Use Of BMP, Debridement Of Seroma;  Surgeon: Carlo sEnrique Dial MD;  Location: [facility] OR     PICC INSERTION Right 2019    4Fr - 37cm, Basilic vein, low SVC     REMOVAL PROSTHETIC MATERIAL/MESH, ABD WALL NECRO TISS INFEXN       ROTATOR CUFF REPAIR RT/LT             Family History:     Family History   Problem Relation Age of Onset     Colon Cancer Mother      Hypertension Father         did not know father well            Social History:     Social History     Socioeconomic History     Marital status:      Spouse name: Not on file     Number of children: Not on file     Years of education: Not on file     Highest education level: Not on file   Occupational History     Not on file   Social Needs     Financial resource strain: Not on file     Food insecurity:     Worry: Not on file     Inability: Not on file     Transportation needs:     Medical: Not on file     Non-medical: Not on file   Tobacco Use     Smoking status: Former Smoker     Packs/day: 0.25     Last attempt to quit: 1970     Years since quittin.3     Smokeless tobacco: Never Used   Substance and Sexual Activity     Alcohol use: Yes     Comment: few times monthly     Drug use: Never     Sexual activity: Not Currently   Lifestyle     Physical activity:     Days per week: Not on file     Minutes per session: Not on file      "Stress: Not on file   Relationships     Social connections:     Talks on phone: Not on file     Gets together: Not on file     Attends Anabaptist service: Not on file     Active member of club or organization: Not on file     Attends meetings of clubs or organizations: Not on file     Relationship status: Not on file     Intimate partner violence:     Fear of current or ex partner: Not on file     Emotionally abused: Not on file     Physically abused: Not on file     Forced sexual activity: Not on file   Other Topics Concern     Not on file   Social History Narrative    Originally from [place].          Moved to [place]         He currently lives with his wife and two sons. Thinking about transitioning to a nursing home.              Medications:     Current Outpatient Medications   Medication     acetaminophen (TYLENOL) 325 MG tablet     albuterol (2.5 MG/3ML) 0.083% neb solution     aspirin (ASA) 81 MG EC tablet     atorvastatin (LIPITOR) 40 MG tablet     benzocaine-menthol (CEPACOL) 15-3.6 MG lozenge     calcium carbonate (OS-DMITRIY 500 MG [place]) 500 MG tablet     Cholecalciferol (VITAMIN D) 2000 units tablet     clopidogrel (PLAVIX) 75 MG tablet     diphenhydrAMINE (BENADRYL) 50 MG capsule     docusate sodium (COLACE) 100 MG capsule     ferrous gluconate (FERGON) 324 (38 Fe) MG tablet     folic acid (FOLVITE) 1 MG tablet     gabapentin (NEURONTIN) 300 MG capsule     insulin aspart (NOVOLOG VIAL) 100 UNITS/ML vial     insulin syringe-needle U-100 (29G X 1/2\" 1 ML) 29G X 1/2\" 1 ML miscellaneous     LANsoprazole (PREVACID) 30 MG DR capsule     levothyroxine (SYNTHROID/LEVOTHROID) 50 MCG tablet     methotrexate 2.5 MG tablet     midodrine (PROAMATINE) 10 MG tablet     multivitamin w/minerals (THERA-VIT-M) tablet     omega 3 1000 MG CAPS     oxyCODONE-acetaminophen (PERCOCET) 7.5-325 MG per tablet     potassium chloride ER (K-DUR/KLOR-CON M) 20 MEQ CR tablet     sildenafil (REVATIO) 20 MG tablet     torsemide " "(DEMADEX) 5 MG tablet     torsemide (DEMADEX) 5 MG tablet     No current facility-administered medications for this visit.             Physical Exam:   BP 94/61   Pulse 101   Ht 1.676 m (5' 6\")   Wt 73.6 kg (162 lb 4.1 oz)   SpO2 94%   BMI 26.19 kg/m      GENERAL: frail appearing gentleman in a wheelchair, in NAD  HEENT: NCAT, EOMI, no scleral icterus, oral mucosa moist and without lesions  Neck: no cervical or supraclavicular adenopathy  Lungs: limited airflow, inspiratory crackles  CV: RRR, S1S2, no murmurs noted  Abdomen: normoactive BS, soft, non tender  Neuro: AAO X 3  Psychiatric: normal affect  Skin: no rash, jaundice or lesions on limited exam  Extremities: No clubbing or cyanosis.  4-/5 hip flexion, otherwise 4+ to 5/5 arm extension/flexion, , knee extension and flexion.          Imaging:     CT Chest High resolution (10/9/19)  Impression:   1.  Pulmonary fibrosis in a pattern favoring NSIP pattern, potentially  related to patient's known polymyositis.  2. Mediastinal lymphadenopathy.  3. Enlarged pulmonary artery.  4. Stable pericardial effusion.  5. Extensive coronary atherosclerotic disease, similar to previous.  6. Nodular opacity in the left upper lobe . Indeterminant.   Infectious, inflammatory such as organizing pneumonia or neoplastic.   Continued follow up recommended.3-6 months.      [Consider Follow Up: Lung nodule]         Pulmonary Function Tests:     PFT interpretation (October 23, 2019):  Maneuver: Meets ATS criteria  Spirometry suggests severe restriction, lung volumes are needed to confirm.   Severe impairment in diffusing capacity.            Laboratory:     2018 ([facility])  DORENE - 1:40  ANCA - neg  Anti-SSA - neg  ANti-SSB - neg  Anti-Scl-70 - neg  Anti-Centromere - neg  Anti-RNP - neg  Anti-RF - neg  Anti-CCP - neg  Anti-Shannon 1 - positive        Recent Results (from the past 168 hour(s))   Glucose by meter    Collection Time: 10/16/19  4:38 PM   Result Value Ref Range    " Glucose 121 (H) 70 - 99 mg/dL   Glucose by meter    Collection Time: 10/16/19  9:17 PM   Result Value Ref Range    Glucose 110 (H) 70 - 99 mg/dL   Glucose by meter    Collection Time: 10/17/19  8:41 AM   Result Value Ref Range    Glucose 92 70 - 99 mg/dL   Glucose by meter    Collection Time: 10/17/19  5:07 PM   Result Value Ref Range    Glucose 105 (H) 70 - 99 mg/dL   Glucose by meter    Collection Time: 10/17/19  9:04 PM   Result Value Ref Range    Glucose 117 (H) 70 - 99 mg/dL   Glucose by meter    Collection Time: 10/18/19  1:51 AM   Result Value Ref Range    Glucose 107 (H) 70 - 99 mg/dL   Glucose by meter    Collection Time: 10/18/19  7:41 AM   Result Value Ref Range    Glucose 91 70 - 99 mg/dL   Glucose by meter    Collection Time: 10/18/19 12:49 PM   Result Value Ref Range    Glucose 139 (H) 70 - 99 mg/dL   Basic metabolic panel    Collection Time: 10/22/19 10:55 AM   Result Value Ref Range    Sodium 138 133 - 144 mmol/L    Potassium 4.2 3.4 - 5.3 mmol/L    Chloride 104 94 - 109 mmol/L    Carbon Dioxide 26 20 - 32 mmol/L    Anion Gap 8 3 - 14 mmol/L    Glucose 135 (H) 70 - 99 mg/dL    Urea Nitrogen 26 7 - 30 mg/dL    Creatinine 1.08 0.66 - 1.25 mg/dL    GFR Estimate 69 >60 mL/min/[1.73_m2]    GFR Estimate If Black 80 >60 mL/min/[1.73_m2]    Calcium 8.5 8.5 - 10.1 mg/dL   CBC with platelets    Collection Time: 10/22/19 10:55 AM   Result Value Ref Range    WBC 14.3 (H) 4.0 - 11.0 10e9/L    RBC Count 4.58 4.4 - 5.9 10e12/L    Hemoglobin 9.7 (L) 13.3 - 17.7 g/dL    Hematocrit 35.4 (L) 40.0 - 53.0 %    MCV 77 (L) 78 - 100 fl    MCH 21.2 (L) 26.5 - 33.0 pg    MCHC 27.4 (L) 31.5 - 36.5 g/dL    RDW 23.6 (H) 10.0 - 15.0 %    Platelet Count 469 (H) 150 - 450 10e9/L   General PFT Lab (Please always keep checked)    Collection Time: 10/22/19  3:14 PM   Result Value Ref Range    FVC-Pred 3.86 L    FVC-Pre 1.31 L    FVC-%Pred-Pre 33 %    FEV1-Pre 1.15 L    FEV1-%Pred-Pre 39 %    FEV1FVC-Pred 77 %    FEV1FVC-Pre 88 %     FEFMax-Pred 7.80 L/sec    FEFMax-Pre 4.45 L/sec    FEFMax-%Pred-Pre 57 %    FEF2575-Pred 2.32 L/sec    FEF2575-Pre 1.65 L/sec    UTG4450-%Pred-Pre 70 %    FEF2575-Post 1.06 L/sec    COE0576-%Pred-Post 45 %    ExpTime-Pre 6.90 sec    FIFMax-Pre 1.71 L/sec    VC-Pred 4.26 L    VC-Pre 1.33 L    VC-%Pred-Pre 31 %    IC-Pred 3.18 L    IC-Pre 0.95 L    IC-%Pred-Pre 29 %    ERV-Pred 1.07 L    ERV-Pre 0.38 L    ERV-%Pred-Pre 35 %    FEV1FEV6-Pred 78 %    FEV1FEV6-Pre 88 %    DLCOunc-Pred 23.74 ml/min/mmHg    DLCOunc-Pre 6.40 ml/min/mmHg    DLCOunc-%Pred-Pre 26 %    DLCOcor-Pre 7.73 ml/min/mmHg    DLCOcor-%Pred-Pre 32 %    VA-Pre 2.24 L    VA-%Pred-Pre 38 %    FEV1SVC-Pred 69 %    FEV1SVC-Pre 87 %   EKG 12-lead, tracing only (Same Day)    Collection Time: 10/23/19  8:42 AM   Result Value Ref Range    Interpretation ECG Click View Image link to view waveform and result               Cardiac:     TTE (10/14/19)  The right ventricle is moderately dilated.  The right ventricular systolic function is mild to moderately reduced.  There is mild (1+) tricuspid regurgitation.  Dilation of the inferior vena cava is present with abnormal respiratory  variation in diameter.  Right ventricular systolic pressure is elevated, consistent with moderate to  severe pulmonary hypertension.  The aortic valve is not well visualized.  PA pressure may be higher when compared with the numerous recent studies. The  study was technically difficult.    TTE (10/9/19)  Left Ventricle  Global and regional left ventricular function is normal with an EF of 55-60%.  Left ventricular wall thickness is normal. Left ventricular size is normal.  Left ventricular diastolic function is indeterminate. No regional wall motion  abnormalities are seen.  Right Ventricle  Global right ventricular function is normal. Mild right ventricular dilation  is present.    RHC (10/7/19)  RA 8, RV 57/3, PA 56/20 (33), PCWP 9, CO/CI(Eunice) 4.38/2.45    RHC (9/25/19)  RA 13, PA 72/31  (46), PCWP 18, CO/CI (Eunice) 4.94/2.75    Vasodilator study: POSITIVE STUDY. With inhaled nitric oxide at 80 ppm, there is a borderline reduction in PA pressures (mean PA reduced from 46 to 36) with improvement in cardiac output by Eunice, leading to substantial reduction in SVR from 10.9 Woods units to 4.2 Woods units.                Other:

## 2019-10-23 NOTE — PATIENT INSTRUCTIONS
You were seen today in the Cardiovascular Clinic at the [facility].    Cardiology provider you saw during your visit Tamar Tillman NP.    1. Increase midodrine to 10 mg three times daily.  2. Start wearing thigh compression while awake.  3. Minimize narcotic use as able - reduce percocet to 5 mg/325 mg TID.   4. Will get remote device check.  5. Increase torsemide to 10 mg in the morning 5 mg in the evening.  6. Repeat labs prior to appt with Dr. Mccain next Monday. Arrive at 8:30 [facility] lab.     Questions and [phone].   First press #1 for the University and then press #3 for Medical Questions to reach the Cardiology triage nurse.     On Call Cardiologist for after hours or on weekends: [phone], press option #4 and ask to speak to the on-call Cardiologist.

## 2019-10-23 NOTE — NURSING NOTE
Chief Complaint   Patient presents with     Follow Up     Present for hospital follow up on 10/14. Beulah Jane is a 69 year old male with complicated PMH of severe aortic stenosis, recent TAVR, complete heart block, recent pacemaker placement, heart failure with preserved ejection fraction, hypertension, hyperlipidemia, JUAN, severe pulm HTN, ILD, diabetes, dermatomyositis.      Vitals were taken and medications were reconciled. EKG was performed.    Venita Johnson CMA    8:48 AM

## 2019-10-24 PROBLEM — S91.302A: Status: ACTIVE | Noted: 2019-01-01

## 2019-10-24 NOTE — PROGRESS NOTES
10/23: Spoke with nurse at Dr. Lennox's office ([phone]); updated at request of Dr. Cedeno about pt lung nodules on recent chest CT with recommendation for 3 month follow-up. Also informed that pt was unclear at Dr. Cedeno's office visit about which pulmonary provider he would be following up with.       10/24: At request of Dr. Guo, tried to contact pt to notify about recent CT result with nodules. Pt phone number sounds disconnected; left message for pt's son/emergency contact (Beulah) for call back to discuss his father's recent test results.     10/24: Spoke with pt's son with return phone call; informed about CT finding with lung nodule and recommendation for follow up with either Dr. Cedeno or Dr. Lennox. Pt's son stated he will speak to his dad this evening (stated his dad's number is from [place] and does not work when dialed from a land line but will connect when dialed from cellular?) and will likely call back to set up a follow up with Dr. Cedeno. Phone number given to pt's son for . No orders placed at this time.

## 2019-10-24 NOTE — LETTER
10/24/2019        RE: Beulah Jane  [address]  [place] [address]              [facility]  [facility] Record Number:  [id number]  Place of Service where encounter took place:  [facility] [[id number]]  Chief Complaint   Patient presents with     RECHECK       HPI:    Beulah Jane  is a 69 year old (1950), who is being seen today for an episodic care visit.  HPI information obtained from: facility chart records, facility staff, patient report and [facility] chart review. Today's concern is:  Brief Summary of Hospital Course: recent hospitalization due to syncopal episode. PMH includes DM2, hypertension, CAD (angioplasty to mid and distal LAD 7/2019), CHF, severe aortic valve stenosis s/p TAVR 9/2019 PM placed 9/2019, s/p NSTEMI, h/o PULMONARY EMBOLISM, pacemaker in place, chronic hypoxia, pulmonary fibrosis, JUAN, ILD, DDD-lumbar, dermatmyositis, chronic pain. Work up in ED revealed SBP 80s, HR 100s, CXR with pulmonary vascular congestion, BB atelectasis, EKG with inferior anterior T wave changes, trop 0.182. Patient was treated for orthostatic hypotension. Cardiology was consulted- thought due to meds or cor pulmonale. He was started on midodrine, torsemide was reduced, revatio stopped. MAHENDRA hose recommended. He was transferred to TCU for ongoing monitoring and therapy.   Updates on Status Since Skilled nursing Admission: patient reports feeling short of breath with getting out of bed to wheelchair. He did go out to see pulmonology and cardiology 10/23.      Past Medical and Surgical History reviewed in Epic today.    MEDICATIONS:  Current Outpatient Medications   Medication Sig Dispense Refill     acetaminophen (TYLENOL) 325 MG tablet Take 2 tablets (650 mg) by mouth every 8 hours as needed for pain       albuterol (2.5 MG/3ML) 0.083% neb solution Take 1 vial by nebulization every 6 hours as needed for shortness of breath / dyspnea or  "wheezing       aspirin (ASA) 81 MG EC tablet Take 1 tablet (81 mg) by mouth daily Start tomorrow morning. 90 tablet 3     atorvastatin (LIPITOR) 40 MG tablet Take 1 tablet (40 mg) by mouth daily 90 tablet 3     benzocaine-menthol (CEPACOL) 15-3.6 MG lozenge Place 1 lozenge inside cheek every hour as needed for sore throat 60 lozenge 0     calcium carbonate (OS-DMITRIY 500 MG [place]. [place]) 500 MG tablet Take 1 tablet (500 mg) by mouth 2 times daily 180 tablet 3     Cholecalciferol (VITAMIN D) 2000 units tablet Take 2,000 Units by mouth daily 100 tablet 3     clopidogrel (PLAVIX) 75 MG tablet Take 1 tablet (75 mg) by mouth daily 30 tablet 0     diphenhydrAMINE (BENADRYL) 50 MG capsule Take 50 mg by mouth as needed for itching or allergies        docusate sodium (COLACE) 100 MG capsule Take 1 capsule (100 mg) by mouth At Bedtime For constipation. Hold for loose stools 30 capsule 0     ferrous gluconate (FERGON) 324 (38 Fe) MG tablet Take 1 tablet (324 mg) by mouth daily (with breakfast) 30 tablet 0     folic acid (FOLVITE) 1 MG tablet Take 1 tablet (1 mg) by mouth every morning 90 tablet 3     gabapentin (NEURONTIN) 400 MG capsule Take 1,200 mg by mouth 3 times daily       insulin aspart (NOVOLOG VIAL) 100 UNITS/ML vial Give before meals and before bed:  For Pre-Meal Glucose:  140-189 give 1 unit   190-239 give 2 units   240-289 give 3 units   290-339 give 4 units   = or >340 give 5 units     For Bedtime Glucose  200 - 239 give 1 units   240 - 289 give 1.5 units  290 - 339 give 2 units  = or >340 give 2.5 units 10 mL 0     insulin syringe-needle U-100 (29G X 1/2\" 1 ML) 29G X 1/2\" 1 ML miscellaneous Inject 1 Syringe Subcutaneous 2 times daily 200 each 11     LANsoprazole (PREVACID) 30 MG DR capsule Take 30 mg by mouth every morning (before breakfast)       levothyroxine (SYNTHROID/LEVOTHROID) 50 MCG tablet Take 50 mcg by mouth every morning        methotrexate 2.5 MG tablet Take 10 tablets (25 mg) by mouth once a week Tuesday " "120 tablet 3     midodrine (PROAMATINE) 10 MG tablet Take 1 tablet (10 mg) by mouth 3 times daily (with meals)       multivitamin w/minerals (THERA-VIT-M) tablet Take 1 tablet by mouth daily       omega 3 1000 MG CAPS Take 2 capsules by mouth every morning        oxyCODONE-acetaminophen (PERCOCET) 5-325 MG tablet Take 1 tablet by mouth 3 times daily       oxyCODONE-acetaminophen (PERCOCET) 7.5-325 MG per tablet Take 1 tablet by mouth 3 times daily 60 tablet 0     potassium chloride ER (K-DUR/KLOR-CON M) 20 MEQ CR tablet Take 1 tablet (20 mEq) by mouth daily 30 tablet 0     torsemide (DEMADEX) 5 MG tablet 10 mg in the morning, 5 mg in the evening       torsemide (DEMADEX) 5 MG tablet Take 1 tablet (5 mg) by mouth daily as needed (if weight increases more than 2 lbs per day)       gabapentin (NEURONTIN) 300 MG capsule Take 2 capsules (600 mg) by mouth 3 times daily 60 capsule 0     sildenafil (REVATIO) 20 MG tablet Take 20 mg by mouth 3 times daily           REVIEW OF SYSTEMS:  4 point ROS including Respiratory, CV, GI and , other than that noted in the HPI,  is negative    Objective:  /76   Pulse 55   Temp 97.5  F (36.4  C)   Resp 18   Ht 1.575 m (5' 2\")   Wt 69.7 kg (153 lb 9.6 oz)   SpO2 94%   BMI 28.09 kg/m     Exam:  GENERAL APPEARANCE:  Alert, in no distress  ENT:  Mouth and posterior oropharynx normal, moist mucous membranes, hearing acuity adequate   EYES:  EOM, conjunctivae, lids, pupils and irises normal  NECK:  No adenopathy,masses or thyromegaly  RESP:  respiratory effort and palpation of chest normal, no respiratory distress, Lung sounds rales in bases. [place] 1/2 way up on left   CV:  Palpation and auscultation of heart done , rate and rhythm reg, no murmur, no rub or gallop, Edema 2+  ABDOMEN:  normal bowel sounds, soft, nontender, no hepatosplenomegaly or other masses  M/S:   Gait and station unsteady, able to take a few steps, Digits and nails sore at left great toe nail and 4th toe. " Scabs on right toes  SKIN:  Inspection/Palpation of skin and subcutaneous tissue no rash  NEURO: 2-12 in normal limits and at patient's baseline  PSYCH:  insight and judgement, memory intact , affect and mood normal    Labs:   CBC RESULTS:   Recent Labs   Lab Test 10/22/19  1055 10/15/19  0333   WBC 14.3* 12.5*   RBC 4.58 4.19*   HGB 9.7* 9.0*   HCT 35.4* 32.1*   MCV 77* 77*   MCH 21.2* 21.5*   MCHC 27.4* 28.0*   RDW 23.6* 23.9*   * 426       Last Basic Metabolic Panel:  Recent Labs   Lab Test 10/22/19  1055 10/16/19  0420    136   POTASSIUM 4.2 4.3   CHLORIDE 104 102   DMITRIY 8.5 8.6   CO2 26 31   BUN 26 16   CR 1.08 0.99   * 130*       Liver Function Studies -   Recent Labs   Lab Test 10/13/19  2303 09/25/19  1411   PROTTOTAL 6.8 7.2   ALBUMIN 3.1* 3.4   BILITOTAL 0.3 0.8   ALKPHOS 135 144   AST 27 26   ALT 20 20       TSH   Date Value Ref Range Status   09/26/2019 0.46 0.40 - 4.00 mU/L Final   ]    Lab Results   Component Value Date    A1C 6.7 08/29/2019    A1C 6.3 01/29/2019           ASSESSMENT/PLAN:    (I27.20) Pulmonary hypertension (H)  (J84.10) Pulmonary fibrosis (H)  Comment: recent hospitalization due to syncopal episode. Cardiology was consulted. This situation is complicated due to need to balance poor cardiac output and interstitial lung disease. During this hospitalization revatio was stopped and midodrine was started. Inhaled pulmonary vasodilators are recommended. He did follow up with pulmonology on 10/23. Spirometry indicated severe restrictive disease while his xrays indicate stable lung disease. It was their conclusion that dyspnea is due to deconditioning due in part to myositis..  His BPs are running 90s to 100s. And drop lower after taking a few steps.     Plan:   Supplemental oxygen.   Methotrexate weekly for ILD  Follow up with PH clinic on 10/28    (I95.1) Orthostatic hypotension  (primary encounter diagnosis)  (I50.32) Congestive heart failure, NYHA class III, chronic,  diastolic (H)  Comment: recent hospitalization due to syncope. He was started on midodrine. At cardiology follow up on 10/23 midodrine was increased to 10mg TID. He was found to be hypervolemic and wt was up. Torsemide was increased to 10mg and 5mg.   Plan:   Midodrine 10mg TID  Monitor BPs  monitor wts  Follow BMP      (S91.302A) Open wound of left foot excluding one or more toes, initial encounter  Comment: left great toe wound. No pain in LEs. ? Arterial insuff.   Plan: betadine to sores  Monitor     (Z95.2) S/P TAVR (transcatheter aortic valve replacement)  (I25.118) Coronary artery disease involving native coronary artery of native heart with other form of angina pectoris (H)  (Z95.0) Cardiac resynchronization therapy pacemaker (CRT-P) in place  s/p TAVR in September 2019, PM placed at that time. In July 2019 patient had CHAU to to the mid and distal LAD. Earlier this week patient had report of chest pressure with deep breaths. Discussed with Dr Cotton.   Plan: plavix 75mg q day  Atorvastatin 40mg q day      (M19.90) Inflammatory arthritis  Comment: patient has chronic pain and attributes it to dermatomyositis. He follows with pain clinic and with rheumatology. Patient reports he takes 1200mg of gabapentin TID and not 600mg TID as prescribed upon discharge.   Plan: schedule percocet 7.5/325 TID along with gabapentin 1200mg TID (dose changed from hospital discharge)    (E11.40,  Z79.4) Type 2 diabetes mellitus with diabetic neuropathy, with long-term current use of insulin (H)  Comment: BG running 110-140. PTA he was on NPH. This was stopped in hospital.   Plan:continue novolog sliding scale for meals    (R53.81) Physical deconditioning  Comment: patient lives with wife in condo. At this point he becomes short of breath with getting out of bed and would not be able to function at home setting  Plan: physical therapy and OCCUPATIONAL THERAPY- monitor O2 sats and BP with activity      Electronically signed  by:  ANASTACIA Mcmanus CNP               Sincerely,        ANASTACIA Mcmanus CNP

## 2019-10-25 PROBLEM — A41.9 SEPSIS (H): Status: ACTIVE | Noted: 2019-01-01

## 2019-10-25 NOTE — PLAN OF CARE
Shift [id number]: IMC status. VSS. A/O x 4. Lungs: Coarse, 6L O2 NC w/ humidification. BS present, passing flatus. Activity: Up w/ one assist. Diet: Mod carb. Pain managed w/ scheduled Neurontin; PRN Oxy/Tylenol available. Pulses: +1 weak in L/R PT w/ Doppler; Absent in R/L DP w/ Doppler (vascular aware). Pt has pacemaker.

## 2019-10-25 NOTE — PLAN OF CARE
A/o x4. AVSS on 5L humidified NC; 2L NC baseline.Tele occasionally AV paced. Up w/ 1. Pain managed w/ PRN oxycodone. LS course. Mod carb diet, no appetite. Voiding adequately. Spouse at bedside.

## 2019-10-25 NOTE — TELEPHONE ENCOUNTER
New Pulmonary Hypertension Patient Form   Patient Name: Beulah Jane  Age:  69   Referring Provider:  Dr. Camacho MRN: [id number]    Date Test Epic Media/Scan Care Everywher    9/25/19 RHC ?  ?   ?      Angio/Stress ?  ?   ?     10/9/19 Echo ?  ?   ?     10/25/19 EKG ?   ?   ?      6MWT ?   ?   ?     7/2/19 PFT ?   ?   ?      Sleep Study ?  ?   ?     10/9/19 Chest CT ?  ?   ?      V/Q Scan ?   ?   ?      Abd/Liver US ?   ?   ?      NT ProBNP ?  ?   ?     10/25/19 CBC  ?   ?   ?     10/25/19 BMP  ?   ?   ?      TSH  ?   ?   ?      DORENE  ?   ?   ?      Rheumatoid  ?   ?   ?     10/13/19 LFT  ?   ?   ?      Hepatitis B Kalie  ?   ?   ?      Hepatitis B Antigen  ?   ?   ?      Hepatitis C Kalie  ?   ?   ?      HIV Serology  ?   ?   ?      Misc:  ?   ?   ?         ?   ?   ?         ?   ?   ?

## 2019-10-25 NOTE — PROVIDER NOTIFICATION
Notified provider d/t pt request to restart home percocet and absent DP pulses with black areas on LLE. PRN oxycodone and vascular consult ordered per Dr. Cervantes.

## 2019-10-25 NOTE — ED PROVIDER NOTES
History     Chief Complaint:  Lightheadedness    HPI   Tyreld Catherine Jane is a 69 year old male with a history of HTN, HLD, DM2, SVT, syncope who presents to the emergency department for evaluation of lightheadedness. The patient reports he was standing at home (lives at [place]) today when he had onset of lightheadedness and shortness of breath. He states he sat down on his bed with the onset of these symptoms, as he has had episodes of this in the past that usually resolve on their own. However, these symptoms did not improve today even after resting for several minutes, and he further noted development of chest pressure. The patient also reports some chills accompanying his symptoms. He went to the nurse at [facility] due to these symptoms and was instructed to present to the ED. Per the patient, his O2 saturation when being checked by the nurse was in 80s; he states he is chronically on 2 L of oxygen at home. He denies any fever.    Allergies:  NKDA     Medications:    Albuterol  Aspirin  Lipitor  Plavix  Colace  Gabapentin  Insulin  Prevacid  Levothyroxine  Methotrexate   Proamatine  Revatio  Demadex  Percocet     Past Medical History:    Aortic stenosis  Chronic pain  DM2  HLD  JUAN  Pneumonia  Polymyositis  Pulmonary fibrosis  SVT  HTN  Hypothyroidism  Arthritis  Syncope    Past Surgical History:    Arthroscopy knee  Colonoscopy  CV coronary angio gram  CV left heart catheterization  CV PCI stent drug eluting  CV right heart catheterization x3  CV transcatheter aortic valve replacement  Decompression, fusion lumbar posterior 2 levels  Pacemaker implant  Fusion lumbar anterior 3+ levels  Heart catheterization femoral cannulization with open standby repair aortic valve  Hernia repair  Lumbar spine hardware removal  Optical tracking system fusion posterior spine thoracic 3+ levels  PICC insertion  Rotator cuff repair    Family History:    Colon cancer  HTN    Social History:  Presents alone.  Former smoker, quit  "1970.  Positive for alcohol use.   Marital Status:   [2]     Review of Systems   Constitutional: Positive for chills. Negative for fever.   Respiratory: Positive for shortness of breath.    Cardiovascular: Positive for chest pain.   Neurological: Positive for light-headedness.   10 point review of systems performed and is negative except as above and in HPI.      Physical Exam     Patient Vitals for the past 24 hrs:   BP Temp Temp src Pulse Heart Rate Resp SpO2 Height Weight   10/25/19 1139 96/72 -- -- 91 87 24 90 % -- --   10/25/19 1100 109/74 -- -- 91 92 26 97 % -- --   10/25/19 1045 100/67 -- -- 90 89 28 99 % -- --   10/25/19 1030 100/67 -- -- 89 90 19 97 % -- --   10/25/19 1015 103/67 -- -- 92 92 22 98 % -- --   10/25/19 1000 99/72 -- -- 94 92 22 97 % -- --   10/25/19 0945 104/71 -- -- 94 92 20 98 % -- --   10/25/19 0930 102/72 -- -- 93 95 21 94 % -- --   10/25/19 0900 112/52 -- -- 67 100 21 94 % -- --   10/25/19 0845 112/74 -- -- 94 106 19 100 % -- --   10/25/19 0830 110/72 -- -- 103 101 (!) 94 93 % -- --   10/25/19 0815 111/73 -- -- 103 102 (!) 94 96 % -- --   10/25/19 0800 111/67 -- -- 98 -- -- -- -- --   10/25/19 0730 100/83 -- -- 92 96 13 96 % -- --   10/25/19 0721 98/60 -- -- 64 -- 23 94 % -- --   10/25/19 0715 109/76 -- -- 93 94 22 98 % -- --   10/25/19 0701 -- -- -- -- -- -- -- 1.702 m (5' 7\") 70.3 kg (155 lb)   10/25/19 0700 106/73 -- -- 101 101 22 92 % -- --   10/25/19 0659 108/76 -- -- 103 -- -- 91 % -- --   10/25/19 0651 (!) 88/54 98  F (36.7  C) Oral 104 -- 22 93 % -- --     Physical Exam  General: Resting on the gurney, appears uncomfortable  Head:  The scalp, face, and head appear normal  Mouth/Throat: Mucus membranes are moist  CV:  Regular rate    Normal S1 and S2  No pathological murmur   Resp:  Breath sounds clear and equal bilaterally    Non-labored, no retractions or accessory muscle use    No coarseness    No wheezing     Occasional cough. Minimal lower extremity edema.  GI:  Abdomen " is soft, no rigidity    No tenderness to palpation  MS:  Normal motor assessment of all extremities.    Good capillary refill noted.  Skin:  No rash or lesions noted.  Neuro:   Speech is normal and fluent. No apparent deficit.  Psych: Awake. Alert.  Normal affect.      Appropriate interactions.    Emergency Department Course   ECG:  Time: 0708  Vent. Rate 67 bpm. OK interval *. QRS duration 166. QT/QTc 470/496. P-R-T axis * -71 102.  AV dual-paced rhythm in a pattern of bigeminy.  Abnormal ECG.  Read time: 0730    Imaging:  Radiographic findings were communicated with the patient who voiced understanding of the findings.    XR Chest, 2 Views:  Two views of the chest are performed. Patchy interstitial  lung opacities are noted bilaterally with increasing opacity overlying  the right lung base concerning for pneumonia. Heart remains enlarged.  Patient is status post transcatheter aortic valve replacement. No  pneumothorax or significant pleural effusions. Implantable cardiac  device right upper chest and both leads appear unchanged in position.  Thoracolumbar fusion hardware is noted.  As per radiology.    Laboratory:  CBC: WBC: 16.0 (H), HGB: 10.7 (L), PLT: 544 (H)  BMP: Glucose 121 (H), Potassium 6.0 (H), Urea Nitrogen 35 (H), Creatinine 1.32 (H), GFR Estimate 54 (L), o/w WNL     Procalcitonin: 1.18    Potassium: 5.1    UA with micro: Albumin 10, Mucous Present, o/w negative    Venous Blood Gas  Recent Labs   Lab 10/25/19  0855 10/25/19  0712   PHV 7.37 7.32   PCO2V 42 46   PO2V 19* 19*   HCO3V 24 24   Lactic acid:    4.8 (HH)        4.6 (HH)    Urine culture aerobic bacterial pending.  Blood culture x2 pending.    Interventions:  0735 Lactated ringers bolus 2109 mL IV   NS 1L IV Bolus  0751 Zosyn 4.5 g IV  0830 Vancocin 1750 mg IV    Emergency Department Course:  Nursing notes and vitals reviewed. 0719 I performed an exam of the patient as documented above.     EKG obtained in the ED, see results above.     IV  inserted. Medicine administered as documented above. Blood drawn. This was sent to the lab for further testing, results above.    The patient was sent for a XR Chest while in the emergency department, findings above.     0809 [facility] Trial coordinator contacted me regarding the patient.    0830 I rechecked the patient and discussed the results of his workup thus far.     0845 I spoke again with the [facility] Trial coordinator.    0905 I spoke with Dr. Cervantes, hospitalist, who agreed to admit the patient.    Findings and plan explained to the Patient who consents to admission. Discussed the patient with Dr. Cervantes, who will admit the patient to an [facility] [place] for further monitoring, evaluation, and treatment.    I personally reviewed the laboratory results with the Patient and answered all related questions prior to admission.    Impression & Plan      CMS Diagnoses:   The patient has signs of Severe Sepsis  as evidenced by:    1. 2 SIRS criteria, AND  2. Suspected infection, AND   3. Organ dysfunction: Lactic Acid > 2.0    Time severe sepsis diagnosis confirmed: 0718  10/25/19  as this was the time when Lactate resulted, and the level was > 2.0    3 Hour Severe Sepsis Bundle Completion:  1. Initial Lactic Acid Result:   Recent Labs   Lab Test 10/25/19  0855 10/25/19  0712 10/07/19  1523   LACT 4.8* 4.6* 0.7     2. Blood Cultures before Antibiotics: Yes  3. Broad Spectrum Antibiotics Administered:  yes       Anti-infectives (From admission through now)    Start     Dose/Rate Route Frequency Ordered Stop    10/25/19 0738  vancomycin (VANCOCIN) 1,750 mg in sodium chloride 0.9 % 500 mL intermittent infusion      1,750 mg  over 2 Hours Intravenous ONCE 10/25/19 0738 10/25/19 1044    10/25/19 0729  piperacillin-tazobactam (ZOSYN) 4.5 g vial to attach to  mL bag      4.5 g  over 30 Minutes Intravenous ONCE 10/25/19 0728 10/25/19 0830          4. Volume of IV Fluid administered in ED: 3000 mL     REMINDER: Please use  septic shock SmartPhrase for Lactate > 4 or a patient  requiring vasopressors after initial fluid bolus (meaning persistent hypotension)      If one the following conditions is present, a 30cc/kg bolus is recommended as part of the 6 hour bundle (IBW can be used for BMI >30, or document refusal/contraindication)    1.   initial hypotension  defined as 2 bps < 90 or map < 65 in the 6hrs before or 6hrs  after time zero.    2.  Lactate >4.                 Severe Sepsis reassessment:  1. Repeat Lactic Acid Level: 4.8  2. MAP>65 after initial IVF bolus, will continue to monitor fluid status and vital signs    I attest to having performed a repeat sepsis exam and assessment of perfusion at 0830 and the results demonstrate no change.         Medical Decision Making:  Beulah Jane is a 69 year old male who presents for evaluation of fever and lightheadedness.  The patient was promptly evaluated for possible sepsis and met criteria with lactic acid of 4.6. The patient had a normal blood pressure throughout the ED stay. Lactic acid was measured at 4.6, repeat was 4.8. The patients blood pressure was within range and therefore not started on pressors.    The etiology of the sepsis is most likely pneumonia. Broad spectrum antibiotics were initiated in the ED after 2 sets of blood cultures and a urine specimen was obtained. A broad workup was done but I feel this is most likely infectious and treatment as noted above was directed towards infectious etiologies.     Critical Care time:  was 45 minutes for this patient excluding procedures.    Diagnosis:    ICD-10-CM   1. Sepsis, due to unspecified organism, unspecified whether acute organ dysfunction present (H) A41.9     Disposition:  Admitted to Dr. Cervantes.    Rajinder SANTOYO, am serving as a scribe on 10/25/2019 at 7:12 AM to personally document services performed by Samira Darling MD based on my observations and the provider's statements to me.     Rajinder  Jerrica  10/25/2019    EMERGENCY DEPARTMENT       Samira Darling MD  10/25/19 [id number]

## 2019-10-25 NOTE — ED TRIAGE NOTES
"From [place], EMS reports staff told them the patient has been \"fainting\" multiple times recently, not feeling well, and wanted him to be seen today. Patient did not fall, was in bed when EMS arrived. Unsure if LOC?   "

## 2019-10-25 NOTE — CONSULTS
[facility] PATIENT CONSULTATION NOTE  Consulted by:Ross Cervantes DO of hospitalist service  Reason for consultation: Concern for PAD on left lower leg    HPI:  Beulah Jane is a 69 year old year old male who has a PMH significant for pulmonary fibrosis, right sided heart failure, DM type II, severe AS s/p TAVR and complete heart block s/p PPM who presented to the ED on 10/25/2019 after a near syncopal episode and was admitted for severe sepsis likely secondary to HAP. Pt  presented to the ED from [place] after a near syncopal episode. This morning the patient was standing when he began to feel light headedness, diaphoretic and short of breath.  He sat down on his bed and as when he has had similar episodes in the past they would resolve with that but he notes his symptoms persisted. Patient also complains of a cough and chest congestion recently but has been unable to produce any sputum. He also noted black areas on his LLE, which per his reports, was going to be treated by nursing at [place] as a fungal infection. However, nursing was unable to doppler pulses and felt it resembled PAD rather than a fungal infection, so vascular surgery was consulted. Of note, pt was seen by Dr. Alegria of [facility] vascular surgery as a pre-op visit for anterior spin exposure in March of this year though no further work-up of vascular issues was done at that time. Of note, pt had a TAVR 7/11/2019.     Today pt is found sleeping soundly in bed with wife at bedside. Pt speaks english and declined an . Pt reports that he knows he has poor blood flow to his feet. He also has polymyositis, which causes him to have nerve pain in his feet (as well as diabetes). Difficult to completely determine if he has rest pain, but sounds as though he doesn't. He had ABIs done a few months ago and feels like his PAD status has worsened since then and he's taking even longer to heal for leg/foot lesions. His feet are cool at  baseline. Recently, his walking is limited by his respiratory status, so unsure how much his PAD limits him and if he has claudication.     Review Of Systems:   General: Denies fevers, is having chills and rigors  Respiratory: SOB mildly worse than normal, has home O2 at baseline  Cardio: Denies CP  Gastrointestinal: Denies N/V, though has some constipation  Genitourinary: Denies recent change in urination  Musculoskeletal: Is slow-healing on bilateral LEs  Neurologic: Denies HA  Psychiatric: Denies confusion  Hematology/immunology: no unexpected bruising    PAST MEDICAL HISTORY:  Past Medical History:   Diagnosis Date     Aortic stenosis      Chronic pain      DM (diabetes mellitus), type 2 (H)     on insulin     Hyperlipidemia      JUAN on CPAP      Pneumonia      Polymyositis (H)      Pulmonary fibrosis, unspecified (H)      Seasonal allergies      PAST SURGICAL HISTORY:  Past Surgical History:   Procedure Laterality Date     ARTHROSCOPY KNEE  1970     COLONOSCOPY       CV CORONARY ANGIOGRAM N/A 7/30/2019    Procedure: CV CORONARY ANGIOGRAM;  Surgeon: Leonidas Mike MD;  Location:  HEART CARDIAC CATH LAB     CV LEFT HEART CATH N/A 7/30/2019    Procedure: CV LEFT HEART CATH;  Surgeon: Leonidas Mike MD;  Location: [facility] CARDIAC CATH LAB     CV PCI STENT DRUG ELUTING Left 7/30/2019    Procedure: PCI Stent Drug Eluting;  Surgeon: Leonidas Mike MD;  Location:  HEART CARDIAC CATH LAB     CV RIGHT HEART CATH N/A 7/30/2019    Procedure: CV RIGHT HEART CATH;  Surgeon: Leonidas Mike MD;  Location:  HEART CARDIAC CATH LAB     CV RIGHT HEART CATH N/A 9/27/2019    Procedure: Right Heart Cath;  Surgeon: Altaf Camacho MD;  Location:  HEART CARDIAC CATH LAB     CV RIGHT HEART CATH N/A 10/7/2019    Procedure: Right Heart Cath;  Surgeon: Benjamin Mancuso MD;  Location:  HEART CARDIAC CATH LAB     CV TRANSCATHETER AORTIC VALVE REPLACEMENT N/A 9/4/2019    Procedure:  Transcatheter (Medtronic - 29mm) Aortic Valve Replacement, trans thorasic echocardiogram, perfusion and cardiac surgery standby, temporary pacemeker placement;  Surgeon: Pj Garcia MD;  Location: [facility] OR     DECOMPRESSION, FUSION LUMBAR POSTERIOR TWO LEVELS, COMBINED       EP PACEMAKER N/A 2019    Procedure: EP PACEMAKER;  Surgeon: Shefali Sylvester MD;  Location: [facility] HEART CARDIAC CATH LAB     FUSION LUMBAR ANTERIOR THREE+ LEVELS N/A 2019    Procedure: Lumbar 1 Or Lumbar 2-5 Anterior Lumbar Interbody Fusion with BMP Stage 1 Of Two Procedure:;  Surgeon: Carlos Enrique Dial MD;  Location: [facility] OR     HEART CATH FEMORAL CANNULIZATION WITH OPEN STANDBY REPAIR AORTIC VALVE N/A 2019    Procedure: Cardiopulmonary Bypass Standby;  Surgeon: Hamilton Carnes MD;  Location: [facility] OR     HERNIA REPAIR       lumbar spine hardware removal       OPTICAL TRACKING SYSTEM FUSION POSTERIOR SPINE THORACIC THREE+ LEVELS N/A 2019    Procedure: O-Arm/Stealth Assisted Thoracic 10-Pelvis Instrumented Fusion T11-2, L1-2, L2-3 Transforminal Lumbar Interbody Fusion (TLIF) And Smith Borges Osteotomies,and L3-4 SPO as well, Use Of BMP, Debridement Of Seroma;  Surgeon: Carlos Enrique Dial MD;  Location: [facility] OR     PICC INSERTION Right 2019    4Fr - 37cm, Basilic vein, low SVC     REMOVAL PROSTHETIC MATERIAL/MESH, ABD WALL NECRO TISS INFEXN       ROTATOR CUFF REPAIR RT/LT       FAMILY HISTORY:  Family History   Problem Relation Age of Onset     Colon Cancer Mother      Hypertension Father         did not know father well     SOCIAL HISTORY:   Social History     Tobacco Use     Smoking status: Former Smoker     Packs/day: 0.25     Last attempt to quit: 1970     Years since quittin.3     Smokeless tobacco: Never Used   Substance Use Topics     Alcohol use: Yes     Comment: few times monthly     HOME MEDICATIONS:  Prior to Admission medications    Medication Sig Start  Date End Date Taking? Authorizing Provider   albuterol (2.5 MG/3ML) 0.083% neb solution Take 1 vial by nebulization every 6 hours as needed for shortness of breath / dyspnea or wheezing   Yes Unknown, Entered By History   aspirin (ASA) 81 MG EC tablet Take 1 tablet (81 mg) by mouth daily Start tomorrow morning. 7/31/19  Yes Nita Barger PA-C   atorvastatin (LIPITOR) 40 MG tablet Take 1 tablet (40 mg) by mouth daily 7/30/19  Yes Nita Barger PA-C   benzocaine-menthol (CEPACOL) 15-3.6 MG lozenge Place 1 lozenge inside cheek every hour as needed for sore throat 9/7/19  Yes Juan Kimbrough PA-C   calcium carbonate (OS-DMITRIY 500 MG [place]. [place]) 500 MG tablet Take 1 tablet (500 mg) by mouth 2 times daily 6/26/18  Yes Carlos Enrique Dial MD   Cholecalciferol (VITAMIN D) 2000 units tablet Take 2,000 Units by mouth daily 6/26/18  Yes Carlos Enrique Dial MD   clopidogrel (PLAVIX) 75 MG tablet Take 1 tablet (75 mg) by mouth daily 10/2/19  Yes Negar Mustafa MD   diphenhydrAMINE (BENADRYL) 50 MG capsule Take 50 mg by mouth as needed for itching or allergies    Yes Unknown, Entered By History   docusate sodium (COLACE) 100 MG capsule Take 1 capsule (100 mg) by mouth At Bedtime For constipation. Hold for loose stools 10/2/19  Yes Negar Mustafa MD   ferrous gluconate (FERGON) 324 (38 Fe) MG tablet Take 1 tablet (324 mg) by mouth daily (with breakfast) 10/2/19  Yes Negar Mustafa MD   folic acid (FOLVITE) 1 MG tablet Take 1 tablet (1 mg) by mouth every morning 8/26/19  Yes Hoa López MD   gabapentin (NEURONTIN) 400 MG capsule Take 1,200 mg by mouth 3 times daily 0700 1400 2100 with percocet   Yes Reported, Patient   insulin aspart (NOVOLOG VIAL) 100 UNITS/ML vial Give before meals and before bed:  For Pre-Meal Glucose:  140-189 give 1 unit   190-239 give 2 units   240-289 give 3 units   290-339 give 4 units   = or >340 give 5 units     For Bedtime Glucose  200 - 239 give 1  "units   240 - 289 give 1.5 units  290 - 339 give 2 units  = or >340 give 2.5 units 10/18/19  Yes Laura Matos MD   LANsoprazole (PREVACID) 30 MG DR capsule Take 30 mg by mouth every morning (before breakfast)   Yes Unknown, Entered By History   levothyroxine (SYNTHROID/LEVOTHROID) 50 MCG tablet Take 50 mcg by mouth every morning    Yes Unknown, Entered By History   methotrexate 2.5 MG tablet Take 10 tablets (25 mg) by mouth once a week Tuesday 8/7/19  Yes Hoa López MD   midodrine (PROAMATINE) 10 MG tablet Take 1 tablet (10 mg) by mouth 3 times daily (with meals) 10/23/19  Yes Tamar Tillman NP   omega 3 1000 MG CAPS Take 2 capsules by mouth every morning    Yes Unknown, Entered By History   oxyCODONE-acetaminophen (PERCOCET) 7.5-325 MG per tablet Take 1 tablet by mouth 3 times daily 10/22/19  Yes Bobbi Baker APRN CNP   potassium chloride ER (K-DUR/KLOR-CON M) 20 MEQ CR tablet Take 1 tablet (20 mEq) by mouth daily 10/18/19  Yes Laura Matos MD   torsemide (DEMADEX) 5 MG tablet Take 10 mg by mouth every morning   Yes Unknown, Entered By History   torsemide (DEMADEX) 5 MG tablet Take 5 mg by mouth daily At 1600   Yes Unknown, Entered By History   insulin syringe-needle U-100 (29G X 1/2\" 1 ML) 29G X 1/2\" 1 ML miscellaneous Inject 1 Syringe Subcutaneous 2 times daily 7/29/19   Hoa López MD     VITAL SIGNS:  BP 99/77   Pulse 92   Temp 98.1  F (36.7  C) (Oral)   Resp 15   Ht 1.702 m (5' 7\")   Wt 70.3 kg (155 lb)   SpO2 92%   BMI 24.28 kg/m      Intake/Output Summary (Last 24 hours) at 10/25/2019 1311  Last data filed at 10/25/2019 1044  Gross per 24 hour   Intake 3600 ml   Output --   Net 3600 ml     Labs:  ROUTINE IP LABS (Last four results)  BMP  Recent Labs   Lab 10/25/19  0912 10/25/19  0702 10/22/19  1055   NA  --  135 138   POTASSIUM 5.1 6.0* 4.2   CHLORIDE  --  102 104   DMITRIY  --  8.6 8.5   CO2  --  23 26   BUN  --  35* 26   CR  --  1.32* 1.08   GLC  -- "  121* 135*     CBC  Recent Labs   Lab 10/25/19  0702 10/22/19  1055   WBC 16.0* 14.3*   RBC 5.07 4.58   HGB 10.7* 9.7*   HCT 38.7* 35.4*   MCV 76* 77*   MCH 21.1* 21.2*   MCHC 27.6* 27.4*   RDW 23.4* 23.6*   * 469*     PHYSICAL EXAM:  Constitutional: alert, no acute distress and cooperative   Cardiovascular: RRR  Respiratory: breathing unlabored without secondary muscle use, nasal cannula in place with supplemental O2  Psychiatric: mentation appears normal and affect normal/bright  Neck: no asymmetry  GI/Abdomen: +BS, abdomen soft, non-tender. No masses, no CVAT  MSK: able to move all extremities without new weakness or ataxia  Extremities: Bilateral lower extremities cool, with some discoloration on plantar surfaces of toes, small, ischemic scabbed over lesions on plantar surfaces of bilateral feet and toes  Vascular: Bilateral femoral pulses palpable. DP, peroneal, and AT signals NOT audible by doppler bilaterally. Bilateral PT signals audible by doppler  Lymph: +1 bilateral LE pitting edema    IMAGING:  ULTRASOUND TIFFANIE DOPPLER NO EXERCISE, 1-2 LEVELS, BILATERAL 6/27/2019  3:35 PM     HISTORY: 69-year-old patient with left anterior foot wound and  prediabetic.     COMPARISON: None     FINDINGS: Resting TIFFANIE in the right lower extremity is 0.83 and 0.86 in  the left lower extremity. However, these may be falsely elevated.     Biphasic waveforms in both femoral and popliteal arteries with  monophasic waveforms in both posterior tibial and dorsalis pedis  arteries.                                                                      IMPRESSION:  1. Suspect falsely elevated resting TIFFANIE values due to calcified  arteries given waveform distribution.  2. Distal waveforms are monophasic bilaterally suggestive of at least  moderate arterial insufficiency bilaterally.     MERCEDES CORRIGAN MD  Patient Active Problem List   Diagnosis     Spinal stenosis of lumbar region with neurogenic claudication     Severe aortic  stenosis     Hypertension     Hypothyroidism     Mixed hyperlipidemia     Inflammatory arthritis     Pulmonary fibrosis (H)     Type 2 diabetes mellitus with neurologic complication, with long-term current use of insulin (H)     Controlled substance agreement signed     Acute pulmonary embolism (H)     Status post non-ST elevation myocardial infarction (NSTEMI)     Physical deconditioning     Coronary artery disease involving native coronary artery of native heart with other form of angina pectoris (H)     Congestive heart failure, NYHA class III, chronic, diastolic (H)     S/P TAVR (transcatheter aortic valve replacement)     GONZALEZ (dyspnea on exertion)     SVT (supraventricular tachycardia) (H)     Pulmonary hypertension (H)     Syncope     Cardiac resynchronization therapy pacemaker (CRT-P) in place     Orthostatic hypotension     Open wound of left foot excluding one or more toes     Sepsis (H)       ASSESSMENT:   69 year old year old male who has a PMH significant for pulmonary fibrosis, right sided heart failure, DM type II, severe AS s/p TAVR and complete heart block s/p PPM. Concern for PAD with non-healing wounds and difficult to doppler distal pedal pulses.       PLAN:  -Pt appears to have adequate flow bilaterally to the common femoral arteries, but has significant, chronic peripheral arterial distal disease that is slowly worsening  -Bilateral LE arterial ultrasound ordered to assess arterial flow  -May consider endo intervention pending results of ultrasound. Pt agreeable to possible intervention, though is aware that he has multiple serious medical problems that increase his morbidity and mortality risk and make him a sub-optimal candidate for intervention  -Vascular surgery following    Discussed pt history, exam, assessment and plan with Dr. Barajas of the vascular surgery service, who is in agreement with the above.    Angelica Jennings PA-C   Division of Vascular Surgery   Pager: [phone]    STAFF:  Patient examined with DANE Jennings.  Has good femoral pulses but no distal pulses and decreased previous TIFFANIE.  Has obvious ischemic changes to both of his feet several superficial eschars on the left foot including the great toe.  No large open ulcers or cellulitis.  Obvious multiple risk factors and poor overall health.  Presently admitted for pneumonia with leukocytosis on antibiotics.  Vascular issues are chronic and not improving.  Will initiate evaluation initially with an arterial duplex and decide whether angiography would be beneficial.  This is discussed with the patient and his wife and all questions answered.  We will continue to follow him during his hospitalization to discuss further treatments once testing is been performed.       David Barajas MD

## 2019-10-25 NOTE — ED NOTES
"[facility]  ED Nurse Handoff Report    ED Chief complaint: Loss of Consciousness      ED Diagnosis:   Final diagnoses:   Sepsis, due to unspecified organism, unspecified whether acute organ dysfunction present (H)       Code Status: Full Code    Allergies: No Known Allergies    Activity level - Baseline/Home:  Independent  Activity Level - Current:   Stand with Assist    Patient's Preferred language: English   Needed?: No    Isolation: No  Infection: Not Applicable  Bariatric?: No    Vital Signs:   Vitals:    10/25/19 0701 10/25/19 0715 10/25/19 0721 10/25/19 0815   BP:  109/76 98/60 111/73   Pulse:  93 64 103   Resp:  22 23 (!) 94   Temp:       TempSrc:       SpO2:  98% 94% 96%   Weight: 70.3 kg (155 lb)      Height: 1.702 m (5' 7\")          Cardiac Rhythm: ,        Pain level:      Is this patient confused?: No   Does this patient have a guardian?  No         If yes, is there guardianship documents in the Epic \"Code/ACP\" activity?  N/A         Guardian Notified?  N/A  [place] - Suicide Severity Rating Scale Completed?  Yes  If yes, what color did the patient score?  White    Patient Report: Initial Complaint: syncope  Focused Assessment: Patient with complex medical history resides at [place], has not feeling well last several days with on and off feeling light and dizzy, this morning symptoms worsened with chills prompted to come to ED for evaluation.   Tests Performed: labs, CXR  Abnormal Results:   Labs Ordered and Resulted from Time of ED Arrival Up to the Time of Departure from the ED   CBC WITH PLATELETS DIFFERENTIAL - Abnormal; Notable for the following components:       Result Value    WBC 16.0 (*)     Hemoglobin 10.7 (*)     Hematocrit 38.7 (*)     MCV 76 (*)     MCH 21.1 (*)     MCHC 27.6 (*)     RDW 23.4 (*)     Platelet Count 544 (*)     Absolute Neutrophil 12.3 (*)     Absolute Monocytes 2.0 (*)     All other components within normal limits   BASIC METABOLIC PANEL - " Abnormal; Notable for the following components:    Potassium 6.0 (*)     Glucose 121 (*)     Urea Nitrogen 35 (*)     Creatinine 1.32 (*)     GFR Estimate 54 (*)     All other components within normal limits   ISTAT  GASES LACTATE DEEPTHI POCT - Abnormal; Notable for the following components:    PO2 Venous 19 (*)     Lactic Acid 4.6 (*)     All other components within normal limits   PROCALCITONIN   ROUTINE UA WITH MICROSCOPIC REFLEX TO CULTURE   LACTIC ACID WHOLE BLOOD   ISTAT CG4 GASES LACTATE DEEPTHI NURSING POCT   PULSE OXIMETRY NURSING   CARDIAC CONTINUOUS MONITORING   STRICT INTAKE AND OUTPUT   BLOOD CULTURE   BLOOD CULTURE   URINE CULTURE AEROBIC BACTERIAL     Treatments provided: LR bolus, NS bolus, Zosyn, Vancomycin IV    Family Comments: na    OBS brochure/video discussed/provided to patient/family: N/A              Name of person given brochure if not patient: na              Relationship to patient: na    ED Medications:   Medications   vancomycin (VANCOCIN) 1,750 mg in sodium chloride 0.9 % 500 mL intermittent infusion (1,750 mg Intravenous New Bag 10/25/19 0830)   lactated ringers BOLUS 2,109 mL (2,109 mLs Intravenous New Bag 10/25/19 0735)   piperacillin-tazobactam (ZOSYN) 4.5 g vial to attach to  mL bag (0 g Intravenous Stopped 10/25/19 0830)   0.9% sodium chloride BOLUS (0 mLs Intravenous Stopped 10/25/19 0755)       Drips infusing?:  No    For the majority of the shift this patient was [place].   Interventions performed were none.    Severe Sepsis OR Septic Shock Diagnosis Present:   Yes    Per the ED Provider, Time Zero for severe sepsis or septic shock is:  0712    3 Hour Severe Sepsis Bundle Completion:  1. Initial Lactic Acid Result:   Recent Labs   Lab Test 10/25/19  0712 10/07/19  1523 09/26/19  0539   LACT 4.6* 0.7 1.2     2. Blood Cultures before Antibiotics: Yes  3. Broad Spectrum Antibiotics Administered:     Anti-infectives (From now, onward)    Start     Dose/Rate Route Frequency Ordered  Stop    10/25/19 0738  vancomycin (VANCOCIN) 1,750 mg in sodium chloride 0.9 % 500 mL intermittent infusion      1,750 mg  over 2 Hours Intravenous ONCE 10/25/19 0738          4. 3100 ml of IV fluids have been given so far      6 Hour Severe Sepsis Bundle Completion:    1. Repeat Lactic Acid Level: Not drawn  2. Patient currently on Vasopressors =  No    To be done/followed up on inpatient unit: close monitoring  ED NURSE PHONE NUMBER: *[id number]

## 2019-10-25 NOTE — H&P
[facility]    History and Physical - Hospitalist Service       Date of Admission:  10/25/2019    Assessment & Plan   Beulah Jane is a 69 year old male with a history of pulmonary fibrosis, right sided heart failure, DM type II, severe AS s/p TAVR and complete heart block s/p PPM who presented to the ED on 10/25/2019 after a near syncopal episode and was admitted for severe sepsis likely secondary to HAP      Suspected HAP with severe sepsis   Patient presented after a near syncopal episode.  In the ED he was found to have a leukocytosis and tachycardia.  CXR showed a patchy interstitial opacities most prominent in the right lung base concerning for pneumonia.    Initial blood pressure in the ED was 88.54 and lactate was 4.6.  He was given IVF and his blood pressures improved but lactate still elevated at 4.8.  Also started on IV Zosyn and Vancomycin   - Admission under [facility] status  - PTA Midodrine   - Repeat lactate   - Follow cultures  - Sputum culture ordered  - Continue IV Zosyn for now     Pulmonary fibrosis w/moderate to severe pulmonary HTN on chronic O2   JUAN   Right sided heart failure and possible diastolic dysfunction of the LV   Patient is normally on 2 L of O2 via NC for known pulmonary fibrosis.  He does have a history of RV dysfunction likely related to his chronic lung disease.  Echocardiogram on 10/14/19 showed a normal LV EF with minimal concentric hypertrophy   - Titrate O2 as tolearted  - Holding PTA Torsemide due to below     SHIRIN  Baseline Cr appears to be 0.9-1 and was elevated at 1.32 on arrival.  Suspect this is pre-renal and related to dehydration from his sepsis.  Of note, potassium was also initially elevated at 6.0 but there was evidence of hemolysis and repeat was normal at 5.1   - Avoid nephrotoxins  - Repeat BMP in AM   - No further IVF given his known CHF     DM type II   Last HgbA1c from 8/29/19 was 6.7.  PTA on SSI   - SSI  - Moderate CHO diet     H/o Severe  AS s/p TAVR on 9/7/19  H/o Complete heart block s/p PPM in September   No issues currently.  Paced rhythm on EKG      Chronic anemia, iron deficiency  - PTA iron        Diet: Moderate CHO with <2 g Na  DVT Prophylaxis: Lovenox   [place] Catheter: not present  Code Status: Full code     Disposition Plan   Expected discharge: 3-5 days, recommended to prior living arrangement once SIRS/Sepsis treated.  Entered: Ross Cervantes DO 10/25/2019, 9:28 AM     The patient's care was discussed with the Patient and ED physician.    Ross Cervantes DO  [facility]    ______________________________________________________________________    Chief Complaint   Near syncope     History is obtained from the patient    History of Present Illness   Beulah Jane is a 69 year old male who presented to the ED from [place] after a near syncopal episode. This morning the patient was standing when he began to feel light headedness, diaphoretic and short of breath.  He sat down on his bed and as when he has had similar episodes in the past they would resolve with that but he notes his symptoms persisted.  At that time he went to a nurse at [place] and they instructed the patient to come in to the ED.  Patient also complains of a cough and chest congestion recently but has been unable to produce any sputum.  He denies any fevers, chest pain, palpitations, abdominal pain, N/V/D, problems with urination, leg pain or leg swelling.  No headaches, focal weakness, numbness/tingling.        Review of Systems    The 10 point Review of Systems is negative other than noted in the HPI    Past Medical History    I have reviewed this patient's medical history and updated it with pertinent information if needed.   Past Medical History:   Diagnosis Date     Aortic stenosis      Chronic pain      DM (diabetes mellitus), type 2 (H)     on insulin     Hyperlipidemia      JUAN on CPAP      Pneumonia      Polymyositis (H)      Pulmonary  fibrosis, unspecified (H)      Seasonal allergies        Past Surgical History   I have reviewed this patient's surgical history and updated it with pertinent information if needed.  Past Surgical History:   Procedure Laterality Date     ARTHROSCOPY KNEE  1970     COLONOSCOPY       CV CORONARY ANGIOGRAM N/A 7/30/2019    Procedure: CV CORONARY ANGIOGRAM;  Surgeon: Leonidas Mike MD;  Location:  HEART CARDIAC CATH LAB     CV LEFT HEART CATH N/A 7/30/2019    Procedure: CV LEFT HEART CATH;  Surgeon: Leonidas Mike MD;  Location:  HEART CARDIAC CATH LAB     CV PCI STENT DRUG ELUTING Left 7/30/2019    Procedure: PCI Stent Drug Eluting;  Surgeon: Leonidas Mike MD;  Location:  HEART CARDIAC CATH LAB     CV RIGHT HEART CATH N/A 7/30/2019    Procedure: CV RIGHT HEART CATH;  Surgeon: Leonidas Mike MD;  Location:  HEART CARDIAC CATH LAB     CV RIGHT HEART CATH N/A 9/27/2019    Procedure: Right Heart Cath;  Surgeon: Altaf Camacho MD;  Location:  HEART CARDIAC CATH LAB     CV RIGHT HEART CATH N/A 10/7/2019    Procedure: Right Heart Cath;  Surgeon: Benjamin Mancuso MD;  Location:  HEART CARDIAC CATH LAB     CV TRANSCATHETER AORTIC VALVE REPLACEMENT N/A 9/4/2019    Procedure: Transcatheter ([employer]tronic - 29mm) Aortic Valve Replacement, trans thorasic echocardiogram, perfusion and cardiac surgery standby, temporary pacemeker placement;  Surgeon: Pj Garcia MD;  Location:  OR     DECOMPRESSION, FUSION LUMBAR POSTERIOR TWO LEVELS, COMBINED  1997     EP PACEMAKER N/A 9/6/2019    Procedure: EP PACEMAKER;  Surgeon: Shefali Sylvester MD;  Location: [facility] CARDIAC CATH LAB     FUSION LUMBAR ANTERIOR THREE+ LEVELS N/A 4/22/2019    Procedure: Lumbar 1 Or Lumbar 2-5 Anterior Lumbar Interbody Fusion with BMP Stage 1 Of Two Procedure:;  Surgeon: Carlos Enrique Dial MD;  Location: [facility]     HEART CATH FEMORAL CANNULIZATION WITH OPEN STANDBY REPAIR AORTIC VALVE N/A 9/4/2019     Procedure: Cardiopulmonary Bypass Standby;  Surgeon: Hamilton Carnes MD;  Location: [facility] OR     HERNIA REPAIR  2006     lumbar spine hardware removal       OPTICAL TRACKING SYSTEM FUSION POSTERIOR SPINE THORACIC THREE+ LEVELS N/A 2019    Procedure: O-Arm/Stealth Assisted Thoracic 10-Pelvis Instrumented Fusion T11-2, L1-2, L2-3 Transforminal Lumbar Interbody Fusion (TLIF) And Smith Borges Osteotomies,and L3-4 SPO as well, Use Of BMP, Debridement Of Seroma;  Surgeon: Carlos Enrique Dial MD;  Location: [facility] OR     PICC INSERTION Right 2019    4Fr - 37cm, Basilic vein, low SVC     REMOVAL PROSTHETIC MATERIAL/MESH, ABD WALL NECRO TISS INFEXN       ROTATOR CUFF REPAIR RT/LT         Social History   I have reviewed this patient's social history and updated it with pertinent information if needed.  Social History     Tobacco Use     Smoking status: Former Smoker     Packs/day: 0.25     Last attempt to quit: 1970     Years since quittin.3     Smokeless tobacco: Never Used   Substance Use Topics     Alcohol use: Yes     Comment: few times monthly     Drug use: Never       Family History   I have reviewed this patient's family history and updated it with pertinent information if needed.   Family History   Problem Relation Age of Onset     Colon Cancer Mother      Hypertension Father         did not know father well     Prior to Admission Medications   Prior to Admission Medications   Prescriptions Last Dose Informant Patient Reported? Taking?   Cholecalciferol (VITAMIN D) 2000 units tablet 10/24/2019 at Unknown time half-way No Yes   Sig: Take 2,000 Units by mouth daily   LANsoprazole (PREVACID) 30 MG DR capsule 10/24/2019 at Unknown time Nursing Home Yes Yes   Sig: Take 30 mg by mouth every morning (before breakfast)   albuterol (2.5 MG/3ML) 0.083% neb solution  half-way Yes Yes   Sig: Take 1 vial by nebulization every 6 hours as needed for shortness of breath /  "dyspnea or wheezing   aspirin (ASA) 81 MG EC tablet 10/24/2019 at Unknown time snf No Yes   Sig: Take 1 tablet (81 mg) by mouth daily Start tomorrow morning.   atorvastatin (LIPITOR) 40 MG tablet 10/24/2019 at Unknown time snf No Yes   Sig: Take 1 tablet (40 mg) by mouth daily   benzocaine-menthol (CEPACOL) 15-3.6 MG lozenge  Nursing Home No Yes   Sig: Place 1 lozenge inside cheek every hour as needed for sore throat   calcium carbonate (OS-DMITRIY 500 MG [place]. [place]) 500 MG tablet 10/24/2019 at Unknown time snf No Yes   Sig: Take 1 tablet (500 mg) by mouth 2 times daily   clopidogrel (PLAVIX) 75 MG tablet 10/24/2019 at Unknown time snf No Yes   Sig: Take 1 tablet (75 mg) by mouth daily   diphenhydrAMINE (BENADRYL) 50 MG capsule  Nursing Home Yes Yes   Sig: Take 50 mg by mouth as needed for itching or allergies    docusate sodium (COLACE) 100 MG capsule 10/24/2019 at Unknown time snf No Yes   Sig: Take 1 capsule (100 mg) by mouth At Bedtime For constipation. Hold for loose stools   ferrous gluconate (FERGON) 324 (38 Fe) MG tablet 10/24/2019 at Unknown time snf No Yes   Sig: Take 1 tablet (324 mg) by mouth daily (with breakfast)   folic acid (FOLVITE) 1 MG tablet 10/24/2019 at Unknown time snf No Yes   Sig: Take 1 tablet (1 mg) by mouth every morning   gabapentin (NEURONTIN) 400 MG capsule 10/24/2019 at Unknown time Nursing Home Yes Yes   Sig: Take 1,200 mg by mouth 3 times daily 0700 1400 2100 with percocet   insulin aspart (NOVOLOG VIAL) 100 UNITS/ML vial 10/24/2019 at 1630 Nursing Home No Yes   Sig: Give before meals and before bed:  For Pre-Meal Glucose:  140-189 give 1 unit   190-239 give 2 units   240-289 give 3 units   290-339 give 4 units   = or >340 give 5 units     For Bedtime Glucose  200 - 239 give 1 units   240 - 289 give 1.5 units  290 - 339 give 2 units  = or >340 give 2.5 units   insulin syringe-needle U-100 (29G X 1/2\" 1 ML) 29G X 1/2\" 1 ML " miscellaneous  Nursing Home No No   Sig: Inject 1 Syringe Subcutaneous 2 times daily   levothyroxine (SYNTHROID/LEVOTHROID) 50 MCG tablet 10/24/2019 at Unknown time Nursing Home Yes Yes   Sig: Take 50 mcg by mouth every morning    methotrexate 2.5 MG tablet 10/22/2019 Nursing Home No Yes   Sig: Take 10 tablets (25 mg) by mouth once a week Tuesday   midodrine (PROAMATINE) 10 MG tablet 10/24/2019 at Unknown time [facility] No Yes   Sig: Take 1 tablet (10 mg) by mouth 3 times daily (with meals)   omega 3 1000 MG CAPS 10/24/2019 at Unknown time Nursing Home Yes Yes   Sig: Take 2 capsules by mouth every morning    oxyCODONE-acetaminophen (PERCOCET) 7.5-325 MG per tablet 10/24/2019 at Unknown time [facility] No Yes   Sig: Take 1 tablet by mouth 3 times daily   potassium chloride ER (K-DUR/KLOR-CON M) 20 MEQ CR tablet 10/24/2019 at Unknown time [facility] No Yes   Sig: Take 1 tablet (20 mEq) by mouth daily   torsemide (DEMADEX) 5 MG tablet 10/24/2019 at Unknown time Nursing Home Yes Yes   Sig: Take 10 mg by mouth every morning   torsemide (DEMADEX) 5 MG tablet 10/24/2019 at 1600 Nursing Home Yes Yes   Sig: Take 5 mg by mouth daily At 1600      Facility-Administered Medications: None     Allergies   No Known Allergies    Physical Exam   Vital Signs: Temp: 98  F (36.7  C) Temp src: Oral BP: 112/52 Pulse: 67 Heart Rate: 100 Resp: 21 SpO2: 94 % O2 Device: Nasal cannula Oxygen Delivery: 4 LPM  Weight: 155 lbs 0 oz    General Appearance: Resting comfortably. NAD  Eyes: EOMI.  Normal conjunctiva  HEENT: NC/AT.  Moist mucous membranes  Respiratory: Clear to auscultation.  No respiratory distress  Cardiovascular: Tachycardiac.  No obvious murmurs  GI: Bowel sounds present.  Non-tender  Skin: No obvious rashes.  No cyanosis  Musculoskeletal: No edema.  No calf tenderness  Neurologic: No focal deficits.  CN appear intact  Psychiatric: Alert.  Pleasant.  Normal affect     Data   Data reviewed today: I reviewed all medications,  new labs and imaging results over the last 24 hours. I personally reviewed   CXR:  Patchy opacities, most prominent at right lung base.  No obvious infiltrates.  PPM in place.  Spine hardware noted  EKG:  AV paced rhythm.  Rate 67 BPM.      Recent Labs   Lab 10/25/19  0912 10/25/19  0702 10/22/19  1055   WBC  --  16.0* 14.3*   HGB  --  10.7* 9.7*   MCV  --  76* 77*   PLT  --  544* 469*   NA  --  135 138   POTASSIUM 5.1 6.0* 4.2   CHLORIDE  --  102 104   CO2  --  23 26   BUN  --  35* 26   CR  --  1.32* 1.08   ANIONGAP  --  10 8   DMITRIY  --  8.6 8.5   GLC  --  121* 135*     Recent Results (from the past 24 hour(s))   XR Chest 2 Views    Narrative    CHEST TWO VIEWS   10/25/2019 8:01 AM     HISTORY: Fever.    COMPARISON: Chest x-ray 10/13/2019.      Impression    IMPRESSION: Two views of the chest are performed. Patchy interstitial  lung opacities are noted bilaterally with increasing opacity overlying  the right lung base concerning for pneumonia. Heart remains enlarged.  Patient is status post transcatheter aortic valve replacement. No  pneumothorax or significant pleural effusions. Implantable cardiac  device right upper chest and both leads appear unchanged in position.  Thoracolumbar fusion hardware is noted.    COURT LOPEZ MD

## 2019-10-25 NOTE — PROGRESS NOTES
Sepsis Evaluation Progress Note    I was called to see Beulah Jane due to abnormal vital signs triggering the Sepsis SIRS screening alert. He is known to have an infection.     Physical Exam   Vital Signs:  Temp: 98.1  F (36.7  C) Temp src: Oral BP: 99/77 Pulse: 92 Heart Rate: 93 Resp: 15 SpO2: 92 % O2 Device: Nasal cannula with humidification Oxygen Delivery: 5 LPM    Lab:  Lactic Acid   Date Value Ref Range Status   10/25/2019 4.6 (HH) 0.7 - 2.0 mmol/L Final     Comment:     Critical Value called to and read back by  BAUDILIO MAURICIO RN ON 33 @ 1336 DK       Lactate for Sepsis Protocol   Date Value Ref Range Status   10/16/2019 1.8 0.7 - 2.0 mmol/L Final       The patient is at baseline mental status.     The rest of their physical exam is significant for see H&P from today     Assessment & Plan   Beulah Jane meets SIRS criteria AND has a lactate >2 or other evidence of acute organ damage.  These vital signs, lab and physical exam findings are consistent with SEVERE SEPSIS.    Sepsis Time-Zero (time severe sepsis diagnosis confirmed): 7:15 AM on 10/25/19 as this was the time when Lactate resulted, and the level was > 2.0     Anti-infectives (From now, onward)    Start     Dose/Rate Route Frequency Ordered Stop    10/25/19 1400  piperacillin-tazobactam (ZOSYN) 4.5 g vial to attach to  mL bag      4.5 g  over 30 Minutes Intravenous EVERY 6 HOURS 10/25/19 1129          Current antibiotic coverage is appropriate for source of infection.    3 Hour Severe Sepsis Bundle Completion:  1. Initial Lactic Acid Result:   Recent Labs   Lab Test 10/25/19  1316 10/25/19  0855 10/25/19  0712   LACT 4.6* 4.8* 4.6*     2. Blood Cultures before Antibiotics: Yes  3. Broad Spectrum Antibiotics Administered: yes  4. Fluids: 30 mL/kg given    Current weight: 70 kg   Ideal body weight: 66.1 kg (145 lb 11.6 oz)  Adjusted ideal body weight: 67.8 kg (149 lb 7 oz) (must be >=60 inches tall to calculate IBW)      Body mass  index is 24.28 kg/m .      I attest to having performed a repeat sepsis exam and assessment of perfusion at 9:30 and the results demonstrate no change.      Lab: Repeat lactic acid is not indicated.    Disposition: The patient will remain on the current unit. We will continue to monitor this patient closely.  Ross Cervantes, DO    Sepsis Criteria   Sepsis: 2+ SIRS criteria due to infection  Severe Sepsis: Sepsis AND 1+ new sign of acute organ dysfunction (Note: lactate >2 is organ dysfunction)  Septic Shock: Sepsis AND hypotension despite volume resuscitation with 30 ml/kg crystalloid

## 2019-10-25 NOTE — PROGRESS NOTES
[facility]  [place] [facility] Record Number:  [id number]  Place of Service where encounter took place:  [facility] [[id number]]  Chief Complaint   Patient presents with     RECHECK       HPI:    Beulah Jane  is a 69 year old (1950), who is being seen today for an episodic care visit.  HPI information obtained from: facility chart records, facility staff, patient report and [facility] chart review. Today's concern is:  Brief Summary of Hospital Course: recent hospitalization due to syncopal episode. PMH includes DM2, hypertension, CAD (angioplasty to mid and distal LAD 7/2019), CHF, severe aortic valve stenosis s/p TAVR 9/2019 PM placed 9/2019, s/p NSTEMI, h/o PULMONARY EMBOLISM, pacemaker in place, chronic hypoxia, pulmonary fibrosis, JUAN, ILD, DDD-lumbar, dermatmyositis, chronic pain. Work up in ED revealed SBP 80s, HR 100s, CXR with pulmonary vascular congestion, BB atelectasis, EKG with inferior anterior T wave changes, trop 0.182. Patient was treated for orthostatic hypotension. Cardiology was consulted- thought due to meds or cor pulmonale. He was started on midodrine, torsemide was reduced, revatio stopped. MAHENDRA hose recommended. He was transferred to TCU for ongoing monitoring and therapy.   Updates on Status Since Skilled nursing Admission: patient reports feeling short of breath with getting out of bed to wheelchair. He did go out to see pulmonology and cardiology 10/23.      Past Medical and Surgical History reviewed in Epic today.    MEDICATIONS:  Current Outpatient Medications   Medication Sig Dispense Refill     acetaminophen (TYLENOL) 325 MG tablet Take 2 tablets (650 mg) by mouth every 8 hours as needed for pain       albuterol (2.5 MG/3ML) 0.083% neb solution Take 1 vial by nebulization every 6 hours as needed for shortness of breath / dyspnea or wheezing       aspirin (ASA) 81 MG EC tablet Take 1 tablet (81 mg) by mouth daily Start tomorrow  "morning. 90 tablet 3     atorvastatin (LIPITOR) 40 MG tablet Take 1 tablet (40 mg) by mouth daily 90 tablet 3     benzocaine-menthol (CEPACOL) 15-3.6 MG lozenge Place 1 lozenge inside cheek every hour as needed for sore throat 60 lozenge 0     calcium carbonate (OS-DMITRIY 500 MG [place]. [place]) 500 MG tablet Take 1 tablet (500 mg) by mouth 2 times daily 180 tablet 3     Cholecalciferol (VITAMIN D) 2000 units tablet Take 2,000 Units by mouth daily 100 tablet 3     clopidogrel (PLAVIX) 75 MG tablet Take 1 tablet (75 mg) by mouth daily 30 tablet 0     diphenhydrAMINE (BENADRYL) 50 MG capsule Take 50 mg by mouth as needed for itching or allergies        docusate sodium (COLACE) 100 MG capsule Take 1 capsule (100 mg) by mouth At Bedtime For constipation. Hold for loose stools 30 capsule 0     ferrous gluconate (FERGON) 324 (38 Fe) MG tablet Take 1 tablet (324 mg) by mouth daily (with breakfast) 30 tablet 0     folic acid (FOLVITE) 1 MG tablet Take 1 tablet (1 mg) by mouth every morning 90 tablet 3     gabapentin (NEURONTIN) 400 MG capsule Take 1,200 mg by mouth 3 times daily       insulin aspart (NOVOLOG VIAL) 100 UNITS/ML vial Give before meals and before bed:  For Pre-Meal Glucose:  140-189 give 1 unit   190-239 give 2 units   240-289 give 3 units   290-339 give 4 units   = or >340 give 5 units     For Bedtime Glucose  200 - 239 give 1 units   240 - 289 give 1.5 units  290 - 339 give 2 units  = or >340 give 2.5 units 10 mL 0     insulin syringe-needle U-100 (29G X 1/2\" 1 ML) 29G X 1/2\" 1 ML miscellaneous Inject 1 Syringe Subcutaneous 2 times daily 200 each 11     LANsoprazole (PREVACID) 30 MG DR capsule Take 30 mg by mouth every morning (before breakfast)       levothyroxine (SYNTHROID/LEVOTHROID) 50 MCG tablet Take 50 mcg by mouth every morning        methotrexate 2.5 MG tablet Take 10 tablets (25 mg) by mouth once a week Tuesday 120 tablet 3     midodrine (PROAMATINE) 10 MG tablet Take 1 tablet (10 mg) by mouth 3 times daily " "(with meals)       multivitamin w/minerals (THERA-VIT-M) tablet Take 1 tablet by mouth daily       omega 3 1000 MG CAPS Take 2 capsules by mouth every morning        oxyCODONE-acetaminophen (PERCOCET) 5-325 MG tablet Take 1 tablet by mouth 3 times daily       oxyCODONE-acetaminophen (PERCOCET) 7.5-325 MG per tablet Take 1 tablet by mouth 3 times daily 60 tablet 0     potassium chloride ER (K-DUR/KLOR-CON M) 20 MEQ CR tablet Take 1 tablet (20 mEq) by mouth daily 30 tablet 0     torsemide (DEMADEX) 5 MG tablet 10 mg in the morning, 5 mg in the evening       torsemide (DEMADEX) 5 MG tablet Take 1 tablet (5 mg) by mouth daily as needed (if weight increases more than 2 lbs per day)       gabapentin (NEURONTIN) 300 MG capsule Take 2 capsules (600 mg) by mouth 3 times daily 60 capsule 0     sildenafil (REVATIO) 20 MG tablet Take 20 mg by mouth 3 times daily           REVIEW OF SYSTEMS:  4 point ROS including Respiratory, CV, GI and , other than that noted in the HPI,  is negative    Objective:  /76   Pulse 55   Temp 97.5  F (36.4  C)   Resp 18   Ht 1.575 m (5' 2\")   Wt 69.7 kg (153 lb 9.6 oz)   SpO2 94%   BMI 28.09 kg/m    Exam:  GENERAL APPEARANCE:  Alert, in no distress  ENT:  Mouth and posterior oropharynx normal, moist mucous membranes, hearing acuity adequate   EYES:  EOM, conjunctivae, lids, pupils and irises normal  NECK:  No adenopathy,masses or thyromegaly  RESP:  respiratory effort and palpation of chest normal, no respiratory distress, Lung sounds rales in bases. [place] 1/2 way up on left   CV:  Palpation and auscultation of heart done , rate and rhythm reg, no murmur, no rub or gallop, Edema 2+  ABDOMEN:  normal bowel sounds, soft, nontender, no hepatosplenomegaly or other masses  M/S:   Gait and station unsteady, able to take a few steps, Digits and nails sore at left great toe nail and 4th toe. Scabs on right toes  SKIN:  Inspection/Palpation of skin and subcutaneous tissue no rash  NEURO: 2-12 " in normal limits and at patient's baseline  PSYCH:  insight and judgement, memory intact , affect and mood normal    Labs:   CBC RESULTS:   Recent Labs   Lab Test 10/22/19  1055 10/15/19  0333   WBC 14.3* 12.5*   RBC 4.58 4.19*   HGB 9.7* 9.0*   HCT 35.4* 32.1*   MCV 77* 77*   MCH 21.2* 21.5*   MCHC 27.4* 28.0*   RDW 23.6* 23.9*   * 426       Last Basic Metabolic Panel:  Recent Labs   Lab Test 10/22/19  1055 10/16/19  0420    136   POTASSIUM 4.2 4.3   CHLORIDE 104 102   DMITRIY 8.5 8.6   CO2 26 31   BUN 26 16   CR 1.08 0.99   * 130*       Liver Function Studies -   Recent Labs   Lab Test 10/13/19  2303 09/25/19  1411   PROTTOTAL 6.8 7.2   ALBUMIN 3.1* 3.4   BILITOTAL 0.3 0.8   ALKPHOS 135 144   AST 27 26   ALT 20 20       TSH   Date Value Ref Range Status   09/26/2019 0.46 0.40 - 4.00 mU/L Final   ]    Lab Results   Component Value Date    A1C 6.7 08/29/2019    A1C 6.3 01/29/2019           ASSESSMENT/PLAN:    (I27.20) Pulmonary hypertension (H)  (J84.10) Pulmonary fibrosis (H)  Comment: recent hospitalization due to syncopal episode. Cardiology was consulted. This situation is complicated due to need to balance poor cardiac output and interstitial lung disease. During this hospitalization revatio was stopped and midodrine was started. Inhaled pulmonary vasodilators are recommended. He did follow up with pulmonology on 10/23. Spirometry indicated severe restrictive disease while his xrays indicate stable lung disease. It was their conclusion that dyspnea is due to deconditioning due in part to myositis..  His BPs are running 90s to 100s. And drop lower after taking a few steps.     Plan:   Supplemental oxygen.   Methotrexate weekly for ILD  Follow up with PH clinic on 10/28    (I95.1) Orthostatic hypotension  (primary encounter diagnosis)  (I50.32) Congestive heart failure, NYHA class III, chronic, diastolic (H)  Comment: recent hospitalization due to syncope. He was started on midodrine. At  cardiology follow up on 10/23 midodrine was increased to 10mg TID. He was found to be hypervolemic and wt was up. Torsemide was increased to 10mg and 5mg.   Plan:   Midodrine 10mg TID  Monitor BPs  monitor wts  Follow BMP      (S91.302A) Open wound of left foot excluding one or more toes, initial encounter  Comment: left great toe wound. No pain in LEs. ? Arterial insuff.   Plan: betadine to sores  Monitor     (Z95.2) S/P TAVR (transcatheter aortic valve replacement)  (I25.118) Coronary artery disease involving native coronary artery of native heart with other form of angina pectoris (H)  (Z95.0) Cardiac resynchronization therapy pacemaker (CRT-P) in place  s/p TAVR in September 2019, PM placed at that time. In July 2019 patient had CHAU to to the mid and distal LAD. Earlier this week patient had report of chest pressure with deep breaths. Discussed with Dr Cotton.   Plan: plavix 75mg q day  Atorvastatin 40mg q day      (M19.90) Inflammatory arthritis  Comment: patient has chronic pain and attributes it to dermatomyositis. He follows with pain clinic and with rheumatology. Patient reports he takes 1200mg of gabapentin TID and not 600mg TID as prescribed upon discharge.   Plan: schedule percocet 7.5/325 TID along with gabapentin 1200mg TID (dose changed from hospital discharge)    (E11.40,  Z79.4) Type 2 diabetes mellitus with diabetic neuropathy, with long-term current use of insulin (H)  Comment: BG running 110-140. PTA he was on NPH. This was stopped in hospital.   Plan:continue novolog sliding scale for meals    (R53.81) Physical deconditioning  Comment: patient lives with wife in [facility]. At this point he becomes short of breath with getting out of bed and would not be able to function at home setting  Plan: physical therapy and OCCUPATIONAL THERAPY- monitor O2 sats and BP with activity      Electronically signed by:  ANASTACIA Mcmanus CNP

## 2019-10-26 NOTE — PROGRESS NOTES
[facility]    Medicine Progress Note - Hospitalist Service       Date of Admission:  10/25/2019  Assessment & Plan   Beulah Jane is a 69 year old male with a history of pulmonary fibrosis, right sided heart failure, DM type II, severe AS s/p TAVR and complete heart block s/p PPM who presented to the ED on 10/25/2019 after a near syncopal episode and was admitted for severe sepsis likely secondary to HAP      Suspected HAP with severe sepsis   Patient presented after a near syncopal episode.  In the ED he was found to have a leukocytosis and tachycardia.  CXR showed a patchy interstitial opacities most prominent in the right lung base concerning for pneumonia.    Initial blood pressure in the ED was 88.54 and lactate was 4.6.  He was given IVF and his blood pressures improved but lactate still elevated at 4.8.  Also started on IV Zosyn and Vancomycin   - PTA Midodrine   - Follow cultures. NGTD   - Sputum culture ordered  - Continue IV Zosyn     Pulmonary fibrosis w/moderate to severe pulmonary HTN on chronic O2   JUAN   Right sided heart failure and possible diastolic dysfunction of the LV   Patient is normally on 2 L of O2 via NC for known pulmonary fibrosis.  He does have a history of RV dysfunction likely related to his chronic lung disease.  Echocardiogram on 10/14/19 showed a normal LV EF with minimal concentric hypertrophy   - Outpatient follow up with pulmonology   - Titrate O2 as tolearted  - Holding PTA Torsemide due to below. Hopefully restart tomorrow   - IS ordered   - RCAT   - Will likely consult palliative care on Monday as patient and family are open to meeting with them     Suspected atrial flutter  H/o SVT   Overnight patient had wide complex tachycardiac.  Initially though SVT vs a fib/flutter.  Unfortunately the patient's blood pressures are not amenable to most rate controlling medications  - Pacemaker evaluation   - Cardiology consulted and appreciate their  recommendations.  Plan for Digoxin     SHIRIN. Resolved   Baseline Cr appears to be 0.9-1 and was elevated at 1.32 on arrival.  Suspect this is pre-renal and related to dehydration from his sepsis.  Of note, potassium was also initially elevated at 6.0 but there was evidence of hemolysis and repeat was normal at 5.1   - Avoid nephrotoxins  - No further IVF given his known CHF     H/o PAD   Has a known history of PAD.  While here patient had persistently elevated lactates and difficulty with finding lower extremity pulses.  Does have evidence of ischemic changes with eschars on the left foot. Some concern for ischemic limb given this  - Vascular surgery following and appreciate their recommendations.  Plan for ultrasound today    DM type II   Last HgbA1c from 8/29/19 was 6.7.  PTA on SSI   - SSI  - Moderate CHO diet     H/o Severe AS s/p TAVR on 9/7/19  H/o Complete heart block s/p PPM in September   No issues currently.  Paced rhythm on EKG      Chronic anemia, iron deficiency  - PTA iron        Diet: Moderate Consistent CHO Diet    DVT Prophylaxis: Enoxaparin (Lovenox) SQ  [place] Catheter: not present  Code Status: Full Code      Disposition Plan   Expected discharge: 2 - 3 days, recommended to transitional care unit once SIRS/Sepsis treated and evaluation complete.  Entered: Ross Cervantes DO 10/26/2019, 1:09 PM       The patient's care was discussed with the Bedside Nurse, Patient and Patient's Family.    Ross Cervantes DO  Hospitalist Service  [facility]    ______________________________________________________________________    Interval History   Patient seen and examined.  Over night having issues with elevated heart rates concerning for SVT vs atrial flutter but this was asymptomatic.  No chest pain or SOB.  No fevers or chills.  Over all improving but not back to baseline.     Data reviewed today: I reviewed all medications, new labs and imaging results over the last 24 hours. I  personally reviewed no images or EKG's today.    Physical Exam   Vital Signs: Temp: 98  F (36.7  C) Temp src: Oral BP: 99/72 Pulse: 128 Heart Rate: 128 Resp: (!) 35 SpO2: (!) 87 % O2 Device: Nasal cannula Oxygen Delivery: 6 LPM  Weight: 167 lbs 8.79 oz  General Appearance: Resting comfortably in chair.  NAD   Respiratory: Lungs surprisingly sound clear.  No respiratory distress on NC   Cardiovascular: Tachycardiac.  No obvious murmurs  GI: Bowel sounds present.  Non-tender  Skin: No rashes.  No cyanosis  Other: No edema.  No calf tenderness      Data   Recent Labs   Lab 10/26/19  0603 10/25/19  0912 10/25/19  0702 10/22/19  1055   WBC 17.5*  --  16.0* 14.3*   HGB 9.8*  --  10.7* 9.7*   MCV 75*  --  76* 77*   *  --  544* 469*     --  135 138   POTASSIUM 4.7 5.1 6.0* 4.2   CHLORIDE 105  --  102 104   CO2 22  --  23 26   BUN 29  --  35* 26   CR 1.12  --  1.32* 1.08   ANIONGAP 9  --  10 8   DMITRIY 8.1*  --  8.6 8.5   GLC 95  --  121* 135*     No results found for this or any previous visit (from the past 24 hour(s)).

## 2019-10-26 NOTE — PLAN OF CARE
Pt is A&Ox4, on O2 @ 6LPM via NC. Both feel molted/red, PT +1 with doppler, DP pulses absent. BLE cool.  LS coarse/diminished, BS+ normoactive, flatus+, voiding adequately.  HR went into 120s at 0200 and sustained at 120s. EKG done and read Atrial flutter with 2:1 conduction rate; tele prior to EKG was occasionally v-paced. See hospitalist note for details. NS bolus currently infusing.

## 2019-10-26 NOTE — PROVIDER NOTIFICATION
Brief update:    Page regarding regular tachycardia in the 120s range.    Patient here with vascular insufficiency as well as concern for healthcare associated pneumonia with severe sepsis.    Has pacer/ICD in place, history of SVT    Pacer interrogation when able.  Order placed  EKG now    Attempting vagal maneuvers    Luis Valdes MD  2:59 AM    EKG appears to be SVT with bundle branch block, heart rate in the 125 range.  Automated read is atrial flutter, though I cannot appreciate flutter waves  I do not note any prior atrial fibrillation or flutter on prior interrogation of device    Multiple attempts at vagal maneuvers at patient's bedside are unsuccessful including increasing intra-abdominal pressure and raising lower extremities.    Given chronic hypotension, chronic lung disease with oxygen dependence, may not tolerate elevated rates, though rate control strategies may be difficult.    Telemetry strip were patient briefly broke from rapid rate into several beats of paced rhythm appear to have some degree of irregularity to rapid rate    Discussed with ICU, cardiology fellow on call.      With irregular rapid rate on telemetry strip, some concern for wide-complex A. fib, potentially with aberrancy.  With this, concern for administering adenosine.    Discussed esmolol with cardiology and ICU.  If patient has significant hypotension, may require pressors.  With low blood pressures at baseline, poor candidate for diltiazem, metoprolol, amiodarone without potential need for pressor support.  Initial plan had been for esmolol drip in CICU, though patient subsequently converted to a paced rhythm for approximately 1 minute, then back in rapid rhythm.  Discussed with cardiology fellow, and decision was made to continue to monitor as long as patient otherwise remains vitally stable from a blood pressure and respiratory status standpoint, await further information with ICD interrogation.  Next step will be to give  low-dose esmolol as bolus rather than continuous.    Patient reports increased shortness of breath largely when ambulating from chair to bedside.  Sitting in chair, he did not notice any significant symptoms associated with rapid heart rate.    Luis Valdes MD  4:44 AM  Greater than 30 minutes inpatient care on floor, over multiple patient visits, and in consultation with ICU and cardiology.

## 2019-10-26 NOTE — CONSULTS
[facility]    Cardiology Consultation     Date of Admission:  10/25/2019    Assessment & Plan   Beulah Jane is a 69 year old year old male who has a PMH significant for pulmonary fibrosis, right sided heart failure, DM type II, severe AS s/p TAVR and complete heart block s/p PPM who presented to the ED on 10/25/2019 after a near syncopal episode and was admitted for severe sepsis likely secondary to HAP.     Patient has a known history of SVT and has had elevations in his heart rate overnight.  The EKGs are concerning for possible atrial flutter.  Heart rate ranges from 125-130.  It is quite regular in nature on the EKGs.  It is somewhat difficult to see flutter waves however.  In either case we are quite limited in terms of our medications to control his heart rate.  I am also concerned that it will be difficult to control his atrial arrhythmias in the setting of pneumonia and his multiple other comorbidities.    Overall, this patient is in a very difficult medical situation.  I am concerned that it might be difficult for him to recover from his underlying infection in the setting of his pulmonary fibrosis.  I do note that he remains full code however consideration for palliative care is being discussed.    #1 history of severe aortic stenosis status post TAVR  #2 history of complete heart block status post dual-chamber pacemaker  #3 pneumonia with severe sepsis  #4 atrial tachycardia  #5 peripheral vascular disease  #6 moderate to severe pulmonary hypertension  #7 pulmonary fibrosis    Plan:  Given we are limited with AV shonna blockade agents due to hypotension we will start digoxin with a digoxin load IV 0.25 mg every 8 hours starting today for a total of 3 doses.  Then he will start oral digoxin 0.125 mg on 10/28/2019.    The patient has had a trial of sildenafil for pulmonary hypertension.  He failed this due to hypotension.  Given that he is struggling with sepsis I do not feel that  we should trial any additional pulmonary hypertension medications at this time.    The patient would benefit from diuresis however in the setting of sepsis and ongoing pneumonia we would have to defer diuresis at this time.    The elevated troponin is most likely related to a demand ischemic event.  However we can explore additional work-up of this once he recovers from his pneumonia.      Javy Carlos MD    Code Status    Full Code    Reason for Consult   Reason for consult: Tachycardia    Primary Care Physician   Hoa López    Chief Complaint   Tachycardia    History is obtained from the patient    History of Present Illness   Beulah Jane is a 69 year old year old male who has a PMH significant for pulmonary fibrosis, right sided heart failure, DM type II, severe AS s/p TAVR and complete heart block s/p PPM who presented to the ED on 10/25/2019 after a near syncopal episode and was admitted for severe sepsis likely secondary to HAP.    The patient is currently being treated for pneumonia.  In addition there is concern with his lower extremities possibly having severe peripheral vascular disease.  Work-up for lower extremity vascular disease notes noncompressible arteries but no obvious critical limb ischemia.    Overnight the patient has had multiple episodes of elevated heart rate.  During my interview with the patient he is short of breath and his heart rate is 130 with a very regular pattern.    Overall the patient is tolerated the elevated heart rate.  It does not appear to worsen his rest isaiah status and he does not have chest pain.      Past Medical History   I have reviewed this patient's medical history and updated it with pertinent information if needed.   Past Medical History:   Diagnosis Date     Aortic stenosis      Chronic pain      DM (diabetes mellitus), type 2 (H)     on insulin     Hyperlipidemia      JUAN on CPAP      Pneumonia      Polymyositis (H)      Pulmonary fibrosis,  unspecified (H)      Seasonal allergies        Past Surgical History   I have reviewed this patient's surgical history and updated it with pertinent information if needed.  Past Surgical History:   Procedure Laterality Date     ARTHROSCOPY KNEE  1970     COLONOSCOPY       CV CORONARY ANGIOGRAM N/A 7/30/2019    Procedure: CV CORONARY ANGIOGRAM;  Surgeon: Leonidas Mike MD;  Location:  HEART CARDIAC CATH LAB     CV LEFT HEART CATH N/A 7/30/2019    Procedure: CV LEFT HEART CATH;  Surgeon: Leonidas Mike MD;  Location:  HEART CARDIAC CATH LAB     CV PCI STENT DRUG ELUTING Left 7/30/2019    Procedure: PCI Stent Drug Eluting;  Surgeon: Leonidas Mike MD;  Location:  HEART CARDIAC CATH LAB     CV RIGHT HEART CATH N/A 7/30/2019    Procedure: CV RIGHT HEART CATH;  Surgeon: Leonidas Miek MD;  Location:  HEART CARDIAC CATH LAB     CV RIGHT HEART CATH N/A 9/27/2019    Procedure: Right Heart Cath;  Surgeon: Altaf Camacho MD;  Location:  HEART CARDIAC CATH LAB     CV RIGHT HEART CATH N/A 10/7/2019    Procedure: Right Heart Cath;  Surgeon: Benjamin Mancuso MD;  Location:  HEART CARDIAC CATH LAB     CV TRANSCATHETER AORTIC VALVE REPLACEMENT N/A 9/4/2019    Procedure: Transcatheter (Medtronic - 29mm) Aortic Valve Replacement, trans thorasic echocardiogram, perfusion and cardiac surgery standby, temporary pacemeker placement;  Surgeon: Pj Garcia MD;  Location:  OR     DECOMPRESSION, FUSION LUMBAR POSTERIOR TWO LEVELS, COMBINED  1997     EP PACEMAKER N/A 9/6/2019    Procedure: EP PACEMAKER;  Surgeon: Shefali Sylvester MD;  Location: [facility] CARDIAC CATH LAB     FUSION LUMBAR ANTERIOR THREE+ LEVELS N/A 4/22/2019    Procedure: Lumbar 1 Or Lumbar 2-5 Anterior Lumbar Interbody Fusion with BMP Stage 1 Of Two Procedure:;  Surgeon: Carlos Enrique Dial MD;  [facility]     HEART CATH FEMORAL CANNULIZATION WITH OPEN STANDBY REPAIR AORTIC VALVE N/A 9/4/2019     Procedure: Cardiopulmonary Bypass Standby;  Surgeon: Hamilton Carnes MD;  Location: [facility] OR     HERNIA REPAIR  2006     lumbar spine hardware removal  1999     OPTICAL TRACKING SYSTEM FUSION POSTERIOR SPINE THORACIC THREE+ LEVELS N/A 4/25/2019    Procedure: O-Arm/Stealth Assisted Thoracic 10-Pelvis Instrumented Fusion T11-2, L1-2, L2-3 Transforminal Lumbar Interbody Fusion (TLIF) And Smith Borges Osteotomies,and L3-4 SPO as well, Use Of BMP, Debridement Of Seroma;  Surgeon: Carlos Enrique Dial MD;  Location: [facility] OR     PICC INSERTION Right 05/06/2019    4Fr - 37cm, Basilic vein, low SVC     REMOVAL PROSTHETIC MATERIAL/MESH, ABD WALL NECRO TISS INFEXN  2012     ROTATOR CUFF REPAIR RT/LT  2003       Prior to Admission Medications   Prior to Admission Medications   Prescriptions Last Dose Informant Patient Reported? Taking?   Cholecalciferol (VITAMIN D) 2000 units tablet 10/24/2019 at Unknown time USP No Yes   Sig: Take 2,000 Units by mouth daily   LANsoprazole (PREVACID) 30 MG DR capsule 10/24/2019 at Unknown time Nursing Home Yes Yes   Sig: Take 30 mg by mouth every morning (before breakfast)   albuterol (2.5 MG/3ML) 0.083% neb solution  USP Yes Yes   Sig: Take 1 vial by nebulization every 6 hours as needed for shortness of breath / dyspnea or wheezing   aspirin (ASA) 81 MG EC tablet 10/24/2019 at Unknown time [facility] No Yes   Sig: Take 1 tablet (81 mg) by mouth daily Start tomorrow morning.   atorvastatin (LIPITOR) 40 MG tablet 10/24/2019 at Unknown time [facility] No Yes   Sig: Take 1 tablet (40 mg) by mouth daily   benzocaine-menthol (CEPACOL) 15-3.6 MG lozenge  Nursing Home No Yes   Sig: Place 1 lozenge inside cheek every hour as needed for sore throat   calcium carbonate (OS-DMITRIY 500 MG [place]. [place]) 500 MG tablet 10/24/2019 at Unknown time [facility] No Yes   Sig: Take 1 tablet (500 mg) by mouth 2 times daily   clopidogrel (PLAVIX) 75 MG tablet 10/24/2019 at Unknown time  "Nursing Home No Yes   Sig: Take 1 tablet (75 mg) by mouth daily   diphenhydrAMINE (BENADRYL) 50 MG capsule  Nursing Home Yes Yes   Sig: Take 50 mg by mouth as needed for itching or allergies    docusate sodium (COLACE) 100 MG capsule 10/24/2019 at Unknown time alf No Yes   Sig: Take 1 capsule (100 mg) by mouth At Bedtime For constipation. Hold for loose stools   ferrous gluconate (FERGON) 324 (38 Fe) MG tablet 10/24/2019 at Unknown time alf No Yes   Sig: Take 1 tablet (324 mg) by mouth daily (with breakfast)   folic acid (FOLVITE) 1 MG tablet 10/24/2019 at Unknown time alf No Yes   Sig: Take 1 tablet (1 mg) by mouth every morning   gabapentin (NEURONTIN) 400 MG capsule 10/24/2019 at Unknown time Nursing Home Yes Yes   Sig: Take 1,200 mg by mouth 3 times daily 0700 1400 2100 with percocet   insulin aspart (NOVOLOG VIAL) 100 UNITS/ML vial 10/24/2019 at 1630 Nursing Home No Yes   Sig: Give before meals and before bed:  For Pre-Meal Glucose:  140-189 give 1 unit   190-239 give 2 units   240-289 give 3 units   290-339 give 4 units   = or >340 give 5 units     For Bedtime Glucose  200 - 239 give 1 units   240 - 289 give 1.5 units  290 - 339 give 2 units  = or >340 give 2.5 units   insulin syringe-needle U-100 (29G X 1/2\" 1 ML) 29G X 1/2\" 1 ML miscellaneous  Nursing Home No No   Sig: Inject 1 Syringe Subcutaneous 2 times daily   levothyroxine (SYNTHROID/LEVOTHROID) 50 MCG tablet 10/24/2019 at Unknown time Nursing Home Yes Yes   Sig: Take 50 mcg by mouth every morning    methotrexate 2.5 MG tablet 10/22/2019 Nursing Home No Yes   Sig: Take 10 tablets (25 mg) by mouth once a week Tuesday   midodrine (PROAMATINE) 10 MG tablet 10/24/2019 at Unknown time alf No Yes   Sig: Take 1 tablet (10 mg) by mouth 3 times daily (with meals)   omega 3 1000 MG CAPS 10/24/2019 at Unknown time Nursing Home Yes Yes   Sig: Take 2 capsules by mouth every morning    oxyCODONE-acetaminophen (PERCOCET) 7.5-325 MG " per tablet 10/24/2019 at Unknown time shelter No Yes   Sig: Take 1 tablet by mouth 3 times daily   potassium chloride ER (K-DUR/KLOR-CON M) 20 MEQ CR tablet 10/24/2019 at Unknown time shelter No Yes   Sig: Take 1 tablet (20 mEq) by mouth daily   torsemide (DEMADEX) 5 MG tablet 10/24/2019 at Unknown time Nursing Home Yes Yes   Sig: Take 10 mg by mouth every morning   torsemide (DEMADEX) 5 MG tablet 10/24/2019 at 1600 Nursing Home Yes Yes   Sig: Take 5 mg by mouth daily At 1600      Facility-Administered Medications: None     Allergies   No Known Allergies    Social History   I have reviewed this patient's social history and updated it with pertinent information if needed. Beulah Jane  reports that he quit smoking about 49 years ago. He smoked 0.25 packs per day. He has never used smokeless tobacco. He reports current alcohol use. He reports that he does not use drugs.    Family History   I have reviewed this patient's family history and updated it with pertinent information if needed.   Family History   Problem Relation Age of Onset     Colon Cancer Mother      Hypertension Father         did not know father well       Review of Systems   The 12 point Review of Systems is negative other than noted in the HPI or here.     Physical Exam   Temp: 98  F (36.7  C) Temp src: Oral BP: 99/72 Pulse: 128 Heart Rate: 129 Resp: (!) 31 SpO2: 99 % O2 Device: Nasal cannula Oxygen Delivery: 4 LPM  Vital Signs with Ranges  Temp:  [97.6  F (36.4  C)-98.9  F (37.2  C)] 98  F (36.7  C)  Pulse:  [] 128  Heart Rate:  [] 129  Resp:  [12-39] 31  BP: ()/() 99/72  SpO2:  [81 %-99 %] 99 %  167 lbs 8.79 oz    GENERAL APPEARANCE: Alert and oriented x3. No acute distress.  SKIN: Inspection of the skin reveals no rashes, ulcerations, warm, dry  NECK: Supple and symmetric.   Difficult to assess but appears normal JVP  LUNGS: Crackles and coarse sounds throughout  CARDIOVASCULAR: Tachycardia makes  auscultation more complicated however there is no obvious murmurs  ABDOMEN: Soft, non-tender, non-distended with normal bowel sounds.  No ascites noted.  EXTREMITIES: 2+ edema bilateral legs.  NEUROLOGIC:  Normal mood and affect.  Sensation to touch was normal.      Data   Results for orders placed or performed during the hospital encounter of 10/25/19 (from the past 24 hour(s))   Glucose by meter   Result Value Ref Range    Glucose 114 (H) 70 - 99 mg/dL   Glucose by meter   Result Value Ref Range    Glucose 134 (H) 70 - 99 mg/dL   Glucose by meter   Result Value Ref Range    Glucose 114 (H) 70 - 99 mg/dL   EKG 12-lead, tracing only   Result Value Ref Range    Interpretation ECG Click View Image link to view waveform and result    Magnesium   Result Value Ref Range    Magnesium 2.4 (H) 1.6 - 2.3 mg/dL   Basic metabolic panel   Result Value Ref Range    Sodium 136 133 - 144 mmol/L    Potassium 4.7 3.4 - 5.3 mmol/L    Chloride 105 94 - 109 mmol/L    Carbon Dioxide 22 20 - 32 mmol/L    Anion Gap 9 3 - 14 mmol/L    Glucose 95 70 - 99 mg/dL    Urea Nitrogen 29 7 - 30 mg/dL    Creatinine 1.12 0.66 - 1.25 mg/dL    GFR Estimate 66 >60 mL/min/[1.73_m2]    GFR Estimate If Black 77 >60 mL/min/[1.73_m2]    Calcium 8.1 (L) 8.5 - 10.1 mg/dL   CBC with platelets   Result Value Ref Range    WBC 17.5 (H) 4.0 - 11.0 10e9/L    RBC Count 4.76 4.4 - 5.9 10e12/L    Hemoglobin 9.8 (L) 13.3 - 17.7 g/dL    Hematocrit 35.8 (L) 40.0 - 53.0 %    MCV 75 (L) 78 - 100 fl    MCH 20.6 (L) 26.5 - 33.0 pg    MCHC 27.4 (L) 31.5 - 36.5 g/dL    RDW 22.7 (H) 10.0 - 15.0 %    Platelet Count 488 (H) 150 - 450 10e9/L   EKG 12-lead, tracing only   Result Value Ref Range    Interpretation ECG Click View Image link to view waveform and result    Glucose by meter   Result Value Ref Range    Glucose 121 (H) 70 - 99 mg/dL

## 2019-10-26 NOTE — PLAN OF CARE
Pt awaiting cardiology consult, waiting for further medical evaluation for optimal PT/OT evaluation.

## 2019-10-26 NOTE — PLAN OF CARE
A&Ox4. VSS on 5-6L NC. Up with assist of one. Pain in bilateral LE, controlled with oxycodone. Bilateral feet molted/red, pulses per doppler and DP absent, md aware. Voiding adequately. Tolerating diet. Non productive cough.

## 2019-10-27 NOTE — PROGRESS NOTES
[facility]    Cardiology Consultation     Date of Admission:  10/25/2019    Assessment & Plan   Beulah Jane is a 69 year old year old male who has a PMH significant for pulmonary fibrosis, right sided heart failure, DM type II, severe AS s/p TAVR and complete heart block s/p PPM who presented to the ED on 10/25/2019 after a near syncopal episode and was admitted for severe sepsis likely secondary to HAP.     Patient has a known history of SVT and has had elevations in his heart rate overnight.  The EKGs are concerning for possible atrial flutter.  Heart rate ranges from 125-130.  It is quite regular in nature on the EKGs.  It is somewhat difficult to see flutter waves however.  In either case we are quite limited in terms of our medications to control his heart rate.  I am also concerned that it will be difficult to control his atrial arrhythmias in the setting of pneumonia and his multiple other comorbidities.    Overall, this patient is in a very difficult medical situation.  I am concerned that it might be difficult for him to recover from his underlying infection in the setting of his pulmonary fibrosis.  I do note that he remains full code however consideration for palliative care is being discussed.    #1 history of severe aortic stenosis status post TAVR  #2 history of complete heart block status post dual-chamber pacemaker  #3 pneumonia with severe sepsis  #4 atrial tachycardia  #5 peripheral vascular disease  #6 moderate to severe pulmonary hypertension  #7 pulmonary fibrosis    Plan:  Given we are limited with AV shonna blockade agents due to hypotension we will start digoxin with a digoxin load IV 0.25 mg every 8 hours starting today for a total of 3 doses.  Then he will start oral digoxin 0.125 mg on 10/27/2019 (PM).    The patient has had a trial of sildenafil for pulmonary hypertension.  He failed this due to hypotension.  Given that he is struggling with sepsis I do not feel  that we should trial any additional pulmonary hypertension medications at this time.    Diuresis as needed.    The elevated troponin is most likely related to a demand ischemic event.  However we can explore additional work-up of this once he recovers from his pneumonia.      Javy Carlos MD    Overnight events:  The patient had worsening respiratory distress last night.  However at this time he has improved with some diuresis and further antibiotic doses.  His heart rate is much better controlled today.      Physical Exam   Temp: 97.4  F (36.3  C) Temp src: Oral BP: (!) 85/66 Pulse: 87 Heart Rate: 86 Resp: 16 SpO2: 98 % O2 Device: Nasal cannula Oxygen Delivery: 3 LPM  Vital Signs with Ranges  Temp:  [97.4  F (36.3  C)-98.3  F (36.8  C)] 97.4  F (36.3  C)  Pulse:  [] 87  Heart Rate:  [] 86  Resp:  [16-57] 16  BP: ()/(56-74) 85/66  FiO2 (%):  [60 %] 60 %  SpO2:  [83 %-100 %] 98 %  167 lbs 15.85 oz    GENERAL APPEARANCE: Alert and oriented x3. No acute distress.  SKIN: Inspection of the skin reveals no rashes, ulcerations, warm, dry  NECK: Supple and symmetric.   Difficult to assess but appears normal JVP  LUNGS: Crackles and coarse sounds throughout  CARDIOVASCULAR: Tachycardia makes auscultation more complicated however there is no obvious murmurs  ABDOMEN: Soft, non-tender, non-distended with normal bowel sounds.  No ascites noted.  EXTREMITIES: 2+ edema bilateral legs.  NEUROLOGIC:  Normal mood and affect.  Sensation to touch was normal.      Data   Results for orders placed or performed during the hospital encounter of 10/25/19 (from the past 24 hour(s))   Glucose by meter   Result Value Ref Range    Glucose 70 70 - 99 mg/dL   Glucose by meter   Result Value Ref Range    Glucose 129 (H) 70 - 99 mg/dL   EKG 12-lead, tracing only   Result Value Ref Range    Interpretation ECG Click View Image link to view waveform and result    Blood gas arterial and oxyhgb   Result Value Ref Range    pH  Arterial 7.38 7.35 - 7.45 pH    pCO2 Arterial 39 35 - 45 mm Hg    pO2 Arterial 38 (LL) 80 - 105 mm Hg    Bicarbonate Arterial 23 21 - 28 mmol/L    FIO2 50     Oxyhemoglobin Arterial 63 (L) 92 - 100 %    Base Deficit Art 2.0 mmol/L   Glucose by meter   Result Value Ref Range    Glucose 109 (H) 70 - 99 mg/dL   Pulmonology IP Consult: Patient to be seen: Routine - within 24 hours; known pulmonary fibrosis.  worsening hypoxia; Consultant may enter orders: Yes; Requesting provider? Hospitalist (if different from attending physician)    Narrative    Holli Bobby MD     10/27/2019  3:17 PM    Pulmonary Medicine Consultation      Date of Admission: 10/25/2019  Primary Attending:  Ross Cervantes,*  Consulting Physician: Alexa Bobby MD    History:    Beulah Jane is a 69 year old male who presented to the   ED from [place] after a near syncopal episode. This morning the   patient was standing when he began to feel light headedness,   diaphoretic and short of breath.  He sat down on his bed and as   when he has had similar episodes in the past they would resolve   with that but he notes his symptoms persisted.  At that time he   went to a nurse at [place] and they instructed the patient to come   in to the ED.  Patient also complains of a cough and chest   congestion recently but has been unable to produce any sputum.    He denies any fevers, chest pain, palpitations, abdominal pain,   N/V/D, problems with urination, leg pain or leg swelling.  No   headaches, focal weakness, numbness/tingling.     Known hx of pulmonary fibrosis 2/2 to polymyositis. Has likely   new infiltrates c/w HCAP since he was last hospitalization for   acute CHF back in 9/19.       Review of Systems - A 10-system ROS is negative except for items   mentioned above and in HPI.       Prior medical history:  Past Medical History:   Diagnosis Date     Aortic stenosis      Chronic pain      DM (diabetes mellitus), type 2 (H)     on insulin      Hyperlipidemia      JUAN on CPAP      Pneumonia      Polymyositis (H)      Pulmonary fibrosis, unspecified (H)      Seasonal allergies        Past Surgical History:   Procedure Laterality Date     ARTHROSCOPY KNEE  1970     COLONOSCOPY       CV CORONARY ANGIOGRAM N/A 7/30/2019    Procedure: CV CORONARY ANGIOGRAM;  Surgeon: Leonidas Mike MD;  Location:  HEART CARDIAC CATH LAB     CV LEFT HEART CATH N/A 7/30/2019    Procedure: CV LEFT HEART CATH;  Surgeon: Leonidas Mike MD;  Location:  HEART CARDIAC CATH LAB     CV PCI STENT DRUG ELUTING Left 7/30/2019    Procedure: PCI Stent Drug Eluting;  Surgeon: Leonidas Mike MD;  Location:  HEART CARDIAC CATH LAB     CV RIGHT HEART CATH N/A 7/30/2019    Procedure: CV RIGHT HEART CATH;  Surgeon: Leonidas Mike MD;  Location:  HEART CARDIAC CATH LAB     CV RIGHT HEART CATH N/A 9/27/2019    Procedure: Right Heart Cath;  Surgeon: Altaf Camacho MD;    Location:  HEART CARDIAC CATH LAB     CV RIGHT HEART CATH N/A 10/7/2019    Procedure: Right Heart Cath;  Surgeon: Benjamin Mancuso MD;    Location:  HEART CARDIAC CATH LAB     CV TRANSCATHETER AORTIC VALVE REPLACEMENT N/A 9/4/2019    Procedure: Transcatheter (Medtronic - 29mm) Aortic Valve   Replacement, trans thorasic echocardiogram, perfusion and cardiac   surgery standby, temporary pacemeker placement;  Surgeon:   Pj Garcia MD;  Location:  OR     DECOMPRESSION, FUSION LUMBAR POSTERIOR TWO LEVELS, COMBINED    1997     EP PACEMAKER N/A 9/6/2019    Procedure: EP PACEMAKER;  Surgeon: Shefali Sylvester MD;  Location:   [facility] CARDIAC CATH LAB     FUSION LUMBAR ANTERIOR THREE+ LEVELS N/A 4/22/2019    Procedure: Lumbar 1 Or Lumbar 2-5 Anterior Lumbar Interbody   Fusion with BMP Stage 1 Of Two Procedure:;  Surgeon: Carlos Enrique Dial MD;  Location: [facility]     HEART CATH FEMORAL CANNULIZATION WITH OPEN STANDBY REPAIR   AORTIC VALVE N/A 9/4/2019     Procedure: Cardiopulmonary Bypass Standby;  Surgeon: Hamilton Carnes MD;  Location: [facility] OR     HERNIA REPAIR  2006     lumbar spine hardware removal  1999     OPTICAL TRACKING SYSTEM FUSION POSTERIOR SPINE THORACIC THREE+   LEVELS N/A 4/25/2019    Procedure: O-Arm/Stealth Assisted Thoracic 10-Pelvis   Instrumented Fusion T11-2, L1-2, L2-3 Transforminal Lumbar   Interbody Fusion (TLIF) And Smith Borges Osteotomies,and L3-4   SPO as well, Use Of BMP, Debridement Of Seroma;  Surgeon: Carlos Enrique Dial MD;  Location: [facility] OR     PICC INSERTION Right 05/06/2019    4Fr - 37cm, Basilic vein, low SVC     REMOVAL PROSTHETIC MATERIAL/MESH, ABD WALL NECRO TISS INFEXN    2012     ROTATOR CUFF REPAIR RT/LT  2003       Patient Active Problem List   Diagnosis     Spinal stenosis of lumbar region with neurogenic claudication     Severe aortic stenosis     Hypertension     Hypothyroidism     Mixed hyperlipidemia     Inflammatory arthritis     Pulmonary fibrosis (H)     Type 2 diabetes mellitus with neurologic complication, with   long-term current use of insulin (H)     Controlled substance agreement signed     Acute pulmonary embolism (H)     Status post non-ST elevation myocardial infarction (NSTEMI)     Physical deconditioning     Coronary artery disease involving native coronary artery of   native heart with other form of angina pectoris (H)     Congestive heart failure, NYHA class III, chronic, diastolic   (H)     S/P TAVR (transcatheter aortic valve replacement)     GONZALEZ (dyspnea on exertion)     SVT (supraventricular tachycardia) (H)     Pulmonary hypertension (H)     Syncope     Cardiac resynchronization therapy pacemaker (CRT-P) in place     Orthostatic hypotension     Open wound of left foot excluding one or more toes     Sepsis (H)       Social History     Social History     Socioeconomic History     Marital status:      Spouse name: Not on file     Number of children: Not on file     Years  of education: Not on file     Highest education level: Not on file   Occupational History     Not on file   Social Needs     Financial resource strain: Not on file     Food insecurity:     Worry: Not on file     Inability: Not on file     Transportation needs:     Medical: Not on file     Non-medical: Not on file   Tobacco Use     Smoking status: Former Smoker     Packs/day: 0.25     Last attempt to quit: 1970     Years since quittin.3     Smokeless tobacco: Never Used   Substance and Sexual Activity     Alcohol use: Yes     Comment: few times monthly     Drug use: Never     Sexual activity: Not Currently   Lifestyle     Physical activity:     Days per week: Not on file     Minutes per session: Not on file     Stress: Not on file   Relationships     Social connections:     Talks on phone: Not on file     Gets together: Not on file     Attends Confucianism service: Not on file     Active member of club or organization: Not on file     Attends meetings of clubs or organizations: Not on file     Relationship status: Not on file     Intimate partner violence:     Fear of current or ex partner: Not on file     Emotionally abused: Not on file     Physically abused: Not on file     Forced sexual activity: Not on file   Other Topics Concern     Not on file   Social History Narrative    Originally from [place].          Moved to [place]         He currently lives with his wife and two sons. Thinking about   transitioning to a nursing home.           Family History  Family History   Problem Relation Age of Onset     Colon Cancer Mother      Hypertension Father         did not know father well         Medications  No current outpatient medications on file.     Current Facility-Administered Medications Ordered in Epic   Medication Dose Route Frequency Last Rate Last Dose     acetaminophen (TYLENOL) Suppository 650 mg  650 mg Rectal Q4H   PRN         acetaminophen (TYLENOL) tablet 650 mg  650 mg Oral Q4H PRN          aspirin EC tablet 81 mg  81 mg Oral Daily   81 mg at 10/27/19   0947     atorvastatin (LIPITOR) tablet 40 mg  40 mg Oral Daily   40 mg   at 10/27/19 0947     clopidogrel (PLAVIX) tablet 75 mg  75 mg Oral Daily   75 mg at   10/27/19 0947     glucose gel 15-30 g  15-30 g Oral Q15 Min PRN        Or     dextrose 50 % injection 25-50 mL  25-50 mL Intravenous Q15 Min   PRN        Or     glucagon injection 1 mg  1 mg Subcutaneous Q15 Min PRN         [START ON 10/28/2019] digoxin (LANOXIN) tablet 125 mcg  125 mcg   Oral Daily         enoxaparin ANTICOAGULANT (LOVENOX) injection 40 mg  40 mg   Subcutaneous Q24H   40 mg at 10/27/19 1142     ferrous gluconate (FERGON) tablet 324 mg  324 mg Oral Daily   with breakfast   324 mg at 10/27/19 0947     folic acid (FOLVITE) tablet 1 mg  1 mg Oral QAM   1 mg at   10/27/19 0947     gabapentin (NEURONTIN) capsule 1,200 mg  1,200 mg Oral TID     1,200 mg at 10/27/19 0946     hypromellose-dextran (ARTIFICAL TEARS) 0.1-0.3 % ophthalmic   solution 1 drop  1 drop Both Eyes Q1H PRN         insulin aspart (NovoLOG) inj (RAPID ACTING)  1-7 Units   Subcutaneous TID AC         insulin aspart (NovoLOG) inj (RAPID ACTING)  1-5 Units   Subcutaneous At Bedtime         LANsoprazole (PREVACID) CR capsule 30 mg  30 mg Oral QAM AC     30 mg at 10/27/19 0947     levothyroxine (SYNTHROID/LEVOTHROID) tablet 50 mcg  50 mcg Oral   QAM   50 mcg at 10/27/19 0947     lidocaine (LMX4) cream   Topical Q1H PRN         lidocaine 1 % 0.1-1 mL  0.1-1 mL Other Q1H PRN         melatonin tablet 1 mg  1 mg Oral At Bedtime PRN         midodrine (PROAMATINE) tablet 10 mg  10 mg Oral TID w/meals     10 mg at 10/27/19 1141     naloxone (NARCAN) injection 0.1-0.4 mg  0.1-0.4 mg Intravenous   Q2 Min PRN         nitroGLYcerin (NITROSTAT) sublingual tablet 0.4 mg  0.4 mg   Sublingual Q5 Min PRN         No lozenges or gum should be given while patient on   BIPAP/AVAPS/AVAPS AE   Does not apply Continuous PRN         ondansetron  (ZOFRAN-ODT) ODT tab 4 mg  4 mg Oral Q6H PRN        Or     ondansetron (ZOFRAN) injection 4 mg  4 mg Intravenous Q6H PRN           oxyCODONE (ROXICODONE) tablet 5-10 mg  5-10 mg Oral Q3H PRN   5   mg at 10/25/19 204     Patient may continue current oral medications   Does not apply   Continuous PRN         piperacillin-tazobactam (ZOSYN) 4.5 g vial to attach to    mL bag  4.5 g Intravenous Q6H 200 mL/hr at 10/27/19 0256 4.5 g at   10/27/19 1403     senna-docusate (SENOKOT-S/PERICOLACE) 8.6-50 MG per tablet 1   tablet  1 tablet Oral BID PRN        Or     senna-docusate (SENOKOT-S/PERICOLACE) 8.6-50 MG per tablet 2   tablet  2 tablet Oral BID PRN         sodium chloride (PF) 0.9% PF flush 3 mL  3 mL Intracatheter q1   min prn         sodium chloride (PF) 0.9% PF flush 3 mL  3 mL Intracatheter Q8H     3 mL at 10/27/19 1143     torsemide (DEMADEX) tablet 10 mg  10 mg Oral Daily   10 mg at   10/27/19 1142     No current [facility]-ordered outpatient medications on file.       No Known Allergies      Physical Examination:   Vitals:    10/27/19 1032 10/27/19 1200 10/27/19 1213 10/27/19 1410   BP:  101/68  90/56   Pulse:  87     Resp: 28 17  16   Temp:   97.4  F (36.3  C)    TempSrc:   Oral    SpO2: 99% 97%  98%   Weight:       Height:         Body mass index is 26.31 kg/m .  Temp (24hrs), Av.8  F (36.6  C), Min:97.4  F (36.3  C),   Max:98.3  F (36.8  C)    Constitutional:  Appears comfortable.  HENT:  mucous membranes moist.  Eyes: PERRLA, no icterus, no pallor.   Neck: No lymphadenopathy or thyromegaly, trachea midline, no   carotid bruits.  Cardiovascular: Regular rate and rythym, no murmurs, rubs or   gallops, no peripheral edema.  Respiratory/Chest: bibasilar rales  Musculoskeletal: No clubbing or cyanosis, full range of motion in   all extremities.  Neurological: No focal motor or sensory deficits. DTR's are 2+   and symmetric.  Skin: No skin rash, hives, petechiae, or breakdown.      CMP  Recent Labs   Lab  10/26/19  0603 10/26/19  0410 10/25/19  0912 10/25/19  0702 10/22/19  1055     --   --  135 138   POTASSIUM 4.7  --  5.1 6.0* 4.2   CHLORIDE 105  --   --  102 104   CO2 22  --   --  23 26   ANIONGAP 9  --   --  10 8   GLC 95  --   --  121* 135*   BUN 29  --   --  35* 26   CR 1.12  --   --  1.32* 1.08   GFRESTIMATED 66  --   --  54* 69   GFRESTBLACK 77  --   --  63 80   DMITRIY 8.1*  --   --  8.6 8.5   MAG  --  2.4*  --   --   --      CBC  Recent Labs   Lab 10/27/19  0828 10/26/19  0603 10/25/19  0702 10/22/19  1055   WBC 13.7* 17.5* 16.0* 14.3*   RBC 4.24* 4.76 5.07 4.58   HGB 9.0* 9.8* 10.7* 9.7*   HCT 31.5* 35.8* 38.7* 35.4*   MCV 74* 75* 76* 77*   MCH 21.2* 20.6* 21.1* 21.2*   MCHC 28.6* 27.4* 27.6* 27.4*   RDW 22.4* 22.7* 23.4* 23.6*    488* 544* 469*     INRNo lab results found in last 7 days.  Arterial Blood Gas  Recent Labs   Lab 10/27/19  0427   PH 7.38   PCO2 39   PO2 38*   HCO3 23   O2PER 50     Recent Labs   Lab 10/25/19  1300 10/25/19  0734 10/25/19  0702   CULT No growth No growth after 2 days No growth after 2 days       Diagnostic Studies:  Chest Radiology:     CT chest  Impression:   1.  Pulmonary fibrosis in a pattern favoring NSIP pattern,   potentially  related to patient's known polymyositis.  2. Mediastinal lymphadenopathy.  3. Enlarged pulmonary artery.  4. Stable pericardial effusion.  5. Extensive coronary atherosclerotic disease, similar to   previous.  6. Nodular opacity in the left upper lobe . Indeterminant.   Infectious, inflammatory such as organizing pneumonia or   neoplastic.   Continued follow up recommended.3-6 months.        Assessment/Plan:     Acute on chronic hypoxic respiratory failure  Pulmonary fibrosis 2/2 to PM  HFePF  Pulmonary HTN from Group 2/3  Sepsis  HCAP    Plan  -antibiotics per primary team  -wean oxygen to maintain sats>90%  -duonebs as needed  -no steroids indicated for fibrosis at time this since it is PNA   and not having a flare of likely NSIP from  polymyositis  -maintain I/O, on torsemide, cardiology following  -overall poor prognosis, agree with palliative care consult  -ambulate if able, out of bed, incentive spirometry, PT/OT      Thank you for the consult, please call with any questions.   Available if needed.    Alexa Bobby M.D.  Pulmonary, Critical Care and Sleep Medicine  [facility]   Pager: [phone]  Office:[phone]     CBC with platelets   Result Value Ref Range    WBC 13.7 (H) 4.0 - 11.0 10e9/L    RBC Count 4.24 (L) 4.4 - 5.9 10e12/L    Hemoglobin 9.0 (L) 13.3 - 17.7 g/dL    Hematocrit 31.5 (L) 40.0 - 53.0 %    MCV 74 (L) 78 - 100 fl    MCH 21.2 (L) 26.5 - 33.0 pg    MCHC 28.6 (L) 31.5 - 36.5 g/dL    RDW 22.4 (H) 10.0 - 15.0 %    Platelet Count 326 150 - 450 10e9/L   Glucose by meter   Result Value Ref Range    Glucose 111 (H) 70 - 99 mg/dL

## 2019-10-27 NOTE — PLAN OF CARE
Discharge Planner PT   Patient plan for discharge: None stated  Current status: Evaluation completed and treatment initiated. 68 y/o male admitted for severe sepsis likely secondary to HAP. Pt resides in an apartment with his wife and two sons. Pt reports there is an elevator in the building for access to his apartment and that he uses a rolling walker at baseline.   Pts HR during subjective questioning varied between 100-155 and RN aware. SBA for safety for bed mobility and CGA for supine to sit. CGA for sit to stand and pt able to side step 4 steps to reposition in bed. HR between 60-70 at EOB and during repositioning. Pt required min A with LE for sit to supine at end of session.   Barriers to return to prior living situation: Decreased activity tolerance, HR variability, level of assist, decreased strength  Recommendations for discharge: TCU  Rationale for recommendations: Pt will benefit from skilled PT to increase strength, balance, and functional activity tolerance. Pt unable to tolerate a significant amount of functional activity due to variability in HR that requires monitoring.        Entered by: Meenakshi Bright 10/27/2019 9:30 AM

## 2019-10-27 NOTE — PROGRESS NOTES
10/27/19 1512   Quick Adds   Type of Visit Initial Occupational Therapy Evaluation   Living Environment   Lives With spouse   Living Arrangements apartment   Home Accessibility no concerns   Transportation Anticipated family or friend will provide   Living Environment Comment elevator access, spouse drives. tub shower.    Self-Care   Usual Activity Tolerance fair   Current Activity Tolerance poor   Regular Exercise No   Equipment Currently Used at Home shower chair;walker, rolling   Activity/Exercise/Self-Care Comment uses walker for all mobility. has shower chair. not very active due to feeling SOB with activity    Functional Level   Ambulation 1-->assistive equipment   Transferring 1-->assistive equipment   Toileting 1-->assistive equipment   Bathing 1-->assistive equipment   Dressing 2-->assistive person   Eating 0-->independent   Communication 0-->understands/communicates without difficulty   Swallowing 0-->swallows foods/liquids without difficulty   Cognition 0 - no cognition issues reported   Fall history within last six months yes   Number of times patient has fallen within last six months 1   Which of the above functional risks had a recent onset or change? ambulation;transferring;toileting;bathing;dressing   Prior Functional Level Comment pt reports he is mostly IND with use of walker but has been having some A from spouse for ADL's    General Information   Onset of Illness/Injury or Date of Surgery - Date 10/25/19   Referring Physician Ross Cervantes, DO   Patient/Family Goals Statement looking for higher level of care    Additional Occupational Profile Info/Pertinent History of Current Problem 68 y/o male admitted for severe sepsis likely secondary to HAP   Precautions/Limitations fall precautions;oxygen therapy device and L/min   General Info Comments on 3 L O2    Cognitive Status Examination   Orientation orientation to person, place and time   Level of Consciousness alert   Follows Commands  (Cognition) WNL   Memory intact   Attention No deficits were identified   Organization/Problem Solving No deficits were identified   Executive Function No deficits were identified   Visual Perception   Visual Perception No deficits were identified   Sensory Examination   Sensory Quick Adds No deficits were identified   Pain Assessment   Patient Currently in Pain No   Integumentary/Edema   Integumentary/Edema no deficits were identifed   Posture   Posture not impaired   Strength   Strength Comments generalized weakness    Muscle Tone Assessment   Muscle Tone Quick Adds No deficits were identified   Coordination   Upper Extremity Coordination No deficits were identified   Transfer Skill: Bed to Chair/Chair to Bed   Level of Evans: Bed to Chair contact guard   Transfer Skill: Sit to Stand   Level of Evans: Sit/Stand contact guard   Upper Body Dressing   Level of Evans: Dress Upper Body stand-by assist   Lower Body Dressing   Level of Evans: Dress Lower Body minimum assist (75% patients effort)   Toileting   Level of Evans: Toilet maximum assist (25% patients effort)   Grooming   Level of Evans: Grooming stand-by assist   Eating/Self Feeding   Level of Evans: Eating independent   Instrumental Activities of Daily Living (IADL)   IADL Comments spouse A with IADL's    Activities of Daily Living Analysis   Impairments Contributing to Impaired Activities of Daily Living balance impaired;ROM decreased;strength decreased   General Therapy Interventions   Planned Therapy Interventions ADL retraining;transfer training   Clinical Impression   Criteria for Skilled Therapeutic Interventions Met yes, treatment indicated   OT Diagnosis dec IND with ADL's and transfers   Influenced by the following impairments dec ax tolerance, weakness   Assessment of Occupational Performance 5 or more Performance Deficits   Identified Performance Deficits all ADL's and transfers   Clinical Decision  "Making (Complexity) Low complexity   Therapy Frequency 5x/week   Predicted Duration of Therapy Intervention (days/wks) 3 days   Anticipated Discharge Disposition Transitional Care Facility   Risks and Benefits of Treatment have been explained. Yes   Patient, Family & other staff in agreement with plan of care Yes   [facility] TM \"6 Clicks\"   2016, Trustees of [facility], under license to [employer].  All rights reserved.   6 Clicks Short Forms Daily Activity Inpatient Short Form   [facility]  \"6 Clicks\" Daily Activity Inpatient Short Form   1. Putting on and taking off regular lower body clothing? 3 - A Little   2. Bathing (including washing, rinsing, drying)? 3 - A Little   3. Toileting, which includes using toilet, bedpan or urinal? 2 - A Lot   4. Putting on and taking off regular upper body clothing? 4 - None   5. Taking care of personal grooming such as brushing teeth? 4 - None   6. Eating meals? 4 - None   Daily Activity Raw Score (Score out of 24.Lower scores equate to lower levels of function) 20   Total Evaluation Time   Total Evaluation Time (Minutes) 8     "

## 2019-10-27 NOTE — PLAN OF CARE
Discharge Planner OT   Patient plan for discharge: none stated     Current status: order received, chart reviewed, eval completed, tx initiated. Pt admitted from [facility] and was A with ADL's and mobility at U. Prior to TCU, pt was living at apartment with spouse and was IND with ADL's and uses walker for mobility. Spouse A with IADL's and occasionally ADL's.     BP 85/66 at start of session, however pt asymptomatic and needing to use BSC. Pt on 3 L O2, very poor tolerance for activity today. CGA for sit <> stand transfer, CGA for stand pivot transfer bed > BSC. Instructed pt on PLB. HR at 86 at start of session did inc to 97 with activity. Pt required inc time on BSC due to diarrhea. Max A needed for pericares as pt too fatigued to perform hygiene. Set-up pt for g/h tasks sitting EOB as pt too fatigued to trial in stance. BP 96/88 at end of session    Barriers to return to prior living situation: current level of A, poor ax tolerance, fall risk     Recommendations for discharge: TCU     Rationale for recommendations: Pt would benefit from continued skilled OT services to improve independence and safety with ADL's and functional transfers as pt is not at baseline.          Entered by: Julia Marlow 10/27/2019 3:49 PM

## 2019-10-27 NOTE — PROGRESS NOTES
Vascular Surgery Update    -given his co-morbidities, patient would not be a open bypass candidate  -would be reasonable to consider an angiogram to evaluate for possible intervention on SFA, but wouldn't be too aggressive with tibial disease  -noted possible Palliative consult tomorrow 10/28   -pending clinical course (recovery from sepsis and other acute on chronic issues) and patient wishes after talks with palliative, could discuss timing of possible angiogram with weekday team  -following    BLE arterial duplex as below    Right lower extremity: There is scattered calcified plaque in the  infrainguinal arteries. No definite flow is identified in the right  peroneal artery. Significantly dampened waveforms are noted in the  distal right anterior tibial artery. There also appears to be a focal  stenosis in the tibioperoneal trunk as seen on the color Doppler  images. Peak systolic velocity in this region is 89 cm/s. Velocity  within the popliteal artery proximal to this level is 23 cm/s.     Left lower extremity: There is scattered calcified plaque in the  infrainguinal arteries. Dampened waveforms are identified in the  distal posterior tibial artery.                                                                      IMPRESSION: Sonographic findings indicate steno-occlusive lesions in  the right infrapopliteal region and in the distal left posterior  tibial artery

## 2019-10-27 NOTE — PROVIDER NOTIFICATION
" Notified Dr dominguez :\"332-2 VE, W increased oxygen needs, use of abdominal muscles. Pt endorsed increased WOB.\" Received orders for Highflow nasal [place]. At approximately 0300, This nurse contacted Dr dominguez again and requested orders for Bipap as pt is not tolerating High flow.   "

## 2019-10-27 NOTE — PROGRESS NOTES
[facility]    Medicine Progress Note - Hospitalist Service       Date of Admission:  10/25/2019  Assessment & Plan   Beulah Jane is a 69 year old male with a history of pulmonary fibrosis, right sided heart failure, DM type II, severe AS s/p TAVR and complete heart block s/p PPM who presented to the ED on 10/25/2019 after a near syncopal episode and was admitted for severe sepsis likely secondary to HAP      Suspected HAP with severe sepsis   Patient presented after a near syncopal episode.  In the ED he was found to have a leukocytosis and tachycardia.  CXR showed a patchy interstitial opacities most prominent in the right lung base concerning for pneumonia.    Initial blood pressure in the ED was 88.54 and lactate was 4.6.  He was given IVF and his blood pressures improved but lactate still elevated at 4.8.  Also started on IV Zosyn and Vancomycin   - PTA Midodrine   - Follow cultures. NGTD   - Sputum culture is in process  - Continue IV Zosyn     Pulmonary fibrosis w/moderate to severe pulmonary HTN on chronic O2   JUAN   Right sided heart failure and possible diastolic dysfunction of the LV/cor pulmonale   Patient is normally on 2 L of O2 via NC for known pulmonary fibrosis.  He does have a history of RV dysfunction likely related to his chronic lung disease.  Echocardiogram on 10/14/19 showed a normal LV EF with minimal concentric hypertrophy   Over night the patient had worsening O2 saturations and was requiring hi-flow.    - Titrate O2 as tolearted  - Restarted morning Torsemide at 10 mg QAM.  Still holding evening dose   - IS ordered   - RCAT   - Pulmonology consulted and appreciate their recommendations   - Palliative care consulted for goals of care discussion     Suspected atrial flutter  H/o SVT   Overnight patient had wide complex tachycardiac.  Initially though SVT vs a fib/flutter.  Unfortunately the patient's blood pressures are not amenable to most rate controlling  medications  - Pacemaker evaluation   - Started on Digoxin by cardiology   - Cardiology following and appreciate their recommendations    SHIRIN. Resolved   Baseline Cr appears to be 0.9-1 and was elevated at 1.32 on arrival.  Suspect this is pre-renal and related to dehydration from his sepsis.  Of note, potassium was also initially elevated at 6.0 but there was evidence of hemolysis and repeat was normal at 5.1   - Avoid nephrotoxins  - No further IVF given his known CHF     H/o PAD   Has a known history of PAD.  While here patient had persistently elevated lactates and difficulty with finding lower extremity pulses.  Does have evidence of ischemic changes with eschars on the left foot. Some concern for ischemic limb given this  - Vascular surgery following and appreciate their recommendations.  Plan for ultrasound today    DM type II   Last HgbA1c from 8/29/19 was 6.7.  PTA on SSI   - SSI  - Moderate CHO diet     H/o Severe AS s/p TAVR on 9/7/19  H/o Complete heart block s/p PPM in September   No issues currently.  Paced rhythm on EKG      Chronic anemia, iron deficiency  - PTA iron      Diet: 2 Gram Sodium Diet    DVT Prophylaxis: Enoxaparin (Lovenox) SQ  [place] Catheter: not present  Code Status: Full Code      Disposition Plan   Expected discharge: 2 - 3 days, recommended to transitional care unit once antibiotic plan established, O2 use less than 3 liters/minute and SIRS/Sepsis treated.  Entered: Ross Cervantes DO 10/27/2019, 10:26 AM       The patient's care was discussed with the Bedside Nurse and Patient.    Ross Cervantes DO  Hospitalist Service  [facility]    ______________________________________________________________________    Interval History   Patient seen and examined.  Over night had worsening hypoxia but no other acute events.  No chest pain.  No fevers or chills.     Data reviewed today: I reviewed all medications, new labs and imaging results over the last 24 hours. I  personally reviewed no images or EKG's today.    Physical Exam   Vital Signs: Temp: 98.3  F (36.8  C) Temp src: Axillary BP: 99/61 Pulse: 67 Heart Rate: 94 Resp: 30 SpO2: 100 % O2 Device: Nasal cannula Oxygen Delivery: 5 LPM  Weight: 167 lbs 15.85 oz  General Appearance: Sleeping but easily arousable.  NAD  Respiratory: Clear to auscultation.  No respiratory distress on RA   Cardiovascular: RRR.  No obvious murmurs  GI: Bowel sounds present.  Non-tender  Skin: Eschars to lower extremity.  No obvious rashes  Other: Trace lower extremity edema.  No calf tenderness      Data   Recent Labs   Lab 10/27/19  0828 10/26/19  0603 10/25/19  0912 10/25/19  0702 10/22/19  1055   WBC 13.7* 17.5*  --  16.0* 14.3*   HGB 9.0* 9.8*  --  10.7* 9.7*   MCV 74* 75*  --  76* 77*    488*  --  544* 469*   NA  --  136  --  135 138   POTASSIUM  --  4.7 5.1 6.0* 4.2   CHLORIDE  --  105  --  102 104   CO2  --  22  --  23 26   BUN  --  29  --  35* 26   CR  --  1.12  --  1.32* 1.08   ANIONGAP  --  9  --  10 8   DMITRIY  --  8.1*  --  8.6 8.5   GLC  --  95  --  121* 135*     No results found for this or any previous visit (from the past 24 hour(s)).

## 2019-10-27 NOTE — PLAN OF CARE
OT:Chart reviewed--attempted to see pt. for initial evaluation. Pt. recently worked w/PT, declined activity at this time. Noted palliative consult for 10/28, will continue to follow.

## 2019-10-27 NOTE — PLAN OF CARE
AVSS ex tachy. On Bipap. Denies pain. LS Dim throughout. Diet NPO due to Bipap. Up with assist of 1. BS active and audible.

## 2019-10-27 NOTE — PROVIDER NOTIFICATION
Brief update:    Increased work of breathing    HR now controlled, BP in 100 systolic range.    Switch to High flow nasal canula for positive pressure benefit  20 IV lasix now, assess for response (if becomes hypotensive, will be able to give fluid back relatively rapidly)    Call if no change, can consider switch to BIPAP    Luis Valdes MD  2:13 AM    Switched to BIPAP for work of breathing not notably improved w/ high flow NC    ---------------    Paged w/ abnormal ABG  Obtained as pt being placed on BIPAP, potentially venous per nursing discussion w. RT    Not especially helpful for determining efficacy of current respiratory treatment as obtained as patient was being transitioned to BiPAP rather than following BiPAP administration.    Obtain VBG now, can draw a.m. labs with this.  Based on this, can consider adjustments to BiPAP    Luis Valdes MD  5:08 AM

## 2019-10-27 NOTE — CONSULTS
Pulmonary Medicine Consultation      Date of Admission: 10/25/2019  Primary Attending:  Ross Cervantes,*  Consulting Physician: Alexa Bobby MD    History:    Beulah Jane is a 69 year old male who presented to the ED from [facility] after a near syncopal episode. This morning the patient was standing when he began to feel light headedness, diaphoretic and short of breath.  He sat down on his bed and as when he has had similar episodes in the past they would resolve with that but he notes his symptoms persisted.  At that time he went to a nurse at [facility] and they instructed the patient to come in to the ED.  Patient also complains of a cough and chest congestion recently but has been unable to produce any sputum.  He denies any fevers, chest pain, palpitations, abdominal pain, N/V/D, problems with urination, leg pain or leg swelling.  No headaches, focal weakness, numbness/tingling.     Known hx of pulmonary fibrosis 2/2 to polymyositis. Has likely new infiltrates c/w HCAP since he was last hospitalization for acute CHF back in 9/19.       Review of Systems - A 10-system ROS is negative except for items mentioned above and in HPI.       Prior medical history:  Past Medical History:   Diagnosis Date     Aortic stenosis      Chronic pain      DM (diabetes mellitus), type 2 (H)     on insulin     Hyperlipidemia      JUAN on CPAP      Pneumonia      Polymyositis (H)      Pulmonary fibrosis, unspecified (H)      Seasonal allergies        Past Surgical History:   Procedure Laterality Date     ARTHROSCOPY KNEE  1970     COLONOSCOPY       CV CORONARY ANGIOGRAM N/A 7/30/2019    Procedure: CV CORONARY ANGIOGRAM;  Surgeon: Leonidas Mike MD;  Location:  HEART CARDIAC CATH LAB     CV LEFT HEART CATH N/A 7/30/2019    Procedure: CV LEFT HEART CATH;  Surgeon: Leonidas Mike MD;  Location:  HE[facility] CARDI[facility] CATH LAB     CV PCI STENT DRUG ELUTING Left 7/30/2019    Procedure: PCI Stent Drug Eluting;   Surgeon: Leonidas Mike MD;  Location:  HEART CARDIAC CATH LAB     CV RIGHT HEART CATH N/A 7/30/2019    Procedure: CV RIGHT HEART CATH;  Surgeon: Leonidas Mike MD;  Location:  HEART CARDIAC CATH LAB     CV RIGHT HEART CATH N/A 9/27/2019    Procedure: Right Heart Cath;  Surgeon: Altaf Camacho MD;  Location:  HEART CARDIAC CATH LAB     CV RIGHT HEART CATH N/A 10/7/2019    Procedure: Right Heart Cath;  Surgeon: Benjamin Mancuso MD;  Location:  HEART CARDIAC CATH LAB     CV TRANSCATHETER AORTIC VALVE REPLACEMENT N/A 9/4/2019    Procedure: Transcatheter (Medtronic - 29mm) Aortic Valve Replacement, trans thorasic echocardiogram, perfusion and cardiac surgery standby, temporary pacemeker placement;  Surgeon: Pj Garcia MD;  Location:  OR     DECOMPRESSION, FUSION LUMBAR POSTERIOR TWO LEVELS, COMBINED  1997     EP PACEMAKER N/A 9/6/2019    Procedure: EP PACEMAKER;  Surgeon: Shefali Sylvester MD;  Location:  HEART CARDIAC CATH LAB     FUSION LUMBAR ANTERIOR THREE+ LEVELS N/A 4/22/2019    Procedure: Lumbar 1 Or Lumbar 2-5 Anterior Lumbar Interbody Fusion with BMP Stage 1 Of Two Procedure:;  Surgeon: Carlos Enrique Dial MD;  Location:  OR     HEART CATH FEMORAL CANNULIZATION WITH OPEN STANDBY REPAIR AORTIC VALVE N/A 9/4/2019    Procedure: Cardiopulmonary Bypass Standby;  Surgeon: Hamilton Carnes MD;  Location:  OR     HERNIA REPAIR  2006     lumbar spine hardware removal  1999     OPTICAL TRACKING SYSTEM FUSION POSTERIOR SPINE THORACIC THREE+ LEVELS N/A 4/25/2019    Procedure: O-Arm/Stealth Assisted Thoracic 10-Pelvis Instrumented Fusion T11-2, L1-2, L2-3 Transforminal Lumbar Interbody Fusion (TLIF) And Smith Borges Osteotomies,and L3-4 SPO as well, Use Of BMP, Debridement Of Seroma;  Surgeon: Carlos Enrique Dial MD;  Location: [facility] OR     PICC INSERTION Right 05/06/2019    4Fr - 37cm, Basilic vein, low SVC     REMOVAL PROSTHETIC MATERIAL/MESH, ABD  WALL NECRO TISS INFEXN       ROTATOR CUFF REPAIR RT/LT         Patient Active Problem List   Diagnosis     Spinal stenosis of lumbar region with neurogenic claudication     Severe aortic stenosis     Hypertension     Hypothyroidism     Mixed hyperlipidemia     Inflammatory arthritis     Pulmonary fibrosis (H)     Type 2 diabetes mellitus with neurologic complication, with long-term current use of insulin (H)     Controlled substance agreement signed     Acute pulmonary embolism (H)     Status post non-ST elevation myocardial infarction (NSTEMI)     Physical deconditioning     Coronary artery disease involving native coronary artery of native heart with other form of angina pectoris (H)     Congestive heart failure, NYHA class III, chronic, diastolic (H)     S/P TAVR (transcatheter aortic valve replacement)     GONZALEZ (dyspnea on exertion)     SVT (supraventricular tachycardia) (H)     Pulmonary hypertension (H)     Syncope     Cardiac resynchronization therapy pacemaker (CRT-P) in place     Orthostatic hypotension     Open wound of left foot excluding one or more toes     Sepsis (H)       Social History     Social History     Socioeconomic History     Marital status:      Spouse name: Not on file     Number of children: Not on file     Years of education: Not on file     Highest education level: Not on file   Occupational History     Not on file   Social Needs     Financial resource strain: Not on file     Food insecurity:     Worry: Not on file     Inability: Not on file     Transportation needs:     Medical: Not on file     Non-medical: Not on file   Tobacco Use     Smoking status: Former Smoker     Packs/day: 0.25     Last attempt to quit: 1970     Years since quittin.3     Smokeless tobacco: Never Used   Substance and Sexual Activity     Alcohol use: Yes     Comment: few times monthly     Drug use: Never     Sexual activity: Not Currently   Lifestyle     Physical activity:     Days per  week: Not on file     Minutes per session: Not on file     Stress: Not on file   Relationships     Social connections:     Talks on phone: Not on file     Gets together: Not on file     Attends [employer] service: Not on file     Active member of club or organization: Not on file     Attends meetings of clubs or organizations: Not on file     Relationship status: Not on file     Intimate partner violence:     Fear of current or ex partner: Not on file     Emotionally abused: Not on file     Physically abused: Not on file     Forced sexual activity: Not on file   Other Topics Concern     Not on file   Social History Narrative    Originally from [place].          Moved to [place]         He currently lives with his wife and two sons. Thinking about transitioning to a nursing home.           Family History  Family History   Problem Relation Age of Onset     Colon Cancer Mother      Hypertension Father         did not know father well         Medications  No current outpatient medications on file.     Current Facility-Administered Medications Ordered in Epic   Medication Dose Route Frequency Last Rate Last Dose     acetaminophen (TYLENOL) Suppository 650 mg  650 mg Rectal Q4H PRN         acetaminophen (TYLENOL) tablet 650 mg  650 mg Oral Q4H PRN         aspirin EC tablet 81 mg  81 mg Oral Daily   81 mg at 10/27/19 0947     atorvastatin (LIPITOR) tablet 40 mg  40 mg Oral Daily   40 mg at 10/27/19 0947     clopidogrel (PLAVIX) tablet 75 mg  75 mg Oral Daily   75 mg at 10/27/19 0947     glucose gel 15-30 g  15-30 g Oral Q15 Min PRN        Or     dextrose 50 % injection 25-50 mL  25-50 mL Intravenous Q15 Min PRN        Or     glucagon injection 1 mg  1 mg Subcutaneous Q15 Min PRN         [START ON 10/28/2019] digoxin (LANOXIN) tablet 125 mcg  125 mcg Oral Daily         enoxaparin ANTICOAGULANT (LOVENOX) injection 40 mg  40 mg Subcutaneous Q24H   40 mg at 10/27/19 1142     ferrous gluconate (FERGON) tablet 324 mg  324  mg Oral Daily with breakfast   324 mg at 10/27/19 0947     folic acid (FOLVITE) tablet 1 mg  1 mg Oral QAM   1 mg at 10/27/19 0947     gabapentin (NEURONTIN) capsule 1,200 mg  1,200 mg Oral TID   1,200 mg at 10/27/19 0946     hypromellose-dextran (ARTIFICAL TEARS) 0.1-0.3 % ophthalmic solution 1 drop  1 drop Both Eyes Q1H PRN         insulin aspart (NovoLOG) inj (RAPID ACTING)  1-7 Units Subcutaneous TID AC         insulin aspart (NovoLOG) inj (RAPID ACTING)  1-5 Units Subcutaneous At Bedtime         LANsoprazole (PREVACID) CR capsule 30 mg  30 mg Oral QAM AC   30 mg at 10/27/19 0947     levothyroxine (SYNTHROID/LEVOTHROID) tablet 50 mcg  50 mcg Oral QAM   50 mcg at 10/27/19 0947     lidocaine (LMX4) cream   Topical Q1H PRN         lidocaine 1 % 0.1-1 mL  0.1-1 mL Other Q1H PRN         melatonin tablet 1 mg  1 mg Oral At Bedtime PRN         midodrine (PROAMATINE) tablet 10 mg  10 mg Oral TID w/meals   10 mg at 10/27/19 1141     naloxone (NARCAN) injection 0.1-0.4 mg  0.1-0.4 mg Intravenous Q2 Min PRN         nitroGLYcerin (NITROSTAT) sublingual tablet 0.4 mg  0.4 mg Sublingual Q5 Min PRN         No lozenges or gum should be given while patient on BIPAP/AVAPS/AVAPS AE   Does not apply Continuous PRN         ondansetron (ZOFRAN-ODT) ODT tab 4 mg  4 mg Oral Q6H PRN        Or     ondansetron (ZOFRAN) injection 4 mg  4 mg Intravenous Q6H PRN         oxyCODONE (ROXICODONE) tablet 5-10 mg  5-10 mg Oral Q3H PRN   5 mg at 10/25/19 2040     Patient may continue current oral medications   Does not apply Continuous PRN         piperacillin-tazobactam (ZOSYN) 4.5 g vial to attach to  mL bag  4.5 g Intravenous Q6H 200 mL/hr at 10/27/19 0256 4.5 g at 10/27/19 1403     senna-docusate (SENOKOT-S/PERICOLACE) 8.6-50 MG per tablet 1 tablet  1 tablet Oral BID PRN        Or     senna-docusate (SENOKOT-S/PERICOLACE) 8.6-50 MG per tablet 2 tablet  2 tablet Oral BID PRN         sodium chloride (PF) 0.9% PF flush 3 mL  3 mL  Intracatheter q1 min prn         sodium chloride (PF) 0.9% PF flush 3 mL  3 mL Intracatheter Q8H   3 mL at 10/27/19 1143     torsemide (DEMADEX) tablet 10 mg  10 mg Oral Daily   10 mg at 10/27/19 1142     No current [facility]-ordered outpatient medications on file.       No Known Allergies      Physical Examination:   Vitals:    10/27/19 1032 10/27/19 1200 10/27/19 1213 10/27/19 1410   BP:  101/68  90/56   Pulse:  87     Resp: 28 17  16   Temp:   97.4  F (36.3  C)    TempSrc:   Oral    SpO2: 99% 97%  98%   Weight:       Height:         Body mass index is 26.31 kg/m .  Temp (24hrs), Av.8  F (36.6  C), Min:97.4  F (36.3  C), Max:98.3  F (36.8  C)    Constitutional:  Appears comfortable.  HENT:  mucous membranes moist.  Eyes: PERRLA, no icterus, no pallor.   Neck: No lymphadenopathy or thyromegaly, trachea midline, no carotid bruits.  Cardiovascular: Regular rate and rythym, no murmurs, rubs or gallops, no peripheral edema.  Respiratory/Chest: bibasilar rales  Musculoskeletal: No clubbing or cyanosis, full range of motion in all extremities.  Neurological: No focal motor or sensory deficits. DTR's are 2+ and symmetric.  Skin: No skin rash, hives, petechiae, or breakdown.      CMP  Recent Labs   Lab 10/26/19  0603 10/26/19  0410 10/25/19  0912 10/25/19  0702 10/22/19  1055     --   --  135 138   POTASSIUM 4.7  --  5.1 6.0* 4.2   CHLORIDE 105  --   --  102 104   CO2 22  --   --  23 26   ANIONGAP 9  --   --  10 8   GLC 95  --   --  121* 135*   BUN 29  --   --  35* 26   CR 1.12  --   --  1.32* 1.08   GFRESTIMATED 66  --   --  54* 69   GFRESTBLACK 77  --   --  63 80   DMITRIY 8.1*  --   --  8.6 8.5   MAG  --  2.4*  --   --   --      CBC  Recent Labs   Lab 10/27/19  0828 10/26/19  0603 10/25/19  0702 10/22/19  1055   WBC 13.7* 17.5* 16.0* 14.3*   RBC 4.24* 4.76 5.07 4.58   HGB 9.0* 9.8* 10.7* 9.7*   HCT 31.5* 35.8* 38.7* 35.4*   MCV 74* 75* 76* 77*   MCH 21.2* 20.6* 21.1* 21.2*   MCHC 28.6* 27.4* 27.6* 27.4*   RDW 22.4*  22.7* 23.4* 23.6*    488* 544* 469*     INRNo lab results found in last 7 days.  Arterial Blood Gas  Recent Labs   Lab 10/27/19  0427   PH 7.38   PCO2 39   PO2 38*   HCO3 23   O2PER 50     Recent Labs   Lab 10/25/19  1300 10/25/19  0734 10/25/19  0702   CULT No growth No growth after 2 days No growth after 2 days       Diagnostic Studies:  Chest Radiology:     CT chest  Impression:   1.  Pulmonary fibrosis in a pattern favoring NSIP pattern, potentially  related to patient's known polymyositis.  2. Mediastinal lymphadenopathy.  3. Enlarged pulmonary artery.  4. Stable pericardial effusion.  5. Extensive coronary atherosclerotic disease, similar to previous.  6. Nodular opacity in the left upper lobe . Indeterminant.   Infectious, inflammatory such as organizing pneumonia or neoplastic.   Continued follow up recommended.3-6 months.        Assessment/Plan:     Acute on chronic hypoxic respiratory failure  Pulmonary fibrosis 2/2 to PM  HFePF  Pulmonary HTN from Group 2/3  Sepsis  HCAP    Plan  -antibiotics per primary team  -wean oxygen to maintain sats>90%  -duonebs as needed  -no steroids indicated for fibrosis at time this since it is PNA and not having a flare of likely NSIP from polymyositis  -maintain I/O, on torsemide, cardiology following  -overall poor prognosis, agree with palliative care consult  -ambulate if able, out of bed, incentive spirometry, PT/OT      Thank you for the consult, please call with any questions. Available if needed.    Alexa Bobby M.D.  Pulmonary, Critical Care and Sleep Medicine  [facility]   Pager: [phone]  Office:[phone]

## 2019-10-27 NOTE — PLAN OF CARE
VSS ex soft BP, little dizzy when up (times) A/O. Up-1. O2 weaned to 4L, very coarse lungs, IS 1250. Infreq cough. Gets very short of breath with little movement. +BM. Absent DP, PT dopplerable. Denies pain. Tele, converted to pacer rhythm @ 1800. +2 LE edema.

## 2019-10-27 NOTE — PLAN OF CARE
VSS. A/O. 3L O2 all day, coarse crackles lungs. Very edematous hips and legs. Denies SOB or pain. +BM x2. Tolerating diet. L foot wound. WOC consulted.Tele paced rhythm.

## 2019-10-28 NOTE — PLAN OF CARE
AVSS on Bipap overnight; Nasal cannula during the day. Denies pain. Left lower extremity toe wound open to air with scabbing around. LS Dim with fine crackles in bases. Diet NPO while on Bipap and 2 gram sodium during day. Up with asis . BS active and audible; passing gas.

## 2019-10-28 NOTE — PLAN OF CARE
Discharge Planner PT   Patient plan for discharge: None stated   Current status: CGA supine>sit and sit>stand, and stand pivot to sit in chair. SBA static sitting EOB and CGA with seated exercises. Pt experienced SOB upon standing to but on pants and required 3 min sitting break to regain breath, also required frequent breaks to catch breath throughout session. Reported feeling good up in the chair. VSS throughout  Barriers to return to prior living situation: Deconditioning, level of assist, decreased strength, impaired functional activity tolerance.   Recommendations for discharge: TCU  Rationale for recommendations: Family reports they can not provide the pt  care he would need at home. Will benefit from continued skilled PT intervention to assist with pressure relief and circulation as tolerated.  Pt considering hospice carefacility placement, per palliative note.     Entered by: Meenakshi Bright 10/28/2019 3:37 PM

## 2019-10-28 NOTE — PROGRESS NOTES
SPIRITUAL HEALTH SERVICES Progress Note  [facility]    Brief visit with pt, per palliative team consult.  Pt was sitting up in a chair, eating breakfast, when I arrived.   was outside the room, but she indicated that pt's English is good, and she thought I could have a conversation with him without her assistance.  Pt said that he's generally doing well.  He verbalized the importance of his isaías given all that he's going through, but otherwise stated no specific need of my support at present.  I will continue to follow, and will gladly respond to need or request for additional pt/family support.                                                                                                                                                 Emmy Sandy M.A.  Staff   Pager [phone]  Phone [phone]

## 2019-10-28 NOTE — PROGRESS NOTES
"WOC focused assessment: BL feet    D: Beulah Jane is a 69 year old year old male who has a PMH significant for pulmonary fibrosis, right sided heart failure, DM type II, severe AS s/p TAVR and complete heart block s/p PPM who presented to the ED on 10/25/2019 after a near syncopal episode and was admitted for severe sepsis likely secondary to HAP.       I/A: Pt has dried scabbing under Left hallux toe nail and dry stable brown/tan callus to plantar over metatarsals.     P:   PERFORM \"GOOD FOOT CARE\" AT ALL TIMES:  1. CLEAN feet daily with a gentle soap and water, making sure to clean between the toes. Dry thoroughly, especially between the toes. Be careful when drying between toes to not use a sawing-action, this may cut the skin between the toes.   2. Spray the skin from toes to knees, not between the toes, with a thick layer of Isabelle Cleanse and Protect, massage into skin and allow sit on skin for 5-10 minutes then wipe or rinse off (the Isabelle will \"condition\" the skin by loosening crusty debris, moisturizing and cleaning the skin)  3. LOTION from toes to knees, not between toes (lotion moisturizes and too much moisture between toes may cause a fungal rash).    4.  If noticing moisture, itching or odor between the toes dust with antifungal powder, think Desenex, twice a day x 4-5 days.        WOC nursing signing off, please re consult with further needs.   "

## 2019-10-28 NOTE — PROGRESS NOTES
SW:   D/I: Pt from [facility]. Updated by medical team that pt would be ready to discharge in 1-2 days if pt chooses restorative cares. Spoke to [facility] admission. Pt does not have a bed hold at [facility].  Facility confirmed that they would take patient back with either restorative or comfort cares.  Referral sent via DOD per facility request.  P: Will continue to monitor and follow.

## 2019-10-28 NOTE — PROVIDER NOTIFICATION
Brief update:    Paged re: increased work of breathing.    Was on BiPAP overnight, able to be weaned to 3 L oxygen over the course of the day, though again with increased work of breathing    I have ordered BiPAP once again for overnight treatment of increased work of breathing.  No increasing hypoxia.    If patient is not diuresing on home torsemide, can add additional IV Lasix dose.     Luis Valdes MD  12:09 AM

## 2019-10-28 NOTE — PLAN OF CARE
Dx: Admitted for sepsis. Hx: Heart failure, DM2, chronic pulmonary fibrosis. VSS ex on 5L nc. Tele: SR. A/Ox4. Blood sugars: no coverage needed. CMS intact ex BLE week pulses, cool to touch, +2 edema. Pt denies pain. Up with A1. Tolerating Mod carb diet. Denies N&V. +gas, +bm. Voiding adequately. LS clear, diminished in lower lobes. Palliative care consulted with family on hospice care. Will continue to monitor.

## 2019-10-28 NOTE — PROGRESS NOTES
[facility]    Medicine Progress Note - Hospitalist Service       Date of Admission:  10/25/2019  Assessment & Plan   Beulah Jane is a 69 year old male with a history of pulmonary fibrosis, right sided heart failure, DM type II, severe AS s/p TAVR and complete heart block s/p PPM who presented to the ED on 10/25/2019 after a near syncopal episode and was admitted for severe sepsis likely secondary to HAP      Goals of care  On 10/28 patient and family met with palliative care.  They would like for the patient to undergo angiogram with vascular surgery and treat his pneumonia and then discharge to inpatient hospice  - Social work for hospice planning     Suspected HAP with severe sepsis   Patient presented after a near syncopal episode.  In the ED he was found to have a leukocytosis and tachycardia.  CXR showed a patchy interstitial opacities most prominent in the right lung base concerning for pneumonia.    Initial blood pressure in the ED was 88.54 and lactate was 4.6.  He was given IVF and his blood pressures improved but lactate still elevated at 4.8.  Also started on IV Zosyn and Vancomycin   - PTA Midodrine   - Follow cultures. NGTD   - Sputum culture is in process  - IV Zosyn switched to PO Augmentin     Pulmonary fibrosis w/moderate to severe pulmonary HTN on chronic O2   JUAN   Right sided heart failure and possible diastolic dysfunction of the LV/cor pulmonale   Patient is normally on 2 L of O2 via NC for known pulmonary fibrosis.  He does have a history of RV dysfunction likely related to his chronic lung disease.  Echocardiogram on 10/14/19 showed a normal LV EF with minimal concentric hypertrophy   Over night the patient had worsening O2 saturations and was requiring hi-flow.    - Titrate O2 as tolearted  - Restarted morning Torsemide at 10 mg QAM.  Still holding evening dose   - IS ordered   - RCAT   - Pulmonology consulted and appreciate their recommendations   - Palliative care  consulted for goals of care discussion     Suspected atrial flutter  H/o SVT   Overnight patient had wide complex tachycardiac.  Initially though SVT vs a fib/flutter.  Unfortunately the patient's blood pressures are not amenable to most rate controlling medications  - Pacemaker evaluation   - Started on Digoxin by cardiology   - Cardiology following and appreciate their recommendations    SHIRIN. Resolved   Baseline Cr appears to be 0.9-1 and was elevated at 1.32 on arrival.  Suspect this is pre-renal and related to dehydration from his sepsis.  Of note, potassium was also initially elevated at 6.0 but there was evidence of hemolysis and repeat was normal at 5.1   - Avoid nephrotoxins  - No further IVF given his known CHF     H/o PAD   Has a known history of PAD.  While here patient had persistently elevated lactates and difficulty with finding lower extremity pulses.  Does have evidence of ischemic changes with eschars on the left foot. Some concern for ischemic limb given this  - Vascular surgery following and appreciate their recommendations.  Plan for angiogram     DM type II   Last HgbA1c from 8/29/19 was 6.7.  PTA on SSI   - SSI  - Moderate CHO diet     H/o Severe AS s/p TAVR on 9/7/19  H/o Complete heart block s/p PPM in September   No issues currently.  Paced rhythm on EKG      Chronic anemia, iron deficiency  - PTA iron       Diet: 2 Gram Sodium Diet    DVT Prophylaxis: Enoxaparin (Lovenox) SQ  [place] Catheter: not present  Code Status: Full Code      Disposition Plan   Expected discharge: 1-2 days.  Will be discharging to inpatient hospice once bed available and vascular surgery evaluation complete   Entered: Ross Cervantes DO 10/28/2019, 9:58 AM       The patient's care was discussed with the Care Coordinator/ and Patient.    Ross Cervantes DO  Hospitalist Service  [facility]    ______________________________________________________________________    Interval History    Patient seen and examined.  No acute events over night.  No fevers or chills.  No chest pain or SOB.  Pain is controlled.      Data reviewed today: I reviewed all medications, new labs and imaging results over the last 24 hours. I personally reviewed no images or EKG's today.    Physical Exam   Vital Signs: Temp: 97.9  F (36.6  C) Temp src: Axillary BP: 107/63 Pulse: 85 Heart Rate: 82 Resp: 25 SpO2: 98 % O2 Device: Nasal cannula Oxygen Delivery: 3 LPM  Weight: 165 lbs 12.57 oz  General Appearance: Resting comfortably in chair.  NAD   Respiratory: Clear to auscultation.  No respiratory distress  Cardiovascular: RRR.  No obvious murmurs  GI: Bowel sounds present.  Non-tender  Skin: Eschars noted to lower extremities.  No obvious rashes  Other: No edema.  No cyanosis     Data   Recent Labs   Lab 10/28/19  0637 10/27/19  0828 10/26/19  0603 10/25/19  0912 10/25/19  0702   WBC  --  13.7* 17.5*  --  16.0*   HGB  --  9.0* 9.8*  --  10.7*   MCV  --  74* 75*  --  76*    326 488*  --  544*     --  136  --  135   POTASSIUM 3.4  --  4.7 5.1 6.0*   CHLORIDE 105  --  105  --  102   CO2 28  --  22  --  23   BUN 19  --  29  --  35*   CR 1.00  --  1.12  --  1.32*   ANIONGAP 5  --  9  --  10   DMITRIY 8.0*  --  8.1*  --  8.6   GLC 92  --  95  --  121*     No results found for this or any previous visit (from the past 24 hour(s)).

## 2019-10-28 NOTE — PROGRESS NOTES
VASCULAR SURGERY BRIEF FOLLOW-UP NOTE:    ASSESSMENT:  69 year old year old male who has a PMH significant for pulmonary fibrosis, right sided heart failure, DM type II, severe AS s/p TAVR and complete heart block s/p PPM. Concern for PAD with non-healing fwounds and difficult to doppler distal pedal pulses.               PLAN:  -Pt appears to have adequate flow bilaterally to the common femoral arteries, but has significant, chronic peripheral arterial distal disease that is slowly worsening. Given his co-morbidities, patient would not be a open bypass candidate. Would be reasonable to consider an angiogram to evaluate for possible intervention on SFA, but wouldn't be too aggressive with tibial disease. Palliative consulted and meeting with pt and family today to determine goals of care.  -Vascular surgery will follow-up after palliative discussions are completed to determine if possible angiogram is indicated    Addendum:  Discussed with hospitalist and reviewed palliative care's note that pt is interested in hospice, but would like to pursue angiogram for symptom palliation.      Angelica Jennings PA-C   Division of Vascular Surgery   Pager: [phone]      ATTENDING ADDENDUM:  I saw and examined Mr. Horner in the evening on 10/28/19. He has evidence of peripheral arterial disease, with shiny, hairless skin on the lower calves, mild dependent rubor, slowed cap refill on the toe tips, and absence of palpable pedal pulses. I have also reviewed his ultrasound imaging, which shows triphasic (normal) arterial flow without focally increased velocities to the tibial level bilaterally.   Mr. Horner said he had some aching that he thought was from the polymyositis. His feet are not in pain so much right now. His primary concern is feeling less short of breath (and he is dyspneic even at rest, sitting up in a chair with NC O2 in place). He is visibly fatigued.     I do not believe an angiogram with  intervention is likely to provide him with significant palliation. He does have some early wounds of the toes but distal tibial intervention would potentially require significant time in the procedure, would likely not alleviate the pain he is feeling, and would perhaps increase the rate of healing but this would likely not impact his overall quality of life in light of his desire to pursue hospice. Additionally, he is short of breath and fatigued at rest, and I do not think he would tolerate an angiogram.     I have explained to him that I do not think we should proceed with an angiogram. If he or his family have questions later, we would be happy to return to explain this with them--please let us know if further concerns arise.     Alice Lino MD

## 2019-10-28 NOTE — CONSULTS
[facility]  Palliative Care Consultation Note    Patient: Beulah Jane  Date of Admission:  10/25/2019    Requesting Clinician / Team: Dr. Cervantes/Hospitalist  Reason for consult: Goals of care    Recommendations:    Pt electing DNR/DNI    Pt and family interested in hospice. He will need facility placement. Financial implications reviewed with family, further discussion per     Pt still interested in pursuing angiogram if it could help with symptom palliation. Further discussion per Vascular Surgery     Continue treatment for PNA, not initiating comfort measures at this time     These recommendations have been discussed with beside CARI Forrester, unit care coordinator Claudia, and Dr. Cervantes.    Isabel GALAVIZ, [employer]  Palliative Medicine   Pager [phone]      Thank you for the opportunity to participate in the care of this patient and family. Our team: does not plan on following further, however do not hesitate to call or re-consult if we can be of further assistance to the patient/family.     During regular M-F work hours -- if you are not sure who specifically to contact -- please contact us at [phone].    After regular work hours and on weekends/holidays, you can call our answering service at [phone]. Also, who's on call for us is available in [employer].     Attestation:  Total time on the floor involved in the patient's care: 70 minutes  Total time spent in counseling/care coordination: >50%    Assessments:  Beulah Jane is a 69 year old male with pulmonary fibrosis, severe pulmonary hypertension on chronic 2L O2 support, JUAN, right sided heart failure, severe aortic stenosis s/p TAVR, complete heart block s/p pacemaker placement, DM2, and chronic anemia who presents near syncopal episode. He is being treated for acute on chronic respiratory failure due to PNA. His respiratory status has improved. He is also being evaluated by vascular surgery regarding PAD and  "nonhealing wounds. He is being evaluated for an angiogram for treatment of SFA. Of note, this is his 6th admission in the last 6 months. [facility]     Today, the patient was seen for:  Goals of care     Visited with pt, along with son, wife, and professional Mongolian interpretor. Pt understands he has underlying pulm fibrosis and a heart condition. He understands he is here for PNA. He overall feels better.    We discussed the chronicity of his multiple medical issues, along with the progressive nature of the conditions. We make note of his frequent hospitalizations, and general decline over time. Family notes they can no longer care for him at home.    We review his options:  1). Continue on a restorative pathway with likely recurrent hospitalizations Q month, if not more frequently. Treatments will lose efficacy overtime and he will die from this process.  2). Shift focus toward his comfort, try to stay in a facility with a team who will mobilize to him and focus on symptom management at end of life. Prepare for a natural dying process, and no further hospitalizations. This would be with the support of hospice. Discussed what hospice is (and is not), what services are usually provided (and those that are not, ex \"[facility] care\"), under what circumstances people tend to enroll, and the variety of places people can get hospice care (along with subsequent financial implications).     Pt generally defers to his family, and they elect hospice in a facility at this point. They are concerned about finances and we will thus link in the SW and  as needed. We did review the option of [facility] hospice home in [place]. This would be more logistically challenging for the family.    We review the option of the angiogram for palliation of sx. Not entirely clear how helpful it will be for the pt, but he is not opposed to doing it. He very clearly does not want any surgery, which is not " being offered anyway.    We review code status. I educated regarding the pros and cons of attempting cardiac resuscitation, intubation and mechanical ventilation. he very clearly states DNR/DNI.     Prognosis, Goals, & Planning:      Functional Status just prior to hospitalization: 1 (Restricted in physically strenuous activity but ambulatory and able to carry out work of a light or sedentary nature)      Prognosis, Goals, and/or Advance Care Planning were addressed today: Yes        Summary/Comments: As above       Patient's decision making preferences: with input from medical clinicians and loved ones          Patient has decision-making capacity today for complex decisions: Partial (needs assistance with complex decisions)            I have concerns about the patient/family's health literacy today: No           Patient has a completed Health Care Directive: No.       Code status: DNR/DNI    Coping, Meaning, & Spirituality:   Mood, coping, and/or meaning in the context of serious illness were addressed today: Yes  Summary/Comments: Pt expresses concerns about being alone and his family needing to visit him, as they have been doing recently. He is also worried about who will care for his wife while he is gone     Social:     Living situation: Has been in and out of hospital and [facility]    Key family / caregivers: Wife, 2 adult sons local, sounds like additional adult children in [place] and [facility]    History of Present Illness:  History gathered today from: patient, family/loved ones, medical chart, medical team members, unit team members    Beulah Jane is a 69 year old male with pulmonary fibrosis, severe pulmonary hypertension on chronic 2L O2 support, JUAN, right sided heart failure, severe aortic stenosis s/p TAVR, complete heart block s/p pacemaker placement, DM2, and chronic anemia who presents near syncopal episode. He is being treated for acute on chronic respiratory failure due to PNA.  His respiratory status has improved. He is also being evaluated by vascular surgery regarding PAD and nonhealing wounds. He is being evaluated for an angiogram for treatment of SFA. Of note, this is his 6th admission in the last 6 months. [facility].     Key Palliative Symptom Data:  We are not helping to manage these symptoms currently in this patient.    ROS:  Comprehensive ROS is reviewed and is negative except as here & per HPI: N/A     Past Medical History:  Past Medical History:   Diagnosis Date     Aortic stenosis      Chronic pain      DM (diabetes mellitus), type 2 (H)     on insulin     Hyperlipidemia      JUAN on CPAP      Pneumonia      Polymyositis (H)      Pulmonary fibrosis, unspecified (H)      Seasonal allergies         Past Surgical History:  Past Surgical History:   Procedure Laterality Date     ARTHROSCOPY KNEE  1970     COLONOSCOPY       CV CORONARY ANGIOGRAM N/A 7/30/2019    Procedure: CV CORONARY ANGIOGRAM;  Surgeon: Leonidas Mike MD;  Location: [facility] CARDIAC CATH LAB     CV LEFT HEART CATH N/A 7/30/2019    Procedure: CV LEFT HEART CATH;  Surgeon: Leoniads Mike MD;  Location: [facility] CARDIAC CATH LAB     CV PCI STENT DRUG ELUTING Left 7/30/2019    Procedure: PCI Stent Drug Eluting;  Surgeon: Leonidas Mike MD;  Location:  HEART CARDIAC CATH LAB     CV RIGHT HEART CATH N/A 7/30/2019    Procedure: CV RIGHT HEART CATH;  Surgeon: Leonidas Mike MD;  Location:  HEART CARDIAC CATH LAB     CV RIGHT HEART CATH N/A 9/27/2019    Procedure: Right Heart Cath;  Surgeon: Altaf Camacho MD;  Location:  HEART CARDIAC CATH LAB     CV RIGHT HEART CATH N/A 10/7/2019    Procedure: Right Heart Cath;  Surgeon: Benjamin Mancuso MD;  Location:  HEART CARDIAC CATH LAB     CV TRANSCATHETER AORTIC VALVE REPLACEMENT N/A 9/4/2019    Procedure: Transcatheter (Medtronic - 29mm) Aortic Valve Replacement, trans thorasic echocardiogram, perfusion and cardiac  surgery standby, temporary pacemeker placement;  Surgeon: Pj Garcia MD;  Location: [facility] OR     DECOMPRESSION, FUSION LUMBAR POSTERIOR TWO LEVELS, COMBINED  1997     EP PACEMAKER N/A 9/6/2019    Procedure: EP PACEMAKER;  Surgeon: Shefali Sylvester MD;  Location: [facility] HEART CARDIAC CATH LAB     FUSION LUMBAR ANTERIOR THREE+ LEVELS N/A 4/22/2019    Procedure: Lumbar 1 Or Lumbar 2-5 Anterior Lumbar Interbody Fusion with BMP Stage 1 Of Two Procedure:;  Surgeon: Carlos Enrique Dial MD;  Location: [facility] OR     HEART CATH FEMORAL CANNULIZATION WITH OPEN STANDBY REPAIR AORTIC VALVE N/A 9/4/2019    Procedure: Cardiopulmonary Bypass Standby;  Surgeon: Hamilton Carnes MD;  Location: [facility] OR     HERNIA REPAIR  2006     lumbar spine hardware removal  1999     OPTICAL TRACKING SYSTEM FUSION POSTERIOR SPINE THORACIC THREE+ LEVELS N/A 4/25/2019    Procedure: O-Arm/Stealth Assisted Thoracic 10-Pelvis Instrumented Fusion T11-2, L1-2, L2-3 Transforminal Lumbar Interbody Fusion (TLIF) And Smith Borges Osteotomies,and L3-4 SPO as well, Use Of BMP, Debridement Of Seroma;  Surgeon: Carlos Enrique Dial MD;  Location: [facility] OR     PICC INSERTION Right 05/06/2019    4Fr - 37cm, Basilic vein, low SVC     REMOVAL PROSTHETIC MATERIAL/MESH, ABD WALL NECRO TISS INFEXN  2012     ROTATOR CUFF REPAIR RT/LT  2003         Family History:  Family History   Problem Relation Age of Onset     Colon Cancer Mother      Hypertension Father         did not know father well        Allergies:  No Known Allergies     Medications:  I have reviewed this patient's medication profile and medications from this hospitalization.   Noted scheduled meds are:  Augmentin  Gabapentin 1200mg PO TID  Torsemide 10mg PO daily    Noted PRN meds are:  APAP 650mg PO/RI Q4hrs PRN; none thus far  Oxycodone 5-10mg PO Q3hrs PRN pain; none thus far    Physical Exam:  Vital Signs: Temp: 97.5  F (36.4  C) Temp src: Axillary BP: 107/63 Pulse: 85 Heart Rate: 82 Resp:  25 SpO2: 98 % O2 Device: Nasal cannula Oxygen Delivery: 3 LPM  Weight: 165 lbs 12.57 oz  CONSTITUTIONAL: Chronically ill man seen sitting up in the chair in NAD, A&Ox3. Calm and cooperative. Family and professional interpretor present   HEENT: NCAT  RESPIRATORY: Tachypneic and slightly labored respiratory effort on 3L via NC. Dry nonproductive cough   NEUROLOGIC: Appropriately responsive during interview  PSYCH: Affect engaged, congruent     Data reviewed:  Recent imaging reviewed, my comments on pertinents:   10/25 CXR with PNA    Recent lab data reviewed, my comments on pertinents:   Creat 1  Lactate 1  WBC 13.7 down from 17.5

## 2019-10-28 NOTE — PROGRESS NOTES
Met with patient. He called his wife- she will be coming today @ 1200. Moroccan interpretor arranged- they will arrive 11:30. Discussed with Jordana (Palliative). She will be able to meet with patient and his spouse at 1200 today

## 2019-10-29 NOTE — PROGRESS NOTES
Sepsis Evaluation Progress Note    I was called to see Beulah Jane due to abnormal vital signs triggering the Sepsis SIRS screening alert. He is known to have an infection.     Physical Exam   Vital Signs:  Temp: 97.9  F (36.6  C) Temp src: Oral BP: 107/65 Pulse: 102 Heart Rate: 101 Resp: 26 SpO2: 93 % O2 Device: Nasal cannula Oxygen Delivery: 3 LPM    Lab:  Lactic Acid   Date Value Ref Range Status   10/25/2019 4.6 (HH) 0.7 - 2.0 mmol/L Final     Comment:     Critical Value called to and read back by  BAUDILIO MAURICIO RN ON 33 @ 1336 DK       Lactate for Sepsis Protocol   Date Value Ref Range Status   10/29/2019 2.4 (H) 0.7 - 2.0 mmol/L Final     Comment:     Significant value called to and read back by  NOVA MAURICIO IN MED SURGE AT 1344 SM         The patient is at baseline mental status.     The rest of their physical exam see from progress note     Assessment & Plan   Beulah Jane meets SIRS criteria but does NOT have a lactate >2 or other evidence of acute organ damage.  These vital sign, lab and physical exam findings are consistent with SEPSIS.    Sepsis Time-Zero (time Sepsis diagnosis confirmed): Already known to have sepsis.  Currently planning on discharging to hospice and does not want to increase cares     Anti-infectives (From now, onward)    Start     Dose/Rate Route Frequency Ordered Stop    10/28/19 2000  amoxicillin-clavulanate (AUGMENTIN) 875-125 MG per tablet 1 tablet      1 tablet Oral 2 TIMES DAILY 10/28/19 1426          Current antibiotic coverage is appropriate for source of infection.     Disposition: The patient will remain on the current unit. We will continue to monitor this patient closely.  Ross Cervantes,     Sepsis Criteria   Sepsis: 2+ SIRS criteria due to infection  Severe Sepsis: Sepsis AND 1+ new sign of acute organ dysfunction (Note: lactate >2 is organ dysfunction)  Septic Shock: Sepsis AND hypotension despite volume resuscitation with 30 ml/kg crystalloid

## 2019-10-29 NOTE — PLAN OF CARE
[facility]. VSS. Tele SR with BBB. Bipap on overnight, slept well. Sats 94% on 3L NC (wears 2L at baseline). A&O x4. C/o pain in left toe, oxycodone given with relief. Lungs with fine crackles in bases. Good, productive cough. Bilateral LE 2+ edema, red. Unable to palpate DP pulse. Up SBA.

## 2019-10-29 NOTE — PROGRESS NOTES
Spiritual Health    SH visited Pt per consult. Pt was visiting with wife at time of SH visit. Pt and wife requested prayer. Pt and wife had no additional spiritual needs at this time.     SH provided prayer.     SH will remain available as needed.     Mala Allen  Chaplain Resident

## 2019-10-29 NOTE — PROGRESS NOTES
[facility]    Medicine Progress Note - Hospitalist Service       Date of Admission:  10/25/2019  Assessment & Plan   Beulah Jnae is a 69 year old male with a history of pulmonary fibrosis, right sided heart failure, DM type II, severe AS s/p TAVR and complete heart block s/p PPM who presented to the ED on 10/25/2019 after a near syncopal episode and was admitted for severe sepsis likely secondary to HAP      Goals of care  On 10/28 patient and family met with palliative care.  They would like for the patient to treat his pneumonia and then discharge to inpatient hospice  - Social work for hospice planning.  Hopefully discharge back to [place] with hospice in place      Suspected HAP with severe sepsis   Patient presented after a near syncopal episode.  In the ED he was found to have a leukocytosis and tachycardia.  CXR showed a patchy interstitial opacities most prominent in the right lung base concerning for pneumonia.    Initial blood pressure in the ED was 88.54 and lactate was 4.6.  He was given IVF and his blood pressures improved but lactate still elevated at 4.8.  Also started on IV Zosyn and Vancomycin   - PTA Midodrine   - Follow cultures. NGTD   - Sputum culture was contaminated   - IV Zosyn switched to PO Augmentin     Pulmonary fibrosis w/moderate to severe pulmonary HTN on chronic O2   JUAN   Right sided heart failure and possible diastolic dysfunction of the LV/cor pulmonale   Patient is normally on 2 L of O2 via NC for known pulmonary fibrosis.  He does have a history of RV dysfunction likely related to his chronic lung disease.  Echocardiogram on 10/14/19 showed a normal LV EF with minimal concentric hypertrophy   Over night the patient had worsening O2 saturations and was requiring hi-flow.    - Titrate O2 as tolerated  - Continue morning Torsemide at 10 mg QAM.  Still holding evening dose   - IS ordered   - RCAT   - Pulmonology consulted and appreciate their recommendations    - Palliative care consulted for goals of care discussion.  See above     Suspected atrial flutter  H/o SVT   Overnight patient had wide complex tachycardiac.  Initially though SVT vs a fib/flutter.  Unfortunately the patient's blood pressures are not amenable to most rate controlling medications  - Pacemaker evaluation   - Started on Digoxin by cardiology   - Cardiology following and appreciate their recommendations    SHIRIN. Resolved   Baseline Cr appears to be 0.9-1 and was elevated at 1.32 on arrival.  Suspect this is pre-renal and related to dehydration from his sepsis.  Of note, potassium was also initially elevated at 6.0 but there was evidence of hemolysis and repeat was normal at 5.1   - Avoid nephrotoxins  - No further IVF given his known CHF     H/o PAD   Has a known history of PAD.  While here patient had persistently elevated lactates and difficulty with finding lower extremity pulses.  Does have evidence of ischemic changes with eschars on the left foot. Some concern for ischemic limb given this  - Vascular surgery following and appreciate their recommendations.  Initially planned for angiogram but no longer planning to do it     DM type II   Last HgbA1c from 8/29/19 was 6.7.  PTA on SSI   - SSI  - Moderate CHO diet     H/o Severe AS s/p TAVR on 9/7/19  H/o Complete heart block s/p PPM in September   No issues currently.  Paced rhythm on EKG      Chronic anemia, iron deficiency  - PTA iron       Diet: 2 Gram Sodium Diet    DVT Prophylaxis: Enoxaparin (Lovenox) SQ  [place] Catheter: not present  Code Status: DNR/DNI      Disposition Plan   Expected discharge: Tomorrow, recommended to prior living arrangement on hospice once [facility] meets with patient and family.  Entered: Ross Cervantes DO 10/29/2019, 11:11 AM       The patient's care was discussed with the Care Coordinator/, Patient and Patient's Family.    Ross Cervantes DO  Hospitalist Service  [facility]  [facility]    ______________________________________________________________________    Interval History   Patient seen and examined.  No acute events over night.  No chest pain or lower extremity pain.  Still some subjective shortness of breath.     Data reviewed today: I reviewed all medications, new labs and imaging results over the last 24 hours. I personally reviewed no images or EKG's today.    Physical Exam   Vital Signs: Temp: 97.6  F (36.4  C) Temp src: Oral BP: 113/70 Pulse: 82 Heart Rate: 87 Resp: 20 SpO2: (!) 89 % O2 Device: Nasal cannula Oxygen Delivery: 3 LPM  Weight: 167 lbs 1.74 oz  General Appearance: Resting comfortably sitting up in bed.  NAD   Respiratory: Clear to auscultation.  No respiratory distress  Cardiovascular: RRR.  No obvious murmurs  GI: Bowel sounds present.  Non-tender  Skin: No rashes.  No cyanosis  Other: No edema.  No calf tenderness     Data   Recent Labs   Lab 10/28/19  0637 10/27/19  0828 10/26/19  0603 10/25/19  0912 10/25/19  0702   WBC  --  13.7* 17.5*  --  16.0*   HGB  --  9.0* 9.8*  --  10.7*   MCV  --  74* 75*  --  76*    326 488*  --  544*     --  136  --  135   POTASSIUM 3.4  --  4.7 5.1 6.0*   CHLORIDE 105  --  105  --  102   CO2 28  --  22  --  23   BUN 19  --  29  --  35*   CR 1.00  --  1.12  --  1.32*   ANIONGAP 5  --  9  --  10   DMITRIY 8.0*  --  8.1*  --  8.6   GLC 92  --  95  --  121*     No results found for this or any previous visit (from the past 24 hour(s)).

## 2019-10-29 NOTE — PROGRESS NOTES
SW:    I: KRYSTIAN following for discharge planning. Pt interested in pursuing hospice care and are agreeable to [facility]. KRYSTIAN contacted Bobbi with  Hospice to schedule a meeting. Hospice RN will meet with pt and family tomorrow at 0900.  is already scheduled for 10/30 at 0900. KRYSTIAN updated bedside RN. Pt is derick to return to [place] at discharge on hospice.    P: SW to follow.    ADDENDUM: KRYSTIAN spoke with pt this afternoon with  present. KRYSTIAN updated pt regarding time of hospice meeting tomorrow and requested that pt updated his family members who may want to be present. Pt shared that [facility], he will update her when she returns. We discussed pt does desire to return to [place], though pt would like to have meeting with [facility] prior to planning further. KRYSTIAN discussed that if pt is to transition from restorative rehab to comfort measures, room and board is out of pocket. Specifics of this cost were not discussed this afternoon and pt desires taking one step at a time and meeting with hospice prior to securing long-term plan. KRYSTIAN informed pt that this writer will be available tomorrow following his meeting to further discuss and assist with plans moving forward.    CARLOS Myoa, LICSW  [facility]  Daytime (8:00am-4:30pm): [phone]  After-Hours KRYSTIAN Pager (4:30pm-11:30pm): [phone]

## 2019-10-29 NOTE — PROVIDER NOTIFICATION
"Page out to Dr Cervantes \"FYI lactic acid is 2.4\"  No new orders at this time. Plan for hospice to to see patient and wife tomorrow 10/30 at 0900.  "

## 2019-10-29 NOTE — PLAN OF CARE
Discharge Planner OT   Patient plan for discharge: none stated      Current status: pt sitting up at EOB upon OT arrival, pt participated with standing ADLS at bedside table, pt stood with SBA, O2 destats to 85% with activity on 3L. After increase seated rest pt amb forward and back ~ 5' in prep for amb to bathroom for ADLS. Pt increase fatigue with activity     Barriers to return to prior living situation: current level of A, poor ax tolerance, fall risk      Recommendations for discharge: TCU per plan established by the Occupational THerapist     Rationale for recommendations: Pt would benefit from continued skilled OT services to improve independence and safety with ADL's and functional transfers as pt is not at baseline.          Entered by: Daysi Linton 10/29/2019 10:48 AM

## 2019-10-29 NOTE — PROGRESS NOTES
"PULMONARY PROGRESS NOTE          Interval History:      Better today, on bipap overnight. Met with palliative care and seems interested in pursing hospice.          Physical Exam:      Blood pressure 113/70, pulse 82, temperature 97.6  F (36.4  C), temperature source Oral, resp. rate 20, height 1.702 m (5' 7\"), weight 75.8 kg (167 lb 1.7 oz), SpO2 (!) 89 %.  Vitals:    10/27/19 0515 10/28/19 0633 10/29/19 0600   Weight: 76.2 kg (167 lb 15.9 oz) 75.2 kg (165 lb 12.6 oz) 75.8 kg (167 lb 1.7 oz)     Vital Signs with Ranges  Temp:  [97.5  F (36.4  C)-98.4  F (36.9  C)] 97.6  F (36.4  C)  Pulse:  [64-89] 82  Heart Rate:  [64-91] 87  Resp:  [10-34] 20  BP: ()/(57-79) 113/70  SpO2:  [89 %-100 %] 89 %  I/O's Last 24 hours  I/O last 3 completed shifts:  In: 1160 [P.O.:1160]  Out: 550 [Urine:550]    Lungs: Basilar crackles   Cardiovascular: regular   Other: abd soft               Medications:          amoxicillin-clavulanate  1 tablet Oral BID     aspirin  81 mg Oral Daily     atorvastatin  40 mg Oral Daily     clopidogrel  75 mg Oral Daily     digoxin  125 mcg Oral Daily     enoxaparin ANTICOAGULANT  40 mg Subcutaneous Q24H     ferrous gluconate  324 mg Oral Daily with breakfast     folic acid  1 mg Oral QAM     gabapentin  1,200 mg Oral TID     insulin aspart  1-7 Units Subcutaneous TID AC     insulin aspart  1-5 Units Subcutaneous At Bedtime     LANsoprazole  30 mg Oral QAM AC     levothyroxine  50 mcg Oral QAM     midodrine  10 mg Oral TID w/meals     sodium chloride (PF)  3 mL Intracatheter Q8H     torsemide  10 mg Oral Daily            Data:      All new lab and imaging data was reviewed.   Recent Labs   Lab Test 10/28/19  0637 10/27/19  0828 10/26/19  0603 10/25/19  0702  08/29/19  1525  07/05/19  2237  05/12/19  2321   WBC  --  13.7* 17.5* 16.0*   < > 9.8   < >  --    < > 8.7   HGB  --  9.0* 9.8* 10.7*   < > 9.7*   < >  --    < > 8.1*   MCV  --  74* 75* 76*   < > 77*   < >  --    < > 87    326 488* 544*   " < > 460*   < > 417   < > 349   INR  --   --   --   --   --  1.38*  --  1.11  --  1.21*    < > = values in this interval not displayed.      Recent Labs   Lab Test 10/28/19  0637 10/26/19  0603 10/25/19  0912 10/25/19  0702    136  --  135   POTASSIUM 3.4 4.7 5.1 6.0*   CHLORIDE 105 105  --  102   CO2 28 22  --  23   BUN 19 29  --  35*   CR 1.00 1.12  --  1.32*   ANIONGAP 5 9  --  10   DMITRIY 8.0* 8.1*  --  8.6   GLC 92 95  --  121*        I reviewed the patient's new clinical lab test results.     I reviewed the patient's new imaging test results.     CT chest  Impression:   1.  Pulmonary fibrosis in a pattern favoring NSIP pattern, potentially  related to patient's known polymyositis.  2. Mediastinal lymphadenopathy.  3. Enlarged pulmonary artery.  4. Stable pericardial effusion.  5. Extensive coronary atherosclerotic disease, similar to previous.  6. Nodular opacity in the left upper lobe . Indeterminant.   Infectious, inflammatory such as organizing pneumonia or neoplastic.   Continued follow up recommended.3-6 months.         Active Problems:    Sepsis (H)           Assessment and Plan:      Acute on chronic hypoxic respiratory failure  Pulmonary fibrosis 2/2 to PM  HFePF  Pulmonary HTN from Group 2/3  Sepsis  HCAP  Note and agree with plans to pursue hospice     Plan  -antibiotics per primary team  -wean oxygen to maintain sats~90%  -duonebs as needed  -Note and agree with plans to pursue hospice  -no further suggestions. Please call if needed    Robert Lauren  [facility]  Office: [phone]  Pager: [phone]

## 2019-10-30 NOTE — PROGRESS NOTES
A&Ox4. AVSS ex 4L NC O2. LS with fine crackles in bases. BS active, passing flatus. Dyspneic on exertion. Reviewed all medications and discharge instructions with pt and family. All questions about medications and discharge instructions answered. All belongings sent with pt. Pt discharged to [facility] by transport. Oxy script sent with pt. IV removed. Packet sent to [facility].

## 2019-10-30 NOTE — PLAN OF CARE
OT: pt sitting up in chair upon OT arrival, pt fatigue and declined participation with OT, per SW pt to discharge to TCU today at 4pm, will defer further OT to next level of care, GOALS NOT MET, see discharge summary

## 2019-10-30 NOTE — PLAN OF CARE
A/O x4. VSS on 4 L O2 nasal cannula, wore Bipap over night. Dyspnea on exertion. LS fine crackles in RML and bilateral lower lobes. No pedal pulses found- MD aware. Posterior tibial pulses 2+ with Doppler. Necrotic areas on left 4th toe and great toe, open to air. Up w/ assist x1. Tolerating diet. Plan is for meeting with hospice this morning,  scheduled to attend due to patient and wife are Uz[place]- speaking.

## 2019-10-30 NOTE — PROGRESS NOTES
CPAP/BiPAP Settings  IPAP: 12  EPAP: 5  Rate: 12  FiO2: 40  Timed Inspiration (sec): 0.9    CPAP/BiPAP/AVAPS/AVAPS AE Alarms  High Pressure: 20  Low Pressure: 5  Low Pressure Delay: 20  High Rate (breaths/min): 45  Low Rate (breaths/min): 5  Alarm Volume Level: 10    CPAP/BiPAP/AVAPS/AVAPS AE Patient Assessment  Skin Assessment: Clean, dry, intact  Barriers Applied: Gel pad and mepilex  Lung Sounds: Fine crackles    Plan: Continue patient on BiPAP as needed. RT will continue to monitor.

## 2019-10-30 NOTE — PROGRESS NOTES
[facility]  Palliative Care Daily Progress Note       Recommendations & Counseling       Pt and family now interested in discharging to [facility] with TCU. They have received information on hospice and aware that they can enroll in this program at any point     DNR/DNI      Case was reviewed with unit KRYSTIAN Beltran and care coordinator Claudia.    Isabel GALAVIZ, [facility]  Palliative Medicine   Pager [phone]      Thank you for the opportunity to participate in the care of this patient and family. Our team: does not plan on following further, however do not hesitate to call or re-consult if we can be of further assistance to the patient/family.     During regular M-F work hours -- if you are not sure who specifically to contact -- please contact us at [phone].    After regular work hours and on weekends/holidays, you can call our answering service at [phone]. Also, who's on call for us is available in [employer].     Attestation:  Total time on the floor involved in the patient's care: 20 minutes  Total time spent in counseling/care coordination: >50%     Assessments          Beulah Jane is a 69 year old male with pulmonary fibrosis, severe pulmonary hypertension on chronic 2L O2 support, JUAN, right sided heart failure, severe aortic stenosis s/p TAVR, complete heart block s/p pacemaker placement, DM2, and chronic anemia who presents near syncopal episode. He is being treated for acute on chronic respiratory failure due to PNA. His respiratory status has improved. He is also being evaluated by vascular surgery regarding PAD and nonhealing wounds. He is being evaluated for an angiogram for treatment of SFA; at this point, this procedure is being deferred. Of note, this is his 6th admission in the last 6 months. He came here from TCU.     Today, the patient was seen for:  Goals of care     Prognosis, Goals, or Advance Care Planning was addressed today with: Yes.  Mood, coping, and/or  meaning in the context of serious illness were addressed today: No.    Received a page from unit KRYSTIAN Parsonsy noting that pt and family met with hospice and they are undecided about this treatment plan. Pt has been accepted at [facility] for either TCU or hospice.    I spoke with son Chi via phone. He again reinforces they are uncertain about hospice at this point. We note that pt has been accepted for TCU. As long as therapy and moving around makes him feel good, going to rehabilitation is reasonable. We make note that given his rehospitalization rate, he is high risk for readmission likely in the coming weeks. They can choose to bypass hospitalization at that point and choose hospice, or choose hospice really at any point. We make note that with the chronicity and progressive nature of his comorbidities, his condition will likely get less comfortable, and more challenging with time. Son is very supportive.    I visited pt (professional interpretor not available at time of my visit). He just got off the phone with his son; they all confirm the family's wish of rehabilitation with hospice in the back pocket, if needed. They have no further questions or concerns.             Interval History:     Chart review/discussion with unit or clinical team members:   Met with hospice, undecided about care plan. Clinically stable and getting ready for discharge     Per patient or family/caregivers today:  Wanting to trial TCU before choosing hospice     Key Palliative Symptoms:  We are not helping to manage these symptoms currently in this patient.    Patient is on opioids: bowels not assessed today.           Review of Systems:     Besides above, an additional N/A system ROS was reviewed and is unremarkable          Medications:     I have reviewed this patient's medication profile and medications during this hospitalization.    Noted meds:    Augmentin   Gabapentin 1200mg PO TID  Torsemide 10mg PO daily  APAP 650mg PO Q4hrs  PRN mild pain; 3 doses yesterday, none today   Oxycodone 5-10mg PO Q3hrs PRN pain; 3 doses yesterday, none today           Physical Exam:   Temp: 97.6  F (36.4  C) Temp src: Axillary BP: 117/62 Pulse: 78 Heart Rate: 85 Resp: 13 SpO2: 95 % O2 Device: Oxymask Oxygen Delivery: 4 LPM  CONSTITUTIONAL: Chronically ill man seen sitting up in the chair in NAD  HEENT: NCAT  RESPIRATORY: Tachypneic and slightly labored respiratory effort on 4L via NC           Data Reviewed:     Reviewed recent pertinent imaging, comments:   N/A    Reviewed recent labs, comments:   N/A

## 2019-10-30 NOTE — PROGRESS NOTES
SW:    I: KRYSTIAN following for discharge planning. Pt met with  Hospice this morning, still undecided whether he will decide upon discharge with comfort care/hospice approach vs return to TCU for restorative rehab. Jaky, Hospice RN planned to contact palliative to request a return visit to discuss resorative, per pt/family request.     [facility] able to accept pt today (1600 or after) either for TCU or on Hospice. SW awaiting follow-up meeting with palliative to make further arrangements for discharge.     ADDENDUM (4158): KRYSTIAN paged Isabel Douglas NP to ensure that she is aware of pt request to speak with palliative again.    1320: Pt and family have decided to return to [facility] for TCU with now the knowledge of hospice. Isabel spoke with pt son and pt and they have confirmed that they are aware that they are able to revisit hospice conversation at any time. KRYSTIAN paged Dr. Cervantes with update and will fax TCU orders when available to [facility] admissions. [facility] transport will  pt at 1600, made aware to bring oxygen for pt.    CAROLS Moya, [facility]  Daytime (8:00am-4:30pm): [phone]  After-Hours KRYSTIAN Pager (4:30pm-11:30pm): [phone] ;

## 2019-10-30 NOTE — DISCHARGE SUMMARY
[facility]  Hospitalist Discharge Summary       Date of Admission:  10/25/2019  Date of Discharge:  10/30/2019  Discharging Provider: Ross Cervantes DO      Discharge Diagnoses   1. Suspected HAP with severe sepsis  2. Pulmonary fibrosis w/moderate to severe pulmonary HTN and chronic hypoxic respiratory failure on O2 chronically  3. Acute on chronic right sided CHF w/possible left sided diastolic dysfunction of the LV vs Cor pulmonale  4. Suspected atrial flutter  5. H/o SVT   6. SHIRIN   7. H/o PAD   8. DM type II   9. H/o Severe AS s/p TAVR on 9/7/19  10. H/o Complete heart block s/p PPM   11. Chronic iron deficiency anemia     Follow-ups Needed After Discharge   Follow-up Appointments     Follow Up and recommended labs and tests      Follow up with Nursing home physician.  No follow up labs or test are   needed.  Follow up with vascular surgery as needed  Follow up with palliative care or hospice as needed  Follow up with pulmonology as previously planned  Follow up with PCP 1-2 weeks after TCU discharge           Unresulted Labs Ordered in the Past 30 Days of this Admission     Date and Time Order Name Status Description    10/25/2019 0728 Blood culture Preliminary     10/25/2019 0728 Blood culture Preliminary       These results will be followed up by PCP     Discharge Disposition   Discharged to short-term care facility  Condition at discharge: Stable    Hospital Course   Beulah Jane is a 69 year old male with a history of pulmonary fibrosis, right sided heart failure, DM type II, severe AS s/p TAVR and complete heart block s/p PPM who presented to the ED on 10/25/2019 after a near syncopal episode and was admitted for severe sepsis likely secondary to HAP       Goals of care  On 10/28 patient and family met with palliative care.  They would like for the patient to treat his pneumonia and then discharge to inpatient hospice.  After meeting with hospice though patient and family  decided to pursue restorative cares and would like to go to TCU for therapy.  Will likely follow up at TCU discharge though      Suspected HAP with severe sepsis   Patient presented after a near syncopal episode.  In the ED he was found to have a leukocytosis and tachycardia.  CXR showed a patchy interstitial opacities most prominent in the right lung base concerning for pneumonia.    Initial blood pressure in the ED was 88.54 and lactate was 4.6.  He was given IVF and his blood pressures improved but lactate still elevated at 4.8.  Also started on IV Zosyn and Vancomycin   - PTA Midodrine   - Follow cultures. NGTD   - Sputum culture was contaminated   - IV Zosyn switched to PO Augmentin and will complete as outpatient      Pulmonary fibrosis w/moderate to severe pulmonary HTN on chronic O2   JUAN   Right sided heart failure and possible diastolic dysfunction of the LV/cor pulmonale   Patient is normally on 2 L of O2 via NC for known pulmonary fibrosis.  He does have a history of RV dysfunction likely related to his chronic lung disease.  Echocardiogram on 10/14/19 showed a normal LV EF with minimal concentric hypertrophy   Over night the patient had worsening O2 saturations and was requiring hi-flow.    - Titrate O2 as tolerated  - Continue morning Torsemide at 10 mg QAM.  Still holding evening dose   - IS ordered   - RCAT   - Pulmonology consulted and appreciate their recommendations     Suspected atrial flutter  H/o SVT   Overnight patient had wide complex tachycardiac.  Initially though SVT vs a fib/flutter.  Unfortunately the patient's blood pressures are not amenable to most rate controlling medications  - Pacemaker evaluation   - Started on Digoxin by cardiology   - Cardiology following and appreciate their recommendations     SHIRIN. Resolved   Baseline Cr appears to be 0.9-1 and was elevated at 1.32 on arrival.  Suspect this is pre-renal and related to dehydration from his sepsis.  Of note, potassium was also  initially elevated at 6.0 but there was evidence of hemolysis and repeat was normal at 5.1   - Avoid nephrotoxins  - No further IVF given his known CHF      H/o PAD   Has a known history of PAD.  While here patient had persistently elevated lactates and difficulty with finding lower extremity pulses.  Does have evidence of ischemic changes with eschars on the left foot. Some concern for ischemic limb given this  - Vascular surgery following and appreciate their recommendations.  Initially planned for angiogram but no longer planning to do it      DM type II   Last HgbA1c from 8/29/19 was 6.7.  PTA on SSI   - SSI  - Moderate CHO diet      H/o Severe AS s/p TAVR on 9/7/19  H/o Complete heart block s/p PPM in September   No issues currently.  Paced rhythm on EKG       Chronic anemia, iron deficiency  - PTA iron        Consultations This Hospital Stay   PHARMACY TO DOSE VANCO  PHYSICAL THERAPY ADULT IP CONSULT  OCCUPATIONAL THERAPY ADULT IP CONSULT  SOCIAL WORK IP CONSULT  VASCULAR SURGERY IP CONSULT  CARDIOLOGY IP CONSULT  PULMONARY IP CONSULT  PALLIATIVE CARE ADULT IP CONSULT  WOUND OSTOMY CONTINENCE NURSE  IP CONSULT  SOCIAL WORK IP CONSULT  SPIRITUAL HEALTH SERVICES IP CONSULT  PHYSICAL THERAPY ADULT IP CONSULT  OCCUPATIONAL THERAPY ADULT IP CONSULT    Code Status   DNR/DNI    Time Spent on this Encounter   I, Ross Cervantes DO, personally saw the patient today and spent greater than 30 minutes discharging this patient.       Ross Cervantes DO  [facility]  ______________________________________________________________________    Physical Exam   Vital Signs: Temp: 98  F (36.7  C) Temp src: Axillary BP: 118/71 Pulse: 78 Heart Rate: 96 Resp: 16 SpO2: 93 % O2 Device: Oxymask Oxygen Delivery: 4 LPM  Weight: 167 lbs 5.27 oz  General Appearance: Resting comfortably.  NAD   Respiratory: Clear to auscultation. No respiratory distress  Cardiovascular: RRR.  No murmurs  GI: Bowel sounds present.   Non-tender  Skin: No rashes.  No cyanosis  Other: No edema.  No calf tenderness         Primary Care Physician   Hoa López    Discharge Orders      General info for SNF    Length of Stay Estimate: Short Term Care: Estimated # of Days <30  Condition at Discharge: Stable  Level of care:skilled   Rehabilitation Potential: Fair  Admission H&P remains valid and up-to-date: Yes  Recent Chemotherapy: N/A  Use Nursing Home Standing Orders: Yes     Mantoux instructions    Give two-step Mantoux (PPD) Per Facility Policy Yes     Reason for your hospital stay    Pneumonia.  Known history of peripheral artery (vascular) disease     Activity - Up with nursing assistance     Follow Up and recommended labs and tests    Follow up with Nursing home physician.  No follow up labs or test are needed.  Follow up with vascular surgery as needed  Follow up with palliative care or hospice as needed  Follow up with pulmonology as previously planned  Follow up with PCP 1-2 weeks after TCU discharge     Physical Therapy Adult Consult    Evaluate and treat as clinically indicated.    Reason:  Deconditioning     Occupational Therapy Adult Consult    Evaluate and treat as clinically indicated.    Reason:  Deconditioning     Oxygen - Nasal cannula    1-6 Lpm by nasal cannula to keep O2 sats 92% or greater.     Advance Diet as Tolerated    Follow this diet upon discharge: Orders Placed This Encounter      2 Gram Sodium Diet       Significant Results and Procedures   Most Recent 3 CBC's:  Recent Labs   Lab Test 10/28/19  0637 10/27/19  0828 10/26/19  0603 10/25/19  0702   WBC  --  13.7* 17.5* 16.0*   HGB  --  9.0* 9.8* 10.7*   MCV  --  74* 75* 76*    326 488* 544*     Most Recent 3 BMP's:  Recent Labs   Lab Test 10/28/19  0637 10/26/19  0603 10/25/19  0912 10/25/19  0702    136  --  135   POTASSIUM 3.4 4.7 5.1 6.0*   CHLORIDE 105 105  --  102   CO2 28 22  --  23   BUN 19 29  --  35*   CR 1.00 1.12  --  1.32*   ANIONGAP 5 9  --  10    DMITRIY 8.0* 8.1*  --  8.6   GLC 92 95  --  121*   ,   Results for orders placed or performed during the hospital encounter of 10/25/19   XR Chest 2 Views    Narrative    CHEST TWO VIEWS   10/25/2019 8:01 AM     HISTORY: Fever.    COMPARISON: Chest x-ray 10/13/2019.      Impression    IMPRESSION: Two views of the chest are performed. Patchy interstitial  lung opacities are noted bilaterally with increasing opacity overlying  the right lung base concerning for pneumonia. Heart remains enlarged.  Patient is status post transcatheter aortic valve replacement. No  pneumothorax or significant pleural effusions. Implantable cardiac  device right upper chest and both leads appear unchanged in position.  Thoracolumbar fusion hardware is noted.    COURT LOPEZ MD   US Lower Extremity Arterial Duplex Bilateral    Narrative    US LOWER EXTREMITY ARTERIAL DUPLEX BILATERAL  10/26/2019 11:57 AM     HISTORY:  Ischemic wounds.    COMPARISON: None.    FINDINGS:     Color Doppler and spectral waveform analysis performed.     Right lower extremity: There is scattered calcified plaque in the  infrainguinal arteries. No definite flow is identified in the right  peroneal artery. Significantly dampened waveforms are noted in the  distal right anterior tibial artery. There also appears to be a focal  stenosis in the tibioperoneal trunk as seen on the color Doppler  images. Peak systolic velocity in this region is 89 cm/s. Velocity  within the popliteal artery proximal to this level is 23 cm/s.    Left lower extremity: There is scattered calcified plaque in the  infrainguinal arteries. Dampened waveforms are identified in the  distal posterior tibial artery.      Impression    IMPRESSION: Sonographic findings indicate steno-occlusive lesions in  the right infrapopliteal region and in the distal left posterior  tibial artery.     DIANE OREILLY MD       Discharge Medications   Current Discharge Medication List      START taking these  medications    Details   acetaminophen (TYLENOL) 325 MG tablet Take 2 tablets (650 mg) by mouth every 4 hours as needed for mild pain    Associated Diagnoses: Spinal stenosis of lumbar region with neurogenic claudication      amoxicillin-clavulanate (AUGMENTIN) 875-125 MG tablet Take 1 tablet by mouth 2 times daily for 2 days    Associated Diagnoses: HAP (hospital-acquired pneumonia)      digoxin (LANOXIN) 125 MCG tablet Take 1 tablet (125 mcg) by mouth daily    Associated Diagnoses: Atrial flutter, unspecified type (H)      oxyCODONE (ROXICODONE) 5 MG tablet Take 1 tablet (5 mg) by mouth every 3 hours as needed for moderate to severe pain or severe pain  Qty: 15 tablet, Refills: 0    Associated Diagnoses: Spinal stenosis of lumbar region with neurogenic claudication      senna-docusate (SENOKOT-S/PERICOLACE) 8.6-50 MG tablet Take 1 tablet by mouth 2 times daily as needed for constipation    Associated Diagnoses: Spinal stenosis of lumbar region with neurogenic claudication         CONTINUE these medications which have NOT CHANGED    Details   albuterol (2.5 MG/3ML) 0.083% neb solution Take 1 vial by nebulization every 6 hours as needed for shortness of breath / dyspnea or wheezing      aspirin (ASA) 81 MG EC tablet Take 1 tablet (81 mg) by mouth daily Start tomorrow morning.  Qty: 90 tablet, Refills: 3    Associated Diagnoses: Coronary artery disease due to lipid rich plaque      atorvastatin (LIPITOR) 40 MG tablet Take 1 tablet (40 mg) by mouth daily  Qty: 90 tablet, Refills: 3    Associated Diagnoses: Coronary artery disease due to lipid rich plaque      benzocaine-menthol (CEPACOL) 15-3.6 MG lozenge Place 1 lozenge inside cheek every hour as needed for sore throat  Qty: 60 lozenge, Refills: 0    Associated Diagnoses: Cough      calcium carbonate (OS-DMITRIY 500 MG [place]. [place]) 500 MG tablet Take 1 tablet (500 mg) by mouth 2 times daily  Qty: 180 tablet, Refills: 3    Associated Diagnoses: Pre-operative examination;  Spinal stenosis of lumbar region with neurogenic claudication      Cholecalciferol (VITAMIN D) 2000 units tablet Take 2,000 Units by mouth daily  Qty: 100 tablet, Refills: 3    Associated Diagnoses: Pre-operative examination; Spinal stenosis of lumbar region with neurogenic claudication      clopidogrel (PLAVIX) 75 MG tablet Take 1 tablet (75 mg) by mouth daily  Qty: 30 tablet, Refills: 0    Comments: Future refills by PCP Dr. Hoa López with phone number [phone].  Associated Diagnoses: Pulmonary hypertension (H)      diphenhydrAMINE (BENADRYL) 50 MG capsule Take 50 mg by mouth as needed for itching or allergies       docusate sodium (COLACE) 100 MG capsule Take 1 capsule (100 mg) by mouth At Bedtime For constipation. Hold for loose stools  Qty: 30 capsule, Refills: 0    Comments: Future refills by PCP Dr. Hoa López with phone number [phone].  Associated Diagnoses: Iron deficiency anemia, unspecified iron deficiency anemia type      ferrous gluconate (FERGON) 324 (38 Fe) MG tablet Take 1 tablet (324 mg) by mouth daily (with breakfast)  Qty: 30 tablet, Refills: 0    Comments: Future refills by PCP Dr. Hoa López with phone number [phone].  Associated Diagnoses: Iron deficiency anemia, unspecified iron deficiency anemia type      folic acid (FOLVITE) 1 MG tablet Take 1 tablet (1 mg) by mouth every morning  Qty: 90 tablet, Refills: 3    Associated Diagnoses: Pulmonary fibrosis (H)      gabapentin (NEURONTIN) 400 MG capsule Take 1,200 mg by mouth 3 times daily 0700 1400 2100 with percocet      insulin aspart (NOVOLOG VIAL) 100 UNITS/ML vial Give before meals and before bed:  For Pre-Meal Glucose:  140-189 give 1 unit   190-239 give 2 units   240-289 give 3 units   290-339 give 4 units   = or >340 give 5 units     For Bedtime Glucose  200 - 239 give 1 units   240 - 289 give 1.5 units  290 - 339 give 2 units  = or >340 give 2.5 units  Qty: 10 mL, Refills: 0    Associated Diagnoses: Type 2 diabetes  "mellitus with diabetic neuropathy, with long-term current use of insulin (H)      LANsoprazole (PREVACID) 30 MG DR capsule Take 30 mg by mouth every morning (before breakfast)      levothyroxine (SYNTHROID/LEVOTHROID) 50 MCG tablet Take 50 mcg by mouth every morning       methotrexate 2.5 MG tablet Take 10 tablets (25 mg) by mouth once a week Tuesday  Qty: 120 tablet, Refills: 3    Associated Diagnoses: Inflammatory arthritis      midodrine (PROAMATINE) 10 MG tablet Take 1 tablet (10 mg) by mouth 3 times daily (with meals)    Associated Diagnoses: Orthostatic hypotension      omega 3 1000 MG CAPS Take 2 capsules by mouth every morning       potassium chloride ER (K-DUR/KLOR-CON M) 20 MEQ CR tablet Take 1 tablet (20 mEq) by mouth daily  Qty: 30 tablet, Refills: 0    Associated Diagnoses: Acute on chronic heart failure with preserved ejection fraction (H)      !! torsemide (DEMADEX) 5 MG tablet Take 10 mg by mouth every morning      !! torsemide (DEMADEX) 5 MG tablet Take 5 mg by mouth daily At 1600      insulin syringe-needle U-100 (29G X 1/2\" 1 ML) 29G X 1/2\" 1 ML miscellaneous Inject 1 Syringe Subcutaneous 2 times daily  Qty: 200 each, Refills: 11    Associated Diagnoses: Type 2 diabetes mellitus with other neurologic complication, with long-term current use of insulin (H)       !! - Potential duplicate medications found. Please discuss with provider.      STOP taking these medications       oxyCODONE-acetaminophen (PERCOCET) 7.5-325 MG per tablet Comments:   Reason for Stopping:             Allergies   No Known Allergies  "

## 2019-10-30 NOTE — OP NOTE
DATE OF SERVICE: 9/04/2019.     PREOPERATIVE DIAGNOSES:  Severe aortic stenosis.     POSTOPERATIVE DIAGNOSES:  Severe aortic stenosis.     PROCEDURE PERFORMED:  Transfemoral transcatheter aortic valve replacement (29mm Medtronic Evolut Pro valve).     SURGEON:  Hamilton Carnes MD, PhD.     ATTENDING CARDIOLOGISTS:  Dr. Garcia and Dr. Duron.    CARDIOLOGY FELLOW: Dr. Doroteo Rodgers.     ANESTHESIA:  General endotracheal anesthesia.     SPECIMEN:  None.     INDICATIONS FOR PROCEDURE: Mr. Catherine Jane is a 69 year-old man with severe aortic stenosis.  He is frail, had a previous coronary artery bypass, and has multiple comorbidities, so transcatheter replacement is the best option. The patient understands the risks and benefits of the procedure and wishes to undergo the operation.     OPERATIVE FINDINGS:  There was no significant perivalvular leak after the procedure. The 29mm Medtronic Evolut Pro valve leaflet motion was normal.     OPERATIVE DESCRIPTION IN DETAIL:  After obtaining informed consent, the patient was brought to the operating room and placed in the supine position on the operating room table.  Appropriate lines and devices for monitoring were placed by the anesthesia team. The patient was prepped and draped, and a timeout was performed to confirm the correct patient identity, as well as the procedure to be performed.      Please see the cardiology procedure note for details. The 29mm Medtronic Evolut Pro valve was deployed successfully through a right transfemoral approach. The deployment was successful and there were no complications. The delivery system was removed and the femoral artery was closed with the previously placed Perclose device.    Hamilton Carnes MD PhD

## 2019-10-30 NOTE — PLAN OF CARE
Physical Therapy Discharge Summary    Reason for therapy discharge:    Discharged to transitional care facility.    Progress towards therapy goal(s). See goals on Care Plan in [facility] electronic health record for goal details.  Goals not met.  Barriers to achieving goals:   discharge from facility.    Therapy recommendation(s):    Continued therapy is recommended.  Rationale/Recommendations:   .Pt would benefit from continued PT at TCU to improve functional strength, balance, activity tolerance, safety and IND with functional mobility and reduce falls risk prior to returning home.

## 2019-10-30 NOTE — CONSULTS
Met with patient, spouse, three sons and  at pt bedside (Beulah Jaffe participated by phone). Reviewed hospice philosophy, services, and medicare benefit. Answered many questions regarding hospice care and how this would look in a facility setting. Patient is considering returning to [facility] for restorative care versus comfort care on hospice. Pt is hopeful that he may become stronger with therapies. Family would like to speak again with Isabel GRIGSBY regarding the option of restorative/rehabilitative care, whether there is a chance pt may benefit from rehab. They are also considering discharging on hospice, would like to speak with KRYSTIAN regarding other placement options outside of [facility] and cost comparisons. Hospice informational sheet with our contact number left with family. Encouraged them to call with any questions or if they would like to meet again with hospice. Updated Dafne BOLAND, Unit coordinator Lindsay Taylor RN.  Page out to  Isabel CONSTANTINO NP to update.   Thank you for this referral,   Jaky Peña RN  Hospice  (o) [phone], (c) [phone]

## 2019-10-31 PROBLEM — I73.9 PERIPHERAL ARTERY DISEASE (H): Status: ACTIVE | Noted: 2019-01-01

## 2019-10-31 PROBLEM — Z71.89 ADVANCED DIRECTIVES, COUNSELING/DISCUSSION: Status: ACTIVE | Noted: 2019-01-01

## 2019-10-31 PROBLEM — J18.9 PNEUMONIA DUE TO INFECTIOUS ORGANISM: Status: ACTIVE | Noted: 2019-01-01

## 2019-10-31 PROBLEM — R19.7 DIARRHEA OF PRESUMED INFECTIOUS ORIGIN: Status: ACTIVE | Noted: 2019-01-01

## 2019-10-31 NOTE — LETTER
To:             Please give to facility    From:   Yesica Manning RN, Care Coordinator   [facility] -Care Coordination  [facility]   E-mail [email or web address]   [phone]    Patient Name:  Beulah Jane YOB: 1950   Admit date: 10/30/2019      *Information Needed:  Please contact me when the patient will discharge (or if they will move to long term care)- include the discharge date, disposition, and main diagnosis   - If the patient is discharged with home care services, please provide the name of the agency    Also- Please inform me if a care conference is being held.   Yesica Manning RN, Care Coordinator   [facility] -Care Coordination  [facility]   E-mail [email or web address]   [phone]                              Thank you

## 2019-10-31 NOTE — PROGRESS NOTES
[facility]  INITIAL VISIT NOTE  November 1, 2019    PRIMARY CARE PROVIDER AND CLINIC:  Hoa López [address] [facility] [address] / [place] [address]    Chief Complaint   Patient presents with     Hospital F/U       HPI:    Beulah Jane is a 69 year old  (1950) male who was [facility] November 1, 2019 for an initial visit. Medical history is notable for Beulah Jane is a 69 year old (1950) male who was [facility] October 25, 2019 for an initial visit. Medical history is notable for chronic diastolic heart failure, interstitial lung disease, pulmonary hypertension, chronic hypoxic respiratory failure, obstructive sleep apnea, status post TAVR, status post permanent pacemaker, coronary artery disease, dermatomyositis, diabetes mellitus type 2, dyslipidemia, hypothyroidism, and chronic anemia. He was recently hospitalized at [facility] from October 13 through October 18, 2019 for symptomatic orthostatic hypotension and syncope. CT head was negative for acute intracranial pathology. Chest x-ray revealed cardiomegaly and pulmonary vascular congestion. EKG revealed inferior T wave changes. Troponin I was 0.182. Echocardiogram on October 14 revealed LV ejection fraction of 55 to 60%, moderate-severe pulmonary hypertension, mild-moderately reduced RV systolic function, and mild TR. Cardiology was consulted. Revatio was held, torsemide dose was reduced, and he was started on midodrine. Compression stockings were recommended.  He was eventually discharged to [facility] for rehab.  On October 25, 2019, he had a near syncopal episode in TCU and was noted to be hypotensive.  Therefore he was sent back to the emergency department at [facility].  In ED he was found to have leukocytosis and tachycardia.  Blood pressure was 88/54.  Lactic acid was elevated at 4.6.  Chest x-ray showed bilateral patchy interstitial opacities, most  prominent in the right lung base, concerning for pneumonia.  He was started empirically on intravenous Zosyn and vancomycin for suspected healthcare associated pneumonia with severe sepsis.  He was resuscitated with intravenous fluids.  Sputum culture was contaminant.Blood cultures remained negative. His condition improved, and he was discharged on oral Augmentin to complete the course as outpatient. Notably, his creatinine was 1.32 on arrival which improved with IV fluids.  Vascular surgery was consulted due to concern for PAD. An arterial duplex showed sonographic findings indicating steno-occlusive lesions in the right infrapopliteal region and in the distal left posterior tibial artery. Angiogram was not recommended due to co morbidities. EKG showed ventricular paced rhythm with underlying atrial flutter. Cardiology was consulted due to tachycardia, but treatment was limited due to hypotension and lung disease. Patient was started on digoxin. Pulmonology was consulted and concurred with plan of care and recommended not starting steroids. Palliative care team met with patient and family. Plan to start hospice care was discussed. Then at time of discharge decision made to hold on hospice and instead try restorative care.     Patient is admitted to this facility for medical management, nursing care, and rehab.     Patient was seen today in his room, while sitting at the edge of the bed.  He continues to require supplemental oxygen.  He is chronically dyspneic but reports no worsening of breathing today.  He complains of occasional cough which is nonproductive.  He denies fever, chills, chest pain, nausea, vomiting, abdominal pain, or urinary symptoms.  He does endorse loose bowel movements, 3-4 times a day.  He denies dizziness/lightheadedness upon standing.    CODE STATUS:   DNR / DNI    PAST MEDICAL HISTORY:   Healthcare associated pneumonia, in October 2019, as outlined in HPI  Chronic diastolic heart failure  (HFpEF), LV ejection fraction 55 to 60% by echo on October 14, 2019   Interstitial lung disease/pulmonary fibrosis, due to dermatomyositis. PFT on October 22, 2019 with severe restriction.   Obstructive sleep apnea, on CPAP   Moderate-severe pulmonary hypertension/cor pulmonale (pulmonary hypertension WHO group II and III)   Mild-moderately reduced RV systolic function   Chronic hypoxic respiratory failure, multifactorial due to above, on home oxygen at 2 L/min   Severe aortic stenosis, status post TAVR, on September 4, 2019   Atrial flutter, noted in 10/2019  Complete heart block, status post permanent pacemaker   Coronary artery disease, status post CHAU x2 to proximal-mid LAD, on July 30, 2019   PAD (involving right infrapopliteal region and distal left posterior tibial artery)  Right bundle branch block   Dermatomyositis   Reactive thrombocytosis   Diabetes mellitus type 2   Dyslipidemia   Hypothyroidism   Peripheral arterial disease   Anemia of chronic disease, baseline hemoglobin 8-10   Degenerative disc disease of the lumbar spine   Left upper lobe pulmonary nodule, measuring 1.6 cm, noted on CT ,October 19, 2019    PAST SURGICAL HISTORY:   Past Surgical History:   Procedure Laterality Date     ARTHROSCOPY KNEE  1970     COLONOSCOPY       CV CORONARY ANGIOGRAM N/A 7/30/2019    Procedure: CV CORONARY ANGIOGRAM;  Surgeon: Leonidas Mike MD;  Location: [facility]     CV LEFT HEART CATH N/A 7/30/2019    Procedure: CV LEFT HEART CATH;  Surgeon: Leonidas Mike MD;  Location: [facility]     CV PCI STENT DRUG ELUTING Left 7/30/2019    Procedure: PCI Stent Drug Eluting;  Surgeon: Leonidas Mike MD;  Location: [facility]     CV RIGHT HEART CATH N/A 7/30/2019    Procedure: CV RIGHT HEART CATH;  Surgeon: Leonidas Mike MD;  Location: [facility]     CV RIGHT HEART CATH N/A 9/27/2019    Procedure: Right Heart Cath;  Surgeon: Janice  Altaf VELA MD;  Location:  HEART CARDIAC CATH LAB     CV RIGHT HEART CATH N/A 10/7/2019    Procedure: Right Heart Cath;  Surgeon: Benjamin Mancuso MD;  Location:  HEART CARDIAC CATH LAB     CV TRANSCATHETER AORTIC VALVE REPLACEMENT N/A 9/4/2019    Procedure: Transcatheter (Medtronic - 29mm) Aortic Valve Replacement, trans thorasic echocardiogram, perfusion and cardiac surgery standby, temporary pacemeker placement;  Surgeon: Pj Garcia MD;  Location: [facility] OR     DECOMPRESSION, FUSION LUMBAR POSTERIOR TWO LEVELS, COMBINED  1997     EP PACEMAKER N/A 9/6/2019    Procedure: EP PACEMAKER;  Surgeon: Shefali Sylvester MD;  Location:  HEART CARDIAC CATH LAB     FUSION LUMBAR ANTERIOR THREE+ LEVELS N/A 4/22/2019    Procedure: Lumbar 1 Or Lumbar 2-5 Anterior Lumbar Interbody Fusion with BMP Stage 1 Of Two Procedure:;  Surgeon: Carlos Enrique Dial MD;  Location: [facility] OR     HEART CATH FEMORAL CANNULIZATION WITH OPEN STANDBY REPAIR AORTIC VALVE N/A 9/4/2019    Procedure: Cardiopulmonary Bypass Standby;  Surgeon: Hamilton Carnes MD;  Location: [facility] OR     HERNIA REPAIR  2006     lumbar spine hardware removal  1999     OPTICAL TRACKING SYSTEM FUSION POSTERIOR SPINE THORACIC THREE+ LEVELS N/A 4/25/2019    Procedure: O-Arm/Stealth Assisted Thoracic 10-Pelvis Instrumented Fusion T11-2, L1-2, L2-3 Transforminal Lumbar Interbody Fusion (TLIF) And Smith Borges Osteotomies,and L3-4 SPO as well, Use Of BMP, Debridement Of Seroma;  Surgeon: Carlos Enrique Dial MD;  Location: [facility] OR     PICC INSERTION Right 05/06/2019    4Fr - 37cm, Basilic vein, low SVC     REMOVAL PROSTHETIC MATERIAL/MESH, ABD WALL NECRO TISS INFEXN  2012     ROTATOR CUFF REPAIR RT/LT  2003       FAMILY HISTORY:   Family History   Problem Relation Age of Onset     Colon Cancer Mother      Hypertension Father         did not know father well       SOCIAL HISTORY:  Patient is originally from [place].  He is  and lives in an  apartment with his wife.  He has lived in [place] for the past 3 years.  He does use a walker for ambulation.    Social History     Tobacco Use     Smoking status: Former Smoker     Packs/day: 0.25     Last attempt to quit: 1970     Years since quittin.3     Smokeless tobacco: Never Used   Substance Use Topics     Alcohol use: Yes     Comment: few times monthly       MEDICATIONS:  Current Outpatient Medications   Medication Sig Dispense Refill     acetaminophen (TYLENOL) 325 MG tablet Take 2 tablets (650 mg) by mouth every 4 hours as needed for mild pain       albuterol (2.5 MG/3ML) 0.083% neb solution Take 1 vial by nebulization every 6 hours as needed for shortness of breath / dyspnea or wheezing       amoxicillin-clavulanate (AUGMENTIN) 875-125 MG tablet Take 1 tablet by mouth 2 times daily for 2 days       aspirin (ASA) 81 MG EC tablet Take 1 tablet (81 mg) by mouth daily Start tomorrow morning. 90 tablet 3     atorvastatin (LIPITOR) 40 MG tablet Take 1 tablet (40 mg) by mouth daily 90 tablet 3     benzocaine-menthol (CEPACOL) 15-3.6 MG lozenge Place 1 lozenge inside cheek every hour as needed for sore throat 60 lozenge 0     calcium carbonate (OS-DMITRIY 500 MG [place]. [place]) 500 MG tablet Take 1 tablet (500 mg) by mouth 2 times daily 180 tablet 3     Cholecalciferol (VITAMIN D) 2000 units tablet Take 2,000 Units by mouth daily 100 tablet 3     clopidogrel (PLAVIX) 75 MG tablet Take 1 tablet (75 mg) by mouth daily 30 tablet 0     digoxin (LANOXIN) 125 MCG tablet Take 1 tablet (125 mcg) by mouth daily       diphenhydrAMINE (BENADRYL) 50 MG capsule Take 50 mg by mouth as needed for itching or allergies        docusate sodium (COLACE) 100 MG capsule Take 1 capsule (100 mg) by mouth At Bedtime For constipation. Hold for loose stools 30 capsule 0     ferrous gluconate (FERGON) 324 (38 Fe) MG tablet Take 1 tablet (324 mg) by mouth daily (with breakfast) 30 tablet 0     folic acid (FOLVITE) 1 MG tablet Take 1  "tablet (1 mg) by mouth every morning 90 tablet 3     gabapentin (NEURONTIN) 400 MG capsule Take 1,200 mg by mouth 3 times daily 0700 1400 2100 with percocet       insulin aspart (NOVOLOG VIAL) 100 UNITS/ML vial Give before meals and before bed:  For Pre-Meal Glucose:  140-189 give 1 unit   190-239 give 2 units   240-289 give 3 units   290-339 give 4 units   = or >340 give 5 units     For Bedtime Glucose  200 - 239 give 1 units   240 - 289 give 1.5 units  290 - 339 give 2 units  = or >340 give 2.5 units 10 mL 0     insulin syringe-needle U-100 (29G X 1/2\" 1 ML) 29G X 1/2\" 1 ML miscellaneous Inject 1 Syringe Subcutaneous 2 times daily 200 each 11     LANsoprazole (PREVACID) 30 MG DR capsule Take 30 mg by mouth every morning (before breakfast)       levothyroxine (SYNTHROID/LEVOTHROID) 50 MCG tablet Take 50 mcg by mouth every morning        methotrexate 2.5 MG tablet Take 10 tablets (25 mg) by mouth once a week Tuesday 120 tablet 3     midodrine (PROAMATINE) 10 MG tablet Take 1 tablet (10 mg) by mouth 3 times daily (with meals)       omega 3 1000 MG CAPS Take 2 capsules by mouth every morning        oxyCODONE (ROXICODONE) 5 MG tablet Take 1 tablet (5 mg) by mouth 3 times daily Give with gabapentin 120 tablet 0     potassium chloride ER (K-DUR/KLOR-CON M) 20 MEQ CR tablet Take 1 tablet (20 mEq) by mouth daily 30 tablet 0     senna-docusate (SENOKOT-S/PERICOLACE) 8.6-50 MG tablet Take 1 tablet by mouth 2 times daily as needed for constipation       torsemide (DEMADEX) 5 MG tablet Take 10 mg by mouth every morning       torsemide (DEMADEX) 5 MG tablet Take 5 mg by mouth daily At 1600         ALLERGIES:  No Known Allergies    ROS:  10 point ROS neg other than the symptoms noted above in the HPI.    PHYSICAL EXAM:  Vitals: Blood pressure 103/48, heart rate 65, respiratory rate 18, temperature 99  F, oxygen saturation 92%  Gen: Cooperative and in no acute distress, generally weak appearing  HEENT: Normocephalic; oropharynx " clear, conjunctival pallor+  Card: Normal S1, S2, RRR  Resp: Bilateral coarse crackles, diffuse, but more prominent in lung bases  GI: Abdomen soft, not-tender, non-distended, +BS  Ext: 2-3+ B/L LE edema  Neuro: CX II-XII grossly intact; ROM in all four extremities grossly in tact  Psych: Alert and oriented x3; normal affect  Skin: No acute rash    LABORATORY/IMAGING DATA:  Lab Results   Component Value Date    WBC 13.7 10/27/2019     Lab Results   Component Value Date    RBC 4.24 10/27/2019     Lab Results   Component Value Date    HGB 9.0 10/27/2019     Lab Results   Component Value Date    HCT 31.5 10/27/2019     Lab Results   Component Value Date    MCV 74 10/27/2019     Lab Results   Component Value Date    MCH 21.2 10/27/2019     Lab Results   Component Value Date    MCHC 28.6 10/27/2019     Lab Results   Component Value Date    RDW 22.4 10/27/2019     Lab Results   Component Value Date     10/28/2019     Last Comprehensive Metabolic Panel:  Sodium   Date Value Ref Range Status   10/28/2019 138 133 - 144 mmol/L Final     Potassium   Date Value Ref Range Status   10/28/2019 3.4 3.4 - 5.3 mmol/L Final     Chloride   Date Value Ref Range Status   10/28/2019 105 94 - 109 mmol/L Final     Carbon Dioxide   Date Value Ref Range Status   10/28/2019 28 20 - 32 mmol/L Final     Anion Gap   Date Value Ref Range Status   10/28/2019 5 3 - 14 mmol/L Final     Glucose   Date Value Ref Range Status   10/28/2019 92 70 - 99 mg/dL Final     Urea Nitrogen   Date Value Ref Range Status   10/28/2019 19 7 - 30 mg/dL Final     Creatinine   Date Value Ref Range Status   10/28/2019 1.00 0.66 - 1.25 mg/dL Final     GFR Estimate   Date Value Ref Range Status   10/28/2019 76 >60 mL/min/[1.73_m2] Final     Comment:     Non  GFR Calc  Starting 12/18/2018, serum creatinine based estimated GFR (eGFR) will be   calculated using the Chronic Kidney Disease Epidemiology Collaboration   (CKD-EPI) equation.       Calcium    Date Value Ref Range Status   10/28/2019 8.0 (L) 8.5 - 10.1 mg/dL Final       ASSESSMENT/PLAN:  Right basilar healthcare associated pneumonia  with sepsis.  Inpatient, he was treated with intravenous vancomycin and Zosyn.  Upon discharge  antibiotic therapy was transitioned to oral Augmentin.  Plan:  Continue Augmentin 875-125 mg p.o. twice daily through November 1, 2019  Continue supportive care    Orthostatic hypotension with syncope.   He was seen by cardiology both outpatient and inpatient and Revatio was discontinued, torsemide was decreased to 5 mg p.o. twice daily, and he was started on midodrine 5 mg p.o. tid, which subsequently increased to 10 mg p.o. 3 times daily. Compression stockings were recommended. .   Plan:   Continue compression stockings   Continue midodrine 10 mg p.o. 3 times daily   Monitor orthostatic vitals   ?   Interstitial lung disease with pulmonary fibrosis,   Dermatomyositis,   Cor pulmonale,   Chronic hypoxic respiratory failure, on oxygen 2 L/min,   Chronic diastolic heart failure, LVEF 55-60%,   Coronary artery disease, status post drug-eluting stent x2 to LAD on July 30, 2019,   Status post TAVR on September 14, 2019 for severe aortic stenosis,   Status post permanent pacemaker for complete heart block,   Atrial flutter,  PAD (involving right infrapopliteal region and distal left posterior tibial artery),  Dyslipidemia.   Chronic hypoxia is multifactorial due to ILD, heart failure, and physical deconditioning.   Cardiac medications were adjusted in the hospital as well as in the clinic, due to orthostatic hypotension, as outlined in HPI.   Digoxin was added for atrial flutter.  Patient is chronically dyspneic and requires supplemental oxygen 2 L/min.  Plan:   Continue supplemental oxygen   Continue aspirin 81 mg daily, Plavix 75 g daily, torsemide 10 mg p.o. the morning and 5 mg p.o. in the afternoon, digoxin 125 mcg p.o. daily, and atorvastatin 40 mg daily   Continue methotrexate  25 mg p.o. every Tuesday   Monitor respiratory status, blood pressure, heart rate, renal function, electrolytes  Follow-up with cardiology and pulmonology clinic as scheduled     Diarrhea.  Likely due to antibiotics.  Plan:  Check C. difficile  ?   Left upper lobe pulmonary nodule.   Measuring 1.6 cm, noted on CT chest on October 9, 2019.   Plan:   Follow-up in 3 to 6 months   ?   Obstructive sleep apnea.   Plan:   Patient states he has not used his CPAP since he was started on supplemental nasal cannula oxygen.  ?   Diabetes mellitus type 2.   Last hemoglobin A1c was 6.7% on August 29, 2019.   Prior to admission he was on NPH which was stopped in the hospital.   Blood glucose levels are currently in the range of  mg/dL.   Plan:   Continue sliding-scale NovoLog   Continue to monitor blood glucose   ?   Hypothyroidism.   Plan:   Continue prior to admission levothyroxine 50 mg p.o. daily   ?   Chronic anemia.   Baseline hemoglobin is in the range of 8-10 which appears to be stable.   There are no signs and symptoms of GI bleed.   Plan:   Continue iron supplement, ferrous gluconate, 324 mg p.o. daily   Continue to monitor hemoglobin periodically     Physical deconditioning.   Plan:   PT/OT evaluation therapy    Total unit/floor time of 50 minutes consisted of the following: examination of patient, reviewing the record including pertinent labs and imaging. More than 50% of this time was spent in coordination of care with the patient, nursing staff, and other healthcare providers. This time was spent on discussing the care plan including medication changes, specialty follow up, prognosis, and execrations.       Electronically signed by:  Lucie Cotton MD

## 2019-10-31 NOTE — LETTER
10/31/2019        RE: Beulah Jane  [address]  [place] [address]        [facility]  PRIMARY CARE PROVIDER AND CLINIC:  Hoa López MD, [address] / [place] [address]  Chief Complaint   Patient presents with     Hospital F/U     [facility] Record Number:  [id number]  Place of Service where encounter took place:  [facility] ([facility]) [[id number]]    Beulah Jane  is a 69 year old  (1950), re-admitted to the above facility from  [facility]. Hospital stay 10/25/19 - 10/30/19. .  Admitted to this facility for  rehab, medical management and nursing care.    HPI:    HPI information obtained from: facility chart records, facility staff, patient report and [facility] chart review.   Brief Summary of Hospital Course: patient sent to ED from [facility] due to lightheadedness and shortness of breath. PMH includes DM2, CAD (angioplasty to mid and distal LAD 7/2019), CHF, severe aortic valve stenosis s/p TAVR 9/2019 PM placed 9/2019, s/p NSTEMI, h/o PULMONARY EMBOLISM, chronic hypoxia, pulmonary fibrosis, JUAN, ILD, DDD-lumbar, dermatmyositis, chronic pain.   Work up in ED included CXR that showed bilateral patchy interstitial lung opacities, WBC 16, lactate 4.6,  K 6, creat 1.32. he was started on IV vanco and zosyn for pneumonia and suspected sepsis. Vascular surgery was consulted due to concern for PAD. An arterial duplex showed occlusive lesions in right infrapopliteal region and distal left posterior tibial artery. Angiogram not recommended due to co morbidities.   Cardiology was consulted due to tachycardia with concerns for atrial flutter but treatment limited due to hypotension and lung disease. Patient was started on digoxin.   Pulmonology was consulted and concurred with plan of care and recommended not starting steroids.   Palliative care team met with patient and family. Plan to start hospice care discussed. Then at  time of discharge decision made to hold on hospice and instead try some rehab to get stronger.    Antibiotics changed to oral and patient transferred to TCU for strengthening.    Updates on Status Since Skilled nursing Admission: patient reports feeling tired. He reports some LE pain. He also reports having up to 5 loose stools daily since starting digoxin.     CODE STATUS/ADVANCE DIRECTIVES DISCUSSION:   DNR / DNI  Patient's living condition: lives with spouse  ALLERGIES: Patient has no known allergies.  PAST MEDICAL HISTORY:  has a past medical history of Aortic stenosis, Chronic pain, DM (diabetes mellitus), type 2 (H), Hyperlipidemia, JUAN on CPAP, Pneumonia, Polymyositis (H), Pulmonary fibrosis, unspecified (H), and Seasonal allergies.  PAST SURGICAL HISTORY:   has a past surgical history that includes hernia repair (2006); removal prosthetic material/mesh, abd wall necro tiss infexn (2012); Decompression, fusion lumbar posterior two levels, combined (1997); lumbar spine hardware removal (1999); rotator cuff repair rt/lt (2003); Arthroscopy knee (1970); colonoscopy; Fusion lumbar anterior three+ levels (N/A, 4/22/2019); Optical tracking system fusion posterior spine thoracic three+ levels (N/A, 4/25/2019); picc insertion (Right, 05/06/2019); Coronary Angiogram (N/A, 7/30/2019); Right Heart Cath (N/A, 7/30/2019); Left Heart Cath (N/A, 7/30/2019); PCI Stent Drug Eluting (Left, 7/30/2019); EP Pacemaker (N/A, 9/6/2019); Transcatheter Aortic Valve Replacement (N/A, 9/4/2019); Heart Cath Femoral Cannulization With Open Standby Repair Aortic Valve (N/A, 9/4/2019); Right Heart Cath (N/A, 9/27/2019); and Right Heart Cath (N/A, 10/7/2019).  FAMILY HISTORY: family history includes Colon Cancer in his mother; Hypertension in his father.  SOCIAL HISTORY:   reports that he quit smoking about 49 years ago. He smoked 0.25 packs per day. He has never used smokeless tobacco. He reports current alcohol use. He reports that he does  not use drugs.    Post Discharge Medication Reconciliation Status: discharge medications reconciled, continue medications without change    Current Outpatient Medications   Medication Sig Dispense Refill     acetaminophen (TYLENOL) 325 MG tablet Take 2 tablets (650 mg) by mouth every 4 hours as needed for mild pain       albuterol (2.5 MG/3ML) 0.083% neb solution Take 1 vial by nebulization every 6 hours as needed for shortness of breath / dyspnea or wheezing       amoxicillin-clavulanate (AUGMENTIN) 875-125 MG tablet Take 1 tablet by mouth 2 times daily for 2 days       aspirin (ASA) 81 MG EC tablet Take 1 tablet (81 mg) by mouth daily Start tomorrow morning. 90 tablet 3     atorvastatin (LIPITOR) 40 MG tablet Take 1 tablet (40 mg) by mouth daily 90 tablet 3     benzocaine-menthol (CEPACOL) 15-3.6 MG lozenge Place 1 lozenge inside cheek every hour as needed for sore throat 60 lozenge 0     calcium carbonate (OS-DMITRIY 500 MG [place]. [place]) 500 MG tablet Take 1 tablet (500 mg) by mouth 2 times daily 180 tablet 3     Cholecalciferol (VITAMIN D) 2000 units tablet Take 2,000 Units by mouth daily 100 tablet 3     clopidogrel (PLAVIX) 75 MG tablet Take 1 tablet (75 mg) by mouth daily 30 tablet 0     digoxin (LANOXIN) 125 MCG tablet Take 1 tablet (125 mcg) by mouth daily       diphenhydrAMINE (BENADRYL) 50 MG capsule Take 50 mg by mouth as needed for itching or allergies        docusate sodium (COLACE) 100 MG capsule Take 1 capsule (100 mg) by mouth At Bedtime For constipation. Hold for loose stools 30 capsule 0     ferrous gluconate (FERGON) 324 (38 Fe) MG tablet Take 1 tablet (324 mg) by mouth daily (with breakfast) 30 tablet 0     folic acid (FOLVITE) 1 MG tablet Take 1 tablet (1 mg) by mouth every morning 90 tablet 3     gabapentin (NEURONTIN) 400 MG capsule Take 1,200 mg by mouth 3 times daily 0700 1400 2100 with percocet       insulin aspart (NOVOLOG VIAL) 100 UNITS/ML vial Give before meals and before bed:  For Pre-Meal  "Glucose:  140-189 give 1 unit   190-239 give 2 units   240-289 give 3 units   290-339 give 4 units   = or >340 give 5 units     For Bedtime Glucose  200 - 239 give 1 units   240 - 289 give 1.5 units  290 - 339 give 2 units  = or >340 give 2.5 units 10 mL 0     insulin syringe-needle U-100 (29G X 1/2\" 1 ML) 29G X 1/2\" 1 ML miscellaneous Inject 1 Syringe Subcutaneous 2 times daily 200 each 11     LANsoprazole (PREVACID) 30 MG DR capsule Take 30 mg by mouth every morning (before breakfast)       levothyroxine (SYNTHROID/LEVOTHROID) 50 MCG tablet Take 50 mcg by mouth every morning        methotrexate 2.5 MG tablet Take 10 tablets (25 mg) by mouth once a week Tuesday 120 tablet 3     midodrine (PROAMATINE) 10 MG tablet Take 1 tablet (10 mg) by mouth 3 times daily (with meals)       omega 3 1000 MG CAPS Take 2 capsules by mouth every morning        oxyCODONE (ROXICODONE) 5 MG tablet Take 1 tablet (5 mg) by mouth every 3 hours as needed for moderate to severe pain or severe pain 15 tablet 0     potassium chloride ER (K-DUR/KLOR-CON M) 20 MEQ CR tablet Take 1 tablet (20 mEq) by mouth daily 30 tablet 0     senna-docusate (SENOKOT-S/PERICOLACE) 8.6-50 MG tablet Take 1 tablet by mouth 2 times daily as needed for constipation       torsemide (DEMADEX) 5 MG tablet Take 10 mg by mouth every morning       torsemide (DEMADEX) 5 MG tablet Take 5 mg by mouth daily At 1600       Wt Readings from Last 5 Encounters:   10/31/19 77.5 kg (170 lb 12.8 oz)   10/30/19 75.9 kg (167 lb 5.3 oz)   10/24/19 69.7 kg (153 lb 9.6 oz)   10/23/19 73.6 kg (162 lb 4.1 oz)   10/23/19 73.6 kg (162 lb 3.2 oz)     ROS:  4 point ROS including Respiratory, CV, GI and , other than that noted in the HPI,  is negative    Vitals:  /71   Pulse 87   Temp 98.4  F (36.9  C)   Resp 18   Ht 1.702 m (5' 7\")   Wt 77.5 kg (170 lb 12.8 oz)   SpO2 98%   BMI 26.75 kg/m     Exam:  GENERAL APPEARANCE:  Alert, in no distress  ENT:  Mouth and posterior oropharynx " normal, moist mucous membranes, hearing acuity adequate   EYES:  EOM, conjunctivae, lids, pupils and irises normal    RESP:  respiratory effort and palpation of chest normal, no respiratory distress, Lung sounds crackles in bases   CV:  Palpation and auscultation of heart done , rate and rhythm irreg, no murmur, no rub or gallop, Edema trace  ABDOMEN:  normal bowel sounds, soft, nontender, no hepatosplenomegaly or other masses  M/S:   Gait and station not observed, Digits and nails onychomycosis   SKIN:  Inspection/Palpation of skin and subcutaneous tissue few sores on left toes. dependendent rubor  NEURO: 2-12 in normal limits and at patient's baseline  PSYCH:  insight and judgement, memory intact , affect and mood normal    Lab/Diagnostic data:  CBC RESULTS:   Recent Labs   Lab Test 10/28/19  0637 10/27/19  0828 10/26/19  0603   WBC  --  13.7* 17.5*   RBC  --  4.24* 4.76   HGB  --  9.0* 9.8*   HCT  --  31.5* 35.8*   MCV  --  74* 75*   MCH  --  21.2* 20.6*   MCHC  --  28.6* 27.4*   RDW  --  22.4* 22.7*    326 488*       Last Basic Metabolic Panel:  Recent Labs   Lab Test 10/28/19  0637 10/26/19  0603    136   POTASSIUM 3.4 4.7   CHLORIDE 105 105   DMITRIY 8.0* 8.1*   CO2 28 22   BUN 19 29   CR 1.00 1.12   GLC 92 95       Liver Function Studies -   Recent Labs   Lab Test 10/13/19  2303 09/25/19  1411   PROTTOTAL 6.8 7.2   ALBUMIN 3.1* 3.4   BILITOTAL 0.3 0.8   ALKPHOS 135 144   AST 27 26   ALT 20 20       TSH   Date Value Ref Range Status   09/26/2019 0.46 0.40 - 4.00 mU/L Final   ]    Lab Results   Component Value Date    A1C 6.7 08/29/2019    A1C 6.3 01/29/2019           ASSESSMENT/PLAN:  (J18.9) Pneumonia of right lung due to infectious organism, unspecified part of lung  (primary encounter diagnosis)  Comment: recent hospitalization due to hypoxia and weakness. He was treated for sepsis due to HCAP- cxr showed patchy interstitial opacities. Treatment included IV zosyn and vancomycin. Antibiotics changed  to augmenting prior to transfer to TCU. He continues on supplemental oxygen. O2 sats in TCU 93-98%. He continues to have occasional cough.   Plan: Augmentin 875/125 mg BID unitl 11/1    (J84.10) Pulmonary fibrosis (H)  (I27.20) Pulmonary hypertension (H)  Comment: followed by pulmonology at [facility]. 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 It is thought his dyspnea is due in part to deconditioning. Spirometry done on 10/23 indicates restrictive disease. He recently stopped sildenafil due to hypotension.   Plan: continue methotrexate weekly  physical therapy   Monitor respiratory status  Follow up with pulmonology team at [facility] prn    (M19.90) Inflammatory arthritis  Comment: patient has chronic pain and attributes this to dermatomyositis. He follows with a pain clinic and rheumatology at [facility] .   Plan: gabapentin 1200mg TID  Oxycodone 5mg TID  physical therapy     (R00.0) Tachycardia  Comment: patient developed tachycardia thought due to atrial flutter. Cardiology was consulted and he was started on digoxin. HR in TCU now running 80-90s  Plan: monitor HR  Continue digoxin 125mcg daily    (I50.32) Congestive heart failure, NYHA class III, chronic, diastolic (H)  Comment: likely due to chronic lung disease. Echo done in September showed preserved LV function with mild to moderately dilated and mildly decreased RV function. Dry wt 150#. He has had problems with ortho stasis due to sildenafil and diuretics. Not sure if today's wt is accurate. Diuretics were reduced during hospital stay but restarted at PTA dose upon transfer to TCU  Plan: daily wts  Torsemide 10mg q am and 5mg q pm  kcl 20 meq daily  BMP 11/4  Midodrine 10mg TID for orthostasis    (I25.118) Coronary artery disease involving native coronary artery of native heart with other form of angina pectoris (H)  (Z95.2) S/P TAVR (transcatheter aortic valve replacement)  (Z95.0) Cardiac resynchronization therapy pacemaker (CRT-P) in  place  Comment: s/p TAVR in September 2019, PM placed at that time. In July 2019 patient had CHAU to to the mid and distal LAD. No c/o chest pain today  Plan: plavix 75mg q day  Atorvastatin 40mg q day    (E11.40,  Z79.4) Type 2 diabetes mellitus with diabetic neuropathy, with long-term current use of insulin (H)  Comment: he has been on NPH in past. Last A1C 6.7 in August 2019. BG in -150.   Plan: novolog sliding scale w meals and at HS  FBG QID    (I73.9) Peripheral artery disease (H)  (S91.302D) Open wound of left foot excluding one or more toes, subsequent encounter  Comment: patient recently developed sores on toes on left foot. He was evaluated by vascular medicine. Arterial duplex showed steno occuclusive lesions in right infrapopliteal region and distal left posterior tibial artery. Decision made to monitor. Angiogram not recommended due to co morbidities  Plan: monitor LEs  Local wound care    (E03.9) Hypothyroidism, unspecified type  Comment:   Lab Results   Component Value Date    TSH 0.46 09/26/2019       Plan: continue with current levothyroxine dose    (R53.81) Physical deconditioning  Comment: due to chronic lung and heart disease. He feels tired and cannot walk much at this point. He lives with wife in apartment. He would like to get stronger.   Plan: physical therapy and OCCUPATIONAL THERAPY     (R19.7) Diarrhea of presumed infectious origin  Comment: patient reports having 5 or 6 loose stools per day. Likely due to antibiotics  Plan: stool for C diff    Advanced directives  Comment: patient did meet with palliative care in hospital. He was almost signed up for hospice but then decided to try to get stronger with therapy. We did review POLST today and he confirmed DNR DNI  Plan:   DNR DNI    Total time spent with patient visit at the skilled nursing facility was 39 minutes including patient visit, review of past records and meeting with family. Greater than 50% of total time spent with  counseling and coordinating care due to complex medical issues, declining status and discussion of goals of care.     Electronically signed by:  ANASTACIA Mcmanus CNP                         Sincerely,        ANASTACIA Mcmanus CNP

## 2019-10-31 NOTE — PROGRESS NOTES
Clinic Care Coordination Contact  Care Coordination Transition Communication    Referral Source: IP Report    Clinical Data: Patient was hospitalized at [facility]  from 10/25 to 10/30/2019 with diagnosis of   Discharge Diagnoses     1. Suspected HAP with severe sepsis  2. Pulmonary fibrosis w/moderate to severe pulmonary HTN and chronic hypoxic respiratory failure on O2 chronically  3. Acute on chronic right sided CHF w/possible left sided diastolic dysfunction of the LV vs Cor pulmonale  4. Suspected atrial flutter  5. H/o SVT   6. SHIRIN   7. H/o PAD   8. DM type II   9. H/o Severe AS s/p TAVR on 9/7/19  10. H/o Complete heart block s/p PPM   11. Chronic iron deficiency anemia       Transition to Facility:              [facility]              Contact name and phone number/fax: CC  faxed contact letter to TCU to call back when discharged from home care   Plan: RN/SW Care Coordinator will await notification from facility staff informing RN/SW Care Coordinator of patient's discharge plans/needs. RN/SW Care Coordinator will review chart and outreach to facility staff every 4 weeks and as needed.     [facility]     Yesica Manning  RN Care Coordinator   [facility] / [facility]   Phone: [phone]  Email :  [email or web address]

## 2019-11-01 NOTE — LETTER
11/1/2019        RE: Beulah Jane  [address]  [place] [address]        [facility]  INITIAL VISIT NOTE  November 1, 2019    PRIMARY CARE PROVIDER AND CLINIC:  Hoa López [address] [facility] [address] / [place] [address]    Chief Complaint   Patient presents with     Hospital F/U       HPI:    Beulah Jane is a 69 year old  (1950) male who [facility] for an initial visit. Medical history is notable for Beulah Jane is a 69 year old (1950) male who was [facility], 2019 for an initial visit. Medical history is notable for chronic diastolic heart failure, interstitial lung disease, pulmonary hypertension, chronic hypoxic respiratory failure, obstructive sleep apnea, status post TAVR, status post permanent pacemaker, coronary artery disease, dermatomyositis, diabetes mellitus type 2, dyslipidemia, hypothyroidism, and chronic anemia. He was recently hospitalized at [facility] from October 13 through October 18, 2019 for symptomatic orthostatic hypotension and syncope. CT head was negative for acute intracranial pathology. Chest x-ray revealed cardiomegaly and pulmonary vascular congestion. EKG revealed inferior T wave changes. Troponin I was 0.182. Echocardiogram on October 14 revealed LV ejection fraction of 55 to 60%, moderate-severe pulmonary hypertension, mild-moderately reduced RV systolic function, and mild TR. Cardiology was consulted. Revatio was held, torsemide dose was reduced, and he was started on midodrine. Compression stockings were recommended.  He was eventually [facility].  On October 25, 2019, he had a near syncopal episode in TCU and was noted to be hypotensive.  Therefore he was sent back to the emergency department at [facility].  In ED he was found to have leukocytosis and tachycardia.  Blood pressure was 88/54.  Lactic  acid was elevated at 4.6.  Chest x-ray showed bilateral patchy interstitial opacities, most prominent in the right lung base, concerning for pneumonia.  He was started empirically on intravenous Zosyn and vancomycin for suspected healthcare associated pneumonia with severe sepsis.  He was resuscitated with intravenous fluids.  Sputum culture was contaminant.Blood cultures remained negative. His condition improved, and he was discharged on oral Augmentin to complete the course as outpatient. Notably, his creatinine was 1.32 on arrival which improved with IV fluids.  Vascular surgery was consulted due to concern for PAD. An arterial duplex showed sonographic findings indicating steno-occlusive lesions in the right infrapopliteal region and in the distal left posterior tibial artery. Angiogram was not recommended due to co morbidities. EKG showed ventricular paced rhythm with underlying atrial flutter. Cardiology was consulted due to tachycardia, but treatment was limited due to hypotension and lung disease. Patient was started on digoxin. Pulmonology was consulted and concurred with plan of care and recommended not starting steroids. Palliative care team met with patient and family. Plan to start hospice care was discussed. Then at time of discharge decision made to hold on hospice and instead try restorative care.     Patient is admitted to this facility for medical management, nursing care, and rehab.     Patient was seen today in his room, while sitting at the edge of the bed.  He continues to require supplemental oxygen.  He is chronically dyspneic but reports no worsening of breathing today.  He complains of occasional cough which is nonproductive.  He denies fever, chills, chest pain, nausea, vomiting, abdominal pain, or urinary symptoms.  He does endorse loose bowel movements, 3-4 times a day.  He denies dizziness/lightheadedness upon standing.    CODE STATUS:   DNR / DNI    PAST MEDICAL HISTORY:   Healthcare  associated pneumonia, in October 2019, as outlined in HPI  Chronic diastolic heart failure (HFpEF), LV ejection fraction 55 to 60% by echo on October 14, 2019   Interstitial lung disease/pulmonary fibrosis, due to dermatomyositis. PFT on October 22, 2019 with severe restriction.   Obstructive sleep apnea, on CPAP   Moderate-severe pulmonary hypertension/cor pulmonale (pulmonary hypertension WHO group II and III)   Mild-moderately reduced RV systolic function   Chronic hypoxic respiratory failure, multifactorial due to above, on home oxygen at 2 L/min   Severe aortic stenosis, status post TAVR, on September 4, 2019   Atrial flutter, noted in 10/2019  Complete heart block, status post permanent pacemaker   Coronary artery disease, status post CHAU x2 to proximal-mid LAD, on July 30, 2019   PAD (involving right infrapopliteal region and distal left posterior tibial artery)  Right bundle branch block   Dermatomyositis   Reactive thrombocytosis   Diabetes mellitus type 2   Dyslipidemia   Hypothyroidism   Peripheral arterial disease   Anemia of chronic disease, baseline hemoglobin 8-10   Degenerative disc disease of the lumbar spine   Left upper lobe pulmonary nodule, measuring 1.6 cm, noted on CT ,October 19, 2019    PAST SURGICAL HISTORY:   Past Surgical History:   Procedure Laterality Date     ARTHROSCOPY KNEE  1970     COLONOSCOPY       CV CORONARY ANGIOGRAM N/A 7/30/2019    Procedure: CV CORONARY ANGIOGRAM;  Surgeon: Leonidas Mike MD;  Location:  HEART CARDIAC CATH LAB     CV LEFT HEART CATH N/A 7/30/2019    Procedure: CV LEFT HEART CATH;  Surgeon: Leonidas Mike MD;  Location:  HEART CARDIAC CATH LAB     CV PCI STENT DRUG ELUTING Left 7/30/2019    Procedure: PCI Stent Drug Eluting;  Surgeon: Leonidas Mike MD;  Location:  HEART CARDIAC CATH LAB     CV RIGHT HEART CATH N/A 7/30/2019    Procedure: CV RIGHT HEART CATH;  Surgeon: Leonidas Mike MD;  [facility]  [facility]     CV RIGHT HEART CATH N/A 9/27/2019    Procedure: Right Heart Cath;  Surgeon: Altaf Camacho MD;  Location:  HEART CARDIAC CATH LAB     CV RIGHT HEART CATH N/A 10/7/2019    Procedure: Right Heart Cath;  Surgeon: Benjamin Mancuso MD;  Location:  HEART CARDIAC CATH LAB     CV TRANSCATHETER AORTIC VALVE REPLACEMENT N/A 9/4/2019    Procedure: Transcatheter (Medtronic - 29mm) Aortic Valve Replacement, trans thorasic echocardiogram, perfusion and cardiac surgery standby, temporary pacemeker placement;  Surgeon: Pj Garcia MD;  Location: [facility] OR     DECOMPRESSION, FUSION LUMBAR POSTERIOR TWO LEVELS, COMBINED  1997     EP PACEMAKER N/A 9/6/2019    Procedure: EP PACEMAKER;  Surgeon: Shefali Sylvester MD;  Location:  HEART CARDIAC CATH LAB     FUSION LUMBAR ANTERIOR THREE+ LEVELS N/A 4/22/2019    Procedure: Lumbar 1 Or Lumbar 2-5 Anterior Lumbar Interbody Fusion with BMP Stage 1 Of Two Procedure:;  Surgeon: Carlos Enrique Dial MD;  Location: [facility] OR     HEART CATH FEMORAL CANNULIZATION WITH OPEN STANDBY REPAIR AORTIC VALVE N/A 9/4/2019    Procedure: Cardiopulmonary Bypass Standby;  Surgeon: Hamilton Carnes MD;  Location: [facility] OR     HERNIA REPAIR  2006     lumbar spine hardware removal  1999     OPTICAL TRACKING SYSTEM FUSION POSTERIOR SPINE THORACIC THREE+ LEVELS N/A 4/25/2019    Procedure: O-Arm/Stealth Assisted Thoracic 10-Pelvis Instrumented Fusion T11-2, L1-2, L2-3 Transforminal Lumbar Interbody Fusion (TLIF) And Smith Borges Osteotomies,and L3-4 SPO as well, Use Of BMP, Debridement Of Seroma;  Surgeon: Carlos Enrique Dial MD;  Location: [facility] OR     PICC INSERTION Right 05/06/2019    4Fr - 37cm, Basilic vein, low SVC     REMOVAL PROSTHETIC MATERIAL/MESH, ABD WALL NECRO TISS INFEXN  2012     ROTATOR CUFF REPAIR RT/LT  2003       FAMILY HISTORY:   Family History   Problem Relation Age of Onset     Colon Cancer Mother      Hypertension Father         did not know father well        SOCIAL HISTORY:  Patient is originally from [place].  He is  and lives in an apartment with his wife.  He has lived in [place] for the past 3 years.  He does use a walker for ambulation.    Social History     Tobacco Use     Smoking status: Former Smoker     Packs/day: 0.25     Last attempt to quit: 1970     Years since quittin.3     Smokeless tobacco: Never Used   Substance Use Topics     Alcohol use: Yes     Comment: few times monthly       MEDICATIONS:  Current Outpatient Medications   Medication Sig Dispense Refill     acetaminophen (TYLENOL) 325 MG tablet Take 2 tablets (650 mg) by mouth every 4 hours as needed for mild pain       albuterol (2.5 MG/3ML) 0.083% neb solution Take 1 vial by nebulization every 6 hours as needed for shortness of breath / dyspnea or wheezing       amoxicillin-clavulanate (AUGMENTIN) 875-125 MG tablet Take 1 tablet by mouth 2 times daily for 2 days       aspirin (ASA) 81 MG EC tablet Take 1 tablet (81 mg) by mouth daily Start tomorrow morning. 90 tablet 3     atorvastatin (LIPITOR) 40 MG tablet Take 1 tablet (40 mg) by mouth daily 90 tablet 3     benzocaine-menthol (CEPACOL) 15-3.6 MG lozenge Place 1 lozenge inside cheek every hour as needed for sore throat 60 lozenge 0     calcium carbonate (OS-DMITRIY 500 MG Cheesh-Na. CA) 500 MG tablet Take 1 tablet (500 mg) by mouth 2 times daily 180 tablet 3     Cholecalciferol (VITAMIN D) 2000 units tablet Take 2,000 Units by mouth daily 100 tablet 3     clopidogrel (PLAVIX) 75 MG tablet Take 1 tablet (75 mg) by mouth daily 30 tablet 0     digoxin (LANOXIN) 125 MCG tablet Take 1 tablet (125 mcg) by mouth daily       diphenhydrAMINE (BENADRYL) 50 MG capsule Take 50 mg by mouth as needed for itching or allergies        docusate sodium (COLACE) 100 MG capsule Take 1 capsule (100 mg) by mouth At Bedtime For constipation. Hold for loose stools 30 capsule 0     ferrous gluconate (FERGON) 324 (38 Fe) MG tablet Take 1 tablet (324 mg)  "by mouth daily (with breakfast) 30 tablet 0     folic acid (FOLVITE) 1 MG tablet Take 1 tablet (1 mg) by mouth every morning 90 tablet 3     gabapentin (NEURONTIN) 400 MG capsule Take 1,200 mg by mouth 3 times daily 0700 1400 2100 with percocet       insulin aspart (NOVOLOG VIAL) 100 UNITS/ML vial Give before meals and before bed:  For Pre-Meal Glucose:  140-189 give 1 unit   190-239 give 2 units   240-289 give 3 units   290-339 give 4 units   = or >340 give 5 units     For Bedtime Glucose  200 - 239 give 1 units   240 - 289 give 1.5 units  290 - 339 give 2 units  = or >340 give 2.5 units 10 mL 0     insulin syringe-needle U-100 (29G X 1/2\" 1 ML) 29G X 1/2\" 1 ML miscellaneous Inject 1 Syringe Subcutaneous 2 times daily 200 each 11     LANsoprazole (PREVACID) 30 MG DR capsule Take 30 mg by mouth every morning (before breakfast)       levothyroxine (SYNTHROID/LEVOTHROID) 50 MCG tablet Take 50 mcg by mouth every morning        methotrexate 2.5 MG tablet Take 10 tablets (25 mg) by mouth once a week Tuesday 120 tablet 3     midodrine (PROAMATINE) 10 MG tablet Take 1 tablet (10 mg) by mouth 3 times daily (with meals)       omega 3 1000 MG CAPS Take 2 capsules by mouth every morning        oxyCODONE (ROXICODONE) 5 MG tablet Take 1 tablet (5 mg) by mouth 3 times daily Give with gabapentin 120 tablet 0     potassium chloride ER (K-DUR/KLOR-CON M) 20 MEQ CR tablet Take 1 tablet (20 mEq) by mouth daily 30 tablet 0     senna-docusate (SENOKOT-S/PERICOLACE) 8.6-50 MG tablet Take 1 tablet by mouth 2 times daily as needed for constipation       torsemide (DEMADEX) 5 MG tablet Take 10 mg by mouth every morning       torsemide (DEMADEX) 5 MG tablet Take 5 mg by mouth daily At 1600         ALLERGIES:  No Known Allergies    ROS:  10 point ROS neg other than the symptoms noted above in the HPI.    PHYSICAL EXAM:  Vitals: Blood pressure 103/48, heart rate 65, respiratory rate 18, temperature 99  F, oxygen saturation 92%  Gen: " Cooperative and in no acute distress, generally weak appearing  HEENT: Normocephalic; oropharynx clear, conjunctival pallor+  Card: Normal S1, S2, RRR  Resp: Bilateral coarse crackles, diffuse, but more prominent in lung bases  GI: Abdomen soft, not-tender, non-distended, +BS  Ext: 2-3+ B/L LE edema  Neuro: CX II-XII grossly intact; ROM in all four extremities grossly in tact  Psych: Alert and oriented x3; normal affect  Skin: No acute rash    LABORATORY/IMAGING DATA:  Lab Results   Component Value Date    WBC 13.7 10/27/2019     Lab Results   Component Value Date    RBC 4.24 10/27/2019     Lab Results   Component Value Date    HGB 9.0 10/27/2019     Lab Results   Component Value Date    HCT 31.5 10/27/2019     Lab Results   Component Value Date    MCV 74 10/27/2019     Lab Results   Component Value Date    MCH 21.2 10/27/2019     Lab Results   Component Value Date    MCHC 28.6 10/27/2019     Lab Results   Component Value Date    RDW 22.4 10/27/2019     Lab Results   Component Value Date     10/28/2019     Last Comprehensive Metabolic Panel:  Sodium   Date Value Ref Range Status   10/28/2019 138 133 - 144 mmol/L Final     Potassium   Date Value Ref Range Status   10/28/2019 3.4 3.4 - 5.3 mmol/L Final     Chloride   Date Value Ref Range Status   10/28/2019 105 94 - 109 mmol/L Final     Carbon Dioxide   Date Value Ref Range Status   10/28/2019 28 20 - 32 mmol/L Final     Anion Gap   Date Value Ref Range Status   10/28/2019 5 3 - 14 mmol/L Final     Glucose   Date Value Ref Range Status   10/28/2019 92 70 - 99 mg/dL Final     Urea Nitrogen   Date Value Ref Range Status   10/28/2019 19 7 - 30 mg/dL Final     Creatinine   Date Value Ref Range Status   10/28/2019 1.00 0.66 - 1.25 mg/dL Final     GFR Estimate   Date Value Ref Range Status   10/28/2019 76 >60 mL/min/[1.73_m2] Final     Comment:     Non  GFR Calc  Starting 12/18/2018, serum creatinine based estimated GFR (eGFR) will be   calculated  using the Chronic Kidney Disease Epidemiology Collaboration   (CKD-EPI) equation.       Calcium   Date Value Ref Range Status   10/28/2019 8.0 (L) 8.5 - 10.1 mg/dL Final       ASSESSMENT/PLAN:  Right basilar healthcare associated pneumonia  with sepsis.  Inpatient, he was treated with intravenous vancomycin and Zosyn.  Upon discharge  antibiotic therapy was transitioned to oral Augmentin.  Plan:  Continue Augmentin 875-125 mg p.o. twice daily through November 1, 2019  Continue supportive care    Orthostatic hypotension with syncope.   He was seen by cardiology both outpatient and inpatient and Revatio was discontinued, torsemide was decreased to 5 mg p.o. twice daily, and he was started on midodrine 5 mg p.o. tid, which subsequently increased to 10 mg p.o. 3 times daily. Compression stockings were recommended. .   Plan:   Continue compression stockings   Continue midodrine 10 mg p.o. 3 times daily   Monitor orthostatic vitals   ?   Interstitial lung disease with pulmonary fibrosis,   Dermatomyositis,   Cor pulmonale,   Chronic hypoxic respiratory failure, on oxygen 2 L/min,   Chronic diastolic heart failure, LVEF 55-60%,   Coronary artery disease, status post drug-eluting stent x2 to LAD on July 30, 2019,   Status post TAVR on September 14, 2019 for severe aortic stenosis,   Status post permanent pacemaker for complete heart block,   Atrial flutter,  PAD (involving right infrapopliteal region and distal left posterior tibial artery),  Dyslipidemia.   Chronic hypoxia is multifactorial due to ILD, heart failure, and physical deconditioning.   Cardiac medications were adjusted in the hospital as well as in the clinic, due to orthostatic hypotension, as outlined in HPI.   Digoxin was added for atrial flutter.  Patient is chronically dyspneic and requires supplemental oxygen 2 L/min.    Plan:   Continue supplemental oxygen   Continue aspirin 81 mg daily, Plavix 75 g daily, torsemide 10 mg p.o. the morning and 5 mg p.o.  in the afternoon, digoxin 125 mcg p.o. daily, and atorvastatin 40 mg daily   Continue methotrexate 25 mg p.o. every Tuesday   Monitor respiratory status, blood pressure, heart rate, renal function, electrolytes  Follow-up with cardiology and pulmonology clinic as scheduled     Diarrhea.  Likely due to antibiotics.  Plan:  Check C. difficile  ?   Left upper lobe pulmonary nodule.   Measuring 1.6 cm, noted on CT chest on October 9, 2019.   Plan:   Follow-up in 3 to 6 months   ?   Obstructive sleep apnea.   Plan:   Patient states he has not used his CPAP since he was started on supplemental nasal cannula oxygen.  ?   Diabetes mellitus type 2.   Last hemoglobin A1c was 6.7% on August 29, 2019.   Prior to admission he was on NPH which was stopped in the hospital.   Blood glucose levels are currently in the range of  mg/dL.   Plan:   Continue sliding-scale NovoLog   Continue to monitor blood glucose   ?   Hypothyroidism.   Plan:   Continue prior to admission levothyroxine 50 mg p.o. daily   ?   Chronic anemia.   Baseline hemoglobin is in the range of 8-10 which appears to be stable.   There are no signs and symptoms of GI bleed.   Plan:   Continue iron supplement, ferrous gluconate, 324 mg p.o. daily   Continue to monitor hemoglobin periodically     Physical deconditioning.   Plan:   PT/OT evaluation therapy    Total unit/floor time of 50 minutes consisted of the following: examination of patient, reviewing the record including pertinent labs and imaging. More than 50% of this time was spent in coordination of care with the patient, nursing staff, and other healthcare providers. This time was spent on discussing the care plan including medication changes, specialty follow up, prognosis, and execrations.       Electronically signed by:  Lucie Cotton MD                      Sincerely,        Lucie Cotton MD

## 2019-11-02 NOTE — TELEPHONE ENCOUNTER
Called re: change in condition. Temp 100.3. RR 40. O2 83% on 5L by face mask. Family present. They do not want him hospitalized. They would like a hospice consult tomorrow.     Reviewed available meds in E Kit:  - lorazepam 0.25 mg tabs in E Kit  - morphine 20 mg/mL in E Kit    Plan:  -- morphine 20 mg/mL - give  2.5 mg q1h PRN for dyspnea, pain, anxiety; may give additional 2.5 mg q1h PRN for ongoing dyspnea, pain, anxiety (max 5 mg/hr)  -- lorazepam 2 mg/mL -- give 0.5 mg q2h PRN for anxiety, dyspnea, pain  -- atropine -- 1 drop sublingual q2h PRN  -- change frequency of albuterol nebs from q6h PRN to q2h PRN  -- OK for hospice consult tomorrow    OK for morphine and lorazepam from E Kit until supply arrives from pharmacy     Caroline Tracey MD     ADDENDUM 8042  Nurse called to report patient death. He  prior to comfort meds being able to be initiated. Family present. Ok given to release the body once they are read. No need for call to the medical examiner.     Caroline Tracey MD

## 2020-10-30 NOTE — DISCHARGE INSTRUCTIONS
-The patient will discharge home with [facility] with RN/PT/OT.  [facility] will contact the patient directly to arrange the first visit.  [facility]'s phone number is [phone].    Oxygen Provider:  Arranged through [facility], contact number [phone]. If you have any questions or concerns please call the oxygen company directly.      
denies all

## 2020-12-10 NOTE — PLAN OF CARE
Problem: Patient Care Overview  Goal: Plan of Care/Patient Progress Review  Outcome: Improving  Lungs coarse with expiratory wheezes but improving. Patient states pain is improving with the pain medication changes of Gabapentin and Tramadol. Patient states his breathing is improving. Infrequent cough.       
Problem: Patient Care Overview  Goal: Plan of Care/Patient Progress Review  Outcome: Improving  Vital signs stable. O2 sats >92% on RA. On telemetry monitoring- SR with inverted T waves.  LS fine crackles in the bases. Dry cough. Slight dyspnea upon exertion- improving. On zosyn, zithromax, prednisone and nebs.  Chronic pain relieved with scheduled and PRN meds- changes made to medications to improve pain.  Up with SBA and cane.      
Problem: Patient Care Overview  Goal: Plan of Care/Patient Progress Review  Outcome: Improving  Vital signs stable. O2 sats stable >92% on RA. BG checks 90 and 222.  LS clear with fine crackles in bases. Chronic pulmonary fibrosis. Slight dyspnea when ambulating longer distances. Dry nonproductive cough. On IV zosyn and neb treatments. IS encouraged.  Pain r/t polymyositis significantly improved from yesterday per pt report. On scheduled gabapentin/prednisone and PRN ultram.   Up with SBA and cane.  Pt will D/C later today once ride is available and D/C meds are filled.      
Problem: Patient Care Overview  Goal: Plan of Care/Patient Progress Review  Outcome: No Change  AO x 4. Stand by assist with cane. Complains of generalized pain, states he wishes to have his home pain regimen reordered because he feels his pain is not controlled. On IV antibiotics for pneumonia. Patient is using the Incentive Spirometer at bedside. Declined to go for a walk at this point in time. Lungs coarse crackles. Dyspnea with exertion. Tele SR with BBB.      
Problem: Pneumonia (Adult)  Goal: Signs and Symptoms of Listed Potential Problems Will be Absent, Minimized or Managed (Pneumonia)  Signs and symptoms of listed potential problems will be absent, minimized or managed by discharge/transition of care (reference Pneumonia (Adult) CPG).   Outcome: Improving  . Woke wet from sweat. v Afebrile. Vs triggered the sepsis protocol. Lactic 2 Bolus ordered. Will need lactic recheck.  A&Ox4 up with A1 and cane. Alarms on. Calls for assist. Good appetite. LS dim crackles. sats 96% RA. Pt states his breathing is better.       
Problem: Pneumonia (Adult)  Goal: Signs and Symptoms of Listed Potential Problems Will be Absent, Minimized or Managed (Pneumonia)  Signs and symptoms of listed potential problems will be absent, minimized or managed by discharge/transition of care (reference Pneumonia (Adult) CPG).   Outcome: Improving  VSS afebrile, on room air, sats mid 90's. Tele SR/ST at times with activities. GONZALEZ, lung sounds diminished. Cont on Zithromax and Zosyn for PNA. Pt reports feeling better, family at bedside. , on SSI and lantus. Up with A x 1-cane. Will cont with POC.      
Problem: Pneumonia (Adult)  Goal: Signs and Symptoms of Listed Potential Problems Will be Absent, Minimized or Managed (Pneumonia)  Signs and symptoms of listed potential problems will be absent, minimized or managed by discharge/transition of care (reference Pneumonia (Adult) CPG).   Outcome: No Change  A&O x 4, pain managed with ultram and tylenol. VSS afebrile, on room air, sat mid 90's. Tele SR HR 80's. Lung sounds coarse/diminishes to bases. Cont on Zithromax and Zosyn for PNA, droplet precaution maintained. , on SSI and lantus. Up with A x 1-cane. Will cont with POC.        
Problem: Pneumonia (Adult)  Goal: Signs and Symptoms of Listed Potential Problems Will be Absent, Minimized or Managed (Pneumonia)  Signs and symptoms of listed potential problems will be absent, minimized or managed by discharge/transition of care (reference Pneumonia (Adult) CPG).   Outcome: No Change  Admitted from the ED to room 334. Pt's son at bedside, oriented to room and call light use. Received 500mL bolus. On oxygen at 2L per NC, sats mid 90's, rest VS, afebrile. Tele SR w/PAC's, HR 90's-100's. Lung sounds coarse/diminished. , on SSI and lantus. Cont on Zithromax, Zosyn and Duonebs for PNA. Negative for both influenza A and B. On droplet ISO, at Awaiting for sputum sample. BC pending. Trop peak 0.071, BNP 2061 and WBC 26.2. Up with SBA-cane. Will cont with POC      
57

## 2021-01-01 NOTE — TELEPHONE ENCOUNTER
Emailed patient with communication from Marj Tan - asked him to reach out with questions    ID is reporting out an abnormal  screen for possible cystic fibrosis with two gene mutations. Elevated immunoreactive trypsinogen (IRT) level detected. Gene mutations: 3849+10kbC>T, R117H, PT-7T/7T. IRT = 79.6 IDPH is asking for an immediate referral to a medical specialist for cystic fibrosis. Dr Jorge/Nani Kaminski have been notified via fax with confirmation. [facility] genetic specialist has been notified via fax with confirmation and Dr Toni Alvarez PMD has been sent with a confirmation and given an oral handoff concerning the need for a referral to Dr Jorge. Dr Alvarez acknowledged the need for referral.

## 2022-01-01 NOTE — PROGRESS NOTES
[facility]  Hospitalist Progress Note  Ivana Mayo MD 11/29/2017    Reason for Stay (Diagnosis): Acute hypoxic respiratory failure         Assessment and Plan:      Summary of Stay: Beulah Jane is a 67 year old male with a history of T2dm, htn, polymyositis, pulmonary fibrosis on chronic prednisone/mtx, traveled here from [place] after [place] to stay with son for the forseable future admitted on 11/27/2017 with c/o cough, sob, and found to be hypoxic at 88 % in UC and sent to ER for further evaluation.  CXR shows nonspecific extensive pathcy opacities, significantly elevated procal, and leukocytosis all suggestive of pneumonia.  Influenza rapid agn negative. He has been placed on azith/pip-tazo for severe CAP and increased prednisone dosing for ? Component of pulmonary fibrosis exacerbation and continues to feel as if his breathing is improving.  His oxygen requirements have resolved.     Eval now notable for 2/2 BCx + for GN coccobacilli id'd to haemophilus influenza not type B.      His current complaint is of uncontrolled pain related to his polymyositis.    Problem List:   1. Acute hypoxic respiratory failure:  2/2-at risk for opportunistic organisms in setting of chronic prednisone at 20 mg per day.  Now appears most consistent with h flu.  Cont pip tazo as improving, narrow abx as S emerge  2. Pna 2/2 h flu cont with current abx with pip-tazo/azith,  Given chronic prednisone use I was concerned about opportunistic infections especially PJC-LDH mildly elevated, fungetelle and induced sputum still pending-seems less likely in setting of + blood cultures as described above  3. Sepsis:  In light of leukocytosis/hypoxia/lactic acidosis/tachycardia-lactic acid trending in wrong direction despite IVF.  Resolving   4. Troponin elevation on admission:  Minimally elevated at 0.071, returned with wnl this am.  No complaints of chest pain-would rec OP stress test to ensure we didn't  "\"unmask\" prior silent CAD  5. Polymyositis with associated Pulmonary fibrosis;  On chronic prednisone 20 mg and mtx, mtx is on hold and prednisone increased to 40 mg and will need taper at discharge  6. Chronic pain 2/2 polymyositis-he now reports that he has been on gabapentin 600 mg tid in addition to prn oxycodone/apap, he has though run out of his tramadol.  We discussed that oxy in long run not a good agent, I have increased his gabapentin back to home dosing (yesterday I got the impression he had run out of all of his meds except his prednisone and mtx) and prn tramadol, but have recommended against any oxy now or at discharge.   7. T2dm:  Home regimen was NPH 50 u qam, 30 u qpm-currently on glargine 16 u with ISS with good control.  hgb A1c 6.2 % likely due to recent weight loss-blood sugars remain under fair control   8. Htn:  pta regimen verapamil 120 mg qd-BPs under good control   9. Unintentional weight loss 18 # over past 2.5 months-suspect due to brewing illness, recent multiple stressors, qualifies for malnutrition but appetite here is excellent  DVT Prophylaxis: Heparin SQ  Code Status: Full Code  Discharge Dispo: home with son  Estimated Disch Date / # of Days until Disch: unclear ? 1-2 days        Interval History (Subjective):      Breathing continues to improve.  Now is complaining of polymyositis pain.  In [place] he was on a \"cocktail\" of claudio 600 mg tid, prn tramadol, prn oxy-apap.  But now that pain is out of control since only being on low dose claudio.     Denies cp n/v/d or abdominal pain                   Physical Exam:      Last Vital Signs:  /57 (BP Location: Right arm)  Pulse 132  Temp 96.9  F (36.1  C) (Oral)  Resp 20  Ht 1.702 m (5' 7.01\")  Wt 76.1 kg (167 lb 11.2 oz)  SpO2 97%  BMI 26.26 kg/m2  HR vary  range,     Pleasant nad looks < stated age head nc/at sclera clear diffuse dry crackles worse in [place] field with occasional wheeze rrr no m/r/g no le edema " DISPLAY PLAN FREE TEXT skin w/d no c/c abd s/nt/nd alert and oriented affect appropriate [place]            Medications:      All current medications were reviewed with changes reflected in problem list.         Data:      All new lab and imaging data was reviewed.   Labs:    Recent Labs  Lab 11/29/17  0553      POTASSIUM 3.5   CHLORIDE 108   CO2 25   ANIONGAP 8   *   BUN 23   CR 0.96   GFRESTIMATED 77   GFRESTBLACK >90   DMITRIY 8.7       Recent Labs  Lab 11/29/17  0553   WBC 14.4*   HGB 10.9*   HCT 34.4*   MCV 95         Imaging:          DISPLAY PLAN FREE TEXT DISPLAY PLAN FREE TEXT DISPLAY PLAN FREE TEXT DISPLAY PLAN FREE TEXT DISPLAY PLAN FREE TEXT DISPLAY PLAN FREE TEXT DISPLAY PLAN FREE TEXT DISPLAY PLAN FREE TEXT DISPLAY PLAN FREE TEXT DISPLAY PLAN FREE TEXT DISPLAY PLAN FREE TEXT DISPLAY PLAN FREE TEXT DISPLAY PLAN FREE TEXT DISPLAY PLAN FREE TEXT

## 2022-03-18 NOTE — PROGRESS NOTES
Social Work Services Progress Note     Hospital Day: 16  Date of Initial Social Work Evaluation:  4/29/19  Collaborated with:  Primary team Gold 6, patient     Data:  Pt is a 69-year-old male admitted to [facility] 4/22/19. Pt is stable for discharge and needs TCU placement.     Intervention:  [facility] now unable to accept pt as pt has no home coverage for IV abx and [facility] not willing to accept pt for duration of IV abx. Met with pt to discuss and provided pt with list of TCUs near his home. Pt stated he wants to speak with his son regarding facilities. Asked if referrals can be sent out to some places near his home in the meantime, as pt is ready for d/c, and pt declined stating he would like to speak with his son first. Will check in with pt later today.      Assessment:  Pursuing TCU placement; [facility] declining pt     Plan:    Anticipated Disposition:  Facility:  TCU    Barriers to d/c plan:  Placement    Follow Up:  SW to follow for discharge planning     CARLOS Larsen, LG  5A Unit   Pager [phone]  Phone [phone]    Addendum 11:17am: Pt requested SW e-mail lists of TCUs near [place] and [place] to pt and pt's son David. Verified e-mail addresses with pt and e-mailed list securely.     Addendum 2:41pm: Met with pt and son. First choice facility is [facility] [address] as pt's family is moving very close to this facility soon. Faxed referral. Pt and son to choose a second choice as well.        Acitretin Pregnancy And Lactation Text: This medication is Pregnancy Category X and should not be given to women who are pregnant or may become pregnant in the future. This medication is excreted in breast milk.

## 2022-11-15 NOTE — LETTER
5/9/2019        RE: Beulah Jane  [address]  [address]  [facility] [address]        [facility]  PRIMARY CARE PROVIDER AND CLINIC:  [facility] [id number] [facility]*  Chief Complaint   Patient presents with     Hospital F/U     [facility] Record Number:  [id number]  Place of Service where encounter took place:  [facility] ([facility] [[id number]]    Beulah Jane  is a 69 year old  (1950), admitted to the above facility from  [facility]. Hospital stay 4/22/2019 through 5/8/2019..  Admitted to this facility for  rehab, medical management and nursing care.    HPI:    HPI information obtained from: facility chart records, facility staff, patient report and [facility] chart review.   Brief Summary of Hospital Course: patient underwent planned two staged lumbar fusion on 4/22 and 4/25 due to  thoracolumbar kyphosis with flat back syndrome and thoracolumbar stenosis with myeloradiculopathy.. During second surgery a chronic seroma was debrided and cultured. It grew out staph epi. ID was consulted and recommended IV vanco until 6/8 hen 3 weeks of po doxycycline.    Post op issues included type 2 NSTEMI likely due to ILD, PULMONARY EMBOLISM and aortic stenosis. He was started on heparin for finding of subsegmental pulmonary emboli on 4/28 then developed  bleeding at incision so anticoagulation had to be held.     PMH includes dermatomyositis and ILD, DM2, hypertension.hypothyroidism.     Once he was stabilized he was transferred to TCU due to weakness.     Updates on Status Since Skilled nursing Admission: patient reports ongoing pain. He is wearing oxygen; O2 sats in mid 90s. HR . He is eating and will work with therapy.     CODE STATUS/ADVANCE DIRECTIVES DISCUSSION:   CPR/Full code   Patient's living condition: lives with spouse  And son. Will be moving to apartment soon. Moved here from  [place].   ALLERGIES: Patient has no known allergies.  PAST MEDICAL HISTORY:  has a past medical history of Aortic stenosis, Chronic pain, DM (diabetes mellitus), type 2 (H), Hyperlipidemia, JUAN on CPAP, Pneumonia, Polymyositis (H), Pulmonary fibrosis, unspecified (H), and Seasonal allergies.  PAST SURGICAL HISTORY:   has a past surgical history that includes hernia repair (2006); removal prosthetic material/mesh, abd wall necro tiss infexn (2012); Decompression, fusion lumbar posterior two levels, combined (1997); lumbar spine hardware removal (1999); rotator cuff repair rt/lt (2003); Arthroscopy knee (1970); colonoscopy; Fusion lumbar anterior three+ levels (N/A, 4/22/2019); Optical tracking system fusion posterior spine thoracic three+ levels (N/A, 4/25/2019); and picc insertion (Right, 05/06/2019).  FAMILY HISTORY: family history is not on file.  SOCIAL HISTORY:   reports that he quit smoking about 48 years ago. He smoked 0.25 packs per day. He has never used smokeless tobacco.    Post Discharge Medication Reconciliation Status: discharge medications reconciled, continue medications without change    Current Outpatient Medications   Medication Sig Dispense Refill     acetaminophen (TYLENOL) 325 MG tablet Take 2 tablets (650 mg) by mouth every 4 hours as needed for mild pain or fever       albuterol (2.5 MG/3ML) 0.083% neb solution Take 1 vial by nebulization every 6 hours as needed for shortness of breath / dyspnea or wheezing       albuterol (PROAIR HFA/PROVENTIL HFA/VENTOLIN HFA) 108 (90 BASE) MCG/ACT Inhaler Inhale 2 puffs into the lungs every 6 hours as needed for shortness of breath / dyspnea or wheezing       beclomethasone (QVAR) 80 MCG/ACT Inhaler Inhale 2 puffs into the lungs 2 times daily       bisacodyl (DULCOLAX) 10 MG suppository Place 1 suppository (10 mg) rectally daily as needed for constipation       calcium carbonate (OS-DMITRIY 500 MG [place]. [place]) 500 MG tablet Take 1 tablet (500 mg) by mouth 2  times daily 180 tablet 3     Cholecalciferol (VITAMIN D) 2000 units tablet Take 2,000 Units by mouth daily 100 tablet 3     diphenhydrAMINE (BENADRYL) 50 MG capsule Take 50 mg by mouth every 6 hours as needed for itching or allergies       famotidine (PEPCID) 20 MG tablet Take 1 tablet (20 mg) by mouth every 12 hours       FOLIC ACID PO Take 1 mg by mouth daily       GABAPENTIN PO Take 600 mg by mouth 3 times daily       GEMFIBROZIL PO Take 600 mg by mouth 2 times daily       hydrOXYzine (ATARAX) 25 MG tablet Take 1 tablet (25 mg) by mouth every 6 hours as needed for other (adjuvant pain)       insulin aspart (NOVOLOG PEN) 100 UNIT/ML pen Inject 1-7 Units Subcutaneous 3 times daily (before meals)       insulin aspart (NOVOLOG PEN) 100 UNIT/ML pen Inject 1-5 Units Subcutaneous At Bedtime       LANsoprazole (PREVACID) 30 MG DR capsule Take 30 mg by mouth every morning (before breakfast)       levothyroxine (SYNTHROID/LEVOTHROID) 50 MCG tablet Take 50 mcg by mouth daily       Lidocaine (LIDOCARE) 4 % Patch Place 1-3 patches onto the skin every 24 hours       melatonin 3 MG tablet Take 1 tablet (3 mg) by mouth nightly as needed for sleep       menthol (ICY HOT) 5 % PTCH Apply 1 patch topically every 8 hours as needed for muscle soreness       methocarbamol (ROBAXIN) 500 MG tablet Take 1 tablet (500 mg) by mouth 4 times daily       methotrexate 2.5 MG tablet Take 10 tablets (25 mg) by mouth once a week Thursdays       multivitamin w/minerals (THERA-VIT-M) tablet Take 1 tablet by mouth daily       omega 3 1000 MG CAPS Take 2 capsules by mouth 2 times daily       oxyCODONE (ROXICODONE) 5 MG tablet Take 1-2 tablets (5-10 mg) by mouth every 3 hours as needed for pain 50 tablet 0     polyethylene glycol (MIRALAX/GLYCOLAX) packet Take 17 g by mouth daily       predniSONE (DELTASONE) 5 MG tablet Take 5 mg by mouth daily for 12 days.       senna-docusate (SENOKOT-S/PERICOLACE) 8.6-50 MG tablet Take 2 tablets by mouth 2 times  "daily       [START ON 5/13/2019] sodium chloride, PF, 0.9% PF flush 10 mLs by Intracatheter route every 7 days       sodium chloride, PF, 0.9% PF flush 10-20 mLs by Intracatheter route every hour as needed for line flush or post meds or blood draw       sulfamethoxazole-trimethoprim (BACTRIM DS/SEPTRA DS) 800-160 MG per tablet Take 1 tablet 3 times a week (Monday, Wednesday, Friday)  11     theophylline (UNIPHYL) 400 MG 24 hr tablet Take 400 mg by mouth every morning        traMADol (ULTRAM) 50 MG tablet Take 1 tablet (50 mg) by mouth every 6 hours as needed for moderate pain 30 tablet 0     vancomycin 1,500 mg Inject 1,500 mg into the vein every 12 hours       verapamil ER (CALAN-SR) 120 MG CR tablet Take 120 mg by mouth At Bedtime         ROS:  10 point ROS of systems including Constitutional, Eyes, Respiratory, Cardiovascular, Gastroenterology, Genitourinary, Integumentary, Musculoskeletal, Psychiatric were all negative except for pertinent positives noted in my HPI.    Vitals:  /70   Pulse 95   Temp 99.7  F (37.6  C)   Resp 18   Ht 1.575 m (5' 2\")   Wt 80.7 kg (178 lb)   SpO2 92%   BMI 32.56 kg/m     Exam:  GENERAL APPEARANCE:  Alert, in no distress  ENT:  Mouth and posterior oropharynx normal, moist mucous membranes, hearing acuity adequate   EYES:  EOM, conjunctivae, lids, pupils and irises normal  NECK:  No adenopathy,masses or thyromegaly  RESP:  respiratory effort and palpation of chest normal, no respiratory distress, Lung sounds clear  CV:  Palpation and auscultation of heart done , rate and rhythm irreg, systolic murmur, no rub or gallop, Edema 1+  ABDOMEN:  normal bowel sounds, soft, nontender, no hepatosplenomegaly or other masses  M/S:   Gait and station not observed, Digits and nails normal   SKIN:  Inspection/Palpation of skin and subcutaneous tissue spine incision is CDI. Few sutures still in place  NEURO: 2-12 in normal limits and at patient's baseline  PSYCH:  insight and judgement, " memory intact , affect and mood normal    Lab/Diagnostic data:  Labs done in SNF are in [place] EPIC. Please refer to them using [employer]/Care Everywhere. and Recent labs in EPIC reviewed by me today.     ASSESSMENT/PLAN:    (Z98.890) S/P spinal surgery  (primary encounter diagnosis)  Comment: patient transferred to TCU from hospital after two staged spinal fusion due to thoracolumbar kyphosis with flat back syndrome and thoracolumbar stenosis with myeloradiculopathy.  Complicated post op period with ABLA, pulmonary emboli, demand ischemia and debrided seroma that that was cultured and grew out staph epi . PMH includes dermatomyositis, ILD, severe aortic stenosis, DM2, hypertension, hypothyroidism.  He is having pain and is taking oxycodone, tramadol and acetaminophen. T has been running .   Plan: analgesics  physical therapy - no bending or twisting, no lifting more than 10# for 6 weeks  Monitor incision  Follow up with ortho spine in 6 weeks    (M96.843) Postprocedural seroma of a musculoskeletal structure following other procedure  Comment: seroma formation from previous surgery. This was debrided and cultured. ID was consulted.   Plan: IV vanco until 6/8   twice weekly labs- BMP, CBC w diff, ESR and CRP  Hospital discharge did not specify if ID will dose vanco so will have [facility] pharmacy take care of this  Follow up with ID Dr Moura in 3-4 weeks.     (I26.99) Other acute pulmonary embolism without acute cor pulmonale (H)  Comment: diagnosed on 4/28. He was started on anticoagulation then developed bleeding.   Plan: restart anticoagulation on 5/17- will discuss with Dr Mahmood    (I25.2) Status post non-ST elevation myocardial infarction (NSTEMI)  (I35.0) Severe aortic stenosis  Comment: patient developed elevated troponins with hypoxia  post operatively. Cardiology was consulted. Thought not ACS but more likely due to demand ischemia due to aortic stenosis and interstitial lung disease and deconditioning.   Plan:  needs follow up with cardiology for consideration for TAVR    (D62) Anemia due to blood loss, acute  Comment: hb dropped from 15.9 to 7.7. Now hgb trending back up  Plan: twice weekly CBC    (E11.40,  Z79.4) Type 2 diabetes mellitus with diabetic neuropathy, with long-term current use of insulin (H)  Comment:A1C 6.3%. Prior to admission he was taking isophan 6u BID. This was discontinued.  BG running 150-180 in TCU  Plan: continue current sliding scale insulin.     (M33.90) Dermatomyositis (H)  (J84.10) Pulmonary fibrosis (H)  Comment: followed by rheumatology Dr Call at [facility]. He had been methotrexate 25mg q weeks. Most recent DLCO was reduced so needed rituximab in March. He was not on oxygen at home.  Prior to surgery he was on long taper of prednisone. During hospital stay prednisone was increased to  5mg BID then back to 5mg q day at time of discharge to TCU.   Plan: prednisone 5mg q day for 2 weeks.   Follow up with Dr Call on 5/16,   Continue PTA theophylline, ellipta and prn albuterol.   Supplemental oxygen.   Restart methotrexate on 5/9      (I10) Essential hypertension  Comment: he continues on PTA verapamil. BPs in /73, 143/64, 116/70  Plan: monitor     (E03.9) Hypothyroidism, unspecified type  Comment: no recent change in synthroid. Last TSH in January was 0.41  Plan: per PCP    (K59.01) Slow transit constipation  Comment: having infrequent BM. He did have one this after noon.   Plan: miralax and senna s    (R53.81) Physical deconditioning  Comment: lives with wife at son's home. Has been independent. Goal is to move to apartment with wife  Plan: physical therapy and OCCUPATIONAL THERAPY   SW to assist with discharge planning        Total time spent with patient visit at the skilled nursing facility was 60 minutes including patient visit and review of past records. Greater than 50% of total time spent with counseling and coordinating care due to multiple medical co  morbidities  Electronically signed by:  ANASTACIA Mcmanus CNP                         Sincerely,        NAASTACIA Mcmanus CNP     chest pain

## 2023-04-17 NOTE — PROGRESS NOTES
[facility]   Cardiology Progress      Interval History: S/P TAVR c/w CHB status post temp pacer with screw in lead, now 100% V-paced. Soft BP but MAP >65 mmHg; asymptomatic and ambulating without dizziness/lightheadedness. Overall feels well today. Some discomfort in left groin but CDI on exam, will monitor. Otherwise, denies fever, chills, nausea, vomiting, new CP, worsening SOB, cough, abdominal pain. He appears euvolemic on exam.     Plan For Today   -EP following, appreciate their assistance   -Continue temp pacing @ 80 bpm  -NPO for dual chamber PPM today,  services available  -Resume Lasix 120 mg daily    Physical Exam:  Temp:  [97  F (36.1  C)-98.3  F (36.8  C)] 98.3  F (36.8  C)  Pulse:  [79] 79  Heart Rate:  [79-80] 79  Resp:  [14] 14  BP: ()/(50-73) 84/65  SpO2:  [87 %-100 %] 100 %    GEN: NAD  Pulm: bibasilar inspiratory rales. Otherwise CTAB  Cardiac: Normal S1 and S2. JVP not appreciably elevate, no murmurs.  Trace ankle LE edema.  Vascular: TAVR access site are clean, dry, intact. RIJ temp pacer line in place without fluctuance or erythema. No appreciable hematoma  GI: soft, non distended  Neuro: CN II-XII grossly intact. Alert and oriented x4.    Medications:    aspirin  81 mg Oral Daily     atorvastatin  40 mg Oral Daily     calcium carbonate 500 mg (elemental)  1 tablet Oral BID     fish oil-omega-3 fatty acids  1 g Oral Daily     folic acid  1 mg Oral QAM     gabapentin  1,200 mg Oral TID     gemfibrozil  600 mg Oral BID     heparin  5,000 Units Subcutaneous Q12H     insulin aspart  1-7 Units Subcutaneous TID AC     insulin aspart  1-5 Units Subcutaneous At Bedtime     LANsoprazole  30 mg Oral QAM AC     levothyroxine  50 mcg Oral QAM     methotrexate  25 mg Oral Q7 Days     oxyCODONE-acetaminophen  1 tablet Oral TID     polyethylene glycol  17 g Oral Daily     senna-docusate  1 tablet Oral At Bedtime     sodium chloride (PF)  3 mL Intracatheter    Problem: Skin Injury Risk Increased  Goal: Skin Health and Integrity  Outcome: Ongoing, Progressing     Problem: Adult Inpatient Plan of Care  Goal: Plan of Care Review  Outcome: Ongoing, Progressing  Goal: Patient-Specific Goal (Individualized)  Outcome: Ongoing, Progressing  Goal: Absence of Hospital-Acquired Illness or Injury  Outcome: Ongoing, Progressing  Goal: Optimal Comfort and Wellbeing  Outcome: Ongoing, Progressing  Goal: Readiness for Transition of Care  Outcome: Ongoing, Progressing      Q8H     ticagrelor  90 mg Oral BID     vitamin D3  2,000 Units Oral Daily       - MEDICATION INSTRUCTIONS -         Labs:   CMP  Recent Labs   Lab 09/06/19  0447 09/05/19  0403 09/04/19  1559 09/04/19  1109    136 136 138   POTASSIUM 4.2 4.4 4.4  4.3 4.0   CHLORIDE 102 101 100 100   CO2 28 27 29 31   ANIONGAP 8 8 7 7   * 132* 131* 118*   BUN 19 19 17 18   CR 0.88 1.03 0.86 0.83   GFRESTIMATED 87 73 88 90   GFRESTBLACK >90 85 >90 >90   DMITRIY 8.6 8.6 8.7 8.4*   MAG 2.3 2.3 2.3 2.3   PHOS 2.4* 3.7  --  3.7   PROTTOTAL  --   --   --  6.2*   ALBUMIN  --   --   --  2.9*   BILITOTAL  --   --   --  0.4   ALKPHOS  --   --   --  142   AST  --   --   --  55*   ALT  --   --   --  35     CBC  Recent Labs   Lab 09/06/19 0447 09/05/19  0403 09/04/19  1109 09/04/19  0435 09/01/19  0506   WBC 9.6 12.3* 15.0*  --  10.1   RBC 3.78* 4.54 4.46  --  4.32*   HGB 8.1* 9.3* 9.3*  --  9.2*   HCT 29.2* 35.5* 34.9*  --  33.5*   MCV 77* 78 78  --  78   MCH 21.4* 20.5* 20.9*  --  21.3*   MCHC 27.7* 26.2* 26.6*  --  27.5*   RDW 23.1* 23.4* 23.3*  --  23.2*    391 433 437 390     INR  No lab results found in last 7 days.  ASSESSMENT/PLAN:  Mr. Catherine Jane is a 70 y/o M w/ severe aortic stenosis (mPG 42 mmHg, PETE 0.70 cm^2), CAD s/p PCI p-mLAD and dLAD 7/30/19,pre-existing RBBB, chronic diastolic heart failure, who presenting with exertional CP, lightheadedness, GONZALEZ and was admitted for decompensated diastolic heart failure c/b severe aortic stenosis. He is now status post diuresis and successful deployment of a 29 mm Medtronic Evolut, now with CHB and temp p   acer. Plan is for PPM.       # Severe aortic stenosis s/p TAVR, 29mm Medtronic Evolut  # Complete Heart Block with Temporary Pacing Wire  # CP, elevated troponin, GONZALEZ  mPG 42 mmHg, PETE 0.70 cm^2. CP, GONZALEZ  Likely related to his severe aortic stenosis, but likely also a component of decompensated heart failure as well on top of comorbidity of ILD (see below). ECG unchanged  from prior and not concerning for acute ischemia. Elevated troponin likely related to ventricular strain with severe aortic stenosis and CHF, and not likely due to acute ischemia, trending down with diuresis. TAVR procedure complicated by complete heart block after valve deployment, otherwise uncomplicated.  Patient did have a pre-existing RBBB. Now with temporary pacing wire in place and 100% V-paced, plan is for PPM. Post TAVR TTE images show a well seated valve with normal gradient and no regurg or PVL noted.   - EP consult; following, appreciate their assistance  - NPO for PPM.   - CXR and device interrogation tomorrow 9/7.   - Stopped PTA metoprolol  - lifelong dental prophylaxis s/p TAVR  - cardiac rehab. Per OT, home with outpatient therapy.    # Acute on Chronic Decompensated Diastolic Heart Failure  Admitted with volume overload.  Was placed on 60mg IV lasix once daily x5 days. Net negative [id number] this admission. Presenting weight: 162 lbs, present weight 154 lbs (EDW).  Now sating well on RA. Previously on 120mg PO lasix daily. Bilateral inspiratory rales appreciable s/p TAVR atelectasis versus congestion versus ILD. Soft BP and MAPs, holding BB. Will resume PO lasix today. IVC dilated and estimated high RA pressure on TTE but exam unremarkable and at EDW.   - S/P 60mg IV lasix x1 yesterday  - MAPS and BP soft, diuresis holiday until BP improves than resume.   - Resume oral Lasix post procedure today     # CAD s/p PCI:  s/p PCI p-mLAD and dLAD 7/30/19.  - Continue ASA, ticagrelor  - Continue atorvastatin  - BB: hold BB given CHB    #Dermatomyositis w/Shannon-1 Positivity:  #Chronic pain  Patient has been diagnosed with polymyositis clinically, not by muscle biopsy. He did have a lung biopsy that showed fibrosis of unknown etiology. Has ILD on CT and blood work showed a positive Shannon-1 antibody. PFTs showed worsening DLCO by 11% from 7/2018-1/2019. Having significant foot and leg pain patient states this is how  his dermatomyositis tends to flare up typically gets like this in the days leading up to his scheduled methotrexate dose.   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 Currently stable on MTX. Worsened since last PFTs but unclear if this is related to his diastolic heart failure w/ severe AS, with recent back surgery and decrease in TLC vs from his DM. CT chest showed stable ILD arguing against DM activity.  - outpatient follow up as arranged        #Hypothyroidism:   -Continue levothyroxine     #DM2:  - ISS as inpatient      Anticipated Disposition: Discharge to home this weekend     Patient seen and discussed with Dr. Garcia, who agrees with above plan.    Juan Kimbrough PA-C  [facility]  Pager [phone]

## 2023-04-20 NOTE — PLAN OF CARE
VSS, HR between 70 and paced to 120's and ST-cardiology is following, no pedal pulses-MD aware, +2 post tib pulses with doppler, small necrotic areas on left great toe and fourth toe, open to air and unchanged, bowels active, one small stool this shift, ambulating with 1 assist, fine crackles in bases and RML, GONZALEZ,  and acapella done, plan for hospice meeting tomorrow at 0900,  scheduled for meeting.  Lactic 2.4-MD aware will continue to monitor. Currently on 3L NC, baseline 2L NC, pt was on BiPap overnight, baseline is c-pap at night.   no

## 2023-05-08 NOTE — PLAN OF CARE
Discharge Planner PT   Patient plan for discharge: Rehab  Current status: Required mod A for bed mobility, min A for sit<>stands and amb 150' x 2 with 4WW/CGA. No LOB but pain limiting. At increased risk for falls due to deconditioning.  Barriers to return to prior living situation: Deconditioning, pain  Recommendations for discharge: TCU  Rationale for recommendations: Current level of function       Entered by: Jason Covington 05/01/2019 3:55 PM        Report called to [facility]. Spoke to GENO Mabry  at this time. All questions answered.

## 2023-05-29 NOTE — PROGRESS NOTES
Nebs given as ordered. LS are diminished t/o and pt is on 2L. Will continue to follow.  Dangelo Armas       caller not available----- Message from Mariela Mercedes sent at 5/29/2023 11:33 AM CDT -----  Regarding: pt call bk  Type:Patient Returning Call:SOUMYA GRAMAJO [[id number]]       Who Called: MA        Who Left Message for Patient: Fly         Does the patient know what this is regarding? Missed call for breathing test         Best Call Back Number: Telephone Information:  Mobile          [phone]            Additional Information:

## 2024-03-18 NOTE — PHARMACY-ADMISSION MEDICATION HISTORY
Pharmacy Medication History  Admission medication history interview status for the 10/25/2019  admission is complete. See EPIC admission navigator for prior to admission medications     Medication history sources: MAR (Ashley)  Medication history source reliability: Good  Adherence assessment: Good    Significant changes made to the medication list:  Removed sildenafil 20 mg po tid      Additional medication history information:   none    Medication reconciliation completed by provider prior to medication history? No    Time spent in this activity: 30 minutes      Prior to Admission medications    Medication Sig Last Dose Taking? Auth Provider   albuterol (2.5 MG/3ML) 0.083% neb solution Take 1 vial by nebulization every 6 hours as needed for shortness of breath / dyspnea or wheezing  Yes Unknown, Entered By History   aspirin (ASA) 81 MG EC tablet Take 1 tablet (81 mg) by mouth daily Start tomorrow morning. 10/24/2019 at Unknown time Yes Nita Barger PA-C   atorvastatin (LIPITOR) 40 MG tablet Take 1 tablet (40 mg) by mouth daily 10/24/2019 at Unknown time Yes Nita Barger PA-C   benzocaine-menthol (CEPACOL) 15-3.6 MG lozenge Place 1 lozenge inside cheek every hour as needed for sore throat  Yes DaniaJuan PA-C   calcium carbonate (OS-DMITRIY 500 MG [place]) 500 MG tablet Take 1 tablet (500 mg) by mouth 2 times daily 10/24/2019 at Unknown time Yes Carlos Enrique Dial MD   Cholecalciferol (VITAMIN D) 2000 units tablet Take 2,000 Units by mouth daily 10/24/2019 at Unknown time Yes Carlos Enrique Dial MD   clopidogrel (PLAVIX) 75 MG tablet Take 1 tablet (75 mg) by mouth daily 10/24/2019 at Unknown time Yes Negar Mustafa MD   diphenhydrAMINE (BENADRYL) 50 MG capsule Take 50 mg by mouth as needed for itching or allergies   Yes Unknown, Entered By History   docusate sodium (COLACE) 100 MG capsule Take 1 capsule (100 mg) by mouth At Bedtime For constipation. Hold for loose  SPOKE TO DR WOLFE OVER THE WEEKEND AND HE TOLD HER TO STOP ALL MEDICATION EXCEPT FOR ANTIBIOTIC. IS THIS OK WITH YOU?       stools 10/24/2019 at Unknown time Yes Negar Mustafa MD   ferrous gluconate (FERGON) 324 (38 Fe) MG tablet Take 1 tablet (324 mg) by mouth daily (with breakfast) 10/24/2019 at Unknown time Yes Negar Mustafa MD   folic acid (FOLVITE) 1 MG tablet Take 1 tablet (1 mg) by mouth every morning 10/24/2019 at Unknown time Yes Hoa López MD   gabapentin (NEURONTIN) 400 MG capsule Take 1,200 mg by mouth 3 times daily 0700 1400 2100 with percocet 10/24/2019 at Unknown time Yes Reported, Patient   insulin aspart (NOVOLOG VIAL) 100 UNITS/ML vial Give before meals and before bed:  For Pre-Meal Glucose:  140-189 give 1 unit   190-239 give 2 units   240-289 give 3 units   290-339 give 4 units   = or >340 give 5 units     For Bedtime Glucose  200 - 239 give 1 units   240 - 289 give 1.5 units  290 - 339 give 2 units  = or >340 give 2.5 units 10/24/2019 at 1630 Yes Laura Matos MD   LANsoprazole (PREVACID) 30 MG DR capsule Take 30 mg by mouth every morning (before breakfast) 10/24/2019 at Unknown time Yes Unknown, Entered By History   levothyroxine (SYNTHROID/LEVOTHROID) 50 MCG tablet Take 50 mcg by mouth every morning  10/24/2019 at Unknown time Yes Unknown, Entered By History   methotrexate 2.5 MG tablet Take 10 tablets (25 mg) by mouth once a week Tuesday 10/22/2019 Yes Hoa López MD   midodrine (PROAMATINE) 10 MG tablet Take 1 tablet (10 mg) by mouth 3 times daily (with meals) 10/24/2019 at Unknown time Yes Tamar Tillman NP   omega 3 1000 MG CAPS Take 2 capsules by mouth every morning  10/24/2019 at Unknown time Yes Unknown, Entered By History   oxyCODONE-acetaminophen (PERCOCET) 7.5-325 MG per tablet Take 1 tablet by mouth 3 times daily 10/24/2019 at Unknown time Yes Bobbi Baker APRN CNP   potassium chloride ER (K-DUR/KLOR-CON M) 20 MEQ CR tablet Take 1 tablet (20 mEq) by mouth daily 10/24/2019 at Unknown time Yes Laura Matos MD   torsemide (DEMADEX) 5 MG tablet  "Take 10 mg by mouth every morning 10/24/2019 at Unknown time Yes Unknown, Entered By History   torsemide (DEMADEX) 5 MG tablet Take 5 mg by mouth daily At 1600 10/24/2019 at 1600 Yes Unknown, Entered By History   insulin syringe-needle U-100 (29G X 1/2\" 1 ML) 29G X 1/2\" 1 ML miscellaneous Inject 1 Syringe Subcutaneous 2 times daily   Hoa López MD       "

## 2024-06-17 PROBLEM — Z71.89 ADVANCED DIRECTIVES, COUNSELING/DISCUSSION: Status: RESOLVED | Noted: 2019-01-01 | Resolved: 2024-06-17

## 2024-08-20 ENCOUNTER — TRANSFERRED RECORDS (OUTPATIENT)
Dept: HEALTH INFORMATION MANAGEMENT | Facility: CLINIC | Age: 74
End: 2024-08-20
Payer: MEDICARE

## 2024-08-27 NOTE — NURSING NOTE
Chief Complaint   Patient presents with     Follow Up     2 week return     Vitals were taken and medications reconciled.     Mati Carey CMA  8:12 AM     Itraconazole Counseling:  I discussed with the patient the risks of itraconazole including but not limited to liver damage, nausea/vomiting, neuropathy, and severe allergy.  The patient understands that this medication is best absorbed when taken with acidic beverages such as non-diet cola or ginger ale.  The patient understands that monitoring is required including baseline LFTs and repeat LFTs at intervals.  The patient understands that they are to contact us or the primary physician if concerning signs are noted. Bactrim Pregnancy And Lactation Text: This medication is Pregnancy Category D and is known to cause fetal risk.  It is also excreted in breast milk. Taltz Counseling: I discussed with the patient the risks of ixekizumab including but not limited to immunosuppression, serious infections, worsening of inflammatory bowel disease and drug reactions.  The patient understands that monitoring is required including a PPD at baseline and must alert us or the primary physician if symptoms of infection or other concerning signs are noted. Topical Steroids Applications Pregnancy And Lactation Text: Most topical steroids are considered safe to use during pregnancy and lactation.  Any topical steroid applied to the breast or nipple should be washed off before breastfeeding. Sarecycline Counseling: Patient advised regarding possible photosensitivity and discoloration of the teeth, skin, lips, tongue and gums.  Patient instructed to avoid sunlight, if possible.  When exposed to sunlight, patients should wear protective clothing, sunglasses, and sunscreen.  The patient was instructed to call the office immediately if the following severe adverse effects occur:  hearing changes, easy bruising/bleeding, severe headache, or vision changes.  The patient verbalized understanding of the proper use and possible adverse effects of sarecycline.  All of the patient's questions and concerns were addressed. Finasteride Female Counseling: Finasteride Counseling:  I discussed with the patient the risks of use of finasteride including but not limited to decreased libido and sexual dysfunction. Explained the teratogenic nature of the medication and stressed the importance of not getting pregnant during treatment. All of the patient's questions and concerns were addressed. Libtayo Counseling- I discussed with the patient the risks of Libtayo including but not limited to nausea, vomiting, diarrhea, and bone or muscle pain.  The patient verbalized understanding of the proper use and possible adverse effects of Libtayo.  All of the patient's questions and concerns were addressed. Adbry Counseling: I discussed with the patient the risks of tralokinumab including but not limited to eye infection and irritation, cold sores, injection site reactions, worsening of asthma, allergic reactions and increased risk of parasitic infection.  Live vaccines should be avoided while taking tralokinumab. The patient understands that monitoring is required and they must alert us or the primary physician if symptoms of infection or other concerning signs are noted. Niacinamide Counseling: I recommended taking niacin or niacinamide, also know as vitamin B3, twice daily. Recent evidence suggests that taking vitamin B3 (500 mg twice daily) can reduce the risk of actinic keratoses and non-melanoma skin cancers. Side effects of vitamin B3 include flushing and headache. Dupixent Pregnancy And Lactation Text: This medication likely crosses the placenta but the risk for the fetus is uncertain. This medication is excreted in breast milk. Cellcept Counseling:  I discussed with the patient the risks of mycophenolate mofetil including but not limited to infection/immunosuppression, GI upset, hypokalemia, hypercholesterolemia, bone marrow suppression, lymphoproliferative disorders, malignancy, GI ulceration/bleed/perforation, colitis, interstitial lung disease, kidney failure, progressive multifocal leukoencephalopathy, and birth defects.  The patient understands that monitoring is required including a baseline creatinine and regular CBC testing. In addition, patient must alert us immediately if symptoms of infection or other concerning signs are noted. Metronidazole Counseling:  I discussed with the patient the risks of metronidazole including but not limited to seizures, nausea/vomiting, a metallic taste in the mouth, nausea/vomiting and severe allergy. Topical Clindamycin Counseling: Patient counseled that this medication may cause skin irritation or allergic reactions.  In the event of skin irritation, the patient was advised to reduce the amount of the drug applied or use it less frequently.   The patient verbalized understanding of the proper use and possible adverse effects of clindamycin.  All of the patient's questions and concerns were addressed. Drysol Counseling:  I discussed with the patient the risks of drysol/aluminum chloride including but not limited to skin rash, itching, irritation, burning. Thalidomide Pregnancy And Lactation Text: This medication is Pregnancy Category X and is absolutely contraindicated during pregnancy. It is unknown if it is excreted in breast milk. Simponi Counseling:  I discussed with the patient the risks of golimumab including but not limited to myelosuppression, immunosuppression, autoimmune hepatitis, demyelinating diseases, lymphoma, and serious infections.  The patient understands that monitoring is required including a PPD at baseline and must alert us or the primary physician if symptoms of infection or other concerning signs are noted. Opzelura Pregnancy And Lactation Text: There is insufficient data to evaluate drug-associated risk for major birth defects, miscarriage, or other adverse maternal or fetal outcomes.  There is a pregnancy registry that monitors pregnancy outcomes in pregnant persons exposed to the medication during pregnancy.  It is unknown if this medication is excreted in breast milk.  Do not breastfeed during treatment and for about 4 weeks after the last dose. Otezla Pregnancy And Lactation Text: This medication is Pregnancy Category C and it isn't known if it is safe during pregnancy. It is unknown if it is excreted in breast milk. Include Pregnancy/Lactation Warning?: No Doxepin Pregnancy And Lactation Text: This medication is Pregnancy Category C and it isn't known if it is safe during pregnancy. It is also excreted in breast milk and breast feeding isn't recommended. Gabapentin Counseling: I discussed with the patient the risks of gabapentin including but not limited to dizziness, somnolence, fatigue and ataxia. VTAMA Counseling: I discussed with the patient that VTAMA is not for use in the eyes, mouth or mouth. They should call the office if they develop any signs of allergic reactions to VTAMA. The patient verbalized understanding of the proper use and possible adverse effects of VTAMA.  All of the patient's questions and concerns were addressed. Rinvoq Pregnancy And Lactation Text: Based on animal studies, Rinvoq may cause embryo-fetal harm when administered to pregnant women.  The medication should not be used in pregnancy.  Breastfeeding is not recommended during treatment and for 6 days after the last dose. Imiquimod Pregnancy And Lactation Text: This medication is Pregnancy Category C. It is unknown if this medication is excreted in breast milk. Prednisone Pregnancy And Lactation Text: This medication is Pregnancy Category C and it isn't know if it is safe during pregnancy. This medication is excreted in breast milk. Isotretinoin Pregnancy And Lactation Text: This medication is Pregnancy Category X and is considered extremely dangerous during pregnancy. It is unknown if it is excreted in breast milk. Niacinamide Pregnancy And Lactation Text: These medications are considered safe during pregnancy. Solaraze Counseling:  I discussed with the patient the risks of Solaraze including but not limited to erythema, scaling, itching, weeping, crusting, and pain. Benzoyl Peroxide Pregnancy And Lactation Text: This medication is Pregnancy Category C. It is unknown if benzoyl peroxide is excreted in breast milk. Sarecycline Pregnancy And Lactation Text: This medication is Pregnancy Category D and not consider safe during pregnancy. It is also excreted in breast milk. Itraconazole Pregnancy And Lactation Text: This medication is Pregnancy Category C and it isn't know if it is safe during pregnancy. It is also excreted in breast milk. Tranexamic Acid Counseling:  Patient advised of the small risk of bleeding problems with tranexamic acid. They were also instructed to call if they developed any nausea, vomiting or diarrhea. All of the patient's questions and concerns were addressed. Cephalexin Counseling: I counseled the patient regarding use of cephalexin as an antibiotic for prophylactic and/or therapeutic purposes. Cephalexin (commonly prescribed under brand name Keflex) is a cephalosporin antibiotic which is active against numerous classes of bacteria, including most skin bacteria. Side effects may include nausea, diarrhea, gastrointestinal upset, rash, hives, yeast infections, and in rare cases, hepatitis, kidney disease, seizures, fever, confusion, neurologic symptoms, and others. Patients with severe allergies to penicillin medications are cautioned that there is about a 10% incidence of cross-reactivity with cephalosporins. When possible, patients with penicillin allergies should use alternatives to cephalosporins for antibiotic therapy. Libtayo Pregnancy And Lactation Text: This medication is contraindicated in pregnancy and when breast feeding. Taltz Pregnancy And Lactation Text: The risk during pregnancy and breastfeeding is uncertain with this medication. Topical Sulfur Applications Counseling: Topical Sulfur Counseling: Patient counseled that this medication may cause skin irritation or allergic reactions.  In the event of skin irritation, the patient was advised to reduce the amount of the drug applied or use it less frequently.   The patient verbalized understanding of the proper use and possible adverse effects of topical sulfur application.  All of the patient's questions and concerns were addressed. Drysol Pregnancy And Lactation Text: This medication is considered safe during pregnancy and breast feeding. Picato Counseling:  I discussed with the patient the risks of Picato including but not limited to erythema, scaling, itching, weeping, crusting, and pain. Adbry Pregnancy And Lactation Text: It is unknown if this medication will adversely affect pregnancy or breast feeding. Arava Counseling:  Patient counseled regarding adverse effects of Arava including but not limited to nausea, vomiting, abnormalities in liver function tests. Patients may develop mouth sores, rash, diarrhea, and abnormalities in blood counts. The patient understands that monitoring is required including LFTs and blood counts.  There is a rare possibility of scarring of the liver and lung problems that can occur when taking methotrexate. Persistent nausea, loss of appetite, pale stools, dark urine, cough, and shortness of breath should be reported immediately. Patient advised to discontinue Arava treatment and consult with a physician prior to attempting conception. The patient will have to undergo a treatment to eliminate Arava from the body prior to conception. Finasteride Pregnancy And Lactation Text: This medication is absolutely contraindicated during pregnancy. It is unknown if it is excreted in breast milk. Metronidazole Pregnancy And Lactation Text: This medication is Pregnancy Category B and considered safe during pregnancy.  It is also excreted in breast milk. Cellcept Pregnancy And Lactation Text: This medication is Pregnancy Category D and isn't considered safe during pregnancy. It is unknown if this medication is excreted in breast milk. Enbrel Counseling:  I discussed with the patient the risks of etanercept including but not limited to myelosuppression, immunosuppression, autoimmune hepatitis, demyelinating diseases, lymphoma, and infections.  The patient understands that monitoring is required including a PPD at baseline and must alert us or the primary physician if symptoms of infection or other concerning signs are noted. Carac Counseling:  I discussed with the patient the risks of Carac including but not limited to erythema, scaling, itching, weeping, crusting, and pain. High Dose Vitamin A Counseling: Side effects reviewed, pt to contact office should one occur. Sotyktu Counseling:  I discussed the most common side effects of Sotyktu including: common cold, sore throat, sinus infections, cold sores, canker sores, folliculitis, and acne.? I also discussed more serious side effects of Sotyktu including but not limited to: serious allergic reactions; increased risk for infections such as TB; cancers such as lymphomas; rhabdomyolysis and elevated CPK; and elevated triglycerides and liver enzymes.? Gabapentin Pregnancy And Lactation Text: This medication is Pregnancy Category C and isn't considered safe during pregnancy. It is excreted in breast milk. Oxybutynin Counseling:  I discussed with the patient the risks of oxybutynin including but not limited to skin rash, drowsiness, dry mouth, difficulty urinating, and blurred vision. Hydroxyzine Counseling: Patient advised that the medication is sedating and not to drive a car after taking this medication.  Patient informed of potential adverse effects including but not limited to dry mouth, urinary retention, and blurry vision.  The patient verbalized understanding of the proper use and possible adverse effects of hydroxyzine.  All of the patient's questions and concerns were addressed. Klisyri Counseling:  I discussed with the patient the risks of Klisyri including but not limited to erythema, scaling, itching, weeping, crusting, and pain. Vtama Pregnancy And Lactation Text: It is unknown if this medication can cause problems during pregnancy and breastfeeding. Solaraze Pregnancy And Lactation Text: This medication is Pregnancy Category B and is considered safe. There is some data to suggest avoiding during the third trimester. It is unknown if this medication is excreted in breast milk. Nsaids Counseling: NSAID Counseling: I discussed with the patient that NSAIDs should be taken with food. Prolonged use of NSAIDs can result in the development of stomach ulcers.  Patient advised to stop taking NSAIDs if abdominal pain occurs.  The patient verbalized understanding of the proper use and possible adverse effects of NSAIDs.  All of the patient's questions and concerns were addressed. Bimzelx Counseling:  I discussed with the patient the risks of Bimzelx including but not limited to depression, immunosuppression, allergic reactions and infections.  The patient understands that monitoring is required including a PPD at baseline and must alert us or the primary physician if symptoms of infection or other concerning signs are noted. Ketoconazole Counseling:   Patient counseled regarding improving absorption with orange juice.  Adverse effects include but are not limited to breast enlargement, headache, diarrhea, nausea, upset stomach, liver function test abnormalities, taste disturbance, and stomach pain.  There is a rare possibility of liver failure that can occur when taking ketoconazole. The patient understands that monitoring of LFTs may be required, especially at baseline. The patient verbalized understanding of the proper use and possible adverse effects of ketoconazole.  All of the patient's questions and concerns were addressed. Tetracycline Counseling: Patient counseled regarding possible photosensitivity and increased risk for sunburn.  Patient instructed to avoid sunlight, if possible.  When exposed to sunlight, patients should wear protective clothing, sunglasses, and sunscreen.  The patient was instructed to call the office immediately if the following severe adverse effects occur:  hearing changes, easy bruising/bleeding, severe headache, or vision changes.  The patient verbalized understanding of the proper use and possible adverse effects of tetracycline.  All of the patient's questions and concerns were addressed. Patient understands to avoid pregnancy while on therapy due to potential birth defects. Odomzo Counseling- I discussed with the patient the risks of Odomzo including but not limited to nausea, vomiting, diarrhea, constipation, weight loss, changes in the sense of taste, decreased appetite, muscle spasms, and hair loss.  The patient verbalized understanding of the proper use and possible adverse effects of Odomzo.  All of the patient's questions and concerns were addressed. Tremfya Counseling: I discussed with the patient the risks of guselkumab including but not limited to immunosuppression, serious infections, worsening of inflammatory bowel disease and drug reactions.  The patient understands that monitoring is required including a PPD at baseline and must alert us or the primary physician if symptoms of infection or other concerning signs are noted. Tranexamic Acid Pregnancy And Lactation Text: It is unknown if this medication is safe during pregnancy or breast feeding. Topical Sulfur Applications Pregnancy And Lactation Text: This medication is Pregnancy Category C and has an unknown safety profile during pregnancy. It is unknown if this topical medication is excreted in breast milk. Cephalexin Pregnancy And Lactation Text: This medication is Pregnancy Category B and considered safe during pregnancy.  It is also excreted in breast milk but can be used safely for shorter doses. Enbrel Pregnancy And Lactation Text: This medication is Pregnancy Category B and is considered safe during pregnancy. It is unknown if this medication is excreted in breast milk. Elidel Counseling: Patient may experience a mild burning sensation during topical application. Elidel is not approved in children less than 2 years of age. There have been case reports of hematologic and skin malignancies in patients using topical calcineurin inhibitors although causality is questionable. Cyclophosphamide Counseling:  I discussed with the patient the risks of cyclophosphamide including but not limited to hair loss, hormonal abnormalities, decreased fertility, abdominal pain, diarrhea, nausea and vomiting, bone marrow suppression and infection. The patient understands that monitoring is required while taking this medication. Skyrizi Counseling: I discussed with the patient the risks of risankizumab-rzaa including but not limited to immunosuppression, and serious infections.  The patient understands that monitoring is required including a PPD at baseline and must alert us or the primary physician if symptoms of infection or other concerning signs are noted. Minocycline Counseling: Patient advised regarding possible photosensitivity and discoloration of the teeth, skin, lips, tongue and gums.  Patient instructed to avoid sunlight, if possible.  When exposed to sunlight, patients should wear protective clothing, sunglasses, and sunscreen.  The patient was instructed to call the office immediately if the following severe adverse effects occur:  hearing changes, easy bruising/bleeding, severe headache, or vision changes.  The patient verbalized understanding of the proper use and possible adverse effects of minocycline.  All of the patient's questions and concerns were addressed. Birth Control Pills Counseling: Birth Control Pill Counseling: I discussed with the patient the potential side effects of OCPs including but not limited to increased risk of stroke, heart attack, thrombophlebitis, deep venous thrombosis, hepatic adenomas, breast changes, GI upset, headaches, and depression.  The patient verbalized understanding of the proper use and possible adverse effects of OCPs. All of the patient's questions and concerns were addressed. Topical Clindamycin Pregnancy And Lactation Text: This medication is Pregnancy Category B and is considered safe during pregnancy. It is unknown if it is excreted in breast milk. Cibinqo Counseling: I discussed with the patient the risks of Cibinqo therapy including but not limited to common cold, nausea, headache, cold sores, increased blood CPK levels, dizziness, UTIs, fatigue, acne, and vomitting. Live vaccines should be avoided.  This medication has been linked to serious infections; higher rate of mortality; malignancy and lymphoproliferative disorders; major adverse cardiovascular events; thrombosis; thrombocytopenia and lymphopenia; lipid elevations; and retinal detachment. Hydroxyzine Pregnancy And Lactation Text: This medication is not safe during pregnancy and should not be taken. It is also excreted in breast milk and breast feeding isn't recommended. High Dose Vitamin A Pregnancy And Lactation Text: High dose vitamin A therapy is contraindicated during pregnancy and breast feeding. Zoryve Counseling:  I discussed with the patient that Zoryve is not for use in the eyes, mouth or vagina. The most commonly reported side effects include diarrhea, headache, insomnia, application site pain, upper respiratory tract infections, and urinary tract infections.  All of the patient's questions and concerns were addressed. Glycopyrrolate Counseling:  I discussed with the patient the risks of glycopyrrolate including but not limited to skin rash, drowsiness, dry mouth, difficulty urinating, and blurred vision. Soolantra Counseling: I discussed with the patients the risks of topial Soolantra. This is a medicine which decreases the number of mites and inflammation in the skin. You experience burning, stinging, eye irritation or allergic reactions.  Please call our office if you develop any problems from using this medication. Carac Pregnancy And Lactation Text: This medication is Pregnancy Category X and contraindicated in pregnancy and in women who may become pregnant. It is unknown if this medication is excreted in breast milk. Klisyri Pregnancy And Lactation Text: It is unknown if this medication can harm a developing fetus or if it is excreted in breast milk. Infliximab Counseling:  I discussed with the patient the risks of infliximab including but not limited to myelosuppression, immunosuppression, autoimmune hepatitis, demyelinating diseases, lymphoma, and serious infections.  The patient understands that monitoring is required including a PPD at baseline and must alert us or the primary physician if symptoms of infection or other concerning signs are noted. Ketoconazole Pregnancy And Lactation Text: This medication is Pregnancy Category C and it isn't know if it is safe during pregnancy. It is also excreted in breast milk and breast feeding isn't recommended. Nsaids Pregnancy And Lactation Text: These medications are considered safe up to 30 weeks gestation. It is excreted in breast milk. Valtrex Counseling: I discussed with the patient the risks of valacyclovir including but not limited to kidney damage, nausea, vomiting and severe allergy.  The patient understands that if the infection seems to be worsening or is not improving, they are to call. Sotyktu Pregnancy And Lactation Text: There is insufficient data to evaluate whether or not Sotyktu is safe to use during pregnancy.? ?It is not known if Sotyktu passes into breast milk and whether or not it is safe to use when breastfeeding.?? Clindamycin Counseling: I counseled the patient regarding use of clindamycin as an antibiotic for prophylactic and/or therapeutic purposes. Clindamycin is active against numerous classes of bacteria, including skin bacteria. Side effects may include nausea, diarrhea, gastrointestinal upset, rash, hives, yeast infections, and in rare cases, colitis. Cyclophosphamide Pregnancy And Lactation Text: This medication is Pregnancy Category D and it isn't considered safe during pregnancy. This medication is excreted in breast milk. Cibinqo Pregnancy And Lactation Text: It is unknown if this medication will adversely affect pregnancy or breast feeding.  You should not take this medication if you are currently pregnant or planning a pregnancy or while breastfeeding. Protopic Counseling: Patient may experience a mild burning sensation during topical application. Protopic is not approved in children less than 2 years of age. There have been case reports of hematologic and skin malignancies in patients using topical calcineurin inhibitors although causality is questionable. Bimzelx Pregnancy And Lactation Text: This medication crosses the placenta and the safety is uncertain during pregnancy. It is unknown if this medication is present in breast milk. Humira Counseling:  I discussed with the patient the risks of adalimumab including but not limited to myelosuppression, immunosuppression, autoimmune hepatitis, demyelinating diseases, lymphoma, and serious infections.  The patient understands that monitoring is required including a PPD at baseline and must alert us or the primary physician if symptoms of infection or other concerning signs are noted. Birth Control Pills Pregnancy And Lactation Text: This medication should be avoided if pregnant and for the first 30 days post-partum. Clofazimine Counseling:  I discussed with the patient the risks of clofazimine including but not limited to skin and eye pigmentation, liver damage, nausea/vomiting, gastrointestinal bleeding and allergy. Topical Ketoconazole Counseling: Patient counseled that this medication may cause skin irritation or allergic reactions.  In the event of skin irritation, the patient was advised to reduce the amount of the drug applied or use it less frequently.   The patient verbalized understanding of the proper use and possible adverse effects of ketoconazole.  All of the patient's questions and concerns were addressed. Minoxidil Counseling: Minoxidil is a topical medication which can increase blood flow where it is applied. It is uncertain how this medication increases hair growth. Side effects are uncommon and include stinging and allergic reactions. Glycopyrrolate Pregnancy And Lactation Text: This medication is Pregnancy Category B and is considered safe during pregnancy. It is unknown if it is excreted breast milk. Soolantra Pregnancy And Lactation Text: This medication is Pregnancy Category C. This medication is considered safe during breast feeding. Xeljanz Counseling: I discussed with the patient the risks of Xeljanz therapy including increased risk of infection, liver issues, headache, diarrhea, or cold symptoms. Live vaccines should be avoided. They were instructed to call if they have any problems. Propranolol Counseling:  I discussed with the patient the risks of propranolol including but not limited to low heart rate, low blood pressure, low blood sugar, restlessness and increased cold sensitivity. They should call the office if they experience any of these side effects. Calcipotriene Counseling:  I discussed with the patient the risks of calcipotriene including but not limited to erythema, scaling, itching, and irritation. Cimzia Counseling:  I discussed with the patient the risks of Cimzia including but not limited to immunosuppression, allergic reactions and infections.  The patient understands that monitoring is required including a PPD at baseline and must alert us or the primary physician if symptoms of infection or other concerning signs are noted. Terbinafine Counseling: Patient counseling regarding adverse effects of terbinafine including but not limited to headache, diarrhea, rash, upset stomach, liver function test abnormalities, itching, taste/smell disturbance, nausea, abdominal pain, and flatulence.  There is a rare possibility of liver failure that can occur when taking terbinafine.  The patient understands that a baseline LFT and kidney function test may be required. The patient verbalized understanding of the proper use and possible adverse effects of terbinafine.  All of the patient's questions and concerns were addressed. Cyclosporine Counseling:  I discussed with the patient the risks of cyclosporine including but not limited to hypertension, gingival hyperplasia,myelosuppression, immunosuppression, liver damage, kidney damage, neurotoxicity, lymphoma, and serious infections. The patient understands that monitoring is required including baseline blood pressure, CBC, CMP, lipid panel and uric acid, and then 1-2 times monthly CMP and blood pressure. Clindamycin Pregnancy And Lactation Text: This medication can be used in pregnancy if certain situations. Clindamycin is also present in breast milk. Olanzapine Counseling- I discussed with the patient the common side effects of olanzapine including but are not limited to: lack of energy, dry mouth, increased appetite, sleepiness, tremor, constipation, dizziness, changes in behavior, or restlessness.  Explained that teenagers are more likely to experience headaches, abdominal pain, pain in the arms or legs, tiredness, and sleepiness.  Serious side effects include but are not limited: increased risk of death in elderly patients who are confused, have memory loss, or dementia-related psychosis; hyperglycemia; increased cholesterol and triglycerides; and weight gain. Xolair Counseling:  Patient informed of potential adverse effects including but not limited to fever, muscle aches, rash and allergic reactions.  The patient verbalized understanding of the proper use and possible adverse effects of Xolair.  All of the patient's questions and concerns were addressed. Spironolactone Counseling: Patient advised regarding risks of diarrhea, abdominal pain, hyperkalemia, birth defects (for female patients), liver toxicity and renal toxicity. The patient may need blood work to monitor liver and kidney function and potassium levels while on therapy. The patient verbalized understanding of the proper use and possible adverse effects of spironolactone.  All of the patient's questions and concerns were addressed. Eucrisa Counseling: Patient may experience a mild burning sensation during topical application. Eucrisa is not approved in children less than 2 years of age. Litfulo Counseling: I discussed with the patient the risks of Litfulo therapy including but not limited to upper respiratory tract infections, shingles, cold sores, and nausea. Live vaccines should be avoided.  This medication has been linked to serious infections; higher rate of mortality; malignancy and lymphoproliferative disorders; major adverse cardiovascular events; thrombosis; gastrointestinal perforations; neutropenia; lymphopenia; anemia; liver enzyme elevations; and lipid elevations. Protopic Pregnancy And Lactation Text: This medication is Pregnancy Category C. It is unknown if this medication is excreted in breast milk when applied topically. Albendazole Counseling:  I discussed with the patient the risks of albendazole including but not limited to cytopenia, kidney damage, nausea/vomiting and severe allergy.  The patient understands that this medication is being used in an off-label manner. Aklief counseling:  Patient advised to apply a pea-sized amount only at bedtime and wait 30 minutes after washing their face before applying.  If too drying, patient may add a non-comedogenic moisturizer.  The most commonly reported side effects including irritation, redness, scaling, dryness, stinging, burning, itching, and increased risk of sunburn.  The patient verbalized understanding of the proper use and possible adverse effects of retinoids.  All of the patient's questions and concerns were addressed. Valtrex Pregnancy And Lactation Text: this medication is Pregnancy Category B and is considered safe during pregnancy. This medication is not directly found in breast milk but it's metabolite acyclovir is present. Quinolones Counseling:  I discussed with the patient the risks of fluoroquinolones including but not limited to GI upset, allergic reaction, drug rash, diarrhea, dizziness, photosensitivity, yeast infections, liver function test abnormalities, tendonitis/tendon rupture. Hydroxychloroquine Counseling:  I discussed with the patient that a baseline ophthalmologic exam is needed at the start of therapy and every year thereafter while on therapy. A CBC may also be warranted for monitoring.  The side effects of this medication were discussed with the patient, including but not limited to agranulocytosis, aplastic anemia, seizures, rashes, retinopathy, and liver toxicity. Patient instructed to call the office should any adverse effect occur.  The patient verbalized understanding of the proper use and possible adverse effects of Plaquenil.  All the patient's questions and concerns were addressed. Zyclara Counseling:  I discussed with the patient the risks of imiquimod including but not limited to erythema, scaling, itching, weeping, crusting, and pain.  Patient understands that the inflammatory response to imiquimod is variable from person to person and was educated regarded proper titration schedule.  If flu-like symptoms develop, patient knows to discontinue the medication and contact us. Propranolol Pregnancy And Lactation Text: This medication is Pregnancy Category C and it isn't known if it is safe during pregnancy. It is excreted in breast milk. Fluconazole Counseling:  Patient counseled regarding adverse effects of fluconazole including but not limited to headache, diarrhea, nausea, upset stomach, liver function test abnormalities, taste disturbance, and stomach pain.  There is a rare possibility of liver failure that can occur when taking fluconazole.  The patient understands that monitoring of LFTs and kidney function test may be required, especially at baseline. The patient verbalized understanding of the proper use and possible adverse effects of fluconazole.  All of the patient's questions and concerns were addressed. Spevigo Counseling: I discussed with the patient the risks of Spevigo including but not limited to fatigue, nasuea, vomiting, headache, pruritus, urinary tract infection, an infusion related reactions.  The patient understands that monitoring is required including screening for tuberculosis at baseline and yearly screening thereafter while continuing Spevigo therapy. They should contact us if symptoms of infection or other concerning signs are noted. Calcipotriene Pregnancy And Lactation Text: The use of this medication during pregnancy or lactation is not recommended as there is insufficient data. Terbinafine Pregnancy And Lactation Text: This medication is Pregnancy Category B and is considered safe during pregnancy. It is also excreted in breast milk and breast feeding isn't recommended. Minoxidil Pregnancy And Lactation Text: This medication has not been assigned a Pregnancy Risk Category but animal studies failed to show danger with the topical medication. It is unknown if the medication is excreted in breast milk. Topical Retinoid counseling:  Patient advised to apply a pea-sized amount only at bedtime and wait 30 minutes after washing their face before applying.  If too drying, patient may add a non-comedogenic moisturizer. The patient verbalized understanding of the proper use and possible adverse effects of retinoids.  All of the patient's questions and concerns were addressed. Dutasteride Male Counseling: Dustasteride Counseling:  I discussed with the patient the risks of use of dutasteride including but not limited to decreased libido, decreased ejaculate volume, and gynecomastia. Women who can become pregnant should not handle medication.  All of the patient's questions and concerns were addressed. Rituxan Counseling:  I discussed with the patient the risks of Rituxan infusions. Side effects can include infusion reactions, severe drug rashes including mucocutaneous reactions, reactivation of latent hepatitis and other infections and rarely progressive multifocal leukoencephalopathy.  All of the patient's questions and concerns were addressed. Cimzia Pregnancy And Lactation Text: This medication crosses the placenta but can be considered safe in certain situations. Cimzia may be excreted in breast milk. Xeleddz Pregnancy And Lactation Text: This medication is Pregnancy Category D and is not considered safe during pregnancy.  The risk during breast feeding is also uncertain. Albendazole Pregnancy And Lactation Text: This medication is Pregnancy Category C and it isn't known if it is safe during pregnancy. It is also excreted in breast milk. Doxycycline Counseling:  Patient counseled regarding possible photosensitivity and increased risk for sunburn.  Patient instructed to avoid sunlight, if possible.  When exposed to sunlight, patients should wear protective clothing, sunglasses, and sunscreen.  The patient was instructed to call the office immediately if the following severe adverse effects occur:  hearing changes, easy bruising/bleeding, severe headache, or vision changes.  The patient verbalized understanding of the proper use and possible adverse effects of doxycycline.  All of the patient's questions and concerns were addressed. Acitretin Pregnancy And Lactation Text: This medication is Pregnancy Category X and should not be given to women who are pregnant or may become pregnant in the future. This medication is excreted in breast milk. Spironolactone Pregnancy And Lactation Text: This medication can cause feminization of the male fetus and should be avoided during pregnancy. The active metabolite is also found in breast milk. Qbrexza Counseling:  I discussed with the patient the risks of Qbrexza including but not limited to headache, mydriasis, blurred vision, dry eyes, nasal dryness, dry mouth, dry throat, dry skin, urinary hesitation, and constipation.  Local skin reactions including erythema, burning, stinging, and itching can also occur. Colchicine Counseling:  Patient counseled regarding adverse effects including but not limited to stomach upset (nausea, vomiting, stomach pain, or diarrhea).  Patient instructed to limit alcohol consumption while taking this medication.  Colchicine may reduce blood counts especially with prolonged use.  The patient understands that monitoring of kidney function and blood counts may be required, especially at baseline. The patient verbalized understanding of the proper use and possible adverse effects of colchicine.  All of the patient's questions and concerns were addressed. Wartpeel Counseling:  I discussed with the patient the risks of Wartpeel including but not limited to erythema, scaling, itching, weeping, crusting, and pain. Xolair Pregnancy And Lactation Text: This medication is Pregnancy Category B and is considered safe during pregnancy. This medication is excreted in breast milk. Olanzapine Pregnancy And Lactation Text: This medication is pregnancy category C.   There are no adequate and well controlled trials with olanzapine in pregnant females.  Olanzapine should be used during pregnancy only if the potential benefit justifies the potential risk to the fetus.   In a study in lactating healthy women, olanzapine was excreted in breast milk.  It is recommended that women taking olanzapine should not breast feed. Aklief Pregnancy And Lactation Text: It is unknown if this medication is safe to use during pregnancy.  It is unknown if this medication is excreted in breast milk.  Breastfeeding women should use the topical cream on the smallest area of the skin for the shortest time needed while breastfeeding.  Do not apply to nipple and areola. Hyrimoz Counseling:  I discussed with the patient the risks of adalimumab including but not limited to myelosuppression, immunosuppression, autoimmune hepatitis, demyelinating diseases, lymphoma, and serious infections.  The patient understands that monitoring is required including a PPD at baseline and must alert us or the primary physician if symptoms of infection or other concerning signs are noted. Hydroxychloroquine Pregnancy And Lactation Text: This medication has been shown to cause fetal harm but it isn't assigned a Pregnancy Risk Category. There are small amounts excreted in breast milk. Litfulo Pregnancy And Lactation Text: Based on animal studies, Lifulo may cause embryo-fetal harm when administered to pregnant women.  The medication should not be used in pregnancy.  Breastfeeding is not recommended during treatment. Spevigo Pregnancy And Lactation Text: The risk during pregnancy and breastfeeding is uncertain with this medication. This medication does cross the placenta. It is unknown if this medication is found in breast milk. Topical Metronidazole Counseling: Metronidazole is a topical antibiotic medication. You may experience burning, stinging, redness, or allergic reactions.  Please call our office if you develop any problems from using this medication. SSKI Counseling:  I discussed with the patient the risks of SSKI including but not limited to thyroid abnormalities, metallic taste, GI upset, fever, headache, acne, arthralgias, paraesthesias, lymphadenopathy, easy bleeding, arrhythmias, and allergic reaction. Azithromycin Counseling:  I discussed with the patient the risks of azithromycin including but not limited to GI upset, allergic reaction, drug rash, diarrhea, and yeast infections. Dutasteride Female Counseling: Dutasteride Counseling:  I discussed with the patient the risks of use of dutasteride including but not limited to decreased libido and sexual dysfunction. Explained the teratogenic nature of the medication and stressed the importance of not getting pregnant during treatment. All of the patient's questions and concerns were addressed. Rituxan Pregnancy And Lactation Text: This medication is Pregnancy Category C and it isn't know if it is safe during pregnancy. It is unknown if this medication is excreted in breast milk but similar antibodies are known to be excreted. Cantharidin Counseling:  I discussed with the patient the risks of Cantharidin including but not limited to pain, redness, burning, itching, and blistering. Mirvaso Counseling: Mirvaso is a topical medication which can decrease superficial blood flow where applied. Side effects are uncommon and include stinging, redness and allergic reactions. Cosentyx Counseling:  I discussed with the patient the risks of Cosentyx including but not limited to worsening of Crohn's disease, immunosuppression, allergic reactions and infections.  The patient understands that monitoring is required including a PPD at baseline and must alert us or the primary physician if symptoms of infection or other concerning signs are noted. Ivermectin Counseling:  Patient instructed to take medication on an empty stomach with a full glass of water.  Patient informed of potential adverse effects including but not limited to nausea, diarrhea, dizziness, itching, and swelling of the extremities or lymph nodes.  The patient verbalized understanding of the proper use and possible adverse effects of ivermectin.  All of the patient's questions and concerns were addressed. Doxycycline Pregnancy And Lactation Text: This medication is Pregnancy Category D and not consider safe during pregnancy. It is also excreted in breast milk but is considered safe for shorter treatment courses. Hydroquinone Counseling:  Patient advised that medication may result in skin irritation, lightening (hypopigmentation), dryness, and burning.  In the event of skin irritation, the patient was advised to reduce the amount of the drug applied or use it less frequently.  Rarely, spots that are treated with hydroquinone can become darker (pseudoochronosis).  Should this occur, patient instructed to stop medication and call the office. The patient verbalized understanding of the proper use and possible adverse effects of hydroquinone.  All of the patient's questions and concerns were addressed. Bexarotene Counseling:  I discussed with the patient the risks of bexarotene including but not limited to hair loss, dry lips/skin/eyes, liver abnormalities, hyperlipidemia, pancreatitis, depression/suicidal ideation, photosensitivity, drug rash/allergic reactions, hypothyroidism, anemia, leukopenia, infection, cataracts, and teratogenicity.  Patient understands that they will need regular blood tests to check lipid profile, liver function tests, white blood cell count, thyroid function tests and pregnancy test if applicable. Qbrexza Pregnancy And Lactation Text: There is no available data on Qbrexza use in pregnant women.  There is no available data on Qbrexza use in lactation. Oral Minoxidil Counseling- I discussed with the patient the risks of oral minoxidil including but not limited to shortness of breath, swelling of the feet or ankles, dizziness, lightheadedness, unwanted hair growth and allergic reaction.  The patient verbalized understanding of the proper use and possible adverse effects of oral minoxidil.  All of the patient's questions and concerns were addressed. Azelaic Acid Counseling: Patient counseled that medicine may cause skin irritation and to avoid applying near the eyes.  In the event of skin irritation, the patient was advised to reduce the amount of the drug applied or use it less frequently.   The patient verbalized understanding of the proper use and possible adverse effects of azelaic acid.  All of the patient's questions and concerns were addressed. Cantharidin Pregnancy And Lactation Text: This medication has not been proven safe during pregnancy. It is unknown if this medication is excreted in breast milk. Low Dose Naltrexone Counseling- I discussed with the patient the potential risks and side effects of low dose naltrexone including but not limited to: more vivid dreams, headaches, nausea, vomiting, abdominal pain, fatigue, dizziness, and anxiety. Cimetidine Counseling:  I discussed with the patient the risks of Cimetidine including but not limited to gynecomastia, headache, diarrhea, nausea, drowsiness, arrhythmias, pancreatitis, skin rashes, psychosis, bone marrow suppression and kidney toxicity. Methotrexate Counseling:  Patient counseled regarding adverse effects of methotrexate including but not limited to nausea, vomiting, abnormalities in liver function tests. Patients may develop mouth sores, rash, diarrhea, and abnormalities in blood counts. The patient understands that monitoring is required including LFT's and blood counts.  There is a rare possibility of scarring of the liver and lung problems that can occur when taking methotrexate. Persistent nausea, loss of appetite, pale stools, dark urine, cough, and shortness of breath should be reported immediately. Patient advised to discontinue methotrexate treatment at least three months before attempting to become pregnant.  I discussed the need for folate supplements while taking methotrexate.  These supplements can decrease side effects during methotrexate treatment. The patient verbalized understanding of the proper use and possible adverse effects of methotrexate.  All of the patient's questions and concerns were addressed. Azithromycin Pregnancy And Lactation Text: This medication is considered safe during pregnancy and is also secreted in breast milk. Olumiant Counseling: I discussed with the patient the risks of Olumiant therapy including but not limited to upper respiratory tract infections, shingles, cold sores, and nausea. Live vaccines should be avoided.  This medication has been linked to serious infections; higher rate of mortality; malignancy and lymphoproliferative disorders; major adverse cardiovascular events; thrombosis; gastrointestinal perforations; neutropenia; lymphopenia; anemia; liver enzyme elevations; and lipid elevations. Rifampin Counseling: I discussed with the patient the risks of rifampin including but not limited to liver damage, kidney damage, red-orange body fluids, nausea/vomiting and severe allergy. Griseofulvin Counseling:  I discussed with the patient the risks of griseofulvin including but not limited to photosensitivity, cytopenia, liver damage, nausea/vomiting and severe allergy.  The patient understands that this medication is best absorbed when taken with a fatty meal (e.g., ice cream or french fries). Acitretin Counseling:  I discussed with the patient the risks of acitretin including but not limited to hair loss, dry lips/skin/eyes, liver damage, hyperlipidemia, depression/suicidal ideation, photosensitivity.  Serious rare side effects can include but are not limited to pancreatitis, pseudotumor cerebri, bony changes, clot formation/stroke/heart attack.  Patient understands that alcohol is contraindicated since it can result in liver toxicity and significantly prolong the elimination of the drug by many years. Stelara Counseling:  I discussed with the patient the risks of ustekinumab including but not limited to immunosuppression, malignancy, posterior leukoencephalopathy syndrome, and serious infections.  The patient understands that monitoring is required including a PPD at baseline and must alert us or the primary physician if symptoms of infection or other concerning signs are noted. Topical Metronidazole Pregnancy And Lactation Text: This medication is Pregnancy Category B and considered safe during pregnancy.  It is also considered safe to use while breastfeeding. Dutasteride Pregnancy And Lactation Text: This medication is absolutely contraindicated in women, especially during pregnancy and breast feeding. Feminization of male fetuses is possible if taking while pregnant. Azathioprine Counseling:  I discussed with the patient the risks of azathioprine including but not limited to myelosuppression, immunosuppression, hepatotoxicity, lymphoma, and infections.  The patient understands that monitoring is required including baseline LFTs, Creatinine, possible TPMP genotyping and weekly CBCs for the first month and then every 2 weeks thereafter.  The patient verbalized understanding of the proper use and possible adverse effects of azathioprine.  All of the patient's questions and concerns were addressed. Mirvaso Pregnancy And Lactation Text: This medication has not been assigned a Pregnancy Risk Category. It is unknown if the medication is excreted in breast milk. Sski Pregnancy And Lactation Text: This medication is Pregnancy Category D and isn't considered safe during pregnancy. It is excreted in breast milk. Erivedge Counseling- I discussed with the patient the risks of Erivedge including but not limited to nausea, vomiting, diarrhea, constipation, weight loss, changes in the sense of taste, decreased appetite, muscle spasms, and hair loss.  The patient verbalized understanding of the proper use and possible adverse effects of Erivedge.  All of the patient's questions and concerns were addressed. Tazorac Counseling:  Patient advised that medication is irritating and drying.  Patient may need to apply sparingly and wash off after an hour before eventually leaving it on overnight.  The patient verbalized understanding of the proper use and possible adverse effects of tazorac.  All of the patient's questions and concerns were addressed. Oral Minoxidil Pregnancy And Lactation Text: This medication should only be used when clearly needed if you are pregnant, attempting to become pregnant or breast feeding. Erythromycin Counseling:  I discussed with the patient the risks of erythromycin including but not limited to GI upset, allergic reaction, drug rash, diarrhea, increase in liver enzymes, and yeast infections. Siliq Counseling:  I discussed with the patient the risks of Siliq including but not limited to new or worsening depression, suicidal thoughts and behavior, immunosuppression, malignancy, posterior leukoencephalopathy syndrome, and serious infections.  The patient understands that monitoring is required including a PPD at baseline and must alert us or the primary physician if symptoms of infection or other concerning signs are noted. There is also a special program designed to monitor depression which is required with Siliq. 5-Fu Counseling: 5-Fluorouracil Counseling:  I discussed with the patient the risks of 5-fluorouracil including but not limited to erythema, scaling, itching, weeping, crusting, and pain. Dapsone Counseling: I discussed with the patient the risks of dapsone including but not limited to hemolytic anemia, agranulocytosis, rashes, methemoglobinemia, kidney failure, peripheral neuropathy, headaches, GI upset, and liver toxicity.  Patients who start dapsone require monitoring including baseline LFTs and weekly CBCs for the first month, then every month thereafter.  The patient verbalized understanding of the proper use and possible adverse effects of dapsone.  All of the patient's questions and concerns were addressed. Opioid Counseling: I discussed with the patient the potential side effects of opioids including but not limited to addiction, altered mental status, and depression. I stressed avoiding alcohol, benzodiazepines, muscle relaxants and sleep aids unless specifically okayed by a physician. The patient verbalized understanding of the proper use and possible adverse effects of opioids. All of the patient's questions and concerns were addressed. They were instructed to flush the remaining pills down the toilet if they did not need them for pain. Winlevi Counseling:  I discussed with the patient the risks of topical clascoterone including but not limited to erythema, scaling, itching, and stinging. Patient voiced their understanding. Rhofade Counseling: Rhofade is a topical medication which can decrease superficial blood flow where applied. Side effects are uncommon and include stinging, redness and allergic reactions. Bexarotene Pregnancy And Lactation Text: This medication is Pregnancy Category X and should not be given to women who are pregnant or may become pregnant. This medication should not be used if you are breast feeding. Ilumya Counseling: I discussed with the patient the risks of tildrakizumab including but not limited to immunosuppression, malignancy, posterior leukoencephalopathy syndrome, and serious infections.  The patient understands that monitoring is required including a PPD at baseline and must alert us or the primary physician if symptoms of infection or other concerning signs are noted. Finasteride Male Counseling: Finasteride Counseling:  I discussed with the patient the risks of use of finasteride including but not limited to decreased libido, decreased ejaculate volume, gynecomastia, and depression. Women should not handle medication.  All of the patient's questions and concerns were addressed. Rifampin Pregnancy And Lactation Text: This medication is Pregnancy Category C and it isn't know if it is safe during pregnancy. It is also excreted in breast milk and should not be used if you are breast feeding. Griseofulvin Pregnancy And Lactation Text: This medication is Pregnancy Category X and is known to cause serious birth defects. It is unknown if this medication is excreted in breast milk but breast feeding should be avoided. Olumiant Pregnancy And Lactation Text: Based on animal studies, Olumiant may cause embryo-fetal harm when administered to pregnant women.  The medication should not be used in pregnancy.  Breastfeeding is not recommended during treatment. Methotrexate Pregnancy And Lactation Text: This medication is Pregnancy Category X and is known to cause fetal harm. This medication is excreted in breast milk. Low Dose Naltrexone Pregnancy And Lactation Text: Naltrexone is pregnancy category C.  There have been no adequate and well-controlled studies in pregnant women.  It should be used in pregnancy only if the potential benefit justifies the potential risk to the fetus.   Limited data indicates that naltrexone is minimally excreted into breastmilk. Topical Steroids Counseling: I discussed with the patient that prolonged use of topical steroids can result in the increased appearance of superficial blood vessels (telangiectasias), lightening (hypopigmentation) and thinning of the skin (atrophy).  Patient understands to avoid using high potency steroids in skin folds, the groin or the face.  The patient verbalized understanding of the proper use and possible adverse effects of topical steroids.  All of the patient's questions and concerns were addressed. Bactrim Counseling:  I discussed with the patient the risks of sulfa antibiotics including but not limited to GI upset, allergic reaction, drug rash, diarrhea, dizziness, photosensitivity, and yeast infections.  Rarely, more serious reactions can occur including but not limited to aplastic anemia, agranulocytosis, methemoglobinemia, blood dyscrasias, liver or kidney failure, lung infiltrates or desquamative/blistering drug rashes. Thalidomide Counseling: I discussed with the patient the risks of thalidomide including but not limited to birth defects, anxiety, weakness, chest pain, dizziness, cough and severe allergy. Opzelura Counseling:  I discussed with the patient the risks of Opzelura including but not limited to nasopharngitis, bronchitis, ear infection, eosinophila, hives, diarrhea, folliculitis, tonsillitis, and rhinorrhea.  Taken orally, this medication has been linked to serious infections; higher rate of mortality; malignancy and lymphoproliferative disorders; major adverse cardiovascular events; thrombosis; thrombocytopenia, anemia, and neutropenia; and lipid elevations. Tazorac Pregnancy And Lactation Text: This medication is not safe during pregnancy. It is unknown if this medication is excreted in breast milk. Detail Level: Simple Dupixent Counseling: I discussed with the patient the risks of dupilumab including but not limited to eye infection and irritation, cold sores, injection site reactions, worsening of asthma, allergic reactions and increased risk of parasitic infection.  Live vaccines should be avoided while taking dupilumab. Dupilumab will also interact with certain medications such as warfarin and cyclosporine. The patient understands that monitoring is required and they must alert us or the primary physician if symptoms of infection or other concerning signs are noted. Otezla Counseling: The side effects of Otezla were discussed with the patient, including but not limited to worsening or new depression, weight loss, diarrhea, nausea, upper respiratory tract infection, and headache. Patient instructed to call the office should any adverse effect occur.  The patient verbalized understanding of the proper use and possible adverse effects of Otezla.  All the patient's questions and concerns were addressed. Erythromycin Pregnancy And Lactation Text: This medication is Pregnancy Category B and is considered safe during pregnancy. It is also excreted in breast milk. Isotretinoin Counseling: Patient should get monthly blood tests, not donate blood, not drive at night if vision affected, not share medication, and not undergo elective surgery for 6 months after tx completed. Side effects reviewed, pt to contact office should one occur. Dapsone Pregnancy And Lactation Text: This medication is Pregnancy Category C and is not considered safe during pregnancy or breast feeding. Rinvoq Counseling: I discussed with the patient the risks of Rinvoq therapy including but not limited to upper respiratory tract infections, shingles, cold sores, bronchitis, nausea, cough, fever, acne, and headache. Live vaccines should be avoided.  This medication has been linked to serious infections; higher rate of mortality; malignancy and lymphoproliferative disorders; major adverse cardiovascular events; thrombosis; thrombocytopenia, anemia, and neutropenia; lipid elevations; liver enzyme elevations; and gastrointestinal perforations. Opioid Pregnancy And Lactation Text: These medications can lead to premature delivery and should be avoided during pregnancy. These medications are also present in breast milk in small amounts. Winlevi Pregnancy And Lactation Text: This medication is considered safe during pregnancy and breastfeeding. Benzoyl Peroxide Counseling: Patient counseled that medicine may cause skin irritation and bleach clothing.  In the event of skin irritation, the patient was advised to reduce the amount of the drug applied or use it less frequently.   The patient verbalized understanding of the proper use and possible adverse effects of benzoyl peroxide.  All of the patient's questions and concerns were addressed. Imiquimod Counseling:  I discussed with the patient the risks of imiquimod including but not limited to erythema, scaling, itching, weeping, crusting, and pain.  Patient understands that the inflammatory response to imiquimod is variable from person to person and was educated regarded proper titration schedule.  If flu-like symptoms develop, patient knows to discontinue the medication and contact us. Doxepin Counseling:  Patient advised that the medication is sedating and not to drive a car after taking this medication. Patient informed of potential adverse effects including but not limited to dry mouth, urinary retention, and blurry vision.  The patient verbalized understanding of the proper use and possible adverse effects of doxepin.  All of the patient's questions and concerns were addressed. Prednisone Counseling:  I discussed with the patient the risks of prolonged use of prednisone including but not limited to weight gain, insomnia, osteoporosis, mood changes, diabetes, susceptibility to infection, glaucoma and high blood pressure.  In cases where prednisone use is prolonged, patients should be monitored with blood pressure checks, serum glucose levels and an eye exam.  Additionally, the patient may need to be placed on GI prophylaxis, PCP prophylaxis, and calcium and vitamin D supplementation and/or a bisphosphonate.  The patient verbalized understanding of the proper use and the possible adverse effects of prednisone.  All of the patient's questions and concerns were addressed.
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
## 2025-03-29 NOTE — PROGRESS NOTES
This is a recent snapshot of the patient's [facility] medical record.  For current drug dose and complete information and questions, call [phone] or In Basket pool, [facility] ([id number])  CSN Number:  [id number]       Abdomen soft, non-tender and non-distended, no rebound, no guarding and no masses. no hepatosplenomegaly.

## (undated) DEVICE — BLADE BONE MILL STRK 3.2MM FINE 5400-702-000

## (undated) DEVICE — WIRE GUIDE 0.035"X150CM EMERALD STR 502542

## (undated) DEVICE — CONNECTOR STOPCOCK 3 WAY MALE LL HI-FLO MX9311L

## (undated) DEVICE — Device

## (undated) DEVICE — DRAPE SHEET MED 44X70" 9355

## (undated) DEVICE — CATH ANGIO INFINITI PIGTAIL 145 6 SH 6FRX110CM  534-652S

## (undated) DEVICE — SHEATH INTRODUCER DRYSEAL FLEX W/HYDRO CT 20FRX33CM DSF2033

## (undated) DEVICE — PREP CHLORAPREP ORANGE 3ML  260415

## (undated) DEVICE — PACK SPINE INSTRUMENT DRAPE 76091-ACM7820

## (undated) DEVICE — CELL SAVER

## (undated) DEVICE — INTRO SHEATH 7FRX25CM PINNACLE RSS706

## (undated) DEVICE — CATH TRAY FOLEY SURESTEP 16FR W/TMP PRB STLK LATEX A319416AM

## (undated) DEVICE — SPONGE RAY-TEC 4X8" 7318

## (undated) DEVICE — DRAPE SHEET REV FOLD 3/4 9349

## (undated) DEVICE — CATH ANGIO LANGSTRON 6FRX110CM 145DEG 4H 5540

## (undated) DEVICE — SU DERMABOND ADVANCED .7ML DNX12

## (undated) DEVICE — DRAIN JACKSON PRATT ROUND W/TROCAR 15FR LF JP-HUR151

## (undated) DEVICE — WIPES FOLEY CARE SURESTEP PROVON DFC100

## (undated) DEVICE — 9FR X 13CM SAFESHEATH PEEL AWAY HEMOSTATIC INTRODUCER FOR VASCULAR ACCESS, MODEL 7463 / PART # 667463-001 (MFR'D BY PRESSURE PRODUCTS)

## (undated) DEVICE — NDL COUNTER 20CT 31142493

## (undated) DEVICE — CLIP HORIZON MED BLUE 002200

## (undated) DEVICE — SU ETHILON 3-0 PS-1 18" 1663H

## (undated) DEVICE — CLIP HORIZON SM RED WIDE SLOT 001201

## (undated) DEVICE — GUIDEWIRE VASC SAFARI2 0.035X275CM H74939406XS1

## (undated) DEVICE — SU PROLENE 5-0 C-1 DA 24" 8725H

## (undated) DEVICE — CATH SYSTEM SENTINEL CEREBRAL PROTECTION 6FR CMS15-10C-US

## (undated) DEVICE — PAD DEFIBRILLATOR ELECTRODE 4.25INX6IN ADULT 2001M-C

## (undated) DEVICE — SOL NACL 0.9% IRRIG 1000ML BOTTLE 2F7124

## (undated) DEVICE — 6FR X 13CM SAFESHEATH II PEEL-AWAY INTRODUCER SHEATH WITH INFUSION SIDE PORT, EA CATALOG PART# 66740-001, (MFG BY PRESSURE PRODUCTS UNDER CAT# SS6-MODEL# 7460)

## (undated) DEVICE — TUBING SUCTION MEDI-VAC SOFT 3/16"X20' N520A

## (undated) DEVICE — TOTE ANGIO CORP PC15AT SAN32CC83O

## (undated) DEVICE — DRAPE O ARM BAR MEDTRONIC 9733023

## (undated) DEVICE — VALVE HEMOSTASIS .096" COPILOT MECH 1003331

## (undated) DEVICE — DRAPE MAYO STAND 23X54 8337

## (undated) DEVICE — IOM SUPPLIES

## (undated) DEVICE — TEST TUBE W/SCREW CAP 17361

## (undated) DEVICE — CATH ANGIO INFINITI 3DRC 6FRX100CM 534676T

## (undated) DEVICE — SOL WATER IRRIG 1000ML BOTTLE 2F7114

## (undated) DEVICE — DRAPE C-ARMOR 5 SIDED 5523

## (undated) DEVICE — SUCTION TIP YANKAUER STR K87

## (undated) DEVICE — CUP AND LID 2PK 2OZ STERILE  SSK9006A

## (undated) DEVICE — RAD KNIFE HANDLE W/11 BLADE DISPOSABLE 371611

## (undated) DEVICE — GUIDEWIRE VASC 0.025X260X15CM J-TIP G02384

## (undated) DEVICE — STRAP KNEE/BODY 31143004

## (undated) DEVICE — SET INTRODUCER SHEATH 14FR 914ES

## (undated) DEVICE — SYR 10ML LL W/O NDL 302995

## (undated) DEVICE — CATH ANGIO SUPERTORQUE PLUS JR4 6FRX100CM 533621

## (undated) DEVICE — ESU PENCIL W/COATED BLADE E2450H

## (undated) DEVICE — SU SILK 2-0 TIE 12X30" A305H

## (undated) DEVICE — PROTECTOR ARM ONE-STEP TRENDELENBURG 40418

## (undated) DEVICE — IOM MONITORING FIRST 7 HOURS ---- 15 MIN

## (undated) DEVICE — SPONGE KITTNER 30-101

## (undated) DEVICE — KIT ACCESSORY INTRO INFLATION SYS 20/30 PRIORITY 1000186-115

## (undated) DEVICE — INTRO SHEATH AVANTI 4FRX23CM 504604T

## (undated) DEVICE — LINEN TOWEL PACK X6 WHITE 5487

## (undated) DEVICE — SU VICRYL 1 CT-1 27" UND J261H

## (undated) DEVICE — DRAIN JACKSON PRATT 15FR ROUND SU130-1323

## (undated) DEVICE — TUBING PRESSURE 30"

## (undated) DEVICE — WIRE GUIDE 0.035"X260CM SAFE-T-J EXCHANGE G00517

## (undated) DEVICE — MANIFOLD KIT ANGIO AUTOMATED 014613

## (undated) DEVICE — LIGHT HANDLE X1 31140133

## (undated) DEVICE — ENDO DISSECTOR BLUNT 05MM  BTD05

## (undated) DEVICE — LINEN TOWEL PACK X5 5464

## (undated) DEVICE — CATH ANGIO SUPERTORQUE AL1 6FRX100CM 532-645

## (undated) DEVICE — BOWL 32OZ STERILE 01232

## (undated) DEVICE — SU SILK 0 TIE 6X30" A306H

## (undated) DEVICE — ESU HOLSTER PLASTIC DISP E2400

## (undated) DEVICE — WIRE GUIDE 0.035"X260CM SAFE-T-J EXCHANGE TSCF-35-260-3-BH

## (undated) DEVICE — SPONGE LAP 18X18" X8435

## (undated) DEVICE — RAD CLOSURE ANGIOSEAL 8FR  610131

## (undated) DEVICE — WIRE GUIDE 0.025"X150CM EMERALD J TIP 502524

## (undated) DEVICE — INTRO SHEATH 7FRX10CM PINNACLE RSS702

## (undated) DEVICE — ESU ELEC BLADE 2.75" COATED/INSULATED E1455

## (undated) DEVICE — PREP CHLORAPREP 26ML TINTED ORANGE  260815

## (undated) DEVICE — WIRE GUIDE 0.035"X150CM EMERALD J TIP 502521

## (undated) DEVICE — DRSG TEGADERM 4X4 3/4" 1626W

## (undated) DEVICE — DRAPE O ARM TUBE 9732722

## (undated) DEVICE — CABLE ADAPTOR PACING DISP 6' 5487

## (undated) DEVICE — INTRO SHEATH 6FRX10CM PINNACLE RSS602

## (undated) DEVICE — SPONGE COTTONOID 1X3" 20-10S

## (undated) DEVICE — SPONGE COTTONOID 1/2X1/2" 20-04S

## (undated) DEVICE — SYR 50ML LL W/O NDL 309653

## (undated) DEVICE — CATH BALLOON TRANSFEMORAL 20MM 9350BC20A

## (undated) DEVICE — BUR MATCHSTICK 3MM ANSPACH L-8NS-G1

## (undated) DEVICE — WIRE GUIDE 0.04"X300CM GRANDSLAM J TP AG141302J

## (undated) DEVICE — LINEN TOWEL PACK X30 5481

## (undated) DEVICE — DRAPE CV SPLIT II 147X106" 9158

## (undated) DEVICE — SU VICRYL 2-0 CT-2 CR 8X18" J726D

## (undated) DEVICE — CATH ANGIO SUPERTORQUE PLUS JL4 6FRX100CM 533620

## (undated) DEVICE — IOM MONITORING ---- 15 MIN

## (undated) DEVICE — KIT RIGHT HEART CATH H965601307191

## (undated) DEVICE — SOL ISOPROPYL ALCOHOL USP 70% 16OZ  NDC10565-002-16 D0022

## (undated) DEVICE — DEFIB PRO-PADZ LVP LQD GEL ADULT 8900-2105-01

## (undated) DEVICE — 3-0 MONOCRYL

## (undated) DEVICE — KIT PATIENT CARE PROAXIS SYSTEM 6988-PV-ACP

## (undated) DEVICE — DRAPE POUCH INSTRUMENT 1018

## (undated) DEVICE — KIT HAND CONTROL ANGIOTOUCH ACIST 65CM AT-P65

## (undated) DEVICE — WIRE GUIDE LUNDERQUIST 0.035"X260CM DBL CVD

## (undated) DEVICE — CATH TRAY FOLEY 16FR BARDEX W/TEMP PRB URINE METER 319416AM

## (undated) DEVICE — SU ETHIBOND 2-0 FS 18" X664H

## (undated) DEVICE — DRSG PRIMAPORE 02X3" 7133

## (undated) DEVICE — SLEEVE TR BAND RADIAL COMPRESSION DEVICE 24CM TRB24-REG

## (undated) DEVICE — DRAPE STERI TOWEL LG 1010

## (undated) DEVICE — PACK NEURO MINOR UMMC SNE32MNMU4

## (undated) DEVICE — BASIN EMESIS STERILE  SSK9005A

## (undated) DEVICE — TAPE MEDIPORE 4"X2YD 2864

## (undated) DEVICE — MIDAS REX DISSECTING TOOL  14MH30

## (undated) DEVICE — SU PDS II 0 TP-1 60" Z991G

## (undated) DEVICE — NDL PERC ENTRY THINWALL 18GA 7.0" G00166

## (undated) DEVICE — TUBING SUCTION 10'X3/16" N510

## (undated) DEVICE — GLOVE PROTEXIS MICRO 7.5  2D73PM75

## (undated) DEVICE — SOL NACL 0.9% 10ML VIAL 0409-4888-02

## (undated) DEVICE — SYR VALVE LOW VISCOSITY 100ML ACIST A2000V

## (undated) DEVICE — KIT HAND CONTROL ACIST 014644 AR-P54

## (undated) DEVICE — PACK HEART LEFT CUSTOM

## (undated) DEVICE — INTRO SHEATH MICRO PLATINUM TIP 4FRX40CM 7274

## (undated) DEVICE — INTRODUCER SHEATH FAST-CATH 7FRX12CM 406244

## (undated) DEVICE — SURGICEL HEMOSTAT 4X8" 1952

## (undated) DEVICE — COVER BIG CASE BACK TABLE 6'X2' 420-HD-S

## (undated) DEVICE — DRAPE SPECIAL PROCEDURE ANGIO-SHIELD D2224

## (undated) DEVICE — SUCTION MANIFOLD DORNOCH ULTRA CART UL-CL500

## (undated) DEVICE — SU PLEDGET SOFT TFE 3/8"X3/26"X1/16" PCP40

## (undated) DEVICE — SU SILK 3-0 TIE 12X30" A304H

## (undated) DEVICE — SYR 30ML LL W/O NDL 302832

## (undated) DEVICE — ADH FLOSEAL W/HUMAN THROMBIN 5ML W/APPLICATOR TIP ADS201844

## (undated) DEVICE — ESU CORD BIPOLAR GREEN 10-4000

## (undated) DEVICE — DRAPE STERI FLUOROSCOPE 35X43"1012 LATEX FREE

## (undated) DEVICE — WIRE GUIDE HI-TRQ BALANCE MDWT JTIP 0.014"X190CM 1001780J-HC

## (undated) DEVICE — DRAPE IOBAN INCISE 23X17" 6650EZ

## (undated) DEVICE — ESU GROUND PAD ADULT REM W/15' CORD E7507DB

## (undated) DEVICE — SYR 03ML SLIP TIP W/O NDL LATEX FREE 309656

## (undated) DEVICE — SPONGE SURGIFOAM 100 1974

## (undated) DEVICE — INTRODUCER CATH VASC 5FRX10CM  MPIS-501-NT-U-SST

## (undated) DEVICE — SPONGE COTTONOID 1/2X1" 20-05S

## (undated) DEVICE — APPLICATOR COTTON TIP 6"X2 STERILE LF 6012

## (undated) DEVICE — MARKER SPHERES PASSIVE MEDT PACK 5 8801075

## (undated) DEVICE — DRSG GAUZE 4X4" TRAY 6939

## (undated) DEVICE — DRAIN HEMOVAC RESERVOIR KIT 10FR 1/8" MED 00-2550-002-10

## (undated) DEVICE — ESU GROUND PAD UNIVERSAL W/O CORD

## (undated) DEVICE — DEFIB PADPRO CONMED ADULT/CHILD 2001Z-C

## (undated) DEVICE — VESSEL LOOPS RED MINI 31145710

## (undated) DEVICE — DRAPE C-ARM W/STRAPS 42X72" 07-CA104

## (undated) DEVICE — SU VICRYL 3-0 CT-1 36" J344H

## (undated) DEVICE — BLADE CLIPPER SGL USE 9680

## (undated) DEVICE — CATH BALLOON EMERGE 2.0X8MM H7493918908200

## (undated) DEVICE — SYR ANGIOGRAPHY MULTIUSE KIT ACIST 014612

## (undated) DEVICE — DRSG GAUZE 2X2" 8042

## (undated) DEVICE — DRSG TEGADERM 8X12" 1629

## (undated) DEVICE — INTRO GLIDESHEATH SLENDER 6FR 10X45CM 60-1060

## (undated) DEVICE — PREP CHLORHEXIDINE 4% 4OZ (HIBICLENS) 57504

## (undated) DEVICE — ESU ELEC BLADE 6" COATED/INSULATED E1455-6

## (undated) DEVICE — SU MONOCRYL 3-0 SH 27" UND Y416H

## (undated) DEVICE — CATH EP PACEL 5FRX110CM 1MM RIGHT HEART CURVE 401763

## (undated) DEVICE — LEAD INGEVITY ACTIVE MRI 45 CM: Type: IMPLANTABLE DEVICE | Status: NON-FUNCTIONAL

## (undated) DEVICE — MIDAS REX DIFFUSER LUBRICANT LEGEND PA100-A

## (undated) DEVICE — MIDAS REX DISSECTING TOOL 14AC60

## (undated) DEVICE — NDL SPINAL 18GA 3.5" 405184

## (undated) DEVICE — INTRO SHEATH 6FRX25CM PINNACLE RSS606

## (undated) DEVICE — SHTH INTRO 0.021IN ID 6FR DIA

## (undated) DEVICE — NDL 27GA 1.25" 305136

## (undated) DEVICE — DEVICE CATH STATLOCK INTRA-AORTIC BALL PUMP 0684-00-0472

## (undated) DEVICE — SU SILK 3-0 SH CR 8X18" C013D

## (undated) DEVICE — SU MONOCRYL 2-0 SH 27" UND Y417H

## (undated) DEVICE — ADH SKIN CLOSURE PREMIERPRO EXOFIN 1.0ML 3470

## (undated) DEVICE — NDL BLUNT 18GA 1" W/O FILTER 305181

## (undated) DEVICE — SLEEVE REPOSITIONING W/CATH LOCK 60CM 406503

## (undated) DEVICE — DRAIN JACKSON PRATT RESERVOIR 100ML SU130-1305

## (undated) DEVICE — INTRO SHEATH 4FRX25CM PINNACLE MARKER RSB403

## (undated) DEVICE — SU VICRYL 3-0 SH 27" UND J416H

## (undated) DEVICE — SU VICRYL 0 CT-1 CR 8X18" J740D

## (undated) DEVICE — MIDAS REX DISSECTING TOOL 4MM BALL 140MM 14BA40

## (undated) DEVICE — COVER ULTRASOUND PROBE W/GEL FLEXI-FEEL 6"X58" LF  25-FF658

## (undated) DEVICE — CLIP HORIZON LG ORANGE 004200

## (undated) DEVICE — VESSEL LOOPS YELLOW MAXI 31145694

## (undated) DEVICE — SU DERMABOND PRINEO CLR602US

## (undated) DEVICE — BLADE KNIFE SURG 10 371110

## (undated) RX ORDER — ALBUTEROL SULFATE 90 UG/1
AEROSOL, METERED RESPIRATORY (INHALATION)
Status: DISPENSED
Start: 2019-01-01

## (undated) RX ORDER — DEXAMETHASONE SODIUM PHOSPHATE 4 MG/ML
INJECTION, SOLUTION INTRA-ARTICULAR; INTRALESIONAL; INTRAMUSCULAR; INTRAVENOUS; SOFT TISSUE
Status: DISPENSED
Start: 2019-01-01

## (undated) RX ORDER — HYDROMORPHONE HYDROCHLORIDE 1 MG/ML
INJECTION, SOLUTION INTRAMUSCULAR; INTRAVENOUS; SUBCUTANEOUS
Status: DISPENSED
Start: 2019-01-01

## (undated) RX ORDER — ONDANSETRON 2 MG/ML
INJECTION INTRAMUSCULAR; INTRAVENOUS
Status: DISPENSED
Start: 2019-01-01

## (undated) RX ORDER — LIDOCAINE HYDROCHLORIDE 10 MG/ML
INJECTION, SOLUTION EPIDURAL; INFILTRATION; INTRACAUDAL; PERINEURAL
Status: DISPENSED
Start: 2018-07-02

## (undated) RX ORDER — HEPARIN SODIUM 1000 [USP'U]/ML
INJECTION, SOLUTION INTRAVENOUS; SUBCUTANEOUS
Status: DISPENSED
Start: 2019-01-01

## (undated) RX ORDER — LIDOCAINE HYDROCHLORIDE 10 MG/ML
INJECTION, SOLUTION EPIDURAL; INFILTRATION; INTRACAUDAL; PERINEURAL
Status: DISPENSED
Start: 2019-01-01

## (undated) RX ORDER — ETOMIDATE 2 MG/ML
INJECTION INTRAVENOUS
Status: DISPENSED
Start: 2019-01-01

## (undated) RX ORDER — GABAPENTIN 300 MG/1
CAPSULE ORAL
Status: DISPENSED
Start: 2019-01-01

## (undated) RX ORDER — FENTANYL CITRATE 50 UG/ML
INJECTION, SOLUTION INTRAMUSCULAR; INTRAVENOUS
Status: DISPENSED
Start: 2019-01-01

## (undated) RX ORDER — BUPIVACAINE HYDROCHLORIDE AND EPINEPHRINE 5; 5 MG/ML; UG/ML
INJECTION, SOLUTION EPIDURAL; INTRACAUDAL; PERINEURAL
Status: DISPENSED
Start: 2019-01-01

## (undated) RX ORDER — PROPOFOL 10 MG/ML
INJECTION, EMULSION INTRAVENOUS
Status: DISPENSED
Start: 2019-01-01

## (undated) RX ORDER — PHENYLEPHRINE HCL IN 0.9% NACL 1 MG/10 ML
SYRINGE (ML) INTRAVENOUS
Status: DISPENSED
Start: 2019-01-01

## (undated) RX ORDER — BACITRACIN 50000 [IU]/1
INJECTION, POWDER, FOR SOLUTION INTRAMUSCULAR
Status: DISPENSED
Start: 2019-01-01

## (undated) RX ORDER — CEFAZOLIN SODIUM 2 G/100ML
INJECTION, SOLUTION INTRAVENOUS
Status: DISPENSED
Start: 2019-01-01

## (undated) RX ORDER — HEPARIN SODIUM 200 [USP'U]/100ML
INJECTION, SOLUTION INTRAVENOUS
Status: DISPENSED
Start: 2019-01-01

## (undated) RX ORDER — GLYCOPYRROLATE 0.2 MG/ML
INJECTION, SOLUTION INTRAMUSCULAR; INTRAVENOUS
Status: DISPENSED
Start: 2019-01-01

## (undated) RX ORDER — LIDOCAINE HYDROCHLORIDE 20 MG/ML
INJECTION, SOLUTION EPIDURAL; INFILTRATION; INTRACAUDAL; PERINEURAL
Status: DISPENSED
Start: 2019-01-01

## (undated) RX ORDER — EPHEDRINE SULFATE 50 MG/ML
INJECTION, SOLUTION INTRAMUSCULAR; INTRAVENOUS; SUBCUTANEOUS
Status: DISPENSED
Start: 2019-01-01

## (undated) RX ORDER — ACETAMINOPHEN 325 MG/1
TABLET ORAL
Status: DISPENSED
Start: 2019-01-01

## (undated) RX ORDER — VANCOMYCIN HYDROCHLORIDE 1 G/20ML
INJECTION, POWDER, LYOPHILIZED, FOR SOLUTION INTRAVENOUS
Status: DISPENSED
Start: 2019-01-01

## (undated) RX ORDER — LABETALOL HYDROCHLORIDE 5 MG/ML
INJECTION, SOLUTION INTRAVENOUS
Status: DISPENSED
Start: 2019-01-01

## (undated) RX ORDER — ASPIRIN 81 MG/1
TABLET, CHEWABLE ORAL
Status: DISPENSED
Start: 2019-01-01

## (undated) RX ORDER — SUFENTANIL CITRATE 50 UG/ML
INJECTION EPIDURAL; INTRAVENOUS
Status: DISPENSED
Start: 2019-01-01

## (undated) RX ORDER — SODIUM CHLORIDE, SODIUM LACTATE, POTASSIUM CHLORIDE, CALCIUM CHLORIDE 600; 310; 30; 20 MG/100ML; MG/100ML; MG/100ML; MG/100ML
INJECTION, SOLUTION INTRAVENOUS
Status: DISPENSED
Start: 2019-01-01

## (undated) RX ORDER — CEFAZOLIN SODIUM 1 G/3ML
INJECTION, POWDER, FOR SOLUTION INTRAMUSCULAR; INTRAVENOUS
Status: DISPENSED
Start: 2019-01-01

## (undated) RX ORDER — ALBUTEROL SULFATE 0.83 MG/ML
SOLUTION RESPIRATORY (INHALATION)
Status: DISPENSED
Start: 2019-01-01

## (undated) RX ORDER — IPRATROPIUM BROMIDE AND ALBUTEROL SULFATE 2.5; .5 MG/3ML; MG/3ML
SOLUTION RESPIRATORY (INHALATION)
Status: DISPENSED
Start: 2019-01-01

## (undated) RX ORDER — ESMOLOL HYDROCHLORIDE 10 MG/ML
INJECTION INTRAVENOUS
Status: DISPENSED
Start: 2019-01-01

## (undated) RX ORDER — SODIUM CHLORIDE 9 MG/ML
INJECTION, SOLUTION INTRAVENOUS
Status: DISPENSED
Start: 2019-01-01

## (undated) RX ORDER — BUPIVACAINE HYDROCHLORIDE 5 MG/ML
INJECTION, SOLUTION EPIDURAL; INTRACAUDAL
Status: DISPENSED
Start: 2019-01-01

## (undated) RX ORDER — PROTAMINE SULFATE 10 MG/ML
INJECTION, SOLUTION INTRAVENOUS
Status: DISPENSED
Start: 2019-01-01

## (undated) RX ORDER — ASPIRIN 81 MG/1
TABLET ORAL
Status: DISPENSED
Start: 2019-01-01